# Patient Record
Sex: MALE | Race: WHITE | NOT HISPANIC OR LATINO | Employment: OTHER | URBAN - METROPOLITAN AREA
[De-identification: names, ages, dates, MRNs, and addresses within clinical notes are randomized per-mention and may not be internally consistent; named-entity substitution may affect disease eponyms.]

---

## 2017-01-05 ENCOUNTER — COMMUNICATION - HEALTHEAST (OUTPATIENT)
Dept: CARDIOLOGY | Facility: CLINIC | Age: 67
End: 2017-01-05

## 2017-01-05 DIAGNOSIS — E87.6 HYPOKALEMIA: ICD-10-CM

## 2017-02-07 ENCOUNTER — AMBULATORY - HEALTHEAST (OUTPATIENT)
Dept: CARDIOLOGY | Facility: CLINIC | Age: 67
End: 2017-02-07

## 2017-02-08 ENCOUNTER — COMMUNICATION - HEALTHEAST (OUTPATIENT)
Dept: CARDIOLOGY | Facility: CLINIC | Age: 67
End: 2017-02-08

## 2017-02-09 ENCOUNTER — OFFICE VISIT - HEALTHEAST (OUTPATIENT)
Dept: CARDIOLOGY | Facility: CLINIC | Age: 67
End: 2017-02-09

## 2017-02-09 DIAGNOSIS — Z86.79 HX OF BACTERIAL ENDOCARDITIS: ICD-10-CM

## 2017-02-09 DIAGNOSIS — E78.5 DYSLIPIDEMIA: ICD-10-CM

## 2017-02-09 DIAGNOSIS — I25.10 CORONARY ARTERY DISEASE INVOLVING NATIVE CORONARY ARTERY OF NATIVE HEART WITHOUT ANGINA PECTORIS: ICD-10-CM

## 2017-02-09 DIAGNOSIS — I48.19 PERSISTENT ATRIAL FIBRILLATION (H): ICD-10-CM

## 2017-02-09 DIAGNOSIS — R50.9 FEVER, UNSPECIFIED FEVER CAUSE: ICD-10-CM

## 2017-02-09 DIAGNOSIS — Z95.2 S/P MVR (MITRAL VALVE REPLACEMENT): ICD-10-CM

## 2017-02-09 LAB
ATRIAL RATE - MUSE: 92 BPM
DIASTOLIC BLOOD PRESSURE - MUSE: NORMAL MMHG
INTERPRETATION ECG - MUSE: NORMAL
P AXIS - MUSE: NORMAL DEGREES
PR INTERVAL - MUSE: NORMAL MS
QRS DURATION - MUSE: 88 MS
QT - MUSE: 372 MS
QTC - MUSE: 494 MS
R AXIS - MUSE: 68 DEGREES
SYSTOLIC BLOOD PRESSURE - MUSE: NORMAL MMHG
T AXIS - MUSE: 86 DEGREES
VENTRICULAR RATE- MUSE: 106 BPM

## 2017-02-09 ASSESSMENT — MIFFLIN-ST. JEOR: SCORE: 1587.36

## 2017-02-16 ENCOUNTER — COMMUNICATION - HEALTHEAST (OUTPATIENT)
Dept: CARDIOLOGY | Facility: CLINIC | Age: 67
End: 2017-02-16

## 2017-02-16 ENCOUNTER — INFUSION - HEALTHEAST (OUTPATIENT)
Dept: INFUSION THERAPY | Age: 67
End: 2017-02-16

## 2017-02-16 DIAGNOSIS — I33.0 SUBACUTE BACTERIAL ENDOCARDITIS: ICD-10-CM

## 2017-02-16 DIAGNOSIS — Z95.2 S/P MVR (MITRAL VALVE REPLACEMENT): ICD-10-CM

## 2017-02-17 ENCOUNTER — RECORDS - HEALTHEAST (OUTPATIENT)
Dept: ADMINISTRATIVE | Facility: OTHER | Age: 67
End: 2017-02-17

## 2017-02-17 ENCOUNTER — INFUSION - HEALTHEAST (OUTPATIENT)
Dept: INFUSION THERAPY | Age: 67
End: 2017-02-17

## 2017-02-17 DIAGNOSIS — I33.0 SUBACUTE BACTERIAL ENDOCARDITIS: ICD-10-CM

## 2017-02-18 ENCOUNTER — INFUSION - HEALTHEAST (OUTPATIENT)
Dept: INFUSION THERAPY | Age: 67
End: 2017-02-18

## 2017-02-18 DIAGNOSIS — I33.0 SUBACUTE BACTERIAL ENDOCARDITIS: ICD-10-CM

## 2017-02-19 ENCOUNTER — INFUSION - HEALTHEAST (OUTPATIENT)
Dept: INFUSION THERAPY | Age: 67
End: 2017-02-19

## 2017-02-19 DIAGNOSIS — I33.0 SUBACUTE BACTERIAL ENDOCARDITIS: ICD-10-CM

## 2017-02-20 ENCOUNTER — INFUSION - HEALTHEAST (OUTPATIENT)
Dept: INFUSION THERAPY | Age: 67
End: 2017-02-20

## 2017-02-20 DIAGNOSIS — I33.0 SUBACUTE BACTERIAL ENDOCARDITIS: ICD-10-CM

## 2017-02-21 ENCOUNTER — INFUSION - HEALTHEAST (OUTPATIENT)
Dept: INFUSION THERAPY | Age: 67
End: 2017-02-21

## 2017-02-21 DIAGNOSIS — I33.0 SUBACUTE BACTERIAL ENDOCARDITIS: ICD-10-CM

## 2017-02-22 ENCOUNTER — INFUSION - HEALTHEAST (OUTPATIENT)
Dept: INFUSION THERAPY | Age: 67
End: 2017-02-22

## 2017-02-22 DIAGNOSIS — I33.0 SUBACUTE BACTERIAL ENDOCARDITIS: ICD-10-CM

## 2017-02-23 ENCOUNTER — INFUSION - HEALTHEAST (OUTPATIENT)
Dept: INFUSION THERAPY | Age: 67
End: 2017-02-23

## 2017-02-23 DIAGNOSIS — I33.0 SUBACUTE BACTERIAL ENDOCARDITIS: ICD-10-CM

## 2017-02-24 ENCOUNTER — AMBULATORY - HEALTHEAST (OUTPATIENT)
Dept: INFUSION THERAPY | Age: 67
End: 2017-02-24

## 2017-02-24 ENCOUNTER — INFUSION - HEALTHEAST (OUTPATIENT)
Dept: INFUSION THERAPY | Age: 67
End: 2017-02-24

## 2017-02-24 DIAGNOSIS — I33.0 SUBACUTE BACTERIAL ENDOCARDITIS: ICD-10-CM

## 2017-02-25 ENCOUNTER — INFUSION - HEALTHEAST (OUTPATIENT)
Dept: INFUSION THERAPY | Age: 67
End: 2017-02-25

## 2017-02-25 DIAGNOSIS — I33.0 SUBACUTE BACTERIAL ENDOCARDITIS: ICD-10-CM

## 2017-02-26 ENCOUNTER — INFUSION - HEALTHEAST (OUTPATIENT)
Dept: INFUSION THERAPY | Age: 67
End: 2017-02-26

## 2017-02-26 DIAGNOSIS — I33.0 SUBACUTE BACTERIAL ENDOCARDITIS: ICD-10-CM

## 2017-02-27 ENCOUNTER — INFUSION - HEALTHEAST (OUTPATIENT)
Dept: INFUSION THERAPY | Age: 67
End: 2017-02-27

## 2017-02-27 DIAGNOSIS — I33.0 SUBACUTE BACTERIAL ENDOCARDITIS: ICD-10-CM

## 2017-02-28 ENCOUNTER — INFUSION - HEALTHEAST (OUTPATIENT)
Dept: INFUSION THERAPY | Age: 67
End: 2017-02-28

## 2017-02-28 DIAGNOSIS — I33.0 SUBACUTE BACTERIAL ENDOCARDITIS: ICD-10-CM

## 2017-03-01 ENCOUNTER — OFFICE VISIT - HEALTHEAST (OUTPATIENT)
Dept: INTERNAL MEDICINE | Facility: CLINIC | Age: 67
End: 2017-03-01

## 2017-03-01 ENCOUNTER — INFUSION - HEALTHEAST (OUTPATIENT)
Dept: INFUSION THERAPY | Age: 67
End: 2017-03-01

## 2017-03-01 DIAGNOSIS — I33.0 SUBACUTE BACTERIAL ENDOCARDITIS: ICD-10-CM

## 2017-03-01 DIAGNOSIS — Z95.2 S/P MVR (MITRAL VALVE REPLACEMENT): ICD-10-CM

## 2017-03-01 DIAGNOSIS — I25.10 CORONARY ARTERY DISEASE INVOLVING NATIVE CORONARY ARTERY OF NATIVE HEART WITHOUT ANGINA PECTORIS: ICD-10-CM

## 2017-03-01 DIAGNOSIS — E78.5 DYSLIPIDEMIA: ICD-10-CM

## 2017-03-01 ASSESSMENT — MIFFLIN-ST. JEOR: SCORE: 1559.12

## 2017-03-02 ENCOUNTER — INFUSION - HEALTHEAST (OUTPATIENT)
Dept: INFUSION THERAPY | Age: 67
End: 2017-03-02

## 2017-03-02 DIAGNOSIS — I33.0 SUBACUTE BACTERIAL ENDOCARDITIS: ICD-10-CM

## 2017-03-03 ENCOUNTER — HOSPITAL ENCOUNTER (OUTPATIENT)
Dept: RADIOLOGY | Facility: CLINIC | Age: 67
Discharge: HOME | End: 2017-03-03
Attending: INTERNAL MEDICINE

## 2017-03-03 ENCOUNTER — INFUSION - HEALTHEAST (OUTPATIENT)
Dept: INFUSION THERAPY | Age: 67
End: 2017-03-03

## 2017-03-03 DIAGNOSIS — R06.02 SHORTNESS OF BREATH: ICD-10-CM

## 2017-03-03 DIAGNOSIS — I33.0 SBE (SUBACUTE BACTERIAL ENDOCARDITIS): ICD-10-CM

## 2017-03-04 ENCOUNTER — INFUSION - HEALTHEAST (OUTPATIENT)
Dept: INFUSION THERAPY | Age: 67
End: 2017-03-04

## 2017-03-04 DIAGNOSIS — I33.0 SBE (SUBACUTE BACTERIAL ENDOCARDITIS): ICD-10-CM

## 2017-03-05 ENCOUNTER — INFUSION - HEALTHEAST (OUTPATIENT)
Dept: INFUSION THERAPY | Age: 67
End: 2017-03-05

## 2017-03-05 DIAGNOSIS — R69 DIAGNOSIS UNKNOWN: ICD-10-CM

## 2017-03-05 DIAGNOSIS — I33.0 SBE (SUBACUTE BACTERIAL ENDOCARDITIS): ICD-10-CM

## 2017-03-09 ENCOUNTER — INFUSION - HEALTHEAST (OUTPATIENT)
Dept: INFUSION THERAPY | Age: 67
End: 2017-03-09

## 2017-03-09 DIAGNOSIS — I33.0 SBE (SUBACUTE BACTERIAL ENDOCARDITIS): ICD-10-CM

## 2017-03-10 ENCOUNTER — INFUSION - HEALTHEAST (OUTPATIENT)
Dept: INFUSION THERAPY | Age: 67
End: 2017-03-10

## 2017-03-10 ENCOUNTER — COMMUNICATION - HEALTHEAST (OUTPATIENT)
Dept: CARDIOLOGY | Facility: CLINIC | Age: 67
End: 2017-03-10

## 2017-03-10 DIAGNOSIS — I33.0 SBE (SUBACUTE BACTERIAL ENDOCARDITIS): ICD-10-CM

## 2017-03-11 ENCOUNTER — INFUSION - HEALTHEAST (OUTPATIENT)
Dept: INFUSION THERAPY | Age: 67
End: 2017-03-11

## 2017-03-11 DIAGNOSIS — I33.0 SBE (SUBACUTE BACTERIAL ENDOCARDITIS): ICD-10-CM

## 2017-03-12 ENCOUNTER — INFUSION - HEALTHEAST (OUTPATIENT)
Dept: INFUSION THERAPY | Age: 67
End: 2017-03-12

## 2017-03-12 DIAGNOSIS — I33.0 SBE (SUBACUTE BACTERIAL ENDOCARDITIS): ICD-10-CM

## 2017-03-13 ENCOUNTER — ANESTHESIA - HEALTHEAST (OUTPATIENT)
Dept: SURGERY | Facility: CLINIC | Age: 67
End: 2017-03-13

## 2017-03-13 ENCOUNTER — SURGERY - HEALTHEAST (OUTPATIENT)
Dept: CARDIOLOGY | Facility: CLINIC | Age: 67
End: 2017-03-13

## 2017-03-13 ENCOUNTER — INFUSION - HEALTHEAST (OUTPATIENT)
Dept: INFUSION THERAPY | Age: 67
End: 2017-03-13

## 2017-03-13 DIAGNOSIS — I33.0 SBE (SUBACUTE BACTERIAL ENDOCARDITIS): ICD-10-CM

## 2017-03-13 DIAGNOSIS — I48.91 A-FIB (H): ICD-10-CM

## 2017-03-13 DIAGNOSIS — I33.0 VEGETATIVE ENDOCARDITIS OF MITRAL VALVE: ICD-10-CM

## 2017-03-14 ENCOUNTER — INFUSION - HEALTHEAST (OUTPATIENT)
Dept: INFUSION THERAPY | Age: 67
End: 2017-03-14

## 2017-03-14 DIAGNOSIS — I33.0 SBE (SUBACUTE BACTERIAL ENDOCARDITIS): ICD-10-CM

## 2017-03-15 ENCOUNTER — AMBULATORY - HEALTHEAST (OUTPATIENT)
Dept: SURGERY | Facility: CLINIC | Age: 67
End: 2017-03-15

## 2017-03-15 ENCOUNTER — INFUSION - HEALTHEAST (OUTPATIENT)
Dept: INFUSION THERAPY | Age: 67
End: 2017-03-15

## 2017-03-15 ENCOUNTER — HOSPITAL ENCOUNTER (OUTPATIENT)
Dept: SURGERY | Facility: CLINIC | Age: 67
Discharge: HOME | End: 2017-03-15

## 2017-03-15 DIAGNOSIS — I33.0 SBE (SUBACUTE BACTERIAL ENDOCARDITIS): ICD-10-CM

## 2017-03-15 DIAGNOSIS — I34.1 MITRAL VALVE PROLAPSE: ICD-10-CM

## 2017-03-15 DIAGNOSIS — G47.00 INSOMNIA: ICD-10-CM

## 2017-03-15 DIAGNOSIS — I33.0 VEGETATIVE ENDOCARDITIS OF MITRAL VALVE: ICD-10-CM

## 2017-03-15 DIAGNOSIS — I48.91 A-FIB (H): ICD-10-CM

## 2017-03-15 LAB
ATRIAL RATE - MUSE: 90 BPM
DIASTOLIC BLOOD PRESSURE - MUSE: NORMAL MMHG
INTERPRETATION ECG - MUSE: NORMAL
P AXIS - MUSE: NORMAL DEGREES
PR INTERVAL - MUSE: NORMAL MS
QRS DURATION - MUSE: 82 MS
QT - MUSE: 364 MS
QTC - MUSE: 455 MS
R AXIS - MUSE: 82 DEGREES
SYSTOLIC BLOOD PRESSURE - MUSE: NORMAL MMHG
T AXIS - MUSE: 43 DEGREES
VENTRICULAR RATE- MUSE: 94 BPM

## 2017-03-15 ASSESSMENT — MIFFLIN-ST. JEOR: SCORE: 1562.25

## 2017-03-16 ENCOUNTER — SURGERY - HEALTHEAST (OUTPATIENT)
Dept: SURGERY | Facility: CLINIC | Age: 67
End: 2017-03-16

## 2017-03-16 ENCOUNTER — HOSPITAL ENCOUNTER (OUTPATIENT)
Dept: CARDIOLOGY | Facility: CLINIC | Age: 67
Discharge: HOME | End: 2017-03-16

## 2017-03-16 ENCOUNTER — HOSPITAL ENCOUNTER (INPATIENT)
Dept: INTENSIVE CARE | Facility: CLINIC | Age: 67
Discharge: NURSING, REHAB OR LONG-TERM CARE | End: 2017-03-24
Attending: SURGERY | Admitting: SURGERY
Payer: MEDICARE

## 2017-03-16 DIAGNOSIS — I33.0 VEGETATIVE ENDOCARDITIS OF MITRAL VALVE: ICD-10-CM

## 2017-03-16 ASSESSMENT — MIFFLIN-ST. JEOR
SCORE: 1543.25
SCORE: 1543.25

## 2017-03-17 LAB
ATRIAL RATE - MUSE: 25 BPM
ATRIAL RATE - MUSE: 83 BPM
BLD PROD TYP BPU: NORMAL
BLOOD EXPIRATION DATE: NORMAL
BLOOD TYPE: 1700
BLOOD TYPE: 1700
BLOOD TYPE: 7300
CODING SYSTEM: NORMAL
COMPONENT (HISTORICAL CONVERSION): NORMAL
CROSSMATCH: NORMAL
DIASTOLIC BLOOD PRESSURE - MUSE: NORMAL MMHG
DIASTOLIC BLOOD PRESSURE - MUSE: NORMAL MMHG
INTERPRETATION ECG - MUSE: NORMAL
INTERPRETATION ECG - MUSE: NORMAL
ISSUE DATE AND TIME: NORMAL
LAB AP CHARGES (HE HISTORICAL CONVERSION): NORMAL
P AXIS - MUSE: 184 DEGREES
P AXIS - MUSE: NORMAL DEGREES
PATH REPORT.COMMENTS IMP SPEC: NORMAL
PATH REPORT.FINAL DX SPEC: NORMAL
PATH REPORT.GROSS SPEC: NORMAL
PATH REPORT.MICROSCOPIC SPEC OTHER STN: NORMAL
PATH REPORT.RELEVANT HX SPEC: NORMAL
PR INTERVAL - MUSE: NORMAL MS
PR INTERVAL - MUSE: NORMAL MS
QRS DURATION - MUSE: 82 MS
QRS DURATION - MUSE: 90 MS
QT - MUSE: 390 MS
QT - MUSE: 574 MS
QTC - MUSE: 456 MS
QTC - MUSE: 487 MS
R AXIS - MUSE: 67 DEGREES
R AXIS - MUSE: 80 DEGREES
RESULT FLAG (HE HISTORICAL CONVERSION): NORMAL
STATUS (HISTORICAL CONVERSION): NORMAL
SYSTOLIC BLOOD PRESSURE - MUSE: NORMAL MMHG
SYSTOLIC BLOOD PRESSURE - MUSE: NORMAL MMHG
T AXIS - MUSE: 19 DEGREES
T AXIS - MUSE: 58 DEGREES
UNIT ABO/RH (HISTORICAL CONVERSION): NORMAL
UNIT NUMBER: NORMAL
VENTRICULAR RATE- MUSE: 38 BPM
VENTRICULAR RATE- MUSE: 94 BPM

## 2017-03-17 ASSESSMENT — MIFFLIN-ST. JEOR
SCORE: 1606.25
SCORE: 1606.25

## 2017-03-19 LAB
BLD PROD TYP BPU: NORMAL
BLOOD EXPIRATION DATE: NORMAL
BLOOD TYPE: 7300
CODING SYSTEM: NORMAL
COMPONENT (HISTORICAL CONVERSION): NORMAL
CROSSMATCH: NORMAL
HBA1C MFR BLD: 5.4 % (ref 4.2–6.1)
STATUS (HISTORICAL CONVERSION): NORMAL
UNIT ABO/RH (HISTORICAL CONVERSION): NORMAL
UNIT NUMBER: NORMAL

## 2017-03-19 ASSESSMENT — MIFFLIN-ST. JEOR
SCORE: 1606.25
SCORE: 1606.25

## 2017-03-21 LAB
ATRIAL RATE - MUSE: 83 BPM
DIASTOLIC BLOOD PRESSURE - MUSE: NORMAL MMHG
INTERPRETATION ECG - MUSE: NORMAL
P AXIS - MUSE: NORMAL DEGREES
PR INTERVAL - MUSE: NORMAL MS
QRS DURATION - MUSE: 88 MS
QT - MUSE: 550 MS
QTC - MUSE: 672 MS
R AXIS - MUSE: 87 DEGREES
SYSTOLIC BLOOD PRESSURE - MUSE: NORMAL MMHG
T AXIS - MUSE: 84 DEGREES
VENTRICULAR RATE- MUSE: 90 BPM

## 2017-03-21 ASSESSMENT — MIFFLIN-ST. JEOR
SCORE: 1592.25
SCORE: 1592.25

## 2017-03-22 ASSESSMENT — MIFFLIN-ST. JEOR
SCORE: 1554.25
SCORE: 1554.25

## 2017-03-23 LAB
AORTIC VALVE MEAN VELOCITY: 79.9 CM/S
AORTIC VALVE MEAN VELOCITY: 79.9 CM/S
AV MEAN GRADIENT: 3 MMHG
AV MEAN GRADIENT: 3 MMHG
BLD PROD TYP BPU: NORMAL
BLOOD EXPIRATION DATE: NORMAL
BLOOD TYPE: 7300
CODING SYSTEM: NORMAL
COMPONENT (HISTORICAL CONVERSION): NORMAL
CROSSMATCH: NORMAL
DOP CALC AO PEAK VEL: 132 CM/S
DOP CALC AO PEAK VEL: 132 CM/S
DOP CALC AO VTI: 24.7 CM
DOP CALC AO VTI: 24.7 CM
DOP CALC LVOT AREA: 3.8 CM2
DOP CALC LVOT DIAMETER: 2.2 CM
DOP CALC LVOT PEAK VEL: 112 CM/S
DOP CALC LVOT STROKE VOLUME: 83.2 CM3
DOP CALC MV VTI: 123 CM
DOP CALC MV VTI: 123 CM
DOP CALCLVOT PEAK VEL VTI: 21.9 CM
ISSUE DATE AND TIME: NORMAL
LEFT VENTRICULAR OUTFLOW TRACT MEAN GRADIENT: 3 MMHG
LEFT VENTRICULAR OUTFLOW TRACT MEAN VELOCITY: 71.8 CM/S
LEFT VENTRICULAR OUTFLOW TRACT PEAK GRADIENT: 5 MMHG
MAIN PULMONARY ARTERY: 2.7 CM
MAIN PULMONARY ARTERY: 2.7 CM
MITRAL VALVE DECELERATION SLOPE: 2240 MM/S2
MITRAL VALVE DECELERATION SLOPE: 2240 MM/S2
MITRAL VALVE MEAN INFLOW VELOCITY: 156 CM/S
MITRAL VALVE MEAN INFLOW VELOCITY: 156 CM/S
MITRAL VALVE PEAK VELOCITY: 266 CM/S
MITRAL VALVE PEAK VELOCITY: 266 CM/S
MITRAL VALVE PRESSURE HALF-TIME: 260 MS
MV MEAN GRADIENT: 11 MMHG
MV MEAN GRADIENT: 11 MMHG
MV VALVE AREA PRESSURE 1/2 METHOD: 0.8 CM2
PV MEAN GRADIENT: 1 MMHG
PV MEAN GRADIENT: 1 MMHG
PV MEAN VELOCITY: 42.8 CM/S
PV MEAN VELOCITY: 42.8 CM/S
PV VELOCITY TIME INTERVAL: 18.2 CM
PV VELOCITY TIME INTERVAL: 18.2 CM
PV VMAX: 72.2 CM/S
PV VMAX: 72.2 CM/S
STATUS (HISTORICAL CONVERSION): NORMAL
UNIT ABO/RH (HISTORICAL CONVERSION): NORMAL
UNIT NUMBER: NORMAL

## 2017-03-23 ASSESSMENT — MIFFLIN-ST. JEOR
SCORE: 1557.3
SCORE: 1557.3

## 2017-03-24 ASSESSMENT — MIFFLIN-ST. JEOR
SCORE: 1554.58
SCORE: 1554.58

## 2017-03-27 ENCOUNTER — AMBULATORY - HEALTHEAST (OUTPATIENT)
Dept: GERIATRICS | Facility: CLINIC | Age: 67
End: 2017-03-27

## 2017-03-28 ENCOUNTER — OFFICE VISIT - HEALTHEAST (OUTPATIENT)
Dept: GERIATRICS | Facility: CLINIC | Age: 67
End: 2017-03-28

## 2017-03-28 DIAGNOSIS — I38 PROSTHETIC VALVE ENDOCARDITIS, SUBSEQUENT ENCOUNTER: ICD-10-CM

## 2017-03-28 DIAGNOSIS — J96.01 ACUTE RESPIRATORY FAILURE WITH HYPOXIA (H): ICD-10-CM

## 2017-03-28 DIAGNOSIS — Z95.4 S/P MITRAL VALVE REPLACEMENT WITH METALLIC VALVE: ICD-10-CM

## 2017-03-28 DIAGNOSIS — I50.21 ACUTE SYSTOLIC CONGESTIVE HEART FAILURE (H): ICD-10-CM

## 2017-03-28 DIAGNOSIS — Z86.79 STATUS POST MAZE OPERATION FOR ATRIAL FIBRILLATION: ICD-10-CM

## 2017-03-28 DIAGNOSIS — Z86.79 HX OF BACTERIAL ENDOCARDITIS: ICD-10-CM

## 2017-03-28 DIAGNOSIS — I34.1 MITRAL VALVE PROLAPSE: ICD-10-CM

## 2017-03-28 DIAGNOSIS — I48.0 PAROXYSMAL ATRIAL FIBRILLATION (H): ICD-10-CM

## 2017-03-28 DIAGNOSIS — D62 ACUTE BLOOD LOSS ANEMIA: ICD-10-CM

## 2017-03-28 DIAGNOSIS — Z95.2 S/P MVR (MITRAL VALVE REPLACEMENT): ICD-10-CM

## 2017-03-28 DIAGNOSIS — N17.9 ACUTE RENAL FAILURE, UNSPECIFIED ACUTE RENAL FAILURE TYPE (H): ICD-10-CM

## 2017-03-28 DIAGNOSIS — Z98.890 STATUS POST MAZE OPERATION FOR ATRIAL FIBRILLATION: ICD-10-CM

## 2017-03-28 DIAGNOSIS — I25.10 CORONARY ARTERY DISEASE INVOLVING NATIVE CORONARY ARTERY OF NATIVE HEART WITHOUT ANGINA PECTORIS: ICD-10-CM

## 2017-03-28 DIAGNOSIS — T82.6XXD PROSTHETIC VALVE ENDOCARDITIS, SUBSEQUENT ENCOUNTER: ICD-10-CM

## 2017-03-28 DIAGNOSIS — E78.5 DYSLIPIDEMIA: ICD-10-CM

## 2017-03-28 DIAGNOSIS — F34.1 DYSTHYMIA: ICD-10-CM

## 2017-03-29 ENCOUNTER — AMBULATORY - HEALTHEAST (OUTPATIENT)
Dept: GERIATRICS | Facility: CLINIC | Age: 67
End: 2017-03-29

## 2017-03-30 ENCOUNTER — OFFICE VISIT - HEALTHEAST (OUTPATIENT)
Dept: GERIATRICS | Facility: CLINIC | Age: 67
End: 2017-03-30

## 2017-03-30 ENCOUNTER — RECORDS - HEALTHEAST (OUTPATIENT)
Dept: ADMINISTRATIVE | Facility: OTHER | Age: 67
End: 2017-03-30

## 2017-03-30 DIAGNOSIS — G47.00 INSOMNIA: ICD-10-CM

## 2017-03-30 DIAGNOSIS — Z86.79 HX OF BACTERIAL ENDOCARDITIS: ICD-10-CM

## 2017-03-30 DIAGNOSIS — Z95.2 S/P MVR (MITRAL VALVE REPLACEMENT): ICD-10-CM

## 2017-03-30 DIAGNOSIS — D62 ACUTE BLOOD LOSS ANEMIA: ICD-10-CM

## 2017-03-30 DIAGNOSIS — T82.6XXD PROSTHETIC VALVE ENDOCARDITIS, SUBSEQUENT ENCOUNTER: ICD-10-CM

## 2017-03-30 DIAGNOSIS — I38 PROSTHETIC VALVE ENDOCARDITIS, SUBSEQUENT ENCOUNTER: ICD-10-CM

## 2017-03-30 DIAGNOSIS — Z98.890 STATUS POST MAZE OPERATION FOR ATRIAL FIBRILLATION: ICD-10-CM

## 2017-03-30 DIAGNOSIS — I25.10 CORONARY ARTERY DISEASE INVOLVING NATIVE CORONARY ARTERY OF NATIVE HEART WITHOUT ANGINA PECTORIS: ICD-10-CM

## 2017-03-30 DIAGNOSIS — E78.5 DYSLIPIDEMIA: ICD-10-CM

## 2017-03-30 DIAGNOSIS — I50.21 ACUTE SYSTOLIC CONGESTIVE HEART FAILURE (H): ICD-10-CM

## 2017-03-30 DIAGNOSIS — N17.9 ACUTE RENAL FAILURE, UNSPECIFIED ACUTE RENAL FAILURE TYPE (H): ICD-10-CM

## 2017-03-30 DIAGNOSIS — I48.0 PAROXYSMAL ATRIAL FIBRILLATION (H): ICD-10-CM

## 2017-03-30 DIAGNOSIS — Z86.79 STATUS POST MAZE OPERATION FOR ATRIAL FIBRILLATION: ICD-10-CM

## 2017-03-30 DIAGNOSIS — Z95.4 S/P MITRAL VALVE REPLACEMENT WITH METALLIC VALVE: ICD-10-CM

## 2017-03-31 ENCOUNTER — AMBULATORY - HEALTHEAST (OUTPATIENT)
Dept: INTERNAL MEDICINE | Facility: CLINIC | Age: 67
End: 2017-03-31

## 2017-03-31 ENCOUNTER — AMBULATORY - HEALTHEAST (OUTPATIENT)
Dept: GERIATRICS | Facility: CLINIC | Age: 67
End: 2017-03-31

## 2017-03-31 ENCOUNTER — COMMUNICATION - HEALTHEAST (OUTPATIENT)
Dept: INTERNAL MEDICINE | Facility: CLINIC | Age: 67
End: 2017-03-31

## 2017-03-31 ENCOUNTER — HOME CARE/HOSPICE - HEALTHEAST (OUTPATIENT)
Dept: HOME HEALTH SERVICES | Facility: HOME HEALTH | Age: 67
End: 2017-03-31

## 2017-03-31 ENCOUNTER — COMMUNICATION - HEALTHEAST (OUTPATIENT)
Dept: GERIATRICS | Facility: CLINIC | Age: 67
End: 2017-03-31

## 2017-03-31 ENCOUNTER — RECORDS - HEALTHEAST (OUTPATIENT)
Dept: ADMINISTRATIVE | Facility: OTHER | Age: 67
End: 2017-03-31

## 2017-03-31 ENCOUNTER — INFUSION - HEALTHEAST (OUTPATIENT)
Dept: INFUSION THERAPY | Age: 67
End: 2017-03-31

## 2017-03-31 DIAGNOSIS — I33.0 SBE (SUBACUTE BACTERIAL ENDOCARDITIS): ICD-10-CM

## 2017-03-31 DIAGNOSIS — Z95.2 H/O MITRAL VALVE REPLACEMENT: ICD-10-CM

## 2017-04-01 ENCOUNTER — INFUSION - HEALTHEAST (OUTPATIENT)
Dept: INFUSION THERAPY | Age: 67
End: 2017-04-01

## 2017-04-01 DIAGNOSIS — I33.0 SBE (SUBACUTE BACTERIAL ENDOCARDITIS): ICD-10-CM

## 2017-04-02 ENCOUNTER — INFUSION - HEALTHEAST (OUTPATIENT)
Dept: INFUSION THERAPY | Age: 67
End: 2017-04-02

## 2017-04-02 DIAGNOSIS — I33.0 SBE (SUBACUTE BACTERIAL ENDOCARDITIS): ICD-10-CM

## 2017-04-03 ENCOUNTER — COMMUNICATION - HEALTHEAST (OUTPATIENT)
Dept: INTERNAL MEDICINE | Facility: CLINIC | Age: 67
End: 2017-04-03

## 2017-04-03 ENCOUNTER — COMMUNICATION - HEALTHEAST (OUTPATIENT)
Dept: NURSING | Facility: CLINIC | Age: 67
End: 2017-04-03

## 2017-04-03 ENCOUNTER — INFUSION - HEALTHEAST (OUTPATIENT)
Dept: INFUSION THERAPY | Age: 67
End: 2017-04-03

## 2017-04-03 DIAGNOSIS — I48.0 PAROXYSMAL ATRIAL FIBRILLATION (H): ICD-10-CM

## 2017-04-03 DIAGNOSIS — Z79.01 LONG TERM (CURRENT) USE OF ANTICOAGULANTS: ICD-10-CM

## 2017-04-03 DIAGNOSIS — I33.0 SBE (SUBACUTE BACTERIAL ENDOCARDITIS): ICD-10-CM

## 2017-04-03 DIAGNOSIS — Z95.4 S/P MITRAL VALVE REPLACEMENT WITH METALLIC VALVE: ICD-10-CM

## 2017-04-03 DIAGNOSIS — J90 RECURRENT PLEURAL EFFUSION ON RIGHT: ICD-10-CM

## 2017-04-03 DIAGNOSIS — Z95.2 H/O MITRAL VALVE REPLACEMENT: ICD-10-CM

## 2017-04-03 DIAGNOSIS — Z95.2 S/P MVR (MITRAL VALVE REPLACEMENT): ICD-10-CM

## 2017-04-04 ENCOUNTER — INFUSION - HEALTHEAST (OUTPATIENT)
Dept: INFUSION THERAPY | Age: 67
End: 2017-04-04

## 2017-04-04 DIAGNOSIS — Z95.2 H/O MITRAL VALVE REPLACEMENT: ICD-10-CM

## 2017-04-04 DIAGNOSIS — I33.0 SBE (SUBACUTE BACTERIAL ENDOCARDITIS): ICD-10-CM

## 2017-04-05 ENCOUNTER — INFUSION - HEALTHEAST (OUTPATIENT)
Dept: INFUSION THERAPY | Age: 67
End: 2017-04-05

## 2017-04-05 ENCOUNTER — OFFICE VISIT - HEALTHEAST (OUTPATIENT)
Dept: INTERNAL MEDICINE | Facility: CLINIC | Age: 67
End: 2017-04-05

## 2017-04-05 DIAGNOSIS — Z95.2 S/P MVR (MITRAL VALVE REPLACEMENT): ICD-10-CM

## 2017-04-05 DIAGNOSIS — D62 ACUTE BLOOD LOSS ANEMIA: ICD-10-CM

## 2017-04-05 DIAGNOSIS — T82.6XXD PROSTHETIC VALVE ENDOCARDITIS, SUBSEQUENT ENCOUNTER: ICD-10-CM

## 2017-04-05 DIAGNOSIS — I33.0 SBE (SUBACUTE BACTERIAL ENDOCARDITIS): ICD-10-CM

## 2017-04-05 DIAGNOSIS — I25.10 CORONARY ARTERY DISEASE INVOLVING NATIVE CORONARY ARTERY OF NATIVE HEART WITHOUT ANGINA PECTORIS: ICD-10-CM

## 2017-04-05 DIAGNOSIS — I38 PROSTHETIC VALVE ENDOCARDITIS, SUBSEQUENT ENCOUNTER: ICD-10-CM

## 2017-04-05 ASSESSMENT — MIFFLIN-ST. JEOR: SCORE: 1559.12

## 2017-04-06 ENCOUNTER — COMMUNICATION - HEALTHEAST (OUTPATIENT)
Dept: NURSING | Facility: CLINIC | Age: 67
End: 2017-04-06

## 2017-04-06 ENCOUNTER — INFUSION - HEALTHEAST (OUTPATIENT)
Dept: INFUSION THERAPY | Age: 67
End: 2017-04-06

## 2017-04-06 DIAGNOSIS — I48.0 PAROXYSMAL ATRIAL FIBRILLATION (H): ICD-10-CM

## 2017-04-06 DIAGNOSIS — Z95.2 S/P MVR (MITRAL VALVE REPLACEMENT): ICD-10-CM

## 2017-04-06 DIAGNOSIS — Z79.01 LONG TERM (CURRENT) USE OF ANTICOAGULANTS: ICD-10-CM

## 2017-04-06 DIAGNOSIS — I33.0 SBE (SUBACUTE BACTERIAL ENDOCARDITIS): ICD-10-CM

## 2017-04-06 DIAGNOSIS — Z95.4 S/P MITRAL VALVE REPLACEMENT WITH METALLIC VALVE: ICD-10-CM

## 2017-04-07 ENCOUNTER — INFUSION - HEALTHEAST (OUTPATIENT)
Dept: INFUSION THERAPY | Age: 67
End: 2017-04-07

## 2017-04-07 DIAGNOSIS — I33.0 SBE (SUBACUTE BACTERIAL ENDOCARDITIS): ICD-10-CM

## 2017-04-07 DIAGNOSIS — Z95.4 S/P MITRAL VALVE REPLACEMENT WITH METALLIC VALVE: ICD-10-CM

## 2017-04-08 ENCOUNTER — INFUSION - HEALTHEAST (OUTPATIENT)
Dept: INFUSION THERAPY | Age: 67
End: 2017-04-08

## 2017-04-08 DIAGNOSIS — Z95.4 S/P MITRAL VALVE REPLACEMENT WITH METALLIC VALVE: ICD-10-CM

## 2017-04-08 DIAGNOSIS — I33.0 SBE (SUBACUTE BACTERIAL ENDOCARDITIS): ICD-10-CM

## 2017-04-09 ENCOUNTER — INFUSION - HEALTHEAST (OUTPATIENT)
Dept: INFUSION THERAPY | Age: 67
End: 2017-04-09

## 2017-04-09 DIAGNOSIS — Z95.4 S/P MITRAL VALVE REPLACEMENT WITH METALLIC VALVE: ICD-10-CM

## 2017-04-09 DIAGNOSIS — I33.0 SBE (SUBACUTE BACTERIAL ENDOCARDITIS): ICD-10-CM

## 2017-04-10 ENCOUNTER — COMMUNICATION - HEALTHEAST (OUTPATIENT)
Dept: NURSING | Facility: CLINIC | Age: 67
End: 2017-04-10

## 2017-04-10 ENCOUNTER — INFUSION - HEALTHEAST (OUTPATIENT)
Dept: INFUSION THERAPY | Age: 67
End: 2017-04-10

## 2017-04-10 DIAGNOSIS — Z95.4 S/P MITRAL VALVE REPLACEMENT WITH METALLIC VALVE: ICD-10-CM

## 2017-04-10 DIAGNOSIS — I33.0 SBE (SUBACUTE BACTERIAL ENDOCARDITIS): ICD-10-CM

## 2017-04-10 DIAGNOSIS — Z95.2 H/O MITRAL VALVE REPLACEMENT: ICD-10-CM

## 2017-04-10 DIAGNOSIS — Z95.2 S/P MVR (MITRAL VALVE REPLACEMENT): ICD-10-CM

## 2017-04-10 DIAGNOSIS — I48.0 PAROXYSMAL ATRIAL FIBRILLATION (H): ICD-10-CM

## 2017-04-11 ENCOUNTER — INFUSION - HEALTHEAST (OUTPATIENT)
Dept: INFUSION THERAPY | Age: 67
End: 2017-04-11

## 2017-04-11 DIAGNOSIS — I33.0 SBE (SUBACUTE BACTERIAL ENDOCARDITIS): ICD-10-CM

## 2017-04-11 DIAGNOSIS — Z95.4 S/P MITRAL VALVE REPLACEMENT WITH METALLIC VALVE: ICD-10-CM

## 2017-04-12 ENCOUNTER — INFUSION - HEALTHEAST (OUTPATIENT)
Dept: INFUSION THERAPY | Age: 67
End: 2017-04-12

## 2017-04-12 DIAGNOSIS — I33.0 SBE (SUBACUTE BACTERIAL ENDOCARDITIS): ICD-10-CM

## 2017-04-13 ENCOUNTER — INFUSION - HEALTHEAST (OUTPATIENT)
Dept: INFUSION THERAPY | Age: 67
End: 2017-04-13

## 2017-04-13 ENCOUNTER — COMMUNICATION - HEALTHEAST (OUTPATIENT)
Dept: NURSING | Facility: CLINIC | Age: 67
End: 2017-04-13

## 2017-04-13 DIAGNOSIS — Z95.2 S/P MVR (MITRAL VALVE REPLACEMENT): ICD-10-CM

## 2017-04-13 DIAGNOSIS — I48.0 PAROXYSMAL ATRIAL FIBRILLATION (H): ICD-10-CM

## 2017-04-13 DIAGNOSIS — I33.0 SBE (SUBACUTE BACTERIAL ENDOCARDITIS): ICD-10-CM

## 2017-04-13 DIAGNOSIS — Z95.4 S/P MITRAL VALVE REPLACEMENT WITH METALLIC VALVE: ICD-10-CM

## 2017-04-13 DIAGNOSIS — Z95.2 H/O MITRAL VALVE REPLACEMENT: ICD-10-CM

## 2017-04-14 ENCOUNTER — OFFICE VISIT - HEALTHEAST (OUTPATIENT)
Dept: CARDIOLOGY | Facility: CLINIC | Age: 67
End: 2017-04-14

## 2017-04-14 ENCOUNTER — INFUSION - HEALTHEAST (OUTPATIENT)
Dept: INFUSION THERAPY | Age: 67
End: 2017-04-14

## 2017-04-14 DIAGNOSIS — T82.6XXD PROSTHETIC VALVE ENDOCARDITIS, SUBSEQUENT ENCOUNTER: ICD-10-CM

## 2017-04-14 DIAGNOSIS — Z95.4 S/P MITRAL VALVE REPLACEMENT WITH METALLIC VALVE: ICD-10-CM

## 2017-04-14 DIAGNOSIS — I38 PROSTHETIC VALVE ENDOCARDITIS, SUBSEQUENT ENCOUNTER: ICD-10-CM

## 2017-04-14 DIAGNOSIS — I33.0 SBE (SUBACUTE BACTERIAL ENDOCARDITIS): ICD-10-CM

## 2017-04-14 ASSESSMENT — MIFFLIN-ST. JEOR: SCORE: 1527.36

## 2017-04-15 ENCOUNTER — INFUSION - HEALTHEAST (OUTPATIENT)
Dept: INFUSION THERAPY | Age: 67
End: 2017-04-15

## 2017-04-15 DIAGNOSIS — Z95.4 S/P MITRAL VALVE REPLACEMENT WITH METALLIC VALVE: ICD-10-CM

## 2017-04-15 DIAGNOSIS — I33.0 SBE (SUBACUTE BACTERIAL ENDOCARDITIS): ICD-10-CM

## 2017-04-16 ENCOUNTER — INFUSION - HEALTHEAST (OUTPATIENT)
Dept: INFUSION THERAPY | Age: 67
End: 2017-04-16

## 2017-04-16 DIAGNOSIS — I33.0 SBE (SUBACUTE BACTERIAL ENDOCARDITIS): ICD-10-CM

## 2017-04-16 DIAGNOSIS — Z95.4 S/P MITRAL VALVE REPLACEMENT WITH METALLIC VALVE: ICD-10-CM

## 2017-04-17 ENCOUNTER — AMBULATORY - HEALTHEAST (OUTPATIENT)
Dept: INFUSION THERAPY | Age: 67
End: 2017-04-17

## 2017-04-17 ENCOUNTER — INFUSION - HEALTHEAST (OUTPATIENT)
Dept: INFUSION THERAPY | Age: 67
End: 2017-04-17

## 2017-04-17 DIAGNOSIS — I33.0 SBE (SUBACUTE BACTERIAL ENDOCARDITIS): ICD-10-CM

## 2017-04-17 DIAGNOSIS — Z95.4 S/P MITRAL VALVE REPLACEMENT WITH METALLIC VALVE: ICD-10-CM

## 2017-04-18 ENCOUNTER — COMMUNICATION - HEALTHEAST (OUTPATIENT)
Dept: NURSING | Facility: CLINIC | Age: 67
End: 2017-04-18

## 2017-04-18 ENCOUNTER — INFUSION - HEALTHEAST (OUTPATIENT)
Dept: INFUSION THERAPY | Age: 67
End: 2017-04-18

## 2017-04-18 DIAGNOSIS — I48.0 PAROXYSMAL ATRIAL FIBRILLATION (H): ICD-10-CM

## 2017-04-18 DIAGNOSIS — I33.0 SBE (SUBACUTE BACTERIAL ENDOCARDITIS): ICD-10-CM

## 2017-04-18 DIAGNOSIS — Z95.4 S/P MITRAL VALVE REPLACEMENT WITH METALLIC VALVE: ICD-10-CM

## 2017-04-18 DIAGNOSIS — Z95.2 S/P MVR (MITRAL VALVE REPLACEMENT): ICD-10-CM

## 2017-04-18 DIAGNOSIS — Z79.01 LONG TERM (CURRENT) USE OF ANTICOAGULANTS: ICD-10-CM

## 2017-04-19 ENCOUNTER — INFUSION - HEALTHEAST (OUTPATIENT)
Dept: INFUSION THERAPY | Age: 67
End: 2017-04-19

## 2017-04-19 DIAGNOSIS — Z95.4 S/P MITRAL VALVE REPLACEMENT WITH METALLIC VALVE: ICD-10-CM

## 2017-04-19 DIAGNOSIS — I33.0 SBE (SUBACUTE BACTERIAL ENDOCARDITIS): ICD-10-CM

## 2017-04-20 ENCOUNTER — OFFICE VISIT - HEALTHEAST (OUTPATIENT)
Dept: INFECTIOUS DISEASES | Facility: CLINIC | Age: 67
End: 2017-04-20

## 2017-04-20 ENCOUNTER — INFUSION - HEALTHEAST (OUTPATIENT)
Dept: INFUSION THERAPY | Age: 67
End: 2017-04-20

## 2017-04-20 DIAGNOSIS — Z95.4 S/P MITRAL VALVE REPLACEMENT WITH METALLIC VALVE: ICD-10-CM

## 2017-04-20 DIAGNOSIS — I33.0 SBE (SUBACUTE BACTERIAL ENDOCARDITIS): ICD-10-CM

## 2017-04-20 ASSESSMENT — MIFFLIN-ST. JEOR: SCORE: 1521.47

## 2017-04-21 ENCOUNTER — INFUSION - HEALTHEAST (OUTPATIENT)
Dept: INFUSION THERAPY | Age: 67
End: 2017-04-21

## 2017-04-21 DIAGNOSIS — I33.0 SBE (SUBACUTE BACTERIAL ENDOCARDITIS): ICD-10-CM

## 2017-04-21 DIAGNOSIS — Z95.4 S/P MITRAL VALVE REPLACEMENT WITH METALLIC VALVE: ICD-10-CM

## 2017-04-22 ENCOUNTER — INFUSION - HEALTHEAST (OUTPATIENT)
Dept: INFUSION THERAPY | Age: 67
End: 2017-04-22

## 2017-04-22 DIAGNOSIS — I33.0 SBE (SUBACUTE BACTERIAL ENDOCARDITIS): ICD-10-CM

## 2017-04-22 DIAGNOSIS — Z95.4 S/P MITRAL VALVE REPLACEMENT WITH METALLIC VALVE: ICD-10-CM

## 2017-04-22 DIAGNOSIS — I33.0 VEGETATIVE ENDOCARDITIS OF MITRAL VALVE: ICD-10-CM

## 2017-04-24 ENCOUNTER — INFUSION - HEALTHEAST (OUTPATIENT)
Dept: INFUSION THERAPY | Age: 67
End: 2017-04-24

## 2017-04-24 DIAGNOSIS — I33.0 SBE (SUBACUTE BACTERIAL ENDOCARDITIS): ICD-10-CM

## 2017-04-24 DIAGNOSIS — Z95.4 S/P MITRAL VALVE REPLACEMENT WITH METALLIC VALVE: ICD-10-CM

## 2017-04-25 ENCOUNTER — INFUSION - HEALTHEAST (OUTPATIENT)
Dept: INFUSION THERAPY | Age: 67
End: 2017-04-25

## 2017-04-25 ENCOUNTER — COMMUNICATION - HEALTHEAST (OUTPATIENT)
Dept: NURSING | Facility: CLINIC | Age: 67
End: 2017-04-25

## 2017-04-25 DIAGNOSIS — I33.0 SBE (SUBACUTE BACTERIAL ENDOCARDITIS): ICD-10-CM

## 2017-04-25 DIAGNOSIS — Z95.2 S/P MVR (MITRAL VALVE REPLACEMENT): ICD-10-CM

## 2017-04-25 DIAGNOSIS — Z95.4 S/P MITRAL VALVE REPLACEMENT WITH METALLIC VALVE: ICD-10-CM

## 2017-04-25 DIAGNOSIS — I48.0 PAROXYSMAL ATRIAL FIBRILLATION (H): ICD-10-CM

## 2017-04-25 DIAGNOSIS — Z79.01 LONG TERM (CURRENT) USE OF ANTICOAGULANTS: ICD-10-CM

## 2017-04-26 ENCOUNTER — INFUSION - HEALTHEAST (OUTPATIENT)
Dept: INFUSION THERAPY | Age: 67
End: 2017-04-26

## 2017-04-26 DIAGNOSIS — I33.0 SBE (SUBACUTE BACTERIAL ENDOCARDITIS): ICD-10-CM

## 2017-04-26 DIAGNOSIS — I48.0 PAROXYSMAL ATRIAL FIBRILLATION (H): ICD-10-CM

## 2017-04-26 DIAGNOSIS — Z95.4 S/P MITRAL VALVE REPLACEMENT WITH METALLIC VALVE: ICD-10-CM

## 2017-04-27 ENCOUNTER — INFUSION - HEALTHEAST (OUTPATIENT)
Dept: INFUSION THERAPY | Age: 67
End: 2017-04-27

## 2017-04-27 DIAGNOSIS — Z95.4 S/P MITRAL VALVE REPLACEMENT WITH METALLIC VALVE: ICD-10-CM

## 2017-04-27 DIAGNOSIS — I33.0 SBE (SUBACUTE BACTERIAL ENDOCARDITIS): ICD-10-CM

## 2017-04-28 ENCOUNTER — INFUSION - HEALTHEAST (OUTPATIENT)
Dept: INFUSION THERAPY | Age: 67
End: 2017-04-28

## 2017-04-28 DIAGNOSIS — Z95.4 S/P MITRAL VALVE REPLACEMENT WITH METALLIC VALVE: ICD-10-CM

## 2017-04-28 DIAGNOSIS — I33.0 SBE (SUBACUTE BACTERIAL ENDOCARDITIS): ICD-10-CM

## 2017-04-28 LAB — BACTERIA SPEC CULT: NORMAL

## 2017-04-29 ENCOUNTER — INFUSION - HEALTHEAST (OUTPATIENT)
Dept: INFUSION THERAPY | Age: 67
End: 2017-04-29

## 2017-04-29 DIAGNOSIS — I33.0 SBE (SUBACUTE BACTERIAL ENDOCARDITIS): ICD-10-CM

## 2017-04-30 ENCOUNTER — INFUSION - HEALTHEAST (OUTPATIENT)
Dept: INFUSION THERAPY | Age: 67
End: 2017-04-30

## 2017-04-30 DIAGNOSIS — I33.0 SBE (SUBACUTE BACTERIAL ENDOCARDITIS): ICD-10-CM

## 2017-05-01 ENCOUNTER — COMMUNICATION - HEALTHEAST (OUTPATIENT)
Dept: NURSING | Facility: CLINIC | Age: 67
End: 2017-05-01

## 2017-05-01 ENCOUNTER — INFUSION - HEALTHEAST (OUTPATIENT)
Dept: INFUSION THERAPY | Age: 67
End: 2017-05-01

## 2017-05-01 DIAGNOSIS — Z95.2 S/P MVR (MITRAL VALVE REPLACEMENT): ICD-10-CM

## 2017-05-01 DIAGNOSIS — Z95.4 S/P MITRAL VALVE REPLACEMENT WITH METALLIC VALVE: ICD-10-CM

## 2017-05-01 DIAGNOSIS — I48.0 PAROXYSMAL ATRIAL FIBRILLATION (H): ICD-10-CM

## 2017-05-01 DIAGNOSIS — Z79.01 LONG TERM (CURRENT) USE OF ANTICOAGULANTS: ICD-10-CM

## 2017-05-01 DIAGNOSIS — I33.0 SBE (SUBACUTE BACTERIAL ENDOCARDITIS): ICD-10-CM

## 2017-05-02 ENCOUNTER — AMBULATORY - HEALTHEAST (OUTPATIENT)
Dept: CARDIOLOGY | Facility: CLINIC | Age: 67
End: 2017-05-02

## 2017-05-02 ENCOUNTER — AMBULATORY - HEALTHEAST (OUTPATIENT)
Dept: CARDIAC REHAB | Facility: CLINIC | Age: 67
End: 2017-05-02

## 2017-05-02 DIAGNOSIS — Z95.2 S/P MVR (MITRAL VALVE REPLACEMENT): ICD-10-CM

## 2017-05-02 DIAGNOSIS — R00.1 BRADYCARDIA: ICD-10-CM

## 2017-05-02 DIAGNOSIS — I33.0 VEGETATIVE ENDOCARDITIS OF MITRAL VALVE: ICD-10-CM

## 2017-05-02 DIAGNOSIS — I48.0 PAROXYSMAL ATRIAL FIBRILLATION (H): ICD-10-CM

## 2017-05-04 ENCOUNTER — COMMUNICATION - HEALTHEAST (OUTPATIENT)
Dept: INTERNAL MEDICINE | Facility: CLINIC | Age: 67
End: 2017-05-04

## 2017-05-05 ENCOUNTER — AMBULATORY - HEALTHEAST (OUTPATIENT)
Dept: CARDIAC REHAB | Facility: CLINIC | Age: 67
End: 2017-05-05

## 2017-05-05 ENCOUNTER — COMMUNICATION - HEALTHEAST (OUTPATIENT)
Dept: NURSING | Facility: CLINIC | Age: 67
End: 2017-05-05

## 2017-05-05 ENCOUNTER — AMBULATORY - HEALTHEAST (OUTPATIENT)
Dept: LAB | Facility: CLINIC | Age: 67
End: 2017-05-05

## 2017-05-05 DIAGNOSIS — Z95.4 S/P MITRAL VALVE REPLACEMENT WITH METALLIC VALVE: ICD-10-CM

## 2017-05-05 DIAGNOSIS — I48.0 PAROXYSMAL ATRIAL FIBRILLATION (H): ICD-10-CM

## 2017-05-05 DIAGNOSIS — Z95.2 S/P MVR (MITRAL VALVE REPLACEMENT): ICD-10-CM

## 2017-05-05 DIAGNOSIS — Z79.01 LONG TERM (CURRENT) USE OF ANTICOAGULANTS: ICD-10-CM

## 2017-05-08 ENCOUNTER — AMBULATORY - HEALTHEAST (OUTPATIENT)
Dept: CARDIAC REHAB | Facility: CLINIC | Age: 67
End: 2017-05-08

## 2017-05-08 ENCOUNTER — HOSPITAL ENCOUNTER (OUTPATIENT)
Dept: CARDIOLOGY | Facility: CLINIC | Age: 67
Discharge: HOME | End: 2017-05-08
Attending: INTERNAL MEDICINE

## 2017-05-08 DIAGNOSIS — R00.1 BRADYCARDIA: ICD-10-CM

## 2017-05-08 DIAGNOSIS — I33.0 VEGETATIVE ENDOCARDITIS OF MITRAL VALVE: ICD-10-CM

## 2017-05-08 DIAGNOSIS — I48.0 PAROXYSMAL ATRIAL FIBRILLATION (H): ICD-10-CM

## 2017-05-08 DIAGNOSIS — Z95.2 S/P MVR (MITRAL VALVE REPLACEMENT): ICD-10-CM

## 2017-05-10 ENCOUNTER — AMBULATORY - HEALTHEAST (OUTPATIENT)
Dept: CARDIAC REHAB | Facility: CLINIC | Age: 67
End: 2017-05-10

## 2017-05-10 DIAGNOSIS — I33.0 VEGETATIVE ENDOCARDITIS OF MITRAL VALVE: ICD-10-CM

## 2017-05-10 DIAGNOSIS — Z95.2 S/P MVR (MITRAL VALVE REPLACEMENT): ICD-10-CM

## 2017-05-12 ENCOUNTER — AMBULATORY - HEALTHEAST (OUTPATIENT)
Dept: CARDIAC REHAB | Facility: CLINIC | Age: 67
End: 2017-05-12

## 2017-05-12 ENCOUNTER — AMBULATORY - HEALTHEAST (OUTPATIENT)
Dept: LAB | Facility: CLINIC | Age: 67
End: 2017-05-12

## 2017-05-12 ENCOUNTER — COMMUNICATION - HEALTHEAST (OUTPATIENT)
Dept: NURSING | Facility: CLINIC | Age: 67
End: 2017-05-12

## 2017-05-12 DIAGNOSIS — Z95.2 S/P MVR (MITRAL VALVE REPLACEMENT): ICD-10-CM

## 2017-05-12 DIAGNOSIS — I48.0 PAROXYSMAL ATRIAL FIBRILLATION (H): ICD-10-CM

## 2017-05-12 DIAGNOSIS — Z95.4 S/P MITRAL VALVE REPLACEMENT WITH METALLIC VALVE: ICD-10-CM

## 2017-05-12 DIAGNOSIS — Z79.01 LONG TERM (CURRENT) USE OF ANTICOAGULANTS: ICD-10-CM

## 2017-05-16 ENCOUNTER — AMBULATORY - HEALTHEAST (OUTPATIENT)
Dept: CARDIAC REHAB | Facility: CLINIC | Age: 67
End: 2017-05-16

## 2017-05-16 ENCOUNTER — OFFICE VISIT - HEALTHEAST (OUTPATIENT)
Dept: CARDIOLOGY | Facility: CLINIC | Age: 67
End: 2017-05-16

## 2017-05-16 DIAGNOSIS — I48.19 PERSISTENT ATRIAL FIBRILLATION (H): ICD-10-CM

## 2017-05-16 DIAGNOSIS — I25.10 CORONARY ARTERY DISEASE INVOLVING NATIVE CORONARY ARTERY OF NATIVE HEART WITHOUT ANGINA PECTORIS: ICD-10-CM

## 2017-05-16 DIAGNOSIS — Z95.4 S/P MITRAL VALVE REPLACEMENT WITH METALLIC VALVE: ICD-10-CM

## 2017-05-16 DIAGNOSIS — Z95.2 H/O MITRAL VALVE REPLACEMENT WITH MECHANICAL VALVE: ICD-10-CM

## 2017-05-16 DIAGNOSIS — Z86.79 HX OF BACTERIAL ENDOCARDITIS: ICD-10-CM

## 2017-05-16 DIAGNOSIS — I50.42 CHRONIC COMBINED SYSTOLIC AND DIASTOLIC CHF, NYHA CLASS 2 (H): ICD-10-CM

## 2017-05-16 DIAGNOSIS — Z95.2 S/P MVR (MITRAL VALVE REPLACEMENT): ICD-10-CM

## 2017-05-16 ASSESSMENT — MIFFLIN-ST. JEOR: SCORE: 1518.29

## 2017-05-19 ENCOUNTER — COMMUNICATION - HEALTHEAST (OUTPATIENT)
Dept: NURSING | Facility: CLINIC | Age: 67
End: 2017-05-19

## 2017-05-19 ENCOUNTER — AMBULATORY - HEALTHEAST (OUTPATIENT)
Dept: LAB | Facility: CLINIC | Age: 67
End: 2017-05-19

## 2017-05-19 DIAGNOSIS — I48.0 PAROXYSMAL ATRIAL FIBRILLATION (H): ICD-10-CM

## 2017-05-19 DIAGNOSIS — Z95.2 S/P MVR (MITRAL VALVE REPLACEMENT): ICD-10-CM

## 2017-05-19 DIAGNOSIS — Z79.01 LONG TERM (CURRENT) USE OF ANTICOAGULANTS: ICD-10-CM

## 2017-05-19 DIAGNOSIS — Z95.4 S/P MITRAL VALVE REPLACEMENT WITH METALLIC VALVE: ICD-10-CM

## 2017-05-23 ENCOUNTER — AMBULATORY - HEALTHEAST (OUTPATIENT)
Dept: CARDIAC REHAB | Facility: CLINIC | Age: 67
End: 2017-05-23

## 2017-05-23 DIAGNOSIS — Z86.79 HX OF BACTERIAL ENDOCARDITIS: ICD-10-CM

## 2017-05-23 DIAGNOSIS — Z95.2 S/P MVR (MITRAL VALVE REPLACEMENT): ICD-10-CM

## 2017-05-25 ENCOUNTER — OFFICE VISIT - HEALTHEAST (OUTPATIENT)
Dept: INFECTIOUS DISEASES | Facility: CLINIC | Age: 67
End: 2017-05-25

## 2017-05-25 ENCOUNTER — AMBULATORY - HEALTHEAST (OUTPATIENT)
Dept: CARDIAC REHAB | Facility: CLINIC | Age: 67
End: 2017-05-25

## 2017-05-25 DIAGNOSIS — Z95.2 S/P MVR (MITRAL VALVE REPLACEMENT): ICD-10-CM

## 2017-05-25 DIAGNOSIS — Z86.79 HX OF BACTERIAL ENDOCARDITIS: ICD-10-CM

## 2017-05-25 DIAGNOSIS — I38 PROSTHETIC VALVE ENDOCARDITIS, SUBSEQUENT ENCOUNTER: ICD-10-CM

## 2017-05-25 DIAGNOSIS — T82.6XXD PROSTHETIC VALVE ENDOCARDITIS, SUBSEQUENT ENCOUNTER: ICD-10-CM

## 2017-05-25 ASSESSMENT — MIFFLIN-ST. JEOR: SCORE: 1548.68

## 2017-05-26 ENCOUNTER — COMMUNICATION - HEALTHEAST (OUTPATIENT)
Dept: NURSING | Facility: CLINIC | Age: 67
End: 2017-05-26

## 2017-05-26 ENCOUNTER — AMBULATORY - HEALTHEAST (OUTPATIENT)
Dept: LAB | Facility: CLINIC | Age: 67
End: 2017-05-26

## 2017-05-26 DIAGNOSIS — Z79.01 LONG TERM (CURRENT) USE OF ANTICOAGULANTS: ICD-10-CM

## 2017-05-26 DIAGNOSIS — I48.0 PAROXYSMAL ATRIAL FIBRILLATION (H): ICD-10-CM

## 2017-05-26 DIAGNOSIS — Z95.4 S/P MITRAL VALVE REPLACEMENT WITH METALLIC VALVE: ICD-10-CM

## 2017-05-26 DIAGNOSIS — Z95.2 S/P MVR (MITRAL VALVE REPLACEMENT): ICD-10-CM

## 2017-05-30 ENCOUNTER — COMMUNICATION - HEALTHEAST (OUTPATIENT)
Dept: INTERNAL MEDICINE | Facility: CLINIC | Age: 67
End: 2017-05-30

## 2017-05-30 ENCOUNTER — AMBULATORY - HEALTHEAST (OUTPATIENT)
Dept: CARDIAC REHAB | Facility: CLINIC | Age: 67
End: 2017-05-30

## 2017-05-30 DIAGNOSIS — I33.0 VEGETATIVE ENDOCARDITIS OF MITRAL VALVE: ICD-10-CM

## 2017-05-30 DIAGNOSIS — Z95.4 S/P MITRAL VALVE REPLACEMENT WITH METALLIC VALVE: ICD-10-CM

## 2017-05-30 DIAGNOSIS — Z95.2 S/P MVR (MITRAL VALVE REPLACEMENT): ICD-10-CM

## 2017-05-30 DIAGNOSIS — I48.0 PAROXYSMAL ATRIAL FIBRILLATION (H): ICD-10-CM

## 2017-06-05 ENCOUNTER — AMBULATORY - HEALTHEAST (OUTPATIENT)
Dept: LAB | Facility: CLINIC | Age: 67
End: 2017-06-05

## 2017-06-05 ENCOUNTER — COMMUNICATION - HEALTHEAST (OUTPATIENT)
Dept: NURSING | Facility: CLINIC | Age: 67
End: 2017-06-05

## 2017-06-05 DIAGNOSIS — I48.0 PAROXYSMAL ATRIAL FIBRILLATION (H): ICD-10-CM

## 2017-06-05 DIAGNOSIS — Z79.01 LONG TERM (CURRENT) USE OF ANTICOAGULANTS: ICD-10-CM

## 2017-06-05 DIAGNOSIS — Z95.4 S/P MITRAL VALVE REPLACEMENT WITH METALLIC VALVE: ICD-10-CM

## 2017-06-05 DIAGNOSIS — Z95.2 S/P MVR (MITRAL VALVE REPLACEMENT): ICD-10-CM

## 2017-06-06 ENCOUNTER — AMBULATORY - HEALTHEAST (OUTPATIENT)
Dept: CARDIAC REHAB | Facility: CLINIC | Age: 67
End: 2017-06-06

## 2017-06-06 DIAGNOSIS — I33.0 VEGETATIVE ENDOCARDITIS OF MITRAL VALVE: ICD-10-CM

## 2017-06-06 DIAGNOSIS — Z95.2 S/P MVR (MITRAL VALVE REPLACEMENT): ICD-10-CM

## 2017-06-07 ENCOUNTER — COMMUNICATION - HEALTHEAST (OUTPATIENT)
Dept: INFECTIOUS DISEASES | Facility: CLINIC | Age: 67
End: 2017-06-07

## 2017-06-08 ENCOUNTER — AMBULATORY - HEALTHEAST (OUTPATIENT)
Dept: CARDIAC REHAB | Facility: CLINIC | Age: 67
End: 2017-06-08

## 2017-06-08 DIAGNOSIS — Z95.2 S/P MVR (MITRAL VALVE REPLACEMENT): ICD-10-CM

## 2017-06-13 ENCOUNTER — AMBULATORY - HEALTHEAST (OUTPATIENT)
Dept: LAB | Facility: CLINIC | Age: 67
End: 2017-06-13

## 2017-06-13 ENCOUNTER — AMBULATORY - HEALTHEAST (OUTPATIENT)
Dept: CARDIAC REHAB | Facility: CLINIC | Age: 67
End: 2017-06-13

## 2017-06-13 ENCOUNTER — COMMUNICATION - HEALTHEAST (OUTPATIENT)
Dept: NURSING | Facility: CLINIC | Age: 67
End: 2017-06-13

## 2017-06-13 DIAGNOSIS — Z95.2 S/P MVR (MITRAL VALVE REPLACEMENT): ICD-10-CM

## 2017-06-13 DIAGNOSIS — I48.0 PAROXYSMAL ATRIAL FIBRILLATION (H): ICD-10-CM

## 2017-06-13 DIAGNOSIS — Z79.01 LONG TERM (CURRENT) USE OF ANTICOAGULANTS: ICD-10-CM

## 2017-06-13 DIAGNOSIS — Z95.4 S/P MITRAL VALVE REPLACEMENT WITH METALLIC VALVE: ICD-10-CM

## 2017-06-16 ENCOUNTER — COMMUNICATION - HEALTHEAST (OUTPATIENT)
Dept: ADMINISTRATIVE | Facility: CLINIC | Age: 67
End: 2017-06-16

## 2017-06-20 ENCOUNTER — COMMUNICATION - HEALTHEAST (OUTPATIENT)
Dept: SCHEDULING | Facility: CLINIC | Age: 67
End: 2017-06-20

## 2017-06-22 ENCOUNTER — AMBULATORY - HEALTHEAST (OUTPATIENT)
Dept: CARDIAC REHAB | Facility: CLINIC | Age: 67
End: 2017-06-22

## 2017-06-22 DIAGNOSIS — Z95.2 S/P MVR (MITRAL VALVE REPLACEMENT): ICD-10-CM

## 2017-06-26 ENCOUNTER — COMMUNICATION - HEALTHEAST (OUTPATIENT)
Dept: CARDIOLOGY | Facility: CLINIC | Age: 67
End: 2017-06-26

## 2017-06-26 DIAGNOSIS — Z95.2 H/O MITRAL VALVE REPLACEMENT WITH MECHANICAL VALVE: ICD-10-CM

## 2017-06-27 ENCOUNTER — AMBULATORY - HEALTHEAST (OUTPATIENT)
Dept: CARDIAC REHAB | Facility: CLINIC | Age: 67
End: 2017-06-27

## 2017-06-27 DIAGNOSIS — Z95.2 S/P MVR (MITRAL VALVE REPLACEMENT): ICD-10-CM

## 2017-06-28 ENCOUNTER — AMBULATORY - HEALTHEAST (OUTPATIENT)
Dept: LAB | Facility: CLINIC | Age: 67
End: 2017-06-28

## 2017-06-28 ENCOUNTER — COMMUNICATION - HEALTHEAST (OUTPATIENT)
Dept: NURSING | Facility: CLINIC | Age: 67
End: 2017-06-28

## 2017-06-28 DIAGNOSIS — I48.0 PAROXYSMAL ATRIAL FIBRILLATION (H): ICD-10-CM

## 2017-06-28 DIAGNOSIS — Z95.4 S/P MITRAL VALVE REPLACEMENT WITH METALLIC VALVE: ICD-10-CM

## 2017-06-28 DIAGNOSIS — Z79.01 LONG TERM (CURRENT) USE OF ANTICOAGULANTS: ICD-10-CM

## 2017-06-28 DIAGNOSIS — Z95.2 S/P MVR (MITRAL VALVE REPLACEMENT): ICD-10-CM

## 2017-06-29 ENCOUNTER — AMBULATORY - HEALTHEAST (OUTPATIENT)
Dept: CARDIAC REHAB | Facility: CLINIC | Age: 67
End: 2017-06-29

## 2017-06-29 DIAGNOSIS — Z95.2 S/P MVR (MITRAL VALVE REPLACEMENT): ICD-10-CM

## 2017-06-30 ENCOUNTER — COMMUNICATION - HEALTHEAST (OUTPATIENT)
Dept: INTERNAL MEDICINE | Facility: CLINIC | Age: 67
End: 2017-06-30

## 2017-07-05 ENCOUNTER — COMMUNICATION - HEALTHEAST (OUTPATIENT)
Dept: INTERNAL MEDICINE | Facility: CLINIC | Age: 67
End: 2017-07-05

## 2017-07-05 ENCOUNTER — COMMUNICATION - HEALTHEAST (OUTPATIENT)
Dept: NURSING | Facility: CLINIC | Age: 67
End: 2017-07-05

## 2017-07-19 ENCOUNTER — COMMUNICATION - HEALTHEAST (OUTPATIENT)
Dept: ADMINISTRATIVE | Facility: CLINIC | Age: 67
End: 2017-07-19

## 2017-07-20 ENCOUNTER — AMBULATORY - HEALTHEAST (OUTPATIENT)
Dept: CARDIAC REHAB | Facility: CLINIC | Age: 67
End: 2017-07-20

## 2017-07-20 DIAGNOSIS — Z95.2 S/P MVR (MITRAL VALVE REPLACEMENT): ICD-10-CM

## 2017-07-21 ENCOUNTER — COMMUNICATION - HEALTHEAST (OUTPATIENT)
Dept: NURSING | Facility: CLINIC | Age: 67
End: 2017-07-21

## 2017-07-21 ENCOUNTER — COMMUNICATION - HEALTHEAST (OUTPATIENT)
Dept: CARDIOLOGY | Facility: CLINIC | Age: 67
End: 2017-07-21

## 2017-07-21 DIAGNOSIS — Z95.4 S/P MITRAL VALVE REPLACEMENT WITH METALLIC VALVE: ICD-10-CM

## 2017-07-21 DIAGNOSIS — Z95.2 H/O MITRAL VALVE REPLACEMENT WITH MECHANICAL VALVE: ICD-10-CM

## 2017-07-21 DIAGNOSIS — I48.0 PAROXYSMAL ATRIAL FIBRILLATION (H): ICD-10-CM

## 2017-07-21 DIAGNOSIS — Z79.01 LONG TERM (CURRENT) USE OF ANTICOAGULANTS: ICD-10-CM

## 2017-07-26 ENCOUNTER — COMMUNICATION - HEALTHEAST (OUTPATIENT)
Dept: NURSING | Facility: CLINIC | Age: 67
End: 2017-07-26

## 2017-07-26 ENCOUNTER — AMBULATORY - HEALTHEAST (OUTPATIENT)
Dept: LAB | Facility: CLINIC | Age: 67
End: 2017-07-26

## 2017-07-26 DIAGNOSIS — Z95.4 S/P MITRAL VALVE REPLACEMENT WITH METALLIC VALVE: ICD-10-CM

## 2017-07-26 DIAGNOSIS — I48.0 PAROXYSMAL ATRIAL FIBRILLATION (H): ICD-10-CM

## 2017-07-26 DIAGNOSIS — Z79.01 LONG TERM (CURRENT) USE OF ANTICOAGULANTS: ICD-10-CM

## 2017-07-26 DIAGNOSIS — Z95.2 S/P MVR (MITRAL VALVE REPLACEMENT): ICD-10-CM

## 2017-07-27 ENCOUNTER — AMBULATORY - HEALTHEAST (OUTPATIENT)
Dept: CARDIAC REHAB | Facility: CLINIC | Age: 67
End: 2017-07-27

## 2017-07-27 DIAGNOSIS — I33.0 VEGETATIVE ENDOCARDITIS OF MITRAL VALVE: ICD-10-CM

## 2017-07-27 DIAGNOSIS — Z95.2 S/P MVR (MITRAL VALVE REPLACEMENT): ICD-10-CM

## 2017-08-01 ENCOUNTER — AMBULATORY - HEALTHEAST (OUTPATIENT)
Dept: CARDIAC REHAB | Facility: CLINIC | Age: 67
End: 2017-08-01

## 2017-08-01 DIAGNOSIS — Z95.2 S/P MVR (MITRAL VALVE REPLACEMENT): ICD-10-CM

## 2017-08-01 DIAGNOSIS — I33.0 VEGETATIVE ENDOCARDITIS OF MITRAL VALVE: ICD-10-CM

## 2017-08-03 ENCOUNTER — AMBULATORY - HEALTHEAST (OUTPATIENT)
Dept: CARDIAC REHAB | Facility: CLINIC | Age: 67
End: 2017-08-03

## 2017-08-03 DIAGNOSIS — I33.0 VEGETATIVE ENDOCARDITIS OF MITRAL VALVE: ICD-10-CM

## 2017-08-03 DIAGNOSIS — Z95.2 S/P MVR (MITRAL VALVE REPLACEMENT): ICD-10-CM

## 2017-08-08 ENCOUNTER — AMBULATORY - HEALTHEAST (OUTPATIENT)
Dept: CARDIAC REHAB | Facility: CLINIC | Age: 67
End: 2017-08-08

## 2017-08-08 DIAGNOSIS — Z95.2 S/P MVR (MITRAL VALVE REPLACEMENT): ICD-10-CM

## 2017-08-08 DIAGNOSIS — I33.0 VEGETATIVE ENDOCARDITIS OF MITRAL VALVE: ICD-10-CM

## 2017-08-10 ENCOUNTER — AMBULATORY - HEALTHEAST (OUTPATIENT)
Dept: CARDIAC REHAB | Facility: CLINIC | Age: 67
End: 2017-08-10

## 2017-08-10 DIAGNOSIS — Z95.2 S/P MVR (MITRAL VALVE REPLACEMENT): ICD-10-CM

## 2017-08-10 DIAGNOSIS — I33.0 VEGETATIVE ENDOCARDITIS OF MITRAL VALVE: ICD-10-CM

## 2017-08-15 ENCOUNTER — AMBULATORY - HEALTHEAST (OUTPATIENT)
Dept: CARDIAC REHAB | Facility: CLINIC | Age: 67
End: 2017-08-15

## 2017-08-15 DIAGNOSIS — Z95.2 S/P MVR (MITRAL VALVE REPLACEMENT): ICD-10-CM

## 2017-08-17 ENCOUNTER — AMBULATORY - HEALTHEAST (OUTPATIENT)
Dept: CARDIAC REHAB | Facility: CLINIC | Age: 67
End: 2017-08-17

## 2017-08-17 DIAGNOSIS — Z95.2 S/P MVR (MITRAL VALVE REPLACEMENT): ICD-10-CM

## 2017-08-17 DIAGNOSIS — I33.0 VEGETATIVE ENDOCARDITIS OF MITRAL VALVE: ICD-10-CM

## 2017-08-23 ENCOUNTER — AMBULATORY - HEALTHEAST (OUTPATIENT)
Dept: LAB | Facility: CLINIC | Age: 67
End: 2017-08-23

## 2017-08-23 ENCOUNTER — COMMUNICATION - HEALTHEAST (OUTPATIENT)
Dept: INFECTIOUS DISEASES | Facility: CLINIC | Age: 67
End: 2017-08-23

## 2017-08-23 ENCOUNTER — COMMUNICATION - HEALTHEAST (OUTPATIENT)
Dept: NURSING | Facility: CLINIC | Age: 67
End: 2017-08-23

## 2017-08-23 DIAGNOSIS — I48.0 PAROXYSMAL ATRIAL FIBRILLATION (H): ICD-10-CM

## 2017-08-23 DIAGNOSIS — Z79.01 LONG TERM (CURRENT) USE OF ANTICOAGULANTS: ICD-10-CM

## 2017-08-23 DIAGNOSIS — Z95.4 S/P MITRAL VALVE REPLACEMENT WITH METALLIC VALVE: ICD-10-CM

## 2017-08-23 DIAGNOSIS — Z95.2 S/P MVR (MITRAL VALVE REPLACEMENT): ICD-10-CM

## 2017-08-29 ENCOUNTER — AMBULATORY - HEALTHEAST (OUTPATIENT)
Dept: CARDIAC REHAB | Facility: CLINIC | Age: 67
End: 2017-08-29

## 2017-08-29 DIAGNOSIS — Z95.2 S/P MVR (MITRAL VALVE REPLACEMENT): ICD-10-CM

## 2017-09-07 ENCOUNTER — AMBULATORY - HEALTHEAST (OUTPATIENT)
Dept: CARDIAC REHAB | Facility: CLINIC | Age: 67
End: 2017-09-07

## 2017-09-07 DIAGNOSIS — Z95.2 S/P MVR (MITRAL VALVE REPLACEMENT): ICD-10-CM

## 2017-09-12 ENCOUNTER — AMBULATORY - HEALTHEAST (OUTPATIENT)
Dept: CARDIAC REHAB | Facility: CLINIC | Age: 67
End: 2017-09-12

## 2017-09-12 DIAGNOSIS — Z95.2 S/P MVR (MITRAL VALVE REPLACEMENT): ICD-10-CM

## 2017-09-14 ENCOUNTER — AMBULATORY - HEALTHEAST (OUTPATIENT)
Dept: CARDIAC REHAB | Facility: CLINIC | Age: 67
End: 2017-09-14

## 2017-09-14 DIAGNOSIS — Z95.2 S/P MVR (MITRAL VALVE REPLACEMENT): ICD-10-CM

## 2017-09-23 ENCOUNTER — COMMUNICATION - HEALTHEAST (OUTPATIENT)
Dept: CARDIOLOGY | Facility: CLINIC | Age: 67
End: 2017-09-23

## 2017-09-23 DIAGNOSIS — E87.6 HYPOKALEMIA: ICD-10-CM

## 2017-09-26 ENCOUNTER — AMBULATORY - HEALTHEAST (OUTPATIENT)
Dept: CARDIAC REHAB | Facility: CLINIC | Age: 67
End: 2017-09-26

## 2017-09-26 DIAGNOSIS — Z95.2 S/P MVR (MITRAL VALVE REPLACEMENT): ICD-10-CM

## 2017-09-27 ENCOUNTER — COMMUNICATION - HEALTHEAST (OUTPATIENT)
Dept: NURSING | Facility: CLINIC | Age: 67
End: 2017-09-27

## 2017-09-27 ENCOUNTER — AMBULATORY - HEALTHEAST (OUTPATIENT)
Dept: LAB | Facility: CLINIC | Age: 67
End: 2017-09-27

## 2017-09-27 DIAGNOSIS — Z95.2 S/P MVR (MITRAL VALVE REPLACEMENT): ICD-10-CM

## 2017-09-27 DIAGNOSIS — Z79.01 LONG TERM (CURRENT) USE OF ANTICOAGULANTS: ICD-10-CM

## 2017-09-27 DIAGNOSIS — Z95.4 S/P MITRAL VALVE REPLACEMENT WITH METALLIC VALVE: ICD-10-CM

## 2017-09-27 DIAGNOSIS — I48.0 PAROXYSMAL ATRIAL FIBRILLATION (H): ICD-10-CM

## 2017-09-28 ENCOUNTER — AMBULATORY - HEALTHEAST (OUTPATIENT)
Dept: CARDIAC REHAB | Facility: CLINIC | Age: 67
End: 2017-09-28

## 2017-09-28 DIAGNOSIS — Z95.2 S/P MVR (MITRAL VALVE REPLACEMENT): ICD-10-CM

## 2017-10-02 ENCOUNTER — COMMUNICATION - HEALTHEAST (OUTPATIENT)
Dept: INTERNAL MEDICINE | Facility: CLINIC | Age: 67
End: 2017-10-02

## 2017-10-02 DIAGNOSIS — Z86.79 HX OF BACTERIAL ENDOCARDITIS: ICD-10-CM

## 2017-10-02 DIAGNOSIS — I48.0 PAROXYSMAL ATRIAL FIBRILLATION (H): ICD-10-CM

## 2017-10-02 DIAGNOSIS — Z95.4 S/P MITRAL VALVE REPLACEMENT WITH METALLIC VALVE: ICD-10-CM

## 2017-10-02 DIAGNOSIS — I48.19 PERSISTENT ATRIAL FIBRILLATION (H): ICD-10-CM

## 2017-10-03 ENCOUNTER — AMBULATORY - HEALTHEAST (OUTPATIENT)
Dept: CARDIAC REHAB | Facility: CLINIC | Age: 67
End: 2017-10-03

## 2017-10-03 DIAGNOSIS — Z95.2 S/P MVR (MITRAL VALVE REPLACEMENT): ICD-10-CM

## 2017-10-05 ENCOUNTER — OFFICE VISIT - HEALTHEAST (OUTPATIENT)
Dept: INFECTIOUS DISEASES | Facility: CLINIC | Age: 67
End: 2017-10-05

## 2017-10-05 DIAGNOSIS — Z86.79 HX OF BACTERIAL ENDOCARDITIS: ICD-10-CM

## 2017-10-05 ASSESSMENT — MIFFLIN-ST. JEOR: SCORE: 1567.28

## 2017-10-10 ENCOUNTER — AMBULATORY - HEALTHEAST (OUTPATIENT)
Dept: CARDIAC REHAB | Facility: CLINIC | Age: 67
End: 2017-10-10

## 2017-10-10 DIAGNOSIS — I33.0 VEGETATIVE ENDOCARDITIS OF MITRAL VALVE: ICD-10-CM

## 2017-10-10 DIAGNOSIS — Z95.2 S/P MVR (MITRAL VALVE REPLACEMENT): ICD-10-CM

## 2017-10-11 ENCOUNTER — RECORDS - HEALTHEAST (OUTPATIENT)
Dept: ADMINISTRATIVE | Facility: OTHER | Age: 67
End: 2017-10-11

## 2017-10-12 ENCOUNTER — AMBULATORY - HEALTHEAST (OUTPATIENT)
Dept: CARDIAC REHAB | Facility: CLINIC | Age: 67
End: 2017-10-12

## 2017-10-12 DIAGNOSIS — Z95.2 S/P MVR (MITRAL VALVE REPLACEMENT): ICD-10-CM

## 2017-10-12 DIAGNOSIS — I33.0 VEGETATIVE ENDOCARDITIS OF MITRAL VALVE: ICD-10-CM

## 2017-10-17 ENCOUNTER — AMBULATORY - HEALTHEAST (OUTPATIENT)
Dept: CARDIAC REHAB | Facility: CLINIC | Age: 67
End: 2017-10-17

## 2017-10-17 DIAGNOSIS — I33.0 VEGETATIVE ENDOCARDITIS OF MITRAL VALVE: ICD-10-CM

## 2017-10-17 DIAGNOSIS — Z95.2 S/P MVR (MITRAL VALVE REPLACEMENT): ICD-10-CM

## 2017-10-17 DIAGNOSIS — J90 RECURRENT PLEURAL EFFUSION ON RIGHT: ICD-10-CM

## 2017-10-18 ENCOUNTER — OFFICE VISIT - HEALTHEAST (OUTPATIENT)
Dept: CARDIOLOGY | Facility: CLINIC | Age: 67
End: 2017-10-18

## 2017-10-18 DIAGNOSIS — I10 ESSENTIAL HYPERTENSION: ICD-10-CM

## 2017-10-18 DIAGNOSIS — Z95.4 S/P MITRAL VALVE REPLACEMENT WITH METALLIC VALVE: ICD-10-CM

## 2017-10-18 DIAGNOSIS — I50.42 CHRONIC COMBINED SYSTOLIC AND DIASTOLIC CONGESTIVE HEART FAILURE (H): ICD-10-CM

## 2017-10-18 DIAGNOSIS — I48.0 PAROXYSMAL ATRIAL FIBRILLATION (H): ICD-10-CM

## 2017-10-18 DIAGNOSIS — Z98.890 STATUS POST MAZE OPERATION FOR ATRIAL FIBRILLATION: ICD-10-CM

## 2017-10-18 DIAGNOSIS — Z86.79 STATUS POST MAZE OPERATION FOR ATRIAL FIBRILLATION: ICD-10-CM

## 2017-10-18 DIAGNOSIS — Z86.79 HX OF BACTERIAL ENDOCARDITIS: ICD-10-CM

## 2017-10-18 ASSESSMENT — MIFFLIN-ST. JEOR: SCORE: 1581.01

## 2017-10-24 ENCOUNTER — AMBULATORY - HEALTHEAST (OUTPATIENT)
Dept: LAB | Facility: CLINIC | Age: 67
End: 2017-10-24

## 2017-10-24 ENCOUNTER — COMMUNICATION - HEALTHEAST (OUTPATIENT)
Dept: NURSING | Facility: CLINIC | Age: 67
End: 2017-10-24

## 2017-10-24 DIAGNOSIS — Z79.01 LONG TERM CURRENT USE OF ANTICOAGULANT THERAPY: ICD-10-CM

## 2017-10-24 DIAGNOSIS — I48.0 PAROXYSMAL ATRIAL FIBRILLATION (H): ICD-10-CM

## 2017-10-24 DIAGNOSIS — Z95.2 S/P MVR (MITRAL VALVE REPLACEMENT): ICD-10-CM

## 2017-10-24 DIAGNOSIS — Z95.4 S/P MITRAL VALVE REPLACEMENT WITH METALLIC VALVE: ICD-10-CM

## 2017-11-21 ENCOUNTER — COMMUNICATION - HEALTHEAST (OUTPATIENT)
Dept: NURSING | Facility: CLINIC | Age: 67
End: 2017-11-21

## 2017-11-28 ENCOUNTER — AMBULATORY - HEALTHEAST (OUTPATIENT)
Dept: LAB | Facility: CLINIC | Age: 67
End: 2017-11-28

## 2017-11-28 ENCOUNTER — COMMUNICATION - HEALTHEAST (OUTPATIENT)
Dept: NURSING | Facility: CLINIC | Age: 67
End: 2017-11-28

## 2017-11-28 DIAGNOSIS — Z95.4 S/P MITRAL VALVE REPLACEMENT WITH METALLIC VALVE: ICD-10-CM

## 2017-11-28 DIAGNOSIS — Z79.01 LONG TERM CURRENT USE OF ANTICOAGULANT THERAPY: ICD-10-CM

## 2017-11-28 DIAGNOSIS — I48.0 PAROXYSMAL ATRIAL FIBRILLATION (H): ICD-10-CM

## 2017-11-28 DIAGNOSIS — Z95.2 S/P MVR (MITRAL VALVE REPLACEMENT): ICD-10-CM

## 2017-12-07 ENCOUNTER — COMMUNICATION - HEALTHEAST (OUTPATIENT)
Dept: INTERNAL MEDICINE | Facility: CLINIC | Age: 67
End: 2017-12-07

## 2018-01-03 ENCOUNTER — COMMUNICATION - HEALTHEAST (OUTPATIENT)
Dept: INTERNAL MEDICINE | Facility: CLINIC | Age: 68
End: 2018-01-03

## 2018-01-04 ENCOUNTER — OFFICE VISIT - HEALTHEAST (OUTPATIENT)
Dept: INTERNAL MEDICINE | Facility: CLINIC | Age: 68
End: 2018-01-04

## 2018-01-04 ENCOUNTER — COMMUNICATION - HEALTHEAST (OUTPATIENT)
Dept: INTERNAL MEDICINE | Facility: CLINIC | Age: 68
End: 2018-01-04

## 2018-01-04 DIAGNOSIS — Z12.5 PROSTATE CANCER SCREENING: ICD-10-CM

## 2018-01-04 DIAGNOSIS — E78.5 DYSLIPIDEMIA: ICD-10-CM

## 2018-01-04 DIAGNOSIS — I48.0 PAROXYSMAL ATRIAL FIBRILLATION (H): ICD-10-CM

## 2018-01-04 DIAGNOSIS — Z00.00 HEALTHCARE MAINTENANCE: ICD-10-CM

## 2018-01-04 DIAGNOSIS — Z95.2 S/P MVR (MITRAL VALVE REPLACEMENT): ICD-10-CM

## 2018-01-04 LAB
ALBUMIN SERPL-MCNC: 4.6 G/DL (ref 3.5–5)
ALP SERPL-CCNC: 82 U/L (ref 45–120)
ALT SERPL W P-5'-P-CCNC: 124 U/L (ref 0–45)
ANION GAP SERPL CALCULATED.3IONS-SCNC: 11 MMOL/L (ref 5–18)
AST SERPL W P-5'-P-CCNC: 164 U/L (ref 0–40)
BILIRUB SERPL-MCNC: 0.8 MG/DL (ref 0–1)
BUN SERPL-MCNC: 56 MG/DL (ref 8–22)
CALCIUM SERPL-MCNC: 10.4 MG/DL (ref 8.5–10.5)
CHLORIDE BLD-SCNC: 110 MMOL/L (ref 98–107)
CHOLEST SERPL-MCNC: 191 MG/DL
CO2 SERPL-SCNC: 18 MMOL/L (ref 22–31)
CREAT SERPL-MCNC: 1.63 MG/DL (ref 0.7–1.3)
FASTING STATUS PATIENT QL REPORTED: YES
GFR SERPL CREATININE-BSD FRML MDRD: 42 ML/MIN/1.73M2
GLUCOSE BLD-MCNC: 99 MG/DL (ref 70–125)
HDLC SERPL-MCNC: 65 MG/DL
LDLC SERPL CALC-MCNC: 113 MG/DL
POTASSIUM BLD-SCNC: 6.7 MMOL/L (ref 3.5–5)
PROT SERPL-MCNC: 8.8 G/DL (ref 6–8)
PSA SERPL-MCNC: 0.6 NG/ML (ref 0–4.5)
SODIUM SERPL-SCNC: 139 MMOL/L (ref 136–145)
TRIGL SERPL-MCNC: 65 MG/DL

## 2018-01-04 ASSESSMENT — MIFFLIN-ST. JEOR: SCORE: 1589.17

## 2018-01-05 ENCOUNTER — COMMUNICATION - HEALTHEAST (OUTPATIENT)
Dept: NURSING | Facility: CLINIC | Age: 68
End: 2018-01-05

## 2018-01-05 ENCOUNTER — AMBULATORY - HEALTHEAST (OUTPATIENT)
Dept: LAB | Facility: CLINIC | Age: 68
End: 2018-01-05

## 2018-01-05 ENCOUNTER — AMBULATORY - HEALTHEAST (OUTPATIENT)
Dept: INTERNAL MEDICINE | Facility: CLINIC | Age: 68
End: 2018-01-05

## 2018-01-05 DIAGNOSIS — E87.5 HYPERKALEMIA: ICD-10-CM

## 2018-01-05 DIAGNOSIS — Z95.2 S/P MVR (MITRAL VALVE REPLACEMENT): ICD-10-CM

## 2018-01-05 DIAGNOSIS — I48.0 PAROXYSMAL ATRIAL FIBRILLATION (H): ICD-10-CM

## 2018-01-05 DIAGNOSIS — Z79.01 LONG TERM CURRENT USE OF ANTICOAGULANT THERAPY: ICD-10-CM

## 2018-01-05 DIAGNOSIS — Z95.4 S/P MITRAL VALVE REPLACEMENT WITH METALLIC VALVE: ICD-10-CM

## 2018-01-05 LAB
INR PPP: 5 (ref 0.9–1.1)
POTASSIUM BLD-SCNC: 6.5 MMOL/L (ref 3.5–5)

## 2018-01-09 ENCOUNTER — COMMUNICATION - HEALTHEAST (OUTPATIENT)
Dept: NURSING | Facility: CLINIC | Age: 68
End: 2018-01-09

## 2018-01-09 ENCOUNTER — AMBULATORY - HEALTHEAST (OUTPATIENT)
Dept: LAB | Facility: CLINIC | Age: 68
End: 2018-01-09

## 2018-01-09 DIAGNOSIS — I48.0 PAROXYSMAL ATRIAL FIBRILLATION (H): ICD-10-CM

## 2018-01-09 DIAGNOSIS — E87.5 HYPERKALEMIA: ICD-10-CM

## 2018-01-09 DIAGNOSIS — Z95.4 S/P MITRAL VALVE REPLACEMENT WITH METALLIC VALVE: ICD-10-CM

## 2018-01-09 DIAGNOSIS — Z79.01 LONG TERM CURRENT USE OF ANTICOAGULANT THERAPY: ICD-10-CM

## 2018-01-09 DIAGNOSIS — Z95.2 S/P MVR (MITRAL VALVE REPLACEMENT): ICD-10-CM

## 2018-01-09 LAB
INR PPP: 1.7 (ref 0.9–1.1)
POTASSIUM BLD-SCNC: 5.3 MMOL/L (ref 3.5–5)

## 2018-01-10 ENCOUNTER — AMBULATORY - HEALTHEAST (OUTPATIENT)
Dept: INTERNAL MEDICINE | Facility: CLINIC | Age: 68
End: 2018-01-10

## 2018-01-10 DIAGNOSIS — E87.5 HYPERKALEMIA: ICD-10-CM

## 2018-01-17 ENCOUNTER — COMMUNICATION - HEALTHEAST (OUTPATIENT)
Dept: INTERNAL MEDICINE | Facility: CLINIC | Age: 68
End: 2018-01-17

## 2018-01-17 ENCOUNTER — AMBULATORY - HEALTHEAST (OUTPATIENT)
Dept: LAB | Facility: CLINIC | Age: 68
End: 2018-01-17

## 2018-01-17 ENCOUNTER — COMMUNICATION - HEALTHEAST (OUTPATIENT)
Dept: NURSING | Facility: CLINIC | Age: 68
End: 2018-01-17

## 2018-01-17 DIAGNOSIS — Z95.4 S/P MITRAL VALVE REPLACEMENT WITH METALLIC VALVE: ICD-10-CM

## 2018-01-17 DIAGNOSIS — I48.0 PAROXYSMAL ATRIAL FIBRILLATION (H): ICD-10-CM

## 2018-01-17 DIAGNOSIS — E87.5 HYPERKALEMIA: ICD-10-CM

## 2018-01-17 DIAGNOSIS — Z95.2 S/P MVR (MITRAL VALVE REPLACEMENT): ICD-10-CM

## 2018-01-17 DIAGNOSIS — Z79.01 LONG TERM CURRENT USE OF ANTICOAGULANT THERAPY: ICD-10-CM

## 2018-01-17 LAB
INR PPP: 2.1 (ref 0.9–1.1)
POTASSIUM BLD-SCNC: 4.9 MMOL/L (ref 3.5–5)

## 2018-01-19 ENCOUNTER — COMMUNICATION - HEALTHEAST (OUTPATIENT)
Dept: INTERNAL MEDICINE | Facility: CLINIC | Age: 68
End: 2018-01-19

## 2018-01-19 DIAGNOSIS — I48.0 PAROXYSMAL ATRIAL FIBRILLATION (H): ICD-10-CM

## 2018-01-19 DIAGNOSIS — Z86.79 HX OF BACTERIAL ENDOCARDITIS: ICD-10-CM

## 2018-01-19 DIAGNOSIS — I48.19 PERSISTENT ATRIAL FIBRILLATION (H): ICD-10-CM

## 2018-01-19 DIAGNOSIS — Z95.4 S/P MITRAL VALVE REPLACEMENT WITH METALLIC VALVE: ICD-10-CM

## 2018-01-25 ENCOUNTER — COMMUNICATION - HEALTHEAST (OUTPATIENT)
Dept: ADMINISTRATIVE | Facility: CLINIC | Age: 68
End: 2018-01-25

## 2018-01-26 ENCOUNTER — AMBULATORY - HEALTHEAST (OUTPATIENT)
Dept: LAB | Facility: CLINIC | Age: 68
End: 2018-01-26

## 2018-01-26 ENCOUNTER — COMMUNICATION - HEALTHEAST (OUTPATIENT)
Dept: NURSING | Facility: CLINIC | Age: 68
End: 2018-01-26

## 2018-01-26 DIAGNOSIS — Z95.4 S/P MITRAL VALVE REPLACEMENT WITH METALLIC VALVE: ICD-10-CM

## 2018-01-26 DIAGNOSIS — I48.0 PAROXYSMAL ATRIAL FIBRILLATION (H): ICD-10-CM

## 2018-01-26 DIAGNOSIS — Z79.01 LONG TERM CURRENT USE OF ANTICOAGULANT THERAPY: ICD-10-CM

## 2018-01-26 DIAGNOSIS — Z95.2 S/P MVR (MITRAL VALVE REPLACEMENT): ICD-10-CM

## 2018-01-26 LAB — INR PPP: 3.1 (ref 0.9–1.1)

## 2018-01-29 ENCOUNTER — COMMUNICATION - HEALTHEAST (OUTPATIENT)
Dept: CARDIOLOGY | Facility: CLINIC | Age: 68
End: 2018-01-29

## 2018-02-01 ENCOUNTER — COMMUNICATION - HEALTHEAST (OUTPATIENT)
Dept: NURSING | Facility: CLINIC | Age: 68
End: 2018-02-01

## 2018-02-01 DIAGNOSIS — I48.0 PAROXYSMAL ATRIAL FIBRILLATION (H): ICD-10-CM

## 2018-02-01 DIAGNOSIS — Z95.4 S/P MITRAL VALVE REPLACEMENT WITH METALLIC VALVE: ICD-10-CM

## 2018-02-08 ENCOUNTER — COMMUNICATION - HEALTHEAST (OUTPATIENT)
Dept: NURSING | Facility: CLINIC | Age: 68
End: 2018-02-08

## 2018-02-08 ENCOUNTER — AMBULATORY - HEALTHEAST (OUTPATIENT)
Dept: LAB | Facility: CLINIC | Age: 68
End: 2018-02-08

## 2018-02-08 DIAGNOSIS — Z95.2 S/P MVR (MITRAL VALVE REPLACEMENT): ICD-10-CM

## 2018-02-08 DIAGNOSIS — I48.0 PAROXYSMAL ATRIAL FIBRILLATION (H): ICD-10-CM

## 2018-02-08 DIAGNOSIS — Z95.4 S/P MITRAL VALVE REPLACEMENT WITH METALLIC VALVE: ICD-10-CM

## 2018-02-08 LAB — INR PPP: 1.5 (ref 0.9–1.1)

## 2018-02-16 ENCOUNTER — COMMUNICATION - HEALTHEAST (OUTPATIENT)
Dept: NURSING | Facility: CLINIC | Age: 68
End: 2018-02-16

## 2018-02-16 ENCOUNTER — AMBULATORY - HEALTHEAST (OUTPATIENT)
Dept: LAB | Facility: CLINIC | Age: 68
End: 2018-02-16

## 2018-02-16 DIAGNOSIS — Z95.4 S/P MITRAL VALVE REPLACEMENT WITH METALLIC VALVE: ICD-10-CM

## 2018-02-16 DIAGNOSIS — I48.0 PAROXYSMAL ATRIAL FIBRILLATION (H): ICD-10-CM

## 2018-02-16 DIAGNOSIS — Z95.2 S/P MVR (MITRAL VALVE REPLACEMENT): ICD-10-CM

## 2018-02-16 LAB — INR PPP: 2.4 (ref 0.9–1.1)

## 2018-02-26 ENCOUNTER — COMMUNICATION - HEALTHEAST (OUTPATIENT)
Dept: ENDOCRINOLOGY | Facility: CLINIC | Age: 68
End: 2018-02-26

## 2018-02-26 DIAGNOSIS — E87.6 HYPOKALEMIA: ICD-10-CM

## 2018-02-27 ENCOUNTER — AMBULATORY - HEALTHEAST (OUTPATIENT)
Dept: LAB | Facility: CLINIC | Age: 68
End: 2018-02-27

## 2018-02-27 ENCOUNTER — COMMUNICATION - HEALTHEAST (OUTPATIENT)
Dept: INTERNAL MEDICINE | Facility: CLINIC | Age: 68
End: 2018-02-27

## 2018-02-27 DIAGNOSIS — I48.0 PAROXYSMAL ATRIAL FIBRILLATION (H): ICD-10-CM

## 2018-02-27 DIAGNOSIS — Z95.2 S/P MVR (MITRAL VALVE REPLACEMENT): ICD-10-CM

## 2018-02-27 DIAGNOSIS — Z95.4 S/P MITRAL VALVE REPLACEMENT WITH METALLIC VALVE: ICD-10-CM

## 2018-02-27 LAB — INR PPP: 4.7 (ref 0.9–1.1)

## 2018-03-01 ENCOUNTER — AMBULATORY - HEALTHEAST (OUTPATIENT)
Dept: LAB | Facility: CLINIC | Age: 68
End: 2018-03-01

## 2018-03-01 ENCOUNTER — COMMUNICATION - HEALTHEAST (OUTPATIENT)
Dept: NURSING | Facility: CLINIC | Age: 68
End: 2018-03-01

## 2018-03-01 DIAGNOSIS — Z95.2 S/P MVR (MITRAL VALVE REPLACEMENT): ICD-10-CM

## 2018-03-01 DIAGNOSIS — I48.0 PAROXYSMAL ATRIAL FIBRILLATION (H): ICD-10-CM

## 2018-03-01 DIAGNOSIS — Z95.4 S/P MITRAL VALVE REPLACEMENT WITH METALLIC VALVE: ICD-10-CM

## 2018-03-01 LAB — INR PPP: 3 (ref 0.9–1.1)

## 2018-03-02 ENCOUNTER — COMMUNICATION - HEALTHEAST (OUTPATIENT)
Dept: INTERNAL MEDICINE | Facility: CLINIC | Age: 68
End: 2018-03-02

## 2018-03-02 DIAGNOSIS — E78.5 HLD (HYPERLIPIDEMIA): ICD-10-CM

## 2018-03-08 ENCOUNTER — AMBULATORY - HEALTHEAST (OUTPATIENT)
Dept: LAB | Facility: CLINIC | Age: 68
End: 2018-03-08

## 2018-03-08 ENCOUNTER — COMMUNICATION - HEALTHEAST (OUTPATIENT)
Dept: NURSING | Facility: CLINIC | Age: 68
End: 2018-03-08

## 2018-03-08 DIAGNOSIS — Z95.4 S/P MITRAL VALVE REPLACEMENT WITH METALLIC VALVE: ICD-10-CM

## 2018-03-08 DIAGNOSIS — I48.0 PAROXYSMAL ATRIAL FIBRILLATION (H): ICD-10-CM

## 2018-03-08 DIAGNOSIS — Z95.2 S/P MVR (MITRAL VALVE REPLACEMENT): ICD-10-CM

## 2018-03-08 LAB — INR PPP: 1.6 (ref 0.9–1.1)

## 2018-03-16 ENCOUNTER — COMMUNICATION - HEALTHEAST (OUTPATIENT)
Dept: NURSING | Facility: CLINIC | Age: 68
End: 2018-03-16

## 2018-03-16 ENCOUNTER — AMBULATORY - HEALTHEAST (OUTPATIENT)
Dept: LAB | Facility: CLINIC | Age: 68
End: 2018-03-16

## 2018-03-16 DIAGNOSIS — Z95.4 S/P MITRAL VALVE REPLACEMENT WITH METALLIC VALVE: ICD-10-CM

## 2018-03-16 DIAGNOSIS — Z95.2 S/P MVR (MITRAL VALVE REPLACEMENT): ICD-10-CM

## 2018-03-16 DIAGNOSIS — I48.0 PAROXYSMAL ATRIAL FIBRILLATION (H): ICD-10-CM

## 2018-03-16 LAB — INR PPP: 2.2 (ref 0.9–1.1)

## 2018-03-23 ENCOUNTER — COMMUNICATION - HEALTHEAST (OUTPATIENT)
Dept: NURSING | Facility: CLINIC | Age: 68
End: 2018-03-23

## 2018-03-23 ENCOUNTER — AMBULATORY - HEALTHEAST (OUTPATIENT)
Dept: LAB | Facility: CLINIC | Age: 68
End: 2018-03-23

## 2018-03-23 DIAGNOSIS — Z95.4 S/P MITRAL VALVE REPLACEMENT WITH METALLIC VALVE: ICD-10-CM

## 2018-03-23 DIAGNOSIS — Z95.2 S/P MVR (MITRAL VALVE REPLACEMENT): ICD-10-CM

## 2018-03-23 DIAGNOSIS — I48.0 PAROXYSMAL ATRIAL FIBRILLATION (H): ICD-10-CM

## 2018-03-23 LAB — INR PPP: 2.6 (ref 0.9–1.1)

## 2018-04-10 ENCOUNTER — COMMUNICATION - HEALTHEAST (OUTPATIENT)
Dept: ANTICOAGULATION | Facility: CLINIC | Age: 68
End: 2018-04-10

## 2018-04-10 ENCOUNTER — AMBULATORY - HEALTHEAST (OUTPATIENT)
Dept: LAB | Facility: CLINIC | Age: 68
End: 2018-04-10

## 2018-04-10 DIAGNOSIS — Z95.4 S/P MITRAL VALVE REPLACEMENT WITH METALLIC VALVE: ICD-10-CM

## 2018-04-10 DIAGNOSIS — I48.0 PAROXYSMAL ATRIAL FIBRILLATION (H): ICD-10-CM

## 2018-04-10 DIAGNOSIS — Z95.2 S/P MVR (MITRAL VALVE REPLACEMENT): ICD-10-CM

## 2018-04-10 LAB — INR PPP: 3.1 (ref 0.9–1.1)

## 2018-04-19 ENCOUNTER — OFFICE VISIT - HEALTHEAST (OUTPATIENT)
Dept: INFECTIOUS DISEASES | Facility: CLINIC | Age: 68
End: 2018-04-19

## 2018-04-19 DIAGNOSIS — T82.6XXD PROSTHETIC VALVE ENDOCARDITIS, SUBSEQUENT ENCOUNTER: ICD-10-CM

## 2018-04-19 DIAGNOSIS — I38 PROSTHETIC VALVE ENDOCARDITIS, SUBSEQUENT ENCOUNTER: ICD-10-CM

## 2018-04-19 ASSESSMENT — MIFFLIN-ST. JEOR: SCORE: 1632.6

## 2018-04-20 ENCOUNTER — OFFICE VISIT - HEALTHEAST (OUTPATIENT)
Dept: CARDIOLOGY | Facility: CLINIC | Age: 68
End: 2018-04-20

## 2018-04-20 ENCOUNTER — COMMUNICATION - HEALTHEAST (OUTPATIENT)
Dept: ANTICOAGULATION | Facility: CLINIC | Age: 68
End: 2018-04-20

## 2018-04-20 DIAGNOSIS — Z95.4 S/P MITRAL VALVE REPLACEMENT WITH METALLIC VALVE: ICD-10-CM

## 2018-04-20 DIAGNOSIS — Z95.2 S/P MVR (MITRAL VALVE REPLACEMENT): ICD-10-CM

## 2018-04-20 DIAGNOSIS — I48.0 PAROXYSMAL ATRIAL FIBRILLATION (H): ICD-10-CM

## 2018-04-20 DIAGNOSIS — I50.42 CHRONIC COMBINED SYSTOLIC AND DIASTOLIC CONGESTIVE HEART FAILURE (H): ICD-10-CM

## 2018-04-20 DIAGNOSIS — Z79.01 CHRONIC ANTICOAGULATION: ICD-10-CM

## 2018-04-20 DIAGNOSIS — I25.10 CORONARY ARTERY DISEASE INVOLVING NATIVE CORONARY ARTERY OF NATIVE HEART WITHOUT ANGINA PECTORIS: ICD-10-CM

## 2018-04-20 DIAGNOSIS — I48.19 PERSISTENT ATRIAL FIBRILLATION (H): ICD-10-CM

## 2018-04-20 LAB — POC INR - HE - HISTORICAL: 2.5 (ref 0.9–1.1)

## 2018-04-20 ASSESSMENT — MIFFLIN-ST. JEOR: SCORE: 1622.62

## 2018-05-01 ENCOUNTER — COMMUNICATION - HEALTHEAST (OUTPATIENT)
Dept: CARDIOLOGY | Facility: CLINIC | Age: 68
End: 2018-05-01

## 2018-05-01 DIAGNOSIS — I50.42 CHRONIC COMBINED SYSTOLIC AND DIASTOLIC CHF, NYHA CLASS 2 (H): ICD-10-CM

## 2018-05-07 ENCOUNTER — COMMUNICATION - HEALTHEAST (OUTPATIENT)
Dept: INTERNAL MEDICINE | Facility: CLINIC | Age: 68
End: 2018-05-07

## 2018-05-07 DIAGNOSIS — I48.19 PERSISTENT ATRIAL FIBRILLATION (H): ICD-10-CM

## 2018-05-07 DIAGNOSIS — Z95.4 S/P MITRAL VALVE REPLACEMENT WITH METALLIC VALVE: ICD-10-CM

## 2018-05-07 DIAGNOSIS — I48.0 PAROXYSMAL ATRIAL FIBRILLATION (H): ICD-10-CM

## 2018-05-18 ENCOUNTER — AMBULATORY - HEALTHEAST (OUTPATIENT)
Dept: LAB | Facility: CLINIC | Age: 68
End: 2018-05-18

## 2018-05-18 ENCOUNTER — COMMUNICATION - HEALTHEAST (OUTPATIENT)
Dept: ANTICOAGULATION | Facility: CLINIC | Age: 68
End: 2018-05-18

## 2018-05-18 DIAGNOSIS — I48.0 PAROXYSMAL ATRIAL FIBRILLATION (H): ICD-10-CM

## 2018-05-18 DIAGNOSIS — Z95.4 S/P MITRAL VALVE REPLACEMENT WITH METALLIC VALVE: ICD-10-CM

## 2018-05-18 DIAGNOSIS — Z95.2 S/P MVR (MITRAL VALVE REPLACEMENT): ICD-10-CM

## 2018-05-18 LAB — INR PPP: 3.6 (ref 0.9–1.1)

## 2018-05-25 ENCOUNTER — AMBULATORY - HEALTHEAST (OUTPATIENT)
Dept: CARDIAC REHAB | Facility: CLINIC | Age: 68
End: 2018-05-25

## 2018-05-25 DIAGNOSIS — I25.10 CORONARY ATHEROSCLEROSIS OF NATIVE CORONARY ARTERY: ICD-10-CM

## 2018-05-29 ENCOUNTER — OFFICE VISIT - HEALTHEAST (OUTPATIENT)
Dept: CARDIOLOGY | Facility: CLINIC | Age: 68
End: 2018-05-29

## 2018-05-29 ENCOUNTER — AMBULATORY - HEALTHEAST (OUTPATIENT)
Dept: CARDIAC REHAB | Facility: CLINIC | Age: 68
End: 2018-05-29

## 2018-05-29 DIAGNOSIS — I25.10 CORONARY ATHEROSCLEROSIS OF NATIVE CORONARY ARTERY: ICD-10-CM

## 2018-05-29 DIAGNOSIS — I33.0 VEGETATIVE ENDOCARDITIS OF MITRAL VALVE: ICD-10-CM

## 2018-05-29 DIAGNOSIS — I25.10 CORONARY ARTERY DISEASE INVOLVING NATIVE CORONARY ARTERY OF NATIVE HEART WITHOUT ANGINA PECTORIS: ICD-10-CM

## 2018-05-29 DIAGNOSIS — I48.19 PERSISTENT ATRIAL FIBRILLATION (H): ICD-10-CM

## 2018-05-29 DIAGNOSIS — Z95.2 S/P MVR (MITRAL VALVE REPLACEMENT): ICD-10-CM

## 2018-05-29 DIAGNOSIS — Z95.2 H/O MITRAL VALVE REPLACEMENT WITH MECHANICAL VALVE: ICD-10-CM

## 2018-05-29 DIAGNOSIS — R21 SKIN RASH: ICD-10-CM

## 2018-05-29 DIAGNOSIS — R60.0 BILATERAL LEG EDEMA: ICD-10-CM

## 2018-05-29 DIAGNOSIS — I50.22 CHRONIC SYSTOLIC CHF (CONGESTIVE HEART FAILURE), NYHA CLASS 2 (H): ICD-10-CM

## 2018-05-29 LAB
ANION GAP SERPL CALCULATED.3IONS-SCNC: 17 MMOL/L (ref 5–18)
BUN SERPL-MCNC: 40 MG/DL (ref 8–22)
CALCIUM SERPL-MCNC: 10 MG/DL (ref 8.5–10.5)
CHLORIDE BLD-SCNC: 107 MMOL/L (ref 98–107)
CO2 SERPL-SCNC: 17 MMOL/L (ref 22–31)
CREAT SERPL-MCNC: 1.29 MG/DL (ref 0.7–1.3)
ERYTHROCYTE [DISTWIDTH] IN BLOOD BY AUTOMATED COUNT: 14.4 % (ref 11–14.5)
GFR SERPL CREATININE-BSD FRML MDRD: 55 ML/MIN/1.73M2
GLUCOSE BLD-MCNC: 82 MG/DL (ref 70–125)
HCT VFR BLD AUTO: 28.3 % (ref 40–54)
HGB BLD-MCNC: 8.7 G/DL (ref 14–18)
MCH RBC QN AUTO: 28.8 PG (ref 27–34)
MCHC RBC AUTO-ENTMCNC: 30.7 G/DL (ref 32–36)
MCV RBC AUTO: 94 FL (ref 80–100)
PLATELET # BLD AUTO: 201 THOU/UL (ref 140–440)
PMV BLD AUTO: 10.9 FL (ref 8.5–12.5)
POTASSIUM BLD-SCNC: 4.2 MMOL/L (ref 3.5–5)
RBC # BLD AUTO: 3.02 MILL/UL (ref 4.4–6.2)
SODIUM SERPL-SCNC: 141 MMOL/L (ref 136–145)
WBC: 5.9 THOU/UL (ref 4–11)

## 2018-05-29 ASSESSMENT — MIFFLIN-ST. JEOR: SCORE: 1622.62

## 2018-05-30 ENCOUNTER — COMMUNICATION - HEALTHEAST (OUTPATIENT)
Dept: INTERNAL MEDICINE | Facility: CLINIC | Age: 68
End: 2018-05-30

## 2018-05-30 LAB — BNP SERPL-MCNC: 370 PG/ML (ref 0–65)

## 2018-05-31 ENCOUNTER — COMMUNICATION - HEALTHEAST (OUTPATIENT)
Dept: ADMINISTRATIVE | Facility: CLINIC | Age: 68
End: 2018-05-31

## 2018-05-31 DIAGNOSIS — T82.6XXD PROSTHETIC VALVE ENDOCARDITIS, SUBSEQUENT ENCOUNTER: ICD-10-CM

## 2018-05-31 DIAGNOSIS — I38 PROSTHETIC VALVE ENDOCARDITIS, SUBSEQUENT ENCOUNTER: ICD-10-CM

## 2018-06-01 ENCOUNTER — COMMUNICATION - HEALTHEAST (OUTPATIENT)
Dept: ANTICOAGULATION | Facility: CLINIC | Age: 68
End: 2018-06-01

## 2018-06-01 ENCOUNTER — HOSPITAL ENCOUNTER (OUTPATIENT)
Dept: CARDIOLOGY | Facility: CLINIC | Age: 68
Discharge: HOME | End: 2018-06-01
Attending: INTERNAL MEDICINE

## 2018-06-01 ENCOUNTER — AMBULATORY - HEALTHEAST (OUTPATIENT)
Dept: LAB | Facility: CLINIC | Age: 68
End: 2018-06-01

## 2018-06-01 ENCOUNTER — HOSPITAL ENCOUNTER (OUTPATIENT)
Dept: ULTRASOUND IMAGING | Facility: CLINIC | Age: 68
Discharge: HOME | End: 2018-06-01
Attending: INTERNAL MEDICINE

## 2018-06-01 ENCOUNTER — COMMUNICATION - HEALTHEAST (OUTPATIENT)
Dept: INTERNAL MEDICINE | Facility: CLINIC | Age: 68
End: 2018-06-01

## 2018-06-01 DIAGNOSIS — R60.0 BILATERAL LEG EDEMA: ICD-10-CM

## 2018-06-01 DIAGNOSIS — Z95.2 H/O MITRAL VALVE REPLACEMENT WITH MECHANICAL VALVE: ICD-10-CM

## 2018-06-01 DIAGNOSIS — I48.0 PAROXYSMAL ATRIAL FIBRILLATION (H): ICD-10-CM

## 2018-06-01 DIAGNOSIS — Z95.4 S/P MITRAL VALVE REPLACEMENT WITH METALLIC VALVE: ICD-10-CM

## 2018-06-01 DIAGNOSIS — Z95.2 S/P MVR (MITRAL VALVE REPLACEMENT): ICD-10-CM

## 2018-06-01 LAB
AORTIC ROOT: 3.6 CM
AORTIC VALVE MEAN VELOCITY: 93.1 CM/S
AV DIMENSIONLESS INDEX VTI: 0.8
AV MEAN GRADIENT: 4 MMHG
AV PEAK GRADIENT: 9.1 MMHG
AV VALVE AREA: 3.3 CM2
AV VELOCITY RATIO: 0.7
BSA FOR ECHO PROCEDURE: 2.07 M2
CV BLOOD PRESSURE: NORMAL MMHG
CV ECHO HEIGHT: 70 IN
CV ECHO WEIGHT: 191 LBS
DOP CALC AO PEAK VEL: 151 CM/S
DOP CALC AO VTI: 30.4 CM
DOP CALC LVOT AREA: 4.15 CM2
DOP CALC LVOT DIAMETER: 2.3 CM
DOP CALC LVOT PEAK VEL: 108 CM/S
DOP CALC LVOT STROKE VOLUME: 101.7 CM3
DOP CALC MV VTI: 49.6 CM
DOP CALC MV VTI: 55.2 CM
DOP CALC MV VTI: 56.7 CM
DOP CALC MV VTI: 59.4 CM
DOP CALCLVOT PEAK VEL VTI: 24.5 CM
EJECTION FRACTION: 64 % (ref 55–75)
FRACTIONAL SHORTENING: 50 % (ref 28–44)
INR PPP: 3.8 (ref 0.9–1.1)
INTERVENTRICULAR SEPTUM IN END DIASTOLE: 1.1 CM (ref 0.6–1)
IVS/PW RATIO: 1
LA AREA 1: 29.2 CM2
LA AREA 2: 28.7 CM2
LEFT ATRIUM LENGTH: 6.19 CM
LEFT ATRIUM SIZE: 5.4 CM
LEFT ATRIUM VOLUME INDEX: 55.6 ML/M2
LEFT ATRIUM VOLUME: 115.1 ML
LEFT VENTRICLE CARDIAC INDEX: 2.9 L/MIN/M2
LEFT VENTRICLE CARDIAC OUTPUT: 6.1 L/MIN
LEFT VENTRICLE DIASTOLIC VOLUME INDEX: 55.1 CM3/M2 (ref 34–74)
LEFT VENTRICLE DIASTOLIC VOLUME: 114 CM3 (ref 62–150)
LEFT VENTRICLE HEART RATE: 60 BPM
LEFT VENTRICLE MASS INDEX: 100.1 G/M2
LEFT VENTRICLE SYSTOLIC VOLUME INDEX: 19.8 CM3/M2 (ref 11–31)
LEFT VENTRICLE SYSTOLIC VOLUME: 41 CM3 (ref 21–61)
LEFT VENTRICULAR INTERNAL DIMENSION IN DIASTOLE: 5 CM (ref 4.2–5.8)
LEFT VENTRICULAR INTERNAL DIMENSION IN SYSTOLE: 2.5 CM (ref 2.5–4)
LEFT VENTRICULAR MASS: 207.1 G
LEFT VENTRICULAR OUTFLOW TRACT MEAN GRADIENT: 2 MMHG
LEFT VENTRICULAR OUTFLOW TRACT MEAN VELOCITY: 68.9 CM/S
LEFT VENTRICULAR OUTFLOW TRACT PEAK GRADIENT: 5 MMHG
LEFT VENTRICULAR POSTERIOR WALL IN END DIASTOLE: 1.1 CM (ref 0.6–1)
LV STROKE VOLUME INDEX: 49.1 ML/M2
MITRAL VALVE MEAN INFLOW VELOCITY: 77.9 CM/S
MITRAL VALVE MEAN INFLOW VELOCITY: 87.4 CM/S
MITRAL VALVE MEAN INFLOW VELOCITY: 89.4 CM/S
MITRAL VALVE MEAN INFLOW VELOCITY: 94.9 CM/S
MITRAL VALVE PEAK VELOCITY: 216 CM/S
MITRAL VALVE PEAK VELOCITY: 227 CM/S
MITRAL VALVE PEAK VELOCITY: 232 CM/S
MITRAL VALVE PEAK VELOCITY: 233 CM/S
MV E'TISSUE VEL-LAT: 8.48 CM/S
MV E'TISSUE VEL-MED: 3.12 CM/S
MV MEAN GRADIENT: 5 MMHG
NUC REST DIASTOLIC VOLUME INDEX: 3056 LBS
NUC REST SYSTOLIC VOLUME INDEX: 70 IN
TRICUSPID REGURGITATION PEAK PRESSURE GRADIENT: 44.1 MMHG
TRICUSPID VALVE ANULAR PLANE SYSTOLIC EXCURSION: 1.8 CM
TRICUSPID VALVE PEAK REGURGITANT VELOCITY: 332 CM/S

## 2018-06-01 ASSESSMENT — MIFFLIN-ST. JEOR: SCORE: 1622.62

## 2018-06-05 ENCOUNTER — COMMUNICATION - HEALTHEAST (OUTPATIENT)
Dept: INFECTIOUS DISEASES | Facility: CLINIC | Age: 68
End: 2018-06-05

## 2018-06-11 ENCOUNTER — COMMUNICATION - HEALTHEAST (OUTPATIENT)
Dept: CARDIOLOGY | Facility: CLINIC | Age: 68
End: 2018-06-11

## 2018-06-11 DIAGNOSIS — I50.42 CHRONIC COMBINED SYSTOLIC AND DIASTOLIC CHF, NYHA CLASS 2 (H): ICD-10-CM

## 2018-06-13 ENCOUNTER — COMMUNICATION - HEALTHEAST (OUTPATIENT)
Dept: CARDIOLOGY | Facility: CLINIC | Age: 68
End: 2018-06-13

## 2018-06-13 DIAGNOSIS — I50.42 CHRONIC COMBINED SYSTOLIC AND DIASTOLIC CHF, NYHA CLASS 2 (H): ICD-10-CM

## 2018-06-15 ENCOUNTER — AMBULATORY - HEALTHEAST (OUTPATIENT)
Dept: LAB | Facility: CLINIC | Age: 68
End: 2018-06-15

## 2018-06-15 ENCOUNTER — COMMUNICATION - HEALTHEAST (OUTPATIENT)
Dept: ANTICOAGULATION | Facility: CLINIC | Age: 68
End: 2018-06-15

## 2018-06-15 DIAGNOSIS — Z95.4 S/P MITRAL VALVE REPLACEMENT WITH METALLIC VALVE: ICD-10-CM

## 2018-06-15 DIAGNOSIS — I48.0 PAROXYSMAL ATRIAL FIBRILLATION (H): ICD-10-CM

## 2018-06-15 DIAGNOSIS — Z95.2 S/P MVR (MITRAL VALVE REPLACEMENT): ICD-10-CM

## 2018-06-15 LAB — INR PPP: 2.1 (ref 0.9–1.1)

## 2018-06-25 ENCOUNTER — COMMUNICATION - HEALTHEAST (OUTPATIENT)
Dept: INTERNAL MEDICINE | Facility: CLINIC | Age: 68
End: 2018-06-25

## 2018-06-25 ENCOUNTER — COMMUNICATION - HEALTHEAST (OUTPATIENT)
Dept: CARDIOLOGY | Facility: CLINIC | Age: 68
End: 2018-06-25

## 2018-06-25 ENCOUNTER — COMMUNICATION - HEALTHEAST (OUTPATIENT)
Dept: SCHEDULING | Facility: CLINIC | Age: 68
End: 2018-06-25

## 2018-06-26 ENCOUNTER — COMMUNICATION - HEALTHEAST (OUTPATIENT)
Dept: INFECTIOUS DISEASES | Facility: CLINIC | Age: 68
End: 2018-06-26

## 2018-06-28 ENCOUNTER — COMMUNICATION - HEALTHEAST (OUTPATIENT)
Dept: ANTICOAGULATION | Facility: CLINIC | Age: 68
End: 2018-06-28

## 2018-06-28 ENCOUNTER — AMBULATORY - HEALTHEAST (OUTPATIENT)
Dept: LAB | Facility: CLINIC | Age: 68
End: 2018-06-28

## 2018-06-28 DIAGNOSIS — Z95.4 S/P MITRAL VALVE REPLACEMENT WITH METALLIC VALVE: ICD-10-CM

## 2018-06-28 DIAGNOSIS — I48.0 PAROXYSMAL ATRIAL FIBRILLATION (H): ICD-10-CM

## 2018-06-28 DIAGNOSIS — Z95.2 S/P MVR (MITRAL VALVE REPLACEMENT): ICD-10-CM

## 2018-06-28 LAB — INR PPP: 2.1 (ref 0.9–1.1)

## 2018-06-29 ENCOUNTER — COMMUNICATION - HEALTHEAST (OUTPATIENT)
Dept: ADMINISTRATIVE | Facility: CLINIC | Age: 68
End: 2018-06-29

## 2018-06-29 ENCOUNTER — OFFICE VISIT - HEALTHEAST (OUTPATIENT)
Dept: INTERNAL MEDICINE | Facility: CLINIC | Age: 68
End: 2018-06-29

## 2018-06-29 DIAGNOSIS — I83.93 VARICOSE VEINS OF BOTH LOWER EXTREMITIES: ICD-10-CM

## 2018-06-29 DIAGNOSIS — Z95.4 S/P MITRAL VALVE REPLACEMENT WITH METALLIC VALVE: ICD-10-CM

## 2018-06-29 ASSESSMENT — MIFFLIN-ST. JEOR: SCORE: 1613.55

## 2018-07-10 ENCOUNTER — COMMUNICATION - HEALTHEAST (OUTPATIENT)
Dept: ANTICOAGULATION | Facility: CLINIC | Age: 68
End: 2018-07-10

## 2018-07-10 ENCOUNTER — AMBULATORY - HEALTHEAST (OUTPATIENT)
Dept: LAB | Facility: CLINIC | Age: 68
End: 2018-07-10

## 2018-07-10 DIAGNOSIS — Z95.4 S/P MITRAL VALVE REPLACEMENT WITH METALLIC VALVE: ICD-10-CM

## 2018-07-10 DIAGNOSIS — I48.0 PAROXYSMAL ATRIAL FIBRILLATION (H): ICD-10-CM

## 2018-07-10 DIAGNOSIS — Z95.2 S/P MVR (MITRAL VALVE REPLACEMENT): ICD-10-CM

## 2018-07-10 LAB — INR PPP: 3.5 (ref 0.9–1.1)

## 2018-07-17 ENCOUNTER — AMBULATORY - HEALTHEAST (OUTPATIENT)
Dept: LAB | Facility: CLINIC | Age: 68
End: 2018-07-17

## 2018-07-17 ENCOUNTER — COMMUNICATION - HEALTHEAST (OUTPATIENT)
Dept: ANTICOAGULATION | Facility: CLINIC | Age: 68
End: 2018-07-17

## 2018-07-17 ENCOUNTER — COMMUNICATION - HEALTHEAST (OUTPATIENT)
Dept: CARDIOLOGY | Facility: CLINIC | Age: 68
End: 2018-07-17

## 2018-07-17 DIAGNOSIS — Z95.2 S/P MVR (MITRAL VALVE REPLACEMENT): ICD-10-CM

## 2018-07-17 DIAGNOSIS — Z95.4 S/P MITRAL VALVE REPLACEMENT WITH METALLIC VALVE: ICD-10-CM

## 2018-07-17 DIAGNOSIS — I48.0 PAROXYSMAL ATRIAL FIBRILLATION (H): ICD-10-CM

## 2018-07-17 DIAGNOSIS — I50.42 CHRONIC COMBINED SYSTOLIC AND DIASTOLIC CHF, NYHA CLASS 2 (H): ICD-10-CM

## 2018-07-17 LAB — INR PPP: 3.4 (ref 0.9–1.1)

## 2018-07-31 ENCOUNTER — AMBULATORY - HEALTHEAST (OUTPATIENT)
Dept: LAB | Facility: CLINIC | Age: 68
End: 2018-07-31

## 2018-07-31 ENCOUNTER — COMMUNICATION - HEALTHEAST (OUTPATIENT)
Dept: ANTICOAGULATION | Facility: CLINIC | Age: 68
End: 2018-07-31

## 2018-07-31 DIAGNOSIS — Z95.4 S/P MITRAL VALVE REPLACEMENT WITH METALLIC VALVE: ICD-10-CM

## 2018-07-31 DIAGNOSIS — I48.0 PAROXYSMAL ATRIAL FIBRILLATION (H): ICD-10-CM

## 2018-07-31 DIAGNOSIS — Z95.2 S/P MVR (MITRAL VALVE REPLACEMENT): ICD-10-CM

## 2018-07-31 LAB — INR PPP: 1.6 (ref 0.9–1.1)

## 2018-08-01 ENCOUNTER — COMMUNICATION - HEALTHEAST (OUTPATIENT)
Dept: INTERNAL MEDICINE | Facility: CLINIC | Age: 68
End: 2018-08-01

## 2018-08-07 ENCOUNTER — COMMUNICATION - HEALTHEAST (OUTPATIENT)
Dept: ANTICOAGULATION | Facility: CLINIC | Age: 68
End: 2018-08-07

## 2018-08-07 ENCOUNTER — AMBULATORY - HEALTHEAST (OUTPATIENT)
Dept: LAB | Facility: CLINIC | Age: 68
End: 2018-08-07

## 2018-08-07 DIAGNOSIS — I48.0 PAROXYSMAL ATRIAL FIBRILLATION (H): ICD-10-CM

## 2018-08-07 DIAGNOSIS — Z95.4 S/P MITRAL VALVE REPLACEMENT WITH METALLIC VALVE: ICD-10-CM

## 2018-08-07 DIAGNOSIS — Z95.2 S/P MVR (MITRAL VALVE REPLACEMENT): ICD-10-CM

## 2018-08-07 LAB — INR PPP: 2.6 (ref 0.9–1.1)

## 2018-08-23 ENCOUNTER — COMMUNICATION - HEALTHEAST (OUTPATIENT)
Dept: ANTICOAGULATION | Facility: CLINIC | Age: 68
End: 2018-08-23

## 2018-08-23 ENCOUNTER — AMBULATORY - HEALTHEAST (OUTPATIENT)
Dept: LAB | Facility: CLINIC | Age: 68
End: 2018-08-23

## 2018-08-23 DIAGNOSIS — I48.0 PAROXYSMAL ATRIAL FIBRILLATION (H): ICD-10-CM

## 2018-08-23 DIAGNOSIS — Z95.4 S/P MITRAL VALVE REPLACEMENT WITH METALLIC VALVE: ICD-10-CM

## 2018-08-23 DIAGNOSIS — Z95.2 S/P MVR (MITRAL VALVE REPLACEMENT): ICD-10-CM

## 2018-08-23 LAB — INR PPP: 4.4 (ref 0.9–1.1)

## 2018-08-29 ENCOUNTER — RECORDS - HEALTHEAST (OUTPATIENT)
Dept: ADMINISTRATIVE | Facility: OTHER | Age: 68
End: 2018-08-29

## 2018-09-04 ENCOUNTER — COMMUNICATION - HEALTHEAST (OUTPATIENT)
Dept: ANTICOAGULATION | Facility: CLINIC | Age: 68
End: 2018-09-04

## 2018-09-04 ENCOUNTER — AMBULATORY - HEALTHEAST (OUTPATIENT)
Dept: LAB | Facility: CLINIC | Age: 68
End: 2018-09-04

## 2018-09-04 DIAGNOSIS — Z95.4 S/P MITRAL VALVE REPLACEMENT WITH METALLIC VALVE: ICD-10-CM

## 2018-09-04 DIAGNOSIS — Z95.2 S/P MVR (MITRAL VALVE REPLACEMENT): ICD-10-CM

## 2018-09-04 DIAGNOSIS — I48.0 PAROXYSMAL ATRIAL FIBRILLATION (H): ICD-10-CM

## 2018-09-04 LAB — INR PPP: 4.4 (ref 0.9–1.1)

## 2018-09-13 ENCOUNTER — AMBULATORY - HEALTHEAST (OUTPATIENT)
Dept: CARDIOLOGY | Facility: CLINIC | Age: 68
End: 2018-09-13

## 2018-09-13 ENCOUNTER — OFFICE VISIT - HEALTHEAST (OUTPATIENT)
Dept: CARDIOLOGY | Facility: CLINIC | Age: 68
End: 2018-09-13

## 2018-09-13 ENCOUNTER — COMMUNICATION - HEALTHEAST (OUTPATIENT)
Dept: ANTICOAGULATION | Facility: CLINIC | Age: 68
End: 2018-09-13

## 2018-09-13 DIAGNOSIS — Z95.2 S/P MVR (MITRAL VALVE REPLACEMENT): ICD-10-CM

## 2018-09-13 DIAGNOSIS — I25.10 CORONARY ARTERY DISEASE INVOLVING NATIVE CORONARY ARTERY OF NATIVE HEART WITHOUT ANGINA PECTORIS: ICD-10-CM

## 2018-09-13 DIAGNOSIS — Z95.4 S/P MITRAL VALVE REPLACEMENT WITH METALLIC VALVE: ICD-10-CM

## 2018-09-13 DIAGNOSIS — I48.0 PAROXYSMAL ATRIAL FIBRILLATION (H): ICD-10-CM

## 2018-09-13 DIAGNOSIS — I48.19 PERSISTENT ATRIAL FIBRILLATION (H): ICD-10-CM

## 2018-09-13 DIAGNOSIS — I50.42 CHRONIC COMBINED SYSTOLIC AND DIASTOLIC CONGESTIVE HEART FAILURE (H): ICD-10-CM

## 2018-09-13 DIAGNOSIS — Z79.01 LONG TERM CURRENT USE OF ANTICOAGULANT THERAPY: ICD-10-CM

## 2018-09-13 LAB — POC INR - HE - HISTORICAL: 3.1 (ref 0.9–1.1)

## 2018-09-13 ASSESSMENT — MIFFLIN-ST. JEOR: SCORE: 1563.65

## 2018-09-27 ENCOUNTER — AMBULATORY - HEALTHEAST (OUTPATIENT)
Dept: LAB | Facility: CLINIC | Age: 68
End: 2018-09-27

## 2018-09-27 ENCOUNTER — COMMUNICATION - HEALTHEAST (OUTPATIENT)
Dept: ANTICOAGULATION | Facility: CLINIC | Age: 68
End: 2018-09-27

## 2018-09-27 DIAGNOSIS — Z95.4 S/P MITRAL VALVE REPLACEMENT WITH METALLIC VALVE: ICD-10-CM

## 2018-09-27 DIAGNOSIS — I48.0 PAROXYSMAL ATRIAL FIBRILLATION (H): ICD-10-CM

## 2018-09-27 DIAGNOSIS — Z95.2 S/P MVR (MITRAL VALVE REPLACEMENT): ICD-10-CM

## 2018-09-27 LAB — INR PPP: 2.3 (ref 0.9–1.1)

## 2018-10-08 ENCOUNTER — COMMUNICATION - HEALTHEAST (OUTPATIENT)
Dept: INTERNAL MEDICINE | Facility: CLINIC | Age: 68
End: 2018-10-08

## 2018-10-08 ENCOUNTER — AMBULATORY - HEALTHEAST (OUTPATIENT)
Dept: INTERNAL MEDICINE | Facility: CLINIC | Age: 68
End: 2018-10-08

## 2018-10-08 DIAGNOSIS — D64.9 ANEMIA: ICD-10-CM

## 2018-10-11 ENCOUNTER — AMBULATORY - HEALTHEAST (OUTPATIENT)
Dept: LAB | Facility: CLINIC | Age: 68
End: 2018-10-11

## 2018-10-11 ENCOUNTER — COMMUNICATION - HEALTHEAST (OUTPATIENT)
Dept: ANTICOAGULATION | Facility: CLINIC | Age: 68
End: 2018-10-11

## 2018-10-11 DIAGNOSIS — Z95.2 S/P MVR (MITRAL VALVE REPLACEMENT): ICD-10-CM

## 2018-10-11 DIAGNOSIS — D64.9 ANEMIA: ICD-10-CM

## 2018-10-11 DIAGNOSIS — Z95.4 S/P MITRAL VALVE REPLACEMENT WITH METALLIC VALVE: ICD-10-CM

## 2018-10-11 DIAGNOSIS — I48.0 PAROXYSMAL ATRIAL FIBRILLATION (H): ICD-10-CM

## 2018-10-11 LAB
FERRITIN SERPL-MCNC: 142 NG/ML (ref 27–300)
HGB BLD-MCNC: 11.1 G/DL (ref 14–18)
INR PPP: 2.9 (ref 0.9–1.1)

## 2018-10-12 ENCOUNTER — COMMUNICATION - HEALTHEAST (OUTPATIENT)
Dept: INTERNAL MEDICINE | Facility: CLINIC | Age: 68
End: 2018-10-12

## 2018-10-15 ENCOUNTER — OFFICE VISIT - HEALTHEAST (OUTPATIENT)
Dept: INTERNAL MEDICINE | Facility: CLINIC | Age: 68
End: 2018-10-15

## 2018-10-15 DIAGNOSIS — D62 ACUTE BLOOD LOSS ANEMIA: ICD-10-CM

## 2018-10-15 DIAGNOSIS — I48.0 PAROXYSMAL ATRIAL FIBRILLATION (H): ICD-10-CM

## 2018-10-15 DIAGNOSIS — I83.93 VARICOSE VEINS OF BOTH LOWER EXTREMITIES: ICD-10-CM

## 2018-10-15 DIAGNOSIS — I50.9 CHRONIC CONGESTIVE HEART FAILURE, UNSPECIFIED HEART FAILURE TYPE (H): ICD-10-CM

## 2018-10-15 DIAGNOSIS — Z95.4 S/P MITRAL VALVE REPLACEMENT WITH METALLIC VALVE: ICD-10-CM

## 2018-10-15 ASSESSMENT — MIFFLIN-ST. JEOR: SCORE: 1594.27

## 2018-10-17 ENCOUNTER — COMMUNICATION - HEALTHEAST (OUTPATIENT)
Dept: ANTICOAGULATION | Facility: CLINIC | Age: 68
End: 2018-10-17

## 2018-10-17 DIAGNOSIS — Z95.4 S/P MITRAL VALVE REPLACEMENT WITH METALLIC VALVE: ICD-10-CM

## 2018-10-17 DIAGNOSIS — I48.0 PAROXYSMAL ATRIAL FIBRILLATION (H): ICD-10-CM

## 2018-10-17 DIAGNOSIS — Z95.2 S/P MVR (MITRAL VALVE REPLACEMENT): ICD-10-CM

## 2018-10-17 LAB — INR PPP: 4 (ref 0.9–1.1)

## 2018-10-22 ENCOUNTER — COMMUNICATION - HEALTHEAST (OUTPATIENT)
Dept: ANTICOAGULATION | Facility: CLINIC | Age: 68
End: 2018-10-22

## 2018-10-22 DIAGNOSIS — I48.0 PAROXYSMAL ATRIAL FIBRILLATION (H): ICD-10-CM

## 2018-10-22 DIAGNOSIS — Z95.2 S/P MVR (MITRAL VALVE REPLACEMENT): ICD-10-CM

## 2018-10-22 DIAGNOSIS — Z95.4 S/P MITRAL VALVE REPLACEMENT WITH METALLIC VALVE: ICD-10-CM

## 2018-10-22 LAB — INR PPP: 2.9 (ref 0.9–1.1)

## 2018-10-29 ENCOUNTER — COMMUNICATION - HEALTHEAST (OUTPATIENT)
Dept: ANTICOAGULATION | Facility: CLINIC | Age: 68
End: 2018-10-29

## 2018-10-29 DIAGNOSIS — Z95.4 S/P MITRAL VALVE REPLACEMENT WITH METALLIC VALVE: ICD-10-CM

## 2018-10-29 DIAGNOSIS — Z95.2 S/P MVR (MITRAL VALVE REPLACEMENT): ICD-10-CM

## 2018-10-29 DIAGNOSIS — I48.0 PAROXYSMAL ATRIAL FIBRILLATION (H): ICD-10-CM

## 2018-10-29 LAB — INR PPP: 2.1 (ref 0.9–1.1)

## 2018-11-05 ENCOUNTER — COMMUNICATION - HEALTHEAST (OUTPATIENT)
Dept: ANTICOAGULATION | Facility: CLINIC | Age: 68
End: 2018-11-05

## 2018-11-05 DIAGNOSIS — Z95.4 S/P MITRAL VALVE REPLACEMENT WITH METALLIC VALVE: ICD-10-CM

## 2018-11-05 DIAGNOSIS — I48.0 PAROXYSMAL ATRIAL FIBRILLATION (H): ICD-10-CM

## 2018-11-05 DIAGNOSIS — Z95.2 S/P MVR (MITRAL VALVE REPLACEMENT): ICD-10-CM

## 2018-11-05 LAB — INR PPP: 3.6 (ref 0.9–1.1)

## 2018-11-12 ENCOUNTER — COMMUNICATION - HEALTHEAST (OUTPATIENT)
Dept: INTERNAL MEDICINE | Facility: CLINIC | Age: 68
End: 2018-11-12

## 2018-11-12 DIAGNOSIS — Z95.4 S/P MITRAL VALVE REPLACEMENT WITH METALLIC VALVE: ICD-10-CM

## 2018-11-12 DIAGNOSIS — Z95.2 S/P MVR (MITRAL VALVE REPLACEMENT): ICD-10-CM

## 2018-11-12 DIAGNOSIS — I48.0 PAROXYSMAL ATRIAL FIBRILLATION (H): ICD-10-CM

## 2018-11-12 LAB — INR PPP: 4.4 (ref 0.9–1.1)

## 2018-11-19 ENCOUNTER — COMMUNICATION - HEALTHEAST (OUTPATIENT)
Dept: ANTICOAGULATION | Facility: CLINIC | Age: 68
End: 2018-11-19

## 2018-11-19 DIAGNOSIS — Z95.2 S/P MVR (MITRAL VALVE REPLACEMENT): ICD-10-CM

## 2018-11-19 DIAGNOSIS — I48.0 PAROXYSMAL ATRIAL FIBRILLATION (H): ICD-10-CM

## 2018-11-19 DIAGNOSIS — Z95.4 S/P MITRAL VALVE REPLACEMENT WITH METALLIC VALVE: ICD-10-CM

## 2018-11-19 LAB — INR PPP: 3.1 (ref 0.9–1.1)

## 2018-11-20 ENCOUNTER — OFFICE VISIT - HEALTHEAST (OUTPATIENT)
Dept: VASCULAR SURGERY | Facility: CLINIC | Age: 68
End: 2018-11-20

## 2018-11-20 ENCOUNTER — RECORDS - HEALTHEAST (OUTPATIENT)
Dept: ADMINISTRATIVE | Facility: OTHER | Age: 68
End: 2018-11-20

## 2018-11-20 DIAGNOSIS — I83.93 SPIDER VEINS OF BOTH LOWER EXTREMITIES: ICD-10-CM

## 2018-11-20 DIAGNOSIS — I87.2 VENOUS INSUFFICIENCY OF BOTH LOWER EXTREMITIES: ICD-10-CM

## 2018-11-20 DIAGNOSIS — I83.899 SYMPTOMATIC VARICOSE VEINS: ICD-10-CM

## 2018-11-20 DIAGNOSIS — M79.89 SWELLING OF BOTH LOWER EXTREMITIES: ICD-10-CM

## 2018-11-26 ENCOUNTER — COMMUNICATION - HEALTHEAST (OUTPATIENT)
Dept: ANTICOAGULATION | Facility: CLINIC | Age: 68
End: 2018-11-26

## 2018-11-26 DIAGNOSIS — Z95.2 S/P MVR (MITRAL VALVE REPLACEMENT): ICD-10-CM

## 2018-11-26 DIAGNOSIS — Z95.4 S/P MITRAL VALVE REPLACEMENT WITH METALLIC VALVE: ICD-10-CM

## 2018-11-26 DIAGNOSIS — I48.0 PAROXYSMAL ATRIAL FIBRILLATION (H): ICD-10-CM

## 2018-11-26 LAB — INR PPP: 2.3 (ref 0.9–1.1)

## 2018-11-27 ENCOUNTER — OFFICE VISIT - HEALTHEAST (OUTPATIENT)
Dept: VASCULAR SURGERY | Facility: CLINIC | Age: 68
End: 2018-11-27

## 2018-11-27 ENCOUNTER — RECORDS - HEALTHEAST (OUTPATIENT)
Dept: VASCULAR ULTRASOUND | Facility: CLINIC | Age: 68
End: 2018-11-27

## 2018-11-27 DIAGNOSIS — I83.899 VARICOSE VEINS OF UNSPECIFIED LOWER EXTREMITY WITH OTHER COMPLICATIONS: ICD-10-CM

## 2018-11-27 DIAGNOSIS — I83.93 SPIDER VEINS OF BOTH LOWER EXTREMITIES: ICD-10-CM

## 2018-11-27 DIAGNOSIS — M79.89 SWELLING OF BOTH LOWER EXTREMITIES: ICD-10-CM

## 2018-11-27 DIAGNOSIS — I87.2 VENOUS INSUFFICIENCY (CHRONIC) (PERIPHERAL): ICD-10-CM

## 2018-12-03 ENCOUNTER — COMMUNICATION - HEALTHEAST (OUTPATIENT)
Dept: INTERNAL MEDICINE | Facility: CLINIC | Age: 68
End: 2018-12-03

## 2018-12-03 DIAGNOSIS — E78.5 HLD (HYPERLIPIDEMIA): ICD-10-CM

## 2018-12-10 ENCOUNTER — COMMUNICATION - HEALTHEAST (OUTPATIENT)
Dept: ANTICOAGULATION | Facility: CLINIC | Age: 68
End: 2018-12-10

## 2018-12-10 ENCOUNTER — COMMUNICATION - HEALTHEAST (OUTPATIENT)
Dept: INTERNAL MEDICINE | Facility: CLINIC | Age: 68
End: 2018-12-10

## 2018-12-10 DIAGNOSIS — Z95.2 S/P MVR (MITRAL VALVE REPLACEMENT): ICD-10-CM

## 2018-12-10 DIAGNOSIS — I48.0 PAROXYSMAL ATRIAL FIBRILLATION (H): ICD-10-CM

## 2018-12-10 DIAGNOSIS — Z95.4 S/P MITRAL VALVE REPLACEMENT WITH METALLIC VALVE: ICD-10-CM

## 2018-12-10 LAB — INR PPP: 2.3 (ref 0.9–1.1)

## 2018-12-24 ENCOUNTER — COMMUNICATION - HEALTHEAST (OUTPATIENT)
Dept: INTERNAL MEDICINE | Facility: CLINIC | Age: 68
End: 2018-12-24

## 2019-01-03 ENCOUNTER — COMMUNICATION - HEALTHEAST (OUTPATIENT)
Dept: ANTICOAGULATION | Facility: CLINIC | Age: 69
End: 2019-01-03

## 2019-01-03 DIAGNOSIS — I48.0 PAROXYSMAL ATRIAL FIBRILLATION (H): ICD-10-CM

## 2019-01-03 DIAGNOSIS — Z95.2 S/P MVR (MITRAL VALVE REPLACEMENT): ICD-10-CM

## 2019-01-03 DIAGNOSIS — Z95.4 S/P MITRAL VALVE REPLACEMENT WITH METALLIC VALVE: ICD-10-CM

## 2019-01-03 LAB — INR PPP: 3 (ref 0.9–1.1)

## 2019-01-11 ENCOUNTER — COMMUNICATION - HEALTHEAST (OUTPATIENT)
Dept: ANTICOAGULATION | Facility: CLINIC | Age: 69
End: 2019-01-11

## 2019-01-11 DIAGNOSIS — Z95.4 S/P MITRAL VALVE REPLACEMENT WITH METALLIC VALVE: ICD-10-CM

## 2019-01-11 DIAGNOSIS — I48.0 PAROXYSMAL ATRIAL FIBRILLATION (H): ICD-10-CM

## 2019-01-16 ENCOUNTER — OFFICE VISIT - HEALTHEAST (OUTPATIENT)
Dept: INTERNAL MEDICINE | Facility: CLINIC | Age: 69
End: 2019-01-16

## 2019-01-16 ENCOUNTER — COMMUNICATION - HEALTHEAST (OUTPATIENT)
Dept: ANTICOAGULATION | Facility: CLINIC | Age: 69
End: 2019-01-16

## 2019-01-16 DIAGNOSIS — Z12.11 SCREEN FOR COLON CANCER: ICD-10-CM

## 2019-01-16 DIAGNOSIS — Z12.5 SCREENING FOR PROSTATE CANCER: ICD-10-CM

## 2019-01-16 DIAGNOSIS — Z00.00 HEALTHCARE MAINTENANCE: ICD-10-CM

## 2019-01-16 DIAGNOSIS — Z95.4 S/P MITRAL VALVE REPLACEMENT WITH METALLIC VALVE: ICD-10-CM

## 2019-01-16 DIAGNOSIS — I48.0 PAROXYSMAL ATRIAL FIBRILLATION (H): ICD-10-CM

## 2019-01-16 DIAGNOSIS — I25.10 CORONARY ARTERY DISEASE INVOLVING NATIVE CORONARY ARTERY OF NATIVE HEART WITHOUT ANGINA PECTORIS: ICD-10-CM

## 2019-01-16 DIAGNOSIS — Z95.2 S/P MVR (MITRAL VALVE REPLACEMENT): ICD-10-CM

## 2019-01-16 DIAGNOSIS — E78.5 DYSLIPIDEMIA: ICD-10-CM

## 2019-01-16 LAB
ALBUMIN SERPL-MCNC: 4.3 G/DL (ref 3.5–5)
ALP SERPL-CCNC: 77 U/L (ref 45–120)
ALT SERPL W P-5'-P-CCNC: 51 U/L (ref 0–45)
ANION GAP SERPL CALCULATED.3IONS-SCNC: 10 MMOL/L (ref 5–18)
AST SERPL W P-5'-P-CCNC: 94 U/L (ref 0–40)
BILIRUB SERPL-MCNC: 0.9 MG/DL (ref 0–1)
BUN SERPL-MCNC: 39 MG/DL (ref 8–28)
CALCIUM SERPL-MCNC: 10.4 MG/DL (ref 8.5–10.5)
CHLORIDE BLD-SCNC: 106 MMOL/L (ref 98–107)
CHOLEST SERPL-MCNC: 136 MG/DL
CO2 SERPL-SCNC: 23 MMOL/L (ref 22–31)
CREAT SERPL-MCNC: 1.27 MG/DL (ref 0.7–1.3)
FASTING STATUS PATIENT QL REPORTED: YES
GFR SERPL CREATININE-BSD FRML MDRD: 56 ML/MIN/1.73M2
GLUCOSE BLD-MCNC: 112 MG/DL (ref 70–125)
HDLC SERPL-MCNC: 37 MG/DL
INR PPP: 2.6 (ref 0.9–1.1)
LDLC SERPL CALC-MCNC: 81 MG/DL
POTASSIUM BLD-SCNC: 4.7 MMOL/L (ref 3.5–5)
PROT SERPL-MCNC: 8.8 G/DL (ref 6–8)
PSA SERPL-MCNC: 0.2 NG/ML (ref 0–6.5)
SODIUM SERPL-SCNC: 139 MMOL/L (ref 136–145)
TRIGL SERPL-MCNC: 92 MG/DL

## 2019-01-16 ASSESSMENT — MIFFLIN-ST. JEOR: SCORE: 1628.29

## 2019-01-17 ENCOUNTER — COMMUNICATION - HEALTHEAST (OUTPATIENT)
Dept: INTERNAL MEDICINE | Facility: CLINIC | Age: 69
End: 2019-01-17

## 2019-01-24 ENCOUNTER — AMBULATORY - HEALTHEAST (OUTPATIENT)
Dept: CARDIAC REHAB | Facility: CLINIC | Age: 69
End: 2019-01-24

## 2019-01-31 ENCOUNTER — COMMUNICATION - HEALTHEAST (OUTPATIENT)
Dept: ANTICOAGULATION | Facility: CLINIC | Age: 69
End: 2019-01-31

## 2019-01-31 DIAGNOSIS — Z95.4 S/P MITRAL VALVE REPLACEMENT WITH METALLIC VALVE: ICD-10-CM

## 2019-01-31 DIAGNOSIS — Z95.2 S/P MVR (MITRAL VALVE REPLACEMENT): ICD-10-CM

## 2019-01-31 DIAGNOSIS — I48.0 PAROXYSMAL ATRIAL FIBRILLATION (H): ICD-10-CM

## 2019-01-31 LAB — INR PPP: 4.7 (ref 0.9–1.1)

## 2019-02-08 ENCOUNTER — COMMUNICATION - HEALTHEAST (OUTPATIENT)
Dept: INTERNAL MEDICINE | Facility: CLINIC | Age: 69
End: 2019-02-08

## 2019-02-11 ENCOUNTER — COMMUNICATION - HEALTHEAST (OUTPATIENT)
Dept: CARDIOLOGY | Facility: CLINIC | Age: 69
End: 2019-02-11

## 2019-02-11 DIAGNOSIS — I50.42 CHRONIC COMBINED SYSTOLIC AND DIASTOLIC CHF, NYHA CLASS 2 (H): ICD-10-CM

## 2019-02-18 ENCOUNTER — COMMUNICATION - HEALTHEAST (OUTPATIENT)
Dept: SCHEDULING | Facility: CLINIC | Age: 69
End: 2019-02-18

## 2019-02-19 ENCOUNTER — OFFICE VISIT - HEALTHEAST (OUTPATIENT)
Dept: INTERNAL MEDICINE | Facility: CLINIC | Age: 69
End: 2019-02-19

## 2019-02-19 ENCOUNTER — COMMUNICATION - HEALTHEAST (OUTPATIENT)
Dept: ANTICOAGULATION | Facility: CLINIC | Age: 69
End: 2019-02-19

## 2019-02-19 ENCOUNTER — HOSPITAL ENCOUNTER (OUTPATIENT)
Dept: LAB | Age: 69
Setting detail: SPECIMEN
Discharge: HOME | End: 2019-02-19

## 2019-02-19 DIAGNOSIS — I48.0 PAROXYSMAL ATRIAL FIBRILLATION (H): ICD-10-CM

## 2019-02-19 DIAGNOSIS — Z95.2 S/P MVR (MITRAL VALVE REPLACEMENT): ICD-10-CM

## 2019-02-19 DIAGNOSIS — I50.43 ACUTE ON CHRONIC COMBINED SYSTOLIC AND DIASTOLIC CONGESTIVE HEART FAILURE (H): ICD-10-CM

## 2019-02-19 DIAGNOSIS — Z95.4 S/P MITRAL VALVE REPLACEMENT WITH METALLIC VALVE: ICD-10-CM

## 2019-02-19 DIAGNOSIS — I89.0 LYMPHEDEMA OF BOTH LOWER EXTREMITIES: ICD-10-CM

## 2019-02-19 DIAGNOSIS — D64.9 ANEMIA, UNSPECIFIED TYPE: ICD-10-CM

## 2019-02-19 LAB
ANION GAP SERPL CALCULATED.3IONS-SCNC: 10 MMOL/L (ref 5–18)
BNP SERPL-MCNC: 447 PG/ML (ref 0–67)
BUN SERPL-MCNC: 33 MG/DL (ref 8–28)
CALCIUM SERPL-MCNC: 9.9 MG/DL (ref 8.5–10.5)
CHLORIDE BLD-SCNC: 102 MMOL/L (ref 98–107)
CO2 SERPL-SCNC: 26 MMOL/L (ref 22–31)
CREAT SERPL-MCNC: 1.37 MG/DL (ref 0.7–1.3)
GFR SERPL CREATININE-BSD FRML MDRD: 51 ML/MIN/1.73M2
GLUCOSE BLD-MCNC: 113 MG/DL (ref 70–125)
HGB BLD-MCNC: 9.3 G/DL (ref 14–18)
INR PPP: 4.1 (ref 0.9–1.1)
POTASSIUM BLD-SCNC: 4.4 MMOL/L (ref 3.5–5)
SODIUM SERPL-SCNC: 138 MMOL/L (ref 136–145)

## 2019-02-19 ASSESSMENT — MIFFLIN-ST. JEOR: SCORE: 1664.58

## 2019-02-20 ENCOUNTER — COMMUNICATION - HEALTHEAST (OUTPATIENT)
Dept: SCHEDULING | Facility: CLINIC | Age: 69
End: 2019-02-20

## 2019-02-20 ENCOUNTER — AMBULATORY - HEALTHEAST (OUTPATIENT)
Dept: INTERNAL MEDICINE | Facility: CLINIC | Age: 69
End: 2019-02-20

## 2019-02-20 ENCOUNTER — COMMUNICATION - HEALTHEAST (OUTPATIENT)
Dept: INTERNAL MEDICINE | Facility: CLINIC | Age: 69
End: 2019-02-20

## 2019-02-20 LAB
IRON SATN MFR SERPL: 5 % (ref 20–50)
IRON SERPL-MCNC: 22 UG/DL (ref 42–175)
TIBC SERPL-MCNC: 460 UG/DL (ref 313–563)
TRANSFERRIN SERPL-MCNC: 368 MG/DL (ref 212–360)

## 2019-02-25 ENCOUNTER — HOSPITAL ENCOUNTER (OUTPATIENT)
Dept: CARDIOLOGY | Facility: CLINIC | Age: 69
Discharge: HOME | End: 2019-02-25
Attending: INTERNAL MEDICINE

## 2019-02-25 ENCOUNTER — COMMUNICATION - HEALTHEAST (OUTPATIENT)
Dept: ANTICOAGULATION | Facility: CLINIC | Age: 69
End: 2019-02-25

## 2019-02-25 DIAGNOSIS — I48.0 PAROXYSMAL ATRIAL FIBRILLATION (H): ICD-10-CM

## 2019-02-25 DIAGNOSIS — Z95.4 S/P MITRAL VALVE REPLACEMENT WITH METALLIC VALVE: ICD-10-CM

## 2019-02-25 DIAGNOSIS — Z95.2 S/P MVR (MITRAL VALVE REPLACEMENT): ICD-10-CM

## 2019-02-25 DIAGNOSIS — I50.43 ACUTE ON CHRONIC COMBINED SYSTOLIC AND DIASTOLIC CONGESTIVE HEART FAILURE (H): ICD-10-CM

## 2019-02-25 LAB — INR PPP: 2.8 (ref 0.9–1.1)

## 2019-02-25 ASSESSMENT — MIFFLIN-ST. JEOR: SCORE: 1664.58

## 2019-02-26 LAB
AORTIC ROOT: 4 CM
AORTIC VALVE MEAN VELOCITY: 117 CM/S
ASCENDING AORTA: 4.1 CM
AV DIMENSIONLESS INDEX VTI: 0.6
AV MEAN GRADIENT: 6 MMHG
AV PEAK GRADIENT: 11.4 MMHG
AV VALVE AREA: 2.6 CM2
AV VELOCITY RATIO: 0.7
BSA FOR ECHO PROCEDURE: 2.12 M2
CV BLOOD PRESSURE: ABNORMAL MMHG
CV ECHO HEIGHT: 69.5 IN
CV ECHO WEIGHT: 202 LBS
DOP CALC AO PEAK VEL: 169 CM/S
DOP CALC AO VTI: 42 CM
DOP CALC LVOT AREA: 4.15 CM2
DOP CALC LVOT DIAMETER: 2.3 CM
DOP CALC LVOT PEAK VEL: 112 CM/S
DOP CALC LVOT STROKE VOLUME: 107.1 CM3
DOP CALC MV VTI: 57.4 CM
DOP CALCLVOT PEAK VEL VTI: 25.8 CM
EJECTION FRACTION: 58 % (ref 55–75)
FRACTIONAL SHORTENING: 36.7 % (ref 28–44)
INTERVENTRICULAR SEPTUM IN END DIASTOLE: 0.9 CM (ref 0.6–1)
IVS/PW RATIO: 0.8
LA AREA 1: 32.3 CM2
LA AREA 2: 36.8 CM2
LEFT ATRIUM LENGTH: 7.28 CM
LEFT ATRIUM SIZE: 4.4 CM
LEFT ATRIUM VOLUME INDEX: 65.5 ML/M2
LEFT ATRIUM VOLUME: 138.8 ML
LEFT VENTRICLE CARDIAC INDEX: 2.8 L/MIN/M2
LEFT VENTRICLE CARDIAC OUTPUT: 5.9 L/MIN
LEFT VENTRICLE DIASTOLIC VOLUME INDEX: 75 CM3/M2 (ref 34–74)
LEFT VENTRICLE DIASTOLIC VOLUME: 159 CM3 (ref 62–150)
LEFT VENTRICLE HEART RATE: 55 BPM
LEFT VENTRICLE MASS INDEX: 83 G/M2
LEFT VENTRICLE SYSTOLIC VOLUME INDEX: 31.6 CM3/M2 (ref 11–31)
LEFT VENTRICLE SYSTOLIC VOLUME: 67 CM3 (ref 21–61)
LEFT VENTRICULAR INTERNAL DIMENSION IN DIASTOLE: 4.9 CM (ref 4.2–5.8)
LEFT VENTRICULAR INTERNAL DIMENSION IN SYSTOLE: 3.1 CM (ref 2.5–4)
LEFT VENTRICULAR MASS: 176 G
LEFT VENTRICULAR OUTFLOW TRACT MEAN GRADIENT: 2 MMHG
LEFT VENTRICULAR OUTFLOW TRACT MEAN VELOCITY: 70.7 CM/S
LEFT VENTRICULAR OUTFLOW TRACT PEAK GRADIENT: 5 MMHG
LEFT VENTRICULAR POSTERIOR WALL IN END DIASTOLE: 1.1 CM (ref 0.6–1)
LV STROKE VOLUME INDEX: 50.5 ML/M2
MITRAL VALVE DECELERATION SLOPE: ABNORMAL MM/S2
MITRAL VALVE MEAN INFLOW VELOCITY: 94.8 CM/S
MITRAL VALVE PEAK VELOCITY: 241 CM/S
MITRAL VALVE PRESSURE HALF-TIME: 50 MS
MV AREA VTI: 1.87 CM2
MV AVERAGE E/E' RATIO: 37.6 CM/S
MV DECELERATION TIME: 208 MS
MV E'TISSUE VEL-LAT: 6.43 CM/S
MV E'TISSUE VEL-MED: 5.65 CM/S
MV LATERAL E/E' RATIO: 35.3
MV MEAN GRADIENT: 5 MMHG
MV MEDIAL E/E' RATIO: 40.2
MV PEAK E VELOCITY: 227 CM/S
MV PEAK GRADIENT: 23.2 MMHG
MV VALVE AREA BY CONTINUITY EQUATION: 1.9 CM2
MV VALVE AREA PRESSURE 1/2 METHOD: 4.4 CM2
NUC REST DIASTOLIC VOLUME INDEX: 3232 LBS
NUC REST SYSTOLIC VOLUME INDEX: 69.5 IN
TRICUSPID REGURGITATION PEAK PRESSURE GRADIENT: 35 MMHG
TRICUSPID VALVE ANULAR PLANE SYSTOLIC EXCURSION: 1.5 CM
TRICUSPID VALVE PEAK REGURGITANT VELOCITY: 296 CM/S

## 2019-02-27 ENCOUNTER — OFFICE VISIT - HEALTHEAST (OUTPATIENT)
Dept: INTERNAL MEDICINE | Facility: CLINIC | Age: 69
End: 2019-02-27

## 2019-02-27 DIAGNOSIS — I50.42 CHRONIC COMBINED SYSTOLIC AND DIASTOLIC CONGESTIVE HEART FAILURE (H): ICD-10-CM

## 2019-02-27 DIAGNOSIS — I89.0 LYMPHEDEMA OF BOTH LOWER EXTREMITIES: ICD-10-CM

## 2019-02-27 DIAGNOSIS — Z79.899 MEDICATION MANAGEMENT: ICD-10-CM

## 2019-02-27 DIAGNOSIS — I50.9 CHRONIC CONGESTIVE HEART FAILURE, UNSPECIFIED HEART FAILURE TYPE (H): ICD-10-CM

## 2019-02-27 LAB
ANION GAP SERPL CALCULATED.3IONS-SCNC: 14 MMOL/L (ref 5–18)
BNP SERPL-MCNC: 360 PG/ML (ref 0–67)
BUN SERPL-MCNC: 48 MG/DL (ref 8–28)
CALCIUM SERPL-MCNC: 9.9 MG/DL (ref 8.5–10.5)
CHLORIDE BLD-SCNC: 99 MMOL/L (ref 98–107)
CO2 SERPL-SCNC: 24 MMOL/L (ref 22–31)
CREAT SERPL-MCNC: 1.48 MG/DL (ref 0.7–1.3)
GFR SERPL CREATININE-BSD FRML MDRD: 47 ML/MIN/1.73M2
GLUCOSE BLD-MCNC: 105 MG/DL (ref 70–125)
POTASSIUM BLD-SCNC: 3.8 MMOL/L (ref 3.5–5)
SODIUM SERPL-SCNC: 137 MMOL/L (ref 136–145)

## 2019-02-27 ASSESSMENT — MIFFLIN-ST. JEOR: SCORE: 1655.5

## 2019-02-28 ENCOUNTER — COMMUNICATION - HEALTHEAST (OUTPATIENT)
Dept: INTERNAL MEDICINE | Facility: CLINIC | Age: 69
End: 2019-02-28

## 2019-03-06 ENCOUNTER — COMMUNICATION - HEALTHEAST (OUTPATIENT)
Dept: INTERNAL MEDICINE | Facility: CLINIC | Age: 69
End: 2019-03-06

## 2019-03-06 ENCOUNTER — OFFICE VISIT - HEALTHEAST (OUTPATIENT)
Dept: INTERNAL MEDICINE | Facility: CLINIC | Age: 69
End: 2019-03-06

## 2019-03-06 DIAGNOSIS — I89.0 LYMPHEDEMA OF BOTH LOWER EXTREMITIES: ICD-10-CM

## 2019-03-06 DIAGNOSIS — Z79.899 MEDICATION MANAGEMENT: ICD-10-CM

## 2019-03-06 DIAGNOSIS — I50.9 CHRONIC CONGESTIVE HEART FAILURE, UNSPECIFIED HEART FAILURE TYPE (H): ICD-10-CM

## 2019-03-06 LAB
ANION GAP SERPL CALCULATED.3IONS-SCNC: 16 MMOL/L (ref 5–18)
BUN SERPL-MCNC: 50 MG/DL (ref 8–28)
CALCIUM SERPL-MCNC: 10.3 MG/DL (ref 8.5–10.5)
CHLORIDE BLD-SCNC: 102 MMOL/L (ref 98–107)
CO2 SERPL-SCNC: 24 MMOL/L (ref 22–31)
CREAT SERPL-MCNC: 1.66 MG/DL (ref 0.7–1.3)
GFR SERPL CREATININE-BSD FRML MDRD: 41 ML/MIN/1.73M2
GLUCOSE BLD-MCNC: 103 MG/DL (ref 70–125)
POTASSIUM BLD-SCNC: 4 MMOL/L (ref 3.5–5)
SODIUM SERPL-SCNC: 142 MMOL/L (ref 136–145)

## 2019-03-06 ASSESSMENT — MIFFLIN-ST. JEOR: SCORE: 1623.75

## 2019-03-08 ENCOUNTER — COMMUNICATION - HEALTHEAST (OUTPATIENT)
Dept: ANTICOAGULATION | Facility: CLINIC | Age: 69
End: 2019-03-08

## 2019-03-08 DIAGNOSIS — I48.0 PAROXYSMAL ATRIAL FIBRILLATION (H): ICD-10-CM

## 2019-03-08 DIAGNOSIS — Z95.4 S/P MITRAL VALVE REPLACEMENT WITH METALLIC VALVE: ICD-10-CM

## 2019-03-08 DIAGNOSIS — Z95.2 S/P MVR (MITRAL VALVE REPLACEMENT): ICD-10-CM

## 2019-03-08 LAB — INR PPP: 2.1 (ref 0.9–1.1)

## 2019-03-14 ENCOUNTER — COMMUNICATION - HEALTHEAST (OUTPATIENT)
Dept: INTERNAL MEDICINE | Facility: CLINIC | Age: 69
End: 2019-03-14

## 2019-03-19 ENCOUNTER — COMMUNICATION - HEALTHEAST (OUTPATIENT)
Dept: ANTICOAGULATION | Facility: CLINIC | Age: 69
End: 2019-03-19

## 2019-03-19 DIAGNOSIS — I48.0 PAROXYSMAL ATRIAL FIBRILLATION (H): ICD-10-CM

## 2019-03-19 DIAGNOSIS — Z95.2 S/P MVR (MITRAL VALVE REPLACEMENT): ICD-10-CM

## 2019-03-19 DIAGNOSIS — Z95.4 S/P MITRAL VALVE REPLACEMENT WITH METALLIC VALVE: ICD-10-CM

## 2019-03-19 LAB — INR PPP: 2.1 (ref 0.9–1.1)

## 2019-03-28 ENCOUNTER — COMMUNICATION - HEALTHEAST (OUTPATIENT)
Dept: CARDIOLOGY | Facility: CLINIC | Age: 69
End: 2019-03-28

## 2019-03-28 DIAGNOSIS — E87.6 HYPOKALEMIA: ICD-10-CM

## 2019-04-02 ENCOUNTER — COMMUNICATION - HEALTHEAST (OUTPATIENT)
Dept: ANTICOAGULATION | Facility: CLINIC | Age: 69
End: 2019-04-02

## 2019-04-02 DIAGNOSIS — Z95.2 S/P MVR (MITRAL VALVE REPLACEMENT): ICD-10-CM

## 2019-04-02 DIAGNOSIS — Z95.4 S/P MITRAL VALVE REPLACEMENT WITH METALLIC VALVE: ICD-10-CM

## 2019-04-02 DIAGNOSIS — I48.0 PAROXYSMAL ATRIAL FIBRILLATION (H): ICD-10-CM

## 2019-04-02 LAB — INR PPP: 4.8 (ref 0.9–1.1)

## 2019-04-04 ENCOUNTER — OFFICE VISIT - HEALTHEAST (OUTPATIENT)
Dept: INTERNAL MEDICINE | Facility: CLINIC | Age: 69
End: 2019-04-04

## 2019-04-04 DIAGNOSIS — I89.0 LYMPHEDEMA OF BOTH LOWER EXTREMITIES: ICD-10-CM

## 2019-04-04 DIAGNOSIS — I50.9 CHRONIC CONGESTIVE HEART FAILURE, UNSPECIFIED HEART FAILURE TYPE (H): ICD-10-CM

## 2019-04-04 DIAGNOSIS — Z12.11 COLON CANCER SCREENING: ICD-10-CM

## 2019-04-04 ASSESSMENT — MIFFLIN-ST. JEOR: SCORE: 1623.75

## 2019-04-09 ENCOUNTER — COMMUNICATION - HEALTHEAST (OUTPATIENT)
Dept: INFECTIOUS DISEASES | Facility: CLINIC | Age: 69
End: 2019-04-09

## 2019-04-09 DIAGNOSIS — T82.6XXD PROSTHETIC VALVE ENDOCARDITIS, SUBSEQUENT ENCOUNTER: ICD-10-CM

## 2019-04-09 DIAGNOSIS — I38 PROSTHETIC VALVE ENDOCARDITIS, SUBSEQUENT ENCOUNTER: ICD-10-CM

## 2019-04-12 ENCOUNTER — COMMUNICATION - HEALTHEAST (OUTPATIENT)
Dept: ANTICOAGULATION | Facility: CLINIC | Age: 69
End: 2019-04-12

## 2019-04-12 DIAGNOSIS — Z95.2 S/P MVR (MITRAL VALVE REPLACEMENT): ICD-10-CM

## 2019-04-12 DIAGNOSIS — Z95.4 S/P MITRAL VALVE REPLACEMENT WITH METALLIC VALVE: ICD-10-CM

## 2019-04-12 DIAGNOSIS — I48.0 PAROXYSMAL ATRIAL FIBRILLATION (H): ICD-10-CM

## 2019-04-12 LAB — INR PPP: 2.6 (ref 0.9–1.1)

## 2019-04-17 ENCOUNTER — RECORDS - HEALTHEAST (OUTPATIENT)
Dept: ADMINISTRATIVE | Facility: OTHER | Age: 69
End: 2019-04-17

## 2019-04-17 LAB — COLOGUARD-ABSTRACT: POSITIVE

## 2019-04-18 ENCOUNTER — OFFICE VISIT - HEALTHEAST (OUTPATIENT)
Dept: INFECTIOUS DISEASES | Facility: CLINIC | Age: 69
End: 2019-04-18

## 2019-04-18 DIAGNOSIS — Z86.79 HX OF BACTERIAL ENDOCARDITIS: ICD-10-CM

## 2019-04-24 ENCOUNTER — COMMUNICATION - HEALTHEAST (OUTPATIENT)
Dept: ANTICOAGULATION | Facility: CLINIC | Age: 69
End: 2019-04-24

## 2019-04-24 DIAGNOSIS — Z95.4 S/P MITRAL VALVE REPLACEMENT WITH METALLIC VALVE: ICD-10-CM

## 2019-04-24 DIAGNOSIS — Z95.2 S/P MVR (MITRAL VALVE REPLACEMENT): ICD-10-CM

## 2019-04-24 DIAGNOSIS — I48.0 PAROXYSMAL ATRIAL FIBRILLATION (H): ICD-10-CM

## 2019-04-24 LAB — INR PPP: 2.4 (ref 0.9–1.1)

## 2019-04-30 ENCOUNTER — RECORDS - HEALTHEAST (OUTPATIENT)
Dept: ADMINISTRATIVE | Facility: OTHER | Age: 69
End: 2019-04-30

## 2019-05-02 ENCOUNTER — OFFICE VISIT - HEALTHEAST (OUTPATIENT)
Dept: CARDIOLOGY | Facility: CLINIC | Age: 69
End: 2019-05-02

## 2019-05-02 ENCOUNTER — COMMUNICATION - HEALTHEAST (OUTPATIENT)
Dept: CARDIOLOGY | Facility: CLINIC | Age: 69
End: 2019-05-02

## 2019-05-02 DIAGNOSIS — E78.5 DYSLIPIDEMIA: ICD-10-CM

## 2019-05-02 DIAGNOSIS — Z95.4 S/P MITRAL VALVE REPLACEMENT WITH METALLIC VALVE: ICD-10-CM

## 2019-05-02 DIAGNOSIS — I48.19 PERSISTENT ATRIAL FIBRILLATION (H): ICD-10-CM

## 2019-05-02 DIAGNOSIS — N18.30 CKD (CHRONIC KIDNEY DISEASE), STAGE III (H): ICD-10-CM

## 2019-05-02 DIAGNOSIS — I25.10 CORONARY ARTERY DISEASE INVOLVING NATIVE CORONARY ARTERY OF NATIVE HEART WITHOUT ANGINA PECTORIS: ICD-10-CM

## 2019-05-02 DIAGNOSIS — I50.42 CHRONIC COMBINED SYSTOLIC AND DIASTOLIC CHF, NYHA CLASS 2 (H): ICD-10-CM

## 2019-05-02 LAB
ANION GAP SERPL CALCULATED.3IONS-SCNC: 8 MMOL/L (ref 5–18)
BNP SERPL-MCNC: 511 PG/ML (ref 0–67)
BUN SERPL-MCNC: 25 MG/DL (ref 8–28)
CALCIUM SERPL-MCNC: 10.1 MG/DL (ref 8.5–10.5)
CHLORIDE BLD-SCNC: 109 MMOL/L (ref 98–107)
CO2 SERPL-SCNC: 24 MMOL/L (ref 22–31)
CREAT SERPL-MCNC: 1.12 MG/DL (ref 0.7–1.3)
GFR SERPL CREATININE-BSD FRML MDRD: >60 ML/MIN/1.73M2
GLUCOSE BLD-MCNC: 91 MG/DL (ref 70–125)
POTASSIUM BLD-SCNC: 4.4 MMOL/L (ref 3.5–5)
SODIUM SERPL-SCNC: 141 MMOL/L (ref 136–145)

## 2019-05-02 ASSESSMENT — MIFFLIN-ST. JEOR: SCORE: 1619.22

## 2019-05-06 ENCOUNTER — COMMUNICATION - HEALTHEAST (OUTPATIENT)
Dept: INTERNAL MEDICINE | Facility: CLINIC | Age: 69
End: 2019-05-06

## 2019-05-08 ENCOUNTER — COMMUNICATION - HEALTHEAST (OUTPATIENT)
Dept: ANTICOAGULATION | Facility: CLINIC | Age: 69
End: 2019-05-08

## 2019-05-08 DIAGNOSIS — Z95.4 S/P MITRAL VALVE REPLACEMENT WITH METALLIC VALVE: ICD-10-CM

## 2019-05-08 DIAGNOSIS — Z95.2 S/P MVR (MITRAL VALVE REPLACEMENT): ICD-10-CM

## 2019-05-08 DIAGNOSIS — I48.0 PAROXYSMAL ATRIAL FIBRILLATION (H): ICD-10-CM

## 2019-05-08 LAB — INR PPP: 2.5 (ref 0.9–1.1)

## 2019-05-09 ENCOUNTER — RECORDS - HEALTHEAST (OUTPATIENT)
Dept: HEALTH INFORMATION MANAGEMENT | Facility: CLINIC | Age: 69
End: 2019-05-09

## 2019-05-10 ENCOUNTER — RECORDS - HEALTHEAST (OUTPATIENT)
Dept: ADMINISTRATIVE | Facility: OTHER | Age: 69
End: 2019-05-10

## 2019-05-20 ENCOUNTER — AMBULATORY - HEALTHEAST (OUTPATIENT)
Dept: LAB | Facility: CLINIC | Age: 69
End: 2019-05-20

## 2019-05-20 DIAGNOSIS — I50.42 CHRONIC COMBINED SYSTOLIC AND DIASTOLIC CHF, NYHA CLASS 2 (H): ICD-10-CM

## 2019-05-20 LAB
ANION GAP SERPL CALCULATED.3IONS-SCNC: 13 MMOL/L (ref 5–18)
BUN SERPL-MCNC: 29 MG/DL (ref 8–28)
CALCIUM SERPL-MCNC: 10.2 MG/DL (ref 8.5–10.5)
CHLORIDE BLD-SCNC: 104 MMOL/L (ref 98–107)
CO2 SERPL-SCNC: 22 MMOL/L (ref 22–31)
CREAT SERPL-MCNC: 1.15 MG/DL (ref 0.7–1.3)
GFR SERPL CREATININE-BSD FRML MDRD: >60 ML/MIN/1.73M2
GLUCOSE BLD-MCNC: 91 MG/DL (ref 70–125)
POTASSIUM BLD-SCNC: 4.3 MMOL/L (ref 3.5–5)
SODIUM SERPL-SCNC: 139 MMOL/L (ref 136–145)

## 2019-05-23 ENCOUNTER — COMMUNICATION - HEALTHEAST (OUTPATIENT)
Dept: ANTICOAGULATION | Facility: CLINIC | Age: 69
End: 2019-05-23

## 2019-05-23 DIAGNOSIS — I48.0 PAROXYSMAL ATRIAL FIBRILLATION (H): ICD-10-CM

## 2019-05-23 DIAGNOSIS — Z95.4 S/P MITRAL VALVE REPLACEMENT WITH METALLIC VALVE: ICD-10-CM

## 2019-05-23 DIAGNOSIS — Z95.2 S/P MVR (MITRAL VALVE REPLACEMENT): ICD-10-CM

## 2019-05-23 LAB — INR PPP: 2.4 (ref 0.9–1.1)

## 2019-06-03 ENCOUNTER — COMMUNICATION - HEALTHEAST (OUTPATIENT)
Dept: ADMINISTRATIVE | Facility: CLINIC | Age: 69
End: 2019-06-03

## 2019-06-07 ENCOUNTER — COMMUNICATION - HEALTHEAST (OUTPATIENT)
Dept: ANTICOAGULATION | Facility: CLINIC | Age: 69
End: 2019-06-07

## 2019-06-07 DIAGNOSIS — Z95.4 S/P MITRAL VALVE REPLACEMENT WITH METALLIC VALVE: ICD-10-CM

## 2019-06-07 DIAGNOSIS — Z95.2 S/P MVR (MITRAL VALVE REPLACEMENT): ICD-10-CM

## 2019-06-07 DIAGNOSIS — I48.0 PAROXYSMAL ATRIAL FIBRILLATION (H): ICD-10-CM

## 2019-06-07 LAB — INR PPP: 2.6 (ref 0.9–1.1)

## 2019-06-10 ENCOUNTER — OFFICE VISIT - HEALTHEAST (OUTPATIENT)
Dept: INTERNAL MEDICINE | Facility: CLINIC | Age: 69
End: 2019-06-10

## 2019-06-10 DIAGNOSIS — I50.9 CHRONIC CONGESTIVE HEART FAILURE, UNSPECIFIED HEART FAILURE TYPE (H): ICD-10-CM

## 2019-06-10 DIAGNOSIS — I48.0 PAROXYSMAL ATRIAL FIBRILLATION (H): ICD-10-CM

## 2019-06-10 ASSESSMENT — MIFFLIN-ST. JEOR: SCORE: 1646.43

## 2019-06-24 ENCOUNTER — COMMUNICATION - HEALTHEAST (OUTPATIENT)
Dept: ANTICOAGULATION | Facility: CLINIC | Age: 69
End: 2019-06-24

## 2019-06-24 DIAGNOSIS — I48.0 PAROXYSMAL ATRIAL FIBRILLATION (H): ICD-10-CM

## 2019-06-24 DIAGNOSIS — Z95.4 S/P MITRAL VALVE REPLACEMENT WITH METALLIC VALVE: ICD-10-CM

## 2019-06-24 DIAGNOSIS — Z95.2 S/P MVR (MITRAL VALVE REPLACEMENT): ICD-10-CM

## 2019-06-24 LAB — INR PPP: 2.2 (ref 0.9–1.1)

## 2019-06-26 ENCOUNTER — COMMUNICATION - HEALTHEAST (OUTPATIENT)
Dept: INTERNAL MEDICINE | Facility: CLINIC | Age: 69
End: 2019-06-26

## 2019-06-27 ENCOUNTER — COMMUNICATION - HEALTHEAST (OUTPATIENT)
Dept: INTERNAL MEDICINE | Facility: CLINIC | Age: 69
End: 2019-06-27

## 2019-06-27 DIAGNOSIS — Z95.4 S/P MITRAL VALVE REPLACEMENT WITH METALLIC VALVE: ICD-10-CM

## 2019-06-27 DIAGNOSIS — I48.0 PAROXYSMAL ATRIAL FIBRILLATION (H): ICD-10-CM

## 2019-06-27 DIAGNOSIS — Z95.2 S/P MVR (MITRAL VALVE REPLACEMENT): ICD-10-CM

## 2019-06-28 ENCOUNTER — COMMUNICATION - HEALTHEAST (OUTPATIENT)
Dept: INTERNAL MEDICINE | Facility: CLINIC | Age: 69
End: 2019-06-28

## 2019-06-28 ENCOUNTER — COMMUNICATION - HEALTHEAST (OUTPATIENT)
Dept: CARDIOLOGY | Facility: CLINIC | Age: 69
End: 2019-06-28

## 2019-06-28 DIAGNOSIS — E87.6 HYPOKALEMIA: ICD-10-CM

## 2019-07-01 ENCOUNTER — AMBULATORY - HEALTHEAST (OUTPATIENT)
Dept: LAB | Facility: CLINIC | Age: 69
End: 2019-07-01

## 2019-07-01 ENCOUNTER — AMBULATORY - HEALTHEAST (OUTPATIENT)
Dept: INTERNAL MEDICINE | Facility: CLINIC | Age: 69
End: 2019-07-01

## 2019-07-01 DIAGNOSIS — I50.22 CHRONIC SYSTOLIC HEART FAILURE (H): ICD-10-CM

## 2019-07-01 DIAGNOSIS — Z12.11 COLON CANCER SCREENING: ICD-10-CM

## 2019-07-02 ENCOUNTER — COMMUNICATION - HEALTHEAST (OUTPATIENT)
Dept: INTERNAL MEDICINE | Facility: CLINIC | Age: 69
End: 2019-07-02

## 2019-07-02 DIAGNOSIS — Z95.4 S/P MITRAL VALVE REPLACEMENT WITH METALLIC VALVE: ICD-10-CM

## 2019-07-03 ENCOUNTER — COMMUNICATION - HEALTHEAST (OUTPATIENT)
Dept: INTERNAL MEDICINE | Facility: CLINIC | Age: 69
End: 2019-07-03

## 2019-07-03 DIAGNOSIS — Z95.4 S/P MITRAL VALVE REPLACEMENT WITH METALLIC VALVE: ICD-10-CM

## 2019-07-03 DIAGNOSIS — Z95.2 S/P MVR (MITRAL VALVE REPLACEMENT): ICD-10-CM

## 2019-07-03 DIAGNOSIS — I48.0 PAROXYSMAL ATRIAL FIBRILLATION (H): ICD-10-CM

## 2019-07-05 ENCOUNTER — COMMUNICATION - HEALTHEAST (OUTPATIENT)
Dept: ANTICOAGULATION | Facility: CLINIC | Age: 69
End: 2019-07-05

## 2019-07-05 DIAGNOSIS — Z95.4 S/P MITRAL VALVE REPLACEMENT WITH METALLIC VALVE: ICD-10-CM

## 2019-07-05 DIAGNOSIS — I48.0 PAROXYSMAL ATRIAL FIBRILLATION (H): ICD-10-CM

## 2019-07-05 DIAGNOSIS — Z95.2 S/P MVR (MITRAL VALVE REPLACEMENT): ICD-10-CM

## 2019-07-05 LAB — INR PPP: 3.1 (ref 0.9–1.1)

## 2019-07-15 ENCOUNTER — COMMUNICATION - HEALTHEAST (OUTPATIENT)
Dept: INTERNAL MEDICINE | Facility: CLINIC | Age: 69
End: 2019-07-15

## 2019-07-16 ENCOUNTER — COMMUNICATION - HEALTHEAST (OUTPATIENT)
Dept: INTERNAL MEDICINE | Facility: CLINIC | Age: 69
End: 2019-07-16

## 2019-07-16 DIAGNOSIS — R19.5 POSITIVE COLORECTAL CANCER SCREENING USING COLOGUARD TEST: ICD-10-CM

## 2019-07-17 ENCOUNTER — AMBULATORY - HEALTHEAST (OUTPATIENT)
Dept: LAB | Facility: CLINIC | Age: 69
End: 2019-07-17

## 2019-07-17 DIAGNOSIS — I50.22 CHRONIC SYSTOLIC HEART FAILURE (H): ICD-10-CM

## 2019-07-17 LAB
ANION GAP SERPL CALCULATED.3IONS-SCNC: 8 MMOL/L (ref 5–18)
BUN SERPL-MCNC: 37 MG/DL (ref 8–28)
CALCIUM SERPL-MCNC: 10 MG/DL (ref 8.5–10.5)
CHLORIDE BLD-SCNC: 102 MMOL/L (ref 98–107)
CO2 SERPL-SCNC: 29 MMOL/L (ref 22–31)
CREAT SERPL-MCNC: 1.35 MG/DL (ref 0.7–1.3)
GFR SERPL CREATININE-BSD FRML MDRD: 52 ML/MIN/1.73M2
GLUCOSE BLD-MCNC: 104 MG/DL (ref 70–125)
POTASSIUM BLD-SCNC: 4.1 MMOL/L (ref 3.5–5)
SODIUM SERPL-SCNC: 139 MMOL/L (ref 136–145)

## 2019-07-18 ENCOUNTER — COMMUNICATION - HEALTHEAST (OUTPATIENT)
Dept: INTERNAL MEDICINE | Facility: CLINIC | Age: 69
End: 2019-07-18

## 2019-07-19 ENCOUNTER — COMMUNICATION - HEALTHEAST (OUTPATIENT)
Dept: INTERNAL MEDICINE | Facility: CLINIC | Age: 69
End: 2019-07-19

## 2019-07-26 ENCOUNTER — COMMUNICATION - HEALTHEAST (OUTPATIENT)
Dept: ANTICOAGULATION | Facility: CLINIC | Age: 69
End: 2019-07-26

## 2019-07-30 ENCOUNTER — COMMUNICATION - HEALTHEAST (OUTPATIENT)
Dept: INTERNAL MEDICINE | Facility: CLINIC | Age: 69
End: 2019-07-30

## 2019-07-31 ENCOUNTER — AMBULATORY - HEALTHEAST (OUTPATIENT)
Dept: LAB | Facility: CLINIC | Age: 69
End: 2019-07-31

## 2019-07-31 ENCOUNTER — COMMUNICATION - HEALTHEAST (OUTPATIENT)
Dept: INTERNAL MEDICINE | Facility: CLINIC | Age: 69
End: 2019-07-31

## 2019-07-31 ENCOUNTER — COMMUNICATION - HEALTHEAST (OUTPATIENT)
Dept: LAB | Facility: CLINIC | Age: 69
End: 2019-07-31

## 2019-07-31 DIAGNOSIS — Z95.4 S/P MITRAL VALVE REPLACEMENT WITH METALLIC VALVE: ICD-10-CM

## 2019-07-31 DIAGNOSIS — Z95.2 S/P MVR (MITRAL VALVE REPLACEMENT): ICD-10-CM

## 2019-07-31 DIAGNOSIS — I48.0 PAROXYSMAL ATRIAL FIBRILLATION (H): ICD-10-CM

## 2019-07-31 LAB — INR PPP: 6.54 (ref 0.9–1.1)

## 2019-08-01 LAB — INR PPP: 5.5 (ref 0.9–1.1)

## 2019-08-02 ENCOUNTER — COMMUNICATION - HEALTHEAST (OUTPATIENT)
Dept: INTERNAL MEDICINE | Facility: CLINIC | Age: 69
End: 2019-08-02

## 2019-08-02 ENCOUNTER — AMBULATORY - HEALTHEAST (OUTPATIENT)
Dept: LAB | Facility: CLINIC | Age: 69
End: 2019-08-02

## 2019-08-02 DIAGNOSIS — I48.0 PAROXYSMAL ATRIAL FIBRILLATION (H): ICD-10-CM

## 2019-08-02 DIAGNOSIS — Z95.4 S/P MITRAL VALVE REPLACEMENT WITH METALLIC VALVE: ICD-10-CM

## 2019-08-02 DIAGNOSIS — Z95.2 S/P MVR (MITRAL VALVE REPLACEMENT): ICD-10-CM

## 2019-08-02 LAB — INR PPP: 3.4 (ref 0.9–1.1)

## 2019-08-13 ENCOUNTER — COMMUNICATION - HEALTHEAST (OUTPATIENT)
Dept: ANTICOAGULATION | Facility: CLINIC | Age: 69
End: 2019-08-13

## 2019-08-13 DIAGNOSIS — Z95.4 S/P MITRAL VALVE REPLACEMENT WITH METALLIC VALVE: ICD-10-CM

## 2019-08-13 DIAGNOSIS — I48.0 PAROXYSMAL ATRIAL FIBRILLATION (H): ICD-10-CM

## 2019-08-13 DIAGNOSIS — Z95.2 S/P MVR (MITRAL VALVE REPLACEMENT): ICD-10-CM

## 2019-08-13 LAB — INR PPP: 3.8 (ref 0.9–1.1)

## 2019-08-26 ENCOUNTER — AMBULATORY - HEALTHEAST (OUTPATIENT)
Dept: LAB | Facility: CLINIC | Age: 69
End: 2019-08-26

## 2019-08-26 DIAGNOSIS — I50.22 CHRONIC SYSTOLIC HEART FAILURE (H): ICD-10-CM

## 2019-08-28 ENCOUNTER — COMMUNICATION - HEALTHEAST (OUTPATIENT)
Dept: ANTICOAGULATION | Facility: CLINIC | Age: 69
End: 2019-08-28

## 2019-08-28 ENCOUNTER — COMMUNICATION - HEALTHEAST (OUTPATIENT)
Dept: ADMINISTRATIVE | Facility: CLINIC | Age: 69
End: 2019-08-28

## 2019-08-28 DIAGNOSIS — I48.0 PAROXYSMAL ATRIAL FIBRILLATION (H): ICD-10-CM

## 2019-08-28 DIAGNOSIS — Z95.4 S/P MITRAL VALVE REPLACEMENT WITH METALLIC VALVE: ICD-10-CM

## 2019-08-28 DIAGNOSIS — Z95.2 S/P MVR (MITRAL VALVE REPLACEMENT): ICD-10-CM

## 2019-08-28 LAB — INR PPP: 4 (ref 0.9–1.1)

## 2019-09-11 ENCOUNTER — AMBULATORY - HEALTHEAST (OUTPATIENT)
Dept: LAB | Facility: CLINIC | Age: 69
End: 2019-09-11

## 2019-09-11 ENCOUNTER — COMMUNICATION - HEALTHEAST (OUTPATIENT)
Dept: ANTICOAGULATION | Facility: CLINIC | Age: 69
End: 2019-09-11

## 2019-09-11 DIAGNOSIS — I48.0 PAROXYSMAL ATRIAL FIBRILLATION (H): ICD-10-CM

## 2019-09-11 DIAGNOSIS — Z95.2 S/P MVR (MITRAL VALVE REPLACEMENT): ICD-10-CM

## 2019-09-11 DIAGNOSIS — Z95.4 S/P MITRAL VALVE REPLACEMENT WITH METALLIC VALVE: ICD-10-CM

## 2019-09-11 DIAGNOSIS — I50.22 CHRONIC SYSTOLIC HEART FAILURE (H): ICD-10-CM

## 2019-09-11 LAB
ANION GAP SERPL CALCULATED.3IONS-SCNC: 12 MMOL/L (ref 5–18)
BUN SERPL-MCNC: 52 MG/DL (ref 8–28)
CALCIUM SERPL-MCNC: 10.4 MG/DL (ref 8.5–10.5)
CHLORIDE BLD-SCNC: 101 MMOL/L (ref 98–107)
CO2 SERPL-SCNC: 27 MMOL/L (ref 22–31)
CREAT SERPL-MCNC: 1.65 MG/DL (ref 0.7–1.3)
GFR SERPL CREATININE-BSD FRML MDRD: 41 ML/MIN/1.73M2
GLUCOSE BLD-MCNC: 111 MG/DL (ref 70–125)
INR PPP: 4.4 (ref 0.9–1.1)
POTASSIUM BLD-SCNC: 3.9 MMOL/L (ref 3.5–5)
SODIUM SERPL-SCNC: 140 MMOL/L (ref 136–145)

## 2019-09-12 ENCOUNTER — COMMUNICATION - HEALTHEAST (OUTPATIENT)
Dept: INTERNAL MEDICINE | Facility: CLINIC | Age: 69
End: 2019-09-12

## 2019-09-24 ENCOUNTER — COMMUNICATION - HEALTHEAST (OUTPATIENT)
Dept: CARDIOLOGY | Facility: CLINIC | Age: 69
End: 2019-09-24

## 2019-09-24 DIAGNOSIS — E87.6 HYPOKALEMIA: ICD-10-CM

## 2019-09-25 ENCOUNTER — COMMUNICATION - HEALTHEAST (OUTPATIENT)
Dept: INTERNAL MEDICINE | Facility: CLINIC | Age: 69
End: 2019-09-25

## 2019-09-25 DIAGNOSIS — E87.6 HYPOKALEMIA: ICD-10-CM

## 2019-09-26 ENCOUNTER — COMMUNICATION - HEALTHEAST (OUTPATIENT)
Dept: ANTICOAGULATION | Facility: CLINIC | Age: 69
End: 2019-09-26

## 2019-09-26 DIAGNOSIS — Z95.4 S/P MITRAL VALVE REPLACEMENT WITH METALLIC VALVE: ICD-10-CM

## 2019-09-26 DIAGNOSIS — I48.0 PAROXYSMAL ATRIAL FIBRILLATION (H): ICD-10-CM

## 2019-09-26 DIAGNOSIS — Z95.2 S/P MVR (MITRAL VALVE REPLACEMENT): ICD-10-CM

## 2019-09-26 LAB — INR PPP: 2.5 (ref 0.9–1.1)

## 2019-09-27 ENCOUNTER — COMMUNICATION - HEALTHEAST (OUTPATIENT)
Dept: INTERNAL MEDICINE | Facility: CLINIC | Age: 69
End: 2019-09-27

## 2019-10-02 ENCOUNTER — COMMUNICATION - HEALTHEAST (OUTPATIENT)
Dept: INTERNAL MEDICINE | Facility: CLINIC | Age: 69
End: 2019-10-02

## 2019-10-02 ENCOUNTER — RECORDS - HEALTHEAST (OUTPATIENT)
Dept: ADMINISTRATIVE | Facility: OTHER | Age: 69
End: 2019-10-02

## 2019-10-02 DIAGNOSIS — I48.0 PAROXYSMAL ATRIAL FIBRILLATION (H): ICD-10-CM

## 2019-10-02 DIAGNOSIS — Z95.2 S/P MVR (MITRAL VALVE REPLACEMENT): ICD-10-CM

## 2019-10-02 DIAGNOSIS — Z95.4 S/P MITRAL VALVE REPLACEMENT WITH METALLIC VALVE: ICD-10-CM

## 2019-10-07 ENCOUNTER — COMMUNICATION - HEALTHEAST (OUTPATIENT)
Dept: INTERNAL MEDICINE | Facility: CLINIC | Age: 69
End: 2019-10-07

## 2019-10-07 DIAGNOSIS — Z95.2 S/P MVR (MITRAL VALVE REPLACEMENT): ICD-10-CM

## 2019-10-07 DIAGNOSIS — Z95.4 S/P MITRAL VALVE REPLACEMENT WITH METALLIC VALVE: ICD-10-CM

## 2019-10-07 DIAGNOSIS — I48.0 PAROXYSMAL ATRIAL FIBRILLATION (H): ICD-10-CM

## 2019-10-07 LAB — INR PPP: 2.4 (ref 0.9–1.1)

## 2019-10-10 ENCOUNTER — COMMUNICATION - HEALTHEAST (OUTPATIENT)
Dept: ANTICOAGULATION | Facility: CLINIC | Age: 69
End: 2019-10-10

## 2019-10-10 DIAGNOSIS — Z95.2 S/P MVR (MITRAL VALVE REPLACEMENT): ICD-10-CM

## 2019-10-10 DIAGNOSIS — I48.0 PAROXYSMAL ATRIAL FIBRILLATION (H): ICD-10-CM

## 2019-10-10 DIAGNOSIS — Z95.4 S/P MITRAL VALVE REPLACEMENT WITH METALLIC VALVE: ICD-10-CM

## 2019-10-10 LAB — INR PPP: 2.6 (ref 0.9–1.1)

## 2019-10-23 ENCOUNTER — COMMUNICATION - HEALTHEAST (OUTPATIENT)
Dept: ANTICOAGULATION | Facility: CLINIC | Age: 69
End: 2019-10-23

## 2019-10-23 DIAGNOSIS — Z95.4 S/P MITRAL VALVE REPLACEMENT WITH METALLIC VALVE: ICD-10-CM

## 2019-10-23 DIAGNOSIS — Z95.2 S/P MVR (MITRAL VALVE REPLACEMENT): ICD-10-CM

## 2019-10-23 DIAGNOSIS — I48.0 PAROXYSMAL ATRIAL FIBRILLATION (H): ICD-10-CM

## 2019-10-23 LAB — INR PPP: 1.9 (ref 0.9–1.1)

## 2019-10-31 ENCOUNTER — COMMUNICATION - HEALTHEAST (OUTPATIENT)
Dept: ANTICOAGULATION | Facility: CLINIC | Age: 69
End: 2019-10-31

## 2019-10-31 ENCOUNTER — RECORDS - HEALTHEAST (OUTPATIENT)
Dept: ADMINISTRATIVE | Facility: OTHER | Age: 69
End: 2019-10-31

## 2019-10-31 DIAGNOSIS — Z95.2 S/P MVR (MITRAL VALVE REPLACEMENT): ICD-10-CM

## 2019-10-31 DIAGNOSIS — Z95.4 S/P MITRAL VALVE REPLACEMENT WITH METALLIC VALVE: ICD-10-CM

## 2019-10-31 DIAGNOSIS — I48.0 PAROXYSMAL ATRIAL FIBRILLATION (H): ICD-10-CM

## 2019-10-31 LAB — INR PPP: 1.9 (ref 0.9–1.1)

## 2019-11-06 ENCOUNTER — AMBULATORY - HEALTHEAST (OUTPATIENT)
Dept: NURSING | Facility: CLINIC | Age: 69
End: 2019-11-06

## 2019-11-06 ENCOUNTER — AMBULATORY - HEALTHEAST (OUTPATIENT)
Dept: LAB | Facility: CLINIC | Age: 69
End: 2019-11-06

## 2019-11-06 DIAGNOSIS — I50.22 CHRONIC SYSTOLIC HEART FAILURE (H): ICD-10-CM

## 2019-11-06 DIAGNOSIS — Z23 FLU VACCINE NEED: ICD-10-CM

## 2019-11-06 LAB
ANION GAP SERPL CALCULATED.3IONS-SCNC: 12 MMOL/L (ref 5–18)
BUN SERPL-MCNC: 51 MG/DL (ref 8–28)
CALCIUM SERPL-MCNC: 9.9 MG/DL (ref 8.5–10.5)
CHLORIDE BLD-SCNC: 101 MMOL/L (ref 98–107)
CO2 SERPL-SCNC: 26 MMOL/L (ref 22–31)
CREAT SERPL-MCNC: 1.7 MG/DL (ref 0.7–1.3)
GFR SERPL CREATININE-BSD FRML MDRD: 40 ML/MIN/1.73M2
GLUCOSE BLD-MCNC: 115 MG/DL (ref 70–125)
POTASSIUM BLD-SCNC: 3.6 MMOL/L (ref 3.5–5)
SODIUM SERPL-SCNC: 139 MMOL/L (ref 136–145)

## 2019-11-08 ENCOUNTER — COMMUNICATION - HEALTHEAST (OUTPATIENT)
Dept: INTERNAL MEDICINE | Facility: CLINIC | Age: 69
End: 2019-11-08

## 2019-11-13 ENCOUNTER — COMMUNICATION - HEALTHEAST (OUTPATIENT)
Dept: ANTICOAGULATION | Facility: CLINIC | Age: 69
End: 2019-11-13

## 2019-11-13 DIAGNOSIS — I48.0 PAROXYSMAL ATRIAL FIBRILLATION (H): ICD-10-CM

## 2019-11-13 DIAGNOSIS — Z95.2 S/P MVR (MITRAL VALVE REPLACEMENT): ICD-10-CM

## 2019-11-13 DIAGNOSIS — Z95.4 S/P MITRAL VALVE REPLACEMENT WITH METALLIC VALVE: ICD-10-CM

## 2019-11-13 LAB — INR PPP: 2.7 (ref 0.9–1.1)

## 2019-11-29 ENCOUNTER — COMMUNICATION - HEALTHEAST (OUTPATIENT)
Dept: INTERNAL MEDICINE | Facility: CLINIC | Age: 69
End: 2019-11-29

## 2019-11-29 DIAGNOSIS — E78.5 HLD (HYPERLIPIDEMIA): ICD-10-CM

## 2019-12-02 ENCOUNTER — COMMUNICATION - HEALTHEAST (OUTPATIENT)
Dept: ANTICOAGULATION | Facility: CLINIC | Age: 69
End: 2019-12-02

## 2019-12-02 DIAGNOSIS — Z95.2 S/P MVR (MITRAL VALVE REPLACEMENT): ICD-10-CM

## 2019-12-02 DIAGNOSIS — I48.0 PAROXYSMAL ATRIAL FIBRILLATION (H): ICD-10-CM

## 2019-12-02 DIAGNOSIS — Z95.4 S/P MITRAL VALVE REPLACEMENT WITH METALLIC VALVE: ICD-10-CM

## 2019-12-02 LAB — INR PPP: 3.4 (ref 0.9–1.1)

## 2019-12-06 ENCOUNTER — RECORDS - HEALTHEAST (OUTPATIENT)
Dept: ADMINISTRATIVE | Facility: OTHER | Age: 69
End: 2019-12-06

## 2019-12-11 ENCOUNTER — OFFICE VISIT - HEALTHEAST (OUTPATIENT)
Dept: INTERNAL MEDICINE | Facility: CLINIC | Age: 69
End: 2019-12-11

## 2019-12-11 DIAGNOSIS — M25.512 LEFT SHOULDER PAIN, UNSPECIFIED CHRONICITY: ICD-10-CM

## 2019-12-11 DIAGNOSIS — I48.0 PAROXYSMAL ATRIAL FIBRILLATION (H): ICD-10-CM

## 2019-12-11 DIAGNOSIS — I50.9 CHRONIC CONGESTIVE HEART FAILURE, UNSPECIFIED HEART FAILURE TYPE (H): ICD-10-CM

## 2019-12-11 DIAGNOSIS — I25.10 CORONARY ARTERY DISEASE INVOLVING NATIVE CORONARY ARTERY OF NATIVE HEART WITHOUT ANGINA PECTORIS: ICD-10-CM

## 2019-12-11 ASSESSMENT — PATIENT HEALTH QUESTIONNAIRE - PHQ9: SUM OF ALL RESPONSES TO PHQ QUESTIONS 1-9: 5

## 2019-12-13 ENCOUNTER — HOSPITAL ENCOUNTER (OUTPATIENT)
Dept: MRI IMAGING | Facility: CLINIC | Age: 69
Discharge: HOME | End: 2019-12-13
Attending: INTERNAL MEDICINE

## 2019-12-13 DIAGNOSIS — M25.512 LEFT SHOULDER PAIN, UNSPECIFIED CHRONICITY: ICD-10-CM

## 2019-12-16 ENCOUNTER — COMMUNICATION - HEALTHEAST (OUTPATIENT)
Dept: INTERNAL MEDICINE | Facility: CLINIC | Age: 69
End: 2019-12-16

## 2019-12-16 ENCOUNTER — AMBULATORY - HEALTHEAST (OUTPATIENT)
Dept: INTERNAL MEDICINE | Facility: CLINIC | Age: 69
End: 2019-12-16

## 2019-12-16 DIAGNOSIS — M25.511 RIGHT SHOULDER PAIN, UNSPECIFIED CHRONICITY: ICD-10-CM

## 2019-12-17 ENCOUNTER — COMMUNICATION - HEALTHEAST (OUTPATIENT)
Dept: INTERNAL MEDICINE | Facility: CLINIC | Age: 69
End: 2019-12-17

## 2019-12-17 ENCOUNTER — COMMUNICATION - HEALTHEAST (OUTPATIENT)
Dept: ANTICOAGULATION | Facility: CLINIC | Age: 69
End: 2019-12-17

## 2019-12-17 DIAGNOSIS — I48.0 PAROXYSMAL ATRIAL FIBRILLATION (H): ICD-10-CM

## 2019-12-17 DIAGNOSIS — Z95.4 S/P MITRAL VALVE REPLACEMENT WITH METALLIC VALVE: ICD-10-CM

## 2019-12-17 DIAGNOSIS — Z95.2 S/P MVR (MITRAL VALVE REPLACEMENT): ICD-10-CM

## 2019-12-17 LAB — INR PPP: 4 (ref 0.9–1.1)

## 2019-12-18 ENCOUNTER — COMMUNICATION - HEALTHEAST (OUTPATIENT)
Dept: INTERNAL MEDICINE | Facility: CLINIC | Age: 69
End: 2019-12-18

## 2019-12-18 DIAGNOSIS — M25.519 ACUTE SHOULDER PAIN, UNSPECIFIED LATERALITY: ICD-10-CM

## 2019-12-20 ENCOUNTER — RECORDS - HEALTHEAST (OUTPATIENT)
Dept: ADMINISTRATIVE | Facility: OTHER | Age: 69
End: 2019-12-20

## 2019-12-23 ENCOUNTER — COMMUNICATION - HEALTHEAST (OUTPATIENT)
Dept: INTERNAL MEDICINE | Facility: CLINIC | Age: 69
End: 2019-12-23

## 2019-12-23 DIAGNOSIS — M25.519 ACUTE SHOULDER PAIN, UNSPECIFIED LATERALITY: ICD-10-CM

## 2019-12-30 ENCOUNTER — COMMUNICATION - HEALTHEAST (OUTPATIENT)
Dept: INTERNAL MEDICINE | Facility: CLINIC | Age: 69
End: 2019-12-30

## 2019-12-30 DIAGNOSIS — M25.519 ACUTE SHOULDER PAIN, UNSPECIFIED LATERALITY: ICD-10-CM

## 2020-01-02 ENCOUNTER — COMMUNICATION - HEALTHEAST (OUTPATIENT)
Dept: ANTICOAGULATION | Facility: CLINIC | Age: 70
End: 2020-01-02

## 2020-01-03 ENCOUNTER — COMMUNICATION - HEALTHEAST (OUTPATIENT)
Dept: ANTICOAGULATION | Facility: CLINIC | Age: 70
End: 2020-01-03

## 2020-01-03 DIAGNOSIS — Z95.2 S/P MVR (MITRAL VALVE REPLACEMENT): ICD-10-CM

## 2020-01-03 DIAGNOSIS — Z95.4 S/P MITRAL VALVE REPLACEMENT WITH METALLIC VALVE: ICD-10-CM

## 2020-01-03 DIAGNOSIS — I48.0 PAROXYSMAL ATRIAL FIBRILLATION (H): ICD-10-CM

## 2020-01-03 LAB — INR PPP: 3.7 (ref 0.9–1.1)

## 2020-01-05 ENCOUNTER — COMMUNICATION - HEALTHEAST (OUTPATIENT)
Dept: INTERNAL MEDICINE | Facility: CLINIC | Age: 70
End: 2020-01-05

## 2020-01-09 ENCOUNTER — RECORDS - HEALTHEAST (OUTPATIENT)
Dept: ADMINISTRATIVE | Facility: OTHER | Age: 70
End: 2020-01-09

## 2020-01-13 ENCOUNTER — COMMUNICATION - HEALTHEAST (OUTPATIENT)
Dept: INTERNAL MEDICINE | Facility: CLINIC | Age: 70
End: 2020-01-13

## 2020-01-13 ENCOUNTER — COMMUNICATION - HEALTHEAST (OUTPATIENT)
Dept: ANTICOAGULATION | Facility: CLINIC | Age: 70
End: 2020-01-13

## 2020-01-13 DIAGNOSIS — Z95.4 S/P MITRAL VALVE REPLACEMENT WITH METALLIC VALVE: ICD-10-CM

## 2020-01-13 DIAGNOSIS — I48.0 PAROXYSMAL ATRIAL FIBRILLATION (H): ICD-10-CM

## 2020-01-13 DIAGNOSIS — M25.519 ACUTE SHOULDER PAIN, UNSPECIFIED LATERALITY: ICD-10-CM

## 2020-01-14 ENCOUNTER — COMMUNICATION - HEALTHEAST (OUTPATIENT)
Dept: INTERNAL MEDICINE | Facility: CLINIC | Age: 70
End: 2020-01-14

## 2020-01-14 ENCOUNTER — COMMUNICATION - HEALTHEAST (OUTPATIENT)
Dept: INFECTIOUS DISEASES | Facility: CLINIC | Age: 70
End: 2020-01-14

## 2020-01-14 DIAGNOSIS — I48.0 PAROXYSMAL ATRIAL FIBRILLATION (H): ICD-10-CM

## 2020-01-14 DIAGNOSIS — T82.6XXD PROSTHETIC VALVE ENDOCARDITIS, SUBSEQUENT ENCOUNTER: ICD-10-CM

## 2020-01-14 DIAGNOSIS — Z95.2 S/P MVR (MITRAL VALVE REPLACEMENT): ICD-10-CM

## 2020-01-14 DIAGNOSIS — M25.519 ACUTE SHOULDER PAIN, UNSPECIFIED LATERALITY: ICD-10-CM

## 2020-01-14 DIAGNOSIS — I38 PROSTHETIC VALVE ENDOCARDITIS, SUBSEQUENT ENCOUNTER: ICD-10-CM

## 2020-01-14 DIAGNOSIS — Z95.4 S/P MITRAL VALVE REPLACEMENT WITH METALLIC VALVE: ICD-10-CM

## 2020-01-17 ENCOUNTER — COMMUNICATION - HEALTHEAST (OUTPATIENT)
Dept: INTERNAL MEDICINE | Facility: CLINIC | Age: 70
End: 2020-01-17

## 2020-01-17 ENCOUNTER — OFFICE VISIT - HEALTHEAST (OUTPATIENT)
Dept: INTERNAL MEDICINE | Facility: CLINIC | Age: 70
End: 2020-01-17

## 2020-01-17 ENCOUNTER — COMMUNICATION - HEALTHEAST (OUTPATIENT)
Dept: ANTICOAGULATION | Facility: CLINIC | Age: 70
End: 2020-01-17

## 2020-01-17 DIAGNOSIS — Z00.00 HEALTHCARE MAINTENANCE: ICD-10-CM

## 2020-01-17 DIAGNOSIS — E78.5 DYSLIPIDEMIA: ICD-10-CM

## 2020-01-17 DIAGNOSIS — Z95.4 S/P MITRAL VALVE REPLACEMENT WITH METALLIC VALVE: ICD-10-CM

## 2020-01-17 DIAGNOSIS — I48.0 PAROXYSMAL ATRIAL FIBRILLATION (H): ICD-10-CM

## 2020-01-17 DIAGNOSIS — M25.512 LEFT SHOULDER PAIN, UNSPECIFIED CHRONICITY: ICD-10-CM

## 2020-01-17 DIAGNOSIS — Z95.2 S/P MVR (MITRAL VALVE REPLACEMENT): ICD-10-CM

## 2020-01-17 DIAGNOSIS — Z12.5 SCREENING FOR PROSTATE CANCER: ICD-10-CM

## 2020-01-17 LAB
ALBUMIN SERPL-MCNC: 4.3 G/DL (ref 3.5–5)
ALP SERPL-CCNC: 78 U/L (ref 45–120)
ALT SERPL W P-5'-P-CCNC: 53 U/L (ref 0–45)
ANION GAP SERPL CALCULATED.3IONS-SCNC: 15 MMOL/L (ref 5–18)
AST SERPL W P-5'-P-CCNC: 79 U/L (ref 0–40)
BILIRUB SERPL-MCNC: 0.8 MG/DL (ref 0–1)
BUN SERPL-MCNC: 56 MG/DL (ref 8–28)
CALCIUM SERPL-MCNC: 10 MG/DL (ref 8.5–10.5)
CHLORIDE BLD-SCNC: 99 MMOL/L (ref 98–107)
CHOLEST SERPL-MCNC: 160 MG/DL
CO2 SERPL-SCNC: 25 MMOL/L (ref 22–31)
CREAT SERPL-MCNC: 1.58 MG/DL (ref 0.7–1.3)
FASTING STATUS PATIENT QL REPORTED: YES
GFR SERPL CREATININE-BSD FRML MDRD: 43 ML/MIN/1.73M2
GLUCOSE BLD-MCNC: 107 MG/DL (ref 70–125)
HDLC SERPL-MCNC: 50 MG/DL
INR PPP: 1.4 (ref 0.9–1.1)
LDLC SERPL CALC-MCNC: 90 MG/DL
POTASSIUM BLD-SCNC: 3.7 MMOL/L (ref 3.5–5)
PROT SERPL-MCNC: 8.1 G/DL (ref 6–8)
PSA SERPL-MCNC: 0.2 NG/ML (ref 0–6.5)
SODIUM SERPL-SCNC: 139 MMOL/L (ref 136–145)
TRIGL SERPL-MCNC: 99 MG/DL

## 2020-01-17 ASSESSMENT — MIFFLIN-ST. JEOR: SCORE: 1551.18

## 2020-01-20 ENCOUNTER — COMMUNICATION - HEALTHEAST (OUTPATIENT)
Dept: INTERNAL MEDICINE | Facility: CLINIC | Age: 70
End: 2020-01-20

## 2020-01-20 DIAGNOSIS — M25.522 PAIN IN JOINT, UPPER ARM, LEFT: ICD-10-CM

## 2020-01-20 DIAGNOSIS — M25.519 ACUTE SHOULDER PAIN, UNSPECIFIED LATERALITY: ICD-10-CM

## 2020-01-22 ENCOUNTER — COMMUNICATION - HEALTHEAST (OUTPATIENT)
Dept: INTERNAL MEDICINE | Facility: CLINIC | Age: 70
End: 2020-01-22

## 2020-01-22 DIAGNOSIS — M25.522 PAIN IN JOINT, UPPER ARM, LEFT: ICD-10-CM

## 2020-01-27 ENCOUNTER — COMMUNICATION - HEALTHEAST (OUTPATIENT)
Dept: ANTICOAGULATION | Facility: CLINIC | Age: 70
End: 2020-01-27

## 2020-01-27 DIAGNOSIS — Z95.2 S/P MVR (MITRAL VALVE REPLACEMENT): ICD-10-CM

## 2020-01-27 DIAGNOSIS — I48.0 PAROXYSMAL ATRIAL FIBRILLATION (H): ICD-10-CM

## 2020-01-27 DIAGNOSIS — Z95.4 S/P MITRAL VALVE REPLACEMENT WITH METALLIC VALVE: ICD-10-CM

## 2020-01-27 LAB — INR PPP: 2.1 (ref 0.9–1.1)

## 2020-01-28 ENCOUNTER — RECORDS - HEALTHEAST (OUTPATIENT)
Dept: ADMINISTRATIVE | Facility: OTHER | Age: 70
End: 2020-01-28

## 2020-01-29 ENCOUNTER — COMMUNICATION - HEALTHEAST (OUTPATIENT)
Dept: INTERNAL MEDICINE | Facility: CLINIC | Age: 70
End: 2020-01-29

## 2020-01-29 DIAGNOSIS — M25.519 ACUTE SHOULDER PAIN, UNSPECIFIED LATERALITY: ICD-10-CM

## 2020-01-30 ENCOUNTER — RECORDS - HEALTHEAST (OUTPATIENT)
Dept: ADMINISTRATIVE | Facility: OTHER | Age: 70
End: 2020-01-30

## 2020-01-31 ENCOUNTER — RECORDS - HEALTHEAST (OUTPATIENT)
Dept: ADMINISTRATIVE | Facility: OTHER | Age: 70
End: 2020-01-31

## 2020-02-06 ENCOUNTER — RECORDS - HEALTHEAST (OUTPATIENT)
Dept: ADMINISTRATIVE | Facility: OTHER | Age: 70
End: 2020-02-06

## 2020-02-07 ENCOUNTER — RECORDS - HEALTHEAST (OUTPATIENT)
Dept: ADMINISTRATIVE | Facility: OTHER | Age: 70
End: 2020-02-07

## 2020-02-10 ENCOUNTER — RECORDS - HEALTHEAST (OUTPATIENT)
Dept: ADMINISTRATIVE | Facility: OTHER | Age: 70
End: 2020-02-10

## 2020-02-10 ENCOUNTER — TRANSFERRED RECORDS (OUTPATIENT)
Dept: HEALTH INFORMATION MANAGEMENT | Facility: CLINIC | Age: 70
End: 2020-02-10

## 2020-02-12 ENCOUNTER — COMMUNICATION - HEALTHEAST (OUTPATIENT)
Dept: ANTICOAGULATION | Facility: CLINIC | Age: 70
End: 2020-02-12

## 2020-02-12 DIAGNOSIS — I48.0 PAROXYSMAL ATRIAL FIBRILLATION (H): ICD-10-CM

## 2020-02-12 DIAGNOSIS — Z95.4 S/P MITRAL VALVE REPLACEMENT WITH METALLIC VALVE: ICD-10-CM

## 2020-02-12 DIAGNOSIS — Z95.2 S/P MVR (MITRAL VALVE REPLACEMENT): ICD-10-CM

## 2020-02-12 LAB — INR PPP: 1.6 (ref 0.9–1.1)

## 2020-02-13 ENCOUNTER — OFFICE VISIT - HEALTHEAST (OUTPATIENT)
Dept: INTERNAL MEDICINE | Facility: CLINIC | Age: 70
End: 2020-02-13

## 2020-02-13 DIAGNOSIS — M54.12 CERVICAL RADICULOPATHY: ICD-10-CM

## 2020-02-13 DIAGNOSIS — I25.10 CORONARY ARTERY DISEASE INVOLVING NATIVE CORONARY ARTERY OF NATIVE HEART WITHOUT ANGINA PECTORIS: ICD-10-CM

## 2020-02-18 ENCOUNTER — COMMUNICATION - HEALTHEAST (OUTPATIENT)
Dept: INTERNAL MEDICINE | Facility: CLINIC | Age: 70
End: 2020-02-18

## 2020-02-18 ENCOUNTER — RECORDS - HEALTHEAST (OUTPATIENT)
Dept: ADMINISTRATIVE | Facility: OTHER | Age: 70
End: 2020-02-18

## 2020-02-18 DIAGNOSIS — M54.12 CERVICAL RADICULOPATHY: ICD-10-CM

## 2020-02-19 ENCOUNTER — COMMUNICATION - HEALTHEAST (OUTPATIENT)
Dept: INTERNAL MEDICINE | Facility: CLINIC | Age: 70
End: 2020-02-19

## 2020-02-19 DIAGNOSIS — M54.12 CERVICAL RADICULOPATHY: ICD-10-CM

## 2020-02-20 ENCOUNTER — COMMUNICATION - HEALTHEAST (OUTPATIENT)
Dept: ANTICOAGULATION | Facility: CLINIC | Age: 70
End: 2020-02-20

## 2020-02-20 DIAGNOSIS — Z95.4 S/P MITRAL VALVE REPLACEMENT WITH METALLIC VALVE: ICD-10-CM

## 2020-02-20 DIAGNOSIS — I48.0 PAROXYSMAL ATRIAL FIBRILLATION (H): ICD-10-CM

## 2020-02-20 DIAGNOSIS — Z95.2 S/P MVR (MITRAL VALVE REPLACEMENT): ICD-10-CM

## 2020-02-20 LAB — INR PPP: 2.1 (ref 0.9–1.1)

## 2020-02-21 ENCOUNTER — RECORDS - HEALTHEAST (OUTPATIENT)
Dept: ADMINISTRATIVE | Facility: OTHER | Age: 70
End: 2020-02-21

## 2020-02-21 ENCOUNTER — COMMUNICATION - HEALTHEAST (OUTPATIENT)
Dept: INTERNAL MEDICINE | Facility: CLINIC | Age: 70
End: 2020-02-21

## 2020-02-21 DIAGNOSIS — Z95.4 S/P MITRAL VALVE REPLACEMENT WITH METALLIC VALVE: ICD-10-CM

## 2020-02-21 DIAGNOSIS — Z95.2 S/P MVR (MITRAL VALVE REPLACEMENT): ICD-10-CM

## 2020-02-21 DIAGNOSIS — I48.0 PAROXYSMAL ATRIAL FIBRILLATION (H): ICD-10-CM

## 2020-02-25 ENCOUNTER — COMMUNICATION - HEALTHEAST (OUTPATIENT)
Dept: INTERNAL MEDICINE | Facility: CLINIC | Age: 70
End: 2020-02-25

## 2020-02-25 DIAGNOSIS — M54.12 CERVICAL RADICULOPATHY: ICD-10-CM

## 2020-02-26 ENCOUNTER — AMBULATORY - HEALTHEAST (OUTPATIENT)
Dept: LAB | Facility: CLINIC | Age: 70
End: 2020-02-26

## 2020-02-26 ENCOUNTER — COMMUNICATION - HEALTHEAST (OUTPATIENT)
Dept: ANTICOAGULATION | Facility: CLINIC | Age: 70
End: 2020-02-26

## 2020-02-26 DIAGNOSIS — Z95.2 S/P MVR (MITRAL VALVE REPLACEMENT): ICD-10-CM

## 2020-02-26 DIAGNOSIS — Z95.4 S/P MITRAL VALVE REPLACEMENT WITH METALLIC VALVE: ICD-10-CM

## 2020-02-26 DIAGNOSIS — I48.0 PAROXYSMAL ATRIAL FIBRILLATION (H): ICD-10-CM

## 2020-02-26 LAB
CREAT SERPL-MCNC: 1.86 MG/DL (ref 0.7–1.3)
GFR SERPL CREATININE-BSD FRML MDRD: 36 ML/MIN/1.73M2
INR PPP: 2.9 (ref 0.9–1.1)

## 2020-02-28 ENCOUNTER — AMBULATORY - HEALTHEAST (OUTPATIENT)
Dept: LAB | Facility: CLINIC | Age: 70
End: 2020-02-28

## 2020-02-28 DIAGNOSIS — I50.22 CHRONIC SYSTOLIC HEART FAILURE (H): ICD-10-CM

## 2020-03-02 ENCOUNTER — COMMUNICATION - HEALTHEAST (OUTPATIENT)
Dept: INTERNAL MEDICINE | Facility: CLINIC | Age: 70
End: 2020-03-02

## 2020-03-02 DIAGNOSIS — M54.12 CERVICAL RADICULOPATHY: ICD-10-CM

## 2020-03-03 ENCOUNTER — COMMUNICATION - HEALTHEAST (OUTPATIENT)
Dept: INTERNAL MEDICINE | Facility: CLINIC | Age: 70
End: 2020-03-03

## 2020-03-03 ENCOUNTER — AMBULATORY - HEALTHEAST (OUTPATIENT)
Dept: LAB | Facility: CLINIC | Age: 70
End: 2020-03-03

## 2020-03-03 DIAGNOSIS — Z95.4 S/P MITRAL VALVE REPLACEMENT WITH METALLIC VALVE: ICD-10-CM

## 2020-03-03 DIAGNOSIS — I48.0 PAROXYSMAL ATRIAL FIBRILLATION (H): ICD-10-CM

## 2020-03-03 DIAGNOSIS — M54.12 CERVICAL RADICULOPATHY: ICD-10-CM

## 2020-03-03 DIAGNOSIS — I50.22 CHRONIC SYSTOLIC HEART FAILURE (H): ICD-10-CM

## 2020-03-03 DIAGNOSIS — Z95.2 S/P MVR (MITRAL VALVE REPLACEMENT): ICD-10-CM

## 2020-03-03 LAB
ANION GAP SERPL CALCULATED.3IONS-SCNC: 17 MMOL/L (ref 5–18)
BUN SERPL-MCNC: 64 MG/DL (ref 8–28)
CALCIUM SERPL-MCNC: 10.4 MG/DL (ref 8.5–10.5)
CHLORIDE BLD-SCNC: 96 MMOL/L (ref 98–107)
CO2 SERPL-SCNC: 27 MMOL/L (ref 22–31)
CREAT SERPL-MCNC: 1.95 MG/DL (ref 0.7–1.3)
GFR SERPL CREATININE-BSD FRML MDRD: 34 ML/MIN/1.73M2
GLUCOSE BLD-MCNC: 107 MG/DL (ref 70–125)
INR PPP: 1.6 (ref 0.9–1.1)
POTASSIUM BLD-SCNC: 3.5 MMOL/L (ref 3.5–5)
SODIUM SERPL-SCNC: 140 MMOL/L (ref 136–145)

## 2020-03-04 ENCOUNTER — COMMUNICATION - HEALTHEAST (OUTPATIENT)
Dept: INFECTIOUS DISEASES | Facility: CLINIC | Age: 70
End: 2020-03-04

## 2020-03-04 ENCOUNTER — COMMUNICATION - HEALTHEAST (OUTPATIENT)
Dept: INTERNAL MEDICINE | Facility: CLINIC | Age: 70
End: 2020-03-04

## 2020-03-04 DIAGNOSIS — T82.6XXD PROSTHETIC VALVE ENDOCARDITIS, SUBSEQUENT ENCOUNTER: ICD-10-CM

## 2020-03-04 DIAGNOSIS — I38 PROSTHETIC VALVE ENDOCARDITIS, SUBSEQUENT ENCOUNTER: ICD-10-CM

## 2020-03-05 ENCOUNTER — AMBULATORY - HEALTHEAST (OUTPATIENT)
Dept: LAB | Facility: CLINIC | Age: 70
End: 2020-03-05

## 2020-03-05 DIAGNOSIS — I50.22 CHRONIC SYSTOLIC HEART FAILURE (H): ICD-10-CM

## 2020-03-06 ENCOUNTER — AMBULATORY - HEALTHEAST (OUTPATIENT)
Dept: LAB | Facility: CLINIC | Age: 70
End: 2020-03-06

## 2020-03-06 ENCOUNTER — COMMUNICATION - HEALTHEAST (OUTPATIENT)
Dept: INTERNAL MEDICINE | Facility: CLINIC | Age: 70
End: 2020-03-06

## 2020-03-06 ENCOUNTER — COMMUNICATION - HEALTHEAST (OUTPATIENT)
Dept: ANTICOAGULATION | Facility: CLINIC | Age: 70
End: 2020-03-06

## 2020-03-06 DIAGNOSIS — Z95.4 S/P MITRAL VALVE REPLACEMENT WITH METALLIC VALVE: ICD-10-CM

## 2020-03-06 DIAGNOSIS — I50.22 CHRONIC SYSTOLIC HEART FAILURE (H): ICD-10-CM

## 2020-03-06 DIAGNOSIS — Z95.2 S/P MVR (MITRAL VALVE REPLACEMENT): ICD-10-CM

## 2020-03-06 DIAGNOSIS — I48.0 PAROXYSMAL ATRIAL FIBRILLATION (H): ICD-10-CM

## 2020-03-06 LAB — LMWH PPP CHRO-ACNC: 1.09 IU/ML (ref 0.5–1)

## 2020-03-08 ENCOUNTER — COMMUNICATION - HEALTHEAST (OUTPATIENT)
Dept: INTERNAL MEDICINE | Facility: CLINIC | Age: 70
End: 2020-03-08

## 2020-03-08 DIAGNOSIS — M54.12 CERVICAL RADICULOPATHY: ICD-10-CM

## 2020-03-09 ENCOUNTER — COMMUNICATION - HEALTHEAST (OUTPATIENT)
Dept: ANTICOAGULATION | Facility: CLINIC | Age: 70
End: 2020-03-09

## 2020-03-09 DIAGNOSIS — I48.0 PAROXYSMAL ATRIAL FIBRILLATION (H): ICD-10-CM

## 2020-03-09 DIAGNOSIS — Z95.4 S/P MITRAL VALVE REPLACEMENT WITH METALLIC VALVE: ICD-10-CM

## 2020-03-09 DIAGNOSIS — Z95.2 S/P MVR (MITRAL VALVE REPLACEMENT): ICD-10-CM

## 2020-03-09 DIAGNOSIS — Z79.01 LONG TERM (CURRENT) USE OF ANTICOAGULANTS: ICD-10-CM

## 2020-03-09 LAB — INR PPP: 1.6 (ref 0.9–1.1)

## 2020-03-10 ENCOUNTER — AMBULATORY - HEALTHEAST (OUTPATIENT)
Dept: LAB | Facility: CLINIC | Age: 70
End: 2020-03-10

## 2020-03-10 DIAGNOSIS — I50.22 CHRONIC SYSTOLIC HEART FAILURE (H): ICD-10-CM

## 2020-03-10 LAB
ANION GAP SERPL CALCULATED.3IONS-SCNC: 22 MMOL/L (ref 5–18)
BUN SERPL-MCNC: 73 MG/DL (ref 8–28)
CALCIUM SERPL-MCNC: 10.5 MG/DL (ref 8.5–10.5)
CHLORIDE BLD-SCNC: 95 MMOL/L (ref 98–107)
CO2 SERPL-SCNC: 22 MMOL/L (ref 22–31)
CREAT SERPL-MCNC: 1.82 MG/DL (ref 0.7–1.3)
GFR SERPL CREATININE-BSD FRML MDRD: 37 ML/MIN/1.73M2
GLUCOSE BLD-MCNC: 86 MG/DL (ref 70–125)
POTASSIUM BLD-SCNC: 3.6 MMOL/L (ref 3.5–5)
SODIUM SERPL-SCNC: 139 MMOL/L (ref 136–145)

## 2020-03-11 ENCOUNTER — COMMUNICATION - HEALTHEAST (OUTPATIENT)
Dept: ANTICOAGULATION | Facility: CLINIC | Age: 70
End: 2020-03-11

## 2020-03-11 ENCOUNTER — COMMUNICATION - HEALTHEAST (OUTPATIENT)
Dept: INTERNAL MEDICINE | Facility: CLINIC | Age: 70
End: 2020-03-11

## 2020-03-11 ENCOUNTER — RECORDS - HEALTHEAST (OUTPATIENT)
Dept: INTERNAL MEDICINE | Facility: CLINIC | Age: 70
End: 2020-03-11

## 2020-03-11 DIAGNOSIS — M54.12 CERVICAL RADICULOPATHY: ICD-10-CM

## 2020-03-11 DIAGNOSIS — Z95.4 S/P MITRAL VALVE REPLACEMENT WITH METALLIC VALVE: ICD-10-CM

## 2020-03-11 DIAGNOSIS — Z95.2 S/P MVR (MITRAL VALVE REPLACEMENT): ICD-10-CM

## 2020-03-11 DIAGNOSIS — I48.0 PAROXYSMAL ATRIAL FIBRILLATION (H): ICD-10-CM

## 2020-03-11 LAB — INR PPP: 2.1 (ref 0.9–1.1)

## 2020-03-13 ENCOUNTER — COMMUNICATION - HEALTHEAST (OUTPATIENT)
Dept: INTERNAL MEDICINE | Facility: CLINIC | Age: 70
End: 2020-03-13

## 2020-03-13 ENCOUNTER — RECORDS - HEALTHEAST (OUTPATIENT)
Dept: ADMINISTRATIVE | Facility: OTHER | Age: 70
End: 2020-03-13

## 2020-03-13 DIAGNOSIS — G89.4 PAIN SYNDROME, CHRONIC: ICD-10-CM

## 2020-03-18 ENCOUNTER — COMMUNICATION - HEALTHEAST (OUTPATIENT)
Dept: ANTICOAGULATION | Facility: CLINIC | Age: 70
End: 2020-03-18

## 2020-03-18 DIAGNOSIS — Z95.2 S/P MVR (MITRAL VALVE REPLACEMENT): ICD-10-CM

## 2020-03-18 DIAGNOSIS — Z95.4 S/P MITRAL VALVE REPLACEMENT WITH METALLIC VALVE: ICD-10-CM

## 2020-03-18 DIAGNOSIS — I48.0 PAROXYSMAL ATRIAL FIBRILLATION (H): ICD-10-CM

## 2020-03-18 LAB — INR PPP: 4 (ref 0.9–1.1)

## 2020-03-19 ENCOUNTER — COMMUNICATION - HEALTHEAST (OUTPATIENT)
Dept: INTERNAL MEDICINE | Facility: CLINIC | Age: 70
End: 2020-03-19

## 2020-03-19 ENCOUNTER — AMBULATORY - HEALTHEAST (OUTPATIENT)
Dept: LAB | Facility: CLINIC | Age: 70
End: 2020-03-19

## 2020-03-19 DIAGNOSIS — I25.10 CORONARY ATHEROSCLEROSIS OF NATIVE CORONARY ARTERY: ICD-10-CM

## 2020-03-19 DIAGNOSIS — G89.4 PAIN SYNDROME, CHRONIC: ICD-10-CM

## 2020-03-19 DIAGNOSIS — I50.22 CHRONIC SYSTOLIC HEART FAILURE (H): ICD-10-CM

## 2020-03-20 ENCOUNTER — AMBULATORY - HEALTHEAST (OUTPATIENT)
Dept: LAB | Facility: CLINIC | Age: 70
End: 2020-03-20

## 2020-03-20 ENCOUNTER — COMMUNICATION - HEALTHEAST (OUTPATIENT)
Dept: INTERNAL MEDICINE | Facility: CLINIC | Age: 70
End: 2020-03-20

## 2020-03-20 DIAGNOSIS — Z95.4 S/P MITRAL VALVE REPLACEMENT WITH METALLIC VALVE: ICD-10-CM

## 2020-03-20 DIAGNOSIS — I48.0 PAROXYSMAL ATRIAL FIBRILLATION (H): ICD-10-CM

## 2020-03-20 DIAGNOSIS — I50.22 CHRONIC SYSTOLIC HEART FAILURE (H): ICD-10-CM

## 2020-03-20 DIAGNOSIS — I25.10 CORONARY ATHEROSCLEROSIS OF NATIVE CORONARY ARTERY: ICD-10-CM

## 2020-03-20 LAB
ANION GAP SERPL CALCULATED.3IONS-SCNC: 15 MMOL/L (ref 5–18)
BASOPHILS # BLD AUTO: 0 THOU/UL (ref 0–0.2)
BASOPHILS NFR BLD AUTO: 1 % (ref 0–2)
BUN SERPL-MCNC: 75 MG/DL (ref 8–28)
CALCIUM SERPL-MCNC: 10 MG/DL (ref 8.5–10.5)
CHLORIDE BLD-SCNC: 95 MMOL/L (ref 98–107)
CO2 SERPL-SCNC: 28 MMOL/L (ref 22–31)
CREAT SERPL-MCNC: 2.39 MG/DL (ref 0.7–1.3)
EOSINOPHIL # BLD AUTO: 0.2 THOU/UL (ref 0–0.4)
EOSINOPHIL NFR BLD AUTO: 4 % (ref 0–6)
ERYTHROCYTE [DISTWIDTH] IN BLOOD BY AUTOMATED COUNT: 14.4 % (ref 11–14.5)
GFR SERPL CREATININE-BSD FRML MDRD: 27 ML/MIN/1.73M2
GLUCOSE BLD-MCNC: 114 MG/DL (ref 70–125)
HCT VFR BLD AUTO: 29.6 % (ref 40–54)
HGB BLD-MCNC: 9.6 G/DL (ref 14–18)
LYMPHOCYTES # BLD AUTO: 1.1 THOU/UL (ref 0.8–4.4)
LYMPHOCYTES NFR BLD AUTO: 23 % (ref 20–40)
MCH RBC QN AUTO: 30.3 PG (ref 27–34)
MCHC RBC AUTO-ENTMCNC: 32.3 G/DL (ref 32–36)
MCV RBC AUTO: 94 FL (ref 80–100)
MONOCYTES # BLD AUTO: 0.5 THOU/UL (ref 0–0.9)
MONOCYTES NFR BLD AUTO: 12 % (ref 2–10)
NEUTROPHILS # BLD AUTO: 2.8 THOU/UL (ref 2–7.7)
NEUTROPHILS NFR BLD AUTO: 61 % (ref 50–70)
PLATELET # BLD AUTO: 199 THOU/UL (ref 140–440)
PMV BLD AUTO: 7.8 FL (ref 7–10)
POTASSIUM BLD-SCNC: 3.7 MMOL/L (ref 3.5–5)
RBC # BLD AUTO: 3.15 MILL/UL (ref 4.4–6.2)
SODIUM SERPL-SCNC: 138 MMOL/L (ref 136–145)
WBC: 4.6 THOU/UL (ref 4–11)

## 2020-03-23 ENCOUNTER — AMBULATORY - HEALTHEAST (OUTPATIENT)
Dept: LAB | Facility: CLINIC | Age: 70
End: 2020-03-23

## 2020-03-23 DIAGNOSIS — I50.22 CHRONIC SYSTOLIC HEART FAILURE (H): ICD-10-CM

## 2020-03-24 ENCOUNTER — RECORDS - HEALTHEAST (OUTPATIENT)
Dept: ADMINISTRATIVE | Facility: OTHER | Age: 70
End: 2020-03-24

## 2020-03-25 ENCOUNTER — COMMUNICATION - HEALTHEAST (OUTPATIENT)
Dept: ANTICOAGULATION | Facility: CLINIC | Age: 70
End: 2020-03-25

## 2020-03-25 ENCOUNTER — COMMUNICATION - HEALTHEAST (OUTPATIENT)
Dept: INTERNAL MEDICINE | Facility: CLINIC | Age: 70
End: 2020-03-25

## 2020-03-25 DIAGNOSIS — G89.4 PAIN SYNDROME, CHRONIC: ICD-10-CM

## 2020-03-25 DIAGNOSIS — Z95.2 S/P MVR (MITRAL VALVE REPLACEMENT): ICD-10-CM

## 2020-03-25 DIAGNOSIS — Z95.4 S/P MITRAL VALVE REPLACEMENT WITH METALLIC VALVE: ICD-10-CM

## 2020-03-25 DIAGNOSIS — I48.0 PAROXYSMAL ATRIAL FIBRILLATION (H): ICD-10-CM

## 2020-03-25 LAB — INR PPP: 5 (ref 0.9–1.1)

## 2020-03-26 ENCOUNTER — COMMUNICATION - HEALTHEAST (OUTPATIENT)
Dept: INTERNAL MEDICINE | Facility: CLINIC | Age: 70
End: 2020-03-26

## 2020-03-26 DIAGNOSIS — G89.4 PAIN SYNDROME, CHRONIC: ICD-10-CM

## 2020-03-30 ENCOUNTER — COMMUNICATION - HEALTHEAST (OUTPATIENT)
Dept: INTERNAL MEDICINE | Facility: CLINIC | Age: 70
End: 2020-03-30

## 2020-03-30 ENCOUNTER — AMBULATORY - HEALTHEAST (OUTPATIENT)
Dept: LAB | Facility: CLINIC | Age: 70
End: 2020-03-30

## 2020-03-30 DIAGNOSIS — I50.22 CHRONIC SYSTOLIC HEART FAILURE (H): ICD-10-CM

## 2020-03-30 DIAGNOSIS — G89.4 PAIN SYNDROME, CHRONIC: ICD-10-CM

## 2020-03-30 LAB
ANION GAP SERPL CALCULATED.3IONS-SCNC: 18 MMOL/L (ref 5–18)
BUN SERPL-MCNC: 74 MG/DL (ref 8–28)
CALCIUM SERPL-MCNC: 10.9 MG/DL (ref 8.5–10.5)
CHLORIDE BLD-SCNC: 98 MMOL/L (ref 98–107)
CO2 SERPL-SCNC: 28 MMOL/L (ref 22–31)
CREAT SERPL-MCNC: 2.25 MG/DL (ref 0.7–1.3)
GFR SERPL CREATININE-BSD FRML MDRD: 29 ML/MIN/1.73M2
GLUCOSE BLD-MCNC: 107 MG/DL (ref 70–125)
POTASSIUM BLD-SCNC: 4 MMOL/L (ref 3.5–5)
SODIUM SERPL-SCNC: 144 MMOL/L (ref 136–145)

## 2020-04-02 ENCOUNTER — COMMUNICATION - HEALTHEAST (OUTPATIENT)
Dept: INTERNAL MEDICINE | Facility: CLINIC | Age: 70
End: 2020-04-02

## 2020-04-02 DIAGNOSIS — G89.4 PAIN SYNDROME, CHRONIC: ICD-10-CM

## 2020-04-03 ENCOUNTER — COMMUNICATION - HEALTHEAST (OUTPATIENT)
Dept: INTERNAL MEDICINE | Facility: CLINIC | Age: 70
End: 2020-04-03

## 2020-04-03 DIAGNOSIS — G89.4 PAIN SYNDROME, CHRONIC: ICD-10-CM

## 2020-04-06 ENCOUNTER — COMMUNICATION - HEALTHEAST (OUTPATIENT)
Dept: ANTICOAGULATION | Facility: CLINIC | Age: 70
End: 2020-04-06

## 2020-04-06 DIAGNOSIS — Z95.4 S/P MITRAL VALVE REPLACEMENT WITH METALLIC VALVE: ICD-10-CM

## 2020-04-06 DIAGNOSIS — I48.0 PAROXYSMAL ATRIAL FIBRILLATION (H): ICD-10-CM

## 2020-04-06 DIAGNOSIS — Z95.2 S/P MVR (MITRAL VALVE REPLACEMENT): ICD-10-CM

## 2020-04-06 LAB — INR PPP: 5.6 (ref 0.9–1.1)

## 2020-04-07 ENCOUNTER — RECORDS - HEALTHEAST (OUTPATIENT)
Dept: ADMINISTRATIVE | Facility: OTHER | Age: 70
End: 2020-04-07

## 2020-04-09 ENCOUNTER — RECORDS - HEALTHEAST (OUTPATIENT)
Dept: ADMINISTRATIVE | Facility: OTHER | Age: 70
End: 2020-04-09

## 2020-04-10 ENCOUNTER — RECORDS - HEALTHEAST (OUTPATIENT)
Dept: ADMINISTRATIVE | Facility: OTHER | Age: 70
End: 2020-04-10

## 2020-04-13 ENCOUNTER — COMMUNICATION - HEALTHEAST (OUTPATIENT)
Dept: INTERNAL MEDICINE | Facility: CLINIC | Age: 70
End: 2020-04-13

## 2020-04-13 ENCOUNTER — COMMUNICATION - HEALTHEAST (OUTPATIENT)
Dept: ANTICOAGULATION | Facility: CLINIC | Age: 70
End: 2020-04-13

## 2020-04-13 DIAGNOSIS — G89.4 PAIN SYNDROME, CHRONIC: ICD-10-CM

## 2020-04-13 DIAGNOSIS — I48.0 PAROXYSMAL ATRIAL FIBRILLATION (H): ICD-10-CM

## 2020-04-13 DIAGNOSIS — Z95.4 S/P MITRAL VALVE REPLACEMENT WITH METALLIC VALVE: ICD-10-CM

## 2020-04-13 DIAGNOSIS — Z95.2 S/P MVR (MITRAL VALVE REPLACEMENT): ICD-10-CM

## 2020-04-13 LAB — INR PPP: 2.8 (ref 0.9–1.1)

## 2020-04-15 ENCOUNTER — RECORDS - HEALTHEAST (OUTPATIENT)
Dept: ADMINISTRATIVE | Facility: OTHER | Age: 70
End: 2020-04-15

## 2020-04-20 ENCOUNTER — COMMUNICATION - HEALTHEAST (OUTPATIENT)
Dept: ANTICOAGULATION | Facility: CLINIC | Age: 70
End: 2020-04-20

## 2020-04-20 ENCOUNTER — COMMUNICATION - HEALTHEAST (OUTPATIENT)
Dept: INTERNAL MEDICINE | Facility: CLINIC | Age: 70
End: 2020-04-20

## 2020-04-20 DIAGNOSIS — I48.0 PAROXYSMAL ATRIAL FIBRILLATION (H): ICD-10-CM

## 2020-04-20 DIAGNOSIS — G89.4 PAIN SYNDROME, CHRONIC: ICD-10-CM

## 2020-04-20 DIAGNOSIS — Z95.4 S/P MITRAL VALVE REPLACEMENT WITH METALLIC VALVE: ICD-10-CM

## 2020-04-20 DIAGNOSIS — Z95.2 S/P MVR (MITRAL VALVE REPLACEMENT): ICD-10-CM

## 2020-04-20 LAB — INR PPP: 4.1 (ref 0.9–1.1)

## 2020-04-22 ENCOUNTER — COMMUNICATION - HEALTHEAST (OUTPATIENT)
Dept: SCHEDULING | Facility: CLINIC | Age: 70
End: 2020-04-22

## 2020-04-22 ENCOUNTER — COMMUNICATION - HEALTHEAST (OUTPATIENT)
Dept: INTERNAL MEDICINE | Facility: CLINIC | Age: 70
End: 2020-04-22

## 2020-04-22 DIAGNOSIS — G89.4 PAIN SYNDROME, CHRONIC: ICD-10-CM

## 2020-04-24 ENCOUNTER — COMMUNICATION - HEALTHEAST (OUTPATIENT)
Dept: SCHEDULING | Facility: CLINIC | Age: 70
End: 2020-04-24

## 2020-04-24 ENCOUNTER — AMBULATORY - HEALTHEAST (OUTPATIENT)
Dept: INTERNAL MEDICINE | Facility: CLINIC | Age: 70
End: 2020-04-24

## 2020-04-24 ENCOUNTER — RECORDS - HEALTHEAST (OUTPATIENT)
Dept: ADMINISTRATIVE | Facility: OTHER | Age: 70
End: 2020-04-24

## 2020-04-24 DIAGNOSIS — M25.519 CHRONIC SHOULDER PAIN: ICD-10-CM

## 2020-04-24 DIAGNOSIS — G89.29 CHRONIC SHOULDER PAIN: ICD-10-CM

## 2020-04-28 ENCOUNTER — COMMUNICATION - HEALTHEAST (OUTPATIENT)
Dept: INTERNAL MEDICINE | Facility: CLINIC | Age: 70
End: 2020-04-28

## 2020-04-28 ENCOUNTER — COMMUNICATION - HEALTHEAST (OUTPATIENT)
Dept: ANTICOAGULATION | Facility: CLINIC | Age: 70
End: 2020-04-28

## 2020-04-28 DIAGNOSIS — G89.4 PAIN SYNDROME, CHRONIC: ICD-10-CM

## 2020-04-29 ENCOUNTER — COMMUNICATION - HEALTHEAST (OUTPATIENT)
Dept: INTERNAL MEDICINE | Facility: CLINIC | Age: 70
End: 2020-04-29

## 2020-04-30 ENCOUNTER — OFFICE VISIT - HEALTHEAST (OUTPATIENT)
Dept: INTERNAL MEDICINE | Facility: CLINIC | Age: 70
End: 2020-04-30

## 2020-04-30 ENCOUNTER — COMMUNICATION - HEALTHEAST (OUTPATIENT)
Dept: ANTICOAGULATION | Facility: CLINIC | Age: 70
End: 2020-04-30

## 2020-04-30 DIAGNOSIS — Z95.2 S/P MVR (MITRAL VALVE REPLACEMENT): ICD-10-CM

## 2020-04-30 DIAGNOSIS — D64.9 NORMOCYTIC ANEMIA: ICD-10-CM

## 2020-04-30 DIAGNOSIS — N18.4 CKD (CHRONIC KIDNEY DISEASE) STAGE 4, GFR 15-29 ML/MIN (H): ICD-10-CM

## 2020-04-30 DIAGNOSIS — M60.9 MYOSITIS OF LEFT SHOULDER, UNSPECIFIED MYOSITIS TYPE: ICD-10-CM

## 2020-04-30 DIAGNOSIS — I48.0 PAROXYSMAL ATRIAL FIBRILLATION (H): ICD-10-CM

## 2020-04-30 DIAGNOSIS — E83.52 HYPERCALCEMIA: ICD-10-CM

## 2020-04-30 DIAGNOSIS — Z95.4 S/P MITRAL VALVE REPLACEMENT WITH METALLIC VALVE: ICD-10-CM

## 2020-04-30 LAB — INR PPP: 5.9 (ref 0.9–1.1)

## 2020-05-01 ENCOUNTER — COMMUNICATION - HEALTHEAST (OUTPATIENT)
Dept: ANTICOAGULATION | Facility: CLINIC | Age: 70
End: 2020-05-01

## 2020-05-01 DIAGNOSIS — Z95.2 S/P MVR (MITRAL VALVE REPLACEMENT): ICD-10-CM

## 2020-05-01 DIAGNOSIS — Z95.4 S/P MITRAL VALVE REPLACEMENT WITH METALLIC VALVE: ICD-10-CM

## 2020-05-01 DIAGNOSIS — I48.0 PAROXYSMAL ATRIAL FIBRILLATION (H): ICD-10-CM

## 2020-05-01 LAB — INR PPP: 3.4 (ref 0.9–1.1)

## 2020-05-04 ENCOUNTER — COMMUNICATION - HEALTHEAST (OUTPATIENT)
Dept: INTERNAL MEDICINE | Facility: CLINIC | Age: 70
End: 2020-05-04

## 2020-05-04 ENCOUNTER — AMBULATORY - HEALTHEAST (OUTPATIENT)
Dept: LAB | Facility: CLINIC | Age: 70
End: 2020-05-04

## 2020-05-04 DIAGNOSIS — N18.4 CKD (CHRONIC KIDNEY DISEASE) STAGE 4, GFR 15-29 ML/MIN (H): ICD-10-CM

## 2020-05-04 DIAGNOSIS — M60.9 MYOSITIS OF LEFT SHOULDER, UNSPECIFIED MYOSITIS TYPE: ICD-10-CM

## 2020-05-04 DIAGNOSIS — G89.4 PAIN SYNDROME, CHRONIC: ICD-10-CM

## 2020-05-04 DIAGNOSIS — D64.9 NORMOCYTIC ANEMIA: ICD-10-CM

## 2020-05-04 DIAGNOSIS — E83.52 HYPERCALCEMIA: ICD-10-CM

## 2020-05-04 DIAGNOSIS — D64.9 ANEMIA, UNSPECIFIED: ICD-10-CM

## 2020-05-04 DIAGNOSIS — M60.812 OTHER MYOSITIS, LEFT SHOULDER: ICD-10-CM

## 2020-05-04 LAB
ALBUMIN SERPL-MCNC: 4 G/DL (ref 3.5–5)
ALBUMIN UR-MCNC: NEGATIVE MG/DL
ALP SERPL-CCNC: 83 U/L (ref 45–120)
ALT SERPL W P-5'-P-CCNC: 21 U/L (ref 0–45)
ANION GAP SERPL CALCULATED.3IONS-SCNC: 18 MMOL/L (ref 5–18)
APPEARANCE UR: CLEAR
AST SERPL W P-5'-P-CCNC: 45 U/L (ref 0–40)
BILIRUB SERPL-MCNC: 0.6 MG/DL (ref 0–1)
BILIRUB UR QL STRIP: NEGATIVE
BUN SERPL-MCNC: 54 MG/DL (ref 8–28)
C REACTIVE PROTEIN LHE: 1.7 MG/DL (ref 0–0.8)
CALCIUM SERPL-MCNC: 9.8 MG/DL (ref 8.5–10.5)
CHLORIDE BLD-SCNC: 96 MMOL/L (ref 98–107)
CK SERPL-CCNC: 95 U/L (ref 30–190)
CO2 SERPL-SCNC: 24 MMOL/L (ref 22–31)
COLOR UR AUTO: YELLOW
CREAT SERPL-MCNC: 1.85 MG/DL (ref 0.7–1.3)
ERYTHROCYTE [SEDIMENTATION RATE] IN BLOOD BY WESTERGREN METHOD: 112 MM/HR (ref 0–15)
FERRITIN SERPL-MCNC: 61 NG/ML (ref 27–300)
GFR SERPL CREATININE-BSD FRML MDRD: 36 ML/MIN/1.73M2
GLUCOSE BLD-MCNC: 93 MG/DL (ref 70–125)
GLUCOSE UR STRIP-MCNC: NEGATIVE MG/DL
HGB UR QL STRIP: NEGATIVE
KETONES UR STRIP-MCNC: NEGATIVE MG/DL
LDH SERPL L TO P-CCNC: 348 U/L (ref 125–220)
LEUKOCYTE ESTERASE UR QL STRIP: NEGATIVE
NITRATE UR QL: NEGATIVE
PH UR STRIP: 7 [PH] (ref 5–8)
POTASSIUM BLD-SCNC: 3.5 MMOL/L (ref 3.5–5)
PROT SERPL-MCNC: 7.7 G/DL (ref 6–8)
PTH-INTACT SERPL-MCNC: 109 PG/ML (ref 10–86)
RHEUMATOID FACT SERPL-ACNC: <15 IU/ML (ref 0–30)
SODIUM SERPL-SCNC: 138 MMOL/L (ref 136–145)
SP GR UR STRIP: 1.02 (ref 1–1.03)
T3 SERPL-MCNC: 117 NG/DL (ref 45–175)
T4 FREE SERPL-MCNC: 1 NG/DL (ref 0.7–1.8)
TSH SERPL DL<=0.005 MIU/L-ACNC: 1.29 UIU/ML (ref 0.3–5)
UROBILINOGEN UR STRIP-ACNC: NORMAL

## 2020-05-05 LAB
25(OH)D3 SERPL-MCNC: 39.9 NG/ML (ref 30–80)
25(OH)D3 SERPL-MCNC: 39.9 NG/ML (ref 30–80)
ALBUMIN PERCENT: 60.6 % (ref 51–67)
ALBUMIN SERPL ELPH-MCNC: 4.7 G/DL (ref 3.2–4.7)
ALPHA 1 PERCENT: 4.7 % (ref 2–4)
ALPHA 2 PERCENT: 8.6 % (ref 5–13)
ALPHA1 GLOB SERPL ELPH-MCNC: 0.4 G/DL (ref 0.1–0.3)
ALPHA2 GLOB SERPL ELPH-MCNC: 0.7 G/DL (ref 0.4–0.9)
B-GLOBULIN SERPL ELPH-MCNC: 1 G/DL (ref 0.7–1.2)
BETA PERCENT: 12.7 % (ref 10–17)
CCP AB SER IA-ACNC: <0.5 U/ML
GAMMA GLOB SERPL ELPH-MCNC: 1 G/DL (ref 0.6–1.4)
GAMMA GLOBULIN PERCENT: 13.4 % (ref 9–20)
PATH ICD:: ABNORMAL
PATH ICD:: NORMAL
PROT PATTERN SERPL ELPH-IMP: ABNORMAL
PROT PATTERN SERPL ELPH-IMP: NORMAL
PROT SERPL-MCNC: 7.8 G/DL (ref 6–8)
REVIEWING PATHOLOGIST: ABNORMAL
REVIEWING PATHOLOGIST: NORMAL
TOTAL PROTEIN RANDOM URINE MG/DL: <7 MG/DL

## 2020-05-06 ENCOUNTER — COMMUNICATION - HEALTHEAST (OUTPATIENT)
Dept: ANTICOAGULATION | Facility: CLINIC | Age: 70
End: 2020-05-06

## 2020-05-06 DIAGNOSIS — Z95.4 S/P MITRAL VALVE REPLACEMENT WITH METALLIC VALVE: ICD-10-CM

## 2020-05-06 DIAGNOSIS — I48.0 PAROXYSMAL ATRIAL FIBRILLATION (H): ICD-10-CM

## 2020-05-06 DIAGNOSIS — Z95.2 S/P MVR (MITRAL VALVE REPLACEMENT): ICD-10-CM

## 2020-05-06 LAB
ACE SERPL-CCNC: 76 U/L (ref 9–67)
INR PPP: 2.4 (ref 0.9–1.1)

## 2020-05-08 ENCOUNTER — COMMUNICATION - HEALTHEAST (OUTPATIENT)
Dept: SCHEDULING | Facility: CLINIC | Age: 70
End: 2020-05-08

## 2020-05-09 LAB
AEROBIC BLOOD CULTURE BOTTLE: NO GROWTH
AEROBIC BLOOD CULTURE BOTTLE: NO GROWTH
ANAEROBIC BLOOD CULTURE BOTTLE: NO GROWTH
ANAEROBIC BLOOD CULTURE BOTTLE: NO GROWTH

## 2020-05-11 ENCOUNTER — COMMUNICATION - HEALTHEAST (OUTPATIENT)
Dept: INTERNAL MEDICINE | Facility: CLINIC | Age: 70
End: 2020-05-11

## 2020-05-11 DIAGNOSIS — G89.4 PAIN SYNDROME, CHRONIC: ICD-10-CM

## 2020-05-11 LAB — ANA SER QL: 1.6 U

## 2020-05-12 ENCOUNTER — OFFICE VISIT - HEALTHEAST (OUTPATIENT)
Dept: INTERNAL MEDICINE | Facility: CLINIC | Age: 70
End: 2020-05-12

## 2020-05-12 DIAGNOSIS — G72.49 INFLAMMATORY MYOPATHY: ICD-10-CM

## 2020-05-12 ASSESSMENT — PATIENT HEALTH QUESTIONNAIRE - PHQ9: SUM OF ALL RESPONSES TO PHQ QUESTIONS 1-9: 0

## 2020-05-13 ENCOUNTER — COMMUNICATION - HEALTHEAST (OUTPATIENT)
Dept: INTERNAL MEDICINE | Facility: CLINIC | Age: 70
End: 2020-05-13

## 2020-05-13 ENCOUNTER — COMMUNICATION - HEALTHEAST (OUTPATIENT)
Dept: ANTICOAGULATION | Facility: CLINIC | Age: 70
End: 2020-05-13

## 2020-05-13 DIAGNOSIS — I48.0 PAROXYSMAL ATRIAL FIBRILLATION (H): ICD-10-CM

## 2020-05-13 DIAGNOSIS — Z95.2 S/P MVR (MITRAL VALVE REPLACEMENT): ICD-10-CM

## 2020-05-13 DIAGNOSIS — Z95.4 S/P MITRAL VALVE REPLACEMENT WITH METALLIC VALVE: ICD-10-CM

## 2020-05-13 LAB — INR PPP: 2 (ref 0.9–1.1)

## 2020-05-18 ENCOUNTER — COMMUNICATION - HEALTHEAST (OUTPATIENT)
Dept: INTERNAL MEDICINE | Facility: CLINIC | Age: 70
End: 2020-05-18

## 2020-05-18 DIAGNOSIS — G89.4 PAIN SYNDROME, CHRONIC: ICD-10-CM

## 2020-05-20 ENCOUNTER — COMMUNICATION - HEALTHEAST (OUTPATIENT)
Dept: INTERNAL MEDICINE | Facility: CLINIC | Age: 70
End: 2020-05-20

## 2020-05-20 ENCOUNTER — OFFICE VISIT - HEALTHEAST (OUTPATIENT)
Dept: INTERNAL MEDICINE | Facility: CLINIC | Age: 70
End: 2020-05-20

## 2020-05-20 DIAGNOSIS — T82.6XXD PROSTHETIC VALVE ENDOCARDITIS, SUBSEQUENT ENCOUNTER: ICD-10-CM

## 2020-05-20 DIAGNOSIS — G72.49 INFLAMMATORY MYOPATHY: ICD-10-CM

## 2020-05-20 DIAGNOSIS — I38 PROSTHETIC VALVE ENDOCARDITIS, SUBSEQUENT ENCOUNTER: ICD-10-CM

## 2020-05-20 DIAGNOSIS — G89.4 PAIN SYNDROME, CHRONIC: ICD-10-CM

## 2020-05-21 ENCOUNTER — OFFICE VISIT - HEALTHEAST (OUTPATIENT)
Dept: RHEUMATOLOGY | Facility: CLINIC | Age: 70
End: 2020-05-21

## 2020-05-21 ENCOUNTER — COMMUNICATION - HEALTHEAST (OUTPATIENT)
Dept: ANTICOAGULATION | Facility: CLINIC | Age: 70
End: 2020-05-21

## 2020-05-21 DIAGNOSIS — I48.0 PAROXYSMAL ATRIAL FIBRILLATION (H): ICD-10-CM

## 2020-05-21 DIAGNOSIS — Z95.4 S/P MITRAL VALVE REPLACEMENT WITH METALLIC VALVE: ICD-10-CM

## 2020-05-21 DIAGNOSIS — M25.512 CHRONIC LEFT SHOULDER PAIN: ICD-10-CM

## 2020-05-21 DIAGNOSIS — D64.9 ANEMIA, UNSPECIFIED TYPE: ICD-10-CM

## 2020-05-21 DIAGNOSIS — N28.9 RENAL INSUFFICIENCY: ICD-10-CM

## 2020-05-21 DIAGNOSIS — M54.2 NECK PAIN ON LEFT SIDE: ICD-10-CM

## 2020-05-21 DIAGNOSIS — R74.8 ABNORMAL SERUM ANGIOTENSIN-CONVERTING ENZYME LEVEL: ICD-10-CM

## 2020-05-21 DIAGNOSIS — G89.29 CHRONIC PAIN OF LEFT UPPER EXTREMITY: ICD-10-CM

## 2020-05-21 DIAGNOSIS — G89.29 CHRONIC LEFT SHOULDER PAIN: ICD-10-CM

## 2020-05-21 DIAGNOSIS — M79.602 CHRONIC PAIN OF LEFT UPPER EXTREMITY: ICD-10-CM

## 2020-05-21 DIAGNOSIS — R70.0 ELEVATED SED RATE: ICD-10-CM

## 2020-05-21 DIAGNOSIS — Z95.2 S/P MVR (MITRAL VALVE REPLACEMENT): ICD-10-CM

## 2020-05-21 LAB — INR PPP: 2.5 (ref 0.9–1.1)

## 2020-05-22 ENCOUNTER — COMMUNICATION - HEALTHEAST (OUTPATIENT)
Dept: INTERNAL MEDICINE | Facility: CLINIC | Age: 70
End: 2020-05-22

## 2020-05-25 ENCOUNTER — COMMUNICATION - HEALTHEAST (OUTPATIENT)
Dept: INTERNAL MEDICINE | Facility: CLINIC | Age: 70
End: 2020-05-25

## 2020-05-25 DIAGNOSIS — G89.4 PAIN SYNDROME, CHRONIC: ICD-10-CM

## 2020-05-26 ENCOUNTER — OFFICE VISIT - HEALTHEAST (OUTPATIENT)
Dept: INTERNAL MEDICINE | Facility: CLINIC | Age: 70
End: 2020-05-26

## 2020-05-26 DIAGNOSIS — I48.0 PAROXYSMAL ATRIAL FIBRILLATION (H): ICD-10-CM

## 2020-05-26 DIAGNOSIS — G89.4 PAIN SYNDROME, CHRONIC: ICD-10-CM

## 2020-05-26 DIAGNOSIS — Z86.79 HX OF BACTERIAL ENDOCARDITIS: ICD-10-CM

## 2020-05-26 DIAGNOSIS — M60.9 MYOSITIS OF LEFT SHOULDER, UNSPECIFIED MYOSITIS TYPE: ICD-10-CM

## 2020-05-26 DIAGNOSIS — I50.42 CHRONIC COMBINED SYSTOLIC AND DIASTOLIC CONGESTIVE HEART FAILURE (H): ICD-10-CM

## 2020-05-27 ENCOUNTER — AMBULATORY - HEALTHEAST (OUTPATIENT)
Dept: LAB | Facility: CLINIC | Age: 70
End: 2020-05-27

## 2020-05-27 DIAGNOSIS — R70.0 ELEVATED SED RATE: ICD-10-CM

## 2020-05-27 DIAGNOSIS — M54.2 NECK PAIN ON LEFT SIDE: ICD-10-CM

## 2020-05-27 DIAGNOSIS — G89.29 CHRONIC LEFT SHOULDER PAIN: ICD-10-CM

## 2020-05-27 DIAGNOSIS — G89.29 CHRONIC PAIN OF LEFT UPPER EXTREMITY: ICD-10-CM

## 2020-05-27 DIAGNOSIS — M79.602 CHRONIC PAIN OF LEFT UPPER EXTREMITY: ICD-10-CM

## 2020-05-27 DIAGNOSIS — M25.512 CHRONIC LEFT SHOULDER PAIN: ICD-10-CM

## 2020-05-27 LAB
CK SERPL-CCNC: 96 U/L (ref 30–190)
URATE SERPL-MCNC: 15.9 MG/DL (ref 3–8)

## 2020-05-28 ENCOUNTER — COMMUNICATION - HEALTHEAST (OUTPATIENT)
Dept: ANTICOAGULATION | Facility: CLINIC | Age: 70
End: 2020-05-28

## 2020-05-28 DIAGNOSIS — Z95.2 S/P MVR (MITRAL VALVE REPLACEMENT): ICD-10-CM

## 2020-05-28 DIAGNOSIS — Z95.4 S/P MITRAL VALVE REPLACEMENT WITH METALLIC VALVE: ICD-10-CM

## 2020-05-28 DIAGNOSIS — I48.0 PAROXYSMAL ATRIAL FIBRILLATION (H): ICD-10-CM

## 2020-05-28 LAB
B BURGDOR IGG+IGM SER QL: 0.17 INDEX VALUE
INR PPP: 3.3 (ref 0.9–1.1)
MYOGLOBIN SERPL-MCNC: 155 MCG/L

## 2020-05-29 LAB
ALDOLASE SERPL-CCNC: 2.5 U/L (ref 1.5–8.1)
ANCA IGG TITR SER IF: NORMAL {TITER}

## 2020-05-31 ENCOUNTER — COMMUNICATION - HEALTHEAST (OUTPATIENT)
Dept: INTERNAL MEDICINE | Facility: CLINIC | Age: 70
End: 2020-05-31

## 2020-05-31 DIAGNOSIS — G89.4 PAIN SYNDROME, CHRONIC: ICD-10-CM

## 2020-06-02 ENCOUNTER — COMMUNICATION - HEALTHEAST (OUTPATIENT)
Dept: SCHEDULING | Facility: CLINIC | Age: 70
End: 2020-06-02

## 2020-06-02 ENCOUNTER — OFFICE VISIT - HEALTHEAST (OUTPATIENT)
Dept: INTERNAL MEDICINE | Facility: CLINIC | Age: 70
End: 2020-06-02

## 2020-06-02 ENCOUNTER — COMMUNICATION - HEALTHEAST (OUTPATIENT)
Dept: ADMINISTRATIVE | Facility: CLINIC | Age: 70
End: 2020-06-02

## 2020-06-02 DIAGNOSIS — G89.4 PAIN SYNDROME, CHRONIC: ICD-10-CM

## 2020-06-02 DIAGNOSIS — E79.0 ELEVATED URIC ACID IN BLOOD: ICD-10-CM

## 2020-06-02 DIAGNOSIS — G72.49 INFLAMMATORY MYOPATHY: ICD-10-CM

## 2020-06-03 ENCOUNTER — HOSPITAL ENCOUNTER (OUTPATIENT)
Dept: CT IMAGING | Facility: CLINIC | Age: 70
Discharge: HOME | End: 2020-06-03
Attending: INTERNAL MEDICINE

## 2020-06-03 ENCOUNTER — COMMUNICATION - HEALTHEAST (OUTPATIENT)
Dept: ANTICOAGULATION | Facility: CLINIC | Age: 70
End: 2020-06-03

## 2020-06-03 DIAGNOSIS — E79.0 ELEVATED URIC ACID IN BLOOD: ICD-10-CM

## 2020-06-03 DIAGNOSIS — Z95.4 S/P MITRAL VALVE REPLACEMENT WITH METALLIC VALVE: ICD-10-CM

## 2020-06-03 DIAGNOSIS — I48.0 PAROXYSMAL ATRIAL FIBRILLATION (H): ICD-10-CM

## 2020-06-03 DIAGNOSIS — G72.49 INFLAMMATORY MYOPATHY: ICD-10-CM

## 2020-06-03 DIAGNOSIS — Z95.2 S/P MVR (MITRAL VALVE REPLACEMENT): ICD-10-CM

## 2020-06-03 LAB
DNA (DS) ANTIBODY - HISTORICAL: 6 IU
INR PPP: 3.6 (ref 0.9–1.1)
JO-1 AUTOANTIBODIES - HISTORICAL: 0 EU

## 2020-06-04 ENCOUNTER — COMMUNICATION - HEALTHEAST (OUTPATIENT)
Dept: ONCOLOGY | Facility: HOSPITAL | Age: 70
End: 2020-06-04

## 2020-06-04 ENCOUNTER — OFFICE VISIT - HEALTHEAST (OUTPATIENT)
Dept: INFECTIOUS DISEASES | Facility: CLINIC | Age: 70
End: 2020-06-04

## 2020-06-04 ENCOUNTER — AMBULATORY - HEALTHEAST (OUTPATIENT)
Dept: INTERNAL MEDICINE | Facility: CLINIC | Age: 70
End: 2020-06-04

## 2020-06-04 DIAGNOSIS — M89.9 BONE LESION: ICD-10-CM

## 2020-06-04 DIAGNOSIS — M60.9 MYOSITIS, UNSPECIFIED MYOSITIS TYPE, UNSPECIFIED SITE: ICD-10-CM

## 2020-06-04 DIAGNOSIS — Z86.79 HX OF BACTERIAL ENDOCARDITIS: ICD-10-CM

## 2020-06-05 ENCOUNTER — OFFICE VISIT - HEALTHEAST (OUTPATIENT)
Dept: INTERNAL MEDICINE | Facility: CLINIC | Age: 70
End: 2020-06-05

## 2020-06-05 ENCOUNTER — COMMUNICATION - HEALTHEAST (OUTPATIENT)
Dept: ANTICOAGULATION | Facility: CLINIC | Age: 70
End: 2020-06-05

## 2020-06-05 ENCOUNTER — COMMUNICATION - HEALTHEAST (OUTPATIENT)
Dept: ONCOLOGY | Facility: HOSPITAL | Age: 70
End: 2020-06-05

## 2020-06-05 DIAGNOSIS — Z95.2 S/P MVR (MITRAL VALVE REPLACEMENT): ICD-10-CM

## 2020-06-05 DIAGNOSIS — Z86.79 HX OF BACTERIAL ENDOCARDITIS: ICD-10-CM

## 2020-06-05 DIAGNOSIS — N18.30 CHRONIC KIDNEY DISEASE, STAGE III (MODERATE) (H): ICD-10-CM

## 2020-06-05 DIAGNOSIS — K59.03 DRUG INDUCED CONSTIPATION: ICD-10-CM

## 2020-06-05 DIAGNOSIS — I48.0 PAROXYSMAL ATRIAL FIBRILLATION (H): ICD-10-CM

## 2020-06-05 DIAGNOSIS — M89.9 LYTIC LESION OF BONE ON X-RAY: ICD-10-CM

## 2020-06-05 DIAGNOSIS — M60.9 MYOSITIS, UNSPECIFIED MYOSITIS TYPE, UNSPECIFIED SITE: ICD-10-CM

## 2020-06-05 DIAGNOSIS — G89.4 PAIN SYNDROME, CHRONIC: ICD-10-CM

## 2020-06-05 DIAGNOSIS — E83.52 HYPERCALCEMIA: ICD-10-CM

## 2020-06-05 DIAGNOSIS — D64.9 NORMOCYTIC ANEMIA: ICD-10-CM

## 2020-06-05 DIAGNOSIS — E79.0 ELEVATED URIC ACID IN BLOOD: ICD-10-CM

## 2020-06-05 DIAGNOSIS — R76.12 POSITIVE QUANTIFERON-TB GOLD TEST: ICD-10-CM

## 2020-06-05 DIAGNOSIS — Z95.4 S/P MITRAL VALVE REPLACEMENT WITH METALLIC VALVE: ICD-10-CM

## 2020-06-05 LAB — INR PPP: 2.9 (ref 0.9–1.1)

## 2020-06-09 ENCOUNTER — COMMUNICATION - HEALTHEAST (OUTPATIENT)
Dept: INTERNAL MEDICINE | Facility: CLINIC | Age: 70
End: 2020-06-09

## 2020-06-09 DIAGNOSIS — G89.4 PAIN SYNDROME, CHRONIC: ICD-10-CM

## 2020-06-10 ENCOUNTER — AMBULATORY - HEALTHEAST (OUTPATIENT)
Dept: LAB | Facility: CLINIC | Age: 70
End: 2020-06-10

## 2020-06-10 ENCOUNTER — COMMUNICATION - HEALTHEAST (OUTPATIENT)
Dept: ANTICOAGULATION | Facility: CLINIC | Age: 70
End: 2020-06-10

## 2020-06-10 ENCOUNTER — COMMUNICATION - HEALTHEAST (OUTPATIENT)
Dept: INTERNAL MEDICINE | Facility: CLINIC | Age: 70
End: 2020-06-10

## 2020-06-10 ENCOUNTER — COMMUNICATION - HEALTHEAST (OUTPATIENT)
Dept: INFECTIOUS DISEASES | Facility: CLINIC | Age: 70
End: 2020-06-10

## 2020-06-10 DIAGNOSIS — T82.6XXD PROSTHETIC VALVE ENDOCARDITIS, SUBSEQUENT ENCOUNTER: ICD-10-CM

## 2020-06-10 DIAGNOSIS — Z95.4 S/P MITRAL VALVE REPLACEMENT WITH METALLIC VALVE: ICD-10-CM

## 2020-06-10 DIAGNOSIS — I38 PROSTHETIC VALVE ENDOCARDITIS, SUBSEQUENT ENCOUNTER: ICD-10-CM

## 2020-06-10 DIAGNOSIS — I48.0 PAROXYSMAL ATRIAL FIBRILLATION (H): ICD-10-CM

## 2020-06-10 DIAGNOSIS — M89.9 BONE LESION: ICD-10-CM

## 2020-06-10 DIAGNOSIS — M60.9 MYOSITIS, UNSPECIFIED MYOSITIS TYPE, UNSPECIFIED SITE: ICD-10-CM

## 2020-06-10 DIAGNOSIS — E79.0 ELEVATED URIC ACID IN BLOOD: ICD-10-CM

## 2020-06-10 DIAGNOSIS — G89.4 PAIN SYNDROME, CHRONIC: ICD-10-CM

## 2020-06-10 DIAGNOSIS — Z95.2 S/P MVR (MITRAL VALVE REPLACEMENT): ICD-10-CM

## 2020-06-10 LAB
CREAT SERPL-MCNC: 1.85 MG/DL (ref 0.7–1.3)
GFR SERPL CREATININE-BSD FRML MDRD: 36 ML/MIN/1.73M2
INR PPP: 1.6 (ref 0.9–1.1)
URATE SERPL-MCNC: 11.6 MG/DL (ref 3–8)

## 2020-06-11 LAB
BASOPHILS # BLD AUTO: 0.1 THOU/UL (ref 0–0.2)
BASOPHILS NFR BLD AUTO: 1 % (ref 0–2)
EOSINOPHIL # BLD AUTO: 0.1 THOU/UL (ref 0–0.4)
EOSINOPHIL NFR BLD AUTO: 2 % (ref 0–6)
ERYTHROCYTE [DISTWIDTH] IN BLOOD BY AUTOMATED COUNT: 15.9 % (ref 11–14.5)
HCT VFR BLD AUTO: 31.6 % (ref 40–54)
HGB BLD-MCNC: 9.7 G/DL (ref 14–18)
LAB AP CHARGES (HE HISTORICAL CONVERSION): NORMAL
LYMPHOCYTES # BLD AUTO: 0.4 THOU/UL (ref 0.8–4.4)
LYMPHOCYTES NFR BLD AUTO: 6 % (ref 20–40)
MCH RBC QN AUTO: 29.7 PG (ref 27–34)
MCHC RBC AUTO-ENTMCNC: 30.7 G/DL (ref 32–36)
MCV RBC AUTO: 97 FL (ref 80–100)
MONOCYTES # BLD AUTO: 0.4 THOU/UL (ref 0–0.9)
MONOCYTES NFR BLD AUTO: 6 % (ref 2–10)
NEUTROPHILS # BLD AUTO: 5.8 THOU/UL (ref 2–7.7)
NEUTROPHILS NFR BLD AUTO: 85 % (ref 50–70)
PATH REPORT.COMMENTS IMP SPEC: NORMAL
PATH REPORT.COMMENTS IMP SPEC: NORMAL
PATH REPORT.FINAL DX SPEC: NORMAL
PATH REPORT.MICROSCOPIC SPEC OTHER STN: ABNORMAL
PATH REPORT.MICROSCOPIC SPEC OTHER STN: NORMAL
PATH REPORT.RELEVANT HX SPEC: NORMAL
PLATELET # BLD AUTO: 258 THOU/UL (ref 140–440)
PMV BLD AUTO: 10.7 FL (ref 8.5–12.5)
RBC # BLD AUTO: 3.27 MILL/UL (ref 4.4–6.2)
WBC: 6.9 THOU/UL (ref 4–11)

## 2020-06-12 ENCOUNTER — COMMUNICATION - HEALTHEAST (OUTPATIENT)
Dept: INTERNAL MEDICINE | Facility: CLINIC | Age: 70
End: 2020-06-12

## 2020-06-12 DIAGNOSIS — G89.4 PAIN SYNDROME, CHRONIC: ICD-10-CM

## 2020-06-12 LAB
KAPPA LC FREE SER-MCNC: 4.54 MG/DL (ref 0.33–1.94)
KAPPA LC FREE/LAMBDA FREE SER NEPH: 2.05 {RATIO} (ref 0.26–1.65)
LAMBDA LC FREE SERPL-MCNC: 2.21 MG/DL (ref 0.57–2.63)

## 2020-06-13 ENCOUNTER — COMMUNICATION - HEALTHEAST (OUTPATIENT)
Dept: INTERNAL MEDICINE | Facility: CLINIC | Age: 70
End: 2020-06-13

## 2020-06-13 DIAGNOSIS — K59.03 DRUG INDUCED CONSTIPATION: ICD-10-CM

## 2020-06-15 ENCOUNTER — AMBULATORY - HEALTHEAST (OUTPATIENT)
Dept: ONCOLOGY | Facility: CLINIC | Age: 70
End: 2020-06-15

## 2020-06-15 ENCOUNTER — OFFICE VISIT - HEALTHEAST (OUTPATIENT)
Dept: ONCOLOGY | Facility: CLINIC | Age: 70
End: 2020-06-15

## 2020-06-15 ENCOUNTER — AMBULATORY - HEALTHEAST (OUTPATIENT)
Dept: INFUSION THERAPY | Facility: CLINIC | Age: 70
End: 2020-06-15

## 2020-06-15 DIAGNOSIS — R93.7 ABNORMAL FINDINGS ON DIAGNOSTIC IMAGING OF OTHER PARTS OF MUSCULOSKELETAL SYSTEM: ICD-10-CM

## 2020-06-15 DIAGNOSIS — M89.9 BONE LESION: ICD-10-CM

## 2020-06-15 DIAGNOSIS — M89.9 LYTIC LESION OF BONE ON X-RAY: ICD-10-CM

## 2020-06-15 DIAGNOSIS — D72.818 OTHER DECREASED WHITE BLOOD CELL COUNT: ICD-10-CM

## 2020-06-15 DIAGNOSIS — M89.9 LYTIC BONE LESIONS ON XRAY: ICD-10-CM

## 2020-06-15 LAB
ALBUMIN SERPL-MCNC: 4 G/DL (ref 3.5–5)
ALP SERPL-CCNC: 65 U/L (ref 45–120)
ALT SERPL W P-5'-P-CCNC: 27 U/L (ref 0–45)
ANION GAP SERPL CALCULATED.3IONS-SCNC: 13 MMOL/L (ref 5–18)
AST SERPL W P-5'-P-CCNC: 40 U/L (ref 0–40)
BASOPHILS # BLD AUTO: 0.1 THOU/UL (ref 0–0.2)
BASOPHILS NFR BLD AUTO: 2 % (ref 0–2)
BILIRUB SERPL-MCNC: 0.6 MG/DL (ref 0–1)
BUN SERPL-MCNC: 63 MG/DL (ref 8–28)
CALCIUM SERPL-MCNC: 9.9 MG/DL (ref 8.5–10.5)
CHLORIDE BLD-SCNC: 96 MMOL/L (ref 98–107)
CO2 SERPL-SCNC: 27 MMOL/L (ref 22–31)
CREAT SERPL-MCNC: 1.87 MG/DL (ref 0.7–1.3)
EOSINOPHIL # BLD AUTO: 0.4 THOU/UL (ref 0–0.4)
EOSINOPHIL NFR BLD AUTO: 5 % (ref 0–6)
ERYTHROCYTE [DISTWIDTH] IN BLOOD BY AUTOMATED COUNT: 15.6 % (ref 11–14.5)
GFR SERPL CREATININE-BSD FRML MDRD: 36 ML/MIN/1.73M2
GLUCOSE BLD-MCNC: 118 MG/DL (ref 70–125)
HCT VFR BLD AUTO: 31.2 % (ref 40–54)
HGB BLD-MCNC: 10 G/DL (ref 14–18)
LYMPHOCYTES # BLD AUTO: 1.2 THOU/UL (ref 0.8–4.4)
LYMPHOCYTES NFR BLD AUTO: 16 % (ref 20–40)
MCH RBC QN AUTO: 29.3 PG (ref 27–34)
MCHC RBC AUTO-ENTMCNC: 32.1 G/DL (ref 32–36)
MCV RBC AUTO: 92 FL (ref 80–100)
MONOCYTES # BLD AUTO: 0.9 THOU/UL (ref 0–0.9)
MONOCYTES NFR BLD AUTO: 12 % (ref 2–10)
NEUTROPHILS # BLD AUTO: 4.7 THOU/UL (ref 2–7.7)
NEUTROPHILS NFR BLD AUTO: 64 % (ref 50–70)
PLATELET # BLD AUTO: 197 THOU/UL (ref 140–440)
PMV BLD AUTO: 10.4 FL (ref 8.5–12.5)
POTASSIUM BLD-SCNC: 3.1 MMOL/L (ref 3.5–5)
PROT SERPL-MCNC: 8 G/DL (ref 6–8)
PSA SERPL-MCNC: 0.2 NG/ML (ref 0–6.5)
RBC # BLD AUTO: 3.41 MILL/UL (ref 4.4–6.2)
SODIUM SERPL-SCNC: 136 MMOL/L (ref 136–145)
WBC: 7.4 THOU/UL (ref 4–11)

## 2020-06-15 ASSESSMENT — MIFFLIN-ST. JEOR: SCORE: 1398.1

## 2020-06-16 ENCOUNTER — COMMUNICATION - HEALTHEAST (OUTPATIENT)
Dept: ANTICOAGULATION | Facility: CLINIC | Age: 70
End: 2020-06-16

## 2020-06-16 DIAGNOSIS — I48.0 PAROXYSMAL ATRIAL FIBRILLATION (H): ICD-10-CM

## 2020-06-16 DIAGNOSIS — Z95.2 S/P MVR (MITRAL VALVE REPLACEMENT): ICD-10-CM

## 2020-06-16 DIAGNOSIS — Z95.4 S/P MITRAL VALVE REPLACEMENT WITH METALLIC VALVE: ICD-10-CM

## 2020-06-16 LAB — INR PPP: 1.6 (ref 0.9–1.1)

## 2020-06-17 ENCOUNTER — COMMUNICATION - HEALTHEAST (OUTPATIENT)
Dept: INTERNAL MEDICINE | Facility: CLINIC | Age: 70
End: 2020-06-17

## 2020-06-18 ENCOUNTER — COMMUNICATION - HEALTHEAST (OUTPATIENT)
Dept: RHEUMATOLOGY | Facility: CLINIC | Age: 70
End: 2020-06-18

## 2020-06-18 ENCOUNTER — OFFICE VISIT - HEALTHEAST (OUTPATIENT)
Dept: RHEUMATOLOGY | Facility: CLINIC | Age: 70
End: 2020-06-18

## 2020-06-18 ENCOUNTER — COMMUNICATION - HEALTHEAST (OUTPATIENT)
Dept: FAMILY MEDICINE | Facility: CLINIC | Age: 70
End: 2020-06-18

## 2020-06-18 DIAGNOSIS — N28.9 RENAL INSUFFICIENCY: ICD-10-CM

## 2020-06-18 DIAGNOSIS — G89.29 CHRONIC PAIN OF LEFT UPPER EXTREMITY: ICD-10-CM

## 2020-06-18 DIAGNOSIS — M25.512 CHRONIC LEFT SHOULDER PAIN: ICD-10-CM

## 2020-06-18 DIAGNOSIS — G89.4 PAIN SYNDROME, CHRONIC: ICD-10-CM

## 2020-06-18 DIAGNOSIS — G89.29 CHRONIC LEFT SHOULDER PAIN: ICD-10-CM

## 2020-06-18 DIAGNOSIS — R93.5 ABNORMAL CT SCAN, PELVIS: ICD-10-CM

## 2020-06-18 DIAGNOSIS — M79.602 CHRONIC PAIN OF LEFT UPPER EXTREMITY: ICD-10-CM

## 2020-06-18 DIAGNOSIS — E79.0 ELEVATED URIC ACID IN BLOOD: ICD-10-CM

## 2020-06-18 DIAGNOSIS — R70.0 ELEVATED SED RATE: ICD-10-CM

## 2020-06-18 LAB
AMPHIPHYSIN AB TITR SER: NEGATIVE TITER
CV2 IGG TITR SER: NEGATIVE TITER
GLIAL NUC TYPE 1 AB TITR SER: NEGATIVE TITER
HU1 AB TITR SER: NEGATIVE TITER
HU2 AB TITR SER IF: NEGATIVE TITER
HU3 AB TITR SER: NEGATIVE TITER
IMMUNOLOGIST REVIEW: NORMAL
LABORATORY COMMENT REPORT: NORMAL
NACHR AB SER-SCNC: 0 NMOL/L
PCA-1 AB TITR SER: NEGATIVE TITER
PCA-2 AB TITR SER: NEGATIVE TITER
PCA-TR AB TITR SER IF: NEGATIVE TITER
STRIA MUS AB TITR SER: NEGATIVE TITER
VGCC-N BIND AB SER-SCNC: 0 NMOL/L
VGCC-P/Q BIND AB SER-SCNC: 0 NMOL/L
VGKC AB SER-SCNC: 0 NMOL/L

## 2020-06-19 ENCOUNTER — OFFICE VISIT - HEALTHEAST (OUTPATIENT)
Dept: INTERNAL MEDICINE | Facility: CLINIC | Age: 70
End: 2020-06-19

## 2020-06-19 DIAGNOSIS — R76.12 POSITIVE QUANTIFERON-TB GOLD TEST: ICD-10-CM

## 2020-06-19 DIAGNOSIS — N18.30 CKD (CHRONIC KIDNEY DISEASE) STAGE 3, GFR 30-59 ML/MIN (H): ICD-10-CM

## 2020-06-19 DIAGNOSIS — E79.0 ELEVATED URIC ACID IN BLOOD: ICD-10-CM

## 2020-06-19 DIAGNOSIS — M89.9 LYTIC LESION OF BONE ON X-RAY: ICD-10-CM

## 2020-06-19 DIAGNOSIS — I50.42 CHRONIC COMBINED SYSTOLIC AND DIASTOLIC CONGESTIVE HEART FAILURE (H): ICD-10-CM

## 2020-06-19 DIAGNOSIS — D64.9 NORMOCYTIC ANEMIA: ICD-10-CM

## 2020-06-19 DIAGNOSIS — G89.4 PAIN SYNDROME, CHRONIC: ICD-10-CM

## 2020-06-19 LAB
PATH ICD:: NORMAL
PROT PATTERN SERPL IFE-IMP: NORMAL
REVIEWING PATHOLOGIST: NORMAL

## 2020-06-21 ENCOUNTER — COMMUNICATION - HEALTHEAST (OUTPATIENT)
Dept: SCHEDULING | Facility: CLINIC | Age: 70
End: 2020-06-21

## 2020-06-21 DIAGNOSIS — E79.0 ELEVATED URIC ACID IN BLOOD: ICD-10-CM

## 2020-06-21 DIAGNOSIS — G89.4 PAIN SYNDROME, CHRONIC: ICD-10-CM

## 2020-06-23 ENCOUNTER — AMBULATORY - HEALTHEAST (OUTPATIENT)
Dept: INFUSION THERAPY | Facility: CLINIC | Age: 70
End: 2020-06-23

## 2020-06-23 ENCOUNTER — RECORDS - HEALTHEAST (OUTPATIENT)
Dept: ADMINISTRATIVE | Facility: OTHER | Age: 70
End: 2020-06-23

## 2020-06-23 ENCOUNTER — COMMUNICATION - HEALTHEAST (OUTPATIENT)
Dept: ANTICOAGULATION | Facility: CLINIC | Age: 70
End: 2020-06-23

## 2020-06-23 ENCOUNTER — AMBULATORY - HEALTHEAST (OUTPATIENT)
Dept: LAB | Facility: CLINIC | Age: 70
End: 2020-06-23

## 2020-06-23 DIAGNOSIS — Z95.2 S/P MVR (MITRAL VALVE REPLACEMENT): ICD-10-CM

## 2020-06-23 DIAGNOSIS — Z95.4 S/P MITRAL VALVE REPLACEMENT WITH METALLIC VALVE: ICD-10-CM

## 2020-06-23 DIAGNOSIS — M89.9 LYTIC LESION OF BONE ON X-RAY: ICD-10-CM

## 2020-06-23 DIAGNOSIS — I48.0 PAROXYSMAL ATRIAL FIBRILLATION (H): ICD-10-CM

## 2020-06-23 DIAGNOSIS — D72.818 OTHER DECREASED WHITE BLOOD CELL COUNT: ICD-10-CM

## 2020-06-23 LAB
BASOPHILS # BLD AUTO: 0.1 THOU/UL (ref 0–0.2)
BASOPHILS NFR BLD AUTO: 2 % (ref 0–2)
EOSINOPHIL # BLD AUTO: 0.4 THOU/UL (ref 0–0.4)
EOSINOPHIL NFR BLD AUTO: 8 % (ref 0–6)
ERYTHROCYTE [DISTWIDTH] IN BLOOD BY AUTOMATED COUNT: 15.8 % (ref 11–14.5)
HCT VFR BLD AUTO: 29.2 % (ref 40–54)
HGB BLD-MCNC: 9.3 G/DL (ref 14–18)
INR PPP: 1.6 (ref 0.9–1.1)
LYMPHOCYTES # BLD AUTO: 0.9 THOU/UL (ref 0.8–4.4)
LYMPHOCYTES NFR BLD AUTO: 19 % (ref 20–40)
MCH RBC QN AUTO: 29.2 PG (ref 27–34)
MCHC RBC AUTO-ENTMCNC: 31.8 G/DL (ref 32–36)
MCV RBC AUTO: 92 FL (ref 80–100)
MONOCYTES # BLD AUTO: 0.8 THOU/UL (ref 0–0.9)
MONOCYTES NFR BLD AUTO: 15 % (ref 2–10)
NEUTROPHILS # BLD AUTO: 2.7 THOU/UL (ref 2–7.7)
NEUTROPHILS NFR BLD AUTO: 56 % (ref 50–70)
PLATELET # BLD AUTO: 161 THOU/UL (ref 140–440)
PMV BLD AUTO: 10.3 FL (ref 8.5–12.5)
RBC # BLD AUTO: 3.19 MILL/UL (ref 4.4–6.2)
WBC: 4.9 THOU/UL (ref 4–11)

## 2020-06-24 LAB
LAB AP CHARGES (HE HISTORICAL CONVERSION): ABNORMAL
PATH REPORT.COMMENTS IMP SPEC: ABNORMAL
PATH REPORT.FINAL DX SPEC: ABNORMAL
PATH REPORT.MICROSCOPIC SPEC OTHER STN: ABNORMAL
RESULT FLAG (HE HISTORICAL CONVERSION): ABNORMAL

## 2020-06-26 ENCOUNTER — OFFICE VISIT - HEALTHEAST (OUTPATIENT)
Dept: INTERNAL MEDICINE | Facility: CLINIC | Age: 70
End: 2020-06-26

## 2020-06-26 DIAGNOSIS — I50.42 CHRONIC COMBINED SYSTOLIC AND DIASTOLIC CONGESTIVE HEART FAILURE (H): ICD-10-CM

## 2020-06-26 DIAGNOSIS — M89.9 LYTIC LESION OF BONE ON X-RAY: ICD-10-CM

## 2020-06-26 DIAGNOSIS — E79.0 ELEVATED URIC ACID IN BLOOD: ICD-10-CM

## 2020-06-26 DIAGNOSIS — G89.4 PAIN SYNDROME, CHRONIC: ICD-10-CM

## 2020-06-26 DIAGNOSIS — R76.12 POSITIVE QUANTIFERON-TB GOLD TEST: ICD-10-CM

## 2020-06-29 ENCOUNTER — COMMUNICATION - HEALTHEAST (OUTPATIENT)
Dept: ONCOLOGY | Facility: CLINIC | Age: 70
End: 2020-06-29

## 2020-06-29 ENCOUNTER — AMBULATORY - HEALTHEAST (OUTPATIENT)
Dept: LAB | Facility: CLINIC | Age: 70
End: 2020-06-29

## 2020-06-29 DIAGNOSIS — I50.22 CHRONIC SYSTOLIC HEART FAILURE (H): ICD-10-CM

## 2020-06-30 LAB — SPECIMEN STATUS: NORMAL

## 2020-07-01 ENCOUNTER — COMMUNICATION - HEALTHEAST (OUTPATIENT)
Dept: ANTICOAGULATION | Facility: CLINIC | Age: 70
End: 2020-07-01

## 2020-07-01 DIAGNOSIS — I48.0 PAROXYSMAL ATRIAL FIBRILLATION (H): ICD-10-CM

## 2020-07-01 DIAGNOSIS — Z95.2 S/P MVR (MITRAL VALVE REPLACEMENT): ICD-10-CM

## 2020-07-01 DIAGNOSIS — Z95.4 S/P MITRAL VALVE REPLACEMENT WITH METALLIC VALVE: ICD-10-CM

## 2020-07-01 LAB — INR PPP: 3.3 (ref 0.9–1.1)

## 2020-07-06 ENCOUNTER — OFFICE VISIT - HEALTHEAST (OUTPATIENT)
Dept: ONCOLOGY | Facility: CLINIC | Age: 70
End: 2020-07-06

## 2020-07-06 ENCOUNTER — AMBULATORY - HEALTHEAST (OUTPATIENT)
Dept: INFUSION THERAPY | Facility: CLINIC | Age: 70
End: 2020-07-06

## 2020-07-06 DIAGNOSIS — R93.5 ABNORMAL CT SCAN, PELVIS: ICD-10-CM

## 2020-07-06 DIAGNOSIS — M89.9 BONE LESION: ICD-10-CM

## 2020-07-06 DIAGNOSIS — N28.9 RENAL INSUFFICIENCY: ICD-10-CM

## 2020-07-06 DIAGNOSIS — I50.22 CHRONIC SYSTOLIC HEART FAILURE (H): ICD-10-CM

## 2020-07-06 DIAGNOSIS — G89.29 CHRONIC PAIN OF LEFT UPPER EXTREMITY: ICD-10-CM

## 2020-07-06 DIAGNOSIS — R70.0 ELEVATED SED RATE: ICD-10-CM

## 2020-07-06 DIAGNOSIS — E79.0 ELEVATED URIC ACID IN BLOOD: ICD-10-CM

## 2020-07-06 DIAGNOSIS — M79.602 CHRONIC PAIN OF LEFT UPPER EXTREMITY: ICD-10-CM

## 2020-07-06 LAB
ALBUMIN SERPL-MCNC: 3.8 G/DL (ref 3.5–5)
ALT SERPL W P-5'-P-CCNC: 29 U/L (ref 0–45)
ANION GAP SERPL CALCULATED.3IONS-SCNC: 13 MMOL/L (ref 5–18)
AST SERPL W P-5'-P-CCNC: 84 U/L (ref 0–40)
BUN SERPL-MCNC: 53 MG/DL (ref 8–28)
C REACTIVE PROTEIN LHE: 0.3 MG/DL (ref 0–0.8)
CALCIUM SERPL-MCNC: 9.9 MG/DL (ref 8.5–10.5)
CHLORIDE BLD-SCNC: 97 MMOL/L (ref 98–107)
CK SERPL-CCNC: 143 U/L (ref 30–190)
CO2 SERPL-SCNC: 24 MMOL/L (ref 22–31)
CREAT SERPL-MCNC: 2.35 MG/DL (ref 0.7–1.3)
ERYTHROCYTE [SEDIMENTATION RATE] IN BLOOD BY WESTERGREN METHOD: 73 MM/HR (ref 0–15)
GFR SERPL CREATININE-BSD FRML MDRD: 27 ML/MIN/1.73M2
GLUCOSE BLD-MCNC: 104 MG/DL (ref 70–125)
POTASSIUM BLD-SCNC: 4.1 MMOL/L (ref 3.5–5)
SODIUM SERPL-SCNC: 134 MMOL/L (ref 136–145)
URATE SERPL-MCNC: 9.4 MG/DL (ref 3–8)

## 2020-07-07 ENCOUNTER — COMMUNICATION - HEALTHEAST (OUTPATIENT)
Dept: ANTICOAGULATION | Facility: CLINIC | Age: 70
End: 2020-07-07

## 2020-07-07 DIAGNOSIS — I48.0 PAROXYSMAL ATRIAL FIBRILLATION (H): ICD-10-CM

## 2020-07-07 DIAGNOSIS — Z95.4 S/P MITRAL VALVE REPLACEMENT WITH METALLIC VALVE: ICD-10-CM

## 2020-07-07 DIAGNOSIS — Z95.2 S/P MVR (MITRAL VALVE REPLACEMENT): ICD-10-CM

## 2020-07-07 LAB — INR PPP: 2.3 (ref 0.9–1.1)

## 2020-07-08 LAB
ALDOLASE SERPL-CCNC: 1.8 U/L (ref 1.5–8.1)
MYOGLOBIN SERPL-MCNC: 344 MCG/L

## 2020-07-09 ENCOUNTER — COMMUNICATION - HEALTHEAST (OUTPATIENT)
Dept: INTERNAL MEDICINE | Facility: CLINIC | Age: 70
End: 2020-07-09

## 2020-07-09 DIAGNOSIS — G89.4 PAIN SYNDROME, CHRONIC: ICD-10-CM

## 2020-07-10 ENCOUNTER — COMMUNICATION - HEALTHEAST (OUTPATIENT)
Dept: RHEUMATOLOGY | Facility: CLINIC | Age: 70
End: 2020-07-10

## 2020-07-10 ENCOUNTER — AMBULATORY - HEALTHEAST (OUTPATIENT)
Dept: ONCOLOGY | Facility: HOSPITAL | Age: 70
End: 2020-07-10

## 2020-07-10 DIAGNOSIS — M89.9 BONE LESION: ICD-10-CM

## 2020-07-10 DIAGNOSIS — N28.9 RENAL INSUFFICIENCY: ICD-10-CM

## 2020-07-10 DIAGNOSIS — R89.7 ELEVATED MYOGLOBIN LEVEL: ICD-10-CM

## 2020-07-10 DIAGNOSIS — D64.9 ANEMIA, UNSPECIFIED TYPE: ICD-10-CM

## 2020-07-10 DIAGNOSIS — E79.0 ELEVATED URIC ACID IN BLOOD: ICD-10-CM

## 2020-07-13 ENCOUNTER — COMMUNICATION - HEALTHEAST (OUTPATIENT)
Dept: RHEUMATOLOGY | Facility: CLINIC | Age: 70
End: 2020-07-13

## 2020-07-13 ENCOUNTER — COMMUNICATION - HEALTHEAST (OUTPATIENT)
Dept: ADMINISTRATIVE | Facility: CLINIC | Age: 70
End: 2020-07-13

## 2020-07-14 ENCOUNTER — COMMUNICATION - HEALTHEAST (OUTPATIENT)
Dept: ANTICOAGULATION | Facility: CLINIC | Age: 70
End: 2020-07-14

## 2020-07-14 DIAGNOSIS — Z95.4 S/P MITRAL VALVE REPLACEMENT WITH METALLIC VALVE: ICD-10-CM

## 2020-07-14 DIAGNOSIS — Z95.2 S/P MVR (MITRAL VALVE REPLACEMENT): ICD-10-CM

## 2020-07-14 DIAGNOSIS — I48.0 PAROXYSMAL ATRIAL FIBRILLATION (H): ICD-10-CM

## 2020-07-14 LAB — INR PPP: 4 (ref 0.9–1.1)

## 2020-07-18 ENCOUNTER — HOSPITAL ENCOUNTER (INPATIENT)
Facility: CLINIC | Age: 70
LOS: 5 days | Discharge: HOME | DRG: 683 | End: 2020-07-23
Attending: INTERNAL MEDICINE | Admitting: HOSPITALIST
Payer: MEDICARE

## 2020-07-18 DIAGNOSIS — K59.09 OTHER CONSTIPATION: ICD-10-CM

## 2020-07-18 DIAGNOSIS — R21 RASH: Primary | ICD-10-CM

## 2020-07-18 LAB
ANION GAP SERPL CALCULATED.3IONS-SCNC: 8 MMOL/L (ref 3–14)
BUN SERPL-MCNC: 58 MG/DL (ref 7–30)
CALCIUM SERPL-MCNC: 8.6 MG/DL (ref 8.5–10.1)
CHLORIDE SERPL-SCNC: 107 MMOL/L (ref 94–109)
CO2 SERPL-SCNC: 22 MMOL/L (ref 20–32)
CREAT SERPL-MCNC: 2.46 MG/DL (ref 0.66–1.25)
GFR SERPL CREATININE-BSD FRML MDRD: 25 ML/MIN/{1.73_M2}
GLUCOSE SERPL-MCNC: 129 MG/DL (ref 70–99)
POTASSIUM SERPL-SCNC: 4 MMOL/L (ref 3.4–5.3)
SODIUM SERPL-SCNC: 137 MMOL/L (ref 133–144)

## 2020-07-18 PROCEDURE — 12000001 ZZH R&B MED SURG/OB UMMC

## 2020-07-18 PROCEDURE — 25800030 ZZH RX IP 258 OP 636: Performed by: NURSE PRACTITIONER

## 2020-07-18 PROCEDURE — 87040 BLOOD CULTURE FOR BACTERIA: CPT | Performed by: NURSE PRACTITIONER

## 2020-07-18 PROCEDURE — 36415 COLL VENOUS BLD VENIPUNCTURE: CPT | Performed by: NURSE PRACTITIONER

## 2020-07-18 PROCEDURE — 25000132 ZZH RX MED GY IP 250 OP 250 PS 637: Mod: GY | Performed by: NURSE PRACTITIONER

## 2020-07-18 PROCEDURE — 99223 1ST HOSP IP/OBS HIGH 75: CPT | Mod: AI | Performed by: HOSPITALIST

## 2020-07-18 PROCEDURE — 80048 BASIC METABOLIC PNL TOTAL CA: CPT | Performed by: NURSE PRACTITIONER

## 2020-07-18 RX ORDER — BISACODYL 5 MG
10 TABLET, DELAYED RELEASE (ENTERIC COATED) ORAL DAILY PRN
Status: DISCONTINUED | OUTPATIENT
Start: 2020-07-18 | End: 2020-07-23 | Stop reason: HOSPADM

## 2020-07-18 RX ORDER — ONDANSETRON 2 MG/ML
4 INJECTION INTRAMUSCULAR; INTRAVENOUS EVERY 6 HOURS PRN
Status: DISCONTINUED | OUTPATIENT
Start: 2020-07-18 | End: 2020-07-23 | Stop reason: HOSPADM

## 2020-07-18 RX ORDER — PROCHLORPERAZINE MALEATE 5 MG
5 TABLET ORAL EVERY 6 HOURS PRN
Status: DISCONTINUED | OUTPATIENT
Start: 2020-07-18 | End: 2020-07-23 | Stop reason: HOSPADM

## 2020-07-18 RX ORDER — BISACODYL 5 MG
5 TABLET, DELAYED RELEASE (ENTERIC COATED) ORAL DAILY PRN
Status: DISCONTINUED | OUTPATIENT
Start: 2020-07-18 | End: 2020-07-23 | Stop reason: HOSPADM

## 2020-07-18 RX ORDER — POTASSIUM CHLORIDE 1500 MG/1
60 TABLET, EXTENDED RELEASE ORAL DAILY
Status: ON HOLD | COMMUNITY
Start: 2019-09-25 | End: 2020-07-19

## 2020-07-18 RX ORDER — TORSEMIDE 100 MG/1
50 TABLET ORAL 2 TIMES DAILY
COMMUNITY
Start: 2020-03-05 | End: 2021-07-15

## 2020-07-18 RX ORDER — NALOXONE HYDROCHLORIDE 0.4 MG/ML
.1-.4 INJECTION, SOLUTION INTRAMUSCULAR; INTRAVENOUS; SUBCUTANEOUS
Status: DISCONTINUED | OUTPATIENT
Start: 2020-07-18 | End: 2020-07-23 | Stop reason: HOSPADM

## 2020-07-18 RX ORDER — POLYETHYLENE GLYCOL 3350 17 G/17G
17 POWDER, FOR SOLUTION ORAL DAILY PRN
Status: DISCONTINUED | OUTPATIENT
Start: 2020-07-18 | End: 2020-07-23 | Stop reason: HOSPADM

## 2020-07-18 RX ORDER — PROCHLORPERAZINE 25 MG
12.5 SUPPOSITORY, RECTAL RECTAL EVERY 12 HOURS PRN
Status: DISCONTINUED | OUTPATIENT
Start: 2020-07-18 | End: 2020-07-23 | Stop reason: HOSPADM

## 2020-07-18 RX ORDER — ONDANSETRON 4 MG/1
4 TABLET, ORALLY DISINTEGRATING ORAL EVERY 6 HOURS PRN
Status: DISCONTINUED | OUTPATIENT
Start: 2020-07-18 | End: 2020-07-23 | Stop reason: HOSPADM

## 2020-07-18 RX ORDER — PENICILLIN V POTASSIUM 500 MG/1
500 TABLET, FILM COATED ORAL DAILY
COMMUNITY
Start: 2019-05-06 | End: 2022-09-08

## 2020-07-18 RX ORDER — FERROUS SULFATE 325(65) MG
325 TABLET ORAL
COMMUNITY
Start: 2019-02-20 | End: 2021-07-15

## 2020-07-18 RX ORDER — MAGNESIUM CARB/ALUMINUM HYDROX 105-160MG
148 TABLET,CHEWABLE ORAL
Status: DISCONTINUED | OUTPATIENT
Start: 2020-07-18 | End: 2020-07-23 | Stop reason: HOSPADM

## 2020-07-18 RX ORDER — LIDOCAINE 40 MG/G
CREAM TOPICAL
Status: DISCONTINUED | OUTPATIENT
Start: 2020-07-18 | End: 2020-07-23 | Stop reason: HOSPADM

## 2020-07-18 RX ORDER — GABAPENTIN 300 MG/1
600 CAPSULE ORAL
COMMUNITY
Start: 2020-02-10 | End: 2021-07-15

## 2020-07-18 RX ORDER — TORSEMIDE 100 MG
50 TABLET ORAL DAILY
Status: DISCONTINUED | OUTPATIENT
Start: 2020-07-19 | End: 2020-07-19

## 2020-07-18 RX ORDER — GABAPENTIN 300 MG/1
600 CAPSULE ORAL 2 TIMES DAILY
Status: DISCONTINUED | OUTPATIENT
Start: 2020-07-18 | End: 2020-07-23 | Stop reason: HOSPADM

## 2020-07-18 RX ORDER — ACETAMINOPHEN 325 MG/1
650 TABLET ORAL EVERY 4 HOURS PRN
Status: DISCONTINUED | OUTPATIENT
Start: 2020-07-18 | End: 2020-07-23 | Stop reason: HOSPADM

## 2020-07-18 RX ORDER — WARFARIN SODIUM 3 MG/1
4.5-6 TABLET ORAL
Status: ON HOLD | COMMUNITY
Start: 2018-12-10 | End: 2020-07-23

## 2020-07-18 RX ORDER — OXYCODONE HYDROCHLORIDE 5 MG/1
10-15 TABLET ORAL EVERY 4 HOURS PRN
Status: DISCONTINUED | OUTPATIENT
Start: 2020-07-18 | End: 2020-07-23 | Stop reason: HOSPADM

## 2020-07-18 RX ORDER — BISACODYL 5 MG
15 TABLET, DELAYED RELEASE (ENTERIC COATED) ORAL DAILY PRN
Status: DISCONTINUED | OUTPATIENT
Start: 2020-07-18 | End: 2020-07-23 | Stop reason: HOSPADM

## 2020-07-18 RX ORDER — PENICILLIN V POTASSIUM 500 MG/1
500 TABLET, FILM COATED ORAL DAILY
Status: DISCONTINUED | OUTPATIENT
Start: 2020-07-19 | End: 2020-07-23 | Stop reason: HOSPADM

## 2020-07-18 RX ADMIN — SODIUM CHLORIDE 500 ML: 9 INJECTION, SOLUTION INTRAVENOUS at 22:49

## 2020-07-18 RX ADMIN — GABAPENTIN 600 MG: 300 CAPSULE ORAL at 22:42

## 2020-07-18 RX ADMIN — OXYCODONE HYDROCHLORIDE 10 MG: 5 TABLET ORAL at 22:42

## 2020-07-18 SDOH — HEALTH STABILITY: MENTAL HEALTH: HOW OFTEN DO YOU HAVE A DRINK CONTAINING ALCOHOL?: 2-4 TIMES A MONTH

## 2020-07-18 ASSESSMENT — ACTIVITIES OF DAILY LIVING (ADL)
SWALLOWING: 0-->SWALLOWS FOODS/LIQUIDS WITHOUT DIFFICULTY
COGNITION: 0 - NO COGNITION ISSUES REPORTED
FALL_HISTORY_WITHIN_LAST_SIX_MONTHS: NO
RETIRED_EATING: 0-->INDEPENDENT
BATHING: 0-->INDEPENDENT
RETIRED_COMMUNICATION: 0-->UNDERSTANDS/COMMUNICATES WITHOUT DIFFICULTY
AMBULATION: 0-->INDEPENDENT
TOILETING: 0-->INDEPENDENT
TRANSFERRING: 0-->INDEPENDENT
DRESS: 0-->INDEPENDENT

## 2020-07-18 ASSESSMENT — PAIN DESCRIPTION - DESCRIPTORS: DESCRIPTORS: BURNING

## 2020-07-18 ASSESSMENT — MIFFLIN-ST. JEOR: SCORE: 1509.69

## 2020-07-19 ENCOUNTER — APPOINTMENT (OUTPATIENT)
Dept: GENERAL RADIOLOGY | Facility: CLINIC | Age: 70
DRG: 683 | End: 2020-07-19
Attending: PHYSICIAN ASSISTANT
Payer: MEDICARE

## 2020-07-19 LAB
ALBUMIN SERPL-MCNC: 2.5 G/DL (ref 3.4–5)
ALBUMIN UR-MCNC: NEGATIVE MG/DL
ALP SERPL-CCNC: 53 U/L (ref 40–150)
ALT SERPL W P-5'-P-CCNC: 25 U/L (ref 0–70)
ANION GAP SERPL CALCULATED.3IONS-SCNC: 6 MMOL/L (ref 3–14)
APPEARANCE UR: CLEAR
AST SERPL W P-5'-P-CCNC: 34 U/L (ref 0–45)
BASOPHILS # BLD AUTO: 0 10E9/L (ref 0–0.2)
BASOPHILS # BLD AUTO: 0 10E9/L (ref 0–0.2)
BASOPHILS NFR BLD AUTO: 0.7 %
BASOPHILS NFR BLD AUTO: 0.7 %
BILIRUB SERPL-MCNC: 0.5 MG/DL (ref 0.2–1.3)
BILIRUB UR QL STRIP: NEGATIVE
BUN SERPL-MCNC: 58 MG/DL (ref 7–30)
CALCIUM SERPL-MCNC: 8.4 MG/DL (ref 8.5–10.1)
CHLORIDE SERPL-SCNC: 108 MMOL/L (ref 94–109)
CO2 SERPL-SCNC: 23 MMOL/L (ref 20–32)
COLOR UR AUTO: ABNORMAL
CREAT SERPL-MCNC: 2.4 MG/DL (ref 0.66–1.25)
CRP SERPL-MCNC: 59 MG/L (ref 0–8)
DIFFERENTIAL METHOD BLD: ABNORMAL
DIFFERENTIAL METHOD BLD: ABNORMAL
EOSINOPHIL # BLD AUTO: 0.3 10E9/L (ref 0–0.7)
EOSINOPHIL # BLD AUTO: 0.3 10E9/L (ref 0–0.7)
EOSINOPHIL NFR BLD AUTO: 4.3 %
EOSINOPHIL NFR BLD AUTO: 4.5 %
ERYTHROCYTE [DISTWIDTH] IN BLOOD BY AUTOMATED COUNT: 17.6 % (ref 10–15)
ERYTHROCYTE [DISTWIDTH] IN BLOOD BY AUTOMATED COUNT: 17.9 % (ref 10–15)
ERYTHROCYTE [SEDIMENTATION RATE] IN BLOOD BY WESTERGREN METHOD: 55 MM/H (ref 0–20)
GFR SERPL CREATININE-BSD FRML MDRD: 26 ML/MIN/{1.73_M2}
GLUCOSE SERPL-MCNC: 114 MG/DL (ref 70–99)
GLUCOSE UR STRIP-MCNC: NEGATIVE MG/DL
HCT VFR BLD AUTO: 26.7 % (ref 40–53)
HCT VFR BLD AUTO: 27.6 % (ref 40–53)
HGB BLD-MCNC: 8.3 G/DL (ref 13.3–17.7)
HGB BLD-MCNC: 8.6 G/DL (ref 13.3–17.7)
HGB UR QL STRIP: NEGATIVE
IMM GRANULOCYTES # BLD: 0 10E9/L (ref 0–0.4)
IMM GRANULOCYTES # BLD: 0 10E9/L (ref 0–0.4)
IMM GRANULOCYTES NFR BLD: 0.2 %
IMM GRANULOCYTES NFR BLD: 0.4 %
INR PPP: 3.12 (ref 0.86–1.14)
KETONES UR STRIP-MCNC: NEGATIVE MG/DL
LEUKOCYTE ESTERASE UR QL STRIP: NEGATIVE
LYMPHOCYTES # BLD AUTO: 0.7 10E9/L (ref 0.8–5.3)
LYMPHOCYTES # BLD AUTO: 0.7 10E9/L (ref 0.8–5.3)
LYMPHOCYTES NFR BLD AUTO: 11.6 %
LYMPHOCYTES NFR BLD AUTO: 12.1 %
MCH RBC QN AUTO: 28.4 PG (ref 26.5–33)
MCH RBC QN AUTO: 28.6 PG (ref 26.5–33)
MCHC RBC AUTO-ENTMCNC: 31.1 G/DL (ref 31.5–36.5)
MCHC RBC AUTO-ENTMCNC: 31.2 G/DL (ref 31.5–36.5)
MCV RBC AUTO: 91 FL (ref 78–100)
MCV RBC AUTO: 92 FL (ref 78–100)
MONOCYTES # BLD AUTO: 0.8 10E9/L (ref 0–1.3)
MONOCYTES # BLD AUTO: 0.8 10E9/L (ref 0–1.3)
MONOCYTES NFR BLD AUTO: 13.6 %
MONOCYTES NFR BLD AUTO: 13.7 %
MUCOUS THREADS #/AREA URNS LPF: PRESENT /LPF
NEUTROPHILS # BLD AUTO: 3.9 10E9/L (ref 1.6–8.3)
NEUTROPHILS # BLD AUTO: 4 10E9/L (ref 1.6–8.3)
NEUTROPHILS NFR BLD AUTO: 69 %
NEUTROPHILS NFR BLD AUTO: 69.2 %
NITRATE UR QL: NEGATIVE
NRBC # BLD AUTO: 0 10*3/UL
NRBC BLD AUTO-RTO: 0 /100
NT-PROBNP SERPL-MCNC: 3687 PG/ML (ref 0–900)
PH UR STRIP: 5 PH (ref 5–7)
PLATELET # BLD AUTO: 146 10E9/L (ref 150–450)
PLATELET # BLD AUTO: 146 10E9/L (ref 150–450)
POTASSIUM SERPL-SCNC: 4 MMOL/L (ref 3.4–5.3)
PROCALCITONIN SERPL-MCNC: 1.34 NG/ML
PROT SERPL-MCNC: 5.7 G/DL (ref 6.8–8.8)
RBC # BLD AUTO: 2.9 10E12/L (ref 4.4–5.9)
RBC # BLD AUTO: 3.03 10E12/L (ref 4.4–5.9)
RBC #/AREA URNS AUTO: 0 /HPF (ref 0–2)
RETICS # AUTO: 58.3 10E9/L (ref 25–95)
RETICS/RBC NFR AUTO: 1.9 % (ref 0.5–2)
SODIUM SERPL-SCNC: 137 MMOL/L (ref 133–144)
SOURCE: ABNORMAL
SP GR UR STRIP: 1 (ref 1–1.03)
TSH SERPL DL<=0.005 MIU/L-ACNC: 2.43 MU/L (ref 0.4–4)
URATE SERPL-MCNC: 8 MG/DL (ref 3.5–7.2)
UROBILINOGEN UR STRIP-MCNC: NORMAL MG/DL (ref 0–2)
WBC # BLD AUTO: 5.3 10E9/L (ref 4–11)
WBC # BLD AUTO: 5.9 10E9/L (ref 4–11)
WBC #/AREA URNS AUTO: 0 /HPF (ref 0–5)

## 2020-07-19 PROCEDURE — 99207 ZZC APP CREDIT; MD BILLING SHARED VISIT: CPT | Performed by: PHYSICIAN ASSISTANT

## 2020-07-19 PROCEDURE — 86140 C-REACTIVE PROTEIN: CPT | Performed by: NURSE PRACTITIONER

## 2020-07-19 PROCEDURE — 25000132 ZZH RX MED GY IP 250 OP 250 PS 637: Performed by: INTERNAL MEDICINE

## 2020-07-19 PROCEDURE — 84550 ASSAY OF BLOOD/URIC ACID: CPT | Performed by: NURSE PRACTITIONER

## 2020-07-19 PROCEDURE — 85045 AUTOMATED RETICULOCYTE COUNT: CPT | Performed by: PHYSICIAN ASSISTANT

## 2020-07-19 PROCEDURE — 25000128 H RX IP 250 OP 636: Performed by: INTERNAL MEDICINE

## 2020-07-19 PROCEDURE — 85027 COMPLETE CBC AUTOMATED: CPT | Performed by: NURSE PRACTITIONER

## 2020-07-19 PROCEDURE — 12000001 ZZH R&B MED SURG/OB UMMC

## 2020-07-19 PROCEDURE — 25000132 ZZH RX MED GY IP 250 OP 250 PS 637: Performed by: NURSE PRACTITIONER

## 2020-07-19 PROCEDURE — 88305 TISSUE EXAM BY PATHOLOGIST: CPT | Mod: TC | Performed by: DERMATOLOGY

## 2020-07-19 PROCEDURE — 81001 URINALYSIS AUTO W/SCOPE: CPT | Performed by: NURSE PRACTITIONER

## 2020-07-19 PROCEDURE — 85004 AUTOMATED DIFF WBC COUNT: CPT | Performed by: NURSE PRACTITIONER

## 2020-07-19 PROCEDURE — 84443 ASSAY THYROID STIM HORMONE: CPT | Performed by: NURSE PRACTITIONER

## 2020-07-19 PROCEDURE — 99233 SBSQ HOSP IP/OBS HIGH 50: CPT | Performed by: INTERNAL MEDICINE

## 2020-07-19 PROCEDURE — 85652 RBC SED RATE AUTOMATED: CPT | Performed by: NURSE PRACTITIONER

## 2020-07-19 PROCEDURE — 83880 ASSAY OF NATRIURETIC PEPTIDE: CPT | Performed by: NURSE PRACTITIONER

## 2020-07-19 PROCEDURE — 25000125 ZZHC RX 250: Performed by: NURSE PRACTITIONER

## 2020-07-19 PROCEDURE — 25000132 ZZH RX MED GY IP 250 OP 250 PS 637: Performed by: PHYSICIAN ASSISTANT

## 2020-07-19 PROCEDURE — 84145 PROCALCITONIN (PCT): CPT | Performed by: NURSE PRACTITIONER

## 2020-07-19 PROCEDURE — 40000611 ZZHCL STATISTIC MORPHOLOGY W/INTERP HEMEPATH TC 85060: Performed by: PHYSICIAN ASSISTANT

## 2020-07-19 PROCEDURE — 36415 COLL VENOUS BLD VENIPUNCTURE: CPT | Performed by: PHYSICIAN ASSISTANT

## 2020-07-19 PROCEDURE — 36415 COLL VENOUS BLD VENIPUNCTURE: CPT | Performed by: NURSE PRACTITIONER

## 2020-07-19 PROCEDURE — 0HBHXZX EXCISION OF RIGHT UPPER LEG SKIN, EXTERNAL APPROACH, DIAGNOSTIC: ICD-10-PCS | Performed by: DERMATOLOGY

## 2020-07-19 PROCEDURE — 80053 COMPREHEN METABOLIC PANEL: CPT | Performed by: NURSE PRACTITIONER

## 2020-07-19 PROCEDURE — 71045 X-RAY EXAM CHEST 1 VIEW: CPT

## 2020-07-19 PROCEDURE — 85025 COMPLETE CBC W/AUTO DIFF WBC: CPT | Performed by: PHYSICIAN ASSISTANT

## 2020-07-19 PROCEDURE — 85610 PROTHROMBIN TIME: CPT | Performed by: NURSE PRACTITIONER

## 2020-07-19 RX ORDER — WARFARIN SODIUM 6 MG/1
6 TABLET ORAL
Status: COMPLETED | OUTPATIENT
Start: 2020-07-19 | End: 2020-07-19

## 2020-07-19 RX ORDER — TRIAMCINOLONE ACETONIDE 1 MG/G
OINTMENT TOPICAL 2 TIMES DAILY
Status: DISCONTINUED | OUTPATIENT
Start: 2020-07-19 | End: 2020-07-23 | Stop reason: HOSPADM

## 2020-07-19 RX ORDER — HYDROXYZINE HYDROCHLORIDE 25 MG/1
25 TABLET, FILM COATED ORAL 3 TIMES DAILY PRN
Status: DISCONTINUED | OUTPATIENT
Start: 2020-07-19 | End: 2020-07-23 | Stop reason: HOSPADM

## 2020-07-19 RX ORDER — FOLIC ACID 1 MG/1
1 TABLET ORAL DAILY
COMMUNITY
End: 2023-04-24

## 2020-07-19 RX ORDER — SENNOSIDES 8.6 MG
1-4 TABLET ORAL 2 TIMES DAILY
COMMUNITY
Start: 2020-06-05

## 2020-07-19 RX ORDER — OXYCODONE HYDROCHLORIDE 10 MG/1
5-10 TABLET ORAL EVERY 4 HOURS PRN
COMMUNITY
Start: 2020-07-13 | End: 2021-07-23

## 2020-07-19 RX ORDER — POTASSIUM CHLORIDE 1.5 G/1.58G
40 POWDER, FOR SOLUTION ORAL 2 TIMES DAILY
COMMUNITY
Start: 2022-09-30 | End: 2024-01-01

## 2020-07-19 RX ORDER — LIDOCAINE HYDROCHLORIDE AND EPINEPHRINE 10; 10 MG/ML; UG/ML
1 INJECTION, SOLUTION INFILTRATION; PERINEURAL ONCE
Status: DISCONTINUED | OUTPATIENT
Start: 2020-07-19 | End: 2020-07-23 | Stop reason: HOSPADM

## 2020-07-19 RX ORDER — PIPERACILLIN SODIUM, TAZOBACTAM SODIUM 3; .375 G/15ML; G/15ML
3.38 INJECTION, POWDER, LYOPHILIZED, FOR SOLUTION INTRAVENOUS EVERY 6 HOURS
Status: DISCONTINUED | OUTPATIENT
Start: 2020-07-19 | End: 2020-07-21

## 2020-07-19 RX ORDER — TRIAMCINOLONE ACETONIDE 1 MG/G
CREAM TOPICAL 2 TIMES DAILY
Status: DISCONTINUED | OUTPATIENT
Start: 2020-07-19 | End: 2020-07-19

## 2020-07-19 RX ADMIN — PIPERACILLIN AND TAZOBACTAM 3.38 G: 3; .375 INJECTION, POWDER, FOR SOLUTION INTRAVENOUS at 23:57

## 2020-07-19 RX ADMIN — WARFARIN SODIUM 6 MG: 6 TABLET ORAL at 17:55

## 2020-07-19 RX ADMIN — LIDOCAINE HYDROCHLORIDE 0.5 ML: 10 INJECTION, SOLUTION EPIDURAL; INFILTRATION; INTRACAUDAL; PERINEURAL at 15:02

## 2020-07-19 RX ADMIN — GABAPENTIN 600 MG: 300 CAPSULE ORAL at 20:46

## 2020-07-19 RX ADMIN — OXYCODONE HYDROCHLORIDE 10 MG: 5 TABLET ORAL at 22:19

## 2020-07-19 RX ADMIN — BISACODYL 5 MG: 5 TABLET, COATED ORAL at 22:20

## 2020-07-19 RX ADMIN — GABAPENTIN 600 MG: 300 CAPSULE ORAL at 08:21

## 2020-07-19 RX ADMIN — HYDROXYZINE HYDROCHLORIDE 25 MG: 25 TABLET ORAL at 15:43

## 2020-07-19 RX ADMIN — OXYCODONE HYDROCHLORIDE 10 MG: 5 TABLET ORAL at 04:37

## 2020-07-19 RX ADMIN — OXYCODONE HYDROCHLORIDE 10 MG: 5 TABLET ORAL at 15:43

## 2020-07-19 RX ADMIN — HYDROXYZINE HYDROCHLORIDE 25 MG: 25 TABLET ORAL at 10:21

## 2020-07-19 RX ADMIN — PENICILLIN V POTASIUM 500 MG: 500 TABLET OROPHARYNGEAL at 08:21

## 2020-07-19 ASSESSMENT — PAIN DESCRIPTION - DESCRIPTORS
DESCRIPTORS: BURNING

## 2020-07-19 ASSESSMENT — ACTIVITIES OF DAILY LIVING (ADL)
ADLS_ACUITY_SCORE: 13
ADLS_ACUITY_SCORE: 13
ADLS_ACUITY_SCORE: 12
ADLS_ACUITY_SCORE: 13
ADLS_ACUITY_SCORE: 12
ADLS_ACUITY_SCORE: 13

## 2020-07-19 ASSESSMENT — MIFFLIN-ST. JEOR: SCORE: 1511.5

## 2020-07-19 NOTE — H&P
[facility], [place]    History and Physical - Hospitalist Service, Gold Night       Date of Admission:  7/18/2020    Assessment & Plan   Bandar Wells is a 69 year old man admitted on 7/18/2020. He has a history of mechanical mitral valve, CKD, systolic and diastolic heart failure, paroxysmal atrial fibrillation s/p Maze procedure and hyperlipidemia and is admitted with a rash, worsening renal disease and hyperkalemia.    1) Rash - Ongoing for close to two weeks now.  The rash is painful, splotchy and originated on his thighs before moving to cover all of his body involving the palms of his hands as well as a few oral lesions.  He was started on allopurinol and colchicine in June, both of which were stopped a week ago.  Differential is broad, but SJS a concern, although I do not note any blisters or skin sloughing.  - Consult dermatology  - Continue oxycodone PRN pain  - Started on cetirizine, clobetasol and hydroxyzine, will hold given lack of improvement    2) History of mechanical mitral valve - Secondary to endocarditis  - Continue prophylactic penicillin  - Continue coumadin, pharmacy to dose    3) Hyperkalemia - Patient takes potassium supplements with his diuretics and has developed worsening renal failure over the past month.  Suspect hyperkalemia in the outside ED was due to not adjusting his potassium supplements as his kidneys fail.  - Hold potassium supplements.  BMP now and in the am.    4) CKD - Cr appears to have been steadily worsening over the past month.  Started on treatment for elevated uric acid levels but this was stopped in the setting of his new rash.  - Check UA, uric acid level, consider renal consult    5) Combined systolic and diastolic heart failure  - Continue torsemide  - Hold potassium supplementation       Diet: Regular  DVT Prophylaxis: Warfarin  Chappell Catheter: not present  Code Status: Full         Disposition Plan   Expected discharge: 4 - 7 days,  recommended to prior living arrangement once diagnosis established.  Entered: STIVEN Green CNP 07/18/2020, 9:09 PM     The patient's care was discussed with the Attending Physician, Dr. Dye.    STIVEN Green CNP  [facility], [place]  Pager: [phone]  Please see sticky note for cross cover information  ______________________________________________________________________    Chief Complaint   Rash    History is obtained from the patient    History of Present Illness   Bandar Wells is a 69 year old man admitted on 7/18/2020. He has a history of mechanical mitral valve, CKD, systolic and diastolic heart failure, paroxysmal atrial fibrillation s/p Maze procedure and hyperlipidemia and is admitted with a rash, worsening renal disease and hyperkalemia.    The patient reports he has had a rash that is been bothering him for close to 2 weeks now.  This began in his inner thighs and was blotchy, red, itchy and painful.  This then spread to his trunk and arms.  He has not noted any oral lesions himself but has had pain when drinking orange juice which is unusual for him.  He has been managing his pain with oxycodone.    He was seen by dermatology in clinic and started on clobetasol, hydroxyzine and sertraline, all without effect.    Last month he was started on colchicine and allopurinol due to elevated uric acid levels.  These medications were stopped 1 week ago after the rash developed.    Review of Systems    The 10 point Review of Systems is negative other than noted in the HPI or here.     Past Medical History    I have reviewed this patient's medical history and updated it with pertinent information if needed.   Past Medical History:   Diagnosis Date     Atrial fibrillation (H)     s/p Maze procedure     Chronic combined systolic and diastolic heart failure (H)      CKD (chronic kidney disease)      H/O mitral valve replacement with mechanical valve       Hyperlipidemia        Past Surgical History   I have reviewed this patient's surgical history and updated it with pertinent information if needed.  Past Surgical History:   Procedure Laterality Date     Mechanical mitral valve         Social History   I have reviewed this patient's social history and updated it with pertinent information if needed.    Drinks 1-2 / week    Family History   I have reviewed this patient's family history and updated it with pertinent information if needed.  Family History   Problem Relation Age of Onset     Heart Failure Mother        Prior to Admission Medications   Prior to Admission Medications   Prescriptions Last Dose Informant Patient Reported? Taking?   ferrous sulfate (FEROSUL) 325 (65 Fe) MG tablet   Yes Yes   Sig: Take 325 mg by mouth   gabapentin (NEURONTIN) 300 MG capsule   Yes Yes   Sig: Take 600 mg by mouth   penicillin V (VEETID) 500 MG tablet   Yes Yes   Sig: Take 500 mg by mouth daily Hx endocarditis   potassium chloride ER (KLOR-CON M) 20 MEQ CR tablet   Yes Yes   Sig: Take 60 mEq by mouth daily   torsemide (DEMADEX) 100 MG tablet   Yes Yes   Sig: Take 50 mg by mouth 2 times daily   warfarin ANTICOAGULANT (COUMADIN) 3 MG tablet   Yes Yes   Sig: Take 4.5-6 mg by mouth      Facility-Administered Medications: None     Allergies   No Known Allergies    Physical Exam   Vital Signs: Temp: 100.4  F (38  C) Temp src: Oral BP: 118/47 Pulse: 80   Resp: 20 SpO2: 100 % O2 Device: None (Room air)    Weight: 165 lbs 0 oz    Physical Exam   Constitutional:   Well nourished, well developed, resting comfortably   Head: Normocephalic and atraumatic.   Eyes: Conjunctivae are normal. Pupils are equal, round, and reactive to light.   Neck:   No adenopathy, no bony tenderness  Cardiovascular: Regular rate and rhythm without murmurs or gallops  Pulmonary/Chest: Clear to auscultation bilaterally, with no wheezes or retractions. No respiratory distress.  GI: Soft with good bowel sounds.   Non-tender, non-distended, with no guarding, no rebound, no peritoneal signs.   Back:  No bony or CVA tenderness   Musculoskeletal:  No edema or clubbing   Skin: Diffuse blotchy rash extending over all extremities, trunk, face, palms of hands and with two small oral lesions.  Neurological: Alert and oriented to person, place, and time. Nonfocal exam  Psychiatric:  Normal mood and affect.      Data   Data reviewed today: I reviewed all medications, new labs and imaging results over the last 24 hours. I personally reviewed his outside labs and records.

## 2020-07-19 NOTE — PROVIDER NOTIFICATION
Attempted to text page and phone page med gold team numerous times with no return call about patient's low BP's. Will continue to try.

## 2020-07-19 NOTE — PLAN OF CARE
AVSS, pain from rash better today. Tolerating regular diet and activity, up to bathroom. Voided x 2 small amounts, adequate fluid intake. Seen by team, son present. When further general work up complete likely return to home in 1-2 days.

## 2020-07-19 NOTE — PROGRESS NOTES
[facility], [place] Progress Note - Hospitalist Service, Gold 4       Date of Admission:  7/18/2020  Assessment & Plan   Bandar Wells is a 69 year old man with PMHx of complicated cardiac disease with systolic and diastolic CHF, mechanical MVR on coumadin, hx of endocarditis, pAfib s/p maze procedure, HLD, and CKD stage III, who recently started allopurinol for hyperuricemia who developed rash, acute kidney injury and hyperkalemia presented to OSH transferred for dermatology evaluation admitted on 7/18/2020.      # Rash - Ongoing diffuse skin rash for last 7-10 day prior to admission, over neck, back, chest, arms, and legs. The rash is painful, splotchy and originated on his thighs before moving to cover all of his body involving the palms of his hands as well as a few oral lesions. Recently started allopurinol and colchicine 6/2020, which was stopped a week ago. Seen by dermatology in outpatient and prescribed cetrizine, clobetasol and hydroxyzine. Differential is broad including drug rash, SJS, TENS. Unlikely DRESS as patient without any eosinophilia on differential. Febrile to 100.4F  - Consulted dermatology, for biopsy. Awaiting recommendations  - Continue oxycodone PRN pain  - Continue atarax PRN itching.   - triamcinolone ointment 0.1% BID, and cover with saran wrap per Derm   - F/u blood cultures  - Obtain blood smear   - Trend CBC with diff and CMP daily     # SIRS inflammatory response - Spiking fever to 100.4F on admission. Hypotension responding to IVF. CRP 59 and SED rate 55 elevated. Procal elevated at 1.3. No leukocytosis. CXR with atelectasis. UA clean. LFTs WNL. Hx of bacteremia on ppx penicillin. Blood cultures obtained on admission. Possibly due to drug reaction, but low threshold to start abx.   -Follow up Blood cultures   - Monitor  - IF patient become hypotensive, repeat cultures and obtain lactic acid and start broad spectrum abx     # Acute on  chronic kidney injury  # CKD stage III- Baseline Cr 1.87, and appears to have been steadily worsening over the past month. Creatinine elevated to 3.32 at OSH. Given IVF and sodium bicarb. Creatinine on admission down to 2.4. UA bland. Likely due to dehydration with poor PO intake and continued diuretic use.   - Trend CBC  - Hold diuretics today  - Avoid nephrotoxic agent, renally dose meds  - IF creatinine worsens will consult nephrology     # Hyperkalemia, resolved  - Patient takes potassium supplements with his diuretics and has developed worsening renal failure over the past month. Given IVF and insulin/D50. Repeat levels resolved.     # Thrombocytopenia   Slightly low at 147. Rash does not appear to be purpura/petechia. No si/sx of bleeding  - Trend    Chronic and resolved issues:  # History of mechanical mitral valve - Secondary to endocarditis  - Continue prophylactic penicillin  - Continue coumadin, pharmacy to dose    # Combined systolic and diastolic heart failure  Dry weight near 160lbs. TTE 2/2020 with LVEF 55-60% with R ventricular systolic dysfunction. Followed by Dr. Valencia and Arlene Bustillos DNP at [facility]. PTA medications include torsemide 50 mg BID and metolazone 2.5 mg as needed for weight gain and KCl 20 mEq daily. BNP at OSH WNL on 7/18. NT-proBNP elevated 3687. No prior level for comparison.   - Hold torsemide today with soft BP and likely dehydration, consider resuming tomorrow  - Hold KCl supplementation   - Daily weights, I&O, low salt diet     # Chronic pain syndrome  Takes oxycodone 5-10 mg Q4H PRN. Following and getting worked up by rheumatology and hematology for possible inflammatory myopathy, hyperuricemia, and abnormal bone lesions possibly contributing to pain.  -Continue oxycodone PRN     # Anemia of chronic disease  Hbg baseline 9.0-10.0s. Hemoglobin slightly lower than baseline.   -Trend CBC    # Abnormal bone lesion  Underwent bone marrow biopsy on June 6th for abnormal bone  lesions, which reveled hypercellularity but otherwise unremarkable with no evidence of myeloma or malignancy. Plans to follow up with outpatient hematology in 6 months.         Diet: Combination Diet Regular Diet Adult    DVT Prophylaxis: Warfarin  Chappell Catheter: not present  Code Status: Full Code           Disposition Plan   Expected discharge: 2 - 3 days, recommended to prior living arrangement once adequate pain management/ tolerating PO medications.  Entered: Lisa Vick PA-C 07/19/2020, 7:32 AM       The patient's care was discussed with the Attending Physician, Dr. Ajay Grover, Bedside Nurse, Patient and Patient's Family.    Lisa Vick PA-C  Hospitalist Service, [address], [place]  Pager: [phone]  Please see sticky note for cross cover information  ______________________________________________________________________    Interval History   Overnight events:   Patient spiked fever to 100.4F and hypotensive which improved with 500 ml bolus.     Subjective:   Patient states he developed rash approximately 10 days ago, which started from knee to thighs bilaterally and slow progressed over entire legs, abdomen, chest, back, neck and UE. States its painful and itchy and hot to touch. Denies any blistering. Denies any oral lesions, but states that he's noticed burning with drinking acidic things like orange juice. Denies any dry/itchy eyes or dysuria. Patient had seen outpatient Dermatologist Aletha Pennington MD with [facility] dermatology who prescribed clobetasol, cetrizine, and atarax. Developed fevers to 103F on Friday and worsening redness. Family saw patient on Friday, where he was very weak and not walking around much or eating/drinking, so they brought him in for evaluation. Denies any recent travel.     Currently patient and son feels the redness has lightened slightly since admission. States rash is still itchy and painful to 8/10 which improved with  oxycodone.  Still feeling fatigued. Patient denies any dyspnea, but patient's son noticed he was more DUMONT prior to coming in. Denies any CP, orthopnea, LE edema, N/V/D, abdominal pain, hematuria, or urinary frequency.     Data reviewed today: I reviewed all medications, new labs and imaging results over the last 24 hours.     Physical Exam   Vital Signs: Temp: 98.8  F (37.1  C) Temp src: Oral BP: 113/42 Pulse: 61 Heart Rate: 60 Resp: 19 SpO2: 92 % O2 Device: None (Room air)    Weight: 165 lbs 0 oz   GENERAL: Alert and oriented x 3. NAD. Pleasant and conversational.   HEENT: Anicteric sclera. EOMI. Mucous membranes moist. No visible oral lesions.   NECK: Trachea midline. No JVD appreciated.   CARDIOVASCULAR: RRR. S1, S2. No murmurs, rubs, or gallops.   RESPIRATORY: Effort normal on RA. Clear to auscultation bilaterally, no rales, rhonchi or wheezes, no use of accessory muscles, no retractions, respirations unlabored   GI: Abdomen soft, non-tender abdomen without rebound or guarding, no hepatosplenomegaly, no palpable masses, normoactive bowel sounds present  MUSCULOSKELETAL: Trace pre-tibial edema. No calf asymmetry, erythema, or tenderness.   NEUROLOGICAL: No focal deficits. CN II-XII grossly intact. Moving all extremities symmetrically.   SKIN: Intact. Warm and dry. No Jaundice. Multiple erythematous papule and macules that coalescing into large patches over trunk, neck, face and extremities. Non-blanchable. No vesicles or bullae or skin sloughing. Warm to touch. Please see photos from today on media tab.     Data   Recent Labs   Lab 07/19/20  0606 07/18/20  2234   WBC 5.3  --    HGB 8.3*  --    MCV 92  --    *  --    INR 3.12*  --     137   POTASSIUM 4.0 4.0   CHLORIDE 108 107   CO2 23 22   BUN 58* 58*   CR 2.40* 2.46*   ANIONGAP 6 8   DEVYN 8.4* 8.6   * 129*   ALBUMIN 2.5*  --    PROTTOTAL 5.7*  --    BILITOTAL 0.5  --    ALKPHOS 53  --    ALT 25  --    AST 34  --      No results found for this  or any previous visit (from the past 24 hour(s)).  Medications     - MEDICATION INSTRUCTIONS -       Warfarin Therapy Reminder         gabapentin  600 mg Oral BID     penicillin V  500 mg Oral Daily     sodium chloride (PF)  3 mL Intracatheter Q8H     triamcinolone   Topical BID     warfarin ANTICOAGULANT  6 mg Oral ONCE at 18:00

## 2020-07-19 NOTE — CONSULTS
[facility] Inpatient Consult Dermatology Note    Impression/Plan:  1. Erythyroderma, BSA ~80%  Given the recent addition of allopurinol and colchicine ~4 weeks prior to onset of rash, a drug-eruption is favored. This would likely be in the category of simple morbilliform eruption vs DIHS/DRESS (favored over simple drug reaction due to systemic symptoms, ie, fevers, and ISMAEL). The patient is without peripheral eosinophilia or transaminitis at this time which is not necessary but does support DRESS/DIHS is these are present. Psoriasiform drug eruption is also a possibility.  It is interesting, however that the rash in some areas resembles a morbilliform eruption, but in others the plaques are more well-demarcated which brings into consideration papulosquamous disorders such as psoriasis and pityriasis rubra pilaris (PRP). The islands of sparing the patient has on his skin are classic for PRP, and should still remain high in the differential. The patient has what appears to be seborrheic dermatitis on the face, and rarely may also cause erythroderma.  It is unclear why the patient has ISMAEL at this time (AIN can also cause morbilliform eruption and ISMAEL). Fevers, although very common in severe drug reactions like DRESS/DIHS, can also occur in other erythrodermic states too. Would recommend to avoid treatment with systemic steroids at this time and treat with topical steroids with Saran wrap occlusion. Would also continue to work up patient for sepsis and rule out infection as well.  In sum, given diagnostic uncertainty, we should remain broad and categorize the patient at this time within the spectrum of erythrodermic disorders (which include drug, Sezary, and the above disorders mentioned). I performed a punch biopsy today, which should hopefully distinguish between morbilliform drug eruption and psoriasiform disorder.    - Punch biopsy: After discussion of benefits and risks including but not limited  to bleeding, infection, scar, incomplete removal, recurrence, and non-diagnostic biopsy, written consent and photographs were obtained. Time-out was performed. The area was cleaned with isopropyl alcohol. 2 mL of 1% lidocaine was injected to obtain adequate anesthesia of the lesion on the right thigh. A 4 mm punch biopsy was performed.  4-0 prolene sutures were utilized to approximate the epidermal edges.  White petroleum jelly/VaselineTM and a bandage was applied to the wound.  Explicit verbal wound care instructions were provided.  Recommend suture removal in 14 days.   - Recommend triamcinolone 0.1% ointment BID to entire body with Saran wrap occlusion on trunk and extremities (should have 454 g jar ordered)  - Avoid allopurinol and colchicine (allopurinol is more likely to cause severe drug rash)  - Daily CBC w/ diff and CMP (monitor for worsening ISMAEL, transaminitis, or peripheral eosinophilia)  - Peripheral smear looking for Sezary cells (if biopsy may suggest this will next add flow cytometry, but will wait on pathology first)  - Consider nephrology consultation, particularly if creatinine doesn't improve  - Consider TTE given elevated BNP, though patient does not appear to be hypervolemic on exam at this time (DRESS/DIHS may cause a eosinophilic myocarditis but usually occurs several months later; erythrodermic patients may develop high-output heart failure)  - Repletion of electrolytes important in erythrodermic patients  - Follow-up blood cultures and continued workup for sepsis      Thank you for the dermatology consultation. Please do not hesitate to contact the dermatology resident/faculty on call for any additional questions or concerns. We will continue to follow.     Patient seen and staffed with attending physician, Dr. Andreas Delgado MD  Internal Medicine-Dermatology, PGY5    I have seen and examined this patient and agree with the assessment and plan as documented in the  resident's note, and was present for the key elements of all procedures.    Michael Johns MD  Dermatology Attending      Dermatology Problem List:  1.Erythroderma- favor drug etiology vs psoriasiform etiology    Date of Admission: Jul 17, 2020   Encounter Date: 07/19/2020    Reason for Consultation:   Rash    History of Present Illness:  Mr. Bandar Wells is a 69 year old male with history of heart failure, endocarditis (on suppressive antibiotics) MVR replacement on warfarin, pAF, CKDIII who was admitted 7/17/20 at [facility] for worsening rash and transferred to [facility] 7/18/20 for Dermatology consultation.  The patient developed diffuse skin rash 7-10 days ago (~7/8/20).  The patient was started on allopurinol and colchicine for hyperuricemia around 6/3/20. The patient was also on a prednisone taper that was started 6/2/20 and stopped on 6/13/20.   The patient stopped allopurinol and colchicine on 7/11/20.   The patient was seen by Dermatology as an outpatient and he was prescribed cetirizine, clobetasol cream and hyxroxyzine. Despite treatment with this agents, his rash continued to progress and he presented to [facility] were he was also found to have ISMAEL on CKD. He otherwise denied F/C, or URI symptoms.   The patient has also been worked up for multiple myeloma by Oncology as outpatient, due to skeletal lytic lesions seen on CT scan. He underwent BM biopsy which did not show evidence of malignancy.  Furthermore patient denies history of psoriasis or similar eruption occurring in the past.         Past Medical History:   Patient Active Problem List   Diagnosis     Rash     Past Medical History:   Diagnosis Date     Atrial fibrillation (H)     s/p Maze procedure     Chronic combined systolic and diastolic heart failure (H)      CKD (chronic kidney disease)      H/O mitral valve replacement with mechanical valve      Hyperlipidemia      Past Surgical History:   Procedure Laterality Date      "Mechanical mitral valve           Social History:  Patient reports that he has never smoked. He does not have any smokeless tobacco history on file. He reports current alcohol use.    Family History:  Family History   Problem Relation Age of Onset     Heart Failure Mother        Medications:  Current Facility-Administered Medications   Medication     acetaminophen (TYLENOL) tablet 650 mg     bisacodyl (DULCOLAX) EC tablet 5 mg    Or     bisacodyl (DULCOLAX) EC tablet 10 mg    Or     bisacodyl (DULCOLAX) EC tablet 15 mg     gabapentin (NEURONTIN) capsule 600 mg     hydrOXYzine (ATARAX) tablet 25 mg     lidocaine (LMX4) cream     lidocaine 1 % 0.1-1 mL     magnesium citrate solution 148 mL     melatonin tablet 1 mg     naloxone (NARCAN) injection 0.1-0.4 mg     ondansetron (ZOFRAN-ODT) ODT tab 4 mg    Or     ondansetron (ZOFRAN) injection 4 mg     oxyCODONE (ROXICODONE) tablet 10-15 mg     Patient is already receiving anticoagulation with heparin, enoxaparin (LOVENOX), warfarin (COUMADIN)  or other anticoagulant medication     penicillin V (VEETID) tablet 500 mg     polyethylene glycol (MIRALAX) Packet 17 g     prochlorperazine (COMPAZINE) injection 5 mg    Or     prochlorperazine (COMPAZINE) tablet 5 mg    Or     prochlorperazine (COMPAZINE) Suppository 12.5 mg     sodium chloride (PF) 0.9% PF flush 3 mL     sodium chloride (PF) 0.9% PF flush 3 mL     triamcinolone (KENALOG) 0.1 % cream     warfarin ANTICOAGULANT (COUMADIN) tablet 6 mg     Warfarin Therapy Reminder (Check START DATE - warfarin may be starting in the FUTURE)          No Known Allergies      Review of Systems:  -As per HPI; otherwise 10 point ROS negative      Physical exam:  Vitals: BP 94/44 (BP Location: Right arm)   Pulse 55   Temp 98.2  F (36.8  C) (Oral)   Resp 18   Ht 1.778 m (5' 10\")   Wt 75 kg (165 lb 6.4 oz)   SpO2 92%   BMI 23.73 kg/m    GEN: This is a well developed, well-nourished male in no acute distress, in a pleasant mood.  " "  SKIN: Full skin, which includes the head/face, both arms, chest, back, abdomen,both legs, genitalia and/or groin buttocks, digits and/or nails, was examined.  -Barrett skin type: I  -multiple pink, non-scaly papules and macules coaslescing to larger patches and plaques invovling the trunk, face and extremities with grease-like scale on the nasolabial folds, eyebrows and scalp  -There are \"islands\" of sparing on the thighs and lower back  -There are faint pink patches on the palms but no hyperkeratosis or evidence of palmoplantar keratoderma  -No other lesions of concern on areas examined.                                 Laboratory:  Results for orders placed or performed during the hospital encounter of 07/18/20 (from the past 24 hour(s))   Blood culture    Specimen: Blood    Right Hand   Result Value Ref Range    Specimen Description Blood Right Hand     Culture Micro No growth after 7 hours    Blood culture    Specimen: Blood    Left Hand   Result Value Ref Range    Specimen Description Blood Left Hand     Culture Micro No growth after 7 hours    Basic metabolic panel   Result Value Ref Range    Sodium 137 133 - 144 mmol/L    Potassium 4.0 3.4 - 5.3 mmol/L    Chloride 107 94 - 109 mmol/L    Carbon Dioxide 22 20 - 32 mmol/L    Anion Gap 8 3 - 14 mmol/L    Glucose 129 (H) 70 - 99 mg/dL    Urea Nitrogen 58 (H) 7 - 30 mg/dL    Creatinine 2.46 (H) 0.66 - 1.25 mg/dL    GFR Estimate 25 (L) >60 mL/min/[1.73_m2]    GFR Estimate If Black 29 (L) >60 mL/min/[1.73_m2]    Calcium 8.6 8.5 - 10.1 mg/dL   UA with Microscopic reflex to Culture    Specimen: Urine clean catch; Midstream Urine   Result Value Ref Range    Color Urine Light Yellow     Appearance Urine Clear     Glucose Urine Negative NEG^Negative mg/dL    Bilirubin Urine Negative NEG^Negative    Ketones Urine Negative NEG^Negative mg/dL    Specific Gravity Urine 1.003 1.003 - 1.035    Blood Urine Negative NEG^Negative    pH Urine 5.0 5.0 - 7.0 pH    Protein " Albumin Urine Negative NEG^Negative mg/dL    Urobilinogen mg/dL Normal 0.0 - 2.0 mg/dL    Nitrite Urine Negative NEG^Negative    Leukocyte Esterase Urine Negative NEG^Negative    Source Midstream Urine     WBC Urine 0 0 - 5 /HPF    RBC Urine 0 0 - 2 /HPF    Mucous Urine Present (A) NEG^Negative /LPF   Comprehensive metabolic panel   Result Value Ref Range    Sodium 137 133 - 144 mmol/L    Potassium 4.0 3.4 - 5.3 mmol/L    Chloride 108 94 - 109 mmol/L    Carbon Dioxide 23 20 - 32 mmol/L    Anion Gap 6 3 - 14 mmol/L    Glucose 114 (H) 70 - 99 mg/dL    Urea Nitrogen 58 (H) 7 - 30 mg/dL    Creatinine 2.40 (H) 0.66 - 1.25 mg/dL    GFR Estimate 26 (L) >60 mL/min/[1.73_m2]    GFR Estimate If Black 30 (L) >60 mL/min/[1.73_m2]    Calcium 8.4 (L) 8.5 - 10.1 mg/dL    Bilirubin Total 0.5 0.2 - 1.3 mg/dL    Albumin 2.5 (L) 3.4 - 5.0 g/dL    Protein Total 5.7 (L) 6.8 - 8.8 g/dL    Alkaline Phosphatase 53 40 - 150 U/L    ALT 25 0 - 70 U/L    AST 34 0 - 45 U/L   CBC with platelets   Result Value Ref Range    WBC 5.3 4.0 - 11.0 10e9/L    RBC Count 2.90 (L) 4.4 - 5.9 10e12/L    Hemoglobin 8.3 (L) 13.3 - 17.7 g/dL    Hematocrit 26.7 (L) 40.0 - 53.0 %    MCV 92 78 - 100 fl    MCH 28.6 26.5 - 33.0 pg    MCHC 31.1 (L) 31.5 - 36.5 g/dL    RDW 17.6 (H) 10.0 - 15.0 %    Platelet Count 146 (L) 150 - 450 10e9/L   Uric acid   Result Value Ref Range    Uric Acid 8.0 (H) 3.5 - 7.2 mg/dL   TSH with free T4 reflex   Result Value Ref Range    TSH 2.43 0.40 - 4.00 mU/L   INR   Result Value Ref Range    INR 3.12 (H) 0.86 - 1.14   CRP inflammation   Result Value Ref Range    CRP Inflammation 59.0 (H) 0.0 - 8.0 mg/L   Nt probnp inpatient   Result Value Ref Range    N-Terminal Pro BNP Inpatient 3,687 (H) 0 - 900 pg/mL   Procalcitonin   Result Value Ref Range    Procalcitonin 1.34 ng/ml   WBC Differential   Result Value Ref Range    Diff Method Automated Method     % Neutrophils 69.2 %    % Lymphocytes 11.6 %    % Monocytes 13.6 %    % Eosinophils 4.5 %     % Basophils 0.7 %    % Immature Granulocytes 0.4 %    Absolute Neutrophil 3.9 1.6 - 8.3 10e9/L    Absolute Lymphocytes 0.7 (L) 0.8 - 5.3 10e9/L    Absolute Monocytes 0.8 0.0 - 1.3 10e9/L    Absolute Eosinophils 0.3 0.0 - 0.7 10e9/L    Absolute Basophils 0.0 0.0 - 0.2 10e9/L    Abs Immature Granulocytes 0.0 0 - 0.4 10e9/L   Erythrocyte sedimentation rate auto   Result Value Ref Range    Sed Rate 55 (H) 0 - 20 mm/h       Dr. Johns staffed the patient.    Staff Involved:  Resident/Staff

## 2020-07-19 NOTE — PLAN OF CARE
[id number]:    AVSS with soft BP 99/40. Pain from rash, gave PRN oxycodone. Tolerating regular diet and activity, up to bathroom/urinal at bedside. Voiding with small amounts, adequate fluid intake. Skin biopsy done late afternoon on right knee. Medication cream ordered, to be put on at 8pm with dressing change. When further general work up complete likely return to home in 1-2 days.

## 2020-07-19 NOTE — PLAN OF CARE
"11p-7a: Tmax 100.2. Had BC's done on previous shift. Completed 500 ml NS fluid bolus. Attempted to reach [facility] provider several times via text and phone pages about patient's BP's running 80's-90's over 30's. Provider called back and was updated on improved pressure of 113/42. No new orders or interventions as patient is asymptomatic. Continue to watch and notify if any concerns today. Given oxy 10 mg x 1 for \"burning\" pain of full-body rash. And also applying sween cream as skin is itchy and dry. States these are helpful and make him feel some relief. Needing frequent reminders to not scratch his skin to prevent open sores. Urine sample collected for UA. Bed alarm on as patient had some mild confusion earlier in the night shift. Was instructed several times to use his call light for assistance but appeared to have problems working the controls. Had also removed his night gown stating he was \"hot.\" Appears appropriate and oriented this morning.   " Hemostasis: Drysol and Monsel's

## 2020-07-19 NOTE — PLAN OF CARE
7p-11p: Admitted from [facility]. Has full body rash which is itchy and painful. Temp 100.4. Has BC's to be drawn per [facility] doctor. Needs UA collected. LBM was a few days ago. Has generalized weakness. Appetite good today but has been poor the last week. Alert and oriented. Admission questions completed. Skin assessment done.

## 2020-07-19 NOTE — PHARMACY-ANTICOAGULATION SERVICE
Clinical Pharmacy - Warfarin Dosing Consult     Pharmacy has been consulted to manage this patient s warfarin therapy.  Indication: Mechanical Mitral Valve Replacement  Therapy Goal: INR 2.5-3.5  Warfarin Prior to Admission: Yes  Warfarin PTA Regimen: 4.5 mg on Tues/Fri and 6 mg ROW    Patient took Warfarin prior to admission.     Pharmacy will monitor Bandar Wells daily and order warfarin doses to achieve specified goal.      Please contact pharmacy as soon as possible if the warfarin needs to be held for a procedure or if the warfarin goals change.      Batool Hood, PharmD, BCPS

## 2020-07-20 ENCOUNTER — AMBULATORY - HEALTHEAST (OUTPATIENT)
Dept: ANTICOAGULATION | Facility: CLINIC | Age: 70
End: 2020-07-20

## 2020-07-20 ENCOUNTER — APPOINTMENT (OUTPATIENT)
Dept: CARDIOLOGY | Facility: CLINIC | Age: 70
DRG: 683 | End: 2020-07-20
Attending: PHYSICIAN ASSISTANT
Payer: MEDICARE

## 2020-07-20 ENCOUNTER — APPOINTMENT (OUTPATIENT)
Dept: PHYSICAL THERAPY | Facility: CLINIC | Age: 70
DRG: 683 | End: 2020-07-20
Attending: PHYSICIAN ASSISTANT
Payer: MEDICARE

## 2020-07-20 DIAGNOSIS — Z95.4 S/P MITRAL VALVE REPLACEMENT WITH METALLIC VALVE: ICD-10-CM

## 2020-07-20 DIAGNOSIS — Z95.2 S/P MVR (MITRAL VALVE REPLACEMENT): ICD-10-CM

## 2020-07-20 DIAGNOSIS — I48.0 PAROXYSMAL ATRIAL FIBRILLATION (H): ICD-10-CM

## 2020-07-20 LAB
ALBUMIN SERPL-MCNC: 2.3 G/DL (ref 3.4–5)
ALP SERPL-CCNC: 56 U/L (ref 40–150)
ALT SERPL W P-5'-P-CCNC: 26 U/L (ref 0–70)
ANION GAP SERPL CALCULATED.3IONS-SCNC: 7 MMOL/L (ref 3–14)
AST SERPL W P-5'-P-CCNC: 50 U/L (ref 0–45)
BASOPHILS # BLD AUTO: 0.1 10E9/L (ref 0–0.2)
BASOPHILS NFR BLD AUTO: 1 %
BILIRUB SERPL-MCNC: 0.6 MG/DL (ref 0.2–1.3)
BUN SERPL-MCNC: 48 MG/DL (ref 7–30)
CALCIUM SERPL-MCNC: 8.3 MG/DL (ref 8.5–10.1)
CHLORIDE SERPL-SCNC: 111 MMOL/L (ref 94–109)
CO2 SERPL-SCNC: 21 MMOL/L (ref 20–32)
COPATH REPORT: NORMAL
CREAT SERPL-MCNC: 1.97 MG/DL (ref 0.66–1.25)
DIFFERENTIAL METHOD BLD: ABNORMAL
EOSINOPHIL # BLD AUTO: 0.3 10E9/L (ref 0–0.7)
EOSINOPHIL NFR BLD AUTO: 5.4 %
ERYTHROCYTE [DISTWIDTH] IN BLOOD BY AUTOMATED COUNT: 17.9 % (ref 10–15)
GFR SERPL CREATININE-BSD FRML MDRD: 33 ML/MIN/{1.73_M2}
GLUCOSE SERPL-MCNC: 111 MG/DL (ref 70–99)
HCT VFR BLD AUTO: 26.7 % (ref 40–53)
HGB BLD-MCNC: 8.3 G/DL (ref 13.3–17.7)
IMM GRANULOCYTES # BLD: 0 10E9/L (ref 0–0.4)
IMM GRANULOCYTES NFR BLD: 0.4 %
INR PPP: 3.03 (ref 0.86–1.14)
LACTATE BLD-SCNC: 1.5 MMOL/L (ref 0.7–2)
LYMPHOCYTES # BLD AUTO: 0.7 10E9/L (ref 0.8–5.3)
LYMPHOCYTES NFR BLD AUTO: 14.4 %
MCH RBC QN AUTO: 28.3 PG (ref 26.5–33)
MCHC RBC AUTO-ENTMCNC: 31.1 G/DL (ref 31.5–36.5)
MCV RBC AUTO: 91 FL (ref 78–100)
MONOCYTES # BLD AUTO: 0.7 10E9/L (ref 0–1.3)
MONOCYTES NFR BLD AUTO: 13.4 %
NEUTROPHILS # BLD AUTO: 3.4 10E9/L (ref 1.6–8.3)
NEUTROPHILS NFR BLD AUTO: 65.4 %
NRBC # BLD AUTO: 0 10*3/UL
NRBC BLD AUTO-RTO: 0 /100
PLATELET # BLD AUTO: 152 10E9/L (ref 150–450)
POTASSIUM SERPL-SCNC: 3.8 MMOL/L (ref 3.4–5.3)
PROT SERPL-MCNC: 5.3 G/DL (ref 6.8–8.8)
RBC # BLD AUTO: 2.93 10E12/L (ref 4.4–5.9)
SODIUM SERPL-SCNC: 138 MMOL/L (ref 133–144)
WBC # BLD AUTO: 5.2 10E9/L (ref 4–11)

## 2020-07-20 PROCEDURE — 83605 ASSAY OF LACTIC ACID: CPT | Performed by: INTERNAL MEDICINE

## 2020-07-20 PROCEDURE — 36415 COLL VENOUS BLD VENIPUNCTURE: CPT | Performed by: INTERNAL MEDICINE

## 2020-07-20 PROCEDURE — 25000132 ZZH RX MED GY IP 250 OP 250 PS 637: Mod: GY | Performed by: NURSE PRACTITIONER

## 2020-07-20 PROCEDURE — 40000556 ZZH STATISTIC PERIPHERAL IV START W US GUIDANCE

## 2020-07-20 PROCEDURE — 93306 TTE W/DOPPLER COMPLETE: CPT

## 2020-07-20 PROCEDURE — 25800030 ZZH RX IP 258 OP 636: Performed by: INTERNAL MEDICINE

## 2020-07-20 PROCEDURE — 97116 GAIT TRAINING THERAPY: CPT | Mod: GP

## 2020-07-20 PROCEDURE — 97162 PT EVAL MOD COMPLEX 30 MIN: CPT | Mod: GP

## 2020-07-20 PROCEDURE — 99233 SBSQ HOSP IP/OBS HIGH 50: CPT | Performed by: INTERNAL MEDICINE

## 2020-07-20 PROCEDURE — 99207 ZZC CDG-CHARGE REQUIRED MANUAL ENTRY: CPT | Performed by: INTERNAL MEDICINE

## 2020-07-20 PROCEDURE — 87040 BLOOD CULTURE FOR BACTERIA: CPT | Performed by: INTERNAL MEDICINE

## 2020-07-20 PROCEDURE — 25000132 ZZH RX MED GY IP 250 OP 250 PS 637: Mod: GY | Performed by: INTERNAL MEDICINE

## 2020-07-20 PROCEDURE — 85610 PROTHROMBIN TIME: CPT | Performed by: INTERNAL MEDICINE

## 2020-07-20 PROCEDURE — 12000001 ZZH R&B MED SURG/OB UMMC

## 2020-07-20 PROCEDURE — 25000128 H RX IP 250 OP 636: Performed by: INTERNAL MEDICINE

## 2020-07-20 PROCEDURE — 25000132 ZZH RX MED GY IP 250 OP 250 PS 637: Mod: GY | Performed by: STUDENT IN AN ORGANIZED HEALTH CARE EDUCATION/TRAINING PROGRAM

## 2020-07-20 PROCEDURE — 85025 COMPLETE CBC W/AUTO DIFF WBC: CPT | Performed by: INTERNAL MEDICINE

## 2020-07-20 PROCEDURE — 80053 COMPREHEN METABOLIC PANEL: CPT | Performed by: INTERNAL MEDICINE

## 2020-07-20 PROCEDURE — 99207 ZZC APP CREDIT; MD BILLING SHARED VISIT: CPT | Performed by: NURSE PRACTITIONER

## 2020-07-20 PROCEDURE — 93306 TTE W/DOPPLER COMPLETE: CPT | Mod: 26 | Performed by: INTERNAL MEDICINE

## 2020-07-20 RX ORDER — POLYETHYLENE GLYCOL 3350 17 G/17G
17 POWDER, FOR SOLUTION ORAL DAILY
Status: DISCONTINUED | OUTPATIENT
Start: 2020-07-20 | End: 2020-07-23 | Stop reason: HOSPADM

## 2020-07-20 RX ORDER — WARFARIN SODIUM 6 MG/1
6 TABLET ORAL
Status: COMPLETED | OUTPATIENT
Start: 2020-07-20 | End: 2020-07-20

## 2020-07-20 RX ADMIN — POLYETHYLENE GLYCOL 3350 17 G: 17 POWDER, FOR SOLUTION ORAL at 10:45

## 2020-07-20 RX ADMIN — GABAPENTIN 600 MG: 300 CAPSULE ORAL at 20:04

## 2020-07-20 RX ADMIN — VANCOMYCIN HYDROCHLORIDE 1750 MG: 1 INJECTION, POWDER, LYOPHILIZED, FOR SOLUTION INTRAVENOUS at 02:10

## 2020-07-20 RX ADMIN — PIPERACILLIN AND TAZOBACTAM 3.38 G: 3; .375 INJECTION, POWDER, FOR SOLUTION INTRAVENOUS at 13:49

## 2020-07-20 RX ADMIN — OXYCODONE HYDROCHLORIDE 10 MG: 5 TABLET ORAL at 14:02

## 2020-07-20 RX ADMIN — PIPERACILLIN AND TAZOBACTAM 3.38 G: 3; .375 INJECTION, POWDER, FOR SOLUTION INTRAVENOUS at 20:04

## 2020-07-20 RX ADMIN — BISACODYL 10 MG: 5 TABLET, COATED ORAL at 12:27

## 2020-07-20 RX ADMIN — ACETAMINOPHEN 650 MG: 325 TABLET, FILM COATED ORAL at 15:44

## 2020-07-20 RX ADMIN — SODIUM CHLORIDE, POTASSIUM CHLORIDE, SODIUM LACTATE AND CALCIUM CHLORIDE 1000 ML: 600; 310; 30; 20 INJECTION, SOLUTION INTRAVENOUS at 06:17

## 2020-07-20 RX ADMIN — OXYCODONE HYDROCHLORIDE 10 MG: 5 TABLET ORAL at 23:24

## 2020-07-20 RX ADMIN — WARFARIN SODIUM 6 MG: 6 TABLET ORAL at 18:50

## 2020-07-20 RX ADMIN — PIPERACILLIN AND TAZOBACTAM 3.38 G: 3; .375 INJECTION, POWDER, FOR SOLUTION INTRAVENOUS at 08:18

## 2020-07-20 RX ADMIN — GABAPENTIN 600 MG: 300 CAPSULE ORAL at 08:13

## 2020-07-20 RX ADMIN — TRIAMCINOLONE ACETONIDE: 1 OINTMENT TOPICAL at 15:49

## 2020-07-20 RX ADMIN — TRIAMCINOLONE ACETONIDE: 1 OINTMENT TOPICAL at 20:04

## 2020-07-20 ASSESSMENT — ACTIVITIES OF DAILY LIVING (ADL)
ADLS_ACUITY_SCORE: 12

## 2020-07-20 ASSESSMENT — PAIN DESCRIPTION - DESCRIPTORS: DESCRIPTORS: ACHING

## 2020-07-20 ASSESSMENT — MIFFLIN-ST. JEOR: SCORE: 1543.7

## 2020-07-20 NOTE — PROGRESS NOTES
[facility] Progress Note - Hospitalist Service, Gold 4       Date of Admission:  7/18/2020  Assessment & Plan     Bandar Wells is a 69 year old male with past medical history significant for complicated cardiac disease with systolic and diastolic CHF, mechanical MVR on warfarin, history endocarditis, paroxysmal atrial fib s/p MAZE procedure, HLD, and stage III CKD who recently started allopurinol and colchicine for hyperuricemia and developed rash, ISMAEL, and hyperkalemia and is transferred to [facility] on 7/18 for further evaluation by Dermatology.      # Rash -  Improving, feels less itchy and painful today.  Developed painful, splotchy, erythematous rash diffusely over neck, back, chest, arms, and legs and eventually palms and a few oral lesions about 7-10 days prior to admission, coinciding with starting allopurinol and colchicine.  Started on cetirizine, clobetasol, and hydroxyzine outpt without improvement.  Last seen by Derm on 7/19, underwent punch bx, results pending.  Overall, favor erythrodermic disorders including DIHS/DRESS vs PRP vs other.  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       - Appreciate Dermatology recommendations   - Okay to trial Triamcinolone cream on small test area of skin; if tolerated, please continue BID all over body and cover with Saran wrap   - Continue Oxycodone PRN for pain   - Continue Atarax PRN for itching   - Peripheral smear to evaluate for Sezary cells, pending   - Trend CBC w/ differential, CMP daily     # SIRS - Febrile to 101.3 F last evening and hypotensive to 92/39, though pt did not experience fevers, chills, or other symptoms.  Started on IV Vanc and Zosyn.  Blood cx pending.  Continues to be without leukocytosis.  UA negative.   - Continue IV Vanc and Zosyn for now   - Added CRP for AM   - Will follow blood cx   - APAP  PRN for fevers   - Monitor closely     # ISMAEL in setting of CKD stage III - Improving.  Cr 1.97 today.  BL closer to 1.87.  Appears to have been gradually worsening over the last month.  Cr peaked at 3.32 at OSH, improved with fluids.  UA neg.  Possibly 2/2 dehydration and poor PO intake, as well as diuretic use PTA.    - Follow BMP daily to trend renal function   - Continue to hold diuretics for now, monitor volume status carefully  - Renally dose medications, avoid nephrotoxic agents     # Thrombocytopenia - Improving.  Plts normal at 152 today.      # Constipation - Reports no BM in 5 days.  Added scheduled miralax.  Continue PRN senna.      # Abnormal bone lesion - Underwent bone marrow biopsy on June 6th for abnormal bone lesions, which reveled hypercellularity but otherwise unremarkable with no evidence of myeloma or malignancy. Plans to follow up with outpatient hematology in 6 months.     Stable/chronic issues:  # S/p mechanical MVR - 2/2 endocarditis.  Continue PTA warfarin, dosing by Pharmacy.  Hold PTA ppx PCN while on broad spectrum abx.    # Combined systolic and diastolic heart failure - Echocardiogram today w/ EF 60-65%, improved from prev in 2/2020 with 55-60%.  On torsemide 50mg BID and metolozone 2.5mg daily PRN, currently on hold in setting of ISMAEL, dehydration.  Dry weight ~ 160lbs.  Please obtain daily standing weights and I/Os.  Will resume PTA torsemide as Cr improves.    # Chronic pain syndrome - On Oxycodone 5-10mg Q4H PRN PTA.  Follows with Rheumatology and Hematology for possible inflammatory myopathy, hyperuricemia, and abnormal bone lesions that may be contributory.  Will continue PTA oxycodone and follow up with outpatient providers.    # Anemia 2/2 chronic illness - Hgb BL 9-10s.  Slightly below BL at 8.3 in setting of acute illness.  Following CBC daily as above.             Diet: Combination Diet Regular Diet Adult    DVT Prophylaxis: on warfarin d/t MV  Chappell Catheter: not  present  Code Status: Full Code           Disposition Plan   Expected discharge: 3-5 days, recommended to TBD once fevers resolved, antibiotic plan in place, rash improved, and PT/OT evaluation complete.  Entered: STIVEN Vega CNP 07/20/2020, 7:53 AM       The patient's care was discussed with the Attending Physician, Dr. Akshat Grover.    STIVEN Vega CNP  Hospitalist Service, [address]  Pager: [phone]  Please see sticky note for cross cover information  ______________________________________________________________________    Interval History   Bandar is seen in his room, his son is at the bedside.  He reports that his rash feels much less itchy and painful today.  Able to move around the room but needs assistance.  He does not recall feeling feverish last night.  Eating and drinking well.  He is agreeable to trying a small test patch for the prescribed triamcinolone.      Data reviewed today: I reviewed all medications, new labs and imaging results over the last 24 hours.     Physical Exam   Vital Signs: Temp: 99.1  F (37.3  C) Temp src: Oral BP: 102/42 Pulse: 58 Heart Rate: 84 Resp: 18 SpO2: 93 % O2 Device: None (Room air)    Weight: 165 lbs 6.4 oz    GENERAL: Alert and oriented x 3. Well nourished, well developed.  No acute distress.    HEENT: Normocephalic, atraumatic. Anicteric sclera. Mucous membranes moist.   CV: RRR. S1, S2. No murmurs appreciated.   RESPIRATORY: Effort normal on room air. Lungs CTAB with no wheezing, rales, or rhonchi.   GI: Abdomen soft and non distended, bowel sounds present x all 4 quadrants. No tenderness, rebound, or guarding.   NEUROLOGICAL: No focal deficits. Follows commands.  Strength equal in upper and lower extremities.   MUSCULOSKELETAL: No joint swelling or tenderness. Moves all extremities.   EXTREMITIES: No gross deformities. No peripheral edema.   SKIN: Grossly warm, dry, and intact. No jaundice. Diffuse  erythematous rash, mostly macular, over more 80% body, mostly legs, back, arms, and abdomen.        Data   Recent Labs   Lab 20  0435 20  1451 20  0606 20  2234   WBC 5.2 5.9 5.3  --    HGB 8.3* 8.6* 8.3*  --    MCV 91 91 92  --     146* 146*  --    INR 3.03*  --  3.12*  --      --  137 137   POTASSIUM 3.8  --  4.0 4.0   CHLORIDE 111*  --  108 107   CO2 21  --  23 22   BUN 48*  --  58* 58*   CR 1.97*  --  2.40* 2.46*   ANIONGAP 7  --  6 8   DEVYN 8.3*  --  8.4* 8.6   *  --  114* 129*   ALBUMIN 2.3*  --  2.5*  --    PROTTOTAL 5.3*  --  5.7*  --    BILITOTAL 0.6  --  0.5  --    ALKPHOS 56  --  53  --    ALT 26  --  25  --    AST 50*  --  34  --      Recent Results (from the past 24 hour(s))   Echo Complete    Narrative    [id number]  [id number]  [id number]  [id number]^SIRENA^ESTEBAN           [facility],[place]  Echocardiography Laboratory  [address] [place] [id number]     Name: JAMEEL ARZATE  MRN: [id number]  : 1950  Study Date: 2020 11:23 AM  Age: 69 yrs  Gender: Male  Patient Location: [facility]  Reason For Study: CHF  Ordering Physician: ESTEBAN PACK  Referring Physician: SELF, REFERRED  Performed By: Jhoana Vazquez RDCS     BSA: 1.9 m2  Height: 70 in  Weight: 165 lb  HR: 64  BP: 99/40 mmHg  _____________________________________________________________________________  __        Procedure  Complete Portable Echo Adult.  _____________________________________________________________________________  __        Interpretation Summary  Global and regional left ventricular function is normal with an EF of 60-65%.  Right ventricular function, chamber size, wall motion, and thickness are  normal.  A mechanical mitral valve is present.  MeanMitral gradient 7 mmHg at heart rate 63BPM/AFib.No valvular or para  valvular MR.  Right ventricular systolic pressure is 42mmHg above the right atrial pressure.  IVC diameter >2.1 cm collapsing  <50% with sniff suggests a high RA pressure  estimated at 15 mmHg or greater.  No pericardial effusion is present.  Previous study not available for comparison.  _____________________________________________________________________________  __        Left Ventricle  Global and regional left ventricular function is normal with an EF of 60-65%.  Left ventricular wall thickness is normal. Left ventricular size is normal.  Diastolic function not assessed due to presence of prosthetic mitral valve.  Abnormal non-specific septal motion is present.     Right Ventricle  Right ventricular function, chamber size, wall motion, and thickness are  normal.     Atria  The atria cannot be assessed.     Mitral Valve  A mechanical mitral valve is present. MeanMitral gradient 7 mmHg at heart rate  63BPM/AFib.No valvular or para valvular MR.        Aortic Valve  Trileaflet aortic sclerosis without stenosis.     Tricuspid Valve  The tricuspid valve is normal. Trace to mild tricuspid insufficiency is  present. Right ventricular systolic pressure is 42mmHg above the right atrial  pressure.     Pulmonic Valve  The pulmonic valve is normal.     Vessels  The thoracic aorta is normal. The pulmonary artery and bifurcation cannot be  assessed. IVC diameter >2.1 cm collapsing <50% with sniff suggests a high RA  pressure estimated at 15 mmHg or greater.     Pericardium  No pericardial effusion is present.        Compared to Previous Study  Previous study not available for comparison.  _____________________________________________________________________________  __  MMode/2D Measurements & Calculations  IVSd: 0.82 cm     LVIDd: 5.0 cm  LVIDs: 3.1 cm  LVPWd: 0.95 cm  FS: 37.3 %  LV mass(C)d: 153.7 grams  LV mass(C)dI: 79.9 grams/m2  asc Aorta Diam: 3.4 cm  LVOT diam: 2.3 cm  LVOT area: 4.2 cm2  RVOT diam: 4.7 cm  RWT: 0.38        Doppler Measurements & Calculations  MV max P.4 mmHg  MV mean P.3 mmHg  MV V2 VTI: 56.6 cm  MVA(VTI): 1.6  cm2  MV P1/2t max donna: 212.0 cm/sec  MV P1/2t: 96.3 msec  MVA(P1/2t): 2.3 cm2  MV dec slope: 645.0 cm/sec2  LV V1 max P.6 mmHg  LV V1 max: 118.4 cm/sec  LV V1 VTI: 22.2 cm  SV(LVOT): 92.4 ml  SI(LVOT): 48.1 ml/m2  TR max donna: 323.5 cm/sec  TR max P.9 mmHg        _____________________________________________________________________________  __        Report approved by: Clayton Trent 2020 12:41 PM        Medications     - MEDICATION INSTRUCTIONS -       Warfarin Therapy Reminder         gabapentin  600 mg Oral BID     lidocaine 1% with EPINEPHrine 1:100,000  1 mL Intradermal Once     [Held by provider] penicillin V  500 mg Oral Daily     piperacillin-tazobactam  3.375 g Intravenous Q6H     polyethylene glycol  17 g Oral Daily     sodium chloride (PF)  3 mL Intracatheter Q8H     triamcinolone   Topical BID     vancomycin place saldana - receiving intermittent dosing  1 each Does not apply See Admin Instructions     warfarin ANTICOAGULANT  6 mg Oral ONCE at 18:00

## 2020-07-20 NOTE — PLAN OF CARE
Discharge Planner PT   Patient plan for discharge: Prefers home; understands TCU  Current status: Requires light physical assist for bed mobility, transfers and amb 150' x 2. 2x loss of balance and pt generally unsteady with staggered and path deviation gait pattern. Lives alone.  Barriers to return to prior living situation: Level of support, fall risk, deconditioning  Recommendations for discharge: TCU at this time  Rationale for recommendations: Current level of function       Entered by: Rj Fajardo 07/20/2020 4:15 PM

## 2020-07-20 NOTE — PROGRESS NOTES
"   07/20/20 1600   Quick Adds   Type of Visit Initial PT Evaluation   Living Environment   Lives With alone   Living Arrangements house   Home Accessibility stairs to enter home;stairs within home   Number of Stairs, Main Entrance 6   Stair Railings, Main Entrance railing on right side (ascending)   Number of Stairs, Within Home, Primary   (flight to 2nd floor and to basement)   Stair Railings, Within Home, Primary railing on right side (ascending)   Transportation Anticipated car, drives self;family or friend will provide   Living Environment Comment Pt lives alone but has some assist at home (son or girlfriend)   Self-Care   Usual Activity Tolerance good   Current Activity Tolerance moderate   Regular Exercise No   Equipment Currently Used at Home none   Activity/Exercise/Self-Care Comment Used to do \"everything\" but now unable due to pandemic. Remains at home and stays busy there.   Functional Level Prior   Ambulation 0-->independent   Transferring 0-->independent   Fall history within last six months no   Which of the above functional risks had a recent onset or change? ambulation;transferring   Prior Functional Level Comment Pt indep prior to admission.   General Information   Onset of Illness/Injury or Date of Surgery - Date 07/18/20   Patient/Family Goals Statement Wants to get stronger prior to d/c home. Hasn't been up and out of bed much over past 3 days.   Pertinent History of Current Problem (include personal factors and/or comorbidities that impact the POC) Bandar Wells is a 69 year old man with PMHx of complicated cardiac disease with systolic and diastolic CHF, mechanical MVR on coumadin, hx of endocarditis, pAfib s/p maze procedure, HLD, and CKD stage III, who recently started allopurinol for hyperuricemia who developed rash, acute kidney injury and hyperkalemia presented to OSH transferred for dermatology evaluation admitted on 7/18/2020.     Precautions/Limitations fall precautions   General " Observations Pt supine in bed; agreeable to session.   Cognitive Status Examination   Orientation orientation to person, place and time   Level of Consciousness alert   Follows Commands and Answers Questions 100% of the time   Personal Safety and Judgment intact;at risk behaviors demonstrated   Memory intact   Pain Assessment   Patient Currently in Pain No   Integumentary/Edema   Integumentary/Edema no deficits were identifed   Integumentary/Edema Comments Rash noted throughout entire body. No edema noted.   Posture    Posture Forward head position;Protracted shoulders;Kyphosis   Posture Comments Neck downward facing with R rotation once against gravity. Unable to maintain head forward position.   Range of Motion (ROM)   ROM Comment B LE AROM WFL   Strength   Strength Comments B UE/LE strength grossly 4-5/5   Bed Mobility   Bed Mobility Comments Supine>sit: min A for trunk support. Difficult to get up to sitting.   Transfer Skills   Transfer Comments Sit>stand; CGA and no AD.   Gait   Gait Comments Amb 15' without AD and CGA. 2x LOB. Path deviation, forward flexion, slow reba noted. Fatigues quickly.   Balance   Balance Comments CGA for standing static/dynamic balance. Min A at times for LOB   Sensory Examination   Sensory Perception no deficits were identified   General Therapy Interventions   Planned Therapy Interventions balance training;bed mobility training;cognition;gait training;neuromuscular re-education;strengthening;stretching;transfer training;home program guidelines;progressive activity/exercise   Clinical Impression   Criteria for Skilled Therapeutic Intervention yes, treatment indicated   PT Diagnosis Impaired functional mobility   Influenced by the following impairments Deconditioning, poor posture, cardiopulmonary status   Functional limitations due to impairments Bed mobility, transfers, gait, balance, endurance   Clinical Presentation Evolving/Changing   Clinical Presentation Rationale clinial  "judgement   Clinical Decision Making (Complexity) Moderate complexity   Therapy Frequency 5x/week   Predicted Duration of Therapy Intervention (days/wks) 2 weeks   Anticipated Discharge Disposition Transitional Care Facility   Risk & Benefits of therapy have been explained Yes   Patient, Family & other staff in agreement with plan of care Yes   [facility] AM-PAC  \"6 Clicks\" V.2 Basic Mobility Inpatient Short Form   1. Turning from your back to your side while in a flat bed without using bedrails? 3 - A Little   2. Moving from lying on your back to sitting on the side of a flat bed without using bedrails? 3 - A Little   3. Moving to and from a bed to a chair (including a wheelchair)? 3 - A Little   4. Standing up from a chair using your arms (e.g., wheelchair, or bedside chair)? 3 - A Little   5. To walk in hospital room? 3 - A Little   6. Climbing 3-5 steps with a railing? 2 - A Lot   Basic Mobility Raw Score (Score out of 24.Lower scores equate to lower levels of function) 17   Total Evaluation Time   Total Evaluation Time (Minutes) 12     "

## 2020-07-20 NOTE — PLAN OF CARE
11p-7a: On call doctor updated on patient's temp of 101.3. Blood cultures ordered and drawn. Started on abx. Given vanco x 1 and zosyn x 1. Sepsis triggered. Lactic acid was 1.5. Doctor updated on second increase in temp to 102.1 and hypotensive BP's. Ordered LR 1,000 ml fluid bolus to be given over 2 hours. Bolus and abx started late due to patient needing a new peripheral IV placed. No BM for several days. Given prn dulcolax tab last night.

## 2020-07-20 NOTE — PHARMACY-VANCOMYCIN DOSING SERVICE
Pharmacy Vancomycin Initial Note  Date of Service 2020  Patient's  1950  69 year old, male    Indication: Sepsis    Current estimated CrCl = Estimated Creatinine Clearance: 29.9 mL/min (A) (based on SCr of 2.4 mg/dL (H)).    Creatinine for last 3 days  2020: 10:34 PM Creatinine 2.46 mg/dL  2020:  6:06 AM Creatinine 2.40 mg/dL    Recent Vancomycin Level(s) for last 3 days  No results found for requested labs within last 72 hours.      Vancomycin IV Administrations (past 72 hours)      No vancomycin orders with administrations in past 72 hours.                Nephrotoxins and other renal medications (From now, onward)    Start     Dose/Rate Route Frequency Ordered Stop    20 2330  piperacillin-tazobactam (ZOSYN) 3.375 g vial to attach to  mL bag      3.375 g  over 1 Hours Intravenous EVERY 6 HOURS 20 2310      20 0800  penicillin V (VEETID) tablet 500 mg      500 mg Oral DAILY 20 2157            Contrast Orders - past 72 hours (72h ago, onward)    None                Plan:  1.  Start vancomycin  1750 mg IV once, then follow levels.   2.  Goal Trough Level: 15-20 mg/L   3.  Pharmacy will check trough levels as appropriate in 1-3 Days.    4. Serum creatinine levels will be ordered daily for the first week of therapy and at least twice weekly for subsequent weeks.    5. [place] method utilized to dose vancomycin therapy: Method 2    Fabio Herzog [facility]

## 2020-07-20 NOTE — PLAN OF CARE
7p-11p: Tmax 100.8. Tried contacting team x 2 to update if they want anything ordered. But no return calls. Temp recheck down to 99.8. Appetite good. BP's remain 90's over 40's but within ordered parameters. Ambulated in the hallway with gait belt and assist of one. Is weak and mildly deconditioned. Encouraged increased activity. Rash pain and itching much improved. Declined Rx cream and is currently using the sween cream.

## 2020-07-20 NOTE — PROGRESS NOTES
Addendum     Around 4:30am paged for ongoing fever and SBP 83.  Boluses with 1L LR and will monitor closely.      Crosscover Note    Paged for temp 101.3 around 11pm.  HDS.  Will reculture and start vanc/zosyn for concern of developing sepsis, unclear source.  Hold oral penicllin V while on broad spectrum abx.    Tayla Hidalgo MD

## 2020-07-21 ENCOUNTER — APPOINTMENT (OUTPATIENT)
Dept: PHYSICAL THERAPY | Facility: CLINIC | Age: 70
DRG: 683 | End: 2020-07-21
Attending: INTERNAL MEDICINE
Payer: MEDICARE

## 2020-07-21 LAB
ALBUMIN SERPL-MCNC: 2.2 G/DL (ref 3.4–5)
ALP SERPL-CCNC: 67 U/L (ref 40–150)
ALT SERPL W P-5'-P-CCNC: 60 U/L (ref 0–70)
ANION GAP SERPL CALCULATED.3IONS-SCNC: 7 MMOL/L (ref 3–14)
AST SERPL W P-5'-P-CCNC: 122 U/L (ref 0–45)
BASOPHILS # BLD AUTO: 0.1 10E9/L (ref 0–0.2)
BASOPHILS NFR BLD AUTO: 1 %
BILIRUB SERPL-MCNC: 1.2 MG/DL (ref 0.2–1.3)
BUN SERPL-MCNC: 38 MG/DL (ref 7–30)
CALCIUM SERPL-MCNC: 8.4 MG/DL (ref 8.5–10.1)
CHLORIDE SERPL-SCNC: 110 MMOL/L (ref 94–109)
CO2 SERPL-SCNC: 21 MMOL/L (ref 20–32)
COPATH REPORT: NORMAL
CREAT SERPL-MCNC: 1.71 MG/DL (ref 0.66–1.25)
CRP SERPL-MCNC: 35 MG/L (ref 0–8)
DIFFERENTIAL METHOD BLD: ABNORMAL
EOSINOPHIL # BLD AUTO: 0.6 10E9/L (ref 0–0.7)
EOSINOPHIL NFR BLD AUTO: 11.3 %
ERYTHROCYTE [DISTWIDTH] IN BLOOD BY AUTOMATED COUNT: 18.1 % (ref 10–15)
GFR SERPL CREATININE-BSD FRML MDRD: 39 ML/MIN/{1.73_M2}
GLUCOSE SERPL-MCNC: 116 MG/DL (ref 70–99)
HCT VFR BLD AUTO: 26.6 % (ref 40–53)
HGB BLD-MCNC: 8.3 G/DL (ref 13.3–17.7)
IMM GRANULOCYTES # BLD: 0 10E9/L (ref 0–0.4)
IMM GRANULOCYTES NFR BLD: 0.8 %
INR PPP: 3.02 (ref 0.86–1.14)
LYMPHOCYTES # BLD AUTO: 0.8 10E9/L (ref 0.8–5.3)
LYMPHOCYTES NFR BLD AUTO: 15.3 %
MCH RBC QN AUTO: 28.6 PG (ref 26.5–33)
MCHC RBC AUTO-ENTMCNC: 31.2 G/DL (ref 31.5–36.5)
MCV RBC AUTO: 92 FL (ref 78–100)
MONOCYTES # BLD AUTO: 0.6 10E9/L (ref 0–1.3)
MONOCYTES NFR BLD AUTO: 11.1 %
NEUTROPHILS # BLD AUTO: 3 10E9/L (ref 1.6–8.3)
NEUTROPHILS NFR BLD AUTO: 60.5 %
NRBC # BLD AUTO: 0 10*3/UL
NRBC BLD AUTO-RTO: 0 /100
PLATELET # BLD AUTO: 142 10E9/L (ref 150–450)
POTASSIUM SERPL-SCNC: 3.9 MMOL/L (ref 3.4–5.3)
PROCALCITONIN SERPL-MCNC: 0.39 NG/ML
PROT SERPL-MCNC: 5.5 G/DL (ref 6.8–8.8)
RBC # BLD AUTO: 2.9 10E12/L (ref 4.4–5.9)
SODIUM SERPL-SCNC: 139 MMOL/L (ref 133–144)
VANCOMYCIN SERPL-MCNC: 11.8 MG/L
WBC # BLD AUTO: 5 10E9/L (ref 4–11)

## 2020-07-21 PROCEDURE — 88185 FLOWCYTOMETRY/TC ADD-ON: CPT | Performed by: NURSE PRACTITIONER

## 2020-07-21 PROCEDURE — 25000132 ZZH RX MED GY IP 250 OP 250 PS 637: Mod: GY | Performed by: INTERNAL MEDICINE

## 2020-07-21 PROCEDURE — 97530 THERAPEUTIC ACTIVITIES: CPT | Mod: GP

## 2020-07-21 PROCEDURE — 99233 SBSQ HOSP IP/OBS HIGH 50: CPT | Performed by: INTERNAL MEDICINE

## 2020-07-21 PROCEDURE — 40001005 ZZHCL STATISTIC FLOW >15 ABY TC 88189: Performed by: NURSE PRACTITIONER

## 2020-07-21 PROCEDURE — 25000128 H RX IP 250 OP 636: Performed by: INTERNAL MEDICINE

## 2020-07-21 PROCEDURE — 80202 ASSAY OF VANCOMYCIN: CPT | Performed by: INTERNAL MEDICINE

## 2020-07-21 PROCEDURE — 85025 COMPLETE CBC W/AUTO DIFF WBC: CPT | Performed by: INTERNAL MEDICINE

## 2020-07-21 PROCEDURE — 84145 PROCALCITONIN (PCT): CPT | Performed by: INTERNAL MEDICINE

## 2020-07-21 PROCEDURE — 99207 ZZC APP CREDIT; MD BILLING SHARED VISIT: CPT | Performed by: NURSE PRACTITIONER

## 2020-07-21 PROCEDURE — 88184 FLOWCYTOMETRY/ TC 1 MARKER: CPT | Performed by: NURSE PRACTITIONER

## 2020-07-21 PROCEDURE — 85610 PROTHROMBIN TIME: CPT | Performed by: INTERNAL MEDICINE

## 2020-07-21 PROCEDURE — 80053 COMPREHEN METABOLIC PANEL: CPT | Performed by: INTERNAL MEDICINE

## 2020-07-21 PROCEDURE — 25000132 ZZH RX MED GY IP 250 OP 250 PS 637: Mod: GY | Performed by: NURSE PRACTITIONER

## 2020-07-21 PROCEDURE — 97116 GAIT TRAINING THERAPY: CPT | Mod: GP

## 2020-07-21 PROCEDURE — 12000001 ZZH R&B MED SURG/OB UMMC

## 2020-07-21 PROCEDURE — 36415 COLL VENOUS BLD VENIPUNCTURE: CPT | Performed by: INTERNAL MEDICINE

## 2020-07-21 PROCEDURE — 86140 C-REACTIVE PROTEIN: CPT | Performed by: INTERNAL MEDICINE

## 2020-07-21 RX ADMIN — GABAPENTIN 600 MG: 300 CAPSULE ORAL at 20:50

## 2020-07-21 RX ADMIN — PIPERACILLIN AND TAZOBACTAM 3.38 G: 3; .375 INJECTION, POWDER, FOR SOLUTION INTRAVENOUS at 03:52

## 2020-07-21 RX ADMIN — PIPERACILLIN AND TAZOBACTAM 3.38 G: 3; .375 INJECTION, POWDER, FOR SOLUTION INTRAVENOUS at 08:41

## 2020-07-21 RX ADMIN — GABAPENTIN 600 MG: 300 CAPSULE ORAL at 07:53

## 2020-07-21 RX ADMIN — OXYCODONE HYDROCHLORIDE 15 MG: 5 TABLET ORAL at 15:23

## 2020-07-21 RX ADMIN — TRIAMCINOLONE ACETONIDE: 1 OINTMENT TOPICAL at 20:50

## 2020-07-21 RX ADMIN — Medication 4.5 MG: at 17:56

## 2020-07-21 RX ADMIN — TRIAMCINOLONE ACETONIDE: 1 OINTMENT TOPICAL at 07:56

## 2020-07-21 RX ADMIN — OXYCODONE HYDROCHLORIDE 10 MG: 5 TABLET ORAL at 20:50

## 2020-07-21 RX ADMIN — POLYETHYLENE GLYCOL 3350 17 G: 17 POWDER, FOR SOLUTION ORAL at 07:53

## 2020-07-21 ASSESSMENT — ACTIVITIES OF DAILY LIVING (ADL)
ADLS_ACUITY_SCORE: 12

## 2020-07-21 ASSESSMENT — MIFFLIN-ST. JEOR: SCORE: 1552.78

## 2020-07-21 NOTE — PLAN OF CARE
Discharge Planner PT   Patient plan for discharge: Prefers home; understands TCU  Current status: SBA for all mobility this date and amb 150' x 2 with front and four wheeled walkers. No loss of balance this date with improved activity tolerance. VSS with activity.  Barriers to return to prior living situation: Level of support, fall risk, deconditioning  Recommendations for discharge: TCU; may progress to home with assist/home PT    Rationale for recommendations: Current level of function       Entered by: Rj Fajardo 07/21/2020 3:08 PM

## 2020-07-21 NOTE — PLAN OF CARE
"/46 (BP Location: Right arm)   Pulse 65   Temp 96.9  F (36.1  C) (Oral)   Resp 16   Ht 1.778 m (5' 10\")   Wt 79.2 kg (174 lb 8 oz)   SpO2 97%   BMI 25.04 kg/m      0730 - 1530    Neuro: A&Ox4.   Cardiac: SR. VSS.   Respiratory: Sating 97% on RA.  GI/: Adequate urine output. BM X1  Diet/appetite: Tolerating regular diet. Eating well.  Activity: Stand by assist up to chair and in halls.  Pain: At acceptable level on current regimen.   Skin: No new deficits noted.  LDA's:PIV saline locked.  New this shift : Renal function is improving and no Vancomycin for today.  Plan: Continue with POC. Notify primary team with changes.  "

## 2020-07-21 NOTE — PROGRESS NOTES
[facility], [facility] Progress Note - Hospitalist Service, Gold 4       Date of Admission:  7/18/2020  Assessment & Plan     Bandar Wells is a 69 year old male with past medical history significant for complicated cardiac disease with systolic and diastolic CHF, mechanical MVR on warfarin, history endocarditis, paroxysmal atrial fib s/p MAZE procedure, HLD, and stage III CKD who recently started allopurinol and colchicine for hyperuricemia and developed rash, ISMAEL, and hyperkalemia and is transferred to [facility] on 7/18 for further evaluation by Dermatology.      # Rash -  Feels about the same, but overall less itchy.  Developed painful, splotchy, erythematous rash diffusely over neck, back, chest, arms, and legs and eventually palms and a few oral lesions about 7-10 days prior to admission, coinciding with starting allopurinol and colchicine.  Started on cetirizine, clobetasol, and hydroxyzine outpt without improvement.  Last seen by Derm on 7/19, underwent punch bx, results pending.  Overall, favor erythrodermic disorders including DIHS/DRESS vs PRP vs other.  Peripheral smear ordered to evaluate for Sezary cells, final result with very rare atypical (?reactive) lymphocytes.    - Appreciate Dermatology following; punch bx inconclusive but possibly c/w drug reaction, will add flow cytometry (blood and tissue) to further evaluate given non-specific bx and PS findings   - Continue Triamcinolone cream BID with saran wrap covering   - Continue Oxycodone PRN for pain   - Continue Atarax PRN for itching   - Trend CBC w/ differential, CMP daily   - Will defer oral steroids for now per d/w Derm, but will consider if worsening LFTs, fevers    # Fevers - Febrile to 102 F evening 7/19 and started on IV Vanc and Zosyn.  Blood cx pending.  Tmax to 101.7 on IV abx on 720, but felt to be 2/2 rash.  Afebrile ~ 24 hr.  CRP and procal downtrending.  Continues to be without leukocytosis.  UA negative.    - Stop IV vanc and zosyn   - Trend CRP and procal in AM   - Will continue to follow blood cx   - APAP PRN for fevers   - Monitor closely - resume IV abx and consider ID consult if recurrent fevers     # Isolated AST elevation - In setting of possible drug rash vs DRESS as above.   (50).  Other LFTs wnl but uptrending.    - D/w Dermatology, will order RUQ US to evaluate for possible other liver pathology       # ISMAEL in setting of CKD stage III - Improving.  Cr 1.71 today.  BL closer to 1.87.  Appears to have been gradually worsening over the last month.  Cr peaked at 3.32 at OSH, improved with fluids.  UA neg.  Possibly 2/2 dehydration and poor PO intake, as well as diuretic use PTA.    - Follow BMP daily to trend renal function   - Continue to hold diuretics for now, monitor volume status carefully  - Renally dose medications, avoid nephrotoxic agents     # Abnormal bone lesion - Underwent bone marrow biopsy on June 6th for abnormal bone lesions, which reveled hypercellularity but otherwise unremarkable with no evidence of myeloma or malignancy. Plans to follow up with outpatient hematology in 6 months.     # Deconditioning - In setting of acute illness.  PT recommending TCU at this time, however pt previously lived independently and wanting to return home.   - Ongoing work w/ PT during hospital stay with goal to return home       Stable/chronic issues:  # S/p mechanical MVR - 2/2 endocarditis.  Continue PTA warfarin, dosing by Pharmacy.  Hold PTA ppx PCN while on broad spectrum abx.    # Combined systolic and diastolic heart failure - Echocardiogram today w/ EF 60-65%, improved from prev in 2/2020 with 55-60%.  On torsemide 50mg BID and metolozone 2.5mg daily PRN, currently on hold in setting of ISMAEL, dehydration.  Dry weight ~ 160lbs.  Please obtain daily standing weights and I/Os.  Will resume PTA torsemide as Cr improves.    # Chronic pain syndrome - On Oxycodone 5-10mg Q4H PRN PTA.  Follows with  Rheumatology and Hematology for possible inflammatory myopathy, hyperuricemia, and abnormal bone lesions that may be contributory.  Will continue PTA oxycodone and follow up with outpatient providers.    # Anemia 2/2 chronic illness - Hgb BL 9-10s.  Slightly below BL at 8.3 in setting of acute illness.  Following CBC daily as above.      Resolved hospital problems:   # Thrombocytopenia - Improving.  Plts normal at 152 today.    # Constipation - Resolved.  Had 3 BMs last 24 hr. Continue scheduled Miralax, hold for loose stools.             Diet: Combination Diet Regular Diet Adult    DVT Prophylaxis: on warfarin d/t MV, goal 2.5-3.5   Chappell Catheter: not present  Code Status: Full Code           Disposition Plan   Expected discharge: 2 - 3 days, recommended to TBD once fevers resolved and final plan for rash per Dermatology.  Entered: STIVEN Vega CNP 07/21/2020, 7:55 AM       The patient's care was discussed with the Attending Physician, Dr. Nacho Knapp.    STIVEN Vega CNP  Hospitalist Service, [address]  Pager: [phone]  Please see sticky note for cross cover information  ______________________________________________________________________    Interval History   Bandar is seen in his room eating lunch, his son is at the bedside.  He reports that his rash feels about the same as yesterday.  Very productive morning with showering and PT session.  No complaints of pain.  Eating and drinking well.  No shortness of breath, chest pain, nausea, or vomiting.  Tolerated test patches of triamcinolone well and eager to use over rest of body.      Data reviewed today: I reviewed all medications, new labs and imaging results over the last 24 hours.     Physical Exam   Vital Signs: Temp: 98.2  F (36.8  C) Temp src: Oral BP: 101/46 Pulse: 50 Heart Rate: 51 Resp: 20 SpO2: 93 % O2 Device: None (Room air)    Weight: 172 lbs 8 oz    GENERAL: Alert and oriented x 3.  Well nourished, well developed.  No acute distress.    HEENT: Normocephalic, atraumatic. Anicteric sclera. Mucous membranes moist.   CV: RRR. S1, S2. No murmurs appreciated.   RESPIRATORY: Effort normal on room air. Lungs CTAB with no wheezing, rales, or rhonchi.   GI: Abdomen soft and non distended, bowel sounds present x all 4 quadrants. No tenderness, rebound, or guarding.   NEUROLOGICAL: No focal deficits. Follows commands.  Strength equal in upper and lower extremities.   MUSCULOSKELETAL: No joint swelling or tenderness. Moves all extremities.   EXTREMITIES: No gross deformities. No peripheral edema.   SKIN: Grossly warm, dry, and intact. No jaundice. Diffuse erythematous rash, mostly macular, over more 80% body, mostly legs, back, arms, and abdomen.        Data   Recent Labs   Lab 20  0527 20  0435 20  1451 20  0606   WBC 5.0 5.2 5.9 5.3   HGB 8.3* 8.3* 8.6* 8.3*   MCV 92 91 91 92   * 152 146* 146*   INR 3.02* 3.03*  --  3.12*    138  --  137   POTASSIUM 3.9 3.8  --  4.0   CHLORIDE 110* 111*  --  108   CO2 21 21  --  23   BUN 38* 48*  --  58*   CR 1.71* 1.97*  --  2.40*   ANIONGAP 7 7  --  6   DEVYN 8.4* 8.3*  --  8.4*   * 111*  --  114*   ALBUMIN 2.2* 2.3*  --  2.5*   PROTTOTAL 5.5* 5.3*  --  5.7*   BILITOTAL 1.2 0.6  --  0.5   ALKPHOS 67 56  --  53   ALT 60 26  --  25   * 50*  --  34     Recent Results (from the past 24 hour(s))   Echo Complete    Narrative    [id number]  [id number]  [id number]  [id number]^SIRENA^ESTEBAN           [facility],[place]  Echocardiography Laboratory  [address] [place] [id number]     Name: JAMEEL ARZATE  MRN: [id number]  : 1950  Study Date: 2020 11:23 AM  Age: 69 yrs  Gender: Male  Patient Location: [facility] [place]  Reason For Study: CHF  Ordering Physician: ESTEBAN PACK  Referring Physician: SELF, REFERRED  Performed By: Jhoana Vazquez RDCS     BSA: 1.9 m2  Height: 70 in  Weight: 165  lb  HR: 64  BP: 99/40 mmHg  _____________________________________________________________________________  __        Procedure  Complete Portable Echo Adult.  _____________________________________________________________________________  __        Interpretation Summary  Global and regional left ventricular function is normal with an EF of 60-65%.  Right ventricular function, chamber size, wall motion, and thickness are  normal.  A mechanical mitral valve is present.  MeanMitral gradient 7 mmHg at heart rate 63BPM/AFib.No valvular or para  valvular MR.  Right ventricular systolic pressure is 42mmHg above the right atrial pressure.  IVC diameter >2.1 cm collapsing <50% with sniff suggests a high RA pressure  estimated at 15 mmHg or greater.  No pericardial effusion is present.  Previous study not available for comparison.  _____________________________________________________________________________  __        Left Ventricle  Global and regional left ventricular function is normal with an EF of 60-65%.  Left ventricular wall thickness is normal. Left ventricular size is normal.  Diastolic function not assessed due to presence of prosthetic mitral valve.  Abnormal non-specific septal motion is present.     Right Ventricle  Right ventricular function, chamber size, wall motion, and thickness are  normal.     Atria  The atria cannot be assessed.     Mitral Valve  A mechanical mitral valve is present. MeanMitral gradient 7 mmHg at heart rate  63BPM/AFib.No valvular or para valvular MR.        Aortic Valve  Trileaflet aortic sclerosis without stenosis.     Tricuspid Valve  The tricuspid valve is normal. Trace to mild tricuspid insufficiency is  present. Right ventricular systolic pressure is 42mmHg above the right atrial  pressure.     Pulmonic Valve  The pulmonic valve is normal.     Vessels  The thoracic aorta is normal. The pulmonary artery and bifurcation cannot be  assessed. IVC diameter >2.1 cm collapsing <50% with  sniff suggests a high RA  pressure estimated at 15 mmHg or greater.     Pericardium  No pericardial effusion is present.        Compared to Previous Study  Previous study not available for comparison.  _____________________________________________________________________________  __  MMode/2D Measurements & Calculations  IVSd: 0.82 cm     LVIDd: 5.0 cm  LVIDs: 3.1 cm  LVPWd: 0.95 cm  FS: 37.3 %  LV mass(C)d: 153.7 grams  LV mass(C)dI: 79.9 grams/m2  asc Aorta Diam: 3.4 cm  LVOT diam: 2.3 cm  LVOT area: 4.2 cm2  RVOT diam: 4.7 cm  RWT: 0.38        Doppler Measurements & Calculations  MV max P.4 mmHg  MV mean P.3 mmHg  MV V2 VTI: 56.6 cm  MVA(VTI): 1.6 cm2  MV P1/2t max donna: 212.0 cm/sec  MV P1/2t: 96.3 msec  MVA(P1/2t): 2.3 cm2  MV dec slope: 645.0 cm/sec2  LV V1 max P.6 mmHg  LV V1 max: 118.4 cm/sec  LV V1 VTI: 22.2 cm  SV(LVOT): 92.4 ml  SI(LVOT): 48.1 ml/m2  TR max donna: 323.5 cm/sec  TR max P.9 mmHg        _____________________________________________________________________________  __        Report approved by: Clayton Trent 2020 12:41 PM        Medications     - MEDICATION INSTRUCTIONS -       Warfarin Therapy Reminder         gabapentin  600 mg Oral BID     lidocaine 1% with EPINEPHrine 1:100,000  1 mL Intradermal Once     [Held by provider] penicillin V  500 mg Oral Daily     piperacillin-tazobactam  3.375 g Intravenous Q6H     polyethylene glycol  17 g Oral Daily     sodium chloride (PF)  3 mL Intracatheter Q8H     triamcinolone   Topical BID     vancomycin place saldana - receiving intermittent dosing  1 each Does not apply See Admin Instructions

## 2020-07-21 NOTE — PHARMACY-VANCOMYCIN DOSING SERVICE
Pharmacy Vancomycin Note  Date of Service 2020  Patient's  1950   69 year old, male    Indication: Sepsis  Goal Trough Level: 15-20 mg/L  Day of Therapy: 2  Current Vancomycin regimen:  1750 mg IV once, planned for intermittent dosing    Current estimated CrCl = Estimated Creatinine Clearance: 44.4 mL/min (A) (based on SCr of 1.71 mg/dL (H)).    Creatinine for last 3 days  2020: 10:34 PM Creatinine 2.46 mg/dL  2020:  6:06 AM Creatinine 2.40 mg/dL  2020:  4:35 AM Creatinine 1.97 mg/dL  2020:  5:27 AM Creatinine 1.71 mg/dL    Recent Vancomycin Levels (past 3 days)  2020:  5:27 AM Vancomycin Level 11.8 mg/L    Vancomycin IV Administrations (past 72 hours)                   vancomycin (VANCOCIN) 1,750 mg in sodium chloride 0.9 % 500 mL intermittent infusion (mg) 1,750 mg New Bag 20 0210                Nephrotoxins and other renal medications (From now, onward)    Start     Dose/Rate Route Frequency Ordered Stop    20 1300  vancomycin (VANCOCIN) 1,750 mg in sodium chloride 0.9 % 500 mL intermittent infusion      1,750 mg  over 2 Hours Intravenous ONCE 20 1222      20 0800  [Held by provider]  penicillin V (VEETID) tablet 500 mg     (Held by provider since Mon 2020 at 0427 by Tayla Hidalgo MD. Reason: Other.)    500 mg Oral DAILY 20 2157               Contrast Orders - past 72 hours (72h ago, onward)    None          Interpretation of levels and current regimen:  Trough level is  Subtherapeutic    Has serum creatinine changed > 50% in last 72 hours: No    Urine output:  good urine output    Renal Function: Improving    Plan:  1.  Vancomycin discontinued .    Cam Jones, PGY-1 Pharmacy Resident  [facility], [facility]        .

## 2020-07-21 NOTE — PLAN OF CARE
[id number]:  Tmax 101.7, pt felt chilled, tylenol given, provider notified. Oxycodone given for pain x1, good relief. Cream applied on legs, saran wrap unavailable at the time, available for evening dose. Echo done today. Dulcolax given x1. Miralax initiated. BM x1. BA on for safety. Up w/ SBA. Skin biopsy results pending. Continue to monitor & w/ POC.     -Notified provider of 1900 VS, low BPs.

## 2020-07-21 NOTE — PROGRESS NOTES
[facility] Inpatient Dermatology Progress Note    Assessment and Plan:  1. Erythroderma  Biopsy findings are a bit equivocal, as there are findings of an eczematous dermatitis but also subtle findings of a drug rash also a possibility. Given history we still are primarily considering drug rash, and have some concern for a limited/abortive form of DRESS/DIHS, but we recommend careful monitoring off of systemic steroids at this time. Patient has had intermittent hypotension, fevers, elevated procalcitonin that is improving on antibiotics. He doesn't have a leukocytosis currently. He also has improving ISMAEL, but a slightly worsening transaminitis. There are features that support diagnosis of DRESS/DIHS and certainly a trial of steroids may be warranted, but would continue to look for cause of infection. If patient spikes fever and transamintis worsens tomorrow, we may consider trial of PO steroids.   -Continue TAC 0.1% ointment BID under occlusion with Saran wrap  -Continue monitoring CBC w/ diff and CMP daily, as well as fever curve  -Do recommend flow cytometry on blood and tissue given atypical cells seen on smear (has had SPEP and immunofixation and BM biopsy recently so no need to add on at this time)  -Consider RUQ U/S to rule out alternative cause for transaminitis      We will continue to follow. Please do not hesitate to contact the dermatology resident/faculty on call for any additional questions or concerns.     Patient seen and discussed with attending physician, Dr. Andreas Delgado MD  Internal Medicine-Dermatology, PGY-5    Attending Note  While we are still concerned about the possibility of a systemic hypersensitivity reaction, the patient is well appearing, and thus we would suggest close monitoring rather than initiating systemic steroids at this time.  We will continue to follow closely.    I have seen and examined this patient and agree with the assessment and  plan as documented in the resident's note.    Michael Johns MD  Dermatology Attending      Dermatology Problem List:  1. 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  ISMAEL appears to be better.  Patient afebrile now after IV antibiotics given yesterday. Now off broad spectrum IV antibiotics.  He overall states he feels better. He thinks his rash is slightly better--not as pruritic. Denies facial edema.    Medications:  Current Facility-Administered Medications   Medication     acetaminophen (TYLENOL) tablet 650 mg     bisacodyl (DULCOLAX) EC tablet 5 mg    Or     bisacodyl (DULCOLAX) EC tablet 10 mg    Or     bisacodyl (DULCOLAX) EC tablet 15 mg     gabapentin (NEURONTIN) capsule 600 mg     hydrOXYzine (ATARAX) tablet 25 mg     lidocaine (LMX4) cream     lidocaine 1 % 0.1-1 mL     lidocaine 1% with EPINEPHrine 1:100,000 injection 1 mL     magnesium citrate solution 148 mL     melatonin tablet 1 mg     naloxone (NARCAN) injection 0.1-0.4 mg     ondansetron (ZOFRAN-ODT) ODT tab 4 mg    Or     ondansetron (ZOFRAN) injection 4 mg     oxyCODONE (ROXICODONE) tablet 10-15 mg     Patient is already receiving anticoagulation with heparin, enoxaparin (LOVENOX), warfarin (COUMADIN)  or other anticoagulant medication     [Held by provider] penicillin V (VEETID) tablet 500 mg     polyethylene glycol (MIRALAX) Packet 17 g     polyethylene glycol (MIRALAX) Packet 17 g     prochlorperazine (COMPAZINE) injection 5 mg    Or     prochlorperazine (COMPAZINE) tablet 5 mg    Or     prochlorperazine (COMPAZINE) Suppository 12.5 mg     sodium chloride (PF) 0.9% PF flush 3 mL     sodium chloride (PF) 0.9% PF flush 3 mL     triamcinolone (KENALOG) 0.1 % ointment     Warfarin Therapy Reminder (Check START DATE - warfarin may be starting in the FUTURE)  "       Physical exam:  /46 (BP Location: Right arm)   Pulse 65   Temp 96.9  F (36.1  C) (Oral)   Resp 16   Ht 1.778 m (5' 10\")   Wt 79.2 kg (174 lb 8 oz)   SpO2 97%   BMI 25.04 kg/m    GEN:This is a well developed, well-nourished male in no acute distress, in a pleasant mood.    SKIN: Total skin excluding the undergarment areas was performed. The exam included the head/face, neck, both arms, chest, back, abdomen, both legs, digits and/or nails.   -Barrett skin type: I  -multiple pink plaques and areas of pink erythema (BSA ~80%) with islands of sparing on the anterior thighs and lower back; erythema and yellow greasy scale on the nasolabial and melolabial folds and eyebrows    Laboratory:  Results for orders placed or performed during the hospital encounter of 07/18/20 (from the past 24 hour(s))   INR   Result Value Ref Range    INR 3.02 (H) 0.86 - 1.14   Comprehensive metabolic panel   Result Value Ref Range    Sodium 139 133 - 144 mmol/L    Potassium 3.9 3.4 - 5.3 mmol/L    Chloride 110 (H) 94 - 109 mmol/L    Carbon Dioxide 21 20 - 32 mmol/L    Anion Gap 7 3 - 14 mmol/L    Glucose 116 (H) 70 - 99 mg/dL    Urea Nitrogen 38 (H) 7 - 30 mg/dL    Creatinine 1.71 (H) 0.66 - 1.25 mg/dL    GFR Estimate 39 (L) >60 mL/min/[1.73_m2]    GFR Estimate If Black 45 (L) >60 mL/min/[1.73_m2]    Calcium 8.4 (L) 8.5 - 10.1 mg/dL    Bilirubin Total 1.2 0.2 - 1.3 mg/dL    Albumin 2.2 (L) 3.4 - 5.0 g/dL    Protein Total 5.5 (L) 6.8 - 8.8 g/dL    Alkaline Phosphatase 67 40 - 150 U/L    ALT 60 0 - 70 U/L     (H) 0 - 45 U/L   CBC with platelets differential   Result Value Ref Range    WBC 5.0 4.0 - 11.0 10e9/L    RBC Count 2.90 (L) 4.4 - 5.9 10e12/L    Hemoglobin 8.3 (L) 13.3 - 17.7 g/dL    Hematocrit 26.6 (L) 40.0 - 53.0 %    MCV 92 78 - 100 fl    MCH 28.6 26.5 - 33.0 pg    MCHC 31.2 (L) 31.5 - 36.5 g/dL    RDW 18.1 (H) 10.0 - 15.0 %    Platelet Count 142 (L) 150 - 450 10e9/L    Diff Method Automated Method     % " Neutrophils 60.5 %    % Lymphocytes 15.3 %    % Monocytes 11.1 %    % Eosinophils 11.3 %    % Basophils 1.0 %    % Immature Granulocytes 0.8 %    Nucleated RBCs 0 0 /100    Absolute Neutrophil 3.0 1.6 - 8.3 10e9/L    Absolute Lymphocytes 0.8 0.8 - 5.3 10e9/L    Absolute Monocytes 0.6 0.0 - 1.3 10e9/L    Absolute Eosinophils 0.6 0.0 - 0.7 10e9/L    Absolute Basophils 0.1 0.0 - 0.2 10e9/L    Abs Immature Granulocytes 0.0 0 - 0.4 10e9/L    Absolute Nucleated RBC 0.0    Vancomycin level   Result Value Ref Range    Vancomycin Level 11.8 mg/L   CRP inflammation   Result Value Ref Range    CRP Inflammation 35.0 (H) 0.0 - 8.0 mg/L   Procalcitonin   Result Value Ref Range    Procalcitonin 0.39 ng/ml       Staff Involved:  Resident/Staff

## 2020-07-21 NOTE — PLAN OF CARE
AVSS.  Pt denies n/v, endorses moderate pain.  Oxy given for relief.  Applied kennalog to legs and saran wrapped for ~6 hours.  No acute events.  Continue with POC.

## 2020-07-22 ENCOUNTER — APPOINTMENT (OUTPATIENT)
Dept: ULTRASOUND IMAGING | Facility: CLINIC | Age: 70
DRG: 683 | End: 2020-07-22
Attending: NURSE PRACTITIONER
Payer: MEDICARE

## 2020-07-22 LAB
ALBUMIN SERPL-MCNC: 2.2 G/DL (ref 3.4–5)
ALP SERPL-CCNC: 70 U/L (ref 40–150)
ALT SERPL W P-5'-P-CCNC: 61 U/L (ref 0–70)
ANION GAP SERPL CALCULATED.3IONS-SCNC: 7 MMOL/L (ref 3–14)
ANISOCYTOSIS BLD QL SMEAR: ABNORMAL
AST SERPL W P-5'-P-CCNC: 89 U/L (ref 0–45)
BASOPHILS # BLD AUTO: 0 10E9/L (ref 0–0.2)
BASOPHILS NFR BLD AUTO: 0 %
BILIRUB SERPL-MCNC: 0.8 MG/DL (ref 0.2–1.3)
BUN SERPL-MCNC: 30 MG/DL (ref 7–30)
BURR CELLS BLD QL SMEAR: SLIGHT
CALCIUM SERPL-MCNC: 8.4 MG/DL (ref 8.5–10.1)
CHLORIDE SERPL-SCNC: 112 MMOL/L (ref 94–109)
CO2 SERPL-SCNC: 22 MMOL/L (ref 20–32)
CREAT SERPL-MCNC: 1.53 MG/DL (ref 0.66–1.25)
CRP SERPL-MCNC: 30 MG/L (ref 0–8)
DIFFERENTIAL METHOD BLD: ABNORMAL
EOSINOPHIL # BLD AUTO: 0.8 10E9/L (ref 0–0.7)
EOSINOPHIL NFR BLD AUTO: 13 %
ERYTHROCYTE [DISTWIDTH] IN BLOOD BY AUTOMATED COUNT: 18.5 % (ref 10–15)
GFR SERPL CREATININE-BSD FRML MDRD: 45 ML/MIN/{1.73_M2}
GLUCOSE SERPL-MCNC: 111 MG/DL (ref 70–99)
HCT VFR BLD AUTO: 24.7 % (ref 40–53)
HGB BLD-MCNC: 7.7 G/DL (ref 13.3–17.7)
INR PPP: 3.09 (ref 0.86–1.14)
LYMPHOCYTES # BLD AUTO: 0.9 10E9/L (ref 0.8–5.3)
LYMPHOCYTES NFR BLD AUTO: 15 %
MACROCYTES BLD QL SMEAR: PRESENT
MCH RBC QN AUTO: 28.8 PG (ref 26.5–33)
MCHC RBC AUTO-ENTMCNC: 31.2 G/DL (ref 31.5–36.5)
MCV RBC AUTO: 93 FL (ref 78–100)
MONOCYTES # BLD AUTO: 0.6 10E9/L (ref 0–1.3)
MONOCYTES NFR BLD AUTO: 10 %
NEUTROPHILS # BLD AUTO: 3.7 10E9/L (ref 1.6–8.3)
NEUTROPHILS NFR BLD AUTO: 62 %
OVALOCYTES BLD QL SMEAR: ABNORMAL
PLATELET # BLD AUTO: 146 10E9/L (ref 150–450)
POIKILOCYTOSIS BLD QL SMEAR: ABNORMAL
POTASSIUM SERPL-SCNC: 3.9 MMOL/L (ref 3.4–5.3)
PROCALCITONIN SERPL-MCNC: 0.28 NG/ML
PROT SERPL-MCNC: 5.5 G/DL (ref 6.8–8.8)
RBC # BLD AUTO: 2.67 10E12/L (ref 4.4–5.9)
SODIUM SERPL-SCNC: 140 MMOL/L (ref 133–144)
WBC # BLD AUTO: 6 10E9/L (ref 4–11)

## 2020-07-22 PROCEDURE — 85025 COMPLETE CBC W/AUTO DIFF WBC: CPT | Performed by: INTERNAL MEDICINE

## 2020-07-22 PROCEDURE — 36415 COLL VENOUS BLD VENIPUNCTURE: CPT | Performed by: INTERNAL MEDICINE

## 2020-07-22 PROCEDURE — 99207 ZZC APP CREDIT; MD BILLING SHARED VISIT: CPT | Performed by: NURSE PRACTITIONER

## 2020-07-22 PROCEDURE — 25000132 ZZH RX MED GY IP 250 OP 250 PS 637: Mod: GY | Performed by: NURSE PRACTITIONER

## 2020-07-22 PROCEDURE — 80053 COMPREHEN METABOLIC PANEL: CPT | Performed by: INTERNAL MEDICINE

## 2020-07-22 PROCEDURE — 76705 ECHO EXAM OF ABDOMEN: CPT

## 2020-07-22 PROCEDURE — 99232 SBSQ HOSP IP/OBS MODERATE 35: CPT | Performed by: INTERNAL MEDICINE

## 2020-07-22 PROCEDURE — 84145 PROCALCITONIN (PCT): CPT | Performed by: INTERNAL MEDICINE

## 2020-07-22 PROCEDURE — 85610 PROTHROMBIN TIME: CPT | Performed by: INTERNAL MEDICINE

## 2020-07-22 PROCEDURE — 99207 ZZC CDG-MDM COMPONENT: MEETS LOW - DOWN CODED: CPT | Performed by: INTERNAL MEDICINE

## 2020-07-22 PROCEDURE — 25000132 ZZH RX MED GY IP 250 OP 250 PS 637: Mod: GY | Performed by: INTERNAL MEDICINE

## 2020-07-22 PROCEDURE — 12000001 ZZH R&B MED SURG/OB UMMC

## 2020-07-22 PROCEDURE — 86140 C-REACTIVE PROTEIN: CPT | Performed by: INTERNAL MEDICINE

## 2020-07-22 RX ORDER — WARFARIN SODIUM 6 MG/1
6 TABLET ORAL
Status: COMPLETED | OUTPATIENT
Start: 2020-07-22 | End: 2020-07-22

## 2020-07-22 RX ORDER — TORSEMIDE 100 MG
50 TABLET ORAL DAILY
Status: DISCONTINUED | OUTPATIENT
Start: 2020-07-23 | End: 2020-07-23 | Stop reason: HOSPADM

## 2020-07-22 RX ADMIN — OXYCODONE HYDROCHLORIDE 15 MG: 5 TABLET ORAL at 21:09

## 2020-07-22 RX ADMIN — WARFARIN SODIUM 6 MG: 6 TABLET ORAL at 18:10

## 2020-07-22 RX ADMIN — GABAPENTIN 600 MG: 300 CAPSULE ORAL at 08:00

## 2020-07-22 RX ADMIN — TRIAMCINOLONE ACETONIDE: 1 OINTMENT TOPICAL at 21:02

## 2020-07-22 RX ADMIN — TRIAMCINOLONE ACETONIDE: 1 OINTMENT TOPICAL at 08:48

## 2020-07-22 RX ADMIN — OXYCODONE HYDROCHLORIDE 15 MG: 5 TABLET ORAL at 15:33

## 2020-07-22 RX ADMIN — OXYCODONE HYDROCHLORIDE 10 MG: 5 TABLET ORAL at 02:19

## 2020-07-22 RX ADMIN — OXYCODONE HYDROCHLORIDE 15 MG: 5 TABLET ORAL at 08:00

## 2020-07-22 RX ADMIN — PENICILLIN V POTASIUM 500 MG: 500 TABLET OROPHARYNGEAL at 08:01

## 2020-07-22 RX ADMIN — GABAPENTIN 600 MG: 300 CAPSULE ORAL at 21:02

## 2020-07-22 ASSESSMENT — PAIN DESCRIPTION - DESCRIPTORS
DESCRIPTORS: BURNING;ITCHING
DESCRIPTORS: BURNING
DESCRIPTORS: BURNING;ITCHING
DESCRIPTORS: BURNING

## 2020-07-22 ASSESSMENT — ACTIVITIES OF DAILY LIVING (ADL)
ADLS_ACUITY_SCORE: 12

## 2020-07-22 ASSESSMENT — MIFFLIN-ST. JEOR: SCORE: 1557.31

## 2020-07-22 NOTE — PROGRESS NOTES
[facility], [facility] Progress Note - Hospitalist Service, Gold 4       Date of Admission:  7/18/2020  Assessment & Plan     Bandar Wells is a 69 year old male with past medical history significant for complicated cardiac disease with systolic and diastolic CHF, mechanical MVR on warfarin, history endocarditis, paroxysmal atrial fib s/p MAZE procedure, HLD, and stage III CKD who recently started allopurinol and colchicine for hyperuricemia and developed rash, ISMAEL, and hyperkalemia and is transferred to [facility] on 7/18 for further evaluation by Dermatology.      # Rash -  Feels about the same, but overall less itchy.  Developed painful, splotchy, erythematous rash diffusely over neck, back, chest, arms, and legs and eventually palms and a few oral lesions about 7-10 days prior to admission, coinciding with starting allopurinol and colchicine.  Started on cetirizine, clobetasol, and hydroxyzine outpt without improvement.  Last seen by Derm on 7/19, underwent punch bx, results pending.  Overall, favor erythrodermic disorders including DIHS/DRESS vs PRP vs other.  Punch bx with mild spongiosis and perivascular lymphohistiocytic inflammation, possible c/w eczematous dermatitis, but could still include drug eruption.  Peripheral smear ordered to evaluate for Sezary cells, final result with very rare atypical (?reactive) lymphocytes.  Flow cytometry added onto tissue and blood specimen, currently pending.  CRP continues to downtrend after stopping abx.    - Appreciate Dermatology recs  - Continue Triamcinolone cream BID with saran wrap covering   - Continue Oxycodone PRN for pain   - Continue Atarax PRN for itching   - Trend CBC w/ differential, CMP daily   - Will defer oral steroids for now per d/w Derm, but will consider if worsening LFTs, fevers    # Fevers - Resolved.  Febrile to 102 F evening 7/19 and started on IV Vanc and Zosyn, Tmax to 101.7 on IV abx on 7/20, but felt to be 2/2  rash.  Afebrile ~ 24 hr off abx.  CRP and procal downtrending.  Continues to be without leukocytosis.  UA negative.  Blood cx NGTD.   - Trend CRP and procal in AM   - Will continue to follow blood cx   - APAP PRN for fevers   - Monitor closely - resume IV abx and consider ID consult if recurrent fevers     # Isolated AST elevation - In setting of possible drug rash vs DRESS as above.  AST remains elevated but now downtrending.  Other LFTs wnl.  US abdomen today with slightly coarsened echotexture of liver parenchyma with focal areas of slightly increased echogenicity, question possible hepatitis, but otherwise unremarkable.    - Will discuss results w/ Derm     # ISMAEL in setting of CKD stage III - Improving.  Cr 1.53 (1.71) today.  BL ~ 1.87.  Appears to have been gradually worsening over the last month.  Cr peaked at 3.32 at OSH, improved with fluids.  UA neg.  Possibly 2/2 dehydration and poor PO intake, as well as diuretic use PTA.    - Follow BMP daily to trend renal function   - Continue to hold diuretics for now, monitor volume status carefully  - Renally dose medications, avoid nephrotoxic agents     # Abnormal bone lesion - Underwent bone marrow biopsy on June 6th for abnormal bone lesions, which reveled hypercellularity but otherwise unremarkable with no evidence of myeloma or malignancy. Plans to follow up with outpatient hematology in 6 months.     # Deconditioning - In setting of acute illness.  PT recommending TCU at this time, however pt previously lived independently and wanting to return home.   - Ongoing work w/ PT during hospital stay with goal to return home       Stable/chronic issues:  # S/p mechanical MVR - 2/2 endocarditis.  Continue PTA warfarin, dosing by Pharmacy.  Hold PTA ppx PCN while on broad spectrum abx.    # Combined systolic and diastolic heart failure - Echocardiogram today w/ EF 60-65%, improved from prev in 2/2020 with 55-60%.  On torsemide 50mg BID and metolozone 2.5mg daily PRN,  currently on hold in setting of ISMAEL, dehydration.  Dry weight ~ 160lbs.  Please obtain daily standing weights and I/Os.  Will resume PTA torsemide at 50% dose.  # Chronic pain syndrome - On Oxycodone 5-10mg Q4H PRN PTA.  Follows with Rheumatology and Hematology for possible inflammatory myopathy, hyperuricemia, and abnormal bone lesions that may be contributory.  Will continue PTA oxycodone and follow up with outpatient providers.    # Anemia 2/2 chronic illness - Hgb BL 9-10s.  Slightly below BL at 8.3 in setting of acute illness.  Following CBC daily as above.      Resolved hospital problems:   # Thrombocytopenia - Improving.  Plts normal at 152 today.    # Constipation - Resolved.  Had 3 BMs last 24 hr. Continue scheduled Miralax, hold for loose stools.             Diet: NPO per Anesthesia Guidelines for Procedure/Surgery Except for: Meds    DVT Prophylaxis: on warfarin d/t MV, goal 2.5-3.5   Chappell Catheter: not present  Code Status: Full Code           Disposition Plan   Expected discharge: 2 - 3 days, recommended to TBD once fevers resolved and final plan for rash per Dermatology.  Entered: STIVEN Vega CNP 07/22/2020, 8:10 AM       The patient's care was discussed with the Attending Physician, Dr. Nacho Knapp.    STIVEN Vega CNP  Hospitalist Service, [address]  Pager: [phone]  Please see sticky note for cross cover information  ______________________________________________________________________    Interval History   Bandar is seen in his room eating lunch, his son is at the bedside.  Feels that he has been more active today.  Rash is less bothersome.  Denies pain.  Having BMs.      Data reviewed today: I reviewed all medications, new labs and imaging results over the last 24 hours.     Physical Exam   Vital Signs: Temp: 97.8  F (36.6  C) Temp src: Oral BP: 111/46 Pulse: 63 Heart Rate: 52 Resp: 16 SpO2: 91 % O2 Device: None (Room air)     Weight: 174 lbs 8 oz    GENERAL: Alert and oriented x 3. Well nourished, well developed.  No acute distress.    HEENT: Normocephalic, atraumatic. Anicteric sclera. Mucous membranes moist.   CV: RRR. S1, S2. No murmurs appreciated.   RESPIRATORY: Effort normal on room air. Lungs CTAB with no wheezing, rales, or rhonchi.   GI: Abdomen soft and non distended, bowel sounds present x all 4 quadrants. No tenderness, rebound, or guarding.   NEUROLOGICAL: No focal deficits. Follows commands.  Strength equal in upper and lower extremities.   MUSCULOSKELETAL: No joint swelling or tenderness. Moves all extremities.   EXTREMITIES: No gross deformities. No peripheral edema.   SKIN: Grossly warm, dry, and intact. No jaundice. Diffuse erythematous rash, mostly macular, over more 80% body, mostly legs, back, arms, and abdomen with some scaling on scalp, improving.        Data   Recent Labs   Lab 07/22/20  0514 07/21/20  0527 07/20/20  0435   WBC 6.0 5.0 5.2   HGB 7.7* 8.3* 8.3*   MCV 93 92 91   * 142* 152   INR 3.09* 3.02* 3.03*    139 138   POTASSIUM 3.9 3.9 3.8   CHLORIDE 112* 110* 111*   CO2 22 21 21   BUN 30 38* 48*   CR 1.53* 1.71* 1.97*   ANIONGAP 7 7 7   DEVYN 8.4* 8.4* 8.3*   * 116* 111*   ALBUMIN 2.2* 2.2* 2.3*   PROTTOTAL 5.5* 5.5* 5.3*   BILITOTAL 0.8 1.2 0.6   ALKPHOS 70 67 56   ALT 61 60 26   AST 89* 122* 50*     No results found for this or any previous visit (from the past 24 hour(s)).  Medications     - MEDICATION INSTRUCTIONS -       Warfarin Therapy Reminder         gabapentin  600 mg Oral BID     lidocaine 1% with EPINEPHrine 1:100,000  1 mL Intradermal Once     penicillin V  500 mg Oral Daily     polyethylene glycol  17 g Oral Daily     sodium chloride (PF)  3 mL Intracatheter Q8H     triamcinolone   Topical BID

## 2020-07-22 NOTE — PLAN OF CARE
"BP's within ordered parameters @ 113/49 and 101/42. Tmax 100.1. Up independently in his room. States he is feeling \"stronger.\" Also ambulated x 1 in the halls with SBA. Skin itchy but is relieved with scheduled kenalog ointment. Given oxy 10 mg x 1 for \"burning\" discomfort from his generalized skin rash. Appetite good. Ate 100% of dinner. Plan to be NPO after midnight for an abdominal US tomorrow. Ordered due to increasing LFT's & to rule out any liver abnomalies that might be present.   "

## 2020-07-22 NOTE — PLAN OF CARE
"Care from [id number].    Status: Admitted for rash evaluation from dermatology.  Vitals: /46 (BP Location: Right arm)   Pulse 63   Temp 97.8  F (36.6  C) (Oral)   Resp 16   Ht 1.778 m (5' 10\")   Wt 79.6 kg (175 lb 8 oz)   SpO2 91%   BMI 25.18 kg/m  . RA.  Neuros: A&O x4.  IV: PIV SL.  Resp/trach: LS clear & equal.  Diet: Diet advanced to regular after abdominal ultrasound. Patient ate 100% of late breakfast. Fluids provided.  Bowel status: BS+, last BM 7/21. Patient refused miralax this morning.  : Voiding spontaneously.  Skin: +2 edema to BLE's. Generalized red rash throughout body. Rash is itchy, painful, and burning. Some peeling was noted with rash cares. Kenalog cream was applied and new saran wrap was placed per orders.  Pain: Pain controlled with PRN oxycodone (15 mg) once, available when it is requested next.  Activity: Up ad liban in the room. Ambulated to the bathroom. Patient reportedly is trying to stay out of bed most of the day today.  Social: Son visited and helped with rash cares.  Plan: Abdominal ultrasound was completed this morning. Will continue to monitor and follow POC.  "

## 2020-07-22 NOTE — PROGRESS NOTES
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 Rash appears to be improving as well but on face.  Reasonable to follow closely off of steroids vs starting prednisone 0.5 mg/kg/day (as long as team not worried about infection) if labs worsen tomorrow.  -Continue TAC 0.1% ointment BID under occlusion with Saran wrap  -Continue monitoring CBC w/ diff and CMP daily, as well as fever curve  -Follow-up flow cytometry  -If transaminitis or ISMAEL worsen, or patient becomes febrile, from our perspective can do prednisone at a lower that usual dose at 0.5 mg/kg/day given we think overall this represents a mild/abortive case of DRESS/DIHS    We will continue to follow. Please do not hesitate to contact the dermatology resident/faculty on call for any additional questions or concerns.     Patient discussed with attending physician, Dr. Andreas Delgado MD  Internal Medicine-Dermatology, PGY-5    Attending note  Patient remains well-appearing; either close monitoring with a low threshold to start oral steroids if objective findings worsen, or a short course of PO prednisone with close followup in clinic both seem reasonable.  At this time we favor careful clinical monitoring, but will revisit our recommendation regarding steroids pending patient's course over the next 1-2 days, including lab value trends.    I have seen and examined this patient and agree with the assessment and plan as documented in the resident's note.    Michael Johns MD  Dermatology Attending      Dermatology Problem List:  1. 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 Rash seems to be improving but significant facial involvement. Continues with triamcinolone under occlusion.    Medications:  Current Facility-Administered Medications   Medication     acetaminophen (TYLENOL) tablet 650 mg     bisacodyl (DULCOLAX) EC tablet 5 mg    Or     bisacodyl (DULCOLAX) EC tablet 10 mg    Or     bisacodyl (DULCOLAX) EC tablet 15 mg     gabapentin (NEURONTIN) capsule 600 mg     hydrOXYzine (ATARAX) tablet 25 mg     lidocaine (LMX4) cream     lidocaine 1 % 0.1-1 mL     lidocaine 1% with EPINEPHrine 1:100,000 injection 1 mL     magnesium citrate solution 148 mL     melatonin tablet 1 mg     naloxone (NARCAN) injection 0.1-0.4 mg     ondansetron (ZOFRAN-ODT) ODT tab 4 mg    Or     ondansetron (ZOFRAN) injection 4 mg     oxyCODONE (ROXICODONE) tablet 10-15 mg     Patient is already receiving anticoagulation with heparin, enoxaparin (LOVENOX), warfarin (COUMADIN)  or other anticoagulant medication     penicillin V (VEETID) tablet 500 mg     polyethylene glycol (MIRALAX) Packet 17 g     polyethylene glycol (MIRALAX) Packet 17 g     prochlorperazine (COMPAZINE) injection 5 mg    Or     prochlorperazine (COMPAZINE) tablet 5 mg    Or     prochlorperazine (COMPAZINE) Suppository 12.5 mg     sodium chloride (PF) 0.9% PF flush 3 mL     sodium chloride (PF) 0.9% PF flush 3 mL     [START ON 7/23/2020] torsemide (DEMADEX) half-tab 50 mg     triamcinolone (KENALOG) 0.1 % ointment     warfarin ANTICOAGULANT (COUMADIN) tablet 6 mg     Warfarin Therapy Reminder (Check START DATE - warfarin may be starting in the FUTURE)        Physical exam:  /56 (BP Location: Right arm)   Pulse 63   Temp 96.7  F (35.9  C) (Oral)   Resp 18   Ht 1.778 m (5' 10\")   Wt 79.6 kg (175 lb 8 oz)   SpO2 98%   BMI 25.18 kg/m    GEN:This is a well developed, well-nourished male in no acute " distress, in a pleasant mood.    SKIN: Total skin excluding the undergarment areas was performed. The exam included the head/face, neck, both arms, chest, back, abdomen, both legs, digits and/or nails.   -Barrett skin type: I  -multiple pink plaques and areas of pink erythema (BSA ~80%) with islands of sparing on the anterior thighs and lower back; erythema and yellow greasy scale on the nasolabial and melolabial folds and eyebrows  There are areas of superficial desquamation  -Overall improved    Laboratory:  Results for orders placed or performed during the hospital encounter of 07/18/20 (from the past 24 hour(s))   INR   Result Value Ref Range    INR 3.09 (H) 0.86 - 1.14   Comprehensive metabolic panel   Result Value Ref Range    Sodium 140 133 - 144 mmol/L    Potassium 3.9 3.4 - 5.3 mmol/L    Chloride 112 (H) 94 - 109 mmol/L    Carbon Dioxide 22 20 - 32 mmol/L    Anion Gap 7 3 - 14 mmol/L    Glucose 111 (H) 70 - 99 mg/dL    Urea Nitrogen 30 7 - 30 mg/dL    Creatinine 1.53 (H) 0.66 - 1.25 mg/dL    GFR Estimate 45 (L) >60 mL/min/[1.73_m2]    GFR Estimate If Black 52 (L) >60 mL/min/[1.73_m2]    Calcium 8.4 (L) 8.5 - 10.1 mg/dL    Bilirubin Total 0.8 0.2 - 1.3 mg/dL    Albumin 2.2 (L) 3.4 - 5.0 g/dL    Protein Total 5.5 (L) 6.8 - 8.8 g/dL    Alkaline Phosphatase 70 40 - 150 U/L    ALT 61 0 - 70 U/L    AST 89 (H) 0 - 45 U/L   CBC with platelets differential   Result Value Ref Range    WBC 6.0 4.0 - 11.0 10e9/L    RBC Count 2.67 (L) 4.4 - 5.9 10e12/L    Hemoglobin 7.7 (L) 13.3 - 17.7 g/dL    Hematocrit 24.7 (L) 40.0 - 53.0 %    MCV 93 78 - 100 fl    MCH 28.8 26.5 - 33.0 pg    MCHC 31.2 (L) 31.5 - 36.5 g/dL    RDW 18.5 (H) 10.0 - 15.0 %    Platelet Count 146 (L) 150 - 450 10e9/L    Diff Method Manual Differential     % Neutrophils 62.0 %    % Lymphocytes 15.0 %    % Monocytes 10.0 %    % Eosinophils 13.0 %    % Basophils 0.0 %    Absolute Neutrophil 3.7 1.6 - 8.3 10e9/L    Absolute Lymphocytes 0.9 0.8 - 5.3 10e9/L     Absolute Monocytes 0.6 0.0 - 1.3 10e9/L    Absolute Eosinophils 0.8 (H) 0.0 - 0.7 10e9/L    Absolute Basophils 0.0 0.0 - 0.2 10e9/L    Anisocytosis Moderate     Poikilocytosis Moderate     Ovalocytes Moderate     Oc Cells Slight     Macrocytes Present    CRP inflammation   Result Value Ref Range    CRP Inflammation 30.0 (H) 0.0 - 8.0 mg/L   Procalcitonin   Result Value Ref Range    Procalcitonin 0.28 ng/ml   US Abdomen Limited    Narrative    Ultrasound abdominal right upper quadrant, 7/22/2020 9:23 AM.    Comparison: None.    History: Rising LFTs in setting of possible DRESS, evaluate for liver  abnormalities.    Findings:    There is no ascites.     Liver: The liver parenchyma has a coarsened echotexture with focal  areas of slightly increased echogenicity, most pronounced in the right  lobe. There are no focal masses. There are no portal-systemic  collaterals identified. There is no intrahepatic biliary dilatation.    Gallbladder: There was a negative sonographic Vaughn's sign. The  gallbladder lumen is anechoic and not distended. The wall is not  thickened. The extrahepatic bile duct measures 6 mm. There is no  extrahepatic biliary dilatation. There is no pericholecystic fluid.    Pancreas: The visualized pancreas is unremarkable.     Right Kidney: There is no hydronephrosis or hydroureter. There are no  shadowing renal calculi, cystic lesion or mass. The right kidney  measures 10.8 cm long. The capsule and parenchyma have normal  echotexture.       Impression    Impression:   Slightly coarsened echotexture of the liver parenchyma with focal  areas of slightly increased echogenicity, most pronounced in the right  lobe suggests underlying parenchymal involvement with possible  hepatitis. Otherwise, unremarkable right upper quadrant ultrasound.    I have personally reviewed the examination and initial interpretation  and I agree with the findings.    MAX EPSTEIN MD       Staff  Involved:  Resident/Staff

## 2020-07-22 NOTE — PLAN OF CARE
[id number]    VSS. Afebrile. LS clear, on room air. Denies nausea and SOB. Generalized burning/itching sensation r/t his skin rash, PRN Oxy given x1. Skin is still saturated in ointment from prior shift, and legs remain wrapped. NPO since 0000 for abdominal ultrasound in the AM, patient verbalized understanding. However, he is very worried about missing breakfast. PIV SL. Will continue with POC.

## 2020-07-23 ENCOUNTER — RECORDS - HEALTHEAST (OUTPATIENT)
Dept: ADMINISTRATIVE | Facility: OTHER | Age: 70
End: 2020-07-23

## 2020-07-23 ENCOUNTER — APPOINTMENT (OUTPATIENT)
Dept: PHYSICAL THERAPY | Facility: CLINIC | Age: 70
DRG: 683 | End: 2020-07-23
Attending: INTERNAL MEDICINE
Payer: MEDICARE

## 2020-07-23 ENCOUNTER — AMBULATORY - HEALTHEAST (OUTPATIENT)
Dept: CARE COORDINATION | Facility: CLINIC | Age: 70
End: 2020-07-23

## 2020-07-23 ENCOUNTER — PATIENT OUTREACH (OUTPATIENT)
Dept: CARE COORDINATION | Facility: CLINIC | Age: 70
End: 2020-07-23

## 2020-07-23 VITALS
HEART RATE: 56 BPM | SYSTOLIC BLOOD PRESSURE: 120 MMHG | BODY MASS INDEX: 25.13 KG/M2 | HEIGHT: 70 IN | WEIGHT: 175.5 LBS | OXYGEN SATURATION: 92 % | DIASTOLIC BLOOD PRESSURE: 50 MMHG | TEMPERATURE: 97.2 F | RESPIRATION RATE: 18 BRPM

## 2020-07-23 DIAGNOSIS — I48.0 PAROXYSMAL ATRIAL FIBRILLATION (H): ICD-10-CM

## 2020-07-23 DIAGNOSIS — I50.42 CHRONIC COMBINED SYSTOLIC AND DIASTOLIC CONGESTIVE HEART FAILURE (H): ICD-10-CM

## 2020-07-23 DIAGNOSIS — N18.30 CKD (CHRONIC KIDNEY DISEASE) STAGE 3, GFR 30-59 ML/MIN (H): ICD-10-CM

## 2020-07-23 PROBLEM — K59.09 OTHER CONSTIPATION: Status: ACTIVE | Noted: 2020-07-23

## 2020-07-23 LAB
ALBUMIN SERPL-MCNC: 2.4 G/DL (ref 3.4–5)
ALP SERPL-CCNC: 70 U/L (ref 40–150)
ALT SERPL W P-5'-P-CCNC: 50 U/L (ref 0–70)
ANION GAP SERPL CALCULATED.3IONS-SCNC: 5 MMOL/L (ref 3–14)
AST SERPL W P-5'-P-CCNC: 52 U/L (ref 0–45)
BASOPHILS # BLD AUTO: 0.1 10E9/L (ref 0–0.2)
BASOPHILS NFR BLD AUTO: 1.5 %
BILIRUB DIRECT SERPL-MCNC: 0.1 MG/DL (ref 0–0.2)
BILIRUB SERPL-MCNC: 0.4 MG/DL (ref 0.2–1.3)
BUN SERPL-MCNC: 31 MG/DL (ref 7–30)
CALCIUM SERPL-MCNC: 8.5 MG/DL (ref 8.5–10.1)
CHLORIDE SERPL-SCNC: 110 MMOL/L (ref 94–109)
CO2 SERPL-SCNC: 22 MMOL/L (ref 20–32)
COPATH REPORT: NORMAL
CREAT SERPL-MCNC: 1.27 MG/DL (ref 0.66–1.25)
CRP SERPL-MCNC: 23 MG/L (ref 0–8)
DIFFERENTIAL METHOD BLD: ABNORMAL
EOSINOPHIL # BLD AUTO: 0.8 10E9/L (ref 0–0.7)
EOSINOPHIL NFR BLD AUTO: 12.2 %
ERYTHROCYTE [DISTWIDTH] IN BLOOD BY AUTOMATED COUNT: 18.6 % (ref 10–15)
GFR SERPL CREATININE-BSD FRML MDRD: 56 ML/MIN/{1.73_M2}
GLUCOSE SERPL-MCNC: 102 MG/DL (ref 70–99)
HCT VFR BLD AUTO: 24.3 % (ref 40–53)
HGB BLD-MCNC: 7.5 G/DL (ref 13.3–17.7)
IMM GRANULOCYTES # BLD: 0 10E9/L (ref 0–0.4)
IMM GRANULOCYTES NFR BLD: 0.4 %
INR PPP: 3.37 (ref 0.86–1.14)
LYMPHOCYTES # BLD AUTO: 1.3 10E9/L (ref 0.8–5.3)
LYMPHOCYTES NFR BLD AUTO: 18.5 %
MCH RBC QN AUTO: 28.3 PG (ref 26.5–33)
MCHC RBC AUTO-ENTMCNC: 30.9 G/DL (ref 31.5–36.5)
MCV RBC AUTO: 92 FL (ref 78–100)
MONOCYTES # BLD AUTO: 0.8 10E9/L (ref 0–1.3)
MONOCYTES NFR BLD AUTO: 11.7 %
NEUTROPHILS # BLD AUTO: 3.8 10E9/L (ref 1.6–8.3)
NEUTROPHILS NFR BLD AUTO: 55.7 %
PLATELET # BLD AUTO: 160 10E9/L (ref 150–450)
POTASSIUM SERPL-SCNC: 3.7 MMOL/L (ref 3.4–5.3)
PROCALCITONIN SERPL-MCNC: 0.18 NG/ML
PROT SERPL-MCNC: 5.9 G/DL (ref 6.8–8.8)
RBC # BLD AUTO: 2.65 10E12/L (ref 4.4–5.9)
SODIUM SERPL-SCNC: 137 MMOL/L (ref 133–144)
WBC # BLD AUTO: 7.1 10E9/L (ref 4–11)

## 2020-07-23 PROCEDURE — 36415 COLL VENOUS BLD VENIPUNCTURE: CPT | Performed by: INTERNAL MEDICINE

## 2020-07-23 PROCEDURE — 80076 HEPATIC FUNCTION PANEL: CPT | Performed by: NURSE PRACTITIONER

## 2020-07-23 PROCEDURE — 80076 HEPATIC FUNCTION PANEL: CPT | Performed by: INTERNAL MEDICINE

## 2020-07-23 PROCEDURE — 99239 HOSP IP/OBS DSCHRG MGMT >30: CPT | Performed by: INTERNAL MEDICINE

## 2020-07-23 PROCEDURE — 85027 COMPLETE CBC AUTOMATED: CPT | Performed by: INTERNAL MEDICINE

## 2020-07-23 PROCEDURE — 86140 C-REACTIVE PROTEIN: CPT | Performed by: INTERNAL MEDICINE

## 2020-07-23 PROCEDURE — 99207 ZZC APP CREDIT; MD BILLING SHARED VISIT: CPT | Performed by: NURSE PRACTITIONER

## 2020-07-23 PROCEDURE — 97116 GAIT TRAINING THERAPY: CPT | Mod: GP

## 2020-07-23 PROCEDURE — 25000132 ZZH RX MED GY IP 250 OP 250 PS 637: Mod: GY | Performed by: NURSE PRACTITIONER

## 2020-07-23 PROCEDURE — G0472 HEP C SCREEN HIGH RISK/OTHER: HCPCS | Performed by: INTERNAL MEDICINE

## 2020-07-23 PROCEDURE — G0499 HEPB SCREEN HIGH RISK INDIV: HCPCS | Performed by: INTERNAL MEDICINE

## 2020-07-23 PROCEDURE — 85004 AUTOMATED DIFF WBC COUNT: CPT | Performed by: INTERNAL MEDICINE

## 2020-07-23 PROCEDURE — 80048 BASIC METABOLIC PNL TOTAL CA: CPT | Performed by: INTERNAL MEDICINE

## 2020-07-23 PROCEDURE — 85610 PROTHROMBIN TIME: CPT | Performed by: INTERNAL MEDICINE

## 2020-07-23 PROCEDURE — 86706 HEP B SURFACE ANTIBODY: CPT | Performed by: INTERNAL MEDICINE

## 2020-07-23 PROCEDURE — 84145 PROCALCITONIN (PCT): CPT | Performed by: INTERNAL MEDICINE

## 2020-07-23 PROCEDURE — 86708 HEPATITIS A ANTIBODY: CPT | Performed by: INTERNAL MEDICINE

## 2020-07-23 RX ORDER — TRIAMCINOLONE ACETONIDE 1 MG/G
OINTMENT TOPICAL 2 TIMES DAILY
Qty: 453.6 G | Refills: 3 | Status: SHIPPED | OUTPATIENT
Start: 2020-07-23 | End: 2021-09-24

## 2020-07-23 RX ORDER — ACETAMINOPHEN 325 MG/1
650 TABLET ORAL EVERY 4 HOURS PRN
COMMUNITY
Start: 2020-07-23 | End: 2021-07-15

## 2020-07-23 RX ORDER — POLYETHYLENE GLYCOL 3350 17 G/17G
17 POWDER, FOR SOLUTION ORAL DAILY PRN
Qty: 30 PACKET | Refills: 0 | Status: SHIPPED | OUTPATIENT
Start: 2020-07-23

## 2020-07-23 RX ORDER — WARFARIN SODIUM 3 MG/1
4.5-6 TABLET ORAL DAILY
Refills: 0 | COMMUNITY
Start: 2020-07-23 | End: 2021-08-26

## 2020-07-23 RX ADMIN — Medication 50 MG: at 07:49

## 2020-07-23 RX ADMIN — OXYCODONE HYDROCHLORIDE 15 MG: 5 TABLET ORAL at 02:04

## 2020-07-23 RX ADMIN — PENICILLIN V POTASIUM 500 MG: 500 TABLET OROPHARYNGEAL at 07:49

## 2020-07-23 RX ADMIN — TRIAMCINOLONE ACETONIDE: 1 OINTMENT TOPICAL at 07:51

## 2020-07-23 RX ADMIN — GABAPENTIN 600 MG: 300 CAPSULE ORAL at 07:50

## 2020-07-23 RX ADMIN — OXYCODONE HYDROCHLORIDE 15 MG: 5 TABLET ORAL at 06:55

## 2020-07-23 RX ADMIN — OXYCODONE HYDROCHLORIDE 15 MG: 5 TABLET ORAL at 10:53

## 2020-07-23 ASSESSMENT — ACTIVITIES OF DAILY LIVING (ADL)
ADLS_ACUITY_SCORE: 12

## 2020-07-23 ASSESSMENT — PAIN DESCRIPTION - DESCRIPTORS: DESCRIPTORS: BURNING

## 2020-07-23 NOTE — PLAN OF CARE
Discharge Planner PT   Patient plan for discharge: Home with family assist  Current status: Much improved activity tolerance, balance and independence. Amb 800' with balance challenges and SBA. Discussed cervical spine HEP and follow up with MDs.  Barriers to return to prior living situation: None  Recommendations for discharge: Home with PRN family assist and outpatient PT (c spine ROM and strengthening)  Rationale for recommendations: Current level of function       Entered by: Rj Fajardo 07/23/2020 10:16 AM

## 2020-07-23 NOTE — PLAN OF CARE
"Care from [id number] (Discharged).    Status: Admitted for further evaluation with dermatology.  Vitals: /50 (BP Location: Right arm)   Pulse 56   Temp 97.2  F (36.2  C) (Oral)   Resp 18   Ht 1.778 m (5' 10\")   Wt 79.6 kg (175 lb 8 oz)   SpO2 92%   BMI 25.18 kg/m  . RA.  Neuros: A&O x4.  IV: PIV removed.  Resp/trach: LS clear & equal.  Diet: Regular diet, good appetite. Ate 100% of breakfast and lunch.  Bowel status: BS+, last BM 7/21. Patient refused miralax.  : Voiding spontaneously.  Skin: +2 edema to LLE, +3 edema to RLE. Generalized red rash throughout body. Rash is itchy, painful, and burning. Some peeling was noted with rash cares, mostly on feet. Kenalog cream was applied and new saran wrap was placed per orders.  Pain: PRN Oxycodone (15 mg). Scheduled gabapentin. Scheduled Kenalog cream BID.  Activity: SBA. Ambulated in the room and in the halls with PT.  Social: Son at the bedside.  Plan: Patient discharged to home with all belongings, son for transport. Discharge paperwork reviewed and signed by patient. All questions answered. Patient left 7D around 1415.  "

## 2020-07-23 NOTE — PLAN OF CARE
VSS. Afebrile. Up independently in room and ambulating in the crowell. No nausea. Appetite good. Rash much less red since admission. Pruritis managed with kenalog ointment. Burning pain managed with oxy 15 mg po x 2. Son here to visit.     Addendum @ 0307: Temp 100.3 at this time. Will monitor.

## 2020-07-23 NOTE — PHARMACY-ANTICOAGULATION SERVICE
Clinical Pharmacy- Warfarin Discharge Note  This patient is currently on warfarin for the treatment of Mechanical mitral valve replacement.      INR Goal= 2.5-3.5  Expected length of therapy lifetime.    Warfarin PTA Regimen: 4.5 mg on Tues/Fri and 6 mg ROW      Anticoagulation Dose History     Recent Dosing and Labs Latest Ref Rng & Units 7/19/2020 7/20/2020 7/21/2020 7/22/2020 7/23/2020    ZZ IMS TEMPLATE - - - 4.5 mg - -    Warfarin 6 mg - 6 mg 6 mg - 6 mg -    INR 0.86 - 1.14 3.12(H) 3.03(H) 3.02(H) 3.09(H) 3.37(H)          Vitamin K doses administered during the last 7 days: none  FFP administered during the last 7 days: none  Recommend discharging the patient on a warfarin regimen of 4.5mg on Tuesday and Fridays, and 6mg the rest of the week, with a prescription for warfarin 3mg tablets.      The patient should have an INR checked in 3-5 days.    Natalie Smith, PharmD

## 2020-07-23 NOTE — DISCHARGE SUMMARY
[facility], [place]  Hospitalist Discharge Summary      Date of Admission:  7/18/2020  Date of Discharge:  7/23/2020  Discharging Provider: STIVEN Vega CNP, Nacho Knapp MD   Discharge Team: Hospitalist Service, Gold 4    Discharge Diagnoses     # Rash  # Fevers   # Isolated AST elevation   # ISMAEL in setting of stage III CKD   # Abnormal bone lesion   # Deconditioning   # S/p mechanical mitral valve replacement   # Combined systolic and diastolic heart failure   # Chronic pain syndrome   # Anemia 2/2 chronic illness   # Thrombocytopenia   # Constipation     Follow-ups Needed After Discharge   Follow-up Appointments     Adult [facility] Follow-up and recommended labs and tests      Follow up with primary care provider, Jin Hicks, within 7 days for   hospital follow- up.  The following labs/tests are recommended: CMP, CBC   w/ differential, Mag, Phos, and INR.      Appointments on [place] and/or [facility] (with [facility] or [facility]   provider or service). Call [phone] if you haven't heard regarding   these appointments within 7 days of discharge.         Follow Up and recommended labs and tests      CMP, CBC w/ differential, Mag, Phos; please have INR checked on Monday 7/27             Unresulted Labs Ordered in the Past 30 Days of this Admission     Date and Time Order Name Status Description    7/23/2020 0524 Hepatitis C antibody In process     7/23/2020 0524 Hepatitis B surface antigen In process     7/23/2020 0524 Hepatitis A Antibody IgG In process     7/23/2020 0524 Hepatitis B Surface Antibody In process     7/21/2020 1644 Leukemia Lymphoma Evaluation Non CSF In process     7/21/2020 1644 Leukemia Lymphoma Evaluation Non CSF In process     7/19/2020 2310 Blood culture Preliminary     7/19/2020 2310 Blood culture Preliminary     7/18/2020 2143 Blood culture Preliminary     7/18/2020 2143 Blood culture Preliminary       Will follow peripherally and also contact  patient's PCP to follow up on hepatitis serology results.      Discharge Disposition   Discharged to home  Condition at discharge: Stable    Hospital Course      Bandar Wells is a 69 year old male with past medical history significant for complicated cardiac disease with systolic and diastolic CHF, mechanical MVR on warfarin, history endocarditis, paroxysmal atrial fib s/p MAZE procedure, HLD, and stage III CKD who recently started allopurinol and colchicine for hyperuricemia and developed rash, ISMAEL, and hyperkalemia and is transferred to [facility] on 7/18 for further evaluation by Dermatology.      # Rash - Developed painful, splotchy, erythematous rash diffusely over neck, back, chest, arms, and legs and eventually palms and a few oral lesions about 7-10 days prior to admission, coinciding with starting allopurinol and colchicine.  Started on cetirizine, clobetasol, and hydroxyzine outpt without improvement.  Last seen by Derm on 7/19 and had punch bx.  Results c/w mild spongiosis and perivascular lymphohistiocytic inflammation, possible c/w eczematous dermatitis, but could still include drug eruption.  Peripheral smear ordered to evaluate for Sezary cells, final result with very rare atypical (?reactive) lymphocytes.  Flow cytometry added onto tissue and blood specimen, currently pending at time of discharge.  Per Dermatology recommendations, given downtrending CRP and clinical improvement of rash, will discharge home.  Continue topical triamcinolone cream to be applied to affected areas twice daily.  Dermatology to arrange outpatient follow up.      # Fevers - Febrile to 102 F evening 7/19 and started on IV Vanc and Zosyn, Tmax to 101.7 on IV abx on 7/20, but ultimately felt to be 2/2 rash.  Stopped IV abx on 7/21.  CRP and procal downtrending at time of discharge.  Continues to be without leukocytosis.  UA negative.  Blood cx NGTD.  No indication for treatment with antibiotics at this time.      # Isolated AST  elevation - In setting of possible drug rash vs DRESS as above.  AST remains slight elevated but now downtrending.  Other LFTs wnl.  US abdomen with slightly coarsened echotexture of liver parenchyma with focal areas of slightly increased echogenicity, question possible hepatitis, but otherwise unremarkable.  Discussed with Hepatology, US findings not reflective of DRESS, as would expect to see marked necrosis but did recommend adding hepatitis serologies.  Will follow results and communicate with patient's PCP to follow up on final results as well.       # ISMAEL in setting of stage III CKD - Resolved, and improved significantly from prev BL ~ 1.87 PTA.  Cr 1.27 day of discharge.   UA neg.  Possibly 2/2 dehydration and poor PO intake, as well as diuretic use PTA.  Resumed PTA diuretics at discharge, as below.  Recommend repeat CMP to assess renal function for continued improvement next week.      # Abnormal bone lesion - Underwent bone marrow biopsy on June 6th for abnormal bone lesions, which reveled hypercellularity but otherwise unremarkable with no evidence of myeloma or malignancy. Plans to follow up with outpatient hematology in 6 months.     # Deconditioning - In setting of acute illness. Okay to return home with family.  Recommend PT outpatient.     # S/p mechanical mitral valve replacement - 2/2 endocarditis.  Continue PTA warfarin regimen and have INR checked on Monday 7/27.  Continue Ppx PCN.     # Combined systolic and diastolic heart failure - Echocardiogram this admission w/ EF 60-65%, improved from prev in 2/2020 with 55-60%.  On torsemide 50mg BID and metolozone 2.5mg daily PRN, held during admission due to ISMAEL, resumed at discharge.  Resumed KCl 20mEq daily.  Will need repeat CMP to check electrolytes next week.      # Chronic pain syndrome - On Oxycodone 5-10mg Q4H PRN PTA.  Follows with Rheumatology and Hematology for possible inflammatory myopathy, hyperuricemia, and abnormal bone lesions that may  be contributory.  Will continue PTA oxycodone and follow up with outpatient providers.      # Anemia 2/2 chronic illness - Hgb BL 9-10s.  Slightly below BL at 8.3 in setting of acute illness.    # Thrombocytopenia - Stable.  No signs of symptoms of bleeding.  Recheck CBC in one week.     # Constipation - Resolved.  Continue Miralax and senna as needed.         Consultations This Hospital Stay   DERMATOLOGY IP CONSULT  PHARMACY TO DOSE WARFARIN  PHYSICAL THERAPY ADULT IP CONSULT  PHARMACY TO DOSE VANCO  VASCULAR ACCESS CARE ADULT IP CONSULT  VASCULAR ACCESS CARE ADULT IP CONSULT    Code Status   Full Code    Time Spent on this Encounter   I, STIVEN Vega CNP, personally saw the patient today and spent greater than 30 minutes discharging this patient.       STIVEN Vega CNP  [facility] [place]  ______________________________________________________________________    Physical Exam   Vital Signs: Temp: 97.2  F (36.2  C) Temp src: Oral BP: 120/50 Pulse: 56 Heart Rate: 90 Resp: 18 SpO2: 92 % O2 Device: None (Room air)    Weight: 175 lbs 8 oz    GENERAL: Alert and oriented x 3. Well nourished, well developed.  No acute distress.    HEENT: Normocephalic, atraumatic. Anicteric sclera. Mucous membranes moist.   CV: RRR. S1, S2. No murmurs appreciated.   RESPIRATORY: Effort normal on room air. Lungs CTAB with no wheezing, rales, or rhonchi.   GI: Abdomen soft and non distended, bowel sounds present x all 4 quadrants. No tenderness, rebound, or guarding.   NEUROLOGICAL: No focal deficits. Follows commands.  Strength equal in upper and lower extremities.   MUSCULOSKELETAL: No joint swelling or tenderness. Moves all extremities.   EXTREMITIES: No gross deformities. No peripheral edema.   SKIN: Grossly warm, dry, and intact. No jaundice. Diffuse erythematous rash, mostly macular, over more 80% body, mostly legs, back, arms, and abdomen with some scaling on scalp, improving.            Primary Care Physician   Jin Hicks    Discharge Orders      PHYSICAL THERAPY REFERRAL      Reason for your hospital stay    Dear Mr. Wells,    You were admitted to [facility] from 7/18 to 7/23 with fevers and diffuse erythematous (red) rash that required inpatient Dermatology consultation.  You were treated with IV antibiotics and topical steroids and your rash began to improve.  Your inflammatory markers and liver enzymes were initially elevated, but have begun to decrease.    You are being discharged on triamcinolone cream, please continue to apply this over your body twice daily and cover with saran wrap if possible.  Our Dermatology team would like to see you in clinic, and they will help arrange this - a  should be in contact with you.  Please continue the triamcinolone cream until they re-evaulate you.  Flow cytometry studies on the skin biopsy samples are not yet final at discharge, but these may be reviewed at your follow up appointment.      Given that you had elevated liver enzymes this admission, we obtained an ultrasound of your liver, which showed some likely fibrotic changes potentially consistent with typical aging process or fatty liver, but are unable to fully exclude hepatitis.  As I discussed with you and your son Jabier, I will add these to your lab specimens taken during hospital stay and your PCP can review them once they are final.  You may need to sign a release of information if your PCP is outside our system.     You will be resumed on your home medications, including your diuretics and potassium supplements.  I recommend that you follow up for lab draws (CMP) to check your electrolytes, kidney function, and liver function on Monday (7/27) or Tuesday (7/28) of next week.  Please continue to have your INR checked and warfarin adjusted per usual routine.         It was a pleasure to care for you during your hospital stay.  Please let us know if there is anything else we can do for you  so that we can be sure you are leaving completely satisfied with your care experience.    If you have any questions, please call the hospital at [phone] and ask to talk to a nurse on unit 7D.    Take care,    Leann Guerra CNP  [facility] - Internal Medicine   Hospitalist Service     Adult [facility] Follow-up and recommended labs and tests    Follow up with primary care provider, Jin Hicks, within 7 days for hospital follow- up.  The following labs/tests are recommended: CMP, CBC w/ differential, Mag, Phos, and INR.      Appointments on [place] and/or [facility] (with [facility] or [facility] provider or service). Call [phone] if you haven't heard regarding these appointments within 7 days of discharge.     Follow Up and recommended labs and tests    CMP, CBC w/ differential, Mag, Phos; please have INR checked on Monday 7/27     Activity    Your activity upon discharge: activity as tolerated     When to contact your care team    Call your PCP or return to ED for temperatures > 100.7 degrees, worsening or changing pain, uncontrolled vomiting or inability to tolerate oral intake, new or worsening diarrhea, new or worsening shortness of breath, decreased urine output, yellowing of the eyes or skin, confusion, weakness, blood in urine or stools.     Diet    Follow this diet upon discharge: Regular Diet Adult       Significant Results and Procedures   Most Recent 3 CBC's:  Recent Labs   Lab Test 07/23/20 0524 07/22/20 0514 07/21/20 0527   WBC 7.1 6.0 5.0   HGB 7.5* 7.7* 8.3*   MCV 92 93 92    146* 142*     Most Recent 3 BMP's:  Recent Labs   Lab Test 07/23/20  0524 07/22/20  0514 07/21/20 0527    140 139   POTASSIUM 3.7 3.9 3.9   CHLORIDE 110* 112* 110*   CO2 22 22 21   BUN 31* 30 38*   CR 1.27* 1.53* 1.71*   ANIONGAP 5 7 7   DEVYN 8.5 8.4* 8.4*   * 111* 116*     Most Recent 2 LFT's:  Recent Labs   Lab Test 07/23/20 0524 07/22/20 0514   AST 52* 89*   ALT 50 61   ALKPHOS  70 70   BILITOTAL 0.4 0.8     Most Recent 3 INR's:  Recent Labs   Lab Test 07/23/20  0524 07/22/20  0514 07/21/20  0527   INR 3.37* 3.09* 3.02*   ,   Results for orders placed or performed during the hospital encounter of 07/18/20   XR Chest Port 1 View    Narrative    EXAM: XR CHEST PORT 1 VW  7/19/2020 11:28 AM      HISTORY: Work up for fevers    COMPARISON: None    TECHNIQUE: AP chest radiograph    FINDINGS:   Postsurgical changes of the chest, including sternotomy wires,  artificial mitral valve. Cardiomegaly. Prominent pulmonary  vasculature. Bibasilar streaky opacities, right greater than left.  Trace right pleural effusion, no left pleural effusion. No  pneumothorax. No acute abdominal pathology.      Impression    IMPRESSION:  1.  Cardiomegaly with interstitial pulmonary edema.   2.  Small right pleural effusion with overlying opacities, likely  atelectasis, less likely infection.    I have personally reviewed the examination and initial interpretation  and I agree with the findings.    GUNJAN JIMÉNEZ MD   US Abdomen Limited    Narrative    Ultrasound abdominal right upper quadrant, 7/22/2020 9:23 AM.    Comparison: None.    History: Rising LFTs in setting of possible DRESS, evaluate for liver  abnormalities.    Findings:    There is no ascites.     Liver: The liver parenchyma has a coarsened echotexture with focal  areas of slightly increased echogenicity, most pronounced in the right  lobe. There are no focal masses. There are no portal-systemic  collaterals identified. There is no intrahepatic biliary dilatation.    Gallbladder: There was a negative sonographic Vaughn's sign. The  gallbladder lumen is anechoic and not distended. The wall is not  thickened. The extrahepatic bile duct measures 6 mm. There is no  extrahepatic biliary dilatation. There is no pericholecystic fluid.    Pancreas: The visualized pancreas is unremarkable.     Right Kidney: There is no hydronephrosis or hydroureter. There are  no  shadowing renal calculi, cystic lesion or mass. The right kidney  measures 10.8 cm long. The capsule and parenchyma have normal  echotexture.       Impression    Impression:   Slightly coarsened echotexture of the liver parenchyma with focal  areas of slightly increased echogenicity, most pronounced in the right  lobe suggests underlying parenchymal involvement with possible  hepatitis. Otherwise, unremarkable right upper quadrant ultrasound.    I have personally reviewed the examination and initial interpretation  and I agree with the findings.    MAX EPSTEIN MD   Echo Complete    Narrative    [id number]  [id number]  [id number]  [id number]^SIRENA^ESTEBAN           [facility],[place]  Echocardiography Laboratory  [address] [place] [address]     Name: JAMEEL ARZATE  MRN: [id number]  : 1950  Study Date: 2020 11:23 AM  Age: 69 yrs  Gender: Male  Patient Location: [facility]  Reason For Study: CHF  Ordering Physician: ESTEBAN PACK  Referring Physician: SELF, REFERRED  Performed By: Jhoana Vazquez RDCS     BSA: 1.9 m2  Height: 70 in  Weight: 165 lb  HR: 64  BP: 99/40 mmHg  _____________________________________________________________________________  __        Procedure  Complete Portable Echo Adult.  _____________________________________________________________________________  __        Interpretation Summary  Global and regional left ventricular function is normal with an EF of 60-65%.  Right ventricular function, chamber size, wall motion, and thickness are  normal.  A mechanical mitral valve is present.  MeanMitral gradient 7 mmHg at heart rate 63BPM/AFib.No valvular or para  valvular MR.  Right ventricular systolic pressure is 42mmHg above the right atrial pressure.  IVC diameter >2.1 cm collapsing <50% with sniff suggests a high RA pressure  estimated at 15 mmHg or greater.  No pericardial effusion is present.  Previous study not available for  comparison.  _____________________________________________________________________________  __        Left Ventricle  Global and regional left ventricular function is normal with an EF of 60-65%.  Left ventricular wall thickness is normal. Left ventricular size is normal.  Diastolic function not assessed due to presence of prosthetic mitral valve.  Abnormal non-specific septal motion is present.     Right Ventricle  Right ventricular function, chamber size, wall motion, and thickness are  normal.     Atria  The atria cannot be assessed.     Mitral Valve  A mechanical mitral valve is present. MeanMitral gradient 7 mmHg at heart rate  63BPM/AFib.No valvular or para valvular MR.        Aortic Valve  Trileaflet aortic sclerosis without stenosis.     Tricuspid Valve  The tricuspid valve is normal. Trace to mild tricuspid insufficiency is  present. Right ventricular systolic pressure is 42mmHg above the right atrial  pressure.     Pulmonic Valve  The pulmonic valve is normal.     Vessels  The thoracic aorta is normal. The pulmonary artery and bifurcation cannot be  assessed. IVC diameter >2.1 cm collapsing <50% with sniff suggests a high RA  pressure estimated at 15 mmHg or greater.     Pericardium  No pericardial effusion is present.        Compared to Previous Study  Previous study not available for comparison.  _____________________________________________________________________________  __  MMode/2D Measurements & Calculations  IVSd: 0.82 cm     LVIDd: 5.0 cm  LVIDs: 3.1 cm  LVPWd: 0.95 cm  FS: 37.3 %  LV mass(C)d: 153.7 grams  LV mass(C)dI: 79.9 grams/m2  asc Aorta Diam: 3.4 cm  LVOT diam: 2.3 cm  LVOT area: 4.2 cm2  RVOT diam: 4.7 cm  RWT: 0.38        Doppler Measurements & Calculations  MV max P.4 mmHg  MV mean P.3 mmHg  MV V2 VTI: 56.6 cm  MVA(VTI): 1.6 cm2  MV P1/2t max donna: 212.0 cm/sec  MV P1/2t: 96.3 msec  MVA(P1/2t): 2.3 cm2  MV dec slope: 645.0 cm/sec2  LV V1 max P.6 mmHg  LV V1 max: 118.4  cm/sec  LV V1 VTI: 22.2 cm  SV(LVOT): 92.4 ml  SI(LVOT): 48.1 ml/m2  TR max donna: 323.5 cm/sec  TR max P.9 mmHg        _____________________________________________________________________________  __        Report approved by: Clayton Trent 2020 12:41 PM            Discharge Medications   Current Discharge Medication List      START taking these medications    Details   acetaminophen (TYLENOL) 325 MG tablet Take 2 tablets (650 mg) by mouth every 4 hours as needed for mild pain    Associated Diagnoses: Rash      polyethylene glycol (MIRALAX) 17 g packet Take 17 g by mouth daily as needed for constipation  Qty: 30 packet, Refills: 0    Associated Diagnoses: Other constipation      triamcinolone (KENALOG) 0.1 % external ointment Apply topically 2 times daily  Qty: 453.6 g, Refills: 3    Associated Diagnoses: Rash         CONTINUE these medications which have CHANGED    Details   warfarin ANTICOAGULANT (COUMADIN) 3 MG tablet Take 1.5-2 tablets (4.5-6 mg) by mouth daily Per PTA regimen (4.5mg on Tue/Fri, 6mg on other days of the week)  Refills: 0         CONTINUE these medications which have NOT CHANGED    Details   ferrous sulfate (FEROSUL) 325 (65 Fe) MG tablet Take 325 mg by mouth      gabapentin (NEURONTIN) 300 MG capsule Take 600 mg by mouth      oxyCODONE IR (ROXICODONE) 10 MG tablet Take 5-10 mg by mouth every 4 hours as needed      penicillin V (VEETID) 500 MG tablet Take 500 mg by mouth daily Hx endocarditis      sennosides (SENOKOT) 8.6 MG tablet Take 1-4 tablets by mouth 2 times daily      torsemide (DEMADEX) 100 MG tablet Take 50 mg by mouth 2 times daily      folic acid (FOLVITE) 1 MG tablet Take 1 mg by mouth daily      potassium chloride (KLOR-CON) 20 MEQ packet Take 20 mEq by mouth daily           Allergies   No Known Allergies

## 2020-07-23 NOTE — PLAN OF CARE
[id number]     HR gisela, other VSS. Afebrile. LS clear, on room air. Denies nausea and SOB. Generalized burning/itching sensation r/t his skin rash, PRN Oxy given when available. Skin saturated in ointment from prior shift, and legs remain wrapped. PIV SL. Sleeping between cares, will continue with POC.

## 2020-07-24 ENCOUNTER — COMMUNICATION - HEALTHEAST (OUTPATIENT)
Dept: INTERNAL MEDICINE | Facility: CLINIC | Age: 70
End: 2020-07-24

## 2020-07-24 ENCOUNTER — COMMUNICATION - HEALTHEAST (OUTPATIENT)
Dept: NURSING | Facility: CLINIC | Age: 70
End: 2020-07-24

## 2020-07-24 ENCOUNTER — OFFICE VISIT - HEALTHEAST (OUTPATIENT)
Dept: INTERNAL MEDICINE | Facility: CLINIC | Age: 70
End: 2020-07-24

## 2020-07-24 DIAGNOSIS — L27.0 DRUG RASH: ICD-10-CM

## 2020-07-24 DIAGNOSIS — G89.4 PAIN SYNDROME, CHRONIC: ICD-10-CM

## 2020-07-24 LAB
BACTERIA SPEC CULT: NO GROWTH
BACTERIA SPEC CULT: NO GROWTH
HAV IGG SER QL IA: REACTIVE
HBV SURFACE AB SERPL IA-ACNC: 0.33 M[IU]/ML
HBV SURFACE AG SERPL QL IA: NONREACTIVE
HCV AB SERPL QL IA: NONREACTIVE
SPECIMEN SOURCE: NORMAL
SPECIMEN SOURCE: NORMAL

## 2020-07-24 NOTE — PROGRESS NOTES
[facility]: Post-Discharge Note  SITUATION                                                      Admission:    Admission Date: 07/18/20   Reason for Admission: Rash  Discharge:   Discharge Date: 07/23/20  Discharge Diagnosis: Rash  Discharge Service: Hospitalist    BACKGROUND                                                      Bandar Wells is a 69 year old male with past medical history significant for complicated cardiac disease with systolic and diastolic CHF, mechanical MVR on warfarin, history endocarditis, paroxysmal atrial fib s/p MAZE procedure, HLD, and stage III CKD who recently started allopurinol and colchicine for hyperuricemia and developed rash, ISMAEL, and hyperkalemia and is transferred to [facility] on 7/18 for further evaluation by Dermatology.      ASSESSMENT      Discharge Assessment  Patient reports symptoms are: Unchanged(Ongoing swelling in bilateral legs. Not worse than yesteday but not improved.)  Does the patient have all of their medications?: Yes  Does patient know what their new medications are for?: Yes  Does patient have a follow-up appointment scheduled?: Yes  Does patient have any other questions or concerns?: No    Post-op  Did the patient have surgery or a procedure: No  Fever: No  Chills: No  Eating & Drinking: eating and drinking without complaints/concerns  PO Intake: regular diet  Bowel Function: normal  Urinary Status: voiding without complaint/concerns    PLAN                                                      Outpatient Plan:      Follow up with primary care provider, Jin Hicks, within 7 days for hospital follow- up.  The following labs/tests are recommended: CMP, CBC w/ differential, Mag, Phos, and INR.      Future Appointments   Date Time Provider Department Center   7/27/2020  6:00 AM [facility] PT OVERFLOW [facility]           Ирина Valdez, ADOLPH

## 2020-07-25 ENCOUNTER — COMMUNICATION - HEALTHEAST (OUTPATIENT)
Dept: INTERNAL MEDICINE | Facility: CLINIC | Age: 70
End: 2020-07-25

## 2020-07-25 DIAGNOSIS — G89.4 PAIN SYNDROME, CHRONIC: ICD-10-CM

## 2020-07-26 LAB
BACTERIA SPEC CULT: NO GROWTH
BACTERIA SPEC CULT: NO GROWTH
SPECIMEN SOURCE: NORMAL
SPECIMEN SOURCE: NORMAL

## 2020-07-27 ENCOUNTER — COMMUNICATION - HEALTHEAST (OUTPATIENT)
Dept: ANTICOAGULATION | Facility: CLINIC | Age: 70
End: 2020-07-27

## 2020-07-27 DIAGNOSIS — I48.0 PAROXYSMAL ATRIAL FIBRILLATION (H): ICD-10-CM

## 2020-07-27 DIAGNOSIS — Z95.2 S/P MVR (MITRAL VALVE REPLACEMENT): ICD-10-CM

## 2020-07-27 DIAGNOSIS — Z95.4 S/P MITRAL VALVE REPLACEMENT WITH METALLIC VALVE: ICD-10-CM

## 2020-07-27 LAB — INR PPP: 4.6 (ref 0.9–1.1)

## 2020-07-27 NOTE — PLAN OF CARE
Physical Therapy Discharge Summary    Reason for therapy discharge:    Discharged to home with outpatient therapy.    Progress towards therapy goal(s). See goals on Care Plan in [facility] electronic health record for goal details.  Goals met    Therapy recommendation(s):    Continued therapy is recommended.  Rationale/Recommendations:  OP PT was recommended to progress c spine ROM and strength.

## 2020-07-28 ENCOUNTER — AMBULATORY - HEALTHEAST (OUTPATIENT)
Dept: LAB | Facility: CLINIC | Age: 70
End: 2020-07-28

## 2020-07-28 DIAGNOSIS — R89.7 ELEVATED MYOGLOBIN LEVEL: ICD-10-CM

## 2020-07-28 DIAGNOSIS — N28.9 RENAL INSUFFICIENCY: ICD-10-CM

## 2020-07-28 DIAGNOSIS — E79.0 ELEVATED URIC ACID IN BLOOD: ICD-10-CM

## 2020-07-28 LAB
ALT SERPL W P-5'-P-CCNC: 44 U/L (ref 0–45)
AST SERPL W P-5'-P-CCNC: 90 U/L (ref 0–40)
BASOPHILS # BLD AUTO: 0.1 THOU/UL (ref 0–0.2)
BASOPHILS NFR BLD AUTO: 1 % (ref 0–2)
CK SERPL-CCNC: 41 U/L (ref 30–190)
CREAT SERPL-MCNC: 1.32 MG/DL (ref 0.7–1.3)
EOSINOPHIL # BLD AUTO: 0.2 THOU/UL (ref 0–0.4)
EOSINOPHIL NFR BLD AUTO: 3 % (ref 0–6)
ERYTHROCYTE [DISTWIDTH] IN BLOOD BY AUTOMATED COUNT: 15.9 % (ref 11–14.5)
GFR SERPL CREATININE-BSD FRML MDRD: 53 ML/MIN/1.73M2
HCT VFR BLD AUTO: 26.1 % (ref 40–54)
HGB BLD-MCNC: 8.8 G/DL (ref 14–18)
LYMPHOCYTES # BLD AUTO: 1 THOU/UL (ref 0.8–4.4)
LYMPHOCYTES NFR BLD AUTO: 13 % (ref 20–40)
MCH RBC QN AUTO: 29.2 PG (ref 27–34)
MCHC RBC AUTO-ENTMCNC: 33.7 G/DL (ref 32–36)
MCV RBC AUTO: 87 FL (ref 80–100)
MONOCYTES # BLD AUTO: 1 THOU/UL (ref 0–0.9)
MONOCYTES NFR BLD AUTO: 13 % (ref 2–10)
NEUTROPHILS # BLD AUTO: 5.2 THOU/UL (ref 2–7.7)
NEUTROPHILS NFR BLD AUTO: 70 % (ref 50–70)
PLATELET # BLD AUTO: 282 THOU/UL (ref 140–440)
PMV BLD AUTO: 8.1 FL (ref 7–10)
RBC # BLD AUTO: 3.01 MILL/UL (ref 4.4–6.2)
URATE SERPL-MCNC: 10.8 MG/DL (ref 3–8)
WBC: 7.5 THOU/UL (ref 4–11)

## 2020-07-29 LAB — ALDOLASE SERPL-CCNC: 4.9 U/L (ref 1.5–8.1)

## 2020-07-30 ENCOUNTER — COMMUNICATION - HEALTHEAST (OUTPATIENT)
Dept: INTERNAL MEDICINE | Facility: CLINIC | Age: 70
End: 2020-07-30

## 2020-07-30 ENCOUNTER — OFFICE VISIT (OUTPATIENT)
Dept: DERMATOLOGY | Facility: CLINIC | Age: 70
End: 2020-07-30
Payer: MEDICARE

## 2020-07-30 ENCOUNTER — RECORDS - HEALTHEAST (OUTPATIENT)
Dept: ADMINISTRATIVE | Facility: OTHER | Age: 70
End: 2020-07-30

## 2020-07-30 DIAGNOSIS — T50.905A DRESS SYNDROME: Primary | ICD-10-CM

## 2020-07-30 DIAGNOSIS — D72.12 DRESS SYNDROME: Primary | ICD-10-CM

## 2020-07-30 DIAGNOSIS — L98.491 SKIN ULCER, LIMITED TO BREAKDOWN OF SKIN (H): ICD-10-CM

## 2020-07-30 DIAGNOSIS — Z51.81 MEDICATION MONITORING ENCOUNTER: ICD-10-CM

## 2020-07-30 LAB — MYOGLOBIN SERPL-MCNC: 94 MCG/L

## 2020-07-30 RX ORDER — TRIAMCINOLONE ACETONIDE 1 MG/G
OINTMENT TOPICAL 2 TIMES DAILY
Qty: 453 G | Refills: 5 | Status: SHIPPED | OUTPATIENT
Start: 2020-07-30 | End: 2021-09-24

## 2020-07-30 RX ORDER — PETROLATUM,WHITE 1"X36"
BANDAGE TOPICAL
Qty: 12 EACH | Refills: 5 | Status: SHIPPED | OUTPATIENT
Start: 2020-07-30 | End: 2022-04-11

## 2020-07-30 RX ORDER — PREDNISONE 10 MG/1
TABLET ORAL
Qty: 60 TABLET | Refills: 2 | Status: SHIPPED | OUTPATIENT
Start: 2020-07-30 | End: 2020-07-30

## 2020-07-30 ASSESSMENT — PAIN SCALES - GENERAL: PAINLEVEL: SEVERE PAIN (6)

## 2020-07-30 NOTE — NURSING NOTE
"Chief Complaint   Patient presents with     Derm Problem     Bandra is here today for HFU. Bandar notes \"oozing\" from legs - \"it stings and it hurts\".  Bandar states he is slowly improving and woudl like to discuss possible causes for his condition.      Nahomy Morales LPN    "

## 2020-07-30 NOTE — PROGRESS NOTES
[facility] Dermatology Note    Dermatology Problem List:  1. 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 Leg ulcers  - elevation, compression, Vaseline gauze    Encounter Date: Jul 30, 2020    CC: DRESS syndrome    History of Present Illness:  Mr. Bandar Wells is a 69 year old male presenting for DRESS follow up  - complex history: heart failure, endocarditis, MVR on warfarin, a-fib, CKD  - diffuse skin eruption and fevers started 7/8/20  - seen in hospital for inpatient consult on 7/19/20  - held off on prednisone as after assessment, thought it may resolved on its own and planned close clinic follow up  - skin has continued to be very itchy  - tried to use triamcinolone ointment on the chest and back, improving but hard to reach his back  - denies fever at home, overall has malaise and does not feel well  - labs revealed resolution of eosinophilia, but persistently elevated AST at 90, creatinine stable at 1.32  - wondering why this happened in the first place  - legs have been swollen and developed some open wounds on the legs that are extremely painful    Past Medical, Social, Family History:   Patient Active Problem List   Diagnosis     Rash     Other constipation     Past Medical History:   Diagnosis Date     Atrial fibrillation (H)     s/p Maze procedure     Chronic combined systolic and diastolic heart failure (H)      CKD (chronic kidney disease)      H/O mitral valve replacement with mechanical valve      Hyperlipidemia      Past Surgical History:   Procedure Laterality Date     Mechanical mitral valve         Family History   Problem Relation Age of Onset     Heart Failure Mother        Social History:  Patient  reports that  he has never smoked. He does not have any smokeless tobacco history on file. He reports current alcohol use.    Current Outpatient Medications   Medication Sig Dispense Refill     acetaminophen (TYLENOL) 325 MG tablet Take 2 tablets (650 mg) by mouth every 4 hours as needed for mild pain       ferrous sulfate (FEROSUL) 325 (65 Fe) MG tablet Take 325 mg by mouth       folic acid (FOLVITE) 1 MG tablet Take 1 mg by mouth daily       gabapentin (NEURONTIN) 300 MG capsule Take 600 mg by mouth       oxyCODONE IR (ROXICODONE) 10 MG tablet Take 5-10 mg by mouth every 4 hours as needed       penicillin V (VEETID) 500 MG tablet Take 500 mg by mouth daily Hx endocarditis       polyethylene glycol (MIRALAX) 17 g packet Take 17 g by mouth daily as needed for constipation 30 packet 0     potassium chloride (KLOR-CON) 20 MEQ packet Take 20 mEq by mouth daily       sennosides (SENOKOT) 8.6 MG tablet Take 1-4 tablets by mouth 2 times daily       torsemide (DEMADEX) 100 MG tablet Take 50 mg by mouth 2 times daily       triamcinolone (KENALOG) 0.1 % external ointment Apply topically 2 times daily 453.6 g 3     warfarin ANTICOAGULANT (COUMADIN) 3 MG tablet Take 1.5-2 tablets (4.5-6 mg) by mouth daily Per PTA regimen (4.5mg on Tue/Fri, 6mg on other days of the week)  0        Allergies:  No Known Allergies    Review of Systems:  Constitutional: Otherwise feeling well today, in usual state of health.  HEENT: Patient denies nonhealing oral sores.  See HPI; otherwise 10 point ROS negative    Physical exam:  Vitals: There were no vitals taken for this visit.  General: in no acute distress, well-developed, well-nourished  Eyes: conjunctivae clear  Pulmonary: breathing comfortably in no distress  CV: well-perfused, no cyanosis  Extremities: severe kyphosis, bilateral lower extremity edema  Lymphatic: no lymphadenopathy    Skin: Waist-up skin, which includes the head/face, neck, both arms, chest, back, abdomen, digits and/or nails, bilateral  legs  - skin type: light-skinned  - upper chest, back: ill-defined scattered bright red to violaceous eczematous scaly coalescing papules and plaques  - face, scalp: fine desquamating pink plaques  - bilateral shins: crusted eroded punched out plaques                      Impression/Plan:  1. Favor a limited/abortive version of drug reaction with eosinophilia with systemic symptoms (DRESS)/drug-induced hypersensitivity syndrome (DIHS)  - likely resolving as skin is much improved  - will hold off on prednisone for now and continue close clinical monitoring; patient would prefer to avoid prednisone if possible as he is concerned that it may exacerbate leg swelling  - repeat CBC with diff and CMP in 2 weeks  - triamcinolone 0.1% ointment BID to all affected areas    2. Leg edema with ulcers   - leg elevation, compression  - Vaseline  - consider Unna boot or ACE wraps in the     [facility]  [address] [facility] [address]  [place] [address] on close of this encounter.    Follow-up in 2 weeks     Faculty: Dr. Johns    Staff Involved:  Resident/Staff    Delia Abebe MD  Dermatology Resident  [facility]    I have seen and examined this patient and agree with the assessment and plan as documented in the resident's note.    Michael Johns MD  Dermatology Attending

## 2020-07-30 NOTE — LETTER
7/30/2020       RE: Bandar Wells  [address] [address]     Dear Colleague,    Thank you for referring your patient, Bandar Wells, to the [facility] DERMATOLOGY at [facility]. Please see a copy of my visit note below.    [facility] Dermatology Note    Dermatology Problem List:  1. 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 Leg ulcers  - elevation, compression, Vaseline gauze    Encounter Date: Jul 30, 2020    CC: DRESS syndrome    History of Present Illness:  Mr. Bandar Wells is a 69 year old male presenting for DRESS follow up  - complex history: heart failure, endocarditis, MVR on warfarin, a-fib, CKD  - diffuse skin eruption and fevers started 7/8/20  - seen in hospital for inpatient consult on 7/19/20  - held off on prednisone as after assessment, thought it may resolved on its own and planned close clinic follow up  - skin has continued to be very itchy  - tried to use triamcinolone ointment on the chest and back, improving but hard to reach his back  - denies fever at home, overall has malaise and does not feel well  - labs revealed resolution of eosinophilia, but persistently elevated AST at 90, creatinine stable at 1.32  - wondering why this happened in the first place  - legs have been swollen and developed some open wounds on the legs that are extremely painful    Past Medical, Social, Family History:   Patient Active Problem List   Diagnosis     Rash     Other constipation     Past Medical History:   Diagnosis Date     Atrial fibrillation (H)     s/p Maze procedure     Chronic combined systolic and diastolic heart failure (H)      CKD (chronic kidney disease)       H/O mitral valve replacement with mechanical valve      Hyperlipidemia      Past Surgical History:   Procedure Laterality Date     Mechanical mitral valve         Family History   Problem Relation Age of Onset     Heart Failure Mother        Social History:  Patient  reports that he has never smoked. He does not have any smokeless tobacco history on file. He reports current alcohol use.    Current Outpatient Medications   Medication Sig Dispense Refill     acetaminophen (TYLENOL) 325 MG tablet Take 2 tablets (650 mg) by mouth every 4 hours as needed for mild pain       ferrous sulfate (FEROSUL) 325 (65 Fe) MG tablet Take 325 mg by mouth       folic acid (FOLVITE) 1 MG tablet Take 1 mg by mouth daily       gabapentin (NEURONTIN) 300 MG capsule Take 600 mg by mouth       oxyCODONE IR (ROXICODONE) 10 MG tablet Take 5-10 mg by mouth every 4 hours as needed       penicillin V (VEETID) 500 MG tablet Take 500 mg by mouth daily Hx endocarditis       polyethylene glycol (MIRALAX) 17 g packet Take 17 g by mouth daily as needed for constipation 30 packet 0     potassium chloride (KLOR-CON) 20 MEQ packet Take 20 mEq by mouth daily       sennosides (SENOKOT) 8.6 MG tablet Take 1-4 tablets by mouth 2 times daily       torsemide (DEMADEX) 100 MG tablet Take 50 mg by mouth 2 times daily       triamcinolone (KENALOG) 0.1 % external ointment Apply topically 2 times daily 453.6 g 3     warfarin ANTICOAGULANT (COUMADIN) 3 MG tablet Take 1.5-2 tablets (4.5-6 mg) by mouth daily Per PTA regimen (4.5mg on Tue/Fri, 6mg on other days of the week)  0        Allergies:  No Known Allergies    Review of Systems:  Constitutional: Otherwise feeling well today, in usual state of health.  HEENT: Patient denies nonhealing oral sores.  See HPI; otherwise 10 point ROS negative    Physical exam:  Vitals: There were no vitals taken for this visit.  General: in no acute distress, well-developed, well-nourished  Eyes: conjunctivae clear  Pulmonary:  breathing comfortably in no distress  CV: well-perfused, no cyanosis  Extremities: severe kyphosis, bilateral lower extremity edema  Lymphatic: no lymphadenopathy    Skin: Waist-up skin, which includes the head/face, neck, both arms, chest, back, abdomen, digits and/or nails, bilateral legs  - skin type: light-skinned  - upper chest, back: ill-defined scattered bright red to violaceous eczematous scaly coalescing papules and plaques  - face, scalp: fine desquamating pink plaques  - bilateral shins: crusted eroded punched out plaques                      Impression/Plan:  1. Favor a limited/abortive version of drug reaction with eosinophilia with systemic symptoms (DRESS)/drug-induced hypersensitivity syndrome (DIHS)  - likely resolving as skin is much improved  - will hold off on prednisone for now and continue close clinical monitoring; patient would prefer to avoid prednisone if possible as he is concerned that it may exacerbate leg swelling  - repeat CBC with diff and CMP in 2 weeks  - triamcinolone 0.1% ointment BID to all affected areas    2. Leg edema with ulcers   - leg elevation, compression  - Vaseline  - consider Unna boot or ACE wraps in the     [facility]  [address] [facility] [address]  [place] [address] on close of this encounter.    Follow-up in 2 weeks     Faculty: Dr. Johns    Staff Involved:  Resident/Staff    Delia Abebe MD  Dermatology Resident  [facility]    I have seen and examined this patient and agree with the assessment and plan as documented in the resident's note.    Michael Johns MD  Dermatology Attending      Again, thank you for allowing me to participate in the care of your patient.      Sincerely,    Michael Johns MD

## 2020-08-03 ENCOUNTER — TELEPHONE (OUTPATIENT)
Dept: DERMATOLOGY | Facility: CLINIC | Age: 70
End: 2020-08-03

## 2020-08-03 ENCOUNTER — THERAPY VISIT (OUTPATIENT)
Dept: PHYSICAL THERAPY | Facility: CLINIC | Age: 70
End: 2020-08-03
Attending: NURSE PRACTITIONER
Payer: MEDICARE

## 2020-08-03 DIAGNOSIS — R21 RASH: ICD-10-CM

## 2020-08-03 PROCEDURE — 97110 THERAPEUTIC EXERCISES: CPT | Mod: GP | Performed by: PHYSICAL THERAPIST

## 2020-08-03 PROCEDURE — 97162 PT EVAL MOD COMPLEX 30 MIN: CPT | Mod: GP | Performed by: PHYSICAL THERAPIST

## 2020-08-03 PROCEDURE — 97140 MANUAL THERAPY 1/> REGIONS: CPT | Mod: GP | Performed by: PHYSICAL THERAPIST

## 2020-08-03 NOTE — LETTER
DEPARTMENT OF HEALTH AND HUMAN SERVICES  CENTERS FOR MEDICARE & MEDICAID SERVICES    PLAN/UPDATED PLAN OF PROGRESS FOR OUTPATIENT REHABILITATION    PATIENTS NAME:  Bandar Wells   : 1950    PROVIDER NUMBER:    [id number]    [id number]     PROVIDER NAME: [place] [facility]    MEDICAL RECORD NUMBER: [id number]     START OF CARE DATE:  SOC Date: 20   TYPE:  PT    PRIMARY/TREATMENT DIAGNOSIS: (Pertinent Medical Diagnosis)  Rash    VISITS FROM START OF CARE:  Rxs Used: 1     [place] [facility] Initial Evaluation  Subjective:  The history is provided by the patient. No  was used.   Patient Health History  Bandar Wells being seen for neck weakness.     Date of Onset: 20, date of order, began 6 months ago.   Problem occurred: not sure   Pain is reported as 4/10 on pain scale.  General health as reported by patient is fair.  Health conditions: heart problems, chronic kidney disease, rash.   Red flags:  Significant weakness.  Medical allergies: alipentonal.   Surgeries: 2 mitral valve replacements.    Current medications: coumadin, penicillian, potassium, diuretic.    Current occupation is Retired.   Primary job tasks include:  Prolonged sitting and repetitive tasks.                  Therapist Generated HPI Evaluation  Problem details: The pt notes that about 6 months ago he has been been having some neck weakness. Prior to today he notes he had good posture. He denies any mechanism of injury. Pt notes having quite a bit of general fatigue these past few months. The pt has a significant other that has been helping him with driving. The pt has been occasionally using a soft neck brace at home.    Activity: walks around the block 1-2x a week. He has a treadmill that he rarely uses    Goal: improve neck strength, use his treadmill at home..         Type of problem:  Cervical spine.    Condition occurred with:  Insidious  onset.    Radiates to: neck.   PATIENTS NAME:  Bandar Wells   : 1950    Associated symptoms:  Fatigue. Symptoms are exacerbated by looking up or down and rotating head  and relieved by rest.    Objective:  Standing Alignment:    Cervical/Thoracic:  Thoracic kyphosis increased, forward head and cervical spine lateral flex R    Gait:    Assistive Devices:  None    Flexibility/Screens:   Spine:  Decreased left spine flexibility:  Sternocleidomastoid and Scalenes  Decreased right spine flexibility:  Sternocleidomastoid and Scalenes     Cervical/Thoracic Evaluation  AROM:  AROM Cervical:  Flexion:          Pt resting in ~50 degeres of cervical fleixon (not formally measured).  Extension:       Minimal  Rotation:         Left: ~10 degress not formally measured     Right: ~10 degress not formally measured  Side Bend:      Left:     Right:   Strength: Pt demonstrated fatiuge when holding head up during subjective of initial evaluaiton (20 minutes)    Headaches: none    Cervical Palpation:    Tenderness present at Left:    Scalenes; Upper Trap; Levator; Erector Spinae and Suboccipitals  Tenderness present at Right:    Scalenes; Upper Trap; Levator; Erector Spinae and Suboccipitals    Spinal Segmental Conclusions:    Level:  Hypo at C2, C3, C4, C5, C6 and C7    Assessment/Plan:    Patient is a 69 year old male with cervical complaints.    Patient has the following significant findings with corresponding treatment plan.                Diagnosis 1: Weakness of cervical muscles  Decreased ROM/flexibility - manual therapy, therapeutic exercise, therapeutic activity and home program  Decreased strength - therapeutic exercise, therapeutic activities and home program  Impaired muscle performance - neuro re-education and home program  Impaired posture - neuro re-education, therapeutic activities and home program    Therapy Evaluation Codes:   1) History comprised of:   Personal factors that impact the plan of care:      Age  and Time since onset of symptoms.    Comorbidity factors that impact the plan of care are:      Heart problems and Weakness.     Medications impacting care: Cardiac.  2) Examination of Body Systems comprised of:   Body structures and functions that impact the plan of care:      Cervical spine.       PATIENTS NAME:  Bandar Wells   : 1950      Activity limitations that impact the plan of care are:      Bathing, Bending, Cooking, Driving, Dressing, Lifting, Reading/Computer work, Sitting and Frequency.  3) Clinical presentation characteristics are:   Evolving/Changing.  4) Decision-Making    Moderate complexity using standardized patient assessment instrument and/or measureable assessment of functional outcome.  Cumulative Therapy Evaluation is: Moderate complexity.    Previous and current functional limitations:  (See Goal Flow Sheet for this information)    Short term and Long term goals: (See Goal Flow Sheet for this information)     Communication ability:  Patient appears to be able to clearly communicate and understand verbal and written communication and follow directions correctly.  Treatment Explanation - The following has been discussed with the patient:   RX ordered/plan of care  Anticipated outcomes  Possible risks and side effects  This patient would benefit from PT intervention to resume normal activities.   Rehab potential is good.    Frequency:  2 X week, once daily  Duration:  for 6 weeks  Discharge Plan:  Achieve all LTG.  Independent in home treatment program.  Reach maximal therapeutic benefit.    Please refer to the daily flowsheet for treatment today, total treatment time and time spent performing 1:1 timed codes.         Caregiver Signature/Credentials _____________________________ Date ________       Treating Provider: Paula Steward DPT   I have reviewed and certified the need for these services and plan of treatment while under my care.        PHYSICIAN'S SIGNATURE:    "_________________________________________  Date___________   Rj Fajardo MD    Certification period:  Beginning of Cert date period: 08/03/20 to  End of Cert period date: 10/03/20     Functional Level Progress Report: Please see attached \"Goal Flow sheet for Functional level.\"    ____X____ Continue Services or       ________ DC Services                Service dates: From  SOC Date: 08/03/20 date to present                         "

## 2020-08-03 NOTE — TELEPHONE ENCOUNTER
[facility] Call Center    Phone Message    May a detailed message be left on voicemail: yes    Reason for Call: Medication Question or concern regarding medication   Prescription Clarification  Name of Medication: Prednisone  Prescribing Provider: Dr. Johns   Pharmacy: [facility]   What on the order needs clarification? Pt received prednisone script dated 7/30 from Dr. Johns from his visit last week despite notes stating Pt should be holding off on it for now. Pt wants to know why it was prescribed. Also, no documentation in Pt chart showing it was sent, but Pt says he has a bottle for it with Dr. Johns's name on it. Pt did get the other 2 prescriptions from 7/30 that are documented. Please call Pt back.           Action Taken: Message routed to:  [facility] ([facility] D[facility]MATOLOG[facility] AD[facility]    Travel Screening: Not Applicable

## 2020-08-03 NOTE — PROGRESS NOTES
[place] [facility] Initial Evaluation  Subjective:  The history is provided by the patient. No  was used.   Patient Health History  Bandar Wells being seen for neck weakness.     Date of Onset: 7/18/20, date of order, began 6 months ago.   Problem occurred: not sure   Pain is reported as 4/10 on pain scale.  General health as reported by patient is fair.  Health conditions: heart problems, chronic kidney disease, rash.   Red flags:  Significant weakness.  Medical allergies: alipentonal.   Surgeries: 2 mitral valve replacements.    Current medications: coumadin, penicillian, potassium, diuretic.    Current occupation is Retired.   Primary job tasks include:  Prolonged sitting and repetitive tasks.                  Therapist Generated HPI Evaluation  Problem details: The pt notes that about 6 months ago he has been been having some neck weakness. Prior to today he notes he had good posture. He denies any mechanism of injury. Pt notes having quite a bit of general fatigue these past few months. The pt has a significant other that has been helping him with driving. The pt has been occasionally using a soft neck brace at home.    Activity: walks around the block 1-2x a week. He has a treadmill that he rarely uses    Goal: improve neck strength, use his treadmill at home..         Type of problem:  Cervical spine.      Condition occurred with:  Insidious onset.        Radiates to: neck.     Associated symptoms:  Fatigue. Symptoms are exacerbated by looking up or down and rotating head  and relieved by rest.                              Objective:  Standing Alignment:    Cervical/Thoracic:  Thoracic kyphosis increased, forward head and cervical spine lateral flex R                Gait:    Assistive Devices:  None      Flexibility/Screens:         Spine:  Decreased left spine flexibility:  Sternocleidomastoid and Scalenes    Decreased right spine flexibility:  Sternocleidomastoid and  Luis F                  Cervical/Thoracic Evaluation    AROM:  AROM Cervical:    Flexion:          Pt resting in ~50 degeres of cervical fleixon (not formally measured).  Extension:       Minimal  Rotation:         Left: ~10 degress not formally measured     Right: ~10 degress not formally measured  Side Bend:      Left:     Right:     Strength: Pt demonstrated fatiuge when holding head up during subjective of initial evaluaiton (20 minutes)  Headaches: none          Cervical Palpation:    Tenderness present at Left:    Scalenes; Upper Trap; Levator; Erector Spinae and Suboccipitals  Tenderness present at Right:    Scalenes; Upper Trap; Levator; Erector Spinae and Suboccipitals      Spinal Segmental Conclusions:    Level:  Hypo at C2, C3, C4, C5, C6 and C7                                                General     ROS    Assessment/Plan:    Patient is a 69 year old male with cervical complaints.    Patient has the following significant findings with corresponding treatment plan.                Diagnosis 1: Weakness of cervical muscles  Decreased ROM/flexibility - manual therapy, therapeutic exercise, therapeutic activity and home program  Decreased strength - therapeutic exercise, therapeutic activities and home program  Impaired muscle performance - neuro re-education and home program  Impaired posture - neuro re-education, therapeutic activities and home program    Therapy Evaluation Codes:   1) History comprised of:   Personal factors that impact the plan of care:      Age and Time since onset of symptoms.    Comorbidity factors that impact the plan of care are:      Heart problems and Weakness.     Medications impacting care: Cardiac.  2) Examination of Body Systems comprised of:   Body structures and functions that impact the plan of care:      Cervical spine.   Activity limitations that impact the plan of care are:      Bathing, Bending, Cooking, Driving, Dressing, Lifting, Reading/Computer work, Sitting and  Frequency.  3) Clinical presentation characteristics are:   Evolving/Changing.  4) Decision-Making    Moderate complexity using standardized patient assessment instrument and/or measureable assessment of functional outcome.  Cumulative Therapy Evaluation is: Moderate complexity.    Previous and current functional limitations:  (See Goal Flow Sheet for this information)    Short term and Long term goals: (See Goal Flow Sheet for this information)     Communication ability:  Patient appears to be able to clearly communicate and understand verbal and written communication and follow directions correctly.  Treatment Explanation - The following has been discussed with the patient:   RX ordered/plan of care  Anticipated outcomes  Possible risks and side effects  This patient would benefit from PT intervention to resume normal activities.   Rehab potential is good.    Frequency:  2 X week, once daily  Duration:  for 6 weeks  Discharge Plan:  Achieve all LTG.  Independent in home treatment program.  Reach maximal therapeutic benefit.    Please refer to the daily flowsheet for treatment today, total treatment time and time spent performing 1:1 timed codes.

## 2020-08-03 NOTE — TELEPHONE ENCOUNTER
Yes, he is not supposed to take prednisone. I had initially sent it, but then canceled it, because Dr. Johns decided to hold off

## 2020-08-03 NOTE — TELEPHONE ENCOUNTER
After reviewing the chart, there is no documentation of Prednisone being prescribed. Per patient, he was given this medication and the two other medication from the pharmacy. Patient has not taken the medication and does not plan on taking it since it was never discussed during his visit. Patient plans on calling the pharmacy.     Britney Jurado [facility]

## 2020-08-04 ENCOUNTER — COMMUNICATION - HEALTHEAST (OUTPATIENT)
Dept: INTERNAL MEDICINE | Facility: CLINIC | Age: 70
End: 2020-08-04

## 2020-08-04 ENCOUNTER — RECORDS - HEALTHEAST (OUTPATIENT)
Dept: ADMINISTRATIVE | Facility: OTHER | Age: 70
End: 2020-08-04

## 2020-08-04 ENCOUNTER — COMMUNICATION - HEALTHEAST (OUTPATIENT)
Dept: ANTICOAGULATION | Facility: CLINIC | Age: 70
End: 2020-08-04

## 2020-08-04 DIAGNOSIS — Z95.2 S/P MVR (MITRAL VALVE REPLACEMENT): ICD-10-CM

## 2020-08-04 DIAGNOSIS — I48.0 PAROXYSMAL ATRIAL FIBRILLATION (H): ICD-10-CM

## 2020-08-04 DIAGNOSIS — Z79.01 LONG TERM (CURRENT) USE OF ANTICOAGULANTS: ICD-10-CM

## 2020-08-04 DIAGNOSIS — Z95.4 S/P MITRAL VALVE REPLACEMENT WITH METALLIC VALVE: ICD-10-CM

## 2020-08-04 LAB — INR PPP: 2.1 (ref 0.9–1.1)

## 2020-08-04 NOTE — PROGRESS NOTES
[facility] Initial Evaluation  Subjective:  HPI                    Objective:  System    Physical Exam    General     ROS    Assessment/Plan:

## 2020-08-05 ENCOUNTER — NURSE TRIAGE (OUTPATIENT)
Dept: NURSING | Facility: CLINIC | Age: 70
End: 2020-08-05

## 2020-08-05 NOTE — TELEPHONE ENCOUNTER
"Bandar calling today for an appointment, he doesn't think he should still have 7-8 open areas on each leg.  Had appointment with Dr Johns 7/30/20.  Reports he has been elevating legs, and applying vaseline to open areas.  Reports redness/swelling and amount of \"sweat\" he has been having have all decreased around sores.  Denies fever, chilling.  Was not able to tolerate wearing compression stockings.  A friend has been putting kenalog to his back twice a day.  Denies any open area on back or chest/abdomen.    Advised to continue with elevation, vaseline for lower legs and triamcinolone as directed.  Is not able to shower as water stings back/abdomen/chest and legs.  Advised to use only clear water, no soaps or lotions, agrees.  Requests appointment, scheduled for 8/6/20 with Dr Burger.    Additional Information    Negative: Sudden onset of rash (within last 2 hours) and difficulty with breathing or swallowing    Negative: Difficult to awaken or acting confused (e.g., disoriented, slurred speech)    Negative: Fever and purple or blood-colored spots or dots    Negative: Too weak or sick to stand    Negative: Life-threatening reaction (anaphylaxis) in the past to similar substance (e.g., food, insect bite/sting, chemical, etc.) and < 2 hours since exposure    Negative: Sounds like a life-threatening emergency to the triager    Negative: Insect bites suspected    Negative: Sunburn suspected    Negative: Hives suspected    Negative: Drug rash suspected and started taking new medicine within last 2 weeks (EXCEPTION: antihistamine, eye drops, ear drops, decongestant or other OTC cough/cold medicines)    Negative: Bright red, sunburn-like rash and current tampon use    Negative: Bright red, sunburn-like rash and current tampon use or nasal packing    Negative: Bright red, sunburn-like rash and wound infection or recent surgery    Negative: Bright red skin that peels off in sheets    Negative: Stiff neck (can't touch chin to " "chest)    Negative: Patient sounds very sick or weak to the triager    Negative: Fever    Negative: Face becomes swollen    Negative: Headache    Negative: Purple or blood-colored spots or dots (no fever and sounds well to triager)    Negative: Joint pain or swelling    Negative: Sores in mouth    Negative: Rash looks like large or small blisters (i.e., fluid filled bubbles or sacs on the skin)    Negative: Pregnant    Negative: Rash began within 4 hours of a new prescription medication    Negative: Severe itching    Negative: Sore throat    Negative: Ring-like appearance of rash (or ask: does it look like a 'target' or 'bulls-eye')    Negative: Patient wants to be seen    Mild widespread rash    Answer Assessment - Initial Assessment Questions  1. APPEARANCE of RASH: \"Describe the rash.\" (e.g., spots, blisters, raised areas, skin peeling, scaly)      Rash appearance on back and abdomen, open sores on lower legs  2. SIZE: \"How big are the spots?\" (e.g., tip of pen, eraser, coin; inches, centimeters)     Open areas on legs quarter size and larger, abdomen and back tip of pen  3. LOCATION: \"Where is the rash located?\"      Rash abdomen and back, wounds on both legs  4. COLOR: \"What color is the rash?\" (Note: It is difficult to assess rash color in people with darker-colored skin. When this situation occurs, simply ask the caller to describe what they see.)      red  5. ONSET: \"When did the rash begin?\"      During hospitalization  6. FEVER: \"Do you have a fever?\" If so, ask: \"What is your temperature, how was it measured, and when did it start?\"      Denies  7. ITCHING: \"Does the rash itch?\" If so, ask: \"How bad is the itch?\" (Scale 1-10; or mild, moderate, severe)      mild  8. CAUSE: \"What do you think is causing the rash?\"      From medications he had been taking  9. MEDICATION FACTORS: \"Have you started any new medications within the last 2 weeks?\" (e.g., antibiotics)       Has been taken off of medication having " "caused symptoms  10. OTHER SYMPTOMS: \"Do you have any other symptoms?\" (e.g., dizziness, headache, sore throat, joint pain)        Some intermittent pain  11. PREGNANCY: \"Is there any chance you are pregnant?\" \"When was your last menstrual period?\"        N/A    Protocols used: RASH OR REDNESS - WIDESPREAD-A-OH      "

## 2020-08-06 ENCOUNTER — OFFICE VISIT (OUTPATIENT)
Dept: DERMATOLOGY | Facility: CLINIC | Age: 70
End: 2020-08-06
Payer: MEDICARE

## 2020-08-06 ENCOUNTER — RECORDS - HEALTHEAST (OUTPATIENT)
Dept: ADMINISTRATIVE | Facility: OTHER | Age: 70
End: 2020-08-06

## 2020-08-06 DIAGNOSIS — L08.9 SKIN INFECTION: Primary | ICD-10-CM

## 2020-08-06 DIAGNOSIS — L97.909 ULCER OF LOWER EXTREMITY, UNSPECIFIED LATERALITY, UNSPECIFIED ULCER STAGE (H): ICD-10-CM

## 2020-08-06 PROCEDURE — 87077 CULTURE AEROBIC IDENTIFY: CPT | Performed by: DERMATOLOGY

## 2020-08-06 PROCEDURE — 87070 CULTURE OTHR SPECIMN AEROBIC: CPT | Performed by: DERMATOLOGY

## 2020-08-06 PROCEDURE — 87186 SC STD MICRODIL/AGAR DIL: CPT | Performed by: DERMATOLOGY

## 2020-08-06 RX ORDER — MUPIROCIN 20 MG/G
OINTMENT TOPICAL
Qty: 30 G | Refills: 3 | Status: SHIPPED | OUTPATIENT
Start: 2020-08-06 | End: 2022-04-11

## 2020-08-06 ASSESSMENT — PAIN SCALES - GENERAL: PAINLEVEL: SEVERE PAIN (6)

## 2020-08-06 NOTE — LETTER
"8/6/2020     RE: Bandar Wells  [address] [facility] [address]     Dear Colleague,    Thank you for referring your patient, Bandar Wells, to the [facility] DERMATOLOGY at [facility]. Please see a copy of my visit note below.    [facility] Dermatology Note      Dermatology Problem List:  1. 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 Leg ulcers  - elevation, compression, Vaseline gauze, mupirocin ointment, ACE wraps  - future: consider vascular consult    Encounter Date: Aug 6, 2020    CC:   Chief Complaint   Patient presents with     Derm Problem     Bandar is here today for lesions on his legs. He states \" the lesions are healing and looking a lot better today\"     History of Present Illness:  Mr. Bandar Wells is a 69 year old male who presents as a follow-up for DRESS felt to be 2/2 allopurinol in addition to leg ulcers. The patient was last seen 7/30/20 by Shreya Johns and Andrae when we recommended repeat CBC/CMP in 2 weeks (~8/13/20, not completed) and triamcinolone ointment 0.1% ointment BID to all affected areas of the skin and for the ulcers recommended leg elevation, compression, Vaseline and consideration of Unna boot or ACE wrap in the future. He did erroneously receive a prescription for prednisone which he did not start. He presents a week earlier than anticipated today after calling the clinic stating he did not believe he should still have ulcerations of the lower legs. He has been applying vaseline to open areas since the last time he was seen (one week ago) and had not been using compression stockings. He has been able to put on kenalog with help from a friend to the skin on " his back. Today he states, he thinks his legs are improving but is worried they may be infected. They are tender around the sites of ulceration. There is some swelling. He would like his suture from his biopsy on 7/19/ removed. The patient is well today in their baseline state of health, with no additional skin concerns.     Past Medical History:   Patient Active Problem List   Diagnosis     Rash     Other constipation     Past Medical History:   Diagnosis Date     Atrial fibrillation (H)     s/p Maze procedure     Chronic combined systolic and diastolic heart failure (H)      CKD (chronic kidney disease)      H/O mitral valve replacement with mechanical valve      Hyperlipidemia      Past Surgical History:   Procedure Laterality Date     Mechanical mitral valve         Social History:  Patient reports that he has never smoked. He has never used smokeless tobacco. He reports current alcohol use.    Family History:  Family History   Problem Relation Age of Onset     Heart Failure Mother        Medications:  Current Outpatient Medications   Medication Sig Dispense Refill     acetaminophen (TYLENOL) 325 MG tablet Take 2 tablets (650 mg) by mouth every 4 hours as needed for mild pain       ferrous sulfate (FEROSUL) 325 (65 Fe) MG tablet Take 325 mg by mouth       folic acid (FOLVITE) 1 MG tablet Take 1 mg by mouth daily       gabapentin (NEURONTIN) 300 MG capsule Take 600 mg by mouth       mupirocin (BACTROBAN) 2 % external ointment Use 2 times a day to affected area. 30 g 3     oxyCODONE IR (ROXICODONE) 10 MG tablet Take 5-10 mg by mouth every 4 hours as needed       penicillin V (VEETID) 500 MG tablet Take 500 mg by mouth daily Hx endocarditis       polyethylene glycol (MIRALAX) 17 g packet Take 17 g by mouth daily as needed for constipation 30 packet 0     potassium chloride (KLOR-CON) 20 MEQ packet Take 20 mEq by mouth daily       torsemide (DEMADEX) 100 MG tablet Take 50 mg by mouth 2 times daily        triamcinolone (KENALOG) 0.1 % external ointment Apply topically 2 times daily 453 g 5     triamcinolone (KENALOG) 0.1 % external ointment Apply topically 2 times daily 453.6 g 3     warfarin ANTICOAGULANT (COUMADIN) 3 MG tablet Take 1.5-2 tablets (4.5-6 mg) by mouth daily Per PTA regimen (4.5mg on Tue/Fri, 6mg on other days of the week)  0     Wound Dressings (VASELINE PETROLATUM GAUZE) PADS Apply to open wounds on the legs 12 each 5     sennosides (SENOKOT) 8.6 MG tablet Take 1-4 tablets by mouth 2 times daily          No Known Allergies      Review of Systems:  -As per HPI  -Constitutional: Otherwise feeling well today, in usual state of health.  -Skin: As above in HPI. No additional skin concerns.    Physical exam:  Vitals: There were no vitals taken for this visit.  GEN: This is a well developed, well-nourished male in no acute distress, in a pleasant mood.    SKIN: Focused examination of the bilateral lower legs and right thigh was performed.  - punched out appearing erosions with impetiginized crust on the bilateral lower legs, appear stable to slightly improved from last week's photos  - 1+ pitting edema bilateral lower legs  - retained suture, right anterior thigh, site is clean/dry/intact        -No other lesions of concern on areas examined.     Impression/Plan:  1. Leg ulcers  - discussed with the patient that these ulcers are likely to take weeks to months to heal. Discussed that poor circulation, decreased tissue oxygenation and delivery of nutrients can contribute to slowed wound healing of the lower legs and he should not expect rapid improvement/improvement over night  - due to impetiginized crust, will send skin culture and start topical mupirocin. Can consider vinegar soaks/compress  In the future if necessary  - re emphasized the benefits of compression and strongly encouraged him to use compression to the bilateral lower legs at least to the level of the knee, encouraged elevation of the legs   -  continue Vaseline to keep wound from drying out     2. History of DRESS, follows with Dr. Johns  - reminded patient that he should get his labs rechecked neck week and keep his scheduled appointment for follow up  - removed suture from 7/19/20    CC Referred Self, MD  No address on file on close of this encounter.  Follow-up in 1 week (labs) 2 weeks (follow up), earlier for new or changing lesions.      staffed the patient.    Staff Involved:  Resident(Marco A)/Staff    Staff Physician Comments:   I saw and evaluated the patient with the resident and I agree with the assessment and plan.  I was present for the examination.    Kalina Burger MD    Department of Dermatology  [facility]: Phone: [phone], Fax: [phone]  8/7/2020

## 2020-08-06 NOTE — PROGRESS NOTES
"[facility] Dermatology Note      Dermatology Problem List:  1. 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 Leg ulcers  - elevation, compression, Vaseline gauze, mupirocin ointment, ACE wraps  - future: consider vascular consult    Encounter Date: Aug 6, 2020    CC:   Chief Complaint   Patient presents with     Derm Problem     Bandar is here today for lesions on his legs. He states \" the lesions are healing and looking a lot better today\"     History of Present Illness:  Mr. Bandar Wells is a 69 year old male who presents as a follow-up for DRESS felt to be 2/2 allopurinol in addition to leg ulcers. The patient was last seen 7/30/20 by Shreya Johns and Andrae when we recommended repeat CBC/CMP in 2 weeks (~8/13/20, not completed) and triamcinolone ointment 0.1% ointment BID to all affected areas of the skin and for the ulcers recommended leg elevation, compression, Vaseline and consideration of Unna boot or ACE wrap in the future. He did erroneously receive a prescription for prednisone which he did not start. He presents a week earlier than anticipated today after calling the clinic stating he did not believe he should still have ulcerations of the lower legs. He has been applying vaseline to open areas since the last time he was seen (one week ago) and had not been using compression stockings. He has been able to put on kenalog with help from a friend to the skin on his back. Today he states, he thinks his legs are improving but is worried they may be infected. They are tender around the sites of ulceration. There is some swelling. He would like his suture from his biopsy on 7/19/ removed. The patient is well today in their baseline state " of [facility], with no additional skin concerns.     Past Medical History:   Patient Active Problem List   Diagnosis     Rash     Other constipation     Past Medical History:   Diagnosis Date     Atrial fibrillation (H)     s/p Maze procedure     Chronic combined systolic and diastolic heart failure (H)      CKD (chronic kidney disease)      H/O mitral valve replacement with mechanical valve      Hyperlipidemia      Past Surgical History:   Procedure Laterality Date     Mechanical mitral valve         Social History:  Patient reports that he has never smoked. He has never used smokeless tobacco. He reports current alcohol use.    Family History:  Family History   Problem Relation Age of Onset     Heart Failure Mother        Medications:  Current Outpatient Medications   Medication Sig Dispense Refill     acetaminophen (TYLENOL) 325 MG tablet Take 2 tablets (650 mg) by mouth every 4 hours as needed for mild pain       ferrous sulfate (FEROSUL) 325 (65 Fe) MG tablet Take 325 mg by mouth       folic acid (FOLVITE) 1 MG tablet Take 1 mg by mouth daily       gabapentin (NEURONTIN) 300 MG capsule Take 600 mg by mouth       mupirocin (BACTROBAN) 2 % external ointment Use 2 times a day to affected area. 30 g 3     oxyCODONE IR (ROXICODONE) 10 MG tablet Take 5-10 mg by mouth every 4 hours as needed       penicillin V (VEETID) 500 MG tablet Take 500 mg by mouth daily Hx endocarditis       polyethylene glycol (MIRALAX) 17 g packet Take 17 g by mouth daily as needed for constipation 30 packet 0     potassium chloride (KLOR-CON) 20 MEQ packet Take 20 mEq by mouth daily       torsemide (DEMADEX) 100 MG tablet Take 50 mg by mouth 2 times daily       triamcinolone (KENALOG) 0.1 % external ointment Apply topically 2 times daily 453 g 5     triamcinolone (KENALOG) 0.1 % external ointment Apply topically 2 times daily 453.6 g 3     warfarin ANTICOAGULANT (COUMADIN) 3 MG tablet Take 1.5-2 tablets (4.5-6 mg) by mouth daily Per PTA regimen  (4.5mg on Tue/Fri, 6mg on other days of the week)  0     Wound Dressings (VASELINE PETROLATUM GAUZE) PADS Apply to open wounds on the legs 12 each 5     sennosides (SENOKOT) 8.6 MG tablet Take 1-4 tablets by mouth 2 times daily          No Known Allergies      Review of Systems:  -As per HPI  -Constitutional: Otherwise feeling well today, in usual state of health.  -Skin: As above in HPI. No additional skin concerns.    Physical exam:  Vitals: There were no vitals taken for this visit.  GEN: This is a well developed, well-nourished male in no acute distress, in a pleasant mood.    SKIN: Focused examination of the bilateral lower legs and right thigh was performed.  - punched out appearing erosions with impetiginized crust on the bilateral lower legs, appear stable to slightly improved from last week's photos  - 1+ pitting edema bilateral lower legs  - retained suture, right anterior thigh, site is clean/dry/intact        -No other lesions of concern on areas examined.     Impression/Plan:  1. Leg ulcers  - discussed with the patient that these ulcers are likely to take weeks to months to heal. Discussed that poor circulation, decreased tissue oxygenation and delivery of nutrients can contribute to slowed wound healing of the lower legs and he should not expect rapid improvement/improvement over night  - due to impetiginized crust, will send skin culture and start topical mupirocin. Can consider vinegar soaks/compress  In the future if necessary  - re emphasized the benefits of compression and strongly encouraged him to use compression to the bilateral lower legs at least to the level of the knee, encouraged elevation of the legs   - continue Vaseline to keep wound from drying out     2. History of DRESS, follows with Dr. Johns  - reminded patient that he should get his labs rechecked neck week and keep his scheduled appointment for follow up  - removed suture from 7/19/20    CC Referred MD Oumar  No address on file  on close of this encounter.  Follow-up in 1 week (labs) 2 weeks (follow up), earlier for new or changing lesions.      staffed the patient.    Staff Involved:  Resident(Marco A)/Staff    Staff Physician Comments:   I saw and evaluated the patient with the resident and I agree with the assessment and plan.  I was present for the examination.    Kalina Burger MD    Department of Dermatology  [facility]: Phone: [phone], Fax: [phone]  8/7/2020

## 2020-08-06 NOTE — PATIENT INSTRUCTIONS
Please apply mupirocin ointment to the wound twice a day (sent to your pharmacy).     Apply vaseline to the wound to keep it from drying out.    Please elevate as often as you can.     Please try compression or an ACE wrap if you can, this will help your wound to heal faster.    Please get your labs drawn as previously recommended next week.     Please keep your follow up on Aug 20.     Great to meet you!

## 2020-08-08 LAB
BACTERIA SPEC CULT: ABNORMAL
BACTERIA SPEC CULT: ABNORMAL
Lab: ABNORMAL
SPECIMEN SOURCE: ABNORMAL

## 2020-08-10 DIAGNOSIS — D72.12 DRESS SYNDROME: Primary | ICD-10-CM

## 2020-08-10 DIAGNOSIS — T50.905A DRESS SYNDROME: Primary | ICD-10-CM

## 2020-08-11 ENCOUNTER — TELEPHONE (OUTPATIENT)
Dept: DERMATOLOGY | Facility: CLINIC | Age: 70
End: 2020-08-11

## 2020-08-11 ENCOUNTER — RECORDS - HEALTHEAST (OUTPATIENT)
Dept: ADMINISTRATIVE | Facility: OTHER | Age: 70
End: 2020-08-11

## 2020-08-11 ENCOUNTER — COMMUNICATION - HEALTHEAST (OUTPATIENT)
Dept: INTERNAL MEDICINE | Facility: CLINIC | Age: 70
End: 2020-08-11

## 2020-08-11 ENCOUNTER — COMMUNICATION - HEALTHEAST (OUTPATIENT)
Dept: ANTICOAGULATION | Facility: CLINIC | Age: 70
End: 2020-08-11

## 2020-08-11 DIAGNOSIS — L08.9 SKIN INFECTION: Primary | ICD-10-CM

## 2020-08-11 DIAGNOSIS — L08.9 SKIN INFECTION: ICD-10-CM

## 2020-08-11 DIAGNOSIS — L29.9 PRURITUS: Primary | ICD-10-CM

## 2020-08-11 DIAGNOSIS — Z95.2 S/P MVR (MITRAL VALVE REPLACEMENT): ICD-10-CM

## 2020-08-11 DIAGNOSIS — I48.0 PAROXYSMAL ATRIAL FIBRILLATION (H): ICD-10-CM

## 2020-08-11 DIAGNOSIS — Z95.4 S/P MITRAL VALVE REPLACEMENT WITH METALLIC VALVE: ICD-10-CM

## 2020-08-11 LAB — INR PPP: 2 (ref 0.9–1.1)

## 2020-08-11 RX ORDER — GENTAMICIN SULFATE 1 MG/G
OINTMENT TOPICAL 2 TIMES DAILY
Qty: 30 G | Refills: 3 | Status: SHIPPED | OUTPATIENT
Start: 2020-08-11 | End: 2022-04-11

## 2020-08-11 RX ORDER — HYDROXYZINE HYDROCHLORIDE 25 MG/1
25 TABLET, FILM COATED ORAL 3 TIMES DAILY PRN
Qty: 90 TABLET | Refills: 4 | Status: SHIPPED | OUTPATIENT
Start: 2020-08-11 | End: 2021-07-15

## 2020-08-11 NOTE — PROGRESS NOTES
Culture results growing enterobacter and coag neg staph. Will add twice daily gentamicin to patient's current regimen, otherwise no other changes. Staffed with Dr. Burger and my chart message sent to patient with recommendation.     Respectfully,    Gabrielle Strickland  PGY-4 Dermatology Resident  [facility] Department of Dermatology

## 2020-08-11 NOTE — TELEPHONE ENCOUNTER
Relayed the message below to Bandar. I also relayed his culture results. He will  both prescriptions.

## 2020-08-11 NOTE — TELEPHONE ENCOUNTER
Health Call Center    Phone Message    May a detailed message be left on voicemail: yes     Reason for Call: Symptoms or Concerns     If patient has red-flag symptoms, warm transfer to triage line    Current symptom or concern: Pt has been having problems since his hospital discharge with intense itching at the site.  Medication from Dr burger and Dr Johns has not had the desired effect and the Pt is becoming desperate for relief.  Please call to discuss    Symptoms have been present for:  3 week(s)    Has patient previously been seen for this? Yes    By Michael Johns MD; Heather Burger MD; Gabrielle Strickland MD;      Are there any new or worsening symptoms? No      Action Taken: Message routed to:  [facility] dermatology    Travel Screening: Not Applicable

## 2020-08-11 NOTE — TELEPHONE ENCOUNTER
Delia Abebe MD  You 5 minutes ago (4:00 PM)       I sent him hydroxyzine 25 mg to take as needed. He can start with one at night, but just let him know it can make him sleepy. Also, he should really do the wet wraps with triamcinolone which is the best for skin inflammation that causes itching:     Wet Wraps   During particularly intense flares with severe itch or pain, wet wrap therapy can work wonders to rehydrate and calm the skin and help topical medications work better.     Wet wraps are best done after bathing, moisturizing and applying medication. Use clean, preferably white, cotton clothing or gauze from a roll for the wet layer, and pajamas or a sweat suit on top as a dry layer. If the itchy rash is severe on the feet and/or hands, you can use cotton gloves or socks for the wet layer with vinyl gloves or food-grade plastic wrap as the dry layer.     To do wet wrap therapy, first apply medicated steroid ointment to all affected areas after bathing. Then moisten the clothing or gauze in warm water until they are slightly damp. Next, wrap the moist dressing around the affected areas. Then gently wrap the dry layer over the wet one. Lastly, carefully put on night-time clothing so as not to disturb the dressing. Leave wet wraps on for several hours or overnight, taking care not to let them dry out.

## 2020-08-11 NOTE — PROGRESS NOTES
- patient calling complaining of severe itching  - sent hydroxyzine 25 mg Q6H PRN    Delia Abebe MD  Dermatology Resident  [facility]

## 2020-08-12 ENCOUNTER — COMMUNICATION - HEALTHEAST (OUTPATIENT)
Dept: RHEUMATOLOGY | Facility: CLINIC | Age: 70
End: 2020-08-12

## 2020-08-12 ENCOUNTER — THERAPY VISIT (OUTPATIENT)
Dept: PHYSICAL THERAPY | Facility: CLINIC | Age: 70
End: 2020-08-12
Payer: MEDICARE

## 2020-08-12 ENCOUNTER — COMMUNICATION - HEALTHEAST (OUTPATIENT)
Dept: SCHEDULING | Facility: CLINIC | Age: 70
End: 2020-08-12

## 2020-08-12 ENCOUNTER — AMBULATORY - HEALTHEAST (OUTPATIENT)
Dept: RHEUMATOLOGY | Facility: CLINIC | Age: 70
End: 2020-08-12

## 2020-08-12 DIAGNOSIS — R89.7 ELEVATED MYOGLOBIN LEVEL: ICD-10-CM

## 2020-08-12 DIAGNOSIS — N28.9 RENAL INSUFFICIENCY: ICD-10-CM

## 2020-08-12 DIAGNOSIS — M53.82 NECK MUSCLE WEAKNESS: ICD-10-CM

## 2020-08-12 DIAGNOSIS — R21 RASH: Primary | ICD-10-CM

## 2020-08-12 DIAGNOSIS — E79.0 ELEVATED URIC ACID IN BLOOD: ICD-10-CM

## 2020-08-12 PROCEDURE — 97110 THERAPEUTIC EXERCISES: CPT | Mod: GP | Performed by: PHYSICAL THERAPIST

## 2020-08-12 PROCEDURE — 97112 NEUROMUSCULAR REEDUCATION: CPT | Mod: GP | Performed by: PHYSICAL THERAPIST

## 2020-08-13 ENCOUNTER — TELEPHONE (OUTPATIENT)
Dept: DERMATOLOGY | Facility: CLINIC | Age: 70
End: 2020-08-13

## 2020-08-13 DIAGNOSIS — T50.905A DRESS SYNDROME: Primary | ICD-10-CM

## 2020-08-13 DIAGNOSIS — D72.12 DRESS SYNDROME: Primary | ICD-10-CM

## 2020-08-13 RX ORDER — PREDNISONE 20 MG/1
TABLET ORAL
Qty: 30 TABLET | Refills: 1 | Status: SHIPPED | OUTPATIENT
Start: 2020-08-13 | End: 2021-07-15

## 2020-08-13 NOTE — TELEPHONE ENCOUNTER
Rx for prednisone 20 mg sent. Patient to take 20 mg x 5 days, extra supply sent in anticipation of patient necessitating potential longer treatment duration. Rx authorized by Dr. Andreas Abebe MD  Dermatology Resident  [facility]

## 2020-08-13 NOTE — TELEPHONE ENCOUNTER
Bandar called and notes that he is still very itchy all over. He is doing the soak and smear and the Hydroxyzine. Bandar notes that he is still very itchy.    I spoke with Dr. Johns and he would like to send a prednisone taper for Bandar (20 mg for 5 days)    Routing to [facility] to fulfil the prescription.

## 2020-08-14 ENCOUNTER — THERAPY VISIT (OUTPATIENT)
Dept: PHYSICAL THERAPY | Facility: CLINIC | Age: 70
End: 2020-08-14
Payer: MEDICARE

## 2020-08-14 DIAGNOSIS — R21 RASH: ICD-10-CM

## 2020-08-14 DIAGNOSIS — M53.82 NECK MUSCLE WEAKNESS: ICD-10-CM

## 2020-08-14 PROCEDURE — 97112 NEUROMUSCULAR REEDUCATION: CPT | Mod: GP | Performed by: PHYSICAL THERAPIST

## 2020-08-14 PROCEDURE — 97140 MANUAL THERAPY 1/> REGIONS: CPT | Mod: GP | Performed by: PHYSICAL THERAPIST

## 2020-08-14 PROCEDURE — 97110 THERAPEUTIC EXERCISES: CPT | Mod: GP | Performed by: PHYSICAL THERAPIST

## 2020-08-14 RX ORDER — GENTAMICIN SULFATE 1 MG/G
OINTMENT TOPICAL
Qty: 30 G | Refills: 3 | OUTPATIENT
Start: 2020-08-14

## 2020-08-15 ENCOUNTER — COMMUNICATION - HEALTHEAST (OUTPATIENT)
Dept: INTERNAL MEDICINE | Facility: CLINIC | Age: 70
End: 2020-08-15

## 2020-08-15 DIAGNOSIS — G89.4 PAIN SYNDROME, CHRONIC: ICD-10-CM

## 2020-08-16 PROBLEM — T50.905A DRESS SYNDROME: Status: ACTIVE | Noted: 2020-08-16

## 2020-08-16 PROBLEM — D72.12 DRESS SYNDROME: Status: ACTIVE | Noted: 2020-08-16

## 2020-08-17 ENCOUNTER — TELEPHONE (OUTPATIENT)
Dept: DERMATOLOGY | Facility: CLINIC | Age: 70
End: 2020-08-17

## 2020-08-17 DIAGNOSIS — N18.9 CHRONIC KIDNEY DISEASE: Primary | ICD-10-CM

## 2020-08-17 DIAGNOSIS — D72.12 DRESS SYNDROME: ICD-10-CM

## 2020-08-17 DIAGNOSIS — T50.905A DRESS SYNDROME: ICD-10-CM

## 2020-08-17 LAB
ALBUMIN SERPL-MCNC: 3.6 G/DL (ref 3.4–5)
ALP SERPL-CCNC: 105 U/L (ref 40–150)
ALT SERPL W P-5'-P-CCNC: 29 U/L (ref 0–70)
ANION GAP SERPL CALCULATED.3IONS-SCNC: 8 MMOL/L (ref 3–14)
AST SERPL W P-5'-P-CCNC: 45 U/L (ref 0–45)
BASOPHILS # BLD AUTO: 0 10E9/L (ref 0–0.2)
BASOPHILS NFR BLD AUTO: 0.3 %
BILIRUB SERPL-MCNC: 0.5 MG/DL (ref 0.2–1.3)
BUN SERPL-MCNC: 36 MG/DL (ref 7–30)
CALCIUM SERPL-MCNC: 9.5 MG/DL (ref 8.5–10.1)
CHLORIDE SERPL-SCNC: 101 MMOL/L (ref 94–109)
CO2 SERPL-SCNC: 28 MMOL/L (ref 20–32)
CREAT SERPL-MCNC: 1.3 MG/DL (ref 0.66–1.25)
DIFFERENTIAL METHOD BLD: ABNORMAL
EOSINOPHIL # BLD AUTO: 0 10E9/L (ref 0–0.7)
EOSINOPHIL NFR BLD AUTO: 0 %
ERYTHROCYTE [DISTWIDTH] IN BLOOD BY AUTOMATED COUNT: 17.6 % (ref 10–15)
GFR SERPL CREATININE-BSD FRML MDRD: 55 ML/MIN/{1.73_M2}
GLUCOSE SERPL-MCNC: 116 MG/DL (ref 70–99)
HCT VFR BLD AUTO: 28.7 % (ref 40–53)
HGB BLD-MCNC: 9 G/DL (ref 13.3–17.7)
IMM GRANULOCYTES # BLD: 0 10E9/L (ref 0–0.4)
IMM GRANULOCYTES NFR BLD: 0.5 %
LYMPHOCYTES # BLD AUTO: 0.6 10E9/L (ref 0.8–5.3)
LYMPHOCYTES NFR BLD AUTO: 9.8 %
MCH RBC QN AUTO: 28 PG (ref 26.5–33)
MCHC RBC AUTO-ENTMCNC: 31.4 G/DL (ref 31.5–36.5)
MCV RBC AUTO: 89 FL (ref 78–100)
MONOCYTES # BLD AUTO: 0.4 10E9/L (ref 0–1.3)
MONOCYTES NFR BLD AUTO: 5.6 %
NEUTROPHILS # BLD AUTO: 5.2 10E9/L (ref 1.6–8.3)
NEUTROPHILS NFR BLD AUTO: 83.8 %
NRBC # BLD AUTO: 0 10*3/UL
NRBC BLD AUTO-RTO: 0 /100
PLATELET # BLD AUTO: 336 10E9/L (ref 150–450)
POTASSIUM SERPL-SCNC: 3.6 MMOL/L (ref 3.4–5.3)
PROT SERPL-MCNC: 8.9 G/DL (ref 6.8–8.8)
RBC # BLD AUTO: 3.22 10E12/L (ref 4.4–5.9)
SODIUM SERPL-SCNC: 137 MMOL/L (ref 133–144)
WBC # BLD AUTO: 6.2 10E9/L (ref 4–11)

## 2020-08-17 NOTE — TELEPHONE ENCOUNTER
Spoke with patient regarding changing visit to a virtual visit. Patient agreed and visit switched.

## 2020-08-18 ENCOUNTER — COMMUNICATION - HEALTHEAST (OUTPATIENT)
Dept: INTERNAL MEDICINE | Facility: CLINIC | Age: 70
End: 2020-08-18

## 2020-08-18 ENCOUNTER — RECORDS - HEALTHEAST (OUTPATIENT)
Dept: ADMINISTRATIVE | Facility: OTHER | Age: 70
End: 2020-08-18

## 2020-08-18 ENCOUNTER — COMMUNICATION - HEALTHEAST (OUTPATIENT)
Dept: ANTICOAGULATION | Facility: CLINIC | Age: 70
End: 2020-08-18

## 2020-08-18 ENCOUNTER — MEDICAL CORRESPONDENCE (OUTPATIENT)
Dept: HEALTH INFORMATION MANAGEMENT | Facility: CLINIC | Age: 70
End: 2020-08-18

## 2020-08-18 DIAGNOSIS — Z95.2 S/P MVR (MITRAL VALVE REPLACEMENT): ICD-10-CM

## 2020-08-18 DIAGNOSIS — Z95.4 S/P MITRAL VALVE REPLACEMENT WITH METALLIC VALVE: ICD-10-CM

## 2020-08-18 DIAGNOSIS — I48.0 PAROXYSMAL ATRIAL FIBRILLATION (H): ICD-10-CM

## 2020-08-18 LAB
INR PPP: 4.6 (ref 0.9–1.1)
URATE SERPL-MCNC: 12.3 MG/DL (ref 3.5–7.2)

## 2020-08-19 ENCOUNTER — OFFICE VISIT - HEALTHEAST (OUTPATIENT)
Dept: INTERNAL MEDICINE | Facility: CLINIC | Age: 70
End: 2020-08-19

## 2020-08-19 DIAGNOSIS — N18.30 CKD (CHRONIC KIDNEY DISEASE) STAGE 3, GFR 30-59 ML/MIN (H): ICD-10-CM

## 2020-08-19 DIAGNOSIS — N17.9 ACUTE RENAL FAILURE, UNSPECIFIED ACUTE RENAL FAILURE TYPE (H): ICD-10-CM

## 2020-08-19 DIAGNOSIS — M60.9 MYOSITIS OF LEFT SHOULDER, UNSPECIFIED MYOSITIS TYPE: ICD-10-CM

## 2020-08-19 DIAGNOSIS — M89.9 BONE LESION: ICD-10-CM

## 2020-08-19 DIAGNOSIS — I50.42 CHRONIC COMBINED SYSTOLIC AND DIASTOLIC CONGESTIVE HEART FAILURE (H): ICD-10-CM

## 2020-08-19 DIAGNOSIS — M60.9 MYOSITIS, UNSPECIFIED MYOSITIS TYPE, UNSPECIFIED SITE: ICD-10-CM

## 2020-08-19 DIAGNOSIS — L27.0 DRUG ERUPTION: ICD-10-CM

## 2020-08-19 DIAGNOSIS — E79.0 ELEVATED URIC ACID IN BLOOD: ICD-10-CM

## 2020-08-20 ENCOUNTER — TELEPHONE (OUTPATIENT)
Dept: RHEUMATOLOGY | Facility: CLINIC | Age: 70
End: 2020-08-20

## 2020-08-20 ENCOUNTER — RECORDS - HEALTHEAST (OUTPATIENT)
Dept: ADMINISTRATIVE | Facility: OTHER | Age: 70
End: 2020-08-20

## 2020-08-20 ENCOUNTER — TRANSCRIBE ORDERS (OUTPATIENT)
Dept: OTHER | Age: 70
End: 2020-08-20

## 2020-08-20 ENCOUNTER — VIRTUAL VISIT (OUTPATIENT)
Dept: DERMATOLOGY | Facility: CLINIC | Age: 70
End: 2020-08-20
Payer: MEDICARE

## 2020-08-20 DIAGNOSIS — D72.12 DRESS SYNDROME: ICD-10-CM

## 2020-08-20 DIAGNOSIS — L97.321 NON-PRESSURE CHRONIC ULCER OF LEFT ANKLE LIMITED TO BREAKDOWN OF SKIN (H): ICD-10-CM

## 2020-08-20 DIAGNOSIS — T50.905A DRESS SYNDROME: ICD-10-CM

## 2020-08-20 DIAGNOSIS — L97.909 ULCER OF LOWER EXTREMITY, UNSPECIFIED LATERALITY, UNSPECIFIED ULCER STAGE (H): Primary | ICD-10-CM

## 2020-08-20 DIAGNOSIS — M60.9 MYOSITIS OF LEFT SHOULDER, UNSPECIFIED MYOSITIS TYPE: ICD-10-CM

## 2020-08-20 DIAGNOSIS — M60.9 MYOSITIS, UNSPECIFIED MYOSITIS TYPE, UNSPECIFIED SITE: ICD-10-CM

## 2020-08-20 DIAGNOSIS — E79.0 ELEVATED URIC ACID IN BLOOD: Primary | ICD-10-CM

## 2020-08-20 NOTE — NURSING NOTE
"Chief Complaint   Patient presents with     Derm Problem     Bandar is here today for nonhealing lesions on his legs. He states \" the lesions are starting to heal\"     Natividad Gray, RMEV  "

## 2020-08-20 NOTE — LETTER
August 26, 2020    Jin Hicks MD  [facility] [address]  [facility] [address]      Bandar Wells  [address]  [facility] [address]      Dr. Hicks,    We did call and offer Mr. Wells a Rheumatology appointment and he stated that his symptoms have resolved and he currently does not see a need to see Rheumatology.  If symptoms return or you would like him to be seen, please discuss with him and re-refer.    Thank you,  [facility] Rheumatology

## 2020-08-20 NOTE — PROGRESS NOTES
" InterviewBest Dermatology Record:  Store and Forward and Telephone [phone]    Dermatology Problem List:  1. 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 eczematous eruption  - bx 7/19/20: mild spongiosis and perivascular lymphohistiocytic inflammation  - +peripheral eosinophilia, transaminitis and ISMAEL, fevers (resolved)  - TAC 0.1% oint BID with Saran wraps  - prednisone 20 mg x 5 days total  - hydroxyzine 25 mg PRN  2. Leg ulcers  - culture with Enterobacter  - elevation, compression, Vaseline gauze, mupirocin oint, gentamicin oint, ACE wraps  - ABIs  - vascular medicine referral    Encounter Date: Aug 20, 2020    CC:   Chief Complaint   Patient presents with     Derm Problem     Bandar is here today for nonhealing lesions on his legs. He states \" the lesions are starting to heal\"       History of Present Illness:  Bandar Wells is a 70 year old male who presents for DRESS  - almost done with prednisone 20 mg daily  - itching was severe, but now skin is much improved  - leg ulcers healing with gentamicin and mupirocin, but still painful  - denies fevers, chills, malaise    ROS: Patient is generally feeling well today.  See HPI, otherwise 10 point ROS negative    Physical Examination:  General: Well-appearing, appropriately-developed individual.  Skin: Focused examination including bilateral legs was performed.   - well-defined punched out eroded plaques with impetiginized crust on the bilateral lower legs and heel, improving                          Past Medical History:   Patient Active Problem List   Diagnosis     Rash     Other constipation     Neck muscle weakness     DRESS syndrome     Past Medical History:   Diagnosis Date     Atrial fibrillation (H)     s/p Maze procedure     Chronic combined systolic and diastolic heart failure (H)      CKD (chronic kidney disease)      H/O mitral valve replacement with mechanical valve      Hyperlipidemia      Past Surgical " History:   Procedure Laterality Date     Mechanical mitral valve         Social History:  Patient reports that he has never smoked. He has never used smokeless tobacco. He reports current alcohol use.    Family History:  Family History   Problem Relation Age of Onset     Heart Failure Mother        Medications:  Current Outpatient Medications   Medication     acetaminophen (TYLENOL) 325 MG tablet     ferrous sulfate (FEROSUL) 325 (65 Fe) MG tablet     folic acid (FOLVITE) 1 MG tablet     gabapentin (NEURONTIN) 300 MG capsule     gentamicin (GARAMYCIN) 0.1 % external ointment     hydrOXYzine (ATARAX) 25 MG tablet     mupirocin (BACTROBAN) 2 % external ointment     oxyCODONE IR (ROXICODONE) 10 MG tablet     penicillin V (VEETID) 500 MG tablet     polyethylene glycol (MIRALAX) 17 g packet     potassium chloride (KLOR-CON) 20 MEQ packet     predniSONE (DELTASONE) 20 MG tablet     sennosides (SENOKOT) 8.6 MG tablet     torsemide (DEMADEX) 100 MG tablet     triamcinolone (KENALOG) 0.1 % external ointment     triamcinolone (KENALOG) 0.1 % external ointment     warfarin ANTICOAGULANT (COUMADIN) 3 MG tablet     Wound Dressings (VASELINE PETROLATUM GAUZE) PADS     No current facility-administered medications for this visit.           Allergies   Allergen Reactions     Allopurinol Rash       Impression and Recommendations (Patient Counseled on the Following):  1. Leg ulcers with impetigization.  Etiology not entirely clear; likely component of venous stasis, but need to rule out arterial component  - gentamicin and mupirocin ointment  - obtain ABIs  - referral to vascular medicine  - compression, elevation as tolerated  - Vaseline and dressing daily     2. DRESS, improving.  Patient likely had more limited form of DRESS rather than full blown syndrome.  - prednisone 20 mg for 5 more days total  - f/u CBC and CMP in 2 weeks  - triamcinolone 0.1% ointment PRN  - hydroxyzine 25 mg PRN  - strict contraindication to rechallenge  with allopurinol    Follow-up: 2-3 weeks after lab tests  Faculty: Dr. Johns     Staff and resident    Delia Abebe MD  Dermatology Resident  [facility]    I participated in the entirety of the interview, reviewed all available images, and agree with the assessment and plan as documented in the resident's note.    Michael Johns MD  Dermatology Attending      ________________________________    Teledermatology information:  - Location of patient: [place]  - Patient presented as: return  - Location of teledermatologist:  ([facility] DERMATOLOGY )  - Reason teledermatology is appropriate:  patient desires expedited evaluation,  - Image quality and interpretability: acceptable  - Physician has received verbal consent for a Video/Photos Visit from the patient? Yes  - In-person dermatology visit recommendation: no  - Date of images: 8/20/20  - Service start time:1:50 PM  - Service end time: 2:30 PM  - Date of report: 8/20/2020

## 2020-08-20 NOTE — PATIENT INSTRUCTIONS
[phone] - call for vascular medicine appointment  Obtain lab tests in 2 weeks  Schedule with radiology to obtain ultrasound test to check your leg circulation (YESSY)

## 2020-08-20 NOTE — LETTER
"8/20/2020       RE: Bandar Wells  [address] [address]     Dear Colleague,    Thank you for referring your patient, Bandar Wells, to the [facility] DERMATOLOGY at [facility]. Please see a copy of my visit note below.    [facility] Dermatology Record:  Store and Forward and Telephone [phone]    Dermatology Problem List:  1. 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 eczematous eruption  - bx 7/19/20: mild spongiosis and perivascular lymphohistiocytic inflammation  - +peripheral eosinophilia, transaminitis and ISMAEL, fevers (resolved)  - TAC 0.1% oint BID with Saran wraps  - prednisone 20 mg x 5 days total  - hydroxyzine 25 mg PRN  2. Leg ulcers  - culture with Enterobacter  - elevation, compression, Vaseline gauze, mupirocin oint, gentamicin oint, ACE wraps  - ABIs  - vascular medicine referral    Encounter Date: Aug 20, 2020    CC:   Chief Complaint   Patient presents with     Derm Problem     Bandar is here today for nonhealing lesions on his legs. He states \" the lesions are starting to heal\"       History of Present Illness:  Bandar Wells is a 70 year old male who presents for DRESS  - almost done with prednisone 20 mg daily  - itching was severe, but now skin is much improved  - leg ulcers healing with gentamicin and mupirocin, but still painful  - denies fevers, chills, malaise    ROS: Patient is generally feeling well today.  See HPI, otherwise 10 point ROS negative    Physical Examination:  General: Well-appearing, appropriately-developed individual.  Skin: Focused examination including bilateral legs was performed.   - well-defined punched out eroded plaques with impetiginized crust on the bilateral lower legs and heel, improving                          Past Medical History:   Patient Active Problem List   Diagnosis     Rash     Other constipation     Neck muscle weakness     DRESS syndrome     Past " Medical History:   Diagnosis Date     Atrial fibrillation (H)     s/p Maze procedure     Chronic combined systolic and diastolic heart failure (H)      CKD (chronic kidney disease)      H/O mitral valve replacement with mechanical valve      Hyperlipidemia      Past Surgical History:   Procedure Laterality Date     Mechanical mitral valve         Social History:  Patient reports that he has never smoked. He has never used smokeless tobacco. He reports current alcohol use.    Family History:  Family History   Problem Relation Age of Onset     Heart Failure Mother        Medications:  Current Outpatient Medications   Medication     acetaminophen (TYLENOL) 325 MG tablet     ferrous sulfate (FEROSUL) 325 (65 Fe) MG tablet     folic acid (FOLVITE) 1 MG tablet     gabapentin (NEURONTIN) 300 MG capsule     gentamicin (GARAMYCIN) 0.1 % external ointment     hydrOXYzine (ATARAX) 25 MG tablet     mupirocin (BACTROBAN) 2 % external ointment     oxyCODONE IR (ROXICODONE) 10 MG tablet     penicillin V (VEETID) 500 MG tablet     polyethylene glycol (MIRALAX) 17 g packet     potassium chloride (KLOR-CON) 20 MEQ packet     predniSONE (DELTASONE) 20 MG tablet     sennosides (SENOKOT) 8.6 MG tablet     torsemide (DEMADEX) 100 MG tablet     triamcinolone (KENALOG) 0.1 % external ointment     triamcinolone (KENALOG) 0.1 % external ointment     warfarin ANTICOAGULANT (COUMADIN) 3 MG tablet     Wound Dressings (VASELINE PETROLATUM GAUZE) PADS     No current facility-administered medications for this visit.           Allergies   Allergen Reactions     Allopurinol Rash       Impression and Recommendations (Patient Counseled on the Following):  1. Leg ulcers with impetigization.  Etiology not entirely clear; likely component of venous stasis, but need to rule out arterial component  - gentamicin and mupirocin ointment  - obtain ABIs  - referral to vascular medicine  - compression, elevation as tolerated  - Vaseline and dressing daily     2.  DRESS, improving.  Patient likely had more limited form of DRESS rather than full blown syndrome.  - prednisone 20 mg for 5 more days total  - f/u CBC and CMP in 2 weeks  - triamcinolone 0.1% ointment PRN  - hydroxyzine 25 mg PRN  - strict contraindication to rechallenge with allopurinol    Follow-up: 2-3 weeks after lab tests  Faculty: Dr. Johns     Staff and resident    Delia Abebe MD  Dermatology Resident  [facility]    I participated in the entirety of the interview, reviewed all available images, and agree with the assessment and plan as documented in the resident's note.    Michael Johns MD  Dermatology Attending      ________________________________    Teledermatology information:  - Location of patient: [place]  - Patient presented as: return  - Location of teledermatologist:  ([facility] DERMATOLOGY )  - Reason teledermatology is appropriate:  patient desires expedited evaluation,  - Image quality and interpretability: acceptable  - Physician has received verbal consent for a Video/Photos Visit from the patient? Yes  - In-person dermatology visit recommendation: no  - Date of images: 8/20/20  - Service start time:1:50 PM  - Service end time: 2:30 PM  - Date of report: 8/20/2020

## 2020-08-21 ENCOUNTER — COMMUNICATION - HEALTHEAST (OUTPATIENT)
Dept: INTERNAL MEDICINE | Facility: CLINIC | Age: 70
End: 2020-08-21

## 2020-08-21 ENCOUNTER — THERAPY VISIT (OUTPATIENT)
Dept: PHYSICAL THERAPY | Facility: CLINIC | Age: 70
End: 2020-08-21
Payer: MEDICARE

## 2020-08-21 DIAGNOSIS — M53.82 NECK MUSCLE WEAKNESS: ICD-10-CM

## 2020-08-21 DIAGNOSIS — R21 RASH: ICD-10-CM

## 2020-08-21 PROCEDURE — 97110 THERAPEUTIC EXERCISES: CPT | Mod: GP | Performed by: PHYSICAL THERAPIST

## 2020-08-21 PROCEDURE — 97112 NEUROMUSCULAR REEDUCATION: CPT | Mod: GP | Performed by: PHYSICAL THERAPIST

## 2020-08-24 ENCOUNTER — THERAPY VISIT (OUTPATIENT)
Dept: PHYSICAL THERAPY | Facility: CLINIC | Age: 70
End: 2020-08-24
Payer: MEDICARE

## 2020-08-24 DIAGNOSIS — M53.82 NECK MUSCLE WEAKNESS: ICD-10-CM

## 2020-08-24 DIAGNOSIS — R21 RASH: ICD-10-CM

## 2020-08-24 PROCEDURE — 97140 MANUAL THERAPY 1/> REGIONS: CPT | Mod: GP | Performed by: PHYSICAL THERAPIST

## 2020-08-24 PROCEDURE — 97112 NEUROMUSCULAR REEDUCATION: CPT | Mod: GP | Performed by: PHYSICAL THERAPIST

## 2020-08-24 PROCEDURE — 97110 THERAPEUTIC EXERCISES: CPT | Mod: GP | Performed by: PHYSICAL THERAPIST

## 2020-08-26 ENCOUNTER — RECORDS - HEALTHEAST (OUTPATIENT)
Dept: ADMINISTRATIVE | Facility: OTHER | Age: 70
End: 2020-08-26

## 2020-08-26 ENCOUNTER — COMMUNICATION - HEALTHEAST (OUTPATIENT)
Dept: ANTICOAGULATION | Facility: CLINIC | Age: 70
End: 2020-08-26

## 2020-08-26 DIAGNOSIS — I48.0 PAROXYSMAL ATRIAL FIBRILLATION (H): ICD-10-CM

## 2020-08-26 DIAGNOSIS — Z95.2 S/P MVR (MITRAL VALVE REPLACEMENT): ICD-10-CM

## 2020-08-26 DIAGNOSIS — Z95.4 S/P MITRAL VALVE REPLACEMENT WITH METALLIC VALVE: ICD-10-CM

## 2020-08-26 LAB — INR PPP: 3.1 (ref 0.9–1.1)

## 2020-08-26 NOTE — TELEPHONE ENCOUNTER
Pt returned call, he states that shoulder pain is gone. He is now having neck pain and weakness and is having physical therapy for this.  He currently does not see a need to see a rheumatologist.    Aga Vasques RN  Rheumatology Clinic

## 2020-08-26 NOTE — TELEPHONE ENCOUNTER
Left message requesting return call to schedule in person appt with Dr. Crowley.    Aga Vasques RN  Rheumatology Clinic

## 2020-08-27 ENCOUNTER — COMMUNICATION - HEALTHEAST (OUTPATIENT)
Dept: INTERNAL MEDICINE | Facility: CLINIC | Age: 70
End: 2020-08-27

## 2020-08-27 ENCOUNTER — RECORDS - HEALTHEAST (OUTPATIENT)
Dept: ADMINISTRATIVE | Facility: OTHER | Age: 70
End: 2020-08-27

## 2020-08-28 ENCOUNTER — TELEPHONE (OUTPATIENT)
Dept: VASCULAR SURGERY | Facility: CLINIC | Age: 70
End: 2020-08-28

## 2020-08-28 NOTE — TELEPHONE ENCOUNTER
[facility] Call Center    Phone Message    May a detailed message be left on voicemail: yes     Reason for Call: Other: The pt has been referred By Dr Johns for Ulcer of lower extremity, unspecified laterality, unspecified ulcer stage. Please contact the pt to make an appt. Thanks.     Action Taken: Message routed to:  [facility] ([facility]    Travel Screening: Not Applicable

## 2020-08-28 NOTE — TELEPHONE ENCOUNTER
YESSY order already placed by Dr. Johns. Sent to pool to schedule for an in person clinic visit with Dr. Pina.

## 2020-09-02 ENCOUNTER — OFFICE VISIT - HEALTHEAST (OUTPATIENT)
Dept: INTERNAL MEDICINE | Facility: CLINIC | Age: 70
End: 2020-09-02

## 2020-09-02 DIAGNOSIS — M60.9 MYOSITIS, UNSPECIFIED MYOSITIS TYPE, UNSPECIFIED SITE: ICD-10-CM

## 2020-09-02 DIAGNOSIS — D72.12 DRESS SYNDROME: ICD-10-CM

## 2020-09-02 DIAGNOSIS — N18.30 CKD (CHRONIC KIDNEY DISEASE) STAGE 3, GFR 30-59 ML/MIN (H): ICD-10-CM

## 2020-09-02 DIAGNOSIS — T50.905A DRESS SYNDROME: ICD-10-CM

## 2020-09-02 DIAGNOSIS — E79.0 ELEVATED URIC ACID IN BLOOD: ICD-10-CM

## 2020-09-02 DIAGNOSIS — I48.0 PAROXYSMAL ATRIAL FIBRILLATION (H): ICD-10-CM

## 2020-09-02 DIAGNOSIS — M89.9 BONE LESION: ICD-10-CM

## 2020-09-02 DIAGNOSIS — I50.42 CHRONIC COMBINED SYSTOLIC AND DIASTOLIC CONGESTIVE HEART FAILURE (H): ICD-10-CM

## 2020-09-03 ENCOUNTER — AMBULATORY - HEALTHEAST (OUTPATIENT)
Dept: ONCOLOGY | Facility: HOSPITAL | Age: 70
End: 2020-09-03

## 2020-09-03 DIAGNOSIS — R77.9 ABNORMALITY OF PLASMA PROTEIN, UNSPECIFIED: ICD-10-CM

## 2020-09-03 DIAGNOSIS — M89.9 BONE LESION: ICD-10-CM

## 2020-09-04 ENCOUNTER — COMMUNICATION - HEALTHEAST (OUTPATIENT)
Dept: ONCOLOGY | Facility: HOSPITAL | Age: 70
End: 2020-09-04

## 2020-09-04 ENCOUNTER — COMMUNICATION - HEALTHEAST (OUTPATIENT)
Dept: INTERNAL MEDICINE | Facility: CLINIC | Age: 70
End: 2020-09-04

## 2020-09-04 DIAGNOSIS — G89.4 PAIN SYNDROME, CHRONIC: ICD-10-CM

## 2020-09-05 ENCOUNTER — COMMUNICATION - HEALTHEAST (OUTPATIENT)
Dept: INTERNAL MEDICINE | Facility: CLINIC | Age: 70
End: 2020-09-05

## 2020-09-05 DIAGNOSIS — K59.03 DRUG INDUCED CONSTIPATION: ICD-10-CM

## 2020-09-08 LAB
BKR LAB AP CORE BIOPSY/ASPIRATE CLOT: ABNORMAL
LAB AP ASPIRATE SMEAR (HE HISTORICAL CONVERSION): ABNORMAL
LAB AP BONE MARROW DIFF (HE HISTORICAL CONVERSION): ABNORMAL
LAB AP CHARGES (HE HISTORICAL CONVERSION): ABNORMAL
PATH REPORT.ADDENDUM SPEC: ABNORMAL
PATH REPORT.COMMENTS IMP SPEC: ABNORMAL
PATH REPORT.FINAL DX SPEC: ABNORMAL
PATH REPORT.MICROSCOPIC SPEC OTHER STN: ABNORMAL
PATH REPORT.MICROSCOPIC SPEC OTHER STN: ABNORMAL
PATH REPORT.RELEVANT HX SPEC: ABNORMAL
RESULT FLAG (HE HISTORICAL CONVERSION): ABNORMAL

## 2020-09-10 ENCOUNTER — COMMUNICATION - HEALTHEAST (OUTPATIENT)
Dept: ANTICOAGULATION | Facility: CLINIC | Age: 70
End: 2020-09-10

## 2020-09-10 ENCOUNTER — RECORDS - HEALTHEAST (OUTPATIENT)
Dept: ADMINISTRATIVE | Facility: OTHER | Age: 70
End: 2020-09-10

## 2020-09-10 DIAGNOSIS — I48.0 PAROXYSMAL ATRIAL FIBRILLATION (H): ICD-10-CM

## 2020-09-10 DIAGNOSIS — Z95.4 S/P MITRAL VALVE REPLACEMENT WITH METALLIC VALVE: ICD-10-CM

## 2020-09-10 DIAGNOSIS — Z95.2 S/P MVR (MITRAL VALVE REPLACEMENT): ICD-10-CM

## 2020-09-10 LAB — INR PPP: 3.3 (ref 0.9–1.1)

## 2020-09-11 ENCOUNTER — ANCILLARY PROCEDURE (OUTPATIENT)
Dept: GENERAL RADIOLOGY | Facility: CLINIC | Age: 70
End: 2020-09-11
Attending: FAMILY MEDICINE
Payer: MEDICARE

## 2020-09-11 ENCOUNTER — OFFICE VISIT (OUTPATIENT)
Dept: ORTHOPEDICS | Facility: CLINIC | Age: 70
End: 2020-09-11
Payer: MEDICARE

## 2020-09-11 ENCOUNTER — RECORDS - HEALTHEAST (OUTPATIENT)
Dept: ADMINISTRATIVE | Facility: OTHER | Age: 70
End: 2020-09-11

## 2020-09-11 VITALS — BODY MASS INDEX: 24.34 KG/M2 | HEIGHT: 70 IN | WEIGHT: 170 LBS

## 2020-09-11 DIAGNOSIS — M79.641 RIGHT HAND PAIN: Primary | ICD-10-CM

## 2020-09-11 ASSESSMENT — MIFFLIN-ST. JEOR: SCORE: 1527.36

## 2020-09-11 NOTE — PROGRESS NOTES
SPORTS & ORTHOPEDIC WALK-IN VISIT 9/11/2020    Primary Care Physician: Dr. Hicks    He was opening the fridge and he fell backwards, once he got up he fell again. The majority of his pain is throughout his hand. He has tingling in his 5th finger.     Reason for visit:     What part of your body is injured / painful?  right hand    What caused the injury /pain? Fall    How long ago did your injury occur or pain begin? 9/3/2020    What are your most bothersome symptoms? Pain, Swelling and Tingling    How would you characterize your symptom?  throbing    What makes your symptoms better? Ice    What makes your symptoms worse? Movement    Have you been previously seen for this problem? No    Medical History:    Any recent changes to your medical history? No    Any new medication prescribed since last visit? No    Have you had surgery on this body part before? No    Social History:    Occupation: Retired    Handedness: Right    Exercise: A little each day    Review of Systems:    Do you have fever, chills, weight loss? No    Do you have any vision problems? No    Do you have any chest pain or edema? No    Do you have any shortness of breath or wheezing?  No    Do you have stomach problems? No    Do you have any numbness or focal weakness? No    Do you have diabetes? No    Do you have problems with bleeding or clotting? No    Do you have an rashes or other skin lesions? No

## 2020-09-11 NOTE — LETTER
9/11/2020         RE: Bandar Wells  [address] [address]        Dear Colleague,    Thank you for referring your patient, Bandar Wells, to the [facility] SPORTS AND ORTHOPAEDIC WALK IN CLINIC. Please see a copy of my visit note below.          SPORTS & ORTHOPEDIC WALK-IN VISIT 9/11/2020    Primary Care Physician: Dr. Hicks    He was opening the fridge and he fell backwards, once he got up he fell again. The majority of his pain is throughout his hand. He has tingling in his 5th finger.     Reason for visit:     What part of your body is injured / painful?  right hand    What caused the injury /pain? Fall    How long ago did your injury occur or pain begin? 9/3/2020    What are your most bothersome symptoms? Pain, Swelling and Tingling    How would you characterize your symptom?  throbing    What makes your symptoms better? Ice    What makes your symptoms worse? Movement    Have you been previously seen for this problem? No    Medical History:    Any recent changes to your medical history? No    Any new medication prescribed since last visit? No    Have you had surgery on this body part before? No    Social History:    Occupation: Retired    Handedness: Right    Exercise: A little each day    Review of Systems:    Do you have fever, chills, weight loss? No    Do you have any vision problems? No    Do you have any chest pain or edema? No    Do you have any shortness of breath or wheezing?  No    Do you have stomach problems? No    Do you have any numbness or focal weakness? No    Do you have diabetes? No    Do you have problems with bleeding or clotting? No    Do you have an rashes or other skin lesions? No           CHIEF COMPLAINT:  Pain of the Right Wrist       HISTORY OF PRESENT ILLNESS  Mr. Wells is a pleasant 70 year old year old male who presents to clinic today with pain and swelling of right wrist.  Bandar explains that he suffered a fall in his house last week.  States that he  believes he was feeling dizzy at the time.  He was on the ground approximately 6 hours before he was able to get up from this fall.  He did not seek medical care for this and attributed it to weaning off of a steroid medication.  He fell while in his kitchen, striking his right hand and causing an abrasion at dorsum of hand. Rather immediate discomfort and swelling of hand and wrist but has been able to use it. Due to swelling he felt it should be evaluated today.    Able to move all fingers. Localizes pain to radial aspect of wrist and fifth metacarpal region.  Has been performing dressing changes and keeping abrasion clean/dry.    Additional history: No dizziness or syncopal episodes in last week.  Has not seen primary care or other physician to evaluate for this event.     MEDICAL HISTORY  Patient Active Problem List   Diagnosis     Rash     Other constipation     Neck muscle weakness     DRESS syndrome       Current Outpatient Medications   Medication Sig Dispense Refill     acetaminophen (TYLENOL) 325 MG tablet Take 2 tablets (650 mg) by mouth every 4 hours as needed for mild pain       ferrous sulfate (FEROSUL) 325 (65 Fe) MG tablet Take 325 mg by mouth       folic acid (FOLVITE) 1 MG tablet Take 1 mg by mouth daily       gabapentin (NEURONTIN) 300 MG capsule Take 600 mg by mouth       gentamicin (GARAMYCIN) 0.1 % external ointment Apply topically 2 times daily 30 g 3     hydrOXYzine (ATARAX) 25 MG tablet Take 1 tablet (25 mg) by mouth 3 times daily as needed for itching 90 tablet 4     mupirocin (BACTROBAN) 2 % external ointment Use 2 times a day to affected area. 30 g 3     oxyCODONE IR (ROXICODONE) 10 MG tablet Take 5-10 mg by mouth every 4 hours as needed       penicillin V (VEETID) 500 MG tablet Take 500 mg by mouth daily Hx endocarditis       polyethylene glycol (MIRALAX) 17 g packet Take 17 g by mouth daily as needed for constipation 30 packet 0     potassium chloride (KLOR-CON) 20 MEQ packet Take 20  "mEq by mouth daily       sennosides (SENOKOT) 8.6 MG tablet Take 1-4 tablets by mouth 2 times daily       torsemide (DEMADEX) 100 MG tablet Take 50 mg by mouth 2 times daily       triamcinolone (KENALOG) 0.1 % external ointment Apply topically 2 times daily 453 g 5     triamcinolone (KENALOG) 0.1 % external ointment Apply topically 2 times daily 453.6 g 3     warfarin ANTICOAGULANT (COUMADIN) 3 MG tablet Take 1.5-2 tablets (4.5-6 mg) by mouth daily Per PTA regimen (4.5mg on Tue/Fri, 6mg on other days of the week)  0     Wound Dressings (VASELINE PETROLATUM GAUZE) PADS Apply to open wounds on the legs 12 each 5     predniSONE (DELTASONE) 20 MG tablet Please take 20 mg daily for 5 days (Patient not taking: Reported on 9/11/2020) 30 tablet 1       Allergies   Allergen Reactions     Allopurinol Rash       Family History   Problem Relation Age of Onset     Heart Failure Mother        Additional medical/Social/Surgical histories reviewed in [facility] and updated as appropriate.     REVIEW OF SYSTEMS (9/12/2020)  A 10-point review of systems was obtained and is negative except for as noted in the HPI.      PHYSICAL EXAM  Ht 1.778 m (5' 10\")   Wt 77.1 kg (170 lb)   BMI 24.39 kg/m      General  - normal appearance, in no obvious distress  HEENT  - conjunctivae not injected, moist mucous membranes  CV  - normal radial pulse  Pulm  - normal respiratory pattern, non-labored  Musculoskeletal - right hand and wrist  - inspection: no atrophy, normal joint alignment, Moderate swelling at metarcarpal region of hand into fingers.    - palpation: Tender distal radiocarpal joint. Tenderness at 5th metacarpal head.  No significant tenderness in region around abrasion.  - ROM:  ROM of wrist without pain, symmetric.  Able to move all fingers into full flexion and extension.  Thumb with full ROM.  - strength: 5/5  strength, 5/5 wrist abduction, 5/5 flexion, extension, pronation, supination, adduction  Neuro  - no numbness, no motor " deficit, grossly normal coordination, normal muscle tone  Skin  - 3.5 cm Abrasion of dorsum of hand overlying second metacarpal. No erythema, no necrotic tissue or bleeding. Bandage clean/dry.   Psych  - interactive, appropriate, normal mood and affect    IMAGING :XR Right hand. Final results and radiologist's interpretation, available in the [facility] health record. Images were reviewed with the patient/family members in the office today. My personal interpretation of the performed imaging is no acute osseous abnormalites of hand or fingers.     ASSESSMENT & PLAN  Mr. Wells is a 70 year old year old male who presents to clinic today with acute pain and swelling of right hand.  Healthy superficial abrasion without concern for cellulitis at this time, I favor swelling secondary to contusion of right hand.      Diagnosis:   Pain of right hand and wrist  Swelling of right hand  Possible syncopal episode    -Long discussion with Bandar that this must be evaluated urgently at his primary doctor's office; reviewed possibility of BP issues, arrhythmias, other pathology that I am not able to evaluate for today.  He agrees to this today  -ACE wrap hand for edema control  -Dressing changes reviewed daily for hand  -Red flags for immediate follow-up; erythema, worsening swelling  -Son purchasing him a monitor to alert for falls  -Follow up 1-2 weeks PRN    It was a pleasure seeing Bandar today.    Raheem Irwin, , CAQSM  Primary Care Sports Medicine

## 2020-09-12 NOTE — PROGRESS NOTES
CHIEF COMPLAINT:  Pain of the Right Wrist       HISTORY OF PRESENT ILLNESS  Mr. Wells is a pleasant 70 year old year old male who presents to clinic today with pain and swelling of right wrist.  Bandar explains that he suffered a fall in his house last week.  States that he believes he was feeling dizzy at the time.  He was on the ground approximately 6 hours before he was able to get up from this fall.  He did not seek medical care for this and attributed it to weaning off of a steroid medication.  He fell while in his kitchen, striking his right hand and causing an abrasion at dorsum of hand. Rather immediate discomfort and swelling of hand and wrist but has been able to use it. Due to swelling he felt it should be evaluated today.    Able to move all fingers. Localizes pain to radial aspect of wrist and fifth metacarpal region.  Has been performing dressing changes and keeping abrasion clean/dry.    Additional history: No dizziness or syncopal episodes in last week.  Has not seen primary care or other physician to evaluate for this event.     MEDICAL HISTORY  Patient Active Problem List   Diagnosis     Rash     Other constipation     Neck muscle weakness     DRESS syndrome       Current Outpatient Medications   Medication Sig Dispense Refill     acetaminophen (TYLENOL) 325 MG tablet Take 2 tablets (650 mg) by mouth every 4 hours as needed for mild pain       ferrous sulfate (FEROSUL) 325 (65 Fe) MG tablet Take 325 mg by mouth       folic acid (FOLVITE) 1 MG tablet Take 1 mg by mouth daily       gabapentin (NEURONTIN) 300 MG capsule Take 600 mg by mouth       gentamicin (GARAMYCIN) 0.1 % external ointment Apply topically 2 times daily 30 g 3     hydrOXYzine (ATARAX) 25 MG tablet Take 1 tablet (25 mg) by mouth 3 times daily as needed for itching 90 tablet 4     mupirocin (BACTROBAN) 2 % external ointment Use 2 times a day to affected area. 30 g 3     oxyCODONE IR (ROXICODONE) 10 MG tablet Take 5-10 mg by mouth every  "4 hours as needed       penicillin V (VEETID) 500 MG tablet Take 500 mg by mouth daily Hx endocarditis       polyethylene glycol (MIRALAX) 17 g packet Take 17 g by mouth daily as needed for constipation 30 packet 0     potassium chloride (KLOR-CON) 20 MEQ packet Take 20 mEq by mouth daily       sennosides (SENOKOT) 8.6 MG tablet Take 1-4 tablets by mouth 2 times daily       torsemide (DEMADEX) 100 MG tablet Take 50 mg by mouth 2 times daily       triamcinolone (KENALOG) 0.1 % external ointment Apply topically 2 times daily 453 g 5     triamcinolone (KENALOG) 0.1 % external ointment Apply topically 2 times daily 453.6 g 3     warfarin ANTICOAGULANT (COUMADIN) 3 MG tablet Take 1.5-2 tablets (4.5-6 mg) by mouth daily Per PTA regimen (4.5mg on Tue/Fri, 6mg on other days of the week)  0     Wound Dressings (VASELINE PETROLATUM GAUZE) PADS Apply to open wounds on the legs 12 each 5     predniSONE (DELTASONE) 20 MG tablet Please take 20 mg daily for 5 days (Patient not taking: Reported on 9/11/2020) 30 tablet 1       Allergies   Allergen Reactions     Allopurinol Rash       Family History   Problem Relation Age of Onset     Heart Failure Mother        Additional medical/Social/Surgical histories reviewed in [facility] and updated as appropriate.     REVIEW OF SYSTEMS (9/12/2020)  A 10-point review of systems was obtained and is negative except for as noted in the HPI.      PHYSICAL EXAM  Ht 1.778 m (5' 10\")   Wt 77.1 kg (170 lb)   BMI 24.39 kg/m      General  - normal appearance, in no obvious distress  HEENT  - conjunctivae not injected, moist mucous membranes  CV  - normal radial pulse  Pulm  - normal respiratory pattern, non-labored  Musculoskeletal - right hand and wrist  - inspection: no atrophy, normal joint alignment, Moderate swelling at metarcarpal region of hand into fingers.    - palpation: Tender distal radiocarpal joint. Tenderness at 5th metacarpal head.  No significant tenderness in region around abrasion.  - " ROM:  ROM of wrist without pain, symmetric.  Able to move all fingers into full flexion and extension.  Thumb with full ROM.  - strength: 5/5  strength, 5/5 wrist abduction, 5/5 flexion, extension, pronation, supination, adduction  Neuro  - no numbness, no motor deficit, grossly normal coordination, normal muscle tone  Skin  - 3.5 cm Abrasion of dorsum of hand overlying second metacarpal. No erythema, no necrotic tissue or bleeding. Bandage clean/dry.   Psych  - interactive, appropriate, normal mood and affect    IMAGING :XR Right hand. Final results and radiologist's interpretation, available in the [facility] health record. Images were reviewed with the patient/family members in the office today. My personal interpretation of the performed imaging is no acute osseous abnormalites of hand or fingers.     ASSESSMENT & PLAN  Mr. Wells is a 70 year old year old male who presents to clinic today with acute pain and swelling of right hand.  Healthy superficial abrasion without concern for cellulitis at this time, I favor swelling secondary to contusion of right hand.      Diagnosis:   Pain of right hand and wrist  Swelling of right hand  Possible syncopal episode    -Long discussion with Bandar that this must be evaluated urgently at his primary doctor's office; reviewed possibility of BP issues, arrhythmias, other pathology that I am not able to evaluate for today.  He agrees to this today  -ACE wrap hand for edema control  -Dressing changes reviewed daily for hand  -Red flags for immediate follow-up; erythema, worsening swelling  -Son purchasing him a monitor to alert for falls  -Follow up 1-2 weeks PRN    It was a pleasure seeing Bandar today.    Raheem Irwin, DO, CAQSM  Primary Care Sports Medicine

## 2020-09-15 ENCOUNTER — RECORDS - HEALTHEAST (OUTPATIENT)
Dept: ADMINISTRATIVE | Facility: OTHER | Age: 70
End: 2020-09-15

## 2020-09-16 ENCOUNTER — RECORDS - HEALTHEAST (OUTPATIENT)
Dept: ADMINISTRATIVE | Facility: OTHER | Age: 70
End: 2020-09-16

## 2020-09-16 PROBLEM — L97.321: Status: ACTIVE | Noted: 2020-09-16

## 2020-09-16 PROBLEM — L97.909 ULCER OF LOWER EXTREMITY, UNSPECIFIED LATERALITY, UNSPECIFIED ULCER STAGE (H): Status: ACTIVE | Noted: 2020-09-16

## 2020-09-19 ENCOUNTER — RECORDS - HEALTHEAST (OUTPATIENT)
Dept: ADMINISTRATIVE | Facility: OTHER | Age: 70
End: 2020-09-19

## 2020-09-21 ENCOUNTER — RECORDS - HEALTHEAST (OUTPATIENT)
Dept: ADMINISTRATIVE | Facility: OTHER | Age: 70
End: 2020-09-21

## 2020-09-21 ENCOUNTER — COMMUNICATION - HEALTHEAST (OUTPATIENT)
Dept: INTERNAL MEDICINE | Facility: CLINIC | Age: 70
End: 2020-09-21

## 2020-09-21 ENCOUNTER — COMMUNICATION - HEALTHEAST (OUTPATIENT)
Dept: SCHEDULING | Facility: CLINIC | Age: 70
End: 2020-09-21

## 2020-09-22 ENCOUNTER — RECORDS - HEALTHEAST (OUTPATIENT)
Dept: ADMINISTRATIVE | Facility: OTHER | Age: 70
End: 2020-09-22

## 2020-09-22 ENCOUNTER — COMMUNICATION - HEALTHEAST (OUTPATIENT)
Dept: SCHEDULING | Facility: CLINIC | Age: 70
End: 2020-09-22

## 2020-09-23 ENCOUNTER — RECORDS - HEALTHEAST (OUTPATIENT)
Dept: ADMINISTRATIVE | Facility: OTHER | Age: 70
End: 2020-09-23

## 2020-09-24 ENCOUNTER — RECORDS - HEALTHEAST (OUTPATIENT)
Dept: ADMINISTRATIVE | Facility: OTHER | Age: 70
End: 2020-09-24

## 2020-09-25 ENCOUNTER — COMMUNICATION - HEALTHEAST (OUTPATIENT)
Dept: INTERNAL MEDICINE | Facility: CLINIC | Age: 70
End: 2020-09-25

## 2020-09-25 ENCOUNTER — RECORDS - HEALTHEAST (OUTPATIENT)
Dept: ADMINISTRATIVE | Facility: OTHER | Age: 70
End: 2020-09-25

## 2020-09-28 ENCOUNTER — COMMUNICATION - HEALTHEAST (OUTPATIENT)
Dept: ONCOLOGY | Facility: CLINIC | Age: 70
End: 2020-09-28

## 2020-09-29 ENCOUNTER — RECORDS - HEALTHEAST (OUTPATIENT)
Dept: ADMINISTRATIVE | Facility: OTHER | Age: 70
End: 2020-09-29

## 2020-09-29 ENCOUNTER — COMMUNICATION - HEALTHEAST (OUTPATIENT)
Dept: INTERNAL MEDICINE | Facility: CLINIC | Age: 70
End: 2020-09-29

## 2020-09-29 DIAGNOSIS — Z95.4 S/P MITRAL VALVE REPLACEMENT WITH METALLIC VALVE: ICD-10-CM

## 2020-09-29 DIAGNOSIS — I48.0 PAROXYSMAL ATRIAL FIBRILLATION (H): ICD-10-CM

## 2020-09-29 DIAGNOSIS — Z95.2 S/P MVR (MITRAL VALVE REPLACEMENT): ICD-10-CM

## 2020-09-29 LAB — INR PPP: 2.6 (ref 0.9–1.1)

## 2020-10-01 ENCOUNTER — COMMUNICATION - HEALTHEAST (OUTPATIENT)
Dept: INTERNAL MEDICINE | Facility: CLINIC | Age: 70
End: 2020-10-01

## 2020-10-02 ENCOUNTER — OFFICE VISIT - HEALTHEAST (OUTPATIENT)
Dept: INTERNAL MEDICINE | Facility: CLINIC | Age: 70
End: 2020-10-02

## 2020-10-02 ENCOUNTER — COMMUNICATION - HEALTHEAST (OUTPATIENT)
Dept: ANTICOAGULATION | Facility: CLINIC | Age: 70
End: 2020-10-02

## 2020-10-02 DIAGNOSIS — I48.0 PAROXYSMAL ATRIAL FIBRILLATION (H): ICD-10-CM

## 2020-10-02 DIAGNOSIS — Z95.2 S/P MVR (MITRAL VALVE REPLACEMENT): ICD-10-CM

## 2020-10-02 DIAGNOSIS — D72.12 DRESS SYNDROME: ICD-10-CM

## 2020-10-02 DIAGNOSIS — T50.905A DRESS SYNDROME: ICD-10-CM

## 2020-10-02 DIAGNOSIS — I25.10 CORONARY ARTERY DISEASE INVOLVING NATIVE CORONARY ARTERY OF NATIVE HEART WITHOUT ANGINA PECTORIS: ICD-10-CM

## 2020-10-02 DIAGNOSIS — I34.1 MITRAL VALVE PROLAPSE: ICD-10-CM

## 2020-10-02 DIAGNOSIS — Z95.4 S/P MITRAL VALVE REPLACEMENT WITH METALLIC VALVE: ICD-10-CM

## 2020-10-02 DIAGNOSIS — I50.42 CHRONIC COMBINED SYSTOLIC AND DIASTOLIC CONGESTIVE HEART FAILURE (H): ICD-10-CM

## 2020-10-02 DIAGNOSIS — M89.9 LYTIC BONE LESIONS ON XRAY: ICD-10-CM

## 2020-10-02 DIAGNOSIS — E87.6 HYPOKALEMIA: ICD-10-CM

## 2020-10-02 DIAGNOSIS — N18.30 STAGE 3 CHRONIC KIDNEY DISEASE, UNSPECIFIED WHETHER STAGE 3A OR 3B CKD (H): ICD-10-CM

## 2020-10-02 DIAGNOSIS — Z86.79 HX OF BACTERIAL ENDOCARDITIS: ICD-10-CM

## 2020-10-02 DIAGNOSIS — E79.0 ELEVATED URIC ACID IN BLOOD: ICD-10-CM

## 2020-10-02 LAB
C REACTIVE PROTEIN LHE: 8.5 MG/DL (ref 0–0.8)
ERYTHROCYTE [DISTWIDTH] IN BLOOD BY AUTOMATED COUNT: 14.1 % (ref 11–14.5)
HCT VFR BLD AUTO: 31.5 % (ref 40–54)
HGB BLD-MCNC: 10.3 G/DL (ref 14–18)
INR PPP: 3.6 (ref 0.9–1.1)
MCH RBC QN AUTO: 27 PG (ref 27–34)
MCHC RBC AUTO-ENTMCNC: 32.8 G/DL (ref 32–36)
MCV RBC AUTO: 82 FL (ref 80–100)
PLATELET # BLD AUTO: 435 THOU/UL (ref 140–440)
PMV BLD AUTO: 7.6 FL (ref 7–10)
RBC # BLD AUTO: 3.83 MILL/UL (ref 4.4–6.2)
URATE SERPL-MCNC: 7.9 MG/DL (ref 3–8)
WBC: 9.2 THOU/UL (ref 4–11)

## 2020-10-02 ASSESSMENT — MIFFLIN-ST. JEOR: SCORE: 1491.08

## 2020-10-05 ENCOUNTER — COMMUNICATION - HEALTHEAST (OUTPATIENT)
Dept: ANTICOAGULATION | Facility: CLINIC | Age: 70
End: 2020-10-05

## 2020-10-05 DIAGNOSIS — Z95.2 S/P MVR (MITRAL VALVE REPLACEMENT): ICD-10-CM

## 2020-10-05 DIAGNOSIS — Z95.4 S/P MITRAL VALVE REPLACEMENT WITH METALLIC VALVE: ICD-10-CM

## 2020-10-05 DIAGNOSIS — I48.0 PAROXYSMAL ATRIAL FIBRILLATION (H): ICD-10-CM

## 2020-10-05 LAB — INR PPP: 2.6 (ref 0.9–1.1)

## 2020-10-07 ENCOUNTER — RECORDS - HEALTHEAST (OUTPATIENT)
Dept: ADMINISTRATIVE | Facility: OTHER | Age: 70
End: 2020-10-07

## 2020-10-08 ENCOUNTER — COMMUNICATION - HEALTHEAST (OUTPATIENT)
Dept: INTERNAL MEDICINE | Facility: CLINIC | Age: 70
End: 2020-10-08

## 2020-10-08 DIAGNOSIS — G89.4 PAIN SYNDROME, CHRONIC: ICD-10-CM

## 2020-10-09 ENCOUNTER — COMMUNICATION - HEALTHEAST (OUTPATIENT)
Dept: INTERNAL MEDICINE | Facility: CLINIC | Age: 70
End: 2020-10-09

## 2020-10-12 ENCOUNTER — COMMUNICATION - HEALTHEAST (OUTPATIENT)
Dept: ONCOLOGY | Facility: CLINIC | Age: 70
End: 2020-10-12

## 2020-10-12 ENCOUNTER — HOSPITAL ENCOUNTER (OUTPATIENT)
Dept: PET IMAGING | Facility: HOSPITAL | Age: 70
Setting detail: RADIATION/ONCOLOGY SERIES
Discharge: STILL ADMITTED OR ADMINISTRATIVE | End: 2020-10-12
Attending: INTERNAL MEDICINE

## 2020-10-12 ENCOUNTER — HOSPITAL ENCOUNTER (OUTPATIENT)
Dept: PET IMAGING | Facility: HOSPITAL | Age: 70
Discharge: HOME | End: 2020-10-12
Attending: INTERNAL MEDICINE

## 2020-10-12 DIAGNOSIS — R77.9 ABNORMALITY OF PLASMA PROTEIN, UNSPECIFIED: ICD-10-CM

## 2020-10-12 DIAGNOSIS — M89.9 BONE LESION: ICD-10-CM

## 2020-10-12 LAB — GLUCOSE BLDC GLUCOMTR-MCNC: 115 MG/DL (ref 70–139)

## 2020-10-12 ASSESSMENT — MIFFLIN-ST. JEOR: SCORE: 1477.47

## 2020-10-13 ENCOUNTER — RECORDS - HEALTHEAST (OUTPATIENT)
Dept: ADMINISTRATIVE | Facility: OTHER | Age: 70
End: 2020-10-13

## 2020-10-13 ENCOUNTER — COMMUNICATION - HEALTHEAST (OUTPATIENT)
Dept: ANTICOAGULATION | Facility: CLINIC | Age: 70
End: 2020-10-13

## 2020-10-13 ENCOUNTER — OFFICE VISIT - HEALTHEAST (OUTPATIENT)
Dept: ONCOLOGY | Facility: HOSPITAL | Age: 70
End: 2020-10-13

## 2020-10-13 DIAGNOSIS — I48.0 PAROXYSMAL ATRIAL FIBRILLATION (H): ICD-10-CM

## 2020-10-13 DIAGNOSIS — Z95.2 S/P MVR (MITRAL VALVE REPLACEMENT): ICD-10-CM

## 2020-10-13 DIAGNOSIS — Z95.4 S/P MITRAL VALVE REPLACEMENT WITH METALLIC VALVE: ICD-10-CM

## 2020-10-13 DIAGNOSIS — M89.9 BONE LESION: ICD-10-CM

## 2020-10-13 LAB — INR PPP: 2.2 (ref 0.9–1.1)

## 2020-10-21 ENCOUNTER — RECORDS - HEALTHEAST (OUTPATIENT)
Dept: ADMINISTRATIVE | Facility: OTHER | Age: 70
End: 2020-10-21

## 2020-10-21 ENCOUNTER — OFFICE VISIT - HEALTHEAST (OUTPATIENT)
Dept: INTERNAL MEDICINE | Facility: CLINIC | Age: 70
End: 2020-10-21

## 2020-10-21 DIAGNOSIS — I48.0 PAROXYSMAL ATRIAL FIBRILLATION (H): ICD-10-CM

## 2020-10-21 DIAGNOSIS — M79.89 LEG SWELLING: ICD-10-CM

## 2020-10-21 DIAGNOSIS — G89.4 PAIN SYNDROME, CHRONIC: ICD-10-CM

## 2020-10-21 DIAGNOSIS — N18.30 STAGE 3 CHRONIC KIDNEY DISEASE, UNSPECIFIED WHETHER STAGE 3A OR 3B CKD (H): ICD-10-CM

## 2020-10-21 DIAGNOSIS — D72.12 DRESS SYNDROME: ICD-10-CM

## 2020-10-21 DIAGNOSIS — E79.0 ELEVATED URIC ACID IN BLOOD: ICD-10-CM

## 2020-10-21 DIAGNOSIS — M89.9 BONE LESION: ICD-10-CM

## 2020-10-21 DIAGNOSIS — T50.905A DRESS SYNDROME: ICD-10-CM

## 2020-10-21 DIAGNOSIS — I50.42 CHRONIC COMBINED SYSTOLIC AND DIASTOLIC CONGESTIVE HEART FAILURE (H): ICD-10-CM

## 2020-10-21 DIAGNOSIS — S81.809D MULTIPLE OPENS WOUND OF LOWER EXTREMITY, UNSPECIFIED LATERALITY, SUBSEQUENT ENCOUNTER: ICD-10-CM

## 2020-10-21 ASSESSMENT — MIFFLIN-ST. JEOR: SCORE: 1477.47

## 2020-10-21 ASSESSMENT — PATIENT HEALTH QUESTIONNAIRE - PHQ9: SUM OF ALL RESPONSES TO PHQ QUESTIONS 1-9: 0

## 2020-10-22 ENCOUNTER — COMMUNICATION - HEALTHEAST (OUTPATIENT)
Dept: INTERNAL MEDICINE | Facility: CLINIC | Age: 70
End: 2020-10-22

## 2020-10-22 DIAGNOSIS — G89.4 PAIN SYNDROME, CHRONIC: ICD-10-CM

## 2020-10-23 ENCOUNTER — RECORDS - HEALTHEAST (OUTPATIENT)
Dept: ADMINISTRATIVE | Facility: OTHER | Age: 70
End: 2020-10-23

## 2020-10-28 ENCOUNTER — COMMUNICATION - HEALTHEAST (OUTPATIENT)
Dept: ANTICOAGULATION | Facility: CLINIC | Age: 70
End: 2020-10-28

## 2020-10-29 ENCOUNTER — RECORDS - HEALTHEAST (OUTPATIENT)
Dept: ADMINISTRATIVE | Facility: OTHER | Age: 70
End: 2020-10-29

## 2020-10-30 ENCOUNTER — RECORDS - HEALTHEAST (OUTPATIENT)
Dept: ADMINISTRATIVE | Facility: OTHER | Age: 70
End: 2020-10-30

## 2020-11-04 ENCOUNTER — COMMUNICATION - HEALTHEAST (OUTPATIENT)
Dept: RHEUMATOLOGY | Facility: CLINIC | Age: 70
End: 2020-11-04

## 2020-11-05 ENCOUNTER — RECORDS - HEALTHEAST (OUTPATIENT)
Dept: ADMINISTRATIVE | Facility: OTHER | Age: 70
End: 2020-11-05

## 2020-11-06 ENCOUNTER — COMMUNICATION - HEALTHEAST (OUTPATIENT)
Dept: INTERNAL MEDICINE | Facility: CLINIC | Age: 70
End: 2020-11-06

## 2020-11-09 ENCOUNTER — COMMUNICATION - HEALTHEAST (OUTPATIENT)
Dept: INTERNAL MEDICINE | Facility: CLINIC | Age: 70
End: 2020-11-09

## 2020-11-09 ENCOUNTER — AMBULATORY - HEALTHEAST (OUTPATIENT)
Dept: LAB | Facility: CLINIC | Age: 70
End: 2020-11-09

## 2020-11-09 DIAGNOSIS — G89.4 PAIN SYNDROME, CHRONIC: ICD-10-CM

## 2020-11-09 DIAGNOSIS — I50.22 CHRONIC SYSTOLIC HEART FAILURE (H): ICD-10-CM

## 2020-11-10 ENCOUNTER — COMMUNICATION - HEALTHEAST (OUTPATIENT)
Dept: INTERNAL MEDICINE | Facility: CLINIC | Age: 70
End: 2020-11-10

## 2020-11-10 DIAGNOSIS — Z95.2 S/P MVR (MITRAL VALVE REPLACEMENT): ICD-10-CM

## 2020-11-10 DIAGNOSIS — I48.0 PAROXYSMAL ATRIAL FIBRILLATION (H): ICD-10-CM

## 2020-11-10 DIAGNOSIS — Z95.4 S/P MITRAL VALVE REPLACEMENT WITH METALLIC VALVE: ICD-10-CM

## 2020-11-10 LAB — INR PPP: 3.3 (ref 0.9–1.1)

## 2020-11-12 ENCOUNTER — AMBULATORY - HEALTHEAST (OUTPATIENT)
Dept: LAB | Facility: CLINIC | Age: 70
End: 2020-11-12

## 2020-11-12 ENCOUNTER — RECORDS - HEALTHEAST (OUTPATIENT)
Dept: ADMINISTRATIVE | Facility: OTHER | Age: 70
End: 2020-11-12

## 2020-11-12 DIAGNOSIS — I50.22 CHRONIC SYSTOLIC HEART FAILURE (H): ICD-10-CM

## 2020-11-12 LAB
ANION GAP SERPL CALCULATED.3IONS-SCNC: 13 MMOL/L (ref 5–18)
BUN SERPL-MCNC: 37 MG/DL (ref 8–28)
CALCIUM SERPL-MCNC: 9.5 MG/DL (ref 8.5–10.5)
CHLORIDE BLD-SCNC: 99 MMOL/L (ref 98–107)
CO2 SERPL-SCNC: 28 MMOL/L (ref 22–31)
CREAT SERPL-MCNC: 1.62 MG/DL (ref 0.7–1.3)
GFR SERPL CREATININE-BSD FRML MDRD: 42 ML/MIN/1.73M2
GLUCOSE BLD-MCNC: 115 MG/DL (ref 70–125)
POTASSIUM BLD-SCNC: 3.7 MMOL/L (ref 3.5–5)
SODIUM SERPL-SCNC: 140 MMOL/L (ref 136–145)

## 2020-11-14 ENCOUNTER — HEALTH MAINTENANCE LETTER (OUTPATIENT)
Age: 70
End: 2020-11-14

## 2020-11-20 ENCOUNTER — RECORDS - HEALTHEAST (OUTPATIENT)
Dept: ADMINISTRATIVE | Facility: OTHER | Age: 70
End: 2020-11-20

## 2020-11-27 ENCOUNTER — RECORDS - HEALTHEAST (OUTPATIENT)
Dept: ADMINISTRATIVE | Facility: OTHER | Age: 70
End: 2020-11-27

## 2020-11-27 ENCOUNTER — COMMUNICATION - HEALTHEAST (OUTPATIENT)
Dept: INTERNAL MEDICINE | Facility: CLINIC | Age: 70
End: 2020-11-27

## 2020-11-27 DIAGNOSIS — D72.12 DRESS SYNDROME: ICD-10-CM

## 2020-11-27 DIAGNOSIS — T50.905A DRESS SYNDROME: ICD-10-CM

## 2020-11-28 ENCOUNTER — RECORDS - HEALTHEAST (OUTPATIENT)
Dept: ADMINISTRATIVE | Facility: OTHER | Age: 70
End: 2020-11-28

## 2020-11-30 ENCOUNTER — COMMUNICATION - HEALTHEAST (OUTPATIENT)
Dept: INTERNAL MEDICINE | Facility: CLINIC | Age: 70
End: 2020-11-30

## 2020-11-30 DIAGNOSIS — G89.4 PAIN SYNDROME, CHRONIC: ICD-10-CM

## 2020-12-01 ENCOUNTER — RECORDS - HEALTHEAST (OUTPATIENT)
Dept: ADMINISTRATIVE | Facility: OTHER | Age: 70
End: 2020-12-01

## 2020-12-01 ENCOUNTER — COMMUNICATION - HEALTHEAST (OUTPATIENT)
Dept: INTERNAL MEDICINE | Facility: CLINIC | Age: 70
End: 2020-12-01

## 2020-12-01 DIAGNOSIS — G89.4 PAIN SYNDROME, CHRONIC: ICD-10-CM

## 2020-12-02 ENCOUNTER — RECORDS - HEALTHEAST (OUTPATIENT)
Dept: ADMINISTRATIVE | Facility: OTHER | Age: 70
End: 2020-12-02

## 2020-12-02 ENCOUNTER — COMMUNICATION - HEALTHEAST (OUTPATIENT)
Dept: ANTICOAGULATION | Facility: CLINIC | Age: 70
End: 2020-12-02

## 2020-12-03 ENCOUNTER — RECORDS - HEALTHEAST (OUTPATIENT)
Dept: ADMINISTRATIVE | Facility: OTHER | Age: 70
End: 2020-12-03

## 2020-12-09 ENCOUNTER — RECORDS - HEALTHEAST (OUTPATIENT)
Dept: ADMINISTRATIVE | Facility: OTHER | Age: 70
End: 2020-12-09

## 2020-12-14 ENCOUNTER — COMMUNICATION - HEALTHEAST (OUTPATIENT)
Dept: SCHEDULING | Facility: CLINIC | Age: 70
End: 2020-12-14

## 2020-12-14 DIAGNOSIS — Z95.4 S/P MITRAL VALVE REPLACEMENT WITH METALLIC VALVE: ICD-10-CM

## 2020-12-14 DIAGNOSIS — I48.0 PAROXYSMAL ATRIAL FIBRILLATION (H): ICD-10-CM

## 2020-12-14 DIAGNOSIS — Z95.2 S/P MVR (MITRAL VALVE REPLACEMENT): ICD-10-CM

## 2020-12-14 LAB — INR PPP: 2.4 (ref 0.9–1.1)

## 2020-12-17 ENCOUNTER — COMMUNICATION - HEALTHEAST (OUTPATIENT)
Dept: ANTICOAGULATION | Facility: CLINIC | Age: 70
End: 2020-12-17

## 2020-12-17 DIAGNOSIS — Z79.01 LONG TERM (CURRENT) USE OF ANTICOAGULANTS: ICD-10-CM

## 2020-12-17 DIAGNOSIS — Z95.4 S/P MITRAL VALVE REPLACEMENT WITH METALLIC VALVE: ICD-10-CM

## 2020-12-17 DIAGNOSIS — I48.0 PAROXYSMAL ATRIAL FIBRILLATION (H): ICD-10-CM

## 2020-12-19 ENCOUNTER — COMMUNICATION - HEALTHEAST (OUTPATIENT)
Dept: INTERNAL MEDICINE | Facility: CLINIC | Age: 70
End: 2020-12-19

## 2020-12-19 DIAGNOSIS — G89.4 PAIN SYNDROME, CHRONIC: ICD-10-CM

## 2020-12-26 ENCOUNTER — COMMUNICATION - HEALTHEAST (OUTPATIENT)
Dept: INFECTIOUS DISEASES | Facility: CLINIC | Age: 70
End: 2020-12-26

## 2020-12-26 DIAGNOSIS — I38 PROSTHETIC VALVE ENDOCARDITIS, SUBSEQUENT ENCOUNTER: ICD-10-CM

## 2020-12-26 DIAGNOSIS — T82.6XXD PROSTHETIC VALVE ENDOCARDITIS, SUBSEQUENT ENCOUNTER: ICD-10-CM

## 2020-12-31 ENCOUNTER — RECORDS - HEALTHEAST (OUTPATIENT)
Dept: ADMINISTRATIVE | Facility: OTHER | Age: 70
End: 2020-12-31

## 2020-12-31 ENCOUNTER — COMMUNICATION - HEALTHEAST (OUTPATIENT)
Dept: ANTICOAGULATION | Facility: CLINIC | Age: 70
End: 2020-12-31

## 2020-12-31 DIAGNOSIS — I48.0 PAROXYSMAL ATRIAL FIBRILLATION (H): ICD-10-CM

## 2020-12-31 DIAGNOSIS — Z95.4 S/P MITRAL VALVE REPLACEMENT WITH METALLIC VALVE: ICD-10-CM

## 2020-12-31 DIAGNOSIS — Z95.2 S/P MVR (MITRAL VALVE REPLACEMENT): ICD-10-CM

## 2020-12-31 LAB — INR PPP: 2.9 (ref 0.9–1.1)

## 2021-01-14 ENCOUNTER — COMMUNICATION - HEALTHEAST (OUTPATIENT)
Dept: INTERNAL MEDICINE | Facility: CLINIC | Age: 71
End: 2021-01-14

## 2021-01-14 ENCOUNTER — OFFICE VISIT - HEALTHEAST (OUTPATIENT)
Dept: INTERNAL MEDICINE | Facility: CLINIC | Age: 71
End: 2021-01-14

## 2021-01-14 DIAGNOSIS — I50.42 CHRONIC COMBINED SYSTOLIC AND DIASTOLIC CONGESTIVE HEART FAILURE (H): ICD-10-CM

## 2021-01-14 DIAGNOSIS — Z95.4 S/P MITRAL VALVE REPLACEMENT WITH METALLIC VALVE: ICD-10-CM

## 2021-01-14 DIAGNOSIS — G24.3 CERVICAL DYSTONIA: ICD-10-CM

## 2021-01-14 DIAGNOSIS — G89.4 PAIN SYNDROME, CHRONIC: ICD-10-CM

## 2021-01-14 DIAGNOSIS — N18.4 CKD (CHRONIC KIDNEY DISEASE) STAGE 4, GFR 15-29 ML/MIN (H): ICD-10-CM

## 2021-01-14 DIAGNOSIS — I48.0 PAROXYSMAL ATRIAL FIBRILLATION (H): ICD-10-CM

## 2021-01-14 DIAGNOSIS — I38 PROSTHETIC VALVE ENDOCARDITIS, SUBSEQUENT ENCOUNTER: ICD-10-CM

## 2021-01-14 DIAGNOSIS — T82.6XXD PROSTHETIC VALVE ENDOCARDITIS, SUBSEQUENT ENCOUNTER: ICD-10-CM

## 2021-01-14 DIAGNOSIS — T50.905A DRESS SYNDROME: ICD-10-CM

## 2021-01-14 DIAGNOSIS — D72.12 DRESS SYNDROME: ICD-10-CM

## 2021-01-14 ASSESSMENT — MIFFLIN-ST. JEOR: SCORE: 1531.9

## 2021-01-18 ENCOUNTER — RECORDS - HEALTHEAST (OUTPATIENT)
Dept: ADMINISTRATIVE | Facility: OTHER | Age: 71
End: 2021-01-18

## 2021-01-18 ENCOUNTER — COMMUNICATION - HEALTHEAST (OUTPATIENT)
Dept: INTERNAL MEDICINE | Facility: CLINIC | Age: 71
End: 2021-01-18

## 2021-01-19 ENCOUNTER — OFFICE VISIT - HEALTHEAST (OUTPATIENT)
Dept: INTERNAL MEDICINE | Facility: CLINIC | Age: 71
End: 2021-01-19

## 2021-01-19 DIAGNOSIS — M1A.30X1 CHRONIC GOUT DUE TO RENAL IMPAIRMENT WITH TOPHUS, UNSPECIFIED SITE: ICD-10-CM

## 2021-01-19 DIAGNOSIS — M25.532 LEFT WRIST PAIN: ICD-10-CM

## 2021-01-21 ENCOUNTER — COMMUNICATION - HEALTHEAST (OUTPATIENT)
Dept: ANTICOAGULATION | Facility: CLINIC | Age: 71
End: 2021-01-21

## 2021-01-21 ENCOUNTER — RECORDS - HEALTHEAST (OUTPATIENT)
Dept: ADMINISTRATIVE | Facility: OTHER | Age: 71
End: 2021-01-21

## 2021-01-22 ENCOUNTER — COMMUNICATION - HEALTHEAST (OUTPATIENT)
Dept: ANTICOAGULATION | Facility: CLINIC | Age: 71
End: 2021-01-22

## 2021-01-22 DIAGNOSIS — Z95.4 S/P MITRAL VALVE REPLACEMENT WITH METALLIC VALVE: ICD-10-CM

## 2021-01-22 DIAGNOSIS — I48.0 PAROXYSMAL ATRIAL FIBRILLATION (H): ICD-10-CM

## 2021-01-22 LAB — INR PPP: 2.2 (ref 0.9–1.1)

## 2021-02-01 ENCOUNTER — COMMUNICATION - HEALTHEAST (OUTPATIENT)
Dept: ANTICOAGULATION | Facility: CLINIC | Age: 71
End: 2021-02-01

## 2021-02-01 DIAGNOSIS — I48.0 PAROXYSMAL ATRIAL FIBRILLATION (H): ICD-10-CM

## 2021-02-01 DIAGNOSIS — Z95.4 S/P MITRAL VALVE REPLACEMENT WITH METALLIC VALVE: ICD-10-CM

## 2021-02-01 LAB — INR PPP: 2.3 (ref 0.9–1.1)

## 2021-02-04 ENCOUNTER — COMMUNICATION - HEALTHEAST (OUTPATIENT)
Dept: INTERNAL MEDICINE | Facility: CLINIC | Age: 71
End: 2021-02-04

## 2021-02-04 DIAGNOSIS — I48.0 PAROXYSMAL ATRIAL FIBRILLATION (H): ICD-10-CM

## 2021-02-04 DIAGNOSIS — Z95.4 S/P MITRAL VALVE REPLACEMENT WITH METALLIC VALVE: ICD-10-CM

## 2021-02-04 LAB — INR PPP: 1.9 (ref 0.9–1.1)

## 2021-02-05 ENCOUNTER — COMMUNICATION - HEALTHEAST (OUTPATIENT)
Dept: INTERNAL MEDICINE | Facility: CLINIC | Age: 71
End: 2021-02-05

## 2021-02-05 DIAGNOSIS — G89.4 PAIN SYNDROME, CHRONIC: ICD-10-CM

## 2021-02-08 ENCOUNTER — COMMUNICATION - HEALTHEAST (OUTPATIENT)
Dept: ANTICOAGULATION | Facility: CLINIC | Age: 71
End: 2021-02-08

## 2021-02-08 ENCOUNTER — AMBULATORY - HEALTHEAST (OUTPATIENT)
Dept: LAB | Facility: CLINIC | Age: 71
End: 2021-02-08

## 2021-02-08 DIAGNOSIS — D64.9 ANEMIA, UNSPECIFIED: ICD-10-CM

## 2021-02-08 DIAGNOSIS — I48.0 PAROXYSMAL ATRIAL FIBRILLATION (H): ICD-10-CM

## 2021-02-08 DIAGNOSIS — E79.0 ELEVATED URIC ACID IN BLOOD: ICD-10-CM

## 2021-02-08 DIAGNOSIS — R89.7 ELEVATED MYOGLOBIN LEVEL: ICD-10-CM

## 2021-02-08 DIAGNOSIS — E79.0 HYPERURICEMIA: ICD-10-CM

## 2021-02-08 DIAGNOSIS — M18.30 POST-TRAUMATIC OSTEOARTHRITIS OF FIRST CARPOMETACARPAL JOINT OF ONE HAND: ICD-10-CM

## 2021-02-08 DIAGNOSIS — N28.9 RENAL INSUFFICIENCY: ICD-10-CM

## 2021-02-08 DIAGNOSIS — Z95.4 S/P MITRAL VALVE REPLACEMENT WITH METALLIC VALVE: ICD-10-CM

## 2021-02-08 DIAGNOSIS — Z79.01 LONG TERM (CURRENT) USE OF ANTICOAGULANTS: ICD-10-CM

## 2021-02-08 LAB
ALBUMIN SERPL-MCNC: 4.5 G/DL (ref 3.5–5)
ANION GAP SERPL CALCULATED.3IONS-SCNC: 16 MMOL/L (ref 5–18)
BASOPHILS # BLD AUTO: 0 THOU/UL (ref 0–0.2)
BASOPHILS NFR BLD AUTO: 1 % (ref 0–2)
BUN SERPL-MCNC: 104 MG/DL (ref 8–28)
CALCIUM SERPL-MCNC: 10.1 MG/DL (ref 8.5–10.5)
CHLORIDE BLD-SCNC: 99 MMOL/L (ref 98–107)
CO2 SERPL-SCNC: 25 MMOL/L (ref 22–31)
CREAT SERPL-MCNC: 2.23 MG/DL (ref 0.7–1.3)
EOSINOPHIL # BLD AUTO: 0 THOU/UL (ref 0–0.4)
EOSINOPHIL NFR BLD AUTO: 1 % (ref 0–6)
ERYTHROCYTE [DISTWIDTH] IN BLOOD BY AUTOMATED COUNT: 15 % (ref 11–14.5)
GFR SERPL CREATININE-BSD FRML MDRD: 29 ML/MIN/1.73M2
GLUCOSE BLD-MCNC: 103 MG/DL (ref 70–125)
HCT VFR BLD AUTO: 35.9 % (ref 40–54)
HGB BLD-MCNC: 11.3 G/DL (ref 14–18)
IMM GRANULOCYTES # BLD: 0 THOU/UL
IMM GRANULOCYTES NFR BLD: 0 %
INR PPP: 2.4 (ref 0.9–1.1)
LYMPHOCYTES # BLD AUTO: 1.2 THOU/UL (ref 0.8–4.4)
LYMPHOCYTES NFR BLD AUTO: 17 % (ref 20–40)
MAGNESIUM SERPL-MCNC: 2.6 MG/DL (ref 1.8–2.6)
MCH RBC QN AUTO: 30.1 PG (ref 27–34)
MCHC RBC AUTO-ENTMCNC: 31.5 G/DL (ref 32–36)
MCV RBC AUTO: 96 FL (ref 80–100)
MONOCYTES # BLD AUTO: 0.6 THOU/UL (ref 0–0.9)
MONOCYTES NFR BLD AUTO: 9 % (ref 2–10)
NEUTROPHILS # BLD AUTO: 5.2 THOU/UL (ref 2–7.7)
NEUTROPHILS NFR BLD AUTO: 73 % (ref 50–70)
PHOSPHATE SERPL-MCNC: 4.2 MG/DL (ref 2.5–4.5)
PLATELET # BLD AUTO: 219 THOU/UL (ref 140–440)
PMV BLD AUTO: 10.4 FL (ref 7–10)
POTASSIUM BLD-SCNC: 3.7 MMOL/L (ref 3.5–5)
RBC # BLD AUTO: 3.76 MILL/UL (ref 4.4–6.2)
SODIUM SERPL-SCNC: 140 MMOL/L (ref 136–145)
URATE SERPL-MCNC: 9.8 MG/DL (ref 3–8)
WBC: 7.1 THOU/UL (ref 4–11)

## 2021-02-09 ENCOUNTER — AMBULATORY - HEALTHEAST (OUTPATIENT)
Dept: ANTICOAGULATION | Facility: CLINIC | Age: 71
End: 2021-02-09

## 2021-02-15 ENCOUNTER — COMMUNICATION - HEALTHEAST (OUTPATIENT)
Dept: INTERNAL MEDICINE | Facility: CLINIC | Age: 71
End: 2021-02-15

## 2021-02-15 DIAGNOSIS — Z79.01 LONG TERM (CURRENT) USE OF ANTICOAGULANTS: ICD-10-CM

## 2021-02-15 DIAGNOSIS — Z95.4 S/P MITRAL VALVE REPLACEMENT WITH METALLIC VALVE: ICD-10-CM

## 2021-02-15 DIAGNOSIS — I48.0 PAROXYSMAL ATRIAL FIBRILLATION (H): ICD-10-CM

## 2021-02-18 ENCOUNTER — COMMUNICATION - HEALTHEAST (OUTPATIENT)
Dept: ANTICOAGULATION | Facility: CLINIC | Age: 71
End: 2021-02-18

## 2021-02-19 ENCOUNTER — TELEPHONE (OUTPATIENT)
Dept: VASCULAR SURGERY | Facility: CLINIC | Age: 71
End: 2021-02-19

## 2021-02-19 NOTE — TELEPHONE ENCOUNTER
Unable to contact the pt the is needing to schedule an  YESSY Doppler No Exercise 1-2 Levels Bilateral Ultrasound and a visit with Dr. Pina.        Called the pt two times and he answered and hung up 2 times on home and on cell phone 2/17 RC       Called the pt on cell phone per pts message he  answered and hung up 2/18 RC     2/19 Letter sent RC

## 2021-02-22 ENCOUNTER — COMMUNICATION - HEALTHEAST (OUTPATIENT)
Dept: ANTICOAGULATION | Facility: CLINIC | Age: 71
End: 2021-02-22

## 2021-02-22 DIAGNOSIS — Z95.4 S/P MITRAL VALVE REPLACEMENT WITH METALLIC VALVE: ICD-10-CM

## 2021-02-22 DIAGNOSIS — I48.0 PAROXYSMAL ATRIAL FIBRILLATION (H): ICD-10-CM

## 2021-02-22 LAB — INR PPP: 4.5 (ref 0.9–1.1)

## 2021-02-23 ENCOUNTER — COMMUNICATION - HEALTHEAST (OUTPATIENT)
Dept: INTERNAL MEDICINE | Facility: CLINIC | Age: 71
End: 2021-02-23

## 2021-03-02 ENCOUNTER — RECORDS - HEALTHEAST (OUTPATIENT)
Dept: ADMINISTRATIVE | Facility: OTHER | Age: 71
End: 2021-03-02

## 2021-03-03 ENCOUNTER — COMMUNICATION - HEALTHEAST (OUTPATIENT)
Dept: INTERNAL MEDICINE | Facility: CLINIC | Age: 71
End: 2021-03-03

## 2021-03-03 DIAGNOSIS — G89.4 PAIN SYNDROME, CHRONIC: ICD-10-CM

## 2021-03-03 NOTE — TELEPHONE ENCOUNTER
Pt called regarding below. Pt said he is currently going to the [facility] for vascular treatment and will not need to come here for that. If pt has any questions or wants to schedule with us , pt will call back. Thanks

## 2021-03-04 ENCOUNTER — THERAPY VISIT (OUTPATIENT)
Dept: PHYSICAL THERAPY | Facility: CLINIC | Age: 71
End: 2021-03-04
Payer: COMMERCIAL

## 2021-03-04 DIAGNOSIS — R21 RASH: ICD-10-CM

## 2021-03-04 DIAGNOSIS — M53.82 NECK MUSCLE WEAKNESS: Primary | ICD-10-CM

## 2021-03-04 PROCEDURE — 97162 PT EVAL MOD COMPLEX 30 MIN: CPT | Mod: GP | Performed by: PHYSICAL THERAPIST

## 2021-03-04 PROCEDURE — 97112 NEUROMUSCULAR REEDUCATION: CPT | Mod: GP | Performed by: PHYSICAL THERAPIST

## 2021-03-04 NOTE — PROGRESS NOTES
Physical Therapy Initial Evaluation  March 4, 2021     Precautions/Restrictions/MD instructions: PT eval and treat.     Subjective:   Date of Onset: 8/3/20  C/C: neck weakness, shoulder and posterior neck pain.  Quality of pain is dull. Pains are described as constant in nature. Pain is worse: evening. Pain is rated 0-7/10.   History of symptoms: Pains began suddenly as the result of unknown origins. This occurred along with a rash on his back due to an allergic reaction to antibiotics. He stayed in the hospital and was diagnosed with Drug rash with eosinophilia and systemic symptoms  (DRESS) syndrome. Since onset, symptoms are waxing and waning with pain, weakness is unchanged. Previous episodes: Hx of left arm pain in May 2020, neck pain on the right following that.  Worsened by: sitting, walking, lifting his arm  Alleviated by:  PT, trigger point injections   General health as reported by patient: good  Pertinent medical/surgical history: mitral valve replacement, blood thinner, history of leg ulcers for 6 months following DRESS syndrome, Working with [place] to address lymphedema and neck pain. He denies unrelenting night pain, significant current illness or recent hospital admission. He denies any regional implanted devices. He denies history of surgery in the area.  Imaging: x-ray, He is unsure when he had his last MRI. Current occupational status: retired. Patient's goals are: decrease pain, improve posture, improve neck position. Return to MD:  PRN.     Objective:  CERVICAL:    Posture: rounded shoulders, forward head, downward gaze. Rests in 35 degrees of cervical flexion.    Neurological:    Motor Deficit: NEXT  Sensory Deficit, Reflexes, Dural Signs: NEXT  AROM: (Major, Moderate, Minimal or Nil loss)  Movement Loss Jose Carlos Mod Min Nil Pain   Protrusion    x    Flexion    x    Retraction x       Extension xxx     lacking 5 degrees from neutral neck position with maximum effort   Left Rotation 20       Right  Rotation  35          Assessment/Plan:    The patient is a 70 year old male with chief complaint of neck weakness.    The patient has the following significant findings with corresponding treatment plan.  Diagnosis 1:  Rash, neck weakness    Pain -  hot/cold therapy, manual therapy, splint/taping/bracing/orthotics, education and home program  Decreased ROM/flexibility - manual therapy and therapeutic exercise  Decreased strength - therapeutic exercise and therapeutic activities  Decreased proprioception - neuro re-education and therapeutic activities  Impaired muscle performance - neuro re-education  Decreased function - therapeutic activities  Impaired posture - neuro re-education          Therapy Evaluation Codes:   1) History comprised of:   Personal factors that impact the plan of care:      Age, Past/current experiences and Time since onset of symptoms.    Comorbidity factors that impact the plan of care are:      Heart problems, Pain at night/rest, Weakness and LE ulcers.     Medications impacting care: see epic.  2) Examination of Body Systems comprised of:   Body structures and functions that impact the plan of care:      Cervical spine, Shoulder and Thoracic Spine.   Activity limitations that impact the plan of care are:      Bathing, Bending, Cooking, Dressing, Lifting, Reading/Computer work, Sitting, Stairs, Standing and Walking.   Clinical presentation characteristics are:    Evolving/Changing.  3) Presentation comprised of:   Presentation scored as Moderate complexity with Evolving presentation with changing characteristics..  4) Decision-Making    Moderate complexity using standardized patient assessment instrument and/or measureable assessment of functional outcome.  Cumulative Therapy Evaluation is: Moderate complexity.    Previous and current functional limitations:  (See Goal Flow Sheet for this information)    Short term and Long term goals: (See Goal Flow Sheet for this information)      Communication ability:  Patient appears to be able to clearly communicate and understand verbal and written communication and follow directions correctly.  Treatment Explanation - The following has been discussed with the patient: RX ordered/plan of care, anticipated outcomes, and possible risks and side effects.  This patient would benefit from PT intervention to resume normal activities.   Rehab potential is good.    Frequency:  1 X week, once daily  Duration:  for 6 weeks    I recommend Bandar follow up with a spine specialist due to neck weakness and substantial postural deformity without a mechanism of injury and only mild pain.    Discharge Plan: Achieve all LTGs, be independent in home treatment program, and reach maximal therapeutic benefit.    Please refer to the daily flowsheet for treatment today, total treatment time and time spent performing 1:1 timed codes.

## 2021-03-08 ENCOUNTER — RECORDS - HEALTHEAST (OUTPATIENT)
Dept: ADMINISTRATIVE | Facility: OTHER | Age: 71
End: 2021-03-08

## 2021-03-09 ENCOUNTER — COMMUNICATION - HEALTHEAST (OUTPATIENT)
Dept: ANTICOAGULATION | Facility: CLINIC | Age: 71
End: 2021-03-09

## 2021-03-09 ENCOUNTER — THERAPY VISIT (OUTPATIENT)
Dept: PHYSICAL THERAPY | Facility: CLINIC | Age: 71
End: 2021-03-09
Payer: COMMERCIAL

## 2021-03-09 DIAGNOSIS — M53.82 NECK MUSCLE WEAKNESS: ICD-10-CM

## 2021-03-09 DIAGNOSIS — R21 RASH: ICD-10-CM

## 2021-03-09 PROCEDURE — 97110 THERAPEUTIC EXERCISES: CPT | Mod: GP | Performed by: PHYSICAL THERAPIST

## 2021-03-09 PROCEDURE — 97140 MANUAL THERAPY 1/> REGIONS: CPT | Mod: GP | Performed by: PHYSICAL THERAPIST

## 2021-03-09 PROCEDURE — 97112 NEUROMUSCULAR REEDUCATION: CPT | Mod: GP | Performed by: PHYSICAL THERAPIST

## 2021-03-10 ENCOUNTER — COMMUNICATION - HEALTHEAST (OUTPATIENT)
Dept: ANTICOAGULATION | Facility: CLINIC | Age: 71
End: 2021-03-10

## 2021-03-10 DIAGNOSIS — Z95.4 S/P MITRAL VALVE REPLACEMENT WITH METALLIC VALVE: ICD-10-CM

## 2021-03-10 DIAGNOSIS — I48.0 PAROXYSMAL ATRIAL FIBRILLATION (H): ICD-10-CM

## 2021-03-10 LAB — INR PPP: 4.5 (ref 0.9–1.1)

## 2021-03-15 ENCOUNTER — THERAPY VISIT (OUTPATIENT)
Dept: PHYSICAL THERAPY | Facility: CLINIC | Age: 71
End: 2021-03-15
Payer: COMMERCIAL

## 2021-03-15 DIAGNOSIS — R21 RASH: ICD-10-CM

## 2021-03-15 DIAGNOSIS — M53.82 NECK MUSCLE WEAKNESS: Primary | ICD-10-CM

## 2021-03-15 PROCEDURE — 97140 MANUAL THERAPY 1/> REGIONS: CPT | Mod: GP | Performed by: PHYSICAL THERAPIST

## 2021-03-15 PROCEDURE — 97110 THERAPEUTIC EXERCISES: CPT | Mod: GP | Performed by: PHYSICAL THERAPIST

## 2021-03-15 PROCEDURE — 97112 NEUROMUSCULAR REEDUCATION: CPT | Mod: GP | Performed by: PHYSICAL THERAPIST

## 2021-03-18 ENCOUNTER — RECORDS - HEALTHEAST (OUTPATIENT)
Dept: ADMINISTRATIVE | Facility: OTHER | Age: 71
End: 2021-03-18

## 2021-03-19 ENCOUNTER — COMMUNICATION - HEALTHEAST (OUTPATIENT)
Dept: ANTICOAGULATION | Facility: CLINIC | Age: 71
End: 2021-03-19

## 2021-03-19 DIAGNOSIS — Z95.4 S/P MITRAL VALVE REPLACEMENT WITH METALLIC VALVE: ICD-10-CM

## 2021-03-19 DIAGNOSIS — I48.0 PAROXYSMAL ATRIAL FIBRILLATION (H): ICD-10-CM

## 2021-03-19 LAB — INR PPP: 2 (ref 0.9–1.1)

## 2021-03-23 ENCOUNTER — THERAPY VISIT (OUTPATIENT)
Dept: PHYSICAL THERAPY | Facility: CLINIC | Age: 71
End: 2021-03-23
Payer: COMMERCIAL

## 2021-03-23 DIAGNOSIS — R21 RASH: Primary | ICD-10-CM

## 2021-03-23 DIAGNOSIS — M53.82 WEAKNESS OF NECK: ICD-10-CM

## 2021-03-23 PROCEDURE — 97110 THERAPEUTIC EXERCISES: CPT | Mod: GP | Performed by: PHYSICAL THERAPIST

## 2021-03-23 PROCEDURE — 97112 NEUROMUSCULAR REEDUCATION: CPT | Mod: GP | Performed by: PHYSICAL THERAPIST

## 2021-03-23 PROCEDURE — 97140 MANUAL THERAPY 1/> REGIONS: CPT | Mod: GP | Performed by: PHYSICAL THERAPIST

## 2021-03-25 ENCOUNTER — COMMUNICATION - HEALTHEAST (OUTPATIENT)
Dept: ANTICOAGULATION | Facility: CLINIC | Age: 71
End: 2021-03-25

## 2021-03-25 ENCOUNTER — RECORDS - HEALTHEAST (OUTPATIENT)
Dept: ADMINISTRATIVE | Facility: OTHER | Age: 71
End: 2021-03-25

## 2021-03-25 DIAGNOSIS — I48.0 PAROXYSMAL ATRIAL FIBRILLATION (H): ICD-10-CM

## 2021-03-25 DIAGNOSIS — Z95.4 S/P MITRAL VALVE REPLACEMENT WITH METALLIC VALVE: ICD-10-CM

## 2021-03-25 LAB — INR PPP: 2.8 (ref 0.9–1.1)

## 2021-03-29 ENCOUNTER — COMMUNICATION - HEALTHEAST (OUTPATIENT)
Dept: INTERNAL MEDICINE | Facility: CLINIC | Age: 71
End: 2021-03-29

## 2021-03-29 DIAGNOSIS — G89.4 PAIN SYNDROME, CHRONIC: ICD-10-CM

## 2021-03-30 ENCOUNTER — THERAPY VISIT (OUTPATIENT)
Dept: PHYSICAL THERAPY | Facility: CLINIC | Age: 71
End: 2021-03-30
Payer: COMMERCIAL

## 2021-03-30 DIAGNOSIS — M53.82 WEAKNESS OF NECK: ICD-10-CM

## 2021-03-30 DIAGNOSIS — R21 RASH: ICD-10-CM

## 2021-03-30 PROCEDURE — 97112 NEUROMUSCULAR REEDUCATION: CPT | Mod: GP | Performed by: PHYSICAL THERAPIST

## 2021-03-30 PROCEDURE — 97110 THERAPEUTIC EXERCISES: CPT | Mod: GP | Performed by: PHYSICAL THERAPIST

## 2021-03-30 PROCEDURE — 97140 MANUAL THERAPY 1/> REGIONS: CPT | Mod: GP | Performed by: PHYSICAL THERAPIST

## 2021-04-06 ENCOUNTER — THERAPY VISIT (OUTPATIENT)
Dept: PHYSICAL THERAPY | Facility: CLINIC | Age: 71
End: 2021-04-06
Payer: COMMERCIAL

## 2021-04-06 DIAGNOSIS — R21 RASH: ICD-10-CM

## 2021-04-06 DIAGNOSIS — M53.82 WEAKNESS OF NECK: ICD-10-CM

## 2021-04-06 PROCEDURE — 97110 THERAPEUTIC EXERCISES: CPT | Mod: GP | Performed by: PHYSICAL THERAPIST

## 2021-04-06 PROCEDURE — 97140 MANUAL THERAPY 1/> REGIONS: CPT | Mod: GP | Performed by: PHYSICAL THERAPIST

## 2021-04-06 PROCEDURE — 97112 NEUROMUSCULAR REEDUCATION: CPT | Mod: GP | Performed by: PHYSICAL THERAPIST

## 2021-04-09 ENCOUNTER — COMMUNICATION - HEALTHEAST (OUTPATIENT)
Dept: INTERNAL MEDICINE | Facility: CLINIC | Age: 71
End: 2021-04-09

## 2021-04-09 DIAGNOSIS — M25.519 SHOULDER PAIN: ICD-10-CM

## 2021-04-09 DIAGNOSIS — M54.2 NECK PAIN: ICD-10-CM

## 2021-04-13 ENCOUNTER — THERAPY VISIT (OUTPATIENT)
Dept: PHYSICAL THERAPY | Facility: CLINIC | Age: 71
End: 2021-04-13
Payer: COMMERCIAL

## 2021-04-13 DIAGNOSIS — R21 RASH: ICD-10-CM

## 2021-04-13 DIAGNOSIS — M53.82 WEAKNESS OF NECK: ICD-10-CM

## 2021-04-13 PROCEDURE — 97140 MANUAL THERAPY 1/> REGIONS: CPT | Mod: GP | Performed by: PHYSICAL THERAPIST

## 2021-04-13 PROCEDURE — 97110 THERAPEUTIC EXERCISES: CPT | Mod: GP | Performed by: PHYSICAL THERAPIST

## 2021-04-13 PROCEDURE — 97112 NEUROMUSCULAR REEDUCATION: CPT | Mod: GP | Performed by: PHYSICAL THERAPIST

## 2021-04-15 ENCOUNTER — COMMUNICATION - HEALTHEAST (OUTPATIENT)
Dept: ANTICOAGULATION | Facility: CLINIC | Age: 71
End: 2021-04-15

## 2021-04-15 ENCOUNTER — RECORDS - HEALTHEAST (OUTPATIENT)
Dept: ADMINISTRATIVE | Facility: OTHER | Age: 71
End: 2021-04-15

## 2021-04-15 ENCOUNTER — COMMUNICATION - HEALTHEAST (OUTPATIENT)
Dept: INTERNAL MEDICINE | Facility: CLINIC | Age: 71
End: 2021-04-15

## 2021-04-15 ENCOUNTER — OFFICE VISIT - HEALTHEAST (OUTPATIENT)
Dept: INTERNAL MEDICINE | Facility: CLINIC | Age: 71
End: 2021-04-15

## 2021-04-15 DIAGNOSIS — Z98.890 STATUS POST MAZE OPERATION FOR ATRIAL FIBRILLATION: ICD-10-CM

## 2021-04-15 DIAGNOSIS — I48.0 PAROXYSMAL ATRIAL FIBRILLATION (H): ICD-10-CM

## 2021-04-15 DIAGNOSIS — Z86.79 STATUS POST MAZE OPERATION FOR ATRIAL FIBRILLATION: ICD-10-CM

## 2021-04-15 DIAGNOSIS — T82.6XXD PROSTHETIC VALVE ENDOCARDITIS, SUBSEQUENT ENCOUNTER: ICD-10-CM

## 2021-04-15 DIAGNOSIS — Z01.818 PREOPERATIVE EXAMINATION: ICD-10-CM

## 2021-04-15 DIAGNOSIS — G89.4 PAIN SYNDROME, CHRONIC: ICD-10-CM

## 2021-04-15 DIAGNOSIS — I25.10 CORONARY ARTERY DISEASE INVOLVING NATIVE CORONARY ARTERY OF NATIVE HEART WITHOUT ANGINA PECTORIS: ICD-10-CM

## 2021-04-15 DIAGNOSIS — I50.42 CHRONIC COMBINED SYSTOLIC AND DIASTOLIC CONGESTIVE HEART FAILURE (H): ICD-10-CM

## 2021-04-15 DIAGNOSIS — M1A.00X0 IDIOPATHIC CHRONIC GOUT WITHOUT TOPHUS, UNSPECIFIED SITE: ICD-10-CM

## 2021-04-15 DIAGNOSIS — H26.9 CATARACT OF BOTH EYES, UNSPECIFIED CATARACT TYPE: ICD-10-CM

## 2021-04-15 DIAGNOSIS — D72.12 DRESS SYNDROME: ICD-10-CM

## 2021-04-15 DIAGNOSIS — Z95.4 S/P MITRAL VALVE REPLACEMENT WITH METALLIC VALVE: ICD-10-CM

## 2021-04-15 DIAGNOSIS — I38 PROSTHETIC VALVE ENDOCARDITIS, SUBSEQUENT ENCOUNTER: ICD-10-CM

## 2021-04-15 DIAGNOSIS — N18.4 CKD (CHRONIC KIDNEY DISEASE) STAGE 4, GFR 15-29 ML/MIN (H): ICD-10-CM

## 2021-04-15 DIAGNOSIS — Z79.01 LONG TERM (CURRENT) USE OF ANTICOAGULANTS: ICD-10-CM

## 2021-04-15 DIAGNOSIS — T50.905A DRESS SYNDROME: ICD-10-CM

## 2021-04-15 LAB
ALBUMIN SERPL-MCNC: 4.2 G/DL (ref 3.5–5)
ALBUMIN UR-MCNC: NEGATIVE G/DL
ALP SERPL-CCNC: 90 U/L (ref 45–120)
ALT SERPL W P-5'-P-CCNC: 19 U/L (ref 0–45)
ANION GAP SERPL CALCULATED.3IONS-SCNC: 16 MMOL/L (ref 5–18)
APPEARANCE UR: CLEAR
AST SERPL W P-5'-P-CCNC: 45 U/L (ref 0–40)
BILIRUB SERPL-MCNC: 0.9 MG/DL (ref 0–1)
BILIRUB UR QL STRIP: NEGATIVE
BUN SERPL-MCNC: 46 MG/DL (ref 8–28)
CALCIUM SERPL-MCNC: 10.3 MG/DL (ref 8.5–10.5)
CHLORIDE BLD-SCNC: 100 MMOL/L (ref 98–107)
CHOLEST SERPL-MCNC: 237 MG/DL
CO2 SERPL-SCNC: 25 MMOL/L (ref 22–31)
COLOR UR AUTO: YELLOW
CREAT SERPL-MCNC: 2.13 MG/DL (ref 0.7–1.3)
ERYTHROCYTE [DISTWIDTH] IN BLOOD BY AUTOMATED COUNT: 15.7 % (ref 11–14.5)
FASTING STATUS PATIENT QL REPORTED: YES
GFR SERPL CREATININE-BSD FRML MDRD: 31 ML/MIN/1.73M2
GLUCOSE BLD-MCNC: 99 MG/DL (ref 70–125)
GLUCOSE UR STRIP-MCNC: NEGATIVE MG/DL
HCT VFR BLD AUTO: 34.4 % (ref 40–54)
HDLC SERPL-MCNC: 39 MG/DL
HGB BLD-MCNC: 10.8 G/DL (ref 14–18)
HGB UR QL STRIP: NEGATIVE
INR PPP: 2.8 (ref 0.9–1.1)
KETONES UR STRIP-MCNC: NEGATIVE MG/DL
LDLC SERPL CALC-MCNC: 166 MG/DL
LEUKOCYTE ESTERASE UR QL STRIP: NEGATIVE
MCH RBC QN AUTO: 30.4 PG (ref 27–34)
MCHC RBC AUTO-ENTMCNC: 31.4 G/DL (ref 32–36)
MCV RBC AUTO: 97 FL (ref 80–100)
NITRATE UR QL: NEGATIVE
PH UR STRIP: 6.5 [PH] (ref 5–8)
PHOSPHATE SERPL-MCNC: 3.8 MG/DL (ref 2.5–4.5)
PLATELET # BLD AUTO: 192 THOU/UL (ref 140–440)
PMV BLD AUTO: 9.3 FL (ref 7–10)
POTASSIUM BLD-SCNC: 4.2 MMOL/L (ref 3.5–5)
PROT SERPL-MCNC: 8.6 G/DL (ref 6–8)
PTH-INTACT SERPL-MCNC: 85 PG/ML (ref 10–86)
RBC # BLD AUTO: 3.55 MILL/UL (ref 4.4–6.2)
SODIUM SERPL-SCNC: 141 MMOL/L (ref 136–145)
SP GR UR STRIP: 1.01 (ref 1–1.03)
TRIGL SERPL-MCNC: 162 MG/DL
TSH SERPL DL<=0.005 MIU/L-ACNC: 3.05 UIU/ML (ref 0.3–5)
URATE SERPL-MCNC: 8.3 MG/DL (ref 3–8)
UROBILINOGEN UR STRIP-ACNC: NORMAL
WBC: 5 THOU/UL (ref 4–11)

## 2021-04-15 ASSESSMENT — MIFFLIN-ST. JEOR: SCORE: 1522.83

## 2021-04-16 ENCOUNTER — COMMUNICATION - HEALTHEAST (OUTPATIENT)
Dept: INTERNAL MEDICINE | Facility: CLINIC | Age: 71
End: 2021-04-16

## 2021-04-16 DIAGNOSIS — G89.4 PAIN SYNDROME, CHRONIC: ICD-10-CM

## 2021-04-16 LAB
25(OH)D3 SERPL-MCNC: 28.9 NG/ML (ref 30–80)
25(OH)D3 SERPL-MCNC: 28.9 NG/ML (ref 30–80)
ATRIAL RATE - MUSE: 40 BPM
DIASTOLIC BLOOD PRESSURE - MUSE: NORMAL
INTERPRETATION ECG - MUSE: NORMAL
P AXIS - MUSE: NORMAL
PR INTERVAL - MUSE: NORMAL
QRS DURATION - MUSE: 108 MS
QT - MUSE: 464 MS
QTC - MUSE: 478 MS
R AXIS - MUSE: 80 DEGREES
SYSTOLIC BLOOD PRESSURE - MUSE: NORMAL
T AXIS - MUSE: 19 DEGREES
VENTRICULAR RATE- MUSE: 64 BPM

## 2021-04-20 ENCOUNTER — THERAPY VISIT (OUTPATIENT)
Dept: PHYSICAL THERAPY | Facility: CLINIC | Age: 71
End: 2021-04-20
Payer: COMMERCIAL

## 2021-04-20 DIAGNOSIS — R21 RASH: ICD-10-CM

## 2021-04-20 DIAGNOSIS — M53.82 WEAKNESS OF NECK: ICD-10-CM

## 2021-04-20 PROCEDURE — 97140 MANUAL THERAPY 1/> REGIONS: CPT | Mod: GP | Performed by: PHYSICAL THERAPIST

## 2021-04-20 PROCEDURE — 97112 NEUROMUSCULAR REEDUCATION: CPT | Mod: GP | Performed by: PHYSICAL THERAPIST

## 2021-04-20 PROCEDURE — 97110 THERAPEUTIC EXERCISES: CPT | Mod: GP | Performed by: PHYSICAL THERAPIST

## 2021-04-27 ENCOUNTER — THERAPY VISIT (OUTPATIENT)
Dept: PHYSICAL THERAPY | Facility: CLINIC | Age: 71
End: 2021-04-27
Payer: COMMERCIAL

## 2021-04-27 DIAGNOSIS — R21 RASH: ICD-10-CM

## 2021-04-27 DIAGNOSIS — M53.82 WEAKNESS OF NECK: ICD-10-CM

## 2021-04-27 PROCEDURE — 97112 NEUROMUSCULAR REEDUCATION: CPT | Mod: GP | Performed by: PHYSICAL THERAPIST

## 2021-04-27 PROCEDURE — 97110 THERAPEUTIC EXERCISES: CPT | Mod: GP | Performed by: PHYSICAL THERAPIST

## 2021-04-27 PROCEDURE — 97140 MANUAL THERAPY 1/> REGIONS: CPT | Mod: GP | Performed by: PHYSICAL THERAPIST

## 2021-05-06 ENCOUNTER — COMMUNICATION - HEALTHEAST (OUTPATIENT)
Dept: ANTICOAGULATION | Facility: CLINIC | Age: 71
End: 2021-05-06

## 2021-05-09 ENCOUNTER — RECORDS - HEALTHEAST (OUTPATIENT)
Dept: ADMINISTRATIVE | Facility: OTHER | Age: 71
End: 2021-05-09

## 2021-05-10 ENCOUNTER — COMMUNICATION - HEALTHEAST (OUTPATIENT)
Dept: ANTICOAGULATION | Facility: CLINIC | Age: 71
End: 2021-05-10

## 2021-05-10 DIAGNOSIS — Z95.4 S/P MITRAL VALVE REPLACEMENT WITH METALLIC VALVE: ICD-10-CM

## 2021-05-10 DIAGNOSIS — I48.0 PAROXYSMAL ATRIAL FIBRILLATION (H): ICD-10-CM

## 2021-05-10 LAB — INR PPP: 1.9 (ref 0.9–1.1)

## 2021-05-11 ENCOUNTER — THERAPY VISIT (OUTPATIENT)
Dept: PHYSICAL THERAPY | Facility: CLINIC | Age: 71
End: 2021-05-11
Payer: COMMERCIAL

## 2021-05-11 DIAGNOSIS — M53.82 WEAKNESS OF NECK: ICD-10-CM

## 2021-05-11 DIAGNOSIS — R21 RASH: ICD-10-CM

## 2021-05-11 PROCEDURE — 97112 NEUROMUSCULAR REEDUCATION: CPT | Mod: GP | Performed by: PHYSICAL THERAPIST

## 2021-05-11 PROCEDURE — 97140 MANUAL THERAPY 1/> REGIONS: CPT | Mod: GP | Performed by: PHYSICAL THERAPIST

## 2021-05-11 PROCEDURE — 97110 THERAPEUTIC EXERCISES: CPT | Mod: GP | Performed by: PHYSICAL THERAPIST

## 2021-05-12 ENCOUNTER — COMMUNICATION - HEALTHEAST (OUTPATIENT)
Dept: INTERNAL MEDICINE | Facility: CLINIC | Age: 71
End: 2021-05-12

## 2021-05-12 DIAGNOSIS — G89.4 PAIN SYNDROME, CHRONIC: ICD-10-CM

## 2021-05-13 ENCOUNTER — COMMUNICATION - HEALTHEAST (OUTPATIENT)
Dept: INTERNAL MEDICINE | Facility: CLINIC | Age: 71
End: 2021-05-13

## 2021-05-13 DIAGNOSIS — G89.4 PAIN SYNDROME, CHRONIC: ICD-10-CM

## 2021-05-18 ENCOUNTER — COMMUNICATION - HEALTHEAST (OUTPATIENT)
Dept: ANTICOAGULATION | Facility: CLINIC | Age: 71
End: 2021-05-18

## 2021-05-18 ENCOUNTER — RECORDS - HEALTHEAST (OUTPATIENT)
Dept: ADMINISTRATIVE | Facility: OTHER | Age: 71
End: 2021-05-18

## 2021-05-18 DIAGNOSIS — I48.0 PAROXYSMAL ATRIAL FIBRILLATION (H): ICD-10-CM

## 2021-05-18 DIAGNOSIS — Z95.4 S/P MITRAL VALVE REPLACEMENT WITH METALLIC VALVE: ICD-10-CM

## 2021-05-18 LAB — INR PPP: 2.4 (ref 0.9–1.1)

## 2021-05-20 ENCOUNTER — RECORDS - HEALTHEAST (OUTPATIENT)
Dept: ADMINISTRATIVE | Facility: OTHER | Age: 71
End: 2021-05-20

## 2021-05-20 ENCOUNTER — COMMUNICATION - HEALTHEAST (OUTPATIENT)
Dept: ANTICOAGULATION | Facility: CLINIC | Age: 71
End: 2021-05-20

## 2021-05-20 DIAGNOSIS — I48.0 PAROXYSMAL ATRIAL FIBRILLATION (H): ICD-10-CM

## 2021-05-20 DIAGNOSIS — Z95.4 S/P MITRAL VALVE REPLACEMENT WITH METALLIC VALVE: ICD-10-CM

## 2021-05-20 LAB — INR PPP: 2.6 (ref 0.9–1.1)

## 2021-05-24 ENCOUNTER — COMMUNICATION - HEALTHEAST (OUTPATIENT)
Dept: INTERNAL MEDICINE | Facility: CLINIC | Age: 71
End: 2021-05-24

## 2021-05-24 ENCOUNTER — COMMUNICATION - HEALTHEAST (OUTPATIENT)
Dept: INFECTIOUS DISEASES | Facility: CLINIC | Age: 71
End: 2021-05-24

## 2021-05-24 DIAGNOSIS — G89.4 PAIN SYNDROME, CHRONIC: ICD-10-CM

## 2021-05-24 DIAGNOSIS — I38 PROSTHETIC VALVE ENDOCARDITIS, SUBSEQUENT ENCOUNTER: ICD-10-CM

## 2021-05-24 DIAGNOSIS — T82.6XXD PROSTHETIC VALVE ENDOCARDITIS, SUBSEQUENT ENCOUNTER: ICD-10-CM

## 2021-05-25 ENCOUNTER — THERAPY VISIT (OUTPATIENT)
Dept: PHYSICAL THERAPY | Facility: CLINIC | Age: 71
End: 2021-05-25
Payer: COMMERCIAL

## 2021-05-25 DIAGNOSIS — M53.82 WEAKNESS OF NECK: ICD-10-CM

## 2021-05-25 DIAGNOSIS — R21 RASH: ICD-10-CM

## 2021-05-25 PROCEDURE — 97110 THERAPEUTIC EXERCISES: CPT | Performed by: PHYSICAL THERAPIST

## 2021-05-25 PROCEDURE — 97112 NEUROMUSCULAR REEDUCATION: CPT | Performed by: PHYSICAL THERAPIST

## 2021-05-26 ENCOUNTER — RECORDS - HEALTHEAST (OUTPATIENT)
Dept: ADMINISTRATIVE | Facility: OTHER | Age: 71
End: 2021-05-26

## 2021-05-26 ASSESSMENT — PATIENT HEALTH QUESTIONNAIRE - PHQ9
SUM OF ALL RESPONSES TO PHQ QUESTIONS 1-9: 0
SUM OF ALL RESPONSES TO PHQ QUESTIONS 1-9: 0
SUM OF ALL RESPONSES TO PHQ QUESTIONS 1-9: 5

## 2021-05-27 VITALS
DIASTOLIC BLOOD PRESSURE: 67 MMHG | TEMPERATURE: 98.6 F | OXYGEN SATURATION: 96 % | HEART RATE: 59 BPM | SYSTOLIC BLOOD PRESSURE: 139 MMHG

## 2021-05-27 NOTE — TELEPHONE ENCOUNTER
ANTICOAGULATION  MANAGEMENT-home monitor result    Assessment     Today's INR result of 2.6 is Therapeutic (goal INR of 2.5-3.5)        Warfarin taken as previously instructed    No new diet changes affecting INR    No new medication/supplements affecting INR    Continues to tolerate warfarin with no reported s/s of bleeding or thromboembolism     Previous INR was Supratherapeutic at this same dose for unclear reasons    Plan:     Spoke with Bandar regarding INR result and instructed:     Warfarin Dosing Instructions:  Continue current warfarin dose    3 mg every Mon, Thu, Sat; 6 mg all other days         Instructed patient to follow up no later than: 2 weeks with home monitor    Education provided: importance of consistent vitamin K intake and target INR goal and significance of current INR result    Bandar verbalizes understanding and agrees to warfarin dosing plan.    Instructed to call the [facility] for any changes, questions or concerns. (#[phone])   ?   Christel La RN    Subjective/Objective:      Bandar Wells, a 68 y.o. male is on warfarin.     Bandar reports:     Home warfarin dose: verbally confirmed home dose with Bandar and updated on anticoagulation calendar     Missed doses: No     Medication changes:  No     S/S of bleeding or thromboembolism:  No     New Injury or illness:  No     Changes in diet or alcohol consumption:  No     Upcoming surgery, procedure or cardioversion:  No    Anticoagulation Episode Summary     Current INR goal:   2.5-3.5   TTR:   53.0 % (1.7 y)   Next INR check:   4/26/2019   INR from last check:   2.60 (4/12/2019)   Weekly max warfarin dose:      Target end date:   Indefinite   INR check location:      Preferred lab:      Send INR reminders to:   ANTICOAGULATION POOL C (DTN,VAD,CGR,GAV)    Indications    Paroxysmal atrial fibrillation (H) [I48.0]  S/P MVR (mitral valve replacement) [Z95.2]  S/P mitral valve replacement with metallic valve [Z95.4]           Comments:   INR  TARGET GOAL 2.5 - 3.5         Anticoagulation Care Providers     Provider Role Specialty Phone number    Jin Bhat MD Referring Internal Medicine [phone]

## 2021-05-27 NOTE — PROGRESS NOTES
CLINIC FOLLOW UP NOTE:    Original Reason for consultation, site patient seen:    Interval history:  Noted shortness of breath and diuretic issues in Dr. Bhat note.  Pt otherwise doing well from ID perspective.  On abx as prior.    We mostly just talked.  I did tell him about a new heart failure specialist at [place] I know from training, Christopher Carlson MD.          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  EF is 58%, valve crisp on exam     PLAN:  Daily PCN probably for life  Takes clinda for dental prophylaxis, he isn't allergic to amox classically any more given takes daily PEN VK but I'll just leave dental prophy same as prior.    Come back in year for visit, if like this one it will mostly be a social visit      Thank you for allowing me to continue care of this patient.    Sincerely:      BISHNU Ordonez MD  [facility]  [facility]   [phone]

## 2021-05-27 NOTE — TELEPHONE ENCOUNTER
Who is calling:  Patient  Reason for Call:  The patient is returning a call to anticoagulation regarding his INR today. Please advise at the number provided.  Okay to leave a detailed message: Yes

## 2021-05-27 NOTE — PROGRESS NOTES
[facility] Follow Up Note    Bandar Wells   68 y.o. male    Date of Visit: 4/4/2019    Chief Complaint   Patient presents with     Follow-up     Subjective  This is a 68-year-old man who has chronic lymphedema but also has issues with chronic congestive heart failure.  Throughout the winter months we have had some difficulty with fluid retention and shortness of breath.  I would refer to my previous notes this winter for details of what is been going on.  He did respond to an adjustment in his diuretics and is now doing much better.  His weight came back to more reasonable level and has been stable since his last visit.  He still has some dyspnea on exertion but it is pretty much at baseline and he is doing well.  The edema is stable.  He has been concerned because of the urinary frequency especially at nighttime.  We had discontinued his metolazone and now he has tapered off of his furosemide over the past few days.  He does weigh himself daily and is very aware of the need to continue to do this.  Otherwise he feels good.    ROS A comprehensive review of systems was performed and was otherwise negative    Medications, allergies, and problem list were reviewed and updated    Exam  General Appearance:   On examination his blood pressure is 138/66.  Weight is 193 pounds and height is 69.5 inches.  BMI is 28.09.    Lungs are clear.    Heart rhythm is in the 60s and stable.    Edema is stable with no increase since his last visit.    The patient is alert and oriented x3.      Assessment/Plan  1. Chronic congestive heart failure, unspecified heart failure type (H)     2. Lymphedema of both lower extremities     3. Colon cancer screening  Cologuard     Chronic congestive heart failure.  Stable at this time.  We did discuss his use of diuretics and I am fine with his monitoring his weight on a daily basis and using his furosemide as needed with any weight increase of 3 pounds or more.  He is  pretty much aware of the situation and I think can handle the schedule.  He does have a follow-up appointment with his cardiologist in about 2 weeks so we will obtain his opinion as well regarding this approach.  No other changes in medication.    He is due for colon cancer screening and would prefer not to do a colonoscopy with his other medical issues and I agree.  We will set him up for noninvasive screening.    I would like to see him back in 2 months for follow-up unless there is an issue.  Total time of this office visit was 25 minutes with greater than 50% of the time spent in care coordination of patient counseling.  The following high BMI interventions were performed this visit: weight monitoring    Jin Bhat MD      Current Outpatient Medications on File Prior to Visit   Medication Sig     acetaminophen (TYLENOL) 500 MG tablet Take 500-1,000 mg by mouth every 6 (six) hours as needed for pain. Pain 1-5  Give 500 mg  Or pain 6-10 give 1000 mg every 6 hours ad needed not to exceed 4 grams in 24 hours     atorvastatin (LIPITOR) 40 MG tablet TAKE 1 TABLET (40 MG TOTAL) BY MOUTH AT BEDTIME.     b complex vitamins tablet Take 1 tablet by mouth daily.      cholecalciferol, vitamin D3, 1,000 unit tablet Take 1,000 Units by mouth daily.     clindamycin (CLEOCIN) 300 MG capsule Take 2 capsules (600 mg total) by mouth see administration instructions. Take prior to Dentist appointments     cyanocobalamin 1000 MCG tablet Take 1,000 mcg by mouth daily.     ferrous sulfate 325 (65 FE) MG tablet Take 1 tablet (325 mg total) by mouth 2 (two) times a day.     FLAXSEED OIL ORAL Take 1,000 mg by mouth daily.      fluticasone (FLONASE) 50 mcg/actuation nasal spray 1 spray into each nostril daily.     FOLIC ACID ORAL Take 1 tablet by mouth daily.     furosemide (LASIX) 20 MG tablet Take 2 tablets (40 mg total) by mouth daily.     L. ACIDOPHILUS/BIFIDO LONGUM (PROBIOTIC PEARLS ORAL) Take 1 capsule by mouth daily.      multivitamin therapeutic (THERAGRAN) tablet Take 1 tablet by mouth daily.     penicillin VK (PEN VK) 500 MG tablet TAKE 1 TABLET BY MOUTH DAILY FOR 30 DAYS     potassium chloride (K-DUR,KLOR-CON) 20 MEQ tablet TAKE 1 TABLET BY MOUTH DAILY. (Patient taking differently: TAKE 2 TABLET BY MOUTH DAILY.)     triamcinolone (KENALOG) 0.1 % cream APPLY TOPICALLY TWICE A DAY AS NEEDED     warfarin (COUMADIN/JANTOVEN) 3 MG tablet Take ONE tab (3mg) to TWO tabs (6mg) by mouth daily, as directed.  Adjust dose based on INR results..     metOLazone (ZAROXOLYN) 5 MG tablet Take 1 tablet (5 mg total) by mouth daily. 30 minutes prior to furosemide     No current facility-administered medications on file prior to visit.      No Known Allergies  Social History     Tobacco Use     Smoking status: Never Smoker     Smokeless tobacco: Never Used   Substance Use Topics     Alcohol use: Yes     Alcohol/week: 3.6 oz     Types: 5 Glasses of wine, 1 Cans of beer per week     Drug use: No

## 2021-05-27 NOTE — TELEPHONE ENCOUNTER
Incoming fax from [employer] [facility] home monitoring     INR date: 4/12    See attached document

## 2021-05-27 NOTE — TELEPHONE ENCOUNTER
Who is calling:  Patient Bandar  Reason for Call:  Returning call, attempted transfer not available. Please call patient back. Thanks.  Date of last appointment with primary care: [id number]  Okay to leave a detailed message: Yes

## 2021-05-27 NOTE — TELEPHONE ENCOUNTER
ANTICOAGULATION  MANAGEMENT    Assessment     Today's INR result of 4.8 is Supratherapeutic (goal INR of 2.5-3.5)        Warfarin taken as previously instructed    No new diet changes affecting INR    No new medication/supplements affecting INR    Continues to tolerate warfarin with no reported s/s of bleeding or thromboembolism     Previous INR was Subtherapeutic    Plan:     Spoke with Bandar regarding INR result and instructed:     Warfarin Dosing Instructions:  hold warfarin today then change warfarin dose to 3 mg daily on Mon/Thurs/Sat; and 6 mg daily rest of week  (8 % change)    Instructed patient to follow up no later than: 7-10 days    Education provided: importance of therapeutic range    Bandar verbalizes understanding and agrees to warfarin dosing plan.    Instructed to call the [facility] for any changes, questions or concerns. (#[phone])   ?   Ilene Porter RN    Subjective/Objective:      Bandar Wells, a 68 y.o. male is on warfarin.     Bandar reports:     Home warfarin dose: confirmed with Bandar correct dose taken     Missed doses: No     Medication changes:  No     S/S of bleeding or thromboembolism:  No     New Injury or illness:  No     Changes in diet or alcohol consumption:  No     Upcoming surgery, procedure or cardioversion:  No    Anticoagulation Episode Summary     Current INR goal:   2.5-3.5   TTR:   53.2 % (1.7 y)   Next INR check:   4/12/2019   INR from last check:   4.80! (4/2/2019)   Weekly max warfarin dose:      Target end date:   Indefinite   INR check location:      Preferred lab:      Send INR reminders to:   ANTICOAGULATION POOL C (DTN,VAD,CGR,GAV)    Indications    Paroxysmal atrial fibrillation (H) [I48.0]  S/P MVR (mitral valve replacement) [Z95.2]  S/P mitral valve replacement with metallic valve [Z95.4]           Comments:   INR TARGET GOAL 2.5 - 3.5         Anticoagulation Care Providers     Provider Role Specialty Phone number    Jin Bhat MD Referring  Internal Medicine [phone]

## 2021-05-27 NOTE — TELEPHONE ENCOUNTER
Incoming fax from [employer] [facility] home monitoring     INR date: 4/2    See attached document

## 2021-05-28 NOTE — TELEPHONE ENCOUNTER
ANTICOAGULATION  MANAGEMENT    Assessment     Today's INR result of 2.50 is Therapeutic (goal INR of 2.5-3.5)        Warfarin taken as previously instructed    No new diet changes affecting INR    No interaction expected between Lasix dose increased and topical Triamcinolone cream and warfarin     Continues to tolerate warfarin with YES reported s/s of bleeding with epistaxis.    Previous INR was Subtherapeutic at 2.49 on 4/24/19.    Plan:     Spoke with Bandar regarding INR result and instructed:    1.  Advised to f/u with Dr. Bhat re:  Results on Colorguard.    Warfarin Dosing Instructions:  (has 4mg tabs).   - Continue current warfarin dose 4 mg daily on Mon/Thurs.Sat; and 6 mg daily rest of week.    Instructed patient to follow up no later than:  2 wks.   - checks INR's every 2 wks with home INR monitor thru [employer].    Education provided: importance of consistent vitamin K intake, target INR goal and significance of current INR result, no interaction anticipated between warfarin and Lasix dose increased, , importance of notifying clinic for changes in medications, monitoring for bleeding signs and symptoms and when to seek medical attention/emergency care    Bandar verbalizes understanding and agrees to warfarin dosing plan.    Instructed to call the [facility] for any changes, questions or concerns. (#[phone])   ?   Meg Jacinto RN    Subjective/Objective:      Bandar Wells, a 68 y.o. male is on warfarin.     Bandar reports:     Home warfarin dose: verbally confirmed home dose with Bandar and updated on anticoagulation calendar     Missed doses: No     Medication changes:  Yes:  On 5/2/19 Lasix increased from 20mg daily to 40mg daily and also reported restarting topical - Triamcinolone cream for his leg.     S/S of bleeding or thromboembolism:  Yes:  Reported nose blood and also result of Colorguard reported on 5/6/19 was positive for blood.   - f/u appt with ENT - Dr. Interiano next week.     New Injury or  illness:  No.  Hx of chronic systolic and diastolic CHF and bilateral Leg edema.     Changes in diet or alcohol consumption:  No     Upcoming surgery, procedure or cardioversion:  No    Anticoagulation Episode Summary     Current INR goal:   2.5-3.5   TTR:   51.8 % (1.8 y)   Next INR check:   5/22/2019   INR from last check:   2.50 (5/8/2019)   Weekly max warfarin dose:      Target end date:   Indefinite   INR check location:      Preferred lab:      Send INR reminders to:   ANTICOAGULATION POOL C (DTN,VAD,CGR,GAV)    Indications    Paroxysmal atrial fibrillation (H) [I48.0]  S/P MVR (mitral valve replacement) [Z95.2]  S/P mitral valve replacement with metallic valve [Z95.4]           Comments:   INR TARGET GOAL 2.5 - 3.5         Anticoagulation Care Providers     Provider Role Specialty Phone number    Jin Bhat MD Referring Internal Medicine [phone]

## 2021-05-28 NOTE — TELEPHONE ENCOUNTER
Test Results  Who is calling?:  [employer] [facility]  / Outside Lab Source   Who ordered the test:  PCP  Type of test: Lab Cologaurd   Date of test:  Results yielded, 05/03/19   Where was the test performed: Sent in to [employer] [facility] .   What are your questions/concerns?:  (+) Positive Results, sent to Provider Portal .  Please advise   Okay to leave a detailed message?:  Yes

## 2021-05-28 NOTE — TELEPHONE ENCOUNTER
ANTICOAGULATION  MANAGEMENT    Assessment     Today's INR result of 2.40 is Subtherapeutic (goal INR of 2.5-3.5)        Warfarin taken as previously instructed    Increased greens/vitamin K intake may be affecting INR    No new medication/supplements affecting INR    Continues to tolerate warfarin with no reported s/s of bleeding or thromboembolism     Previous INR was Therapeutic at 2.60 on 4/12/19.    Plan:     Spoke with Bandar regarding INR result and instructed:     Warfarin Dosing Instructions:  (has 3mg tabs)   - advised one time booster with 7.5mg warfarin dose tonight,    - then continue current warfarin dose 3 mg daily on Mon/Thurs/Sat; and 6 mg daily rest of week.    Instructed patient to follow up no later than:  2 wks.   - checks INR's every 2 wks with home INR monitor thru [employer].    Education provided: importance of consistent vitamin K intake, impact of vitamin K foods on INR and target INR goal and significance of current INR result    Bandar verbalizes understanding and agrees to warfarin dosing plan.    Instructed to call the [facility] for any changes, questions or concerns. (#[phone])   ?   Meg Jacinto RN    Subjective/Objective:      Bandar Wells, a 68 y.o. male is on warfarin.     Bandar reports:     Home warfarin dose: verbally confirmed home dose with Bandar and updated on anticoagulation calendar     Missed doses: No     Medication changes:  Yes:  Metolazone stopped and takes furosemide PRN as directed with any increase in WT - 3 lbs or more.     S/S of bleeding or thromboembolism:  No     New Injury or illness:  Yes:  Hx of chronic CHF.  (during winter had problems with fluid retention and shortness of breath.  Will be following up soon with his Cardiologist.     Changes in diet or alcohol consumption:  Yes: Bandar reported eating more aspargus encinas (at least 10).     Upcoming surgery, procedure or cardioversion:  No    Anticoagulation Episode Summary     Current INR goal:   2.5-3.5    TTR:   52.9 % (1.7 y)   Next INR check:   5/8/2019   INR from last check:   2.40! (4/24/2019)   Weekly max warfarin dose:      Target end date:   Indefinite   INR check location:      Preferred lab:      Send INR reminders to:   ANTICOAGULATION POOL C (DTN,VAD,CGR,GAV)    Indications    Paroxysmal atrial fibrillation (H) [I48.0]  S/P MVR (mitral valve replacement) [Z95.2]  S/P mitral valve replacement with metallic valve [Z95.4]           Comments:   INR TARGET GOAL 2.5 - 3.5         Anticoagulation Care Providers     Provider Role Specialty Phone number    Jin Bhat MD Referring Internal Medicine [phone]

## 2021-05-28 NOTE — TELEPHONE ENCOUNTER
Incoming fax from [employer] [facility] home monitoring     INR date: 5/8    See attached document

## 2021-05-28 NOTE — TELEPHONE ENCOUNTER
Received a faxed INR result for Bandar Wells  From [facility]  INR result dated 4/24/2019 is 2.40

## 2021-05-29 NOTE — TELEPHONE ENCOUNTER
Received a faxed INR result for Bandar Wells  From [facility]  INR result dated 6/22/2019 is 2.20

## 2021-05-29 NOTE — TELEPHONE ENCOUNTER
I called the pt, Dr Ordonez had not called him. The pt is concerned about what to do in regards with his current Cardiologist. I informed the pt that he does not need to tell his Cardiologist Dr Carney that he has an appt for a second opinion. I informed the pt that he should have the second opinion and assess who he would like to continue seeing. The pt understands. He would like me to inform Dr Ordonez of this. I will forward.

## 2021-05-29 NOTE — TELEPHONE ENCOUNTER
Anticoagulation Management    Unable to reach Bandar today.    Today's INR result of 2.20 is Subtherapeutic (goal INR of 2.5-3.5)     Follow up required to confirm dose and assess for changes (no template)     Left message to take a booster dose of warfarin,  9 mg tonight    - will be increasing warfarin dose to 3mg on Wednesdays and 6mg ROW.   - will continue to recheck in 2 wks with home INR monitor.        ACN to follow up - on 6/25/19.    Meg Jacinto RN

## 2021-05-29 NOTE — PROGRESS NOTES
[facility] Follow Up Note    Bandar Wells   68 y.o. male    Date of Visit: 6/10/2019    Chief Complaint   Patient presents with     Follow-up     Subjective  This is a 68-year-old man with known paroxysmal atrial fibrillation chronic congestive heart failure.  He has had ongoing issues with significant edema.  He also has chronic lymphedema but in addition he has been having more leg swelling probably secondary to his heart failure.  I did review his last visit with cardiology and they increased his diuretic but he does not think there is been any significant improvement.  He sought a second cardiology opinion but they did not make any additional recommendations.  He does get somewhat short of breath although he has had no chest pain.  Cardiac rhythm is been stable.  No other particular complaints at this time.    We did note in reviewing his chart that he had a Cologuard test done about 6 weeks ago and it came back positive.  Usually a letter goes out but he had never received one so I am not sure what happened.  I did discuss this with him.    ROS A comprehensive review of systems was performed and was otherwise negative    Medications, allergies, and problem list were reviewed and updated    Exam  General Appearance:   On examination his blood pressure is 128/60.  Weight is 198 pounds and height is 69.5 inches.  BMI is 28.82.    Lungs are clear today.    Heart rhythm is stable with rate in the 60s and no ectopy.    He does have 3+ edema in both lower extremities below the knees.    The patient is alert and oriented x3.      Assessment/Plan  1. Chronic congestive heart failure, unspecified heart failure type (H)     2. Paroxysmal atrial fibrillation (H)       Ongoing congestive heart failure.  He will continue to follow the recommendations of cardiology.  We discussed his options in terms of cardiologist.    Paroxysmal atrial fibrillation.  Stable at this time.  Continue blood  thinners.    Positive Cologuard test.  I discussed with him the recommendation for a colonoscopy.  He has been reluctant to do this because of his multiple cardiac issues and use of anticoagulants.  He is going to give this some thought and we will talk about it when he returns from a trip [place] which she will be doing until the end of this month.  The following high BMI interventions were performed this visit: weight monitoring    Jin Bhat MD      Current Outpatient Medications on File Prior to Visit   Medication Sig     acetaminophen (TYLENOL) 500 MG tablet Take 500-1,000 mg by mouth every 6 (six) hours as needed for pain. Pain 1-5  Give 500 mg  Or pain 6-10 give 1000 mg every 6 hours ad needed not to exceed 4 grams in 24 hours     atorvastatin (LIPITOR) 40 MG tablet TAKE 1 TABLET (40 MG TOTAL) BY MOUTH AT BEDTIME.     b complex vitamins tablet Take 1 tablet by mouth daily.      cholecalciferol, vitamin D3, 1,000 unit tablet Take 1,000 Units by mouth daily.     clindamycin (CLEOCIN) 300 MG capsule Take 2 capsules (600 mg total) by mouth see administration instructions. Take prior to Dentist appointments     cyanocobalamin 1000 MCG tablet Take 1,000 mcg by mouth daily.     ferrous sulfate 325 (65 FE) MG tablet Take 1 tablet (325 mg total) by mouth 2 (two) times a day. (Patient taking differently: Take 1 tablet by mouth daily with breakfast.       )     FLAXSEED OIL ORAL Take 1,000 mg by mouth daily.      fluticasone (FLONASE) 50 mcg/actuation nasal spray 1 spray into each nostril daily.     FOLIC ACID ORAL Take 1 tablet by mouth daily.     furosemide (LASIX) 40 MG tablet Take 1 tablet (40 mg total) by mouth daily.     L. ACIDOPHILUS/BIFIDO LONGUM (PROBIOTIC PEARLS ORAL) Take 1 capsule by mouth daily.     multivitamin therapeutic (THERAGRAN) tablet Take 1 tablet by mouth daily.     penicillin VK (PEN VK) 500 MG tablet TAKE 1 TABLET BY MOUTH DAILY FOR 30 DAYS (Patient taking differently: TAKE 1 TABLET BY MOUTH  DAILY      )     potassium chloride (K-DUR,KLOR-CON) 20 MEQ tablet TAKE 1 TABLET BY MOUTH DAILY.     triamcinolone (KENALOG) 0.1 % cream APPLY TOPICALLY TWICE A DAY AS NEEDED     warfarin (COUMADIN/JANTOVEN) 3 MG tablet Take ONE tab (3mg) to TWO tabs (6mg) by mouth daily, as directed.  Adjust dose based on INR results..     No current facility-administered medications on file prior to visit.      No Known Allergies  Social History     Tobacco Use     Smoking status: Never Smoker     Smokeless tobacco: Never Used   Substance Use Topics     Alcohol use: Yes     Alcohol/week: 3.6 oz     Types: 5 Glasses of wine, 1 Cans of beer per week     Frequency: 2-3 times a week     Drug use: No

## 2021-05-29 NOTE — TELEPHONE ENCOUNTER
Mery    Patient calling back. You referred patient to another provider.    will be meeting with the dr araujo tomorrow 06.04.2019 and would like to talk with you before then. Please call him back @ [phone].

## 2021-05-29 NOTE — TELEPHONE ENCOUNTER
ANTICOAGULATION  MANAGEMENT    Assessment     Today's INR result of 2.40 is Subtherapeutic (goal INR of 2.5-3.5)        Missed dose(s) may be affecting INR    No new diet changes affecting INR    Potential interaction between topical Triamcinolone cream and warfarin which may affect subsequent INRs    Continues to tolerate warfarin with NO reported s/s of bleeding.   - however, COLORGUARD TEST WAS POSITIVE FOR BLOOD ON 4/17/19.    Previous INR was Therapeutic at 2.50 on 5/8/19.    Plan:     Spoke with Bandar regarding INR result and instructed:     Warfarin Dosing Instructions:  (has 3mg tabs)   - Change warfarin dose to 3 mg daily on Wed/Sat; and 6 mg daily rest of week  (9.1 % change)    Instructed patient to follow up no later than: 1-2 wks.   - checks INR's every 2 wks with home INR monitor thru [employer].       Education provided: target INR goal and significance of current INR result, importance of taking warfarin as instructed, potential interaction between warfarin and Triamcinolone topical cream  and importance of notifying clinic for changes in medications    Bandar verbalizes understanding and agrees to warfarin dosing plan.    Instructed to call the [facility] for any changes, questions or concerns. (#[phone])   ?   Meg Jacinto RN    Subjective/Objective:      Bandar Wells, a 68 y.o. male is on warfarin.     Bandar reports:     Home warfarin dose: verbally confirmed home dose with Bandar and updated on anticoagulation calendar     Missed doses: Yes:  Reported missing 3mg warfarin dose 10 days ago.     Medication changes:  Yes: had been using topical Triamcinolone cream on legs.   - per [employer], Concurrent use of TRIAMCINOLONE and WARFARIN may potenially result in increased risk of bleeding or diminished effects of warfarin.    - Furosemide dose was also increased to 40mg daily to get rid of 10-12 lbs of fluid r/t CHF and bilateral leg edema.     S/S of bleeding or thromboembolism:  No     New Injury  or illness:  No     Changes in diet or alcohol consumption:  No     Upcoming surgery, procedure or cardioversion:  No    Anticoagulation Episode Summary     Current INR goal:   2.5-3.5   TTR:   50.6 % (1.8 y)   Next INR check:   6/6/2019   INR from last check:   2.40! (5/23/2019)   Weekly max warfarin dose:      Target end date:   Indefinite   INR check location:      Preferred lab:      Send INR reminders to:   [facility]    Indications    Paroxysmal atrial fibrillation (H) [I48.0]  S/P MVR (mitral valve replacement) [Z95.2]  S/P mitral valve replacement with metallic valve [Z95.4]           Comments:   INR TARGET GOAL 2.5 - 3.5         Anticoagulation Care Providers     Provider Role Specialty Phone number    Jin Bhat MD Referring Internal Medicine [phone]

## 2021-05-29 NOTE — TELEPHONE ENCOUNTER
Incoming fax from [employer] [facility] home monitoring     INR date: 5/23    See attached document

## 2021-05-29 NOTE — TELEPHONE ENCOUNTER
ANTICOAGULATION  MANAGEMENT    Assessment     Today's INR result of 2.60 is Therapeutic (goal INR of 2.5-3.5)        Warfarin taken as previously instructed    No new diet changes affecting INR    No new medication/supplements affecting INR    Continues to tolerate warfarin with no reported s/s of bleeding or thromboembolism     Previous INR was Subtherapeutic at 2.40 on 5/23/19.    Plan:     Spoke with Bandar regarding INR result and instructed:     Warfarin Dosing Instructions:  (has 3mg tabs)   - Continue current warfarin dose 3 mg daily on Wed/Sat; and 6 mg daily rest of week.    Instructed patient to follow up no later than:  2 wks.   - checks INR's every 2 wks with home INR monitor thru [employer].    Education provided: importance of consistent vitamin K intake, target INR goal and significance of current INR result and importance of notifying clinic for changes in medications    Bandar verbalizes understanding and agrees to warfarin dosing plan.    Instructed to call the [facility] for any changes, questions or concerns. (#[phone])   ?   Meg Jacinto RN    Subjective/Objective:      Bandar Wells, a 68 y.o. male is on warfarin.     Bandar reports:     Home warfarin dose: verbally confirmed home dose with Bandar and updated on anticoagulation calendar     Missed doses: No     Medication changes:  No.  Reported finishing topical Triamcinolone cream for now.     S/S of bleeding or thromboembolism:  No     New Injury or illness:  No     Changes in diet or alcohol consumption:  No     Upcoming surgery, procedure or cardioversion:  No    Anticoagulation Episode Summary     Current INR goal:   2.5-3.5   TTR:   50.6 % (1.9 y)   Next INR check:   6/21/2019   INR from last check:   2.60 (6/7/2019)   Weekly max warfarin dose:      Target end date:   Indefinite   INR check location:      Preferred lab:      Send INR reminders to:   [facility]    Indications    Paroxysmal atrial fibrillation (H)  [I48.0]  S/P MVR (mitral valve replacement) [Z95.2]  S/P mitral valve replacement with metallic valve [Z95.4]           Comments:   INR TARGET GOAL 2.5 - 3.5         Anticoagulation Care Providers     Provider Role Specialty Phone number    Jin Bhat MD Referring Internal Medicine [phone]

## 2021-05-29 NOTE — TELEPHONE ENCOUNTER
Voice mail Friday 5/31 at 4:40.    Bandar is requesting a call back from Dr. Ordonez before Tuesday.

## 2021-05-30 VITALS — BODY MASS INDEX: 25.5 KG/M2 | WEIGHT: 177.7 LBS

## 2021-05-30 VITALS
HEIGHT: 70 IN | HEIGHT: 70 IN | BODY MASS INDEX: 25.2 KG/M2 | WEIGHT: 176 LBS | WEIGHT: 176 LBS | BODY MASS INDEX: 25.2 KG/M2

## 2021-05-30 VITALS — BODY MASS INDEX: 25.34 KG/M2 | HEIGHT: 70 IN | WEIGHT: 177 LBS

## 2021-05-30 VITALS — BODY MASS INDEX: 24.39 KG/M2 | WEIGHT: 170 LBS

## 2021-05-30 VITALS — WEIGHT: 177 LBS | BODY MASS INDEX: 25.34 KG/M2 | HEIGHT: 70 IN

## 2021-05-30 VITALS — WEIGHT: 169 LBS | BODY MASS INDEX: 24.25 KG/M2

## 2021-05-30 VITALS — HEIGHT: 70 IN | WEIGHT: 168 LBS | BODY MASS INDEX: 24.05 KG/M2

## 2021-05-30 VITALS — HEIGHT: 70 IN | WEIGHT: 170 LBS | BODY MASS INDEX: 24.34 KG/M2

## 2021-05-30 VITALS — WEIGHT: 170 LBS | BODY MASS INDEX: 24.39 KG/M2

## 2021-05-30 VITALS — WEIGHT: 174.7 LBS | HEIGHT: 70 IN | BODY MASS INDEX: 25.01 KG/M2

## 2021-05-30 VITALS — WEIGHT: 168.7 LBS | BODY MASS INDEX: 24.15 KG/M2 | HEIGHT: 70 IN

## 2021-05-30 VITALS — BODY MASS INDEX: 25.28 KG/M2 | WEIGHT: 180.6 LBS | HEIGHT: 71 IN

## 2021-05-30 VITALS — WEIGHT: 166.6 LBS | BODY MASS INDEX: 23.9 KG/M2

## 2021-05-30 VITALS — HEIGHT: 70 IN | BODY MASS INDEX: 25.44 KG/M2 | WEIGHT: 177.69 LBS

## 2021-05-30 VITALS — WEIGHT: 168 LBS | BODY MASS INDEX: 24.11 KG/M2

## 2021-05-30 VITALS — WEIGHT: 186 LBS | BODY MASS INDEX: 26.69 KG/M2

## 2021-05-30 NOTE — TELEPHONE ENCOUNTER
FYI - Status Update  Who is Calling: Patient  Update: Patient has been out of town, but now home and has received her message regarding dosing. Patient not available to talk at this time, but will call back for Odette later today or tomorrow to discuss the dosing.   Okay to leave a detailed message?:  No return call needed

## 2021-05-30 NOTE — TELEPHONE ENCOUNTER
MTM please advise on this patient, per Dr. Bhat.      1.  Please advise if patient will need bridging while off warfarin.    2.  Patient will be scheduled for Colonoscopy.    3.  [facility] needs a 4 day hold and bridge orders called to - [phone].

## 2021-05-30 NOTE — TELEPHONE ENCOUNTER
FYI - Status Update  Who is Calling: Patient  Update: Patient wanted the ACN to know that they have run out of testing supplies for their home monitor and wont have more for about a week.  Okay to leave a detailed message?:  No return call needed

## 2021-05-30 NOTE — TELEPHONE ENCOUNTER
Already called ERIC WALSH with OK to hold warfarin for 4 days with bridging orders n place by PharmD.    Already informed Bandar.  He is awaiting to be called with scheduled appt for colonoscopy from [facility].   -Bandar will call back ACN.

## 2021-05-30 NOTE — TELEPHONE ENCOUNTER
Odette,  Would you be able to advise on the bridging recommendations?  Please advise.  Thank you.  Elyssa HERNANDEZ, ADOLPH/BAHMAN....................10:02 AM

## 2021-05-30 NOTE — TELEPHONE ENCOUNTER
Bandar called     - reported he was on vacation.  He could not access his home phone during his trip.     - so he took the one time booster of 9mg warfarin dose last night, instead of 6/24.     - gave his new warfarin dose.     - will check INR in 2 wks with home INR monitor.     - reported no new changes with meds / has not missed any warfarin doses.  He stated feeling good with no new complaints.

## 2021-05-30 NOTE — TELEPHONE ENCOUNTER
Provider has reviewed and agreed with plan below.  Lovenox order sent to pharmacy on file.    Batool Connelly, PharmD, BCACP  Anticoagulation Clinical Pharmacist

## 2021-05-30 NOTE — TELEPHONE ENCOUNTER
Pt is scheduled for a colonoscopy  Updated: 07/17/2019 16:20  We scheduled the Colonoscopy order for Bandar Wells. The appointment is with Geeta Branham MD at [facility] on 10/02/2019 at 09:30 AM.

## 2021-05-30 NOTE — TELEPHONE ENCOUNTER
Orders being requested: hold and bridge orders for patient warfarin.   Reason service is needed/diagnosis: colonoscopy, hold warfarin for 4 days   When are orders needed by: as soon as possible   Where to send Orders: Phone:  [phone]  Okay to leave detailed message?  Yes

## 2021-05-30 NOTE — TELEPHONE ENCOUNTER
Patient Returning Call  Reason for call:  Patient returning AC[facility] nurse call  Information relayed to patient:  Tried to schedule an appointment at clinic but patient refused and just wants to speak with ACN nurse.   Patient has additional questions:  Yes  If YES, what are your questions/concerns:  Patient would like to discuss with AC[facility] nurse only about INRs.  Okay to leave a detailed message?: Yes

## 2021-05-30 NOTE — TELEPHONE ENCOUNTER
Anticoagulation Management    Unable to reach Bandar today.  (LM detailed message on home / mobile #).    Follow up required to confirm dose and assess for changes (no template)     left detailed message with new increased warfarin dose starting today, 6/25/19  - 3mg every Wednesdays and 6mg ROW.    - check INR every 2 wks with home INR monitor thru [employer].   - advised to call me back.      (would like to know if he took the booster dose on 6/24/19).         ACN to follow up    Meg Jacinto RN

## 2021-05-30 NOTE — TELEPHONE ENCOUNTER
FYI:    Pt has declined to schedule colonoscopy since he is due in April of 2020. He did have a positive Cologuard.    Please advise if ok for patient to wait or should he get scheduled.    Updated: 07/12/2019 10:17  We cancelled the Colonoscopy order for Bandar Wells. Reason: Patient Refused. (Spoke w/ pt, informed him that he is not actually due back until 04/09/2020. Asked pt why his PCP wanted him to come back before, pt states he had a positive cologuard.  Asked pt if he would like to continue sched or wait until recall date. Pt elected to wait until next year to sched. Adviced pt to also let his PCP know.)

## 2021-05-30 NOTE — TELEPHONE ENCOUNTER
ANTICOAGULATION  MANAGEMENT    Assessment     Today's INR result of 3.10 is Therapeutic (goal INR of 2.5-3.5)        Warfarin taken as previously instructed    No new diet changes affecting INR    No new medication/supplements affecting INR    Continues to tolerate warfarin with no reported s/s of bleeding or thromboembolism     Previous INR was Subtherapeutic at 2.20 on 6/22/19.    Will be scheduled for colonoscopy.  Will need 4 day warfarin hold and bridging.    Plan:     Spoke with Bandar regarding INR result and instructed:     Warfarin Dosing Instructions:   (has 3mg tabs)   - Continue current warfarin dose 3 mg daily on Wednesday; and 6 mg daily rest of week.    Instructed patient to follow up no later than:  2 wks.   - checks INR's every 2 wks with home monitor thru [employer].    Education provided: importance of consistent vitamin K intake, target INR goal and significance of current INR result and importance of notifying clinic of upcoming surgeries and procedures 2 weeks in advance    Bandar verbalizes understanding and agrees to warfarin dosing plan.    Instructed to call the [facility] for any changes, questions or concerns. (#[phone])   ?   Meg Jacinto RN    Subjective/Objective:      Bandar Wells, a 68 y.o. male is on warfarin.     Bandar reports:     Home warfarin dose: verbally confirmed home dose with Bandar and updated on anticoagulation calendar     Missed doses: No     Medication changes:  No     S/S of bleeding or thromboembolism:  No     New Injury or illness:  No     Changes in diet or alcohol consumption:  No     Upcoming surgery, procedure or cardioversion:  Yes.  [facility] will be calling Bandar with appt schedule for Colonoscopy.   - there is an OK to hold warfarin for 4 days and to bridge while off warfarin.   - Bandar will call me, as soon as he is scheduled.  Warfarin hold / lovenox injections insructions for patient has been outlined by pharmD.  (See Telephone encounter on  7/2/19.)    Anticoagulation Episode Summary     Current INR goal:   2.5-3.5   TTR:   50.4 % (1.9 y)   Next INR check:   7/19/2019   INR from last check:   3.10 (7/5/2019)   Weekly max warfarin dose:      Target end date:   Indefinite   INR check location:      Preferred lab:      Send INR reminders to:   [facility]    Indications    Paroxysmal atrial fibrillation (H) [I48.0]  S/P MVR (mitral valve replacement) [Z95.2]  S/P mitral valve replacement with metallic valve [Z95.4]           Comments:   INR TARGET GOAL 2.5 - 3.5         Anticoagulation Care Providers     Provider Role Specialty Phone number    Jin Bhat MD Referring Internal Medicine [phone]

## 2021-05-30 NOTE — TELEPHONE ENCOUNTER
Who is calling:  Patient   Reason for Call:  Patient is returning [facility] call- has been playing phone tag  Date of last appointment with primary care: n/a  Okay to leave a detailed message: Yes

## 2021-05-30 NOTE — TELEPHONE ENCOUNTER
ANTICOAGULATION  MANAGEMENT PROGRAM    Bandar Wells is overdue for INR check.  Reminder call made.    Spoke with Bandar and left message for Bandar. If returning call, please schedule INR check as soon as possible.    - will test as soon as he gets his test strips.    Meg Jacinto RN

## 2021-05-30 NOTE — TELEPHONE ENCOUNTER
Please review and confirm if you agree with the plan below:    Celestino SwainD, BCACP  Anticoagulation Clinical Pharmacist      BELL-PROCEDURAL ANTICOAGULATION  MANAGEMENT    Assessment     Warfarin interruption plan for [facility] procedure to be scheduled.    Mitral Valve Replacement plus Afib      Risk stratification for thromboembolism: high. (2012 Chest guidelines)         2017 AHA/ACC Management of Valvular Heart disease guidelines suggests bridging is reasonable on individual basis for mechanical AVR patients with risk factors for thrombosis (Afib, previous thromboembolism, hypercoagulable condition, LV systolic dysfunction, older generation valves, or > 1 valve)    Plan       Pre-Procedure:    Hold warfarin until after procedure startin days prior to procedure     Lovenox 90 mg subq Q 12 hrs (1 mg/kg Q 12 hr for CrCl 60ml/min)     Start Lovenox: 36 hours after last warfarin dose    Last dose of Lovenox prior to procedure: 24 hours prior to procedure       Post-Procedure:    Resume home warfarin dose if okay with provider doing procedure on night of procedure, 6mg PM: of procedure    Resume Lovenox ~ 24 hrs post procedure when okay with provider doing procedure. Continue until INR >= 2.5    Recheck INR 3-5 days after resuming warfarin   ?   Celestino DensonD    Subjective/Objective:      terence Hopkins 68 y.o. male    Reason for Anticoagulation: Mitral Valve Replacement    Goal INR Range: 2.5-3.5    Patient bridged in past: Not noted in chart since metallic valve placed 2017  Pertinent History:     Wt Readings from Last 3 Encounters:   06/10/19 198 lb (89.8 kg)   19 192 lb (87.1 kg)   19 193 lb 14.4 oz (88 kg)        Ideal body weight: 71.8 kg (158 lb 6.4 oz)  Adjusted ideal body weight: 79 kg (174 lb 3.8 oz)     BMI: There is no height or weight on file to calculate BMI.    Lab Results   Component Value Date    INR 2.20 (!) 2019    INR 2.60 (!) 2019    INR 2.40  (!) 05/23/2019     Lab Results   Component Value Date    HGB 9.3 (L) 02/19/2019    HCT 28.3 (L) 05/29/2018     05/29/2018     Lab Results   Component Value Date    CREATININE 1.15 05/20/2019    CREATININE 1.12 05/02/2019    CREATININE 1.66 (H) 03/06/2019     Estimated CrCl: 60ml/min from May 2018 BMP

## 2021-05-30 NOTE — TELEPHONE ENCOUNTER
I thought that to the anticoagulation nurses were now doing the bridging orders for this type of thing.

## 2021-05-31 VITALS — WEIGHT: 179.2 LBS | HEIGHT: 71 IN | BODY MASS INDEX: 25.09 KG/M2

## 2021-05-31 VITALS — WEIGHT: 176 LBS | BODY MASS INDEX: 25.25 KG/M2

## 2021-05-31 VITALS — HEIGHT: 71 IN | BODY MASS INDEX: 25.34 KG/M2 | WEIGHT: 181 LBS

## 2021-05-31 VITALS — BODY MASS INDEX: 25.4 KG/M2 | WEIGHT: 177 LBS

## 2021-05-31 VITALS — BODY MASS INDEX: 25.54 KG/M2 | WEIGHT: 178 LBS

## 2021-05-31 VITALS — HEIGHT: 70 IN | WEIGHT: 178.8 LBS | BODY MASS INDEX: 25.6 KG/M2

## 2021-05-31 NOTE — TELEPHONE ENCOUNTER
FYI - Status Update  Who is Calling: [employer] ([employer] for [employer])  Update: INR 5.5, taken yesterday, patient called it in today, patient has own meter,home test  Okay to leave a detailed message?: Please call patient back to followup   No return call needed for [employer]

## 2021-05-31 NOTE — TELEPHONE ENCOUNTER
ANTICOAGULATION  MANAGEMENT    Assessment     Today's INR result of 6.54 is Supratherapeutic (goal INR of 2.5-3.5)        Warfarin taken as previously instructed    Change in alcohol intake may be affecting INR and decreased greens    No new medication/supplements affecting INR    Continues to tolerate warfarin with no reported s/s of bleeding or thromboembolism     Previous INR was Therapeutic    Plan:     Spoke with Bandar regarding INR result and instructed:     Warfarin Dosing Instructions: hold warfarin today and tomorrow    Instructed patient to follow up no later than: 8/2    Education provided: importance of consistent vitamin K intake, impact of vitamin K foods on INR, potential interaction between warfarin and alcohol, target INR goal and significance of current INR result and importance of following up for INR monitoring at instructed interval  Bandar plans on going back to his normal amount of greens and alcohol.   Bandar verbalizes understanding and agrees to warfarin dosing plan.    Instructed to call the [facility] for any changes, questions or concerns. (#[phone])   ?   Ilene Porter RN    Subjective/Objective:      Bandar Wells, a 68 y.o. male is on warfarin.     Bandar reports:     Home warfarin dose: as updated on anticoagulation calendar per template     Missed doses: No     Medication changes:  No     S/S of bleeding or thromboembolism:  No     New Injury or illness:  No     Changes in diet or alcohol consumption:  Yes: more alcohol and less greens     Upcoming surgery, procedure or cardioversion:  No    Anticoagulation Episode Summary     Current INR goal:   2.5-3.5   TTR:   49.0 % (2 y)   Next INR check:   8/2/2019   INR from last check:   6.54! (7/31/2019)   Weekly max warfarin dose:      Target end date:   Indefinite   INR check location:      Preferred lab:      Send INR reminders to:   [facility]    Indications    Paroxysmal atrial fibrillation (H) [I48.0]  S/P MVR  (mitral valve replacement) [Z95.2]  S/P mitral valve replacement with metallic valve [Z95.4]           Comments:   INR TARGET GOAL 2.5 - 3.5         Anticoagulation Care Providers     Provider Role Specialty Phone number    Jin Bhat MD Referring Internal Medicine [phone]

## 2021-05-31 NOTE — TELEPHONE ENCOUNTER
Who is calling:  Patient   Reason for Call:  Requesting to talk to Ilene about his last INR. No other information given  Date of last appointment with primary care: n/a  Okay to leave a detailed message: Yes

## 2021-05-31 NOTE — TELEPHONE ENCOUNTER
Bandar amari his INR yesterday and it was 5.5. He states he continues to get the error code today when trying to draw another INR. Recommended patient have INR drawn in clinic. Scheduled at Mid for 12:45

## 2021-05-31 NOTE — TELEPHONE ENCOUNTER
Received a faxed INR result for Bandar Wells  From [facility]  INR result dated 8/28/2019 is 4.00

## 2021-05-31 NOTE — TELEPHONE ENCOUNTER
FYI - Status Update  Who is Calling: [facility] lab  Update: Patients INR was 6.54  Okay to leave a detailed message?:  Yes

## 2021-05-31 NOTE — TELEPHONE ENCOUNTER
INR Venous draw 6.54. Does PCP agree with plan for patient to hold warfarin today and tomorrow and recheck INR Friday?

## 2021-05-31 NOTE — TELEPHONE ENCOUNTER
ANTICOAGULATION  MANAGEMENT    Assessment     Today's INR result of 3.80 is Supratherapeutic (goal INR of 2.5-3.5)        Warfarin recently held as instructed which may be affecting INR    No new diet changes affecting INR    No new medication/supplements affecting INR    Continues to tolerate warfarin with no reported s/s of bleeding or thromboembolism     Previous INR was Therapeutic at 3.40 on 8/2/19.    Plan:     Spoke with Bandar regarding INR result and instructed:     Warfarin Dosing Instructions:    - Change warfarin dose to 3 mg daily on Tuesday / Friday; and 6 mg daily rest of week.   - (7.7 % change)    Instructed patient to follow up no later than:  2 wks.   - checks INR's every 2 wks with home INR monitor thru [employer].    Education provided: importance of consistent vitamin K intake, target INR goal and significance of current INR result, potential interaction between warfarin and CBD oil and importance of notifying clinic for changes in medications    Bandar verbalizes understanding and agrees to warfarin dosing plan.    Instructed to call the [facility] for any changes, questions or concerns. (#[phone])   ?   Meg Jacinto RN    Subjective/Objective:      Bandar Wells, a 68 y.o. male is on warfarin.     Bandar reports:     Home warfarin dose: verbally confirmed home dose with Bandar and updated on anticoagulation calendar     Missed doses: Yes:  Reported holding 2 days of warfarin doses on 7/31 - 8/1.     Medication changes:  Yes: Now taking CBD oil.   - CBD can increase the blood thinning effect of warfarin and may increase the risk of bleeding. This interaction has been scientifically documented.       S/S of bleeding or thromboembolism:  No     New Injury or illness:  Yes:   Reported valve surgery on his veins of the lower extremities' (sclerotherapy).  Wearing compression stockings for 2 wks.     Changes in diet or alcohol consumption:  No     Upcoming surgery, procedure or cardioversion:   No    Anticoagulation Episode Summary     Current INR goal:   2.5-3.5   TTR:   48.5 % (2 y)   Next INR check:   8/27/2019   INR from last check:   3.80! (8/13/2019)   Weekly max warfarin dose:      Target end date:   Indefinite   INR check location:      Preferred lab:      Send INR remind[facility] to:   [facility]    Indications    Paroxysmal atrial fibrillation (H) [I48.0]  S/P MVR (mitral valve replacement) [Z95.2]  S/P mitral valve replacement with metallic valve [Z95.4]           Comments:   INR TARGET GOAL 2.5 - 3.5         Anticoagulation Care Providers     Provider Role Specialty Phone number    Jin Bhat MD Referring Internal Medicine [phone]

## 2021-05-31 NOTE — TELEPHONE ENCOUNTER
"ANTICOAGULATION  MANAGEMENT    Bandar Wells is on warfarin and had a point of care INR result > 5.5 or an \"error message\" on point of care meter today.    Morning INR; STAT venous INR processing requested from lab    Spoke with Bandar via phone while at the lab and reviewed triage questions for potential signs and symptoms of bleeding.      Patient Response     Have you had any bleeding in the last week?   No   Have you passed any red, black, or tarry stools in last week?   No   Have you vomited/spit up any red or coffee ground material in last week?   No   Have you had any new, severe abdominal pain or bloating develop in last week?   No   Have you fallen or had any injuries in last week?   No   Do you currently have a severe, sudden onset headache?   No   Do you currently have any severe sudden changes in your vision?   No   Do you currently have any new onset numbness, weakness/paralysis?   No     Assessment/Plan:     Bandar's responses were negative for signs and symptoms of bleeding; may discharge from clinic    Bandar instructed to:       Hold warfarin today until venous INR result returns and receives follow up call from [facility]    Seek medical attention for new signs and symptoms of bleeding or a fall with injury.       "

## 2021-05-31 NOTE — TELEPHONE ENCOUNTER
ANTICOAGULATION  MANAGEMENT    Assessment     Today's INR result of 4.00 is Supratherapeutic (goal INR of 2.5-3.5)        Warfarin taken as previously instructed    No new diet changes affecting INR    No interaction expected between Lasix and warfarin    Continues to tolerate warfarin with no reported s/s of bleeding or thromboembolism     Previous INR was Supratherapeutic at 3.80 on 8/13/19.    Plan:     Spoke with Bandar regarding INR result and instructed:     Warfarin Dosing Instructions:   (evenings. has 3mg tabs)   - today, advised one time lower dose with 3mg warfarin,    - then continue current warfarin dose 3 mg daily on Tues / Fri; and 6 mg daily rest of week.    Instructed patient to follow up no later than:  2 wks.   - checks INR's every  wks with home monitor.    Education provided: importance of consistent vitamin K intake, target INR goal and significance of current INR result, no interaction anticipated between warfarin and 3 days of increased Lasix and importance of notifying clinic for changes in medications    Bandar verbalizes understanding and agrees to warfarin dosing plan.    Instructed to call the [facility] for any changes, questions or concerns. (#[phone])   ?   Meg Jacinto RN    Subjective/Objective:      Bandar Wells, a 69 y.o. male is on warfarin.     Bandar reports:     Home warfarin dose: verbally confirmed home dose with Bandar and updated on anticoagulation calendar     Missed doses: No     Medication changes:  Yes:  On 8/26/19, to take Lasix 40mg two times a day for 3 days, then resume 40mg daily.     S/S of bleeding or thromboembolism:  No     New Injury or illness:  Yes:  On 8/26/19 had f/u at [facility] @ [facility] - d/t legs are more swollen and some increase in WT.     Changes in diet or alcohol consumption:  No     Upcoming surgery, procedure or cardioversion:  No    Anticoagulation Episode Summary     Current INR goal:   2.5-3.5   TTR:   47.6 % (2.1 y)   Next INR  check:   9/11/2019   INR from last check:   4.00! (8/28/2019)   Weekly max warfarin dose:      Target end date:   Indefinite   INR check location:      Preferred lab:      Send INR reminders to:   [facility]    Indications    Paroxysmal atrial fibrillation (H) [I48.0]  S/P MVR (mitral valve replacement) [Z95.2]  S/P mitral valve replacement with metallic valve [Z95.4]           Comments:   INR TARGET GOAL 2.5 - 3.5         Anticoagulation Care Providers     Provider Role Specialty Phone number    Jin Bhat MD Referring Internal Medicine [phone]

## 2021-05-31 NOTE — TELEPHONE ENCOUNTER
Verified critical INR result of 6.54 with [facility]. Closing this encounter as it is a duplicate. See other encounter from today (7/31)

## 2021-05-31 NOTE — TELEPHONE ENCOUNTER
INITIAL INR > 5.5 NOTIFICATION- Anticoagulation Management Program    Bandar Wells had an initial point of care INR of >8.0.     Venous confirmation of INR required per protocol. STAT venous sample drawn and being sent out for confirmation.    Anticoagulation template scanned into EHR. Patient phone call with Anticoagulation Management Program (ACM) being arranged for patient triage prior to discharge. Called Dr Office and gave them the result     Jess Ferguson MLT

## 2021-05-31 NOTE — TELEPHONE ENCOUNTER
ANTICOAGULATION  MANAGEMENT    Assessment     Today's INR result of 3.4 is Supratherapeutic (goal INR of 2.5-3.5)        Warfarin recently held as instructed which may be affecting INR    Back to his normal amount of greens and alcohol    No new medication/supplements affecting INR    Continues to tolerate warfarin with no reported s/s of bleeding or thromboembolism     Previous INR was Supratherapeutic    Plan:     Spoke with Bandar regarding INR result and instructed:     Warfarin Dosing Instructions:  Continue current warfarin dose 3 mg daily on Wed; and 6 mg daily rest of week  (0 % change)    Instructed patient to follow up no later than: 5-7 days    Education provided: importance of consistent vitamin K intake, impact of vitamin K foods on INR, potential interaction between warfarin and alcohol, importance of therapeutic range and target INR goal and significance of current INR result    Bandar verbalizes understanding and agrees to warfarin dosing plan.    Instructed to call the [facility] for any changes, questions or concerns. (#[phone])   ?   Ilene Porter RN    Subjective/Objective:      Bandar Wells, a 68 y.o. male is on warfarin.     Bandar reports:     Home warfarin dose: as updated on anticoagulation calendar per template     Missed doses: Yes: held two doses as instructed     Medication changes:  No     S/S of bleeding or thromboembolism:  No     New Injury or illness:  No     Changes in diet or alcohol consumption:  Back to normal amount of greens/alcohol     Upcoming surgery, procedure or cardioversion:  No    Anticoagulation Episode Summary     Current INR goal:   2.5-3.5   TTR:   48.8 % (2 y)   Next INR check:   8/9/2019   INR from last check:   3.40 (8/2/2019)   Weekly max warfarin dose:      Target end date:   Indefinite   INR check location:      Preferred lab:      Send INR reminders to:   [facility]    Indications    Paroxysmal atrial fibrillation (H) [I48.0]  S/P MVR  (mitral valve replacement) [Z95.2]  S/P mitral valve replacement with metallic valve [Z95.4]           Comments:   INR TARGET GOAL 2.5 - 3.5         Anticoagulation Care Providers     Provider Role Specialty Phone number    Jin Bhat MD Referring Internal Medicine [phone]

## 2021-05-31 NOTE — TELEPHONE ENCOUNTER
Received a faxed INR result for Bandar Wells  From [facility]  INR result dated 8/13/2019 is 3.80

## 2021-06-01 ENCOUNTER — THERAPY VISIT (OUTPATIENT)
Dept: PHYSICAL THERAPY | Facility: CLINIC | Age: 71
End: 2021-06-01
Payer: COMMERCIAL

## 2021-06-01 VITALS — WEIGHT: 178 LBS | HEIGHT: 70 IN | BODY MASS INDEX: 25.48 KG/M2

## 2021-06-01 VITALS — BODY MASS INDEX: 26.45 KG/M2 | WEIGHT: 184.75 LBS | HEIGHT: 70 IN

## 2021-06-01 VITALS — HEIGHT: 70 IN | BODY MASS INDEX: 27.06 KG/M2 | WEIGHT: 189 LBS

## 2021-06-01 VITALS — HEIGHT: 70 IN | BODY MASS INDEX: 27.35 KG/M2 | WEIGHT: 191 LBS

## 2021-06-01 VITALS — HEIGHT: 70 IN | WEIGHT: 193.2 LBS | BODY MASS INDEX: 27.66 KG/M2

## 2021-06-01 VITALS — WEIGHT: 191 LBS | BODY MASS INDEX: 27.35 KG/M2 | HEIGHT: 70 IN

## 2021-06-01 VITALS — WEIGHT: 191 LBS | HEIGHT: 70 IN | BODY MASS INDEX: 27.35 KG/M2

## 2021-06-01 DIAGNOSIS — M53.82 WEAKNESS OF NECK: ICD-10-CM

## 2021-06-01 DIAGNOSIS — R21 RASH: ICD-10-CM

## 2021-06-01 PROCEDURE — 97110 THERAPEUTIC EXERCISES: CPT | Mod: GP | Performed by: PHYSICAL THERAPIST

## 2021-06-01 PROCEDURE — 97112 NEUROMUSCULAR REEDUCATION: CPT | Mod: GP | Performed by: PHYSICAL THERAPIST

## 2021-06-01 NOTE — TELEPHONE ENCOUNTER
Reviewed Lovenox bridging and holding warfarin instructions per note from 9/26. Bandar was concerned about how to waste his Lovenox (has 100mg/ml syringe and needs to take 0.9ml). ACN explained over phone and also recommended going to his pharmacist to have them show him on the syringe how much will be wasted. Bandar verbalized understanding and has no further questions.

## 2021-06-01 NOTE — TELEPHONE ENCOUNTER
FYI - Status Update  Who is Calling: Patient  Update: Had his colonoscopy earlier today, and per instructions, he is calling back to report they did find and remove polyps. Please call him back to discuss his anticoagulation medication dosing.  Okay to leave a detailed message?:  Yes

## 2021-06-01 NOTE — TELEPHONE ENCOUNTER
BELL-PROCEDURAL ANTICOAGULATION  MANAGEMENT    Assessment     Warfarin interruption plan for Colonoscopy on 10/2.    Atrial Fibrillation and Mitral Valve Replacement      Risk stratification for thromboembolism: high. (2012 Chest guidelines)    Bridging suggested per chest guideline. See 7/2/19 encounter for initial bridge plan approval    With INR 2.5 today; okay to hold 4 days per GI protocol    Elevated creatinine. Recently rechecked at [facility]. Stable at 1.53. Creatinine clearance remains appropriate for 1 mg/kg Q 12 hrs enoxaparin    Plan       Pre-Procedure:      Hold warfarin until after procedure starting: Saturday 9/28     Lovenox 90 mg subq Q 12 hrs (1 mg/kg Q 12 hr for CrCl >30)     Start Lovenox: Sunday 9/29 PM    Last dose of Lovenox prior to procedure: Tuesday 10/1 AM       Post-Procedure:      Resume warfarin dose if okay with provider doing procedure on night of procedure, Wed 10/2 PM: 9 mg x 1 then resume home regimen 3 mg Sun, Tue, Fri and 6 mg rest of week       Resume Lovenox ~ 24 hrs post procedure when okay with provider doing procedure. Call prior to restarting if polyps removed Continue until INR >= 2.5 x 2.        Recheck 10/7    Spoke to Bandar and relayed above plan. Enoxaparin (Lovenox) education provided. Reviewed: role of enoxaparin in bridge therapy, prescribed enoxaparin dose and frequency, recommended injection site and site rotation, proper technique for administration of subcutaneous injection, monitoring for signs and symptoms of bruising and bleeding and monitoring for signs and symptoms of clotting.     Written instructions also sent via MyChart Letter    Bandar verbalizes understanding of plan.    ?   Shilpi Tamayo, PharmD    Subjective/Objective:      Bandar Wells, a 69 y.o. male    Reason for Anticoagulation: Mitral Valve Replacement; hx of aifb    Goal INR Range: 2.5-3.5    Patient bridged in past: Yes    Pertinent History: CHF    9/24/19: 87.5 kg  (at [facility] visit;  [facility]) via Care Everywhere    Wt Readings from Last 3 Encounters:   06/10/19 198 lb (89.8 kg)   05/02/19 192 lb (87.1 kg)   04/18/19 193 lb 14.4 oz (88 kg)        Ideal body weight: 71.8 kg (158 lb 6.4 oz)  Adjusted ideal body weight: 79 kg (174 lb 3.8 oz)     Lab Results   Component Value Date    INR 2.50 (!) 09/26/2019    INR 4.40 (H) 09/11/2019    INR 4.00 (!) 08/28/2019     Lab Results   Component Value Date    HGB 9.3 (L) 02/19/2019    HCT 28.3 (L) 05/29/2018     05/29/2018 9/24/19:  Creatinine 1.53 at [facility] (via Care Everywhere)    Lab Results   Component Value Date    CREATININE 1.65 (H) 09/11/2019    CREATININE 1.35 (H) 07/17/2019    CREATININE 1.15 05/20/2019     Estimated CrCl:   49 ml/min (using adjusted weight 79 kg; creatinine 1.53)

## 2021-06-01 NOTE — TELEPHONE ENCOUNTER
Received a faxed INR result for Bandar Wells  From [facility]  INR result dated 9/26/2019 is 2.50

## 2021-06-01 NOTE — TELEPHONE ENCOUNTER
Received a faxed INR result for Bandar Wells  From [facility]  INR result dated 9/11/2019 is 4.4

## 2021-06-01 NOTE — TELEPHONE ENCOUNTER
Refill Approved    Rx renewed per Medication Renewal Policy. Medication was last renewed on 3/28/19.    Elin Camarillo, [facility] Triage/Med Refill 9/25/2019     Requested Prescriptions   Pending Prescriptions Disp Refills     potassium chloride (K-DUR,KLOR-CON) 20 MEQ tablet 90 tablet 1     Sig: Take 1 tablet (20 mEq total) by mouth daily.       Potassium Supplements Refill Protocol Passed - 9/25/2019  9:13 AM        Passed - PCP or prescribing provider visit in past 12 months       Last office visit with prescriber/PCP: 6/10/2019 Jin Bhat MD OR same dept: 6/10/2019 Jin Bhat MD OR same specialty: 6/10/2019 Jin Bhat MD  Last physical: 1/16/2019 Last MTM visit: Visit date not found   Next visit within 3 mo: Visit date not found  Next physical within 3 mo: Visit date not found  Prescriber OR PCP: Jin Bhat MD  Last diagnosis associated with med order: 1. Hypokalemia  - potassium chloride (K-DUR,KLOR-CON) 20 MEQ tablet; Take 1 tablet (20 mEq total) by mouth daily.  Dispense: 90 tablet; Refill: 1    If protocol passes may refill for 12 months if within 3 months of last provider visit (or a total of 15 months).             Passed - Potassium level in last 12 months     Lab Results   Component Value Date    Potassium 3.9 09/11/2019

## 2021-06-01 NOTE — TELEPHONE ENCOUNTER
Who is calling:  Patient   Reason for Call:  The patient is calling the [facility] to request more information about dosing. He has questions.    Okay to leave a detailed message: Yes

## 2021-06-01 NOTE — TELEPHONE ENCOUNTER
Shilpi Tamayo,PharmD     - Bandar Wells just informed me he is already scheduled for colonoscopy on 10/2/19 with [facility] - Dr. Rowan.     - he is familiar with injecting Lovenox.  (s/p mitral valve replacement, (metalic)     - please call him as to when he should stop warfarin and start lovenox injections.

## 2021-06-01 NOTE — TELEPHONE ENCOUNTER
ANTICOAGULATION  MANAGEMENT    Assessment     Today's INR result of 4.40 is Supratherapeutic (goal INR of 2.5-3.5)    - [facility].  (he also tested with his home monitor machine thru [employer]).       Missed dose(s) may be affecting INR    No new diet changes affecting INR    No new medication/supplements affecting INR    Continues to tolerate warfarin with no reported s/s of bleeding or thromboembolism     Previous INR was Supratherapeutic at 4.00 on 8/28/19.    Plan:     Spoke with Bandar regarding INR result and instructed:     Warfarin Dosing Instructions:    (evenings. has 3mg tabs).   - today, advised one time lower dose with 3mg warfarin,    - then change warfarin dose to 3 mg daily on Sun/Tues/Fri; and 6 mg daily rest of week.   - (8.3 % change)    Instructed patient to follow up no later than:  1-2 wks.    - he also has home INR monitor thru [employer] and checks q2wks.    Education provided: importance of consistent vitamin K intake, target INR goal and significance of current INR result, no interaction anticipated between warfarin and Furosemide / Metolazone and importance of notifying clinic for changes in medications    Bandar verbalizes understanding and agrees to warfarin dosing plan.    Instructed to call the [facility] for any changes, questions or concerns. (#[phone])   ?   Meg Jacinto RN    Subjective/Objective:      Bandar Wells, a 69 y.o. male is on warfarin.     Bandar reports:     Home warfarin dose: verbally confirmed home dose with Bandar and updated on anticoagulation calendar     Missed doses: Yes: reported missing one dose on 9/6/19.     Medication changes:  Yes:   on 8/26/19, new RX for Metolazone 5mg daily.  He also reported, taking an increased Lasix dose of 40mg two times a day just for 3 days, then back to his usual 40mg daily     S/S of bleeding or thromboembolism:  No     New Injury or illness:  Yes:  Followed at [facility] with JOSEY  fluctuations.     Changes in diet or alcohol consumption:  No     Upcoming surgery, procedure or cardioversion:  No    Anticoagulation Episode Summary     Current INR goal:   2.5-3.5   TTR:   35.3 %   Next INR check:   9/25/2019   INR from last check:   4.40! (9/11/2019)   Weekly max warfarin dose:      Target end date:   Indefinite   INR check location:      Preferred lab:      Send INR reminders to:   [facility]    Indications    Paroxysmal atrial fibrillation (H) [I48.0]  S/P MVR (mitral valve replacement) [Z95.2]  S/P mitral valve replacement with metallic valve [Z95.4]           Comments:   INR TARGET GOAL 2.5 - 3.5         Anticoagulation Care Providers     Provider Role Specialty Phone number    Jin Bhat MD Referring Internal Medicine [phone]

## 2021-06-01 NOTE — TELEPHONE ENCOUNTER
Anticoagulation    Spoke to Bandar.  He reports he was told he had 3 small polyps removed today and pre-procedure INR was 1.6.  He also noted a little bit of delayed bleeding at injection site with last lovenox shot. Resolved when band-aid placed.    Instructed Bandar to resume warfarin as planned tonight 9 mg x 1 (delayed onset medication) and to hold off on Lovenox tomorrow until I call back with further instruction after review with his care team. Polypectomy high bleeding risk Lovenox restart 48-72 hours post procedure suggested.    Shilpi Tamayo, PharmD

## 2021-06-02 VITALS — HEIGHT: 70 IN | WEIGHT: 194 LBS | BODY MASS INDEX: 27.77 KG/M2

## 2021-06-02 VITALS — BODY MASS INDEX: 28.92 KG/M2 | WEIGHT: 202 LBS | HEIGHT: 70 IN

## 2021-06-02 VITALS — HEIGHT: 70 IN | WEIGHT: 200 LBS | BODY MASS INDEX: 28.63 KG/M2

## 2021-06-02 VITALS — HEIGHT: 70 IN | BODY MASS INDEX: 28.35 KG/M2 | WEIGHT: 198 LBS

## 2021-06-02 VITALS — WEIGHT: 202 LBS | BODY MASS INDEX: 28.92 KG/M2 | HEIGHT: 70 IN

## 2021-06-02 VITALS — HEIGHT: 70 IN | BODY MASS INDEX: 27.49 KG/M2 | WEIGHT: 192 LBS

## 2021-06-02 VITALS — WEIGHT: 193 LBS | HEIGHT: 70 IN | BODY MASS INDEX: 27.63 KG/M2

## 2021-06-02 VITALS — BODY MASS INDEX: 27.63 KG/M2 | HEIGHT: 70 IN | WEIGHT: 193 LBS

## 2021-06-02 VITALS — WEIGHT: 193.9 LBS | BODY MASS INDEX: 28.22 KG/M2

## 2021-06-02 NOTE — TELEPHONE ENCOUNTER
Anticoagulation    Returned call to Bandar    Relayed Lovenox instructions as below to restart at 72 hours post procedure on Sat 10/5 and continue Q 12 hrs until INR therapeutic.  Also reviewed warfarin plan: 10/3: 6 mg, 10/4: 3 mg, 10/5: 6 mg, 10/6: 3 mg,  Monday INR with home monitor    Enoxaparin (Lovenox) education provided. Reviewed: role of enoxaparin in bridge therapy, monitoring for signs and symptoms of bruising and bleeding and monitoring for signs and symptoms of clotting. *    Bandar verbalizes understanding on plan

## 2021-06-02 NOTE — TELEPHONE ENCOUNTER
ANTICOAGULATION  MANAGEMENT    Assessment     Today's INR result of 2.60 is Therapeutic (goal INR of 2.5-3.5)        Warfarin taken as previously instructed    poor appetite may be affecting diet and INR    No interaction expected between stopped Lovenox injections and warfarin    Continues to tolerate warfarin with no reported s/s of bleeding or thromboembolism     Previous INR was Subtherapeutic at 2.40 on 10/7/19.    S/p Colonoscopy on 10/2/19 with polypectomy 2x (biopsies were negative - cecum and transverse colon).      Plan:     Spoke with Bandar regarding INR result and instructed:    1.  Advised to f/u with Dr. Bhat if his decreased appetite and not feeling good persists.    Warfarin Dosing Instructions:   (evenings. has 3mg tabs)   - Continue current warfarin dose 3 mg daily on Sun/Tues/Fri; and 6 mg daily rest of week.    Instructed patient to follow up no later than:  1-2 wksl   - checks INR's with home monitor thru [employer].    Education provided: impact of vitamin K foods on INR, target INR goal and significance of current INR result, when to seek medical attention/emergency care and importance of notifying clinic for diarrhea, nausea/vomiting, reduced intake and/or illness    Bandar verbalizes understanding and agrees to warfarin dosing plan.    Instructed to call the [facility] for any changes, questions or concerns. (#[phone])   ?   Meg Jacinto RN    Subjective/Objective:      Bandar Wells, a 69 y.o. male is on warfarin.     Bandar reports:     Home warfarin dose: verbally confirmed home dose with Bandar and updated on anticoagulation calendar     Missed doses: No.  However, he did hold warfarin dose for 4 days prior to colonoscopy.     Medication changes:  No     S/S of bleeding or thromboembolism:  No    New Injury or illness:  Yes.  Reported not feeling good.  Had no appetite.   - S/p Colonoscopy on 10/2/19 with polypectomy 2x (biopsies were negative - cecum and transverse colon).     Changes  in diet or alcohol consumption:  No     Upcoming surgery, procedure or cardioversion:  No    Anticoagulation Episode Summary     Current INR goal:   2.5-3.5   TTR:   32.2 %   Next INR check:   10/17/2019   INR from last check:   2.60 (10/10/2019)   Weekly max warfarin dose:      Target end date:   Indefinite   INR check location:      Preferred lab:      Send INR reminders to:   [facility]    Indications    Paroxysmal atrial fibrillation (H) [I48.0]  S/P MVR (mitral valve replacement) [Z95.2]  S/P mitral valve replacement with metallic valve [Z95.4]           Comments:   INR TARGET GOAL 2.5 - 3.5         Anticoagulation Care Providers     Provider Role Specialty Phone number    Jin Bhat MD Referring Internal Medicine [phone]

## 2021-06-02 NOTE — TELEPHONE ENCOUNTER
Anticoagulation    Bandar called yesterday after procedure to notify anticoagulation clinic he had polyps removed as requested in hold plan in order to review Lovenox start time.  Warfarin started as planned 10/3.    Spoke to [facility] and confirmed Bandar had 3 polyps removed from 2 different locations. Polyps were  5 mm, 3-4 mm and 4-5 mm. At least some were removed by hot snare.    Polypectomy moved colonoscopy to high bleeding risk procedure. Recommended to delay restart of therapeutic Lovenox until 48-72 hours post procedure.  ____    Suggest resuming Lovenox at 48 hours post procedure given no polyps >= 1 cm and Bandar at high thrombotic risk with Afib and MVR.    Shilpi Tamayo, PharmD            - - - - - -

## 2021-06-02 NOTE — TELEPHONE ENCOUNTER
ANTICOAGULATION  MANAGEMENT    Assessment     Today's INR result of 2.40 is Subtherapeutic (goal INR of 2.5-3.5)        Warfarin recently held as instructed which may be affecting INR    No new diet changes affecting INR    No new medication/supplements affecting INR    - per Shilpi Tamayo, PharmD - OK to stop Lovenox injection today.    Continues to tolerate warfarin with YES reported s/s of bleeding.   - epistaxis event 2x on 10/6-7.    Previous INR was Therapeutic at 2.50 on  9/26/19    S/p Colonoscopy on 10/2/19 with polypectomy 2x (cecum and transverse colon) - negative.      Plan:     Spoke with Bandar regarding INR result and instructed:     Warfarin Dosing Instructions:  (evenings. has 3mg tabs)   - tonight, advised one time booster with 7.5mg warfarin dose,   - then continue current warfarin dose 3 mg daily on Sn/Tues/Fri; and 6 mg daily rest of week.    Instructed patient to follow up no later than:  Advised to check INR this Thurs. 10/10/19 with INR monitor thru [employer].    Education provided: importance of consistent vitamin K intake, target INR goal and significance of current INR result, importance of notifying clinic for changes in medications and monitoring for bleeding signs and symptoms    Bandar verbalizes understanding and agrees to warfarin dosing plan.    Instructed to call the [facility] for any changes, questions or concerns. (#[phone])   ?   Meg Jacinto RN    Subjective/Objective:      Bandar Wells, a 69 y.o. male is on warfarin.     Bandar reports:     Home warfarin dose: verbally confirmed home dose with Bandar and updated on anticoagulation calendar     Missed doses: No.  Previously held warfarin for 4 days, on 9/28-10/1.     Medication changes:  Yes.  OK to stop Lovenox injections.     S/S of bleeding or thromboembolism:  Yes:  Reported epistaxis on 10/6-7.     New Injury or illness:  No.  S/p Colonoscopy on 10/2/19 with polypectomy 2x (cecum and transverse colon) -  negative     Changes in diet or alcohol consumption:  No     Upcoming surgery, procedure or cardioversion:  No    Anticoagulation Episode Summary     Current INR goal:   2.5-3.5   TTR:   32.6 %   Next INR check:   10/10/2019   INR from last check:   2.40! (10/7/2019)   Weekly max warfarin dose:      Target end date:   Indefinite   INR check location:      Preferred lab:      Send INR reminders to:   [facility]    Indications    Paroxysmal atrial fibrillation (H) [I48.0]  S/P MVR (mitral valve replacement) [Z95.2]  S/P mitral valve replacement with metallic valve [Z95.4]           Comments:   INR TARGET GOAL 2.5 - 3.5         Anticoagulation Care Providers     Provider Role Specialty Phone number    Jin Bhat MD Referring Internal Medicine [phone]

## 2021-06-02 NOTE — TELEPHONE ENCOUNTER
Received a faxed INR result for Bandar Wells  From [facility]  INR result dated 10/23/2019 is 1.90        Last INR on 10/10/19 was 2.60

## 2021-06-02 NOTE — PROGRESS NOTES
Discharge Note    Diagnosis: REDO MVR. Bacterial endocarditits    Functional Outcomes:  Patient has participated in Phase III Cardiac Rehab from 5/29/2018 to 1/24/2019.  Patient has progressed/maintained a 3-5 met level for a duration of 55 minutes.  Target heart Rate 20-30 > than resting HR.  RPE 11-14.  Resting HR Range 50-70  BP Range 114//70  Exercise HR Range 60-80 BP Range 106//74     Asymptomatic/ Symptomatic: asymptomatic    Comments/Concerns: none      Patient verbalized an understanding of:  Cardiac signs/symptoms and emergency plan, Home Exercise program and Basic exercise principles    Patient has chosen to discontinue Phase III Cardiac Rehabilitation.

## 2021-06-02 NOTE — TELEPHONE ENCOUNTER
ANTICOAGULATION  MANAGEMENT    Assessment     Today's INR result of 1.90 is Subtherapeutic (goal INR of 2.5-3.5)        Missed dose(s) may be affecting INR    No new diet changes affecting INR    No interaction expected between Furosemide and Metolazone and warfarin, but could affect INR with frequent fluctuations with his WT.    Continues to tolerate warfarin with no reported s/s of bleeding or thromboembolism     Previous INR was Therapeutic at 2.60 on 10/10/19.    Has f/u appt with Cardiologist on 10/28/19.    Plan:     Spoke with Bandar regarding INR result and instructed:     Warfarin Dosing Instructions:  (evenings.  Has 3mg tabs)   - tonight, advised one time booster with 9mg warfarin dose,    - then continue current warfarin dose 3 mg daily on Sun/Tues/Fri; and 6 mg daily rest of week.    Instructed patient to follow up no later than:  advised to check in one wk on 10/30/19.   - checks INR's every 2 wks with home monitor thru [employer].    Education provided: importance of consistent vitamin K intake, target INR goal and significance of current INR result, no interaction anticipated between warfarin and Furosemide / Metolazone and importance of notifying clinic for changes in medications    Bandar verbalizes understanding and agrees to warfarin dosing plan.    Instructed to call the [facility] for any changes, questions or concerns. (#[phone])   ?   Meg Jacinto RN    Subjective/Objective:      Bandar Wells, a 69 y.o. male is on warfarin.     Bandar reports:     Home warfarin dose: verbally confirmed home dose with Bandar and updated on anticoagulation calendar     Missed doses: Yes: reported missing warfarin dose 5 days ago.     Medication changes:  Yes: on 9/24/19, per Cardiologist currently was instructed to increased his Lasix 80mg daily and added Metolazone 10mg daily.   - was advised to increase Lasix to 40mg two times a day for 3 days, if WT goes over 190 lbs.  Then back down to 40mg daily.     S/S  of bleeding or thromboembolism:  No     New Injury or illness:  Yes:  Frequent fluctuations with his WT due to Heart Failure.  He does watch his sodium intake.     Changes in diet or alcohol consumption:  No     Upcoming surgery, procedure or cardioversion:  No    Anticoagulation Episode Summary     Current INR goal:   2.5-3.5   TTR:   30.4 %   Next INR check:   10/30/2019   INR from last check:   1.90! (10/23/2019)   Weekly max warfarin dose:      Target end date:   Indefinite   INR check location:      Preferred lab:      Send INR reminders to:   [facility]    Indications    Paroxysmal atrial fibrillation (H) [I48.0]  S/P MVR (mitral valve replacement) [Z95.2]  S/P mitral valve replacement with metallic valve [Z95.4]           Comments:   INR TARGET GOAL 2.5 - 3.5         Anticoagulation Care Providers     Provider Role Specialty Phone number    Jin Bhat MD Referring Internal Medicine [phone]

## 2021-06-02 NOTE — TELEPHONE ENCOUNTER
INR result is   2.4    Will the patient be seen, or did they already see, MD or CNP today? No    Most Recent Warfarin dose day/week    Last day of lovenox.    Sunday Monday Tuesday Wednesday Thursday Friday Saturday      9 6 3 6     Sunday Monday Tuesday Wednesday Thursday Friday Saturday   3             Has the patient missed any doses of Coumadin, Warfarin, Jantoven in the past 7 days? No    Has the patients medications changed since the last visit? No    Has the patient experienced any bleeding recently? Yes, slight nose bleed, 10/6 and 10/7    Has the patient experienced any injuries or illness recently? No    Has the patient experienced any 'new' shortness of breath, severe headaches, or changes in vision recently? No    Has the patient had any changes in their diet, or alcohol consumption? Yes, went on a diet to prepare for the colonoscopy.    Is the patient here today to prepare for any type of upcoming surgery, procedure, or for a cardioversion procedure? No    What phone number can we reach the patient at today? [phone]

## 2021-06-02 NOTE — TELEPHONE ENCOUNTER
Incoming fax from [facility] home monitoring     INR date: 10/10    See attached document   Patient tolerated exercise with no complaints.

## 2021-06-02 NOTE — TELEPHONE ENCOUNTER
Who is calling:  Patient  Reason for Call:  Returning phone call from Shilpi, it is noted in message thread for patient to call [phone] with questions, however,  writer unable to relay dosing instructions and below phone number for Shilpi is not working.   Date of last appointment with primary care: na  Okay to leave a detailed message: Yes

## 2021-06-02 NOTE — TELEPHONE ENCOUNTER
Incoming fax from [facility] home monitoring     INR date: 10/31    See attached document

## 2021-06-02 NOTE — TELEPHONE ENCOUNTER
ANTICOAGULATION  MANAGEMENT    Assessment     Today's INR result of 1.90 is Subtherapeutic (goal INR of 2.5-3.5)        Warfarin taken as previously instructed    WT Loss of 7 lbs may be affecting INR    No new medication/supplements affecting INR    - on 10/23/19 increased Lasix and new RX Metolazone.    Continues to tolerate warfarin with no reported s/s of bleeding or thromboembolism     Previous INR was Subtherapeutic at 1.90 on 10/23/19.    Plan:     Spoke with Bandar regarding INR result and instructed:    1.  Rationale for one time booster dose tonight.  INR was low and tomorrow, he is due to take lower warfarin dose with 3mg.    Warfarin Dosing Instructions:   (evenings.  has 3mg tabs)   - today, advised one time booster with 9mg warfarin dose,   - then change warfarin dose to 3 mg daily on Tues/Fri; and 6 mg daily rest of week  (9.1 % change)    Instructed patient to follow up no later than:  1-2 wks.   - checks INR's every 2 wks with home monitor thru [employer].    Education provided: importance of consistent vitamin K intake, target INR goal and significance of current INR result and importance of notifying clinic for changes in medications    Bandar verbalizes understanding and agrees to warfarin dosing plan.    Instructed to call the [facility] for any changes, questions or concerns. (#[phone])   ?   Meg Jacinto RN    Subjective/Objective:      Bandar Wells, a 69 y.o. male is on warfarin.     Bandar reports:     Home warfarin dose: verbally confirmed home dose with Bandar and updated on anticoagulation calendar     Missed doses: No     Medication changes:  Yes: recently increased his Lasix 80mg daily and added Metolazone 10mg daily.     S/S of bleeding or thromboembolism:  No     New Injury or illness:  Yes: has lost 7 lbs.  (was 198 lbs.  Now at 191 lbs).     - Frequent fluctuations with his WT due to Heart Failure.     Changes in diet or alcohol consumption:  No     Upcoming surgery, procedure or  cardioversion:  No    Anticoagulation Episode Summary     Current INR goal:   2.5-3.5   TTR:   29.7 %   Next INR check:   11/13/2019   INR from last check:   1.90! (10/31/2019)   Weekly max warfarin dose:      Target end date:   Indefinite   INR check location:      Preferred lab:      Send INR reminders to:   [facility]    Indications    Paroxysmal atrial fibrillation (H) [I48.0]  S/P MVR (mitral valve replacement) [Z95.2]  S/P mitral valve replacement with metallic valve [Z95.4]           Comments:   INR TARGET GOAL 2.5 - 3.5         Anticoagulation Care Providers     Provider Role Specialty Phone number    Jin Bhat MD Referring Internal Medicine [phone]

## 2021-06-03 ENCOUNTER — OFFICE VISIT - HEALTHEAST (OUTPATIENT)
Dept: INFECTIOUS DISEASES | Facility: CLINIC | Age: 71
End: 2021-06-03

## 2021-06-03 ENCOUNTER — COMMUNICATION - HEALTHEAST (OUTPATIENT)
Dept: INFECTIOUS DISEASES | Facility: CLINIC | Age: 71
End: 2021-06-03

## 2021-06-03 VITALS
SYSTOLIC BLOOD PRESSURE: 136 MMHG | OXYGEN SATURATION: 94 % | WEIGHT: 191 LBS | BODY MASS INDEX: 27.8 KG/M2 | HEART RATE: 66 BPM | DIASTOLIC BLOOD PRESSURE: 70 MMHG

## 2021-06-03 DIAGNOSIS — Z86.79 HX OF BACTERIAL ENDOCARDITIS: ICD-10-CM

## 2021-06-03 NOTE — TELEPHONE ENCOUNTER
ANTICOAGULATION  MANAGEMENT    Assessment     Today's INR result of 2.70 is Therapeutic (goal INR of 2.5-3.5)        Warfarin taken as previously instructed    No new diet changes affecting INR    No new medication/supplements affecting INR    Continues to tolerate warfarin with no reported s/s of bleeding or thromboembolism     Previous INR was Subtherapeutic at 1.90 on 10/31/19.    Plan:     Spoke with Bandar regarding INR result and instructed:     Warfarin Dosing Instructions:  (evenings.  has 3mg tabs)   - Continue current warfarin dose 3 mg daily on Tues/Fri; and 6 mg daily rest of week.    Instructed patient to follow up no later than:  2 wks.   - checks INR's with home monitor thru [employer].    Education provided: importance of consistent vitamin K intake, target INR goal and significance of current INR result and importance of notifying clinic for changes in medications    Bandar verbalizes understanding and agrees to warfarin dosing plan.    Instructed to call the [facility] for any changes, questions or concerns. (#[phone])   ?   Meg Jacinto RN    Subjective/Objective:      Bandar Wells, a 69 y.o. male is on warfarin.     Bandar reports:     Home warfarin dose: verbally confirmed home dose with Bandar and updated on anticoagulation calendar     Missed doses: No     Medication changes:  No vaccinated with high dose Flu shot on 11/6/19.     S/S of bleeding or thromboembolism:  No     New Injury or illness:  No     Changes in diet or alcohol consumption:  No     Upcoming surgery, procedure or cardioversion:  No    Anticoagulation Episode Summary     Current INR goal:   2.5-3.5   TTR:   29.4 %   Next INR check:   11/27/2019   INR from last check:   2.70 (11/13/2019)   Weekly max warfarin dose:      Target end date:   Indefinite   INR check location:      Preferred lab:      Send INR reminders to:   [facility]    Indications    Paroxysmal atrial fibrillation (H) [I48.0]  S/P MVR (mitral  valve replacement) [Z95.2]  S/P mitral valve replacement with metallic valve [Z95.4]           Comments:   INR TARGET GOAL 2.5 - 3.5         Anticoagulation Care Providers     Provider Role Specialty Phone number    Jin Bhat MD Referring Internal Medicine [phone]

## 2021-06-03 NOTE — TELEPHONE ENCOUNTER
Incoming fax from [employer] [facility] home monitoring     INR date: 12/2    See attached document

## 2021-06-03 NOTE — TELEPHONE ENCOUNTER
ANTICOAGULATION  MANAGEMENT-Patient Home Monitoring Result    Assessment     Therapeutic INR result of 3.40 (goal INR of 2.5-3.5) received via fax from [employer] home INR monitoring company.        Previous INR was Therapeutic at 2.70 on 11/13/19.    Bandar Wells last contacted by phone:  11/13/19.    Plan     Per home monitoring agreement with patient, patient was NOT contacted regarding therapeutic result today.  Patient is to continue current dose and continue to check INR with home monitor per protocol.  ?   Meg Jacinto RN    Subjective/Objective:      Bandar Wells, a 69 y.o. male  is established on warfarin using a home INR monitor.    Anticoagulation Episode Summary     Current INR goal:   2.5-3.5   TTR:   32.5 % (1 y)   Next INR check:   12/16/2019   INR from last check:   3.40 (12/2/2019)   Weekly max warfarin dose:      Target end date:   Indefinite   INR check location:      Preferred lab:      Send INR reminders to:   [facility]    Indications    Paroxysmal atrial fibrillation (H) [I48.0]  S/P MVR (mitral valve replacement) [Z95.2]  S/P mitral valve replacement with metallic valve [Z95.4]           Comments:   INR TARGET GOAL 2.5 - 3.5         Anticoagulation Care Providers     Provider Role Specialty Phone number    Jin Bhat MD Referring Internal Medicine [phone]

## 2021-06-03 NOTE — TELEPHONE ENCOUNTER
Received a faxed INR result for Bandar Wells  From [facility]  INR result dated 11/13/2019 is 2.70        Last INR from 10/31/19 was 1.90

## 2021-06-03 NOTE — TELEPHONE ENCOUNTER
Refill Approved    Rx renewed per Medication Renewal Policy. Medication was last renewed on 12/5/18.    Batool Ramirez, [facility] Triage/Med Refill 11/29/2019     Requested Prescriptions   Pending Prescriptions Disp Refills     atorvastatin (LIPITOR) 40 MG tablet [Pharmacy Med Name: ATORVASTATIN 40 MG TAB** 40 TAB] 90 tablet 3     Sig: TAKE 1 TABLET (40 MG TOTAL) BY MOUTH AT BEDTIME.       Statins Refill Protocol (Hmg CoA Reductase Inhibitors) Passed - 11/29/2019 12:14 PM        Passed - PCP or prescribing provider visit in past 12 months      Last office visit with prescriber/PCP: 6/10/2019 Jin Bhat MD OR same dept: 6/10/2019 Jin Bhat MD OR same specialty: 6/10/2019 Jin Bhat MD  Last physical: 1/16/2019 Last MTM visit: Visit date not found   Next visit within 3 mo: Visit date not found  Next physical within 3 mo: Visit date not found  Prescriber OR PCP: Jin Bhat MD  Last diagnosis associated with med order: 1. HLD (hyperlipidemia)  - atorvastatin (LIPITOR) 40 MG tablet [Pharmacy Med Name: ATORVASTATIN 40 MG TAB** 40 TAB]; TAKE 1 TABLET (40 MG TOTAL) BY MOUTH AT BEDTIME.  Dispense: 90 tablet; Refill: 3    If protocol passes may refill for 12 months if within 3 months of last provider visit (or a total of 15 months).

## 2021-06-04 VITALS
WEIGHT: 166 LBS | TEMPERATURE: 98.3 F | HEIGHT: 63 IN | HEART RATE: 69 BPM | DIASTOLIC BLOOD PRESSURE: 65 MMHG | OXYGEN SATURATION: 94 % | BODY MASS INDEX: 29.41 KG/M2 | SYSTOLIC BLOOD PRESSURE: 140 MMHG

## 2021-06-04 VITALS
SYSTOLIC BLOOD PRESSURE: 128 MMHG | HEART RATE: 63 BPM | BODY MASS INDEX: 27.8 KG/M2 | OXYGEN SATURATION: 97 % | WEIGHT: 191 LBS | DIASTOLIC BLOOD PRESSURE: 60 MMHG

## 2021-06-04 VITALS
OXYGEN SATURATION: 96 % | DIASTOLIC BLOOD PRESSURE: 57 MMHG | TEMPERATURE: 98.1 F | SYSTOLIC BLOOD PRESSURE: 105 MMHG | WEIGHT: 160.3 LBS | HEART RATE: 64 BPM | BODY MASS INDEX: 28.4 KG/M2

## 2021-06-04 VITALS
BODY MASS INDEX: 23.19 KG/M2 | WEIGHT: 162 LBS | OXYGEN SATURATION: 96 % | DIASTOLIC BLOOD PRESSURE: 56 MMHG | HEIGHT: 70 IN | SYSTOLIC BLOOD PRESSURE: 120 MMHG | HEART RATE: 73 BPM

## 2021-06-04 VITALS
BODY MASS INDEX: 22.76 KG/M2 | HEIGHT: 70 IN | OXYGEN SATURATION: 95 % | WEIGHT: 159 LBS | SYSTOLIC BLOOD PRESSURE: 138 MMHG | DIASTOLIC BLOOD PRESSURE: 64 MMHG | HEART RATE: 68 BPM

## 2021-06-04 VITALS
BODY MASS INDEX: 25.34 KG/M2 | SYSTOLIC BLOOD PRESSURE: 138 MMHG | OXYGEN SATURATION: 96 % | WEIGHT: 177 LBS | HEART RATE: 67 BPM | DIASTOLIC BLOOD PRESSURE: 70 MMHG | HEIGHT: 70 IN

## 2021-06-04 VITALS
OXYGEN SATURATION: 97 % | WEIGHT: 161 LBS | BODY MASS INDEX: 23.1 KG/M2 | HEART RATE: 66 BPM | SYSTOLIC BLOOD PRESSURE: 121 MMHG | TEMPERATURE: 98.8 F | DIASTOLIC BLOOD PRESSURE: 57 MMHG

## 2021-06-04 VITALS — WEIGHT: 159 LBS | HEIGHT: 70 IN | BODY MASS INDEX: 22.76 KG/M2

## 2021-06-04 NOTE — TELEPHONE ENCOUNTER
Controlled Substance Refill Request  Medication:   Requested Prescriptions     Pending Prescriptions Disp Refills     traMADol (ULTRAM) 50 mg tablet 20 tablet 0     Sig: Take 1 tablet (50 mg total) by mouth every 6 (six) hours as needed for pain.     Date Last Fill: 12.23.19  Pharmacy: [facility]   Submit electronically to pharmacy  Controlled Substance Agreement on File:   Encounter-Level CSA Scan Date:    There are no encounter-level csa scan date.       Last office visit: Last office visit pertaining to requested medication was 12.11.19.

## 2021-06-04 NOTE — TELEPHONE ENCOUNTER
Received a faxed INR result for Bandar Wells  From [facility]  INR result dated 12/17/2019 is 4.00      Last INR from 12/2/19 was 3.40

## 2021-06-04 NOTE — TELEPHONE ENCOUNTER
Left message to call back for: Bandar  Information to relay to patient:   See note    Germaine Melissa CMA ([facility])

## 2021-06-04 NOTE — PROGRESS NOTES
[facility] Follow Up Note    Bandar Wells   69 y.o. male    Date of Visit: 12/11/2019    Chief Complaint   Patient presents with     Arm Pain     left, thinks its coming from the shoulder     Subjective  This is a 69-year-old man who comes in primarily because of persistent left shoulder pain.  This started about 3 months ago.  He denies any unusual activity or trauma.  The pain was fairly severe low he has fairly decent range of motion in that left shoulder.  He tried massage therapy which did not work.  He then visited a chiropractor and has had about 15 treatments over the past 2 months.  There is been no significant improvement.  He describes episodes 4-5 times a day where he will get sudden pain in that shoulder that is not related to anything in particular.  The symptoms may last up to 15 minutes at a time.  It will awaken him at night at times.  He is wondering if any additional evaluation should be done such as an MRI scan.    He also has multiple cardiac issues including coronary artery disease, chronic congestive heart failure and paroxysmal atrial fibrillation.  He is now seeing a new cardiologist for this and there have been some medication adjustments.  He does feel like he is making progress in this area.    ROS A comprehensive review of systems was performed and was otherwise negative    Medications, allergies, and problem list were reviewed and updated    Exam  General Appearance:   On examination his blood pressure is 136/70.  Weight is 191 pounds.  BMI is 27.80.    Heart rhythm is stable with a rate in the 60s.    Lungs are clear.    Edema is stable.    No specific tenderness over the left shoulder.  Range of motion is reasonably good.    The patient is alert and oriented x3.      Assessment/Plan  1. Left shoulder pain, unspecified chronicity  MR Shoulder Without Contrast Left   2. Paroxysmal atrial fibrillation (H)     3. Coronary artery disease involving native coronary  artery of native heart without angina pectoris     4. Chronic congestive heart failure, unspecified heart failure type (H)       Left shoulder pain.  Given the 3-month history and persistence as well as the lack of response to treatment I agree we should go ahead with an MRI scan as our next step.  We will set that up and follow-up with him regarding the results.    Congestive heart failure, paroxysmal atrial fibrillation and coronary artery disease.  He will continue to follow-up with his new cardiologist.  The following high BMI interventions were performed this visit: weight monitoring    Jin Bhat MD      Current Outpatient Medications on File Prior to Visit   Medication Sig     acetaminophen (TYLENOL) 500 MG tablet Take 500-1,000 mg by mouth every 6 (six) hours as needed for pain. Pain 1-5  Give 500 mg  Or pain 6-10 give 1000 mg every 6 hours ad needed not to exceed 4 grams in 24 hours     atorvastatin (LIPITOR) 40 MG tablet TAKE 1 TABLET (40 MG TOTAL) BY MOUTH AT BEDTIME.     b complex vitamins tablet Take 1 tablet by mouth daily.      cholecalciferol, vitamin D3, 1,000 unit tablet Take 1,000 Units by mouth daily.     cyanocobalamin 1000 MCG tablet Take 1,000 mcg by mouth daily.     ferrous sulfate 325 (65 FE) MG tablet Take 1 tablet (325 mg total) by mouth 2 (two) times a day. (Patient taking differently: Take 1 tablet by mouth daily with breakfast.       )     FLAXSEED OIL ORAL Take 1,000 mg by mouth daily.      fluticasone (FLONASE) 50 mcg/actuation nasal spray 1 spray into each nostril daily.     FOLIC ACID ORAL Take 1 tablet by mouth daily.     furosemide (LASIX) 40 MG tablet Take 1 tablet (40 mg total) by mouth daily.     L. ACIDOPHILUS/BIFIDO LONGUM (PROBIOTIC PEARLS ORAL) Take 1 capsule by mouth daily.     multivitamin therapeutic (THERAGRAN) tablet Take 1 tablet by mouth daily.     penicillin VK (PEN VK) 500 MG tablet TAKE 1 TABLET BY MOUTH DAILY FOR 30 DAYS (Patient taking differently: TAKE 1  TABLET BY MOUTH DAILY      )     potassium chloride (K-DUR,KLOR-CON) 20 MEQ tablet Take 1 tablet (20 mEq total) by mouth daily.     warfarin (COUMADIN/JANTOVEN) 3 MG tablet Take ONE tablet (3mg) to TWO tablets (6mg) by mouth daily, as directed.  Adjust dose based on INR results.     clindamycin (CLEOCIN) 300 MG capsule Take 2 capsules (600 mg total) by mouth see administration instructions. Take prior to Dentist appointments     triamcinolone (KENALOG) 0.1 % cream APPLY TOPICALLY TWICE A DAY AS NEEDED     [DISCONTINUED] enoxaparin (LOVENOX) 100 mg/mL Syrg Inject 90mg every 12 hours as directed by INR clinic     No current facility-administered medications on file prior to visit.      No Known Allergies  Social History     Tobacco Use     Smoking status: Never Smoker     Smokeless tobacco: Never Used   Substance Use Topics     Alcohol use: Yes     Alcohol/week: 6.0 standard drinks     Types: 5 Glasses of wine, 1 Cans of beer per week     Frequency: 2-3 times a week     Drug use: No

## 2021-06-04 NOTE — TELEPHONE ENCOUNTER
Dr. Bhat,  Please review message and advise if a prescription should be sent for the patient.  Elyssa HERNANDEZ, ADOLPH/CMT....................3:39 PM

## 2021-06-04 NOTE — TELEPHONE ENCOUNTER
----- Message from Jin Bhat MD sent at 12/16/2019  3:20 PM CST -----  Please tell him there is inflammation and a small partial tear in the shoulder.  I would recommend that he see the orthopedic doctor to assess this and make a recommendation on treatment.

## 2021-06-04 NOTE — TELEPHONE ENCOUNTER
Patient Returning Call  Reason for call:  Results  Information relayed to patient:  The writer read the following to patient per Dr Perlita Bhat MD sent at 12/16/2019  3:20 PM CST -----  Please tell him there is inflammation and a small partial tear in the shoulder.  I would recommend that he see the orthopedic doctor to assess this and make a recommendation on   Patient has additional questions:  Yes  If YES, what are your questions/concerns:  Please refer him to orthopedic provider of Dr Bhat's choice.  Okay to leave a detailed message?: Yes

## 2021-06-04 NOTE — TELEPHONE ENCOUNTER
ANTICOAGULATION  MANAGEMENT    Assessment     Today's INR result of 4.00 is Supratherapeutic (goal INR of 2.5-3.5)        Warfarin taken as previously instructed    No new diet changes affecting INR    Potential interaction between scheduled ES-Tylenol 500mg and warfarin which may affect subsequent INRs    Continues to tolerate warfarin with no reported s/s of bleeding or thromboembolism     Previous INR was Therapeutic at 3.40 on 12/2/19.    Plan:     Spoke with Bandar regarding INR result and instructed:     Warfarin Dosing Instructions:  (evenings. has 3mg tabs)   - today, advised one time lower dose with 1.5mg warfarin,   - then change warfarin dose to 3 mg daily on Tues/Thurs/Sat; and 6 mg daily rest of week.    Instructed patient to follow up no later than:  2 wks.   - checks INR's every 2 wks with home monitor thru [employer].    Education provided: impact of vitamin K foods on INR, target INR goal and significance of current INR result, potential interaction between warfarin and taking more ES-Tylenol and importance of notifying clinic for changes in medications    Bandar verbalizes understanding and agrees to warfarin dosing plan.    Instructed to call the [facility] for any changes, questions or concerns. (#[phone])   ?   Meg Jacinto RN    Subjective/Objective:      Bandar Wells, a 69 y.o. male is on warfarin.     Bandar reports:     Home warfarin dose: verbally confirmed home dose with Bandar and updated on anticoagulation calendar     Missed doses: No     Medication changes:  Yes:  Reported a lot more of generic ES-Tylenol 500mg  2 tablets every 6-8 hours.  (not effective though in controlling his shoulder pain).     S/S of bleeding or thromboembolism:  No     New Injury or illness:  Yes:  Reported persistent left shoulder pain r/t to inflammation and small partial tear.  Awaiting call to get set up for orthopedic appt.     Changes in diet or alcohol consumption:  No     Upcoming surgery, procedure or  cardioversion:  Yes:  Possible left shoulder surgery.    Anticoagulation Episode Summary     Current INR goal:   2.5-3.5   TTR:   33.2 % (1 y)   Next INR check:   12/31/2019   INR from last check:   4.00! (12/17/2019)   Weekly max warfarin dose:      Target end date:   Indefinite   INR check location:      Preferred lab:      Send INR reminders to:   [facility]    Indications    Paroxysmal atrial fibrillation (H) [I48.0]  S/P MVR (mitral valve replacement) [Z95.2]  S/P mitral valve replacement with metallic valve [Z95.4]           Comments:   INR TARGET GOAL 2.5 - 3.5         Anticoagulation Care Providers     Provider Role Specialty Phone number    Jin Bhat MD Referring Internal Medicine [phone]

## 2021-06-04 NOTE — TELEPHONE ENCOUNTER
ANTICOAGULATION  MANAGEMENT PROGRAM    Bandar Wells is overdue for INR check.     Spoke with Bandar. Instructed to test INR with home meter and call results in to home monitoring company as soon as possible.       Ilene Porter RN

## 2021-06-04 NOTE — TELEPHONE ENCOUNTER
ANTICOAGULATION  MANAGEMENT    Assessment     Today's INR result of 3.7 is Supratherapeutic (goal INR of 2.5-3.5)        Warfarin taken as previously instructed    Decreased greens/vitamin K intake may be affecting INR    Interaction between tramadol and warfarin may be affecting INR . Started 2 weeks ago    Nosebleed in the last week. Was able to stop it fairly quickly . Has had this issue in the past    Previous INR was Supratherapeutic    Plan:     Spoke with Bandar regarding INR result and instructed:     Warfarin Dosing Instructions:  Continue current warfarin dose 3 mg daily on Tue/Thurs/Sat; and 6 mg daily rest of week  (0 % change)    Instructed patient to follow up no later than: two weeks    Education provided: impact of vitamin K foods on INR and vitamin K content of foods  Bandar states he will increase his greens back to normal  Bandar verbalizes understanding and agrees to warfarin dosing plan.    Instructed to call the [facility] for any changes, questions or concerns. (#[phone])   ?   Ilene Porter RN    Subjective/Objective:      Bandar Wells, a 69 y.o. male is on warfarin.     Bandar reports:     Home warfarin dose: confirmed correct dose taken with Bandar     Missed doses: No     Medication changes:  Yes: Tramadol started 2 weeks ago     S/S of bleeding or thromboembolism:  Yes: one nose bleed in the last week     New Injury or illness:  Yes: muscle tear in shoulder     Changes in diet or alcohol consumption:  Yes: less greens than normal     Upcoming surgery, procedure or cardioversion:  No    Anticoagulation Episode Summary     Current INR goal:   2.5-3.5   TTR:   28.5 % (1 y)   Next INR check:   1/17/2020   INR from last check:   3.70! (1/3/2020)   Weekly max warfarin dose:      Target end date:   Indefinite   INR check location:      Preferred lab:      Send INR reminders to:   [facility]    Indications    Paroxysmal atrial fibrillation (H) [I48.0]  S/P MVR (mitral valve  replacement) [Z95.2]  S/P mitral valve replacement with metallic valve [Z95.4]           Comments:   INR TARGET GOAL 2.5 - 3.5         Anticoagulation Care Providers     Provider Role Specialty Phone number    Jin Bhat MD Referring Internal Medicine [phone]

## 2021-06-04 NOTE — TELEPHONE ENCOUNTER
Prescription Monitoring Program activity reviewed with no discrepancies noted.      Last fill per : 12/23  Quantity/days supply: 20/5    Controlled Substance Agreement on file: No  Date:     Last office visit with provider:  12/11/2019 Jin Bhat MD    Please advise.  Germaine Melissa CMA ([facility])

## 2021-06-04 NOTE — TELEPHONE ENCOUNTER
Controlled Substance Refill Request  Medication:   Requested Prescriptions     Pending Prescriptions Disp Refills     traMADol (ULTRAM) 50 mg tablet 20 tablet 0     Sig: Take 1 tablet (50 mg total) by mouth every 6 (six) hours as needed for pain.     Date Last Fill: 12.23.19  Pharmacy: [facility] [facility]   Submit electronically to pharmacy  Controlled Substance Agreement on File:   Encounter-Level CSA Scan Date:    There are no encounter-level csa scan date.       Last office visit: Last office visit pertaining to requested medication was 12.11.19.

## 2021-06-05 VITALS
HEIGHT: 70 IN | DIASTOLIC BLOOD PRESSURE: 60 MMHG | HEART RATE: 79 BPM | BODY MASS INDEX: 24.2 KG/M2 | WEIGHT: 169 LBS | SYSTOLIC BLOOD PRESSURE: 122 MMHG | OXYGEN SATURATION: 97 %

## 2021-06-05 VITALS
HEIGHT: 70 IN | OXYGEN SATURATION: 96 % | BODY MASS INDEX: 24.48 KG/M2 | DIASTOLIC BLOOD PRESSURE: 58 MMHG | SYSTOLIC BLOOD PRESSURE: 130 MMHG | WEIGHT: 171 LBS | HEART RATE: 75 BPM

## 2021-06-05 NOTE — TELEPHONE ENCOUNTER
Controlled Substance Refill Request  Medication Name:   Requested Prescriptions     Pending Prescriptions Disp Refills     warfarin ANTICOAGULANT (COUMADIN/JANTOVEN) 3 MG tablet [Pharmacy Med Name: WARFARIN SODIUM 3 MG TAB ^^ 3 TAB] 160 tablet 1     Sig: TAKE ONE TO TWO TABLETS (3-6MG) BY MOUTH DAILY, AS DIRECTED. ADJUST DOSE BASED ON INR RESULTS.     traMADol (ULTRAM) 50 mg tablet [Pharmacy Med Name: TRAMADOL HCL 50MG TABLET** 50 TAB] 20 tablet 0     Sig: TAKE 1 TABLET (50 MG TOTAL) BY MOUTH EVERY 6 (SIX) HOURS AS NEEDED FOR PAIN.     Date Last Fill: 1/2/2020  Requested Pharmacy: [facility]  Submit electronically to pharmacy  Controlled Substance Agreement on file:   Encounter-Level CSA Scan Date:    There are no encounter-level csa scan date.        Last office visit:  12/11/19    Constanza Latham RN  Triage Nurse Advisor          RN cannot approve Refill Request    RN can NOT refill this medication Protocol failed and NO refill given. Last office visit: 12/11/2019 Jin Bhat MD Last Physical: 1/16/2019 Last MTM visit: Visit date not found Last visit same specialty: 12/11/2019 Jin Bhat MD.  Next visit within 3 mo: Visit date not found  Next physical within 3 mo: Visit date not found      Constanza Latham, [facility] Triage/Med Refill 1/14/2020    Requested Prescriptions   Pending Prescriptions Disp Refills     warfarin ANTICOAGULANT (COUMADIN/JANTOVEN) 3 MG tablet [Pharmacy Med Name: WARFARIN SODIUM 3 MG TAB ^^ 3 TAB] 160 tablet 1     Sig: TAKE ONE TO TWO TABLETS (3-6MG) BY MOUTH DAILY, AS DIRECTED. ADJUST DOSE BASED ON INR RESULTS.       Warfarin Refill Protocol  Failed - 1/14/2020  4:32 PM        Failed -  Route to appropriate pool/provider     Last Anticoagulation Summary:   Anticoagulation Episode Summary     Current INR goal:   2.5-3.5   TTR:   26.3 % (11.8 mo)   Next INR check:   1/17/2020   INR from last check:   3.70! (1/3/2020)   Weekly max warfarin dose:      Target end date:    Indefinite   INR check location:      Preferred lab:      Send INR reminders to:   [facility]    Indications    Paroxysmal atrial fibrillation (H) [I48.0]  S/P MVR (mitral valve replacement) [Z95.2]  S/P mitral valve replacement with metallic valve [Z95.4]           Comments:   INR TARGET GOAL 2.5 - 3.5         Anticoagulation Care Providers     Provider Role Specialty Phone number    Jin Bhat MD Referring Internal Medicine [phone]                Passed - Provider visit in last year     Last office visit with prescriber/PCP: 12/11/2019 Jin Bhat MD OR same dept: 12/11/2019 Jin Bhat MD OR same specialty: 12/11/2019 Jin Baht MD  Last physical: 1/16/2019 Last MTM visit: Visit date not found    Next appt within 3 mo: Visit date not found Next physical within 3 mo: Visit date not found  Prescriber OR PCP: Jin Bhat MD  Last diagnosis associated with med order: 1. S/P mitral valve replacement with metallic valve  - warfarin ANTICOAGULANT (COUMADIN/JANTOVEN) 3 MG tablet [Pharmacy Med Name: WARFARIN SODIUM 3 MG TAB ^^ 3 TAB]; TAKE ONE TO TWO TABLETS (3-6MG) BY MOUTH DAILY, AS DIRECTED. ADJUST DOSE BASED ON INR RESULTS.  Dispense: 160 tablet; Refill: 1    2. Paroxysmal atrial fibrillation (H)  - warfarin ANTICOAGULANT (COUMADIN/JANTOVEN) 3 MG tablet [Pharmacy Med Name: WARFARIN SODIUM 3 MG TAB ^^ 3 TAB]; TAKE ONE TO TWO TABLETS (3-6MG) BY MOUTH DAILY, AS DIRECTED. ADJUST DOSE BASED ON INR RESULTS.  Dispense: 160 tablet; Refill: 1    3. S/P MVR (mitral valve replacement)  - warfarin ANTICOAGULANT (COUMADIN/JANTOVEN) 3 MG tablet [Pharmacy Med Name: WARFARIN SODIUM 3 MG TAB ^^ 3 TAB]; TAKE ONE TO TWO TABLETS (3-6MG) BY MOUTH DAILY, AS DIRECTED. ADJUST DOSE BASED ON INR RESULTS.  Dispense: 160 tablet; Refill: 1    4. Acute shoulder pain, unspecified laterality  - traMADol (ULTRAM) 50 mg tablet [Pharmacy Med Name: TRAMADOL HCL 50MG TABLET** 50 TAB]; Take 1 tablet (50  mg total) by mouth every 6 (six) hours as needed for pain.  Dispense: 20 tablet; Refill: 0    If protocol passes may refill for 6 months if within 3 months of last provider visit (or a total of 9 months).          traMADol (ULTRAM) 50 mg tablet [Pharmacy Med Name: TRAMADOL HCL 50MG TABLET** 50 TAB] 20 tablet 0     Sig: TAKE 1 TABLET (50 MG TOTAL) BY MOUTH EVERY 6 (SIX) HOURS AS NEEDED FOR PAIN.       Controlled Substances Refill Protocol Failed - 1/14/2020  4:32 PM        Failed - Route all Controlled Substance Requests to Provider        Failed - Patient has controlled substance agreement in past 12 months     Encounter-Level CSA Scan Date:    There are no encounter-level csa scan date.               Passed - Visit with PCP or prescribing provider visit in past 12 months      Last office visit with prescriber/PCP: 12/11/2019 Jin Bhat MD OR same dept: 12/11/2019 Jin Bhat MD OR same specialty: 12/11/2019 Jin Bhat MD Last physical: 1/16/2019 Last MTM visit: Visit date not found    Next visit within 3 mo: Visit date not found  Next physical within 3 mo: Visit date not found  Prescriber OR PCP: Jin Bhat MD  Last diagnosis associated with med order: 1. S/P mitral valve replacement with metallic valve  - warfarin ANTICOAGULANT (COUMADIN/JANTOVEN) 3 MG tablet [Pharmacy Med Name: WARFARIN SODIUM 3 MG TAB ^^ 3 TAB]; TAKE ONE TO TWO TABLETS (3-6MG) BY MOUTH DAILY, AS DIRECTED. ADJUST DOSE BASED ON INR RESULTS.  Dispense: 160 tablet; Refill: 1    2. Paroxysmal atrial fibrillation (H)  - warfarin ANTICOAGULANT (COUMADIN/JANTOVEN) 3 MG tablet [Pharmacy Med Name: WARFARIN SODIUM 3 MG TAB ^^ 3 TAB]; TAKE ONE TO TWO TABLETS (3-6MG) BY MOUTH DAILY, AS DIRECTED. ADJUST DOSE BASED ON INR RESULTS.  Dispense: 160 tablet; Refill: 1    3. S/P MVR (mitral valve replacement)  - warfarin ANTICOAGULANT (COUMADIN/JANTOVEN) 3 MG tablet [Pharmacy Med Name: WARFARIN SODIUM 3 MG TAB ^^ 3 TAB]; TAKE ONE TO TWO  TABLETS (3-6MG) BY MOUTH DAILY, AS DIRECTED. ADJUST DOSE BASED ON INR RESULTS.  Dispense: 160 tablet; Refill: 1    4. Acute shoulder pain, unspecified laterality  - traMADol (ULTRAM) 50 mg tablet [Pharmacy Med Name: TRAMADOL HCL 50MG TABLET** 50 TAB]; Take 1 tablet (50 mg total) by mouth every 6 (six) hours as needed for pain.  Dispense: 20 tablet; Refill: 0

## 2021-06-05 NOTE — TELEPHONE ENCOUNTER
Dr. Bhat,  Please verify questions from the pharmacy below.  Elyssa HERNANDEZ CMA/BAHMAN....................5:00 PM

## 2021-06-05 NOTE — TELEPHONE ENCOUNTER
Date: 4/2/2020 Status: [facility]   Time: 2:00 PM Length: 30   Visit Type: OFFICE VISIT [[id number]] Copay: $0.00   Provider: Ronn Ordonez MD Department: INF INFECTIOUS DISEASE   Referring Provider: KATHRYN COFFMAN CSN: [id number]   Notes: annual f/u

## 2021-06-05 NOTE — TELEPHONE ENCOUNTER
ANTICOAGULATION  MANAGEMENT    Assessment     Today's INR result of 2.10 is Subtherapeutic (goal INR of 2.5-3.5)        Warfarin taken as previously instructed    No new diet changes affecting INR    No new medication/supplements affecting INR    Continues to tolerate warfarin with no reported s/s of bleeding or thromboembolism     Previous INR was Subtherapeutic at 1.40 on 1/17/2020.    Plan:     Spoke with Bandar regarding INR result and instructed:     Warfarin Dosing Instructions:    - Change warfarin dose to 3 mg daily on Wed / Sat; and 6 mg daily rest of week.   - (9.1 % change)    Instructed patient to follow up no later than: 1-2 wks   - Checks INR's with home monitor    Education provided: importance of consistent vitamin K intake, target INR goal and significance of current INR result and importance of notifying clinic of upcoming surgeries and procedures 2 weeks in advance    Bandar verbalizes understanding and agrees to warfarin dosing plan.    Instructed to call the [facility] for any changes, questions or concerns. (#[phone])   ?   Meg Jacinto RN    Subjective/Objective:      Bandar Wells, a 69 y.o. male is on warfarin.     Bandar reports:     Home warfarin dose: verbally confirmed home dose with Bandar and updated on anticoagulation calendar     Missed doses: No     Medication changes:  No     S/S of bleeding or thromboembolism:  No     New Injury or illness:  No     Changes in diet or alcohol consumption:  No     Upcoming surgery, procedure or cardioversion:  No    Anticoagulation Episode Summary     Current INR goal:   2.5-3.5   TTR:   24.9 % (1 y)   Next INR check:   2/10/2020   INR from last check:   2.10! (1/27/2020)   Weekly max warfarin dose:      Target end date:   Indefinite   INR check location:      Preferred lab:      Send INR reminders to:   [facility]    Indications    Paroxysmal atrial fibrillation (H) [I48.0]  S/P MVR (mitral valve replacement) [Z95.2]  S/P mitral  valve replacement with metallic valve [Z95.4]           Comments:   INR TARGET GOAL 2.5 - 3.5         Anticoagulation Care Providers     Provider Role Specialty Phone number    Jin Bhat MD Referring Internal Medicine [phone]

## 2021-06-05 NOTE — PROGRESS NOTES
Assessment and Plan:   Annual wellness visit.  All material and information related to the annual wellness visit was reviewed.  Significant new issues are identified.    Hyperlipidemia.  We will check lipids today.    Paroxysmal atrial fibrillation.  Stable at this time.  Continue follow-up with cardiology.    Ongoing left shoulder pain.  He will continue to follow-up with the orthopedic doctors.  He will try some lidocaine patches for the shoulder to see if that helps somewhat.        The patient's current medical problems were reviewed.    I have had an Advance Directives discussion with the patient.  The following health maintenance schedule was reviewed with the patient and provided in printed form in the after visit summary:   Health Maintenance   Topic Date Due     DEPRESSION ACTION PLAN  1950     HEART FAILURE ACTION PLAN  1950     ZOSTER VACCINES (2 of 3) 12/13/2016     CBC  05/29/2019     ALT  01/16/2020     LIPID  01/16/2020     MEDICARE ANNUAL WELLNESS VISIT  01/16/2020     BMP  05/06/2020     FALL RISK ASSESSMENT  12/11/2020     TD 18+ HE  09/07/2022     ADVANCE CARE PLANNING  01/16/2024     COLONOSCOPY  10/02/2029     TSH  Completed     HEPATITIS C SCREENING  Completed     PNEUMOCOCCAL IMMUNIZATION 65+ HIGH/HIGHEST RISK  Completed     INFLUENZA VACCINE RULE BASED  Completed        Subjective:   Chief Complaint: Bandar Wells is an 69 y.o. male here for an Annual Wellness visit.   HPI: This is a 69-year-old man who comes in for his annual wellness visit.  He does have a history of hyperlipidemia as well as paroxysmal atrial fibrillation coronary artery disease and prior mitral valve disease with replacement.  All of these issues have been fairly stable and he seems to be doing well.  His biggest issue at this point is some ongoing left shoulder pain that is been going on now for about 4 months conservative approaches did not work and he was referred to orthopedic doctors.  He has fairly  recently had an injection with not much luck at this point.  He is to follow-up with him in another week and a half or so if he does not start to make some improvement.  He gets minimal relief with tramadol.  He is wondering about trying some lidocaine patches.    Review of Systems:    Please see above.  The rest of the review of systems are negative for all systems.    Patient Care Team:  Jin Bhat MD as PCP - General (Internal Medicine)  Bull Dodson MD (Dermatology)  Andrew Interiano MD as Physician (Otolaryngology)  Burt Velasco MD as Physician (General Surgery)  Jin Bhat MD as Assigned PCP     Patient Active Problem List   Diagnosis     Hx of bacterial endocarditis     Dyslipidemia     Mitral valve prolapse     S/P MVR (mitral valve replacement)     Recurrent pleural effusion on right     Pericardial effusion without cardiac tamponade     Status post Maze operation for atrial fibrillation     Paroxysmal atrial fibrillation (H)     Coronary artery disease     Acute diastolic heart failure (H)     CHF (congestive heart failure) (H)     Hypovolemia     Hypotension     Acute respiratory failure with hypoxia (H)     Acute blood loss anemia     Acute renal failure (H)     Dysthymia     Prosthetic valve endocarditis, subsequent encounter     S/P mitral valve replacement with metallic valve     Persistent atrial fibrillation     Chronic combined systolic and diastolic congestive heart failure (H)     Acute on chronic combined systolic and diastolic congestive heart failure (H)     Lymphedema of both lower extremities     Past Medical History:   Diagnosis Date     Abnormal liver function test      Atrial fibrillation (H)     post naun     Atrial fibrillation with RVR (H) 8/29/2015    S/p MAZE Jul 2015     Bacterial endocarditis      CHF (congestive heart failure) (H)      Dyslipidemia      Hyperlipidemia      Mitral valve prolapse      Near syncope      Pericardial effusion without cardiac  tamponade 8/29/2015     S/P mitral valve replacement with metallic valve 03/16/2017     Status post Maze operation for atrial fibrillation 8/29/2015      Past Surgical History:   Procedure Laterality Date     CARDIAC SURGERY       CHG X-RAY PELVIS 3+ VW N/A 7/1/2015    Procedure: MITRAL VALVE REPLACEMENT, MAZE PROCEDURE, CARDIOLOGY TRANSESOPHAGEAL ECHOCARDIOGRAM;  Surgeon: Rm Munoz MD;  Location: [facility] OR;  Service:      LANG-MAZE MICROWAVE ABLATION       EYE SURGERY      Retial hole treatment     HERNIA REPAIR Right     inguinal     MITRAL VALVE REPLACEMENT N/A 3/16/2017    Procedure: REDO STERNOTOMY, REDO MITRAL VALVE REPLACEMENT, PLACEMENT TEMPORARY VENTRICULAR PACING WIRES, ANESTHESIA TRANSESOPHAGEAL ECHOCARDIOGRAM;  Surgeon: Raphael Rosenberg MD;  Location: [facility] OR;  Service:      MITRAL VALVE REPLACEMENT  2015    Bioprosthetic     PERICARDIAL WINDOW N/A 8/31/2015    Procedure: RIGHT PERICARDIAL WINDOW, TALC PLEURODESIS,VIDEO ASSISTED THOROSCOPY,DRAINAGE OF BILATERAL PLEURAL EFFUSIONS;  Surgeon: Rm Munoz MD;  Location: [facility] OR;  Service:      PICC  9/3/2015          PICC  2/14/2017           Family History   Problem Relation Age of Onset     Atrial fibrillation Mother      Heart failure Mother      Emphysema Father      Heart failure Father      Kidney failure Sister         S/P Renal transplant     Alcoholism Brother      No Medical Problems Son      No Medical Problems Brother       Social History     Socioeconomic History     Marital status: Single     Spouse name: Not on file     Number of children: Not on file     Years of education: Not on file     Highest education level: Not on file   Occupational History     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   Social Needs     Financial resource strain: Not on file     Food insecurity:     Worry: Not on file     Inability: Not on file     Transportation needs:     Medical:  Not on file     Non-medical: Not on file   Tobacco Use     Smoking status: Never Smoker     Smokeless tobacco: Never Used   Substance and Sexual Activity     Alcohol use: Yes     Alcohol/week: 6.0 standard drinks     Types: 5 Glasses of wine, 1 Cans of beer per week     Frequency: 2-3 times a week     Drug use: No     Sexual activity: Never   Lifestyle     Physical activity:     Days per week: Not on file     Minutes per session: Not on file     Stress: Not on file   Relationships     Social connections:     Talks on phone: Not on file     Gets together: Not on file     Attends Bahai service: Not on file     Active member of club or organization: Not on file     Attends meetings of clubs or organizations: Not on file     Relationship status: Not on file     Intimate partner violence:     Fear of current or ex partner: Not on file     Emotionally abused: Not on file     Physically abused: Not on file     Forced sexual activity: Not on file   Other Topics Concern     Not on file   Social History Narrative    Has a steady girlfriend and multiple friends .  His son is in [place] but can be present if warned.  Home 1 1/2 stories.    3/2017      Current Outpatient Medications   Medication Sig Dispense Refill     acetaminophen (TYLENOL) 500 MG tablet Take 500-1,000 mg by mouth every 6 (six) hours as needed for pain. Pain 1-5  Give 500 mg  Or pain 6-10 give 1000 mg every 6 hours ad needed not to exceed 4 grams in 24 hours       atorvastatin (LIPITOR) 40 MG tablet TAKE 1 TABLET (40 MG TOTAL) BY MOUTH AT BEDTIME. 90 tablet 2     b complex vitamins tablet Take 1 tablet by mouth daily.        cholecalciferol, vitamin D3, 1,000 unit tablet Take 1,000 Units by mouth daily.       clindamycin (CLEOCIN) 300 MG capsule Take 2 capsules (600 mg total) by mouth see administration instructions. Take prior to Dentist appointments 2 capsule 3     cyanocobalamin 1000 MCG tablet Take 1,000 mcg by mouth daily.       ferrous sulfate 325 (65  "FE) MG tablet Take 1 tablet (325 mg total) by mouth 2 (two) times a day. (Patient taking differently: Take 1 tablet by mouth daily with breakfast.       ) 60 tablet 4     FLAXSEED OIL ORAL Take 1,000 mg by mouth daily.        fluticasone (FLONASE) 50 mcg/actuation nasal spray 1 spray into each nostril daily.       FOLIC ACID ORAL Take 1 tablet by mouth daily.       furosemide (LASIX) 40 MG tablet Take 1 tablet (40 mg total) by mouth daily. 30 tablet 11     L. ACIDOPHILUS/BIFIDO LONGUM (PROBIOTIC PEARLS ORAL) Take 1 capsule by mouth daily.       multivitamin therapeutic (THERAGRAN) tablet Take 1 tablet by mouth daily.       penicillin VK (PEN VK) 500 MG tablet TAKE 1 TABLET BY MOUTH DAILY 90 tablet 0     potassium chloride (K-DUR,KLOR-CON) 20 MEQ tablet Take 1 tablet (20 mEq total) by mouth daily. 90 tablet 2     traMADol (ULTRAM) 50 mg tablet TAKE 1 TABLET (50 MG TOTAL) BY MOUTH EVERY 6 (SIX) HOURS AS NEEDED FOR PAIN. 20 tablet 0     triamcinolone (KENALOG) 0.1 % cream APPLY TOPICALLY TWICE A DAY AS NEEDED  2     warfarin ANTICOAGULANT (COUMADIN/JANTOVEN) 3 MG tablet TAKE ONE TO TWO TABLETS (3-6MG) BY MOUTH DAILY, AS DIRECTED. ADJUST DOSE BASED ON INR RESULTS. 160 tablet 1     No current facility-administered medications for this visit.       Objective:   Vital Signs:   Visit Vitals  /70 (Patient Site: Left Arm, Patient Position: Sitting, Cuff Size: Adult Large)   Pulse 67   Ht 5' 9.5\" (1.765 m)   Wt 177 lb (80.3 kg)   SpO2 96%   BMI 25.76 kg/m         VisionScreening:  No exam data present     PHYSICAL EXAM  On examination his vital signs are as noted.    Eyes: Pupils are equal    Ears nose and throat: External ears canals and tympanic membranes are normal.  Nose and throat are normal.    Neck: Supple with no masses and no neck vein distention.  No thyroid enlargement.    Lungs: Clear.    Cardiovascular: Heart rhythm is stable with rate of 68 and no ectopy today.  No gallops or murmurs.  Carotid pulses are " full.  No peripheral edema.    GI: Abdomen is soft and nontender with no distention.  No masses organomegaly.    Musculoskeletal: Head and neck are normal to inspection with good range of motion.  Movement in the right arm and lower extremities is normal.  He does have tenderness over the left shoulder.    Neurologic: Cranial nerves are intact.  Gait is normal.    Psychiatric: The patient is alert and oriented x3.  Mood and affect are appropriate.    Assessment Results 1/17/2020   Activities of Daily Living No help needed   Instrumental Activities of Daily Living No help needed   Get Up and Go Score Less than 12 seconds   Mini Cog Total Score 5   Some recent data might be hidden     A Mini-Cog score of 0-2 suggests the possibility of dementia, score of 3-5 suggests no dementia    Identified Health Risks:     He is at risk for lack of exercise and has been provided with information to increase physical activity for the benefit of his well-being.  The patient's PHQ-9 score is consistent with mild depression. He was provided with information regarding depression and was advised to schedule a follow up appointment in 26 weeks to further address this issue.  Patient's advanced directive was discussed and I am comfortable with the patient's wishes.

## 2021-06-05 NOTE — TELEPHONE ENCOUNTER
Pharmacy calling - says patient is out of Tramadol.    Constanza Latham, CLINT  Triage Nurse Advisor

## 2021-06-05 NOTE — TELEPHONE ENCOUNTER
Controlled Substance Refill Request  Medication Name:   Requested Prescriptions     Pending Prescriptions Disp Refills     traMADol (ULTRAM) 50 mg tablet 50 tablet 0     Sig: Take 1 tablet (50 mg total) by mouth every 6 (six) hours as needed for pain.     Date Last Fill: 1/20/20  [facility]ed [facility]  Submit electronically to pharmacy  Controlled Substance Agreement on file:   Encounter-Level CSA Scan Date:    There are no encounter-level csa scan date.        Last office visit:  1/17/20

## 2021-06-05 NOTE — TELEPHONE ENCOUNTER
Who is calling:  Martha  Reason for Call:  INR result of 1.4  Date of last appointment with primary care: 1/17/2020  Okay to leave a detailed message: Yes

## 2021-06-05 NOTE — TELEPHONE ENCOUNTER
Already documented INR.     - Bandar had INR done at [facility] during f/u visit with Dr. Bhat.

## 2021-06-05 NOTE — TELEPHONE ENCOUNTER
ANTICOAGULATION  MANAGEMENT    Assessment     Today's INR result of 1.40 is Subtherapeutic (goal INR of 2.5-3.5)        Warfarin taken as previously instructed    - reported not missing any warfarin doses.    No new diet changes affecting INR    No interaction expected between OTC Lidocaine patch and warfarin    Potential interaction between Depo-Medrol injection and warfarin which may affect subsequent INRs    Continues to tolerate warfarin with no reported s/s of bleeding or thromboembolism     Previous INR was Supratherapeutic at 3.70 on 1/3/2020    Plan:     Spoke with Bandar regarding INR result and instructed:     Warfarin Dosing Instructions:  (evenings. Has 3mg tabs)   - today, advised one time booster with 9mg warfarin dose,    -then continue current warfarin dose 3 mg daily on Tues/Thurs/Sat; and 6 mg daily rest of week.    Instructed patient to follow up no later than:  Advised to check INR on 1/23/2020 with home INR monitor.    Education provided: importance of consistent vitamin K intake, target INR goal and significance of current INR result, potential interaction between warfarin and Methylprednisone injection and importance of notifying clinic for changes in medications    Bandar verbalizes understanding and agrees to warfarin dosing plan.    Instructed to call the [facility] for any changes, questions or concerns. (#[phone])   ?   Meg Jacinto RN    Subjective/Objective:      Bandar Wells, a 69 y.o. male is on warfarin.     Bandar reports:     Home warfarin dose: as updated on anticoagulation calendar per template     Missed doses: No     Medication changes:  Yes:  On 1/9/20 post Depo-medrol injection of LEFT Shoulder.   - also has been taking Tramadol 50mg every 5 hrs, PRN.   - per [employer], Concurrent use of METHYLPREDNISOLONE and WARFARIN may result in increased risk of bleeding or diminished effects of warfarin.        S/S of bleeding or thromboembolism:  No     New Injury or illness:   Yes:  Left shoulder pain really bad.     Changes in diet or alcohol consumption:  No     Upcoming surgery, procedure or cardioversion:  No    Anticoagulation Episode Summary     Current INR goal:   2.5-3.5   TTR:   26.4 % (1 y)   Next INR check:   1/23/2020   INR from last check:   1.40! (1/17/2020)   Weekly max warfarin dose:      Target end date:   Indefinite   INR check location:      Preferred lab:      Send INR reminders to:   [facility]    Indications    Paroxysmal atrial fibrillation (H) [I48.0]  S/P MVR (mitral valve replacement) [Z95.2]  S/P mitral valve replacement with metallic valve [Z95.4]           Comments:   INR TARGET GOAL 2.5 - 3.5         Anticoagulation Care Providers     Provider Role Specialty Phone number    Jin Bhat MD Referring Internal Medicine [phone]

## 2021-06-05 NOTE — TELEPHONE ENCOUNTER
Dr. Bhat,    Patient is requesting sig change to state 50 mg every 3 hours. Pharmacy also needs to know if this is a chronic or acute pain medication.    Please advise.      Prescription Monitoring Program activity reviewed with no discrepancies noted.      Last fill per : 1/15/2020  Quantity/days supply: #20 for a 5 day supply    Controlled Substance Agreement on file: No  Date:     Last office visit with provider:  12/11/2019 Jin Bhat MD    Please advise.    Marisa HAYES LPN .......... 2:53 PM  01/20/20

## 2021-06-05 NOTE — TELEPHONE ENCOUNTER
Prescription Monitoring Program activity reviewed with no discrepancies noted.      Last fill per : 1/2/20  Quantity/days supply: 20 tablets     Controlled Substance Agreement on file: No  Date: NA    Last office visit with provider:  12/11/2019 Jin Bhat MD    Please advise.

## 2021-06-05 NOTE — TELEPHONE ENCOUNTER
Controlled Substance Refill Request  Medication Name:   Requested Prescriptions     Pending Prescriptions Disp Refills     traMADol (ULTRAM) 50 mg tablet 20 tablet 0     Sig: Take 1 tablet (50 mg total) by mouth every 6 (six) hours as needed for pain.     Date Last Fill: d/cd  traMADol (ULTRAM) 50 mg tablet (Discontinued) 20 tablet 0 1/2/2020 1/15/2020 No   Sig - Route: Take 1 tablet (50 mg total) by mouth every 6 (six) hours as needed for pain. - Oral   Sent to pharmacy as: traMADol 50 mg tablet (ULTRAM)   E-Prescribing Status: Receipt confirmed by pharmacy (1/2/2020 10:12 AM CST)   Requested Pharmacy: [facility]   Submit electronically to pharmacy  Controlled Substance Agreement on file:   Encounter-Level CSA Scan Date:    There are no encounter-level csa scan date.        Last office visit:  12/11/19

## 2021-06-05 NOTE — TELEPHONE ENCOUNTER
Medication Request  Medication name:   lidocaine (LIDODERM) 5 % 10 patch 0 1/22/2020  No   Sig: REMOVE & DISCARD PATCH WITHIN 12 HOURS OR AS DIRECTED BY MD **NEED SPECIFIC DIRECTIONS**     Requested Pharmacy: [facility]  Reason for request: please indicate the area or areas of the body to be treated.  How many patches is the patient using daily?  When did you use medication last?:  unclear  Patient offered appointment:  N/A - electronic request  Okay to leave a detailed message: yes

## 2021-06-05 NOTE — TELEPHONE ENCOUNTER
Prescription Monitoring Program activity reviewed with no discrepancies noted.      Last fill per : 1/20/20   Quantity/days supply: #50, 2 week supply    Controlled Substance Agreement on file: No  Date:     Last office visit with provider:  12/11/2019 Jin Bhat MD    Please advise.    Natividad Interiano LPN

## 2021-06-05 NOTE — TELEPHONE ENCOUNTER
"Anticoagulation Annual Referral Renewal Review    Bandar Wells's chart reviewed for annual renewal of referral to anticoagulation monitoring.        Criteria for anticoagulation nurse and/or pharmacist renewal met   Warfarin indication: Mechanical MVR Yes, per indication   Current with INR monitoring/compliant Yes Yes   Date of last office visit 12/11/2019 Yes, had office visit within last year   Time in Therapeutic Range (TTR) 54.23 % No, TTR < 60 %       Bandar Wells did NOT meet all criteria for anticoagulation management program initiated renewal and requires provider review. Using dot phrase, \".acmrenewalprovider\", please advise if Janeths anticoagulation management referral should be renewed or if patient should be seen in office to review anticoagulation therapy      Meg Jacinto RN  11:02 AM      "
Provider Review: Anticoagulation Annual Referral Renewal    [facility] Renewal Decision:  Renew [facility] warfarin management      INR Range:   Continue management at current INR goal   Anticipated Duration of Therapy (from today):  Long-term anticoagulation      Jin Bhat MD  4:10 PM  
Verbal/written post procedure instructions were given to patient/caregiver
Verbal/written post procedure instructions were given to patient/caregiver

## 2021-06-05 NOTE — TELEPHONE ENCOUNTER
Are you willing to prescribe this, or have patient come in to see you first? Thank you.    Jackelyn Vasquez, CMA

## 2021-06-05 NOTE — TELEPHONE ENCOUNTER
Controlled Substance Refill Request  Medication Name:   Requested Prescriptions     Pending Prescriptions Disp Refills     traMADol (ULTRAM) 50 mg tablet 20 tablet 0     Sig: Take 1 tablet (50 mg total) by mouth every 6 (six) hours as needed for pain.     Date Last Fill: 1/15/20  [facility]ed [facility]  Submit electronically to pharmacy  Controlled Substance Agreement on file:   Encounter-Level CSA Scan Date:    There are no encounter-level csa scan date.        Last office visit:  1/17/20

## 2021-06-06 NOTE — TELEPHONE ENCOUNTER
Anticoagulation    Spoke to Bandar and notified him creatinine still pending. Will call tomorrow with instructions and written plan via [employer].    Discussed injections in clinic is generally not available. Bandar has given them in the past, but would prefer not too.  Suggested a family member or neighbor for support, but he does not feel there is anyone

## 2021-06-06 NOTE — TELEPHONE ENCOUNTER
Who is calling:  Patient  Reason for Call:  The patient is planning a steroid injection in his neck. He is hoping to get this on 2/27 but if this date is too soon for 5 day holding and bridging then he could get it on 3/4. Please advise at the number provided today.     Okay to leave a detailed message: Yes

## 2021-06-06 NOTE — TELEPHONE ENCOUNTER
Controlled Substance Refill Request  Medication Name:   Requested Prescriptions     Pending Prescriptions Disp Refills     HYDROcodone-acetaminophen 5-325 mg per tablet 28 tablet 0     Sig: Take 1 tablet by mouth every 6 (six) hours as needed for pain.     Date Last Fill: 2/19/20  [facility]ed [facility]  Submit electronically to pharmacy  Controlled Substance Agreement on file:   Encounter-Level CSA Scan Date:    There are no encounter-level csa scan date.        Last office visit:  2/13/20

## 2021-06-06 NOTE — TELEPHONE ENCOUNTER
BELL-PROCEDURAL ANTICOAGULATION  MANAGEMENT    Assessment     Warfarin interruption plan for Epidural Steroid Injection on TBD.    Atrial Fibrillation and Mitral Valve Replacement      Risk stratification for thromboembolism: high. (2012 Chest guidelines)    MVR: 2017 AHA/ACC Management of Valvular Heart disease guidelines suggests bridging is reasonable on individual basis with risk of bleeding weighed against risks of clotting for mechanical MVR patients    Plan       Pre-Procedure:    Labs:    Creatinine check prior to Lovenox    INR 1-2 weeks prior to procedure      Okay to hold warfarin 5 days prior to procedure      Bridge with Lovenox; plan will be written based on pre-procedure INR/creatinine.   ?   Shilpi Tamayo, PharmD    Subjective/Objective:      Bandar Wells, a 69 y.o. male    Reason for Anticoagulation: Atrial Fibrillation and Mitral Valve Replacement    Goal INR Range: 2.5-3.5    Patient bridged in past: Yes, most recently 9/2019    Wt Readings from Last 3 Encounters:   02/13/20 191 lb (86.6 kg)   01/17/20 177 lb (80.3 kg)   12/11/19 191 lb (86.6 kg)      Ideal body weight: 71.8 kg (158 lb 6.4 oz)  Adjusted ideal body weight: 77.8 kg (171 lb 7 oz)     Lab Results   Component Value Date    INR 2.10 (!) 02/20/2020    INR 1.60 (!) 02/12/2020    INR 2.10 (!) 01/27/2020     Lab Results   Component Value Date    HGB 9.3 (L) 02/19/2019    HCT 28.3 (L) 05/29/2018     05/29/2018     Lab Results   Component Value Date    CREATININE 1.58 (H) 01/17/2020    CREATININE 1.70 (H) 11/06/2019    CREATININE 1.65 (H) 09/11/2019     Estimated CrCl:  46.846 ml/min using adjusted weight 77.8 kg, creatinine 1.58

## 2021-06-06 NOTE — TELEPHONE ENCOUNTER
Called and informed Bandar.     - he is still awaiting to be scheduled for CS steroid injection.     - advised to let us know, as soon as he finds out the date.       - we willl give instructions on 5 day warfarin hold and Lovenox bridging.

## 2021-06-06 NOTE — TELEPHONE ENCOUNTER
Prescription Monitoring Program activity reviewed with no discrepancies noted.      Last fill per : 3/4  Quantity/days supply: 28/7    Controlled Substance Agreement on file: No  Date:       Yes the refill is sitting in the My chart refill bucket    Last office visit with provider:  2/13/2020 Jin Bhat MD    Please advise.  Germaine Melissa CMA ([facility])

## 2021-06-06 NOTE — TELEPHONE ENCOUNTER
Called and spoke with Bandar Wells.     - has phobia about giving himself injections.     - is requesting if he can go to [facility] for nurse to give his injections     - scheduled for epidural steroidal injection on 3/4/2020.     - he still waiting for your instructions on Lovenox injection, if there is something that can be sent to him to follow instructions.

## 2021-06-06 NOTE — TELEPHONE ENCOUNTER
Received a faxed INR result for Bandar Wells  From [facility]  INR result dated 2/12/2020 is 1.60

## 2021-06-06 NOTE — TELEPHONE ENCOUNTER
"ANTICOAGULATION  MANAGEMENT    Assessment     Today's INR result of 2.10 is Subtherapeutic (goal INR of 2.5-3.5)        Warfarin taken as previously instructed    Increased greens/vitamin K intake may be affecting INR    - reported \" started eating a lot of spinach. \"    No new medication/supplements affecting INR    Continues to tolerate warfarin with no reported s/s of bleeding or thromboembolism     Previous INR was Subtherapeutic at 1.60 on 2/12/2020.    Will be scheduled for steroid injection of cervical spine with [facility].      Plan:     Spoke with Bandar regarding INR result and instructed:    1.  Last 4 INR's sub-therapeutic.  Will continue to adjust warfarin dose, based on INR.   2.  Advised to check INR's every wk for now, till maintenance dose is achieved.    Warfarin Dosing Instructions:  (evenings. Has 3mg tabs)   - Change warfarin dose to 9 mg daily on Mon/Thurs; and 6 mg daily rest of week.   - (14.3 % change)    Instructed patient to follow up no later than:  Recommended to check INR in one wk.   - checks INR's with home monitor thru [employer] every 2 wks.    Education provided: importance of consistent vitamin K intake, impact of vitamin K foods on INR, target INR goal and significance of current INR result and importance of notifying clinic of upcoming surgeries and procedures 2 weeks in advance    Bandar verbalizes understanding and agrees to warfarin dosing plan.    Instructed to call the [facility] for any changes, questions or concerns. (#[phone])   ?   Meg Jacinto RN    Subjective/Objective:      Bandar Wells, a 69 y.o. male is on warfarin.     Bandar reports:     Home warfarin dose: verbally confirmed home dose with Bandar  and updated on anticoagulation calendar     Missed doses: No     Medication changes:  Yes: Hydrocodone-Acetaminophen 5/325mg 1 tab q6hrs, PRN for back pain.     S/S of bleeding or thromboembolism:  No     New Injury or illness:  Yes:  Continues to complain of " shoulder / arm pains r/t cervical deg. Disc disease.     Changes in diet or alcohol consumption:  Yes.  Reported, just started eating spinach.     Upcoming surgery, procedure or cardioversion:  Yes: awaiting to hear when he will be scheduled for cervical spine steroid injection.  Will possibly need to hold warfarin with possible bridging with lovenox.  He will await for orders / instructions.    Anticoagulation Episode Summary     Current INR goal:   2.5-3.5   TTR:   24.9 % (1 y)   Next INR check:   2/27/2020   INR from last check:   2.10! (2/20/2020)   Weekly max warfarin dose:      Target end date:   Indefinite   INR check location:      Preferred lab:      Send INR reminders to:   [facility]    Indications    Paroxysmal atrial fibrillation (H) [I48.0]  S/P MVR (mitral valve replacement) [Z95.2]  S/P mitral valve replacement with metallic valve [Z95.4]           Comments:   INR TARGET GOAL 2.5 - 3.5         Anticoagulation Care Providers     Provider Role Specialty Phone number    Jin Bhat MD Referring Internal Medicine [phone]

## 2021-06-06 NOTE — TELEPHONE ENCOUNTER
"FYI - Status Update  Who is Calling: Patient  Update: Is scheduled for a procedure tomorrow morning, and paperwork tells him to follow up with Provider regarding anticoagulation. Requests to speak with ACN to discuss \"after procedure\"  Okay to leave a detailed message?:  Yes    "
Anticoagulation    10:07 am called placed to Dr. Valencia [facility] this morning to discuss options with Bandar's Lovenox difficulties with injection site bleeding to review with his appointment today. Discussed potential options to continue lovenox with Anti-xa and adjustment if high (suspicion he's accumulating with CKD) or transition to heparin.    12:41 PM: received return call from CLINT Mercado, with Dr. Valencia. Dr. Valencia recommends continuing Lovenox and seeing if dose reduction needed with anti-xa. Notes he may discontinue lovenox early at INR > 2 if continues to have difficulty tolerating due to injection site bleeding. Jess also notes they are worried about Bandar's fluid status and weight gain with heart failure. They are going to attempt to manage by adjusting dose of diuretics, but if he doesn't respond may need hospitalization. They have BMP planned for 3/10.    5:00:  Spoke to Bandar. He notes he took Lovenox last night and not warfarin (different than instructed). Reviewed plan for 9 mg of warfarin tonight. Take Lovenox tonight and tomorrow morning and check Lovenox Anti-Xa 4 hours post dose tomorrow. Lab appointment scheduled. Will do stat lab and call Bandar tomorrow with further instruction.    Shilpi Tamayo, PharmD    
Anticoagulation    Spoke to Bandar. Reviewed only INR done today.  INR 1.6 still a little high for 1 day prior to procedure; will continue to drop overnight. Instructed Bandar to discuss result with provider doing procedure tomorrow.    Reviewed post procedure plan to restart warfarin 3/4 PM and Lovenox 3/5 if okay with provider doing procedure. Bandar has Letter printed with dose instructions that we made on 2/26 visit.    Will follow up on creatinine results that are still pending today    Bandar reports generally tolerating lovenox shots okay. No significant bleeding/bruising concerns, however a little bleeding from last injection site right now and he will re-bandage.    Shilpi Tamayo, PharmD        
Called and spoke with Bandar.     - would like to talk with Shilpi.      He wanted to know if he can stop his Lovenox?  He's been bleeding at injection site.  
INR result is 1.3 at [facility] 3/4  INR   Date Value Ref Range Status   03/03/2020 1.60 (H) 0.90 - 1.10 Final       Will the patient be seen, or did they already see, MD or CNP today? Yes [facility]    Most Recent Warfarin dose day/week been holding for procedure  Sunday Monday Tuesday Wednesday Thursday Friday Saturday Sunday Monday Tuesday Wednesday Thursday Friday Saturday                Has the patient missed any doses of Coumadin, Warfarin, Jantoven in the past 7 days? No    Has the patients medications changed since the last visit? No    Has the patient experienced any bleeding recently? Yes Slight bleeding when injecting Lovenox    Has the patient experienced any injuries or illness recently? No    Has the patient experienced any 'new' shortness of breath, severe headaches, or changes in vision recently? No    Has the patient had any changes in their diet, or alcohol consumption? No    Is the patient here today to prepare for any type of upcoming surgery, procedure, or for a cardioversion procedure? No    What phone number can we reach the patient at today? [phone] Bandar GAGNON.  
Returned call to Bandar.  He was able to have injection today. Cleared to resume anticoagulation. Hesitant to resume Lovenox with seeping from injection site with latest injections; first 4 went well.  Notes his metabolic panel back.  Creatinine 1.86 --> 1.95.      Explained to Bandar he's high risk with MVR and afib. Encouraged him to take first dose of Lovenox tomorrow and we will discuss lovenox with anti=xa vs. Heparin with his cardiologist. He is seeing cardiology tomorrow too.    Cardiology: Dr. Valencia, [facility]  Aniko: [phone]    Shilpi Tamayo, PharmD    
none

## 2021-06-06 NOTE — TELEPHONE ENCOUNTER
ANTICOAGULATION  MANAGEMENT    Assessment     Today's INR result of 1.60 is Subtherapeutic (goal INR of 2.5-3.5)        Warfarin taken as previously instructed    No new diet changes affecting INR    No new medication/supplements affecting INR    Continues to tolerate warfarin with no reported s/s of bleeding or thromboembolism     Previous INR was Subtherapeutic at 2.10 on 1/27/2020.    Plan:     Spoke with Bandar regarding INR result and instructed:     Warfarin Dosing Instructions:  (evenings. Has 3mg tabs)   - today, advised one time booster with 9mg warfarin dose,   - then change warfarin dose to 6 mg daily.   - (16.7 % change)    Instructed patient to follow up no later than:  5-7 days.    Education provided: importance of consistent vitamin K intake, target INR goal and significance of current INR result, no interaction anticipated between warfarin and Gabapentin and importance of notifying clinic for changes in medications    Banadr verbalizes understanding and agrees to warfarin dosing plan.    Instructed to call the [facility] for any changes, questions or concerns. (#[phone])   ?   Meg Jacinto RN    Subjective/Objective:      Bandar Wells, a 69 y.o. male is on warfarin.     Bandar reports:     Home warfarin dose: verbally confirmed home dose with Bandar and updated on anticoagulation calendar     Missed doses: No.  However, he not sure if he missed a dose 4 days ago, Saturday.     Medication changes:  No     S/S of bleeding or thromboembolism:  No     New Injury or illness:  No     Changes in diet or alcohol consumption:  No     Upcoming surgery, procedure or cardioversion:  No    Anticoagulation Episode Summary     Current INR goal:   2.5-3.5   TTR:   24.9 % (1 y)   Next INR check:   2/19/2020   INR from last check:   1.60! (2/12/2020)   Weekly max warfarin dose:      Target end date:   Indefinite   INR check location:      Preferred lab:      Send INR reminders to:   [facility]     Indications    Paroxysmal atrial fibrillation (H) [I48.0]  S/P MVR (mitral valve replacement) [Z95.2]  S/P mitral valve replacement with metallic valve [Z95.4]           Comments:   INR TARGET GOAL 2.5 - 3.5         Anticoagulation Care Providers     Provider Role Specialty Phone number    Jin Bhat MD Referring Internal Medicine [phone]

## 2021-06-06 NOTE — TELEPHONE ENCOUNTER
Anticoagulation    Spoke to Bandar. He realizes he has a conflict with 2/27 and would like to plan for back injection on 3/4.      Set up for INR and creatinine check in clinic 2/26.  Will review hold plan based on lab results (see 2/17 encounter)    Shilpi Tamayo, PharmD

## 2021-06-06 NOTE — TELEPHONE ENCOUNTER
Form juana back to Dr Bhat to sign with information, then faxed back to Sophia    Germaine Melissa CMA ([facility])

## 2021-06-06 NOTE — TELEPHONE ENCOUNTER
Anticoagulation    Bandar on warfarin for MVR and Afib. Goal INR 2.5-3.5. Held warfarin 2/28-2/4 for spine injection on 3/4. Resumed Lovenox 90 mg Q 12 hrs on 3/4 PM post procedure. Complaint for prolonged bleeding from injections site with last shot pre-procedure; hx of prolonged bleeding too with past bridging. Discussed plan with his cardiologist Dr. Valencia yesterday. Continue Lovenox, check Anti-Xa and adjust dose if needed due to CKD.    Bandar reports: took 9 mg of warfarin last night. Last dose of Lovenox  this morning at 8:45 AM. Notes no recent difficulties with bleeding. Reports he has 7 syringes of lovenox 100 mg left    Lab Results   Component Value Date    CREATININE 1.95 (H) 03/03/2020    CREATININE 1.86 (H) 02/26/2020    CREATININE 1.58 (H) 01/17/2020      Estimated Creatinine Clearance: 38.2 mL/min (A) (by C-G formula based on SCr of 1.95 mg/dL (H)).    Lab Results   Component Value Date    ANTIXALMWH 1.09 (H) 03/06/2020     ASSESSMENT    Anti-Xa peak drawn ~ 4 hours post dose.    Anti-Xa of 1.09 is SUPRATHERAPEUTIC (goal Anti-Xa of 0.6-1.0.)    PLAN    Reduce Lovenox dose to 80 mg Q 12 hrs  (10%)    Warfarin dosing:  3/6:  9 mg then continue 9 mg on Mon,Thur and 6 mg daily rest of week.     INR check 3/9    Spoke to Bandar and reviewed results and plan. He verbalized understanding and agrees to plan. Reviewed pushing out Lovenox to new dose.  Sending [employer] message with written instructions for Bandar.    Shilpi Tamayo, PharmD

## 2021-06-06 NOTE — TELEPHONE ENCOUNTER
Prescription Monitoring Program activity reviewed with no discrepancies noted.      Last fill per : 2/26/2020  Quantity/days supply: 28    Controlled Substance Agreement on file: No  Date:     Last office visit with provider:  2/13/2020 Jin Bhat MD    Please advise.

## 2021-06-06 NOTE — TELEPHONE ENCOUNTER
Per message from Dr. Bhat, refill has been set up for him to review.  Elyssa HERNANDEZ, ADOLPH/CMT....................12:41 PM

## 2021-06-06 NOTE — TELEPHONE ENCOUNTER
Received a faxed INR result for Bandar Wells  From [facility]  INR result dated 2/20/2020 is 2.10      Last INR from 2/12/2020 was 1.60

## 2021-06-06 NOTE — TELEPHONE ENCOUNTER
BELL-PROCEDURAL ANTICOAGULATION  MANAGEMENT    Assessment     Warfarin interruption plan for Epidural Injection on 3/4/19.    Atrial Fibrillation and Mitral Valve Replacement      5 day hold and bridge planned (see 2/20 encounter)      Creatinine increased from January; CrCl ~ 40 ml/min; remains above threshold for renal dose adjustment for Lovenox.  Lovenox Anti-Xa planned for 3/3 due to elevated creatinine and hx of bleeding/seeping from injection site from Lovenox    Plan       Pre-Procedure:    Hold warfarin until after procedure starting: Friday 2/28     Lovenox 90 mg subq Q 12 hrs (1 mg/kg Q 12 hr for CrCl >30)     Start Lovenox: Michael 3/1 AM    Last dose of Lovenox prior to procedure: Tuesday 3/3 AM    Lovenox Anti-Xa 3/3 4 hours after AM dose       Post-Procedure:    Resume warfarin dose if okay with provider doing procedure on night of procedure, 3/4 PM: 9 mg x 1 then resume 9 mg on Mon, Thur and 6 mg daily rest of week    Resume Lovenox ~ 24 hrs post procedure when okay with provider doing procedure. Continue until INR >= 2.5 per protocol    Recheck INR 4-6 days after resuming warfarin     Spoke to Bandar and reviewed instructions above. Bandar repeated back. Education provided: prescribed enoxaparin dose and frequency, recommended injection site and site rotation, monitoring for signs and symptoms of bruising and bleeding and monitoring for signs and symptoms of clotting.     Education session with RN to review Lovenox technique was offered and declined. Encouraged Bandar to review administration video at [employer]    Bandar verbalizes understanding and agrees to plan.    Requests [employer] with copy of instructions; will send 2/28  ?   Shilpi Tamayo, PharmD    Subjective/Objective:      Bandar Wells, a 69 y.o. male    Goal INR Range: 2.5-3.5    Patient bridged in past: Yes    Wt Readings from Last 3 Encounters:   02/13/20 191 lb (86.6 kg)   01/17/20 177 lb (80.3 kg)   12/11/19 191 lb (86.6 kg)      Ideal body  weight: 71.8 kg (158 lb 6.4 oz)  Adjusted ideal body weight: 77.8 kg (171 lb 7 oz)     Lab Results   Component Value Date    INR 2.90 (H) 02/26/2020    INR 2.10 (!) 02/20/2020    INR 1.60 (!) 02/12/2020     Lab Results   Component Value Date    HGB 9.3 (L) 02/19/2019    HCT 28.3 (L) 05/29/2018     05/29/2018     Lab Results   Component Value Date    CREATININE 1.86 (H) 02/26/2020    CREATININE 1.58 (H) 01/17/2020    CREATININE 1.70 (H) 11/06/2019      Estimated Creatinine Clearance: 40.1 mL/min (A) (by C-G formula based on SCr of 1.86 mg/dL (H)).

## 2021-06-06 NOTE — TELEPHONE ENCOUNTER
Patient states he sent my chart message for requesting refill on 03/08/20 patient is out of medication he is in pain requesting to process as soon as possible.  Controlled Substance Refill Request  Medication Name:   Requested Prescriptions     Pending Prescriptions Disp Refills     HYDROcodone-acetaminophen 5-325 mg per tablet 28 tablet 0     Sig: Take 1 tablet by mouth every 6 (six) hours as needed for pain.   Date Last Fill: 03/04/20  Is patient out of medication?:  Yes  Patient notified refills processed within 3 business days:  Yes  Requested Pharmacy: [facility]   Submit electronically to pharmacy  Controlled Substance Agreement on file:   Encounter-Level CSA Scan Date:    There are no encounter-level csa scan date.        Last office visit:  Unknown

## 2021-06-06 NOTE — TELEPHONE ENCOUNTER
Prescription Monitoring Program activity reviewed with no discrepancies noted.      Last fill per : 02/26/2020  Quantity/days supply: 28    Controlled Substance Agreement on file: No  Date:     Last office visit with provider:  2/13/2020 Jin Bhat MD    Please advise.

## 2021-06-06 NOTE — TELEPHONE ENCOUNTER
Controlled Substance Refill Request  Medication Name:   Requested Prescriptions     Pending Prescriptions Disp Refills     HYDROcodone-acetaminophen 5-325 mg per tablet 28 tablet 0     Sig: Take 1 tablet by mouth every 6 (six) hours as needed for pain.     Date Last Fill: 2/26/20  Requested Pharmacy: [facility]  Submit electronically to pharmacy  Controlled Substance Agreement on file:   Encounter-Level CSA Scan Date:    There are no encounter-level csa scan date.        Last office visit:  2/13/20

## 2021-06-06 NOTE — TELEPHONE ENCOUNTER
ANTICOAGULATION  MANAGEMENT    Assessment     Today's INR result of 2.90 is Therapeutic (goal INR of 2.5-3.5)        Warfarin taken as previously instructed    No new diet changes affecting INR    Potential interaction between Hydrocodone-Acetaminophen and warfarin which may affect subsequent INRs    Continues to tolerate warfarin with no reported s/s of bleeding or thromboembolism     Previous INR was Subtherapeutic at 2.10 on 2/20/2020.    Scheduled Epidural injection on 3/4/2020.  (warfarin will be held for 5 days and bridged with Lovenox injections).    Plan:     Spoke with Bandar regarding INR result and instructed:     Warfarin Dosing Instructions:  (evenings. Has 3mg tabs)   - Continue current warfarin dose 9 mg daily on Mon/Thurs; and 6 mg daily rest of week.    Instructed patient to follow up no later than: 2 wks.   - checks INR's every 2 wks with home monitor thru [employer].    Education provided: importance of consistent vitamin K intake, target INR goal and significance of current INR result, importance of notifying clinic for changes in medications and importance of notifying clinic of upcoming surgeries and procedures 2 weeks in advance    Bandar verbalizes understanding and agrees to warfarin dosing plan.    Instructed to call the [facility] for any changes, questions or concerns. (#[phone])   ?   Meg Jacinto RN    Subjective/Objective:      Bandar Wells, a 69 y.o. male is on warfarin.     Bandar reports:     Home warfarin dose: as updated on anticoagulation calendar per template     Missed doses: No     Medication changes:  Yes:  Currently taking Vicodin (Hydrocodone-Acet) since 2/19/20.     S/S of bleeding or thromboembolism:  No     New Injury or illness:  No     Changes in diet or alcohol consumption:  No     Upcoming surgery, procedure or cardioversion:  Yes:  Reported scheduled for epidural steroidal injection of cervical spine with [facility] on 3/4/2020.    Anticoagulation Episode  Summary     Current INR goal:   2.5-3.5   TTR:   24.6 % (1 y)   Next INR check:   3/11/2020   INR from last check:   2.90 (2/26/2020)   Weekly max warfarin dose:      Target end date:   Indefinite   INR check location:      Preferred lab:      Send INR reminders to:   [facility]    Indications    Paroxysmal atrial fibrillation (H) [I48.0]  S/P MVR (mitral valve replacement) [Z95.2]  S/P mitral valve replacement with metallic valve [Z95.4]           Comments:   INR TARGET GOAL 2.5 - 3.5         Anticoagulation Care Providers     Provider Role Specialty Phone number    Jin Bhat MD Referring Internal Medicine [phone]

## 2021-06-06 NOTE — PROGRESS NOTES
[facility] Follow Up Note    Bandar Wells   69 y.o. male    Date of Visit: 2/13/2020    Chief Complaint   Patient presents with     Follow-up     pain-ortho, derm, meds per pharmacy     Subjective  This is a 69-year-old man with multiple medical problems many of which are cardiology related.  These have actually been pretty stable.  He saw his cardiologist yesterday and reports that everything seems to be going well.  As long as he is taking his diuretic he has no shortness of breath and no chest pains.    He recently noted a scalp lesion and saw his dermatologist.  This was a biopsied and he tells me it is a precancerous lesion but it is doing well.    His primary reason for coming in today was to follow-up on what we have been thinking with shoulder pain.  He has been seen the orthopedic doctors.  He has made absolutely no progress.  In reviewing the most recent orthopedic notes they are now thinking this might be a cervical radiculopathy.  Cervical x-ray apparently showed some disc compression.  He did have an MRI scan and is meeting with the surgeons again next Tuesday to go over the results and try to come up with the plan.  He continues to experience severe pain at least 3-4 times per day.  He describes it as a very severe sharp lightening-like pain down his right shoulder and arm.  The change now is that the pain is going all the way down to his hand.  Tramadol is not doing anything for his pain.    ROS A comprehensive review of systems was performed and was otherwise negative    Medications, allergies, and problem list were reviewed and updated    Exam  General Appearance:   On examination his blood pressure is 128/60.  Weight is 191 pounds and BMI is 27.80.    Heart rhythm is stable today with rate in the 60s and no ectopy.    No palpable tenderness in the right shoulder or arm at this moment.  Range of motion in that right arm is good right now.    The patient is alert and oriented  x3.      Assessment/Plan  1. Cervical radiculopathy  HYDROcodone-acetaminophen 5-325 mg per tablet   2. Coronary artery disease involving native coronary artery of native heart without angina pectoris       Right shoulder and arm pain.  Based upon the most recent evaluation this seems likely to be a cervical radiculopathy.  He has absolutely no pain relief at this point so I am going to try him on some Vicodin as needed for the next 7 days to see if we can get him some relief while we await the results of the MRI and recommendations from the surgeons.  I will follow-up on this next week.    Coronary artery disease.  Stable.  Continue follow-up with cardiology as recommended.    I will see him back once some type of plan for the neck issue is been set up.      Jin Bhat MD      Current Outpatient Medications on File Prior to Visit   Medication Sig     acetaminophen (TYLENOL) 500 MG tablet Take 500-1,000 mg by mouth every 6 (six) hours as needed for pain. Pain 1-5  Give 500 mg  Or pain 6-10 give 1000 mg every 6 hours ad needed not to exceed 4 grams in 24 hours     atorvastatin (LIPITOR) 40 MG tablet TAKE 1 TABLET (40 MG TOTAL) BY MOUTH AT BEDTIME.     b complex vitamins tablet Take 1 tablet by mouth daily.      cholecalciferol, vitamin D3, 1,000 unit tablet Take 1,000 Units by mouth daily.     clindamycin (CLEOCIN) 300 MG capsule Take 2 capsules (600 mg total) by mouth see administration instructions. Take prior to Dentist appointments     cyanocobalamin 1000 MCG tablet Take 1,000 mcg by mouth daily.     ferrous sulfate 325 (65 FE) MG tablet Take 1 tablet (325 mg total) by mouth 2 (two) times a day. (Patient taking differently: Take 1 tablet by mouth daily with breakfast.       )     FLAXSEED OIL ORAL Take 1,000 mg by mouth daily.      fluticasone (FLONASE) 50 mcg/actuation nasal spray 1 spray into each nostril daily.     FOLIC ACID ORAL Take 1 tablet by mouth daily.     furosemide (LASIX) 40 MG tablet Take 1  tablet (40 mg total) by mouth daily.     gabapentin (NEURONTIN) 300 MG capsule 1-3 at night     L. ACIDOPHILUS/BIFIDO LONGUM (PROBIOTIC PEARLS ORAL) Take 1 capsule by mouth daily.     lidocaine (LIDODERM) 5 % Apply one patch to shoulder once daily     multivitamin therapeutic (THERAGRAN) tablet Take 1 tablet by mouth daily.     penicillin VK (PEN VK) 500 MG tablet TAKE 1 TABLET BY MOUTH DAILY     potassium chloride (K-DUR,KLOR-CON) 20 MEQ tablet Take 1 tablet (20 mEq total) by mouth daily.     traMADol (ULTRAM) 50 mg tablet Take 1 tablet (50 mg total) by mouth every 6 (six) hours as needed for pain.     triamcinolone (KENALOG) 0.1 % cream APPLY TOPICALLY TWICE A DAY AS NEEDED     warfarin ANTICOAGULANT (COUMADIN/JANTOVEN) 3 MG tablet TAKE ONE TO TWO TABLETS (3-6MG) BY MOUTH DAILY, AS DIRECTED. ADJUST DOSE BASED ON INR RESULTS.     No current facility-administered medications on file prior to visit.      No Known Allergies  Social History     Tobacco Use     Smoking status: Never Smoker     Smokeless tobacco: Never Used   Substance Use Topics     Alcohol use: Yes     Alcohol/week: 6.0 standard drinks     Types: 5 Glasses of wine, 1 Cans of beer per week     Frequency: 2-3 times a week     Drug use: No

## 2021-06-06 NOTE — TELEPHONE ENCOUNTER
FYI - Status Update  Who is Calling: Patient  Update: Patient would like a call back to discuss their Lovenox dosing, please call the patient back, thank you.  Okay to leave a detailed message?:  Yes

## 2021-06-06 NOTE — TELEPHONE ENCOUNTER
Controlled Substance Refill Request  Medication Name:   Requested Prescriptions     Pending Prescriptions Disp Refills     HYDROcodone-acetaminophen 5-325 mg per tablet 28 tablet 0     Sig: Take 1 tablet by mouth every 6 (six) hours as needed for pain.     Date Last Fill: 2/26/20  Is patient out of medication?:  Yes  Patient notified refills processed within 3 business days:  Yes  Requested Pharmacy:  [facility]  Submit electronically to pharmacy  Controlled Substance Agreement on file:   Encounter-Level CSA Scan Date:    There are no encounter-level csa scan date.        Last office visit:  2/13/20    Patient is having a steroid injection tomorrow morning at 8:30 am but will need to have the pain medication on had to take as needed.  Bandar hopes to get this prescription today.

## 2021-06-06 NOTE — TELEPHONE ENCOUNTER
Lovenox renewal.     - Bandar has one syringe left for tonight.     - need to reorder for the Lovenox 80mg every 12 hrs, till INR is greater than 2.0.     - have pharmacy call patient when ready for  today.

## 2021-06-07 ENCOUNTER — COMMUNICATION - HEALTHEAST (OUTPATIENT)
Dept: ANTICOAGULATION | Facility: CLINIC | Age: 71
End: 2021-06-07

## 2021-06-07 ENCOUNTER — TRANSFERRED RECORDS (OUTPATIENT)
Dept: HEALTH INFORMATION MANAGEMENT | Facility: CLINIC | Age: 71
End: 2021-06-07

## 2021-06-07 DIAGNOSIS — I48.0 PAROXYSMAL ATRIAL FIBRILLATION (H): ICD-10-CM

## 2021-06-07 DIAGNOSIS — Z95.4 S/P MITRAL VALVE REPLACEMENT WITH METALLIC VALVE: ICD-10-CM

## 2021-06-07 LAB — INR PPP: 2.3 (ref 0.9–1.1)

## 2021-06-07 NOTE — TELEPHONE ENCOUNTER
There is a problem taking any NSAID because he is on the warfarin.  Fortunately other than some supplemental Tylenol I do not know that there is any other medication he could take at this time.

## 2021-06-07 NOTE — TELEPHONE ENCOUNTER
ANTICOAGULATION  MANAGEMENT    Assessment     Today's INR result of 2.40 is Subtherapeutic (goal INR of 2.5-3.5)        Warfarin recently held as instructed which may be affecting INR    No new diet changes affecting INR    No new medication/supplements affecting INR    Continues to tolerate warfarin with no reported s/s of bleeding or thromboembolism     Previous INR was Therapeutic at 3.40 on 5/1/20.    Plan:     Spoke with Bandar regarding INR result and instructed:     Warfarin Dosing Instructions:   (evenings. Has 3mg tabs)   - tonight, advised one time booster with 4.5mg warfarin dose, then continue current warfarin dose 6 mg daily on Mon/Thurs/Sat; and 3 mg daily rest of week.    Instructed patient to follow up no later than:  1-2 wks.  Advised to check INR on 5/13.   - checks INR's with home monitor thru [employer].    Education provided: importance of consistent vitamin K intake, target INR goal and significance of current INR result, importance of notifying clinic for changes in medications, monitoring for bleeding signs and symptoms and when to seek medical attention/emergency care    Bandar verbalizes understanding and agrees to warfarin dosing plan.    Instructed to call the [facility] for any changes, questions or concerns. (#[phone])   ?   Meg Jacinto RN    Subjective/Objective:      Bandar Wells, a 69 y.o. male is on warfarin.     Bandar reports:     Home warfarin dose: verbally confirmed home dose with Bandar and updated on anticoagulation calendar     Missed doses: Yes:  HELD warfarin dose on 4/30.     Medication changes:  No     S/S of bleeding or thromboembolism:  No     New Injury or illness:  No     Changes in diet or alcohol consumption:  No     Upcoming surgery, procedure or cardioversion:  No    Anticoagulation Episode Summary     Current INR goal:   2.5-3.5   TTR:   23.7 % (1 y)   Next INR check:   5/13/2020   INR from last check:   2.40! (5/6/2020)   Weekly max warfarin dose:      Target  end date:   Indefinite   INR check location:      Preferred lab:      Send INR reminders to:   [facility]    Indications    Paroxysmal atrial fibrillation (H) [I48.0]  S/P MVR (mitral valve replacement) [Z95.2]  S/P mitral valve replacement with metallic valve [Z95.4]           Comments:   INR TARGET GOAL 2.5 - 3.5         Anticoagulation Care Providers     Provider Role Specialty Phone number    Jin Bhat MD Referring Internal Medicine [phone]

## 2021-06-07 NOTE — TELEPHONE ENCOUNTER
Spoke with the patient and relayed message below from Dr. Bhat.  He verbalized understanding and had no further questions at this time.  Elyssa HERNANDEZ, ADOLPH/BAHMAN....................2:31 PM

## 2021-06-07 NOTE — TELEPHONE ENCOUNTER
Received a faxed INR result for Bandar Wells  From [facility]  INR result dated 3/25/2020 is 5.00      Last INR from 3/18/20 was 4.00

## 2021-06-07 NOTE — TELEPHONE ENCOUNTER
Referral Request  Type of referral: Rhuematology  Who s requesting: Patient  Why the request: Patient has been seen by [facility], who has told the patient that he should see a Rheumatologist for his pain,   Have you been seen for this: Concern evaluated with [facility]  Does patient have a preference on a group/provider? [facility]  Okay to leave a detailed message?  Yes

## 2021-06-07 NOTE — TELEPHONE ENCOUNTER
Dr. Bhat or covering provider:    INR today 5.9 is supratherapeutic ( Goal Range is 2.5-3.5 )    Bandar reported that he has not eating well lately and that he still has a lot of pain which could have affected his INR today.       Bandar's responses positive for potential signs and symptoms of bleeding; Reported Nose bleeding last episode was 2-3 days ago.    Patient is currently at home and is not able to come in the clinic to have venous drawn to confirm INR    Bandar reported that he has not eating well lately and that he still has a lot of pain which could have affected his INR today. He stated that he has an appointment at [facility] next Thurs.    Is it okay to hold warfarin dose today then recheck INR tomorrow with home monitor?    Please advise.    Thank you.    Galilea Abebe

## 2021-06-07 NOTE — TELEPHONE ENCOUNTER
ANTICOAGULATION  MANAGEMENT    Assessment     Today's INR result of 3.4 is Therapeutic (goal INR of 2.5-3.5)        Warfarin taken as previously instructed    No new diet changes affecting INR had low appetite, now normal and eating well    No new medication/supplements affecting INR    Continues to tolerate warfarin with no reported s/s of bleeding or thromboembolism     Previous INR was Supratherapeutic    Plan:     Spoke with Bandar regarding INR result and instructed:     Warfarin Dosing Instructions:  Change warfarin dose to 6 mg daily on mon/thu/sat; and 3 mg daily rest of week  (9 % change)    Instructed patient to follow up no later than: 5/5 with home monitor      Bandar verbalizes understanding and agrees to warfarin dosing plan.    Instructed to call the [facility] for any changes, questions or concerns. (#[phone])   ?   Shmuel Jeffers RN    Subjective/Objective:      Bandar Wells, a 69 y.o. male is on warfarin.     Bandar reports:     Home warfarin dose: as updated on anticoagulation calendar per template     Missed doses: No     Medication changes:  No     S/S of bleeding or thromboembolism:  No     New Injury or illness:  No     Changes in diet or alcohol consumption:  No     Upcoming surgery, procedure or cardioversion:  No    Anticoagulation Episode Summary     Current INR goal:   2.5-3.5   TTR:   22.5 % (1 y)   Next INR check:   5/5/2020   INR from last check:   3.40 (5/1/2020)   Weekly max warfarin dose:      Target end date:   Indefinite   INR check location:      Preferred lab:      Send INR reminders to:   [facility] [place]    Indications    Paroxysmal atrial fibrillation (H) [I48.0]  S/P MVR (mitral valve replacement) [Z95.2]  S/P mitral valve replacement with metallic valve [Z95.4]           Comments:   INR TARGET GOAL 2.5 - 3.5         Anticoagulation Care Providers     Provider Role Specialty Phone number    Jin Bhat MD Referring Internal Medicine [phone]

## 2021-06-07 NOTE — TELEPHONE ENCOUNTER
Patient calling today with nose bleed that is lasting longer than 10 hours on and off.  He states it is a small amount at a time but almost Continuously.  He does not have transportation and does not want to come in.  He will call [facility] to make appt as they have treated him previously.  Gave home care instructions for him to follow.    COVID 19 Nurse Triage Plan/Patient Instructions    Please be aware that novel coronavirus (COVID-19) may be circulating in the community. If you develop symptoms such as fever, cough, or SOB or if you have concerns about the presence of another infection including coronavirus (COVID-19), please contact your health care provider or visit [email or web address].     Disposition/Instructions    Patient to stay at home and follow home care protocol based instructions.    Thank you for limiting contact with others, wearing a simple mask to cover your cough, practice good hand hygiene habits and accessing our virtual services where possible to limit the spread of this virus.    For more information about COVID19 and options for caring for yourself at home, please visit the CDC website at https://www.cdc.gov/coronavirus/2019-ncov/about/steps-when-sick.html  For more options for care at [facility], please visit our website at [email or web address]    For more information, please use the [place] Department of Health COVID-19 Website: https://www.health.[facility].[place].us/diseases/coronavirus/index.html  [place] Department of Health ([facility]) COVID-19 Hotlines (Interpreters available):      Health questions: Phone Number: [phone] or [phone] and Hours: 7 a.m. to 7 p.m.    Schools and  questions: Phone Number: [phone] or [phone] and Hours 7 a.m. to 7 p.m.      Barby Joaquin RN

## 2021-06-07 NOTE — TELEPHONE ENCOUNTER
Controlled Substance Refill Request  Medication Name:   Requested Prescriptions     Pending Prescriptions Disp Refills     oxyCODONE (ROXICODONE) 5 MG immediate release tablet 28 tablet 0     Sig: Take 1 tablet (5 mg total) by mouth every 6 (six) hours as needed for pain.     Date Last Fill: 3/20/20  [facility]ed Pharmacy: [facility]  Submit electronically to pharmacy  Controlled Substance Agreement on file:   Encounter-Level CSA Scan Date:    There are no encounter-level csa scan date.        Last office visit:  2/13/20

## 2021-06-07 NOTE — TELEPHONE ENCOUNTER
Controlled Substance Refill Request  Medication Name:   Requested Prescriptions     Pending Prescriptions Disp Refills     oxyCODONE (ROXICODONE) 5 MG immediate release tablet 42 tablet 0     Sig: Take 1 tablet (5 mg total) by mouth every 4 (four) hours as needed for pain.     Date Last Fill: 4/28/20  Requested Pharmacy: [facility]  Submit electronically to pharmacy  Controlled Substance Agreement on file:   Encounter-Level CSA Scan Date:    There are no encounter-level csa scan date.        Last office visit:  4/30/20

## 2021-06-07 NOTE — TELEPHONE ENCOUNTER
Spoke with Bandar,     - he reported, needs a renewal on his Oxycodone (Roxicodone).  Due to renew on 4/29.     - please renew.

## 2021-06-07 NOTE — TELEPHONE ENCOUNTER
Prescription Monitoring Program activity reviewed with no discrepancies noted.      Last fill per : 04/22/2020  Quantity/days supply: 42    Controlled Substance Agreement on file: No  Date:     Last office visit with provider:  2/13/2020 Jin Bhat MD    Please advise.

## 2021-06-07 NOTE — TELEPHONE ENCOUNTER
Spoke with patient- upon further review a rheumatology order was placed last Friday- apparently there was a message left for patient on 4/27- however he states he never received it    Can scheduling reach out to him again- his contact # is [phone]

## 2021-06-07 NOTE — TELEPHONE ENCOUNTER
Who is calling:  Patient  Reason for Call:  Patient stated that he is out of his medication and would like a prescription sent today. Patient stated that on 3/26 there was a My-Chart message sent to him stating that the medication was filled 3/26. Patient stated that nothing was sent. Patient is questioning if the dose should be increased? Patient stated that he will leave that for Jin Bhat MD to decide but he needs his medication today.    Date of last appointment with primary care: n/a  Okay to leave a detailed message: Yes  [phone]

## 2021-06-07 NOTE — TELEPHONE ENCOUNTER
Controlled Substance Refill Request  Medication Name:   Requested Prescriptions     Pending Prescriptions Disp Refills     oxyCODONE (ROXICODONE) 5 MG immediate release tablet 42 tablet 0     Sig: Take 1 tablet (5 mg total) by mouth every 4 (four) hours as needed for pain.     Date Last Fill: 4/3/20  [facility]ed Pharmacy: [facility]  Submit electronically to pharmacy  Controlled Substance Agreement on file:   Encounter-Level CSA Scan Date:    There are no encounter-level csa scan date.        Last office visit:  2/13/20

## 2021-06-07 NOTE — PROGRESS NOTES
"Bandar Wells is a 69 y.o. male who is being evaluated via a billable video visit.      The patient has been notified of following:     \"This video visit will be conducted via a call between you and your physician/provider. We have found that certain health care needs can be provided without the need for an in-person physical exam.  This service lets us provide the care you need with a video conversation.  If a prescription is necessary we can send it directly to your pharmacy.  If lab work is needed we can place an order for that and you can then stop by our lab to have the test done at a later time.    Video visits are billed at different rates depending on your insurance coverage. Please reach out to your insurance provider with any questions.    If during the course of the call the physician/provider feels a video visit is not appropriate, you will not be charged for this service.\"    Patient has given verbal consent to a Video visit? Yes    Patient would like to receive their AVS by AVS Preference: Mail a copy.    Patient would like the video invitation sent by: Text to cell phone: [phone]    Will anyone else be joining your video visit? No    Video Start Time: 11:10 AM    Additional provider notes: GENERAL: healthy, alert and no distress  EYES: Eyes grossly normal to inspection, conjunctivae and sclerae normal  RESP: no audible wheeze, cough, or visible cyanosis.  No visible retractions or increased work of breathing.  Able to speak fully in complete sentences.  NEURO: Cranial nerves grossly intact, mentation intact and speech normal  PSYCH: mentation appears normal, affect normal/bright, judgement and insight intact, normal speech and appearance well-groomed      Video-Visit Details    Type of service:  Video Visit    Video End Time (time video stopped): 11:30 AM  Originating Location (pt. Location): Home    Distant Location (provider location):  [facility]     Platform used for Video Visit: " Doximity      Jin Hicks MD       Video Visit - Follow Up   Bandar Wells   69 y.o. male    Date of Visit: 4/30/2020    Chief Complaint   Patient presents with     Medication Questions        Assessment and Plan   1. Myositis of left shoulder, unspecified myositis type  Patient appears to have inflammatory process in the muscles of the neck and shoulder.  Etiology is uncertain.  History of endocarditis and infection a consideration although no ongoing fever or chills.  He is on suppressive antibiotics.  He has had elevation in his transaminases, hypercalcemia, normocytic anemia and recent worsening of his creatinine.  He is going to see rheumatology at the end of May.  I like to start by checking some labs as below.  Will check blood cultures.  Not getting much pain relief from opioids.  Not get much benefit from steroid injection.  - C-Reactive Protein  - Erythrocyte Sedimentation Rate  - CK Total  - Urinalysis-UC if Indicated  - Antinuclear Antibody (LILIAN) Cascade  - Rheumatoid Factor Quant  - CCP Antibodies  - Angiotensin Converting Enzyme (ACE)  - Thyroid Stimulating Hormone (TSH)  - T4, Free  - T3, Total  - Lactate Dehydrogenase (LDH)    2. Normocytic anemia  Longstanding but etiology not entirely certain.  At some point infection and chronic inflammation contributed but unclear why this was persistent.  Check labs as above and below  - Ferritin    3. Hypercalcemia  Etiology uncertain  - Parathyroid Hormone Intact  - Vitamin D, Total (25-Hydroxy)  - Electrophoresis, Protein, Serum, Cascade  - Electrophoresis, Protein, Random Urine Ccade    4. CKD (chronic kidney disease) stage 4, GFR 15-29 ml/min (H)  Labs as above and below    Addendum: Elevated INR, new medications, poor appetite, hold warfarin today recheck tomorrow follow-up with anticoagulation nursing    Return in about 2 weeks (around 5/14/2020) for follow up with PCP, video visit.     History of Present Illness   This 69 y.o. old man who I am  meeting via video conference for the first time.  He is a somewhat complex history including history of endocarditis, mitral valve replacement, now 6+ month history of left shoulder pain and arm pain.  He has had MRI of the shoulder, MRI of the brachial plexus and EMG.  He is seeing orthopedic surgery.  Apparently MRI of the brachial plexus showed some inflammation in the paraspinal muscles and Dr. Moreno was concerned about a myositis or underlying rheumatologic condition.  Patient is not having other areas of muscle pain or fever, night sweats.  His appetite is been poor and he has lost some weight.  No nausea vomiting diarrhea.  No headache.  No inflammatory skin condition.  INR recently elevated.    Review of Systems: A comprehensive review of systems was negative except as noted.     Medications, Allergies and Problem List   Reviewed, reconciled and updated  Post Discharge Medication Reconciliation Status:      Additional Information   Current Outpatient Medications   Medication Sig Dispense Refill     acetaminophen (TYLENOL) 500 MG tablet Take 500-1,000 mg by mouth every 6 (six) hours as needed for pain. Pain 1-5  Give 500 mg  Or pain 6-10 give 1000 mg every 6 hours ad needed not to exceed 4 grams in 24 hours       atorvastatin (LIPITOR) 40 MG tablet TAKE 1 TABLET (40 MG TOTAL) BY MOUTH AT BEDTIME. 90 tablet 2     b complex vitamins tablet Take 1 tablet by mouth daily.        cholecalciferol, vitamin D3, 1,000 unit tablet Take 1,000 Units by mouth daily.       clindamycin (CLEOCIN) 300 MG capsule Take 2 capsules (600 mg total) by mouth see administration instructions. Take prior to Dentist appointments 2 capsule 3     cyanocobalamin 1000 MCG tablet Take 1,000 mcg by mouth daily.       ferrous sulfate 325 (65 FE) MG tablet Take 1 tablet (325 mg total) by mouth 2 (two) times a day. (Patient taking differently: Take 1 tablet by mouth daily with breakfast.       ) 60 tablet 4     FLAXSEED OIL ORAL Take 1,000 mg by  mouth daily.        fluticasone (FLONASE) 50 mcg/actuation nasal spray 1 spray into each nostril daily.       FOLIC ACID ORAL Take 1 tablet by mouth daily.       furosemide (LASIX) 40 MG tablet Take 1 tablet (40 mg total) by mouth daily. 30 tablet 11     gabapentin (NEURONTIN) 300 MG capsule 1-3 at night       L. ACIDOPHILUS/BIFIDO LONGUM (PROBIOTIC PEARLS ORAL) Take 1 capsule by mouth daily.       multivitamin therapeutic (THERAGRAN) tablet Take 1 tablet by mouth daily.       oxyCODONE (ROXICODONE) 5 MG immediate release tablet Take 1 tablet (5 mg total) by mouth every 4 (four) hours as needed for pain. 42 tablet 0     penicillin VK (PEN VK) 500 MG tablet TAKE 1 TABLET BY MOUTH DAILY 30 tablet 0     potassium chloride (K-DUR,KLOR-CON) 20 MEQ tablet Take 1 tablet (20 mEq total) by mouth daily. 90 tablet 2     traMADol (ULTRAM) 50 mg tablet Take 1 tablet (50 mg total) by mouth every 6 (six) hours as needed for pain. 50 tablet 0     warfarin ANTICOAGULANT (COUMADIN/JANTOVEN) 3 MG tablet TAKE ONE TO TWO TABLETS (3-6MG) BY MOUTH DAILY, AS DIRECTED. ADJUST DOSE BASED ON INR RESULTS. 160 tablet 1     No current facility-administered medications for this visit.      No Known Allergies  Social History     Tobacco Use     Smoking status: Never Smoker     Smokeless tobacco: Never Used   Substance Use Topics     Alcohol use: Yes     Alcohol/week: 6.0 standard drinks     Types: 5 Glasses of wine, 1 Cans of beer per week     Frequency: 2-3 times a week     Drug use: No       Review and/or order of clinical lab tests: Reviewed  Review and/or order of radiology tests: Reviewed  Review and/or order of medicine tests: Reviewed  Discussion of test results with performing physician:  Decision to obtain old records and/or obtain history from someone other than the patient:  Review and summarization of old records and/or obtaining history from someone other than the patient and.or discussion of case with another health care provider:  Sense of review of the chart including notes from Dr. Jin Bhat, orthopedic surgery including Dr. Moreno  Independent visualization of image, tracing or specimen itself:    Time:      Jin Hicks MD

## 2021-06-07 NOTE — TELEPHONE ENCOUNTER
"68 y/o male calls about medication renewal    Patient will be out of his Oxycontin 5mg PRN today, would like it renewed.    He states he only has a \"weekly supply\" and would like to get that changed to 30 days, if possible.  His pain is getting worse, he has an appointment with orthopedic surgeon on Friday, Dr. Moreno at [facility].    Venus Wells RN Triage Nurse/[facility]  04/22/2020   1:50AM        "

## 2021-06-07 NOTE — TELEPHONE ENCOUNTER
"ANTICOAGULATION  MANAGEMENT    Bandar Wells is on warfarin and had a point of care INR result > 5.5 or an \"error message\" on point of care meter today.    Patient stated that he is not able to come in to the clinic today to have a venous drawn to confirm INR result    Spoke with Bandar via phone  and reviewed triage questions for potential signs and symptoms of bleeding.      Patient Response     Have you had any bleeding in the last week?   Yes: nose bleeding 2 - 3 days ago   Have you passed any red, black, or tarry stools in last week?   No   Have you vomited/spit up any red or coffee ground material in last week?   No   Have you had any new, severe abdominal pain or bloating develop in last week?   No   Have you fallen or had any injuries in last week?   No   Do you currently have a severe, sudden onset headache?   No   Do you currently have any severe sudden changes in your vision?   No   Do you currently have any new onset numbness, weakness/paralysis?   No     Assessment/Plan:     Bandar's responses positive for potential signs and symptoms of bleeding; Patient is currently at home and is not able to come in the clinic to have venous drawn to confirm INR    Bandar reported that he has not eating well lately and that he still has a lot of pain which could have affected his INR today.    Bandar instructed to:       Hold warfarin today and ACN will call back with PCP's response    Seek medical attention for new signs and symptoms of bleeding or a fall with injury.       "

## 2021-06-07 NOTE — TELEPHONE ENCOUNTER
Spoke with the patient and relayed message below from Dr. Bhat.  Patient verbalized understanding and had no further questions at this time.    Referral has been placed per message below.  Elyssa HERNANDEZ CMA/BAHMAN....................2:32 PM

## 2021-06-07 NOTE — TELEPHONE ENCOUNTER
** PCP please review request below and advise on qty requested. I have not updated Rx to reflect 30 day qty. **     Prescription Monitoring Program activity reviewed with no discrepancies noted.      Last fill per : 4/15/20  Quantity/days supply: 42 tablets for 7 days    Controlled Substance Agreement on file: No  Date: NA    Last office visit with provider:2/13/20      Please advise.

## 2021-06-07 NOTE — TELEPHONE ENCOUNTER
Who is calling:  Patient  Reason for Call:  The jarettent has been seen by [facility], who wish the patient to see Rheumatology. This writer has requested the referral for the patient  The patient is also asking if there is not an anti-inflammatory that he is able to take along with the oxycodone for the pain, as the patient says that the oxycodone works for all but an hour and then the pain is back. Is asking for suggestions for PCP please, based on his current medications, [facility] recommended the Ibuprofen but needs clearance from PCP.as the naresh take Warfarin..  Date of last appointment with primary care: 2/13/20  Okay to leave a detailed message: Yes

## 2021-06-07 NOTE — TELEPHONE ENCOUNTER
We could change the dosing to 5 mg every 4 hours as needed and at the next refill then would be for a #42.

## 2021-06-07 NOTE — TELEPHONE ENCOUNTER
Controlled Substance Refill Request  Medication Name:   Requested Prescriptions     Pending Prescriptions Disp Refills     oxyCODONE (ROXICODONE) 5 MG immediate release tablet 28 tablet 0     Sig: Take 1 tablet (5 mg total) by mouth every 6 (six) hours as needed for pain.     Date Last Fill: 3/26/20  Requested Pharmacy: [facility]  Submit electronically to pharmacy  Controlled Substance Agreement on file:   Encounter-Level CSA Scan Date:    There are no encounter-level csa scan date.        Last office visit:  2/13/20

## 2021-06-07 NOTE — TELEPHONE ENCOUNTER
ANTICOAGULATION  MANAGEMENT PROGRAM    Bandar Wells is overdue for INR check.     Spoke with Bandar who declined to schedule INR at this time. If calling back please schedule as soon as possible.    - Bandar will call and make INR appt.   - he is currently having a lot of muscle pains and rheumatology appt not till 6/4/20      Meg Jacinto RN

## 2021-06-07 NOTE — TELEPHONE ENCOUNTER
Controlled Substance Refill Request  Medication Name:   Requested Prescriptions     Pending Prescriptions Disp Refills     oxyCODONE (ROXICODONE) 5 MG immediate release tablet 42 tablet 0     Sig: Take 1 tablet (5 mg total) by mouth every 4 (four) hours as needed for pain.     Date Last Fill: 04/03/20  Is patient out of medication?: No, 3 days left  Patient notified refills processed within 3 business days:  Yes  Requested Pharmacy: [facility]  Submit electronically to pharmacy  Controlled Substance Agreement on file:   Encounter-Level CSA Scan Date:    There are no encounter-level csa scan date.        Last office visit:  01/17/2020    Who is calling:  Patient  Reason for Call:  Pt is calling because he is having a hard time remembering to order his pain medication before he runs out.  Pt also inquiring why he only gets 7 days worth.  Pt states it is hard to get around and he doesn't drive.  Please advise.  Date of last appointment with primary care:  N/A  Has the patient been recently seen:  No  Okay to leave a detailed message: No

## 2021-06-07 NOTE — TELEPHONE ENCOUNTER
Symptom  Describe your symptoms: left shoulder shooting down to arm to finger tips.   Any pain: yes  New/Ongoing: New  How long have you been having symptoms: 10  week(s)  Have you been seen for this:  No  Appointment offered?: Patient declines  Triage offered?: No, caller not with patient  Home remedies tried: caller has been seeing [facility] in the last 4 weeks and was told that it was a muscle and that he needed to see a Rheumatologist   Requested Pharmacy: [facility]  Okay to leave a detailed message? Yes  [phone]

## 2021-06-07 NOTE — TELEPHONE ENCOUNTER
Saw Dr. Moreno @ [place] most recently on 4/24    Assessment indicated that his neck pain and L shoulder pain were secondary to inflammatory process and not spinal etiology    The recommendation was to have further evaluation management with a rheumatologist either through [place] or [place]    If you are in agreement with this plan okay to place order?

## 2021-06-07 NOTE — TELEPHONE ENCOUNTER
FYI - Status Update  Who is Calling: [facility]  Update: 4/30/20      5.9       No patient-related details available  Okay to leave a detailed message?:  Return call to patient

## 2021-06-07 NOTE — TELEPHONE ENCOUNTER
Spoke with the patient and relayed message below from Dr. Bhat. He verbalized understanding and had no further questions at this time.    Prescription has been set up for Dr. Bhat to review per message below.  Elyssa HERNANDEZ CMA/BAHMAN....................3:38 PM

## 2021-06-07 NOTE — TELEPHONE ENCOUNTER
ANTICOAGULATION  MANAGEMENT    Assessment     Today's INR result of 2.8 is Supratherapeutic (goal INR of 2.5-3.5)        Warfarin recently held as instructed which may be affecting INR    No new diet changes affecting INR    No new medication/supplements affecting INR    Continues to tolerate warfarin with no reported s/s of bleeding or thromboembolism     Previous INR was Supratherapeutic    Plan:     Spoke with Bandar regarding INR result and instructed:     Warfarin Dosing Instructions:  Continue current warfarin dose 3 mg daily on Mon/Thurs; and 6 mg daily rest of week  (0 % change)    Instructed patient to follow up no later than: one week    Education provided: importance of therapeutic range and target INR goal and significance of current INR result    Bandar verbalizes understanding and agrees to warfarin dosing plan.    Instructed to call the [facility] for any changes, questions or concerns. (#[phone])   ?   Ilene Porter RN    Subjective/Objective:      Bandar Wells, a 69 y.o. male is on warfarin.     Bandar reports:     Home warfarin dose: confirmed correct dose taken with Bandar     Missed doses: No     Medication changes:  No     S/S of bleeding or thromboembolism:  No     New Injury or illness:  No     Changes in diet or alcohol consumption:  No     Upcoming surgery, procedure or cardioversion:  No    Anticoagulation Episode Summary     Current INR goal:   2.5-3.5   TTR:   22.5 % (1 y)   Next INR check:   4/20/2020   INR from last check:   2.80 (4/13/2020)   Weekly max warfarin dose:      Target end date:   Indefinite   INR check location:      Preferred lab:      Send INR reminders to:   [facility]    Indications    Paroxysmal atrial fibrillation (H) [I48.0]  S/P MVR (mitral valve replacement) [Z95.2]  S/P mitral valve replacement with metallic valve [Z95.4]           Comments:   INR TARGET GOAL 2.5 - 3.5         Anticoagulation Care Providers     Provider Role Specialty Phone  number    Jin Bhat MD [facility] Internal Medicine [phone]

## 2021-06-07 NOTE — TELEPHONE ENCOUNTER
Medication Question or Clarification  Who is calling: Patient  What medication are you calling about (include dose and sig)?:   oxyCODONE (ROXICODONE) 5 MG immediate release tablet  28 tablet  0  4/1/2020      Sig - Route: Take 1 tablet (5 mg total) by mouth every 6 (six) hours as needed for pain. - Oral     Sent to pharmacy as: oxyCODONE 5 mg tablet (ROXICODONE)     Earliest Fill Date: 4/1/2020     E-Prescribing Status: Receipt confirmed by pharmacy (4/1/2020  8:54 AM CDT)         Who prescribed the medication?:   Jin Bhat MD  What is your question/concern?:   Patient is having increased pain.  Patient is requesting an increase in dosage.  Please reach out to patient and advise.  Requested Pharmacy: [facility], [address], [place]  Okay to leave a detailed message?: Yes

## 2021-06-07 NOTE — TELEPHONE ENCOUNTER
ANTICOAGULATION  MANAGEMENT    Assessment     Today's INR result of 4.1 is Supratherapeutic (goal INR of 2.5-3.5)        Patient states it's possible he missed a dose but is unsure    No new diet changes affecting INR    No new medication/supplements affecting INR    Continues to tolerate warfarin with no reported s/s of bleeding or thromboembolism     Previous INR was Therapeutic    Plan:     Spoke with Bandar regarding INR result and instructed:     Warfarin Dosing Instructions:  one time lower dose of 1.5mg  then change warfarin dose to 3 mg daily on Mon/wed/fri; and 6 mg daily rest of week  (8 % change)    Instructed patient to follow up no later than: one week    Education provided: importance of therapeutic range    Bandar verbalizes understanding and agrees to warfarin dosing plan.    Instructed to call the [facility] for any changes, questions or concerns. (#[phone])   ?   Ilene Porter RN    Subjective/Objective:      Bandar Wells, a 69 y.o. male is on warfarin.     Bandar reports:     Home warfarin dose: confirmed correct dose taken      Missed doses: Possibly     Medication changes:  No     S/S of bleeding or thromboembolism:  No     New Injury or illness:  No     Changes in diet or alcohol consumption:  No     Upcoming surgery, procedure or cardioversion:  No    Anticoagulation Episode Summary     Current INR goal:   2.5-3.5   TTR:   22.5 % (1 y)   Next INR check:   4/27/2020   INR from last check:   4.10! (4/20/2020)   Weekly max warfarin dose:      Target end date:   Indefinite   INR check location:      Preferred lab:      Send INR reminders to:   [facility]    Indications    Paroxysmal atrial fibrillation (H) [I48.0]  S/P MVR (mitral valve replacement) [Z95.2]  S/P mitral valve replacement with metallic valve [Z95.4]           Comments:   INR TARGET GOAL 2.5 - 3.5         Anticoagulation Care Providers     Provider Role Specialty Phone number    Jin Bhat MD Referring  Internal Medicine [phone]

## 2021-06-08 ENCOUNTER — THERAPY VISIT (OUTPATIENT)
Dept: PHYSICAL THERAPY | Facility: CLINIC | Age: 71
End: 2021-06-08
Payer: COMMERCIAL

## 2021-06-08 DIAGNOSIS — M53.82 WEAKNESS OF NECK: ICD-10-CM

## 2021-06-08 DIAGNOSIS — R21 RASH: ICD-10-CM

## 2021-06-08 PROCEDURE — 97110 THERAPEUTIC EXERCISES: CPT | Mod: GP | Performed by: PHYSICAL THERAPIST

## 2021-06-08 PROCEDURE — 97112 NEUROMUSCULAR REEDUCATION: CPT | Mod: GP | Performed by: PHYSICAL THERAPIST

## 2021-06-08 NOTE — TELEPHONE ENCOUNTER
Check if this patient is due to get oxycodone. He just refilled this 4 days ago. Perhaps this can wait for Dr. Bhat to refill tomorrow.

## 2021-06-08 NOTE — TELEPHONE ENCOUNTER
Question following Office Visit  When did you see your provider: 5/12/20  What is your question: Patient stated he would like a call back from Dr. Hicks. Patient stated he just about done with the prednisone and it did not help him with his symptoms.  Okay to leave a detailed message: No  [phone]

## 2021-06-08 NOTE — PROGRESS NOTES
Bandar arrived on [address] at 1025. He stated he had no pain or problems with the PICC line at home. PICC site looks clean, dry and intact. This nurse administered 10 ml NS without difficulty. Unable to get a blood return,but no change at the site with administering the normal saline. This nurse infused the Rocephin per Infusion therapy's direction without difficulty. The PICC line then flushed with 20ml NS and capped with a green swab cap. Bandar left unit 5000  Ambulatory at 1125 to return to this unit on 2/19.

## 2021-06-08 NOTE — PROGRESS NOTES
"Bandar Wells is a 69 y.o. male who is being evaluated via a billable video visit.      The patient has been notified of following:     \"This video visit will be conducted via a call between you and your physician/provider. We have found that certain health care needs can be provided without the need for an in-person physical exam.  This service lets us provide the care you need with a video conversation.  If a prescription is necessary we can send it directly to your pharmacy.  If lab work is needed we can place an order for that and you can then stop by our lab to have the test done at a later time.    Video visits are billed at different rates depending on your insurance coverage. Please reach out to your insurance provider with any questions.    If during the course of the call the physician/provider feels a video visit is not appropriate, you will not be charged for this service.\"    Patient has given verbal consent to a Video visit? Yes    Patient would like to receive their AVS by AVS Preference: Mail a copy.    Patient would like the video invitation sent by: Text to cell phone: [phone]    Will anyone else be joining your video visit? No    Video Start Time: 11:22a    Additional provider notes: GENERAL: Healthy, alert and no distress  EYES: Eyes grossly normal to inspection. No discharge or erythema, or obvious scleral/conjunctival abnormalities.  RESP: No audible wheeze, cough, or visible cyanosis.  No visible retractions or increased work of breathing.    NEURO: Cranial nerves grossly intact. Mentation and speech appropriate for age.  PSYCH: Mentation appears normal, affect normal/bright, judgement and insight intact, normal speech and appearance well-groomed      Video-Visit Details    Type of service:  Video Visit    Video End Time (time video stopped): 11:52a  Originating Location (pt. Location): Home    Distant Location (provider location):  [facility] INTERNAL MEDICINE     Platform used for Video Visit: " [employer]      Jin Hicks MD       Video Visit - Follow Up   Bandar Wells   69 y.o. male    Date of Visit: 5/12/2020    Chief Complaint   Patient presents with     Results Or General        Assessment and Plan   1. Inflammatory myopathy  Long discussion with Bandar today about the inflammatory process in his neck and back.  I suspect this is the cause of his pain in the left arm and neck.  With the elevated sed rate and no other explanation, consideration for polymyalgia rheumatica although I am not entirely certain that this would lead to the pattern seen on the MRI.  That said he is pretty miserable right now and I discussed the possible diagnostic benefit of low/moderate dose prednisone as well as the potential downside of masking the diagnosis down the road.  He is very much in favor of a couple days of prednisone to see if he gets vast improvement in his symptoms.  I discussed with Dr. Ronn Ordonez the possibility of a pyomyositis and although possible he thinks this is unlikely and did not recommend any additional diagnostic testing to this and at this point.  Other considerations for myositis, does have mild elevation in calcium and mild elevation is but a sarcoid process I believe would be exceptionally uncommon without other features.  He is going to see rheumatology.  He is also lengthened to [facility] and it would be helpful if their care everywhere was functional so they could review labs and records as well.    I did call over to his rheumatology clinic to discuss but his rheumatologist was unavailable today.  I think it is okay to wait until May 21 for any further discussion.    - predniSONE (DELTASONE) 5 MG tablet; Take 15 mg by mouth daily.  Dispense: 30 tablet; Refill: 1      Return in about 3 days (around 5/15/2020) for video visit if symptoms fail to improve.     History of Present Illness   This 69 y.o. old man comes in and is seen via video conference for evaluation of left arm and neck  pain.  This is been going on for quite some time please see previous notes for complete details.  He has had multiple MRIs including the shoulder neck and brachial plexus.  I was able to obtain the MRI after calling [facility] today specifically of the brachial plexus.  This shows inflammation, possible myositis of the paraspinal musculature bilaterally at the C3-C7 levels.  He is mostly complaining of achiness and pain in the left arm around the shoulder down to the elbow.  Occasional neck achiness.  This is worse in the morning and improves after couple of hours.  It gets worse again in the evening.  He said no vision changes.  No other muscle achiness or joint pain.  No rash.  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  He has had no fever chills or night sweats.  There is no focal fluid collection on the MRI.  We did some labs and his sed rate was 112.  LDH mildly elevated, CK normal, calcium mildly elevated with mild elevation in PTH, ACE level mildly elevated, other labs okay.  I thought I checked an aldolase but this was not resulted.  He denies any rash on his hands or fingers.    Review of Systems: A comprehensive review of systems was negative except as noted.     Medications, Allergies and Problem List   Reviewed, reconciled and updated  Post Discharge Medication Reconciliation Status:      Additional Information   Current Outpatient Medications   Medication Sig Dispense Refill     acetaminophen (TYLENOL) 500 MG tablet Take 500-1,000 mg by mouth every 6 (six) hours as needed for pain. Pain 1-5  Give 500 mg  Or pain 6-10 give 1000 mg every 6 hours ad needed not to exceed 4 grams in 24 hours       atorvastatin (LIPITOR) 40 MG tablet TAKE 1 TABLET (40 MG TOTAL) BY MOUTH AT BEDTIME. 90 tablet 2     b complex vitamins tablet Take 1 tablet by mouth daily.        cholecalciferol, vitamin D3, 1,000 unit tablet Take 1,000 Units by mouth daily.       clindamycin  (CLEOCIN) 300 MG capsule Take 2 capsules (600 mg total) by mouth see administration instructions. Take prior to Dentist appointments 2 capsule 3     cyanocobalamin 1000 MCG tablet Take 1,000 mcg by mouth daily.       ferrous sulfate 325 (65 FE) MG tablet Take 1 tablet (325 mg total) by mouth 2 (two) times a day. (Patient taking differently: Take 1 tablet by mouth daily with breakfast.       ) 60 tablet 4     FLAXSEED OIL ORAL Take 1,000 mg by mouth daily.        fluticasone (FLONASE) 50 mcg/actuation nasal spray 1 spray into each nostril daily.       FOLIC ACID ORAL Take 1 tablet by mouth daily.       furosemide (LASIX) 40 MG tablet Take 1 tablet (40 mg total) by mouth daily. 30 tablet 11     gabapentin (NEURONTIN) 300 MG capsule 1-3 at night       L. ACIDOPHILUS/BIFIDO LONGUM (PROBIOTIC PEARLS ORAL) Take 1 capsule by mouth daily.       multivitamin therapeutic (THERAGRAN) tablet Take 1 tablet by mouth daily.       penicillin VK (PEN VK) 500 MG tablet TAKE 1 TABLET BY MOUTH DAILY 30 tablet 0     potassium chloride (K-DUR,KLOR-CON) 20 MEQ tablet Take 1 tablet (20 mEq total) by mouth daily. 90 tablet 2     traMADol (ULTRAM) 50 mg tablet Take 1 tablet (50 mg total) by mouth every 6 (six) hours as needed for pain. 50 tablet 0     warfarin ANTICOAGULANT (COUMADIN/JANTOVEN) 3 MG tablet TAKE ONE TO TWO TABLETS (3-6MG) BY MOUTH DAILY, AS DIRECTED. ADJUST DOSE BASED ON INR RESULTS. 160 tablet 1     oxyCODONE (ROXICODONE) 5 MG immediate release tablet Take 1 tablet (5 mg total) by mouth every 4 (four) hours as needed for pain. 42 tablet 0     predniSONE (DELTASONE) 5 MG tablet Take 15 mg by mouth daily. 30 tablet 1     No current facility-administered medications for this visit.      No Known Allergies  Social History     Tobacco Use     Smoking status: Never Smoker     Smokeless tobacco: Never Used   Substance Use Topics     Alcohol use: Yes     Alcohol/week: 6.0 standard drinks     Types: 5 Glasses of wine, 1 Cans of beer  per week     Frequency: 2-3 times a week     Drug use: No       Review and/or order of clinical lab tests: Reviewed  Review and/or order of radiology tests: Reviewed  Review and/or order of medicine tests:  Discussion of test results with performing physician:  Decision to obtain old records and/or obtain history from someone other than the patient:  Review and summarization of old records and/or obtaining history from someone other than the patient and.or discussion of case with another health care provider: I spoke with Dr. Ronn Ordonez, requested records from [facility] and reviewed documentation from [facility] as well as forwarded results to [facility]  Independent visualization of image, tracing or specimen itself:    Time:      Jin Hicks MD

## 2021-06-08 NOTE — TELEPHONE ENCOUNTER
ANTICOAGULATION  MANAGEMENT    Assessment     Today's INR result of 3.30 is Therapeutic (goal INR of 2.5-3.5)        Warfarin taken as previously instructed    No new diet changes affecting INR    No interaction expected between Oxycodone and warfarin    Continues to tolerate warfarin with no reported s/s of bleeding or thromboembolism     Previous INR was Therapeutic at 2.50 on 5/21/20.    Plan:     Spoke with Bandar regarding INR result and instructed:     Warfarin Dosing Instructions:    - Continue current warfarin dose 6 mg daily on Wednesdays; and 4.5 mg daily rest of week.    Instructed patient to follow up no later than:  One wk.   - checks INR/s with home monitor thru [employer].    Education provided: target INR goal and significance of current INR result, no interaction anticipated between warfarin and Oxycodone and importance of notifying clinic for changes in medications    Bandar verbalizes understanding and agrees to warfarin dosing plan.    Instructed to call the [facility] for any changes, questions or concerns. (#[phone])   ?   Meg Jacinto RN    Subjective/Objective:      Bandar Wells, a 69 y.o. male is on warfarin.     Bandar reports:     Home warfarin dose: verbally confirmed home dose with Bandra and updated on anticoagulation calendar     Missed doses: No     Medication changes:  Yes:  Since 5/26/20 - Oxycodone     S/S of bleeding or thromboembolism:  No     New Injury or illness:  Yes: Myositis     Changes in diet or alcohol consumption:  No     Upcoming surgery, procedure or cardioversion:  No    Anticoagulation Episode Summary     Current INR goal:   2.5-3.5   TTR:   25.6 % (1 y)   Next INR check:   6/4/2020   INR from last check:   3.30 (5/28/2020)   Weekly max warfarin dose:      Target end date:   Indefinite   INR check location:      Preferred lab:      Send INR reminders to:   [facility]    Indications    Paroxysmal atrial fibrillation (H) [I48.0]  S/P MVR (mitral valve  replacement) [Z95.2]  S/P mitral valve replacement with metallic valve [Z95.4]           Comments:   INR TARGET GOAL 2.5 - 3.5         Anticoagulation Care Providers     Provider Role Specialty Phone number    Jin Bhat MD Referring Internal Medicine [phone]

## 2021-06-08 NOTE — TELEPHONE ENCOUNTER
Central PA team  [phone]  Pool: HE PA MED ([id number])          PA has been initiated.       PA form completed and faxed insurance via Cover My Meds     Brown:  JAMEEL ARZATE Brown: [id number]     Medication:  oxyCODONE HCl 10MG tablets    Insurance:  HEALTH "[employer]        Response will be received via fax and may take up to 5-10 business days depending on plan

## 2021-06-08 NOTE — PROGRESS NOTES
"Bandar Wells is a 69 y.o. male who is being evaluated via a billable video visit.      The patient has been notified of following:     \"This video visit will be conducted via a call between you and your physician/provider. We have found that certain health care needs can be provided without the need for an in-person physical exam.  This service lets us provide the care you need with a video conversation.  If a prescription is necessary we can send it directly to your pharmacy.  If lab work is needed we can place an order for that and you can then stop by our lab to have the test done at a later time.    Video visits are billed at different rates depending on your insurance coverage. Please reach out to your insurance provider with any questions.    If during the course of the call the physician/provider feels a video visit is not appropriate, you will not be charged for this service.\"    Patient has given verbal consent to a Video visit? Yes    Patient would like to receive their AVS by AVS Preference: Mail a copy.    Patient would like the video invitation sent by: Text to cell phone: [phone]    Will anyone else be joining your video visit? No    Video Start Time: 8:45 AM    Additional provider notes: GENERAL: Healthy, alert and no distress  EYES: Eyes grossly normal to inspection. No discharge or erythema, or obvious scleral/conjunctival abnormalities.  RESP: No audible wheeze, cough, or visible cyanosis.  No visible retractions or increased work of breathing.    NEURO: Cranial nerves grossly intact. Mentation and speech appropriate for age.  PSYCH: Mentation appears normal, affect normal/bright, judgement and insight intact, normal speech and appearance well-groomed      Video-Visit Details    Type of service:  Video Visit    Video End Time (time video stopped): 9:02 AM  Originating Location (pt. Location): Home    Distant Location (provider location):  [facility] INTERNAL MEDICINE "     Platform used for Video Visit: [employer]      Jin Hicks MD       Video Visit - Follow Up   Bandar Wells   69 y.o. male    Date of Visit: 6/5/2020    Chief Complaint   Patient presents with     Gout        Assessment and Plan   1. Lytic lesion of bone on x-ray  2. Hypercalcemia  3. Normocytic anemia  4. Chronic kidney disease, stage III (moderate) (H)  5. Elevated uric acid in blood  Concerning for myeloma although no M protein on SPEP.  I have placed order for peripheral smear as well as light chain analysis of the serum.  He has appoint with Dr. Dennis Solorio on 6/15/2020.  Regarding his pain and some radicular symptoms into the left arm and shoulder, MRI did not show any evidence of cord compression, plasmacytosis etc.  I did discuss with him that if he has progression of symptoms we should re-images cervical spine    6. Myositis, unspecified myositis type, unspecified site  Unclear etiology, I have placed an order for paraneoplastic antibody panel    7. Pain syndrome, chronic  Current dose of oxycodone not sufficient to provide pain relief we will increase from 5 mg with each dose to 10 mg.  He understands that he can use half a tablet as well.  Start senna for prevention of constipation  - oxyCODONE (ROXICODONE) 10 mg immediate release tablet; Take 0.5-1 tablets (5-10 mg total) by mouth every 4 (four) hours as needed for pain.  Dispense: 42 tablet; Refill: 0    8. Drug induced constipation  - senna (SENOKOT) 8.6 mg tablet; Take 1-4 tablets by mouth 2 (two) times a day.  Dispense: 90 tablet; Refill: 1    9. Positive QuantiFERON-TB Gold test  Asked with Dr. Wolf and very low positive.  The patient's mother did have tuberculosis.  At this point his presentation seems unlikely to be related to tuberculosis    10. Hx of bacterial endocarditis  On suppressive antibiotics    Return in about 2 weeks (around 6/19/2020) for video visit.     History of Present Illness   This 69 y.o. old man is seen in  follow-up.  Since his last visit he has had a CT of the chest abdomen pelvis which showed some lytic bone lesions.  Previously he had had SPEP and UPEP which were fairly unremarkable.  He has had hypercalcemia, anemia and progression of his chronic kidney disease.  This is concerning for multiple myeloma and he has an appoint with Dr. Dennis Solorio on 6/15/2020.  He has marked elevation in his uric acid level.  Prior to the CT scan there was concern that his joint pain in the left shoulder and spine could be a manifestation of gouty arthropathy.  He was put on prednisone, colchicine and allopurinol.  He has had much improvement in morning stiffness and achiness but is still having pain in the shoulder and neck as the day progresses and using about 30 mg of oxycodone which is insufficient to control the pain.  He has no weakness, numbness.  He has previously had MRI of the cervical spine which showed no tumor cord compression.  MRI of the brachial plexus did not show any masses.    Review of Systems: A comprehensive review of systems was negative except as noted.     Medications, Allergies and Problem List   Reviewed, reconciled and updated  Post Discharge Medication Reconciliation Status:      Additional Information   Current Outpatient Medications   Medication Sig Dispense Refill     acetaminophen (TYLENOL) 500 MG tablet Take 500-1,000 mg by mouth every 6 (six) hours as needed for pain. Pain 1-5  Give 500 mg  Or pain 6-10 give 1000 mg every 6 hours ad needed not to exceed 4 grams in 24 hours       allopurinoL (ZYLOPRIM) 100 MG tablet Take 2 tablets (200 mg total) by mouth daily. 60 tablet 0     b complex vitamins tablet Take 1 tablet by mouth daily.        cholecalciferol, vitamin D3, 1,000 unit tablet Take 1,000 Units by mouth daily.       clindamycin (CLEOCIN) 300 MG capsule Take 2 capsules (600 mg total) by mouth see administration instructions. Take prior to Dentist appointments 2 capsule 3     colchicine 0.6 mg  tablet Take 1 tablet (0.6 mg total) by mouth daily for 14 days. 14 tablet 0     cyanocobalamin 1000 MCG tablet Take 1,000 mcg by mouth daily.       ferrous sulfate 325 (65 FE) MG tablet Take 1 tablet (325 mg total) by mouth 2 (two) times a day. (Patient taking differently: Take 1 tablet by mouth daily with breakfast.       ) 60 tablet 4     FLAXSEED OIL ORAL Take 1,000 mg by mouth daily.        fluticasone (FLONASE) 50 mcg/actuation nasal spray 1 spray into each nostril daily.       FOLIC ACID ORAL Take 1 tablet by mouth daily.       furosemide (LASIX) 40 MG tablet Take 1 tablet (40 mg total) by mouth daily. 30 tablet 11     gabapentin (NEURONTIN) 300 MG capsule 1-3 at night       L. ACIDOPHILUS/BIFIDO LONGUM (PROBIOTIC PEARLS ORAL) Take 1 capsule by mouth daily.       multivitamin therapeutic (THERAGRAN) tablet Take 1 tablet by mouth daily.       oxyCODONE (ROXICODONE) 10 mg immediate release tablet Take 0.5-1 tablets (5-10 mg total) by mouth every 4 (four) hours as needed for pain. 42 tablet 0     penicillin VK (PEN VK) 500 MG tablet TAKE 1 TABLET BY MOUTH DAILY 30 tablet 0     potassium chloride (K-DUR,KLOR-CON) 20 MEQ tablet Take 1 tablet (20 mEq total) by mouth daily. 90 tablet 2     predniSONE (DELTASONE) 10 mg tablet Take 40mg by mouth daily for 3 days, then 30mg x 3 days, then 20mg x 3 days ,then 10mg x 3 days then stop. 30 tablet 0     warfarin ANTICOAGULANT (COUMADIN/JANTOVEN) 3 MG tablet TAKE ONE TO TWO TABLETS (3-6MG) BY MOUTH DAILY, AS DIRECTED. ADJUST DOSE BASED ON INR RESULTS. 160 tablet 1     senna (SENOKOT) 8.6 mg tablet Take 1-4 tablets by mouth 2 (two) times a day. 90 tablet 1     No current facility-administered medications for this visit.      No Known Allergies  Social History     Tobacco Use     Smoking status: Never Smoker     Smokeless tobacco: Never Used   Substance Use Topics     Alcohol use: Yes     Alcohol/week: 6.0 standard drinks     Types: 5 Glasses of wine, 1 Cans of beer per week      Frequency: 2-3 times a week     Drug use: No       Review and/or order of clinical lab tests: Reviewed and ordered  Review and/or order of radiology tests: Reviewed  Review and/or order of medicine tests:  Discussion of test results with performing physician:  Decision to obtain old records and/or obtain history from someone other than the patient:  Review and summarization of old records and/or obtaining history from someone other than the patient and.or discussion of case with another health care provider: I spoken with Dr. Ronn Ordonez regarding his medical treatment, we did discuss his positive QuantiFERON gold and this was mildly positive and Dr. Wolf did not feel it was of clinical significance.  I also have spoken with Dr. Raphael Nicole about the above management  Independent visualization of image, tracing or specimen itself:    Time:      Jin Hicks MD

## 2021-06-08 NOTE — TELEPHONE ENCOUNTER
Received a faxed INR result for Bandar Wells  From [facility]  INR result dated 5/28/2020 is 3.30      Last INR on 5/21/20 was 3.30

## 2021-06-08 NOTE — TELEPHONE ENCOUNTER
ANTICOAGULATION  MANAGEMENT    Assessment     Today's INR result of 3.6 is Supratherapeutic (goal INR of 2.5-3.5)        Warfarin taken as previously instructed    No new diet changes affecting INR     6/2 Virtual visit with Dr Hicks for Inflammatory myopathy pain syndrome, chronic elevated uric acid    6/2 -Started taking prednisone 40 mg x 3 days; then 30 mg x 3; 20 mg x 3 then 10 mg x3, allopurinol, colchicine .    No interaction expected between cochicine and warfarin    Potential interaction between prednisone,allopurinol and warfarin which may affect subsequent INRs    Continues to tolerate warfarin with no reported s/s of bleeding or thromboembolism     Previous INR was Therapeutic    Plan:     Spoke with Bandar regarding INR result and instructed:     Warfarin Dosing Instructions:  take one time lower dose of 3 mg today then scheduled 4.5 mg tomorrow    (0 % change)    Instructed patient to follow up no later than: 6/5 with home monitor. Instructed to check INR before 2 PM.    Education provided: importance of therapeutic range, target INR goal and significance of current INR result, potential interaction between warfarin and Prednisone,Allopurinol and no interaction anticipated between warfarin and Cochicine    Bandar verbalizes understanding and agrees to warfarin dosing plan.    Instructed to call the [facility] for any changes, questions or concerns. (#[phone])   ?   Galilea Hernandez RN    Subjective/Objective:      Bandar Wells, a 69 y.o. male is on warfarin.     Bandar reports:     Home warfarin dose: verbally confirmed home dose with Bandar and updated on anticoagulation calendar     Missed doses: No     Medication changes:  Yes: started doses yesterday:  1. Prednisone 40 mg x 3 days 6/2-6/4  Then 30 mg x days 6/5 -6/7  Then 20 mg x 3 days 6/8-6/10  Then 10 mg x3 days 6/11-6/13 then stop.  2.Colchicine .6 mg daily  3. Allopurinol 200 mg daily     S/S of bleeding or thromboembolism:  No     New Injury or  illness:  Yes: see above     Changes in diet or alcohol consumption:  No     Upcoming surgery, procedure or cardioversion:  No    Anticoagulation Episode Summary     Current INR goal:   2.5-3.5   TTR:   25.5 % (1 y)   Next INR check:   6/10/2020   INR from last check:   3.60! (6/3/2020)   Weekly max warfarin dose:      Target end date:   Indefinite   INR check location:      Preferred lab:      Send INR reminders to:   [facility]    Indications    Paroxysmal atrial fibrillation (H) [I48.0]  S/P MVR (mitral valve replacement) [Z95.2]  S/P mitral valve replacement with metallic valve [Z95.4]           Comments:   INR TARGET GOAL 2.5 - 3.5         Anticoagulation Care Providers     Provider Role Specialty Phone number    Jin Bhat MD Referring Internal Medicine [phone]

## 2021-06-08 NOTE — TELEPHONE ENCOUNTER
Who is calling:  Bandar  Reason for Call:  #1)  Patient is now back from [facility].  #2) Please send his May 4th lab work to [facility].  Dr. Rubin, Spine/Orthopedics.  Include his [place] Patient ID #[id number].  Fax #[phone]  Date of last appointment with primary care: 54/30/20.  Okay to leave a detailed message: Yes

## 2021-06-08 NOTE — TELEPHONE ENCOUNTER
Bandar was referred for an oncology consult by Dr. Hicks.  I called in attempt to schedule and reached his VM.  I LM for him to call me back.

## 2021-06-08 NOTE — TELEPHONE ENCOUNTER
Controlled Substance Refill Request  Medication Name:   Requested Prescriptions     Pending Prescriptions Disp Refills     oxyCODONE (ROXICODONE) 5 MG immediate release tablet 42 tablet 0     Sig: Take 1 tablet (5 mg total) by mouth every 4 (four) hours as needed for pain.     Date Last Fill: 5/5/20  [facility]ed [facility]: [facility]  Submit electronically to pharmacy  Controlled Substance Agreement on file:   Encounter-Level CSA Scan Date:    There are no encounter-level csa scan date.        Last office visit:  4/30/20

## 2021-06-08 NOTE — TELEPHONE ENCOUNTER
See progress note in chart from [facility]-    There are still tests in process- will fax next week when everything is completed     Fax # for Dr. Rubin is [phone]

## 2021-06-08 NOTE — PROGRESS NOTES
Arrived for IV antibiotic.  No new problems or c/o's.  PICC flushed with ease but unable to ascertain blood return after multiple attempts to flush.  TPA instilled.  No blood return after 30 min, recheck at 60 min revealed slight blood return.  Had pt stand move arms, walk short distance and was able to withdraw 4cc of blood without problem.  Line flushed with 20cc NS and antibiotic hung.   Antibiotic infused without problem and PICC flushed with ease upon completion.  Left via w/c accompanied by transport at 1245.  Will return to [address] at 1000 on Saturday and Sunday and return here on Monday.  Verbalizes understanding of plan of care.  Note that catheter length exposed on PICC is 11 cm today and on insertion it is noted there were 2 cm exposed.  Spoke with PICC nurse and pt does not require central line for rocephin but unclear where exactly the tip of the catheter is now since there is change in amount exposed.  Dressing itself is dry and intact today.  Call placed to Dr. Ordonez about whether he wants to do anything with catheter at this time.  Did receive call back from Dr. Ordonez.  As long as we continue to get blood return from catheter and it infuses without problem, we don't need to have any further intervention at this time.  We will notify Dr. Ordonez if further problems.

## 2021-06-08 NOTE — TELEPHONE ENCOUNTER
Who is calling:  Patient  Reason for Call:  Pt called to express 3 concerns:    1.Seen rheum yesterday get back to himtest were ordered next few weeks    2.Appt with [place]     3.Pain from condition is worsening    Pt asking that provider contact him. Please advise.     Date of last appointment with primary care: N/A  Okay to leave a detailed message: Yes

## 2021-06-08 NOTE — TELEPHONE ENCOUNTER
Received a faxed INR result for Bandar Wells  From [facility]    INR result dated 6/3/2020 is 3.6

## 2021-06-08 NOTE — TELEPHONE ENCOUNTER
Controlled Substance Refill Request  Medication Name:   Requested Prescriptions     Pending Prescriptions Disp Refills     oxyCODONE (ROXICODONE) 10 mg immediate release tablet [Pharmacy Med Name: OXYCODONE HCL 10 MG TABLET 10 TAB] 42 tablet 0     Sig: TAKE 0.5-1 TABLETS (5-10 MG TOTAL) BY MOUTH EVERY 4 (FOUR) HOURS AS NEEDED FOR PAIN.     Date Last Fill: 6/5/20  [facility]ed [facility]  Submit electronically to pharmacy  Controlled Substance Agreement on file:   Encounter-Level CSA Scan Date:    There are no encounter-level csa scan date.        Last office visit:  6/5/20

## 2021-06-08 NOTE — PROGRESS NOTES
Arrived to continue daily Rocephin after being discharged from hospital yesterday.Color kvng. States very tired after going home yesterday, felt he may have run a fever last pm, no chills, temp on arrival now of 99.0 PICC patent, bio patch bloody so dressing changed. Rocephin infused over approx 30 minutes and was well tolerated. Pt denies any rash, loose stools etc. States he will try to eat some yogurt daily. Marginal appetite but when he does eat food tastes good. BETI noted.States he is drinking fluids well, eating the best he can. Is able to do most activities if he paces self. Pt. Left via w/c. Will check for temp at home prn. RTC 02/17/17  When changing PICC dressing after biopatch and stat lock removed it appeared that possibly more line had migrated from picc site and this was secured.

## 2021-06-08 NOTE — PROGRESS NOTES
"Bandar Wells is a 69 y.o. male who is being evaluated via a billable video visit.      The patient has been notified of following:     \"This video visit will be conducted via a call between you and your physician/provider. We have found that certain health care needs can be provided without the need for an in-person physical exam.  This service lets us provide the care you need with a video conversation.  If a prescription is necessary we can send it directly to your pharmacy.  If lab work is needed we can place an order for that and you can then stop by our lab to have the test done at a later time.    Video visits are billed at different rates depending on your insurance coverage. Please reach out to your insurance provider with any questions.    If during the course of the call the physician/provider feels a video visit is not appropriate, you will not be charged for this service.\"    Patient has given verbal consent to a Video visit? Yes    Patient would like to receive their AVS by AVS Preference: Nick.    Patient would like the video invitation sent by: Text to cell phone: [phone]    Will anyone else be joining your video visit? No              Rachael Wick CMA  "

## 2021-06-08 NOTE — PATIENT INSTRUCTIONS - HE
Summary of Your Rheumatology Visit    Next Appointment: 3-4 weeks    Medications:       Referrals:     Neurology    Tests:      EMG    Please have labs and x-rays that were ordered performed.       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

## 2021-06-08 NOTE — TELEPHONE ENCOUNTER
Prior Authorization Request  Who s requesting:  Pharmacy  Pharmacy Name and Location: [facility] [place], [place] - [facility] [address]   Medication Name: oxyCODONE (ROXICODONE) 10 mg immediate release tablet     Insurance Plan:   Insurance Member ID Number:  [id number]  CoverMyMeds Key: [id number]  Informed patient that prior authorizations can take up to 10 business days for response:   NA  Okay to leave a detailed message: Yes

## 2021-06-08 NOTE — PROGRESS NOTES
"Bandar Wells is a 69 y.o. male who is being evaluated via a billable video visit.      The patient has been notified of following:     \"This video visit will be conducted via a call between you and your physician/provider. We have found that certain health care needs can be provided without the need for an in-person physical exam.  This service lets us provide the care you need with a video conversation.  If a prescription is necessary we can send it directly to your pharmacy.  If lab work is needed we can place an order for that and you can then stop by our lab to have the test done at a later time.    Video visits are billed at different rates depending on your insurance coverage. Please reach out to your insurance provider with any questions.    If during the course of the call the physician/provider feels a video visit is not appropriate, you will not be charged for this service.\"    Patient has given verbal consent to a Video visit? Yes    Patient would like to receive their AVS by AVS Preference: Mail a copy.    Patient would like the video invitation sent by: Text to cell phone: [phone]    Will anyone else be joining your video visit? No    Video Start Time: 2:25 PM    Additional provider notes: GENERAL: Healthy, alert and no distress  EYES: Eyes grossly normal to inspection. No discharge or erythema, or obvious scleral/conjunctival abnormalities.  RESP: No audible wheeze, cough, or visible cyanosis.  No visible retractions or increased work of breathing.    NEURO: Cranial nerves grossly intact. Mentation and speech appropriate for age.  PSYCH: Mentation appears normal, affect normal/bright, judgement and insight intact, normal speech and appearance well-groomed      Video-Visit Details    Type of service:  Video Visit    Video End Time (time video stopped): 2:36 PM  Originating Location (pt. Location): Home    Distant Location (provider location):  [facility] INTERNAL MEDICINE     Platform used for Video " Visit: Artem Hicks MD       Video Visit - Follow Up   Bandar Wells   69 y.o. male    Date of Visit: 5/20/2020    Chief Complaint   Patient presents with     Polymyalgia rheumatica follow up        Assessment and Plan   1. Inflammatory myopathy  Etiology uncertain.  He has elevated inflammatory markers, imaging suggesting muscle inflammation.  No clear evidence of abscess or infection.  No significant response to low moderate dose prednisone making polymyalgia unlikely.  He is going to see rheumatology tomorrow and also may follow-up at [facility].    2. Prosthetic valve endocarditis, subsequent encounter  As above      Return in about 2 weeks (around 6/3/2020) for video visit, follow up with PCP.     History of Present Illness   This 69 y.o. old man is being seen in follow-up.  He has been having pain in his left arm and neck.  Extensive evaluation including imaging showed some inflammation in the musculature along the paraspinous muscles on the left.  He has seen orthopedic surgery, orthopedic spine and spine surgeon at [facility].  I checked some labs and found his inflammatory marker including sed rate was quite high.  We did a trial of prednisone which the first day seemed to help but has not helped the rest of the week.  He is back taking oxycodone on a regular basis.  I had double checked with his infectious disease doctor Dr. Ronn Ordonez about the possibility of pyomyositis given history of endocarditis and is on suppressive antibiotics.  Although possible felt to be unlikely.  No clear abscess for drainage.  No specific diagnostic testing apart from what with already done including blood cultures was recommended.  He is going to follow-up at [facility] and has an appoint with rheumatology tomorrow    Review of Systems: A comprehensive review of systems was negative except as noted.     Medications, Allergies and Problem List   Reviewed, reconciled and updated  Post Discharge  Medication Reconciliation Status:      Additional Information   Current Outpatient Medications   Medication Sig Dispense Refill     acetaminophen (TYLENOL) 500 MG tablet Take 500-1,000 mg by mouth every 6 (six) hours as needed for pain. Pain 1-5  Give 500 mg  Or pain 6-10 give 1000 mg every 6 hours ad needed not to exceed 4 grams in 24 hours       atorvastatin (LIPITOR) 40 MG tablet TAKE 1 TABLET (40 MG TOTAL) BY MOUTH AT BEDTIME. 90 tablet 2     b complex vitamins tablet Take 1 tablet by mouth daily.        cholecalciferol, vitamin D3, 1,000 unit tablet Take 1,000 Units by mouth daily.       clindamycin (CLEOCIN) 300 MG capsule Take 2 capsules (600 mg total) by mouth see administration instructions. Take prior to Dentist appointments 2 capsule 3     cyanocobalamin 1000 MCG tablet Take 1,000 mcg by mouth daily.       ferrous sulfate 325 (65 FE) MG tablet Take 1 tablet (325 mg total) by mouth 2 (two) times a day. (Patient taking differently: Take 1 tablet by mouth daily with breakfast.       ) 60 tablet 4     FLAXSEED OIL ORAL Take 1,000 mg by mouth daily.        fluticasone (FLONASE) 50 mcg/actuation nasal spray 1 spray into each nostril daily.       FOLIC ACID ORAL Take 1 tablet by mouth daily.       furosemide (LASIX) 40 MG tablet Take 1 tablet (40 mg total) by mouth daily. 30 tablet 11     gabapentin (NEURONTIN) 300 MG capsule 1-3 at night       L. ACIDOPHILUS/BIFIDO LONGUM (PROBIOTIC PEARLS ORAL) Take 1 capsule by mouth daily.       multivitamin therapeutic (THERAGRAN) tablet Take 1 tablet by mouth daily.       oxyCODONE (ROXICODONE) 5 MG immediate release tablet Take 1 tablet (5 mg total) by mouth every 4 (four) hours as needed for pain. 42 tablet 0     penicillin VK (PEN VK) 500 MG tablet TAKE 1 TABLET BY MOUTH DAILY 30 tablet 0     potassium chloride (K-DUR,KLOR-CON) 20 MEQ tablet Take 1 tablet (20 mEq total) by mouth daily. 90 tablet 2     predniSONE (DELTASONE) 5 MG tablet Take 15 mg by mouth daily. 30  tablet 1     traMADol (ULTRAM) 50 mg tablet Take 1 tablet (50 mg total) by mouth every 6 (six) hours as needed for pain. 50 tablet 0     warfarin ANTICOAGULANT (COUMADIN/JANTOVEN) 3 MG tablet TAKE ONE TO TWO TABLETS (3-6MG) BY MOUTH DAILY, AS DIRECTED. ADJUST DOSE BASED ON INR RESULTS. 160 tablet 1     No current facility-administered medications for this visit.      No Known Allergies  Social History     Tobacco Use     Smoking status: Never Smoker     Smokeless tobacco: Never Used   Substance Use Topics     Alcohol use: Yes     Alcohol/week: 6.0 standard drinks     Types: 5 Glasses of wine, 1 Cans of beer per week     Frequency: 2-3 times a week     Drug use: No       Review and/or order of clinical lab tests:  Review and/or order of radiology tests:  Review and/or order of medicine tests:  Discussion of test results with performing physician:  Decision to obtain old records and/or obtain history from someone other than the patient:  Review and summarization of old records and/or obtaining history from someone other than the patient and.or discussion of case with another health care provider:  Independent visualization of image, tracing or specimen itself:    Time:      Jin Hicks MD

## 2021-06-08 NOTE — PROGRESS NOTES
I reviewed Bandar CT with him.  He has some bone lesions which are concerning for myeloma.  I had previously checked an SPEP and UPEP which showed no evidence of myeloma.  He does have chronic kidney disease, anemia, elevated inflammatory markers, mild hypercalcemia, elevated LDH, elevated uric acid.  I am going to check a peripheral smear, light chains serum, and a paraneoplastic antibody panel.  I have placed a referral for oncology.

## 2021-06-08 NOTE — TELEPHONE ENCOUNTER
Controlled Substance Refill Request  Medication Name:   Requested Prescriptions     Pending Prescriptions Disp Refills     oxyCODONE (ROXICODONE) 5 MG immediate release tablet 42 tablet 0     Sig: Take 1 tablet (5 mg total) by mouth every 4 (four) hours as needed for pain.     Date Last Fill: 5/12/20  [facility]  Submit electronically to pharmacy  Controlled Substance Agreement on file:   Encounter-Level CSA Scan Date:    There are no encounter-level csa scan date.        Last office visit:  5/12/20

## 2021-06-08 NOTE — PROGRESS NOTES
Bandar arrived ambulatory on [address] at 1025.He denies any pain or any problems with his PICC line. He did state he has felt a little more short of breath today. Pulse 52. Denies any dizziness. Patient stated that he was recently started on Metoprolol. Bandar to let his MD know if he experiences any dizziness or other symptoms  and instructed to let the infusion enter know about his lower pulse rate. PICC line dressing dry, clean and intact. PICC line flushed with 10 ml NS. Obtained a GOOD blood return today. Rocephin infused without difficulty. PICC line flushed with 20 ml NS post antibiotic infusion and capped with a green swab cap. Bandar left [address]  Ambulatory at 1125. To return to the Infusion center 2/20.

## 2021-06-08 NOTE — PATIENT INSTRUCTIONS - HE
Summary of Your Rheumatology Visit    Next Appointment:   6-8 weeks    Medications:     Please follow directives on pill bottle on how to take medication(s) provided.      Referrals:       Tests:      Please have labs performed in about 3 weeks.       Injections:         Other:

## 2021-06-08 NOTE — TELEPHONE ENCOUNTER
ANTICOAGULATION  MANAGEMENT    Assessment     Today's INR result of 2.50 is Therapeutic (goal INR of 2.5-3.5)        Warfarin taken as previously instructed    No new diet changes affecting INR    No new medication/supplements affecting INR    Continues to tolerate warfarin with no reported s/s of bleeding or thromboembolism     Previous INR was Subtherapeutic at 2.00 on 5/13/20.    Still complaining of myalgias.  Consultation today with Rheumatologist.    Plan:     Spoke with Bandar regarding INR result and instructed:     Warfarin Dosing Instructions: (evenings. Has 3mg tabs)   - Continue current warfarin dose 6 mg daily on Wednesdays; and 4.5 mg daily rest of week.    Instructed patient to follow up no later than:  One wk.   - checks INR's with home monitor thru [employer]    Education provided: importance of consistent vitamin K intake, target INR goal and significance of current INR result and importance of notifying clinic for changes in medications    Bandar verbalizes understanding and agrees to warfarin dosing plan.    Instructed to call the [facility] for any changes, questions or concerns. (#[phone])   ?   Meg Jacinto RN    Subjective/Objective:      Bandar Wells, a 69 y.o. male is on warfarin.     Bandar reports:     Home warfarin dose: verbally confirmed home dose with Bandar and updated on anticoagulation calendar     Missed doses: No     Medication changes:  No     S/S of bleeding or thromboembolism:  No     New Injury or illness:  No     Changes in diet or alcohol consumption:  No     Upcoming surgery, procedure or cardioversion:  No    Anticoagulation Episode Summary     Current INR goal:   2.5-3.5   TTR:   23.7 % (1 y)   Next INR check:   5/28/2020   INR from last check:   2.50 (5/21/2020)   Weekly max warfarin dose:      Target end date:   Indefinite   INR check location:      Preferred lab:      Send INR reminders to:   [facility]    Indications    Paroxysmal atrial fibrillation  (H) [I48.0]  S/P MVR (mitral valve replacement) [Z95.2]  S/P mitral valve replacement with metallic valve [Z95.4]           Comments:   INR TARGET GOAL 2.5 - 3.5         Anticoagulation Care Providers     Provider Role Specialty Phone number    Jin Bhat MD Referring Internal Medicine [phone]

## 2021-06-08 NOTE — TELEPHONE ENCOUNTER
Prior Authorization Request  Who s requesting:  Pharmacy  Pharmacy Name and Location: [facility]  Medication Name: oxyCODONE (ROXICODONE) 10 mg immediate release tablet   Insurance Plan: [employer]  Insurance Member ID Number:  [id number]  CoverMyMeds Key: N/A  Informed patient that prior authorizations can take up to 10 business days for response:   NA  Okay to leave a detailed message: No

## 2021-06-08 NOTE — TELEPHONE ENCOUNTER
Received a faxed INR result for Bandar Wells  From [facility]  INR result dated 5/13/2020 is 2.00      Last INR from 5/6/20 was 2.40

## 2021-06-08 NOTE — TELEPHONE ENCOUNTER
PRIOR AUTHORIZATION NOT NEEDED    Per [facility] drug,  a PA is not needed a they did fill the Rx on 06/13/2020 for the full amount of 42 tablets per 7 days. They do not sure any pending requests for a Prior Auth or a Refill Request. If the provider wants to send a new Rx if appropriate, he may do so.

## 2021-06-08 NOTE — TELEPHONE ENCOUNTER
Dr. Hicks,  Spoke with the patient and helped him to schedule a video visit with you on Tuesday morning at 9 am.  Elyssa HERNANDEZ, ADOLPH/CMT....................11:55 AM

## 2021-06-08 NOTE — PROGRESS NOTES
Bandar Wells who presents today with a chief complaint of  Consult      Joint Pains: Yes  Location: neck, left shoulder and left arm  Onset: September 2019   Intensity:  4/10  AM Stiffness: 30 Minutes  Alleviating/Aggravating Factors: Medications helpful? Yes   Tolerating Meds: Yes  Other:      ROS:  Patient denies having: persistent dry eyes, dry mouth, recurrent oral ulcers, patchy alopecia, active rashes, photosensitivity, history of psoriasis, active chest pain, active shortness of breath, active cough, active dysuria, history of kidney stones, active abdominal pain, active diarrhea, history of hematochezia, active dysphagia, history of peptic ulcer disease, history of HIV, tuberculosis, hepatitis B or C, Lyme disease, seizure history, raynaud's, active documented fevers, recent infections, difficulty sleeping or chronic unrefreshing sleep, involuntary weight loss, loss of appetite, excessive fatigue, depression, anxiety,  recurrent sinus infections, history of inflammatory eye diseases (such as uveitis, scleritis, iritis, etc).     Information gathered by medical assistant incorporated into this note, was reviewed and discussed with the patient.    Problem List:  Patient Active Problem List   Diagnosis     Hx of bacterial endocarditis     Dyslipidemia     Mitral valve prolapse     S/P MVR (mitral valve replacement)     Recurrent pleural effusion on right     Pericardial effusion without cardiac tamponade     Status post Maze operation for atrial fibrillation     Paroxysmal atrial fibrillation (H)     Coronary artery disease     Acute diastolic heart failure (H)     CHF (congestive heart failure) (H)     Hypovolemia     Hypotension     Acute respiratory failure with hypoxia (H)     Acute blood loss anemia     Acute renal failure (H)     Dysthymia     Prosthetic valve endocarditis, subsequent encounter     S/P mitral valve replacement with metallic valve     Persistent atrial fibrillation     Chronic combined  systolic and diastolic congestive heart failure (H)     Acute on chronic combined systolic and diastolic congestive heart failure (H)     Lymphedema of both lower extremities        PMH:   Past Medical History:   Diagnosis Date     Abnormal liver function test      Atrial fibrillation (H)     post naun     Atrial fibrillation with RVR (H) 8/29/2015    S/p MAZE Jul 2015     Bacterial endocarditis      CHF (congestive heart failure) (H)      Dyslipidemia      Hyperlipidemia      Mitral valve prolapse      Near syncope      Pericardial effusion without cardiac tamponade 8/29/2015     S/P mitral valve replacement with metallic valve 03/16/2017     Status post Maze operation for atrial fibrillation 8/29/2015       Surgical History:  Past Surgical History:   Procedure Laterality Date     CARDIAC SURGERY       CHG X-RAY PELVIS 3+ VW N/A 7/1/2015    Procedure: MITRAL VALVE REPLACEMENT, MAZE PROCEDURE, CARDIOLOGY TRANSESOPHAGEAL ECHOCARDIOGRAM;  Surgeon: Rm Munoz MD;  Location: [facility];  Service:      LANG-MAZE MICROWAVE ABLATION       EYE SURGERY      Retial hole treatment     HERNIA REPAIR Right     inguinal     MITRAL VALVE REPLACEMENT N/A 3/16/2017    Procedure: REDO STERNOTOMY, REDO MITRAL VALVE REPLACEMENT, PLACEMENT TEMPORARY VENTRICULAR PACING WIRES, ANESTHESIA TRANSESOPHAGEAL ECHOCARDIOGRAM;  Surgeon: Raphael Rosenberg MD;  Location: [facility] OR;  Service:      MITRAL VALVE REPLACEMENT  2015    Bioprosthetic     PERICARDIAL WINDOW N/A 8/31/2015    Procedure: RIGHT PERICARDIAL WINDOW, TALC PLEURODESIS,VIDEO ASSISTED THOROSCOPY,DRAINAGE OF BILATERAL PLEURAL EFFUSIONS;  Surgeon: Rm Munoz MD;  Location: [facility] OR;  Service:      PICC  9/3/2015          PICC  2/14/2017            Family History:  Family History   Problem Relation Age of Onset     Atrial fibrillation Mother      Heart failure Mother      Emphysema Father      Heart failure Father      Kidney  failure Sister         S/P Renal transplant     Alcoholism Brother      No Medical Problems Son      No Medical Problems Brother        Social History:   reports that he has never smoked. He has never used smokeless tobacco. He reports current alcohol use of about 6.0 standard drinks of alcohol per week. He reports that he does not use drugs.    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 Pain 1-5  Give 500 mg  Or pain 6-10 give 1000 mg every 6 hours ad needed not to exceed 4 grams in 24 hours       atorvastatin (LIPITOR) 40 MG tablet TAKE 1 TABLET (40 MG TOTAL) BY MOUTH AT BEDTIME. 90 tablet 2     b complex vitamins tablet Take 1 tablet by mouth daily.        cholecalciferol, vitamin D3, 1,000 unit tablet Take 1,000 Units by mouth daily.       clindamycin (CLEOCIN) 300 MG capsule Take 2 capsules (600 mg total) by mouth see administration instructions. Take prior to Dentist appointments 2 capsule 3     cyanocobalamin 1000 MCG tablet Take 1,000 mcg by mouth daily.       ferrous sulfate 325 (65 FE) MG tablet Take 1 tablet (325 mg total) by mouth 2 (two) times a day. (Patient taking differently: Take 1 tablet by mouth daily with breakfast.       ) 60 tablet 4     FLAXSEED OIL ORAL Take 1,000 mg by mouth daily.        fluticasone (FLONASE) 50 mcg/actuation nasal spray 1 spray into each nostril daily.       FOLIC ACID ORAL Take 1 tablet by mouth daily.       furosemide (LASIX) 40 MG tablet Take 1 tablet (40 mg total) by mouth daily. 30 tablet 11     gabapentin (NEURONTIN) 300 MG capsule 1-3 at night       L. ACIDOPHILUS/BIFIDO LONGUM (PROBIOTIC PEARLS ORAL) Take 1 capsule by mouth daily.       multivitamin therapeutic (THERAGRAN) tablet Take 1 tablet by mouth daily.        "oxyCODONE (ROXICODONE) 5 MG immediate release tablet Take 1 tablet (5 mg total) by mouth every 4 (four) hours as needed for pain. 42 tablet 0     penicillin VK (PEN VK) 500 MG tablet TAKE 1 TABLET BY MOUTH DAILY 30 tablet 0     potassium chloride (K-DUR,KLOR-CON) 20 MEQ tablet Take 1 tablet (20 mEq total) by mouth daily. 90 tablet 2     traMADol (ULTRAM) 50 mg tablet Take 1 tablet (50 mg total) by mouth every 6 (six) hours as needed for pain. 50 tablet 0     warfarin ANTICOAGULANT (COUMADIN/JANTOVEN) 3 MG tablet TAKE ONE TO TWO TABLETS (3-6MG) BY MOUTH DAILY, AS DIRECTED. ADJUST DOSE BASED ON INR RESULTS. 160 tablet 1     predniSONE (DELTASONE) 5 MG tablet Take 15 mg by mouth daily. 30 tablet 1     No current facility-administered medications for this visit.            Physical Exam:  There were no vitals taken for this visit.  General: A & O x 3 in NAD  MS: Points the pain involving left side of neck, left shoulder and proximal left upper extremity.      Bandar Wells is a 69 y.o. male who is being evaluated via a billable video visit.      The patient has been notified of following:     \"This video visit will be conducted via a call between you and your physician/provider. We have found that certain health care needs can be provided without the need for an in-person physical exam.  This service lets us provide the care you need with a video conversation.  If a prescription is necessary we can send it directly to your pharmacy.  If lab work is needed we can place an order for that and you can then stop by our lab to have the test done at a later time.    Video visits are billed at different rates depending on your insurance coverage. Please reach out to your insurance provider with any questions.    If during the course of the call the physician/provider feels a video visit is not appropriate, you will not be charged for this service.\"    Patient has given verbal consent to a Video visit? Yes    Patient would like " to receive their AVS by AVS Preference: Nick.    Patient would like the video invitation sent by: Send to e-mail at: [email or web address]    Will anyone else be joining your video visit? Yes: Pat. How would they like to receive their invitation? Send to e-mail at: [email or web address]      Video Start Time: 9:20 AM    Additional provider notes:       Video-Visit Details    Type of service:  Video Visit     Video End Time (time video stopped): 10:04 AM  Originating Location (pt. Location): Home    Distant Location (provider location):  [facility]     Platform used for Video Visit: Yessica Gottlieb DO        Summary/Assessment:    Pleasant 69-year-old male presents with history of developing pain involving left side of neck, left shoulder and proximal left upper extremity.  States these pains began in September 2019.  Denies any prior trauma.    Initially saw orthopedics told to have some mild rotator cuff tear, went to physical therapy and also received cortisone injection however has not noticed improvement.    In addition to MRI left shoulder, patient also had MRIs of cervical spine and left brachial plexus.    MRI of cervical spine showed some mild to moderate DJD, mild to moderate foraminal stenosis and mild spinal stenosis.  States received injection involving the cervical spine from spine clinic and has not noticed benefit.    Left brachial plexus MRI showed some nonspecific signs of myositis involving dorsal paraspinal musculature at C3-C7.    Noted to have elevated ESR, elevated sed rate.    Also noted to have renal insufficiency and anemia.    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      Some autoimmune markers were performed which were unrevealing (see below).    Currently receiving some partial benefit from oxycodone.  Has tried Tylenol, tramadol, Vicodin, Lidoderm, Neurontin which were not very  beneficial.    Denies having any swelling involving left shoulder joint.    Denies left arm weakness, mainly pain.    Traveled to [place] in the summer 2019.      Denies having chest x-ray or EMG study.    Has not seen neurology.    Is now established with [facility] orthopedics, has seen [place] as well in the past.    States PCP try trial of prednisone Fifteen (15)  mg for 5 days, with insufficient benefit.    Please see below for management plan.        Pertinent rheumatology/past medical history (please refer to above for more detailed history):      Chronic left neck, left shoulder, left proximal arm pain since September 2019.    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 fib        Rheumatology medications provided/suggested:          Pertinent medication from other providers or from otc (please refer to above for more detailed med list):    Coumadin  Oxycodone  Lipitor  Iron supplements  Neurontin      Pertinent medications already tried:     Tylenol  Tramadol  Vicodin  Lidoderm patches      Pertinent lab history:    Negative/unremarkable: LILIAN, rheumatoid factor, CCP antibody, SPEP/UPEP, blood cultures    Elevated/positive: ESR, CRP, ACE level, creatinine, AST    Low hemoglobin/hematocrit    Pertinent imaging/test history:      2/2020: MRI Left shoulder impression:   1.  Mild left supraspinatus and subscapularis tendinopathy.  2.  Small partial-thickness bursal sided tear distal supraspinatus footprint posteriorly.  3.  No full-thickness medially retracted rotator cuff tear or rotator cuff muscle atrophy.  4.  Mild acromioclavicular joint osteoarthritis. Mild long head biceps tendinopathy.  5.  Mild-moderate glenohumeral chondrosis with degenerative superior labrum.     MRI of the cervical spine from 2/2020 showed:  Some signs of mild  to moderate cervical spondylosis C3-C7.    Mild to moderate left femoral stenosis C3-7  Mild spinal stenosis 5 the C6   (if necessary, please see full impression/report for more details).    MRI of left brachial plexus showed:  Diffuse edema involving dorsal paraspinal musculature bilaterally/symmetrically C3-C7, nonspecific reflects nonspecific myositis.  No abnormal collection or masses involving brachial plexus noted.  (if necessary, please see full impression/report for more details).    Other:    , has 1 child.  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  Denies tobacco use.       Plan:      We will obtain some additional autoimmune and muscle markers and correlate clinically.    Will obtain chest x-ray given elevated ACE level.    Will obtain EMG study of left upper extremity.    Will place referral to neurology.    Noted to have positive TB test in February 2017, unsure if managed, recommend he discuss with his infectious disease doctor who is been monitoring him annually.    Patient plans on continuing care with [facility] orthopedics.    Continue following up with PCP regarding renal insufficiency and anemia.    On oxycodone, 5 some pain relief.    Patient made aware that after having labs, chest x-ray and EMG if still symptomatic he can contact us and we can try trial of higher dosing of steroids (such as Medrol Dosepak), will avoid providing sooner as may taint results.    Follow-up in 3 to 4 weeks.      Procedure note:     .  This note was transcribed using Dragon voice recognition software as a result unintentional grammatical errors or word substitutions may have occurred. Please contact our Rheumatology department if you need any clarification or if you have any related inquiries.    Thank you for referring this patient to our clinic.      Peter Gottlieb DO ....................  5/21/2020   8:43 AM

## 2021-06-08 NOTE — TELEPHONE ENCOUNTER
Pt notified of Dr Dow's comments. Pt did also talk to Dr Hicks regarding this as well. Scheduled pts labs for 6/12/20 at [facility]. Lab orders entered per Dr Dow.  Pt has CT scan scheduled for today.

## 2021-06-08 NOTE — TELEPHONE ENCOUNTER
MARK Ernst    Patient's lab results have been printed and faxed the the [facility] as requested.  Fax# [phone].  Elyssa HERNANDEZ CMA/BAHMAN....................10:09 AM

## 2021-06-08 NOTE — PROGRESS NOTES
"Bandar Wells is a 69 y.o. male who is being evaluated via a billable telephone visit.      The patient has been notified of following:     \"This telephone visit will be conducted via a call between you and your physician/provider. We have found that certain health care needs can be provided without the need for a physical exam.  This service lets us provide the care you need with a short phone conversation.  If a prescription is necessary we can send it directly to your pharmacy.  If lab work is needed we can place an order for that and you can then stop by our lab to have the test done at a later time.    Telephone visits are billed at different rates depending on your insurance coverage. During this emergency period, for some insurers they may be billed the same as an in-person visit.  Please reach out to your insurance provider with any questions.    If during the course of the call the physician/provider feels a telephone visit is not appropriate, you will not be charged for this service.\"    Patient has given verbal consent to a Telephone visit? Yes    What phone number would you like to be contacted at? home    Patient would like to receive their AVS by AVS Preference: Mail a copy.    Additional provider notes: insert own note template here   Called the pt using amwell.  He has lost 30 pounds.  I talked about a month ago to Kurt about this case.  He has new renal failure.  His uric acid is way up.  Testing SPEP for MM is actually negative but CT yesterday with lytic lesions.  I can see lysis in his spine on the films.  I don't see axial bone lysis much to my reading.  He has pain after about ten am till he goes to bed and it is UPPER spine and shoulder not lower back.  Some fever at night and sweats.  Is worried about situation understandably, can't really be active much at all.      These sxs do NOT remind him of having endocarditis.     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 Hicks's choice (?[facility])  Continue PCN    Phone call duration: 15 minutes    Ronn Ordonez MD

## 2021-06-08 NOTE — TELEPHONE ENCOUNTER
ANTICOAGULATION  MANAGEMENT    Assessment     Today's INR result of 2.00 is Subtherapeutic (goal INR of 2.5-3.5)        Missed dose(s) may be affecting INR    - was @ [facility] in [place], [place] and forgot to to bring his meds, and missed 2 days on 5/6-7.    No new diet changes affecting INR    Potential interaction between Prednisone and warfarin which may affect subsequent INRsContinues to tolerate warfarin with no reported s/s of bleeding or thromboembolism     Previous INR was Subtherapeutic at 2.40 on 5/6/20.    Has noted some improvement of his polymyalgias.    Plan:     Spoke with Bandar regarding INR result and instructed:     Warfarin Dosing Instructions:  (evenings. Has 3mg tabs)   - Change warfarin dose to 6 mg daily on Wednesdays; and 4.5 mg daily rest of week.   - (10 % change)    Instructed patient to follow up no later than: 1-2 wks.  Advised to check in one wk, 5/20.   - checks INR's with home monitor thru [employer]      Education provided: target INR goal and significance of current INR result, potential interaction between warfarin and Prednisone and importance of notifying clinic for changes in medications    Bandar verbalizes understanding and agrees to warfarin dosing plan.    Instructed to call the [facility] for any changes, questions or concerns. (#[phone])   ?   Meg Jacinto RN    Subjective/Objective:      Bandar Wells, a 69 y.o. male is on warfarin.     Bandar reports:     Home warfarin dose: verbally confirmed home dose with Bandar and updated on anticoagulation calendar     Missed doses: Yes:  Reported missing warfarin doses on 5/6-7.     Medication changes:  Yes: on 5/12/20, started on Prednisone 15mg daily.     S/S of bleeding or thromboembolism:  No     New Injury or illness:  Yes: reported possible polymyalgia rheumatica.     Changes in diet or alcohol consumption:  No     Upcoming surgery, procedure or cardioversion:  No    Anticoagulation Episode Summary     Current INR goal:    2.5-3.5   TTR:   23.7 % (1 y)   Next INR check:   5/20/2020   INR from last check:   2.00! (5/13/2020)   Weekly max warfarin dose:      Target end date:   Indefinite   INR check location:      Preferred lab:      Send INR reminders to:   [facility], [place], operated by [facility]    Indications    Paroxysmal atrial fibrillation (H) [I48.0]  S/P MVR (mitral valve replacement) [Z95.2]  S/P mitral valve replacement with metallic valve [Z95.4]           Comments:   INR TARGET GOAL 2.5 - 3.5         Anticoagulation Care Providers     Provider Role Specialty Phone number    Jin Bhat MD Referring Internal Medicine [phone]

## 2021-06-08 NOTE — TELEPHONE ENCOUNTER
ANTICOAGULATION  MANAGEMENT    Assessment     Today's INR result of 1.60 is Subtherapeutic (goal INR of 2.5-3.5)        Warfarin taken as previously instructed    No new diet changes affecting INR    Potential interaction between tapering Prednisone dose. and warfarin which may affect subsequent INRs    Continues to tolerate warfarin with no reported s/s of bleeding or thromboembolism     Previous INR was Therapeutic at 2.90 on 6/5/20.    Plan:     Spoke with Bandar regarding INR result and instructed:     Warfarin Dosing Instructions:    (evenings. has 3mg tabs).   - tonight, take one time booster with 10.5mg warfarin dose,   - then continue current warfarin dose 6 mg daily on Wednesdays; and 4.5 mg daily rest of week.    Instructed patient to follow up no later than:  5-7 days.    - will test INR on 6/15/20 with home monitor thru [employer].    Education provided: target INR goal and significance of current INR result, potential interaction between warfarin and tapering Prednisone / colchicine and importance of notifying clinic for changes in medications    Bandar verbalizes understanding and agrees to warfarin dosing plan.    Instructed to call the [facility] for any changes, questions or concerns. (#[phone])   ?   Meg Jacinto RN    Subjective/Objective:      Bandar Wells, a 69 y.o. male is on warfarin.     Bandar reports:     Home warfarin dose: as updated on anticoagulation calendar per template     Missed doses: No     Medication changes:  Yes:  Reported he completed Colchicine and tapering Prednisone.   - Since 6/2/20 - Prednisone 40mg for 3 days, 30mg for 3 days, then 20mg for 3 days, then 10mg for 3 days, then STOP.       S/S of bleeding or thromboembolism:  No     New Injury or illness:  Yes. Reported myalgia a little better.      Changes in diet or alcohol consumption:  No     Upcoming surgery, procedure or cardioversion:  No    Anticoagulation Episode Summary     Current INR goal:   2.5-3.5   TTR:    24.5 % (1 y)   Next INR check:   6/15/2020   INR from last check:   1.60! (6/10/2020)   Weekly max warfarin dose:      Target end date:   Indefinite   INR check location:      Preferred lab:      Send INR reminders to:   [facility]    Indications    Paroxysmal atrial fibrillation (H) [I48.0]  S/P MVR (mitral valve replacement) [Z95.2]  S/P mitral valve replacement with metallic valve [Z95.4]           Comments:   INR TARGET GOAL 2.5 - 3.5         Anticoagulation Care Providers     Provider Role Specialty Phone number    Jin Bhat MD Referring Internal Medicine [phone]

## 2021-06-08 NOTE — TELEPHONE ENCOUNTER
Who is calling:  The patient   Reason for Call:  The patient states he was given prednisone yesterday per Dr. Hicks and was informed to call back today with an update. The patient states the prednisone is helping with relieving his polymyalgia pain.The patient states Dr. Hicks can call him at his convenience.  Date of last appointment with primary care: 5/12/20  Okay to leave a detailed message: Yes

## 2021-06-08 NOTE — TELEPHONE ENCOUNTER
ANTICOAGULATION  MANAGEMENT    Assessment     Today's INR result of 2.90 is Therapeutic (goal INR of 2.5-3.5)        Warfarin taken as previously instructed    No new diet changes affecting INR    No interaction expected between Colchicine / Senna / Oxycodone and warfarin    Continues to tolerate warfarin with no reported s/s of bleeding or thromboembolism     Previous INR was Supratherapeutic at 3.60 on 6/3/20.    Plan:     Spoke with Bandar regarding INR result and instructed:     Warfarin Dosing Instructions:  (evenings. has 3mg tabs).   - Continue current warfarin dose 6 mg daily on Wednesdays; and 4.5 mg daily rest of week.    Instructed patient to follow up no later than:  One wk.   - checks INR's with home monitor thru [employer].    Education provided: importance of consistent vitamin K intake, target INR goal and significance of current INR result, potential interaction between warfarin and Colchicine / Oxycodone / Senna and importance of notifying clinic for changes in medications    Bandar verbalizes understanding and agrees to warfarin dosing plan.    Instructed to call the [facility] for any changes, questions or concerns. (#[phone])   ?   Meg Jacinto RN    Subjective/Objective:      Bandar Wells, a 69 y.o. male is on warfarin.     Bandar reports:     Home warfarin dose: verbally confirmed home dose with Bandar and updated on anticoagulation calendar     Missed doses: No     Medication changes:  Yes: Oxycodone dose increased / Senna for constipation on 6/5/20 / Colchicine on 6/2/20.     S/S of bleeding or thromboembolism:  No     New Injury or illness:  Yes:  Gout.     Changes in diet or alcohol consumption:  No     Upcoming surgery, procedure or cardioversion:  No    Anticoagulation Episode Summary     Current INR goal:   2.5-3.5   TTR:   25.4 % (1 y)   Next INR check:   6/12/2020   INR from last check:   2.90 (6/5/2020)   Weekly max warfarin dose:      Target end date:   Indefinite   INR check  location:      Preferred lab:      Send INR reminders to:   [facility]    Indications    Paroxysmal atrial fibrillation (H) [I48.0]  S/P MVR (mitral valve replacement) [Z95.2]  S/P mitral valve replacement with metallic valve [Z95.4]           Comments:   INR TARGET GOAL 2.5 - 3.5         Anticoagulation Care Providers     Provider Role Specialty Phone number    Jin Bhat MD Referring Internal Medicine [phone]

## 2021-06-08 NOTE — TELEPHONE ENCOUNTER
Spoke with the patient and relayed message below from Dr. Hicks.  Patient agreed to a video visit this afternoon with Dr. Hicks to discuss.  Lab results have been faxed to [facility] as requested.  Elyssa HERNANDEZ, ADOLPH/CMT....................11:02 AM

## 2021-06-08 NOTE — TELEPHONE ENCOUNTER
New Patient Oncology Nurse Navigator Note     Referring provider: Dr. Hicks     Referring Clinic/Organization: [facility] pcp     Referred to (specialty): Medical oncology    Requested provider (if applicable): NA     Date Referral Received: 6/4/20     Evaluation for : possible myeloma     Clinical History (per Nurse review of records provided):  Patient began having pain and work up in September of 2019.  He has been seen by ortho, [facility] and recently reumatology, Dr. Wilcox      Clinical Assessment / Barriers to Care (Per Nurse): NA       Records Location (Care Everywhere, Media, etc.): [place]: CareEverywhere, otherwise in [facility]      Records Needed: NA     Additional testing needed prior to consult: none    I called Bandar and scheduled him to see Dr. Solorio at  on 6/15/20.  I will mail a welcome packet to him.  I reviewed my role with him as his navigator and gave him our contact information.  I encouraged he call me if needed for resource assistance.

## 2021-06-08 NOTE — PROGRESS NOTES
"Bandar Wells is a 69 y.o. male who is being evaluated via a billable video visit.      The patient has been notified of following:     \"This video visit will be conducted via a call between you and your physician/provider. We have found that certain health care needs can be provided without the need for an in-person physical exam.  This service lets us provide the care you need with a video conversation.  If a prescription is necessary we can send it directly to your pharmacy.  If lab work is needed we can place an order for that and you can then stop by our lab to have the test done at a later time.    Video visits are billed at different rates depending on your insurance coverage. Please reach out to your insurance provider with any questions.    If during the course of the call the physician/provider feels a video visit is not appropriate, you will not be charged for this service.\"    Patient has given verbal consent to a Video visit? Yes    Patient would like to receive their AVS by AVS Preference: Mail a copy.    Patient would like the video invitation sent by: Text to cell phone: [phone]    Will anyone else be joining your video visit? No    Video Start Time: 10:03 AM    Additional provider notes: GENERAL: Healthy, alert and no distress  EYES: Eyes grossly normal to inspection. No discharge or erythema, or obvious scleral/conjunctival abnormalities.  RESP: No audible wheeze, cough, or visible cyanosis.  No visible retractions or increased work of breathing.    NEURO: Cranial nerves grossly intact. Mentation and speech appropriate for age.  PSYCH: Mentation appears normal, affect normal/bright, judgement and insight intact, normal speech and appearance well-groomed      Video-Visit Details    Type of service:  Video Visit    Video End Time (time video stopped): 10:23 AM  Originating Location (pt. Location): Home    Distant Location (provider location):  [facility] INTERNAL MEDICINE "     Platform used for Video Visit: 4th [email or web address]      Jin Hicks MD       Video Visit - Follow Up   Bandar Wells   69 y.o. male    Date of Visit: 6/2/2020    Chief Complaint   Patient presents with     Lab Result Follow Up     Medication Management     pain meds        Assessment and Plan   1. Pain syndrome, chronic  Given the elevated uric acid level, elevated inflammatory markers, I think gout should be considered.  It would be fairly atypical.  There is no evidence of a hemolytic anemia with normal bilirubin, normal MCV.  Considering other causes of elevated uric acid, there is no mention of hemolysis on the automated review.  There has been no suggestion of underlying tumor burden or malignancy on imaging studies.  He is not on niacin.  He does have chronic kidney disease.  Hyperparathyroidism would be in the differential but seems fairly mild with normal calcium now and PTH of 109.  I have placed a call to his rheumatologist to discuss.  I am interested in starting him on prednisone and colchicine and possibly allopurinol as treatment for gout.  I would like to discuss with his rheumatologist prior to initiating this therapy but if unable to get a hold of him today we will start this this afternoon.    Addendum: I am unable to reach his rheumatologist today.  I spoke with Bandar and he would like to proceed with treatment of possible gout.  We will start higher dose prednisone, colchicine, renally dosed, discussed extensively narrow therapeutic window of this medication and risk in patients with chronic kidney disease and he would like to try it.  We will also start allopurinol at a lower dose and gradually titrate this.  We discussed the importance of staying hydrated.  I would like to see him on Friday for follow-up visit.  - oxyCODONE (ROXICODONE) 5 MG immediate release tablet; Take 1 tablet (5 mg total) by mouth every 4 (four) hours as needed for pain.  Dispense: 42 tablet; Refill: 0    Return in about 1  week (around 6/9/2020) for video visit.     History of Present Illness   This 69 y.o. old-year-old man is seen in follow-up.  Since I saw him last he has seen rheumatology.  Numerous labs were ordered.  Chest x-ray ordered but not completed.  Labs notable for now normalized calcium, quite elevated uric acid and elevated myoglobin.  Continues to have pain in the shoulder and left arm.  Quite debilitating.  Using 6 oxycodone per day.  Has not made progress on evaluation at [facility].    Review of Systems: A comprehensive review of systems was negative except as noted.     Medications, Allergies and Problem List   Reviewed, reconciled and updated  Post Discharge Medication Reconciliation Status:      Additional Information   Current Outpatient Medications   Medication Sig Dispense Refill     acetaminophen (TYLENOL) 500 MG tablet Take 500-1,000 mg by mouth every 6 (six) hours as needed for pain. Pain 1-5  Give 500 mg  Or pain 6-10 give 1000 mg every 6 hours ad needed not to exceed 4 grams in 24 hours       atorvastatin (LIPITOR) 40 MG tablet TAKE 1 TABLET (40 MG TOTAL) BY MOUTH AT BEDTIME. 90 tablet 2     b complex vitamins tablet Take 1 tablet by mouth daily.        cholecalciferol, vitamin D3, 1,000 unit tablet Take 1,000 Units by mouth daily.       clindamycin (CLEOCIN) 300 MG capsule Take 2 capsules (600 mg total) by mouth see administration instructions. Take prior to Dentist appointments 2 capsule 3     cyanocobalamin 1000 MCG tablet Take 1,000 mcg by mouth daily.       ferrous sulfate 325 (65 FE) MG tablet Take 1 tablet (325 mg total) by mouth 2 (two) times a day. (Patient taking differently: Take 1 tablet by mouth daily with breakfast.       ) 60 tablet 4     FLAXSEED OIL ORAL Take 1,000 mg by mouth daily.        fluticasone (FLONASE) 50 mcg/actuation nasal spray 1 spray into each nostril daily.       FOLIC ACID ORAL Take 1 tablet by mouth daily.       furosemide (LASIX) 40 MG tablet Take 1 tablet (40 mg  total) by mouth daily. 30 tablet 11     gabapentin (NEURONTIN) 300 MG capsule 1-3 at night       L. ACIDOPHILUS/BIFIDO LONGUM (PROBIOTIC PEARLS ORAL) Take 1 capsule by mouth daily.       multivitamin therapeutic (THERAGRAN) tablet Take 1 tablet by mouth daily.       oxyCODONE (ROXICODONE) 5 MG immediate release tablet Take 1 tablet (5 mg total) by mouth every 4 (four) hours as needed for pain. 42 tablet 0     penicillin VK (PEN VK) 500 MG tablet TAKE 1 TABLET BY MOUTH DAILY 30 tablet 0     potassium chloride (K-DUR,KLOR-CON) 20 MEQ tablet Take 1 tablet (20 mEq total) by mouth daily. 90 tablet 2     warfarin ANTICOAGULANT (COUMADIN/JANTOVEN) 3 MG tablet TAKE ONE TO TWO TABLETS (3-6MG) BY MOUTH DAILY, AS DIRECTED. ADJUST DOSE BASED ON INR RESULTS. 160 tablet 1     No current facility-administered medications for this visit.      No Known Allergies  Social History     Tobacco Use     Smoking status: Never Smoker     Smokeless tobacco: Never Used   Substance Use Topics     Alcohol use: Yes     Alcohol/week: 6.0 standard drinks     Types: 5 Glasses of wine, 1 Cans of beer per week     Frequency: 2-3 times a week     Drug use: No       Review and/or order of clinical lab tests:  Review and/or order of radiology tests:  Review and/or order of medicine tests:  Discussion of test results with performing physician:  Decision to obtain old records and/or obtain history from someone other than the patient:  Review and summarization of old records and/or obtaining history from someone other than the patient and.or discussion of case with another health care provider:  Independent visualization of image, tracing or specimen itself:    Time:      Jin Hicks MD

## 2021-06-08 NOTE — TELEPHONE ENCOUNTER
Spoke to Dr. Hicks.      Patient's symptoms and a latest labs particularly significantly elevated uric acid level.      Given current GFR suggested can increase allopurinol to 200 mg daily (from 100 mg daily).    Agree with adding colchicine 0.6 mg daily.    Suggested rechecking creatinine and uric acid in about 1 to 2 weeks.    Also agreed with plan to obtain CT of chest, abdomen and pelvis to screen for other etiologies. Dr Hicks will discuss further with patient.    Tried calling patient to go over lab results and above plan, reached voicemail, left message on home and cell phone.    Please go over lab results with patient, recommend he maintain upcoming appointment with us this month to discuss results in more detail, follow-through additional labs/test results and monitor response to treatment.    Also recommend he maintain upcoming appointment  with Dr. Hicks.

## 2021-06-08 NOTE — TELEPHONE ENCOUNTER
Per message below, please start PA process.  Thank you.  Elyssa HERNANDEZ CMA/BAHMAN....................11:43 AM

## 2021-06-08 NOTE — TELEPHONE ENCOUNTER
ANTICOAGULATION  MANAGEMENT    Assessment     Today's INR result of 1.60 is Subtherapeutic (goal INR of 2.5-3.5)        Warfarin taken as previously instructed    No new diet changes affecting INR    No new medication/supplements affecting INR    Continues to tolerate warfarin with no reported s/s of bleeding or thromboembolism     Previous INR was Subtherapeutic at 1.60 on 6/10/20.    Plan:     Spoke with Bandar regarding INR result and instructed:     Warfarin Dosing Instructions:  (evenings. has 3mg tabs).   - Change warfarin dose to 4.5 mg daily on Mon/Wed/Fri; and 6 mg daily rest of week.(13.6 % change)    Instructed patient to follow up no later than:  1-2 wks.  Advised to check INR's wkly for now on 6/22/20.   - checks INR's with home monitor thru [employer].    Education provided: target INR goal and significance of current INR result and importance of notifying clinic for changes in medications    Bandar verbalizes understanding and agrees to warfarin dosing plan.    Instructed to call the [facility] for any changes, questions or concerns. (#[phone])   ?   Meg Jacinto RN    Subjective/Objective:      Bandar Wells, a 69 y.o. male is on warfarin.     Bandar reports:     Home warfarin dose: verbally confirmed home dose with Bandar and updated on anticoagulation calendar     Missed doses: No     Medication changes:  No     S/S of bleeding or thromboembolism:  No     New Injury or illness:  Yes:  Lytic lesions of bone.     Changes in diet or alcohol consumption:  No     Upcoming surgery, procedure or cardioversion:  Yes:  Will be scheduled for bone biopsy to r/o myeloma.    Anticoagulation Episode Summary     Current INR goal:   2.5-3.5   TTR:   24.6 % (1 y)   Next INR check:   6/23/2020   INR from last check:   1.60! (6/16/2020)   Weekly max warfarin dose:      Target end date:   Indefinite   INR check location:      Preferred lab:      Send INR reminders to:   [facility] [place]    Indications     Paroxysmal atrial fibrillation (H) [I48.0]  S/P MVR (mitral valve replacement) [Z95.2]  S/P mitral valve replacement with metallic valve [Z95.4]           Comments:   INR TARGET GOAL 2.5 - 3.5         Anticoagulation Care Providers     Provider Role Specialty Phone number    Jin Bhat MD Referring Internal Medicine [phone]

## 2021-06-08 NOTE — TELEPHONE ENCOUNTER
Received a faxed INR result for Bandar Wells  From [facility]  INR result dated 5/21/2020 is 2.50      Last INR from 5/13/20 was 2.00

## 2021-06-08 NOTE — PROGRESS NOTES
"Bandar Wells is a 69 y.o. male who is being evaluated via a billable video visit.      The patient has been notified of following:     \"This video visit will be conducted via a call between you and your physician/provider. We have found that certain health care needs can be provided without the need for an in-person physical exam.  This service lets us provide the care you need with a video conversation.  If a prescription is necessary we can send it directly to your pharmacy.  If lab work is needed we can place an order for that and you can then stop by our lab to have the test done at a later time.    Video visits are billed at different rates depending on your insurance coverage. Please reach out to your insurance provider with any questions.    If during the course of the call the physician/provider feels a video visit is not appropriate, you will not be charged for this service.\"    Patient has given verbal consent to a Video visit? Yes    Patient would like to receive their AVS by AVS Preference: Mail a copy.    Patient would like the video invitation sent by: Text to cell phone: [phone]    Will anyone else be joining your video visit? No    Video Start Time: 9:00AM    Additional provider notes: GENERAL: Healthy, alert and no distress  EYES: Eyes grossly normal to inspection. No discharge or erythema, or obvious scleral/conjunctival abnormalities.  RESP: No audible wheeze, cough, or visible cyanosis.  No visible retractions or increased work of breathing.    NEURO: Cranial nerves grossly intact. Mentation and speech appropriate for age.  PSYCH: Mentation appears normal, affect normal/bright, judgement and insight intact, normal speech and appearance well-groomed      Video-Visit Details    Type of service:  Video Visit    Video End Time (time video stopped): 9:13 AM  Originating Location (pt. Location): Home    Distant Location (provider location):  [facility] INTERNAL MEDICINE     Platform used for Video Visit: " Artem Hicks MD       Video Visit - Follow Up   Bandar Wells   69 y.o. male    Date of Visit: 5/26/2020    Chief Complaint   Patient presents with     Test Results     rheumatologist        Assessment and Plan   1. Myositis of left shoulder, unspecified myositis type  This patient has evidence of muscle inflammation of the left shoulder.  Elevated inflammatory markers, mildly elevated ACE level.  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  He will continue with current plan, check labs chest x-ray and EMG.  He will also schedule visit with [facility] if it is an option.  Did not respond well to prednisone    2. Pain syndrome, chronic  - oxyCODONE (ROXICODONE) 5 MG immediate release tablet; Take 1 tablet (5 mg total) by mouth every 4 (four) hours as needed for pain.  Dispense: 42 tablet; Refill: 0    3. Hx of bacterial endocarditis  As above    4. Chronic combined systolic and diastolic congestive heart failure (H)  Stable following up with cardiology    5. Paroxysmal atrial fibrillation (H)  Rate control strategy with warfarin for stroke prevention    Return in about 1 week (around 6/2/2020) for video visit.     History of Present Illness   This 69 y.o. old man is seen in follow-up.  Since I last saw him he seen rheumatology.  Additional evaluation has been ordered including lab tests chest x-ray and EMG.  Patient had quite a bit of pain over the weekend but this morning actually feels better.  Seems to respond okay to oxycodone.  Still waiting for [facility] to schedule consultative visit.  Has follow-up appointment with cardiology later today.  Feels well from a cardiac standpoint.    Review of Systems: A comprehensive review of systems was negative except as noted.     Medications, Allergies and Problem List   Reviewed, reconciled and updated  Post Discharge Medication Reconciliation Status:      Additional Information   Current  Outpatient Medications   Medication Sig Dispense Refill     acetaminophen (TYLENOL) 500 MG tablet Take 500-1,000 mg by mouth every 6 (six) hours as needed for pain. Pain 1-5  Give 500 mg  Or pain 6-10 give 1000 mg every 6 hours ad needed not to exceed 4 grams in 24 hours       atorvastatin (LIPITOR) 40 MG tablet TAKE 1 TABLET (40 MG TOTAL) BY MOUTH AT BEDTIME. 90 tablet 2     b complex vitamins tablet Take 1 tablet by mouth daily.        cholecalciferol, vitamin D3, 1,000 unit tablet Take 1,000 Units by mouth daily.       clindamycin (CLEOCIN) 300 MG capsule Take 2 capsules (600 mg total) by mouth see administration instructions. Take prior to Dentist appointments 2 capsule 3     cyanocobalamin 1000 MCG tablet Take 1,000 mcg by mouth daily.       ferrous sulfate 325 (65 FE) MG tablet Take 1 tablet (325 mg total) by mouth 2 (two) times a day. (Patient taking differently: Take 1 tablet by mouth daily with breakfast.       ) 60 tablet 4     FLAXSEED OIL ORAL Take 1,000 mg by mouth daily.        fluticasone (FLONASE) 50 mcg/actuation nasal spray 1 spray into each nostril daily.       FOLIC ACID ORAL Take 1 tablet by mouth daily.       furosemide (LASIX) 40 MG tablet Take 1 tablet (40 mg total) by mouth daily. 30 tablet 11     gabapentin (NEURONTIN) 300 MG capsule 1-3 at night       L. ACIDOPHILUS/BIFIDO LONGUM (PROBIOTIC PEARLS ORAL) Take 1 capsule by mouth daily.       multivitamin therapeutic (THERAGRAN) tablet Take 1 tablet by mouth daily.       oxyCODONE (ROXICODONE) 5 MG immediate release tablet Take 1 tablet (5 mg total) by mouth every 4 (four) hours as needed for pain. 42 tablet 0     penicillin VK (PEN VK) 500 MG tablet TAKE 1 TABLET BY MOUTH DAILY 30 tablet 0     potassium chloride (K-DUR,KLOR-CON) 20 MEQ tablet Take 1 tablet (20 mEq total) by mouth daily. 90 tablet 2     predniSONE (DELTASONE) 5 MG tablet Take 15 mg by mouth daily. 30 tablet 1     traMADol (ULTRAM) 50 mg tablet Take 1 tablet (50 mg total) by  mouth every 6 (six) hours as needed for pain. 50 tablet 0     warfarin ANTICOAGULANT (COUMADIN/JANTOVEN) 3 MG tablet TAKE ONE TO TWO TABLETS (3-6MG) BY MOUTH DAILY, AS DIRECTED. ADJUST DOSE BASED ON INR RESULTS. 160 tablet 1     No current facility-administered medications for this visit.      No Known Allergies  Social History     Tobacco Use     Smoking status: Never Smoker     Smokeless tobacco: Never Used   Substance Use Topics     Alcohol use: Yes     Alcohol/week: 6.0 standard drinks     Types: 5 Glasses of wine, 1 Cans of beer per week     Frequency: 2-3 times a week     Drug use: No       Review and/or order of clinical lab tests:  Review and/or order of radiology tests:  Review and/or order of medicine tests:  Discussion of test results with performing physician:  Decision to obtain old records and/or obtain history from someone other than the patient:  Review and summarization of old records and/or obtaining history from someone other than the patient and.or discussion of case with another health care provider:  Independent visualization of image, tracing or specimen itself:    Time:      Jin Hicks MD

## 2021-06-08 NOTE — PROGRESS NOTES
Bandar ZURITA Priscilla who presents today with a chief complaint of  Follow-up      Joint Pains: No   Location: none   Onset:no joint pain   Intensity:  0/10  AM Stiffness:0 Minutes  Alleviating/Aggravating Factors:0  Tolerating Meds:  Other:      ROS:  Patient denies having any chest pain, shortness of breath, cough, abdominal pain, nausea, vomiting, rashes, fevers, oral ulcers and recent infections.  Patient admits to having a good appetite      Problem List:  Patient Active Problem List   Diagnosis     Hx of bacterial endocarditis     Dyslipidemia     Mitral valve prolapse     S/P MVR (mitral valve replacement)     Status post Maze operation for atrial fibrillation     Paroxysmal atrial fibrillation (H)     Coronary artery disease     Dysthymia     Prosthetic valve endocarditis, subsequent encounter     S/P mitral valve replacement with metallic valve     Chronic combined systolic and diastolic congestive heart failure (H)     Lymphedema of both lower extremities        PMH:   Past Medical History:   Diagnosis Date     Abnormal liver function test      Atrial fibrillation (H)     post naun     Atrial fibrillation with RVR (H) 8/29/2015    S/p MAZE Jul 2015     Bacterial endocarditis      CHF (congestive heart failure) (H)      Dyslipidemia      Hyperlipidemia      Mitral valve prolapse      Near syncope      Pericardial effusion without cardiac tamponade 8/29/2015     S/P mitral valve replacement with metallic valve 03/16/2017     Status post Maze operation for atrial fibrillation 8/29/2015       Surgical History:  Past Surgical History:   Procedure Laterality Date     CARDIAC SURGERY       CHG X-RAY PELVIS 3+ VW N/A 7/1/2015    Procedure: MITRAL VALVE REPLACEMENT, MAZE PROCEDURE, CARDIOLOGY TRANSESOPHAGEAL ECHOCARDIOGRAM;  Surgeon: Rm Munoz MD;  Location: [facility];  Service:      LANG-MAZE MICROWAVE ABLATION       EYE SURGERY      Retial hole treatment     HERNIA REPAIR Right     inguinal      MITRAL VALVE REPLACEMENT N/A 3/16/2017    Procedure: REDO STERNOTOMY, REDO MITRAL VALVE REPLACEMENT, PLACEMENT TEMPORARY VENTRICULAR PACING WIRES, ANESTHESIA TRANSESOPHAGEAL ECHOCARDIOGRAM;  Surgeon: Raphael Rosenberg MD;  Location: [facility];  Service:      MITRAL VALVE REPLACEMENT  2015    Bioprosthetic     PERICARDIAL WINDOW N/A 8/31/2015    Procedure: RIGHT PERICARDIAL WINDOW, TALC PLEURODESIS,VIDEO ASSISTED THOROSCOPY,DRAINAGE OF BILATERAL PLEURAL EFFUSIONS;  Surgeon: Rm Munoz MD;  Location: [facility];  Service:      PICC  9/3/2015          PICC  2/14/2017            Family History:  Family History   Problem Relation Age of Onset     Atrial fibrillation Mother      Heart failure Mother      Emphysema Father      Heart failure Father      Kidney failure Sister         S/P Renal transplant     Alcoholism Brother      No Medical Problems Son      No Medical Problems Brother        Social History:   reports that he has never smoked. He has never used smokeless tobacco. He reports current alcohol use of about 6.0 standard drinks of alcohol per week. He reports that he does not use drugs.    Allergies:  No Known Allergies     Current Medications:  Current Outpatient Medications   Medication Sig Dispense Refill     acetaminophen (TYLENOL) 500 MG tablet Take 500-1,000 mg by mouth every 6 (six) hours as needed for pain. Pain 1-5  Give 500 mg  Or pain 6-10 give 1000 mg every 6 hours ad needed not to exceed 4 grams in 24 hours       allopurinoL (ZYLOPRIM) 100 MG tablet Take 2 tablets (200 mg total) by mouth daily. 60 tablet 0     b complex vitamins tablet Take 1 tablet by mouth daily.        cholecalciferol, vitamin D3, 1,000 unit tablet Take 1,000 Units by mouth daily.       clindamycin (CLEOCIN) 300 MG capsule Take 2 capsules (600 mg total) by mouth see administration instructions. Take prior to Dentist appointments 2 capsule 3     cyanocobalamin 1000 MCG tablet Take 1,000 mcg by  "mouth daily.       ferrous sulfate 325 (65 FE) MG tablet Take 1 tablet (325 mg total) by mouth 2 (two) times a day. (Patient taking differently: Take 1 tablet by mouth daily with breakfast.       ) 60 tablet 4     FLAXSEED OIL ORAL Take 1,000 mg by mouth daily.        fluticasone (FLONASE) 50 mcg/actuation nasal spray 1 spray into each nostril daily.       FOLIC ACID ORAL Take 1 tablet by mouth daily.       L. ACIDOPHILUS/BIFIDO LONGUM (PROBIOTIC PEARLS ORAL) Take 1 capsule by mouth daily.       metOLazone (ZAROXOLYN) 2.5 MG tablet Take 2.5 mg by mouth. One tab on Sun and wed       multivitamin therapeutic (THERAGRAN) tablet Take 1 tablet by mouth daily.       oxyCODONE (ROXICODONE) 10 mg immediate release tablet TAKE 0.5-1 TABLETS (5-10 MG TOTAL) BY MOUTH EVERY 4 (FOUR) HOURS AS NEEDED FOR PAIN. 42 tablet 0     penicillin VK (PEN VK) 500 MG tablet TAKE 1 TABLET BY MOUTH DAILY 90 tablet 1     potassium chloride (K-DUR,KLOR-CON) 20 MEQ tablet Take 1 tablet (20 mEq total) by mouth daily. 90 tablet 2     predniSONE (DELTASONE) 10 mg tablet Take 40mg by mouth daily for 3 days, then 30mg x 3 days, then 20mg x 3 days ,then 10mg x 3 days then stop. 30 tablet 0     senna (SENOKOT) 8.6 mg tablet Take 1-4 tablets by mouth 2 (two) times a day. 90 tablet 1     torsemide (DEMADEX) 50 MG Take 100 mg by mouth 2 (two) times a day at 9am and 6pm.       warfarin ANTICOAGULANT (COUMADIN/JANTOVEN) 3 MG tablet TAKE ONE TO TWO TABLETS (3-6MG) BY MOUTH DAILY, AS DIRECTED. ADJUST DOSE BASED ON INR RESULTS. 160 tablet 1     No current facility-administered medications for this visit.            Physical Exam:  There were no vitals taken for this visit.  General: A & O x 3 in RICARDO Wells is a 69 y.o. male who is being evaluated via a billable video visit.      The patient has been notified of following:     \"This video visit will be conducted via a call between you and your physician/provider. We have found that certain health care " "needs can be provided without the need for an in-person physical exam.  This service lets us provide the care you need with a video conversation.  If a prescription is necessary we can send it directly to your pharmacy.  If lab work is needed we can place an order for that and you can then stop by our lab to have the test done at a later time.    Video visits are billed at different rates depending on your insurance coverage. Please reach out to your insurance provider with any questions.    If during the course of the call the physician/provider feels a video visit is not appropriate, you will not be charged for this service.\"    Patient has given verbal consent to a Video visit? Yes     Patient would like the video invitation sent by:   [email or web address]     Will anyone else be joining your video visit? No      Video Start Time: 9:27 AM    Additional provider notes:       Video-Visit Details    Type of service:  Video Visit    Video End Time (time video stopped): 9:48 AM  Originating Location (pt. Location): Home    Distant Location (provider location):  [facility] RHEUMATOLOGY     Platform used for Video Visit: Yessica Gottlieb DO      Summary/Assessment:    History that includes chronic left side of neck/left shoulder/left proximal arm pain since September 2019, hyperuricemia and abnormal skeletal findings on CT.    Presents for follow-up visit.    States pains are significantly better \"the best I have felt in 10 months\", after completing prednisone taper starting at 40 mg decreasing to off over 12 days, allopurinol 200 mg daily and colchicine 0.6 mg daily.  States is about to run out of these medications.    Uric acid has improved from 15 range to 11.6.    Has renal insufficiency, kidney function has been stable in the process.     Had an appointment with Dr. Solorio from hematology, has a bone marrow biopsy scheduled.  Multiple myeloma in the differential.    Has a " follow-up appointment with PCP.    From prior note: Presented on initial visit with history of developing pain involving left side of neck, left shoulder and proximal left upper extremity.  States these pains began in September 2019.  Denies any prior trauma.    Initially saw orthopedics told to have some mild rotator cuff tear, went to physical therapy and also received cortisone injection however has not noticed improvement.    In addition to MRI left shoulder, patient also had MRIs of cervical spine and left brachial plexus.    MRI of cervical spine showed some mild to moderate DJD, mild to moderate foraminal stenosis and mild spinal stenosis.  States received injection involving the cervical spine from spine clinic and has not noticed benefit.    Left brachial plexus MRI showed some nonspecific signs of myositis involving dorsal paraspinal musculature at C3-C7.    Noted to have elevated ESR, elevated sed rate.    Also noted to have renal insufficiency and anemia.    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      Some autoimmune markers were performed which were unrevealing (see below).    Currently receiving some partial benefit from oxycodone.  Has tried Tylenol, tramadol, Vicodin, Lidoderm, Neurontin which were not very beneficial.    Denies having any swelling involving left shoulder joint.    Denies left arm weakness, mainly pain.    Traveled to [place] in the summer 2019.      Denies having chest x-ray or EMG study.    Has not seen neurology.    Is now established with [facility], has seen [place] as well in the past.    States PCP try trial of prednisone Fifteen (15)  mg for 5 days, with insufficient benefit.      Pertinent rheumatology/past medical history (please refer to above for more detailed history):      Hyperuricemia    Abnormal skeletal findings on CT    Chronic left neck, left shoulder, left proximal arm pain since September  2019.    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 fib      Rheumatology medications provided/suggested:    Allopurinol  Colchicine      Pertinent medication from other providers or from otc (please refer to above for more detailed med list):    Coumadin  Oxycodone  Lipitor  Iron supplements  Neurontin      Pertinent medications already tried:     Tylenol  Tramadol  Vicodin  Lidoderm patches      Pertinent lab history:    Negative/unremarkable: LILIAN, rheumatoid factor, CCP antibody, SPEP/UPEP, blood cultures    Elevated/positive: Uric acid ESR, CRP, ACE level, creatinine, AST    Low hemoglobin/hematocrit    Pertinent imaging/test history:      2/2020: MRI Left shoulder impression:   1.  Mild left supraspinatus and subscapularis tendinopathy.  2.  Small partial-thickness bursal sided tear distal supraspinatus footprint posteriorly.  3.  No full-thickness medially retracted rotator cuff tear or rotator cuff muscle atrophy.  4.  Mild acromioclavicular joint osteoarthritis. Mild long head biceps tendinopathy.  5.  Mild-moderate glenohumeral chondrosis with degenerative superior labrum.     MRI of the cervical spine from 2/2020 showed:  Some signs of mild to moderate cervical spondylosis C3-C7.    Mild to moderate left femoral stenosis C3-7  Mild spinal stenosis 5 the C6   (if necessary, please see full impression/report for more details).    MRI of left brachial plexus showed:  Diffuse edema involving dorsal paraspinal musculature bilaterally/symmetrically C3-C7, nonspecific reflects nonspecific myositis.  No abnormal collection or masses involving brachial plexus noted.  (if necessary, please see full impression/report for more details).    June 2020, CT of chest abdomen and pelvis  1.  Multiple tiny lucent skeletal lesions suspicious for  multiple myeloma.  2.  No evidence of mass nor adenopathy within the chest, abdomen and pelvis.  3.  Cirrhotic appearing liver. No ascites nor splenomegaly.  4.  Previous right pleurodesis, sternotomy for coronary artery bypass and mitral valve replacement.    Other:    , has 1 child.  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  Denies tobacco use.     On prior visit we also placed referral for neurology/EMG study of left upper extremity, can hold off for now given that he is feeling better.      Plan:      Continue allopurinol 200 mg daily.    For now continue colchicine 0.6 mg daily.    Continue following through with directives provided by hematology/oncology.    On prior visit, noted to have positive TB test in February 2017, unsure if managed, recommend he discuss with his infectious disease doctor who is been monitoring him annually.    Continue following up with PCP.  PCP also managing patients renal insufficiency and anemia.    Has oxycodone, as needed.    Recommend checking labs in 3 weeks.    Follow-up in 6-8 weeks.      This note was transcribed using Dragon voice recognition software as a result unintentional grammatical errors or word substitutions may have occurred. Please contact our Rheumatology department if you need any clarification or if you have any related inquiries.    Major side effect profile of medications provided were discussed with the patient.      Peter Gottlieb DO  ....................  6/18/2020   8:51 AM

## 2021-06-08 NOTE — TELEPHONE ENCOUNTER
Who is calling:  Patient  Reason for Call:  The patient was seen at [facility] today and is calling back to discuss further cares with Dr Hicks, as requested by Dr Hicks.    The patient also needs please his most recent labs sent to [facility] Attn: Dr Rubin, Orthopedics.  Patient's [facility] ID # : [id number]    Date of last appointment with primary care: 4/30/20 with Dr Hicks  Okay to leave a detailed message: No

## 2021-06-08 NOTE — CONSULTS
"[facility] Hematology and Oncology Consult Note    Patient: Bandar Wells  MRN: [id number]  Date of Service: 06/15/2020      Reason for Visit:    1.  Lytic lesions on x-ray    Assessment/Plan:    1.  Lytic lesion seen on CT scan I reviewed his recent CT chest, abdomen, and pelvis with the patient.:  None of the lesions are too big.  No evidence of primary tumor elsewhere.  His myeloma labs drawn to date are not convincing.  We added a serum immunofixation.  PSA is normal.  His only pain is in the left shoulder and left upper arm.  Reviewed with him the only way to diagnose rule out myeloma is with a bone marrow biopsy.  The logistics of the procedure were explained to him.  He agrees to proceed.  We will get this scheduled.  We will see him back in clinic 3 to 4 days after to review the results.  If he has myeloma we will discuss treatment options.  If he does not, we may consider a PET/CT to see if there is any activity in these lesions.  He had an opportunity to have his questions answered.     ECOG Performance   ECOG Performance Status: 0(not driving d/t opioids)    Distress Assessment  Distress Assessment Score: 7(\"frustrated with pain issue\")    Problem List:    1. Bone lesion       Staging History:    Cancer Staging  No matching staging information was found for the patient.    History:    Bandar is a 69-year-old gentleman who is referred for further evaluation of some small lucencies seen in his bones on imaging.  He states that he has had some discomfort in the lateral left upper arm and a little bit in the neck since the fall 2019.  He has had an extensive work-up todate.  it has generally been unrevealing.  He had an MRI in April 2020 which showed some diffuse hyperintensity within the dorsal paraspinal musculature suggesting myositis.  He also had an MRI of the cervical spine in February 2020 showing normal marrow signal intensity.  No lytic lesions were seen.  Today's feeling okay.  He actually states that " he has no pain today.  Denies shortness of breath.  No headaches.  No acute complaints today.    Past History:    Past Medical History:   Diagnosis Date     Abnormal liver function test      Atrial fibrillation (H)     post naun     Atrial fibrillation with RVR (H) 8/29/2015    S/p MAZE Jul 2015     Bacterial endocarditis      CHF (congestive heart failure) (H)      Dyslipidemia      Hyperlipidemia      Mitral valve prolapse      Near syncope      Pericardial effusion without cardiac tamponade 8/29/2015     S/P mitral valve replacement with metallic valve 03/16/2017     Status post Maze operation for atrial fibrillation 8/29/2015    Family History   Problem Relation Age of Onset     Atrial fibrillation Mother      Heart failure Mother      Emphysema Father      Heart failure Father      Kidney failure Sister         S/P Renal transplant     Alcoholism Brother      No Medical Problems Son      No Medical Problems Brother       [unfilled] Social History     Socioeconomic History     Marital status: Single     Spouse name: Not on file     Number of children: Not on file     Years of education: Not on file     Highest education level: Not on file   Occupational History     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   Social Needs     Financial resource strain: Not on file     Food insecurity     Worry: Not on file     Inability: Not on file     Transportation needs     Medical: Not on file     Non-medical: Not on file   Tobacco Use     Smoking status: Never Smoker     Smokeless tobacco: Never Used   Substance and Sexual Activity     Alcohol use: Yes     Alcohol/week: 6.0 standard drinks     Types: 5 Glasses of wine, 1 Cans of beer per week     Frequency: 2-3 times a week     Drug use: No     Sexual activity: Never   Lifestyle     Physical activity     Days per week: Not on file     Minutes per session: Not on file     Stress: Not on file   Relationships     Social connections     Talks on phone: Not on  file     Gets together: Not on file     Attends Yazidi service: Not on file     Active member of club or organization: Not on file     Attends meetings of clubs or organizations: Not on file     Relationship status: Not on file     Intimate partner violence     Fear of current or ex partner: Not on file     Emotionally abused: Not on file     Physically abused: Not on file     Forced sexual activity: Not on file   Other Topics Concern     Not on file   Social History Narrative    Has a steady girlfriend and multiple friends .  His son is in [place] but can be present if warned.  Home 1 1/2 stories.    3/2017        Allergies:    No Known Allergies    Review of Systems:    General  General (WDL): All general elements are within defined limits  ENT  ENT (WDL): All ENT elements are within defined limits  Respiratory  Respiratory (WDL): All respiratory elements are within defined limits  Cardiovascular  Cardiovascular (WDL): All cardiovascular elements are within defined limits  Endocrine  Endocrine (WDL): All endocrine elements are within defined limits  Gastrointestinal  Gastrointestinal (WDL): All gastrointestinal elements are within defined limits  Musculoskeletal  Musculoskeletal (WDL): Exceptions to WDL  Muscle pain or stiffness: Yes - Recent (Less than 3 months)  Neurological  Neurological (WDL): All neurological elements are within defined limits  Dominant Hand: Right  Psychological/Emotional  Psychological/Emotional (WDL): Exceptions to WDL  Anxiety: Yes - Recent (Less than 3 months)  Hematological/Lymphatic  Hematological/Lymphatic (WDL): All hematological/lymphatic elements are within defined limits  Dermatological  Dermatologic (WDL): All dermatological elements are within defined limits  Genitourinary/Reproductive  Genitourinary/Reproductive (WDL): All genitourinary/reproductive elements are within defined limits  Reproductive (Females only)     Pain       Physical Exam:    Recent Vitals 6/15/2020  "  Height 5' 3\"   Weight 166 lbs   BSA (m2) 1.83 m2   /65   Pulse 69   Temp 98.3   Temp src 1   SpO2 94   Some recent data might be hidden     General: patient appears stated age of 69 y.o.. Nontoxic and in no distress.   HEENT: Head: atraumatic, normocephalic. Sclerae anicteric.  Chest:  Normal respiratory effort  Cardiac:  No edema.   Abdomen: abdomen is non-distended  Extremities: normal tone and muscle bulk.  Skin: no lesions or rash. Warm and dry.   CNS: alert and oriented. Grossly non-focal.   Psychiatric: normal mood and affect.     Lab Results:    Recent Results (from the past 168 hour(s))   INR   Result Value Ref Range    INR 1.60 (H) 0.90 - 1.10   Creatinine   Result Value Ref Range    Creatinine 1.85 (H) 0.70 - 1.30 mg/dL    GFR MDRD Af Amer 44 (L) >60 mL/min/1.73m2    GFR MDRD Non Af Amer 36 (L) >60 mL/min/1.73m2   Uric Acid   Result Value Ref Range    Uric Acid 11.6 (H) 3.0 - 8.0 mg/dL   Morphology,Smear Review (MORP)   Result Value Ref Range    Pathology, Smear Review See Separate Pathology Report (!) (none)    WBC 6.9 4.0 - 11.0 thou/uL    RBC 3.27 (L) 4.40 - 6.20 mill/uL    Hemoglobin 9.7 (L) 14.0 - 18.0 g/dL    Hematocrit 31.6 (L) 40.0 - 54.0 %    MCV 97 80 - 100 fL    MCH 29.7 27.0 - 34.0 pg    MCHC 30.7 (L) 32.0 - 36.0 g/dL    RDW 15.9 (H) 11.0 - 14.5 %    Platelets 258 140 - 440 thou/uL    MPV 10.7 8.5 - 12.5 fL    Neutrophils % 85 (H) 50 - 70 %    Lymphocytes % 6 (L) 20 - 40 %    Monocytes % 6 2 - 10 %    Eosinophils % 2 0 - 6 %    Basophils % 1 0 - 2 %    Neutrophils Absolute 5.8 2.0 - 7.7 thou/uL    Lymphocytes Absolute 0.4 (L) 0.8 - 4.4 thou/uL    Monocytes Absolute 0.4 0.0 - 0.9 thou/uL    Eosinophils Absolute 0.1 0.0 - 0.4 thou/uL    Basophils Absolute 0.1 0.0 - 0.2 thou/uL   Immunoglobulin Free Light Chains, Serum   Result Value Ref Range    Kappa Free Lt Chain 4.54 (H) 0.33 - 1.94 mg/dL    Lambda Free Lt Chain 2.21 0.57 - 2.63 mg/dL    Kappa Lambda Ratio 2.05 (H) 0.26 - 1.65 "   Peripheral Blood Smear, Path Review   Result Value Ref Range    Case Report       Peripheral Blood Morphology Report                Case: [id number]                                   Authorizing Provider:  Jin Hicks MD   Collected:           06/10/2020 1002              Ordering Location:     [facility]   Received:            06/11/2020 0700                                     Lab                                                                          Pathologist:           Brenden King MD                                                        Specimen:    Peripheral Blood                                                                           Final Diagnosis       PERIPHERAL BLOOD:    -  NORMOCHROMIC NORMOCYTIC ANEMIA    Comment       The complex clinical history has been reviewed.   No significant rouleaux formation.     Normocytic anemia can be caused by multiple etiologies including intrinsic bone marrow disease, renal disease, hepatic disease, endocrine disease, anemia of chronic disease, post-hemorrhagic anemia, and bone marrow infiltration by tumors. Recommend clinical correlation and follow-up.    Clinical Information M89.9     Peripheral Smear       Red blood cells are decreased in number but overall normochromic and normocytic. Anisopoikilocytosis and polychromasia are slightly increased and rouleaux formation does not appear prominent.     The white blood cell count and differential appear as reported on the CBC. Leukocytes are normal in number and appearance, consisting predominantly of segmented and band neutrophils with fewer numbers of lymphocytes and monocytes. No blasts or dysplastic changes are identified.    Platelets are normal in number and appearance.    Charges CPT:   39351  ICD10: D64.9    Comprehensive Metabolic Panel   Result Value Ref Range    Sodium 136 136 - 145 mmol/L    Potassium 3.1 (L) 3.5 - 5.0 mmol/L    Chloride 96 (L) 98 - 107 mmol/L    CO2 27  22 - 31 mmol/L    Anion Gap, Calculation 13 5 - 18 mmol/L    Glucose 118 70 - 125 mg/dL    BUN 63 (H) 8 - 28 mg/dL    Creatinine 1.87 (H) 0.70 - 1.30 mg/dL    GFR MDRD Af Amer 43 (L) >60 mL/min/1.73m2    GFR MDRD Non Af Amer 36 (L) >60 mL/min/1.73m2    Bilirubin, Total 0.6 0.0 - 1.0 mg/dL    Calcium 9.9 8.5 - 10.5 mg/dL    Protein, Total 8.0 6.0 - 8.0 g/dL    Albumin 4.0 3.5 - 5.0 g/dL    Alkaline Phosphatase 65 45 - 120 U/L    AST 40 0 - 40 U/L    ALT 27 0 - 45 U/L   PSA, Diagnostic (Prostatic-Specific Antigen)   Result Value Ref Range    PSA 0.2 0.0 - 6.5 ng/mL   HM1 (CBC with Diff)   Result Value Ref Range    WBC 7.4 4.0 - 11.0 thou/uL    RBC 3.41 (L) 4.40 - 6.20 mill/uL    Hemoglobin 10.0 (L) 14.0 - 18.0 g/dL    Hematocrit 31.2 (L) 40.0 - 54.0 %    MCV 92 80 - 100 fL    MCH 29.3 27.0 - 34.0 pg    MCHC 32.1 32.0 - 36.0 g/dL    RDW 15.6 (H) 11.0 - 14.5 %    Platelets 197 140 - 440 thou/uL    MPV 10.4 8.5 - 12.5 fL    Neutrophils % 64 50 - 70 %    Lymphocytes % 16 (L) 20 - 40 %    Monocytes % 12 (H) 2 - 10 %    Eosinophils % 5 0 - 6 %    Basophils % 2 0 - 2 %    Neutrophils Absolute 4.7 2.0 - 7.7 thou/uL    Lymphocytes Absolute 1.2 0.8 - 4.4 thou/uL    Monocytes Absolute 0.9 0.0 - 0.9 thou/uL    Eosinophils Absolute 0.4 0.0 - 0.4 thou/uL    Basophils Absolute 0.1 0.0 - 0.2 thou/uL     Imaging Results:    Personally reviewed CT chest, abdomen, pelvis.  Small lytic lesions seen in a few vertebral bodies.    Ct Chest Abdomen Pelvis Without Oral Without Iv Contrast    Result Date: 6/3/2020  EXAM: CT CHEST ABDOMEN PELVIS WO ORAL WO IV CONTRAST LOCATION: [facility] DATE/TIME: 6/3/2020 3:24 PM INDICATION: Unexplained myositis and elevated uric acid. COMPARISON: CT chest 2/9/2017 and CT abdomen pelvis 1/16/2014. TECHNIQUE: CT scan of the chest, abdomen, and pelvis was performed without IV contrast. Multiplanar reformats were obtained. Dose reduction techniques were used. CONTRAST: None. FINDINGS: LUNGS AND  PLEURA: Postpleurodesis changes within the right posterior costophrenic phrenic sulcus with pleural thickening and evolving calcific indication. Scattered subpleural linear parenchymal bands of fibrosis. No focal consolidation. Central airways are patent. MEDIASTINUM/AXILLAE: Previous sternotomy for coronary artery bypass and mitral valve replacement. Mild cardiac enlargement no mass/adenopathy nor pericardial effusion. HEPATOBILIARY: Nodular hepatic surface contour compatible with cirrhosis. Tiny calcified gallstones with no bile duct dilatation. PANCREAS: No significant mass, duct dilatation, or inflammatory change. SPLEEN: No splenomegaly. Spleen measures 11.5 cm in craniocaudal dimension. ADRENAL GLANDS: Normal. KIDNEYS/BLADDER: No significant mass, stone, or hydronephrosis. BOWEL: No obstruction or inflammatory change. Normal appendix. LYMPH NODES: No lymphadenopathy. VASCULATURE: No aortoiliac aneurysm. PELVIC ORGANS: Normal-sized prostate. No focal fluid collection. MUSCULOSKELETAL: Multiple tiny lucent lesions within the axial and proximal appendicular skeleton, most pronounced in the pelvis and proximal femurs. Findings suspicious for myeloma.     1.  Multiple tiny lucent skeletal lesions suspicious for multiple myeloma. 2.  No evidence of mass nor adenopathy within the chest, abdomen and pelvis. 3.  Cirrhotic appearing liver. No ascites nor splenomegaly. 4.  Previous right pleurodesis, sternotomy for coronary artery bypass and mitral valve replacement.    Pathology:    Peripheral Blood Smear, Path Review       Final Diagnosis    PERIPHERAL BLOOD:     -  NORMOCHROMIC NORMOCYTIC ANEMIA    Electronically signed by Brenden King MD on 6/11/2020 at 1340    Comment     The complex clinical history has been reviewed.   No significant rouleaux formation.     Normocytic anemia can be caused by multiple etiologies including intrinsic bone marrow disease, renal disease, hepatic disease, endocrine disease, anemia  of chronic disease, post-hemorrhagic anemia, and bone marrow infiltration by tumors. Recommend clinical correlation and follow-up.              Signed by: José Miguel Solorio MD

## 2021-06-08 NOTE — TELEPHONE ENCOUNTER
Patient calling - says he missed a call from Dr. Gottlieb.  Message from Dr. Gottlieb relayed to patient:    Patient's symptoms and a latest labs particularly significantly elevated uric acid level.    Given current GFR suggested can increase allopurinol to 200 mg daily (from 100 mg daily).   Agree with adding colchicine 0.6 mg daily.   Suggested rechecking creatinine and uric acid in about 1 to 2 weeks.   Also agreed with plan to obtain CT of chest, abdomen and pelvis to screen for other etiologies. Dr Hicks will discuss further with patient.   Tried calling patient to go over lab results and above plan, reached voicemail, left message on home and cell phone.   Please go over lab results with patient, recommend he maintain upcoming appointment with us this month to discuss results in more detail, follow-through additional labs/test results and monitor response to treatment.   Also recommend he maintain upcoming appointment  with Dr. Hicks.     Constanza Latham, RN  Triage Nurse Advisor    Reason for Disposition    Caller requesting lab results    Protocols used: PCP CALL - NO TRIAGE-A-

## 2021-06-09 NOTE — TELEPHONE ENCOUNTER
Patient called and left a message requesting call back to [phone].      I called patient back. Patient had BMBX on 6/23/20.  He said he felt good on Tues and Wed. He said he had trouble walking on Thurs due to L foot pain. He said pain was improved by Fri morning and was able to walk normally.  He said since Fri he has been having bilat lower back pain.  Describes as burning sensation. No rash, redness, puss. Denies fevers. He said pain has been improving every day. I told him since his pain is improving to continue to monitor and if it doesn't resolve, worsens, or he develops fever, rash, redness, drainage, he needs to call us back. I also told him he could apply ice to the areas of soreness. He verbalized understanding.    Reviewed the above with ABHISHEK Carvalho CNP. He agreed and also said patient could try tylenol as needed to.help with pain. Called patient with this message. He verbalized understanding and will monitor symptoms.    He said his PCP ordered lab draw for 7/6. He is wondering if I can be drawn here.  I told him we have an order from Dr Hicks, so yes we can and I asked that he come at 2p so we can draw prior to his appt with Dr. Solorio. He thanked for this/BISHNU Zamora RN

## 2021-06-09 NOTE — PROGRESS NOTES
Pharmacy Consult: Warfarin Management    Pharmacy consulted to dose warfarin for Bandar Wells, a 66 y.o. male    Ordering provider: Janusz Hall PA-C  Reason for warfarin therapy: Atrial Fibrillation, prosethic valve  Goal INR Range: 2.5-3.5    Subjective  Medication lists (home and hospital) were reviewed.    New medications that may increase bleeding risk/INR: Rocephin  Started home medications that may increase bleeding risk/INR: None  Home Warfarin Dosing: Patient was previously on Apixaban 5 mg bid, then enoxapirin prior to surg  Other Anticoagulants:heparin gtt- low dose protocol   Patient being bridged: Yes    Patient Active Problem List   Diagnosis     Hx of bacterial endocarditis     Dyslipidemia     Mitral valve prolapse     S/P MVR (mitral valve replacement)     Recurrent pleural effusion on right     Pericardial effusion without cardiac tamponade     Status post Maze operation for atrial fibrillation     Paroxysmal atrial fibrillation     Coronary artery disease     Acute diastolic heart failure     CHF (congestive heart failure)     Endocarditis of prosthetic valve, sequela     Prosthetic valve dysfunction     Hypovolemia     Hypotension     Prosthetic valve endocarditis     Acute respiratory failure with hypoxia     Acute blood loss anemia    Past Medical History:   Diagnosis Date     Abnormal liver function test      Atrial fibrillation     post naun     Atrial fibrillation with RVR 8/29/2015    S/p MAZE Jul 2015     Bacterial endocarditis      CHF (congestive heart failure)      Dyslipidemia      Hyperlipidemia      Mitral valve prolapse      Near syncope      Pericardial effusion without cardiac tamponade 8/29/2015     Status post Maze operation for atrial fibrillation 8/29/2015        Social History   Substance Use Topics     Smoking status: Never Smoker     Smokeless tobacco: None     Alcohol use 3.6 oz/week     5 Glasses of wine, 1 Cans of beer per week       Objective   Labs:  Last 3 days:    Recent  Labs      03/15/17   0905  03/16/17   1449  03/16/17   1548  03/17/17   0411  03/17/17   1127   CREATININE  1.29  0.96  0.96  1.27   --    HGB  9.5*  10.0*  10.6*  7.7*  7.9*   HCT  29.8*  30.0*  32.0*  23.7*  24.7*   PLT  197  168  153  138*  136*     Last 7 days:   Recent labs: (last 7 days)      03/15/17   0905  03/16/17   1449  03/16/17   1548  03/17/17   0411  03/17/17   1127   INR  1.23*  1.41*  1.35*  1.30*  1.24*       Warfarin Dosing History:    Date INR Warfarin Dose Comment   03/17/17 1.24 5 mg                        Assessment  The patient is initiating warfarin for the indication of Atrial Fibrillation with a goal INR of 2-3. I    Plan  1. Administer warfarin 5 mg PO today.  2. Check INR daily or as appropriate.  3. Continue to follow the patient's INR, PLT, and HGB as available.  4. Monitor for potential drug/disease interactions.    Thank you for the consult,  Mildred Durand, [facility] 3/17/2017 2:18 PM

## 2021-06-09 NOTE — PROGRESS NOTES
"Inpatient Progress Note - Internal Medicine  Principal Problem:    Prosthetic valve endocarditis  Active Problems:    Hx of bacterial endocarditis    S/P MVR (mitral valve replacement)    Acute respiratory failure with hypoxia    Acute blood loss anemia    Acute renal failure      Subjective:  He is a little discouraged and down.  Wife has reported periods of delirium or minor confusion although has really oriented and is doing well.  Serum creatinine continues to improve urine output is stable    Notes by nephrology ID and cardiovascular surgery services reviewed and appreciated    Vital signs stable would like to wean from dopamine.  Pacer wires still in contact these will be removed tomorrow.    INR is therapeutic.    No other specific complaints        ROS: A comprehensive review of systems was performed and was otherwise negative except as mentioned above.     Physical Exam:   Visit Vitals     /66     Pulse 88     Temp 98.4  F (36.9  C) (Oral)     Resp 18     Ht 5' 10\" (1.778 m)     Wt 184 lb 4.9 oz (83.6 kg)     SpO2 90%     BMI 26.44 kg/m2     Vitals stable and reviewed chest exam unremarkable chest looks good heart systolic murmur unchanged skin and lymphatics negative extremities 2+ edema    Weight is down 3 pounds remains about 10-12 pounds over baseline weight        Allergies: reviewed  Medications: reviewed, see discussion for details  Diagnostics: Pertinent labs and imaging reviewed and discussed in subjective as pertinent.    Assessment and Plan:  Generally but slowly improving.  Mild renal insufficiency hemodynamically stable.  Continue on antibiotic therapy.  Increase activity as tolerated after weaning from dopamine and removal of pacer wires continue with diuretics    Time: total time spent with the patient was 35 minutes of which >50% was spent in counseling and coordination of care.    José Miguel Black MD   3/21/2017 5:34 PM  Internal Medicine  [facility]    Scheduled Meds:    aspirin  81 mg Oral DAILY     atorvastatin  40 mg Oral QHS     bisacodyl  10 mg Oral Once     bumetanide  1 mg Intravenous Q12H     cefTRIAXone (ROCEPHIN) 2 g in 50 mL NS  2 g Intravenous Q24H     docusate sodium  100 mg Oral BID     ferrous sulfate  325 mg Oral BID with meals     insulin aspart (NovoLOG) injection   Subcutaneous TID with meals     insulin aspart (NovoLOG) injection   Subcutaneous QHS     ipratropium-albuterol  3 mL Nebulization Q6H - RT     lidocaine  2 patch Transdermal Q24H     magnesium hydroxide  30 mL Oral DAILY     melatonin  3 mg Oral QHS     omeprazole  20 mg Oral Daily before brkfst     [START ON 3/22/2017] penicillin VK  250 mg Oral DAILY     polyethylene glycol  17 g Oral DAILY     warfarin - daily dose required   Other Med Consult or Protocol     warfarin - NO DOSE TODAY   Other No Dose Today     Continuous Infusions:    dexmedetomidine 200 mcg/50 mL (PRECEDEX) (4mcg/mL) 0.3 mcg/kg/hr (03/16/17 1743)     DOPamine 3 mcg/kg/min (03/20/17 0700)     DOPamine 3 mcg/kg/min (03/21/17 1447)     epinephrine Stopped (03/17/17 0908)     heparin infusion 14 Units/kg/hr (03/21/17 1223)     insulin regular infusion 0.5 unit/mL 1 Units/hr (03/17/17 0929)     niCARdipine Stopped (03/17/17 0800)     norepinephrine IV infusion in D5W Stopped (03/17/17 0451)     nacl 0.9% Stopped (03/17/17 1921)     vasopressin 100 units/100 ml (PITRESSIN)in D5W (1 unit/ml) 2.4 Units/hr (03/20/17 0800)     PRN Meds:.acetaminophen, acetaminophen, albumin human, aluminum-magnesium hydroxide-simethicone, bisacodyl, bisacodyl, dexmedetomidine 200 mcg/50 mL (PRECEDEX) (4mcg/mL), dextrose 50 % (D50W), DOPamine, epinephrine, glucagon (human recombinant), heparin, heparin, magnesium hydroxide, naloxone **OR** naloxone, niCARdipine, norepinephrine IV infusion in D5W, ondansetron, oxyCODONE, sodium chloride, sodium phosphates 133 mL, traZODone, vasopressin 100 units/100 ml (PITRESSIN)in D5W (1  unit/ml)

## 2021-06-09 NOTE — ANESTHESIA PROCEDURE NOTES
Central line    Start time: 3/16/2017 8:40 AM  End time: 3/16/2017 8:57 AM  Patient location: OR Pre-induction  Indications: central pressure monitoring and vascular access  Performing Anesthesiologist: BARRETT HARO  Pre-procedure Checklist  Completed: patient identified, site marked, risks, benefits, and alternatives discussed, timeout performed, consent obtained, hand hygiene performed, all elements of maximal sterile barriers used including cap, mask, gown, sterile gloves, and large sheet and skin prep agent completely dried prior to procedure    Procedure Details:  Lidocaine 1% local anesthesia used for skin prep  Preparation: 2% chlorhexidine  Location details: right internal jugular  Site selection rationale: access  Catheter type: Introducer with [place]  Introducer type: MAC  Lumens:double lumenWedged at: 58   Withdrawn and locked at: 52.  Note:  swan floated s/p cardiopulmonary bypass.  Pre-bypass, swan tip placed in SVC.Number of attempts: 2  Ultrasound evaluation of access site: yes  Vessel patent by US exam  Concurrent real time visualization of needle entry  manometry confirmation of venous access    Post-procedure:   line sutured  Assessment: blood return through all ports and free fluid flow  Complications: none

## 2021-06-09 NOTE — PROGRESS NOTES
03/22/17 1745   Reason for Assessment (RCAT)   RCAT Assesment Re-eval   Assessment Reason  Cardiac surgery   $ RCAT Eval Time 15 min.  Yes   Vitals (RCAT)   Resp 24   SpO2 95 %   FiO2 (%) 21 %   Patient Temp 98 F   Chart Assessment (RCAT)   Pulmonary Status  0   Surgical Status  3   Chest Xray  3   Patient Assessment (RCAT)    Respiratory Pattern  1   Mental Status  0   Breath Sounds  2   Cough Effectiveness  1   Level of Activity  1   O2 Required SpO2 >= 92%  0   Chart + Pt. Assessment Total Points  11   RCAT Acuity Score 11 (Acuity 3)   Clinical Indications (RCAT)   Aerosol Hygiene RCAT protocol;Physician order   Broncho-Pulmonary Therapy Productive cough   Volume Expansion Therapy Atelectasis   RCAT Order Placed  Continue current therapy;Yes    Pt RCAT score 11 acuity 3, will change neb to  Duo Qid & leave Bronchial hygiene Qid.  Pt has coarse BS occ rhonchi with prod cough.  Will re-eval in 48 hrs.   Kristi Sinclair

## 2021-06-09 NOTE — TELEPHONE ENCOUNTER
Question following Office Visit  When did you see your provider: Today  What is your question: The patient is calling to share the appointments he has set for providers.  The next appointment will be with Dr Johns Dermatologist [facility] on 7/30/2020 2:30  The other appointment is for Dr España Nephrologist who works for [facility].  The appointment is August 5 2020 at 1:10 pm.  No follow up needed.    Okay to leave a detailed message: Yes

## 2021-06-09 NOTE — PROGRESS NOTES
Patient extubated to 10lpm oxymask without complication.  Patient with strong cough.  Patient verbalizes name.  Sao2 on 10lpm oxymask 94%  DEBBY Adams PATIENT CHARTED. PLEASE EXCLUDE NOTE

## 2021-06-09 NOTE — PROGRESS NOTES
...  Patient Name: Bandar Wells   MRN: [id number]   Date of Admission: 3/16/2017    Procedure: REDO STERNOTOMY, REDO MITRAL VALVE REPLACEMENT, PLACEMENT TEMPORARY VENTRICULAR PACING WIRES, ANESTHESIA TRANSESOPHAGEAL ECHOCARDIOGRAM    Post Op day #:    Subjective (Patient focus/Primary Problem for shift): Use of IS respiratory status pain level , activity level.          Pain Goal0 Pain Rating2/10           Pain Medication/ Regime effective to reduce patient painN/A    Objective (Physical assessment):           Rhythm: Junctional Rhythm            Bowel Activity: no if Yes indicate when: 3/22          Bowel Medications: no            Incision: healing well          Incentive Spirometry Q 1-2 hour when awake:  yes Volume: 600          Epicardial Pacing Wires:  no            Patient Activity:           Up to chair for meals: yes          Ambulation with RN x2 (Not including CR): yes             Chest Tubes   Pleural: no Draining: no               Suction: no              Mediastinal: no Draining: no               Suction: no   Dressing Change Daily:no                      Urinary Catheter: no           Preventative WOC consult (need MD order): no       Assessment (Nursing primary shift focus):  IS level ,Activity tolerance  atient Care Plan/focus): Encourage use of IS walkwith patient , offer pain medication       Alma Grande   3/23/2017   2:34 PM           No

## 2021-06-09 NOTE — PROGRESS NOTES
Code Status:  FULL CODE  Visit Type: Discharge Summary     Facility:  [facility] [[id number]]         Facility Type: SNF (Skilled Nursing Facility, TCU)    History of Present Illness: Bandar Wells is a 66 y.o. male who I am seeing today for discharge.  Patient plans to discharge in a.m.however very eager to discharge today so he can begin setting up his IV care at the clinic and outpatient cardiac rehab.  Patient recently admitted status post mechanical mitral valve replacement on 3/16/2017 with a bacterial endocarditis.  He originally underwent mitral valve bioprosthetic replacement in 2015.  He continues on both oral and IV antibiotics until May 1.  He tells me he will go to his clinic for administration.  Continues with some tenderness in the chest around incision site.  History of atrial fib on chronic anticoagulation.  INR today somewhat subtherapeutic at 1.56.  He did undergo maze procedure in  August 2015.  CHF.  He continues on Lasix.  He does have 1-2+ lower extremity edema.  During his TCU stay he did have low heart rate in the mid 40s.  His metoprolol was decreased.  This seems to have improved somewhat.  Insomnia.  He was treated with trazodone during his TCU stay.  He did have the same after his first procedure.  He continues in therapy.  Acute blood loss anemia.  He does continue on iron.  Recent hemoglobin 9.7.      Active Ambulatory Problems     Diagnosis Date Noted     Hx of bacterial endocarditis 04/29/2015     Dyslipidemia 04/29/2015     Mitral valve prolapse 04/29/2015     S/P MVR (mitral valve replacement) 07/01/2015     Recurrent pleural effusion on right 08/03/2015     Pericardial effusion without cardiac tamponade 08/29/2015     Status post Maze operation for atrial fibrillation 08/29/2015     Paroxysmal atrial fibrillation 10/29/2015     Coronary artery disease 12/07/2015     Acute diastolic heart failure 03/03/2017     CHF (congestive heart failure) 03/03/2017      Hypovolemia      Hypotension      Acute respiratory failure with hypoxia 03/16/2017     Acute blood loss anemia 03/16/2017     Acute renal failure      Dysthymia      Prosthetic valve endocarditis, subsequent encounter      S/P mitral valve replacement with metallic valve 03/16/2017     Resolved Ambulatory Problems     Diagnosis Date Noted     Abnormal liver function tests 04/29/2015     Post-op bleeding 07/01/2015     Acute postoperative respiratory insufficiency 07/01/2015     Hives 07/01/2015     Atrial fibrillation with RVR 08/29/2015     Pericardial effusion 08/31/2015     Persistent atrial fibrillation 12/07/2015     Febrile illness 02/09/2017     H/O infective endocarditis during pregnancy 02/09/2017     Subacute bacterial endocarditis      Past Medical History:   Diagnosis Date     Abnormal liver function test      Atrial fibrillation      Atrial fibrillation with RVR 8/29/2015     Bacterial endocarditis      CHF (congestive heart failure)      Dyslipidemia      Hyperlipidemia      Mitral valve prolapse      Near syncope      Pericardial effusion without cardiac tamponade 8/29/2015     S/P mitral valve replacement with metallic valve 03/16/2017     Status post Maze operation for atrial fibrillation 8/29/2015       Current Outpatient Prescriptions   Medication Sig Note     acetaminophen (TYLENOL) 500 MG tablet Take 500-1,000 mg by mouth every 6 (six) hours as needed for pain. Pain 1-5  Give 500 mg  Or pain 6-10 give 1000 mg every 6 hours ad needed not to exceed 4 grams in 24 hours      atorvastatin (LIPITOR) 40 MG tablet Take 40 mg by mouth bedtime.       b complex vitamins tablet Take 1 tablet by mouth daily.       cefTRIAXone (ROCEPHIN) 2 gram injection Infuse 2,000 mg into a venous catheter daily.      cholecalciferol, vitamin D3, 1,000 unit tablet Take 1,000 Units by mouth daily.      clindamycin (CLEOCIN) 300 MG capsule Take 600 mg by mouth see administration instructions. Take prior to Dentist  appointments      cyanocobalamin 1000 MCG tablet Take 1,000 mcg by mouth daily.      ferrous sulfate 325 (65 FE) MG tablet Take 1 tablet by mouth 2 (two) times a day.      FLAXSEED OIL ORAL Take 1,000 mg by mouth daily.       FOLIC ACID ORAL Take 1 tablet by mouth daily. 3/16/2017: Unknown strength, patient ran out and needs to buy more     furosemide (LASIX) 20 MG tablet Take 20 mg by mouth every other day. Daily regimen of 20 mg alternating with 40 mg every other day.       furosemide (LASIX) 20 MG tablet Take 40 mg by mouth every other day. Daily regimen of 20 mg alternating with 40 mg every other day.      L. ACIDOPHILUS/BIFIDO LONGUM (PROBIOTIC PEARLS ORAL) Take 1 capsule by mouth daily.      metoprolol tartrate (LOPRESSOR) 25 MG tablet Take 1 tablet (25 mg total) by mouth 2 (two) times a day. (Patient taking differently: Take 12.5 mg by mouth 2 (two) times a day. ) 3/10/2017: Message left on pts phone that he needs to be taking the 25 mg BID now, see above note as well as progress note     multivitamin therapeutic (THERAGRAN) tablet Take 1 tablet by mouth daily.      oxyCODONE (ROXICODONE) 5 MG immediate release tablet Take 1-2 tablets (5-10 mg total) by mouth every 4 (four) hours as needed. (Patient taking differently: Take 5-10 mg by mouth every 4 (four) hours as needed. Take 5 mg for pain 1-5  Or take 10 mg for pain 6-10 ever 4 hours as needed for pain)      oxymetazoline (AFRIN) 0.05 % nasal spray Apply 2-3 sprays into each nostril 2 (two) times a day as needed. Separate doses by at least 10-12 hours.      traZODone (DESYREL) 50 MG tablet Take 50 mg by mouth at bedtime as needed for sleep.      WARFARIN SODIUM (WARFARIN ORAL) Take 6 mg by mouth once daily. 3/30 INR 1.56. Take 6 mg on 3/30 and 3/31. Take 1/2 tab (3mg) on 4/1 and 4/2. Follow up INR on 4/3.        Allergies   Allergen Reactions     Amoxicillin Other (See Comments)     Hepatic fibrosis,  Tolerated ceftriaxone (rocephine)          Review of  Systems   No fevers or chills. No headache, lightheadedness or dizziness. No SOB, chest pains or palpitations. Appetite is good. No nausea, vomiting, constipation or diarrhea. No dysuria, frequency, burning or pain with urination. Otherwise review of systems are negative.       Physical Exam   PHYSICAL EXAMINATION:  Vital signs:   Vitals:    03/30/17 1219   BP: 128/64   Pulse: 62   Resp: 18   Temp: 97  F (36.1  C)   SpO2: 95%     General: Awake, Alert, oriented x3, appropriately, follows simple commands, conversant  HEENT:PERRLA, Pink conjunctiva, anicteric sclerae, moist oral mucosa  NECK: Supple, without any lymphadenopathy, or masses  CVS:  S1  S2, without murmur or gallop. Incision to chest dry and intact. Slight tender around. No redness. Drain sites with band aide.   LUNG: Clear to auscultation, No wheezes, rales or rhonci. No SOB at rest.   BACK: No kyphosis of the thoracic spine  ABDOMEN: Soft, nontender to palpation, with positive bowel sounds  EXTREMITIES: Good range of motion on both upper and lower extremities, 2+ pedal edema, no calf tenderness. PICC line in place.   SKIN: Warm and dry, no rashes or erythema noted  NEUROLOGIC: Intact, pulses palpable  PSYCHIATRIC: Cognition intact, flat affect.           Labs:    Recent Results (from the past 240 hour(s))   Anti-Xa Heparin Level   Result Value Ref Range    Anti-Xa Heparin Assay <0.10 (L) 0.30 - 0.70 IU/mL   POCT Glucose   Result Value Ref Range    Glucose,  mg/dL   POCT Glucose   Result Value Ref Range    Glucose,  mg/dL   POCT Glucose   Result Value Ref Range    Glucose,  mg/dL   Anti-Xa Heparin Level   Result Value Ref Range    Anti-Xa Heparin Assay 0.71 (H) 0.30 - 0.70 IU/mL   INR   Result Value Ref Range    INR 2.25 (H) 0.90 - 1.10   Magnesium   Result Value Ref Range    Magnesium 2.1 1.8 - 2.6 mg/dL   Renal Function Profile   Result Value Ref Range    Albumin 3.2 (L) 3.5 - 5.0 g/dL    Calcium 9.6 8.5 - 10.5 mg/dL    Phosphorus  4.1 2.5 - 4.5 mg/dL    Glucose 99 70 - 125 mg/dL    BUN 39 (H) 8 - 22 mg/dL    Creatinine 1.41 (H) 0.70 - 1.30 mg/dL    Sodium 134 (L) 136 - 145 mmol/L    Potassium 4.3 3.5 - 5.0 mmol/L    Chloride 99 98 - 107 mmol/L    CO2 24 22 - 31 mmol/L    Anion Gap, Calculation 11 5 - 18 mmol/L    GFR MDRD Af Amer >60 >60 mL/min/1.73m2    GFR MDRD Non Af Amer 50 (L) >60 mL/min/1.73m2   HM2(CBC w/o Differential)   Result Value Ref Range    WBC 11.0 4.0 - 11.0 thou/uL    RBC 2.72 (L) 4.40 - 6.20 mill/uL    Hemoglobin 8.2 (L) 14.0 - 18.0 g/dL    Hematocrit 26.3 (L) 40.0 - 54.0 %    MCV 97 80 - 100 fL    MCH 30.1 27.0 - 34.0 pg    MCHC 31.2 (L) 32.0 - 36.0 g/dL    RDW 15.2 (H) 11.0 - 14.5 %    Platelets 218 140 - 440 thou/uL    MPV 10.6 8.5 - 12.5 fL   Hepatic Profile   Result Value Ref Range    Bilirubin, Total 0.4 0.0 - 1.0 mg/dL    Bilirubin, Direct 0.2 <=0.5 mg/dL    Protein, Total 7.5 6.0 - 8.0 g/dL    Albumin 3.2 (L) 3.5 - 5.0 g/dL    Alkaline Phosphatase 56 45 - 120 U/L    AST 27 0 - 40 U/L    ALT 11 0 - 45 U/L   Anti-Xa Heparin Level   Result Value Ref Range    Anti-Xa Heparin Assay 0.50 0.30 - 0.70 IU/mL   ECG 12 lead MUSE   Result Value Ref Range    SYSTOLIC BLOOD PRESSURE  mmHg    DIASTOLIC BLOOD PRESSURE  mmHg    VENTRICULAR RATE 90 BPM    ATRIAL RATE 83 BPM    P-R INTERVAL  ms    QRS DURATION 88 ms    Q-T INTERVAL 550 ms    QTC CALCULATION (BEZET) 672 ms    P Axis  degrees    R AXIS 87 degrees    T AXIS 84 degrees    MUSE DIAGNOSIS       Atrial fibrillation  T wave abnormality, consider anterior ischemia or digitalis effect  Prolonged QT  Abnormal ECG  When compared with ECG of 17-MAR-2017 08:16,  ST no longer depressed in Anterior leads  Nonspecific T wave abnormality no longer evident in Inferior leads  QT has lengthened  Confirmed by PAO VEGA MD LOC:WW ([id number]) on 3/21/2017 10:43:07 AM     POCT Glucose   Result Value Ref Range    Glucose, POC 98 mg/dL   Anti-Xa Heparin Level   Result Value Ref Range    Anti-Xa  Heparin Assay <0.10 (L) 0.30 - 0.70 IU/mL   Anti-Xa Heparin Level   Result Value Ref Range    Anti-Xa Heparin Assay <0.10 (L) 0.30 - 0.70 IU/mL   POCT Glucose   Result Value Ref Range    Glucose,  mg/dL   Procalcitonin   Result Value Ref Range    Procalcitonin 1.14 (H) 0.00 - 0.49 ng/mL   Sputum culture   Result Value Ref Range    Culture Usual Evelia     Gram Stain Result 2+ Polymorphonuclear leukocytes     Gram Stain Result 2+ Yeast    POCT Glucose   Result Value Ref Range    Glucose,  mg/dL   Anti-Xa Heparin Level   Result Value Ref Range    Anti-Xa Heparin Assay 0.17 (L) 0.30 - 0.70 IU/mL   POCT Glucose   Result Value Ref Range    Glucose,  mg/dL   Anti-Xa Heparin Level   Result Value Ref Range    Anti-Xa Heparin Assay 0.68 0.30 - 0.70 IU/mL   Potassium   Result Value Ref Range    Potassium 3.6 3.5 - 5.0 mmol/L   POCT Glucose   Result Value Ref Range    Glucose,  mg/dL   INR   Result Value Ref Range    INR 2.91 (H) 0.90 - 1.10   Renal Function Profile   Result Value Ref Range    Albumin 2.9 (L) 3.5 - 5.0 g/dL    Calcium 9.4 8.5 - 10.5 mg/dL    Phosphorus 3.1 2.5 - 4.5 mg/dL    Glucose 99 70 - 125 mg/dL    BUN 32 (H) 8 - 22 mg/dL    Creatinine 1.00 0.70 - 1.30 mg/dL    Sodium 134 (L) 136 - 145 mmol/L    Potassium 3.5 3.5 - 5.0 mmol/L    Chloride 96 (L) 98 - 107 mmol/L    CO2 28 22 - 31 mmol/L    Anion Gap, Calculation 10 5 - 18 mmol/L    GFR MDRD Af Amer >60 >60 mL/min/1.73m2    GFR MDRD Non Af Amer >60 >60 mL/min/1.73m2   Magnesium   Result Value Ref Range    Magnesium 1.9 1.8 - 2.6 mg/dL   Hepatic Profile   Result Value Ref Range    Bilirubin, Total 0.4 0.0 - 1.0 mg/dL    Bilirubin, Direct 0.2 <=0.5 mg/dL    Protein, Total 6.8 6.0 - 8.0 g/dL    Albumin 2.9 (L) 3.5 - 5.0 g/dL    Alkaline Phosphatase 55 45 - 120 U/L    AST 30 0 - 40 U/L    ALT 14 0 - 45 U/L   Anti-Xa Heparin Level   Result Value Ref Range    Anti-Xa Heparin Assay 0.50 0.30 - 0.70 IU/mL   HM1 (CBC with Diff)   Result  Value Ref Range    WBC 9.5 4.0 - 11.0 thou/uL    RBC 2.45 (L) 4.40 - 6.20 mill/uL    Hemoglobin 7.5 (L) 14.0 - 18.0 g/dL    Hematocrit 22.9 (L) 40.0 - 54.0 %    MCV 94 80 - 100 fL    MCH 30.6 27.0 - 34.0 pg    MCHC 32.8 32.0 - 36.0 g/dL    RDW 15.1 (H) 11.0 - 14.5 %    Platelets 227 140 - 440 thou/uL    MPV 10.4 8.5 - 12.5 fL    Neutrophils % 75 (H) 50 - 70 %    Lymphocytes % 10 (L) 20 - 40 %    Monocytes % 11 (H) 2 - 10 %    Eosinophils % 5 0 - 6 %    Basophils % 0 0 - 2 %    Neutrophils Absolute 7.1 2.0 - 7.7 thou/uL    Lymphocytes Absolute 0.9 0.8 - 4.4 thou/uL    Monocytes Absolute 1.0 (H) 0.0 - 0.9 thou/uL    Eosinophils Absolute 0.4 0.0 - 0.4 thou/uL    Basophils Absolute 0.0 0.0 - 0.2 thou/uL   POCT Glucose   Result Value Ref Range    Glucose, POC 99 mg/dL   Hemoglobin   Result Value Ref Range    Hemoglobin 7.9 (L) 14.0 - 18.0 g/dL   Type and Screen   Result Value Ref Range    ABORh B POS     Antibody Screen Negative Negative   Potassium   Result Value Ref Range    Potassium 3.9 3.5 - 5.0 mmol/L   Crossmatch   Result Value Ref Range    Crossmatch Compatible     Blood Expiration Date [id number]     Unit Type B Pos     Unit Number [id number]     Status Transfused     Component Red Blood Cells     PRODUCT CODE R5780V35     Issue Date and Time [id number]     Blood Type 7300     CODING SYSTEM [id number]    INR   Result Value Ref Range    INR 3.01 (H) 0.90 - 1.10   Renal Function Profile   Result Value Ref Range    Albumin 2.9 (L) 3.5 - 5.0 g/dL    Calcium 9.2 8.5 - 10.5 mg/dL    Phosphorus 2.9 2.5 - 4.5 mg/dL    Glucose 102 70 - 125 mg/dL    BUN 25 (H) 8 - 22 mg/dL    Creatinine 0.90 0.70 - 1.30 mg/dL    Sodium 134 (L) 136 - 145 mmol/L    Potassium 3.9 3.5 - 5.0 mmol/L    Chloride 96 (L) 98 - 107 mmol/L    CO2 32 (H) 22 - 31 mmol/L    Anion Gap, Calculation 6 5 - 18 mmol/L    GFR MDRD Af Amer >60 >60 mL/min/1.73m2    GFR MDRD Non Af Amer >60 >60 mL/min/1.73m2   Magnesium   Result Value Ref Range     Magnesium 1.9 1.8 - 2.6 mg/dL   HM2(CBC w/o Differential)   Result Value Ref Range    WBC 7.9 4.0 - 11.0 thou/uL    RBC 2.67 (L) 4.40 - 6.20 mill/uL    Hemoglobin 7.9 (L) 14.0 - 18.0 g/dL    Hematocrit 25.0 (L) 40.0 - 54.0 %    MCV 94 80 - 100 fL    MCH 29.6 27.0 - 34.0 pg    MCHC 31.6 (L) 32.0 - 36.0 g/dL    RDW 15.1 (H) 11.0 - 14.5 %    Platelets 226 140 - 440 thou/uL    MPV 10.4 8.5 - 12.5 fL   POCT Glucose   Result Value Ref Range    Glucose,  mg/dL   POCT Glucose   Result Value Ref Range    Glucose,  mg/dL   INR   Result Value Ref Range    INR 3.16 (H) 0.90 - 1.10   INR   Result Value Ref Range    INR 2.41 (H) 0.90 - 1.10   Basic Metabolic Panel   Result Value Ref Range    Sodium 138 136 - 145 mmol/L    Potassium 3.4 (L) 3.5 - 5.0 mmol/L    Chloride 95 (L) 98 - 107 mmol/L    CO2 35 (H) 22 - 31 mmol/L    Anion Gap, Calculation 8 5 - 18 mmol/L    Glucose 103 70 - 125 mg/dL    Calcium 9.8 8.5 - 10.5 mg/dL    BUN 19 8 - 22 mg/dL    Creatinine 1.05 0.70 - 1.30 mg/dL    GFR MDRD Af Amer >60 >60 mL/min/1.73m2    GFR MDRD Non Af Amer >60 >60 mL/min/1.73m2   C-Reactive Protein (CRP)   Result Value Ref Range    CRP 7.9 (H) 0.0 - 0.8 mg/dL   INR   Result Value Ref Range    INR 2.46 (H) 0.90 - 1.10   HM1 (CBC with Diff)   Result Value Ref Range    WBC 10.1 4.0 - 11.0 thou/uL    RBC 3.31 (L) 4.40 - 6.20 mill/uL    Hemoglobin 9.7 (L) 14.0 - 18.0 g/dL    Hematocrit 31.1 (L) 40.0 - 54.0 %    MCV 94 80 - 100 fL    MCH 29.3 27.0 - 34.0 pg    MCHC 31.2 (L) 32.0 - 36.0 g/dL    RDW 14.9 (H) 11.0 - 14.5 %    Platelets 341 140 - 440 thou/uL    MPV 11.4 8.5 - 12.5 fL    Neutrophils % 69 50 - 70 %    Lymphocytes % 18 (L) 20 - 40 %    Monocytes % 10 2 - 10 %    Eosinophils % 2 0 - 6 %    Basophils % 1 0 - 2 %    Neutrophils Absolute 6.9 2.0 - 7.7 thou/uL    Lymphocytes Absolute 1.8 0.8 - 4.4 thou/uL    Monocytes Absolute 1.0 (H) 0.0 - 0.9 thou/uL    Eosinophils Absolute 0.2 0.0 - 0.4 thou/uL    Basophils Absolute 0.1 0.0 -  0.2 thou/uL   INR   Result Value Ref Range    INR 2.01 (H) 0.90 - 1.10   INR   Result Value Ref Range    INR 1.56 (H) 0.90 - 1.10         Assessment/Plan:  1. S/P MVR (mitral valve replacement)     2. Prosthetic valve endocarditis, subsequent encounter     3. S/P mitral valve replacement with metallic valve     4. Hx of bacterial endocarditis     5. Paroxysmal atrial fibrillation     6. Status post Maze operation for atrial fibrillation     7. Acute systolic congestive heart failure     8. Acute renal failure, unspecified acute renal failure type     9. Acute blood loss anemia     10. Coronary artery disease involving native coronary artery of native heart without angina pectoris     11. Dyslipidemia     12. Insomnia       Patient with recent mitral valve replacement with resulting bacterial endocarditis.  He does continue on oral antibiotics and IV Rocephin once daily until 5 1.  He is followed by infectious disease.  C-reactive protein continues to be elevated at 7.9.  He will follow-up at his clinic for IV infusion daily.  He will set up cardiac rehab.  He has done this in the past.  Acute blood loss anemia.  He does continue on iron.  Recent hemoglobin was 9.7.  Insomnia.  Treated with trazodone.  Systolic congestive heart failure.  He does continue with some lower extremity edema.  No shortness of breath.  No chest pain other than incisional site pain.  He is on Lasix with an alternating dose of 2040 every other day.  Atrial fib.  He continues on chronic anticoagulation.  Recent subtherapeutic range at 1.56.  I will send him home with 6 mg tablets taking 6 mg today 3/30, 3/31. 3mg 4/1, 4/2 with follow up INR on 4/3. Labs weekly to be sent to ID.       40 minutes spent of which greater than 50% was face to face communication with the patient about above plan of care.    Electronically signed by: Anne Hester, CNP

## 2021-06-09 NOTE — PROGRESS NOTES
Pharmacy Consult: Warfarin Management    Pharmacy consulted to dose warfarin for Bandar Wells, a 66 y.o. male    Ordering provider: Janusz Hall PA-C  Reason for warfarin therapy: Atrial Fibrillation, prosethic valve  Goal INR Range: 2.5-3.5    Subjective  Medication lists (home and hospital) were reviewed.    New medications that may increase bleeding risk/INR: Rocephin, Penicillin VK    Started home medications that may increase bleeding risk/INR: Aspirin    Home Warfarin Dosing: Patient was previously on Apixaban 5 mg BID, then enoxaparin prior to surg    Other Anticoagulants: None  Patient being bridged: No, heparin discontinued today as INR therapeutic    Patient Active Problem List   Diagnosis     Hx of bacterial endocarditis     Dyslipidemia     Mitral valve prolapse     S/P MVR (mitral valve replacement)     Recurrent pleural effusion on right     Pericardial effusion without cardiac tamponade     Status post Maze operation for atrial fibrillation     Paroxysmal atrial fibrillation     Coronary artery disease     Acute diastolic heart failure     CHF (congestive heart failure)     Endocarditis of prosthetic valve, sequela     Prosthetic valve dysfunction     Hypovolemia     Hypotension     Prosthetic valve endocarditis     Acute respiratory failure with hypoxia     Acute blood loss anemia     Acute renal failure    Past Medical History:   Diagnosis Date     Abnormal liver function test      Atrial fibrillation     post naun     Atrial fibrillation with RVR 8/29/2015    S/p MAZE Jul 2015     Bacterial endocarditis      CHF (congestive heart failure)      Dyslipidemia      Hyperlipidemia      Mitral valve prolapse      Near syncope      Pericardial effusion without cardiac tamponade 8/29/2015     Status post Maze operation for atrial fibrillation 8/29/2015        Social History   Substance Use Topics     Smoking status: Never Smoker     Smokeless tobacco: None     Alcohol use 3.6 oz/week     5 Glasses of wine, 1  Cans of beer per week       Objective   Labs:  Last 3 days:    Recent Labs      03/20/17   0507  03/21/17   0513  03/22/17   0538  03/22/17   0936   CREATININE  1.71*  1.41*  1.00   --    HGB  8.0*  8.2*  7.5*  7.9*   HCT   --   26.3*  22.9*   --    PLT  194  218  227   --    BILITOT   --   0.4  0.4   --    AST   --   27  30   --    ALT   --   11  14   --    ALKPHOS   --   56  55   --      Last 7 days:   Recent labs: (last 7 days)      03/17/17   0411  03/17/17   1127  03/18/17   0539  03/19/17   0422  03/20/17   0507  03/21/17   0513  03/22/17   0538   INR  1.30*  1.24*  1.23*  1.19*  1.46*  2.25*  2.91*     Warfarin Dosing History:    Date INR Warfarin Dose Comment   3/17/17 1.24 5 mg    3/18/17 1.23 5 mg    3/19/17 1.19 7.5 mg    3/20/17 1.46 7.5 mg    3/21/17 2.25 Held    3/22/17 2.91 2 mg            Assessment  The patient is initiating warfarin for the indication of Atrial Fibrillation with a goal INR of 2.5-3.5. INR is therapeutic today. INR increased significantly again. Will dose conservatively given recent trend in INR and recent dosing. Given that we held the dose yesterday, we will give low dose to avoid any significant drop in the upcoming days.    Plan  1. Administer warfarin 2 mg today.  2. Check INR daily or as appropriate.  3. Continue to follow the patient's INR, PLT, and HGB as available.  4. Monitor for potential drug/disease interactions.    Thank you for the consult,  Wesley J Franke, PharmD 3/22/2017 10:51 AM

## 2021-06-09 NOTE — PROGRESS NOTES
Arrived for daily rocephin.  States he was very tired over the weekend but feels better today and more rested.  No other changes or c/o's.  States he remains short of breath with exertion, but no worsening of this.  PICC flushed with ease and given rocephin without problem.  Left via w/c acccompanied by transport at 1000.  Will return in am for next dose of rocephin.

## 2021-06-09 NOTE — PROGRESS NOTES
"Inpatient Progress Note - Internal Medicine  Principal Problem:    Prosthetic valve endocarditis  Active Problems:    Hx of bacterial endocarditis    S/P MVR (mitral valve replacement)    Acute respiratory failure with hypoxia    Acute blood loss anemia      Subjective:  Covering for Dr. Jin Bhat.  Patient is well-known to me.  He is admitted for removal of infected mitral valve prosthesis and tricuspid valvuloplasty.  Notes by Dr. Rosenberg and Dr. Ronn Ordonez are reviewed and appreciated.  Note by Barby Curtis is reviewed and appreciated as well.    He is now one day postop and is doing well.  He is awake and alert.  Pain control was adequate.  He did have labile blood pressures during the night this has responded to IV fluids    Urine output has been sluggish however has been responded to IV fluids.  Hemodynamic data reviewed as noted stable    Laboratory studies reviewed creatinine has increased slightly however remains in acceptable range.    He offers no complaints pain control adequate denies fever    Plan as outlined by Dr. Wolf is reviewed.  Plan IV antibiotics for 6 weeks and lifelong penicillin.    Has some discomfort from chest tubes otherwise feeling reasonably well    ROS: A comprehensive review of systems was performed and was otherwise negative except as mentioned above.     Physical Exam:   Visit Vitals     BP (!) 89/53     Pulse 80     Temp 98  F (36.7  C) (Core)     Resp 23     Ht 5' 10\" (1.778 m)     Wt 187 lb 6.3 oz (85 kg)     SpO2 94%     BMI 26.89 kg/m2     Alert and awake and able to communicate well.  Remembers me has no evidence of confusion or cognitive deficit    Skin warm and dry    Heart systolic murmur of mitral regurgitation and tricuspid regurgitation otherwise negative abdomen soft chest tubes in place no edema neurologically intact    Allergies: reviewed  Medications: reviewed, see discussion for details  Diagnostics: Pertinent labs and imaging reviewed and discussed in " subjective as pertinent.    Assessment and Plan:  Goalpost mitral valve prosthetic removal with replacement and valvuloplasty for persistent prosthetic valvular endocarditis.  No major concerns will continue to follow closely continue with maximal supportive care    Will be followed by Dr. Raheem Bloom over the weekend    Time: total time spent with the patient was 30 minutes of which >50% was spent in counseling and coordination of care.    José Miguel Black MD   3/17/2017 4:36 PM  Internal Medicine  [facility]    Scheduled Meds:    acetaminophen  650 mg Oral Q6H    Or     acetaminophen  650 mg Rectal Q6H     aspirin  81 mg Oral DAILY     atorvastatin  40 mg Oral QHS     bisacodyl  10 mg Oral Once     [START ON 3/19/2017] bisacodyl  10 mg Rectal Once     cefTRIAXone (ROCEPHIN) 2 g in 50 mL NS  2 g Intravenous Q24H     docusate sodium  100 mg Oral BID     ferrous sulfate  325 mg Oral BID with meals     furosemide  40 mg Intravenous BID - diuretic     insulin aspart (NovoLOG) injection   Subcutaneous TID with meals     insulin aspart (NovoLOG) injection   Subcutaneous QHS     [START ON 3/18/2017] magnesium hydroxide  30 mL Oral DAILY     melatonin  3 mg Oral QHS     mupirocin  1 application Nasal BID     omeprazole  20 mg Oral Daily before brkfst     polyethylene glycol  17 g Oral DAILY     warfarin - daily dose required   Other Med Consult or Protocol     warfarin  5 mg Oral Daily 1700     Continuous Infusions:    dexmedetomidine 200 mcg/50 mL (PRECEDEX) (4mcg/mL) 0.3 mcg/kg/hr (03/16/17 1743)     dexmedetomidine 200 mcg/50 mL (PRECEDEX) (4mcg/mL) 0.5 mcg/kg/hr (03/16/17 1600)     DOPamine       epinephrine Stopped (03/17/17 0908)     heparin infusion 12 Units/kg/hr (03/17/17 1157)     insulin regular infusion 0.5 unit/mL 1 Units/hr (03/17/17 0929)     niCARdipine Stopped (03/17/17 0800)     norepinephrine IV infusion in D5W Stopped (03/17/17 0451)     nacl 0.9% 10 mL/hr (03/17/17 1300)      nacl 0.9% 25 mL/hr (03/16/17 1600)     vasopressin 100 units/100 ml (PITRESSIN)in D5W (1 unit/ml)       PRN Meds:.acetaminophen, acetaminophen, albumin human, aluminum-magnesium hydroxide-simethicone, [START ON 3/18/2017] bisacodyl, [START ON 3/19/2017] bisacodyl, dexmedetomidine 200 mcg/50 mL (PRECEDEX) (4mcg/mL), dextrose 50 % (D50W), DOPamine, epinephrine, glucagon (human recombinant), heparin, heparin, HYDROmorphone, [START ON 3/18/2017] magnesium hydroxide, naloxone **OR** naloxone, niCARdipine, norepinephrine IV infusion in D5W, ondansetron, oxyCODONE, sodium chloride, sodium phosphates 133 mL, traZODone, vasopressin 100 units/100 ml (PITRESSIN)in D5W (1 unit/ml)

## 2021-06-09 NOTE — PROGRESS NOTES
"Cardiac Rehab Discharge Summary    Problem List  Patient Active Problem List    Diagnosis Date Noted     Acute renal failure, unspecified acute renal failure type      Prosthetic valve endocarditis, subsequent encounter      Dysthymia      Acute renal failure      Prosthetic valve endocarditis 03/16/2017     Acute respiratory failure with hypoxia 03/16/2017     Acute blood loss anemia 03/16/2017     Prosthetic valve dysfunction      Hypovolemia      Hypotension      Endocarditis of prosthetic valve, sequela      Acute diastolic heart failure 03/03/2017     CHF (congestive heart failure) 03/03/2017     Coronary artery disease 12/07/2015     Paroxysmal atrial fibrillation 10/29/2015     Pericardial effusion without cardiac tamponade 08/29/2015     Status post Maze operation for atrial fibrillation 08/29/2015     Recurrent pleural effusion on right 08/03/2015     S/P MVR (mitral valve replacement) 07/01/2015     Hx of bacterial endocarditis 04/29/2015     Dyslipidemia 04/29/2015     Mitral valve prolapse 04/29/2015       Pertinent Medical History  Pertinent Medical History: Valve Surgery, Hyperlipidemia, CHF (A-fib,bacterial endocarditis,pericardial window)    Risk Factors  Risk Factors: High cholesterol    Living Situation  Living Accomodations: Alone, House    Vitals  Height: 5' 10\" (177.8 cm)     Weight: 176 lb (79.8 kg)      Labs    Hemoglobin A1c   Date/Time Value Ref Range Status   03/17/2017 11:27 AM 5.4 4.2 - 6.1 % Final     Troponin I   Date/Time Value Ref Range Status   03/03/2017 05:16 PM 0.06 0.00 - 0.29 ng/mL Final     BNP   Date/Time Value Ref Range Status   03/06/2017 05:39  (H) 0 - 64 pg/mL Final     Lab Results   Component Value Date    CHOL 197 10/18/2016    CHOL 142 07/28/2016    CHOL 261 (H) 04/06/2015     Lab Results   Component Value Date    HDL 62 10/18/2016    HDL 40 07/28/2016    HDL 32 (L) 04/06/2015     Lab Results   Component Value Date    LDLCALC 109 10/18/2016    LDLCALC 87 " 07/28/2016    LDLCALC 208 (H) 04/06/2015     Lab Results   Component Value Date    TRIG 130 10/18/2016    TRIG 74 07/28/2016    TRIG 106 04/06/2015         Treatment Progression: Maximal Activity and Exercise Level  Transfers  Bed Mobility: Min A, Assist of 1  Bed to Chair: Min A, Assist of 1  Chair to Stand: Min A, Assist of 1  Transfer Comments: assisted to bathroom x 2    Walking/Marching  Distance: 300 feet  Time: standing 5/transfer to bed  Met Level: 3  Peak HR: 71  Peak BP: 108/62  Ambulation Status: CGA, Assist of 1  Device: Walker  Comment: tired                             Tolerance Level/Symptoms/Complications       Symptomatic: sl sob, tired, weak    EKG/Arrhythmias  ECG: Junctional    Discharge Disposition  Discharge   Recommended Discharge Disposition: TCU  Outpatient Location: [facility]  Comment: Reviewed discharge instructions and home activity guidelines with patient and SO

## 2021-06-09 NOTE — TELEPHONE ENCOUNTER
Received a faxed INR result for Bandar Wells  From [facility]  INR result dated 6/23/2020 is 1.60      Last INR from 6/16/20 was 1.60

## 2021-06-09 NOTE — PROGRESS NOTES
"Progress Note  CV Surg   POD # 5 Redo sternotomy MVR -[facility]  A1C 5.2    Subjective: \"I cough too much\"    Objective  In bed, appear fatigued/sleepy,   no obvious confusion but more sedated today.  SO feels patient is not \"himself\".     GTTs: Dopamine       Vital signs in last 24 hours  Visit Vitals     /57     Pulse 82     Temp 98.4  F (36.9  C) (Oral)     Resp 18     Ht 5' 10\" (1.778 m)     Wt 184 lb 4.9 oz (83.6 kg)     SpO2 90%     BMI 26.44 kg/m2     O2 Sat's on 2L/nc  cc    Weight:   184 lb 4.9 oz (83.6 kg)  POD #1  85 kg.  Pre op 78.7 kg    CXR: 3/19 Sternotomy. Interval removal of SG catheter. Interval removal of mediastinal drain. Mild cardiomegaly. Small amount of fluid at both lung bases. Right-sided PICC terminates in the right subclavian. No pneumothorax. Mild infiltrate or   atelectasis both lung bases.     Intake/Output this shift:  Urine output 255 cc/noc and 545 cc/24 hours      Ambulation:  Physical Exam  Neuro: A & O, TAM = [place]  Lungs:decreased with congested,  productive cough  Cor: irreg  PW remove wean INR <2.0  INC: intact, no redness, no drainage  ABD: slightly firm, BS hypoactive  EXT: minimal edema       Pertinent Labs   Lab Results: personally reviewed.   Lab Results   Component Value Date     (L) 03/21/2017    K 4.3 03/21/2017    CL 99 03/21/2017    CO2 24 03/21/2017    BUN 39 (H) 03/21/2017    CREATININE 1.41 (H) 03/21/2017    CALCIUM 9.6 03/21/2017     Lab Results   Component Value Date    WBC 11.0 03/21/2017    WBC 7.3 09/07/2015    HGB 8.2 (L) 03/21/2017    HCT 26.3 (L) 03/21/2017    MCV 97 03/21/2017     03/21/2017     Mag 2.1  INR 1.19    Assessment  1. Hx of bacterial endocarditis   S/P redo MVR-[facility] - coumadin- heparin gtt until therapeutic INR   Pain control issues with frequent cough  2. Acute resp failure- requires supplemental O2 - 40% aeros mask  3. Acute blood loss anemia-  stable   4. Acute renal failure/volume overload- weight is down 1.4 kg " today    Creat improving 1.41 (171)    Renal for fluid management   5. Rhythm: accelerated junctional- atrial fib-currently    Atrial fib with rate -     PW remain now due to INR >2.0 -   6. Constipation - bowel routine - LBM 3/20  7. Hypotension during the night Dopamine - added resolved B/P 100-130 systolic     Unable to wean dopamine and maintain good pressures at present  Will hold on weaning until B/P not as soft  No BB. Currently HR is Atrial fib rate of 60.    PLAN  Dopamine added during the night for B/P  Wean dopamine   Limit narcotic pain meds- dc dilaudid  Use alternative pain management, ice, heat etc.- discussed with RN  Try lidocaine patch for sternal discomfort with cough  Continue humidity with O2  Pulmonary toilet   Hold coumadin today, hope to dc PW in am  Desmond dillon when okay with Renal

## 2021-06-09 NOTE — TELEPHONE ENCOUNTER
Pt notified of message below, pt states he does not have left arm pain currently so he will hld off on EMG and neurology consult.     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 Ordonez-ID once a year, last seen 6/4/20. Will send message to ID to see if pt needs to see Dr. Ordonez again to advise on this result or if a repeat lab or CXR is needed? (chest CT done 6/3/20)

## 2021-06-09 NOTE — TELEPHONE ENCOUNTER
Bandar with a very weakly + QFT test in 2017.  Never treated.  This is not what is currently wrong with the pt and should not block or delay dx of bone marrow problem. I think the risk for TB reactivation is extremely low even if gets chemotherapy.  I would continue to deal with present issue first.

## 2021-06-09 NOTE — BRIEF OP NOTE
Brief Operative Note    Name:  Bandar Wells  Location: [facility] Main OR  Procedure Date:  3/16/2017  PCP:  Jin Bhat MD      REDO STERNOTOMY, REDO MITRAL VALVE REPLACEMENT, PLACEMENT TEMPORARY VENTRICULAR PACING WIRES, ANESTHESIA TRANSESOPHAGEAL ECHOCARDIOGRAM (N/A)    Pre-Procedure Diagnosis:    Endocarditis [I38]     Post-Procedure Diagnosis:    * No post-op diagnosis entered *    Surgeon(s):  MD Rm Bonner MD    Findings:    As above    Estimated Blood Loss:   1500 mL from 3/16/2017  8:08 AM to 3/16/2017  2:44 PM    Specimens:      ID Type Source Tests Collected by Time Destination   A : prosthetic mitral valve and prosthetic vegetation Tissue Heart Valve, Mitral (Bicuspid) CULTURE, ANAEROBIC, CULTURE, FUNGUS, FLUID/TISSUE, STAT GRAM STAIN, AFB STAIN, CULTURE/GRAM STAIN: TISSUE, CULTURE, AFB, BODY FLUID/TISSUE, SURGICAL PATHOLOGY EXAM Raphael Rosenberg MD 3/16/2017 1150    B : mitral valve and cord--gross only Tissue Heart Valve, Mitral (Bicuspid) SURGICAL PATHOLOGY EXAM Raphael Rosenberg MD 3/16/2017 1419           Drains:   Chest Tube 1 28 Fr. (Active)       Chest Tube 2 24 Fr. (Active)       Urethral Catheter Non-latex;Temperature probe 16 Fr. (Active)       Implants:    Implant Name Type Inv. Item Serial No.  Lot No. LRB No. Used Action   FELT YAS PLEDGET 4.5 X 6       [id number] - [id number] CARDIO IMPLANTS FELT YAS PLEDGET 4.5 X 6       [id number]  [place] [id number] N/A 2 Implanted   LEAD PACING MYOCARDIAL TEMPORARY   6495F - [id number] Lead LEAD PACING MYOCARDIAL TEMPORARY   6495F  [employer] [id number] N/A 1 Implanted   WIRE STERNAL SUTURE SINGLE #6    [id number] - [id number] 98-OTHER IMPLANTS WIRE STERNAL SUTURE SINGLE #6    046-032  A&E MED [id number] N/A 2 Implanted   WIRE STERNAL SUTURE DOUBLE #6    046-267 - [id number] 98-OTHER IMPLANTS WIRE STERNAL SUTURE DOUBLE #6    046-267  A&E MED [id number] N/A 1 Implanted   CLIP LIGATION [facility] MED MULTI     2204 -  [id number] - [id number] STAPLING CLIP LIGATION HORIZON MED MULTI     2204 - [id number]  TELEFLEX [id number] N/A 1 Implanted   CLIP LIGATION HORIZON SM SINGLE     1200 - [id number] - [id number] STAPLING CLIP LIGATION HORIZON SM SINGLE     1200 - 022314  WECK [id number] N/A 1 Implanted   RELOAD COR KNOT SGL               247854 - [id number] STAPLING RELOAD COR KNOT SGL               824171  LSI [id number] N/A 15 Implanted   RELOAD COR KNOT SGL               574060 - [id number] STAPLING RELOAD COR KNOT SGL               952893  LSI [id number] N/A 3 Implanted   VALVE MITRAL 31MM       ST [facility] 31MJ-501 - [id number] HEART VALVES VALVE MITRAL 31MM       ST HANH 31MJ-501 [id number] ST [facility]H  N/A 1 Implanted   GUARD BELL 6X8 HEARTS ONLY    [id number] - [id number] CARDIO IMPLANTS GUARD BELL 6X8 HEARTS ONLY    [id number]   BIO VASC [id number] N/A 1 Implanted       Complications:    None    Raphael Rosenberg     Date: 3/16/2017  Time: 2:44 PM

## 2021-06-09 NOTE — PROGRESS NOTES
ANTICOAGULATION  MANAGEMENT: Discharge Review    Bandar Wells chart reviewed for anticoagulation continuity of care    Hospital admission on  7/17/20 for rash.    Discharge disposition: Transferred hospitals to [facility]    INR Results:       Recent labs: (last 7 days)     07/14/20 07/17/20  2141 07/18/20  0835   INR 4.00* 2.44* 2.86*       Warfarin inpatient management: home regimen continued    Warfarin discharge instructions: not applicable    Plan     Will follow up after discharge from [facility]    Anticoagulation calendar updated    Ilene Porter RN

## 2021-06-09 NOTE — PROGRESS NOTES
Clinic Care Coordination Contact  Community Health Worker Initial Outreach            Patient accepts CC: No, Patient has a cancer care navigator and no needs for CCC at this time.. Patient will be sent Care Coordination introduction letter for future reference.

## 2021-06-09 NOTE — TELEPHONE ENCOUNTER
Who is calling:  Patient  Reason for Call:  Patient tried getting an appointment before Dr. Hicks's vacation, however he has set one for a tele visit on 7/24/20. If that is not suitable or if Dr. Hicks would like to see the patient sooner please contact patient to schedule.   Date of last appointment with primary care: NA  Okay to leave a detailed message: Yes

## 2021-06-09 NOTE — PROGRESS NOTES
"Arrived for daily rocephin.  No new specific c/o's.  Does seem that his dry cough is more apparent today, but as noted on toxicity assessment, he states \"the window I have drains and that sometimes makes the cough worse.\"  Did have \"mild\" noc sweat last nite.  \"They are not as bad as they had been\".  Given rocephin without problem.  Tolerated infusion well while here.  Left ambulatory by self at 0955.  Will return in am for next dose of rocephin.   "

## 2021-06-09 NOTE — PROGRESS NOTES
Patient arrived via pedis, for bone marrow bx and exam. Consent signed and witnessed. Pre-meds given. Post procedure information covered, patient had no questions. Pathology notified.   Corin Vaughn RN

## 2021-06-09 NOTE — PROGRESS NOTES
Bone Marrow Biopsy and Aspiration Procedure Note     Informed consent was obtained and potential risks including bleeding, infection and pain were reviewed with the patient.     Pause for the cause was performed.    Posterior iliac crest(s) prepped with Betadine.     Lidocaine 1% local anesthesia infiltrated into the subcutaneous tissue.    Left PIC bone marrow biopsy and bone marrow aspirate was obtained.     The procedure was tolerated well and there were no complications.    Specimens sent for: routine.    Physician: Brenden King

## 2021-06-09 NOTE — PROGRESS NOTES
"CV Surgery POD # 4 re do sternotomy, re do MVR    Subjective : up in the chair, said he had a bad night d/t SOB    Objective:  Visit Vitals     /55     Pulse 65     Temp 98  F (36.7  C)     Resp 18     Ht 5' 10\" (1.778 m)     Wt 187 lb 6.3 oz (85 kg)     SpO2 97%     BMI 26.89 kg/m2     Weight: 85.0, pre 78.7  Drips: dopamine, heparin  Oxygen: 88% RA, 96% 5L oxymask    Physical Exam:  Neuro: A&O,, no focal deficits  Heart: RRR crisp valve click  Lungs: clear, few crackles in right base, productive cough whitish secretions  Abdomen: soft and nontender, +BS and BM  Incisions: CDI  Extremities:warm, 1+ BLE edema    Ambulation: ICU status    UO:   UO 1600cc last shift, 150cc/24 hours    Labs:  Plt 194, Hgb 8.0, Mg 2.1, INR 1.46  Na 132, K 4.6, Bun 38, Cr 1.71, GFR 40 (baseline Cr 1.1-1.4)  -119    Imaging:  CXR 3/19: Sternotomy. Interval removal of SG catheter. Interval removal of mediastinal drain. Mild cardiomegaly. Small amount of fluid at both lung bases. Right-sided PICC terminates in the right subclavian. No pneumothorax. Mild infiltrate or atelectasis both lung bases.    Assessment:  1. Bacterial endocarditis of prosthetic MV-s/p re do MVR (mechanical)-on coumadin and heparin drip INR target is 2.5-3.5  2. Hypotension-still on dopamine  3. Accelerated junctional rhythm-  4. Acute on chronic kidney disease, stage 3-renal has been consulted  5. Hypervolemia-diuresis per nephrology  6. Acute post op blood loss anemia-stable  7. Junctional rhythm earlier this am, then AFib 100,, now SR 60's-    Plan:  Will need pacing wires removed before INR therapeutic  Diuresis per renal  Wean dopamine as able  Continue nebs  Antibiotics per ID    Winifred Garza CNP  Pager [phone]      "

## 2021-06-09 NOTE — PROGRESS NOTES
[facility] Progress Note    SUBJECTIVE:  Feels pretty good today. New cough with tan sputum. No fever. Npo rash or shortness of breath or lip swelling initiated on PCN this morning.     REVIEW OF SYSTEMS:  Negative unless as listed above.  Social history, Family history, Medications: reviewed.    OBJECTIVE:  Vitals:    03/21/17 1000   BP: 103/56   Pulse: 92   Resp: 18   Temp:    SpO2:        PHYSICAL EXAM:  Alert, awake  Vitals tabulated above, reviewed  Neck supple without lymphadenopathy  Sclera normal color, not injected  CARDIOVASCULAR regular rate and rhythm, no mumur  Lungs CLEAR TO AUSCULTATION   Abdomen soft, NT/ND, absent HEPATOSPLENOMEGALY  Skin normal  Joints normal  Neurologic exam non focal  Lines ok  Incision clean      Antibiotics:  CTX    Pertinent labs:    Results from last 7 days  Lab Units 03/21/17  0513 03/20/17  0507 03/19/17  0422 03/18/17  0949   LN-WHITE BLOOD CELL COUNT thou/uL 11.0  --  11.0 12.3*   LN-HEMOGLOBIN g/dL 8.2* 8.0* 7.8* 7.9*   LN-HEMATOCRIT % 26.3*  --  25.4* 25.6*   LN-PLATELET COUNT thou/uL 218 194 153 140         Results from last 7 days  Lab Units 03/21/17  0513   LN-SODIUM mmol/L 134*   LN-POTASSIUM mmol/L 4.3   LN-CHLORIDE mmol/L 99   LN-CO2 mmol/L 24   LN-BLOOD UREA NITROGEN mg/dL 39*   LN-CREATININE mg/dL 1.41*   LN-CALCIUM mg/dL 9.6       MICROBIOLOGY DATA:  Valve tissue cx so far neg      IMAGING/RADIOLOGY:  XR CHEST AP PORTABLE  3/21/2017 9:41 AM     INDICATION: reassess effusion/infiltrates  COMPARISON: 3/19/2017     FINDINGS: Right-sided PICC line with the tip of the catheter superimposed over the right subclavian vein. Sternotomy with aortic valve prosthesis. Cardiomegaly with normal pulmonary vascularity. Bilateral pleural effusions. Right lung infiltrate which   has increased in comparison to previous study. Minor left lower lobe atelectasis and/or consolidation.        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   New cough and infiltrate on CXR. No fever or leukocytosis. At high risk for HCAP/VAP  PCN allergy. Per family, may have been elevation of LFT. No rash or angioedema or anaphylaxis. PCN challenge started 3/21 with normal baseline LFT.      RECOMMENDATION:  -Sputum cultures today.   -Monitor temp and WBC. Procalcitonin today.   -Continue CTX for at least 6 weeks from surgery   -Follow creatinine  -Continue PCN challenge, watching for side effects.   -Low threshold to change CTX to IV Vanco + Zosyn pending sputum cultures.   -Line in    Discussed with the patient and family (girlfriend) and nursing staff.     ID will follow      ABHISHEK Currie MD  Office [phone] option 2 to desk staff

## 2021-06-09 NOTE — PROGRESS NOTES
03/20/17 0836   Reason for Assessment (RCAT)   RCAT Assesment Initial   Assessment Reason  Cardiac surgery   $ RCAT Eval Time 15 min.  Yes   Vitals (RCAT)   Resp 24   Chart Assessment (RCAT)   Pulmonary Status  0   Surgical Status  3   Chest Xray  3   Patient Assessment (RCAT)    Respiratory Pattern  1   Mental Status  0   Breath Sounds  2   Cough Effectiveness  1   Level of Activity  1   O2 Required SpO2 >= 92%  2   Chart + Pt. Assessment Total Points  13   RCAT Acuity Score 13 (Acuity 3)   Clinical Indications (RCAT)   Aerosol Hygiene RCAT protocol   RCAT Order Placed  Yes     Pt RCAT completed, continue current regimen, with an increase of pulmonary toileting with EZpap. Pt BS are coarse and diminished with a congested productive cough, remains on 5L oxymask. Will continue to monitor. RT following.    TOBY DiazT

## 2021-06-09 NOTE — ANESTHESIA POSTPROCEDURE EVALUATION
Patient: Bandar Wells  REDO STERNOTOMY, REDO MITRAL VALVE REPLACEMENT, PLACEMENT TEMPORARY VENTRICULAR PACING WIRES, ANESTHESIA TRANSESOPHAGEAL ECHOCARDIOGRAM  Anesthesia type: general    Patient location: ICU  Last vitals:   Vitals:    03/17/17 0715   BP: 99/54   Pulse: 70   Resp: 26   Temp:    SpO2: 96%     Post vital signs: stable  Level of consciousness: awake and responds to simple questions  Post-anesthesia pain: pain controlled  Post-anesthesia nausea and vomiting: no  Pulmonary: unassisted, return to baseline  Cardiovascular: stable and blood pressure at baseline  Hydration: adequate  Anesthetic events: no    QCDR Measures:  ASA# 11 - Kathia-op Cardiac Arrest: ASA11B - Patient did NOT experience unanticipated cardiac arrest  ASA# 12 - Kathia-op Mortality Rate: ASA12B - Patient did NOT die  ASA# 13 - PACU Re-Intubation Rate: ASA13B - Patient did NOT require a new airway mgmt  ASA# 10 - Composite Anes Safety: ASA10A - No serious adverse event  ASA# 38 - New Corneal Injury: ASA38A - No new exposure keratitis or corneal abrasion in PACU    Additional Notes:

## 2021-06-09 NOTE — TELEPHONE ENCOUNTER
ANTICOAGULATION  MANAGEMENT    Assessment     Today's INR result of 4.00 is Supratherapeutic (goal INR of 2.5-3.5)        Warfarin taken as previously instructed    Increased greens/vitamin K intake may be affecting INR    No interaction expected between Colchicine and potential interaction with Prednisone and  warfarin    Continues to tolerate warfarin with no reported s/s of bleeding or thromboembolism     Previous INR was Subtherapeutic at 2.30 on 7/7/20.    Rash developed and itchy skin.    Plan:     Spoke with Bandar regarding INR result and instructed:     Warfarin Dosing Instructions:  (evenings. Has 3mg tabs)   - Change warfarin dose to 4.5 mg daily on Tues/Fri; and 6 mg daily rest of week.   - (3.7 % change)    Instructed patient to follow up no later than:  1-2 wks.   - advised to check INR's wkly for now with home monitor thru [employer].    Education provided: importance of consistent vitamin K intake, impact of vitamin K foods on INR, target INR goal and significance of current INR result, potential interaction between warfarin and stopped Allopurinol, no interaction anticipated between warfarin and stopped Colchicine and importance of notifying clinic for changes in medications    Bandar verbalizes understanding and agrees to warfarin dosing plan.    Instructed to call the [facility] for any changes, questions or concerns. (#[phone])   ?   Meg Jacinto RN    Subjective/Objective:      Bandar Wells, a 69 y.o. male is on warfarin.     Bandar reports:     Home warfarin dose: verbally confirmed home dose with Bandar and updated on anticoagulation calendar     Missed doses: No     Medication changes:  Yes: was advised to hold Allopurinol for now and stop Colchicine r/t rash. Was also prescribed Prednisone 10mg daily for one wk, then 5mg daily till he is seen.  (this is due to holding Allopurinol and could have a flare up of gout.)     S/S of bleeding or thromboembolism:  No     New Injury or illness:  Yes:   Reported developed rash and itchy skin.     Changes in diet or alcohol consumption:  Yes: reported eating more salads than usual last night.     Upcoming surgery, procedure or cardioversion:  No    Anticoagulation Episode Summary     Current INR goal:   2.5-3.5   TTR:   24.9 % (1 y)   Next INR check:   7/21/2020   INR from last check:   4.00! (7/14/2020)   Weekly max warfarin dose:      Target end date:   Indefinite   INR check location:      Preferred lab:      Send INR reminders to:   [facility], [place]    Indications    Paroxysmal atrial fibrillation (H) [I48.0]  S/P MVR (mitral valve replacement) [Z95.2]  S/P mitral valve replacement with metallic valve [Z95.4]           Comments:   INR TARGET GOAL 2.5 - 3.5         Anticoagulation Care Providers     Provider Role Specialty Phone number    Jin Bhat MD Referring Internal Medicine [phone]

## 2021-06-09 NOTE — PROGRESS NOTES
Pt arrived per w/c with son Jabier from his preop cardiology visit and testing. Tired after his long morning. Some anxiety about surgery but ready to be done with it. Antibiotic infused without problem per patent PICC line. Line flushed and saline locked at completion. Left per w/c with transport and his son. Will return as directed by MD's after surgery if antibiotics needed.

## 2021-06-09 NOTE — CONSULTS
"Clinical Nutrition Therapy Assessment Note    Reason for Assessment:  consult/ CV Surgery.    Nutrition History:  Information obtained from chart.  Low Na diet prior to admission.   Patient has the following food allergies or intolerances: none reported    Current Nutrition Prescription and intake:   Diet: Clear liquid, no CARB to start. Pt has larger emesis.    Anthropometrics:  Height: 5'10\"    Admit wt:173 1/2#, 187 1/2# today   BMI: 25.5  BMI indication: 25-29.9 overweight  Weight History: 171 1/4# 3/7/2017, had lost 5.5% weight Feb to early March 2017, 179# 2013    Skin/Wound:  Neymar score not available, no open wounds beyond surgical.    Physical finding:edema post surgery    Medications:  Medications reviewed.    Labs:  Labs reviewed     Estimated Nutrition Needs:  Using 79 kg, with a weight source of ideal weight BW.    Estimated Energy Needs:  1975 to 2370 calories at 25 to 30 flaco/kg  Estimated Protein Needs: 95 to 110 grams at 1.2 to 1.4 grams/kg    Malnutrition Assessment:difficult to tell with fluid issues, met moderate malnutrition 3/4 admit, weight is up 4# from that weight at admission.     Nutrition Risk Level: high    PES:inadequate po intake r/t clear liquids starting, poor intake documented earlier this month AEB diet hx,      Intervention/,goal/plan:  Intervention: no education planned, pt is very familiar with as has had education with inpatient RD and RD at cardiac rehab outpatient. Earlier this month had trials of supplements due to poor po intake, will monitor intake as diet advances for additional supplement needs.  Goal: pt to meet est needs,  Plan: monitor intake as diet advances, (Dislikes boost compact supplement), adjust as needed    See Care Plan for further Problems, Goals, and Interventions.  "

## 2021-06-09 NOTE — PROGRESS NOTES
"Pt is alert and oriented. He has transfers with an unsteady gait and requires to 2 assist with transfers. He c/o pain 7-8/10  And was given rpn Dilaudid 1mg oxycodone 5-10 mg with some relief. Pt is currently on Oxyimizier 4L and has a congested cough. While awake pt have been using the IS at 750 ml.  He knows to use cardiac pillow during transfers to protect incision site. He has been afebrile this shift. Dopamine fusing at 3 mcg and Heparin 12 units at this time.     Visit Vitals     /70 (Patient Position: Lying)     Pulse (!) 107     Temp 98.5  F (36.9  C) (Oral)     Resp 20     Ht 5' 10\" (1.778 m)     Wt 184 lb 4.9 oz (83.6 kg)     SpO2 90%     BMI 26.44 kg/m2     "

## 2021-06-09 NOTE — PROGRESS NOTES
"Inpatient Progress Note - Internal Medicine  Principal Problem:    Prosthetic valve endocarditis  Active Problems:    Hx of bacterial endocarditis    S/P MVR (mitral valve replacement)    Acute respiratory failure with hypoxia    Acute blood loss anemia    Acute renal failure      Subjective:  Postop day #4 post question tissue mitral valve replacement and tricuspid valvuloplasty.    Moving slowly.  Has sig significant postop renal failure likely related to ATN.  Notes by the nephrology service appreciated.    Endocarditis currently on ceftriaxone therapy will be discharged on IV therapy for 4 weeks followed by lifetime penicillin.    Agree a cautious trial with penicillin is reasonable.    INR remains subtherapeutic is on heparin.  Hemoglobin 8.0 will continue to follow creatinine improved at 1.7.    Vital signs stable mild transient hypotension O2 sats stable.  Weight is up about 15 pounds from baseline     ROS: A comprehensive review of systems was performed and was otherwise negative except as mentioned above.     Physical Exam:   Visit Vitals     /55     Pulse 68     Temp 97.5  F (36.4  C) (Axillary)     Resp 24     Ht 5' 10\" (1.778 m)     Wt 187 lb 6.3 oz (85 kg)     SpO2 96%     BMI 26.89 kg/m2     Mental status appropriate appears tired alert and oriented EENT grossly negative lungs good breath sounds abdomen soft extremities 3+ edema    Allergies: reviewed  Medications: reviewed, see discussion for details  Diagnostics: Pertinent labs and imaging reviewed and discussed in subjective as pertinent.    Assessment and Plan:  Total body sodium and volume overload responding to diuretics.  Creatinine improved    Continue with supportive care generally doing well    Time: total time spent with the patient was 35 minutes of which >50% was spent in counseling and coordination of care.    José Miguel Black MD   3/20/2017 4:58 PM  Internal Medicine  [facility]    Scheduled Meds:    aspirin "  81 mg Oral DAILY     atorvastatin  40 mg Oral QHS     bisacodyl  10 mg Oral Once     bumetanide  2 mg Intravenous Q12H     cefTRIAXone (ROCEPHIN) 2 g in 50 mL NS  2 g Intravenous Q24H     docusate sodium  100 mg Oral BID     ferrous sulfate  325 mg Oral BID with meals     insulin aspart (NovoLOG) injection   Subcutaneous TID with meals     insulin aspart (NovoLOG) injection   Subcutaneous QHS     ipratropium-albuterol  3 mL Nebulization Q6H - RT     magnesium hydroxide  30 mL Oral DAILY     melatonin  3 mg Oral QHS     metOLazone  2.5 mg Oral DAILY     omeprazole  20 mg Oral Daily before brkfst     [START ON 3/22/2017] penicillin VK  250 mg Oral DAILY     penicillin VK  500 mg Oral DAILY     polyethylene glycol  17 g Oral DAILY     warfarin - daily dose required   Other Med Consult or Protocol     [COMPLETED] warfarin  7.5 mg Oral Daily 1700     Continuous Infusions:    dexmedetomidine 200 mcg/50 mL (PRECEDEX) (4mcg/mL) 0.3 mcg/kg/hr (03/16/17 1743)     DOPamine 3 mcg/kg/min (03/20/17 0700)     DOPamine 3 mcg/kg/min (03/20/17 1518)     epinephrine Stopped (03/17/17 0908)     heparin infusion 10 Units/kg/hr (03/20/17 1110)     insulin regular infusion 0.5 unit/mL 1 Units/hr (03/17/17 0929)     niCARdipine Stopped (03/17/17 0800)     norepinephrine IV infusion in D5W Stopped (03/17/17 0451)     nacl 0.9% Stopped (03/17/17 1921)     vasopressin 100 units/100 ml (PITRESSIN)in D5W (1 unit/ml) 2.4 Units/hr (03/20/17 0800)     PRN Meds:.acetaminophen, acetaminophen, albumin human, aluminum-magnesium hydroxide-simethicone, bisacodyl, bisacodyl, dexmedetomidine 200 mcg/50 mL (PRECEDEX) (4mcg/mL), dextrose 50 % (D50W), DOPamine, epinephrine, glucagon (human recombinant), heparin, heparin, HYDROmorphone, magnesium hydroxide, naloxone **OR** naloxone, niCARdipine, norepinephrine IV infusion in D5W, ondansetron, oxyCODONE, sodium chloride, sodium phosphates 133 mL, traZODone, vasopressin 100 units/100 ml (PITRESSIN)in D5W (1  unit/ml)

## 2021-06-09 NOTE — PROGRESS NOTES
Bandar arrived ambulatory on [address] at 1055. He denied any problems with his PICC line. PICC line dressing dry and intact. Bandar did complain of some chronic shoulder discomfort bilaterally 3/10. He stated he takes Tylenol at home for this with relief noted. Good blood return noted from PICC line and it was flushed with 10 ml NS prior to starting the Ceftriaxone. Ceftriaxone infused without difficulty. PICC then flushed with 20 ml of NS and capped with a green swab cap. Bandar left [address] in wheelchair at 1155 to return to [address] on 4/2

## 2021-06-09 NOTE — ANESTHESIA PROCEDURE NOTES
BETI    Patient location during procedure: OR  Start time: 3/16/2017 9:45 AM  Staffing:  Performing  Anesthesiologist: BARRETT HARO  BETI:  Type/Reason: Monitoring BETI  Technique: blind insertion  Difficulty: easy  Anesthesia Monitoring: see additional note

## 2021-06-09 NOTE — PROGRESS NOTES
"Visit Vitals     /61 (Patient Position: Sitting)     Pulse 64     Temp 97.9  F (36.6  C) (Oral)     Resp 18     Ht 5' 10\" (1.778 m)     Wt 175 lb 14.8 oz (79.8 kg)     SpO2 96%     BMI 25.24 kg/m2   Pt seen for Flutter valve bronchial hygiene x2 & Duo neb x1  HR 64-65 RR 22-24 SO2 94% on RA.  BS coarse with occ rhonchi. Strong PC pt swallowed.  IS vol 750-1000 ml.  MD DC'd neb with pt transfer orders, but bronchial hygiene remains Qid.    Kristi Sinclair    "

## 2021-06-09 NOTE — PROGRESS NOTES
[facility] Follow Up Note    Bandar Wells   66 y.o. male    Date of Visit: 4/5/2017    Chief Complaint   Patient presents with     Follow-up     surgery     Subjective  This is a 66-year-old man whom I have only seen one time before.  He has a history of mitral valve disease and recently had another valve replacement.  He has had endocarditis and continues on daily IV antibiotic infusion.  He did spend about 1 week and TCU following his most recent discharge.  He is doing well.  He has minimal shortness of breath and no chest pain.  He is fatigued but feels he is doing much better than after his previous heart surgery 2 years ago.  He is now back at home.  Other than the fatigue he really has no significant complaints.  Blood tests were done yesterday at the infusion center and I did review with him.  His potassium is stable and he is currently not on potassium.  His hemoglobin was down just a little bit.  He has a follow-up with cardiology at the end of next week and in 2 weeks with infectious disease.  He looks much better than he did when I saw him for his first visit several weeks ago.  He offers no new complaints.    ROS A comprehensive review of systems was performed and was otherwise negative    Medications, allergies, and problem list were reviewed and updated    Exam  General Appearance:   On examination his blood pressure is 122/60.  Weight is 177 pounds and height is 70 inches.  BMI is 25.40.    No neck vein distention.    Lungs are clear.    Heart rhythm seems slightly irregular but his rate is in the 50s.  It had been in the 40s last week and his metoprolol was discontinued.    He does have 1+ edema in both ankles.    The patient is alert and oriented ×3.      Assessment/Plan  1. S/P MVR (mitral valve replacement)     2. Coronary artery disease involving native coronary artery of native heart without angina pectoris     3. Prosthetic valve endocarditis, subsequent encounter      4. Acute blood loss anemia       The patient appears to be doing well since hospitalization and surgery.  He is very slowly regaining his strength which I would anticipate to be the case.  Breathing is stable and he has had no chest pain.  He will continue with his antibiotic infusions.  He is now on Coumadin and INRs are being monitored.  He will follow-up with cardiology and infectious disease as noted.  I would like to see him back in 2 weeks for follow-up.    Total time of this office visit was 25 minutes with greater than 50% of the time spent in care coordination and patient counseling.      Jin Bhat MD      Current Outpatient Prescriptions on File Prior to Visit   Medication Sig     acetaminophen (TYLENOL) 500 MG tablet Take 500-1,000 mg by mouth every 6 (six) hours as needed for pain. Pain 1-5  Give 500 mg  Or pain 6-10 give 1000 mg every 6 hours ad needed not to exceed 4 grams in 24 hours     atorvastatin (LIPITOR) 40 MG tablet Take 40 mg by mouth bedtime.      b complex vitamins tablet Take 1 tablet by mouth daily.      cefTRIAXone (ROCEPHIN) 2 gram injection Infuse 2,000 mg into a venous catheter daily.     cholecalciferol, vitamin D3, 1,000 unit tablet Take 1,000 Units by mouth daily.     clindamycin (CLEOCIN) 300 MG capsule Take 600 mg by mouth see administration instructions. Take prior to Dentist appointments     cyanocobalamin 1000 MCG tablet Take 1,000 mcg by mouth daily.     ferrous sulfate 325 (65 FE) MG tablet Take 1 tablet by mouth 2 (two) times a day.     FLAXSEED OIL ORAL Take 1,000 mg by mouth daily.      FOLIC ACID ORAL Take 1 tablet by mouth daily.     furosemide (LASIX) 20 MG tablet Take 20 mg by mouth every other day. Daily regimen of 20 mg alternating with 40 mg every other day.      furosemide (LASIX) 20 MG tablet Take 40 mg by mouth every other day. Daily regimen of 20 mg alternating with 40 mg every other day.     L. ACIDOPHILUS/BIFIDO LONGUM (PROBIOTIC PEARLS ORAL) Take 1  capsule by mouth daily.     oxymetazoline (AFRIN) 0.05 % nasal spray Apply 2-3 sprays into each nostril 2 (two) times a day as needed. Separate doses by at least 10-12 hours.     traZODone (DESYREL) 50 MG tablet Take 50 mg by mouth at bedtime as needed for sleep.     warfarin (COUMADIN) 6 MG tablet Take ONE or ONE-HALF tablet daily, as instructed.  Dose will be adjusted based on INR results.     metoprolol tartrate (LOPRESSOR) 25 MG tablet Take 1 tablet (25 mg total) by mouth 2 (two) times a day. (Patient taking differently: Take 12.5 mg by mouth 2 (two) times a day. )     multivitamin therapeutic (THERAGRAN) tablet Take 1 tablet by mouth daily.     Current Facility-Administered Medications on File Prior to Visit   Medication     [COMPLETED] cefTRIAXone 2 g in NaCl 0.9 % 50 mL (MINI-BAG Plus) (ROCEPHIN)     cefTRIAXone 2 g in NaCl 0.9 % 50 mL (MINI-BAG Plus) (ROCEPHIN)     sodium chloride 0.9 % flush 10 mL (NS)     [COMPLETED] sodium chloride 0.9% 250 mL infusion     sodium chloride 0.9% 250 mL infusion     [DISCONTINUED] sodium chloride 0.9 % flush 10 mL (NS)     Allergies   Allergen Reactions     Amoxicillin Other (See Comments)     Hepatic fibrosis,  Tolerated ceftriaxone (rocephine)      Social History   Substance Use Topics     Smoking status: Never Smoker     Smokeless tobacco: None     Alcohol use 3.6 oz/week     5 Glasses of wine, 1 Cans of beer per week

## 2021-06-09 NOTE — PROGRESS NOTES
Progress Note    Assessment/Plan  3 days postop.  Patient seems to be clinically improving.  Unfortunately, his creatinine continues to rise.  Will ask nephrology to see.  Principal Problem:    Prosthetic valve endocarditis  Active Problems:    Hx of bacterial endocarditis    S/P MVR (mitral valve replacement)    Acute respiratory failure with hypoxia    Acute blood loss anemia      Subjective  Some trouble swallowing his stool softener  No complaints of dyspnea.  No chest pain.  No chills.  No nausea.    Objective    Vital signs in last 24 hours  Temp:  [97  F (36.1  C)-98.3  F (36.8  C)] 98.1  F (36.7  C)  Heart Rate:  [51-58] 52  Resp:  [13-34] 17  BP: ()/(48-59) 114/53  Weight:   187 lb 6.3 oz (85 kg)    Intake/Output last 3 shifts  I/O last 3 completed shifts:  In: 1640.6 [P.O.:610; I.V.:1030.6]  Out: 545 [Urine:545]  Intake/Output this shift:         Physical Exam  Sitting up in a chair conversing.  He is in no distress.  Blood pressure 114/50.  Pulse 56 and regular.  Lungs have decreased breath sounds in the left lung base.  Legs look okay.  Heart shows a crisp sounding mitral valve.    Pertinent Labs     Results from last 7 days  Lab Units 03/19/17  0422 03/18/17  0949 03/18/17  0539 03/17/17  1127 03/17/17  0411 03/16/17  1548 03/16/17  1449 03/15/17  0905   LN-WHITE BLOOD CELL COUNT thou/uL 11.0 12.3*  --  11.1* 12.8* 16.7* 14.0* 11.0   LN-HEMOGLOBIN g/dL 7.8* 7.9*  --  7.9* 7.7* 10.6* 10.0* 9.5*   LN-HEMATOCRIT % 25.4* 25.6*  --  24.7* 23.7* 32.0* 30.0* 29.8*   LN-PLATELET COUNT thou/uL 153 140 130* 136* 138* 153 168 197       Results from last 7 days  Lab Units 03/19/17  0422 03/18/17  0539 03/17/17  1127 03/17/17  0411 03/16/17  1548 03/16/17  1449 03/15/17  0905   LN-INR  1.19* 1.23* 1.24* 1.30* 1.35* 1.41* 1.23*       Results from last 7 days  Lab Units 03/19/17  0422 03/18/17  1100 03/18/17  0540 03/17/17  0411 03/16/17  1548 03/16/17  1449 03/15/17  0905   LN-SODIUM mmol/L 133*  --  137 140  143 143 135*   LN-POTASSIUM mmol/L 4.7 4.3 4.3 4.6 4.1 4.0 4.6   LN-CHLORIDE mmol/L 103  --  107 111* 113* 113* 105   LN-CO2 mmol/L 20*  --  17* 24 22 23 22   LN-BLOOD UREA NITROGEN mg/dL 34*  --  27* 22 20 20 21   LN-CREATININE mg/dL 1.83*  --  1.67* 1.27 0.96 0.96 1.29   LN-CALCIUM mg/dL 9.3  --  9.5 9.0 10.2 10.7* 9.5                     Pertinent Radiology   na    Advanced Care Planning  Continues to require hospital care.  Nephrology consult pending.

## 2021-06-09 NOTE — TELEPHONE ENCOUNTER
Who is calling:  Patient   Reason for Call:  Patient was returning missed ACN call for today.    Patient states he did not receive any message on his voice-mail.    Date of last appointment with primary care:   Okay to leave a detailed message: No

## 2021-06-09 NOTE — DISCHARGE SUMMARY
Physician Discharge Summary     Principal Diagnosis:  Prosthetic valve endocarditis    Discharge Diagnoses:  Principal Problem:    Prosthetic valve endocarditis  Active Problems:    Hx of bacterial endocarditis    S/P MVR (mitral valve replacement)    Coronary artery disease    Acute diastolic heart failure    CHF (congestive heart failure)    Endocarditis of prosthetic valve, sequela    Prosthetic valve dysfunction    Acute respiratory failure with hypoxia    Acute blood loss anemia    Acute renal failure    Dysthymia    Acute renal failure, unspecified acute renal failure type    Prosthetic valve endocarditis, subsequent encounter      Brief Hospital Course:   66year-old gentleman with known endocarditis of mitral prosthetic valve.  Had failed medical therapy and was admitted for mitral valve replacement and tricuspid valvuloplasty.    See notes by Dr. Rosenberg regarding operative findings and procedure.  Underwent successful tricuspid valvuloplasty and mechanical [facility] mitral prosthetic replacement    Postoperatively the patient progressed slowly although had no major complications.  Hemodynamically he was relatively stable he had no problems with shock or of hypoperfusion.    Developed transient acute renal insufficiency related to ATN needing return to normal see notes by nephrologist    Followed by infectious disease as well will continue with a total of 6 weeks of treatment with ceftriaxone followed by lifelong penicillin.  Will be followed by Dr. Ronn Ordonez who is very familiar with him    History of possible penicillin allergy however was treated with test doses of oral penicillin and had no evidence of allergic reaction.    Mild symptoms of mood disorder or dysthymia I did start low-dose Paxil.    INR on discharge was 3.16 BUN 25 creatinine 0.9 electrolytes negative hemoglobin 7.9 white count normal platelets normal.    See notes regarding medications on discharge.    Plan discharge to transitional  care unit with ultimate discharge to home within 7-10 days will need follow-up with infectious disease with Dr. Jin Bhat and with the cardiovascular surgery service  Discharge Provider: José Miguel Black MD     Admission Date: 3/16/2017  Discharge Date: March 24, 2017  Code Status: Full Code  Disposition: Transitional care unit    Discharge Medications:      Medication List      START taking these medications          oxyCODONE 5 MG immediate release tablet   Quantity:  30 tablet   Dose:  5-10 mg   Commonly known as:  ROXICODONE   5-10 mg, Oral, Q4H PRN       polyethylene glycol 17 gram packet   Quantity:  14 each   Dose:  17 g   Commonly known as:  MIRALAX   17 g, Oral, DAILY       * warfarin 2 MG tablet   Quantity:  30 tablet   Dose:  2 mg   Commonly known as:  COUMADIN   2 mg, Oral, Daily 1700       * warfarin 2 MG tablet   Quantity:  30 tablet   Dose:  2 mg   Commonly known as:  COUMADIN   2 mg, Oral, Daily 1700       * Notice:  This list has 2 medication(s) that are the same as other medications prescribed for you. Read the directions carefully, and ask your doctor or other care provider to review them with you.      CONTINUE taking these medications          acetaminophen 500 MG tablet   Dose:  500-1000 mg   Commonly known as:  TYLENOL   500-1,000 mg, Oral, Q6H PRN       atorvastatin 40 MG tablet   Dose:  40 mg   Commonly known as:  LIPITOR   40 mg, Oral, QHS       b complex vitamins tablet   Dose:  1 tablet   1 tablet, Oral, DAILY       cefTRIAXone 2 g in NaCl 0.9 % 0.9 % 50 mL IVPB   Quantity:  1 each   Dose:  2 g   2 g, Intravenous, Q24H       cholecalciferol (vitamin D3) 1,000 unit tablet   Dose:  1000 Units   1,000 Units, Oral, DAILY       clindamycin 300 MG capsule   Dose:  600 mg   Commonly known as:  CLEOCIN   600 mg, Oral, See Admin Instructions, Take prior to Dentist appointments       cyanocobalamin 1000 MCG tablet   Dose:  1000 mcg   1,000 mcg, DAILY       enoxaparin 80 mg/0.8 mL syringe   Dose:   80 mg   Commonly known as:  LOVENOX   80 mg, Subcutaneous, Q12H       ferrous sulfate 325 (65 FE) MG tablet   Dose:  1 tablet   1 tablet, BID       FLAXSEED OIL ORAL   Dose:  1000 mg   1 tablet, DAILY       FOLIC ACID ORAL   Dose:  1 tablet   1 tablet, Oral, DAILY       * furosemide 20 MG tablet   Dose:  20 mg   Commonly known as:  LASIX   20 mg, Oral, Every Other Day, Daily regimen of 20 mg alternating with 40 mg every other day.       * furosemide 20 MG tablet   Dose:  40 mg   Commonly known as:  LASIX   40 mg, Oral, Every Other Day, Daily regimen of 20 mg alternating with 40 mg every other day.        metoprolol tartrate 25 MG tablet   Quantity:  60 tablet   Dose:  25 mg   Commonly known as:  LOPRESSOR   25 mg, Oral, BID       multivitamin therapeutic tablet   Dose:  1 tablet   Commonly known as:  THERAGRAN   1 tablet, Oral, DAILY       oxymetazoline 0.05 % nasal spray   Dose:  2-3 spray   Commonly known as:  AFRIN   2-3 sprays, Each Nare, BID PRN, Separate doses by at least 10-12 hours.       PROBIOTIC PEARLS ORAL   Dose:  1 capsule   1 capsule, Oral, DAILY       * Notice:  This list has 2 medication(s) that are the same as other medications prescribed for you. Read the directions carefully, and ask your doctor or other care provider to review them with you.      STOP taking these medications          traZODone 50 MG tablet   Commonly known as:  DESYREL             Discharge Instructions:  Follow up appointment with Primary Care Physician: Jin Bhat MD  Follow up appointment with Specialist: As noted needs to follow-up with Dr. Jin Bhat his primary physician, Dr. Ronn Ordonez his infectious disease consultant and with the cardiovascular surgery service  Diet: regular diet  Activity: activity as tolerated  The following tests have been done with results pending: Should have INR metabolic profile and CBC done early next week     Vital Signs in last 24 hours:    Temp:  [97.7  F (36.5  C)-98.2  F (36.8   C)] 97.7  F (36.5  C)  Heart Rate:  [60-66] 62  Resp:  [16-19] 16  BP: (104-129)/(52-71) 104/56    Physical Exam:    Pertinent exam findings on day of discharge include valve sounds are distinct short systolic murmur no diastolic murmur abdomen soft lungs clear mental status appropriate wounds are well-healed    Total Time:   Time: total time spent with the patient was 50 minutes of which >50% was spent in counseling and coordination of care    José Miguel Black MD  Internal Medicine  [facility]

## 2021-06-09 NOTE — PROGRESS NOTES
"Progress Note  CV Surg   POD # 3 Redo sternotomy MVR -[facility]  A1C 5.2    Subjective: \"I have them shut the door so I don't scare anyone\"    Objective  Up to chair, frequent congested cough    GTTs: Dopamine   Heparin    Vital signs in last 24 hours  Visit Vitals     /52     Pulse 60     Temp 98.2  F (36.8  C) (Oral)     Resp 23     Ht 5' 10\" (1.778 m)     Wt 187 lb 6.3 oz (85 kg)     SpO2 94%     BMI 26.89 kg/m2     O2 Sat's on 2L/nc  cc    Weight:   187 lb 6.3 oz (85 kg)  POD #1  85 kg.  Pre op 78.7 kg    CXR: 3/19 Sternotomy. Interval removal of SG catheter. Interval removal of mediastinal drain. Mild cardiomegaly. Small amount of fluid at both lung bases. Right-sided PICC terminates in the right subclavian. No pneumothorax. Mild infiltrate or   atelectasis both lung bases.     Intake/Output this shift:  Urine output 255 cc/noc and 545 cc/24 hours      Ambulation:  Physical Exam  Neuro: A & O, TAM = [place]  Lungs:decreased with congested, non productive cough  Cor: accelerated junctional   PW remain due to accelerated junctional rhythm  INC: intact, no redness, no drainage  ABD: slightly firm, BS hypoactive  EXT: minimal edema       Pertinent Labs   Lab Results: personally reviewed.   Lab Results   Component Value Date     (L) 03/19/2017    K 4.7 03/19/2017     03/19/2017    CO2 20 (L) 03/19/2017    BUN 34 (H) 03/19/2017    CREATININE 1.83 (H) 03/19/2017    CALCIUM 9.3 03/19/2017     Lab Results   Component Value Date    WBC 11.0 03/19/2017    WBC 7.3 09/07/2015    HGB 7.8 (L) 03/19/2017    HCT 25.4 (L) 03/19/2017    MCV 98 03/19/2017     03/19/2017     Mag 2.1  INR 1.19    Assessment  1. Hx of bacterial endocarditis   S/P redo MVR-[facility] - coumadin- heparin gtt until therapeutic INR  2. Acute resp failure- requires supplemental O2 - 2l/nc  3. Acute blood loss anemia- low but stable, watching for now-recheck in am   4. Acute renal failure/volume overload- weight is up ~ 6 kg.  - " increasing creat with poor response to lasix and bumex,    Consulted Renal for fluid management   5. Rhythm: accelerated junctional- PW remain - no BB  6. Constipation - bowel routine    PLAN  Keep in ICU  Keep dillon for accurate I/O  Consult Nephrology  Dopamine low dose 3 mcg-  Keep in ICU  Constipation- supp today.    LABS: hgb, BMP

## 2021-06-09 NOTE — PROGRESS NOTES
"CV Surgery POD # 8 re do sternotomy, re do MVR      Subjective :  Seen with Dr Rosenberg, was in the chair, looks great, is a little anxious about going to TCU today but Dr Rosenberg reassured  Him that he is ready to leave      Objective:  /58 (Patient Position: Lying)  Pulse 60  Temp 98.2  F (36.8  C) (Oral)   Resp 16  Ht 5' 10\" (1.778 m)  Wt 176 lb (79.8 kg)  SpO2 94%  BMI 25.25 kg/m2     Weight: 78.8, pre 78.7  Oxygen: 92% 2L NC      Physical Exam:  Neuro: A&O,, no focal deficits  Heart: RRR crisp valve click  Lungs: clear,f  Abdomen: soft and nontender, +BS and BM  Incisions: CDI  Extremities:warm, no edema     Ambulation: Ambulated  300  feet x2 yesterday      Labs:  Wbc 7.9, Hgb 7.9, Plt 226, INR 3.01  Na 134, K 3.9, Bun 25, Cr 0.90, GFR >60       Imaging:  CXR 3/21: .Right-sided PICC line with the tip of the catheter superimposed over the right subclavian vein. Sternotomy with aortic valve prosthesis. Cardiomegaly with normal pulmonary vascularity. Bilateral pleural effusions. Right lung infiltrate which has increased in comparison to previous study. Minor left lower lobe atelectasis and/or consolidation.      Assessment:  1. Bacterial endocarditis of prosthetic MV-s/p re do MVR (mechanical)-on coumadin INR target is 2.5-3.5  2. Hypotension-resolved  3. Accelerated junctional rhythm-  4. Acute on chronic kidney disease, stage 3-resolved  5. Hypervolemia-diuresis per nephrology  6. Acute post op blood loss anemia-stable      Plan:  Ok from surgical standpoint for DC to TCU today    He has an appointment to f/u with me on 4/11 @ 11:20am    Winifred Garza CNP  Pager [phone]          "

## 2021-06-09 NOTE — PROGRESS NOTES
Pharmacy Consult: Warfarin Management    Pharmacy consulted to dose warfarin for Bandar Wells, a 66 y.o. male    Ordering provider: Janusz Hall PA-C  Reason for warfarin therapy: Atrial Fibrillation, prosethic valve  Goal INR Range: 2.5-3.5    Subjective  Medication lists (home and hospital) were reviewed.    New medications that may increase bleeding risk/INR: Rocephin  Started home medications that may increase bleeding risk/INR: None  Home Warfarin Dosing: Patient was previously on Apixaban 5 mg bid, then enoxapirin prior to surg  Other Anticoagulants:heparin gtt- low dose protocol   Patient being bridged: Yes    Patient Active Problem List   Diagnosis     Hx of bacterial endocarditis     Dyslipidemia     Mitral valve prolapse     S/P MVR (mitral valve replacement)     Recurrent pleural effusion on right     Pericardial effusion without cardiac tamponade     Status post Maze operation for atrial fibrillation     Paroxysmal atrial fibrillation     Coronary artery disease     Acute diastolic heart failure     CHF (congestive heart failure)     Endocarditis of prosthetic valve, sequela     Prosthetic valve dysfunction     Hypovolemia     Hypotension     Prosthetic valve endocarditis     Acute respiratory failure with hypoxia     Acute blood loss anemia     Acute renal failure    Past Medical History:   Diagnosis Date     Abnormal liver function test      Atrial fibrillation     post naun     Atrial fibrillation with RVR 8/29/2015    S/p MAZE Jul 2015     Bacterial endocarditis      CHF (congestive heart failure)      Dyslipidemia      Hyperlipidemia      Mitral valve prolapse      Near syncope      Pericardial effusion without cardiac tamponade 8/29/2015     Status post Maze operation for atrial fibrillation 8/29/2015        Social History   Substance Use Topics     Smoking status: Never Smoker     Smokeless tobacco: None     Alcohol use 3.6 oz/week     5 Glasses of wine, 1 Cans of beer per week       Objective    Labs:  Last 3 days:    Recent Labs      03/16/17   1449  03/16/17   1548  03/17/17   0411  03/17/17   1127  03/18/17   0539  03/18/17   0540  03/18/17   0949  03/19/17   0422   CREATININE  0.96  0.96  1.27   --    --   1.67*   --   1.83*   HGB  10.0*  10.6*  7.7*  7.9*   --    --   7.9*  7.8*   HCT  30.0*  32.0*  23.7*  24.7*   --    --   25.6*  25.4*   PLT  168  153  138*  136*  130*   --   140  153     Last 7 days:   Recent labs: (last 7 days)      03/15/17   0905  03/16/17   1449  03/16/17   1548  03/17/17   0411  03/17/17   1127  03/18/17   0539  03/19/17   0422   INR  1.23*  1.41*  1.35*  1.30*  1.24*  1.23*  1.19*       Warfarin Dosing History:    Date INR Warfarin Dose Comment   03/17/17 1.24 5 mg    03/18/17 1.23 5 mg    03/19/17 1.19 7.5 mg            Assessment  The patient is initiating warfarin for the indication of Atrial Fibrillation with a goal INR of 2.5-3.5. INR is lower than anticipated today. Will increase warfarin dose.    Plan  1. Administer warfarin 7.5 mg PO today.  2. Check INR daily or as appropriate.  3. Continue to follow the patient's INR, PLT, and HGB as available.  4. Monitor for potential drug/disease interactions.    Thank you for the consult,  Mildred Durand, [facility] 3/19/2017 1:58 PM

## 2021-06-09 NOTE — TELEPHONE ENCOUNTER
Controlled Substance Refill Request  Medication Name:   Requested Prescriptions     Pending Prescriptions Disp Refills     oxyCODONE (ROXICODONE) 10 mg immediate release tablet 42 tablet 0     Sig: Take 0.5-1 tablets (5-10 mg total) by mouth every 4 (four) hours as needed for pain.     Date Last Fill: 6/12/20  Requested Pharmacy: [facility]  Submit electronically to pharmacy  Controlled Substance Agreement on file:   Encounter-Level CSA Scan Date:    There are no encounter-level csa scan date.        Last office visit:  6/26/20  Fifi Mccarthy RN, MA  [facility]    Triage Nurse Advisor

## 2021-06-09 NOTE — PROGRESS NOTES
Pt came in ambulatory with no complaints.  PICC line flushed and blood return present.  IV antibiotic infused with out difficulty.  Drove himself home.

## 2021-06-09 NOTE — PROGRESS NOTES
Saw patient and son, Jabier, in [facility]. Patient is very familiar to us and our program.  Patient feels he knows program well and stated he did not need us to review our program with him in [place].  Gave him brief review of when we will start program post-op and talked about outpatient course.

## 2021-06-09 NOTE — TELEPHONE ENCOUNTER
Received a faxed INR result for Bandar Wells  From [facility]  INR result dated 7/14/2020 is 4.00      Last INR from 7/7/20 was 2.30

## 2021-06-09 NOTE — PROGRESS NOTES
"Arrived for daily Rocephin. Fatigue and shortness of breath about the same with activity but declines any w/c rides. Denies any changes from yesterday. VSS. PICC patent, faint blood return when flushed with NS with arm elevated and rocephin infused and pt. tolerated this well while here. Just before leaving pt has very minor bleeding from right nare, states he has noticed this the past couple of days and it is only a few drops and then stops, pt is on fish oil and he was told \" thinner blood\" can be a side effect of this and that dry air can be a contributing factor. Pt. Left amb. RTC 03/02/17  "

## 2021-06-09 NOTE — PROGRESS NOTES
Nutrition Follow-up    Date: 3/21/2017    Discussed diet with patient and wife. She would like to bring him some food from home and wanted to make sure the items were within modifications. They adhere to a low salt diet at home. They will receive more education after being transferred out of the ICU.    Diet:   ADA diet, no caffeine ordered.  (patient is not diabetic, will change diet to Cardiac/2m Na)    Appetite/Intake:   Has just had some clear liquids thus far.    Weight: increased 4.3 lb per I&Os    Labs:   Glu 99    GI:  Bowel sounds: hypoactive  Last stool: 3/21    Plan:    Change diet to Cardiac/2 gm Na    Goals:    Tolerate oral diet  Address education needs after transfer out of the ICU       Dayami Shen RD

## 2021-06-09 NOTE — PROGRESS NOTES
"Progress Note    Assessment/Plan  S/P redo MVR POD # 2  Awake and alert, AVSS, accelerated junctional R 56  Complaint of incisional chest pain with coughing, otherwise pain is well controlled, mild associated shortness of breath  Lung sounds diminished bilaterally and very congested  Will start broncopysiotherapy to mobilize secretion  Acute blood loss anemia, Hgb 7.8, and transfuse 1 unit PRBC  ISMAEL, Cr 1.67 up from 1.27 yesterday  H/o bacterial endocarditis  Incisions CDI, lung sounds diminished bilaterally  Chest tube drainage 95/195, serous sanguineous  Chest tubes removed and PW left in due to junctional rhythm, patient tolerated well  Abdomen soft and non tender, bowel sounds present  UO marginal and no BM yet  Lasix 40 mg IV BID X 24 hrs for gentle diuresis  Wt 85 kg, pre-op wt 78.7 kg  Continue aggressive pulmonary toilet  Keep in the unit today and work on pulmonary congestion and also monitor rhythm      Subjective  Denies chest pain, except with coughing, no overt shortness of breath  Objective  Awake and alert, no apparent distress  Vital signs in last 24 hours  Temp:  [98  F (36.7  C)-98.4  F (36.9  C)] 98.4  F (36.9  C)  Heart Rate:  [55-90] 55  Resp:  [13-30] 27  BP: ()/(49-56) 95/51  Arterial Line BP: ()/(42-50) 93/47  Weight:   187 lb 6.3 oz (85 kg)    Intake/Output last 3 shifts  I/O last 3 completed shifts:  In: 1045 [P.O.:540; I.V.:505]  Out: 1746 [Urine:1551; Chest Tube:195]  Intake/Output this shift:       Review of Systems   A 12 point comprehensive review of systems was negative except as noted.    Physical Exam  Visit Vitals     BP 95/51     Pulse (!) 55     Temp 98.4  F (36.9  C) (Oral)     Resp 27     Ht 5' 10\" (1.778 m)     Wt 187 lb 6.3 oz (85 kg)     SpO2 92%     BMI 26.89 kg/m2     Accelerated junctional HR 56, incisions CDI, lung sounds congested  Abdomen soft and non tender  No LE edema    Pertinent Labs   Lab Results: personally reviewed.   Lab Results   Component Value " Date     03/18/2017    K 4.3 03/18/2017     03/18/2017    CO2 17 (L) 03/18/2017    BUN 27 (H) 03/18/2017    CREATININE 1.67 (H) 03/18/2017    CALCIUM 9.5 03/18/2017     Lab Results   Component Value Date    WBC 11.1 (H) 03/17/2017    WBC 7.3 09/07/2015    HGB 7.9 (L) 03/17/2017    HCT 24.7 (L) 03/17/2017    MCV 95 03/17/2017     (L) 03/18/2017       Pertinent Radiology   Radiology Results: Not yet available for review  EKG Results: personally reviewed.  and accelerated junctional     Dave Hall

## 2021-06-09 NOTE — PROGRESS NOTES
[facility]    DATE: 3/28/2017  NAME: Bandar Wells  : 1950           MR# [id number]     CODE STATUS:  FULL CODE  VISIT TYPE: Admission  FACILITY: [facility] [[id number]]    ROOM: [id number]  PRIMARY CARE PROVIDER: Jin Bhat MD Phone: [phone] Fax:[phone]    History of Present Illness:   Bandar Wells is a 66 y.o. male with Status post 3/16/17 mechanical mitral valve replacement following bacterial endocarditis and impairment of a bioprosthetic  Mitral valve placed in . He is recovering uneventfully from surgery and actually feeling much stronger and more alert since placement of the new valve. He does understand the issue of anticoagulation with the mechanical valve versus bioprosthetic. He does note that he's had a history of insomnia lasting three months after his prior surgery but resolving spontaneously eventually. He would like to try the trazodone that was prescribed for him in the hospital as a sleep aid but did not carry over onto his discharge records. 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 I have decreased his metoprolol by 50% and will watch the pulse. I have given them the trazodone to try for his sleep disturbance, unfortunately I have limited expectations for success given his previous report of 3 to 4 months insomnia after his initial valve surgery.    Past Medical History:  Past Medical History:   Diagnosis Date     Abnormal liver function test      Atrial fibrillation     post naun     Atrial fibrillation with RVR 2015    S/p MAZE 2015     Bacterial endocarditis      CHF (congestive heart failure)      Dyslipidemia      Hyperlipidemia      Mitral valve prolapse      Near syncope      Pericardial effusion without cardiac tamponade 2015     S/P mitral valve replacement with metallic valve 2017     Status post Maze operation for atrial fibrillation 2015       Past  Surgical History:  Past Surgical History:   Procedure Laterality Date     CARDIAC SURGERY       CHG X-RAY PELVIS 3+ VW N/A 7/1/2015    Procedure: MITRAL VALVE REPLACEMENT, MAZE PROCEDURE, CARDIOLOGY TRANSESOPHAGEAL ECHOCARDIOGRAM;  Surgeon: Rm Munoz MD;  Location: [facility];  Service:      LANG-MAZE MICROWAVE ABLATION       EYE SURGERY      Retial hole treatment     HERNIA REPAIR Right     inguinal     MITRAL VALVE REPLACEMENT N/A 3/16/2017    Procedure: REDO STERNOTOMY, REDO MITRAL VALVE REPLACEMENT, PLACEMENT TEMPORARY VENTRICULAR PACING WIRES, ANESTHESIA TRANSESOPHAGEAL ECHOCARDIOGRAM;  Surgeon: Raphael Rosenberg MD;  Location: [facility];  Service:      MITRAL VALVE REPLACEMENT  2015    Bioprosthetic     PERICARDIAL WINDOW N/A 8/31/2015    Procedure: RIGHT PERICARDIAL WINDOW, TALC PLEURODESIS,VIDEO ASSISTED THOROSCOPY,DRAINAGE OF BILATERAL PLEURAL EFFUSIONS;  Surgeon: Rm Munoz MD;  Location: [facility];  Service:      PICC  9/3/2015          PICC  2/14/2017            Allergies:  Allergies   Allergen Reactions     Amoxicillin Other (See Comments)     Hepatic fibrosis,  Tolerated ceftriaxone (rocephine)        Social History:  Social History     Social History     Marital status: Single     Spouse name: N/A     Number of children: N/A     Years of education: N/A     Occupational History     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  His son is in [place] but can be present if warned.  Home 1 1/2 stories.    3/2017       Family History:  Family History   Problem Relation Age of Onset     Atrial fibrillation Mother       Heart failure Mother      Emphysema Father      Heart failure Father      Kidney failure Sister      S/P Renal transplant     Alcoholism Brother      No Medical Problems Son      No Medical Problems Brother        Current Medications:  Current Outpatient Prescriptions   Medication Sig Note     acetaminophen (TYLENOL) 500 MG tablet Take 500-1,000 mg by mouth every 6 (six) hours as needed for pain. Pain 1-5  Give 500 mg  Or pain 6-10 give 1000 mg every 6 hours ad needed not to exceed 4 grams in 24 hours      atorvastatin (LIPITOR) 40 MG tablet Take 40 mg by mouth bedtime.       b complex vitamins tablet Take 1 tablet by mouth daily.       cefTRIAXone (ROCEPHIN) 2 gram injection Infuse 2,000 mg into a venous catheter daily.      cholecalciferol, vitamin D3, 1,000 unit tablet Take 1,000 Units by mouth daily.      clindamycin (CLEOCIN) 300 MG capsule Take 600 mg by mouth see administration instructions. Take prior to Dentist appointments      cyanocobalamin 1000 MCG tablet Take 1,000 mcg by mouth daily.      ferrous sulfate 325 (65 FE) MG tablet Take 1 tablet by mouth 2 (two) times a day.      FLAXSEED OIL ORAL Take 1,000 mg by mouth daily.       FOLIC ACID ORAL Take 1 tablet by mouth daily. 3/16/2017: Unknown strength, patient ran out and needs to buy more     furosemide (LASIX) 20 MG tablet Take 20 mg by mouth every other day. Daily regimen of 20 mg alternating with 40 mg every other day.       furosemide (LASIX) 20 MG tablet Take 40 mg by mouth every other day. Daily regimen of 20 mg alternating with 40 mg every other day.      L. ACIDOPHILUS/BIFIDO LONGUM (PROBIOTIC PEARLS ORAL) Take 1 capsule by mouth daily.      metoprolol tartrate (LOPRESSOR) 25 MG tablet Take 1 tablet (25 mg total) by mouth 2 (two) times a day. (Patient taking differently: Take 12.5 mg by mouth 2 (two) times a day. ) 3/10/2017: Message left on pts phone that he needs to be taking the 25 mg BID now, see above note as well as progress note      multivitamin therapeutic (THERAGRAN) tablet Take 1 tablet by mouth daily.      oxyCODONE (ROXICODONE) 5 MG immediate release tablet Take 1-2 tablets (5-10 mg total) by mouth every 4 (four) hours as needed. (Patient taking differently: Take 5-10 mg by mouth every 4 (four) hours as needed. Take 5 mg for pain 1-5  Or take 10 mg for pain 6-10 ever 4 hours as needed for pain)      oxymetazoline (AFRIN) 0.05 % nasal spray Apply 2-3 sprays into each nostril 2 (two) times a day as needed. Separate doses by at least 10-12 hours.      traZODone (DESYREL) 50 MG tablet Take 50 mg by mouth at bedtime as needed for sleep.      warfarin (COUMADIN) 2 MG tablet Take 1 tablet (2 mg total) by mouth daily. (Patient taking differently: Take by mouth daily. 3/27/17 INR 2.46. Take 3mg daily. Next INR 3/30)        Review of Systems:  History obtained from chart review and the patient  General ROS: positive for  - fatigue  negative for - chills or fever  Psychological ROS: negative for - disorientation, hallucinations or hostility He does recall being very snowed in the hospital with the pain patch and oxycodone however  Ophthalmic ROS: negative for - double vision or loss of vision  ENT ROS: negative for - nasal congestion or sore throat  Respiratory ROS: no cough, shortness of breath, or wheezing  Cardiovascular ROS: He has chest wall pain related to his sternotomy in his drain sites  Gastrointestinal ROS: Minimal discomfort at the drain tube sites on his abdomen  Genito-Urinary ROS: no dysuria, trouble voiding, or hematuria  Musculoskeletal ROS: He feels stiff especially on a motion of his arms or chest  Neurological ROS: no TIA or stroke symptoms  Dermatological ROS: e reports his skin incisions look good to him       Physical Examination:  /53  Pulse (!) 53  Temp 97.5  F (36.4  C)  Resp 16  Wt 186 lb (84.4 kg)  SpO2 91%  BMI 26.69 kg/m2  General appearance: alert, appears stated age, cachectic, cooperative, distracted,  fatigued and moderate distress  Head: Normocephalic, without obvious abnormality, atraumatic  Eyes: conjunctivae/corneas clear. PERRL, EOM's intact. Fundi benign.  Nose: Nares normal. Septum midline. Mucosa normal. No drainage or sinus tenderness.  Throat: lips, mucosa, and tongue normal; teeth and gums normal  Neck: no adenopathy, no carotid bruit, no JVD, supple, symmetrical, trachea midline and thyroid not enlarged, symmetric, no tenderness/mass/nodules  Back: symmetric, no curvature. ROM normal. No CVA tenderness.  Lungs: clear to auscultation bilaterally  Chest wall: excellent looking midline incision with minimal tenderness on palpation no evidence of inflammation  Heart: click avail was  easily appreciated with a stethoscope  Abdomen: soft, non-tender; bowel sounds normal; no masses,  no organomegaly  Extremities: extremities normal, atraumatic, no cyanosis or edema  Pulses: 2+ and symmetric  Skin: clean looking drain tube sites on upper abdomen  Neurologic: Mental status: Alert, oriented, thought content appropriate  Motor:generally symmetrical strength limited somewhat by chest wall tenderness       Impression:  Bandar Wells is a 66 y.o. male with The history of infected bioprosthetic valve now replaced with a mechanical mitral valve    1. Mitral valve prolapse     2. S/P MVR (mitral valve replacement)     3. Hx of bacterial endocarditis     4. Prosthetic valve endocarditis, subsequent encounter     5. S/P mitral valve replacement with metallic valve     6. Paroxysmal atrial fibrillation     7. Status post Maze operation for atrial fibrillation     8. Acute renal failure, unspecified acute renal failure type     9. Acute systolic congestive heart failure     10. Acute respiratory failure with hypoxia     11. Acute blood loss anemia     12. Coronary artery disease involving native coronary artery of native heart without angina pectoris     13. Dyslipidemia     14. Dysthymia           Plan: Will follow  recommendations from infectious disease with regards to his IV medication which does  include ceftriaxone Intravenously. Will pay attention to his anticoagulation in hopes of being able to stop his Lovenox relatively quickly    Electronically signed by: Robert Joseph Sr., MD

## 2021-06-09 NOTE — TELEPHONE ENCOUNTER
ANTICOAGULATION  MANAGEMENT    Assessment     Today's INR result of 3.30 is Therapeutic (goal INR of 2.5-3.5)        Warfarin taken as previously instructed    No new diet changes affecting INR    No interaction expected between decreased dose -  Metolazone and warfarin    Continues to tolerate warfarin with no reported s/s of bleeding or thromboembolism     Previous INR was Subtherapeutic at 1.60 on 6/23/20.    Reported, awaiting for bone marrow biopsy result.    Plan:     Spoke with Badnar regarding INR result and instructed:     Warfarin Dosing Instructions:  (evenings. Has 3mg tabs)   - Continue current warfarin dose 4.5 mg daily on Mon/Fri; and 6 mg daily rest of week.    Instructed patient to follow up no later than:  One wk.   - checks INRs with home monitor thru [employer].    Education provided: target INR goal and significance of current INR result    Bandar verbalizes understanding and agrees to warfarin dosing plan.    Instructed to call the [facility] for any changes, questions or concerns. (#[phone])   ?   Meg Jacinto RN    Subjective/Objective:      Bandar Wells, a 69 y.o. male is on warfarin.     Bandar reports:     Home warfarin dose: verbally confirmed home dose with Bandar and updated on anticoagulation calendar     Missed doses: No     Medication changes:  No     S/S of bleeding or thromboembolism:  No     New Injury or illness:  No     Changes in diet or alcohol consumption:  No     Upcoming surgery, procedure or cardioversion:  No    Anticoagulation Episode Summary     Current INR goal:   2.5-3.5   TTR:   24.0 % (1 y)   Next INR check:   7/7/2020   INR from last check:   3.30 (6/30/2020)   Weekly max warfarin dose:      Target end date:   Indefinite   INR check location:      Preferred lab:      [facility]    Indications    Paroxysmal atrial fibrillation (H) [I48.0]  S/P MVR (mitral valve replacement) [Z95.2]  S/P mitral valve replacement with metallic  valve [Z95.4]           Comments:   INR TARGET GOAL 2.5 - 3.5         Anticoagulation Care Providers     Provider Role Specialty Phone number    Jin Bhat MD Referring Internal Medicine [phone]

## 2021-06-09 NOTE — PROGRESS NOTES
"Arrived for daily rocephin.  States he is feeling \"a little better today\" with slightly less shortness of breath and cough.  \"Maybe not quite as tired\".  Labs drawn via PICC without problem.  Given rocephin.  Tolerated infusion well while here.  Labs reviewed with pt (except for ESR which was not back by the time he left here).  Left via w/c accompanied by transport at 1015.  Will return tomorrow for next rocephin.   Note that pt states he was called by coumadin nurse and is taking 6 mg now.  Next INR will be done on 4/6.    "

## 2021-06-09 NOTE — PROGRESS NOTES
"Bandar Wlels is a 69 y.o. male who is being evaluated via a billable video visit.      The patient has been notified of following:     \"This video visit will be conducted via a call between you and your physician/provider. We have found that certain health care needs can be provided without the need for an in-person physical exam.  This service lets us provide the care you need with a video conversation.  If a prescription is necessary we can send it directly to your pharmacy.  If lab work is needed we can place an order for that and you can then stop by our lab to have the test done at a later time.    Video visits are billed at different rates depending on your insurance coverage. Please reach out to your insurance provider with any questions.    If during the course of the call the physician/provider feels a video visit is not appropriate, you will not be charged for this service.\"    Patient has given verbal consent to a Video visit? Yes  How would you like to obtain your AVS? AVS Preference: Mail a copy.  If dropped by the video visit, the video invitation should be sent to: Text to cell phone: [phone]  Will anyone else be joining your video visit? No    Video Start Time: 7:56 AM    Additional provider notes: GENERAL: Healthy, alert and no distress  EYES: Eyes grossly normal to inspection. No discharge or erythema, or obvious scleral/conjunctival abnormalities.  RESP: No audible wheeze, cough, or visible cyanosis.  No visible retractions or increased work of breathing.    NEURO: Cranial nerves grossly intact. Mentation and speech appropriate for age.  PSYCH: Mentation appears normal, affect normal/bright, judgement and insight intact, normal speech and appearance well-groomed      Video-Visit Details    Type of service:  Video Visit    Video End Time (time video stopped): 8:33 AM  Originating Location (pt. Location): Home    Distant Location (provider location):  [facility] INTERNAL MEDICINE "     Platform used for Video Visit: Artem Hicks MD     I am seeing Bandar virtually in post hospital follow-up.  In summary, Bandar is a fairly new patient to me who last fall early winter developed some pain in his left shoulder.  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  This was in 2017.  He also has a slight positive QuantiFERON gold test which infectious disease feels it is of little clinical significance.  His mother did have tuberculosis.  Subsequent evaluation of his shoulder pain showed some myositis in the shoulder.  He was seen down at [facility] by orthopedic specialist.  Around the same time I saw him for virtual visit.  My initial evaluation showed quite elevated inflammatory markers with a sed rate above 100 I believe.  His ferritin was also quite elevated.  He had a normocytic anemia and progression of chronic kidney disease.  Rheumatologic markers were normal or negative including rheumatoid factor, LILIAN, CCP antibodies.  He had mild transaminitis which I attributed to muscle injury.  Interestingly, his CK was normal throughout.  His thyroid function was normal.  SPEP and UPEP were normal.  Lyme testing was normal or negative.  He had an elevated LDH.  Myoglobin was mildly elevated.  Aldolase was normal.  He was seen by rheumatology and additional testing showed quite elevated uric acid level to over 16.  Lety antibodies were normal or negative.  Paraneoplastic antibody panel was normal or negative.  He underwent CT of the chest abdomen pelvis which showed lytic bone lesions.  He was seen by hematology and had a bone marrow biopsy which was fairly unremarkable.  No evidence of myeloma.  Peripheral smear had also previously been fairly unremarkable with the exception of a normocytic anemia.  He was started on colchicine and allopurinol and prednisone.  He had a marked improvement in his symptoms of left  shoulder pain and his uric acid was improving.  Concurrently his renal function was worsening and he had been on both torsemide and metolazone.  I was in contact with his cardiologist and we had stopped metolazone.  After his bone marrow biopsy he developed a rash which progressed involving most of his body.  He was admitted to the [facility] and his rash was felt to be related to allopurinol.  A skin biopsy was performed at the [facility] system which was fairly nondiagnostic, consistent with eczematous type reaction.  At that time he also had flow cytometry which was slightly abnormal with suggestion of I believe clonal B lymphocytes but not diagnostic.    I am seeing him today and overall he feels okay.  Rash is improving some leg swelling.  No shoulder pain.  He is going to see a neurologist and nephrologist has rheumatology follow-up and hematology follow-up.  He has lab scheduled for later this month.  He requests referral to [facility] and I have called down to [facility] and arranged for a general internal medicine consultation.  He will fill out paperwork for this.    I am not entirely sure what is the primary process with Bandar.  He has evidence of myositis in his left shoulder.  He has lytic bone lesions.  He has marked elevation inflammatory markers, elevated uric acid, worsening chronic kidney disease, known history of streptococcal endocarditis on suppressive antibiotics, known history of positive QuantiFERON gold, abnormal flow cytometry.  His presentation could be secondary to gout with heavy diuretics including metolazone and quite elevated uric acid.  In this case, he might benefit from medication like Uloric and can discuss with nephrology.  He could have an underlying lymphoma which is possibly picked up on his flow cytometry and will await assessment by hematology.  There could be other infectious process going on especially given his history of  recurrent endocarditis.  He is on suppressive penicillin and his blood cultures have been negative.  He does have prior history of slight positive QuantiFERON gold with a maternal history of tuberculosis and I am not entirely certain that we have fully excluded extrapulmonary tuberculosis.  Other inflammatory conditions could be considered as well, including extrapulmonary sarcoidosis.  He did have a mildly elevated ACE level.    I appreciate the numerous subspecialists attention and stability of [facility] evaluation.    Time: total time spent with the patient was 40 minutes of which >50% was spent in counseling and coordination of care as above    Jin Hicks

## 2021-06-09 NOTE — TELEPHONE ENCOUNTER
Pt states he saw Dr Thompson at [facility], thinks rash is likely related to allopurinol. Pt is has been off allopurinol and colchicine since 7/11/20. Dr Thompson prescribed cetrizine, clobetasol cream and hydroxyzine to help with rash symptoms. Pt will be seeing Dr Thompson again on Monday or sooner if rash gets worse.

## 2021-06-09 NOTE — TELEPHONE ENCOUNTER
Please mention to the patient that agree with holding allopurinol for now.  Discontinue colchicine.  When holding allopurinol patients uric acid level may rise and sometimes patients can have a gout flare therefore recommend taking prednisone 10 mg daily for 1 week then 5 mg daily remain on this dose until reassessed by us.    If unable to see PCP or covering PCP, recommend patient go to urgent care to have the rash assessed and to optimize management.  Urgent care may increase prednisone dose, if deemed necessary.    This past Friday, message sent with some recommendations and to go over lab results, please discuss with patient.      Recommend scheduling reassessment with us within 2 to 3 weeks.

## 2021-06-09 NOTE — PROGRESS NOTES
Pt arrived at [facility] to see Dr. Solorio. Pt is here for Bone Marrow Bx results.

## 2021-06-09 NOTE — PROGRESS NOTES
Writer met with pt to discuss DC plan.  Writer also spoke to pt's significant other, Pat, via phone regarding DC plan.  [facility] is able to accept pt on 3/24.  Pt is aware that the bed they have available has a private bedroom with a shared bathroom.  Pt would prefer Pat transport him but understands if he is still needing O2 he will need to be transported via HE stretcher.  RN will assess pt this evening to see if he is able to wean off O2.    Planning for DC to [facility] on 3/24 at 11am via [facility] stretcher.

## 2021-06-09 NOTE — CONSULTS
RENAL CONSULTATION:    Date of Consultation:  3/19/2017    Requesting Physician: Dr. Raheem Bloom    Reason for Consult:  ISMAEL    Assessment/ Recommendations:  1. ISMAEL: likely ATN in the setting of hypotension perioperatively. UA from 3/15 showed some hyaline casts leading up to surgery.    -No need for RRT at this time   -Increase diuretics   -Daily labs, wts, I/Os    2. Hypervolemia: Due to ISMAEL/CHF and input.    -Bumex 2mg IV BID   -Metolazone 2.5mg Daily.   -Goal Net negative of 2L/day    3. CKD Stage 3: baseline creatinine of 1.1-1.4mg/dl in the past few months. Check UPCR. Supportive care.    4. Hyponatremia: Due to hypervolemia. Increase diuretics as above.    5. HFrEF: left ventricular ejection fraction is 42% by 3/4/17 echo. Also diminished right heart function. Diuretics as above.        Javier Hudson MD  [facility]  Pager: [phone]   Office: [phone]        History of present illness:  Mr. Bandar Wells is a 66 year-old man who I am asked to see for ISMAEL. He was admitted on 3/16/17 for redo of his MVR, now POD#3.   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 He then developed severe mitral regurgitation and atrial fibrillation, and is status post mitral valve replacement in July 2015. He developed a pericardial effusion with cardiac tamponade in August 2016, in addition to bilateral pleural effusions. He underwent subxiphoid pericardial window at that time, and because he had bilateral pleural effusions, he underwent drainage of these and right VATS talc pleurodesis as well. 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 He was doing well until February 2017 when he re-presented with fevers, chills weight loss and bacteremia with enterococcus. He was started on Ceftriaxone and was continuing as an outpatient when he developed  worsening chills and fatigue. He is now readmitted and repeat echcardiography demonstrates persistent prosthetic valve dysfunction with thickened immobile leaflefts and native tricuspid valvular vegetation. His baseline creatinine is about 1.1-1.4mg/dl over the past few months leading up to surgery. He had had ISMAEL in the 2014. His creatinine is now up to 1.83mg/dl. He was hypotensive perioperatively, but hemodynamics have improved. He has been on intermittent, single doses of bumex and lasix. His weight is up from 78.7kg on admit to 85kg today. His I/Os show him to be up by 4.7L since admit. He is requiring 2L NC.     Past Medical History:   Diagnosis Date     Abnormal liver function test      Atrial fibrillation     post naun     Atrial fibrillation with RVR 8/29/2015    S/p MAZE Jul 2015     Bacterial endocarditis      CHF (congestive heart failure)      Dyslipidemia      Hyperlipidemia      Mitral valve prolapse      Near syncope      Pericardial effusion without cardiac tamponade 8/29/2015     Status post Maze operation for atrial fibrillation 8/29/2015       Medications: Scheduled Meds:    aspirin  81 mg Oral DAILY     atorvastatin  40 mg Oral QHS     bisacodyl  10 mg Oral Once     bisacodyl  10 mg Rectal Once     cefTRIAXone (ROCEPHIN) 2 g in 50 mL NS  2 g Intravenous Q24H     docusate sodium  100 mg Oral BID     ferrous sulfate  325 mg Oral BID with meals     insulin aspart (NovoLOG) injection   Subcutaneous TID with meals     insulin aspart (NovoLOG) injection   Subcutaneous QHS     ipratropium-albuterol  3 mL Nebulization Q6H - RT     magnesium hydroxide  30 mL Oral DAILY     melatonin  3 mg Oral QHS     omeprazole  20 mg Oral Daily before brkfst     polyethylene glycol  17 g Oral DAILY     warfarin - daily dose required   Other Med Consult or Protocol     Continuous Infusions:    dexmedetomidine 200 mcg/50 mL (PRECEDEX) (4mcg/mL) 0.3 mcg/kg/hr (03/16/17 3866)     dexmedetomidine 200 mcg/50 mL (PRECEDEX)  "(4mcg/mL) 0.5 mcg/kg/hr (03/16/17 1600)     DOPamine       epinephrine Stopped (03/17/17 0908)     heparin infusion 14 Units/kg/hr (03/19/17 0656)     insulin regular infusion 0.5 unit/mL 1 Units/hr (03/17/17 0929)     niCARdipine Stopped (03/17/17 0800)     norepinephrine IV infusion in D5W Stopped (03/17/17 0451)     nacl 0.9% Stopped (03/17/17 1921)     nacl 0.9% Stopped (03/17/17 1921)     vasopressin 100 units/100 ml (PITRESSIN)in D5W (1 unit/ml)       PRN Meds:.acetaminophen, acetaminophen, albumin human, aluminum-magnesium hydroxide-simethicone, bisacodyl, bisacodyl, dexmedetomidine 200 mcg/50 mL (PRECEDEX) (4mcg/mL), dextrose 50 % (D50W), DOPamine, epinephrine, glucagon (human recombinant), heparin, heparin, HYDROmorphone, magnesium hydroxide, naloxone **OR** naloxone, niCARdipine, norepinephrine IV infusion in D5W, ondansetron, oxyCODONE, sodium chloride, sodium phosphates 133 mL, traZODone, vasopressin 100 units/100 ml (PITRESSIN)in D5W (1 unit/ml)    Allergies   Allergen Reactions     Amoxicillin Other (See Comments)     Hepatic fibrosis,  Tolerated ceftriaxone (rocephine)        Social History     Social History     Marital status: Single     Spouse name: N/A     Number of children: N/A     Years of education: N/A     Occupational History     Not on file.     Social History Main Topics     Smoking status: Never Smoker     Smokeless tobacco: Not on file     Alcohol use 3.6 oz/week     5 Glasses of wine, 1 Cans of beer per week     Drug use: No     Sexual activity: No     Other Topics Concern     Not on file     Social History Narrative       Family History:  + for kidney disease(mother).      Review of Systems:Other than above, a comprehensive ROS was negative.        Visit Vitals     /53     Pulse (!) 52     Temp 98.1  F (36.7  C) (Oral)     Resp 17     Ht 5' 10\" (1.778 m)     Wt 187 lb 6.3 oz (85 kg)     SpO2 95%     BMI 26.89 kg/m2       Intake/Output Summary (Last 24 hours) at 03/19/17 " 1007  Last data filed at 03/19/17 0600   Gross per 24 hour   Intake          1640.56 ml   Output              545 ml   Net          1095.56 ml     Physical Exam:   GENERAL: calm and comfortable, alert  HEENT: NC/AT, OP clear, MMM, pupils equal, sclerae not icteric.  RESP: Decreased breath sounds at bases with no respiratory distress, normal effort.  CV: Regular rhythm, normal rate, no rub. + leg edema.    GI:  Soft, NT/ND, no masses or HSM  Musculoskeletal: Normal muscle bulk/ tone; No gross joint abnormalities  SKIN: No rash, warm/ dry  PSYCH: Alert and oriented x 3, normal affect  Lymph: No cervical adenopathy    LABS:    Results from last 7 days  Lab Units 03/19/17  0422 03/18/17  1100 03/18/17  0540 03/17/17  0411   LN-SODIUM mmol/L 133*  --  137 140   LN-POTASSIUM mmol/L 4.7 4.3 4.3 4.6   LN-CHLORIDE mmol/L 103  --  107 111*   LN-CO2 mmol/L 20*  --  17* 24   LN-BLOOD UREA NITROGEN mg/dL 34*  --  27* 22   LN-CREATININE mg/dL 1.83*  --  1.67* 1.27   LN-CALCIUM mg/dL 9.3  --  9.5 9.0       Results from last 7 days  Lab Units 03/19/17  0422 03/18/17  0949 03/18/17  0539 03/17/17  1127   LN-WHITE BLOOD CELL COUNT thou/uL 11.0 12.3*  --  11.1*   LN-HEMOGLOBIN g/dL 7.8* 7.9*  --  7.9*   LN-HEMATOCRIT % 25.4* 25.6*  --  24.7*   LN-PLATELET COUNT thou/uL 153 140 130* 136*

## 2021-06-09 NOTE — PROGRESS NOTES
"Inpatient Progress Note - Internal Medicine  Principal Problem:    Prosthetic valve endocarditis  Active Problems:    Hx of bacterial endocarditis    S/P MVR (mitral valve replacement)    Acute respiratory failure with hypoxia    Acute blood loss anemia    Acute renal failure      Subjective:  Generally doing well but feels down and discouraged.  Pain control is adequate he does not feel breathless.  Has done some limited walking.  Pacer wires are out.  Creatinine continues to improve.    Hemoglobin down to 7.5 could consider transfusion will reassess tomorrow.    Hemodynamically stable.  No evidence of heart failure.  Remains afebrile.  Rhythm stable.    ROS: A comprehensive review of systems was performed and was otherwise negative except as mentioned above.     Physical Exam:   Visit Vitals     /54     Pulse 69     Temp 98.1  F (36.7  C) (Oral)     Resp 18     Ht 5' 10\" (1.778 m)     Wt 175 lb 14.8 oz (79.8 kg)     SpO2 96%     BMI 25.24 kg/m2     Valve sounds distinct murmur unchanged good breath sounds bilaterally abdomen soft 1-2+ edema        Allergies: reviewed  Medications: reviewed, see discussion for details  Diagnostics: Pertinent labs and imaging reviewed and discussed in subjective as pertinent.    Assessment and Plan:  We will likely go to TCU hopefully later this week.  Needs to improve nutritional intake he would not like to begin a supplement.    Will begin low-dose antidepressant therapy per discussion with patient.    Antibiotic plan as outlined by infectious disease    Time: total time spent with the patient was 35 minutes of which >50% was spent in counseling and coordination of care.    José Miguel Black MD   3/22/2017 12:19 PM  Internal Medicine  [facility]    Scheduled Meds:    aspirin  81 mg Oral DAILY     atorvastatin  40 mg Oral QHS     bisacodyl  10 mg Oral Once     [START ON 3/23/2017] bumetanide  1 mg Intravenous DAILY     cefTRIAXone (ROCEPHIN) 2 g in 50 " mL NS  2 g Intravenous Q24H     docusate sodium  100 mg Oral BID     ferrous sulfate  325 mg Oral BID with meals     ipratropium-albuterol  3 mL Nebulization Q6H - RT     lidocaine  2 patch Transdermal Q24H     melatonin  3 mg Oral QHS     omeprazole  20 mg Oral Daily before brkfst     PARoxetine  10 mg Oral DAILY     penicillin VK  250 mg Oral DAILY     polyethylene glycol  17 g Oral DAILY     potassium chloride SA  30 mEq Oral Q4H     warfarin - daily dose required   Other Med Consult or Protocol     warfarin  2 mg Oral Daily 1700     Continuous Infusions:    dexmedetomidine 200 mcg/50 mL (PRECEDEX) (4mcg/mL) 0.3 mcg/kg/hr (03/16/17 1743)     DOPamine 2 mcg/kg/min (03/22/17 0730)     epinephrine Stopped (03/17/17 0908)     insulin regular infusion 0.5 unit/mL 1 Units/hr (03/17/17 0929)     niCARdipine Stopped (03/17/17 0800)     norepinephrine IV infusion in D5W Stopped (03/17/17 0451)     nacl 0.9% Stopped (03/17/17 1921)     vasopressin 100 units/100 ml (PITRESSIN)in D5W (1 unit/ml) 2.4 Units/hr (03/20/17 0800)     PRN Meds:.acetaminophen, acetaminophen, albumin human, aluminum-magnesium hydroxide-simethicone, bisacodyl, bisacodyl, dexmedetomidine 200 mcg/50 mL (PRECEDEX) (4mcg/mL), dextrose 50 % (D50W), epinephrine, glucagon (human recombinant), magnesium hydroxide, naloxone **OR** naloxone, niCARdipine, norepinephrine IV infusion in D5W, ondansetron, oxyCODONE, sodium chloride, sodium phosphates 133 mL, traZODone, vasopressin 100 units/100 ml (PITRESSIN)in D5W (1 unit/ml)

## 2021-06-09 NOTE — PROGRESS NOTES
Patient extubated to 6lpm oxymask without complication. Sao2 94% Patient able to verbalize. Patient cough strong.    Amos Amato, LRT

## 2021-06-09 NOTE — PROGRESS NOTES
Neb tx with flutter valve, pt up in chair, coarse BS throughout with congested prod cough.  800mls on IS, VVS

## 2021-06-09 NOTE — PROGRESS NOTES
"Progress Note    Assessment/Plan  S/P redo MVR POD # 1  Awake and alert, AVSS, ? A-fib, EKG rhythm undetermined, backup V-pace at 60  Complaint of incisional chest pain and associated shortness of breath  Lung sounds diminished bilaterally and congested  Acute blood loss anemia, Hgb 7.7,will repeat Hgb and transfuse if still low  H/o bacterial endocarditis  Now off Epi, CI 4.2, CO 8.7, CVP 20, PAP 48/27  Incisions CDI, lung sounds diminished bilaterally  Chest tube drainage 95/217, serous sanguineous  Will leave chest tube for now  Abdomen soft and non tender, bowel sounds present  UO marginal will continue Lasix for gentle diuresis  Wt 85 kg, pre-op wt 78.7 kg  If patient remains stable for 2 hours after Epi coming off, will d/c A-line and swan  Start aggressive pulmonary toilet  Maybe able to transfer to [facility] today once lines are out      Subjective  Complaint of incisional chest pain and associated shortness of breath  Objective  Awake and alert, laying in bed with HOB up, no apparent distress  Vital signs in last 24 hours  Temp:  [96  F (35.6  C)-100  F (37.8  C)] 97.3  F (36.3  C)  Heart Rate:  [] 70  Resp:  [12-32] 26  BP: ()/(41-56) 99/54  Arterial Line BP: ()/(9-64) 98/46  FiO2 (%):  [40 %-60 %] 40 %  Weight:   187 lb 6.3 oz (85 kg)  Pre-op wt 78.7 kg  Intake/Output last 3 shifts  I/O last 3 completed shifts:  In: 6995.8 [I.V.:3909.8; Blood:2616; IV Piggyback:470]  Out: 2659 [Urine:942; Blood:1500; Chest Tube:217]  Intake/Output this shift:       Review of Systems   A 12 point comprehensive review of systems was negative except as noted.    Physical Exam  Visit Vitals     BP 99/54     Pulse 70     Temp 97.3  F (36.3  C) (Core)     Resp 26     Ht 5' 10\" (1.778 m)     Wt 187 lb 6.3 oz (85 kg)     SpO2 96%     BMI 26.89 kg/m2     A-fib, V-paced, incisions CDI, lung sounds congested  Abdomen soft and non tender  No LE edema noted    Pertinent Labs   Lab Results: personally reviewed.   Lab " Results   Component Value Date     03/17/2017    K 4.6 03/17/2017     (H) 03/17/2017    CO2 24 03/17/2017    BUN 22 03/17/2017    CREATININE 1.27 03/17/2017    CALCIUM 9.0 03/17/2017     Lab Results   Component Value Date    WBC 12.8 (H) 03/17/2017    WBC 7.3 09/07/2015    HGB 7.7 (L) 03/17/2017    HCT 23.7 (L) 03/17/2017    MCV 93 03/17/2017     (L) 03/17/2017       Pertinent Radiology   Radiology Results: Personally reviewed image/s, Personally reviewed impression/s and Bilateral pulmonary congestion and atelectasis  EKG Results: personally reviewed.  and A-fib, back up [facility]

## 2021-06-09 NOTE — CONSULTS
PULMONARY / CRITICAL CARE CONSULTATION NOTE      Consultation - Pulmonary/Critical Care Medicine  Bandar Wells,  1950, MRN [id number]  Date / Time of Admission:  3/16/2017  5:35 AM    Admitting Dx: Endocarditis [I38]    PCP: Jin Bhat MD, [phone]  Consulting physician:  Raphael Rosenberg MD   Code status:  Full Code       Extended Emergency Contact Information  Primary Emergency Contact: Milly Echevarria   [place]  Mobile Phone: [phone]  Relation: Friend  Secondary Emergency Contact: Jabier Wells   [place]  Mobile Phone: [phone]  Relation: Child       ID:  Bandar Wells is a 66 y.o. male with h/o MVR, maze 2015, admitted today for elective redo sternotomy, and redo  MVR for prosthestic mitral valve dysfunction.      ASSESSMENT   1. Acute hypoxic respiratory failure.  To ICU from OR on full vent support  2. Prosthetic mitral valve dysfunction .  S/p redo sternotomy and redo MVR today  3. Coagulopathy.    4. Acute blood loss anemia .  EBL 1500 ml     Advance Directives:  Received     PLAN   Systems to Assess:     Pulmonary: Wean supplemental O2 as tolerated; goal O2 sat > 92%.  HOB > 30 degrees to limit aspiration risk.      Check ABG and adjust support as indicated; avoid acidotic state.     Check CXR    Once hemodynamically stable, plan to proceed to wake, wean, and extubate.     CT management per CVS    Cardiovascular: Cardiac monitoring.     SBP goal > 90 mmHg, MAP goal > 65 mmHg.      Goal CI 2-3     Vasoactive gtts per CV-surgery.     V Temporary pacing wires present; will use in setting of symptomatic arrhythmia.     Neurological: Neuro checks per ICU protocol.     Sedate with precededx until ready to wean and extubate.     Pain control: PRN    GI/:     NPO until extubated and fully awake.     Chappell catheter in place for accurate measurement of I/O.     GI prophylaxis:  Omeprazole    Renal: Monitor I/O's.  Electrolyte repletion PRN.  Avoid/limit  nephrotoxic agents.     IVFs: per CV-surgery    Heme/Coag: Monitor H/H.     Transfuse per CV-surgery.     Monitor chest tube output hourly; notify CV-surgery for excessive output or abrupt stop in output.     DVT prophylaxis:  SubQ heparin    Infectious disease: General precautions.     Remove lines and drains once no longer required.     Endocrine:     RHI gtt per ICU protocol.     Musculoskeletal:     Bedrest; initiate mobility protocol.     Lines: A-line, Day# 1, Central line, Day# 1 or [place] Mildred, Day# 1      Code Status:  full    Thank you for this interesting consultation.  Please call if any questions or concerns.    The patient and/or the family were educated about the above plan of care and indicated understanding.      This patient is considered critically ill and requires ICU level of care due to immediate post CV-surgical procedure. High risk for acute hemodynamic collapse and death.     Total Critical Care time, not including separate billable procedure time:  45 minutes.    Barby Ever [facility] Pulmonary/Critical Care      Chief Complaint   Prosthetic mitral valve dysfunction      HPI    Bandar Wells is a 66 y.o. 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 He was treated with IV antibiotics (ampicillin and gentamicin) and recovered. He then developed severe mitral regurgitation and atrial fibrillation, and is status post elective mitral valve replacement with a 33 mm SJM Epic mitral valve and Nicholas maze IV procedure in July 2015. He did well and was discharged on POD 6.     He developed a pericardial effusion with cardiac tamponade in August 2016, in addition to bilateral pleural effusions. He underwent subxiphoid pericardial window at that time, and because he had bilateral pleural effusions, he underwent drainage of these and right VATS talc pleurodesis as well. 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    He completed a course of IV antibiotics after about 6 weeks (10/8/2015), and was doing well until February 2017 when he re-presented with fevers, chills weight loss and bacteremia with enterococcus. He was started on Ceftriaxone and was continuing as an outpatient when he developed worsening chills and fatigue. He was readmitted and repeat echcardiography demonstrates persistent prosthetic valve dysfunction (mitral stenosis with a mean gradient of 18 mmHg) with thickened immobile leaflefts and native tricuspid valvular vegetation.     History is provided by: review of medical records and bedside handoff by NURIS and Dr Rosenberg.  Pt had redo sternotomy, redo MV replacement today. The case was reported ans uneventful.  He came to ICu on full vent support, on precedex, insulin and levophed drips.     Review of Systems   Review of systems not obtained due to inability to communicate with the patient.      Active Problem List   Patient Active Problem List    Diagnosis Date Noted     Prosthetic valve endocarditis 03/16/2017     Acute respiratory failure with hypoxia 03/16/2017     Acute blood loss anemia 03/16/2017     Prosthetic valve dysfunction      Hypovolemia      Hypotension      Endocarditis of prosthetic valve, sequela      Acute diastolic heart failure 03/03/2017     CHF (congestive heart failure) 03/03/2017     Coronary artery disease 12/07/2015     Paroxysmal atrial fibrillation 10/29/2015     Pericardial effusion without cardiac tamponade 08/29/2015     Status post Maze operation for atrial fibrillation 08/29/2015     Recurrent pleural effusion on right 08/03/2015     S/P MVR (mitral valve replacement) 07/01/2015     Hx of bacterial endocarditis 04/29/2015     Dyslipidemia 04/29/2015     Mitral valve prolapse 04/29/2015         Medical/Surgical History   Past Medical History:   Diagnosis Date     Abnormal liver function test       Atrial fibrillation     post nanu     Atrial fibrillation with RVR 8/29/2015    S/p MAZE Jul 2015     Bacterial endocarditis      CHF (congestive heart failure)      Dyslipidemia      Hyperlipidemia      Mitral valve prolapse      Near syncope      Pericardial effusion without cardiac tamponade 8/29/2015     Status post Maze operation for atrial fibrillation 8/29/2015     Past Surgical History:   Procedure Laterality Date     CARDIAC SURGERY       CHG X-RAY PELVIS 3+ VW N/A 7/1/2015    Procedure: MITRAL VALVE REPLACEMENT, MAZE PROCEDURE, CARDIOLOGY TRANSESOPHAGEAL ECHOCARDIOGRAM;  Surgeon: Rm Munoz MD;  Location: [facility];  Service:      EYE SURGERY       HERNIA REPAIR Right     inguinal     PERICARDIAL WINDOW N/A 8/31/2015    Procedure: RIGHT PERICARDIAL WINDOW, TALC PLEURODESIS,VIDEO ASSISTED THOROSCOPY,DRAINAGE OF BILATERAL PLEURAL EFFUSIONS;  Surgeon: Rm Munoz MD;  Location: [facility];  Service:      PICC  9/3/2015          PICC  2/14/2017              Allergies   Allergies   Allergen Reactions     Amoxicillin Other (See Comments)     Hepatic fibrosis,  Tolerated ceftriaxone (rocephine)          Medications:  OUTpatient medications   Prior to Admission medications    Medication Sig Start Date End Date Taking? Authorizing Provider   acetaminophen (TYLENOL) 500 MG tablet Take 500-1,000 mg by mouth every 6 (six) hours as needed for pain.    Yes PROVIDER, HISTORICAL   atorvastatin (LIPITOR) 40 MG tablet Take 40 mg by mouth bedtime.    Yes PROVIDER, HISTORICAL   b complex vitamins tablet Take 1 tablet by mouth daily.    Yes PROVIDER, HISTORICAL   cefTRIAXone 2 g in NaCl 0.9 % 0.9 % 50 mL IVPB Infuse 2 g into a venous catheter daily. 2/14/17 3/25/17 Yes Lashonda Currie MD   cholecalciferol, vitamin D3, 1,000 unit tablet Take 1,000 Units by mouth daily.   Yes PROVIDER, HISTORICAL   clindamycin (CLEOCIN) 300 MG capsule Take 600 mg by mouth see administration  instructions. Take prior to Dentist appointments   Yes PROVIDER, HISTORICAL   cyanocobalamin 1000 MCG tablet Take 1,000 mcg by mouth daily.   Yes PROVIDER, HISTORICAL   enoxaparin (LOVENOX) 80 mg/0.8 mL syringe Inject 80 mg under the skin every 12 (twelve) hours.   Yes PROVIDER, HISTORICAL   ferrous sulfate 325 (65 FE) MG tablet Take 1 tablet by mouth 2 (two) times a day.   Yes PROVIDER, HISTORICAL   FLAXSEED OIL ORAL Take 1,000 mg by mouth daily.    Yes PROVIDER, HISTORICAL   FOLIC ACID ORAL Take 1 tablet by mouth daily.   Yes PROVIDER, HISTORICAL   furosemide (LASIX) 20 MG tablet Take 20 mg by mouth every other day. Daily regimen of 20 mg alternating with 40 mg every other day.    Yes PROVIDER, HISTORICAL   furosemide (LASIX) 20 MG tablet Take 40 mg by mouth every other day. Daily regimen of 20 mg alternating with 40 mg every other day.   Yes PROVIDER, HISTORICAL   L. ACIDOPHILUS/BIFIDO LONGUM (PROBIOTIC PEARLS ORAL) Take 1 capsule by mouth daily.   Yes PROVIDER, HISTORICAL   metoprolol tartrate (LOPRESSOR) 25 MG tablet Take 1 tablet (25 mg total) by mouth 2 (two) times a day. 3/8/17  Yes José Miguel Black MD   multivitamin therapeutic (THERAGRAN) tablet Take 1 tablet by mouth daily.   Yes PROVIDER, HISTORICAL   oxymetazoline (AFRIN) 0.05 % nasal spray Apply 2-3 sprays into each nostril 2 (two) times a day as needed. Separate doses by at least 10-12 hours.   Yes PROVIDER, HISTORICAL   traZODone (DESYREL) 50 MG tablet Take 1 tablet (50 mg total) by mouth bedtime as needed for sleep (Insomnia). 3/15/17 3/16/17 Yes Dave Hall PA-C   apixaban (ELIQUIS) 5 mg Tab tablet Take 5 mg by mouth 2 (two) times a day.  3/16/17 Yes PROVIDER, HISTORICAL           Medications:  INpatient medications     acetaminophen  650 mg Oral Q6H    Or     acetaminophen  650 mg Rectal Q6H     [START ON 3/17/2017] aspirin  81 mg Oral DAILY     atorvastatin  40 mg Oral QHS     [START ON 3/17/2017] bisacodyl  10 mg Oral Once     [START ON  "3/19/2017] bisacodyl  10 mg Rectal Once     cefTRIAXone (ROCEPHIN) 2 g in 50 mL NS  2 g Intravenous Q24H     [START ON 3/17/2017] docusate sodium  100 mg Oral BID     [START ON 3/17/2017] heparin (PF)  5,000 Units Subcutaneous Q8H FIXED TIMES     [START ON 3/18/2017] magnesium hydroxide  30 mL Oral DAILY     melatonin  3 mg Oral QHS     mupirocin  1 application Nasal BID     [START ON 3/17/2017] omeprazole  20 mg Oral Daily before brkfst     [START ON 3/17/2017] polyethylene glycol  17 g Oral DAILY     [START ON 3/17/2017] vancomycin  1,000 mg Intravenous Q12H           Family History  Social History     Reviewed  Family History   Problem Relation Age of Onset     Atrial fibrillation Mother      Heart failure Mother        Reviewed  Social History     Social History     Marital status: Single     Spouse name: N/A     Number of children: N/A     Years of education: N/A     Occupational History     Not on file.     Social History Main Topics     Smoking status: Never Smoker     Smokeless tobacco: Not on file     Alcohol use 3.6 oz/week     5 Glasses of wine, 1 Cans of beer per week     Drug use: No     Sexual activity: No     Other Topics Concern     Not on file     Social History Narrative      Psychosocial Needs  Social History     Social History Narrative     Additional psychosocial needs reviewed per nursing assessment.      Physical Exam   VITALS:    Visit Vitals     /61 (Patient Position: Sitting)     Pulse (!) 53     Temp 97.8  F (36.6  C) (Oral)     Resp (!) 34     Ht 5' 10\" (1.778 m)     Wt 173 lb 8 oz (78.7 kg)     SpO2 98%     BMI 24.89 kg/m2     Temp:  [97.8  F (36.6  C)] 97.8  F (36.6  C)  Heart Rate:  [53-94] 53  Resp:  [34] 34  BP: (104-107)/(61-67) 104/61  SpO2:  [97 %-98 %] 98 %  FiO2 (%):  [60 %] 60 %    PHYSICAL EXAM:   GEN: sedated., on vent support  HEENT:  NC PERRL OETT  NECK: Supple.  Trachea midline  PULM:  Diminished, CTs w/o air leak  CVS:   RRR  ABDOMEN:   soft  EXTREMITIES/SKIN:    No " e/c/c  NEURO:  .  Unknown, sedated    I&O:    Intake/Output Summary (Last 24 hours) at 03/16/17 1538  Last data filed at 03/16/17 1458   Gross per 24 hour   Intake             5666 ml   Output             1875 ml   Net             3791 ml        Pertinent Labs   Lab Results: personally reviewed.     Serum Glucose range:  Recent Labs      03/16/17   1106  03/16/17   1144  03/16/17   1212  03/16/17   1304  03/16/17   1336  03/16/17   1416   POCGLU  141  128  159  173  173  146       No results for input(s): PHART, HRS9KAE, PO2ART, OXYHB, BEARTCALC, CARBOXYHGB, METHGB, POCPEEP, TEMP, 19481, POCRATE, POCFLOW, PSV in the last 72 hours.    Invalid input(s): [id number], 02SAT, VENTTIVOL  CBC:    Results from last 7 days  Lab Units 03/16/17  1449 03/15/17  0905   LN-WHITE BLOOD CELL COUNT thou/uL 14.0* 11.0   LN-HEMOGLOBIN g/dL 10.0* 9.5*   LN-HEMATOCRIT % 30.0* 29.8*   LN-PLATELET COUNT thou/uL 168 197     Chemistry:    Results from last 7 days  Lab Units 03/16/17  1449 03/16/17  0624 03/15/17  0905   LN-SODIUM mmol/L 143  --  135*   LN-POTASSIUM mmol/L 4.0  --  4.6   LN-CHLORIDE mmol/L 113*  --  105   LN-CO2 mmol/L 23  --  22   LN-BLOOD UREA NITROGEN mg/dL 20  --  21   LN-CREATININE mg/dL 0.96  --  1.29   LN-CALCIUM mg/dL 10.7*  --  9.5   LN-MAGNESIUM mg/dL  --  2.1 1.6*     Coags:  Lab Results   Component Value Date    INR 1.41 (H) 03/16/2017    INR 1.23 (H) 03/15/2017    INR 1.59 (H) 07/28/2016     Cardiac Markers:           Cardiac/Radiology Studies   Cardiac:    EKG:   Postop one not done yet    Radiology:    Chest X-Ray: postop not done yet

## 2021-06-09 NOTE — PROGRESS NOTES
Bandar arrived on [address] via ambulatory at 1055. He c/o only 2/10 bilateral shoulder discomfort. He did state he felt  Weak and cold  today. Note pulse in mid 40's. Denies any problems with his PICC line. PICC dressing dry and intact. Good blood return noted from PICC line. PICC intially flushed with 10ml NS. Ceftriaxone infused without difficulty. PICC line flushed with 20ml and end covered with a green swab cap. Left [address] via wheelchair at 1155 to return to OP infusion 4/3

## 2021-06-09 NOTE — PROGRESS NOTES
RENAL PROGRESS NOTE - [facility]    CC: f/u ismael    Subjective: feels  better today, up in chair, denies sob, pain, nausea, able to stand up.  Assessment and Plan: 65 yo wm with h/o enterococcus endocaditis complicated by severe MR, afib, s/p MV replacement in July 2015 with prosthetic valve mitral endocarditis, now sp redo MVR with post op ismael  1. ISMAEL - baseline ckd 3, now ismael in setting of MVR with likely post-op ATN.  Good urine output today and continues with improvement in creatinine. Cont to renally dose meds, optimize hemodynamics, will dec bumex to 1 mg bid  Will need to monitor renal function closely with ongoing use of ceftriaxone as at risk for AIN, now undergoing penicillin challenge. Ok for diloln removal   2. MV endocarditits -now s/p redo MVR. Post op care per surgery.  On ceftriaxone.  3. Anasarca - good response to bumex and metolazone, will change to bumex 1 mg bid given very good response  4. Hyponatremia - due to ismael, heart failure/hypervolemia, improving with diuresis.  D/w family at bedside  Urvashi Osborne MD  [facility]  [phone]    Objective    PHYSICAL EXAM  Vitals:    03/21/17 1000   BP: 103/56   Pulse: 92   Resp: 18   Temp:    SpO2:            GENERAL: nad, in chair  HEENT:normocephalic, atraumatic  CARDIOVASCULAR: rr, no rub, 1+ edema  PULMONARY:cta ant. No cyanosis  GASTROINTESTINAL: soft, nt/nd  MSK: Normal muscle mass,warm  NEURO: Alert, no gross focal findings  PSYCHIATRIC: flat affect  SKIN: Pale, no jaundice, no rash. Sternotomy incision cdi    LABORATORIES    Results from last 7 days  Lab Units 03/21/17  0513 03/20/17  0507 03/19/17  0422 03/18/17  0949   LN-WHITE BLOOD CELL COUNT thou/uL 11.0  --  11.0 12.3*   LN-HEMOGLOBIN g/dL 8.2* 8.0* 7.8* 7.9*   LN-HEMATOCRIT % 26.3*  --  25.4* 25.6*   LN-PLATELET COUNT thou/uL 218 194 153 140       Results from last 7 days  Lab Units 03/21/17  0513 03/20/17  0507 03/19/17  0422   LN-SODIUM mmol/L 134* 132*  133*   LN-POTASSIUM mmol/L 4.3 4.6 4.7   LN-CHLORIDE mmol/L 99 101 103   LN-CO2 mmol/L 24 22 20*   LN-BLOOD UREA NITROGEN mg/dL 39* 38* 34*   LN-CREATININE mg/dL 1.41* 1.71* 1.83*   LN-CALCIUM mg/dL 9.6 9.3 9.3   LN-PROTEIN TOTAL g/dL 7.5  --   --    LN-BILIRUBIN TOTAL mg/dL 0.4  --   --    LN-ALKALINE PHOSPHATASE U/L 56  --   --    LN-ALT (SGPT) U/L 11  --   --    LN-AST (SGOT) U/L 27  --   --          MEDICATIONS    aspirin  81 mg Oral DAILY     atorvastatin  40 mg Oral QHS     bisacodyl  10 mg Oral Once     bumetanide  2 mg Intravenous Q12H     cefTRIAXone (ROCEPHIN) 2 g in 50 mL NS  2 g Intravenous Q24H     docusate sodium  100 mg Oral BID     ferrous sulfate  325 mg Oral BID with meals     insulin aspart (NovoLOG) injection   Subcutaneous TID with meals     insulin aspart (NovoLOG) injection   Subcutaneous QHS     ipratropium-albuterol  3 mL Nebulization Q6H - RT     lidocaine  2 patch Transdermal Q24H     magnesium hydroxide  30 mL Oral DAILY     melatonin  3 mg Oral QHS     metOLazone  2.5 mg Oral DAILY     metoprolol tartrate  6.25 mg Oral BID     omeprazole  20 mg Oral Daily before brkfst     [START ON 3/22/2017] penicillin VK  250 mg Oral DAILY     polyethylene glycol  17 g Oral DAILY     warfarin - daily dose required   Other Med Consult or Protocol     warfarin - NO DOSE TODAY   Other No Dose Today         Urvashi Osborne MD  [facility]  [phone]

## 2021-06-09 NOTE — PROGRESS NOTES
RESPIRATORY CARE NOTE     Patient Name: Bandar Wells  Today's Date: 3/21/2017       Pt continues to receive duo neb and flutter treatment. BS are coarse. Pt is on 40% of oxygen via cool mist treatment, SpO2 is 100%. Pt has a strong productive cough mod amt thick green sample sent.  RT encouraged deep breathing and coughing. Pt's respiratory status is stable.  RT will continue to monitor pt's respiratory status closely.     Amos Amato, LRT

## 2021-06-09 NOTE — PROGRESS NOTES
[facility] Progress Note    SUBJECTIVE:  Feels pretty good today. No new issues. PCN allergy may have been elevation of LFT.     REVIEW OF SYSTEMS:  Negative unless as listed above.  Social history, Family history, Medications: reviewed.    OBJECTIVE:  Vitals:    03/20/17 1406   BP:    Pulse:    Resp:    Temp:    SpO2: 95%       PHYSICAL EXAM:  Alert, awake  Vitals tabulated above, reviewed  Neck supple without lymphadenopathy  Sclera normal color, not injected  CARDIOVASCULAR regular rate and rhythm, no mumur  Lungs CLEAR TO AUSCULTATION   Abdomen soft, NT/ND, absent HEPATOSPLENOMEGALY  Skin normal  Joints normal  Neurologic exam non focal  Lines ok  Incision clean    CTX  Antibiotics:    Pertinent labs:    Results from last 7 days  Lab Units 03/20/17  0507 03/19/17  0422 03/18/17  0949  03/17/17  1127   LN-WHITE BLOOD CELL COUNT thou/uL  --  11.0 12.3*  --  11.1*   LN-HEMOGLOBIN g/dL 8.0* 7.8* 7.9*  --  7.9*   LN-HEMATOCRIT %  --  25.4* 25.6*  --  24.7*   LN-PLATELET COUNT thou/uL 194 153 140  < > 136*   < > = values in this interval not displayed.      Results from last 7 days  Lab Units 03/20/17  0507   LN-SODIUM mmol/L 132*   LN-POTASSIUM mmol/L 4.6   LN-CHLORIDE mmol/L 101   LN-CO2 mmol/L 22   LN-BLOOD UREA NITROGEN mg/dL 38*   LN-CREATININE mg/dL 1.71*   LN-CALCIUM mg/dL 9.3                   MICROBIOLOGY DATA:  Valve tissue cx so far neg      IMAGING/RADIOLOGY:          ASSESSMENT:  MVR  Treated for cx negative endocarditis autumn 16  Then gordonii + PVE feb 17  Failed medical rx due to acute heart failure  Mild RF hopefully just post op ATN, creatinine 1.7 today, from 1.8 yesterday.   PCN allergy. Per family, may have been elevation of LFT. No rash or angioedema or anaphylaxis. Will start challenging tomorrow. Also baseline LFT today.     RECOMMENDATION:  Continue CTX for at least 6 weeks from surgery   Follow creatinine  Will challenge with 3 days of daily penVK to be sure he can take it  in future for prophylaxis, give here in a monitored setting.  Ordered to be started 3/21 8 AM.   Line in    Discussed with the patient and family (girlfriend) and nursing staff.     ID will follow      ABHISHEK Currie MD  Office [phone] option 2 to desk staff

## 2021-06-09 NOTE — TELEPHONE ENCOUNTER
lvm for patient to return call and schedule next follow up and lab    Summary of Your Rheumatology Visit     Next Appointment:   6-8 weeks     Medications:     Please follow directives on pill bottle on how to take medication(s) provided.        Referrals:        Tests:      Please have labs performed in about 3 weeks.        Injections:

## 2021-06-09 NOTE — PROGRESS NOTES
Pharmacy Consult: Warfarin Management    Pharmacy consulted to dose warfarin for Bandar Wells, a 66 y.o. male    Ordering provider: Janusz Hall PA-C  Reason for warfarin therapy: Atrial Fibrillation, prosethic valve  Goal INR Range: 2.5-3.5    Subjective  Medication lists (home and hospital) were reviewed.    New medications that may increase bleeding risk/INR: Rocephin  Started home medications that may increase bleeding risk/INR: None  Home Warfarin Dosing: Patient was previously on Apixaban 5 mg bid, then enoxapirin prior to surg  Other Anticoagulants:heparin gtt- low dose protocol   Patient being bridged: Yes    Patient Active Problem List   Diagnosis     Hx of bacterial endocarditis     Dyslipidemia     Mitral valve prolapse     S/P MVR (mitral valve replacement)     Recurrent pleural effusion on right     Pericardial effusion without cardiac tamponade     Status post Maze operation for atrial fibrillation     Paroxysmal atrial fibrillation     Coronary artery disease     Acute diastolic heart failure     CHF (congestive heart failure)     Endocarditis of prosthetic valve, sequela     Prosthetic valve dysfunction     Hypovolemia     Hypotension     Prosthetic valve endocarditis     Acute respiratory failure with hypoxia     Acute blood loss anemia    Past Medical History:   Diagnosis Date     Abnormal liver function test      Atrial fibrillation     post naun     Atrial fibrillation with RVR 8/29/2015    S/p MAZE Jul 2015     Bacterial endocarditis      CHF (congestive heart failure)      Dyslipidemia      Hyperlipidemia      Mitral valve prolapse      Near syncope      Pericardial effusion without cardiac tamponade 8/29/2015     Status post Maze operation for atrial fibrillation 8/29/2015        Social History   Substance Use Topics     Smoking status: Never Smoker     Smokeless tobacco: None     Alcohol use 3.6 oz/week     5 Glasses of wine, 1 Cans of beer per week       Objective   Labs:  Last 3 days:    Recent  Labs      03/16/17   1449  03/16/17   1548  03/17/17   0411  03/17/17   1127  03/18/17   0539  03/18/17   0540  03/18/17   0949   CREATININE  0.96  0.96  1.27   --    --   1.67*   --    HGB  10.0*  10.6*  7.7*  7.9*   --    --   7.9*   HCT  30.0*  32.0*  23.7*  24.7*   --    --   25.6*   PLT  168  153  138*  136*  130*   --   140     Last 7 days:   Recent labs: (last 7 days)      03/15/17   0905  03/16/17   1449  03/16/17   1548  03/17/17   0411  03/17/17   1127  03/18/17   0539   INR  1.23*  1.41*  1.35*  1.30*  1.24*  1.23*       Warfarin Dosing History:    Date INR Warfarin Dose Comment   03/17/17 1.24 5 mg    03/18/17 1.23 5 mg                  Assessment  The patient is initiating warfarin for the indication of Atrial Fibrillation with a goal INR of 2-3. I    Plan  1. Administer warfarin 5 mg PO today.  2. Check INR daily or as appropriate.  3. Continue to follow the patient's INR, PLT, and HGB as available.  4. Monitor for potential drug/disease interactions.    Thank you for the consult,  Vanesa Zepeda [facility] 3/18/2017 2:16 PM

## 2021-06-09 NOTE — PROGRESS NOTES
Arrived for daily antibiotic.  States he feels as tho he's stable.  Continues to have dyspnea on exertion but has been limiting his walking or activity to a point that he is not dyspneic.  Continues to have shallow cough at times but again, feels that this is unchanged. Given rocephin without problem. Left via w/c accompanied by transport at 1015.  To return to [address] on Saturday and Sunday at 1100.  Will return here on Monday.    Does have questions about his metoprolol dosing as noted on medication list.  I did call cardiology and, as noted on that phone call from Irene, as the discharge noted from Dr. Black was reviewed, he does state that pt should be taking the 25 mg bid.  Pt had left here by the time this issue had been resolved so I called him and left a message on his home phone.

## 2021-06-09 NOTE — PROGRESS NOTES
"Arrived for daily rocephin.  States he was very tired over the weekend and \"really couldn't do anything\".  Continues to have noc sweats, \"maybe about every third nite\".  Continues, also, to have issues with insomnia but did sleep better last nite and feels better this am.  Given rocephin without problem.  Left ambulatory by self at 1000.  Will return tomorrow for labs, PICC dressing change and rocephin.  Suggested to pt he call cardiology to check when they want to see him again.  "

## 2021-06-09 NOTE — PROGRESS NOTES
"  RESPIRATORY CARE NOTE     Patient Name: Bandar Wells  Today's Date: 3/20/2017         Visit Vitals     /58 (Patient Position: Sitting)     Pulse 71     Temp 98.1  F (36.7  C) (Oral)     Resp 20     Ht 5' 10\" (1.778 m)     Wt 187 lb 6.3 oz (85 kg)     SpO2 97%     BMI 26.89 kg/m2      Pt is on 4L oxymask sating high 90s, Duo-neb was given with flutter valve which help to initiated strong productive cough, Pt seems falling sleep on and off during tx and encouraged to complete the tx.  BBS clear on the upper and crackles at the bases.  RT will continue follow.     Ashley Funes, TOBYT    "

## 2021-06-09 NOTE — PROGRESS NOTES
Pt was found on Aerosol Mask 10L 40% sating 100%, switched to 2L nasal cannula, SpO2 94%. Bs coarse. Duo neb given at scheduled time. Will continue to monitor.     DEBBY Marcos

## 2021-06-09 NOTE — TELEPHONE ENCOUNTER
Please mention to the patient that given that his left arm pain is doing better, he can hold off for now on pursuing referral for EMG study and neurology consult.    Please inquire if he had a chance yet to discuss indeterminate TB test in the past with his infectious disease doctor?  If not, recommend discussing.  If yes, what was the outcome?

## 2021-06-09 NOTE — PROGRESS NOTES
"Arrived this am for daily rocephin.  States he walked up to the department today and does note he is short of breath with this exertion as well as when he \"does the stairs\" at home. States \"my cough is gone, tho\".    No other new c/o's.  PICC flushed with ease with good blood return noted.  Given rocephin without problem.  Tolerated infusion well while here.  Left ambulatory by self at 1020.  Will return in am for next dose of rocephin.    "

## 2021-06-09 NOTE — PROGRESS NOTES
[place] [facility] Progress Note    SUBJECTIVE: Cough is much better. Not on oxygen.     REVIEW OF SYSTEMS:  Negative unless as listed above.  Social history, Family history, Medications: reviewed.    OBJECTIVE:  Vitals:    03/22/17 0800   BP: 109/56   Pulse: 67   Resp: 20   Temp: 98.1  F (36.7  C)   SpO2:        PHYSICAL EXAM:  Alert, awake  Vitals tabulated above, reviewed  Neck supple without lymphadenopathy  Sclera normal color, not injected  CARDIOVASCULAR regular rate and rhythm, no mumur  Lungs CLEAR TO AUSCULTATION   Abdomen soft, NT/ND, absent HEPATOSPLENOMEGALY  Skin normal  Joints normal  Neurologic exam non focal  Lines ok  Incision clean      Antibiotics:  CTX    Pertinent labs:    Results from last 7 days  Lab Units 03/22/17  0538 03/21/17  0513 03/20/17  0507 03/19/17  0422   LN-WHITE BLOOD CELL COUNT thou/uL 9.5 11.0  --  11.0   LN-HEMOGLOBIN g/dL 7.5* 8.2* 8.0* 7.8*   LN-HEMATOCRIT % 22.9* 26.3*  --  25.4*   LN-PLATELET COUNT thou/uL 227 218 194 153         Results from last 7 days  Lab Units 03/22/17  0538   LN-SODIUM mmol/L 134*   LN-POTASSIUM mmol/L 3.5   LN-CHLORIDE mmol/L 96*   LN-CO2 mmol/L 28   LN-BLOOD UREA NITROGEN mg/dL 32*   LN-CREATININE mg/dL 1.00   LN-CALCIUM mg/dL 9.4       MICROBIOLOGY DATA:  Valve tissue cx so far neg      IMAGING/RADIOLOGY:  XR CHEST AP PORTABLE  3/21/2017 9:41 AM     INDICATION: reassess effusion/infiltrates  COMPARISON: 3/19/2017     FINDINGS: Right-sided PICC line with the tip of the catheter superimposed over the right subclavian vein. Sternotomy with aortic valve prosthesis. Cardiomegaly with normal pulmonary vascularity. Bilateral pleural effusions. Right lung infiltrate which   has increased in comparison to previous study. Minor left lower lobe atelectasis and/or consolidation.        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   New cough and infiltrate on CXR. No fever or leukocytosis. At high risk for HCAP/VAP  PCN allergy. Per family, may have been elevation of LFT. No rash or angioedema or anaphylaxis. PCN challenge started 3/21 with normal baseline LFT.  Tolerating well.     RECOMMENDATION:  -Monitor temp and WBC. Procalcitonin today.   -Continue CTX for at least 6 weeks from surgery   -Follow creatinine  -Continue PCN challenge, watching for side effects.   -Low threshold to change CTX to IV Vanco + Zosyn pending sputum cultures.   -Line in    Discussed with the patient and family and nursing staff.     ID will follow      ABHISHEK Currie MD  Office [phone] option 2 to desk staff

## 2021-06-09 NOTE — PROGRESS NOTES
[facility] Clinic Follow Up Note    Bandar Wells   66 y.o. male    Date of Visit: 3/1/2017    Chief Complaint   Patient presents with     Establish Care     needs new primary     Subjective  This is the first visit to our office for this 66-year-old patient.  Formerly been a patient of the family practice clinic.  He had generally been in excellent health until 2014.  He had had no prior evidence of medical issues or cardiac disease and was very active to the point of running marathons.  Since then he has had a multitude of cardiac issues which are listed in his [facility] charts and which I have had the opportunity to review.  He had developed endocarditis followed by severe mitral valve disease with ultimate valve replacement.  He has had problems with the coronaries and with rhythm disturbances as well.  His most recent hospitalization was for another episode of endocarditis and he is currently on an outpatient treatment program with daily IV antibiotics which will continue for a total of 6 weeks.  He tells me that for the most part his breathing is okay and that he is not having any chest pain.  He does have ongoing fatigue as I would expect.  He is clearly somewhat discouraged over the issues that have developed over the past 2-1/2-3 years.  He has no other specific complaints to offer at this time.  He is seen at the [facility] cardiologist on a regular basis.  He is also being followed up by the infectious disease doctors.  Medications are as listed.    Past medical history, social history and family history are well outlined in his multiple charts.    ROS A comprehensive review of systems was performed and was otherwise negative    Medications, allergies, and problem list were reviewed and updated    Exam  General Appearance:   On examination his blood pressure is 124/60.  Weight is 177 pounds and height 70 inches.  BMI is 25.40.    Heart rhythm is stable with a rate around 100.    The  patient is alert and oriented ×3.      Assessment/Plan  1. Subacute bacterial endocarditis     2. S/P MVR (mitral valve replacement)     3. Coronary artery disease involving native coronary artery of native heart without angina pectoris     4. Dyslipidemia       This is an unfortunate gentleman who has developed significant cardiac issues.  The most recent problem is an exacerbation of an endocarditis for which she is currently on prolonged IV antibiotic treatment.  At this time the majority of his care is being delivered by the cardiologist and infectious disease doctors.  He came in today primarily to establish care with a primary clinic that is within the [facility] system.  We did minimal examination today as he was recently hospitalized and is seeing the cardiologist.  We spent the majority of our time discussing his issues and getting acquainted.  He had no further questions at the end of the meeting.  He will continue his current therapy and I asked him to return to see me after he has been reevaluated by infectious disease.  He will call in between should there be any other medical issues.      Jin Bhat MD      Current Outpatient Prescriptions on File Prior to Visit   Medication Sig     apixaban (ELIQUIS) 5 mg Tab tablet Take 1 tablet (5 mg total) by mouth 2 (two) times a day.     atorvastatin (LIPITOR) 40 MG tablet Take 40 mg by mouth bedtime.      b complex vitamins tablet Take 1 tablet by mouth daily.      cholecalciferol, vitamin D3, 1,000 unit tablet Take 1,000 Units by mouth daily.     cyanocobalamin 1000 MCG tablet Take 1,000 mcg by mouth daily.     diltiazem (CARDIZEM CD) 180 MG 24 hr capsule Take 1 capsule (180 mg total) by mouth daily.     docoshexanoic acid-eicosapent 500 mg (FISH OIL) 500-100 mg cap capsule Take 500 mg by mouth daily.     ferrous sulfate 325 (65 FE) MG tablet Take 1 tablet by mouth 2 (two) times a day.     FLAXSEED OIL ORAL Take 1,000 mg by mouth daily.      folic acid  (FOLVITE) 1 MG tablet Take 1 mg by mouth daily.     furosemide (LASIX) 20 MG tablet Take by mouth see administration instructions. Daily regimen of 20 mg alternating with 40 mg every other day.     furosemide (LASIX) 20 MG tablet TAKE 1 TABLET BY MOUTH EVERY OTHER DAY, ALTERNATING WITH 2 TABLETS OTHER DAYS     L. ACIDOPHILUS/BIFIDO LONGUM (PROBIOTIC PEARLS ORAL) Take 1 capsule by mouth daily.     metoprolol tartrate (LOPRESSOR) 25 MG tablet Take 0.5 tablets (12.5 mg total) by mouth 2 (two) times a day.     multivitamin therapeutic (THERAGRAN) tablet Take 1 tablet by mouth daily.     oxymetazoline (AFRIN) 0.05 % nasal spray Apply 2-3 sprays into each nostril 2 (two) times a day as needed. Separate doses by at least 10-12 hours.     potassium chloride SA (K-DUR,KLOR-CON) 20 MEQ tablet Take 20 mEq by mouth daily.     UBIDECARENONE (COENZYME Q10 ORAL) Take 1 capsule by mouth daily.     cefTRIAXone 2 g in NaCl 0.9 % 0.9 % 50 mL IVPB Infuse 2 g into a venous catheter daily.     clindamycin (CLEOCIN) 300 MG capsule Take 600 mg by mouth see administration instructions. Take prior to Dentist appointments     Current Facility-Administered Medications on File Prior to Visit   Medication     [COMPLETED] cefTRIAXone 2 g in NaCl 0.9 % 50 mL (MINI-BAG Plus) (ROCEPHIN)     [COMPLETED] sodium chloride 0.9 % flush 10 mL (NS)     [COMPLETED] sodium chloride 0.9% 250 mL infusion     [DISCONTINUED] sodium chloride 0.9 % flush 10 mL (NS)     [DISCONTINUED] sodium chloride 0.9 % flush 10 mL (NS)     Allergies   Allergen Reactions     Amoxicillin Other (See Comments)     Hepatic fibrosis,  Tolerated ceftriaxone (rocephine)      Social History   Substance Use Topics     Smoking status: Never Smoker     Smokeless tobacco: None     Alcohol use 3.6 oz/week     5 Glasses of wine, 1 Cans of beer per week

## 2021-06-09 NOTE — TELEPHONE ENCOUNTER
Pt notified of Dr Gottlieb's recommendations, he will continue to hold allopurinol and d/c colchicine due to labs. Lab results discussed with pt and sent [employer] message with Dr Gottlieb's comments as well per pt request. Scheduled lab appt 7/28 at [facility] clinic. Pt states he will go to a urgent care clinic to have the rash further evaluated, encouraged pt to do this this afternoon or tomorrow morning. Pt agreed to do so.

## 2021-06-09 NOTE — PROGRESS NOTES
RENAL PROGRESS NOTE - [facility]    CC: f/u ismael    Subjective: feels  better today, up in chair, denies sob, pain, nausea, says he has been working with therapy  Assessment and Plan: 65 yo wm with h/o enterococcus endocaditis complicated by severe MR, afib, s/p MV replacement in July 2015 with prosthetic valve mitral endocarditis, now sp redo MVR with post op ismael  1. ISMAEL - baseline ckd 3, now ismael in setting of MVR with likely post-op ATN.  Good urine output today and continues with improvement in creatinine. Cont to renally dose meds, optimize hemodynamics, will dec bumex to 1 mg q day given very brisk diuresis.   Will need to monitor renal function closely with ongoing use of ceftriaxone as at risk for AIN, now undergoing penicillin challenge.   2. MV endocarditits -now s/p redo MVR. Post op care per surgery.  On ceftriaxone, pcn  3. Anasarca - good response to bumex and approaching admit weight will change to bumex 1 mg qd given very good response - will likely be able to cut back further soon  4. Hyponatremia - due to ismael, heart failure/hypervolemia, improving with diuresis.  D/w family at bedside  Not much to add, will sign off, call with questions.  Urvashi Osborne MD  [facility]  [phone]    Objective    PHYSICAL EXAM  Vitals:    03/22/17 0930   BP: 109/55   Pulse: 68   Resp:    Temp:    SpO2:            GENERAL: nad, in chair  HEENT:normocephalic, atraumatic  CARDIOVASCULAR: rr, no rub, 1+ edema  PULMONARY:cta ant. No cyanosis  GASTROINTESTINAL: soft, nt/nd  MSK: Normal muscle mass,warm  NEURO: Alert, no gross focal findings  PSYCHIATRIC: flat affect  SKIN: Pale, no jaundice, no rash. Sternotomy incision cdi    LABORATORIES    Results from last 7 days  Lab Units 03/22/17  0936 03/22/17  0538 03/21/17  0513 03/20/17  0507 03/19/17  0422   LN-WHITE BLOOD CELL COUNT thou/uL  --  9.5 11.0  --  11.0   LN-HEMOGLOBIN g/dL 7.9* 7.5* 8.2* 8.0* 7.8*   LN-HEMATOCRIT %  --  22.9* 26.3*   --  25.4*   LN-PLATELET COUNT thou/uL  --  227 218 194 153       Results from last 7 days  Lab Units 03/22/17  0538 03/22/17  0043 03/21/17  0513 03/20/17  0507   LN-SODIUM mmol/L 134*  --  134* 132*   LN-POTASSIUM mmol/L 3.5 3.6 4.3 4.6   LN-CHLORIDE mmol/L 96*  --  99 101   LN-CO2 mmol/L 28  --  24 22   LN-BLOOD UREA NITROGEN mg/dL 32*  --  39* 38*   LN-CREATININE mg/dL 1.00  --  1.41* 1.71*   LN-CALCIUM mg/dL 9.4  --  9.6 9.3   LN-PROTEIN TOTAL g/dL 6.8  --  7.5  --    LN-BILIRUBIN TOTAL mg/dL 0.4  --  0.4  --    LN-ALKALINE PHOSPHATASE U/L 55  --  56  --    LN-ALT (SGPT) U/L 14  --  11  --    LN-AST (SGOT) U/L 30  --  27  --          MEDICATIONS    aspirin  81 mg Oral DAILY     atorvastatin  40 mg Oral QHS     bisacodyl  10 mg Oral Once     bumetanide  1 mg Intravenous Q12H     cefTRIAXone (ROCEPHIN) 2 g in 50 mL NS  2 g Intravenous Q24H     docusate sodium  100 mg Oral BID     ferrous sulfate  325 mg Oral BID with meals     insulin aspart (NovoLOG) injection   Subcutaneous QHS     ipratropium-albuterol  3 mL Nebulization Q6H - RT     lidocaine  2 patch Transdermal Q24H     melatonin  3 mg Oral QHS     omeprazole  20 mg Oral Daily before brkfst     penicillin VK  250 mg Oral DAILY     polyethylene glycol  17 g Oral DAILY     potassium chloride SA  30 mEq Oral Q4H     warfarin - daily dose required   Other Med Consult or Protocol         Urvashi Osborne MD  [facility]  [phone]

## 2021-06-09 NOTE — PROGRESS NOTES
Arrived for daily rocephin.  Discouraged by continued sx, especially shortness of breath with exertion,  which he feels is slightly worse and fatigue which he feels is significant.  Dry, shallow cough is also more noticeable, especially with conversation.  Dr. Ordonez called with pts continuing concern and chest xray ordered.  Pt will stop in radiology after leaving here today.  Given rocephin without problem.  Pt left ambulatory by self at 1000 to go to radiology.  Will return on Saturday and Sunday to unit 5000 and here again on Monday.  Later this afternoon, noted xray report and Dr. Ordonez called to have him review this.  He wants pt to go to ED today to be evaluated.  Pt called and told to go to ED.

## 2021-06-09 NOTE — TELEPHONE ENCOUNTER
Prescription Monitoring Program activity reviewed with no discrepancies noted.      Last fill per : 6/12/20  Quantity/days supply: 42 tablets for 7days    Controlled Substance Agreement on file: No  Date: NA    Last office visit with provider:  2/13/20    Please advise.

## 2021-06-09 NOTE — ANESTHESIA PREPROCEDURE EVALUATION
Anesthesia Evaluation      Patient summary reviewed   No history of anesthetic complications     Airway   Mallampati: II  Neck ROM: full   Pulmonary - normal exam   (+) shortness of breath,                          Cardiovascular - normal exam  Exercise tolerance: < 4 METS  (+) valvular problems/murmurs (Severe MS) MS, CAD, CHF, cardiomyopathy, hypercholesterolemia,     ECG reviewed        Neuro/Psych - negative ROS     Endo/Other - negative ROS      GI/Hepatic/Renal    (+)   impaired hepatic function          Dental - normal exam                        Anesthesia Plan  Planned anesthetic: general endotracheal  20 mg methadone on induction.  50 mg ketamine on induction.  ASA 4   Induction: intravenous   Anesthetic plan and risks discussed with: patient and child/children  Anesthesia plan special considerations: CVP line, arterial catheterization, pulmonary artery catheterization, dexmedetomidine  Post-op plan: extended intubation/vent support

## 2021-06-09 NOTE — TELEPHONE ENCOUNTER
Why won't his insurance cover?  It should be inexpensive to pay for a few days with cash.  Like only a few dollars.

## 2021-06-09 NOTE — PROGRESS NOTES
"Patient info: Name: Bandar Wells           AGE: 66 y.o.           : 1950                      MRN: [id number]                      Admit Date: 3/16/2017               Patient transferred to unit 4100  Mode of transport: wc  Family/Caregiver Member updated: no - no  Patient belongings accompanied with patient: yes  Bedside report given to receiving nurse- RN Name: Lyndsey RN [id number]    D: Diagnosis/Procedures  Principal Problem:    Prosthetic valve endocarditis  Active Problems:    Hx of bacterial endocarditis    S/P MVR (mitral valve replacement)    Acute respiratory failure with hypoxia    Acute blood loss anemia    Acute renal failure    Dysthymia    REDO STERNOTOMY, REDO MITRAL VALVE REPLACEMENT, PLACEMENT TEMPORARY VENTRICULAR PACING WIRES, ANESTHESIA TRANSESOPHAGEAL ECHOCARDIOGRAM 3/16/2017    A: Assessment/Abnormals  Last VS:   Visit Vitals     /61 (Patient Position: Sitting)     Pulse 64     Temp 97.9  F (36.6  C) (Oral)     Resp 18     Ht 5' 10\" (1.778 m)     Wt 175 lb 14.8 oz (79.8 kg)     SpO2 96%     BMI 25.24 kg/m2     IV Access: Patient doeshave an IV    Pain:   Last PRN pain medication given , Pain reassessment due done    Patient appeared stable at nurse handoff: yes  Patient without complaints/incidents: yes except lack of sleep and poor appetite    S: Safety  Falls: Yellow \"At Risk\" band applied:yes  Equipment with patient at time of transfer: chart, tele pack  All unnecessary IV pumps and medications have been removed, the patient transferred with the following IV medications running via IV pump: none    H: Home/Disposition  Anticipated Discharge Date: couple days per Dr. Black  Anticipated Discharge Disposition: TCU     Further care and data now assumed per receiving nurse/unit.    Vashti Peraza  3/22/2017 10:01 PM     "

## 2021-06-09 NOTE — TELEPHONE ENCOUNTER
Received a faxed INR result for Bandar Wells  From [facility]  INR result dated 6/30/2020 is 3.30      Last INR from 6/23/20 was 1.60

## 2021-06-09 NOTE — PROGRESS NOTES
[place] [facility] Progress Note    SUBJECTIVE:  Has congestion.  No fever.  Incision clean.  Teeth in good repair generally.  He has a girlfriend, is .        REVIEW OF SYSTEMS:  Negative unless as listed above.  Social history, Family history, Medications: reviewed.    OBJECTIVE:  Vitals:    03/18/17 1400   BP: 99/55   Pulse: (!) 53   Resp: 25   Temp:    SpO2:                  PHYSICAL EXAM:  Alert, awake  Vitals tabulated above, reviewed  Neck supple without lymphadenopathy  Sclera normal color, not injected  CARDIOVASCULAR regular rate and rhythm, no mumur  Lungs CLEAR TO AUSCULTATION   Abdomen soft, NT/ND, absent HEPATOSPLENOMEGALY  Skin normal  Joints normal  Neurologic exam non focal  Lines ok  Incision clean    CTX  Antibiotics:    Pertinent labs:    Results from last 7 days  Lab Units 03/18/17  0949 03/18/17  0539 03/17/17  1127 03/17/17  0411   LN-WHITE BLOOD CELL COUNT thou/uL 12.3*  --  11.1* 12.8*   LN-HEMOGLOBIN g/dL 7.9*  --  7.9* 7.7*   LN-HEMATOCRIT % 25.6*  --  24.7* 23.7*   LN-PLATELET COUNT thou/uL 140 130* 136* 138*         Results from last 7 days  Lab Units 03/18/17  1100 03/18/17  0540   LN-SODIUM mmol/L  --  137   LN-POTASSIUM mmol/L 4.3 4.3   LN-CHLORIDE mmol/L  --  107   LN-CO2 mmol/L  --  17*   LN-BLOOD UREA NITROGEN mg/dL  --  27*   LN-CREATININE mg/dL  --  1.67*   LN-CALCIUM mg/dL  --  9.5                   MICROBIOLOGY DATA:  Valve tissue cx so far neg      IMAGING/RADIOLOGY:          ASSESSMENT:  MVR  Treated for 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

## 2021-06-09 NOTE — PROGRESS NOTES
Dr. Zarco was contacted as the patient was having an increased need for oxygen.  Per Dr. Zarco, he wanted a RCAT, which was done.  The patient was placed on 7L oxymask and was moved to the chair.  He is now sating 92-93%.  HR and BP stable.     Will continue to monitor.

## 2021-06-09 NOTE — PROGRESS NOTES
RENAL PROGRESS NOTE - [facility]    CC: f/u ismael    Subjective: feels a little better today, slept poorly, was still on clear diet earlier, appetite is poor. Denies sob, has some incisional pain.    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 ISMAEL - baseline ckd 3, now ismael in setting of MVR with likely post-op ATN.  Good urine output today and some improvement in creatinine. Cont to renally dose meds, optimize hemodynamics, will cont current dose bumex, metolazone as does appear volume up.  Will need to monitor renal function closely with ongoing use of ceftriaxone as at risk for AIN, plan for penicillin challenge noted.  2. MV endocarditits -now s/p redo MVR. Post op care per surgery.  3. Anasarca - good response to bumex and metolazone, cont current dosing.  4. Hyponatremia - due to ismael, heart failure/hypervolemia, cont diuresis.    Urvashi Osborne MD  [facility]  [phone]    Objective    PHYSICAL EXAM  Vitals:    03/20/17 1600   BP: 113/55   Pulse: 68   Resp:    Temp:    SpO2:            GENERAL: nad  HEENT:normocephalic, atraumatic  CARDIOVASCULAR: rr, no rub, 1+ edema  PULMONARY:cta ant. No cyanosis  GASTROINTESTINAL: soft, nt/nd  MSK: Normal muscle mass,warm  NEURO: Alert, no gross focal findings  PSYCHIATRIC: Adequate mood and interaction  SKIN: Pale, no jaundice, no rash. Sternotomy incision cdi    LABORATORIES    Results from last 7 days  Lab Units 03/20/17  0507 03/19/17  0422 03/18/17  0949  03/17/17  1127   LN-WHITE BLOOD CELL COUNT thou/uL  --  11.0 12.3*  --  11.1*   LN-HEMOGLOBIN g/dL 8.0* 7.8* 7.9*  --  7.9*   LN-HEMATOCRIT %  --  25.4* 25.6*  --  24.7*   LN-PLATELET COUNT thou/uL 194 153 140  < > 136*   < > = values in this interval not displayed.    Results from last 7 days  Lab Units 03/20/17  4606  03/19/17  0422 03/18/17  1100 03/18/17  0540   LN-SODIUM mmol/L 132* 133*  --  137   LN-POTASSIUM mmol/L 4.6 4.7 4.3 4.3   LN-CHLORIDE mmol/L 101 103  --  107   LN-CO2 mmol/L 22 20*  --  17*   LN-BLOOD UREA NITROGEN mg/dL 38* 34*  --  27*   LN-CREATININE mg/dL 1.71* 1.83*  --  1.67*   LN-CALCIUM mg/dL 9.3 9.3  --  9.5         MEDICATIONS    aspirin  81 mg Oral DAILY     atorvastatin  40 mg Oral QHS     bisacodyl  10 mg Oral Once     bumetanide  2 mg Intravenous Q12H     cefTRIAXone (ROCEPHIN) 2 g in 50 mL NS  2 g Intravenous Q24H     docusate sodium  100 mg Oral BID     ferrous sulfate  325 mg Oral BID with meals     insulin aspart (NovoLOG) injection   Subcutaneous TID with meals     insulin aspart (NovoLOG) injection   Subcutaneous QHS     ipratropium-albuterol  3 mL Nebulization Q6H - RT     magnesium hydroxide  30 mL Oral DAILY     melatonin  3 mg Oral QHS     metOLazone  2.5 mg Oral DAILY     omeprazole  20 mg Oral Daily before brkfst     [START ON 3/22/2017] penicillin VK  250 mg Oral DAILY     penicillin VK  500 mg Oral DAILY     polyethylene glycol  17 g Oral DAILY     warfarin - daily dose required   Other Med Consult or Protocol     [COMPLETED] warfarin  7.5 mg Oral Daily 1700         Urvashi Osborne MD  [facility]  [phone]

## 2021-06-09 NOTE — PROGRESS NOTES
"  RESPIRATORY CARE NOTE     Patient Name: Bandar Wells  Today's Date: 3/20/2017          Visit Vitals     /55     Pulse 65     Temp 98  F (36.7  C)     Resp 18     Ht 5' 10\" (1.778 m)     Wt 187 lb 6.3 oz (85 kg)     SpO2 97%     BMI 26.89 kg/m2   SpO2 on 5L oxymask    Pt on 5L oxymask.  Breath sounds diminished coarse.  Duoneb given X1 on schedule.  Pt has a congested non-productive cough.   Pt performs a little over 500ml on IS.  RT will continue to follow.  Encourage cough and deep breathing.         Roe Vazquez, LRT    "

## 2021-06-09 NOTE — PROGRESS NOTES
Progress Note    Assessment/Plan  Patient is 20 days postop.  Clinically he seems to be doing well.  Minor discomfort especially when he coughs.  Minor nausea.  I note his creatinine has risen.  Principal Problem:    Prosthetic valve endocarditis  Active Problems:    Hx of bacterial endocarditis    S/P MVR (mitral valve replacement)    Acute respiratory failure with hypoxia    Acute blood loss anemia      Subjective  Please have chest discomfort with coughing.  No shortness of breath.  No leg pain.    Objective    Vital signs in last 24 hours  Temp:  [98  F (36.7  C)-98.4  F (36.9  C)] 98.4  F (36.9  C)  Heart Rate:  [55-97] 55  Resp:  [13-30] 27  BP: ()/(49-59) 95/51  Arterial Line BP: ()/(42-54) 93/47  Weight:   187 lb 6.3 oz (85 kg)    Intake/Output last 3 shifts  I/O last 3 completed shifts:  In: 1045 [P.O.:540; I.V.:505]  Out: 1746 [Urine:1551; Chest Tube:195]  Intake/Output this shift:         Physical Exam  He is wide awake and alert.  Vital signs are stable.  Lungs are clear after coughing.  He has upper airway rhonchi.  Heart shows atrial fibrillation rhythm  Abdomen is nontender.  Legs look okay.    Pertinent Labs     Results from last 7 days  Lab Units 03/18/17  0539 03/17/17  1127 03/17/17  0411 03/16/17  1548 03/16/17  1449 03/15/17  0905   LN-WHITE BLOOD CELL COUNT thou/uL  --  11.1* 12.8* 16.7* 14.0* 11.0   LN-HEMOGLOBIN g/dL  --  7.9* 7.7* 10.6* 10.0* 9.5*   LN-HEMATOCRIT %  --  24.7* 23.7* 32.0* 30.0* 29.8*   LN-PLATELET COUNT thou/uL 130* 136* 138* 153 168 197       Results from last 7 days  Lab Units 03/18/17  0539 03/17/17  1127 03/17/17  0411 03/16/17  1548 03/16/17  1449 03/15/17  0905   LN-INR  1.23* 1.24* 1.30* 1.35* 1.41* 1.23*       Results from last 7 days  Lab Units 03/18/17  0540 03/17/17  0411 03/16/17  1548 03/16/17  1449 03/15/17  0905   LN-SODIUM mmol/L 137 140 143 143 135*   LN-POTASSIUM mmol/L 4.3 4.6 4.1 4.0 4.6   LN-CHLORIDE mmol/L 107 111* 113* 113* 105   LN-CO2  mmol/L 17* 24 22 23 22   LN-BLOOD UREA NITROGEN mg/dL 27* 22 20 20 21   LN-CREATININE mg/dL 1.67* 1.27 0.96 0.96 1.29   LN-CALCIUM mg/dL 9.5 9.0 10.2 10.7* 9.5                     Pertinent Radiology   na    Advanced Care Planning  Per surgery.  Need to adjust medicines per renal function.  Looks quite good at this time.

## 2021-06-09 NOTE — PROGRESS NOTES
"Arrived for daily rocephin and weekly labs.  Again, today, states he's feeling a little better.  Labs drawn via picc with some difficulty in that I flushed it multiple times and had him reposition his arms before being able to draw.  Flushed with ease entire time.  Line flushed well after draw and given rocephin without problem.  Labs noted and faxed to Dr. Ordonez with note that pt is still having noc sweats approx \"every third nite\".  Copy of labs also given to pt.  Dressing also changed as noted on LDA flowsheet.  PICC site looks good with a very slight increase in amount of catheter exposed as noted on LDA.  Left ambulatory by self at completion of infusion.  Will return in am.    "

## 2021-06-09 NOTE — ANESTHESIA PROCEDURE NOTES
Arterial Line  Reason for Procedure: hemodynamic monitoring and multiple ABGs  Start time: 3/16/2017 8:25 AM  End time: 3/16/2017 8:33 AM  Staffing:  Performing  Anesthesiologist: BARRETT HARO  Performing CRNA: KATHRYN GIRON  Sterile Precautions:  sterile barriers used during insertion: cap, mask, sterile gloves, large sheet, and hand hygiene used.  Arterial Line:   Immediately prior to procedure a time out was called to verify the correct patient, procedure, equipment, support staff and site/side marked as required  Laterality: right  Location: radial  Prepped with: ChloroPrep    Needle gauge: 20 G  Number of Attempts: 1  Secured with: tape, transparent dressing and pressure dressing  Flushed with: saline  1% lidocaine local anesthesia used for skin prep.   See MAR for additional medications given.

## 2021-06-09 NOTE — PROGRESS NOTES
"Arrived for IV antibiotic this am.  States he is feeling better each day and is pleased about this.  Feels his pain is \"almost gone\".  Looking forward to visit from son this weekend and \"getting out a little\".  States he is taking 6 mg of coumadin daily now after talking with anticoag nurse yesterday.  Given rocephin without problem.  Left via w/c accompanied by transport.  Will go to [address] on Saturday and Sunday and return here on Monday and will have INR done here on Monday as well.    "

## 2021-06-09 NOTE — PROGRESS NOTES
Critical Care Progress Note     Admit Date: 3/16/2017  ICU Day: 1     Code Status: full code      HPI: 66 y.o. 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 He was treated with IV antibiotics (ampicillin and gentamicin) and recovered. He then developed severe mitral regurgitation and atrial fibrillation, and is status post elective mitral valve replacement with a 33 mm SJM Epic mitral valve and Nicholas maze IV procedure in July 2015. He did well and was discharged on POD 6.      He developed a pericardial effusion with cardiac tamponade in August 2016, in addition to bilateral pleural effusions. He underwent subxiphoid pericardial window at that time, and because he had bilateral pleural effusions, he underwent drainage of these and right VATS talc pleurodesis as well. A mass was noted on the prosthetic mitral valve, and he was treated empirically for culture negative enterococcal prosthetic valvular endocarditis with gentamicin and Unasyn.     He completed a course of IV antibiotics after about 6 weeks (10/8/2015), and was doing well until February 2017 when he re-presented with fevers, chills weight loss and bacteremia with enterococcus. He was started on Ceftriaxone and was continuing as an outpatient when he developed worsening chills and fatigue. He was readmitted and repeat echcardiography demonstrates persistent prosthetic valve dysfunction (mitral stenosis with a mean gradient of 18 mmHg) with thickened immobile leaflefts and native tricuspid valvular vegetation.      History is provided by: review of medical records and bedside handoff by NURIS and Dr Rosenberg. Pt had redo sternotomy, redo MV replacement today. The case was reported ans uneventful. He came to ICu on full vent support, on precedex, insulin and levophed drips.    Major events over the last 24 hours: extubated last pm  off pressors since mid am, having loose, strong cough this am also    Subjective: alert in bed in  "nad  ROS: denies sob, has some incisional pain with coughing    Drips: epi, heparin    Ventilator: Mechanical Ventilation Day: NA        Settings: NA    Visit Vitals     /59     Pulse 80     Temp 98  F (36.7  C) (Core)     Resp 24     Ht 5' 10\" (1.778 m)     Wt 187 lb 6.3 oz (85 kg)     SpO2 96%     BMI 26.89 kg/m2     PAP: (33-76)/(17-41) 60/20  CVP:  [11 mmHg-38 mmHg] 19 mmHg  I/O last 3 completed shifts:  In: 6995.8 [I.V.:3909.8; Blood:2616; IV Piggyback:470]  Out: 2659 [Urine:942; Blood:1500; Chest Tube:217]  Weight change:   Vent Mode: CPAP/PSV  FiO2 (%):  [40 %-60 %] 40 %  S RR:  [14] 14  S VT:  [575 mL] 575 mL  PEEP/CPAP (cm H2O):  [5 cm H2O] 5 cm H2O  Minute Ventilation (L/min):  [8.1 L/min-9 L/min] 9 L/min  PIP:  [13 cm H2O-36 cm H2O] 13 cm H2O  MAP (cm H2O):  [7-9] 7      EXAM:   Mental status: in bed smiling in nad  HEENT: Nc PERRL EOmsI  Resp: cta Cts w/o air leak  Cardiovascular: RRR  Abdominal: soft  Extremeties: no e/c/c  Neurology: non focal      Labs Personally reviewed: yes    Results from last 7 days  Lab Units 03/17/17  1127 03/17/17  0411 03/16/17  1548   LN-WHITE BLOOD CELL COUNT thou/uL 11.1* 12.8* 16.7*   LN-HEMOGLOBIN g/dL 7.9* 7.7* 10.6*   LN-HEMATOCRIT % 24.7* 23.7* 32.0*   LN-PLATELET COUNT thou/uL 136* 138* 153   LN-NEUTROPHILS RELATIVE PERCENT %  --  83* 88*   LN-MONOCYTES RELATIVE PERCENT %  --  8 6       Results from last 7 days  Lab Units 03/17/17  0411 03/16/17  1548 03/16/17  1449   LN-SODIUM mmol/L 140 143 143   LN-POTASSIUM mmol/L 4.6 4.1 4.0   LN-CHLORIDE mmol/L 111* 113* 113*   LN-CO2 mmol/L 24 22 23   LN-BLOOD UREA NITROGEN mg/dL 22 20 20   LN-CREATININE mg/dL 1.27 0.96 0.96   LN-CALCIUM mg/dL 9.0 10.2 10.7*       Results from last 7 days  Lab Units 03/17/17  1127 03/17/17  0411 03/16/17  1548 03/16/17  1449   LN-INR  1.24* 1.30* 1.35* 1.41*   LN-PARTIAL THROMBOPLASTIN TIME seconds 51*  --   --  50*       Microbiology: reviwed  Imaging (all imaging is personally " reviewed): yes    cxr 3/17/17-Sternal wires. Aortic valve prosthesis. Right internal jugular [place] catheter tip projects over the main pulmonary artery. Mediastinal drain. Right PICC tip is difficult to visualize because of the overlying ribs. It appears to overlie the   right subclavian vein. The endotracheal tube has been removed.     Stable enlarged cardiac silhouette. Stable bilateral pleural thickening which was present preoperatively and could represent fluid or scar. Stable prominent central pulmonary vessels. Mild left central alveolar infiltrate, increased from preoperative   could represent atelectasis, asymmetric edema, pneumonia, or hemorrhage.    Impression:  1.  Acute hypoxic respiratory failure.    To ICU from OR on full vent support   Now extubated  2.  Prosthetic mitral valve dysfunction .    POD # 1 redo sternotomy and redo MVR   3.  Coagulopathy.   4. Acute blood loss anemia .    EBL 1500 ml     PLAN:   1. Titrate fio2 to keep sats>90%  2. Antibiotics per ID  3. Weaning off pressors per CVS     ICU DAILY CHECKLIST                           Can patient transfer out of MICU? Yes when off pressr s and Ok with [facility]    FAST HUG:    Feeding:  Feeding: Yes.  Patient is receiving ORAL    [place]:Yes  Analgesia/Sedation:Not Indicated NA  Thromboembolic prophylaxis: yes; Mode:  Heparin and SCDs  HOB>30:  Yes  Stress Ulcer Protocol Active: yes; Mode: PPI  Glycemic Control: Any glucose > 180 yes; Mode of Insulin Therapy: Insulin gtt    INTUBATED:  Can patient have daily waking:  not applicable  Can patient have spontaneous breathing trial:  not applicable    Restraints? no    PHYSICAL THERAPY AND MOBILITY:  Can patient have PT and mobility trial: no  Activity: Bed Rest, while still on pressors    transfer/discharge plans: stays in ICu while still on pressors    The patient is critically ill with impairment in organ system and high risk of life threatening deterioration.     Total CCT spent 33 minutes thus  far today.       Barby Curtis [facility] [phone]  [facility]

## 2021-06-09 NOTE — TELEPHONE ENCOUNTER
ANTICOAGULATION  MANAGEMENT    Assessment     Today's INR result of 2.30 is Subtherapeutic (goal INR of 2.5-3.5)        Warfarin taken as previously instructed    appetite may be affecting diet and INR    No new medication/supplements affecting INR    Continues to tolerate warfarin with no reported s/s of bleeding or thromboembolism     Previous INR was Therapeutic at 3.30 on 6/30/20.    Plan:     Spoke with Bandar regarding INR result and instructed:     Warfarin Dosing Instructions:  (evenings. Has 3mg tabs)   - Change warfarin dose to 4.5 mg daily on Fridays; and 6 mg daily rest of week.   - (3.8 % change)    Instructed patient to follow up no later than:  One wk.   - checks INRs with home monitor thru [employer].    Education provided: target INR goal and significance of current INR result and importance of notifying clinic for changes in medications    Bandar verbalizes understanding and agrees to warfarin dosing plan.    Instructed to call the [facility] for any changes, questions or concerns. (#[phone])   ?   Meg Jacinto RN    Subjective/Objective:      Bandar Wells, a 69 y.o. male is on warfarin.     Bandar reports:     Home warfarin dose: verbally confirmed home dose with Bandar and updated on anticoagulation calendar     Missed doses: No     Medication changes:  No     S/S of bleeding or thromboembolism:  No     New Injury or illness:  No     Changes in diet or alcohol consumption:  No     Upcoming surgery, procedure or cardioversion:  No    Anticoagulation Episode Summary     Current INR goal:   2.5-3.5   TTR:   23.8 % (1 y)   Next INR check:   7/21/2020   INR from last check:   2.30! (7/7/2020)   Weekly max warfarin dose:      Target end date:   Indefinite   INR check location:      Preferred lab:      Send INR reminders to:   [facility] [place], operated [facility]    Indications    Paroxysmal atrial fibrillation (H) [I48.0]  S/P MVR (mitral valve replacement) [Z95.2]  S/P mitral valve replacement with metallic  valve [Z95.4]           Comments:   INR TARGET GOAL 2.5 - 3.5         Anticoagulation Care Providers     Provider Role Specialty Phone number    Jin Bhat MD Referring Internal Medicine [phone]

## 2021-06-09 NOTE — PROGRESS NOTES
Pt arrived per w/c for his daily antibiotic infusion. Saw Dr Bhat this am and says it went well. Feeling much better since stopping metoprolol. Pulse 57 at office and 50-56 when checked here. Feels less fatigued and DUMONT. Very happy about this. Antibiotic infused without problem. Line flushed and saline locked. [place] per w/c with transport. Will return tomorrow am.

## 2021-06-09 NOTE — TELEPHONE ENCOUNTER
"Pt notified of lab results below from Dr. Gottlieb:    \"Creatinine level a bit more elevated, level has been fluctuating, consistent with having chronic renal insufficiency/diminished kidney function noted.       Myoglobin, a muscle enzyme noted to be more elevated compared to prior level however, other muscle enzymes CK level and aldolase levels were normal.       Recommend holding Colchicine/Colcrys and seeing a nephrologist, please place referral.       Uric acid level still elevated however improving.  Latest level 9.4, was 11.6 and 15.9 prior.       Sedimentation rate still elevated however some improvement noted compared to prior level.  CRP level now within normal limits.  Sedimentation rate and CRP levels are nonspecific inflammatory markers.       AST liver enzyme elevated, levels fluctuating however stable when trending over the past year.  Other liver enzyme ALT level was within normal limits.       Recommend holding Colchicine/Colcrys and seeing a nephrologist.  Please place referral to nephrology and associate with diagnosis of chronic renal insufficiency, elevated myoglobin, hyperuricemia.       Recheck myoglobin, CK, aldolase, uric acid, creatinine, CBC, AST/ALT, urinalysis, urine microscopic in 1 to 2 weeks. \"    Lab order entered in chart and lab appt scheduled. Nephrology referral entered in chart.     "

## 2021-06-09 NOTE — PROGRESS NOTES
Pharmacy Note - Brief Admission Medication History Note  Pharmacist completed medication history with the patient while in the [facility].  Prior to admission (PTA) med list completed and updated in the electronic medical record (EMR).    The patient was asked about OTC/herbal products specifically and the PTA med list reflects the patient's response.    Allergies were reviewed and assessed with the patient, responses were updated in the EMR.    Thank you for the opportunity to participate in the care of this patient.    Lashawn Hardwick, PharmD     3/16/2017     6:20 AM    PTA Med List   Medication Sig Note Last Dose     acetaminophen (TYLENOL) 500 MG tablet Take 500-1,000 mg by mouth every 6 (six) hours as needed for pain.   3/15/2017 at pm     atorvastatin (LIPITOR) 40 MG tablet Take 40 mg by mouth bedtime.   3/15/2017 at pm     b complex vitamins tablet Take 1 tablet by mouth daily.   3/15/2017 at am     cefTRIAXone 2 g in NaCl 0.9 % 0.9 % 50 mL IVPB Infuse 2 g into a venous catheter daily.  3/15/2017 at Unknown time     cholecalciferol, vitamin D3, 1,000 unit tablet Take 1,000 Units by mouth daily.  3/15/2017 at am     clindamycin (CLEOCIN) 300 MG capsule Take 600 mg by mouth see administration instructions. Take prior to Dentist appointments  Unknown at Unknown time     cyanocobalamin 1000 MCG tablet Take 1,000 mcg by mouth daily.  3/15/2017 at am     enoxaparin (LOVENOX) 80 mg/0.8 mL syringe Inject 80 mg under the skin every 12 (twelve) hours. 3/16/2017: While holding apixaban 5 mg PO BID 3/15/2017 at am     ferrous sulfate 325 (65 FE) MG tablet Take 1 tablet by mouth 2 (two) times a day.  3/16/2017 at 0500     FLAXSEED OIL ORAL Take 1,000 mg by mouth daily.   3/15/2017 at am     FOLIC ACID ORAL Take 1 tablet by mouth daily. 3/16/2017: Unknown strength, patient ran out and needs to buy more Past Month at Unknown time     furosemide (LASIX) 20 MG tablet Take 20 mg by mouth every other day. Daily regimen of 20 mg  alternating with 40 mg every other day.   3/16/2017 at 0500     furosemide (LASIX) 20 MG tablet Take 40 mg by mouth every other day. Daily regimen of 20 mg alternating with 40 mg every other day.  3/15/2017 at am     L. ACIDOPHILUS/BIFIDO LONGUM (PROBIOTIC PEARLS ORAL) Take 1 capsule by mouth daily.  3/15/2017 at am     metoprolol tartrate (LOPRESSOR) 25 MG tablet Take 1 tablet (25 mg total) by mouth 2 (two) times a day.  3/16/2017 at 0500     multivitamin therapeutic (THERAGRAN) tablet Take 1 tablet by mouth daily.  3/15/2017 at am     oxymetazoline (AFRIN) 0.05 % nasal spray Apply 2-3 sprays into each nostril 2 (two) times a day as needed. Separate doses by at least 10-12 hours.  Unknown at Unknown time     traZODone (DESYREL) 50 MG tablet Take 1 tablet (50 mg total) by mouth bedtime as needed for sleep (Insomnia).  3/15/2017 at pm     [DISCONTINUED] apixaban (ELIQUIS) 5 mg Tab tablet Take 5 mg by mouth 2 (two) times a day.  Unknown at Unknown time     [DISCONTINUED] enoxaparin (LOVENOX) 80 mg/0.8 mL syringe Inject 0.8 mL (80 mg total) under the skin every 12 (twelve) hours.  Taking

## 2021-06-09 NOTE — DISCHARGE SUMMARY
Patient's wife has the tcu packet and is instructed to bring for patient. Patient left stable with his wife to the tcu.

## 2021-06-09 NOTE — PROGRESS NOTES
"Progress Note    Assessment/Plan  S/P redo MVR POD # 6  Endocarditis of the previously implanted mitral valve  Awake and alert, OOB to chair  AVSS on Dopamine 2 mcg/min  Mild respiratory insufficiency requiring supplemental oxygen 2 liters nasal cannula  Acute blood loss anemia with no apparent hemorrhage, Hgb 7.5 down from 8.2 yesterday  Stat Hgb, and if still low may transfuse 1 unit PRBC  Leukocytosis resolved, WBC 9.9   On Warfarin therapy, INR 2.91, will d/c Heparin drip  ISMAEL, resolved, Cr 1.0 down from 1.4 yesterday  Chest tube had been removed. Left ventricular PW was cut today due to INR 2.9  Abdomen soft and non tender  UO adequate with diuresis, BM adequate  Wt 79.8 kg, pre-op wt 78.7 kg  No LE edema  Continue mobilization and pulmonary toilet  Wean Dopamine very slowly for SBP > 100 or MAP > 65  D/c dillon cath today  Ambulated with cardiac rehab and tolerated very well  Encourage to get a shave today  Keep in the unit for now till off Dopamine        Subjective  Denies chest pain and no overt shortness of breath  Objective  Awake and alert, OOB to chair  Vital signs in last 24 hours  Temp:  [97.7  F (36.5  C)-98.4  F (36.9  C)] 98.1  F (36.7  C)  Heart Rate:  [67-98] 67  Resp:  [18-22] 20  BP: ()/(50-66) 109/56  FiO2 (%):  [40 %] 40 %  Weight:   175 lb 14.8 oz (79.8 kg)    Intake/Output last 3 shifts  I/O last 3 completed shifts:  In: 777.3 [I.V.:777.3]  Out: 5050 [Urine:5050]  Intake/Output this shift:       Review of Systems   A 12 point comprehensive review of systems was negative except as noted.    Physical Exam  Visit Vitals     /56     Pulse 67     Temp 98.1  F (36.7  C) (Oral)     Resp 20     Ht 5' 10\" (1.778 m)     Wt 175 lb 14.8 oz (79.8 kg)     SpO2 94%     BMI 25.24 kg/m2     HRR, incisions CDI, lung sounds diminished  Abdomen soft and non tender  No LE edema    Pertinent Labs   Lab Results: personally reviewed.   Lab Results   Component Value Date     (L) 03/22/2017    K 3.5 " 03/22/2017    CL 96 (L) 03/22/2017    CO2 28 03/22/2017    BUN 32 (H) 03/22/2017    CREATININE 1.00 03/22/2017    CALCIUM 9.4 03/22/2017     Lab Results   Component Value Date    WBC 9.5 03/22/2017    WBC 7.3 09/07/2015    HGB 7.5 (L) 03/22/2017    HCT 22.9 (L) 03/22/2017    MCV 94 03/22/2017     03/22/2017       Pertinent Radiology   Radiology Results: Personally reviewed image/s, Personally reviewed impression/s and Bilateral pulmonary congestion  EKG Results: personally reviewed.  and NSR per monitor    Dave Hall

## 2021-06-09 NOTE — PROGRESS NOTES
Speech Language/Pathology  Bedside Swallow Evaluation    Problem:  Patient Active Problem List   Diagnosis     Hx of bacterial endocarditis     Dyslipidemia     Mitral valve prolapse     S/P MVR (mitral valve replacement)     Recurrent pleural effusion on right     Pericardial effusion without cardiac tamponade     Status post Maze operation for atrial fibrillation     Paroxysmal atrial fibrillation     Coronary artery disease     Acute diastolic heart failure     CHF (congestive heart failure)     Endocarditis of prosthetic valve, sequela     Prosthetic valve dysfunction     Hypovolemia     Hypotension     Prosthetic valve endocarditis     Acute respiratory failure with hypoxia     Acute blood loss anemia     Acute renal failure     Dysthymia       Onset date: 3/22/17  Reason for evaluation: rule out aspiration that could be contributing to (L)LL atelectasis. No issues with s/s aspiration reported by nurse.  Pertinent History: see above  Current Diet: regular textures  Baseline Diet: regular textures    Patient presents as alert and cooperative during this session.   An  was not applicable    Patient was given thin and regular solid.    Dentition/Oral hygiene: intact    Oral motor function was not impaired.     Bolus prep and oral control was not impaired. Mastication was not impaired  and the patient used rotary chewing.    Anterior-Posterior transit was not impaired.    Oral stasis did not occur with all textures trialed.    Pharyngeal Phase:    Thin:Patient trialed 2 ounces by straw and presented with no s/s of aspiration. Initiation of swallow appeared timely. Hyolaryngeal movement appeared intact .    Regular solid: Patient trialed grilled cheese sandwich and fresh fruit and presented with no s/s of aspiration. Initiation of swallow appeared timely. Hyolaryngeal movement appeared intact         Assessment:    Patient presents with no signs and symptoms of aspiration with all textures  trialed    Patient demonstrated no oral and no pharyngeal dysphagia.    Rehab potential is good based on prior level of function and evaluation results.    Recommendations:    Plan: Regular and thin liquids    Strategies: na    Speech therapy is not recommended at this time    Referrals: N/A    20 dysphagia minutes       Khushboo Antonio MS, CCC-SLP

## 2021-06-09 NOTE — TELEPHONE ENCOUNTER
ANTICOAGULATION  MANAGEMENT    Assessment     Today's INR result of 1.60 is Subtherapeutic (goal INR of 2.5-3.5)        Warfarin taken as previously instructed    No new diet changes affecting INR    No new medication/supplements affecting INR    Continues to tolerate warfarin with no reported s/s of bleeding or thromboembolism     Previous INR was Subtherapeutic at 1.60 on 6/16/20.    Plan:     Spoke with Bandar regarding INR result and instructed:     Warfarin Dosing Instructions:    - tonight, advised to take one time booster with 9mg warfarin dose,   - then change warfarin dose to 4.5 mg daily on Mon/Fri; and 6 mg daily rest of week.   - (4 % change)    Instructed patient to follow up no later than: one wk.   - will check INR with home monitor thru [employer].    Education provided: target INR goal and significance of current INR result    Bandar verbalizes understanding and agrees to warfarin dosing plan.    Instructed to call the [facility] for any changes, questions or concerns. (#[phone])   ?   Meg Jacinto RN    Subjective/Objective:      Bandar Wells, a 69 y.o. male is on warfarin.     Bandar reports:     Home warfarin dose: verbally confirmed home dose with Bandar and updated on anticoagulation calendar     Missed doses: No     Medication changes:  No.  Recent completion with Prednisone.     S/S of bleeding or thromboembolism:  No     New Injury or illness:  Yes.  Post Bone marrow biopsy and aspiration, 6/23/20.     Changes in diet or alcohol consumption:  No     Upcoming surgery, procedure or cardioversion:  No    Anticoagulation Episode Summary     Current INR goal:   2.5-3.5   TTR:   24.6 % (1 y)   Next INR check:   6/30/2020   INR from last check:   1.60! (6/23/2020)   Weekly max warfarin dose:      Target end date:   Indefinite   INR check location:      Preferred lab:      Send INR reminders to:   [facility]    Indications    Paroxysmal atrial fibrillation (H) [I48.0]  S/P MVR  (mitral valve replacement) [Z95.2]  S/P mitral valve replacement with metallic valve [Z95.4]           Comments:   INR TARGET GOAL 2.5 - 3.5         Anticoagulation Care Providers     Provider Role Specialty Phone number    Jin Bhat MD Referring Internal Medicine [phone]

## 2021-06-09 NOTE — PROGRESS NOTES
Bone marrow procedure complete at 1145. Checked on patient.   At 12:18, VSS. Dressing C/D/I. No shadowing.   Patient reports feeling well.   Gave written information on post-procedure.   Patient transferred via WC to front hospital entrance. Friend Dennis Luis Miguel to drive patient home.   Corin Vaughn RN

## 2021-06-09 NOTE — PROGRESS NOTES
"Spiritual Care Narrative Note    Spiritual Assessment:   made a post surgery visit this morning to offer support and assess needs. Patient up in his chair when  arrives; no family present. Patient seems tired as he speaks of his long medical journey. Patient spoke about feeling like he has, \"Been in the hospital everyday for a long time,\" and is hopeful he will recover from this surgery and find relief and good health. When asked about his support system he states, \"I am wonderfully amazed at the wonderful support I am receiving from my family.\" Patient seems to be doing well overall;  notes no concerns.     Care Provided/ Plan of Care:    Patient denies needs; support offered.  will continue to follow as a member of patient's care team.    ISABEL Escamilla, BCC            "

## 2021-06-09 NOTE — PROGRESS NOTES
"Inpatient Progress Note - Internal Medicine  Principal Problem:    Prosthetic valve endocarditis  Active Problems:    Hx of bacterial endocarditis    S/P MVR (mitral valve replacement)    Acute respiratory failure with hypoxia    Acute blood loss anemia    Acute renal failure    Dysthymia      Subjective:  Overall is feeling better.  I did start Paxil yesterday as a low dose mood elevator had discussion with his wife about this.    Generally feels better appetite somewhat improved.  Energy level fair    Progressing well with rehab is ambulating without difficulty.    Laboratory studies stable vital signs stable afebrile    Long-term plan includes treatment with ceftriaxone for 6 weeks post surgery.  Will begin his post hospital treatments in a transitional care unit we are reviewing options.    No other new complaints        ROS: A comprehensive review of systems was performed and was otherwise negative except as mentioned above.     Physical Exam: /71 (Patient Position: Lying)  Pulse 61  Temp 98.2  F (36.8  C) (Oral)   Resp 18  Ht 5' 10\" (1.778 m)  Wt 176 lb 9.6 oz (80.1 kg)  SpO2 96%  BMI 25.34 kg/m2  Canted no mental status changes head and neck negative wounds look good lungs clear abdomen benign    Allergies: reviewed  Medications: reviewed, see discussion for details  Diagnostics: Pertinent labs and imaging reviewed and discussed in subjective as pertinent.    Assessment and Plan:  Stable post mitral valve replacement for endocarditis with tricuspid valvuloplasty.  Mild mood disorder.  Otherwise doing well.    We will plan transfer tomorrow to TCU if appropriate bed available    Time: total time spent with the patient was 25 minutes of which >50% was spent in counseling and coordination of care.    José Miguel Black MD   3/23/2017 4:17 PM  Internal Medicine  [facility]    Scheduled Meds:    aspirin  81 mg Oral DAILY     atorvastatin  40 mg Oral QHS     bumetanide  1 mg " Intravenous DAILY     cefTRIAXone (ROCEPHIN) 2 g in 50 mL NS  2 g Intravenous Q24H     lidocaine  2 patch Transdermal Q24H     melatonin  3 mg Oral QHS     omeprazole  20 mg Oral Daily before brkfst     PARoxetine  10 mg Oral DAILY     penicillin VK  250 mg Oral DAILY     polyethylene glycol  17 g Oral DAILY     warfarin - daily dose required   Other Med Consult or Protocol     warfarin  2 mg Oral Once     Continuous Infusions:   PRN Meds:.acetaminophen, bisacodyl, bisacodyl, dextrose 50 % (D50W), glucagon (human recombinant), morphine  injection, naloxone **OR** naloxone, ondansetron, oxyCODONE, sodium phosphates 133 mL, traZODone

## 2021-06-09 NOTE — PROGRESS NOTES
Arrived for daily antibiotic and weekly lab and dressing change.  No specific new c/o's.  Continues to note dyspnea with mild to moderate exertion.  Lab drawn before infusion begun.  Slight difficulty in obtaining blood return but after good flush of line and pt moving his arms, was able to draw lab without problem.  Given antibiotic without problem.  PICC line dressing changed and site looks good. Scant amount of scabbed drainage at PICC site.  Catheter length exposed unchanged.  Copy of labs given to pt.  Pt left ambulatory by self at 1000.  Will return in am for next dose of IV antibiotic.

## 2021-06-09 NOTE — PROGRESS NOTES
Pharmacy Consult: Warfarin Management    Pharmacy consulted to dose warfarin for Bandar Wells, a 66 y.o. male    Ordering provider: Janusz Hall PA-C  Reason for warfarin therapy: Atrial Fibrillation, prosethic valve  Goal INR Range: 2.5-3.5    Subjective  Medication lists (home and hospital) were reviewed.    New medications that may increase bleeding risk/INR: Rocephin, Penicillin VK, Heparin    Started home medications that may increase bleeding risk/INR: Aspirin    Home Warfarin Dosing: Patient was previously on Apixaban 5 mg BID, then enoxaparin prior to surg    Other Anticoagulants: Heparin - Low Dose protocol   Patient being bridged: Yes    Patient Active Problem List   Diagnosis     Hx of bacterial endocarditis     Dyslipidemia     Mitral valve prolapse     S/P MVR (mitral valve replacement)     Recurrent pleural effusion on right     Pericardial effusion without cardiac tamponade     Status post Maze operation for atrial fibrillation     Paroxysmal atrial fibrillation     Coronary artery disease     Acute diastolic heart failure     CHF (congestive heart failure)     Endocarditis of prosthetic valve, sequela     Prosthetic valve dysfunction     Hypovolemia     Hypotension     Prosthetic valve endocarditis     Acute respiratory failure with hypoxia     Acute blood loss anemia     Acute renal failure    Past Medical History:   Diagnosis Date     Abnormal liver function test      Atrial fibrillation     post naun     Atrial fibrillation with RVR 8/29/2015    S/p MAZE Jul 2015     Bacterial endocarditis      CHF (congestive heart failure)      Dyslipidemia      Hyperlipidemia      Mitral valve prolapse      Near syncope      Pericardial effusion without cardiac tamponade 8/29/2015     Status post Maze operation for atrial fibrillation 8/29/2015        Social History   Substance Use Topics     Smoking status: Never Smoker     Smokeless tobacco: None     Alcohol use 3.6 oz/week     5 Glasses of wine, 1 Cans of beer  per week       Objective   Labs:  Last 3 days:    Recent Labs      03/19/17   0422  03/20/17   0507  03/21/17   0513   CREATININE  1.83*  1.71*  1.41*   HGB  7.8*  8.0*  8.2*   HCT  25.4*   --   26.3*   PLT  153  194  218   BILITOT   --    --   0.4   AST   --    --   27   ALT   --    --   11   ALKPHOS   --    --   56     Last 7 days:   Recent labs: (last 7 days)      03/16/17   1548  03/17/17   0411  03/17/17   1127  03/18/17   0539  03/19/17   0422  03/20/17   0507  03/21/17   0513   INR  1.35*  1.30*  1.24*  1.23*  1.19*  1.46*  2.25*     Warfarin Dosing History:    Date INR Warfarin Dose Comment   3/17/17 1.24 5 mg    3/18/17 1.23 5 mg    3/19/17 1.19 7.5 mg    3/20/17 1.46 7.5 mg    3/21/17 2.25 Held                  Assessment  The patient is initiating warfarin for the indication of Atrial Fibrillation with a goal INR of 2.5-3.5. INR is subtherapeutic today. INR increased significantly again. Given the drastic elevation and desire from CV surgery to pull pacer wires soon will need to hold off on dosing for the immediate time.    Plan  1. HOLD warfarin today.  2. Check INR daily or as appropriate.  3. Continue to follow the patient's INR, PLT, and HGB as available.  4. Monitor for potential drug/disease interactions.    Thank you for the consult,  Wesley J Franke, PharmD 3/21/2017 10:36 AM

## 2021-06-09 NOTE — PROGRESS NOTES
"CV Surgery POD # 7 re do sternotomy, re do MVR     Subjective : up walking with rehab, feeling better,  Hoping to discharge soom     Objective:  /65 (Patient Position: Lying)  Pulse (!) 59  Temp 98.4  F (36.9  C) (Oral)   Resp 20  Ht 5' 10\" (1.778 m)  Wt 176 lb 9.6 oz (80.1 kg)  SpO2 92%  BMI 25.34 kg/m2   Weight: 80.1, pre 78.7  Oxygen: 92% 2L NC     Physical Exam:  Neuro: A&O,, no focal deficits  Heart: RRR crisp valve click  Lungs: clear, f  Abdomen: soft and nontender, +BS and BM  Incisions: CDI  Extremities:warm, no edema     Ambulation:  Ambulated 2200 and 250 feet yesterday     UO:   UO 1575cc/24 hours     Labs:  Wbc 7.9, Hgb 7.9, Plt 226, INR 3.01  Na 134, K 3.9, Bun 25, Cr 0.90, GFR >60      Imaging:  CXR 3/21: .Right-sided PICC line with the tip of the catheter superimposed over the right subclavian vein. Sternotomy with aortic valve prosthesis. Cardiomegaly with normal pulmonary vascularity. Bilateral pleural effusions. Right lung infiltrate which has increased in comparison to previous study. Minor left lower lobe atelectasis and/or consolidation.     Assessment:  1. Bacterial endocarditis of prosthetic MV-s/p re do MVR (mechanical)-on coumadin  INR target is 2.5-3.5  2. Hypotension-resolved  3. Accelerated junctional rhythm-  4. Acute on chronic kidney disease, stage 3-resolved  5. Hypervolemia-diuresis per nephrology  6. Acute post op blood loss anemia-stable     Plan:  Rehab today  Antibiotics per ID- will continue antibiotics for at least 6 weeks from surgery date, F/U with Dr Gandara as outpt  tentative dc to TCU tomorrow     Winifred Garza CNP  Pager [phone]          "

## 2021-06-09 NOTE — TELEPHONE ENCOUNTER
Pt is calling    Appointment this last Friday. He went to  the Allopurinol that he is supposed to be on and his insurance won't let him pick it up until 06/24/2020. They will not pay for it. He will have missed 7 days of the medication. He hasn't taken it since Thursday, and it won't be covered. He is having testing done, and this may affect the results.  He is concerned about this and is wondering if he can get an authorization to pick it up tomorrow.     Dr Valencia, Cardiology is recommending metolazone as well, and he is ok to pick that up, and will start tomorrow.      Reason for Disposition    [1] Follow-up call from patient regarding patient's clinical status AND [2] information urgent    Additional Information    Negative: [1] Caller is not with the adult (patient) AND [2] reporting urgent symptoms    Negative: Lab result questions    Negative: Medication questions    Negative: Caller can't be reached by phone    Negative: Caller has already spoken to PCP or another triager    Negative: RN needs further essential information from caller in order to complete triage    Negative: Lab calling with strep throat test results and triager can call in prescription    Negative: Lab calling with urinalysis test results and triager can call in prescription    Negative: Medication questions    Protocols used: PCP CALL - NO TRIAGE-A-, INFORMATION ONLY CALL-A-    I advised him that I would send a message to his physician, and we will call him back with the advice tomorrow.  Call back with any new signs, symptom, concerns, or questions.  He verbalized understanding.    Anne Aquino RN   [facility] Nurse Advisor

## 2021-06-09 NOTE — PROGRESS NOTES
"Arrived this am for daily rocephin.  States he's feeling better today.  Still fatigues easily and is short of breath with exertion but feels it's \"a little better\".  States cough \"is almost gone\".  Given rocephin without problem.  PICC line care done as noted on LDA flowsheet.  Site looks good.  Note INR drawn and result given to pt as well as called to [facility] in [facility].  Left via w/c accompanied by transport at 1020.  Will return in am for next rocephin.  "

## 2021-06-09 NOTE — PROGRESS NOTES
Patient's 02 sats dropped to 85% on room air during the night, put 1 liter per nasal cannula back on and 02 sats went back up to 93%.

## 2021-06-09 NOTE — TELEPHONE ENCOUNTER
Patient called.     He has a rash that appear on Friday and it could be from taking the medication Dr. Gottlieb prescribed. He is wondering what to do.    Please call the patient for additional information @ [phone]

## 2021-06-09 NOTE — PROGRESS NOTES
Arrived for daily rocephin.  Labs entered in computer are only for cardiology.  Spoke with Winifred Garza CNP and she states they will draw labs tomorrow when pt comes for pre op education.  PICC dressing changed and site looks good.  No further migration of line.  Tolerated rocephin well while here.  Left via w/c accompanied by transport at 1015.  Will return tomorrow after his pre op teaching is done (around noon).

## 2021-06-09 NOTE — PROGRESS NOTES
[place] [facility] Progress Note    SUBJECTIVE:  Has congestion. Mild cough.  Girlfriend here.  No fever.      REVIEW OF SYSTEMS:  Negative unless as listed above.  Social history, Family history, Medications: reviewed.    OBJECTIVE:  Vitals:    03/19/17 1300   BP: 107/53   Pulse: (!) 52   Resp:    Temp:    SpO2:                  PHYSICAL EXAM:  Alert, awake  Vitals tabulated above, reviewed  Neck supple without lymphadenopathy  Sclera normal color, not injected  CARDIOVASCULAR regular rate and rhythm, no mumur  Lungs CLEAR TO AUSCULTATION   Abdomen soft, NT/ND, absent HEPATOSPLENOMEGALY  Skin normal  Joints normal  Neurologic exam non focal  Lines ok  Incision clean    CTX  Antibiotics:    Pertinent labs:    Results from last 7 days  Lab Units 03/19/17  0422 03/18/17  0949 03/18/17  0539 03/17/17  1127   LN-WHITE BLOOD CELL COUNT thou/uL 11.0 12.3*  --  11.1*   LN-HEMOGLOBIN g/dL 7.8* 7.9*  --  7.9*   LN-HEMATOCRIT % 25.4* 25.6*  --  24.7*   LN-PLATELET COUNT thou/uL 153 140 130* 136*         Results from last 7 days  Lab Units 03/19/17  0422   LN-SODIUM mmol/L 133*   LN-POTASSIUM mmol/L 4.7   LN-CHLORIDE mmol/L 103   LN-CO2 mmol/L 20*   LN-BLOOD UREA NITROGEN mg/dL 34*   LN-CREATININE mg/dL 1.83*   LN-CALCIUM mg/dL 9.3                   MICROBIOLOGY DATA:  Valve tissue cx so far neg      IMAGING/RADIOLOGY:          ASSESSMENT:  MVR  Treated for cx negative endocarditis autumn 16  Then chino + PVE feb 17  Failed medical rx due to acute heart failure  Mild RF hopefully just post op ATN    RECOMMENDATION:  Follow creatinine  Give three days of daily penVK to be sure he can take it in future for prophylaxis, give here in a monitored setting.    On CTX will treat again for many weeks more  Line in  'watch kidneys closely    BISHNU Ordonez MD  Office [phone] option 2 to desk staff

## 2021-06-09 NOTE — TELEPHONE ENCOUNTER
Pt states he has a rash on his right leg on the inside from his knee up to his groin and around to his buttocks and on the front of his left leg. Pt states he started noticing the rash late last week, pt stopped taking taking allopurinol and colchicine on Saturday (last dose was on Friday) as he is concerned this is a reaction to one of these meds. Pt states the rash is somewhat raised, red and sometimes itchy. Pt started allopurinol and colchicine 3-4 weeks ago.    Pt denies starting any other new meds, has not used any new soaps or lotions.     Pt willing to do virtual visit but no openings today, PCP is out of town this week.     Try on home number first, if he doesn't answer call cell number

## 2021-06-09 NOTE — PROGRESS NOTES
Nephrology was contacted as the patient was having increased oxygen needs, is up 15lb and has only had 300 since his Bumex was given at this evening.  This was brought to Dr. Ta who said that he just wanted to wait and see how he would do.  He gave no new orders.      Will continue to monitor respiratory status and urine output.

## 2021-06-09 NOTE — PROGRESS NOTES
"Arrived for IV antibiotic after having been discharged from hospital yesterday.  States he slept well last nite and \"felt good to be home\".  No new problems.  As noted on toxicity assessment, pt still has cough, but is much better than before admission.  Note that PICC site looks good and pt states dressing was changed yesterday but stat lock is not within tegaderm so dressing was changed again.  Note that 13 cm are now out from insertion site.  I did have to flush catheter and have pt move arms about before I was able to ascertain blood return.  PICC did function well and infusion completed with flush of line without problem.  Left via w/c accompanied by transport at 1020.  Will return in am for next antibiotic.  "

## 2021-06-09 NOTE — PROGRESS NOTES
"Bandar Wells is a 69 y.o. male who is being evaluated via a billable video visit.      The patient has been notified of following:     \"This video visit will be conducted via a call between you and your physician/provider. We have found that certain health care needs can be provided without the need for an in-person physical exam.  This service lets us provide the care you need with a video conversation.  If a prescription is necessary we can send it directly to your pharmacy.  If lab work is needed we can place an order for that and you can then stop by our lab to have the test done at a later time.    Video visits are billed at different rates depending on your insurance coverage. Please reach out to your insurance provider with any questions.    If during the course of the call the physician/provider feels a video visit is not appropriate, you will not be charged for this service.\"    Patient has given verbal consent to a Video visit? Yes    Will anyone else be joining your video visit? No     Please contact patient at     Video Start Time: 8:56 AM    Additional provider notes: GENERAL: Healthy, alert and no distress  EYES: Eyes grossly normal to inspection. No discharge or erythema, or obvious scleral/conjunctival abnormalities.  RESP: No audible wheeze, cough, or visible cyanosis.  No visible retractions or increased work of breathing.    NEURO: Cranial nerves grossly intact. Mentation and speech appropriate for age.  PSYCH: Mentation appears normal, affect normal/bright, judgement and insight intact, normal speech and appearance well-groomed      Video-Visit Details    Type of service:  Video Visit    Video End Time (time video stopped): 9:14 AM  Originating Location (pt. Location): Home    Distant Location (provider location):  [facility] INTERNAL MEDICINE     Platform used for Video Visit: Artem Hicks MD       Video Visit - Follow Up   Bandar Wells   69 y.o. " male    Date of Visit: 6/26/2020    Chief Complaint   Patient presents with     Gout        Assessment and Plan   1. Pain syndrome, chronic  Etiology uncertain, bone marrow biopsy pending, continue allopurinol and colchicine.    2. Lytic lesion of bone on x-ray  Bone marrow pending.  I did ask for mycobacterial stains and culture and will try and get these added on  - Culture, Mycobacterium, Blood/Bone Marrow  - KOH Prep  - Culture, Yeast    3. Elevated uric acid in blood  As above recheck labs the week of July 6    4. Chronic combined systolic and diastolic congestive heart failure (H)  Does not seem to have any issues with fluid or sodium retention with decreasing metolazone.  Could be contributing to uric acid elevation    5. Positive QuantiFERON-TB Gold test  As above    Return in about 4 weeks (around 7/24/2020) for recheck.     History of Present Illness   This 69 y.o. old and is seen in follow-up.  Overall doing okay left arm pain is remained under control.  Some ongoing neck and back pain on the left side.  Had a bone marrow biopsy and some pain afterwards.  Waiting for results of bone marrow biopsy.  On allopurinol and colchicine which seems to have helped.  Now off prednisone.  I have asked him to stop metolazone.  He contacted his cardiology clinic and I also spoke with his cardiology team and they would like him to take metolazone 1 tablet weekly.  He has not noticed any worsening swelling with reduced metolazone.  His potassium was low last check.    Review of Systems: A comprehensive review of systems was negative except as noted.     Medications, Allergies and Problem List   Reviewed, reconciled and updated  Post Discharge Medication Reconciliation Status:      Additional Information   Current Outpatient Medications   Medication Sig Dispense Refill     acetaminophen (TYLENOL) 500 MG tablet Take 500-1,000 mg by mouth every 6 (six) hours as needed for pain. Pain 1-5  Give 500 mg  Or pain 6-10 give 1000  mg every 6 hours ad needed not to exceed 4 grams in 24 hours       allopurinoL (ZYLOPRIM) 100 MG tablet Take 2 tablets (200 mg total) by mouth daily. 180 tablet 3     b complex vitamins tablet Take 1 tablet by mouth daily.        cholecalciferol, vitamin D3, 1,000 unit tablet Take 1,000 Units by mouth daily.       clindamycin (CLEOCIN) 300 MG capsule Take 2 capsules (600 mg total) by mouth see administration instructions. Take prior to Dentist appointments 2 capsule 3     colchicine (COLCRYS) 0.6 mg tablet Take 1 tablet (0.6 mg total) by mouth daily. 30 tablet 1     cyanocobalamin 1000 MCG tablet Take 1,000 mcg by mouth daily.       ferrous sulfate 325 (65 FE) MG tablet Take 1 tablet (325 mg total) by mouth 2 (two) times a day. (Patient taking differently: Take 1 tablet by mouth daily with breakfast.       ) 60 tablet 4     FLAXSEED OIL ORAL Take 1,000 mg by mouth daily.        fluticasone (FLONASE) 50 mcg/actuation nasal spray 1 spray into each nostril daily.       FOLIC ACID ORAL Take 1 tablet by mouth daily.       L. ACIDOPHILUS/BIFIDO LONGUM (PROBIOTIC PEARLS ORAL) Take 1 capsule by mouth daily.       metOLazone (ZAROXOLYN) 2.5 MG tablet Take 2.5 mg by mouth. One tab on Sun and wed       multivitamin therapeutic (THERAGRAN) tablet Take 1 tablet by mouth daily.       oxyCODONE (ROXICODONE) 10 mg immediate release tablet TAKE 0.5-1 TABLETS (5-10 MG TOTAL) BY MOUTH EVERY 4 (FOUR) HOURS AS NEEDED FOR PAIN. 42 tablet 0     penicillin VK (PEN VK) 500 MG tablet TAKE 1 TABLET BY MOUTH DAILY 90 tablet 1     potassium chloride (K-DUR,KLOR-CON) 20 MEQ tablet Take 1 tablet (20 mEq total) by mouth daily. 90 tablet 2     predniSONE (DELTASONE) 10 mg tablet Take 40mg by mouth daily for 3 days, then 30mg x 3 days, then 20mg x 3 days ,then 10mg x 3 days then stop. 30 tablet 0     senna (SENOKOT) 8.6 mg tablet Take 1-4 tablets by mouth 2 (two) times a day. 90 tablet 1     torsemide (DEMADEX) 50 MG Take 50 mg by mouth 2 (two)  times a day at 9am and 6pm.       warfarin ANTICOAGULANT (COUMADIN/JANTOVEN) 3 MG tablet TAKE ONE TO TWO TABLETS (3-6MG) BY MOUTH DAILY, AS DIRECTED. ADJUST DOSE BASED ON INR RESULTS. 160 tablet 1     No current facility-administered medications for this visit.      No Known Allergies  Social History     Tobacco Use     Smoking status: Never Smoker     Smokeless tobacco: Never Used   Substance Use Topics     Alcohol use: Yes     Alcohol/week: 6.0 standard drinks     Types: 5 Glasses of wine, 1 Cans of beer per week     Frequency: 2-3 times a week     Drug use: No       Review and/or order of clinical lab tests:  Review and/or order of radiology tests:  Review and/or order of medicine tests:  Discussion of test results with performing physician:  Decision to obtain old records and/or obtain history from someone other than the patient:  Review and summarization of old records and/or obtaining history from someone other than the patient and.or discussion of case with another health care provider:  Independent visualization of image, tracing or specimen itself:    Time:      Jin Hicks MD

## 2021-06-09 NOTE — PROGRESS NOTES
Pharmacy Consult: Warfarin Management    Pharmacist consulted to dose warfarin for Bandar Wells, a 66 y.o. male    Ordering provider: Janusz Hall PA-C  Reason for warfarin therapy: Atrial Fibrillation, prosethic valve  Goal INR Range: 2.5-3.5    Subjective  Medication lists (home and hospital) were reviewed.    New medications that may increase bleeding risk/INR: penicillin VK, paroxetine    Restarted home medications that may increase bleeding risk/INR: ceftriaxone, aspirin    Home Warfarin Dosing: Patient was previously on Apixaban 5 mg BID, then bridged with enoxaparin prior to surgery    Other Anticoagulants: None  Patient being bridged: No, heparin discontinued 3/22/17 as INR therapeutic    Patient Active Problem List   Diagnosis     Hx of bacterial endocarditis     Dyslipidemia     Mitral valve prolapse     S/P MVR (mitral valve replacement)     Recurrent pleural effusion on right     Pericardial effusion without cardiac tamponade     Status post Maze operation for atrial fibrillation     Paroxysmal atrial fibrillation     Coronary artery disease     Acute diastolic heart failure     CHF (congestive heart failure)     Endocarditis of prosthetic valve, sequela     Prosthetic valve dysfunction     Hypovolemia     Hypotension     Prosthetic valve endocarditis     Acute respiratory failure with hypoxia     Acute blood loss anemia     Acute renal failure     Dysthymia     Acute renal failure, unspecified acute renal failure type     Prosthetic valve endocarditis, subsequent encounter    Past Medical History:   Diagnosis Date     Abnormal liver function test      Atrial fibrillation     post naun     Atrial fibrillation with RVR 8/29/2015    S/p MAZE Jul 2015     Bacterial endocarditis      CHF (congestive heart failure)      Dyslipidemia      Hyperlipidemia      Mitral valve prolapse      Near syncope      Pericardial effusion without cardiac tamponade 8/29/2015     Status post Maze operation for atrial fibrillation  8/29/2015        Social History   Substance Use Topics     Smoking status: Never Smoker     Smokeless tobacco: None     Alcohol use 3.6 oz/week     5 Glasses of wine, 1 Cans of beer per week       Objective   Labs:  Last 3 days:    Recent Labs      03/22/17   0538  03/22/17   0936  03/23/17   0600   CREATININE  1.00   --   0.90   HGB  7.5*  7.9*  7.9*   HCT  22.9*   --   25.0*   PLT  227   --   226   BILITOT  0.4   --    --    AST  30   --    --    ALT  14   --    --    ALKPHOS  55   --    --      Last 7 days:   Recent labs: (last 7 days)      03/18/17   0539  03/19/17   0422  03/20/17   0507  03/21/17   0513  03/22/17   0538  03/23/17   0600  03/24/17   0646   INR  1.23*  1.19*  1.46*  2.25*  2.91*  3.01*  3.16*     Warfarin Dosing History:    Date INR Warfarin Dose Comment   3/17/17 1.24 5 mg    3/18/17 1.23 5 mg    3/19/17 1.19 7.5 mg    3/20/17 1.46 7.5 mg    3/21/17 2.25 Held    3/22/17 2.91 2 mg    3/23/17 3.01 2 mg    3/24/17 3.16 2 mg      Assessment  The patient is initiating warfarin for the indication of Atrial Fibrillation with a goal INR of 2.5-3.5. INR is within therapeutic range today.   INR increased again, but only a small increase. Will continue to dose conservatively given the fast trend up in his INR with recent dosing.     Plan  1. Administer warfarin 2 mg PO today.  2. Check INR daily or as appropriate.  3. Continue to follow the patient's INR, PLT, and HGB as available.  4. Monitor for potential drug/disease interactions.    Thank you for the consult,  Precious Bonilla, PharmD 3/24/2017 8:57 AM

## 2021-06-09 NOTE — TELEPHONE ENCOUNTER
Anticoagulation Management    Unable to reach Bandar today.    Today's INR result of 1.60 is Subtherapeutic (goal INR of 2.5-3.5).     Follow up required to confirm doses taken and assess for changes (no template).   - s/p Bone marrow biopsy and aspiration 6/23/20.    Left message to take a booster dose of warfarin,  9 mg tonight.       ACN to follow up - Wed. 6/24.    Meg Jacinto RN

## 2021-06-09 NOTE — PROGRESS NOTES
Vitals:    03/19/17 0245   BP:    Pulse: (!) 55   Resp: (!) 34   Temp:    SpO2: 92%     Patient given Duoneb times 1 during the night. Flutter valve also used. SpO2 96% on 3 L/M via NC. Lung sounds very congested. Also noted that his weight is up 10 lbs since admission and the I & O is up about 4500.   RT following with aggressive pulmonary toilet. Roe Blank, LRT

## 2021-06-09 NOTE — ANESTHESIA CARE TRANSFER NOTE
Last vitals:   Vitals:    03/16/17 1537   BP: 121/56   Pulse: (!) 44   Resp: 18   Temp: 36  C (96.8  F)   SpO2: 100%     Patient's level of consciousness is unresponsive  Spontaneous respirations: no: on vent  Maintains airway independently: no: on vent  Dentition unchanged: yes  Oropharynx: oropharynx clear of all foreign objects and endotracheal tube in place    QCDR Measures:  ASA# 20 - Surgical Safety Checklist: ASA20A - Safety Checks Done  PQRS# 430 - Adult PONV Prevention: 4558F-8P - Pt did NOT receive => 2 anti-emetic agents  ASA# 8 - Peds PONV Prevention: 4558F-8P - Pt did NOT receive => 2 antiemetic agents  PQRS# 424 - Kathia-op Temp Management: 4559F - At least one body temp DOCUMENTED => 35.5C or 95.9F within required timeframe  PQRS# 426 - PACU Transfer Protocol: - Transfer of care checklist used  ASA# 14 - Acute Post-op Pain: ASA14B - Patient did NOT experience pain >= 7 out of 10to icu with all monitors on. 100 percent O2 via ett/ambu. Placed on vent per icu parameters    I completed my SBAR handoff to the receiving nurse per policy and procedure.

## 2021-06-09 NOTE — PROGRESS NOTES
Pulmonary/Critical Care  3/20/2017   9:37 AM     Chart reviewed. Discussed on morning interdisciplinary rounds.   CV-surgery directing care.   Our service will sign off. Please call if we can be of further assistance.     Tayla Rosa, [facility] Pulmonary[facility]Critical Care

## 2021-06-09 NOTE — PROGRESS NOTES
"Bandar Wells is a 69 y.o. male who is being evaluated via a billable video visit.      The patient has been notified of following:     \"This video visit will be conducted via a call between you and your physician/provider. We have found that certain health care needs can be provided without the need for an in-person physical exam.  This service lets us provide the care you need with a video conversation.  If a prescription is necessary we can send it directly to your pharmacy.  If lab work is needed we can place an order for that and you can then stop by our lab to have the test done at a later time.    Video visits are billed at different rates depending on your insurance coverage. Please reach out to your insurance provider with any questions.    If during the course of the call the physician/provider feels a video visit is not appropriate, you will not be charged for this service.\"    Patient has given verbal consent to a Video visit? Yes    Will anyone else be joining your video visit? No     Please contact patient at     Video Start Time: 8:15 AM    Additional provider notes: GENERAL: Healthy, alert and no distress  EYES: Eyes grossly normal to inspection. No discharge or erythema, or obvious scleral/conjunctival abnormalities.  RESP: No audible wheeze, cough, or visible cyanosis.  No visible retractions or increased work of breathing.    NEURO: Cranial nerves grossly intact. Mentation and speech appropriate for age.  PSYCH: Mentation appears normal, affect normal/bright, judgement and insight intact, normal speech and appearance well-groomed      Video-Visit Details    Type of service:  Video Visit    Video End Time (time video stopped): 8:37 AM  Originating Location (pt. Location): Home    Distant Location (provider location):  [facility] INTERNAL MEDICINE     Platform used for Video Visit: Artem Hicks MD       Video Visit - Follow Up   Bandar Wells   69 y.o. " male    Date of Visit: 6/19/2020    Chief Complaint   Patient presents with     Gout     Follow up        Assessment and Plan   1. Pain syndrome, chronic  2. Elevated uric acid in blood  He has had quite significant improvement with treatment targeted at uric acid and possible gout.  His uric acid was quite high which raise concern for multiple myeloma which is amplified with finding of lytic bone lesions on his x-ray coupled with anemia, worsening/progressive chronic kidney disease and elevated inflammatory markers.  He is on metolazone which could be driving up his uric acid and worsening his kidney function have asked him to stop this and I spoke with his cardiology team about it as well.  He has been directed to continue colchicine and allopurinol by rheumatology and this seems like very good advice.  He will be getting a bone marrow biopsy.  I have asked for acid-fast bacilli staining on this and TB culture.    3. Chronic combined systolic and diastolic congestive heart failure (H)  As above    4. Positive QuantiFERON-TB Gold test  As above    5. Lytic lesion of bone on x-ray  6. CKD (chronic kidney disease) stage 3, GFR 30-59 ml/min (H)  7. Normocytic anemia  As above    Return in about 1 week (around 6/26/2020) for recheck, video visit.     History of Present Illness   This 69 y.o. old man is seen in follow-up.  Since I seen him last he is followed up with rheumatology as well as oncology.  He is feeling nearly 100% better.  Shoulder and neck pain essentially resolved.  Not really using any opioid pain medication.  He has run out of his medications but his rheumatologist has recommended he stay on colchicine and allopurinol.  We also reviewed his cardiac meds and he has been on both high-dose torsemide as well as intermittent metolazone.  Had previously spoken with Dr. Ronn Ordonez about his positive QuantiFERON gold test.  Per Dr. Wolf, this was minimally positive.  Additional testing thus far for multiple  myeloma has been fairly unremarkable and he has an upcoming bone marrow biopsy his weights have been stable.    Review of Systems: A comprehensive review of systems was negative except as noted.     Medications, Allergies and Problem List   Reviewed, reconciled and updated  Post Discharge Medication Reconciliation Status:      Additional Information   Current Outpatient Medications   Medication Sig Dispense Refill     acetaminophen (TYLENOL) 500 MG tablet Take 500-1,000 mg by mouth every 6 (six) hours as needed for pain. Pain 1-5  Give 500 mg  Or pain 6-10 give 1000 mg every 6 hours ad needed not to exceed 4 grams in 24 hours       allopurinoL (ZYLOPRIM) 100 MG tablet Take 2 tablets (200 mg total) by mouth daily. 180 tablet 0     b complex vitamins tablet Take 1 tablet by mouth daily.        cholecalciferol, vitamin D3, 1,000 unit tablet Take 1,000 Units by mouth daily.       clindamycin (CLEOCIN) 300 MG capsule Take 2 capsules (600 mg total) by mouth see administration instructions. Take prior to Dentist appointments 2 capsule 3     colchicine (COLCRYS) 0.6 mg tablet Take 1 tablet (0.6 mg total) by mouth daily. 30 tablet 1     cyanocobalamin 1000 MCG tablet Take 1,000 mcg by mouth daily.       ferrous sulfate 325 (65 FE) MG tablet Take 1 tablet (325 mg total) by mouth 2 (two) times a day. (Patient taking differently: Take 1 tablet by mouth daily with breakfast.       ) 60 tablet 4     FLAXSEED OIL ORAL Take 1,000 mg by mouth daily.        fluticasone (FLONASE) 50 mcg/actuation nasal spray 1 spray into each nostril daily.       FOLIC ACID ORAL Take 1 tablet by mouth daily.       L. ACIDOPHILUS/BIFIDO LONGUM (PROBIOTIC PEARLS ORAL) Take 1 capsule by mouth daily.       metOLazone (ZAROXOLYN) 2.5 MG tablet Take 2.5 mg by mouth. One tab on Sun and wed       multivitamin therapeutic (THERAGRAN) tablet Take 1 tablet by mouth daily.       oxyCODONE (ROXICODONE) 10 mg immediate release tablet TAKE 0.5-1 TABLETS (5-10 MG  TOTAL) BY MOUTH EVERY 4 (FOUR) HOURS AS NEEDED FOR PAIN. 42 tablet 0     penicillin VK (PEN VK) 500 MG tablet TAKE 1 TABLET BY MOUTH DAILY 90 tablet 1     potassium chloride (K-DUR,KLOR-CON) 20 MEQ tablet Take 1 tablet (20 mEq total) by mouth daily. 90 tablet 2     predniSONE (DELTASONE) 10 mg tablet Take 40mg by mouth daily for 3 days, then 30mg x 3 days, then 20mg x 3 days ,then 10mg x 3 days then stop. 30 tablet 0     senna (SENOKOT) 8.6 mg tablet Take 1-4 tablets by mouth 2 (two) times a day. 90 tablet 1     torsemide (DEMADEX) 50 MG Take 50 mg by mouth 2 (two) times a day at 9am and 6pm.       warfarin ANTICOAGULANT (COUMADIN/JANTOVEN) 3 MG tablet TAKE ONE TO TWO TABLETS (3-6MG) BY MOUTH DAILY, AS DIRECTED. ADJUST DOSE BASED ON INR RESULTS. 160 tablet 1     No current facility-administered medications for this visit.      No Known Allergies  Social History     Tobacco Use     Smoking status: Never Smoker     Smokeless tobacco: Never Used   Substance Use Topics     Alcohol use: Yes     Alcohol/week: 6.0 standard drinks     Types: 5 Glasses of wine, 1 Cans of beer per week     Frequency: 2-3 times a week     Drug use: No       Review and/or order of clinical lab tests: Reviewed  Review and/or order of radiology tests: Reviewed  Review and/or order of medicine tests:  Discussion of test results with performing physician:  Decision to obtain old records and/or obtain history from someone other than the patient:  Review and summarization of old records and/or obtaining history from someone other than the patient and.or discussion of case with another health care provider: I have spoken with his cardiology team at [facility] with stopping metolazone and monitoring weights, have spoken with Dr. José Miguel Solorio regarding TB studies on bone marrow.  I reviewed extensively records from subspecialists, summarized above  Independent visualization of image, tracing or specimen itself:    Time:      Jin King  MD Kurt

## 2021-06-09 NOTE — TELEPHONE ENCOUNTER
Pt states he did get the [employer] message with lab result comments now. Pt states he is still having a lot of itching, has not taken allopurinol or colchicine since Saturday. Pt had trouble getting in to see urgent care or PCP so he is going to derm today and will call us back this afternoon or tomorrow and let us know what they say rash is from.

## 2021-06-09 NOTE — PROGRESS NOTES
[facility] Hematology and Oncology Progress Note    Patient: Bandar Wells  MRN: [id number]  Date of Service: 07/06/2020      Assessment and Plan:    1.  Bone lesion seen on imaging: Bone marrow was negative.  No evidence of myeloma or other malignancy.  Myeloma serologic work-up was negative.  CT imaging shows no evidence of any primary tumor.  PSA was stable and quite low.  At this point I do not think we need to pursue any further work-up.  I think it is unlikely that these lesions are malignant.  We are going to see him back in 6 months with a noncontrast CT to assure stability.  If there is change at that time we will get a PET scan.  The plan was discussed with the patient and his significant other.  Questions were answered.    2.  Anemia: Chronic.  Likely anemia of chronic kidney disease.    ECOG Performance   ECOG Performance Status: 1    Distress Assessment  Distress Assessment Score: 6(Bx results)    Pain  Currently in Pain: No/denies    Diagnosis:    Bone lesion seen on CT scan from Rita 3, 2020.    Treatment:    Observation    Interim History:    Bandar returns today for a follow-up visit.  He is been feeling okay.  No acute complaints today.    Review of Systems:    Constitutional  Constitutional (WDL): Exceptions to WDL  Fatigue: Fatigue relieved by rest  Weight Loss: to <10% from baseline, intervention not indicated  Neurosensory  Neurosensory (WDL): Exceptions to WDL  Ataxia: Moderate symptoms, limiting instrumental ADL  Cardiovascular  Cardiovascular (WDL): All cardiovascular elements are within defined limits  Pulmonary  Respiratory (WDL): Exceptions to WDL  Cough: Mild symptoms, nonprescription intervention indicated  Gastrointestinal  Gastrointestinal (WDL): Exceptions to WDL  Anorexia: Oral intake altered without significant weight loss or malnutrition, oral nutritional supplements indicated  Constipation: Occasional or intermittent symptoms, occasional use of stool softeners, laxatives, dietary  modification, or enema  Genitourinary  Genitourinary (WDL): All genitourinary elements are within defined limits  Integumentary  Integumentary (WDL): All integumentary elements are within defined limits  Patient Coping  Patient Coping: Accepting  Accompanied by  Accompanied by: Alone    Past History:    Past Medical History:   Diagnosis Date     Abnormal liver function test      Atrial fibrillation (H)     post naun     Atrial fibrillation with RVR (H) 8/29/2015    S/p MAZE Jul 2015     Bacterial endocarditis      CHF (congestive heart failure) (H)      Dyslipidemia      Hyperlipidemia      Mitral valve prolapse      Near syncope      Pericardial effusion without cardiac tamponade 8/29/2015     S/P mitral valve replacement with metallic valve 03/16/2017     Status post Maze operation for atrial fibrillation 8/29/2015     Physical Exam:    Recent Vitals 7/6/2020   Height -   Weight 160 lbs 5 oz   BSA (m2) 1.8 m2   /57   Pulse 64   Temp 98.1   Temp src 1   SpO2 96   Some recent data might be hidden     General: patient appears stated age of 69 y.o.. Nontoxic and in no distress.   HEENT: Head: atraumatic, normocephalic. Sclerae anicteric.  Chest:  Normal respiratory effort  Cardiac:  No edema.   Abdomen: abdomen is non-distended  Extremities: normal tone and muscle bulk.  Skin: no lesions or rash. Warm and dry.   CNS: alert and oriented. Grossly non-focal.   Psychiatric: normal mood and affect.     Lab Results:    Recent Results (from the past 168 hour(s))   INR   Result Value Ref Range    INR 3.30 (!) 0.9 - 1.1   ALT (SGPT)   Result Value Ref Range    ALT 29 0 - 45 U/L   AST (SGOT)   Result Value Ref Range    AST 84 (H) 0 - 40 U/L   Albumin   Result Value Ref Range    Albumin 3.8 3.5 - 5.0 g/dL   Uric Acid   Result Value Ref Range    Uric Acid 9.4 (H) 3.0 - 8.0 mg/dL   Erythrocyte Sedimentation Rate   Result Value Ref Range    Sed Rate 73 (H) 0 - 15 mm/hr   C-Reactive Protein   Result Value Ref Range    CRP 0.3  0.0 - 0.8 mg/dL   CK Total   Result Value Ref Range    CK, Total 143 30 - 190 U/L   Basic Metabolic Panel   Result Value Ref Range    Sodium 134 (L) 136 - 145 mmol/L    Potassium 4.1 3.5 - 5.0 mmol/L    Chloride 97 (L) 98 - 107 mmol/L    CO2 24 22 - 31 mmol/L    Anion Gap, Calculation 13 5 - 18 mmol/L    Glucose 104 70 - 125 mg/dL    Calcium 9.9 8.5 - 10.5 mg/dL    BUN 53 (H) 8 - 28 mg/dL    Creatinine 2.35 (H) 0.70 - 1.30 mg/dL    GFR MDRD Af Amer 33 (L) >60 mL/min/1.73m2    GFR MDRD Non Af Amer 27 (L) >60 mL/min/1.73m2     Imaging:    No results found.      Signed by: José Miguel Solorio MD

## 2021-06-09 NOTE — PROGRESS NOTES
Pharmacy Consult: Warfarin Management    Pharmacist consulted to dose warfarin for Bandar Wells, a 66 y.o. male    Ordering provider: Janusz Hall PA-C  Reason for warfarin therapy: Atrial Fibrillation, prosethic valve  Goal INR Range: 2.5-3.5    Subjective  Medication lists (home and hospital) were reviewed.    New medications that may increase bleeding risk/INR: penicillin VK, paroxetine    Restarted home medications that may increase bleeding risk/INR: ceftriaxone, aspirin    Home Warfarin Dosing: Patient was previously on Apixaban 5 mg BID, then bridged with enoxaparin prior to surgery    Other Anticoagulants: None  Patient being bridged: No, heparin discontinued 3/22/17 as INR therapeutic    Patient Active Problem List   Diagnosis     Hx of bacterial endocarditis     Dyslipidemia     Mitral valve prolapse     S/P MVR (mitral valve replacement)     Recurrent pleural effusion on right     Pericardial effusion without cardiac tamponade     Status post Maze operation for atrial fibrillation     Paroxysmal atrial fibrillation     Coronary artery disease     Acute diastolic heart failure     CHF (congestive heart failure)     Endocarditis of prosthetic valve, sequela     Prosthetic valve dysfunction     Hypovolemia     Hypotension     Prosthetic valve endocarditis     Acute respiratory failure with hypoxia     Acute blood loss anemia     Acute renal failure     Dysthymia    Past Medical History:   Diagnosis Date     Abnormal liver function test      Atrial fibrillation     post naun     Atrial fibrillation with RVR 8/29/2015    S/p MAZE Jul 2015     Bacterial endocarditis      CHF (congestive heart failure)      Dyslipidemia      Hyperlipidemia      Mitral valve prolapse      Near syncope      Pericardial effusion without cardiac tamponade 8/29/2015     Status post Maze operation for atrial fibrillation 8/29/2015        Social History   Substance Use Topics     Smoking status: Never Smoker     Smokeless tobacco: None      Alcohol use 3.6 oz/week     5 Glasses of wine, 1 Cans of beer per week       Objective   Labs:  Last 3 days:    Recent Labs      03/21/17   0513  03/22/17   0538  03/22/17   0936  03/23/17   0600   CREATININE  1.41*  1.00   --   0.90   HGB  8.2*  7.5*  7.9*  7.9*   HCT  26.3*  22.9*   --   25.0*   PLT  218  227   --   226   BILITOT  0.4  0.4   --    --    AST  27  30   --    --    ALT  11  14   --    --    ALKPHOS  56  55   --    --      Last 7 days:   Recent labs: (last 7 days)      03/17/17   1127  03/18/17   0539  03/19/17   0422  03/20/17   0507  03/21/17   0513  03/22/17   0538  03/23/17   0600   INR  1.24*  1.23*  1.19*  1.46*  2.25*  2.91*  3.01*     Warfarin Dosing History:    Date INR Warfarin Dose Comment   3/17/17 1.24 5 mg    3/18/17 1.23 5 mg    3/19/17 1.19 7.5 mg    3/20/17 1.46 7.5 mg    3/21/17 2.25 Held    3/22/17 2.91 2 mg    3/23/17 3.01 2 mg      Assessment  The patient is initiating warfarin for the indication of Atrial Fibrillation with a goal INR of 2.5-3.5. INR is therapeutic today.   INR increased again, but only a small increase today. Will continue to dose conservatively given the fast trend up in his INR with recent dosing.     Plan  1. Administer warfarin 2 mg PO today.  2. Check INR daily or as appropriate.  3. Continue to follow the patient's INR, PLT, and HGB as available.  4. Monitor for potential drug/disease interactions.    Thank you for the consult,  Lashawn Hardwick, PharmD 3/23/2017 11:47 AM    Detail Level: Detailed Detail Level: Zone

## 2021-06-09 NOTE — PROGRESS NOTES
[facility] Progress Note    SUBJECTIVE: Cough is much better. Had some episode of nausea and vomiting last evening. Feels good this  Morning. No rash to PCN     REVIEW OF SYSTEMS:  Negative unless as listed above.  Social history, Family history, Medications: reviewed.    OBJECTIVE:  Vitals:    03/23/17 0701   BP: 121/65   Pulse: (!) 59   Resp: 20   Temp: 98.4  F (36.9  C)   SpO2: 92%       PHYSICAL EXAM:  Alert, awake  Vitals tabulated above, reviewed  Neck supple without lymphadenopathy  Sclera normal color, not injected  CARDIOVASCULAR regular rate and rhythm, no mumur  Lungs CLEAR TO AUSCULTATION   Abdomen soft, NT/ND, absent HEPATOSPLENOMEGALY  Skin normal  Joints normal  Neurologic exam non focal  Lines ok  Incision clean      Antibiotics:  CTX    Pertinent labs:    Results from last 7 days  Lab Units 03/23/17  0600 03/22/17  0936 03/22/17  0538 03/21/17  0513   LN-WHITE BLOOD CELL COUNT thou/uL 7.9  --  9.5 11.0   LN-HEMOGLOBIN g/dL 7.9* 7.9* 7.5* 8.2*   LN-HEMATOCRIT % 25.0*  --  22.9* 26.3*   LN-PLATELET COUNT thou/uL 226  --  227 218         Results from last 7 days  Lab Units 03/23/17  0600   LN-SODIUM mmol/L 134*   LN-POTASSIUM mmol/L 3.9   LN-CHLORIDE mmol/L 96*   LN-CO2 mmol/L 32*   LN-BLOOD UREA NITROGEN mg/dL 25*   LN-CREATININE mg/dL 0.90   LN-CALCIUM mg/dL 9.2       MICROBIOLOGY DATA:  Valve tissue cx so far neg      IMAGING/RADIOLOGY:  XR CHEST AP PORTABLE  3/21/2017 9:41 AM     INDICATION: reassess effusion/infiltrates  COMPARISON: 3/19/2017     FINDINGS: Right-sided PICC line with the tip of the catheter superimposed over the right subclavian vein. Sternotomy with aortic valve prosthesis. Cardiomegaly with normal pulmonary vascularity. Bilateral pleural effusions. Right lung infiltrate which   has increased in comparison to previous study. Minor left lower lobe atelectasis and/or consolidation.        ASSESSMENT:  MVR  Treated for cx negative endocarditis autumn 16  Then  gigiii + PVE feb 17  Failed medical rx due to acute heart failure  Mild RF hopefully just post op ATN, creatinine 1.8, 3/19, 1.7 on 3/20 and 1.4 on 3/21.   New cough and infiltrate on CXR. No fever or leukocytosis. At high risk for HCAP/VAP. Improving.   PCN allergy. Per family, may have been elevation of LFT. No rash or angioedema or anaphylaxis. PCN challenge started 3/21 with normal baseline LFT.  Tolerating well.     RECOMMENDATION:  -Continue CTX for at least 6 weeks from surgery   -Follow creatinine  -Continue PCN challenge, watching for side effects.   -Line in  -Can discharge anytime going forward from the ID prospective.   -Outpatient follow up with Dr. Ordonez.     Discussed with the patient and family and nursing staff.     ID will follow      ABHISHEK Currie MD  Office [phone] option 2 to desk staff

## 2021-06-09 NOTE — TELEPHONE ENCOUNTER
Please go over lab results with patient, advise to hold colchicine/Colcrys, avoid anti-inflammatory medication such as ibuprofen/Advil/Aleve and place referral to nephrology.

## 2021-06-09 NOTE — TELEPHONE ENCOUNTER
Thank you for the update.    Can you please inform me if patient was placed or is now taking low dose prednisone in the interim? Please see prior message below.    If stable and not on low-dose prednisone/desires to monitor symptoms for now, can do so however there is a possibility that symptoms may resurface.    If desires to start low-dose prednisone, can prescribe 5 mg daily.    An alternative to allopurinol is Uloric.  Please send patient handout.     Patient would  need clearance from kidney specialist prior to starting 40 mg qd of Uloric.    If already established with kidney specialist and desires to start prednisone 5 mg daily, patient should make kidney specialist aware initiating prednisone 5 mg daily.    Recommend patient schedule a follow-up appointment as suggested on prior message.

## 2021-06-09 NOTE — PROGRESS NOTES
Arrived for daily rocephin.  States he is more fatigued but has been sleeping quite well and able to nap during the day as well.  Heart rate noted to be 40 and remained 36-40 while here. He states he feels short of breath but states he doesn't feel it has gotten worse.  I did note that he seemed to have a little more frequent shallow cough today, but he doesn't feel it has gotten any worse.    Called Dr. Carney's office and nurse suggested we call CV surgery for this.  I did speak with Maria C ADAMSON and he said he would see pt but didn't know how long it would take for him to stop by since he was seeing other pts.  Pt waited for approx 45 min but then expressed concern that his ride was waiting.  I did place another call to PA and he stated pt should stop his metoprolol and see primary care this week and CV surgery in 2-3 weeks.  These appts made for pt after he left here and he was called to notify him of these appts.  INR noted and coumadin nurse (Venus)  at Dr. Bhat's office called to note it.  She will call pt later today with coumadin dosing.  Pt is aware of this.  Pt left here at 1100 via w/c accompanied by transport.  Will return in am for next rocephin.

## 2021-06-09 NOTE — CONSULTS
Infectious Disease Consultation:  Requesting MD:Chet  Reason for consult:follow up       HISTORY:  Bandar Wells is my pt.  He had cx negative PVE of mitral valve late July 2016 and felt to be cured about oct 15 2016.  He did well for about four months and was able to travel.  Admitted to our service feb 2017 with s gordonii/anguinosis PVE.  While on CTX IV for that he came to infusion SOB and malaise.  Admit echo showed worsening mitral disease DESPITE the abx, set for surgery.  Surgery done yesterday Dr. Rosenberg graciously emailed me the findings.  Valve was badly infected.  He is awake now with all mental faculty.  He introduced me to his son Jabier (I think was the name) and Jabier came down from [place] to see dad.  He is not having fever, he has an open clean heart incision and swan and lines.  The rest of the ROS is negative.  He is not at all sure about having an amoxicillin reaction.            Pertinent past history, past infectious disease history:  Medical/Surgical History        Past Medical History:   Diagnosis Date     Abnormal liver function test       Atrial fibrillation       post naun     Atrial fibrillation with RVR 8/29/2015     S/p MAZE Jul 2015     Bacterial endocarditis       CHF (congestive heart failure)       Dyslipidemia       Hyperlipidemia       Mitral valve prolapse       Near syncope       Pericardial effusion without cardiac tamponade 8/29/2015     Status post Maze operation for atrial fibrillation 8/29/2015            Past Surgical History:   Procedure Laterality Date     CARDIAC SURGERY         CHG X-RAY PELVIS 3+ VW N/A 7/1/2015     Procedure: MITRAL VALVE REPLACEMENT, MAZE PROCEDURE, CARDIOLOGY TRANSESOPHAGEAL ECHOCARDIOGRAM; Surgeon: Rm Munoz MD; Location: [facility]; Service:      EYE SURGERY         HERNIA REPAIR Right       inguinal     PERICARDIAL WINDOW N/A 8/31/2015     Procedure: RIGHT PERICARDIAL WINDOW, TALC PLEURODESIS,VIDEO ASSISTED  THOROSCOPY,DRAINAGE OF BILATERAL PLEURAL EFFUSIONS; Surgeon: Rm Munoz MD; Location: [facility]; Service:      PICC   9/3/2015            PICC   2/14/2017                 Allergies         Allergies   Allergen Reactions     Amoxicillin Other (See Comments)       Hepatic fibrosis,  Tolerated ceftriaxone (rocephine)           Medications: OUTpatient medications           Prior to Admission medications    Medication Sig Start Date End Date Taking? Authorizing Provider   acetaminophen (TYLENOL) 500 MG tablet Take 500-1,000 mg by mouth every 6 (six) hours as needed for pain.      Yes PROVIDER, HISTORICAL   atorvastatin (LIPITOR) 40 MG tablet Take 40 mg by mouth bedtime.      Yes PROVIDER, HISTORICAL   b complex vitamins tablet Take 1 tablet by mouth daily.      Yes PROVIDER, HISTORICAL   cefTRIAXone 2 g in NaCl 0.9 % 0.9 % 50 mL IVPB Infuse 2 g into a venous catheter daily. 2/14/17 3/25/17 Yes Lashonda Currie MD   cholecalciferol, vitamin D3, 1,000 unit tablet Take 1,000 Units by mouth daily.     Yes PROVIDER, HISTORICAL   clindamycin (CLEOCIN) 300 MG capsule Take 600 mg by mouth see administration instructions. Take prior to Dentist appointments     Yes PROVIDER, HISTORICAL   cyanocobalamin 1000 MCG tablet Take 1,000 mcg by mouth daily.     Yes PROVIDER, HISTORICAL   enoxaparin (LOVENOX) 80 mg/0.8 mL syringe Inject 80 mg under the skin every 12 (twelve) hours.     Yes PROVIDER, HISTORICAL   ferrous sulfate 325 (65 FE) MG tablet Take 1 tablet by mouth 2 (two) times a day.     Yes PROVIDER, HISTORICAL   FLAXSEED OIL ORAL Take 1,000 mg by mouth daily.      Yes PROVIDER, HISTORICAL   FOLIC ACID ORAL Take 1 tablet by mouth daily.     Yes PROVIDER, HISTORICAL   furosemide (LASIX) 20 MG tablet Take 20 mg by mouth every other day. Daily regimen of 20 mg alternating with 40 mg every other day.      Yes PROVIDER, HISTORICAL   furosemide (LASIX) 20 MG tablet Take 40 mg by mouth every other day. Daily  regimen of 20 mg alternating with 40 mg every other day.     Yes PROVIDER, HISTORICAL   L. ACIDOPHILUS/BIFIDO LONGUM (PROBIOTIC PEARLS ORAL) Take 1 capsule by mouth daily.     Yes PROVIDER, HISTORICAL   metoprolol tartrate (LOPRESSOR) 25 MG tablet Take 1 tablet (25 mg total) by mouth 2 (two) times a day. 3/8/17   Yes José Miguel Black MD   multivitamin therapeutic (THERAGRAN) tablet Take 1 tablet by mouth daily.     Yes PROVIDER, HISTORICAL   oxymetazoline (AFRIN) 0.05 % nasal spray Apply 2-3 sprays into each nostril 2 (two) times a day as needed. Separate doses by at least 10-12 hours.     Yes PROVIDER, HISTORICAL   traZODone (DESYREL) 50 MG tablet Take 1 tablet (50 mg total) by mouth bedtime as needed for sleep (Insomnia). 3/15/17 3/16/17 Yes Dave Hall PA-C   apixaban (ELIQUIS) 5 mg Tab tablet Take 5 mg by mouth 2 (two) times a day.   3/16/17 Yes PROVIDER, HISTORICAL          Medications: INpatient medications     acetaminophen 650 mg Oral Q6H     Or     acetaminophen 650 mg Rectal Q6H     [START ON 3/17/2017] aspirin 81 mg Oral DAILY     atorvastatin 40 mg Oral QHS     [START ON 3/17/2017] bisacodyl 10 mg Oral Once     [START ON 3/19/2017] bisacodyl 10 mg Rectal Once     cefTRIAXone (ROCEPHIN) 2 g in 50 mL NS 2 g Intravenous Q24H     [START ON 3/17/2017] docusate sodium 100 mg Oral BID     [START ON 3/17/2017] heparin (PF) 5,000 Units Subcutaneous Q8H FIXED TIMES     [START ON 3/18/2017] magnesium hydroxide 30 mL Oral DAILY     melatonin 3 mg Oral QHS     mupirocin 1 application Nasal BID     [START ON 3/17/2017] omeprazole 20 mg Oral Daily before brkfst     [START ON 3/17/2017] polyethylene glycol 17 g Oral DAILY     [START ON 3/17/2017] vancomycin 1,000 mg Intravenous Q12H          Family History Social History     Reviewed        Family History   Problem Relation Age of Onset     Atrial fibrillation Mother       Heart failure Mother         Reviewed   Social History    Social History            Social  History     Marital status: Single       Spouse name: N/A     Number of children: N/A     Years of education: N/A          Occupational History     Not on file.              Social History Main Topics     Smoking status: Never Smoker     Smokeless tobacco: Not on file     Alcohol use 3.6 oz/week       5 Glasses of wine, 1 Cans of beer per week     Drug use: No     Sexual activity: No           Other Topics Concern     Not on file      Social History Narrative         Psychosocial Needs  Social History      Social History Narrative      Additional psychosocial needs reviewed per nursing assessment.           Medications:  Reviewed prior to admission meds as applicable in chart review.  Current meds are reviewed in the EMR listed MAR.     ANTIBIOTICS:    Current:CTX   Prior:CTX   Allergy to:amox?    SH/FH and  travel history(if applicable to consult):  This is above and reviewed by me  REVIEW OF SYSTEMS:  All other systems negative    EXAMINATION:  Vitals:    03/17/17 0715   BP: 99/54   Pulse: 70   Resp: 26   Temp:    SpO2: 96%     Alert, awake  Vitals tabulated above, reviewed  HEENT:normal exam, dentition good symmetric smile  Neck supple without lymphadenopathy  Sclera clear  CARDIOVASCULAR regular rate and rhythm, no mumur  Lungs CLEAR TO AUSCULTATION   Abdomen soft, NT/ND, absent HEPATOSPLENOMEGALY  Skin normal  Joints normal  Neurologic exam non focal  Wound:  Clean cardiac scar, fresh, one day old only          CLINICAL DATABASE FOR---LAB/MICRO/CULTURES/IMAGING STUDIES:    Results from last 7 days  Lab Units 03/17/17  0411 03/16/17  1548 03/16/17  1449   LN-WHITE BLOOD CELL COUNT thou/uL 12.8* 16.7* 14.0*   LN-HEMOGLOBIN g/dL 7.7* 10.6* 10.0*   LN-HEMATOCRIT % 23.7* 32.0* 30.0*   LN-PLATELET COUNT thou/uL 138* 153 168         Results from last 7 days  Lab Units 03/17/17  0411   LN-SODIUM mmol/L 140   LN-POTASSIUM mmol/L 4.6   LN-CHLORIDE mmol/L 111*   LN-CO2 mmol/L 24   LN-BLOOD UREA NITROGEN mg/dL 22    LN-CREATININE mg/dL 1.27   LN-CALCIUM mg/dL 9.0     Gram stain 1+ wbc, tissue cx pending      IMPRESSION:  PVE which decompensated despite medical therapy so s/p scheduled MVR  REDO STERNOTOMY, REDO MITRAL VALVE REPLACEMENT, PLACEMENT TEMPORARY VENTRICULAR PACING WIRES, ANESTHESIA TRANSESOPHAGEAL ECHOCARDIOGRAM (N/A)  Doing well POD 1    PLAN:  I will do IV CTX for another 6 weeks as d/w Dr. hightower  This coming Sunday I'm going to give penicillin  mg oral and see if he does ok on it  Then 500mg PenVK possibly for life, I had an internal discussion with Dr. Hightower about this idea.    Thank you for calling me again and his good care on 5100.        BISHNU GARCIA MD  [facility]  Office [phone]

## 2021-06-09 NOTE — PROGRESS NOTES
Arrived for daily Rocephin infusion. Pt. Thinks he may have taken a full tablet of metoprolol this am instead of half a pill and he will not take anymore Metoprolol until tomorrow. Pulse rate low to upper 50's. No new concerns or medication changes since here yesterday. Brisk blood return from picc when he raises right arm, flushes easily in any position. Tolerated Rocephin well while here and left amb. RTC 02/23/17

## 2021-06-09 NOTE — PROGRESS NOTES
Pharmacy Consult: Warfarin Management    Pharmacy consulted to dose warfarin for Bandar Wells, a 66 y.o. male    Ordering provider: Janusz Hall PA-C  Reason for warfarin therapy: Atrial Fibrillation, prosethic valve  Goal INR Range: 2.5-3.5    Subjective  Medication lists (home and hospital) were reviewed.    New medications that may increase bleeding risk/INR: Rocephin, Penicillin VK, Heparin    Started home medications that may increase bleeding risk/INR: Aspirin    Home Warfarin Dosing: Patient was previously on Apixaban 5 mg BID, then enoxaparin prior to surg    Other Anticoagulants: Heparin - Low Dose protocol   Patient being bridged: Yes    Patient Active Problem List   Diagnosis     Hx of bacterial endocarditis     Dyslipidemia     Mitral valve prolapse     S/P MVR (mitral valve replacement)     Recurrent pleural effusion on right     Pericardial effusion without cardiac tamponade     Status post Maze operation for atrial fibrillation     Paroxysmal atrial fibrillation     Coronary artery disease     Acute diastolic heart failure     CHF (congestive heart failure)     Endocarditis of prosthetic valve, sequela     Prosthetic valve dysfunction     Hypovolemia     Hypotension     Prosthetic valve endocarditis     Acute respiratory failure with hypoxia     Acute blood loss anemia     Acute renal failure    Past Medical History:   Diagnosis Date     Abnormal liver function test      Atrial fibrillation     post naun     Atrial fibrillation with RVR 8/29/2015    S/p MAZE Jul 2015     Bacterial endocarditis      CHF (congestive heart failure)      Dyslipidemia      Hyperlipidemia      Mitral valve prolapse      Near syncope      Pericardial effusion without cardiac tamponade 8/29/2015     Status post Maze operation for atrial fibrillation 8/29/2015        Social History   Substance Use Topics     Smoking status: Never Smoker     Smokeless tobacco: None     Alcohol use 3.6 oz/week     5 Glasses of wine, 1 Cans of beer  per week       Objective   Labs:  Last 3 days:    Recent Labs      03/17/17   1127  03/18/17   0539  03/18/17   0540  03/18/17   0949  03/19/17   0422  03/20/17   0507   CREATININE   --    --   1.67*   --   1.83*  1.71*   HGB  7.9*   --    --   7.9*  7.8*  8.0*   HCT  24.7*   --    --   25.6*  25.4*   --    PLT  136*  130*   --   140  153  194     Last 7 days:   Recent labs: (last 7 days)      03/16/17   1449  03/16/17   1548  03/17/17   0411  03/17/17   1127  03/18/17   0539  03/19/17   0422  03/20/17   0507   INR  1.41*  1.35*  1.30*  1.24*  1.23*  1.19*  1.46*     Warfarin Dosing History:    Date INR Warfarin Dose Comment   03/17/17 1.24 5 mg    03/18/17 1.23 5 mg    03/19/17 1.19 7.5 mg    3/20/17 1.46 7.5 mg      Assessment  The patient is initiating warfarin for the indication of Atrial Fibrillation with a goal INR of 2.5-3.5. INR is subtherapeutic today. INR increased from yesterday after the 3rd dose, likely increase is related to first 5 mg dose. Since INR still < 1.5 and goal is 2.5-3.5 will continue to dose slightly more aggressively to get patient into therapeutic range and monitor to prevent supratherapeutic INR levels.    Plan  1. Administer warfarin 7.5 mg PO today.  2. Check INR daily or as appropriate.  3. Continue to follow the patient's INR, PLT, and HGB as available.  4. Monitor for potential drug/disease interactions.    Thank you for the consult,  Wesley J Franke, PharmD 3/20/2017 10:41 AM

## 2021-06-09 NOTE — PROGRESS NOTES
[facility] Patient Outreach:     Discharge Date::  3/30/17      Reason for TCU stay (discharge diagnosis)::  Mechanical mitral valve replacement due to prosthetic valve endocarditis, A-fib, anemia      Are you feeling better, the same or worse since your discharge?:  Patient is feeling better          As part of your discharge plan, did they discuss home care with you?: Yes (goes daily to outpatient at [facility])        Have your seen them yet, or are they scheduled to visit?: No        Did you receive any new medications, or was there a change to your medications?: Yes        Are you taking those medications, or do you have any established regiment?:  Cardiology stopped Metoprolol as of 4/3/17.      Do you have any follow up visits scheduled with your PCP or Specialist?:  Yes, with PCP      (RN) Is it scheduled soon enough (3-5 days)?: Yes

## 2021-06-09 NOTE — PROGRESS NOTES
"Arrived for daily Rocephin. Denies any changes since here yesterday \" only complaint is I get tired and short of breath with walking up stairs or walking longer distances but think it is a little better\" VSS. PICC remains patent. Rocephin infused and pt tolerated this well while here. Left amb, refuses a w/c. RTC 02/24/17  "

## 2021-06-09 NOTE — OP NOTE
DATE OF SERVICE: 03/16/2017    DATE OF SERVICE:  03/16/2017    PREOPERATIVE DIAGNOSES:  1.  Prosthetic valve dysfunction.  2.  History of mitral valve replacement with a bioprosthetic valve.  3.  Prosthetic mitral valvular stenosis and regurgitation.  4.  Prosthetic valvular endocarditis.    POSTOPERATIVE DIAGNOSIS:  1.  Prosthetic valve dysfunction.  2.  History of mitral valve replacement with a bioprosthetic valve.  3.  Prosthetic mitral valvular stenosis and regurgitation.  4.  Prosthetic valvular endocarditis.    PROCEDURE PERFORMED:  1.  Redo sternotomy.  2.  Mitral valve replacement (31 mm [employer] medical mechanical mitral valve).    SURGEON:  Raphael Rosenberg MD, PhD    FIRST ASSISTANT:  Rm Munoz MD     SECOND ASSISTANT:  Constanza Harrison, surgical first assistant/surgical technician.    ANESTHESIA:  General endotracheal anesthesia.    ANESTHESIOLOGIST:  Micah Faye MD    ESTIMATED BLOOD LOSS:  1 liter.    SPECIMEN:  Previously placed bioprosthetic mitral valve and thrombus/vegetation.    INDICATIONS FOR PROCEDURE:  Mr. Wells is a 66-year-old man with a history of native  mitral valvular endocarditis that was treated with antibiotics.  He subsequently  developed severe mitral regurgitation.  He underwent mitral valve replacement with a  [employer] medical Epic bioprosthetic valve.  He then developed prosthetic valvular  endocarditis and was treated with antibiotics.  Now, a year and a half later, he has developed a  recurrence of prosthetic valvular endocarditis with positive blood cultures.  He is  currently being treated with antibiotics.  He understands the risks and benefits of  the procedure and wishes to undergo the operation.    OPERATIVE FINDINGS:  There were dense adhesions throughout the mediastinum.  There  was no calcified plaque in the ascending aorta.  The previously placed [employer]  medical Epic bioprosthetic valve was covered with vegetation and thrombus with  almost no motion  of the leaflets.  There was no annular abscess.  After valve  replacement with a [employer] medical mechanical valve, leaflet motion was normal.   The valve was competent.  Postoperatively on transesophageal echocardiography, there  was no paravalvular leak.  Left ventricular function was minimally diminished  preoperatively and unchanged after bypass on low-dose inotropic support.  After  reperfusion, atrial fibrillation was present which was present preoperatively.    OPERATIVE DESCRIPTION IN DETAIL:  After obtaining informed consent, the patient was  brought to the operating room and placed in the supine position on the operating  room table.  Appropriate lines and devices for monitoring were placed by the  anesthesia team.  The patient underwent smooth induction of general anesthesia, and  the trachea was intubated orally.  A right internal jugular Cordis introducer was  placed, and a Ethel-Mildred catheter was placed through this.  The patient was prepped  and draped, and a timeout was performed to confirm the correct patient identity, as  well as the procedure to be performed.      A redo median sternotomy was performed.  The  previously placed sternal wires were removed.  Extensive adhesiolysis was undertaken  and this required approximately an hour of dissection before cannulation could be  performed.  The patient was given full dose heparin and after cannulation of the  distal ascending aorta, the inferior vena cava and the superior vena cava  cardiopulmonary bypass was commenced.  Antegrade and retrograde cardioplegia  cannulae were placed.  The heart was arrested with 1 liter of cold antegrade blood  cardioplegia.  Subsequent maintenance doses of 300 mL of cold retrograde blood  cardioplegia were given approximately every 20 minutes.  The patient was cooled to  28 degrees Celsius.  Topical cold saline slush was applied for additional  protection.      The vena cavae were snared, and a right atriotomy was  performed. Through a transseptal approach, the  previously placed Epic mitral valve was examined with the above findings noted.  The  valve was removed using sharp and blunt dissection.  The previously placed pledgets  were removed, and the annulus was debrided meticulously.  The mitral annulus and left  ventricle were irrigated with 4 liters of cold saline.  Pledgeted 2-0 Ethibond  everting mattress sutures were placed (from the atrial side to the ventricular side)  circumferentially.  These sutures were then brought through the sewing ring of a 31  mm [employer] medical mechanical valve, and the valve was lowered into place.  The  sutures were tied down and cut with a Cor-Knot device.  The motion of the leaflets  was confirmed, and the competence of the valve was confirmed with saline.  The  septostomy was closed in 2 layers with running 4-0 Prolene.  The right atriotomy was  closed in 2 layers with running 4-0 Prolene.     The caval snares were released.  After delivering a hot shot  through the retrograde cardioplegia cannula, the  crossclamp was released, and the heart was de-aired and resuscitated .  The patient weaned from cardiopulmonary  bypass, was given protamine and decannulated.  All bleeding points were controlled.  A bipolar ventricular  pacing lead was placed and brought out through a separate stab incision.  A  28-Stateless chest tube and a 24-Stateless Renny drain was placed in the mediastinum and  brought out through a separate stab incision.  The sternal edges were reapposed with  3 interrupted #6 stainless steel sternal wires in the manubrium, 3 double wires in  the body of the sternum and 1 additional #6 stainless steel sternal wire at the  lower aspect of the sternum.  The muscle, fascia, and skin were closed in layers  with absorbable suture.  Dermabond was applied.  All sponge, needle and instrument  counts were correct at the end of the case.  Total cardiopulmonary bypass time was  150 minutes.   Aortic crossclamp time was 112 minutes.  The patient received multiple  blood and blood products at the end of case.      MD yao CHENG 03/24/2017 21:12:16  T 03/24/2017 21:44:32  R 03/24/2017 21:44:32  [id number]        cc: KATHRYN REYNOLDS MD

## 2021-06-09 NOTE — PROGRESS NOTES
"Arrived for daily rocephin.  No new c/o's.  See toxicity assessment.  Tries to nap for an hour after lunch each day but is still very fatigued and feels this is partly due to his insomnia.  \"I know I had this before when I had an infection\".  Does also report noc sweat \"about every other noc\".  States he does need to change his noc clothing when it occurs.  Given rocephin without problem.  PICC functioning well with blood return as noted.  Tolerated infusion well while here.  Left ambulatory by self at 1000.  Will return on Saturday and Sunday to unit 5000 and here again on Monday.    "

## 2021-06-10 NOTE — PROGRESS NOTES
Arrived for daily Rocephin. States he is glad labs are good and he can feel improvement in his general well being post op. Comfortable with driving.PICC patent and rocephin administered and this was well tolerated while here. Left amb. RTC 04/20/17

## 2021-06-10 NOTE — TELEPHONE ENCOUNTER
RN cannot approve Refill Request    RN can NOT refill this medication Protocol failed and NO refill given. Last office visit: 2/13/2020 Jin Bhat MD Last Physical: 1/17/2020 Last MTM visit: Visit date not found Last visit same specialty: 2/13/2020 Jin Bhat MD.  Next visit within 3 mo: Visit date not found  Next physical within 3 mo: Visit date not found      Elin [place], [facility] Triage/Med Refill 8/4/2020    Requested Prescriptions   Pending Prescriptions Disp Refills     warfarin ANTICOAGULANT (COUMADIN/JANTOVEN) 3 MG tablet [Pharmacy Med Name: WARFARIN SODIUM 3 MG TABS 3 TAB] 160 tablet 1     Sig: TAKE ONE TO TWO TABLETS (3-6MG) BY MOUTH DAILY, AS DIRECTED. ADJUST DOSE BASED ON INR RESULTS.       Warfarin Refill Protocol  Failed - 8/4/2020  3:09 PM        Failed -  Route to appropriate pool/provider     Last Anticoagulation Summary:   Anticoagulation Episode Summary     Current INR goal:   2.5-3.5   TTR:   26.8 % (1 y)   Next INR check:   8/11/2020   INR from last check:   2.10! (8/4/2020)   Weekly max warfarin dose:      Target end date:   Indefinite   INR check location:      Preferred lab:      Send INR reminders to:   [facility]    Indications    Paroxysmal atrial fibrillation (H) [I48.0]  S/P MVR (mitral valve replacement) [Z95.2]  S/P mitral valve replacement with metallic valve [Z95.4]           Comments:   INR TARGET GOAL 2.5 - 3.5         Anticoagulation Care Providers     Provider Role Specialty Phone number    Jin Bhat MD Referring Internal Medicine [phone]                Passed - Provider visit in last year     Last office visit with prescriber/PCP: 2/13/2020 Jin Bhat MD OR same dept: 2/13/2020 Jin Bhat MD OR same specialty: 2/13/2020 Jin Bhat MD  Last physical: 1/17/2020 Last MTM visit: Visit date not found    Next appt within 3 mo: Visit date not found Next physical within 3 mo: Visit date not found  Prescriber OR  PCP: Jin Bhat MD  Last diagnosis associated with med order: There are no diagnoses linked to this encounter.  If protocol passes may refill for 6 months if within 3 months of last provider visit (or a total of 9 months).

## 2021-06-10 NOTE — PROGRESS NOTES
ITP ASSESSMENT   Assessment Day: Initial  Session Number: 1 of 18-36  Precautions: standard CV surgical precautions  Diagnosis: Valve  Risk Stratification: High  Referring Provider: Raphael Rosenberg, *  EXERCISE  Exercise Assessment: Initial     6 Minute Walk Test   Pre   Pre Exercise HR: 45                  Pre Exercise BP: 123/63    Peak  Peak HR: 98                 Peak BP: 146/79  Peak feet: 800  Peak O2 SAT: 94  Peak RPE: 13  Peak MPH: 1.52    Symptoms:  Peak Symptoms: just starting to get winded    5 mins. Post  5 Min Post HR: 48 ( junctional rhythm 30s post walk. Asx. EKG fax to )  5 Min Post BP: 149/68                         Exercise Plan  Goals Next 30 days  ADL'S: climb 1-2 flights steps without fatigue and sob  Leisure: try putting and chipping gently to work on golCatacel game without sx or sternal discomfort.     Education Goals: All goals in this section met  Education Goals Met: Patient can state cardiac s/s and appropriate emergency response.;Has system for taking medication.;Medication review.    Exercise Prescription  Exercise Mode: Treadmill;Bike;Nustep  Frequency: 3x week   Duration: 45-50 min  Intensity / THR: 20-30 beats above resting heart rate  RPE 11-14  Progression / Met level: 2-4 mets as tolerated      Current Exercise (mins/week): 1    Interventions  Home Exercise:  Mode: walk  Frequency: 5-7 days / week  Duration: 5 - 10 min 3-5 x daily with rest as needed    Education Material : Offer educational classes    Education Completed  Exercise Education Completed: Signs and Symptoms;Medication review;RPE;Emergency Plan;Home Exercise;Warm up/cool down;FITT Principles;BP/HR Reponse to exercise;Benefits of Exercise;End point of exercise            Exercise Follow-up/Discharge  Follow up/Discharge: To begin home TM after being assessed  in CR NUTRITION  Nutrition Assessment: Initial    Nutrition Risk Factors:  Nutrition Risk Factors: Dyslipidemia  Cholesterol: 197  LDL: 109  HDL:  "62  Triglycerides: 130       Education Completed  Nutrition Education Completed: Risk factor overview;Low sodium diet;Weight management    Goals  Nutrition Goals (Next 30 days): Patient will maintain current weight or gradual weight gain    Goals Met  Nutrition Goals Met: Patient follows a low sodium diet;Patient knows appropriate portion size    Height, Weight, and  BMI  Weight: 169 lb (76.7 kg)  Height: 5' 10\" (1.778 m)  BMI: 24.25    Nutrition Follow-up  Follow-up/Discharge: Pt hopes to maintain healthy weight thru exercise and healthy eating .       Other Risk Factors  Other Risk Factor Assessment: Initial    HTN Risk Factor: NA    Pre Exercise BP: 123/63  Post Exercise BP: 146/79            PSYCHOSOCIAL  Psychosocial Assessment: Initial     [facility] Q of L Summary Score: 22    ERIC-D Score: 23    Psychosocial Risk Factor: Stress    Psychosocial Plan  Interventions  If ERIC-D > 15 send letter to MD  Interventions: Individual education and counseling    Education Completed  Education Completed: Relaxation/Coping Techniques    Goals  Goals (Next 30 days): Practicing stress management skills    Goals Met  Goals Met: Identify stressors    Psychosocial Follow-up  Follow-up/Discharge: pt feels as his health improves his stress level with decrease           Patient involved in Goal setting?: Yes    Signature: _____________________________________________________________    Date: __________________    Time: __________________  "

## 2021-06-10 NOTE — PROGRESS NOTES
"Bandar Wells is a 69 y.o. male who is being evaluated via a billable video visit.      The patient has been notified of following:     \"This video visit will be conducted via a call between you and your physician/provider. We have found that certain health care needs can be provided without the need for an in-person physical exam.  This service lets us provide the care you need with a video conversation.  If a prescription is necessary we can send it directly to your pharmacy.  If lab work is needed we can place an order for that and you can then stop by our lab to have the test done at a later time.    Video visits are billed at different rates depending on your insurance coverage. Please reach out to your insurance provider with any questions.    If during the course of the call the physician/provider feels a video visit is not appropriate, you will not be charged for this service.\"    Patient has given verbal consent to a Video visit? Yes  How would you like to obtain your AVS? AVS Preference: Mail a copy.  If dropped by the video visit, the video invitation should be sent to: Text to cell phone: [phone]  Will anyone else be joining your video visit? No    Video Start Time: 7:28 AM    Additional provider notes: GENERAL: Healthy, alert and no distress  EYES: Eyes grossly normal to inspection. No discharge or erythema, or obvious scleral/conjunctival abnormalities.  RESP: No audible wheeze, cough, or visible cyanosis.  No visible retractions or increased work of breathing.    NEURO: Cranial nerves grossly intact. Mentation and speech appropriate for age.  PSYCH: Mentation appears normal, affect normal/bright, judgement and insight intact, normal speech and appearance well-groomed      Video-Visit Details    Type of service:  Video Visit    Video End Time (time video stopped): 7:55 AM  Originating Location (pt. Location): Home    Distant Location (provider location):  [facility] INTERNAL MEDICINE "     Platform used for Video Visit: [employer]      Jin Hicks MD       Video Visit - Follow Up   Bandar Wells   69 y.o. male    Date of Visit: 8/19/2020    Chief Complaint   Patient presents with     Neck Pain     Shoulder Pain     left        Assessment and Plan   1. Elevated uric acid in blood  Agree with starting Uloric, titration per nephrology.  Still unexplained arthralgias myositis and elevation inflammatory markers.  He would like to see rheumatology and I think it would be a good idea for him to establish at the [facility] where he is also receiving care.  He may see [facility] as well.  Hopefully the [facility] will be kind enough to see him.  - Ambulatory referral to Rheumatology    2. Myositis, unspecified myositis type, unspecified site  As above  - Ambulatory referral to Rheumatology    3. Myositis of left shoulder, unspecified myositis type  As above  - Ambulatory referral to Rheumatology    4. Drug eruption  Likely related to allopurinol, starting Uloric as above, follow with dermatology wounds are healing    5. Acute renal failure, unspecified acute renal failure type (H)  Kidney function improved    6. Bone lesion  Somewhat unexplained, undergoing continued evaluation of paraproteinemia with nephrology    7. Chronic combined systolic and diastolic congestive heart failure (H)  Appears euvolemic continue same follows with cardiology    8. CKD (chronic kidney disease) stage 3, GFR 30-59 ml/min (H)  Kidney function now stabilized improved    Return in about 4 weeks (around 9/16/2020) for recheck.     History of Present Illness   This 69 y.o. old man is seen telephonically/video visit for follow-up of numerous medical problems.  Overall doing much better.  Leg swelling essentially resolved.  Wounds are healing, follows with dermatology after drug reaction presumably to allopurinol.  Recently saw new nephrologist, Dr. Florencia Nguyen.  She and I spoke today.  She is  continue evaluation for paraproteinemia.  She rechecked uric acid level which is still elevated and is going to start him on Uloric.  He is still interested in seeing [facility] and has not yet established an appointment.  His shoulder pain is better.  Kidney function improved.    Review of Systems: A comprehensive review of systems was negative except as noted.     Medications, Allergies and Problem List   Reviewed, reconciled and updated  Post Discharge Medication Reconciliation Status:      Additional Information   Current Outpatient Medications   Medication Sig Dispense Refill     acetaminophen (TYLENOL) 500 MG tablet Take 500-1,000 mg by mouth every 6 (six) hours as needed for pain. Pain 1-5  Give 500 mg  Or pain 6-10 give 1000 mg every 6 hours ad needed not to exceed 4 grams in 24 hours       b complex vitamins tablet Take 1 tablet by mouth daily.        cholecalciferol, vitamin D3, 1,000 unit tablet Take 1,000 Units by mouth daily.       clindamycin (CLEOCIN) 300 MG capsule Take 2 capsules (600 mg total) by mouth see administration instructions. Take prior to Dentist appointments 2 capsule 3     cyanocobalamin 1000 MCG tablet Take 1,000 mcg by mouth daily.       FLAXSEED OIL ORAL Take 1,000 mg by mouth daily.        fluticasone (FLONASE) 50 mcg/actuation nasal spray 1 spray into each nostril daily as needed.        folic acid (FOLVITE) 1 MG tablet Take 1 mg by mouth daily.       L. ACIDOPHILUS/BIFIDO LONGUM (PROBIOTIC PEARLS ORAL) Take 1 capsule by mouth daily.       multivitamin therapeutic (THERAGRAN) tablet Take 1 tablet by mouth daily.       oxyCODONE (ROXICODONE) 10 mg immediate release tablet Take 0.5-1 tablets (5-10 mg total) by mouth every 4 (four) hours as needed for pain. 42 tablet 0     penicillin VK (PEN VK) 500 MG tablet TAKE 1 TABLET BY MOUTH DAILY 90 tablet 1     potassium chloride (K-DUR,KLOR-CON) 20 MEQ tablet Take 1 tablet (20 mEq total) by mouth daily. (Patient taking differently: Take 20  mEq by mouth 2 (two) times a day. ) 90 tablet 2     senna (SENOKOT) 8.6 mg tablet Take 1-4 tablets by mouth 2 (two) times a day. 90 tablet 1     torsemide (DEMADEX) 50 MG Take 50 mg by mouth 2 (two) times a day at 9am and 6pm.       warfarin ANTICOAGULANT (COUMADIN/JANTOVEN) 3 MG tablet Take 1 &1/2  Tablets (4.5mg) to 2 tablets (6mg) by mouth daily, as directed.  Adjust dose based on INR results. 170 tablet 1     No current facility-administered medications for this visit.      Allergies   Allergen Reactions     Allopurinol Rash     Social History     Tobacco Use     Smoking status: Never Smoker     Smokeless tobacco: Never Used   Substance Use Topics     Alcohol use: Yes     Alcohol/week: 2.0 standard drinks     Types: 2 Shots of liquor per week     Frequency: 2-3 times a week     Drug use: No       Review and/or order of clinical lab tests: Reviewed  Review and/or order of radiology tests: Reviewed  Review and/or order of medicine tests:  Discussion of test results with performing physician:  Decision to obtain old records and/or obtain history from someone other than the patient:  Review and summarization of old records and/or obtaining history from someone other than the patient and.or discussion of case with another health care provider: I spoke with Dr. Lopez, nephrology and reviewed extensively outside records  Independent visualization of image, tracing or specimen itself:    Time:      Jin Hicks MD

## 2021-06-10 NOTE — TELEPHONE ENCOUNTER
ANTICOAGULATION  MANAGEMENT    Assessment     Today's INR result of 4.6 is Supratherapeutic (goal INR of 2.5-3.5)        Warfarin taken as previously instructed    No new diet changes affecting INR    No new medication/supplements affecting INR abx are finished    Continues to tolerate warfarin with no reported s/s of bleeding or thromboembolism     Previous INR was Therapeutic    Plan:     Spoke with Bandar regarding INR result and instructed:     Warfarin Dosing Instructions:  hold today and take 3 mg tomorrow to lower inr then continue current warfarin dose 4.5 mg daily on tue/fri; and 6 mg daily rest of week        Instructed patient to follow up no later than: one week with home monitor      Bandar verbalizes understanding and agrees to warfarin dosing plan.    Instructed to call the [facility] for any changes, questions or concerns. (#[phone])   ?   Shmuel Jeffers RN    Subjective/Objective:      Bandar Wells, a 69 y.o. male is on warfarin.     Bandar reports:     Home warfarin dose: verbally confirmed home dose with Bandar and updated on anticoagulation calendar     Missed doses: No     Medication changes:  No     S/S of bleeding or thromboembolism:  No     New Injury or illness:  No     Changes in diet or alcohol consumption:  No     Upcoming surgery, procedure or cardioversion:  No    Anticoagulation Episode Summary     Current INR goal:   2.5-3.5   TTR:   26.6 % (1 y)   Next INR check:   8/3/2020   INR from last check:   4.60! (7/27/2020)   Weekly max warfarin dose:      Target end date:   Indefinite   INR check location:      Preferred lab:      Send INR reminders to:   [facility] [place]    Indications    Paroxysmal atrial fibrillation (H) [I48.0]  S/P MVR (mitral valve replacement) [Z95.2]  S/P mitral valve replacement with metallic valve [Z95.4]           Comments:   INR TARGET GOAL 2.5 - 3.5         Anticoagulation Care Providers     Provider Role Specialty Phone number    Jin Bhat MD  [facility] Internal Medicine [phone]

## 2021-06-10 NOTE — TELEPHONE ENCOUNTER
ANTICOAGULATION  MANAGEMENT    Assessment     Today's INR result of 2.10 is Subtherapeutic (goal INR of 2.5-3.5)        Warfarin recently held as instructed which may be affecting INR   - HELD warfarin on 7/27/20.    No new diet changes affecting INR    Potential interaction between tapering Prednisone dose and Triamcinolone ointment and warfarin which may affect subsequent INRs    - Prednisone 10mg.  (take 40mg x days, 20mg x5 days, and 10mg x5 days, and   - Triamcinolone ointment applied BID    Continues to tolerate warfarin with no reported s/s of bleeding or thromboembolism     Previous INR was Supratherapeutic at 4.60 on 7/27.    Referral to [facility] for consultation with Internal medicine.    Plan:     Spoke with Bandar regarding INR result and instructed:     Warfarin Dosing Instructions:     Change warfarin dose to 4.5 mg daily on Tues/Fri; and 6 mg daily rest of week.    Instructed patient to follow up no later than:  One wk   - checks INR wkly with home monitor thru [employer].    Education provided: target INR goal and significance of current INR result    Bandar verbalizes understanding and agrees to warfarin dosing plan.    Instructed to call the [facility] for any changes, questions or concerns. (#[phone])   ?   Meg Jacinto RN    Subjective/Objective:      Bandar Wells, a 69 y.o. male is on warfarin.     Bandar reports:     Home warfarin dose: verbally confirmed home dose with Bandar and updated on anticoagulation calendar     Missed doses: Yes:  Reported holding warfarin on 7/27.     Medication changes:  Yes.  Tapering dose with Prednisone and Triamcinolone ointment.     S/S of bleeding or thromboembolism:  No     New Injury or illness:  Yes:  Reported his legs hurt from from leg sores.  (during leg edema, fluid seeped out due to leg swelling).     Changes in diet or alcohol consumption:  No     Upcoming surgery, procedure or cardioversion:  No    Anticoagulation Episode Summary     Current INR  goal:   2.5-3.5   TTR:   26.8 % (1 y)   Next INR check:   8/11/2020   INR from last check:   2.10! (8/4/2020)   Weekly max warfarin dose:      Target end date:   Indefinite   INR check location:      Preferred lab:      Send INR reminders to:   [facility], [place]    Indications    Paroxysmal atrial fibrillation (H) [I48.0]  S/P MVR (mitral valve replacement) [Z95.2]  S/P mitral valve replacement with metallic valve [Z95.4]           Comments:   INR TARGET GOAL 2.5 - 3.5         Anticoagulation Care Providers     Provider Role Specialty Phone number    Jin Bhat MD Referring Internal Medicine [phone]

## 2021-06-10 NOTE — PROGRESS NOTES
" FYI :  Pt gradually noting more stamina and is \"feeling better than he has in a long time\"  Monitor showing AFlutter with exercise and Junctional rhythm with controlled rate of 57-60  with  rest post exercise.. Asx and BP stable 122/74. EKG strips scanned into system for review.   "

## 2021-06-10 NOTE — PROGRESS NOTES
CLINIC FOLLOW UP NOTE:    Original Reason for consultation, site patient seen:follow up post cardiac surgery    Interval history:  First visit since got off IV and onto daily PCN.  Stronger.  Weight 170#.  Brighter spirits, very mentally sharp and with it.  No complaints, GI Ok.            Current antibiotics and duration plan:daily PCN long term    Reviewed PAST MEDICAL HISTORY,  family and social history      Examination:  Alert, awake  Vitals tabulated above, reviewed  Neck supple  Sclera OK  CARDIOVASCULAR regular rate and rhythm, no mumur pulse around 70  Incision very clean, few healing scabs  Lungs CLEAR TO AUSCULTATION   Abdomen soft, NT/ND, absent HEPATOSPLENOMEGALY  Skin normal  Joints normal  Neurologic exam non focal  Wound:  N/A    Lab Data/New Imaging/Medication levels/Microbiologic data:  Reviewed, nothing of new concern    IMPRESSION:  Post MVR  Recurrent endocarditis, even happened while ON iv abx last time  Appetite improving slowly, same for energy    PLAN:  Daily PCN probably for life  Takes clinda for dental prophylaxis, he isn't allergic to amox classically any more given takes daily PEN VK but I'll just leave dental prophy same as prior.    Grateful Bandar is to Dr. Rosenberg and his nurses, thanks again    Thank you for allowing me to continue care of this patient.    Sincerely:      BISHNU Ordonez MD  [facility]  [facility]   [phone]

## 2021-06-10 NOTE — PROGRESS NOTES
CLINIC FOLLOW UP NOTE:    Original Reason for consultation, site patient seen:[facility]    Interval history:  Well known to me.  On IV CTX for many weeks.  He feels he is getting better this time faster than the prior time.  New 31mm St Hector mitral valve.  I looked over final micro and path.  Path saw clotty like material on the valve but no comment on wbcs or orgs and all micro is TOTALLY negative from the valve.  Saw Chet and all happy with incision and progress thus far.  ESR and CRP are better all blood salts and labs are good as are LFTs    Lost 20 pounds but out of TCU now with better appetite          Current antibiotics and duration plan:  CTX to May 1  Reviewed PAST MEDICAL HISTORY,  family and social history      Examination:  Alert, awake  Vitals tabulated above, reviewed  Neck supple  Sclera OK  CARDIOVASCULAR regular rate and rhythm, no mumur nice crisp click noted  Lungs CLEAR TO AUSCULTATION   Abdomen soft, NT/ND, absent HEPATOSPLENOMEGALY  Skin normal  Joints normal  Neurologic exam non focal  Wound:  N/A    Lab Data/New Imaging/Medication levels/Microbiologic data:  Component      Latest Ref Rng & Units 9/17/2015 9/24/2015 10/2/2015 10/8/2015   CRP      0.0 - 0.8 mg/dL 5.6 (H) 3.7 (H) 2.9 (H) 1.6 (H)     Component      Latest Ref Rng & Units 2/9/2017 2/21/2017 2/28/2017 3/27/2017   CRP      0.0 - 0.8 mg/dL 11.7 (H) 2.7 (H) 4.9 (H) 7.9 (H)     Component      Latest Ref Rng & Units 4/4/2017 4/11/2017 4/18/2017   CRP      0.0 - 0.8 mg/dL 5.7 (H) 2.4 (H) 1.8 (H)     Component      Latest Ref Rng & Units 9/10/2015 9/17/2015 9/24/2015 10/2/2015   Sed Rate      0 - 15 mm/hr 104 (H) 105 (H) 111 (H) 114 (H)     Component      Latest Ref Rng & Units 10/8/2015 2/9/2017 4/4/2017 4/11/2017   Sed Rate      0 - 15 mm/hr 104 (H) 103 (H) 85 (H) 71 (H)     Component      Latest Ref Rng & Units 4/18/2017   Sed Rate      0 - 15 mm/hr 77 (H)     IMPRESSION:  Strep gordonae infective endocarditis, 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  If does good on this might do for life?    Appreciate all the work Dr. Rosenberg did for Bandar and especially also tigist and kajal RNs at [facility] too.    Thank you for allowing me to continue care of this patient.    Sincerely:      BISHNU Ordonez MD  [facility]  [facility]   [phone]

## 2021-06-10 NOTE — TELEPHONE ENCOUNTER
ANTICOAGULATION  MANAGEMENT    Assessment     Today's INR result of 2.00 is Subtherapeutic (goal INR of 2.5-3.5)        Warfarin taken as previously instructed    No new diet changes affecting INR    No new medication/supplements affecting INR    Continues to tolerate warfarin with no reported s/s of bleeding or thromboembolism     Previous INR was Subtherapeutic at 2.10 on 8/4/20.    Plan:     Spoke on phone with Bandar regarding INR result and instructed:    1.  Bandar requested a call back and leave detailed message of new warfarin dose on VM.    Warfarin Dosing Instructions:  (evenings. has 3mg tabs)   - Change warfarin dose to 7.5 mg daily on Tuesdays; and 6 mg daily rest of week  (11.5 % change)    Instructed patient to follow up no later than: one wk.   - checks INR's wkly with home monitor thru [employer].    Education provided: importance of consistent vitamin K intake, target INR goal and significance of current INR result and importance of notifying clinic for changes in medications    Bandar verbalizes understanding and agrees to warfarin dosing plan.    Instructed to call the [facility] for any changes, questions or concerns. (#[phone])   ?   Meg Jacinto RN    Subjective/Objective:      Bandar Wells, a 69 y.o. male is on warfarin.     Bandar reports:     Home warfarin dose: verbally confirmed home dose with Bandar and updated on anticoagulation calendar     Missed doses: No     Medication changes:  No     S/S of bleeding or thromboembolism:  No     New Injury or illness:  No     Changes in diet or alcohol consumption:  No     Upcoming surgery, procedure or cardioversion:  No    Anticoagulation Episode Summary     Current INR goal:   2.5-3.5   TTR:   26.7 % (1 y)   Next INR check:   8/18/2020   INR from last check:   2.00! (8/11/2020)   Weekly max warfarin dose:      Target end date:   Indefinite   INR check location:      Preferred lab:      Send INR reminders to:   [facility]     Indications    Paroxysmal atrial fibrillation (H) [I48.0]  S/P MVR (mitral valve replacement) [Z95.2]  S/P mitral valve replacement with metallic valve [Z95.4]           Comments:   INR TARGET GOAL 2.5 - 3.5         Anticoagulation Care Providers     Provider Role Specialty Phone number    Jin Bhat MD Referring Internal Medicine [phone]

## 2021-06-10 NOTE — PROGRESS NOTES
"Arrived for daily rocephin.  States he's feeling \"pretty good\" today, \"probably the best I've felt since surgery\".  Continues to have shoulder and upper back aching as noted on toxicity assessment \"but it's not bad\".  Pulse slightly irregular today.  Is seeing cardiac surgeon today after leaving here.  Given rocephin without problem.  PICC looks good.  Left via w/c accompanied by friend at 0935  to go to cardiac surgery appt.  Will return to [address] over weekend and then back here on Monday am.  Understanding of plan.   "

## 2021-06-10 NOTE — TELEPHONE ENCOUNTER
Left message to call back for: Bandar  Information to relay to patient:  Please relay message below from Dr. Hicks and let us know if the appointment will work.  Thank you.  Elyssa HERNANDEZ, ADOLPH/CMT....................3:33 PM

## 2021-06-10 NOTE — TELEPHONE ENCOUNTER
Please call patient offer him an appt on dr. Hicks's schedule for 8/19/20 @ 720 am.  Please add to Dr. Hicks's schedule if this works, thanks.

## 2021-06-10 NOTE — TELEPHONE ENCOUNTER
Dr. Hicks,  Patient would like to know if you would be willing to be his PCP.  Since he would like continue on with you regarding his issues since he has been working with you.  He would like you to know that he is still alive and has an appointment with you on 9/2/2020.  Please advise.  Thank you.  Elyssa HERNANDEZ CMA/BAHMAN....................3:06 PM

## 2021-06-10 NOTE — TELEPHONE ENCOUNTER
Dr. Hicks,  Spoke with the patient who will contact [facility] about his referral status.  He would like you to know that his legs are draining with is causing sores.  The sores are not open, but are oozing, which stings when they start to drain.  He is planning on sending a photo of his legs through my chart for you to review.  Elyssa HERNANDEZ, ADOLPH/BAHMAN....................8:48 AM

## 2021-06-10 NOTE — PROGRESS NOTES
"Arrived for daily Rocephin. Did hold coumadin last pm and now is on 6 mg daily with another inr on Monday. Pulse rate varies while here today from 48 to 60 and irregular. States very infrequently he feels a little lightheaded when standing up but it passes quickly and he takes his time, denies feeling like he could fall or that it is more than just minor. Pt. Encouraged to try and drink some extra fluids. Appetite continues to improve and stamina. States at times he still feels a little cloudy mentally but this is also minor, \" I write a lot of things down not to forget\". Has been pensive about his past and what the future holds. Denies feeling any depression. Rocephin infused and pt tolerated this well while here. Is picking up his oral antibiotic today so he can start it on 05/02/17. Pt left amb after ivab completed, denied any lightheadedness . RTC 04/27/17  "

## 2021-06-10 NOTE — PROGRESS NOTES
"Arrived for daily rocephin.  States he still feels tired.  Last week was saying \"better every day\" but feels that has \"leveled off and I wish I could turn a corner\".  Heart rate is slower and is more irregular this am.  States he did feel a \"little lightheaded\" this am but none while here.  States he is still off metoprolol.  No real dyspnea since being in [place].  Feels the stairs in his son's house was the issue over the weekend.  Labs drawn without problem on arrival and results noted and copy given to pt as well.  Given rocephin without problem.  Left ambulatory by self at 1020.  Will return in am.    "

## 2021-06-10 NOTE — PROGRESS NOTES
Arrived for daily rocephin.  No new problems or c/o's.  Seeing Dr. Ordonez this afternoon.  Looking forward to weekend when he is going to [place] for granddaughter's confirmation.  States he continues to feel continuing improvement in condition and he is happy about this.  Given rocephin without problem.  Left ambulatory by self at 0955.  Will return in am for rocephin.

## 2021-06-10 NOTE — TELEPHONE ENCOUNTER
Who is calling:  patient  Reason for Call:  patient has an appointment sept 2 regarding on going health concerns , would like to know if he can speak with the provider sooner? Please advise.  Date of last appointment with primary care:   Okay to leave a detailed message: Yes

## 2021-06-10 NOTE — PROGRESS NOTES
"Arrived for final dose of rocephin.  Pleased that this is his final dose but somewhat guarded because \"I've been down this road before\".  Hopeful that \"this will have taken care of it all\".  Continues to have irregular heart beat, but rate is better than last week.  States occasionally experiences light headedness with standing but \"if I take some deep breaths it goes away\".  Continues to feel fatigued and is hopeful that this will improve.  He is starting cardiac rehab tomorrow.  Upon completion of rocephin and flush of line, PICC removed without difficulty.  Pt observed for 20 minutes post removal of line without problems noted.  Left ambulatory by self.   AVS reviewed with pt and he verbalizes understanding of info given.    "

## 2021-06-10 NOTE — PROGRESS NOTES
Pt arrived for his antibiotic infusion and INR. Feeling OK- a little better each day. Sees Dr Rosenberg tomorrow. Lab drawn per protocol and result called to INR nurse Odette. Antibiotic infused without problem. Line flushed and saline locked. Left per w/c with transport. Will return tomorrow am before appt with MD.

## 2021-06-10 NOTE — PROGRESS NOTES
Pt arrived for his daily antibiotic infusion. Says no new problems. Continues to slowly feel better. Antibiotic infused without problem. Line flushed and saline locked at completion. Left per w/c with transport. Will return tomorrow am.

## 2021-06-10 NOTE — TELEPHONE ENCOUNTER
Not sure what is going on with this one. There is a mychart message from 7/24/2020 that Dr. Hicks sent to patient about [place].

## 2021-06-10 NOTE — TELEPHONE ENCOUNTER
Who is calling:  Vashti from [facility].  Reason for Call:  The caller states she needs the patient's labs that were drawn on 8/17/20 faxed.The patient has an appointment today. Fax to #[phone]  Date of last appointment with primary care:   Okay to leave a detailed message: Yes

## 2021-06-10 NOTE — PROGRESS NOTES
Bandar Wells has participated in 5 sessions of Phase II Cardiac Rehab.    Progress Report:   Cardiac Rehab Treatment Progress Report 5/2/2017 5/5/2017 5/8/2017 5/10/2017 5/12/2017   Weight 169 lbs 170 lbs 166 lbs 10 oz 170 lbs 168 lbs   Pre Exercise  HR 45-50 97 73 65 62   Pre Exercise /63 102/60 124/44 102/66 120/56   Pre Blood Sugar (mg/dl) NA - - - -   Treadmill Peak HR - 80 73 72 75   Treadmill Peak Blood Pressure - 112/60 114/52 128/54 130/62   Nustep Peak Heart Rate - 70 77 73 -   Nustep Peak Blood Pressure - - - 110/56 -   Heart Rate 48-62 60 66 64 -   Post Exercise /79 104/60 122/58 124/64 -   Post Blood Sugar (mg/dl) NA - - - -   ECG AFib, SB, ST, junctional A-fib, Junctional  SR/ AFib SR / AFib / PVC / bigeminy -   Total Exercise Minutes 4.5 35 30 45 25         Current Status:  Symptomatic: mild SOB while on treadmill. His endurance has shown a gradual improvement  ECG: Varies each session as noted above.    If Physician recommends change in treatment plan, please place orders.        __________________________________________________      _____________  Signature                                                                                                  Date

## 2021-06-10 NOTE — PROGRESS NOTES
Cardiac surgery    Mr. 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 He will complete IV antibiotics at the end of this month and then he will start oral penicillin therapy under the direction of Dr. Ordonez. Mr. Wells is doing well and will start cardiac rehab when he completes IV antibiotics. He will have follow up with Dr. Carney. He may start driving.    I asked him to please call me at the first sign of any trouble--specifically if he has fevers, chills, sweats, malaise, worsening dyspnea, or lower extremity edema.    Raphael Rosenberg MD PhD

## 2021-06-10 NOTE — PROGRESS NOTES
"Arrived for daily rocephin.  States he's \"feeling better every day\".  Able to walk up to department on own today and drove himself here as well.  States he is \"actually thinking I can maybe plan a vacation for this summer\".  Given rocephin without problem.  PICC site looks good with dressing change.  No discomfort or redness of any kind.  Left ambulatory by self at 0955.  Will return in am for next dose.   "

## 2021-06-10 NOTE — PROGRESS NOTES
Bandar arrived ambulatory on [address] at 1045. He denied any pain or problems with his PICC line. PICC dressing dry and intact. Obtained a blood return from PICC line and flushed it with 10 ml of NS. Ceftriaxone infused without problem. Flushed PICC with 20 ml of NS after antibiotic infusion and capped line with a green swab cap. Bandar left [address] at 1140 to return 4/30

## 2021-06-10 NOTE — PROGRESS NOTES
Arrived ambulatory. States he was cleared to drive and did drive here today, he thinks he is doing much better than a few days ago. Good spirits. VSS. Rocephin infused and this was well tolerated while here. States he wants to walk back to lobby.

## 2021-06-10 NOTE — PROGRESS NOTES
"Arrived for daily rocephin.  States he really enjoyed his weekend in [place].  Did note increased fatigue after returning home last evening.  Notes also he felt more short of breath with stair climbing but states son has 18 steps to get to his upper level of his home.  Was able to navigate stairs, \"but slowly\".  Denies dyspnea on walk over to our department today.  PICC flushes with ease.  Did require additional flush and arm movement to obtain blood return but one was obtained.  Given rocephin without problem.  Left ambulatory by self on completion of flush of PICC as noted on MAR and LDA flowsheet.  Will return in am for lab and dressing change in addition to rocephin.    "

## 2021-06-10 NOTE — PROGRESS NOTES
Arrived for daily rocephin.  States he didn't sleep as well last nite and doesn't feel as good as he did yesterday.  No real specific change in sx.  Labs drawn without problem via PICC and given rocephin without problem.  Pt has concerns about attending his granddaughter's confirmation on 4/23.  Spoke with Dr. Ordonez who is ok with pt receiving his rocephin on 4/22 and then taking po ceftin for 4/23 and then returning here for his infusion on 4/24.  Script for ceftin for 2 doses for 4/23 called to [facility] and pt notified of plan.  He is very happy to be able to attend the confirmation.  Left here this am at 1010 and will return here in am for next dose of rocephin.

## 2021-06-10 NOTE — TELEPHONE ENCOUNTER
Left message for patient to call back. Need to know what medical supply company he wants to use. Once we have the information we can have PCP sign order.    Jackelyn Vasquez, CMA

## 2021-06-10 NOTE — TELEPHONE ENCOUNTER
Was able to reach pt, the rash is doing better every day finally. However, pt was admitted to [facility] for the rash for almost a week, notes are in Epic. Pt now sees Dr. Johns- Dermatology, pt has follow up with him tomorrow.     Pt is not taking prednisone, joint feel okay, pt mentions some ankle swelling but feels it is fluid retention. Recommended follow up with PCP on this.     Pt states he was not told the cause of the rash, possibly reaction to allopurinol or colchicine, which pt stopped around July 11th.

## 2021-06-10 NOTE — PROGRESS NOTES
Arrived for daily rocephin.  States he feels good this am.  Did not hear from cardiology yesterday.  Is wearing his fitbit and HR has been in the 60's.  On arrival here, HR was in 40's but shortly after sitting down returned to 80 and regular rhythm.  Plans to start cardiac rehab on Tuesday where he will be wearing monitor so he is ok with watching rate over weekend and will call MD if problems.  Given rocephin without problem.  Left ambulatory by self at 1015.  Will return to [address] Sat and Sunday and will return here on Monday for final dose of rocephin and PICC removal.

## 2021-06-10 NOTE — TELEPHONE ENCOUNTER
Controlled Substance Refill Request  Medication Name:   Requested Prescriptions     Pending Prescriptions Disp Refills     oxyCODONE (ROXICODONE) 10 mg immediate release tablet 42 tablet 0     Sig: Take 0.5-1 tablets (5-10 mg total) by mouth every 4 (four) hours as needed for pain.     Date Last Fill: 7/24/20  Requested Pharmacy: [facility]  Submit electronically to pharmacy  Controlled Substance Agreement on file:   Encounter-Level CSA Scan Date:    There are no encounter-level csa scan date.        Last office visit:  7/24/20

## 2021-06-10 NOTE — PROGRESS NOTES
"Arrived for daily rocephin.  No new c/o's and feels he's \"getting a little better every day\".  Didn't nap yesterday which he felt good about.  INR drawn before antibiotic given and called tSaci in [facility] clinic, tho she had already seen result and called pt.  Given rocephin without problem.  Left via w/c accompanied by transport at completion at 1035.   Will return in am for next rocephin.    "

## 2021-06-10 NOTE — PROGRESS NOTES
Bandar arrived on [address] ambulatory at 1045. He denied any problems with his PICC line or any pain.Flushed PICC with 10 ml NS, Did obtain a blood return. Infused the Ceftriaxone without difficulty. Flushed the PICC line with 20 ml NS after antibiotic infusion and capped with green swab cap. Bandar left [address] at 1140 to return to infusion therapy 5/1

## 2021-06-10 NOTE — TELEPHONE ENCOUNTER
ANTICOAGULATION  MANAGEMENT    Assessment     Today's INR result of 3.10 is Therapeutic (goal INR of 2.5-3.5)        Warfarin recently held as instructed which may be affecting INR    - however, he was taking more than instructed.   - HELD warfarin on 8/18, as instructed.    No new diet changes affecting INR    No interaction expected between Uloric and warfarin    Potential interaction between tapering dose with Prednisone and warfarin which may affect subsequent INRs    Continues to tolerate warfarin with no reported s/s of bleeding or thromboembolism     Previous INR was Supratherapeutic at 4.60 on 8/18/20.    Plan:     Spoke on phone with Banadr regarding INR result and instructed:    1.  INR therapeutic.  Will have Bandar continue taking same warfarin dose, he is currently taking at 40.5mg per week.       Warfarin Dosing Instructions:   (evenings. Has 3mg tabs)   - Continue current warfarin dose 4.5 mg daily on Tuesdays; and 6 mg daily rest of week.    Instructed patient to follow up no later than:  2 wks.   - advised to check INR in one wk with home monitor thru [employer].    Education provided: importance of consistent vitamin K intake, target INR goal and significance of current INR result and importance of notifying clinic for changes in medications    Bandar verbalizes understanding and agrees to warfarin dosing plan.    [facility], questions or concerns. (#[phone])   ?   Meg Jacinto RN    Subjective/Objective:      Bandar Wells, a 70 y.o. male is on warfarin.     Bandar reports:     Home warfarin dose: verbally confirmed home dose with Bandar and updated on anticoagulation calendar - Bandar verbalized back taking more warfarin than instructed.     Missed doses: No     Medication changes:  Yes: also started on Uloric for elevated Uric acid level.   - started on 8/18/20, tapering Prednisone 10mg:              - 40mg for 5 days, ends today.  Then down to 20mg for 5 days,  then 10mg for 5 days.              - Continues with topical ointment - Triamcinolone.  Recent addition with Mupirocin ointment     S/S of bleeding or thromboembolism:  No     New Injury or illness:  No     Changes in diet or alcohol consumption:  No     Upcoming surgery, procedure or cardioversion:  No    Anticoagulation Episode Summary     Current INR goal:   2.5-3.5   TTR:   28.1 % (1 y)   Next INR check:   9/2/2020   INR from last check:   3.10 (8/26/2020)   Weekly max warfarin dose:      Target end date:   Indefinite   INR check location:      Preferred lab:      Send INR reminders to:   [facility]    Indications    Paroxysmal atrial fibrillation (H) [I48.0]  S/P MVR (mitral valve replacement) [Z95.2]  S/P mitral valve replacement with metallic valve [Z95.4]           Comments:   INR TARGET GOAL 2.5 - 3.5         Anticoagulation Care Providers     Provider Role Specialty Phone number    Jin Bhat MD Referring Internal Medicine [phone]

## 2021-06-10 NOTE — PROGRESS NOTES
Pt arrived ambulatory. VSS. Denies pain.  No signs or symptoms of infection or distress.  Pt plans trip to [place] to attend grand daughters confirmation to be held tomorrow.  Alert/ oriented.  Picc line flushed new cap placed.

## 2021-06-10 NOTE — TELEPHONE ENCOUNTER
Prescription Monitoring Program activity reviewed with no discrepancies noted.      Last fill per : 7/24/20  Quantity/days supply: 42 tablets for 7 days    Controlled Substance Agreement on file: No  Date: NA    Last office visit with provider:  7/24/20    Please advise.

## 2021-06-10 NOTE — TELEPHONE ENCOUNTER
Patient Returning Call  Reason for call:  The patient states he has open sores on his legs and that he is returning Dr. Hicks's call.   Information relayed to patient:   There is no message to relay  Patient has additional questions:  Yes  If YES, what are your questions/concerns:  The patient states he saw a dermatologist at the [facility] for his leg sores and was given a treatment option.   Okay to leave a detailed message?: No call back needed The patient states only if  wants to call.

## 2021-06-10 NOTE — TELEPHONE ENCOUNTER
Received a faxed INR result for Bandar Wells  From [facility]  INR result dated 8/26/2020 is 3.1

## 2021-06-10 NOTE — PROGRESS NOTES
"Arrived for daily rocephin. No new problems.  \"Feeling pretty good this morning\".  Heart rate remains irregular and slow on arrival.  Denies any lightheadedness and was able to ambulate here from lobby without any dyspnea.  Did call Dr. Carney's office who referred me to Dr. Rosenberg's office.  Spoke with Cuca and she will check with MD and contact pt if any changes are going to be made.  This info relayed to Bandar as well.  Given rocephin without problem.  Left ambulatory by self at 0945.  Will return in am for next dose of rocephin.  "

## 2021-06-10 NOTE — TELEPHONE ENCOUNTER
Received a faxed INR result for Bandar Wells  From [facility]  INR result dated 8/18/2020 is 4.6

## 2021-06-10 NOTE — TELEPHONE ENCOUNTER
Patient Returning Call  Reason for call: calling back   Information relayed to patient:  Pt was informed  Would like to have the vv on 8.19.20 @720a and pt is ok with that and would like to move forward with that time, advised that central scheduling was not able to schedule it but will send msg to provide   Patient has additional questions:  Yes  If YES, what are your questions/concerns:  Wants a callback to confirm apt  Okay to leave a detailed message?: Yes

## 2021-06-10 NOTE — TELEPHONE ENCOUNTER
Who is calling:  MAYANK Lindsey with [facility]  Reason for Call:  Caller stated she saw the patient this morning for physical therapy for his neck weakness and pain. Caller stated she messaged Jin Hicks MD on [employer] and through a staff message.    Caller stated the presentation of the neck weakness is dramatic. Caller is asking if Jin Hicks MD has an explanation as to what may be causing the head drop? Caller is questioning if the patient should see sports medicine or neurology because the head drop is progressing? Caller stated the patient may have seen Jin Hicks MD about this neck issues but they do not discuss the head drop.    Caller stated she would like a call back from Jin Hicks MD to further discuss this.  Date of last appointment with primary care: 8/19/20  Okay to leave a detailed message: No [phone]

## 2021-06-10 NOTE — PROGRESS NOTES
Cardiac Rehab  Phase II Assessment    Assessment Date:5/2/2017    Diagnosis: Vegetative Endocarditis  Date of Onset: 3/16/2017  Procedure: Redo [facility] MVR  Date of Onset: 3/16/2017  ICD/Pacemaker: No Parameters: NA  Post-op Complications: None  ECG History: AFib EF%:42  Past Medical History:   Patient Active Problem List   Diagnosis     Hx of bacterial endocarditis     Dyslipidemia     Mitral valve prolapse     S/P MVR (mitral valve replacement)     Recurrent pleural effusion on right     Pericardial effusion without cardiac tamponade     Status post Maze operation for atrial fibrillation     Paroxysmal atrial fibrillation     Coronary artery disease     Acute diastolic heart failure     CHF (congestive heart failure)     Hypovolemia     Hypotension     Acute respiratory failure with hypoxia     Acute blood loss anemia     Acute renal failure     Dysthymia     Prosthetic valve endocarditis, subsequent encounter     S/P mitral valve replacement with metallic valve       Physical Assessment  Precautions/ Physical Limitations: none  Oxygen: No  O2 Sats:97 Lung Sounds: clear Edema:very mild [place] ankle edema.  Incisions: sternal healed  Sleeping Pattern: poor  Appetite: good   Nutrition Risk Screen: Weight loss since Feb, 15# due to poor appetite    Pain none  Location: na  Characteristics:na  Intensity: (0-10 scale) 0  Current Pain Management: occasional pain med  Intervention: Oxycontin   Response: sleeps better    Psychosocial/ Emotional Health  1. In the past 12 months, have you been in a relationship where you have been abused physically, emotionally, sexually or financially? No  notified: NA  2. Who do you turn to for emotional support?: Pat ,son and friends  3. Do you have cultural or spiritual needs? No  4. Have there been any major life changes in the past 12 months? No    Referral Information  Primary Physician: Jin Bhat MD  Cardiologist: Dr Carney  Surgeon: Dr Rosenberg    [place]  exercise/Equipment: Treadmill    Patient's long-term goal(s): regain energy and muscle tone.     1. Living Accommodations: Home Steps: Yes      Support people at home: Lives alone independently   2. Marital Status: single  3. Family is able to assist with cares      Gnosticism/Community involvement: volunteers with [employer]  4. Recreation/Hobbies: curling, running, golf, cross country skiing

## 2021-06-10 NOTE — TELEPHONE ENCOUNTER
Patient Returning Call  Reason for call:  Return call  Information relayed to patient:  Patient was informed of Arlene's message below.  Patient has additional questions:  No  If YES, what are your questions/concerns:  n/a  Okay to leave a detailed message?: No call back needed    Patient was transferred to scheduling to be added onto Dr. Hicks's schedule on 8/19/20 at 7:20 AM.

## 2021-06-10 NOTE — TELEPHONE ENCOUNTER
Dr. Hicks    [facility]    Please advise if any further action is needed.  Thank you.  Elyssa HERNANDEZ, ADOLPH/BAHMAN....................8:26 AM

## 2021-06-10 NOTE — TELEPHONE ENCOUNTER
Spoke with the patient and let him know that the appointment has been scheduled.  He verbalized understanding and had no further questions at this time.  Elyssa HERNANDEZ CMA/BAHMAN....................4:04 PM

## 2021-06-10 NOTE — PROGRESS NOTES
Arrived for daily rocephin.  Pleased with visit with Dr. Ordonez yesterday.  Happy to be stopping antibiotics (IV) on 5/1.  States has script for penicillin to start after IV antibiotics are completed.  Very happy to be going to [place] for the weekend as well.  Will have rocephin tomorrow on 5000 and will take his oral ceftin on Sunday and return here on Monday.  Tolerated rocephin well while here.  Left ambulatory by self at 0945.

## 2021-06-10 NOTE — TELEPHONE ENCOUNTER
ANTICOAGULATION  MANAGEMENT    Assessment     Today's INR result of 4.60 is Supratherapeutic (goal INR of 2.5-3.5)        Warfarin taken as previously instructed    No new diet changes affecting INR    Potential interaction between tapering Prednisone dose and topical ointment with Triamcinolone and Mupirocin, and warfarin which may affect subsequent INRs    Continues to tolerate warfarin with no reported s/s of bleeding or thromboembolism     Previous INR was Subtherapeutic at 2.00 on 8/11/20.    Continues to f/u with [facility] Dermatologist for leg lesions / rash.    Plan:     Spoke on phone with Bandar regarding INR result and instructed:     Warfarin Dosing Instructions:  (evenings. Has 3mg tabs)   - HOLD warfarin dose today,   - then change warfarin dose to 6 mg daily.   - (10.3 % change)    Instructed patient to follow up no later than:  Advised to check INR this Fri. 8/21/20.   - he only has one strip left and is awaiting for more to be delivered.   - checks INR's with home monitor thru [employer].    Education provided: importance of consistent vitamin K intake, target INR goal and significance of current INR result, potential interaction between warfarin and Prednisone and importance of notifying clinic for changes in medications    Bandar verbalizes understanding and agrees to warfarin dosing plan.    Instructed to call the [facility] for any changes, questions or concerns. (#[phone])   ?   Meg Jacinto RN    Subjective/Objective:      Bandar Wells, a 69 y.o. male is on warfarin.     Bandar reports:     Home warfarin dose: verbally confirmed home dose with Bandar and updated on anticoagulation calendar     Missed doses: No     Medication changes:  Yes: started on tapering Prednisone 10mg:   - 40mg for 5 days, ends today.  Then down to 20mg for 5 days, then 10mg for 5 days.   - Continues with topical ointment - Triamcinolone.  Recent addition with Mupirocin ointment.     S/S of bleeding or thromboembolism:   No     New Injury or illness:  No     Changes in diet or alcohol consumption:  No     Upcoming surgery, procedure or cardioversion:  No    Anticoagulation Episode Summary     Current INR goal:   2.5-3.5   TTR:   27.5 % (1 y)   Next INR check:   8/25/2020   INR from last check:   4.60! (8/18/2020)   Weekly max warfarin dose:      Target end date:   Indefinite   INR check location:      Preferred lab:      Send INR reminders to:   [facility]    Indications    Paroxysmal atrial fibrillation (H) [I48.0]  S/P MVR (mitral valve replacement) [Z95.2]  S/P mitral valve replacement with metallic valve [Z95.4]           Comments:   INR TARGET GOAL 2.5 - 3.5         Anticoagulation Care Providers     Provider Role Specialty Phone number    Jin Bhat MD Referring Internal Medicine [phone]

## 2021-06-10 NOTE — TELEPHONE ENCOUNTER
Patient Returning Call    Reason for call:    DME-  Cervical Collar-Neck muscle weakness      Information relayed to patient:  Patient informed of request and states he needs script sent to [facility] medical    Patient has additional questions:  Yes  If YES, what are your questions/concerns:    Please send script.    Okay to leave a detailed message?: Yes     [facility]  Ph:   Fx:     Please place order and notify patient to schedule.

## 2021-06-10 NOTE — PROGRESS NOTES
Pt arrived in Cardiac Rehab for his initial evaluation following his MVR redo. Monitor showed slow AFib 40s at rest. With walk in [place] went to 98 AFib with BP stable 146/79. Sats 94% and feeling slightly winded, but ok. While resting post walk noted to drop to 37 with apparent junctional rhythm. BP remains stable  144/76. EKG strips scanned and faxed to Dr Carney.

## 2021-06-11 NOTE — TELEPHONE ENCOUNTER
Received a faxed INR result for Bandar Wells  From [facility]  INR result dated 9/10/2020 is 3.3

## 2021-06-11 NOTE — TELEPHONE ENCOUNTER
ANTICOAGULATION  MANAGEMENT-Patient Home Monitoring Result    Assessment     Therapeutic INR result of 3.30 (goal INR of 2.5-3.5) received via fax from [employer] home INR monitoring company.        Previous INR was Therapeutic at 3.10 on 8/26/20.    Bandar Wells last contacted by phone: 8/26/20.    Plan     Per home monitoring agreement with patient, patient was NOT contacted regarding therapeutic result today.  Patient is to continue current dose and continue to check INR with home monitor per protocol.  ?   Meg Jacinto RN    Subjective/Objective:      Bandar Wells, a 70 y.o. male  is established on warfarin using a home INR monitor.    Anticoagulation Episode Summary     Current INR goal:   2.5-3.5   TTR:   32.2 % (1 y)   Next INR check:   9/24/2020   INR from last check:   3.30 (9/10/2020)   Weekly max warfarin dose:      Target end date:   Indefinite   INR check location:      Preferred lab:      Send INR reminders to:   [facility]    Indications    Paroxysmal atrial fibrillation (H) [I48.0]  S/P MVR (mitral valve replacement) [Z95.2]  S/P mitral valve replacement with metallic valve [Z95.4]           Comments:   INR TARGET GOAL 2.5 - 3.5         Anticoagulation Care Providers     Provider Role Specialty Phone number    Jin Bhat MD Referring Internal Medicine [phone]

## 2021-06-11 NOTE — TELEPHONE ENCOUNTER
ANTICOAGULATION  MANAGEMENT PROGRAM    Bandar Wells is overdue for INR check.     Spoke with Bandar. Instructed to test INR with home meter and call results in to home monitoring company as soon as possible.     - he just checked his INR and was good at 3.3   - he will [employer] with result      Meg Jacinto RN

## 2021-06-11 NOTE — TELEPHONE ENCOUNTER
FYI - Status Update  Who is Calling: Jess with [facility]  Update: Patient INR was 2.6  Okay to leave a detailed message?:  No return call needed

## 2021-06-11 NOTE — TELEPHONE ENCOUNTER
- Bandar reported recent hospitalization  @ [facility] since 9/21 with possible discharge tomorro - for wound cellulitis Left lower extremity - it was red, swelling and some drainage discharge.  He did have temperature of 100.     - prescribed Doxycycline for 7 days.   - and Metolazone 2.5mg for WT gain or swelling.     - INR taken today, was subtherapeutic at 2.1    (target goal is 2.5 - 3.5)   - he expressed, not doing lovenox injections when he gets discharge tomorrow.     - advised Bandar, to inform his Cardiologist during his hospital visit today and express his wished that he does not want to bridge with Lovenox, while INR is still subtherapeutic. Perhaps doctor will order a warfarin booster dose.

## 2021-06-11 NOTE — TELEPHONE ENCOUNTER
"Patient calls to request a message sent as FYI to his PCP Dr. Hicks.    Patient states he was  \"admitted to [facility] today by Dr. Franklin (cardiologist) for excess fluid both legs needing to be drained and might reveal problems with Right side of heart.\"  Also states is receiving wound care for legs.    Advised patient this RN will route the message to PCP now. Encouraged patient to contact PCP when discharged.    Anabela Cantu RN BSN PHN  Triage Nurse Advisor      "

## 2021-06-11 NOTE — TELEPHONE ENCOUNTER
Who is calling:    Reason for Call:  The patient was discharged from [facility] on 9/19/20. Caller states the patient has a temp of 102.2 and wounds on both lower extremities that have purulent drainage. The patient's leg wounds are red and hot to touch. The patient has tylenol ordered PRN which hasn't been given to him. Caller states the patient has had the wounds since end of July 2020.   Caller would like a call to discuss further care for the patient.   Date of last appointment with primary care:   Okay to leave a detailed message: Yes

## 2021-06-11 NOTE — TELEPHONE ENCOUNTER
Spoke with [facility] and she states patient did agree to go back in and will head there later today.    Jackelyn Vasquez, CMA

## 2021-06-11 NOTE — PROGRESS NOTES
"ITP ASSESSMENT   Assessment Day: 60 Day  Session Number: 15  Precautions: Standard Cardiac Sxs  Diagnosis: Valve  Risk Stratification: High  Referring Provider: Raphael Rosenberg, *  EXERCISE  Exercise Assessment: Reassessment     6 Minute Walk Test  Pre  Pre Exercise HR: 45                  Pre Exercise BP: 123/63    Peak  Peak HR: 98                 Peak BP: 146/79  Peak feet: 800  Peak O2 SAT: 94  Peak RPE: 13  Peak MPH: 1.52    Symptoms:  Peak Symptoms: just starting to get winded    5 mins. Post  5 Min Post HR: 48 ( junctional rhythm 30s post walk. Asx. EKG fax to )  5 Min Post BP: 149/68                         Exercise Plan  Goals Next 30 days  ADL'S: Pt will include stair climbing up to 2-3 flights several times weekly to increase activity tolerance.  Leisure: Pt will resume golfing with friends.  Work: Pt will increase time on TM at home to 30 min 4-5 days per week.    Education Goals: All goals in this section met  Education Goals Met: Patient can state cardiac s/s and appropriate emergency response.;Has system for taking medication.;Medication review.                        Goals Met  30 day ADL'S goals met: Pt has not met goal of climbing 2 flights of stairs without sxs and would like to continue goal.  30 day Leisure goals met: Pt has not started any golfing yet but plans on starting this month.  30 day Work goals met: Pt has met this goal and is ready to start increasing the intensity and duration of exercise.  30 Day Progression: Pt reported being encouraged by increased stamina. \"I was able to drive to [place] which was about a 6 hour drive one way without being tired!\"    Initial ADL's goals met: Pt. able to climbm 1 flight of stairs without SOB or other SX.    Initial Progression: Pt's endurance is gradually improving.  He has completed his antibiotic infusions. He is 6 weeks post surgery and is tolerating low level upper extremity exercise/activity without discomfort.  He is frustrated " over slow progress, but is starting to make some lonf term goals, such as light jogging, cross counnrty skiing and golfing with friends.    Exercise Prescription  Exercise Mode: Treadmill;Bike;Nustep;Arm Erg.;Elliptical  Frequency: 2-3 x weekly  Duration: 50-60 min  Intensity / THR: 20-30 beats above resting heart rate  RPE 11-14  Progression / Met level: 4-6 METS  Resistive Training?: Yes    Current Exercise (mins/week): 210    Interventions  Home Exercise:  Mode: TM,walking  Frequency: 5 -7 days weekly  Duration: 30-60 min as tolerated    Education Material : Educational videos;Provide written material;Individual education and counseling;Offer educational classes    Education Completed  Exercise Education Completed: Signs and Symptoms;Medication review;RPE;Emergency Plan;Home Exercise;Warm up/cool down;FITT Principles;BP/HR Reponse to exercise;Benefits of Exercise;End point of exercise            Exercise Follow-up/Discharge  Follow up/Discharge: Pt stated he is ready to start increasing duration and intensity on TM at home. NUTRITION  Nutrition Assessment: Reassessment    Nutrition Risk Factors:  Nutrition Risk Factors: Dyslipidemia  Cholesterol: 197  LDL: 109  HDL: 62  Triglycerides: 130    Nutrition Plan  Interventions  Nutrition Interventions: Diet consult;Diet class;Therapist/Patient discussion;Educational videos;Provide with written material    Education Completed  Nutrition Education Completed: Low Saturated fat diet;Risk factor overview;Low sodium diet;Weight management    Goals  Nutrition Goals (Next 30 days): Patient demonstrated understanding of cardiac nutrition, no goals identified for the next 30 days    Goals Met  Nutrition Goals Met: Patient can identify their risk factors for CAD;Patient follows a low sodium diet;Completed Nutritional Risk Screen;Patient states following a low saturated fat diet;Patient knows appropriate portion size    Height, Weight, and  BMI  Weight: 170 lb (77.1 kg)  Height:  "5' 10\" (1.778 m)  BMI: 24.39    Nutrition Follow-up  Follow-up/Discharge: Pt stated he is following the low fat / cholesterol / salt diet. \"I'm doing well maintaining my weight and I eat a lot of fish, fruit.\"       Other Risk Factors  Other Risk Factor Assessment: Reassessment    HTN Risk Factor: NA    Pre Exercise BP: 120/66  Post Exercise BP: 112/64    Hypertension Plan  Goals  NA      Tobacco Risk Factor: NA      Risk Factor Follow-up   Follow-up/Discharge: Pt. is doing well with risk factor modification .  He is following dietary modification and is exercising.   PSYCHOSOCIAL  Psychosocial Assessment: Reassessment      Psychosocial Risk Factor: Stress    Psychosocial Plan  Interventions  Interventions: Offer educational videos and classes;Provide written material;Individual education and counseling    Education Completed  Education Completed: Relaxation/Coping Techniques;S/S of depression;Effects of stress on body    Goals  Goals (Next 30 days): Practicing stress management skills    Goals Met  Goals Met: Identified Support system;Identify stressors;Practicing stress management skills    Psychosocial Follow-up  Follow-up/Discharge: Pt has reported returniing to several leisure and social activities such as concerts, traveling with SO and plans to return to golfing with friends as tolerated.           Patient involved in Goal setting?: Yes    Signature: _____________________________________________________________    Date: __________________    Time: __________________  "

## 2021-06-11 NOTE — TELEPHONE ENCOUNTER
ANTICOAGULATION  MANAGEMENT- Home Care/Care Facility Result    Assessment     Today's INR result of 2.60 is Therapeutic (goal INR of 2.5-3.5)    - [facility] tested INR today.      Was given a 2 days warfarin dose with 8mg on 9/24-25,     No new diet changes affecting INR    No new medication/supplements affecting INR    - completed a 5 day course with Keflex.    - discharge with iv lovenox until INR 2.5  - He already stopped Lovenox injections.    - He was bridge with heparin during his hospitalization and did not want to use Lovenox injections after his discharge.    - unsure of warfarin dose during hospitalization.    Continues to tolerate warfarin with no reported s/s of bleeding or thromboembolism     Previous INR was Subtherapeutic at 2.10 on 9/25/20.     Bandar was recently discharged from [facility] on 9/22-25/20 with Home Care thru [facility].    Presented in ED on 9/21/20 due to high fever and infected wounds with odorous drainage on his legs - cellulitis.    Plan:     Spoke with CLINT Gonzalez and Bandar discussed INR result and instructed:    1.  Will monitor INR's closely and adjust warfarin dose based on INR results during RN home visits for wound cares.    Warfarin Dosing Instructions:   - Continue current warfarin dose 4.5 mg daily on Tuesdays; and 6 mg daily rest of week.    Next INR to be drawn:  Scheduled this Fri. 10/2/20.    Education provided: target INR goal and significance of current INR result and importance of notifying clinic for changes in medications    CLINT Gonzalez and Bandar verbalizes understanding and agrees to warfarin dosing plan.   ?   Meg Jacinto RN    Subjective/Objective:      Bandar Wells, a 70 y.o. male is established on warfarin.     Home care/care facility RN's report of Bandar INR, recent warfarin dosing, diet changes, medication changes, and symptoms is documented below.    Additional findings: none    Anticoagulation Episode Summary     Current INR goal:   2.5-3.5    TTR:   35.3 % (1 y)   Next INR check:   10/2/2020   INR from last check:   2.60 (9/29/2020)   Weekly max warfarin dose:      Target end date:   Indefinite   INR check location:      Preferred lab:      Send INR reminders to:   [facility] [place]    Indications    Paroxysmal atrial fibrillation (H) [I48.0]  S/P MVR (mitral valve replacement) [Z95.2]  S/P mitral valve replacement with metallic valve [Z95.4]           Comments:   INR TARGET GOAL 2.5 - 3.5         Anticoagulation Care Providers     Provider Role Specialty Phone number    Jin Bhat MD Referring Internal Medicine [phone]

## 2021-06-11 NOTE — TELEPHONE ENCOUNTER
Refill Approved    Rx renewed per Medication Renewal Policy. Medication was last renewed on 6/5/20.    Elin Camarillo, [facility] Triage/Med Refill 9/7/2020     Requested Prescriptions   Pending Prescriptions Disp Refills     SENNA 8.6 mg tablet [Pharmacy Med Name: SENNA 8.6MG TAB 8.6MG TAB] 90 tablet 1     Sig: TAKE 1-4 TABLETS BY MOUTH 2 (TWO) TIMES A DAY.       GI Medications Refill Protocol Passed - 9/5/2020  2:10 PM        Passed - PCP or prescribing provider visit in last 12 or next 3 months.     Last office visit with prescriber/PCP: Visit date not found OR same dept: Visit date not found OR same specialty: 2/13/2020 Jin Bhat MD  Last physical: Visit date not found Last MTM visit: Visit date not found   Next visit within 3 mo: Visit date not found  Next physical within 3 mo: Visit date not found  Prescriber OR PCP: Jin Hicks MD  Last diagnosis associated with med order: 1. Drug induced constipation  - SENNA 8.6 mg tablet [Pharmacy Med Name: SENNA 8.6MG TAB 8.6MG TAB]; TAKE 1-4 TABLETS BY MOUTH 2 (TWO) TIMES A DAY.  Dispense: 90 tablet; Refill: 1    If protocol passes may refill for 12 months if within 3 months of last provider visit (or a total of 15 months).

## 2021-06-11 NOTE — PROGRESS NOTES
ITP ASSESSMENT   Assessment Day: 30 Day  Session Number: 10  Precautions: Standard Cardiac SX, gradual resumption of UE activity  Diagnosis: Valve  Risk Stratification: High  Referring Provider: Raphael Rosenberg, *  EXERCISE  Exercise Assessment: Reassessment                          Exercise Plan  Goals Next 30 days  ADL'S: Pt. to return to tolerating 2 flights of sytairs without SOB or other SX.  Leisure: Pt. to try putting and chipping golf balls.   Exercie Goals: Pt. to increase to 3.5-4.0 on Tdm. Pt. to explore possibility of doing high intensity exercise training.    Education Goals: All goals in this section met  Education Goals Met: Patient can state cardiac s/s and appropriate emergency response.;Has system for taking medication.;Medication review.                        Goals Met  Initial ADL's goals met: Pt. able to climbm 1 flight of stairs without SOB or other SX.  Initial Progression: Pt's endurance is gradually improving.  He has completed his antibiotic infusions. He is 6 weeks post surgery and is tolerating low level upper extremity exercise/activity without discomfort.  He is frustrated over slow progress, but is starting to make some lonf term goals, such as light jogging, cross counnrty skiing and golfing with friends.    Exercise Prescription  Exercise Mode: Treadmill;Bike;Nustep (Rower)  Frequency: 3x week  Duration: 45-60 min  Intensity / THR: 20-30 beats above resting heart rate  RPE 11-14  Progression / Met level: 3-5 mets  No Data Recorded    Current Exercise (mins/week): 190    Interventions  Home Exercise:  Mode: walk  Frequency: 5-7 days/ week  Duration: 30-60 min as tolerated    Education Material : Educational videos;Provide written material;Individual education and counseling;Offer educational classes    Education Completed  Exercise Education Completed: Signs and Symptoms;Medication review;RPE;Emergency Plan;Home Exercise;Warm up/cool down;FITT Principles;BP/HR Reponse to  "exercise;Benefits of Exercise;End point of exercise            Exercise Follow-up/Discharge  Follow up/Discharge: Pt. is excited to increase modalities at home and in rehab. He has developed an exercise plan for home.         NUTRITION  Nutrition Assessment: Reassessment    Nutrition Risk Factors:  Nutrition Risk Factors: Dyslipidemia  Cholesterol: 197  LDL: 109  HDL: 62  Triglycerides: 130    Nutrition Plan  Interventions  Nutrition Interventions: Therapist/Patient discussion;Educational videos;Provide with written material (pt. has declined 1;1 and dietary class, done on prev. admit)    Education Completed  Nutrition Education Completed: Risk factor overview;Low sodium diet;Weight management    Goals  Nutrition Goals (Next 30 days): Patient demonstrated understanding of cardiac nutrition, no goals identified for the next 30 days    Goals Met  Nutrition Goals Met: Patient follows a low sodium diet;Patient knows appropriate portion size;Patient can identify their risk factors for CAD;Completed Nutritional Risk Screen    Height, Weight, and  BMI  Weight: 170 lb (77.1 kg)  Height: 5' 10\" (1.778 m)  BMI: 24.39    Nutrition Follow-up  Follow-up/Discharge: Pt. feels he has a good understanding of healthy food choices. He will ask for a nutr. consult as needed.  His maintaining his current weight.     Other Risk Factors  Other Risk Factor Assessment: Reassessment    HTN Risk Factor: NA    Pre Exercise BP: 120/62  Post Exercise BP: 106/62      RiskFactor follow-up; Pt. Is doing well , plans to continue current plan of watching his diet,and exercising.              PSYCHOSOCIAL  Psychosocial Assessment: Reassessment       Psychosocial Risk Factor: Stress    Psychosocial Plan  Interventions  Interventions: Individual education and counseling;Provide written material;Offer educational videos and classes    Education Completed  Education Completed: Relaxation/Coping Techniques;Effects of stress on body;S/S of " depression    Goals  Goals (Next 30 days): Practicing stress management skills    Goals Met  Goals Met: Identify stressors;Identified Support system    Psychosocial Follow-up  Follow-up/Discharge: pt feels as his health improves his stress level with decrease         Patient involved in Goal setting?: Yes    Signature: _____________________________________________________________    Date: __________________    Time: __________________See Doc Flowsheet

## 2021-06-11 NOTE — TELEPHONE ENCOUNTER
Who is calling:  Bandar  Reason for Call:  Patient is in the hospital & is being discharged today. He stated he needs his INR to be regulated for discharge with Lovenox  Date of last appointment with primary care: 9/2/2020  Okay to leave a detailed message: Yes

## 2021-06-11 NOTE — TELEPHONE ENCOUNTER
Patient called and left message stating he's unable to come to his appointment with Dr. Solorio tomorrow.  He asked for call back to [phone].    Appt tomorrow for PET scan results. PET scan not done. Per Dr. Solorio 7/6/20 visit, patient due for CT and follow-up in 6 mo.  Discussed with Dr. Solorio. He said PCP, Dr. Hemphill, still concerned lesions could be malignant. Dr. Solorio reports labs and bone marrow work up negative. He said PET last thing that can be done.     Called patient. He reports he was hospitalized for leg cellultitis. He was discharged on Fri. And having home care in for dressing changes. He agreed to PET scan but requests follow-up in 1 week. Transferred call to DAVE [facility] to reschedule appointments/BISHNU Zamora RN

## 2021-06-11 NOTE — PROGRESS NOTES
"Bandar Wells is a 70 y.o. male who is being evaluated via a billable video visit.      The patient has been notified of following:     \"This video visit will be conducted via a call between you and your physician/provider. We have found that certain health care needs can be provided without the need for an in-person physical exam.  This service lets us provide the care you need with a video conversation.  If a prescription is necessary we can send it directly to your pharmacy.  If lab work is needed we can place an order for that and you can then stop by our lab to have the test done at a later time.    Video visits are billed at different rates depending on your insurance coverage. Please reach out to your insurance provider with any questions.    If during the course of the call the physician/provider feels a video visit is not appropriate, you will not be charged for this service.\"    Patient has given verbal consent to a Video visit? Yes  How would you like to obtain your AVS? AVS Preference: Mail a copy.  If dropped by the video visit, the video invitation should be sent to: Text to cell phone: [phone]  Will anyone else be joining your video visit? No    Video Start Time: 3:28 PM    Additional provider notes: GENERAL: Healthy, alert and no distress  EYES: Eyes grossly normal to inspection. No discharge or erythema, or obvious scleral/conjunctival abnormalities.  RESP: No audible wheeze, cough, or visible cyanosis.  No visible retractions or increased work of breathing.    NEURO: Cranial nerves grossly intact. Mentation and speech appropriate for age.  PSYCH: Mentation appears normal, affect normal/bright, judgement and insight intact, normal speech and appearance well-groomed      Video-Visit Details    Type of service:  Video Visit    Video End Time (time video stopped): 3:44 PM  Originating Location (pt. Location): Home    Distant Location (provider location):  [facility] INTERNAL MEDICINE "     Platform used for Video Visit: Artem Hicks MD       Video Visit - Follow Up   Bandar Wells   70 y.o. male    Date of Visit: 9/2/2020    Chief Complaint   Patient presents with     Neck Pain     Leg Pain        Assessment and Plan   1. DRESS syndrome  Continue in the dermatology clinic and wound/vascular clinic    2. Myositis, unspecified myositis type, unspecified site  We are still lacking a unifying diagnosis for his presentation.  Unfortunately despite my request that a [facility] rheumatologist, board-certified and rheumatology with prior training internal medicine evaluate the patient, he was instead screened by a nurse without above training who decided his presentation did not warrant rheumatologic evaluation.    3. CKD (chronic kidney disease) stage 3, GFR 30-59 ml/min (H)  This seems to improved with change in his diuretics.  Following with nephrology, repeat protein levels have been ordered and he has not yet had these drawn.  Have encouraged him to follow through with this    4. Elevated uric acid in blood  Continue Uloric, etiology not entirely certain may be diuretic induced, I think myeloma is still a consideration    5. Chronic combined systolic and diastolic congestive heart failure (H)  Appears euvolemic continue same    6. Bone lesion  Etiology uncertain, bone marrow biopsy fairly unremarkable.  Unfortunately no TB testing was ordered on the bone marrow specimen.    7. Paroxysmal atrial fibrillation (H)  Continue with current strategy    Return in about 4 weeks (around 9/30/2020) for recheck.     History of Present Illness   This 70 y.o. old man is seen via video visit for follow-up of complicated medical history.  He overall is feeling okay.  Feels tired and weak.  He has started Uloric.  Has not yet followed up labs as recommended by nephrology.  I asked him to see rheumatology at [facility] for second opinion regarding his myositis and inexplicably a nurse  in the rheumatology clinic decided that this appointment was not necessary.  His wounds are healing and he follows with dermatology.  Vascular referral placed.  Does not have follow-up with Dr. José Miguel Solorio for a few months.  Has unexplained lytic lesions on his bone, abnormal blood proteins and abnormal flow cytometry.  Somewhat unexplained anemia, chronic kidney disease elevated uric acid level.    Review of Systems: A comprehensive review of systems was negative except as noted.     Medications, Allergies and Problem List   Reviewed, reconciled and updated  Post Discharge Medication Reconciliation Status:      Additional Information   Current Outpatient Medications   Medication Sig Dispense Refill     acetaminophen (TYLENOL) 500 MG tablet Take 500-1,000 mg by mouth every 6 (six) hours as needed for pain. Pain 1-5  Give 500 mg  Or pain 6-10 give 1000 mg every 6 hours ad needed not to exceed 4 grams in 24 hours       b complex vitamins tablet Take 1 tablet by mouth daily.        cholecalciferol, vitamin D3, 1,000 unit tablet Take 1,000 Units by mouth daily.       clindamycin (CLEOCIN) 300 MG capsule Take 2 capsules (600 mg total) by mouth see administration instructions. Take prior to Dentist appointments 2 capsule 3     cyanocobalamin 1000 MCG tablet Take 1,000 mcg by mouth daily.       FLAXSEED OIL ORAL Take 1,000 mg by mouth daily.        fluticasone (FLONASE) 50 mcg/actuation nasal spray 1 spray into each nostril daily as needed.        folic acid (FOLVITE) 1 MG tablet Take 1 mg by mouth daily.       L. ACIDOPHILUS/BIFIDO LONGUM (PROBIOTIC PEARLS ORAL) Take 1 capsule by mouth daily.       multivitamin therapeutic (THERAGRAN) tablet Take 1 tablet by mouth daily.       oxyCODONE (ROXICODONE) 10 mg immediate release tablet Take 0.5-1 tablets (5-10 mg total) by mouth every 4 (four) hours as needed for pain. 42 tablet 0     penicillin VK (PEN VK) 500 MG tablet TAKE 1 TABLET BY MOUTH DAILY 90 tablet 1     potassium  chloride (K-DUR,KLOR-CON) 20 MEQ tablet Take 1 tablet (20 mEq total) by mouth daily. (Patient taking differently: Take 20 mEq by mouth 2 (two) times a day. ) 90 tablet 2     senna (SENOKOT) 8.6 mg tablet Take 1-4 tablets by mouth 2 (two) times a day. 90 tablet 1     torsemide (DEMADEX) 50 MG Take 50 mg by mouth 2 (two) times a day at 9am and 6pm.       warfarin ANTICOAGULANT (COUMADIN/JANTOVEN) 3 MG tablet Take 1 &1/2  Tablets (4.5mg) to 2 tablets (6mg) by mouth daily, as directed.  Adjust dose based on INR results. 170 tablet 1     No current facility-administered medications for this visit.      Allergies   Allergen Reactions     Allopurinol Rash     Social History     Tobacco Use     Smoking status: Never Smoker     Smokeless tobacco: Never Used   Substance Use Topics     Alcohol use: Yes     Alcohol/week: 2.0 standard drinks     Types: 2 Shots of liquor per week     Frequency: 2-3 times a week     Drug use: No       Review and/or order of clinical lab tests:  Review and/or order of radiology tests:  Review and/or order of medicine tests:  Discussion of test results with performing physician:  Decision to obtain old records and/or obtain history from someone other than the patient:  Review and summarization of old records and/or obtaining history from someone other than the patient and.or discussion of case with another health care provider:  Independent visualization of image, tracing or specimen itself:    Time:      Jin Hicks MD

## 2021-06-11 NOTE — TELEPHONE ENCOUNTER
Patient may need to have a emergency room or urgent care evaluation and treatment as he could have surrounding cellulitis to the infected wounds.  At the minimum he may require a telephone visit.  My schedule is full today sorry.

## 2021-06-11 NOTE — TELEPHONE ENCOUNTER
Who is calling:  Lisa  Reason for Call:  Homecare nurse calling for dosing for patient, per nurse patient had his INR drawn in the clinic with a result of 2.6 today at 12pm  Date of last appointment with primary care: 9/2/2020  Okay to leave a detailed message: Yes

## 2021-06-11 NOTE — TELEPHONE ENCOUNTER
Controlled Substance Refill Request  Medication Name:   Requested Prescriptions     Pending Prescriptions Disp Refills     oxyCODONE (ROXICODONE) 10 mg immediate release tablet [Pharmacy Med Name: OXYCODONE HCL 10 MG TABLET 10 TAB] 42 tablet 0     Sig: TAKE 0.5 TO 1 TABLET (5-10 MG TOTAL) BY MOUTH EVERY 4 (FOUR) HOURS AS NEEDED FOR PAIN.     Date Last Fill: 08/18/20  Requested Pharmacy: [facility]  Submit electronically to pharmacy  Controlled Substance Agreement on file:   Encounter-Level CSA Scan Date:    There are no encounter-level csa scan date.        Last office visit:  07/24/20

## 2021-06-11 NOTE — TELEPHONE ENCOUNTER
[facility] is calling and phoned about 2 hours ago and is admitting Bandar and needs to see if MD wants to see him in clinic or return to ED today.  Please phone ASAP.  Legs are red and hot and have green odorous drainage and fever of 102.2 today.  RN feels that Bandar needs to also schedule a follow up appointment with clinic.  [facility] [phone].    Reason for Disposition    [1] Red area or streak AND [2] fever    Additional Information    Negative: Looks like a broken bone or dislocated joint (e.g., crooked or deformed)    Negative: Sounds like a life-threatening emergency to the triager    Negative: Chest pain    Negative: Difficulty breathing    Negative: Entire foot is cool or blue in comparison to other side    Negative: Unable to walk    Protocols used: LEG PAIN-A-AH

## 2021-06-12 NOTE — PROGRESS NOTES
"ITP ASSESSMENT   Assessment Day: 120 Day  Session Number: 24  Precautions: standard cardiac   Diagnosis: Valve  Risk Stratification: High  Referring Provider: Raphael Rosenberg, *  EXERCISE  Exercise Assessment: Reassessment                          Exercise Plan  Goals Next 30 days  ADL'S: pt will move furniture out of spare bedroom and paint room without sob  Leisure: Pt will participate in 3 day golf tournement completing 18 holes each day without sob.      Education Goals: All goals in this section met  Education Goals Met: Patient can state cardiac s/s and appropriate emergency response.;Has system for taking medication.;Medication review.                        Goals Met  90 day ADL'S goals met: stair climbing goal met, pt will loose 2-3 lbs as pt is concerned with his recently 3 lb weight gain.  90 day Leisure goals met: golf goal met  90 Day Progress: Pt is making good progress but continues to have sob and fatigue.  Pt experienced a llittle set back in the month of July due to frequent nose bleeds, but is no longer struggling with them.    60 day ADL'S goals met: pt able to climb 2 flights slowly now, pacing self wilthout becoming too winded.  60 day Leisure goals met: has not been golfing yet due to nose bleed, pkg.   60 day Work goals met: Treadmill time and walking dog limited due to bloody nose   60 Day Progression: pt frustrated by frequent medical problems hampering his recovery.    30 day ADL'S goals met: Pt has not met goal of climbing 2 flights of stairs without sxs and would like to continue goal.  30 day Leisure goals met: Pt has not started any golfing yet but plans on starting this month.  30 day Work goals met: Pt has met this goal and is ready to start increasing the intensity and duration of exercise.  30 Day Progression: Pt reported being encouraged by increased stamina. \"I was able to drive to [place] which was about a 6 hour drive one way without being tired!\"    Initial ADL's goals " met: Pt. able to climbm 1 flight of stairs without SOB or other SX.  Initial Progression: Pt's endurance is gradually improving.  He has completed his antibiotic infusions. He is 6 weeks post surgery and is tolerating low level upper extremity exercise/activity without discomfort.  He is frustrated over slow progress, but is starting to make some lonf term goals, such as light jogging, cross counnrty skiing and golfing with friends.    Exercise Prescription  Exercise Mode: Treadmill;Bike;Nustep;Arm Erg.;Elliptical;Stairs  Frequency: 2-3 x week  Duration: 50-60 min  Intensity / THR: 20-30 beats above resting heart rate  RPE 11-14  Progression / Met level: 4-6 mets  Resistive Training?: Yes    Current Exercise (mins/week): 200    Interventions  Home Exercise:  Mode: treadmill, outdoor walking  Frequency: 4-5 x per week  Duration: 20-60 min    Education Material : Educational videos;Provide written material;Individual education and counseling;Offer educational classes    Education Completed  Exercise Education Completed: Signs and Symptoms;Medication review;RPE;Emergency Plan;Home Exercise;Warm up/cool down;FITT Principles;BP/HR Reponse to exercise;Benefits of Exercise;End point of exercise;Strength training            Exercise Follow-up/Discharge  Follow up/Discharge: Continue with home exercise.  Start  back at HI program on treadmill NUTRITION  Nutrition Assessment: Reassessment    Nutrition Risk Factors:  Nutrition Risk Factors: Dyslipidemia    Nutrition Plan  Interventions  Nutrition Interventions: Diet consult;Diet class;Therapist/Patient discussion;Educational videos;Provide with written material  Rate Your Plate Score: 56    Education Completed  Nutrition Education Completed: Low Saturated fat diet;Risk factor overview;Low sodium diet;Weight management;Discussed small frequent meals and nutritional supplements    Goals  Nutrition Goals (Next 30 days): Patient demonstrated understanding of cardiac nutrition,  "no goals identified for the next 30 days    Goals Met  Nutrition Goals Met: Patient can identify their risk factors for CAD;Patient follows a low sodium diet;Completed Nutritional Risk Screen;Patient states following a low saturated fat diet;Patient knows appropriate portion size;Provided Rate your Plate Survey;Reviewed Dietitian schedule    Height, Weight, and  BMI  Weight: 176 lb (79.8 kg)  Height: 5' 10\" (1.778 m)  BMI: 25.25    Nutrition Follow-up  Follow-up/Discharge: Pt stated he is following the low fat / cholesterol / salt diet. \"I'm doing well maintaining my weight and I eat a lot of fish, fruit.\"       Other Risk Factors  Other Risk Factor Assessment: Reassessment    HTN Risk Factor: NA    Pre Exercise BP: 106/70  Post Exercise BP: 132/60    Tobacco Risk Factor: NA   PSYCHOSOCIAL  Psychosocial Assessment: Reassessment    Psychosocial Risk Factor: Stress    Psychosocial Plan  Interventions  Interventions: Individual education and counseling;Offer educational videos and classes;Provide written material    Education Completed  Education Completed: Relaxation/Coping Techniques;S/S of depression;Effects of stress on body    Goals  Goals (Next 30 days): Practicing stress management skills    Goals Met  Goals Met: Identified Support system;Oriented to stress management classes;Identify stressors    Psychosocial Follow-up  Follow-up/Discharge: Pt continues to struggle with stress, attempting to manage it through exercise and relaxation techniques           Patient involved in Goal setting?: Yes   Progressing well but continues to struggle with lower energy and endurance and sob.  Exercising regularly at home and rehab.  Continue with further cardiac rehab to increase endurance for daily activities.  Please sign and return through epic.      Signature: _____________________________________________________________    Date: __________________    Time: __________________  "

## 2021-06-12 NOTE — PROGRESS NOTES
Office Visit - Follow Up   Bandar Wells   70 y.o. male    Date of Visit: 10/2/2020    Chief Complaint   Patient presents with     Referral     wound care     Rash     all over body     Labs Only        Assessment and Plan   1. Chronic combined systolic and diastolic congestive heart failure (H)  Continue with current plan per cardiology, still quite edematous but some of this may be venous stasis  - torsemide (DEMADEX) 20 MG tablet; Take 3 tablets (60 mg total) by mouth 2 (two) times a day at 9am and 6pm.; Refill: 0    2. Mitral valve prolapse  3. S/P MVR (mitral valve replacement)  4. Hx of bacterial endocarditis  Continue on penicillin prophylaxis follow-up with ID    5. Coronary artery disease involving native coronary artery of native heart without angina pectoris  Continue secondary prevention    6. Paroxysmal atrial fibrillation (H)  Rate control strategy with warfarin for stroke prevention    7. Hypokalemia  - potassium chloride (K-DUR,KLOR-CON) 20 MEQ tablet; Take 2 tablets (40 mEq total) by mouth daily AND 1 tablet (20 mEq total) at bedtime.  Dispense: 90 tablet; Refill: 2    8. Stage 3 chronic kidney disease, unspecified whether stage 3a or 3b CKD  Following with nephrology, recently had labs and will have close follow-up    9. DRESS syndrome  Continue with wound care per dermatology and home care  - gabapentin (NEURONTIN) 300 MG capsule; Take 2 capsules (600 mg total) by mouth 2 (two) times a day.  Dispense: 30 capsule; Refill: 0  - C-Reactive Protein  - HM2(CBC w/o Differential)    10. Elevated uric acid in blood  Recently started Uloric recheck uric acid follow-up with nephrology  - febuxostat (ULORIC) 40 mg tablet; Take 1 tablet (40 mg total) by mouth daily.  Dispense:  ; Refill: 0  - Uric Acid    11. Lytic bone lesions on xray  Has follow-up with Dr. José Miguel Solorio, does not appear to be a malignant process    Return in about 4 weeks (around 10/30/2020) for recheck.     History of Present Illness  "  This 70 y.o. old man comes in for follow-up.  Recent hospitalization at [facility] for congestive heart failure.  He is in a program at home monitoring vitals weights.  He has home care nurse.  Is getting wound care at home.  He follows in the dermatology clinic at the [facility] but does not have any scheduled follow-up now.  He has chronic kidney disease and follows with Dr. Nguyen.  Recently started on Uloric.  States that new medication was sent in but not sure what this is has not started it yet.  Is back on high-dose torsemide and intermittent metolazone which she has not taken recently.  Uric acid level quite elevated.  Pain generally controlled except for in his legs where he had a reaction to allopurinol and dress syndrome.  He has chronic wounds.    Review of Systems: A comprehensive review of systems was negative except as noted.     Medications, Allergies and Problem List   Reviewed, reconciled and updated  Post Discharge Medication Reconciliation Status:      Physical Exam   General Appearance:   No acute distress    /56 (Patient Site: Right Arm, Patient Position: Sitting, Cuff Size: Adult Regular)   Pulse 73   Ht 5' 10\" (1.778 m)   Wt 162 lb (73.5 kg)   SpO2 96%   BMI 23.24 kg/m      HEENT exam is unremarkable  Neck supple no thyromegaly or nodule palpable  Lymphatic no cervical lymphadenopathy  Cardiovascular regular rate and rhythm no murmur gallop or rub  Pulmonary lungs are clear to auscultation bilaterally  Gastrointestinal abdomen soft nontender nondistended no organomegaly  Neurologic exam is non focal  Psychiatric pleasant, no confusion or agitation   Bilateral lower extremity edema with wounds which are bandaged     Additional Information   Current Outpatient Medications   Medication Sig Dispense Refill     acetaminophen (TYLENOL) 500 MG tablet Take 500-1,000 mg by mouth every 6 (six) hours as needed for pain. Pain 1-5  Give 500 mg  Or pain 6-10 give 1000 mg every 6 " hours ad needed not to exceed 4 grams in 24 hours       b complex vitamins tablet Take 1 tablet by mouth daily.        cholecalciferol, vitamin D3, 1,000 unit tablet Take 1,000 Units by mouth daily.       cyanocobalamin 1000 MCG tablet Take 1,000 mcg by mouth daily.       FLAXSEED OIL ORAL Take 1,000 mg by mouth daily.        fluticasone (FLONASE) 50 mcg/actuation nasal spray 1 spray into each nostril daily as needed.        folic acid (FOLVITE) 1 MG tablet Take 1 mg by mouth daily.       L. ACIDOPHILUS/BIFIDO LONGUM (PROBIOTIC PEARLS ORAL) Take 1 capsule by mouth daily.       multivitamin therapeutic (THERAGRAN) tablet Take 1 tablet by mouth daily.       oxyCODONE (ROXICODONE) 10 mg immediate release tablet TAKE 0.5 TO 1 TABLET (5-10 MG TOTAL) BY MOUTH EVERY 4 (FOUR) HOURS AS NEEDED FOR PAIN. 42 tablet 0     penicillin VK (PEN VK) 500 MG tablet TAKE 1 TABLET BY MOUTH DAILY 90 tablet 1     potassium chloride (K-DUR,KLOR-CON) 20 MEQ tablet Take 2 tablets (40 mEq total) by mouth daily AND 1 tablet (20 mEq total) at bedtime. 90 tablet 2     SENNA 8.6 mg tablet TAKE 1-4 TABLETS BY MOUTH 2 (TWO) TIMES A DAY. 90 tablet 11     warfarin ANTICOAGULANT (COUMADIN/JANTOVEN) 3 MG tablet Take 1 &1/2  Tablets (4.5mg) to 2 tablets (6mg) by mouth daily, as directed.  Adjust dose based on INR results. 170 tablet 1     febuxostat (ULORIC) 40 mg tablet Take 1 tablet (40 mg total) by mouth daily.  0     gabapentin (NEURONTIN) 300 MG capsule Take 2 capsules (600 mg total) by mouth 2 (two) times a day. 30 capsule 0     torsemide (DEMADEX) 20 MG tablet Take 3 tablets (60 mg total) by mouth 2 (two) times a day at 9am and 6pm.  0     No current facility-administered medications for this visit.      Allergies   Allergen Reactions     Allopurinol Rash     Social History     Tobacco Use     Smoking status: Never Smoker     Smokeless tobacco: Never Used   Substance Use Topics     Alcohol use: Yes     Alcohol/week: 2.0 standard drinks     Types:  2 Shots of liquor per week     Frequency: 2-3 times a week     Drug use: No       Review and/or order of clinical lab tests: Extensive review  Review and/or order of radiology tests: Extensive review  Review and/or order of medicine tests: Sensitive review  Discussion of test results with performing physician:  Decision to obtain old records and/or obtain history from someone other than the patient:  Review and summarization of old records and/or obtaining history from someone other than the patient and.or discussion of case with another health care provider: Tensive review of hospitalization, current cardiac follow-up and geriatric homecare, summarized above  Independent visualization of image, tracing or specimen itself:    Time:      Jin Hicks MD

## 2021-06-12 NOTE — TELEPHONE ENCOUNTER
Who is calling:  patient  Reason for Call:  Would like a call back regarding his message below.   Date of last appointment with primary care: 10/02/20  Okay to leave a detailed message: Yes

## 2021-06-12 NOTE — TELEPHONE ENCOUNTER
ANTICOAGULATION  MANAGEMENT    Assessment     Today's INR result of 2.60 is Therapeutic (goal INR of 2.5-3.5)        Warfarin taken as previously instructed    No new diet changes affecting INR    No new medication/supplements affecting INR    Continues to tolerate warfarin with no reported s/s of bleeding or thromboembolism     Previous INR was Supratherapeutic at 3.60 on 10/2/20.    Plan:     Spoke on phone with Bandar regarding INR result and instructed:     Warfarin Dosing Instructions:   (evenings. Has 3mg tabs)   - Continue current warfarin dose 4.5 mg daily on Thursdays; and 6 mg daily rest of week.    Instructed patient to follow up no later than:  5-7 days.   - advised to check INR this Thurs. 10/8/20 with home monitor thru [employer].    Education provided: importance of consistent vitamin K intake, target INR goal and significance of current INR result and importance of notifying clinic for changes in medications    Bandar verbalizes understanding and agrees to warfarin dosing plan.    Instructed to call the [facility] for any changes, questions or concerns. (#[phone])   ?   Meg Jacinto RN    Subjective/Objective:      Bandar Wells, a 70 y.o. male is on warfarin.     Bandar reports:     Home warfarin dose: verbally confirmed home dose with Bandar and updated on anticoagulation calendar     Missed doses: No     Medication changes:  No     S/S of bleeding or thromboembolism:  No     New Injury or illness:  No     Changes in diet or alcohol consumption:  No     Upcoming surgery, procedure or cardioversion:  No    Anticoagulation Episode Summary     Current INR goal:  2.5-3.5   TTR:  36.7 % (1 y)   Next INR check:  10/8/2020   INR from last check:  2.60 (10/5/2020)   Weekly max warfarin dose:     Target end date:  Indefinite   INR check location:     Preferred lab:     Send INR reminders to:  [facility]    Indications    Paroxysmal atrial fibrillation (H) [I48.0]  S/P MVR (mitral valve  replacement) [Z95.2]  S/P mitral valve replacement with metallic valve [Z95.4]           Comments:  INR TARGET GOAL 2.5 - 3.5         Anticoagulation Care Providers     Provider Role Specialty Phone number    Jin Bhat MD Referring Internal Medicine [phone]

## 2021-06-12 NOTE — TELEPHONE ENCOUNTER
ANTICOAGULATION  MANAGEMENT PROGRAM    Bandar Wells is overdue for INR check.     Spoke with Bandar. Instructed to test INR with home meter and call results in to home monitoring company as soon as possible.       Meg Jacinto RN

## 2021-06-12 NOTE — TELEPHONE ENCOUNTER
Received a faxed INR result for Bandar Wells  From [facility]  INR result dated 10/2/2020 is 3.6

## 2021-06-12 NOTE — TELEPHONE ENCOUNTER
Patient called and left message for call back re: questions about his PET scan this afternoon. I called patient back. He wanted to confirm that his PET scan was at 1245 today at [facility]. I confirmed this. He wondered where to go. I told him to go to main entrance and the welcome desk will direct him to the proper location. He also wanted to confirm that he had an appointment tomorrow with Dr. Solorio at [facility]. I told him this was correct and that Dr. Solorio would be reviewing his PET scan results from today. I told him to arrive tomorrow by 135p.  He verbalized and thanked me for calling back/BISHNU Zamora RN

## 2021-06-12 NOTE — TELEPHONE ENCOUNTER
Controlled Substance Refill Request  Medication Name:   Requested Prescriptions     Pending Prescriptions Disp Refills     oxyCODONE (ROXICODONE) 10 mg immediate release tablet 42 tablet 0     Date Last Fill: 9/8/2020  Requested Pharmacy: [facility], [address], [place]  Submit electronically to pharmacy  Controlled Substance Agreement on file:   Encounter-Level CSA Scan Date:    There are no encounter-level csa scan date.        Last office visit:  10/2/2020

## 2021-06-12 NOTE — PROGRESS NOTES
Office Visit - Follow Up   Bandar Wells   70 y.o. male    Date of Visit: 10/21/2020    Chief Complaint   Patient presents with     Leg Swelling     left        Assessment and Plan   1. Multiple opens wound of lower extremity, unspecified laterality, subsequent encounter  Unclear if these are venous stasis wounds or ongoing injury from his dress syndrome.  Following with wound care and has a wound specialist that is coming to his home.  He will also be meeting with dermatology at [facility].    2. Leg swelling  Likely multifactorial.  Some evidence of elevated right-sided pressures on echocardiogram.  He has some known diastolic congestive heart failure and has had systolic failure in the past.  He is on fairly significant doses of diuretic which are managed by cardiology.  He is also on the study through the [facility].    3. DRESS syndrome  As above    4. Stage 3 chronic kidney disease, unspecified whether stage 3a or 3b CKD  Kidney function has been stable and follows with nephrology Dr. Nguyen.  Encourage him not to use ibuprofen    5. Chronic combined systolic and diastolic congestive heart failure (H)  As above    6. Paroxysmal atrial fibrillation (H)  Rate control strategy with warfarin for stroke prevention    7. Bone lesion  Recent PET scan looked okay, Dr. José Miguel Solorio is not believing that these are related to malignancy    8. Elevated uric acid in blood  Last uric acid level 8.5.  He is going to follow-up with nephrology, may need to increase Uloric    9. Pain syndrome, chronic  He does have oxycodone and I think this would be a better medication for him to use for his pain related to his lower extremity wounds then ibuprofen.  - oxyCODONE (ROXICODONE) 10 mg immediate release tablet; TAKE 0.5 TO 1 TABLET (5-10 MG TOTAL) BY MOUTH EVERY 4 (FOUR) HOURS AS NEEDED FOR PAIN.  Dispense: 42 tablet; Refill: 0      Return in about 4 weeks (around 11/18/2020) for recheck.     History of Present Illness   This 70  "y.o. old man with a complicated history which I have outlined previously.  Is currently struggling with congestive heart failure and lower extremity edema.  Diuretic management per cardiology.  Currently on torsemide and thrice weekly metolazone.  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  He is now on Uloric, low-dose and has had improvement but not normalization of his uric acid level.  He is following with nephrology for chronic kidney disease and his kidney function has been stable.  He has been using some ibuprofen which I discouraged.  He has had some abnormal proteins in the blood and had a bone scan which showed some lytic lesions.  Bone marrow biopsy showed no evidence of multiple myeloma and recent PET scan was unremarkable.  He is following with Dr. José Miguel Solorio.  He developed skin reaction to allopurinol and subsequently has had lower extremity wounds and is meeting with wound care next week and has a home health wound care nurse.  Underlying atrial fibrillation for which he takes warfarin.  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    Review of Systems: A comprehensive review of systems was negative except as noted.     Medications, Allergies and Problem List   Reviewed, reconciled and updated  Post Discharge Medication Reconciliation Status:      Physical Exam   General Appearance:   No acute distress    /64 (Patient Site: Left Arm, Patient Position: Sitting, Cuff Size: Adult Regular)   Pulse 68   Ht 5' 10\" (1.778 m)   Wt 159 lb (72.1 kg)   SpO2 95%   BMI 22.81 kg/m      Significant weakness of his extensor muscles of the neck.  Heart rate is controlled Marked edema of the lower extremities with chronic wounds some erythematous changes with thickening of the skin.  Abdomen soft.  No hot or swollen joints no joint deformity.  Lungs are generally clear to " auscultation bilaterally     Additional Information   Current Outpatient Medications   Medication Sig Dispense Refill     acetaminophen (TYLENOL) 500 MG tablet Take 500-1,000 mg by mouth every 6 (six) hours as needed for pain. Pain 1-5  Give 500 mg  Or pain 6-10 give 1000 mg every 6 hours ad needed not to exceed 4 grams in 24 hours       b complex vitamins tablet Take 1 tablet by mouth daily.        cholecalciferol, vitamin D3, 1,000 unit tablet Take 1,000 Units by mouth daily.       cyanocobalamin 1000 MCG tablet Take 1,000 mcg by mouth daily.       febuxostat (ULORIC) 40 mg tablet Take 1 tablet (40 mg total) by mouth daily.  0     FLAXSEED OIL ORAL Take 1,000 mg by mouth daily.        fluticasone (FLONASE) 50 mcg/actuation nasal spray 1 spray into each nostril daily as needed.        folic acid (FOLVITE) 1 MG tablet Take 1 mg by mouth daily.       gabapentin (NEURONTIN) 300 MG capsule Take 2 capsules (600 mg total) by mouth 2 (two) times a day. 30 capsule 0     lactobacillus comb no.10 20 billion cell cap Take 1 capsule by mouth.       metOLazone (ZAROXOLYN) 2.5 MG tablet Take 2.5 mg by mouth 3 (three) times a week.       multivitamin therapeutic (THERAGRAN) tablet Take 1 tablet by mouth daily.       oxyCODONE (ROXICODONE) 10 mg immediate release tablet TAKE 0.5 TO 1 TABLET (5-10 MG TOTAL) BY MOUTH EVERY 4 (FOUR) HOURS AS NEEDED FOR PAIN. 42 tablet 0     penicillin VK (PEN VK) 500 MG tablet TAKE 1 TABLET BY MOUTH DAILY 90 tablet 1     potassium chloride (K-DUR,KLOR-CON) 20 MEQ tablet Take 2 tablets (40 mEq total) by mouth daily AND 1 tablet (20 mEq total) at bedtime. 90 tablet 2     SENNA 8.6 mg tablet TAKE 1-4 TABLETS BY MOUTH 2 (TWO) TIMES A DAY. 90 tablet 11     torsemide (DEMADEX) 20 MG tablet Take 3 tablets (60 mg total) by mouth 2 (two) times a day at 9am and 6pm.  0     warfarin ANTICOAGULANT (COUMADIN/JANTOVEN) 3 MG tablet Take 1 &1/2  Tablets (4.5mg) to 2 tablets (6mg) by mouth daily, as directed.  Adjust  dose based on INR results. 170 tablet 1     No current facility-administered medications for this visit.      Allergies   Allergen Reactions     Allopurinol Rash     Social History     Tobacco Use     Smoking status: Never Smoker     Smokeless tobacco: Never Used   Substance Use Topics     Alcohol use: Yes     Alcohol/week: 2.0 standard drinks     Types: 2 Shots of liquor per week     Frequency: 2-3 times a week     Drug use: No       Review and/or order of clinical lab tests:  Review and/or order of radiology tests:  Review and/or order of medicine tests:  Discussion of test results with performing physician:  Decision to obtain old records and/or obtain history from someone other than the patient:  Review and summarization of old records and/or obtaining history from someone other than the patient and.or discussion of case with another health care provider:  Independent visualization of image, tracing or specimen itself:    Time:      Jin Hicks MD

## 2021-06-12 NOTE — PROGRESS NOTES
"ITP ASSESSMENT   Assessment Day: 90 Day  Session Number: 17  Precautions: standard cardiac  Diagnosis: Valve  Risk Stratification: High  Referring Provider: Raphael Rosenberg, *  EXERCISE                           Exercise Plan  Goals Next 30 days  ADL'S: To try 2-3 flights slowly without fatigue, sob  Leisure: To resume chipping, putting and   riding in golf cart with friends to find relaxation, enjoyment with friends.  Work: Still trying to increase stamina thru trying mod version of HIIT program, 3-4 mph     Education Goals: All goals in this section met  Education Goals Met: Patient can state cardiac s/s and appropriate emergency response.;Has system for taking medication.;Medication review.                        Goals Met  60 day ADL'S goals met: pt able to climb 2 flights slowly now, pacing self wilthout becoming too winded.  60 day Leisure goals met: has not been golfing yet due to nose bleed, pkg.   60 day Work goals met: Treadmill time and walking dog limited due to bloody nose   60 Day Progression: pt frustrated by frequent medical problems hampering his recovery.    30 day ADL'S goals met: Pt has not met goal of climbing 2 flights of stairs without sxs and would like to continue goal.  30 day Leisure goals met: Pt has not started any golfing yet but plans on starting this month.  30 day Work goals met: Pt has met this goal and is ready to start increasing the intensity and duration of exercise.  30 Day Progression: Pt reported being encouraged by increased stamina. \"I was able to drive to [place] which was about a 6 hour drive one way without being tired!\"    Initial ADL's goals met: Pt. able to climbm 1 flight of stairs without SOB or other SX.      Initial Progression: Pt's endurance is gradually improving.  He has completed his antibiotic infusions. He is 6 weeks post surgery and is tolerating low level upper extremity exercise/activity without discomfort.  He is frustrated over slow progress, " but is starting to make some long term goals, such as light jogging, cross country skiing and golfing with friends.    Exercise Prescription  Exercise Mode: Treadmill;Bike;Nustep;Arm Erg.;Elliptical;Stairs  Frequency: 2-3 x weekly  Duration: 50-60 min  Intensity / THR: 20-30 beats above resting heart rate  RPE 11-14  Progression / Met level: 4-6 mets  Resistive Training?: Yes    Current Exercise (mins/week): 50    Interventions  Home Exercise:  Mode: TM, Outdoor,Walking  Frequency: 4-7 days / week  Duration: 30-60 min    Education Material : Educational videos;Provide written material;Individual education and counseling;Offer educational classes    Education Completed  Exercise Education Completed: Signs and Symptoms;Medication review;RPE;Emergency Plan;Home Exercise;Warm up/cool down;FITT Principles;BP/HR Reponse to exercise;Benefits of Exercise;End point of exercise;Strength training (states he's got handout from past CR, but not doing Wts yet.)            Exercise Follow-up/Discharge  Follow up/Discharge: Still hoping to increase energy and endurance by increasing speed rather than incline. NUTRITION  Nutrition Assessment: Reassessment    Nutrition Risk Factors:  Nutrition Risk Factors: Dyslipidemia  Cholesterol: 197  LDL: 109  HDL: 62  Triglycerides: 130    Nutrition Plan  Interventions  Nutrition Interventions: Diet consult;Diet class;Therapist/Patient discussion;Educational videos;Provide with written material  Rate Your Plate Score: 56    Education Completed  Nutrition Education Completed: Low Saturated fat diet;Risk factor overview;Low sodium diet;Weight management;Discussed small frequent meals and nutritional supplements    Goals  Nutrition Goals (Next 30 days): Patient demonstrated understanding of cardiac nutrition, no goals identified for the next 30 days    Goals Met  Nutrition Goals Met: Patient can identify their risk factors for CAD;Patient follows a low sodium diet;Completed Nutritional Risk  "Screen;Patient states following a low saturated fat diet;Patient knows appropriate portion size    Height, Weight, and  BMI  Weight: 170 lb (77.1 kg)  Height: 5' 10\" (1.778 m)  BMI: 24.39    Nutrition Follow-up  Follow-up/Discharge: Pt stated he is following the low fat / cholesterol / salt diet. \"I'm doing well maintaining my weight and I eat a lot of fish, fruit.\"       Other Risk Factors  Other Risk Factor Assessment: Reassessment    HTN Risk Factor: NA    Pre Exercise BP: 128/62  Post Exercise BP: 124/78       Tobacco Risk Factor: NA            PSYCHOSOCIAL     Psychosocial Risk Factor: Stress    Psychosocial Plan  Interventions  Interventions: Individual education and counseling    Education Completed  Education Completed: Relaxation/Coping Techniques;S/S of depression;Effects of stress on body    Goals  Goals (Next 30 days): Practicing stress management skills    Goals Met  Goals Met: Identified Support system;Oriented to stress management classes;Identify stressors    Psychosocial Follow-up  Follow-up/Discharge: Pt finding it difficult to remain upbeat but working on stress management thru exercise and relaxation techniques.           Patient involved in Goal setting?: Yes    Signature: _____________________________________________________________    Date: __________________    Time: __________________See Doc Flowsheet  "

## 2021-06-12 NOTE — TELEPHONE ENCOUNTER
Orders being requested: New dressings   Reason service is needed/diagnosis: caller stated that the current routine for the wound care is not working and that the wrap is sticking into the wound and when they remove it, its hurting the patient. Caller is wondering if they can use Iodosorb  wound gel to help it.   When are orders needed by: as soon as possible   Where to send Orders: Phone:  [phone]  Okay to leave detailed message?  yes

## 2021-06-12 NOTE — TELEPHONE ENCOUNTER
Received a faxed INR result for Bandar Wells  From [facility]  INR result dated 10/13/2020 is 2.2

## 2021-06-12 NOTE — TELEPHONE ENCOUNTER
Prescription Monitoring Program activity reviewed with no discrepancies noted.      Last fill per : 10/21/20  Quantity/days supply: #42 for a 7 day supply    Controlled Substance Agreement on file: No  Date:     Last office visit with provider:  10/21/2020 Jin Hicks MD    Please advise.

## 2021-06-13 NOTE — TELEPHONE ENCOUNTER
"Anticoagulation Annual Referral Renewal Review    Bandar Wells's chart reviewed for annual renewal of referral to anticoagulation monitoring.        Criteria for anticoagulation nurse and/or pharmacist renewal met   Warfarin indication: Atrial Fibrillation and Mechanical MVR Yes, per indication   Current with INR monitoring/compliant Yes Yes   Date of last office visit 10/21/2020 Yes, had office visit within last year   Time in Therapeutic Range (TTR) 43.4 % No, TTR < 60 %       Bandar Wells did NOT meet all criteria for anticoagulation management program initiated renewal and requires provider review. Using dot phrase, \".acmrenewalprovider\", please advise if Janeths anticoagulation management referral should be renewed or if patient should be seen in office to review anticoagulation therapy      Meg Jacinto RN  2:03 PM      "

## 2021-06-13 NOTE — PROGRESS NOTES
[facility] Hematology and Oncology Progress Note    Patient: Bandar Wells  MRN: [id number]  Date of Service: 10/13/2020      Assessment and Plan:    1.  Bone lesion seen on imaging: Patient had a PET scan on October 12.  It was negative.  Specifically, no bone lesions suspicious for myeloma or metastases.  Previous evaluation including a bone marrow has also been unremarkable in terms of finding a malignancy.  I do not necessarily think he needs to follow-up with us.  I will leave this at the discretion of Dr. Hicks.    2.  Anemia: Chronic.  Likely anemia of chronic kidney disease.    ECOG Performance   ECOG Performance Status: 1    Distress Assessment  Distress Assessment Score: 1    Pain  Currently in Pain: Yes  Pain Score (Initial OR Reassessment): 7  Location: feet/ankles    Diagnosis:    Bone lesion seen on CT scan from Rita 3, 2020.    Treatment:    Observation    Interim History:    Bandar returns today for a follow-up visit.  He recently had a PET scan and is here to review the results.  Still has a rash which itches but no blisters.  No fevers.    Review of Systems:    Constitutional  Constitutional (WDL): All constitutional elements are within defined limits  Neurosensory  Neurosensory (WDL): Exceptions to WDL  Ataxia: Asymptomatic, clinical or diagnostic observations only, intervention not indicated(r/t neck)  Cardiovascular  Cardiovascular (WDL): All cardiovascular elements are within defined limits  Pulmonary  Respiratory (WDL): Within Defined Limits  Gastrointestinal  Gastrointestinal (WDL): All gastrointestinal elements are within defined limits  Genitourinary  Genitourinary (WDL): All genitourinary elements are within defined limits  Integumentary  Integumentary (WDL): Exceptions to WDL(open sores on ankles/feet from cellulitis, gets in home wounds care)  Patient Coping  Patient Coping: Accepting  Accompanied by  Accompanied by: Alone    Past History:    Past Medical History:   Diagnosis Date      "Abnormal liver function test      Atrial fibrillation (H)     post naun     Atrial fibrillation with RVR (H) 8/29/2015    S/p MAZE Jul 2015     Bacterial endocarditis      CHF (congestive heart failure) (H)      Dyslipidemia      Hyperlipidemia      Mitral valve prolapse      Near syncope      Pericardial effusion without cardiac tamponade 8/29/2015     S/P mitral valve replacement with metallic valve 03/16/2017     Status post Maze operation for atrial fibrillation 8/29/2015     Physical Exam:    Recent Vitals 10/21/2020   Height 5' 10\"   Weight 159 lbs   BSA (m2) 1.89 m2   /64   Pulse 68   Temp -   Temp src -   SpO2 95   Some recent data might be hidden     General: patient appears stated age of 70 y.o.. Nontoxic and in no distress.   HEENT: Head: atraumatic, normocephalic. Sclerae anicteric.  Chest:  Normal respiratory effort  Cardiac:  No edema.   Abdomen: abdomen is non-distended  Extremities: normal tone and muscle bulk.  CNS: alert and oriented. Grossly non-focal.   Psychiatric: normal mood and affect.     Lab Results:    No results found for this or any previous visit (from the past 168 hour(s)).     Imaging:    PET scan personally reviewed.  No suspicious lesions noted.    EXAM: NM PET CT WHOLE BODY  LOCATION: [facility]  DATE/TIME: 10/12/2020 3:15 PM     FINDINGS: FDG avid skin/subcutaneous thickening of the bilateral lower extremities likely representing inflammatory change related to venous stasis. Linear FDG uptake involving the skin/subcutaneous tissue along the medial aspect of the right great toe   (max SUV 5.2) and along the left heel (max SUV 2.8) with mildly FDG avid bilateral inguinal lymph nodes (Max SUV 2.2) suspicious for superimposed inflammatory/infectious processes with reactive lymphadenopathy. The remaining FDG uptake is physiologic   from the skull vertex to feet without convincing evidence of active neoplasm.     Moderate senescent intracranial changes. " Mitral valve prosthesis. Cholelithiasis. Multilevel degenerative changes of the spine.     IMPRESSION:      1. No metabolic evidence of active neoplasm.     2. FDG avid skin thickening of the bilateral lower extremities likely representing inflammatory change related to the venous stasis with more focal areas along the medial aspect of the right great toe and left heel suspicious for superimposed   inflammatory/infectious processes.      Signed by: José Miguel Solorio MD

## 2021-06-13 NOTE — TELEPHONE ENCOUNTER
Controlled Substance Refill Request  Medication Name:   Requested Prescriptions     Pending Prescriptions Disp Refills     oxyCODONE (ROXICODONE) 10 mg immediate release tablet 42 tablet 0     Sig: TAKE 0.5 TO 1 TABLET (5-10 MG TOTAL) BY MOUTH EVERY 4 (FOUR) HOURS AS NEEDED FOR PAIN.     Date Last Fill: 12/1/20  Requested Pharmacy: [facility]  Submit electronically to pharmacy  Controlled Substance Agreement on file:   Encounter-Level CSA Scan Date:    There are no encounter-level csa scan date.        Last office visit:  10/21/20

## 2021-06-13 NOTE — PROGRESS NOTES
ITP ASSESSMENT   Assessment Day:180 Day/Discharge  Session Number: 33  Precautions: standard cardiac  Diagnosis: Valve  Risk Stratification: High  Referring Provider: Raphael Rosenberg, *  EXERCISE  Exercise Assessment: Discharge     6 Minute Walk Test   Pre   Pre Exercise HR: 67                  Pre Exercise BP: 120/60    Peak  Peak HR: 86                 Peak BP: 177/76  Peak feet: 1660  Peak O2 SAT: 100  Peak RPE: 13  Peak MPH: 3.14    Symptoms:  Peak Symptoms: none    5 mins. Post  5 Min Post HR: 80  5 Min Post BP: 142/76                         Exercise Plan  Goals Next 30 days  ADL'S: Pt. will continue to move furniture out and paint spare bedroom  Leisure: Pt. will complete Phase 2   Work: Pt. will continue a regular exercise program  to get in to shape for cross-country skiing this winter.    Education Goals: All goals in this section met  Education Goals Met: Patient can state cardiac s/s and appropriate emergency response.;Has system for taking medication.;Medication review.                        Goals Met  150+ day ADL'S goals met: Pt. has been able to organize and move furniture out of spare bedroom.          150+ Day Progress: Pt. has completed pase 2 cardiac rehab.  He has been tolerating a 3-5 met level comfortably .  Discussed long term ex. plan in preparation for crops[place] country skiing.     150+ day ADL'S goals met: Pt. has been able to organize and move furniture out of spare bedroom.          150+ Day Progress: Pt. has completed pase 2 cardiac rehab.  He has been tolerating a 3-5 met level comfortably .  Discussed long term ex. plan in preparation for crops[place] country skiing.     120 day Leisure goals met: Pt. completed participating in 3 day golf tournament without symptoms  No Data Recorded  120 Day Progress: Pt. has started moving some of the furniture out of the bedroom. pt. plans to remove rest of furniture and begin painting.    90 day ADL'S goals met: stair climbing goal met, pt will  "loose 2-3 lbs as pt is concerned with his recently 3 lb weight gain.  90 day Leisure goals met: golf goal met  No Data Recorded  90 Day Progress: Pt is making good progress but continues to have sob and fatigue.  Pt experienced a llittle set back in the month of July due to frequent nose bleeds, but is no longer struggling with them.    60 day ADL'S goals met: pt able to climb 2 flights slowly now, pacing self wilthout becoming too winded.  60 day Leisure goals met: has not been golfing yet due to nose bleed, pkg.   60 day Work goals met: Treadmill time and walking dog limited due to bloody nose   60 Day Progression: pt frustrated by frequent medical problems hampering his recovery.    30 day ADL'S goals met: Pt has not met goal of climbing 2 flights of stairs without sxs and would like to continue goal.  30 day Leisure goals met: Pt has not started any golfing yet but plans on starting this month.  30 day Work goals met: Pt has met this goal and is ready to start increasing the intensity and duration of exercise.  30 Day Progression: Pt reported being encouraged by increased stamina. \"I was able to drive to [place] which was about a 6 hour drive one way without being tired!\"      Exercise Prescription  Exercise Mode: Treadmill;Bike;Nustep;Arm Erg.;Elliptical (rower)  Frequency: 2x week  Duration: 50-60 min  Intensity / THR: 20-30 beats above resting heart rate  RPE 11-14  Progression / Met level: 4-6 mets  Resistive Training?: Yes    Current Exercise (mins/week): 300    Interventions  Home Exercise:  Mode: Treadmill, walking  Frequency: 4-5x week  Duration: 30-60    Education Material : Educational videos;Provide written material;Individual education and counseling;Offer educational classes    Education Completed  Exercise Education Completed: Signs and Symptoms;Medication review;RPE;Emergency Plan;Home Exercise;Warm up/cool down;FITT Principles;BP/HR Reponse to exercise;Benefits of Exercise;End point of " "exercise;Strength training;Stretching            Exercise Follow-up/Discharge  Follow up/Discharge: Home exercise reviewed, pt. exhibits an understanding of information. His plan is to do exercise on his own. NUTRITION  Nutrition Assessment: Discharge    Nutrition Risk Factors:  Nutrition Risk Factors: Dyslipidemia  Cholesterol: 197  LDL: 109  HDL: 62  Triglycerides: 120    Nutrition Plan  Interventions  Nutrition Interventions: Diet consult;Diet class;Therapist/Patient discussion;Educational videos;Provide with written material  Rate Your Plate Score: 56        Education Completed  Nutrition Education Completed: Low Saturated fat diet;Risk factor overview;Low sodium diet;Weight management;Discussed small frequent meals and nutritional supplements    Goals  Nutrition Goals (Next 30 days): Patient demonstrated understanding of cardiac nutrition, no goals identified for the next 30 days    Goals Met  Nutrition Goals Met: Patient can identify their risk factors for CAD;Patient follows a low sodium diet;Completed Nutritional Risk Screen;Patient states following a low saturated fat diet;Patient knows appropriate portion size;Provided Rate your Plate Survey;Reviewed Dietitian schedule    Height, Weight, and  BMI  Weight: 178 lb (80.7 kg)  Height: 5' 10\" (1.778 m)  BMI: 25.54  Pre BMI: 25.54     Nutrition Follow-up  Follow-up/Discharge: Pt. states he has made many positive lifestyle changes       Other Risk Factors  Other Risk Factor Assessment: Discharge    HTN Risk Factor: NA    Pre Exercise BP: 120/62  Post Exercise BP: 116/70      Tobacco Risk Factor: NA      Risk Factor Follow-up   Follow-up/Discharge: Pt. continues to do well with risk factor management.   PSYCHOSOCIAL  Psychosocial Assessment: Discharge     Jaime CHANCE Q of L Summary Score: 19        ERIC-D Score: 14      Psychosocial Risk Factor: Stress    Psychosocial Plan  Interventions  If ERIC-D > 15 send letter to MD  Interventions: Individual education and " counseling;Offer educational videos and classes;Provide written material    Education Completed  Education Completed: Relaxation/Coping Techniques;S/S of depression;Effects of stress on body      Goals Met  Goals Met: Practicing stress management skills;Identify stressors;Oriented to stress management classes;Identified Support system;[facility]    Psychosocial Follow-up  Follow-up/Discharge: Pt. states he feels positive about his recovery and feels he has the skills to deal with future stressors.           Patient involved in Goal setting?: Yes    Signature: _____________________________________________________________    Date: __________________    Time: __________________

## 2021-06-13 NOTE — TELEPHONE ENCOUNTER
ANTICOAGULATION  MANAGEMENT    Assessment     Today's INR result of 3.30 is Therapeutic (goal INR of 2.5-3.5)    - reported getting a new home INR monitor over the weekend.      Less warfarin taken than instructed which may be affecting INR    No new diet changes affecting INR    No interaction expected between Potassium supplement / Metolazone and warfarin    - increased dose of his Potassium supplement.   - on 11/5/20, increased Metolazone 2.5mg to 4 days/wk.    (4 lbs WT gain and lower extremity edema.)    Continues to tolerate warfarin with no reported s/s of bleeding or thromboembolism     Previous INR was Subtherapeutic at 2.20 on 10/13/20.    Plan:     Spoke on phone with Bandar regarding INR result and instructed:     Warfarin Dosing Instructions:   (evenings. Has 3mg tabs)   - Continue current warfarin dose 6 mg daily.    Instructed patient to follow up no later than: 1-2 wks.   - checks INR's with home monitor thru [employer].    Education provided: importance of consistent vitamin K intake, target INR goal and significance of current INR result, no interaction anticipated between warfarin and Potassium supplement and importance of notifying clinic for changes in medications    Bandar verbalizes understanding and agrees to warfarin dosing plan.    Instructed to call the [facility] for any changes, questions or concerns. (#[phone])   ?   Meg Jacinto RN    Subjective/Objective:      Bandar Wells, a 70 y.o. male is on warfarin.     Bandar reports:     Home warfarin dose: verbally confirmed home dose with Bandar and updated on anticoagulation calendar     Missed doses: No     Medication changes:  Yes: was taking less warfarin dose with 42mg/wk, instead of 45mg/wk.     S/S of bleeding or thromboembolism:  No     New Injury or illness:  No     Changes in diet or alcohol consumption:  No     Upcoming surgery, procedure or cardioversion:  No    Anticoagulation Episode Summary     Current INR goal:  2.5-3.5   TTR:   41.6 % (1 y)   Next INR check:  11/24/2020   INR from last check:  3.30 (11/10/2020)   Weekly max warfarin dose:     Target end date:  Indefinite   INR check location:     Preferred lab:     Send INR reminders to:  [facility]    Indications    Paroxysmal atrial fibrillation (H) [I48.0]  S/P MVR (mitral valve replacement) [Z95.2]  S/P mitral valve replacement with metallic valve [Z95.4]           Comments:  INR TARGET GOAL 2.5 - 3.5         Anticoagulation Care Providers     Provider Role Specialty Phone number    Jin Bhat MD Referring Internal Medicine [phone]

## 2021-06-13 NOTE — TELEPHONE ENCOUNTER
INR result is 3.3  INR   Date Value Ref Range Status   10/13/2020 2.20 (!) 0.9 - 1.1 Final       Will the patient be seen, or did they already see, MD or PATRIZIA today? Yes [facility]     Most Recent Warfarin dose day/week  Sunday Monday Tuesday Wednesday Thursday Friday Saturday     6 6 6 6 6     Sunday Monday Tuesday Wednesday Thursday Friday Saturday   6 6            Has the patient missed any doses of Coumadin, Warfarin, Jantoven in the past 7 days? No    Has the patients medications changed since the last visit? Yes Increase in potassium from 3 tabs a day to 6 tabs a day.     Has the patient experienced any bleeding recently? No    Has the patient experienced any injuries or illness recently? No    Has the patient experienced any 'new' shortness of breath, severe headaches, or changes in vision recently? No    Has the patient had any changes in their diet, or alcohol consumption? No    Is the patient here today to prepare for any type of upcoming surgery, procedure, or for a cardioversion procedure? No    What phone number can we reach the patient at today? Patient -- Patient also  got his new monitoring system over the weekend .

## 2021-06-13 NOTE — PROGRESS NOTES
ITP ASSESSMENT   Assessment Day: 150 Day  Session Number: 29  Precautions: standard cardiac  Diagnosis: Valve  Risk Stratification: High  Referring Provider: Raphael Rosenberg,   EXERCISE  Exercise Assessment: Reassessment                            Exercise Plan  Goals Next 30 days  ADL'S: Pt. will continue to move furniture out and paint spare bedroom  Leisure: Pt. will complete Phase 2   Work: Pt. will continue a regular exercise program  to get in to shape for cross-country skiing this winter.    Education Goals: All goals in this section met  Education Goals Met: Patient can state cardiac s/s and appropriate emergency response.;Has system for taking medication.;Medication review.                        Goals Met                                120 day Leisure goals met: Pt. completed participating in 3 day golf tournament without symptoms    120 Day Progress: Pt. has started moving some of the furniture out of the bedroom. pt. plans to remove rest of furniture and begin painting.    90 day ADL'S goals met: stair climbing goal met, pt will loose 2-3 lbs as pt is concerned with his recently 3 lb weight gain.  90 day Leisure goals met: golf goal met    90 Day Progress: Pt is making good progress but continues to have sob and fatigue.  Pt experienced a llittle set back in the month of July due to frequent nose bleeds, but is no longer struggling with them.    60 day ADL'S goals met: pt able to climb 2 flights slowly now, pacing self wilthout becoming too winded.  60 day Leisure goals met: has not been golfing yet due to nose bleed, pkg.   60 day Work goals met: Treadmill time and walking dog limited due to bloody nose   60 Day Progression: pt frustrated by frequent medical problems hampering his recovery.    30 day ADL'S goals met: Pt has not met goal of climbing 2 flights of stairs without sxs and would like to continue goal.  30 day Leisure goals met: Pt has not started any golfing yet but plans on starting  "this month.  30 day Work goals met: Pt has met this goal and is ready to start increasing the intensity and duration of exercise.  30 Day Progression: Pt reported being encouraged by increased stamina. \"I was able to drive to [place] which was about a 6 hour drive one way without being tired!\"      Exercise Prescription  Exercise Mode: Treadmill;Bike;Nustep;Arm Erg.;Elliptical (rower)  Frequency: 2x week  Duration: 50-60 min  Intensity / THR: 20-30 beats above resting heart rate  RPE 11-14  Progression / Met level: 4-6 mets  Resistive Training?: Yes    Current Exercise (mins/week): 120    Interventions  Home Exercise:  Mode: treadmill, outdoor walking  Frequency: 4-5x week  Duration: 30-60 min    Education Material : Educational videos;Provide written material;Individual education and counseling;Offer educational classes    Education Completed  Exercise Education Completed: Signs and Symptoms;Medication review;RPE;Emergency Plan;Home Exercise;Warm up/cool down;FITT Principles;BP/HR Reponse to exercise;Benefits of Exercise;End point of exercise;Strength training            Exercise Follow-up/Discharge  Follow up/Discharge: Plans to complete Phase 2, and get into shape for cross country skin. NUTRITION  Nutrition Assessment: Reassessment    Nutrition Risk Factors:  Nutrition Risk Factors: Dyslipidemia  Cholesterol: 197  LDL: 109  HDL: 62  Triglycerides: 130    Nutrition Plan  Interventions  Nutrition Interventions: Diet consult;Diet class;Therapist/Patient discussion;Educational videos;Provide with written material  Rate Your Plate Score: 56  Pre       Education Completed  Nutrition Education Completed: Low Saturated fat diet;Risk factor overview;Low sodium diet;Weight management;Discussed small frequent meals and nutritional supplements    Goals  Nutrition Goals (Next 30 days): Patient demonstrated understanding of cardiac nutrition, no goals identified for the next 30 days    Goals Met  Nutrition Goals Met: " "Patient can identify their risk factors for CAD;Patient follows a low sodium diet;Completed Nutritional Risk Screen;Patient states following a low saturated fat diet;Patient knows appropriate portion size;Provided Rate your Plate Survey;Reviewed Dietitian schedule    Height, Weight, and  BMI  Weight: 176 lb (79.8 kg)  Height: 5' 10\" (1.778 m)  BMI: 25.25  Pre BMI: 25.25     Nutrition Follow-up  Follow-up/Discharge: Pt stated he is following the low fat / cholesterol / salt diet. \"I'm doing well maintaining my weight and I eat a lot of fish, fruit.\"       Other Risk Factors  Other Risk Factor Assessment: Reassessment    HTN Risk Factor: NA    Pre Exercise BP: 126/74  Post Exercise BP: 114/50    Hypertension Plan  Goals    Goals Met    HTN Interventions    HTN Education Completed    Tobacco Risk Factor: NA        Tobacco Plan  Tobacco Goals      Goals Met      Tobacco Interventions    Tobacco Education Completed    Risk Factor Follow-up    PSYCHOSOCIAL  Psychosocial Assessment: Reassessment       Psychosocial Risk Factor: Stress    Psychosocial Plan  Interventions  If ERIC-D > 15 send letter to MD  Interventions: Individual education and counseling;Offer educational videos and classes;Provide written material    Education Completed  Education Completed: Relaxation/Coping Techniques;S/S of depression;Effects of stress on body    Goals  Goals (Next 30 days): Improvement in [employer] COOP score    Goals Met  Goals Met: Practicing stress management skills;Identify stressors;Oriented to stress management classes;Identified Support system    Psychosocial Follow-up  Follow-up/Discharge: Pt. states he is managing his stress better           Patient involved in Goal setting?: Yes    Signature: _____________________________________________________________    Date: __________________    Time: __________________  "

## 2021-06-13 NOTE — TELEPHONE ENCOUNTER
Prescription set up and sent in a separate encounter. Given to covering provider and denied.     Jackelyn Vasquez, CMA

## 2021-06-13 NOTE — TELEPHONE ENCOUNTER
INR result is  2.4   INR   Date Value Ref Range Status   11/10/2020 3.30 (!) 0.9 - 1.1 Final       Will the patient be seen, or did they already see, MD or CNP today? No    Most Recent Warfarin dose day/week  Sunday Monday Tuesday Wednesday Thursday Friday Saturday    6.0 6.0 6.0 6.0 6.0 6.0     Sunday Monday Tuesday Wednesday Thursday Friday Saturday   6.0             Has the patient missed any doses of Coumadin, Warfarin, Jantoven in the past 7 days? No    Has the patients medications changed since the last visit? No     Has the patient experienced any bleeding recently? No    Has the patient experienced any injuries or illness recently? No    Has the patient experienced any 'new' shortness of breath, severe headaches, or changes in vision recently? No    Has the patient had any changes in their diet, or alcohol consumption? No    Is the patient here today to prepare for any type of upcoming surgery, procedure, or for a cardioversion procedure? No    What phone number can we reach the patient at today? Ok to leave detailed message on cell or home number.

## 2021-06-13 NOTE — PROGRESS NOTES
CLINIC FOLLOW UP NOTE:    Original Reason for consultation, site patient seen:[place] for complex PVE  Interval history:  Bandar doing better, going still to cardiac rehab.  Active lifestyle, golfs some.  Will xc ski this winter.  No cardiac or infectious complaints.  Taking daily pen VK 500mg for suppression.  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      Could drop by for check up 6-9 months, but told him not critical to do so      Thank you for allowing me to continue care of this patient.    Sincerely:      BISHNU Ordonez MD  [facility]  [facility]   [phone]

## 2021-06-13 NOTE — TELEPHONE ENCOUNTER
Prescription Monitoring Program activity reviewed with no discrepancies noted.      Last fill per : 12/1/20  Quantity/days supply: 42 tablets for 7 days    Controlled Substance Agreement on file: No  Date: NA    Last office visit with provider:  10/21/20    Please advise.

## 2021-06-13 NOTE — TELEPHONE ENCOUNTER
Controlled Substance Refill Request  Medication Name:   Requested Prescriptions     Pending Prescriptions Disp Refills     oxyCODONE (ROXICODONE) 10 mg immediate release tablet 42 tablet 0     Sig: TAKE 0.5 TO 1 TABLET (5-10 MG TOTAL) BY MOUTH EVERY 4 (FOUR) HOURS AS NEEDED FOR PAIN.     Date Last Fill: 11/10/2020  Is patient out of medication?: No, 3 days left  Patient notified refills processed within 3 business days:  Yes  Requested Pharmacy: [facility]  Submit electronically to pharmacy  Controlled Substance Agreement on file:   Encounter-Level CSA Scan Date:    There are no encounter-level csa scan date.        Last office visit:  10/21/2020

## 2021-06-13 NOTE — TELEPHONE ENCOUNTER
RN cannot approve Refill Request    RN can NOT refill this medication med is not covered by policy/route to provider. Last office visit: 10/21/2020 Jin Hicks MD Last Physical: Visit date not found Last MTM visit: Visit date not found Last visit same specialty: 10/21/2020 Jin Hicks MD.  Next visit within 3 mo: Visit date not found  Next physical within 3 mo: Visit date not found      Anne Aquino, [facility] Triage/Med Refill 11/27/2020    Requested Prescriptions   Pending Prescriptions Disp Refills     ALOCANE EMERGENCY BURN 4 % Gel [Pharmacy Med Name: LIDOCAINE HCL 4% GEL 4 Gel] 75 mL 0     Sig: APPLY TOPICALLY TO AFFECTED AREA(S).       There is no refill protocol information for this order

## 2021-06-13 NOTE — TELEPHONE ENCOUNTER
Provider Review: Anticoagulation Annual Referral Renewal    ACM Renewal Decision:  Renew ACM warfarin management      INR Range:   Continue management at current INR goal   Anticipated Duration of Therapy (from today):  Long-term anticoagulation      Jin Hicks MD  8:41 AM

## 2021-06-13 NOTE — TELEPHONE ENCOUNTER
Prescription Monitoring Program activity reviewed with no discrepancies noted.      Last fill per : 11/10/20  Quantity/days supply: 42 tablets for 7 days     Controlled Substance Agreement on file: No  Date: NA    Last office visit with provider:  10/21/2020 Jin Hicks MD    Please advise.

## 2021-06-14 NOTE — PROGRESS NOTES
Bandar Wells is a 70 y.o. male who is being evaluated via a billable video visit.      How would you like to obtain your AVS? Mail a copy.  If dropped from the video visit, the video invitation should be resent by: Text to cell phone: [phone]  Will anyone else be joining your video visit? No    Video Start Time: 11:48 AM  Assessment & Plan     Left wrist pain  I think this is probably gout.  He has known elevated uric acid and chronic kidney disease.  He has been on high doses diuretics.  No specific injury.  No constitutional symptoms to suggest infection.  We will try prednisone and he will be in close contact us if this is not improving quickly.  - predniSONE (DELTASONE) 10 mg tablet; Take 10 mg by mouth daily Take 4 tabs daily for 3 days, then 3 tabs daily for 3 days, then 2 tabs daily for 3 days, then 1 tab daily for 3 days, then stop..    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      Return in about 1 week (around 1/26/2021) for office visit, if symptoms worsen/fail to improve.    Jin Hicks MD  [facility]     Bandar Wells is 70 y.o. and presents to clinic today for the following health issues   HPI had sudden onset of wrist pain.  No specific injury.  Consistent with his shoulder pain previously.  Pain rating up his arm.  Redness swelling increased warmth.  No skin breakdown.      Review of Systems      Objective       Vitals:  No vitals were obtained today due to virtual visit.    Physical Exam    His left wrist is visibly swollen and red  Currently has decreased range of motion          Video-Visit Details    Type of service:  Video Visit    Video End Time (time video stopped): 11:58 AM  Originating Location (pt. Location):  Home    Distant Location (provider location):  [facility]     Platform used for Video Visit: Artem

## 2021-06-14 NOTE — TELEPHONE ENCOUNTER
Received a faxed INR result for Bandar Wells  From [facility]  INR result dated 1/22/2021 is 2.2

## 2021-06-14 NOTE — TELEPHONE ENCOUNTER
ANTICOAGULATION  MANAGEMENT    Assessment     Today's INR result of 2.9 is Therapeutic (goal INR of 2.5-3.5)        Warfarin taken as previously instructed    No new diet changes affecting INR    No new medication/supplements affecting INR    Continues to tolerate warfarin with no reported s/s of bleeding or thromboembolism     Previous INR was Subtherapeutic    Plan:     Spoke on phone with Bandar regarding INR result and instructed:     Warfarin Dosing Instructions:  Continue current warfarin dose 6 mg daily  (0 % change)    Instructed patient to follow up no later than: two weeks.    Education provided: importance of therapeutic range, importance of following up for INR monitoring at instructed interval and importance of taking warfarin as instructed    Bandar verbalizes understanding and agrees to warfarin dosing plan.    Instructed to call the [facility] for any changes, questions or concerns. (#[phone])   ?   Mireya Santos RN    Subjective/Objective:      Bandar Wells, a 70 y.o. male is on warfarin.     Bandar reports:     Home warfarin dose: verbally confirmed home dose with Bandar and updated on anticoagulation calendar     Missed doses: No     Medication changes:  No     S/S of bleeding or thromboembolism:  No     New Injury or illness:  No     Changes in diet or alcohol consumption:  No     Upcoming surgery, procedure or cardioversion:  No    Anticoagulation Episode Summary     Current INR goal:  2.5-3.5   TTR:  47.2 % (1 y)   Next INR check:  1/14/2021   INR from last check:  2.90 (12/31/2020)   Weekly max warfarin dose:     Target end date:  Indefinite   INR check location:     Preferred lab:     Send INR reminders to:  [facility]    Indications    Paroxysmal atrial fibrillation (H) [I48.0]  S/P MVR (mitral valve replacement) [Z95.2]  S/P mitral valve replacement with metallic valve [Z95.4]           Comments:  INR TARGET GOAL 2.5 - 3.5         Anticoagulation Care Providers      Provider Role Specialty Phone number    Jin Bhat MD Referring Internal Medicine [phone]

## 2021-06-14 NOTE — TELEPHONE ENCOUNTER
ANTICOAGULATION  MANAGEMENT PROGRAM    Bandar Wells is overdue for INR check.     Spoke with Bandar. Instructed to test INR with home meter and call results in to home monitoring company as soon as possible.       Meg Jacinto RN     18-May-2019 23:49

## 2021-06-14 NOTE — TELEPHONE ENCOUNTER
Received a faxed INR result for Bandar Wells  From [facility]  INR result dated 1/31/2021 is 2.3

## 2021-06-14 NOTE — PROGRESS NOTES
Office Visit - Follow Up   Bandar Wells   70 y.o. male    Date of Visit: 1/14/2021    Chief Complaint   Patient presents with     Hospital Visit Follow Up        Assessment and Plan   1. Pain syndrome, chronic  We did have him fill out a pain contract.  He urinated red-free came to grab a urine drug screen in the future visit.  We reviewed his drug prescription history, no abnormalities  - oxyCODONE (ROXICODONE) 10 mg immediate release tablet; TAKE 0.5 TO 1 TABLET (5-10 MG TOTAL) BY MOUTH EVERY 4 (FOUR) HOURS AS NEEDED FOR PAIN.  Dispense: 42 tablet; Refill: 0    2. Cervical dystonia  Meeting with [facility] regarding treatment options    3. DRESS syndrome  Plan as outlined by the wound clinic at [facility]    4. Chronic combined systolic and diastolic congestive heart failure (H)  Appears stable today, close follow-up with cardiology    5. CKD (chronic kidney disease) stage 4, GFR 15-29 ml/min (H)  Stable, close follow-up with nephrology    6. Paroxysmal atrial fibrillation (H)  Continue with a rate control strategy and warfarin for stroke prevention    7. S/P mitral valve replacement with metallic valve  He continues on warfarin, also on Endo carditis prophylaxis with penicillin    8. Prosthetic valve endocarditis, subsequent encounter    Return in about 3 months (around 4/14/2021) for recheck.     History of Present Illness   This 70 y.o. old man comes in for follow-up.  Since I last saw him he has been seen by [facility].  We did extensive review his visit at [facility] including visits with neurology, rheumatology, dermatology, general internal medicine.  He has a plan in place for his chronic skin wounds.  Wrapping and applying treatments.  He follows up with wound clinic.  He also met with pain clinic and current treatment plan was endorsed.  Rheumatology evaluated for gout and did a CT scan of his foot which does show gouty insufficiency changes.  He is taking Uloric.  He has been evaluated for  "difficulty extending his neck.  It is felt that this is some form of dystonia and different treatment options have been discussed.  There is some difficulty in treatment including use of Botox medication because of the location of the muscles and a concern about worsening the extensor function.  He continues to meet with the heart clinic at the [facility].  He also meets with nephrology.  Overall generally feeling stable.  Struggled somewhat with his mood with one of his medical appointments on top of the global health pandemic etc.    Review of Systems: A comprehensive review of systems was negative except as noted.     Medications, Allergies and Problem List   Reviewed, reconciled and updated  Post Discharge Medication Reconciliation Status:      Physical Exam   General Appearance:   No acute distress    /58 (Patient Site: Left Arm, Patient Position: Sitting, Cuff Size: Adult Regular)   Pulse 75   Ht 5' 10\" (1.778 m)   Wt 171 lb (77.6 kg)   SpO2 96%   BMI 24.54 kg/m      Neck noted with difficulty extending, tight muscles  Lymphatic no cervical lymphadenopathy  Cardiovascular regular rate and rhythm no murmur gallop or rub, mechanical mitral valve noted.  His legs are wrapped  Pulmonary lungs are clear to auscultation bilaterally  Gastrointestinal abdomen soft nontender nondistended no organomegaly  Neurologic exam is non focal  Psychiatric pleasant, no confusion or agitation        Additional Information   Current Outpatient Medications   Medication Sig Dispense Refill     acetaminophen (TYLENOL) 500 MG tablet Take 500-1,000 mg by mouth every 6 (six) hours as needed for pain. Pain 1-5  Give 500 mg  Or pain 6-10 give 1000 mg every 6 hours ad needed not to exceed 4 grams in 24 hours       ALOCANE EMERGENCY BURN 4 % Gel APPLY TOPICALLY TO AFFECTED AREA(S). 75 mL 0     b complex vitamins tablet Take 1 tablet by mouth daily.        cholecalciferol, vitamin D3, 1,000 unit tablet Take 1,000 Units " by mouth daily.       cyanocobalamin 1000 MCG tablet Take 1,000 mcg by mouth daily.       febuxostat (ULORIC) 40 mg tablet Take 1 tablet (40 mg total) by mouth daily.  0     FLAXSEED OIL ORAL Take 1,000 mg by mouth daily.        fluticasone (FLONASE) 50 mcg/actuation nasal spray 1 spray into each nostril daily as needed.        folic acid (FOLVITE) 1 MG tablet Take 1 mg by mouth daily.       gabapentin (NEURONTIN) 300 MG capsule Take 2 capsules (600 mg total) by mouth 2 (two) times a day. 30 capsule 0     lactobacillus comb no.10 20 billion cell cap Take 1 capsule by mouth.       metOLazone (ZAROXOLYN) 2.5 MG tablet Take 2.5 mg by mouth 3 (three) times a week.       multivitamin therapeutic (THERAGRAN) tablet Take 1 tablet by mouth daily.       oxyCODONE (ROXICODONE) 10 mg immediate release tablet TAKE 0.5 TO 1 TABLET (5-10 MG TOTAL) BY MOUTH EVERY 4 (FOUR) HOURS AS NEEDED FOR PAIN. 42 tablet 0     penicillin VK (PEN VK) 500 MG tablet TAKE 1 TABLET BY MOUTH DAILY 90 tablet 0     potassium chloride (K-DUR,KLOR-CON) 20 MEQ tablet Take 2 tablets (40 mEq total) by mouth daily AND 1 tablet (20 mEq total) at bedtime. 90 tablet 2     SENNA 8.6 mg tablet TAKE 1-4 TABLETS BY MOUTH 2 (TWO) TIMES A DAY. 90 tablet 11     torsemide (DEMADEX) 20 MG tablet Take 3 tablets (60 mg total) by mouth 2 (two) times a day at 9am and 6pm.  0     warfarin ANTICOAGULANT (COUMADIN/JANTOVEN) 3 MG tablet Take 1 &1/2  Tablets (4.5mg) to 2 tablets (6mg) by mouth daily, as directed.  Adjust dose based on INR results. 170 tablet 1     No current facility-administered medications for this visit.      Allergies   Allergen Reactions     Allopurinol Rash     Clindamycin Rash     Social History     Tobacco Use     Smoking status: Never Smoker     Smokeless tobacco: Never Used   Substance Use Topics     Alcohol use: Yes     Alcohol/week: 2.0 standard drinks     Types: 2 Shots of liquor per week     Frequency: 2-3 times a week     Drug use: No       Review  and/or order of clinical lab tests:  Review and/or order of radiology tests:  Review and/or order of medicine tests:  Discussion of test results with performing physician:  Decision to obtain old records and/or obtain history from someone other than the patient:  Review and summarization of old records and/or obtaining history from someone other than the patient and.or discussion of case with another health care provider:  Independent visualization of image, tracing or specimen itself:    Time: 47 minutes spent in this encounter today     Jin Hicks MD

## 2021-06-15 ENCOUNTER — COMMUNICATION - HEALTHEAST (OUTPATIENT)
Dept: INTERNAL MEDICINE | Facility: CLINIC | Age: 71
End: 2021-06-15

## 2021-06-15 DIAGNOSIS — G89.4 PAIN SYNDROME, CHRONIC: ICD-10-CM

## 2021-06-15 NOTE — TELEPHONE ENCOUNTER
Reason for Call:  Other call back      Detailed comments: [facility] CALLING STATING THEY FAXED A PLAN OF CARE FORM TO BE SIGNED BY DR ROSS ON 02/12/2021 - WONDERING STATUS  IF IT NEEDS TO BE REFAXED PLEASE CALL [facility] - [place] - [phone]    PLEASE REFAX:  [phone]    Phone Number Patient can be reached at: Other phone number:  [phone]    Best Time: ANYTIME    Can we leave a detailed message on this number?: Yes    Call taken on 2/23/2021 at 11:50 AM by Corin Samayoa

## 2021-06-15 NOTE — TELEPHONE ENCOUNTER
Received a faxed INR result for Bandar Wells  From [facility]  INR result dated 2/22/2021 is 4.5

## 2021-06-15 NOTE — TELEPHONE ENCOUNTER
Controlled Substance Refill Request  Medication Name:   Requested Prescriptions     Pending Prescriptions Disp Refills     oxyCODONE (ROXICODONE) 10 mg immediate release tablet 42 tablet 0     Sig: TAKE 0.5 TO 1 TABLET (5-10 MG TOTAL) BY MOUTH EVERY 4 (FOUR) HOURS AS NEEDED FOR PAIN.     Date Last Fill: 2/5/21  Requested Pharmacy: [facility]  Submit electronically to pharmacy  Controlled Substance Agreement on file:   Encounter-Level CSA Scan Date:    There are no encounter-level csa scan date.        Last office visit:  1/19/21

## 2021-06-15 NOTE — PROGRESS NOTES
[employer] called stating that insurance needs a new RX for INR home monitoring in order keep patient active.  Order filled out and faxed to provider for signature.

## 2021-06-15 NOTE — TELEPHONE ENCOUNTER
Received  INR result for Bandar Wells  From [facility] where done at [place] today  INR result dated 2/4/2021 is 1.9

## 2021-06-15 NOTE — TELEPHONE ENCOUNTER
ANTICOAGULATION  MANAGEMENT PROGRAM    Bandar Wells is overdue for INR check.     Left message to check INR with home meter and call results to home monitoring company as soon as possible.      Meg Jacinto RN

## 2021-06-15 NOTE — TELEPHONE ENCOUNTER
Prescription Monitoring Program activity reviewed with no discrepancies noted.      Last fill per : 2/5/21  Quantity/days supply: 42 tabs x 7 days     Controlled Substance Agreement on file: Yes  Date: 1/14/21    Last office visit with provider:  1/19/21    Please advise.

## 2021-06-15 NOTE — PROGRESS NOTES
Assessment and Plan:   Annual wellness visit.  All information relative to the annual wellness visit was reviewed and reviewed with the patient.  No new areas of concern were noted.    Mitral valve replacement.  After a very zach 2 years he seems to be doing well.  I reviewed his notes from cardiology and infectious disease from last October.  He is continuing with his current treatment plan and will follow up with cardiology in the spring.  He will continue on his current medication.    Paroxysmal atrial fibrillation.  Stable at this time.  He will continue on his anticoagulation.    Prostate cancer screening.  We will do a PSA today.    Dyslipidemia.  We will check lipids today.    I will follow-up with him as needed.        The patient's current medical problems were reviewed.    I have had an Advance Directives discussion with the patient.  The following health maintenance schedule was reviewed with the patient and provided in printed form in the after visit summary:   Health Maintenance   Topic Date Due     COLONOSCOPY  08/20/1998     INFLUENZA VACCINE RULE BASED (1) 08/01/2017     FALL RISK ASSESSMENT  03/01/2018     DEPRESSION FOLLOW UP  07/04/2018     ADVANCE DIRECTIVES DISCUSSED WITH PATIENT  03/01/2022     TD 18+ HE  09/07/2022     PNEUMOCOCCAL POLYSACCHARIDE VACCINE AGE 65 AND OVER  Completed     PNEUMOCOCCAL CONJUGATE VACCINE FOR ADULTS (PCV13 OR PREVNAR)  Completed     ZOSTER VACCINE  Completed        Subjective:   Chief Complaint: Bandar Wells is an 67 y.o. male here for an Annual Wellness visit.   HPI: This is a 67-year-old man who comes in for his annual wellness visit.  He has had a very difficult 2 years with mitral valve disease and valve replacement.  He had issues with bacterial endocarditis.  He has been doing well in these areas since last spring.  He also has a history of paroxysmal atrial fibrillation which is been stable.  He saw both his cardiologist and infectious disease doctor last  October and I have reviewed those notes.  Both consultants felt the patient was doing relatively well.  The patient remains on daily penicillin and it sounds as though this will be a lifetime necessity.  He has had no cardiac symptoms of late.  His most significant symptoms is intermittent nosebleeds.  These have been severe enough to send him to the emergency room on at least 2 occasions.  He does see the ear nose and throat doctor on a regular basis to follow-up on this issue.  Otherwise he offers no new complaints and there have been no other changes in his medical status.  Medications are as listed.  He does have a vacation planned to [place], [place] in 1 month.  No other immediate travel plans.    Review of Systems:    Please see above.  The rest of the review of systems are negative for all systems.    Patient Care Team:  Jin Bhat MD as PCP - General (Internal Medicine)  Bull Dodson MD (Dermatology)  Andrew Interiano MD as Physician (Otolaryngology)     Patient Active Problem List   Diagnosis     Hx of bacterial endocarditis     Dyslipidemia     Mitral valve prolapse     S/P MVR (mitral valve replacement)     Recurrent pleural effusion on right     Pericardial effusion without cardiac tamponade     Status post Maze operation for atrial fibrillation     Paroxysmal atrial fibrillation     Coronary artery disease     Acute diastolic heart failure     CHF (congestive heart failure)     Hypovolemia     Hypotension     Acute respiratory failure with hypoxia     Acute blood loss anemia     Acute renal failure     Dysthymia     Prosthetic valve endocarditis, subsequent encounter     S/P mitral valve replacement with metallic valve     Persistent atrial fibrillation     Chronic combined systolic and diastolic congestive heart failure     Past Medical History:   Diagnosis Date     Abnormal liver function test      Atrial fibrillation     post naun     Atrial fibrillation with RVR 8/29/2015    S/p MAZE  Jul 2015     Bacterial endocarditis      CHF (congestive heart failure)      Dyslipidemia      Hyperlipidemia      Mitral valve prolapse      Near syncope      Pericardial effusion without cardiac tamponade 8/29/2015     S/P mitral valve replacement with metallic valve 03/16/2017     Status post Maze operation for atrial fibrillation 8/29/2015      Past Surgical History:   Procedure Laterality Date     CARDIAC SURGERY       CHG X-RAY PELVIS 3+ VW N/A 7/1/2015    Procedure: MITRAL VALVE REPLACEMENT, MAZE PROCEDURE, CARDIOLOGY TRANSESOPHAGEAL ECHOCARDIOGRAM;  Surgeon: Rm Munoz MD;  Location: [facility] OR;  Service:      LANG-MAZE MICROWAVE ABLATION       EYE SURGERY      Retial hole treatment     HERNIA REPAIR Right     inguinal     MITRAL VALVE REPLACEMENT N/A 3/16/2017    Procedure: REDO STERNOTOMY, REDO MITRAL VALVE REPLACEMENT, PLACEMENT TEMPORARY VENTRICULAR PACING WIRES, ANESTHESIA TRANSESOPHAGEAL ECHOCARDIOGRAM;  Surgeon: Raphael Rosenberg MD;  Location: [facility] OR;  Service:      MITRAL VALVE REPLACEMENT  2015    Bioprosthetic     PERICARDIAL WINDOW N/A 8/31/2015    Procedure: RIGHT PERICARDIAL WINDOW, TALC PLEURODESIS,VIDEO ASSISTED THOROSCOPY,DRAINAGE OF BILATERAL PLEURAL EFFUSIONS;  Surgeon: Rm Munoz MD;  Location: [facility];  Service:      PICC  9/3/2015          PICC  2/14/2017           Family History   Problem Relation Age of Onset     Atrial fibrillation Mother      Heart failure Mother      Emphysema Father      Heart failure Father      Kidney failure Sister      S/P Renal transplant     Alcoholism Brother      No Medical Problems Son      No Medical Problems Brother       Social History     Social History     Marital status: Single     Spouse name: N/A     Number of children: N/A     Years of education: N/A     Occupational History     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  His son is in [place] but can be present if warned.  Home 1 1/2 stories.    3/2017      Current Outpatient Prescriptions   Medication Sig Dispense Refill     acetaminophen (TYLENOL) 500 MG tablet Take 500-1,000 mg by mouth every 6 (six) hours as needed for pain. Pain 1-5  Give 500 mg  Or pain 6-10 give 1000 mg every 6 hours ad needed not to exceed 4 grams in 24 hours       atorvastatin (LIPITOR) 40 MG tablet Take 1 tablet (40 mg total) by mouth at bedtime. 90 tablet 0     b complex vitamins tablet Take 1 tablet by mouth daily.        cholecalciferol, vitamin D3, 1,000 unit tablet Take 1,000 Units by mouth daily.       clindamycin (CLEOCIN) 300 MG capsule Take 2 capsules (600 mg total) by mouth see administration instructions. Take prior to Dentist appointments 2 capsule 3     cyanocobalamin 1000 MCG tablet Take 1,000 mcg by mouth daily.       ferrous sulfate 325 (65 FE) MG tablet Take 1 tablet (325 mg total) by mouth 2 (two) times a day. 60 tablet 4     FLAXSEED OIL ORAL Take 1,000 mg by mouth daily.        FOLIC ACID ORAL Take 1 tablet by mouth daily.       furosemide (LASIX) 20 MG tablet Take 1 tablet (20 mg total) by mouth every other day. 30 tablet 11     L. ACIDOPHILUS/BIFIDO LONGUM (PROBIOTIC PEARLS ORAL) Take 1 capsule by mouth daily.       lisinopril (PRINIVIL) 10 MG tablet Take 1 tablet (10 mg total) by mouth daily. 30 tablet 11     multivitamin therapeutic (THERAGRAN) tablet Take 1 tablet by mouth daily.       oxymetazoline (AFRIN) 0.05 % nasal spray Apply 2-3 sprays into each nostril 2 (two) times a day as needed. Separate doses by at least 10-12 hours.       penicillin VK (PEN VK) 500 MG tablet Take 500 mg by mouth daily.    "    potassium chloride SA (K-DUR,KLOR-CON) 20 MEQ tablet TAKE 1 TABLET BY MOUTH DAILY. 90 tablet 1     warfarin (COUMADIN) 6 MG tablet Take 6 mg by mouth daily except for 3 mg on Tues and Thursday. Adjust dose based on INR results as directed. 30 tablet 1     warfarin (COUMADIN) 6 MG tablet TAKE 1/2 TO 1 TABLET BY MOUTH DAILY, AS INSTRUCTED. DOSE WILL BE ADJUSTED BASED ON INR RESULTS. 40 tablet 0     warfarin (COUMADIN) 6 MG tablet Take 1/2 or 1 tablet (3mg or 6mg) by mouth daily, as directed.  Dose adjusted based on INR results. 110 tablet 0     No current facility-administered medications for this visit.       Objective:   Vital Signs:   Visit Vitals     /80     Pulse (!) 59     Ht 5' 10.75\" (1.797 m)     Wt 181 lb (82.1 kg)     SpO2 99%     BMI 25.42 kg/m2        VisionScreening:  No exam data present     PHYSICAL EXAM  On examination today his blood pressure was 136/80.  Weight is 181 pounds and height is 71 inches.  BMI is 25.42.    Eyes: Pupils are equal and conjunctiva are normal.    Ears nose and throat: External ears canals and tympanic membranes are normal.  Nose and throat are normal.    Neck: Supple with no masses and no neck vein distention.  No thyroid enlargement.    Lungs: Clear.    Cardiovascular: Heart is in sinus rhythm today with rate of 60 and no ectopy.  No gallops are heard.  Carotid pulses are full with no bruits.  No peripheral edema.    GI: Abdomen is soft and nontender with no distention.  No masses or organomegaly.    Musculoskeletal: Head and neck are normal to inspection with good range of motion.  Good strength and range of motion in all 4 extremities.    Neurologic: Cranial nerves are intact.  Gait is normal.    Psychiatric: The patient is alert and oriented ×3.  Mood and affect are appropriate.    Assessment Results 1/4/2018   Activities of Daily Living No help needed   Instrumental Activities of Daily Living No help needed   Get Up and Go Score Less than 12 seconds   Mini Cog " Total Score 4   Some recent data might be hidden     A Mini-Cog score of 0-2 suggests the possibility of dementia, score of 3-5 suggests no dementia    Identified Health Risks:     The patient was counseled and encouraged to consider modifying their diet and eating habits. He was provided with information on recommended healthy diet options.  The patient was provided with written information regarding signs of hearing loss.  Patient's advanced directive was discussed and I am comfortable with the patient's wishes.

## 2021-06-15 NOTE — TELEPHONE ENCOUNTER
Controlled Substance Refill Request  Medication Name:   Requested Prescriptions     Pending Prescriptions Disp Refills     oxyCODONE (ROXICODONE) 10 mg immediate release tablet 42 tablet 0     Sig: TAKE 0.5 TO 1 TABLET (5-10 MG TOTAL) BY MOUTH EVERY 4 (FOUR) HOURS AS NEEDED FOR PAIN.     Date Last Fill: 1/4/2021  Requested Pharmacy: [facility]  Submit electronically to pharmacy  Controlled Substance Agreement on file:   Encounter-Level CSA Scan Date:    There are no encounter-level csa scan date.        Last office visit:  1/19/2021

## 2021-06-15 NOTE — TELEPHONE ENCOUNTER
INR result is 1.9  INR   Date Value Ref Range Status   02/01/2021 2.30 (!) 0.9 - 1.1 Final       Will the patient be seen, or did they already see, MD or CNP today? Yes Ceballos    Most Recent Warfarin dose day/week  Sunday Monday Tuesday Wednesday Thursday Friday Saturday       6 mg 6 mg 6 mg     Sunday Monday Tuesday Wednesday Thursday Friday Saturday   6 mg 9 mg 6 mg 6 mg          Has the patient missed any doses of Coumadin, Warfarin, Jantoven in the past 7 days? No    Has the patients medications changed since the last visit? No    Has the patient experienced any bleeding recently? No    Has the patient experienced any injuries or illness recently? No    Has the patient experienced any 'new' shortness of breath, severe headaches, or changes in vision recently? No    Has the patient had any changes in their diet, or alcohol consumption? No    Is the patient here today to prepare for any type of upcoming surgery, procedure, or for a cardioversion procedure? No    What phone number can we reach the patient at today? [phone] Bandar LOJA.

## 2021-06-16 ENCOUNTER — COMMUNICATION - HEALTHEAST (OUTPATIENT)
Dept: INTERNAL MEDICINE | Facility: CLINIC | Age: 71
End: 2021-06-16

## 2021-06-16 DIAGNOSIS — G89.4 PAIN SYNDROME, CHRONIC: ICD-10-CM

## 2021-06-16 NOTE — TELEPHONE ENCOUNTER
Telephone Encounter by Shilpi Tamayo, PharmD at 10/3/2019  2:40 PM     Author: Shilpi Tamayo, PharmD Service: -- Author Type: Pharmacist    Filed: 10/3/2019  2:43 PM Encounter Date: 10/2/2019 Status: Signed    : Shilpi Tamayo, PharmD (Pharmacist)       Anticoagulation    Returned call to Bandar and left detailed message instructing him Dr. Bhat advised to restart Lovenox 72 hours post procedure on Saturday 10/5.     Continue warfarin as previously discussed.    Call with questions [phone]    Shilpi Tamayo, PharmD    ___________    Jin Bhat MD  You 4 minutes ago (2:34 PM)      I would be okay with waiting 72 hours.

## 2021-06-16 NOTE — TELEPHONE ENCOUNTER
Telephone Encounter by Meg Jacinto RN at 3/11/2020  3:03 PM     Author: Meg Jacinto RN Service: -- Author Type: Registered Nurse    Filed: 3/11/2020  3:43 PM Encounter Date: 3/11/2020 Status: Attested Addendum    : Meg Jacinto RN (Registered Nurse)    Related Notes: Original Note by Meg Jacinto RN (Registered Nurse) filed at 3/11/2020  3:09 PM    Cosigner: Jin Bhat MD at 3/11/2020  4:04 PM    Attestation signed by Jin Bhat MD at 3/11/2020  4:04 PM    Continue anticoagulation.                Spoke with Bandar.     - having problems with bruising / bleeding from the Lovenox injection every 12 hrs.     - advised to check INR status now with home monitor and call INR results in to [employer] as well.     - then call [facility] back with INR results.     - will discuss Lovenox injections with pharmacist, Shilpi Tamayo, PharmD.    - OK'd - per CANDACE Weber to discontinue Lovenox injection when INR is >2.0, if patient continues to have increased bruising / bleeding.     - informed Bandar.     - advised to reesume his new warfarin dose with 9mg on Mon/Thurs and 6mg ROW.     - advised to check INR in one wk with home monitor. Verbalized understanding and happily agreed with plan.

## 2021-06-16 NOTE — TELEPHONE ENCOUNTER
ANTICOAGULATION  MANAGEMENT    Assessment     Today's INR result of 2.80 is Therapeutic (goal INR of 2.5-3.5)        Warfarin taken as previously instructed    No new diet changes affecting INR    No new medication/supplements affecting INR    Continues to tolerate warfarin with no reported s/s of bleeding or thromboembolism     Previous INR was Subtherapeutic at 2.00 on 3/18/21.    Plan:     Spoke on phone with Bandar regarding INR result and instructed:      Warfarin Dosing Instructions:   -  Continue current warfarin dose 6 mg daily.    Instructed patient to follow up no later than:  2 wks with home monitor thru [employer]   - however, Bandar stated he will check within 3 wks.    Education provided: importance of consistent vitamin K intake, target INR goal and significance of current INR result and importance of notifying clinic for changes in medications    Bandar verbalizes understanding and agrees to warfarin dosing plan.    Instructed to call the [facility] for any changes, questions or concerns. (#[phone])   ?   Meg Jacinto RN    Subjective/Objective:      Bandar Wells, a 70 y.o. male is on warfarin. Bandar Portillo reports:     Home warfarin dose: verbally confirmed home dose with Bandar and updated on anticoagulation calendar     Missed doses: No     Medication changes:  No     S/S of bleeding or thromboembolism:  No     New Injury or illness:  No     Changes in diet or alcohol consumption:  No     Upcoming surgery, procedure or cardioversion:  No    Anticoagulation Episode Summary     Current INR goal:  2.5-3.5   TTR:  49.8 % (1 y)   Next INR check:  4/15/2021   INR from last check:  2.80 (3/25/2021)   Weekly max warfarin dose:     Target end date:  Indefinite   INR check location:     Preferred lab:     Send INR reminders to:  [facility]    Indications    Paroxysmal atrial fibrillation (H) [I48.0]  S/P MVR (mitral valve replacement) (Resolved) [Z95.2]  S/P mitral valve replacement with metallic  valve [Z95.4]           Comments:  INR TARGET GOAL 2.5 - 3.5         Anticoagulation Care Providers     Provider Role Specialty Phone number    Jin Hicks MD [facility] Internal Medicine [phone]

## 2021-06-16 NOTE — TELEPHONE ENCOUNTER
Telephone Encounter by Meg Jacinto RN at 3/9/2020  2:02 PM     Author: Meg Jacinto RN Service: -- Author Type: Registered Nurse    Filed: 3/9/2020  2:50 PM Encounter Date: 3/9/2020 Status: Attested    : Meg Jacinto RN (Registered Nurse) Cosigner: Jin Bhat MD at 3/9/2020  2:52 PM    Attestation signed by Jin Bhat MD at 3/9/2020  2:52 PM    Continue anticoagulation.                ANTICOAGULATION  MANAGEMENT    Assessment     Today's INR result of 1.60 is Subtherapeutic (goal INR of 2.5-3.5)        Warfarin recently held as instructed which may be affecting INR    No new diet changes affecting INR    No new medication/supplements affecting INR    - currently bridging with Lovenox q12 hrs. Till INR is > 2.0, if he continues to have difficulty tolerating injection d/t site bleeding.    Continues to tolerate warfarin with no reported s/s of bleeding or thromboembolism     Previous INR was Subtherapeutic     Post Epidural injection for CS on 3/4/20.    Consulted pharmacist, Shilpi Tamayo, PharmD.    Plan:     Spoke with Sheryl regarding INR result and instructed:     Warfarin Dosing Instructions:  (evenings. Has 3mg tabs)   - continue Lovenox injections every 12 hours.   - due to take 9mg warfarin dose tonight.   - tomorrow,, 3/10 advised one time booster with 9mg warfarin dose,   - then continue current warfarin dose 9 mg daily on Mon/Thurs; and 6 mg daily rest of week.    Instructed patient to follow up no later than:  Advised to check INR this Thurs. 3/12/20.   - checks INR's with home monitor thru [employer].    Education provided: target INR goal and significance of current INR result, importance of notifying clinic for changes in medications and monitoring for bleeding signs and symptoms    Sheryl verbalizes understanding and agrees to warfarin dosing plan.    Instructed to call the [facility] for any changes, questions or concerns. (#[phone])   ?   Meg Jacinto  RN    Subjective/Objective:      Bandar Wells, a 69 y.o. male is on warfarin.     Bandar reports:     Home warfarin dose: verbally confirmed home dose with Bandar and updated on anticoagulation calendar     Missed doses: Yes:  HELD warfarin for 6 days, from 2/28 - 3/4.     Medication changes:  No     S/S of bleeding or thromboembolism:  No     New Injury or illness:  No     Changes in diet or alcohol consumption:  No     Upcoming surgery, procedure or cardioversion:  No    Anticoagulation Episode Summary     Current INR goal:   2.5-3.5   TTR:   24.1 % (1 y)   Next INR check:   3/12/2020   INR from last check:   1.60! (3/9/2020)   Weekly max warfarin dose:      Target end date:   Indefinite   INR check location:      Preferred lab:      Send INR reminders to:   [facility]    Indications    Paroxysmal atrial fibrillation (H) [I48.0]  S/P MVR (mitral valve replacement) [Z95.2]  S/P mitral valve replacement with metallic valve [Z95.4]           Comments:   INR TARGET GOAL 2.5 - 3.5         Anticoagulation Care Providers     Provider Role Specialty Phone number    Jin Bhat MD Referring Internal Medicine [phone]

## 2021-06-16 NOTE — TELEPHONE ENCOUNTER
**Duplicate request**    Controlled Substance Refill Request  Medication Name:   Requested Prescriptions     Pending Prescriptions Disp Refills     oxyCODONE (ROXICODONE) 10 mg immediate release tablet [Pharmacy Med Name: OXYCODONE HCL 10 MG TABLET 10 Tablet] 42 tablet 0     Sig: TAKE A HALF TABLET TO ONE TABLET (5-10 MG TOTAL) BY MOUTH EVERY FOUR HOURS AS NEEDED FOR PAIN.     Date Last Fill: 4/16/21  Requested Pharmacy: [facility]  Submit electronically to pharmacy  Controlled Substance Agreement on file:   Encounter-Level CSA Scan Date:    There are no encounter-level csa scan date.        Last office visit:  4/15/21

## 2021-06-16 NOTE — TELEPHONE ENCOUNTER
Prescription Monitoring Program activity reviewed with no discrepancies noted.      Last fill per : 3/4/21  Quantity/days supply: 42 tabs x 7 days     Controlled Substance Agreement on file: Yes  Date: 1/14/21    Last office visit with provider:  1/14/2021 Jin Hicks MD    Please advise.

## 2021-06-16 NOTE — TELEPHONE ENCOUNTER
Telephone Encounter by Meg Jacinto RN at 3/25/2020  4:02 PM     Author: Meg Jacinto RN Service: -- Author Type: Registered Nurse    Filed: 3/25/2020  4:12 PM Encounter Date: 3/25/2020 Status: Attested    : Meg Jacinto RN (Registered Nurse) Cosigner: Jin Bhat MD at 3/26/2020  8:49 AM    Attestation signed by Jin Bhat MD at 3/26/2020  8:49 AM    Continue anticoagulation                ANTICOAGULATION  MANAGEMENT    Assessment     Today's INR result of 5.00 is Supratherapeutic (goal INR of 2.5-3.5)        Warfarin taken as previously instructed    Decreased greens/vitamin K intake may be affecting INR    No new medication/supplements affecting INR    Continues to tolerate warfarin with no reported s/s of bleeding or thromboembolism     Previous INR was Supratherapeutic at 4.00 on 3/18/20.    Plan:     Spoke with Bandar regarding INR result and instructed:    1.  Advised not to skip meals due to warfarin therapy is highly protein bound.    Warfarin Dosing Instructions:  (evenings. Has 3mg tabs)    today, advised to HOLD warfarin dose,   - then change warfarin dose to 6 mg daily.   - (12.5 % change)    Instructed patient to follow up no later than:  7-10 days.   - advised to check INR in 7-10 days with home monitor thru [employer].    Education provided: impact of vitamin K foods on INR, target INR goal and significance of current INR result, monitoring for bleeding signs and symptoms and when to seek medical attention/emergency care    Bandar verbalizes understanding and agrees to warfarin dosing plan.    Instructed to call the [facility] for any changes, questions or concerns. (#[phone])   ?   Meg Jacinto RN    Subjective/Objective:      Bandar Wells, a 69 y.o. male is on warfarin.     Bandar reports:     Home warfarin dose: verbally confirmed home dose with Bandar and updated on anticoagulation calendar     Missed doses: No     Medication changes:  No     S/S of  bleeding or thromboembolism:  No     New Injury or illness:  No     Changes in diet or alcohol consumption:  Yes:  Reported was not hungry and skipped eating lunch.  He was too busy to eat.     Upcoming surgery, procedure or cardioversion:  No    Anticoagulation Episode Summary     Current INR goal:   2.5-3.5   TTR:   23.8 % (1 y)   Next INR check:   4/6/2020   INR from last check:   5.00! (3/25/2020)   Weekly max warfarin dose:      Target end date:   Indefinite   INR check location:      Preferred lab:      Send INR reminders to:   [facility]    Indications    Paroxysmal atrial fibrillation (H) [I48.0]  S/P MVR (mitral valve replacement) [Z95.2]  S/P mitral valve replacement with metallic valve [Z95.4]           Comments:   INR TARGET GOAL 2.5 - 3.5         Anticoagulation Care Providers     Provider Role Specialty Phone number    Jin Bhat MD Referring Internal Medicine [phone]

## 2021-06-16 NOTE — PROGRESS NOTES
Preoperative Consultation   Bandar Wells   70 y.o.  male    Date of visit: 4/15/2021  Physician: Jin Hicks MD    This is a preoperative consultation requested by Dr. Groves in preparation for bilateral cataract surgery on 4/22/21(left) and  5/13/21 (right) and  at [facility], fax [phone].       Assessment and Plan   Bandar eWlls was seen in preoperative consultation in preparation for bilateral cataract surgery.  This is a low risk surgery and the patient has increased risk for major cardiac complications based on a history of congestive heart failure and creatinine >2mg/dl Please note he will continue his anticoagulation through surgery.  Check labs today.  EKG does show a junctional rhythm which is not new and he appears stable cardiovascular hemodynamically.    1. Preoperative examination    2. Cataract of both eyes, unspecified cataract type    3. S/P mitral valve replacement with metallic valve    4. Prosthetic valve endocarditis, subsequent encounter    5. Paroxysmal atrial fibrillation (H)    6. Status post Maze operation for atrial fibrillation    7. DRESS syndrome    8. Coronary artery disease involving native coronary artery of native heart without angina pectoris    9. CKD (chronic kidney disease) stage 4, GFR 15-29 ml/min (H)    10. Chronic combined systolic and diastolic congestive heart failure (H)    11. Idiopathic chronic gout without tophus, unspecified site    12. Long term (current) use of anticoagulants         Patient Profile   Social History     Social History Narrative    Has a steady girlfriend and multiple friends .  His son is in [place] but can be present if warned.  Home 1 1/2 stories.    3/2017        Past Medical History   Patient Active Problem List   Diagnosis     Dyslipidemia     Status post Maze operation for atrial fibrillation     Paroxysmal atrial fibrillation (H)     Coronary artery disease     Dysthymia     Prosthetic valve endocarditis,  subsequent encounter     S/P mitral valve replacement with metallic valve     Chronic combined systolic and diastolic congestive heart failure (H)     Bone lesion     DRESS syndrome     CKD (chronic kidney disease) stage 4, GFR 15-29 ml/min (H)     Idiopathic chronic gout without tophus, unspecified site       Past Surgical History  He has a past surgical history that includes chg x-ray pelvis 3+ vw (N/A, 7/1/2015); Pericardial window (N/A, 8/31/2015); PICC (9/3/2015); Cardiac surgery; PICC (2/14/2017); Mitral valve replacement (N/A, 3/16/2017); Mitral valve replacement (2015); Nicholas maze microwave ablation; Eye surgery; and Hernia repair (Right).     History of Present Illness   This 70 y.o. old man comes in for preoperative evaluation.  He will be having cataract surgery.  Overall he is feeling better.  His main issue is next tightness.  He is working with [facility] and physical therapy.  He is having some improvement.  From a cardiovascular standpoint he is doing well.  His legs have healed up from his wounds related to dress syndrome and heart failure.  He is wearing compression garments and tolerating diuretics.  He does have some complications from the diuretics including elevated uric acid, probable gout and chronic kidney disease.  He follows with nephrology and things have generally been stable    Recent antiplatelet use: yes .  Personal or family history of bleeding or clotting disorders: yes He is on warfarin  Steroid use in the past year: no  Personal or family history of difficulty with anesthesia: no  Current cardiopulmonary symptoms: no    Review of Systems: A comprehensive review of systems was negative except as noted.     Medications and Allergies   Current Outpatient Medications   Medication Sig Dispense Refill     acetaminophen (TYLENOL) 500 MG tablet Take 500-1,000 mg by mouth every 6 (six) hours as needed for pain. Pain 1-5  Give 500 mg  Or pain 6-10 give 1000 mg every 6 hours ad needed not to  exceed 4 grams in 24 hours       b complex vitamins tablet Take 1 tablet by mouth daily.        cholecalciferol, vitamin D3, 1,000 unit tablet Take 1,000 Units by mouth daily.       cyanocobalamin 1000 MCG tablet Take 1,000 mcg by mouth daily.       febuxostat (ULORIC) 40 mg tablet Take 1 tablet (40 mg total) by mouth daily.  0     FLAXSEED OIL ORAL Take 1,000 mg by mouth daily.        fluticasone (FLONASE) 50 mcg/actuation nasal spray 1 spray into each nostril daily as needed.        folic acid (FOLVITE) 1 MG tablet Take 1 mg by mouth daily.       gabapentin (NEURONTIN) 300 MG capsule Take 2 capsules (600 mg total) by mouth 2 (two) times a day. 30 capsule 0     lactobacillus comb no.10 20 billion cell cap Take 1 capsule by mouth.       metOLazone (ZAROXOLYN) 2.5 MG tablet Take 2.5 mg by mouth 3 (three) times a week.       multivitamin therapeutic (THERAGRAN) tablet Take 1 tablet by mouth daily.       oxyCODONE (ROXICODONE) 10 mg immediate release tablet TAKE 0.5 TO 1 TABLET (5-10 MG TOTAL) BY MOUTH EVERY 4 (FOUR) HOURS AS NEEDED FOR PAIN. 42 tablet 0     penicillin VK (PEN VK) 500 MG tablet TAKE 1 TABLET BY MOUTH DAILY 90 tablet 0     potassium chloride (K-DUR,KLOR-CON) 20 MEQ tablet Take 2 tablets (40 mEq total) by mouth daily AND 1 tablet (20 mEq total) at bedtime. 90 tablet 2     SENNA 8.6 mg tablet TAKE 1-4 TABLETS BY MOUTH 2 (TWO) TIMES A DAY. 90 tablet 11     torsemide (DEMADEX) 20 MG tablet Take 3 tablets (60 mg total) by mouth 2 (two) times a day at 9am and 6pm.  0     warfarin ANTICOAGULANT (COUMADIN/JANTOVEN) 3 MG tablet Take 2 tablets (6mg) by mouth daily, as directed.  Adjusted dose based on INR results. 190 tablet 1     No current facility-administered medications for this visit.      Allergies   Allergen Reactions     Allopurinol Rash     Clindamycin Rash        Family and Social History   Family History   Problem Relation Age of Onset     Atrial fibrillation Mother      Heart failure Mother       "Emphysema Father      Heart failure Father      Kidney failure Sister         S/P Renal transplant     Alcoholism Brother      No Medical Problems Son      No Medical Problems Brother         Social History     Tobacco Use     Smoking status: Never Smoker     Smokeless tobacco: Never Used   Substance Use Topics     Alcohol use: Yes     Alcohol/week: 2.0 standard drinks     Types: 2 Shots of liquor per week     Frequency: 2-3 times a week     Drug use: No        Physical Exam   General Appearance:   nad    /60   Pulse 79   Ht 5' 10\" (1.778 m)   Wt 169 lb (76.7 kg)   SpO2 97%   BMI 24.25 kg/m      EYES: Eyelids, conjunctiva, and sclera were normal. Pupils were normal. Cornea, iris, and lens were normal bilaterally.  HEAD, EARS, NOSE, MOUTH, AND THROAT: Head and face were normal. Hearing was normal to voice and the ears were normal to external exam. Nose appearance was normal and there was no discharge. Oropharynx was normal.  NECK: Neck is in a flexed position  RESPIRATORY: Breathing pattern was normal and the chest moved symmetrically.  Percussion/auscultatory percussion was normal.  Lung sounds were normal and there were no abnormal sounds.  CARDIOVASCULAR: Heart rate and rhythm were normal.  S1 and S2 were normal and there were no extra sounds or murmurs. Peripheral pulses in arms and legs were normal.  Jugular venous pressure was normal.  Legs are wrapped  GASTROINTESTINAL: The abdomen was normal in contour.  Bowel sounds were present.  Percussion detected no organ enlargement or tenderness.  Palpation detected no tenderness, mass, or enlarged organs.   MUSCULOSKELETAL: Skeletal configuration was normal and muscle mass was normal for age. Joint appearance was overall normal.  LYMPHATIC: There were no enlarged nodes.  SKIN/HAIR/NAILS: Skin color was normal.  There were no skin lesions.  Hair and nails were normal.  NEUROLOGIC: The patient was alert and oriented to person, place, time, and circumstance. " Speech was normal. Cranial nerves were normal. Motor strength was normal for age. The patient was normally coordinated.  PSYCHIATRIC:  Mood and affect were normal and the patient had normal recent and remote memory. The patient's judgment and insight were normal.       Additional Tests   Laboratory: Pending labs    Electrocardiogram: To be a junctional rhythm, previous EKG also showed junctional rhythm with a normal rate  Total time: 40 chart review, history, examination, counseling, documentation     Jin Hicks MD  Internal Medicine  Contact me at [phone]

## 2021-06-16 NOTE — TELEPHONE ENCOUNTER
Received a faxed INR result for Bandar Wells  From [facility]  INR result dated 3/25/2021 is 2.8

## 2021-06-16 NOTE — TELEPHONE ENCOUNTER
ANTICOAGULATION  MANAGEMENT    Assessment     Today's INR result of 2.80 is Therapeutic (goal INR of 2.5-3.5)        Warfarin taken as previously instructed    No new diet changes affecting INR    No new medication/supplements affecting INR    - start Prednisolone eye drops on 4/22.    Continues to tolerate warfarin with no reported s/s of bleeding or thromboembolism     Previous INR was Therapeutic at 2.80 on 3/25/21.    Scheduled cataract surgery on LEFT 4/22 and RIGHT on 5/13/21 with Dr. Groves @ [facility].    Plan:     Spoke on phone with Bandar regarding INR result and instructed:      Warfarin Dosing Instructions:  (evenings. 3mg tabs)   - Continue current warfarin dose 6 mg daily    Instructed patient to follow up no later than: 7-10 days with home monitor with [employer].    Education provided: importance of consistent vitamin K intake, target INR goal and significance of current INR result, potential interaction between warfarin and Prednisolone eye drops and importance of notifying clinic for changes in medications    Gavin verbalizes understanding and agrees to warfarin dosing plan.    Instructed to call the [facility] for any changes, questions or concerns. (#[phone])   ?   Meg Jacinto RN    Subjective/Objective:      Bandar Wells, a 70 y.o. male is on warfarin. Bandar Portillo reports:     Home warfarin dose: as updated on anticoagulation calendar per template     Missed doses: No     Medication changes:  Yes:  Was given a RX for Prednisolone eye drops to instill on 4/22 for left cataract surgery.     S/S of bleeding or thromboembolism:  No     New Injury or illness:  No     Changes in diet or alcohol consumption:  No    Upcoming surgery, procedure or cardioversion:  Yes:  Cataract surgery on LEFT 4/22 and RIGHT on 5/13/21.      Anticoagulation Episode Summary     Current INR goal:  2.5-3.5   TTR:  54.6 % (1 y)   Next INR check:  4/29/2021   INR from last check:  2.80 (4/15/2021)   Weekly  max warfarin dose:     Target end date:  Indefinite   INR check location:     Preferred lab:     Send INR reminders to:  [facility]    Indications    Paroxysmal atrial fibrillation (H) [I48.0]  S/P MVR (mitral valve replacement) (Resolved) [Z95.2]  S/P mitral valve replacement with metallic valve [Z95.4]           Comments:  INR TARGET GOAL 2.5 - 3.5         Anticoagulation Care Providers     Provider Role Specialty Phone number    Jin Hicks MD [facility] Internal Medicine [phone]

## 2021-06-16 NOTE — TELEPHONE ENCOUNTER
Controlled Substance Refill Request  Medication Name:   Requested Prescriptions     Pending Prescriptions Disp Refills     oxyCODONE (ROXICODONE) 10 mg immediate release tablet 42 tablet 0     Sig: TAKE 0.5 TO 1 TABLET (5-10 MG TOTAL) BY MOUTH EVERY 4 (FOUR) HOURS AS NEEDED FOR PAIN.     Date Last Fill: 3/29/21  Requested Pharmacy: [facility]  Submit electronically to pharmacy  Controlled Substance Agreement on file:   Encounter-Level CSA Scan Date:    There are no encounter-level csa scan date.        Last office visit:  4/15/21

## 2021-06-16 NOTE — TELEPHONE ENCOUNTER
Reason for Call:  Other call back      Detailed comments: Pharmacy calling stating that:   Oxycodone will need a PA from insurance        Phone Number Patient can be reached at: Other phone number:  [phone]    Best Time: anytime    Can we leave a detailed message on this number?: Yes    Call taken on 4/16/2021 at 10:58 AM by Corin Samayoa

## 2021-06-16 NOTE — TELEPHONE ENCOUNTER
Prescription Monitoring Program activity reviewed with no discrepancies noted.      Last fill per : 3/29/21  Quantity/days supply: 42 tabs x 7 days     Controlled Substance Agreement on file: Yes  Date: 1/14/21    Last office visit with provider:  4/15/21    Please advise.

## 2021-06-16 NOTE — TELEPHONE ENCOUNTER
Controlled Substance Refill Request  Medication Name:   Requested Prescriptions     Pending Prescriptions Disp Refills     oxyCODONE (ROXICODONE) 10 mg immediate release tablet 42 tablet 0     Sig: TAKE 0.5 TO 1 TABLET (5-10 MG TOTAL) BY MOUTH EVERY 4 (FOUR) HOURS AS NEEDED FOR PAIN.     Date Last Fill: 3/4/21  Requested Pharmacy: [facility]  Submit electronically to pharmacy  Controlled Substance Agreement on file:   Encounter-Level CSA Scan Date:    There are no encounter-level csa scan date.        Last office visit:  1/19/21

## 2021-06-16 NOTE — TELEPHONE ENCOUNTER
Telephone Encounter by Meg Jacinto RN at 3/18/2020  1:24 PM     Author: Meg Jacinto RN Service: -- Author Type: Registered Nurse    Filed: 3/18/2020  2:07 PM Encounter Date: 3/18/2020 Status: Attested    : Meg Jacinto RN (Registered Nurse) Cosigner: Jin Bhat MD at 3/18/2020  2:21 PM    Attestation signed by Jin Bhat MD at 3/18/2020  2:21 PM    Continue anticoagulation                ANTICOAGULATION  MANAGEMENT    Assessment     Today's INR result of 4.00 is Supratherapeutic (goal INR of 2.5-3.5)        Warfarin taken as previously instructed    - booster doses were given after stoppage of warfarin therapy for 6 days.    No new diet changes affecting INR    No new medication/supplements affecting INR    Continues to tolerate warfarin with YES, reported s/s of bleeding.   - Epistaxis last night, took a while to stop bleeding.    Previous INR was Subtherapeutic at 2.10 on 3/11/20.    Plan:     Spoke with Bandar regarding INR result and instructed:    1.  Advised to check INR in one wk, prior to taking 9mg warfarin dose on Thurs. 3/26.    Warfarin Dosing Instructions:  (evenings. Has 3mg tabs)   - tonight, advised one time lower dose with warfarin 3mg,    - tomorrow, advised another one time lower dose with 6mg warfarin,   - then continue current warfarin dose  9 mg daily on Mon/Thurs; and 6 mg daily rest of week.    Instructed patient to follow up no later than:  Advised to check in one wk on 3/25/20.   - checks INR's with home monitor thru [employer].    Education provided: importance of consistent vitamin K intake, target INR goal and significance of current INR result, monitoring for bleeding signs and symptoms and when to seek medical attention/emergency care    Bandar verbalizes understanding and agrees to warfarin dosing plan.    Instructed to call the [facility] for any changes, questions or concerns. (#[phone])   ?   Meg Jacinto RN    Subjective/Objective:       Bandar Wells, a 69 y.o. male is on warfarin.     Bandar reports:     Home warfarin dose: verbally confirmed home dose with Bandar and updated on anticoagulation calendar     Missed doses: No     Medication changes:  No     S/S of bleeding or thromboembolism:  Yes:  Reported nosebleed with same culprit nasal.  He stated it took a while to stop the bleeding.  He absolutely did not want to go to ED.  He had bad experience one time when they could not stop nose bleed and they used rocket and packed his nose.     New Injury or illness:  No     Changes in diet or alcohol consumption:  No     Upcoming surgery, procedure or cardioversion:  No    Anticoagulation Episode Summary     Current INR goal:   2.5-3.5   TTR:   25.1 % (1 y)   Next INR check:   3/25/2020   INR from last check:   4.00! (3/18/2020)   Weekly max warfarin dose:      Target end date:   Indefinite   INR check location:      Preferred lab:      Send INR reminders to:   [facility]    Indications    Paroxysmal atrial fibrillation (H) [I48.0]  S/P MVR (mitral valve replacement) [Z95.2]  S/P mitral valve replacement with metallic valve [Z95.4]           Comments:   INR TARGET GOAL 2.5 - 3.5         Anticoagulation Care Providers     Provider Role Specialty Phone number    Jin Bhat MD Referring Internal Medicine [phone]

## 2021-06-16 NOTE — TELEPHONE ENCOUNTER
Who is calling:  Patient  Reason for Call:  The patient is returning a call to [facility] regarding today's INR.  Okay to leave a detailed message: Yes

## 2021-06-17 NOTE — PATIENT INSTRUCTIONS - HE
Patient Instructions by Jin Bhat MD at 1/17/2020  9:00 AM     Author: Jin Bhat MD Service: -- Author Type: Physician    Filed: 1/17/2020  2:06 PM Encounter Date: 1/17/2020 Status: Signed    : Jin Bhat MD (Physician)         Patient Education     Exercise for a Healthier Heart  You may wonder how you can improve the health of your heart. If youre thinking about exercise, youre on the right track. You dont need to become an athlete, but you do need a certain amount of brisk exercise to help strengthen your heart. If you have been diagnosed with a heart condition, your doctor may recommend exercise to help stabilize your condition. To help make exercise a habit, choose safe, fun activities.       Be sure to check with your health care provider before starting an exercise program.    Why exercise?  Exercising regularly offers many healthy rewards. It can help you do all of the following:    Improve your blood cholesterol levels to help prevent further heart trouble    Lower your blood pressure to help prevent a stroke or heart attack    Control diabetes, or reduce your risk of getting this disease    Improve your heart and lung function    Reach and maintain a healthy weight    Make your muscles stronger and more limber so you can stay active    Prevent falls and fractures by slowing the loss of bone mass (osteoporosis)    Manage stress better  Exercise tips  Ease into your routine. Set small goals. Then build on them.  Exercise on most days. Aim for a total of 150 or more minutes of moderate to  vigorous intensity activity each week. Consider 40 minutes, 3 to 4 times a week. For best results, activity should last for 40 minutes on average. It is OK to work up to the 40 minute period over time. Examples of moderate-intensity activity is walking one mile in 15 minutes or 30 to 45 minutes of yard work.  Step up your daily activity level. Along with your exercise program, try being more  active throughout the day. Walk instead of drive. Do more household tasks or yard work.  Choose one or more activities you enjoy. Walking is one of the easiest things you can do. You can also try swimming, riding a bike, or taking an exercise class.  Stop exercising and call your doctor if you:    Have chest pain or feel dizzy or lightheaded    Feel burning, tightness, pressure, or heaviness in your chest, neck, shoulders, back, or arms    Have unusual shortness of breath    Have increased joint or muscle pain    Have palpitations or an irregular heartbeat      3293-0401 [employer]. [address] [place] [address]. All rights reserved. This information is not intended as a substitute for professional medical care. Always follow your healthcare professional's instructions.         Patient Education   Depression and Suicide in Older Adults  Nearly 2 million older Americans have some type of depression. Sadly, some of them even take their own lives. Yet depression among older adults is often ignored. Learn the warning signs. You may help spare a loved one needless pain. You may also save a life.       What Is Depression?  Depression is a mood disorder that affects the way you think and feel. The most common symptom is a feeling of deep sadness. People who are depressed also may seem tired and listless. And nothing seems to give them pleasure. Its normal to grieve or be sad sometimes. But sadness lessens or passes with time. Depression rarely goes away or improves on its own. Other symptoms of depression are:    Sleeping more or less than normal    Eating more or less than normal    Having headaches, stomachaches, or other pains that dont go away    Feeling nervous, empty, or worthless    Crying a great deal    Thinking or talking about suicide or death    Feeling confused or forgetful  What Causes It?  The causes of depression arent fully known. Certain chemicals in the brain play a role.  Depression does run in families. And life stresses can also trigger depression in some people. That may be the case with older adults. They often face great burdens, such as the death of friends or a spouse. They may have failing health. And they are more likely to be alone, lonely, or poor.  How You Can Help  Often, depressed people may not want to ask for help. When they do, they may be ignored. Or, they may receive the wrong treatment. You can help by showing parents and older friends love and support. If they seem depressed, help them find the right treatment. Talk to your doctor. Or contact a local mental health center, social service agency, or hospital. With modern treatment, no one has to suffer from depression.  Resources:    National [place] of Mental Health  204.618.8496  www.nimh.nih.gov    National [place] on Mental Illness  152.901.9161  www.alma.org    M[facility] [place]  605.889.5742  www.[facility].org    National Suicide Hotline  709.894.7208 (800-SUICIDE)      7043-9961 [employer]. [address] [place] [address]. All rights reserved. This information is not intended as a substitute for professional medical care. Always follow your healthcare professional's instructions.           Advance Directive  Patients advance directive was discussed and I am comfortable with the patients wishes.  Patient Education   Personalized Prevention Plan  You are due for the preventive services outlined below.  Your care team is available to assist you in scheduling these services.  If you have already completed any of these items, please share that information with your care team to update in your medical record.  Health Maintenance   Topic Date Due   ? DEPRESSION ACTION PLAN  1950   ? HEART FAILURE ACTION PLAN  1950   ? ZOSTER VACCINES (2 of 3) 12/13/2016   ? CBC  05/29/2019   ? ALT  01/16/2020   ? LIPID  01/16/2020   ? MEDICARE ANNUAL WELLNESS VISIT  01/16/2020   ? BMP   05/06/2020   ? FALL RISK ASSESSMENT  12/11/2020   ? TD 18+ HE  09/07/2022   ? ADVANCE CARE PLANNING  01/16/2024   ? COLONOSCOPY  10/02/2029   ? TSH  Completed   ? HEPATITIS C SCREENING  Completed   ? PNEUMOCOCCAL IMMUNIZATION 65+ HIGH/HIGHEST RISK  Completed   ? INFLUENZA VACCINE RULE BASED  Completed

## 2021-06-17 NOTE — TELEPHONE ENCOUNTER
Telephone Encounter by Meg Jacinto RN at 1/22/2021 10:22 AM     Author: Meg Jacinto RN Service: -- Author Type: Registered Nurse    Filed: 1/22/2021 11:41 AM Encounter Date: 1/22/2021 Status: Signed    : Meg Jacinto RN (Registered Nurse)       ANTICOAGULATION  MANAGEMENT    Assessment     Today's INR result of 2.20 is Subtherapeutic (goal INR of 2.5-3.5)        Missed dose(s) may be affecting INR    No new diet changes affecting INR    Potential interaction between Prednisone and warfarin which may affect subsequent INRs    - on 1/19/21 on tapering dose with PredniSONE (DELTASONE) 10 mg tablet;     -Take 4 tabs daily for 3 days,    - then 3 tabs daily for 3 days,    - then 2 tabs daily for 3 days,     - then 1 tab daily for 3 days,     - then stop. For Chronic Gout (wrist pains)    Continues to tolerate warfarin with no reported s/s of bleeding or thromboembolism     Previous INR was Therapeutic at 2.90 on 12/31/20.    Reported ongoing neck pain (anterocollis) and possibly [facility] will do trigger point injections with ultrasound guidance of the cervical thoracic region. Risks benefits alternatives discussed with the patient. We will pursue INR check prior to the procedure. Or medical Cannabis with low-dose opioid therapies.    Plan:     Spoke on phone with Bandar regarding INR result and instructed:     Warfarin Dosing Instructions:  (evenings. 3mg tabs)   - tonight, advised one time booster with 9mg warfarin dose,   - then continue current warfarin dose 6 mg daily.    Instructed patient to follow up no later than: advised to check INR in one wk (1/29/21) with home monitor thru [employer]    Education provided: importance of consistent vitamin K intake, target INR goal and significance of current INR result, potential interaction between warfarin and Prednisone and importance of notifying clinic for changes in medications    Bandar verbalizes understanding and agrees to  warfarin dosing plan.    Instructed to call the [facility] for any changes, questions or concerns. (#[phone])   ?   Meg Jacinto RN    Subjective/Objective:      Bandar Wells, a 70 y.o. male is on warfarin.     Bandar reports:     Home warfarin dose: verbally confirmed home dose with Bandar and updated on anticoagulation calendar     Missed doses: No     Medication changes:  Yes: on tapering dose with Prednisone for gout flare up.   S/S of bleeding or thromboembolism:  No     New Injury or illness:  No     Changes in diet or alcohol consumption:  No     Upcoming surgery, procedure or cardioversion:  No    Anticoagulation Episode Summary     Current INR goal:  2.5-3.5   TTR:  48.9 % (1 y)   Next INR check:  2/5/2021   INR from last check:  2.20 (1/22/2021)   Weekly max warfarin dose:     Target end date:  Indefinite   INR check location:     Preferred lab:     Send INR reminders to:  [facility]    Indications    Paroxysmal atrial fibrillation (H) [I48.0]  S/P MVR (mitral valve replacement) (Resolved) [Z95.2]  S/P mitral valve replacement with metallic valve [Z95.4]           Comments:  INR TARGET GOAL 2.5 - 3.5         Anticoagulation Care Providers     Provider Role Specialty Phone number    Jin Bhat MD Referring Internal Medicine [phone]

## 2021-06-17 NOTE — TELEPHONE ENCOUNTER
Telephone Encounter by Galilea Hernandez RN at 4/30/2020  1:33 PM     Author: Galilea Hernandez RN Service: -- Author Type: Registered Nurse    Filed: 4/30/2020  3:09 PM Encounter Date: 4/30/2020 Status: Attested    : Galilea Hernandez RN (Registered Nurse) Cosigner: Jin Hicks MD at 4/30/2020  5:47 PM    Attestation signed by Jin Hicks MD at 4/30/2020  5:47 PM    [facility]                ANTICOAGULATION  MANAGEMENT    Assessment     Today's INR result of 5.9 is Supratherapeutic (goal INR of 2.0-3.0)   Patient had a virtual visit with Dr Hicks today and gave okay to hold Warfarin dose today and recheck INR tomorrow.      Warfarin taken as previously instructed    Decreased appetite may be affecting diet and INR     Still having pain may be affecting INR    No new medication/supplements affecting INR    Continues to tolerate warfarin with no reported s/s of  thromboembolism     Reported nose bleeding bout 2-3 days ago.    Previous INR was Supratherapeutic    Plan:     Spoke with Bandar regarding INR result and instructed:     Warfarin Dosing Instructions:  Hold warfarin dose today       Instructed patient to follow up no later than: tomorrow with home monitor.    Education provided: vitamin K content of foods, importance of therapeutic range, target INR goal and significance of current INR result, monitoring for bleeding signs and symptoms and when to seek medical attention/emergency care    Bandar verbalizes understanding and agrees to warfarin dosing plan.    Instructed to call the [facility] for any changes, questions or concerns. (#[phone])   ?   Galilea Hernandez RN    Subjective/Objective:      Bandar Wells, a 69 y.o. male is on warfarin.     Bandar reports:     Home warfarin dose: verbally confirmed home dose with Bandar and updated on anticoagulation calendar     Missed doses: No     Medication changes:  No     S/S of bleeding or thromboembolism:  Yes: nose bleeding 2-3 days  ago.     New Injury or illness:  No     Changes in diet or alcohol consumption:  Yes: still not eating well.     Upcoming surgery, procedure or cardioversion:  No    Anticoagulation Episode Summary     Current INR goal:   2.5-3.5   TTR:   22.5 % (1 y)   Next INR check:   5/1/2020   INR from last check:   5.90! (4/30/2020)   Weekly max warfarin dose:      Target end date:   Indefinite   INR check location:      Preferred lab:      Send INR reminders to:   [facility]    Indications    Paroxysmal atrial fibrillation (H) [I48.0]  S/P MVR (mitral valve replacement) [Z95.2]  S/P mitral valve replacement with metallic valve [Z95.4]           Comments:   INR TARGET GOAL 2.5 - 3.5         Anticoagulation Care Providers     Provider Role Specialty Phone number    Jin Bhat MD Referring Internal Medicine [phone]

## 2021-06-17 NOTE — TELEPHONE ENCOUNTER
Prescription Monitoring Program activity reviewed with no discrepancies noted.      Last fill per : 4/22/21  Quantity/days supply: 42 tabs x 7 days     Controlled Substance Agreement on file: Yes  Date: 1/14/21    Last office visit with provider:  4/15/21    Please advise.

## 2021-06-17 NOTE — TELEPHONE ENCOUNTER
Telephone Encounter by Karan Byers at 6/15/2020 12:06 PM     Author: Karan Byers Service: -- Author Type: --    Filed: 6/15/2020 12:07 PM Encounter Date: 6/12/2020 Status: Signed    : Karan Byers APPROVED:    Approval start date: 05/12/2020  Approval end date:  06/12/2021    Pharmacy has been notified of approval and will contact patient when medication is ready for pickup.

## 2021-06-17 NOTE — TELEPHONE ENCOUNTER
Telephone Encounter by Meg Jacinto RN at 2/22/2021  1:09 PM     Author: Meg Jacinto RN Service: -- Author Type: Registered Nurse    Filed: 2/22/2021  1:20 PM Encounter Date: 2/22/2021 Status: Addendum    : Meg Jacinto RN (Registered Nurse)    Related Notes: Original Note by Meg Jacinto RN (Registered Nurse) filed at 2/22/2021  1:17 PM       ANTICOAGULATION  MANAGEMENT    Assessment     Today's INR result of 4.50 is Supratherapeutic (goal INR of 2.5-3.5)        Warfarin taken as previously instructed    No new diet changes affecting INR    Potential interaction between Triamcinolone trigger point injections and warfarin which may affect subsequent INRs    - 2/4, trigger point Triamcinolone injections multiple sites (x4) of cervical spine.    Continues to tolerate warfarin with no reported s/s of bleeding or thromboembolism     Previous INR was Subtherapeutic at 2.40 on 2/8/21.    Plan:     Spoke on phone with Bandar regarding INR result and instructed:      Warfarin Dosing Instructions:  (evenings. 3mg tabs)   - today, advised to HOLD warfarin dose,   - tomorrow 2/23, advised one time lower dose with 3mg warfarin,   - then continue current warfarin dose 6 mg daily.    Instructed patient to follow up no later than:  2 wks with home monitor thru [employer]    Education provided: importance of consistent vitamin K intake and target INR goal and significance of current INR result    Bandar verbalizes understanding and agrees to warfarin dosing plan.    Instructed to call the [facility] for any changes, questions or concerns. (#[phone])   ?   Meg Jacinto RN    Subjective/Objective:      Bandar Wells, a 70 y.o. male is on warfarin. Bandar Portillo reports:     Home warfarin dose: verbally confirmed home dose with Bandar and updated on anticoagulation calendar     Missed doses: No     Medication changes:  No     S/S of bleeding or thromboembolism:  No     New Injury or illness:   No     Changes in diet or alcohol consumption:  No     Upcoming surgery, procedure or cardioversion:  No    Anticoagulation Episode Summary     Current INR goal:  2.5-3.5   TTR:  50.6 % (1 y)   Next INR check:  3/8/2021   INR from last check:  4.50 (2/22/2021)   Weekly max warfarin dose:     Target end date:  Indefinite   INR check location:     Preferred lab:     Send INR reminders to:  [facility]    Indications    Paroxysmal atrial fibrillation (H) [I48.0]  S/P MVR (mitral valve replacement) (Resolved) [Z95.2]  S/P mitral valve replacement with metallic valve [Z95.4]           Comments:  INR TARGET GOAL 2.5 - 3.5         Anticoagulation Care Providers     Provider Role Specialty Phone number    Jin Hicks MD [facility] Internal Medicine [phone]

## 2021-06-17 NOTE — TELEPHONE ENCOUNTER
Controlled Substance Refill Request  Medication Name:   Requested Prescriptions     Pending Prescriptions Disp Refills     oxyCODONE (ROXICODONE) 10 mg immediate release tablet [Pharmacy Med Name: OXYCODONE HCL 10 MG TABLET 10 Tablet] 42 tablet 0     Sig: TAKE 1/2 TO 1 TABLET (5-10 MG TOTAL) BY MOUTH EVERY FOUR HOURS AS NEEDED FOR PAIN.     Date Last Fill: 5/13/21  Requested Pharmacy: [facility]  Submit electronically to pharmacy  Controlled Substance Agreement on file:   Encounter-Level CSA Scan Date:    There are no encounter-level csa scan date.        Last office visit:  4/15/21         Dona Hoffmann RN, BSN Nurse Triage Advisor 3:59 PM 5/24/2021

## 2021-06-17 NOTE — TELEPHONE ENCOUNTER
Telephone Encounter by Meg Jacinto RN at 2/1/2021  9:30 AM     Author: Meg Jacinto RN Service: -- Author Type: Registered Nurse    Filed: 2/2/2021 10:20 AM Encounter Date: 2/1/2021 Status: Signed    : Meg Jacinto RN (Registered Nurse)       ANTICOAGULATION  MANAGEMENT    Assessment     Today's INR result of 2.30 is Subtherapeutic (goal INR of 2.5-3.5)        Warfarin taken as previously instructed    No new diet changes affecting INR    No new medication/supplements affecting INR    - just completed tapered Prednisone therapy for gout flare up, from 1/19-31.    Continues to tolerate warfarin with no reported s/s of bleeding or thromboembolism     Previous INR was Subtherapeutic at 2.20 on 1/22/21.    Ultrasound guided trigger point injections of the cervical thoracic spine this Thursday, 2/4/21 @ [facility].  Bandar reported, he was not advised on warfarin.  He will call to check if warfarin needs to be held prior to procedure. They will check INR prior to procedure.    Plan:     Spoke on phone with Bandar regarding INR result and instructed:   - advised to call [facility], if any prep for trigger injections of neck on 2/4/21.   - advised what range INR needs to be at?    - right now, continue same warfarin dose with 6mg daily, until warfarin instructions have been verified.      Warfarin Dosing Instructions:   (evenings. 3mg tabs)   - advised, first to check INR range prior to trigger injection of neck on 2/4.   - Change warfarin dose to 9 mg daily on Mondays; and 6 mg daily rest of week.   - (7.1 % change)    Instructed patient to follow up no later than:  2 wks.     - Checks INR's with home monitor thru [employer].    Education provided: target INR goal and significance of current INR result and importance of notifying clinic of upcoming surgeries and procedures 2 weeks in advance    Bandar verbalizes understanding and agrees to warfarin dosing plan.    Instructed to call the [facility]  for any changes, questions or concerns. (#[phone])   ?   Meg Jacinto RN    Subjective/Objective:      Bandar Wells, a 70 y.o. male is on warfarin. Bandar Portillo reports:     Home warfarin dose: verbally confirmed home dose with Bandar and updated on anticoagulation calendar     Missed doses: No     Medication changes:  Yes:  Completed tapering dose with Prednisone.     S/S of bleeding or thromboembolism:  No     New Injury or illness:  No     Changes in diet or alcohol consumption:  No     Upcoming surgery, procedure or cardioversion:  Yes:  Trigger point injections on 2/4/21 of cervical thoracic spine @ [facility].    Anticoagulation Episode Summary     Current INR goal:  2.5-3.5   TTR:  49.0 % (1 y)   Next INR check:  2/4/2021   INR from last check:  2.30 (2/1/2021)   Weekly max warfarin dose:     Target end date:  Indefinite   INR check location:     Preferred lab:     Send INR reminders to:  [facility]    Indications    Paroxysmal atrial fibrillation (H) [I48.0]  S/P MVR (mitral valve replacement) (Resolved) [Z95.2]  S/P mitral valve replacement with metallic valve [Z95.4]           Comments:  INR TARGET GOAL 2.5 - 3.5         Anticoagulation Care Providers     Provider Role Specialty Phone number    Jin Bhat MD Referring Internal Medicine [phone]

## 2021-06-17 NOTE — TELEPHONE ENCOUNTER
RN cannot approve Refill Request    RN can NOT refill this medication med is not covered by policy/route to provider. Last office visit: Visit date not found Last Physical: Visit date not found Last MTM visit: Visit date not found Last visit same specialty: 4/15/2021 Jin Hicks MD.  Next visit within 3 mo: Visit date not found  Next physical within 3 mo: Visit date not found      Mildred Murphy, [facility] Triage/Med Refill 5/12/2021    Requested Prescriptions   Pending Prescriptions Disp Refills     oxyCODONE (ROXICODONE) 10 mg immediate release tablet 42 tablet 0     Sig: TAKE A HALF TABLET TO ONE TABLET (5-10 MG TOTAL) BY MOUTH EVERY FOUR HOURS AS NEEDED FOR PAIN.       Controlled Substances Refill Protocol Failed - 5/12/2021  8:35 AM        Failed - Route all Controlled Substance Requests to Provider        Passed - Patient has controlled substance agreement in past 12 months     Encounter-Level CSA Scan Date:    There are no encounter-level csa scan date.               Passed - Visit with PCP or prescribing provider visit in past 12 months      Last office visit with prescriber/PCP: Visit date not found OR same dept: 4/15/2021 Jin Hicks MD OR same specialty: 4/15/2021 Jin Hicks MD Last physical: Visit date not found Last MTM visit: Visit date not found    Next visit within 3 mo: Visit date not found  Next physical within 3 mo: Visit date not found  Prescriber OR PCP: Ramonita Martinez MD  Last diagnosis associated with med order: 1. Pain syndrome, chronic  - oxyCODONE (ROXICODONE) 10 mg immediate release tablet; TAKE A HALF TABLET TO ONE TABLET (5-10 MG TOTAL) BY MOUTH EVERY FOUR HOURS AS NEEDED FOR PAIN.  Dispense: 42 tablet; Refill: 0

## 2021-06-17 NOTE — PROGRESS NOTES
CLINIC FOLLOW UP NOTE:      Original Reason for consultation, site patient seen:[place] for complex PVE  Interval history:  stable.  Active lifestyle, golfs some.  xc ski this winter (only couple times).  Likes music, going to see trampled by turtles [place].  No cardiac or infectious complaints.  Taking daily pen VK 500mg for suppression.    CC lately past year has been epistaxis, sees romeo and timi at [facility] for this.  Humidifier helped.       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    Come back in year for visit, if like this one it will mostly be a social visit--he happens to live 2 blocks from me also    Thank you for allowing me to continue care of this patient.    Sincerely:      BISHNU Ordonez MD  [facility]  [facility]   [phone]

## 2021-06-17 NOTE — TELEPHONE ENCOUNTER
Telephone Encounter by Meg Jacinto RN at 10/13/2020 10:24 AM     Author: Meg Jacinto RN Service: -- Author Type: Registered Nurse    Filed: 10/13/2020 10:38 AM Encounter Date: 10/13/2020 Status: Signed    : Meg Jacinto RN (Registered Nurse)       ANTICOAGULATION  MANAGEMENT    Assessment     Today's INR result of 2.20 is Subtherapeutic (goal INR of 2.5-3.5)        Warfarin taken as previously instructed    No new diet changes affecting INR    No new medication/supplements affecting INR    Continues to tolerate warfarin with no reported s/s of bleeding or thromboembolism     Previous INR was Therapeutic at 2.60 on 10/5/20.    Plan:     Spoke on phone with Bandar regarding INR result and instructed:     Warfarin Dosing Instructions:  (evenings. Has 3mg tabs)   - Change warfarin dose to 9 mg daily on Tuesdays; and 6 mg daily rest of week.   - (11.1 % change)    Instructed patient to follow up no later than:  One wk.   - continue to check wkly with home monitor thru [employer].    Education provided: importance of consistent vitamin K intake, target INR goal and significance of current INR result and importance of notifying clinic for changes in medications    Bandar verbalizes understanding and agrees to warfarin dosing plan.    Instructed to call the [facility] for any changes, questions or concerns. (#[phone])   ?   Meg Jacinto RN    Subjective/Objective:      Bandar Wells, a 70 y.o. male is on warfarin.     Bandar reports:     Home warfarin dose: verbally confirmed home dose with Bandar and updated on anticoagulation calendar     Missed doses: No     Medication changes:  No     S/S of bleeding or thromboembolism:  No     New Injury or illness:  No     Changes in diet or alcohol consumption:  No     Upcoming surgery, procedure or cardioversion:  No    Anticoagulation Episode Summary     Current INR goal:  2.5-3.5   TTR:  36.4 % (1 y)   Next INR check:  10/20/2020   INR from last  check:  2.20 (10/13/2020)   Weekly max warfarin dose:     Target end date:  Indefinite   INR check location:     Preferred lab:     Send INR reminders to:  [facility]    Indications    Paroxysmal atrial fibrillation (H) [I48.0]  S/P MVR (mitral valve replacement) [Z95.2]  S/P mitral valve replacement with metallic valve [Z95.4]           Comments:  INR TARGET GOAL 2.5 - 3.5         Anticoagulation Care Providers     Provider Role Specialty Phone number    Jin Bhat MD Referring Internal Medicine [phone]

## 2021-06-17 NOTE — TELEPHONE ENCOUNTER
Telephone Encounter by Tea Torres LPN at 4/16/2021  7:49 AM     Author: Tea Torres LPN Service: -- Author Type: Licensed Nurse    Filed: 4/16/2021  7:52 AM Encounter Date: 4/15/2021 Status: Signed    : Tea Torres LPN (Licensed Nurse)       Medication: Oxycodone 10 mg  Last Date Filled 3/29/21, #42 tablets   pulled: YES          Only PCP Prescribing? : YES  Taken as prescribed from physician notes? YES    CSA in last year: YES  Random Utox in last year: NO  Opioids + benzodiazepines? YES    Is patient on the Executive Review Team? NO      All responses suggest: Refilling prescription

## 2021-06-17 NOTE — TELEPHONE ENCOUNTER
Telephone Encounter by Meg Jacinto RN at 5/10/2021 10:04 AM     Author: Meg Jacinto RN Service: -- Author Type: Registered Nurse    Filed: 5/10/2021  2:23 PM Encounter Date: 5/10/2021 Status: Signed    : Meg Jacinto RN (Registered Nurse)       ANTICOAGULATION  MANAGEMENT    Assessment     Today's INR result of 1.90 is Subtherapeutic (goal INR of 2.5-3.5)        Warfarin taken as previously instructed    No new diet changes affecting INR    Potential interaction between Prednisolone eye drops and warfarin which may affect subsequent INRs    - had right cataract surgery on 5/13 and was instilling Prednisone eye drops.    Continues to tolerate warfarin with no reported s/s of bleeding or thromboembolism     Previous INR was Therapeutic at 2.80 on 4/15/21.    S/p Cataract surgery on left 4/22.    Scheduled for right cataract 5/13/21.    Plan:     Spoke on phone with Bandar regarding INR result and instructed:      Warfarin Dosing Instructions:  (evenings. 3mg tabs)   - tonight, advised one time booster with 9mg warfarin dose,   - then continue current warfarin dose 6 mg daily.    Instructed patient to follow up no later than:  5-7 days.   - check INR with home monitor thru [employer] in one wk.    Education provided: importance of consistent vitamin K intake, target INR goal and significance of current INR result, potential interaction between warfarin and Prednisolone and importance of notifying clinic for changes in medications    Bandar verbalizes understanding and agrees to warfarin dosing plan.    Instructed to call the [facility] for any changes, questions or concerns. (#[phone])   ?   Meg Jacinto RN    Subjective/Objective:      Bandar Wells, a 70 y.o. male is on warfarin. Bandar Portillo reports:     Home warfarin dose: verbally confirmed home dose with Bandar and updated on anticoagulation calendar     Missed doses: No     Medication changes:  Yes: finished Prednisolone eye  drops.     S/S of bleeding or thromboembolism:  No     New Injury or illness:  No     Changes in diet or alcohol consumption:  No     Upcoming surgery, procedure or cardioversion:  Yes: right cataract on 5/13/21.    Anticoagulation Episode Summary     Current INR goal:  2.5-3.5   TTR:  55.3 % (1 y)   Next INR check:  5/24/2021   INR from last check:  1.90 (5/9/2021)   Weekly max warfarin dose:     Target end date:  Indefinite   INR check location:     Preferred lab:     Send INR reminders to:  [facility]    Indications    Paroxysmal atrial fibrillation (H) [I48.0]  S/P MVR (mitral valve replacement) (Resolved) [Z95.2]  S/P mitral valve replacement with metallic valve [Z95.4]           Comments:  INR TARGET GOAL 2.5 - 3.5         Anticoagulation Care Providers     Provider Role Specialty Phone number    Jin Hicks MD [facility] Internal Medicine [phone]

## 2021-06-17 NOTE — TELEPHONE ENCOUNTER
Prescription Monitoring Program activity reviewed with no discrepancies noted.      Last fill per : 5/13/21  Quantity/days supply: 42 tabs x 9 days     Controlled Substance Agreement on file: Yes  Date: 1/14/21    Last office visit with provider:  4/15/2021 Jin Hicks MD    Please advise.

## 2021-06-17 NOTE — TELEPHONE ENCOUNTER
ANTICOAGULATION  MANAGEMENT    Assessment     Today's INR result of 2.60 is Therapeutic (goal INR of 2.5-3.5)        Warfarin taken as previously instructed    No new diet changes affecting INR    Potential interaction between Triamcinolone and warfarin which may affect subsequent INRs    - 5/19/21 Triamcinolone injection   - per [employer], Concurrent use of TRIAMCINOLONE and WARFARIN may result in increased risk of bleeding or diminished effects of warfarin    Continues to tolerate warfarin with no reported s/s of bleeding or thromboembolism     Previous INR was Subtherapeutic at 2.40 on 5/18/21.    S/p trigger point injection of CS on 5/19/21 @ [place].    Plan:     Spoke on phone with Bandar regarding INR result and instructed:      Warfarin Dosing Instructions:  (evenings. 3mg tabs)   - Continue current warfarin dose 7.5 mg daily on Wednesdays; and 6 mg daily rest of week.    Instructed patient to follow up no later than: 1-2 wks.   - advised to check INR in 7-10 days with home monitor thru [employer].    Education provided: target INR goal and significance of current INR result, potential interaction between warfarin and Triamcinolone injection and importance of notifying clinic for changes in medications    Bandar verbalizes understanding and agrees to warfarin dosing plan.    Instructed to call the [facility] for any changes, questions or concerns. (#[phone])   ?   Meg Jacinto RN    Subjective/Objective:      Bandar Wells, a 70 y.o. male is on warfarin. Bandar Portillo reports:     Home warfarin dose: verbally confirmed home dose with Bandar and updated on anticoagulation calendar     Missed doses: No     Medication changes:  Yes:  Triamcinolone injection.     S/S of bleeding or thromboembolism:  No     New Injury or illness:  No     Changes in diet or alcohol consumption:  No     Upcoming surgery, procedure or cardioversion:  No    Anticoagulation Episode Summary     Current INR goal:  2.5-3.5   TTR:  55.4 %  (1 y)   Next INR check:  5/31/2021   INR from last check:  2.60 (5/20/2021)   Weekly max warfarin dose:     Target end date:  Indefinite   INR check location:     Preferred lab:     Send INR reminders to:  [facility]    Indications    Paroxysmal atrial fibrillation (H) [I48.0]  S/P MVR (mitral valve replacement) (Resolved) [Z95.2]  S/P mitral valve replacement with metallic valve [Z95.4]           Comments:  INR TARGET GOAL 2.5 - 3.5         Anticoagulation Care Providers     Provider Role Specialty Phone number    Jin Hicks MD [facility] Internal Medicine [phone]

## 2021-06-17 NOTE — TELEPHONE ENCOUNTER
Telephone Encounter by Meg Jacinto RN at 10/2/2020 12:13 PM     Author: Meg Jacinto RN Service: -- Author Type: Registered Nurse    Filed: 10/2/2020 12:51 PM Encounter Date: 10/2/2020 Status: Signed    : Meg Jacinto RN (Registered Nurse)       ANTICOAGULATION  MANAGEMENT    Assessment     Today's INR result of 3.60 is slightly Supratherapeutic (goal INR of 2.5-3.5)        Warfarin taken as previously instructed    - Bandar was a given 2x warfarin booster dose with 8mg on 9/24-25.    No new diet changes affecting INR    - eating well.    No new medication/supplements affecting INR    - started on 9/29/20, Clindamycin d/t green drainage from wounds and temp of 100.4    - however, today 10/2, Bandar reported a rash spread all over his body since 10/1.    - per, NP from [facility] - advised to stop Clindamycin and start Benardry 25mg q8hrs, PRN.    - recently completed Keflex prior to hospital discharge on 9/29/20.  Started on Clindamycin.   - completed Enoxaparin injections from 9/2-27.    Continues to tolerate warfarin with no reported s/s of bleeding or thromboembolism     Previous INR was Therapeutic at 2.60 on 9/29/20    Recent hospitalization for fever, Cellulitis and abscess of lower extremity, from 9/22-25/20.    Plan:     Spoke on phone with Bandar regarding INR result and instructed:    1.  Bandar is awaiting virtual call visit with Dr. Hicks today.    Warfarin Dosing Instructions:  (evenings. has 3mg tabs)   - today, advised one time lower dose with 4.5mg warfarin,   - then continue current warfarin dose 4.5 mg daily on Tuesdays; and 6 mg daily rest of week.    Instructed patient to follow up no later than:   Check INR with home monitor on 10/5/20.    Education provided: target INR goal and significance of current INR result, importance of notifying clinic for changes in medications, when to seek medical attention/emergency care and importance of notifying clinic for diarrhea,  nausea/vomiting, reduced intake and/or illness    Bandar verbalizes understanding and agrees to warfarin dosing plan.    Instructed to call the [facility] for any changes, questions or concerns. (#[phone])   ?   Meg Jacinto RN    Subjective/Objective:      Bandar Wells, a 70 y.o. male is on warfarin.     Bandar reports:     Home warfarin dose: verbally confirmed home dose with Bandar and updated on anticoagulation calendar     Missed doses: No     Medication changes:  Yes:  Stopped Clindamycin today.  (developed rash all over his body on 10/1).  Started Benadryl today.     S/S of bleeding or thromboembolism:  No     New Injury or illness:  Yes:  Recent discharge from hospital d/t cellulitis.     Changes in diet or alcohol consumption:  No     Upcoming surgery, procedure or cardioversion:  No    Anticoagulation Episode Summary     Current INR goal:  2.5-3.5   TTR:  36.0 % (1 y)   Next INR check:  10/5/2020   INR from last check:  3.60 (10/2/2020)   Weekly max warfarin dose:     Target end date:  Indefinite   INR check location:     Preferred lab:     Send INR reminders to:  [facility]    Indications    Paroxysmal atrial fibrillation (H) [I48.0]  S/P MVR (mitral valve replacement) [Z95.2]  S/P mitral valve replacement with metallic valve [Z95.4]           Comments:  INR TARGET GOAL 2.5 - 3.5         Anticoagulation Care Providers     Provider Role Specialty Phone number    Jin Bhat MD Referring Internal Medicine [phone]

## 2021-06-17 NOTE — TELEPHONE ENCOUNTER
Telephone Encounter by Meg Jacinto RN at 3/19/2021  8:54 AM     Author: Meg Jacinto RN Service: -- Author Type: Registered Nurse    Filed: 3/19/2021 10:20 AM Encounter Date: 3/19/2021 Status: Signed    : Meg Jacinto RN (Registered Nurse)       ANTICOAGULATION  MANAGEMENT    Assessment     Today's INR result of 2.00 is Subtherapeutic (goal INR of 2.5-3.5)        Warfarin recently held as instructed which may be affecting INR    - held warfarin for 2 days, 3/10-11.    No new diet changes affecting INR    No new medication/supplements affecting INR    Continues to tolerate warfarin with no reported s/s of bleeding or thromboembolism     Previous INR was Supratherapeuticat 4.50 on 3/10/21.    Plan:     Spoke on phone with Bandar regarding INR result and instructed:      Warfarin Dosing Instructions:    - today, advised one time booster with 9mg warfarin dose,   - then continue current warfarin dose 6 mg daily.    Instructed patient to follow up no later than:  Will check INR in one wk with home monitor thru [employer].    Education provided: importance of consistent vitamin K intake, target INR goal and significance of current INR result, importance of notifying clinic for changes in medications and monitoring for clotting signs and symptoms    Bandar verbalizes understanding and agrees to warfarin dosing plan.    Instructed to call the [facility] for any changes, questions or concerns. (#[phone])   ?   Meg Jacinto RN    Subjective/Objective:      Bandar Wells, a 70 y.o. male is on warfarin. Bandar Portillo reports:     Home warfarin dose: verbally confirmed home dose with Bandar and updated on anticoagulation calendar     Missed doses: Yes:  Reported holding warfarin for 2 days.     Medication changes:  No     S/S of bleeding or thromboembolism:  No     New Injury or illness:  No     Changes in diet or alcohol consumption:  No     Upcoming surgery, procedure or cardioversion:   No    Anticoagulation Episode Summary     Current INR goal:  2.5-3.5   TTR:  49.3 % (1 y)   Next INR check:  3/26/2021   INR from last check:  2.00 (3/18/2021)   Weekly max warfarin dose:     Target end date:  Indefinite   INR check location:     Preferred lab:     Send INR reminders to:  [facility]    Indications    Paroxysmal atrial fibrillation (H) [I48.0]  S/P MVR (mitral valve replacement) (Resolved) [Z95.2]  S/P mitral valve replacement with metallic valve [Z95.4]           Comments:  INR TARGET GOAL 2.5 - 3.5         Anticoagulation Care Providers     Provider Role Specialty Phone number    Jin Hicks MD [facility] Internal Medicine [phone]

## 2021-06-17 NOTE — TELEPHONE ENCOUNTER
Telephone Encounter by Galilea Hernandez RN at 2/4/2021  4:55 PM     Author: Galilea Hernandez RN Service: -- Author Type: Registered Nurse    Filed: 2/5/2021  1:39 PM Encounter Date: 2/4/2021 Status: Addendum    : Galilea Hernandez RN (Registered Nurse)    Related Notes: Original Note by Galilea Hernandez RN (Registered Nurse) filed at 2/4/2021  5:25 PM       ANTICOAGULATION  MANAGEMENT    Assessment     Today's INR result of 1.9 ( venous) is Subtherapeutic (goal INR of 2.5-3.5)        Per patient he took warfarin doses as  what the AC[facility] instructed him on 2/1.    No new diet changes affecting INR-    Interaction between tapered prednisone dose completed on 1/31 and warfarin may still  be affecting INR    Patient had US guided trigger point injection of cervical thoracic spine done today at [facility]    Continues to tolerate warfarin with no reported s/s of bleeding or thromboembolism     Previous INR was Subtherapeutic    Plan:     Spoke on phone with Bandar regarding INR result and instructed:      Warfarin Dosing Instructions:  9 mg booster dose today then continue current warfarin dose    9 mg every Mon; 6 mg all other days     (0 % change)    Instructed patient to follow up no later than: 2/8 with home monitor.    Education provided: importance of therapeutic range    Bandar verbalizes understanding and agrees to warfarin dosing plan.    Instructed to call the [facility] for any changes, questions or concerns. (#[phone])   ?   Galilea Hernandez RN    Subjective/Objective:      Bandar Wells, a 70 y.o. male is on warfarin. Bandar Portillo reports:     Home warfarin dose: as updated on anticoagulation calendar per template     Missed doses: No     Medication changes:  No     S/S of bleeding or thromboembolism:  No     New Injury or illness:  No     Changes in diet or alcohol consumption:  No     Upcoming surgery, procedure or cardioversion:  No.    Anticoagulation Episode Summary     Current INR goal:   2.5-3.5   TTR:  48.9 % (1 y)   Next INR check:  2/8/2021   INR from last check:  1.90 (2/4/2021)   Weekly max warfarin dose:     Target end date:  Indefinite   INR check location:     Preferred lab:     Send INR reminders to:  [facility]    Indications    Paroxysmal atrial fibrillation (H) [I48.0]  S/P MVR (mitral valve replacement) (Resolved) [Z95.2]  S/P mitral valve replacement with metallic valve [Z95.4]           Comments:  INR TARGET GOAL 2.5 - 3.5         Anticoagulation Care Providers     Provider Role Specialty Phone number    Jin Bhat MD Referring Internal Medicine [phone]

## 2021-06-17 NOTE — TELEPHONE ENCOUNTER
Telephone Encounter by Christel La RN at 4/6/2020  1:35 PM     Author: Christel La RN Service: -- Author Type: Registered Nurse    Filed: 4/6/2020  1:39 PM Encounter Date: 4/6/2020 Status: Attested    : Christel La RN (Registered Nurse) Cosigner: Jin Bhat MD at 4/6/2020  1:50 PM    Attestation signed by Jin Bhat MD at 4/6/2020  1:50 PM    Continue anticoagulation                ANTICOAGULATION  MANAGEMENT-home monitor result    Assessment     Today's INR result of 5.6 is Supratherapeutic (goal INR of 2.5-3.5)        Warfarin taken as previously instructed    patient reports poor appetite and weight loss d/t ongoing issues with chronic pain    No interaction expected between increased oxycodone dose and warfarin     Patient is taking tylenol daily, this is new since his pain has been worsening    Continues to tolerate warfarin with no reported s/s of bleeding or thromboembolism     Previous INR was Supratherapeutic    Plan:     Spoke with Bandar regarding INR result and instructed:     Warfarin Dosing Instructions:  hold warfarin x 2, Mon 4/6 and Tue 4/7 then change warfarin dose to 3 mg daily on Mon/Thur; and 6 mg daily rest of week  (14 % change)    Instructed patient to follow up no later than: 4-7 days with home monitor. Advised sooner recheck for any concerning symptoms of bleeding/bruising    Education provided: target INR goal and significance of current INR result, potential interaction between warfarin and tylenol, no interaction anticipated between warfarin and increased oxycodone dose, monitoring for bleeding signs and symptoms, when to seek medical attention/emergency care and role of diet/oral intake as it affects his INR    Bandar verbalizes understanding and agrees to warfarin dosing plan.    Instructed to call the [facility] for any changes, questions or concerns. (#[phone])   ?   Christel La RN    Subjective/Objective:      terence Hopkins  69 y.o. male is on warfarin.     Bandar reports:     Home warfarin dose: verbally confirmed home dose with Bandar and updated on anticoagulation calendar     Missed doses: No     Medication changes:  Yes: increased oxycodone dose. Patient is taking tylenol daily, alternating with the oxycodone     S/S of bleeding or thromboembolism:  No     New Injury or illness:  Not new, ongoing issues with pain     Changes in diet or alcohol consumption:  Yes: decreased appetite d/t pain     Upcoming surgery, procedure or cardioversion:  No    Anticoagulation Episode Summary     Current INR goal:   2.5-3.5   TTR:   23.7 % (1 y)   Next INR check:   4/13/2020   INR from last check:   5.60! (4/6/2020)   Weekly max warfarin dose:      Target end date:   Indefinite   INR check location:      Preferred lab:      Send INR reminders to:   [facility]    Indications    Paroxysmal atrial fibrillation (H) [I48.0]  S/P MVR (mitral valve replacement) [Z95.2]  S/P mitral valve replacement with metallic valve [Z95.4]           Comments:   INR TARGET GOAL 2.5 - 3.5         Anticoagulation Care Providers     Provider Role Specialty Phone number    Jin Bhat MD Referring Internal Medicine [phone]

## 2021-06-17 NOTE — TELEPHONE ENCOUNTER
Telephone Encounter by Meg Jacinto RN at 5/18/2021  1:35 PM     Author: Meg Jacinto RN Service: -- Author Type: Registered Nurse    Filed: 5/18/2021  1:50 PM Encounter Date: 5/18/2021 Status: Signed    : Meg Jacinto RN (Registered Nurse)       ANTICOAGULATION  MANAGEMENT    Assessment     Today's INR result of 2.40 is Subtherapeutic (goal INR of 2.5-3.5)        Warfarin taken as previously instructed    Increased greens/vitamin K intake may be affecting INR    - reported eating more greens - d/t graduation parties.    No new medication/supplements affecting INR    - continues with Prednisone eye drops from right cataract surgery on 5/13.    Continues to tolerate warfarin with no reported s/s of bleeding or thromboembolism     Previous INR was Subtherapeutic at 1.90 on 5/9/21.    Scheduled for steroidal injection - Neck @ [facility] on 5/19/21. Needs INR to be less than 3.0 for procedure.    Plan:     Spoke on phone with Bandar  regarding INR result and instructed:      Warfarin Dosing Instructions:  (Evenings. 3mg tabs)    - started on 5/19/21 -  Change warfarin dose to 7.5 mg daily on Wednesdays; and 6 mg daily rest of week.   - (3.6 % change)    Instructed patient to follow up no later than:  2 wks with home monitor thru [employer].    Education provided: importance of consistent vitamin K intake, impact of vitamin K foods on INR, target INR goal and significance of current INR result, importance of notifying clinic for changes in medications and importance of notifying clinic of upcoming surgeries and procedures 2 weeks in advance    Bandar verbalizes understanding and agrees to warfarin dosing plan.    Instructed to call the [facility] for any changes, questions or concerns. (#[phone])   ?   Meg Jacinto RN    Subjective/Objective:      Bandar Wells, a 70 y.o. male is on warfarin. Bandar Portillo reports:     Home warfarin dose: verbally confirmed home dose with Bandar and updated on  anticoagulation calendar     Missed doses: No     Medication changes:  No     S/S of bleeding or thromboembolism:  No     New Injury or illness:  No     Changes in diet or alcohol consumption:  Yes:  Increased Vitamin-K intake.     Upcoming surgery, procedure or cardioversion:  Yes: 5/19/21 cervical spine cortisone injection @ [facility].    Anticoagulation Episode Summary     Current INR goal:  2.5-3.5   TTR:  55.1 % (1 y)   Next INR check:  6/1/2021   INR from last check:  2.40 (5/18/2021)   Weekly max warfarin dose:     Target end date:  Indefinite   INR check location:     Preferred lab:     Send INR reminders to:  [facility]    Indications    Paroxysmal atrial fibrillation (H) [I48.0]  S/P MVR (mitral valve replacement) (Resolved) [Z95.2]  S/P mitral valve replacement with metallic valve [Z95.4]           Comments:  INR TARGET GOAL 2.5 - 3.5         Anticoagulation Care Providers     Provider Role Specialty Phone number    Jin Hicks MD [facility] Internal Medicine [phone]

## 2021-06-17 NOTE — TELEPHONE ENCOUNTER
Telephone Encounter by Meg Jacinto RN at 3/10/2021 11:00 AM     Author: Meg Jacinto RN Service: -- Author Type: Registered Nurse    Filed: 3/10/2021 11:09 AM Encounter Date: 3/10/2021 Status: Signed    : Meg Jacinto RN (Registered Nurse)       ANTICOAGULATION  MANAGEMENT    Assessment     Today's INR result of 4.50 is Supratherapeutic (goal INR of 2.5-3.5)        Warfarin recently held as instructed which may be affecting INR    - held on 2/22/21, as instructed.    No new diet changes affecting INR    Potential interaction between Triamcinolone injection and warfarin which may affect subsequent INRs    - ES-Tylenol 2 tabs for neck pains.   - trigger point Triamcinolone injections (multiple sites), however, did not last long. Now getting PT for his neck pains.    Continues to tolerate warfarin with no reported s/s of bleeding or thromboembolism     Previous INR was Supratherapeutic at 4.50 on 2/22/21.    Plan:     Spoke on phone with Bandar regarding INR result and instructed:      Warfarin Dosing Instructions:    - advised to HOLD warfarin for 2 days, 3/10-11.   - then continue current warfarin dose 6 mg daily.    Instructed patient to follow up no later than: 1-2 wks with home monitor thru [employer].    Education provided: target INR goal and significance of current INR result, potential interaction between warfarin and ES-Tylenol and Triamcinolone injections and importance of notifying clinic for changes in medications    Bandar verbalizes understanding and agrees to warfarin dosing plan.    Instructed to call the [facility] for any changes, questions or concerns. (#[phone])   ?   Meg Jacinto RN    Subjective/Objective:      Bandar Wells, a 70 y.o. male is on warfarin. Bandar Portillo reports:     Home warfarin dose: verbally confirmed home dose with Bandar and updated on anticoagulation calendar     Missed doses: No     Medication changes:  Yes: taking ES-Tylenol for neck  pains.     S/S of bleeding or thromboembolism:  No     New Injury or illness:  No     Changes in diet or alcohol consumption:  No     Upcoming surgery, procedure or cardioversion:  No    Anticoagulation Episode Summary     Current INR goal:  2.5-3.5   TTR:  49.3 % (1 y)   Next INR check:  3/24/2021   INR from last check:  4.50 (3/10/2021)   Weekly max warfarin dose:     Target end date:  Indefinite   INR check location:     Preferred lab:     Send INR reminders to:  [facility]    Indications    Paroxysmal atrial fibrillation (H) [I48.0]  S/P MVR (mitral valve replacement) (Resolved) [Z95.2]  S/P mitral valve replacement with metallic valve [Z95.4]           Comments:  INR TARGET GOAL 2.5 - 3.5         Anticoagulation Care Providers     Provider Role Specialty Phone number    Jin Hicks MD [facility] Internal Medicine [phone]

## 2021-06-17 NOTE — TELEPHONE ENCOUNTER
Telephone Encounter by Meg Jacinto RN at 2/8/2021  3:52 PM     Author: Meg Jacinto RN Service: -- Author Type: Registered Nurse    Filed: 2/8/2021  4:23 PM Encounter Date: 2/8/2021 Status: Signed    : Meg Jacinto RN (Registered Nurse)       ANTICOAGULATION  MANAGEMENT    Assessment     Today's INR result of 2.40 is Subtherapeutic (goal INR of 2.0-3.0)        Warfarin taken as previously instructed    No new diet changes affecting INR     Potential interaction between Triamcinolone and warfarin which may affect subsequent INRs    - 2/4/21 s/p Triamcinolone trigger point injections x4 site, of cervical spine.   - per [employer], Concurrent use of TRIAMCINOLONE and WARFARIN may result in increased risk of bleeding or diminished effects of warfarin.     Continues to tolerate warfarin with no reported s/s of bleeding or thromboembolism     Previous INR was Subtherapeutic at 1.90 on 2/4/21.    S/p Trigger point cervical spine injections on 2/4/21, 4 sites.    Reported less neck pains.    Plan:     Spoke on phone with Bandarregarding INR result and instructed:      Warfarin Dosing Instructions:    - today, advised one time booster with 9mg warfarin dose, then continue current warfarin dose 6 mg daily.    Instructed patient to follow up no later than:  One wk.   - advised to check with home monitor thru [employer].    Education provided: target INR goal and significance of current INR result, potential interaction between warfarin and Triamcinolone injections and importance of notifying clinic for changes in medications    Bandar verbalizes understanding and agrees to warfarin dosing plan.    Instructed to call the [facility] for any changes, questions or concerns. (#[phone])   ?   Meg Jacinto RN    Subjective/Objective:      Bandar Wells, a 70 y.o. male is on warfarin. Bandar Portillo reports:     Home warfarin dose: verbally confirmed home dose with Bandar and updated on anticoagulation  calendar     Missed doses: No     Medication changes:  Yes:  Triamcinolone injections.     S/S of bleeding or thromboembolism:  No     New Injury or illness:  No     Changes in diet or alcohol consumption:  No     Upcoming surgery, procedure or cardioversion:  No    Anticoagulation Episode Summary     Current INR goal:  2.5-3.5   TTR:  48.8 % (1 y)   Next INR check:  2/15/2021   INR from last check:  2.40 (2/8/2021)   Weekly max warfarin dose:     Target end date:  Indefinite   INR check location:     Preferred lab:     Send INR reminders to:  [facility]    Indications    Paroxysmal atrial fibrillation (H) [I48.0]  S/P MVR (mitral valve replacement) (Resolved) [Z95.2]  S/P mitral valve replacement with metallic valve [Z95.4]           Comments:  INR TARGET GOAL 2.5 - 3.5         Anticoagulation Care Providers     Provider Role Specialty Phone number    Jin Bhat MD Referring Internal Medicine [phone]

## 2021-06-17 NOTE — TELEPHONE ENCOUNTER
Telephone Encounter by Anne Aquino RN at 2/15/2021  9:32 PM     Author: Anne Aquino RN Service: -- Author Type: Registered Nurse    Filed: 2/15/2021  9:33 PM Encounter Date: 2/15/2021 Status: Signed    : Anne Aquino RN (Registered Nurse)       RN cannot approve Refill Request    RN can NOT refill this medication med is not covered by policy/route to provider. Last office visit: 2/13/2020 Jin Bhat MD Last Physical: 1/17/2020 Last MTM visit: Visit date not found Last visit same specialty: 1/14/2021 Jin Hicks MD.  Next visit within 3 mo: Visit date not found  Next physical within 3 mo: Visit date not found      [facility] Connection Triage/Med Refill 2/15/2021    Requested Prescriptions   Pending Prescriptions Disp Refills   ? warfarin ANTICOAGULANT (COUMADIN/JANTOVEN) 3 MG tablet [Pharmacy Med Name: WARFARIN SODIUM 3 MG TAB ^^ 3 Tablet] 170 tablet 1     Sig: TAKE 1 AND 1/2 TABLETS (4.5MG) TO 2 TABLETS (6MG) BY MOUTH DAILY, AS DIRECTED. ADJUST DOSE BASED ON INR RESULTS.       Warfarin Refill Protocol  Failed - 2/15/2021  7:50 PM        Failed -  Route to appropriate pool/provider     Last Anticoagulation Summary:   Anticoagulation Episode Summary     Current INR goal:  2.5-3.5   TTR:  49.8 % (11.9 mo)   Next INR check:  2/15/2021   INR from last check:  2.40 (2/8/2021)   Weekly max warfarin dose:     Target end date:  Indefinite   INR check location:     Preferred lab:     [facility]    Indications    Paroxysmal atrial fibrillation (H) [I48.0]  S/P MVR (mitral valve replacement) (Resolved) [Z95.2]  S/P mitral valve replacement with metallic valve [Z95.4]           Comments:  INR TARGET GOAL 2.5 - 3.5         Anticoagulation Care Providers     Provider Role Specialty Phone number    Jin Bhat MD Referring Internal Medicine [phone]                Passed - Provider visit in last year     Last office visit with  prescriber/PCP: 2/13/2020 Jin Bhat MD OR same dept: 10/21/2020 Jin Hicks MD OR same specialty: 1/14/2021 Jin Hicks MD  Last physical: 1/17/2020 Last MTM visit: Visit date not found    Next appt within 3 mo: Visit date not found Next physical within 3 mo: Visit date not found  Prescriber OR PCP: Jin Bhat MD  Last diagnosis associated with med order: 1. S/P mitral valve replacement with metallic valve  - warfarin ANTICOAGULANT (COUMADIN/JANTOVEN) 3 MG tablet [Pharmacy Med Name: WARFARIN SODIUM 3 MG TAB ^^ 3 Tablet]; TAKE 1 AND 1/2 TABLETS (4.5MG) TO 2 TABLETS (6MG) BY MOUTH DAILY, AS DIRECTED. ADJUST DOSE BASED ON INR RESULTS.  Dispense: 170 tablet; Refill: 1    2. Long term (current) use of anticoagulants  - warfarin ANTICOAGULANT (COUMADIN/JANTOVEN) 3 MG tablet [Pharmacy Med Name: WARFARIN SODIUM 3 MG TAB ^^ 3 Tablet]; TAKE 1 AND 1/2 TABLETS (4.5MG) TO 2 TABLETS (6MG) BY MOUTH DAILY, AS DIRECTED. ADJUST DOSE BASED ON INR RESULTS.  Dispense: 170 tablet; Refill: 1    If protocol passes may refill for 6 months if within 3 months of last provider visit (or a total of 9 months).

## 2021-06-17 NOTE — TELEPHONE ENCOUNTER
Telephone Encounter by Christel La RN at 12/14/2020  3:38 PM     Author: Christel La RN Service: -- Author Type: Registered Nurse    Filed: 12/14/2020  3:40 PM Encounter Date: 12/14/2020 Status: Signed    : Christel La RN (Registered Nurse)       ANTICOAGULATION  MANAGEMENT- Home monitor result    Assessment     Today's INR result of 2.4 is Subtherapeutic (goal INR of 2.5-3.5)        Warfarin taken as previously instructed    No new diet changes affecting INR    No new medication/supplements affecting INR    Continues to tolerate warfarin with no reported s/s of bleeding or thromboembolism     Previous INR was Therapeutic    Plan:     Left a detailed message for Lucrecia aleman INR result and instructed:     Warfarin Dosing Instructions: Continue current warfarin dose 6 mg daily     Next INR to be drawn: 2 weeks with home monitor    Education provided: importance of consistent vitamin K intake, target INR goal and significance of current INR result and importance of following up for INR monitoring at instructed interval    Reminded patient to call/transmit result to [employer] if he has not already done so as this was a self-reported result.  ?   Christel La RN    Subjective/Objective:      Lucrecia Wells, a 70 y.o. male is established on warfarin.         Anticoagulation Episode Summary     Current INR goal:  2.5-3.5   TTR:  43.4 % (1 y)   Next INR check:  12/28/2020   INR from last check:  2.40 (12/14/2020)   Weekly max warfarin dose:     Target end date:  Indefinite   INR check location:     Preferred lab:     Send INR reminders to:  [facility]    Indications    Paroxysmal atrial fibrillation (H) [I48.0]  S/P MVR (mitral valve replacement) [Z95.2]  S/P mitral valve replacement with metallic valve [Z95.4]           Comments:  INR TARGET GOAL 2.5 - 3.5         Anticoagulation Care Providers     Provider Role Specialty Phone number    Jin Bhat MD Referring  Internal Medicine [phone]

## 2021-06-18 ENCOUNTER — COMMUNICATION - HEALTHEAST (OUTPATIENT)
Dept: ANTICOAGULATION | Facility: CLINIC | Age: 71
End: 2021-06-18

## 2021-06-18 ENCOUNTER — RECORDS - HEALTHEAST (OUTPATIENT)
Dept: ANTICOAGULATION | Facility: CLINIC | Age: 71
End: 2021-06-18

## 2021-06-18 ENCOUNTER — TRANSFERRED RECORDS (OUTPATIENT)
Dept: HEALTH INFORMATION MANAGEMENT | Facility: CLINIC | Age: 71
End: 2021-06-18

## 2021-06-18 DIAGNOSIS — I48.0 PAROXYSMAL ATRIAL FIBRILLATION (H): ICD-10-CM

## 2021-06-18 DIAGNOSIS — Z95.4 S/P MITRAL VALVE REPLACEMENT WITH METALLIC VALVE: ICD-10-CM

## 2021-06-18 LAB — INR PPP: 3.7 (ref 0.9–1.1)

## 2021-06-18 NOTE — LETTER
Letter by Jin Bhat MD at      Author: Jin Bhat MD Service: -- Author Type: --    Filed:  Encounter Date: 3/6/2019 Status: (Other)       Bandar Wells  [address] [place] [address]             March 6, 2019         Dear Mr. Wells,    Below are the results from your recent visit:    Resulted Orders   Basic Metabolic Panel   Result Value Ref Range    Sodium 142 136 - 145 mmol/L    Potassium 4.0 3.5 - 5.0 mmol/L    Chloride 102 98 - 107 mmol/L    CO2 24 22 - 31 mmol/L    Anion Gap, Calculation 16 5 - 18 mmol/L    Glucose 103 70 - 125 mg/dL    Calcium 10.3 8.5 - 10.5 mg/dL    BUN 50 (H) 8 - 28 mg/dL    Creatinine 1.66 (H) 0.70 - 1.30 mg/dL    GFR MDRD Af Amer 50 (L) >60 mL/min/1.73m2    GFR MDRD Non Af Amer 41 (L) >60 mL/min/1.73m2    Narrative    Fasting Glucose reference range is 70-99 mg/dL per  American Diabetes Association (ADA) guidelines.       Your potassium is good.  Your kidney tests are just a little worse so I think it is good that we are starting to cut back on your diuretics.  Have a good trip and I will await your update when you return.    Please call with questions or contact us using [facility].    Sincerely,        Electronically signed by Jin Bhat MD

## 2021-06-18 NOTE — PROGRESS NOTES
Cardiac Rehab  Phase III Treatment Plan    Risk Factors  HLD    Exercise Prescription  THR:   Frequency: 2x week  Duration: 45-55 minutes    RPE: 11-14    Phase 3:  Phase 3 Goal:  Patient will maintain 3-4 met level for 45-55 minutes of continuous/interval exercise. and Phase 3 Plan:  Adjust therapy according to patient tolerance, vital signs and symptoms. Assess functional status and recommend changes as needed.Increase/ improve cardiovascular conditioning.    Primary MD:Dr. Jin Bhat  Phone: [phone]  MD: Dr. CARLOTTA Carney  Phone: [phone]

## 2021-06-18 NOTE — PROGRESS NOTES
Cardiac Rehab  Phase III    SOC Date:5/29/2018    Diagnosis: Redo MVR  Date of Onset: 3/16/2017  Procedure:   Date of Onset:   ICD/Pacemaker: No Parameters: N/A  ECG History: A Fib EF%:40-45  Past Medical History:  Patient Active Problem List   Diagnosis     Hx of bacterial endocarditis     Dyslipidemia     Mitral valve prolapse     S/P MVR (mitral valve replacement)     Recurrent pleural effusion on right     Pericardial effusion without cardiac tamponade     Status post Maze operation for atrial fibrillation     Paroxysmal atrial fibrillation     Coronary artery disease     Acute diastolic heart failure     CHF (congestive heart failure)     Hypovolemia     Hypotension     Acute respiratory failure with hypoxia     Acute blood loss anemia     Acute renal failure     Dysthymia     Prosthetic valve endocarditis, subsequent encounter     S/P mitral valve replacement with metallic valve     Persistent atrial fibrillation     Chronic combined systolic and diastolic congestive heart failure       Precautions/ Physical Limitations: Recurrent A-Fib and S.O.B.  Oxygen: No    Social History  Living Accommodations: Home Steps: Yes  Support people at home: yes  Marital Status: single  Family is able to assist with cares  Quaker/Community involvement: Volunteer at [facility]  Recreation/Hobbies: golfing, curling, skiing    Current Activity Level  Mobility status: None  Self Cares/ ADL's:Independent  Home management: Independent  Driving: Independent  Sleeping Pattern: good   Appetite: good     Pain  Location:   Characteristics:  Intensity: (0-10 scale) 0  Current Pain Management:  Intervention:   Response:     Home exercise/Equipment: TM    Psychosocial/ Emotional Health  1. In the past 12 months, have you been in a relationship where you have been abused physically, emotionally, sexually or financially? No  notified: NA  2. Who do you turn to for emotional support?: Friends and Family  3. Do you have  cultural or spiritual needs? No  4. Have there been any major life changes in the past 12 months? Yes . Continuing health issues.

## 2021-06-18 NOTE — PROGRESS NOTES
Discharge Note    Diagnosis: S/P MVR    Functional Outcomes:  Patient has participated in Phase III Cardiac Rehab from 2/22/2016 to 1/26/2017.  Patient has progressed/maintained a 3-4 met level for a duration of 50-60 minutes.  Target heart Rate 20-30>RHR  RPE 11-14.  Resting HR Range   BP Range 88//68  Exercise HR Range   BP Range 124//80    Asymptomatic/ Symptomatic: S.O.B. At times    Comments/Concerns:     Updated Outcomes:     Modalities/MET levels: Alvarez Chauhan TM     Patient verbalized an understanding of:  Cardiac signs/symptoms and emergency plan, Home Exercise program and Basic exercise principles    Patient has chosen to discontinue Phase III Cardiac Rehabilitation due to endocarditis and Redo MVR on 3/16/17

## 2021-06-18 NOTE — LETTER
Letter by Jin Bhat MD at      Author: Jin Bhat MD Service: -- Author Type: --    Filed:  Encounter Date: 1/17/2019 Status: (Other)       Bandar Wells  [address] [place] [address]             January 17, 2019         Dear Mr. Wells,    Below are the results from your recent visit:    Resulted Orders   Comprehensive Metabolic Panel   Result Value Ref Range    Sodium 139 136 - 145 mmol/L    Potassium 4.7 3.5 - 5.0 mmol/L    Chloride 106 98 - 107 mmol/L    CO2 23 22 - 31 mmol/L    Anion Gap, Calculation 10 5 - 18 mmol/L    Glucose 112 70 - 125 mg/dL    BUN 39 (H) 8 - 28 mg/dL    Creatinine 1.27 0.70 - 1.30 mg/dL    GFR MDRD Af Amer >60 >60 mL/min/1.73m2    GFR MDRD Non Af Amer 56 (L) >60 mL/min/1.73m2    Bilirubin, Total 0.9 0.0 - 1.0 mg/dL    Calcium 10.4 8.5 - 10.5 mg/dL    Protein, Total 8.8 (H) 6.0 - 8.0 g/dL    Albumin 4.3 3.5 - 5.0 g/dL    Alkaline Phosphatase 77 45 - 120 U/L    AST 94 (H) 0 - 40 U/L    ALT 51 (H) 0 - 45 U/L    Narrative    Fasting Glucose reference range is 70-99 mg/dL per  American Diabetes Association (ADA) guidelines.   Lipid Cascade   Result Value Ref Range    Cholesterol 136 <=199 mg/dL    Triglycerides 92 <=149 mg/dL    HDL Cholesterol 37 (L) >=40 mg/dL    LDL Calculated 81 <=129 mg/dL    Patient Fasting > 8hrs? Yes    PSA (Prostatic-Specific Antigen), Annual Screen   Result Value Ref Range    PSA 0.2 0.0 - 6.5 ng/mL    Narrative    Method is Abbott Prostate-Specific Antigen (PSA)  Standard-WHO 1st International (90:10)       2 of your liver tests are somewhat elevated.  I do not have a good reason for this.  I would suggest we recheck the liver tests in about 1 month to see if they are changing.  The rest of the blood tests appears stable.    Please call with questions or contact us using [employer].    Sincerely,        Electronically signed by Jin Bhat MD

## 2021-06-18 NOTE — LETTER
Letter by Jin Bhat MD at      Author: Jin Bhat MD Service: -- Author Type: --    Filed:  Encounter Date: 2/28/2019 Status: (Other)       Bandar Wells  [address] [place] [address]             February 28, 2019         Dear Mr. Wells,    Below are the results from your recent visit:    Resulted Orders   Basic Metabolic Panel   Result Value Ref Range    Sodium 137 136 - 145 mmol/L    Potassium 3.8 3.5 - 5.0 mmol/L    Chloride 99 98 - 107 mmol/L    CO2 24 22 - 31 mmol/L    Anion Gap, Calculation 14 5 - 18 mmol/L    Glucose 105 70 - 125 mg/dL    Calcium 9.9 8.5 - 10.5 mg/dL    BUN 48 (H) 8 - 28 mg/dL    Creatinine 1.48 (H) 0.70 - 1.30 mg/dL    GFR MDRD Af Amer 57 (L) >60 mL/min/1.73m2    GFR MDRD Non Af Amer 47 (L) >60 mL/min/1.73m2    Narrative    Fasting Glucose reference range is 70-99 mg/dL per  American Diabetes Association (ADA) guidelines.   BNP(B-type Natriuretic Peptide)   Result Value Ref Range     (H) 0 - 67 pg/mL       Your kidney tests are a little worse than they were last week.  We will need to be careful with the diuretics.  The BNP which is related to heart failure has actually improved which is good.  Continue doing what you are doing and to see me next week as planned.  We will also need to recheck your kidney tests at that time.    Please call with questions or contact us using [employer].    Sincerely,        Electronically signed by Jin Bhat MD

## 2021-06-19 NOTE — LETTER
"Letter by Shilip Tamayo, PharmD at      Author: Shilpi Tamayo, PharmD Service: -- Author Type: --    Filed:  Encounter Date: 9/26/2019 Status: Signed       Bandar Wells  [address] [place] [address]      Procedure Instructions for Anticoagulation     Your healthcare provider wants you to stop warfarin prior to your upcoming Colonoscopy on 10/2. To protect you from a clot while your INR is low, your provider wants you to inject a short-acting medication called enoxaparin (Lovenox) at home, this is called \"bridging\".  Below is a copy of our plan for your warfarin (Coumadin) and enoxaparin (Lovenox).     BEFORE your procedure:       6 days before    Thursday 9/26   5 days before    Friday 9/27   4 days before    Saturday 9/28   3 days before    Sunday 9/29   2 days before    Monday 9/30   1 day   before    Tuesday  10/1   Day of Procedure    Wednesday  10/2     Warfarin  (Coumadin)     Take your regular dose of warfarin   Take your regular dose of warfarin     No warfarin     No warfarin   No warfarin     No warfarin       NO warfarin   in AM     Enoxaparin  (Lovenox)      Your dose:  90 mg   (0.9 mL -partial syringe)   No enoxaparin      -   No enoxaparin      -   No enoxaparin      -   No enoxaparin in AM      Enoxaparin in PM   Enoxaparin in AM        Enoxaparin in PM   Enoxaparin ONLY in AM      NO enoxaparin in PM   NO  Enoxaparin     Day of procedure:     Bring these instructions with you to your procedure to show the provider doing the procedure. Please discuss with the provider doing the procedure if it is okay to restart warfarin and enoxaparin (Lovenox) as we have planned. See page 2 for after procedure instructions.      AFTER your procedure:    After the procedure you will go back to taking warfarin and may need to take enoxaparin (Lovenox) until your INR is at or above 2.5.         Day of Procedure    Wednesday  10/2   1 day   after    Thursday  10/3   2 days   after    Friday  10/4   3 " days   after    Saturday  10/5   4 days   after    Michael  10/6   5 days   after    Monday  10/7     Warfarin  (Coumadin)    9 mg of warfarin     Please ask the provider doing your procedure if it is okay to restart your warfarin as planned   6 mg of warfarin   3 mg of warfarin   6 mg of warfarin    3 mg of warfarin       Check INR    warfarin as directed by INR clinic         Enoxaparin  (Lovenox)      Your dose:  90 mg   (0.9 mL-partial syringe)   NO  Enoxaparin    Please ask the provider doing your procedure if it is okay to restart your enoxaparin as planned.   ** Please call INR clinic prior to restarting injections if you have polyps removed   Enoxaparin in AM      Enoxaparin in PM   Enoxaparin in AM      Enoxaparin in PM   Enoxaparin in AM      Enoxaparin in PM   Enoxaparin in AM      Enoxaparin in PM   Enoxaparin in AM      As directed by INR clinic                               Please watch for symptoms of both bleeding and clotting.      Bleeding:    Call 911 or go to the emergency room if you: are coughing or throwing up blood, can't control bleeding from a cut or injury after putting pressure on it, have a sudden severe headache, have red or black stools, or have red or orange urine.      Call your care team if you have: bleeding gums, large bruises, pale skin.     Clotting:    Call 911 or go to the emergency room if you have:     Chest pain or shortness of breath    Any symptoms of a stroke including: sudden numbness or weakness in your face, arm or leg; sudden confusion or trouble speaking, reading or understanding; sudden blurred or decreased vision; sudden trouble walking or moving a part of the body; sudden severe headache for no reason.      Call your care team if you have: sudden pain, tenderness or swelling in your leg or arm    Please contact the Anticoagulation Clinic at [phone] if you have any questions or concerns.     Shilpi Tamayo, PharmD

## 2021-06-19 NOTE — PROGRESS NOTES
[facility] Follow Up Note    Bandar Wells   67 y.o. male    Date of Visit: 6/29/2018    Chief Complaint   Patient presents with     Bleeding/Bruising     from legs, on blood thinner     Subjective  This is a 67-year-old man with history of mitral valve disease and valve replacement.  He is on Coumadin.  Sees cardiology on a regular basis.  He phone earlier this week because of some bleeding from varicose veins near his right knee.  He has not had this problem with bleeding in the past.  There was no trauma or any unusual activity.  The only thing he thought might contribute to this is a steroid cream that the dermatologist had recommended for his leg.  He has been applying it lower on the leg but wonders if it got a little high possibly causing some thinning of the skin which led to the easy bleeding.  He did speak with cardiology to ask if he could try a different blood thinning agent but the answer was no because of the reason that he is on the blood thinner which is his mitral valve issue.  Since the bleeding stopped he has had no further problems.  There is no pain associated with this and no other changes are reported.    ROS A comprehensive review of systems was performed and was otherwise negative    Medications, allergies, and problem list were reviewed and updated    Exam  General Appearance:   On examination his blood pressure was 132/60.  Weight is 189 pounds and height is 70 inches.  BMI is 27.12.    Heart rhythm is stable with rate in the 60s and no ectopy.    No real peripheral edema.    He does have significant varicose veins with a large cluster behind his right knee.  No evidence of immediate bleeding at this time.    The patient is alert and oriented ×3.      Assessment/Plan  1. Varicose veins of both lower extremities     2. S/P mitral valve replacement with metallic valve       Bleeding from varicose veins.  We discussed this at length.  He is clearly at greater risk for  bleeding because of the Coumadin but has no choice but to stay on it.  It is not out of the question that the steroid cream was in part responsible for the bleeding from these veins earlier in the week.  He will not be applying any more of that cream.  We talked about compression but stockings and Ace wraps may actually cause more problems than they will solve due to the location of the veins.  We also discussed the possibility of seeing the vascular surgeon for some type of procedure.  He would prefer to wait on this and I am fine with that decision.  For now we will watch but should there be a recurrence he was advised to call me immediately.    Mitral valve disease with surgery.  Continue follow-up with cardiology.  Body Mass Index was not assessed due to The patient was in with an acute medical issue..    Jin Bhat MD      Current Outpatient Prescriptions on File Prior to Visit   Medication Sig     acetaminophen (TYLENOL) 500 MG tablet Take 500-1,000 mg by mouth every 6 (six) hours as needed for pain. Pain 1-5  Give 500 mg  Or pain 6-10 give 1000 mg every 6 hours ad needed not to exceed 4 grams in 24 hours     atorvastatin (LIPITOR) 40 MG tablet TAKE 1 TABLET (40 MG TOTAL) BY MOUTH AT BEDTIME.     b complex vitamins tablet Take 1 tablet by mouth daily.      cholecalciferol, vitamin D3, 1,000 unit tablet Take 1,000 Units by mouth daily.     clindamycin (CLEOCIN) 300 MG capsule Take 2 capsules (600 mg total) by mouth see administration instructions. Take prior to Dentist appointments     cyanocobalamin 1000 MCG tablet Take 1,000 mcg by mouth daily.     FLAXSEED OIL ORAL Take 1,000 mg by mouth daily.      fluticasone (FLONASE) 50 mcg/actuation nasal spray 1 spray into each nostril daily.     FOLIC ACID ORAL Take 1 tablet by mouth daily.     furosemide (LASIX) 20 MG tablet Take 1 tablet (20 mg total) by mouth daily.     L. ACIDOPHILUS/BIFIDO LONGUM (PROBIOTIC PEARLS ORAL) Take 1 capsule by mouth daily.      multivitamin therapeutic (THERAGRAN) tablet Take 1 tablet by mouth daily.     potassium chloride SA (K-DUR,KLOR-CON) 20 MEQ tablet TAKE 1 TABLET BY MOUTH DAILY.     warfarin (COUMADIN) 3 MG tablet Take one to two tablets (3 to 6 mg) by mouth daily. Adjust dose based on INR results as directed.     No current facility-administered medications on file prior to visit.      No Known Allergies  Social History   Substance Use Topics     Smoking status: Never Smoker     Smokeless tobacco: Never Used     Alcohol use 3.6 oz/week     5 Glasses of wine, 1 Cans of beer per week

## 2021-06-19 NOTE — LETTER
Letter by Jin Bhat MD at      Author: Jin Bhat MD Service: -- Author Type: --    Filed:  Encounter Date: 9/12/2019 Status: (Other)         Bandar Wells  [address] [place] [address]             September 12, 2019         Dear Mr. Wells,    Below are the results from your recent visit:    Resulted Orders   Basic Metabolic Panel   Result Value Ref Range    Sodium 140 136 - 145 mmol/L    Potassium 3.9 3.5 - 5.0 mmol/L    Chloride 101 98 - 107 mmol/L    CO2 27 22 - 31 mmol/L    Anion Gap, Calculation 12 5 - 18 mmol/L    Glucose 111 70 - 125 mg/dL    Calcium 10.4 8.5 - 10.5 mg/dL    BUN 52 (H) 8 - 28 mg/dL    Creatinine 1.65 (H) 0.70 - 1.30 mg/dL    GFR MDRD Af Amer 50 (L) >60 mL/min/1.73m2    GFR MDRD Non Af Amer 41 (L) >60 mL/min/1.73m2    Narrative    Fasting Glucose reference range is 70-99 mg/dL per  American Diabetes Association (ADA) guidelines.       Your kidney tests are little worse than they were the last time.    Please call with questions or contact us using [employer].    Sincerely,        Electronically signed by Jin Bhat MD

## 2021-06-19 NOTE — LETTER
Letter by Jin Bhat MD at      Author: Jin Bhat MD Service: -- Author Type: --    Filed:  Encounter Date: 7/18/2019 Status: (Other)         Bandar Wells  [address] [place] [address]             July 18, 2019         Dear Mr. Wells,    Below are the results from your recent visit:    Resulted Orders   Basic Metabolic Panel   Result Value Ref Range    Sodium 139 136 - 145 mmol/L    Potassium 4.1 3.5 - 5.0 mmol/L    Chloride 102 98 - 107 mmol/L    CO2 29 22 - 31 mmol/L    Anion Gap, Calculation 8 5 - 18 mmol/L    Glucose 104 70 - 125 mg/dL    Calcium 10.0 8.5 - 10.5 mg/dL    BUN 37 (H) 8 - 28 mg/dL    Creatinine 1.35 (H) 0.70 - 1.30 mg/dL    GFR MDRD Af Amer >60 >60 mL/min/1.73m2    GFR MDRD Non Af Amer 52 (L) >60 mL/min/1.73m2    Narrative    Fasting Glucose reference range is 70-99 mg/dL per  American Diabetes Association (ADA) guidelines.       Your kidney tests are just a little worse.    Please call with questions or contact us using [employer].    Sincerely,        Electronically signed by Jin Bhat MD

## 2021-06-19 NOTE — LETTER
Letter by Jin Bhat MD at      Author: Jin Bhat MD Service: -- Author Type: --    Filed:  Encounter Date: 11/8/2019 Status: Signed         Bandar Wells  [address] [place] [address]             November 8, 2019         Dear Mr. Wells,    Below are the results from your recent visit:    Resulted Orders   Basic Metabolic Panel   Result Value Ref Range    Sodium 139 136 - 145 mmol/L    Potassium 3.6 3.5 - 5.0 mmol/L    Chloride 101 98 - 107 mmol/L    CO2 26 22 - 31 mmol/L    Anion Gap, Calculation 12 5 - 18 mmol/L    Glucose 115 70 - 125 mg/dL    Calcium 9.9 8.5 - 10.5 mg/dL    BUN 51 (H) 8 - 28 mg/dL    Creatinine 1.70 (H) 0.70 - 1.30 mg/dL    GFR MDRD Af Amer 48 (L) >60 mL/min/1.73m2    GFR MDRD Non Af Amer 40 (L) >60 mL/min/1.73m2    Narrative    Fasting Glucose reference range is 70-99 mg/dL per  American Diabetes Association (ADA) guidelines.       Your kidney tests are about the same.    Please call with questions or contact us using [employer].    Sincerely,        Electronically signed by Jin Bhat MD

## 2021-06-19 NOTE — LETTER
Letter by Aleksandr Carney MD at      Author: Aleksandr Carney MD Service: -- Author Type: --    Filed:  Encounter Date: 8/28/2019 Status: (Other)         Bandar Wells  [address] [place] [address]      August 28, 2019      Dear Bandar,    This letter is to remind you that you will be due for your follow up appointment with Dr. Aleksandr Carney. To help ensure you are in the best health possible, a regular follow-up with your cardiologist is essential.     Please call our Patient Scheduling Line at [phone] to schedule your appointment at your earliest convenience.  If you have recently scheduled an appointment, please disregard this letter.    We look forward to seeing you again. As always, we are available at the number  above for any questions or concerns you may have.      Sincerely,     [facility]

## 2021-06-20 NOTE — LETTER
Letter by Jin Bhat MD at      Author: Jin Bhat MD Service: -- Author Type: --    Filed:  Encounter Date: 3/4/2020 Status: (Other)         Bandar Wells  [address] [place] [address]             March 4, 2020         Dear Mr. Wells,    Below are the results from your recent visit:    Resulted Orders   Basic Metabolic Panel   Result Value Ref Range    Sodium 140 136 - 145 mmol/L    Potassium 3.5 3.5 - 5.0 mmol/L    Chloride 96 (L) 98 - 107 mmol/L    CO2 27 22 - 31 mmol/L    Anion Gap, Calculation 17 5 - 18 mmol/L    Glucose 107 70 - 125 mg/dL    Calcium 10.4 8.5 - 10.5 mg/dL    BUN 64 (H) 8 - 28 mg/dL    Creatinine 1.95 (H) 0.70 - 1.30 mg/dL    GFR MDRD Af Amer 41 (L) >60 mL/min/1.73m2    GFR MDRD Non Af Amer 34 (L) >60 mL/min/1.73m2    Narrative    Fasting Glucose reference range is 70-99 mg/dL per  American Diabetes Association (ADA) guidelines.       Your kidney tests are just a little worse than last time.    Please call with questions or contact us using [employer].    Sincerely,        Electronically signed by Jin Bhat MD

## 2021-06-20 NOTE — LETTER
Letter by Jin Bhat MD at      Author: Jin Bhat MD Service: -- Author Type: --    Filed:  Encounter Date: 3/11/2020 Status: (Other)         Bandar Wells  [address] [place] [address]             March 11, 2020         Dear Mr. Wells,    Below are the results from your recent visit:    Resulted Orders   Basic Metabolic Panel   Result Value Ref Range    Sodium 139 136 - 145 mmol/L    Potassium 3.6 3.5 - 5.0 mmol/L    Chloride 95 (L) 98 - 107 mmol/L    CO2 22 22 - 31 mmol/L    Anion Gap, Calculation 22 (H) 5 - 18 mmol/L    Glucose 86 70 - 125 mg/dL    Calcium 10.5 8.5 - 10.5 mg/dL    BUN 73 (H) 8 - 28 mg/dL    Creatinine 1.82 (H) 0.70 - 1.30 mg/dL    GFR MDRD Af Amer 45 (L) >60 mL/min/1.73m2    GFR MDRD Non Af Amer 37 (L) >60 mL/min/1.73m2    Narrative    Fasting Glucose reference range is 70-99 mg/dL per  American Diabetes Association (ADA) guidelines.       Your blood tests look about the same.    Please call with questions or contact us using [employer].    Sincerely,        Electronically signed by Jin Bhat MD

## 2021-06-20 NOTE — LETTER
"Letter by Shilpi Tamayo, PharmD at      Author: Shilpi Tamayo, PharmD Service: -- Author Type: --    Filed:  Encounter Date: 2/26/2020 Status: (Other)       Bandar Wells  [address] [place] [address]      Procedure Instructions for Anticoagulation     Your healthcare provider wants you to stop warfarin prior to your upcoming Back Injection on 3/4/20. To protect you from a clot while your INR is low, your provider wants you to inject a short-acting medication called enoxaparin (Lovenox) at home, this is called \"bridging\".  Below is a copy of our plan for your warfarin (Coumadin) and enoxaparin (Lovenox).     BEFORE your procedure:       5 days before    Friday 2/28/20   4 days before    Saturday 2/29/20   3 days before    Michael  3/1/20   2 days before    Monday  3/2/20   1 day   before    Tuesday  3/3/20   Day of Procedure    Wednesday  3/4/20     Warfarin  (Coumadin)     No warfarin     No warfarin     No warfarin   No warfarin     No warfarin      Lab Appointment 12:30 [place]   NO warfarin   in AM     Enoxaparin  (Lovenox)      Your dose:  90 mg   (0.9 mL -partial syringe)   No enoxaparin      -   No enoxaparin      -   Enoxaparin in AM      Enoxaparin in PM   Enoxaparin in AM      Enoxaparin in PM   Enoxaparin ONLY in AM      NO enoxaparin in PM   NO  Enoxaparin     Day of procedure:     Bring these instructions with you to your procedure to show the provider doing the procedure. Please discuss with the provider doing the procedure if it is okay to restart warfarin and enoxaparin (Lovenox) as we have planned. See page 2 for after procedure instructions.      AFTER your procedure:    After the procedure you will go back to taking warfarin and may need to take enoxaparin (Lovenox) until your INR is at or above 2.5.         Day of Procedure    Wednesday  3/4/20   1 day   after    Thursday  3/5/20   2 days   after    Friday  3/6/20   3 days   after    Saturday  3/7/20   4 days "   after    Michael  3/8/20   5 days   after    Monday  3/9/20     Warfarin  (Coumadin)    9 mg of warfarin     Please ask the provider doing your procedure if it is okay to restart your warfarin as planned     9 mg of warfarin     6 mg of warfarin     6 mg of warfarin      6 mg of warfarin         Check INR    warfarin as directed by INR clinic         Enoxaparin  (Lovenox)      Your dose:  90 mg   (0.9 mL-partial syringe)   NO  Enoxaparin    *Please ask the provider doing your procedure if it is okay to restart your enoxaparin as planned   Enoxaparin in AM*      Enoxaparin in PM*    *if okay with provider doing procedure   Enoxaparin in AM      Enoxaparin in PM   Enoxaparin in AM      Enoxaparin in PM   Enoxaparin in AM      Enoxaparin in PM   Enoxaparin in AM      as directed by INR clinic     Please watch for symptoms of both bleeding and clotting.      Bleeding:    Call 911 or go to the emergency room if you: are coughing or throwing up blood, can't control bleeding from a cut or injury after putting pressure on it, have a sudden severe headache, have red or black stools, or have red or orange urine.      Call your care team if you have: bleeding gums, large bruises, pale skin.     Clotting:    Call 911 or go to the emergency room if you have:     Chest pain or shortness of breath    Any symptoms of a stroke including: sudden numbness or weakness in your face, arm or leg; sudden confusion or trouble speaking, reading or understanding; sudden blurred or decreased vision; sudden trouble walking or moving a part of the body; sudden severe headache for no reason.      Call your care team if you have: sudden pain, tenderness or swelling in your leg or arm    Please contact the Anticoagulation Clinic at [phone] if you have any questions or concern

## 2021-06-20 NOTE — LETTER
Letter by Jin Bhat MD at      Author: Jin Bhat MD Service: -- Author Type: --    Filed:  Encounter Date: 1/17/2020 Status: Signed         Bandar Wells  [address] [place] [address]             January 17, 2020         Dear Mr. Wells,    Below are the results from your recent visit:    Resulted Orders   Comprehensive Metabolic Panel   Result Value Ref Range    Sodium 139 136 - 145 mmol/L    Potassium 3.7 3.5 - 5.0 mmol/L    Chloride 99 98 - 107 mmol/L    CO2 25 22 - 31 mmol/L    Anion Gap, Calculation 15 5 - 18 mmol/L    Glucose 107 70 - 125 mg/dL    BUN 56 (H) 8 - 28 mg/dL    Creatinine 1.58 (H) 0.70 - 1.30 mg/dL    GFR MDRD Af Amer 53 (L) >60 mL/min/1.73m2    GFR MDRD Non Af Amer 43 (L) >60 mL/min/1.73m2    Bilirubin, Total 0.8 0.0 - 1.0 mg/dL    Calcium 10.0 8.5 - 10.5 mg/dL    Protein, Total 8.1 (H) 6.0 - 8.0 g/dL    Albumin 4.3 3.5 - 5.0 g/dL    Alkaline Phosphatase 78 45 - 120 U/L    AST 79 (H) 0 - 40 U/L    ALT 53 (H) 0 - 45 U/L    Narrative    Fasting Glucose reference range is 70-99 mg/dL per  American Diabetes Association (ADA) guidelines.   Lipid Cascade   Result Value Ref Range    Cholesterol 160 <=199 mg/dL    Triglycerides 99 <=149 mg/dL    HDL Cholesterol 50 >=40 mg/dL    LDL Calculated 90 <=129 mg/dL    Patient Fasting > 8hrs? Yes    PSA (Prostatic-Specific Antigen), Annual Screen   Result Value Ref Range    PSA 0.2 0.0 - 6.5 ng/mL    Narrative    Method is Abbott Prostate-Specific Antigen (PSA)  Standard-WHO 1st International (90:10)       Your kidney tests appear about the same.  Your cholesterol numbers are good.  Your PSA test is good.  A couple of your liver tests are slightly elevated.    Please call with questions or contact us using [employer].    Sincerely,        Electronically signed by Jin Bhat MD

## 2021-06-20 NOTE — LETTER
Letter by Shilpi Tamayo, PharmD at      Author: Shilpi Tamayo, PharmD Service: -- Author Type: --    Filed:  Encounter Date: 3/6/2020 Status: (Other)       Bandar Wells  [address] [place] [address]      Procedure Instructions for Anticoagulation     AFTER your procedure:              Friday  3/6/20       Saturday  3/7/20       Michael  3/8/20       Monday  3/9/20     Warfarin  (Coumadin)     9 mg of warfarin     6 mg of warfarin     6 mg of warfarin     Check INR  With home monitor      warfarin as directed by INR clinic       Enoxaparin  (Lovenox)      Your NEW dose:  80 mg   (0.8 mL-partial syringe)             Enoxaparin  in PM   Enoxaparin in AM    Enoxaparin in PM     Enoxaparin in AM      Enoxaparin in PM     Enoxaparin in AM        Enoxaparin as directed by INR clinic                       Please watch for symptoms of both bleeding and clotting.      Bleeding:    Call 911 or go to the emergency room if you: are coughing or throwing up blood, can't control bleeding from a cut or injury after putting pressure on it, have a sudden severe headache, have red or black stools, or have red or orange urine.      Call your care team if you have: bleeding gums, large bruises, pale skin.     Clotting:    Call 911 or go to the emergency room if you have:     Chest pain or shortness of breath    Any symptoms of a stroke including: sudden numbness or weakness in your face, arm or leg; sudden confusion or trouble speaking, reading or understanding; sudden blurred or decreased vision; sudden trouble walking or moving a part of the body; sudden severe headache for no reason.      Call your care team if you have: sudden pain, tenderness or swelling in your leg or arm    Please contact the Anticoagulation Clinic at [phone] if you have any questions or concerns.     Shlipi Tamayo, PharmD

## 2021-06-20 NOTE — LETTER
Letter by Peter Gottlieb DO at      Author: Peter Gottlieb DO Service: -- Author Type: --    Filed:  Encounter Date: 8/12/2020 Status: (Other)         Bandar Wells  [address] [place] [address]             August 12, 2020         Dear Mr. Wells,    Below are the results from your recent visit:    Resulted Orders   Myoglobin, Serum   Result Value Ref Range    Myoglobin 94 (H) <=90 mcg/L      Comment:         Test Performed by:  [address] [address]  : Denton Tejada M.D. Ph.D.; CLIA# [id number]   CK Total   Result Value Ref Range    CK, Total 41 30 - 190 U/L   Aldolase   Result Value Ref Range    Aldolase 4.9 1.5 - 8.1 U/L      Comment:      REFERENCE INTERVAL: Aldolase    Access complete set of age- and/or gender-specific reference   intervals for this test in the [facility] Test Directory   ([email or web address]).  Performed By: [facility]  [address] [place] [id number]  : Iván Neely MD, MS   Uric acid   Result Value Ref Range    Uric Acid 10.8 (H) 3.0 - 8.0 mg/dL   ALT (SGPT)   Result Value Ref Range    ALT 44 0 - 45 U/L   AST (SGOT)   Result Value Ref Range    AST 90 (H) 0 - 40 U/L   HM1 (CBC with Diff)   Result Value Ref Range    WBC 7.5 4.0 - 11.0 thou/uL    RBC 3.01 (L) 4.40 - 6.20 mill/uL    Hemoglobin 8.8 (L) 14.0 - 18.0 g/dL    Hematocrit 26.1 (L) 40.0 - 54.0 %    MCV 87 80 - 100 fL    MCH 29.2 27.0 - 34.0 pg    MCHC 33.7 32.0 - 36.0 g/dL    RDW 15.9 (H) 11.0 - 14.5 %    Platelets 282 140 - 440 thou/uL    MPV 8.1 7.0 - 10.0 fL    Neutrophils % 70 50 - 70 %    Lymphocytes % 13 (L) 20 - 40 %    Monocytes % 13 (H) 2 - 10 %    Eosinophils % 3 0 - 6 %    Basophils % 1 0 - 2 %    Neutrophils Absolute 5.2 2.0 - 7.7 thou/uL    Lymphocytes Absolute 1.0 0.8 - 4.4 thou/uL    Monocytes Absolute 1.0 (H) 0.0 - 0.9 thou/uL    Eosinophils Absolute 0.2 0.0 - 0.4  thou/uL    Basophils Absolute 0.1 0.0 - 0.2 thou/uL      Myoglobin, a muscle enzyme noted to be borderline elevated, with significant improved compared to prior level.   Other muscle markers aldolase and CK level were within normal limits, as in the past.  Recommend periodic  monitoring.      Uric acid level noted to be elevated, not new, latest level bit higher compared to prior level currently at 10.8 was  9.4 three weeks prior.  Noted that patient had diuretics recently held, likely contributing to some improvement of  kidney function.  This may also lead to lowering (improvement) of uric acid levels with time.      If desires, we can monitor uric acid levels for now.  Other option is starting a medication called Uloric, which may  now be better tolerated given improvement of kidney function.         Noted to have elevated AST liver enzyme with normal ALT liver enzyme.  If drinking alcoholic beverages,  recommend cutting back, this would also benefit uric acid level.  Recommend periodic monitoring and discussing  further with PCP.      Noted to have low hemoglobin/hematocrit, not new, recommend periodic monitoring and discussing further with  hematology and PCP.      Recommend rechecking myoglobin, ALT, AST, creatinine, uric acid, cbc with diff in about 2-3 weeks      If desires to start Uloric, recommend scheduling a follow-up visit to reassess and discuss further after having labs  performed. If desires, we can send patient a handout on Uloric to review.      Reassuring that bone marrow biopsy did not show signs of myeloma, recommend discussing bone marrow  results further with hematologist.           Please call with questions or contact us using [employer].    Sincerely,        Electronically signed by Peter Gottlieb,

## 2021-06-20 NOTE — LETTER
Letter by Jin Bhat MD at      Author: Jin Bhat MD Service: -- Author Type: --    Filed:  Encounter Date: 3/30/2020 Status: (Other)         Bandar Wells  [address] [place] [address]             March 30, 2020         Dear Mr. Wells,    Below are the results from your recent visit:    Resulted Orders   Basic Metabolic Panel   Result Value Ref Range    Sodium 144 136 - 145 mmol/L    Potassium 4.0 3.5 - 5.0 mmol/L    Chloride 98 98 - 107 mmol/L    CO2 28 22 - 31 mmol/L    Anion Gap, Calculation 18 5 - 18 mmol/L    Glucose 107 70 - 125 mg/dL    Calcium 10.9 (H) 8.5 - 10.5 mg/dL    BUN 74 (H) 8 - 28 mg/dL    Creatinine 2.25 (H) 0.70 - 1.30 mg/dL    GFR MDRD Af Amer 35 (L) >60 mL/min/1.73m2    GFR MDRD Non Af Amer 29 (L) >60 mL/min/1.73m2    Narrative    Fasting Glucose reference range is 70-99 mg/dL per  American Diabetes Association (ADA) guidelines.       Your calcium is up again a little bit but the kidney tests appear about the same.    Please call with questions or contact us using [employer].    Sincerely,        Electronically signed by Jin Bhat MD

## 2021-06-20 NOTE — LETTER
Letter by Nimisha Fournier RN at      Author: Nimisha Fournier RN Service: -- Author Type: --    Filed:  Encounter Date: 6/5/2020 Status: (Other)       Dear Bandar,    Thank you for choosing [facility] for your care.  We are committed to providing you with the highest quality and compassionate healthcare services.  The following information pertains to your first appointment with our clinic.    Date/Time of appointment:  Monday, Rita 15, 2020, 10:30 AM       Name of your Physician: Dr. José Miguel Solorio    What to bring to your appointment:      Completed Patient History/Initial Nursing Assessment and Medication/Allergy List (these forms were sent to you).    Any paperwork or films from your physician that we have asked you to bring.    Your current insurance card(s).    Parking:      Please refer to the map included to direct you to [facility].    You can park in any visitor/patient parking area you choose. There is no charge for parking at [facility].     Enter the hospital at the front/main entrance.      Please check in with our  representatives who will escort you to the clinic located in [address] of the [facility].    We hope these instructions are helpful to you.  If you have any questions or concerns, please call us at [phone].  It is our pleasure to assist you.    Warm Regards,  Maria Isabel Fournier RN  Nurse Navigator  [phone]

## 2021-06-20 NOTE — LETTER
Letter by Jin Bhat MD at      Author: Jin Bhat MD Service: -- Author Type: --    Filed:  Encounter Date: 3/20/2020 Status: (Other)         Bandar Wells  [address] [place] [address]             March 20, 2020         Dear Mr. Wells,    Below are the results from your recent visit:    Resulted Orders   Basic Metabolic Panel   Result Value Ref Range    Sodium 138 136 - 145 mmol/L    Potassium 3.7 3.5 - 5.0 mmol/L    Chloride 95 (L) 98 - 107 mmol/L    CO2 28 22 - 31 mmol/L    Anion Gap, Calculation 15 5 - 18 mmol/L    Glucose 114 70 - 125 mg/dL    Calcium 10.0 8.5 - 10.5 mg/dL    BUN 75 (H) 8 - 28 mg/dL    Creatinine 2.39 (H) 0.70 - 1.30 mg/dL    GFR MDRD Af Amer 33 (L) >60 mL/min/1.73m2    GFR MDRD Non Af Amer 27 (L) >60 mL/min/1.73m2    Narrative    Fasting Glucose reference range is 70-99 mg/dL per  American Diabetes Association (ADA) guidelines.   HM1 (CBC with Diff)   Result Value Ref Range    WBC 4.6 4.0 - 11.0 thou/uL    RBC 3.15 (L) 4.40 - 6.20 mill/uL    Hemoglobin 9.6 (L) 14.0 - 18.0 g/dL    Hematocrit 29.6 (L) 40.0 - 54.0 %    MCV 94 80 - 100 fL    MCH 30.3 27.0 - 34.0 pg    MCHC 32.3 32.0 - 36.0 g/dL    RDW 14.4 11.0 - 14.5 %    Platelets 199 140 - 440 thou/uL    MPV 7.8 7.0 - 10.0 fL    Neutrophils % 61 50 - 70 %    Lymphocytes % 23 20 - 40 %    Monocytes % 12 (H) 2 - 10 %    Eosinophils % 4 0 - 6 %    Basophils % 1 0 - 2 %    Neutrophils Absolute 2.8 2.0 - 7.7 thou/uL    Lymphocytes Absolute 1.1 0.8 - 4.4 thou/uL    Monocytes Absolute 0.5 0.0 - 0.9 thou/uL    Eosinophils Absolute 0.2 0.0 - 0.4 thou/uL    Basophils Absolute 0.0 0.0 - 0.2 thou/uL       Your kidney tests are just a little worse.  Everything else appears to be about the same.    Please call with questions or contact us using [employer].    Sincerely,        Electronically signed by Jin Bhat MD

## 2021-06-21 ENCOUNTER — THERAPY VISIT (OUTPATIENT)
Dept: PHYSICAL THERAPY | Facility: CLINIC | Age: 71
End: 2021-06-21
Payer: COMMERCIAL

## 2021-06-21 DIAGNOSIS — M53.82 WEAKNESS OF NECK: ICD-10-CM

## 2021-06-21 DIAGNOSIS — R21 RASH: ICD-10-CM

## 2021-06-21 PROCEDURE — 97112 NEUROMUSCULAR REEDUCATION: CPT | Mod: GP | Performed by: PHYSICAL THERAPIST

## 2021-06-21 PROCEDURE — 97110 THERAPEUTIC EXERCISES: CPT | Mod: GP | Performed by: PHYSICAL THERAPIST

## 2021-06-21 NOTE — PROGRESS NOTES
[facility] Clinic Follow Up Note    Bandar Wells   68 y.o. male    Date of Visit: 10/15/2018    Chief Complaint   Patient presents with     Follow-up     discuss iron supplement - colonoscopy      Subjective  This is a 68-year-old man with multiple medical issues that include previous mitral valve replacement, paroxysmal atrial fibrillation and chronic congestive heart failure.  He saw his cardiologist a month ago and I have reviewed those notes.  From a cardiac perspective his issues are stable.  Cardiology seems happy with his progress and made no additional recommendations.  His varicose veins have been problematic and he had significant bleeding from them earlier this year.  He is now using compression with some good response and less edema in the legs although he still has significant varicosities.  He and I had previously talked about visit to the vascular clinic for recommendation.  Following the blood loss he was anemic and on iron twice daily.  He did have a hemoglobin done last week which was 11.1.  Because he has difficulty in swallowing iron pills he has been dissolving them in his mouth which was staining his teeth.  He did stop the iron for a few months but then resumed it.  He had no additional symptoms when he was off of the iron tablets.  Today he also had questions about the shingles vaccine which I encouraged him to get.  He also wanted to discuss colonoscopy.  His most recent procedure was about 4-5 years ago and was normal.  No family history of colon cancer.  He will not be due for procedure for several years.  We discussed the possibility of using an alternative screen to avoid having to hold his Coumadin and use Lovenox.  He is otherwise feeling good and doing well with no other new complaints.  No other changes in medication.    ROS A comprehensive review of systems was performed and was otherwise negative    Medications, allergies, and problem list were reviewed and  updated    Exam  General Appearance:   On examination his blood pressure is 136/70.  Weight is 184 pounds and height is 70 inches.  BMI is 26.51.    Lungs are clear.    Heart is in sinus rhythm with a rate of 58 and no ectopy.    He does have varicose veins bilaterally.  Minimal leg edema.    The patient is alert and oriented x3.      Assessment/Plan  1. Varicose veins of both lower extremities  Ambulatory referral to Vascular Center   2. S/P mitral valve replacement with metallic valve     3. Paroxysmal atrial fibrillation (H)     4. Chronic congestive heart failure, unspecified heart failure type (H)     5. Acute blood loss anemia       Mitral valve replacement.  Doing well as per cardiology.  Follow-up with cardiology.    Paroxysmal atrial fibrillation periods stable.  He again will follow up with cardiology.    Chronic congestive heart failure.  Also stable.    Blood loss anemia.  We discussed the situation and he will hold his iron for the next 2 months and then we will recheck his hemoglobin.    Varicose veins with previous bleeding.  We discussed a referral to vascular clinic again for evaluation and recommendation.  He is agreeable to this we will set that up for him.    I will follow-up on these issues.  Total time of this office visit was 25 minutes with greater than 50% of the time spent in care coordination of patient counseling.      Jin Bhat MD      Current Outpatient Prescriptions on File Prior to Visit   Medication Sig     atorvastatin (LIPITOR) 40 MG tablet TAKE 1 TABLET (40 MG TOTAL) BY MOUTH AT BEDTIME.     b complex vitamins tablet Take 1 tablet by mouth daily.      cholecalciferol, vitamin D3, 1,000 unit tablet Take 1,000 Units by mouth daily.     cyanocobalamin 1000 MCG tablet Take 1,000 mcg by mouth daily.     FLAXSEED OIL ORAL Take 1,000 mg by mouth daily.      furosemide (LASIX) 20 MG tablet Take 1 tablet (20 mg total) by mouth daily.     L. ACIDOPHILUS/BIFIDO LONGUM (PROBIOTIC  PEARLS ORAL) Take 1 capsule by mouth daily.     multivitamin therapeutic (THERAGRAN) tablet Take 1 tablet by mouth daily.     warfarin (COUMADIN) 3 MG tablet Take one to two tablets (3 to 6 mg) by mouth daily. Adjust dose based on INR results as directed.     acetaminophen (TYLENOL) 500 MG tablet Take 500-1,000 mg by mouth every 6 (six) hours as needed for pain. Pain 1-5  Give 500 mg  Or pain 6-10 give 1000 mg every 6 hours ad needed not to exceed 4 grams in 24 hours     clindamycin (CLEOCIN) 300 MG capsule Take 2 capsules (600 mg total) by mouth see administration instructions. Take prior to Dentist appointments     ferrous sulfate 325 (65 FE) MG tablet TAKE 1 TABLET BY MOUTH TWICE DAILY     fluticasone (FLONASE) 50 mcg/actuation nasal spray 1 spray into each nostril daily.     FOLIC ACID ORAL Take 1 tablet by mouth daily.     potassium chloride SA (K-DUR,KLOR-CON) 20 MEQ tablet TAKE 1 TABLET BY MOUTH DAILY.     No current facility-administered medications on file prior to visit.      No Known Allergies  Social History   Substance Use Topics     Smoking status: Never Smoker     Smokeless tobacco: Never Used     Alcohol use 3.6 oz/week     5 Glasses of wine, 1 Cans of beer per week

## 2021-06-21 NOTE — LETTER
Letter by Jin Hicks MD at      Author: Jin Hicks MD Service: -- Author Type: --    Filed:  Encounter Date: 1/14/2021 Status: (Other)         [facility]  01/14/21    Patient: Bandar Wells  YOB: 1950  Medical Record Number: [id number]                                                                  Opioid / Opioid Plus Controlled Substance Agreement    I understand that my care provider has prescribed an opioid (narcotic) controlled substance to help manage my condition(s). I am taking this medicine to help me function or work. I know this is strong medicine, and that it can cause serious side effects. Opioid medicine can be sedating, addicting and may cause a dependency on the drug. They can affect my ability to drive or think, and cause depression. They need to be taken exactly as prescribed. Combining opioids with certain medicines or chemicals (such as cocaine, sedatives and tranquilizers, sleeping pills, meth) can be dangerous or even fatal. Also, if I stop opioids suddenly, I may have severe withdrawal symptoms. Last, I understand that opioids do not work for all types of pain nor for all patients. If not helpful, I may be asked to stop them.        The risks, benefits, and side effects of these medicine(s) were explained to me. I agree that:    1. I will take part in other treatments as advised by my care team. This may be psychiatry or counseling, physical therapy, behavioral therapy, group treatment or a referral to a pain clinic. I will reduce or stop my medicine when my care team tells me to do so.  2. I will take my medicines as prescribed. I will not change the dose or schedule unless my care team tells me to. There will be no refills if I run out early.  I may be contactedwithout warning and asked to complete a urine drug test or pill count at any time.   3. I will keep all my appointments, and understand this is part of the monitoring of  opioids. My care team may require an office visit for EVERY opioid/controlled substance refill. If I miss appointments or dont follow instructions, my care team may stop my medicine.  4. I will not ask other providers to prescribe controlled substances, and I will not accept controlled substances from other people. If I need another prescribed controlled substance for a new reason, I will tell my care team within 1 business day.  5. I will use one pharmacy to fill all of my controlled substance prescriptions, and it is up to me to make sure that I do not run out of my medicines on weekends or holidays. If my care team is willing to refill my opioid prescription without a visit, I must request refills only during office hours, refills may take up to 3 days to process, and it may take up to 5 to 7 days for my medicine to be mailed and ready at my pharmacy. Prescriptions will not be mailed anywhere except my pharmacy.        [id number]  Rev 12/18         Registration to scan to EHR                             Page 1 of 2               Controlled Substance Agreement [facility]  01/14/21  Patient: Bandar Wells  YOB: 1950  Medical Record Number: [id number]                                                                  6. I am responsible for my prescriptions. If the medicine/prescription is lost or stolen, it will not be replaced. I also agree not to share controlled substance medicines with anyone.  7. I agree to not use ANY illegal or recreational drugs. This includes marijuana, cocaine, bath salts or other drugs. I agree not to use alcohol unless my care team says I may.          I agree to give urine samples whenever asked. If I dont give a urine sample, the care team may stop my medicine.    8. If I enroll in the [place] Medical Marijuana program, I will tell my care team. I will also sign an agreement to share my medical records with my care team.   9. I will  bring in my list of medicines (or my medicine bottles) each time I come to the clinic.   10. I will tell my care team right away if I become pregnant or have a new medical problem treated outside of my regular clinic.  11. I understand that this medicine can affect my thinking and judgment. It may be unsafe for me to drive, use machinery and do dangerous tasks. I will not do any of these things until I know how the medicine affects me. If my dose changes, I will wait to see how it affects me. I will contact my care team if I have concerns about medicine side effects.    I understand that if I do not follow any of the conditions above, my prescriptions or treatment may be stopped.      I agree that my provider, clinic care team, and pharmacy may work with any city, state or federal law enforcement agency that investigates the misuse, sale, or other diversion of my controlled medicine. I will allow my provider to discuss my care with or share a copy of this agreement with any other treating provider, pharmacy or emergency room where I receive care. I agree to give up (waive) any right of privacy or confidentiality with respect to these consents.     I have read this agreement and have asked questions about anything I did not understand.      ________________________________________________________________________  Patient signature - Date/Time -  Bandar Wells                                      ________________________________________________________________________  Witness signature                                                            ________________________________________________________________________  Provider signature - Jin Hicks MD      [id number]  Rev 12/18         Registration to scan to EHR                         Page 2 of 2                   Controlled Substance Agreement Opioid           Page 1 of 2  Opioid Pain Medicines (also known as Narcotics)  What You Need to Know    What are  opioids?   Opioids are pain medicines that must be prescribed by a doctor.  They are also known as narcotics.    Examples are:     morphine (MS Contin, Sarahi)    oxycodone (Oxycontin)    oxycodone and acetaminophen (Percocet)    hydrocodone and acetaminophen (Vicodin, Norco)     fentanyl patch (Duragesic)     hydromorphone (Dilaudid)     methadone     What do opioids do well?   Opioids are best for short-term pain after a surgery or injury. They also work well for cancer pain. Unlike other pain medicines, they do not cause liver or kidney failure or ulcers. They may help some people with long-lasting (chronic) pain.     What do opioids NOT do well?   Opioids never get rid of pain entirely, and they do not work well for most patients with chronic pain. Opioids do not reduce swelling, one of the causes of pain. They also dont work well for nerve pain.                           For informational purposes only.  Not to replace the advice of your care provider.  Copyright 201 [facility]. All right reserved. [employer] 143312-Sax 02/18.      Page 2 of 2    Risks and side effects   Talk to your doctor before you start or decide to keep taking one of these medicines. Side effects include:    Lowering your breathing rate enough to cause death    Overdose, including death, especially if taking higher than prescribed doses    Long-term opioid use    Worse depression symptoms; less pleasure in things you usually enjoy    Feeling tired or sluggish    Slower thoughts or cloudy thinking    Being more sensitive to pain over time; pain is harder to control    Trouble sleeping or restless sleep    Changes in hormone levels (for example, less testosterone)    Changes in sex drive or ability to have sex    Constipation    Unsafe driving    Itching and sweating    Feeling dizzy    Nausea, vomiting and dry mouth    What else should I know about opioids?  When someone takes opioids for too long or too often, they become  dependent. This means that if you stop or reduce the medicine too quickly, you will have withdrawal symptoms.    Dependence is not the same as addiction. Addiction is when people keep using a substance that harms their body, their mind or their relations with others. If you have a history of drug or alcohol abuse, taking opioids can cause a relapse.    Over time, opioids dont work as well. Most people will need higher and higher doses. The higher the dose, the more serious the side effects. We dont know the long-term effects of opioids.      Prescribed opioids aren't the best way to manage chronic pain    Other ways to manage pain include:      Ibuprofen or acetaminophen.  You should always try this first.      Treat health problems that may be causing pain.      acupuncture or massage, deep breathing, meditation, visual imagery, aromatherapy.      Use heat or ice at the pain site      Physical therapy and exercise      Stop smoking      See a counselor or therapist                                                  People who have used opioids for a long time may have a lower quality of life, worse depression, higher levels of pain and more visits to doctors.    Never share your opioids with others. Be sure to store opioids in a secure place, locked if possible.Young children can easily swallow them and overdose.     You can overdose on opioids.  Signs of overdose include decrease or loss of consciousness, slowed breathing, trouble waking and blue lips.  If someone is worried about overdose, they should call 911.    If you are at risk for overdose, you may get naloxone (Narcan, a medicine that reverses the effects of opioids.  If you overdose, a friend or family member can give you Narcan while waiting for the ambulance.  They need to know the signs of overdose and how to give Narcan.    While you're taking opioids:    Don't use alcohol or street drugs. Taking them together can cause death.    Don't take any of these  medicines unless your doctor says its okay.  Taking these with opioids can cause death.    Benzodiazepines (such as lorazepam         or diazepam)    Muscle relaxers (such as cyclobenzaprine)    sleeping pills    other opioids    Safe disposal of opioids  Find your area drug take-back program, your pharmacy mail-back program, buy a special disposal bag (such as Deterra) from your pharmacy or flush them down the toilet.  Use the guidelines at:  www.fda.gov/drugs/resourcesforyou

## 2021-06-21 NOTE — PROGRESS NOTES
Consult;Varicose vein/swellings of both lower extremities;Dr. Bhat referring/patient on coumadin for a fib and valve replacement.right knee superficail spider vein. Came from cardiac rehab at [facility]. Has used compression for 1 year with progression of symp toms. US tba. With cardiac issues may not proceed with any vein treatment.BP redone 150/80 no symptoms. Advised to check  And report to doctors if continues.

## 2021-06-22 NOTE — TELEPHONE ENCOUNTER
ANTICOAGULATION  MANAGEMENT    Assessment     Today's INR result of 3.0 is Therapeutic (goal INR of 2.5-3.5)        Warfarin taken as previously instructed    No new diet changes affecting INR    Potential interaction between Multivitamin and warfarin which may affect subsequent INRs. Was taking two different types of Multivitamin and is now just taking one. Change happened one week ago    Continues to tolerate warfarin with no reported s/s of bleeding or thromboembolism     Previous INR was Subtherapeutic    Plan:     Spoke with Bandar regarding INR result and instructed:     Warfarin Dosing Instructions:  Continue current warfarin dose 3 mg daily on Wed/Sat; and 6 mg daily rest of week  (0 % change)    Instructed patient to follow up no later than: two weeks    Education provided: importance of therapeutic range and target INR goal and significance of current INR result    Bandar verbalizes understanding and agrees to warfarin dosing plan.    Instructed to call the [facility] for any changes, questions or concerns. (#[phone])   ?   Ilene Porter RN    Subjective/Objective:      Bandar Wells, a 68 y.o. male is on warfarin.     Bandar reports:     Home warfarin dose: verbally confirmed correct dose taken     Missed doses: No     Medication changes:  Yes: was taking two multivitamins a day by accident and is now taking one a day     S/S of bleeding or thromboembolism:  No     New Injury or illness:  No     Changes in diet or alcohol consumption:  No     Upcoming surgery, procedure or cardioversion:  No    Anticoagulation Episode Summary     Current INR goal:   2.5-3.5   TTR:   56.0 % (1.4 y)   Next INR check:   1/17/2019   INR from last check:   3.00 (1/3/2019)   Weekly max warfarin dose:      Target end date:   Indefinite   INR check location:      Preferred lab:      Send INR reminders to:   ANTICOAGULATION POOL C (DTN,VAD,CGR,GAV)    Indications    Paroxysmal atrial fibrillation (H) [I48.0]  S/P MVR (mitral  valve replacement) [Z95.2]  S/P mitral valve replacement with metallic valve [Z95.4]           Comments:   INR TARGET GOAL 2.5 - 3.5         Anticoagulation Care Providers     Provider Role Specialty Phone number    Jin Bhat MD Referring Internal Medicine [phone]

## 2021-06-22 NOTE — TELEPHONE ENCOUNTER
Incoming fax from [facility] home monitoring     INR date: 1/3    See attached document

## 2021-06-23 NOTE — TELEPHONE ENCOUNTER
Who is calling:  Bandar  Reason for Call:  Bandar is calling to let us know he used up all his test strips by getting an error message and ran out. He is expected to get more in the next 3-5 days.   Date of last appointment with primary care: 1/16/19  Has the patient been recently seen:  Yes  Okay to leave a detailed message: Yes

## 2021-06-23 NOTE — TELEPHONE ENCOUNTER
ANTICOAGULATION  MANAGEMENT    Assessment     Today's INR result of 2.60 is Therapeutic (goal INR of 2.5-3.5)        Warfarin taken as previously instructed    No new diet changes affecting INR    No new medication/supplements affecting INR    Continues to tolerate warfarin with no reported s/s of bleeding or thromboembolism     Previous INR was Therapeutic at 3.00 on 1/3/19.    Plan:     Spoke with Bandar regarding INR result and instructed:     Warfarin Dosing Instructions:    - Continue current warfarin dose 3 mg daily on Wed/Sat; and 6 mg daily rest of week.    Instructed patient to follow up no later than:  1-2 wks. (due to check on 1/30)   - checks INR's with home INR monitor thru [employer].    Education provided: importance of consistent vitamin K intake, target INR goal and significance of current INR result and importance of notifying clinic for changes in medications    Javier verbalizes understanding and agrees to warfarin dosing plan.    Instructed to call the [facility] for any changes, questions or concerns. (#[phone])   ?   Meg Jacinto RN    Subjective/Objective:      Bandar Wells, a 68 y.o. male is on warfarin.     Bandar reports:     Home warfarin dose: as updated on anticoagulation calendar per template     Missed doses: No     Medication changes:  No. Reported more than 3 wks ago, was on 2 different MVI.  He stopped the OTC MVI and just taking OTC Alpha [employer] Multi-vitamins recommended from his Pharmacist.     S/S of bleeding or thromboembolism:  No     New Injury or illness:  No     Changes in diet or alcohol consumption:  No     Upcoming surgery, procedure or cardioversion:  Yes:  Will be referred for colonoscopy.    Anticoagulation Episode Summary     Current INR goal:   2.5-3.5   TTR:   57.1 % (1.5 y)   Next INR check:   2/13/2019   INR from last check:   2.60 (1/16/2019)   Weekly max warfarin dose:      Target end date:   Indefinite   INR check location:      Preferred lab:      Send INR  reminders to:   ANTICOAGULATION POOL C (DTN,VAD,CGR,GAV)    Indications    Paroxysmal atrial fibrillation (H) [I48.0]  S/P MVR (mitral valve replacement) [Z95.2]  S/P mitral valve replacement with metallic valve [Z95.4]           Comments:   INR TARGET GOAL 2.5 - 3.5         Anticoagulation Care Providers     Provider Role Specialty Phone number    Jin Bhat MD Referring Internal Medicine [phone]

## 2021-06-23 NOTE — PROGRESS NOTES
Assessment and Plan:   Annual wellness visit.  All information related to the annual wellness visit was reviewed.  No new issues are identified.    Coronary artery disease.  Stable.  No chest pain or shortness of breath.  Continue follow-up with cardiology.    Mitral valve disease.  He is post valve replacement and continues to do well.  This also requires continued follow-up with cardiology.    Hyperlipidemia.  Check lipids today.    Paroxysmal atrial fibrillation.  Stable.  Rate is well controlled.    Insomnia.  He has had issues with this in the past but is been good for a while.  The last couple of months he has experienced increased problems and staying asleep at night.  We did discuss this at length.  He is doing the correct things but if the problem continues he could try some melatonin.        The patient's current medical problems were reviewed.    I have had an Advance Directives discussion with the patient.  The following health maintenance schedule was reviewed with the patient and provided in printed form in the after visit summary:   Health Maintenance   Topic Date Due     COLONOSCOPY  08/20/1998     ZOSTER VACCINES (2 of 3) 12/13/2016     DEPRESSION FOLLOW UP  12/29/2018     FALL RISK ASSESSMENT  06/29/2019     TD 18+ HE  09/07/2022     ADVANCE DIRECTIVES DISCUSSED WITH PATIENT  01/04/2023     PNEUMOCOCCAL POLYSACCHARIDE VACCINE AGE 65 AND OVER  Completed     INFLUENZA VACCINE RULE BASED  Completed     PNEUMOCOCCAL CONJUGATE VACCINE FOR ADULTS (PCV13 OR PREVNAR)  Completed        Subjective:   Chief Complaint: Bandar Wells is an 68 y.o. male here for an Annual Wellness visit.   HPI: This is a 68-year-old man with multiple medical issues that include coronary artery disease, prior mitral valve replacement, paroxysmal atrial fibrillation, hyperlipidemia, and significant venous disease in the lower extremities.  For the most part he has been feeling okay.  His most recent visits with cardiology were  unremarkable and no changes were noted.  He continues to have edema in both lower extremities that worsens as the day progresses.  He has seen his vascular doctor and no new recommendations were made.  He uses the compression machine and wears the compression stockings.  He does try to keep his legs elevated when sitting.  The symptoms are not worsening but are not improving.  His only new issue is a recurrence of insomnia which she had experienced a number of years ago.  This is been going on for about 2 months and there does not seem to be any particular reason for it.  He wakes up after a few hours of sleep and then has trouble getting back to sleep but eventually does so and sleeps another few hours.  He does not report any excessive fatigue during the daytime hours.  He is aware of techniques to help him with this and does apply them.  He has considered trying melatonin.    Review of Systems:    Please see above.  The rest of the review of systems are negative for all systems.    Patient Care Team:  Jin Bhat MD as PCP - General (Internal Medicine)  Bull Dodson MD (Dermatology)  Andrew Interiano MD as Physician (Otolaryngology)  Burt Velasco MD as Physician (General Surgery)     Patient Active Problem List   Diagnosis     Hx of bacterial endocarditis     Dyslipidemia     Mitral valve prolapse     S/P MVR (mitral valve replacement)     Recurrent pleural effusion on right     Pericardial effusion without cardiac tamponade     Status post Maze operation for atrial fibrillation     Paroxysmal atrial fibrillation (H)     Coronary artery disease     Acute diastolic heart failure (H)     CHF (congestive heart failure) (H)     Hypovolemia     Hypotension     Acute respiratory failure with hypoxia (H)     Acute blood loss anemia     Acute renal failure (H)     Dysthymia     Prosthetic valve endocarditis, subsequent encounter     S/P mitral valve replacement with metallic valve     Persistent atrial  fibrillation (H)     Chronic combined systolic and diastolic congestive heart failure (H)     Past Medical History:   Diagnosis Date     Abnormal liver function test      Atrial fibrillation (H)     post naun     Atrial fibrillation with RVR (H) 8/29/2015    S/p MAZE Jul 2015     Bacterial endocarditis      CHF (congestive heart failure) (H)      Dyslipidemia      Hyperlipidemia      Mitral valve prolapse      Near syncope      Pericardial effusion without cardiac tamponade 8/29/2015     S/P mitral valve replacement with metallic valve 03/16/2017     Status post Maze operation for atrial fibrillation 8/29/2015      Past Surgical History:   Procedure Laterality Date     CARDIAC SURGERY       CHG X-RAY PELVIS 3+ VW N/A 7/1/2015    Procedure: MITRAL VALVE REPLACEMENT, MAZE PROCEDURE, CARDIOLOGY TRANSESOPHAGEAL ECHOCARDIOGRAM;  Surgeon: Rm Munoz MD;  Location: [facility] Main OR;  Service:      LANG-MAZE MICROWAVE ABLATION       EYE SURGERY      Retial hole treatment     HERNIA REPAIR Right     inguinal     MITRAL VALVE REPLACEMENT N/A 3/16/2017    Procedure: REDO STERNOTOMY, REDO MITRAL VALVE REPLACEMENT, PLACEMENT TEMPORARY VENTRICULAR PACING WIRES, ANESTHESIA TRANSESOPHAGEAL ECHOCARDIOGRAM;  Surgeon: Raphael Rosenberg MD;  Location: [facility] Main OR;  Service:      MITRAL VALVE REPLACEMENT  2015    Bioprosthetic     PERICARDIAL WINDOW N/A 8/31/2015    Procedure: RIGHT PERICARDIAL WINDOW, TALC PLEURODESIS,VIDEO ASSISTED THOROSCOPY,DRAINAGE OF BILATERAL PLEURAL EFFUSIONS;  Surgeon: Rm Munoz MD;  Location: [facility] OR;  Service:      PICC  9/3/2015          [facility]  2/14/2017           Family History   Problem Relation Age of Onset     Atrial fibrillation Mother      Heart failure Mother      Emphysema Father      Heart failure Father      Kidney failure Sister         S/P Renal transplant     Alcoholism Brother      No Medical Problems Son      No Medical Problems Brother        Social History     Socioeconomic History     Marital status: Single     Spouse name: Not on file     Number of children: Not on file     Years of education: Not on file     Highest education level: Not on file   Social Needs     Financial resource strain: Not on file     Food insecurity - worry: Not on file     Food insecurity - inability: Not on file     Transportation needs - medical: Not on file     Transportation needs - non-medical: Not on file   Occupational History     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  His son is in [place] but can be present if warned.  Home 1 1/2 stories.    3/2017      Current Outpatient Medications   Medication Sig Dispense Refill     acetaminophen (TYLENOL) 500 MG tablet Take 500-1,000 mg by mouth every 6 (six) hours as needed for pain. Pain 1-5  Give 500 mg  Or pain 6-10 give 1000 mg every 6 hours ad needed not to exceed 4 grams in 24 hours       atorvastatin (LIPITOR) 40 MG tablet TAKE 1 TABLET (40 MG TOTAL) BY MOUTH AT BEDTIME. 90 tablet 3     b complex vitamins tablet Take 1 tablet by mouth daily.        cholecalciferol, vitamin D3, 1,000 unit tablet Take 1,000 Units by mouth daily.       clindamycin (CLEOCIN) 300 MG capsule Take 2 capsules (600 mg total) by mouth see administration instructions. Take prior to Dentist appointments 2 capsule 3     cyanocobalamin 1000 MCG tablet Take 1,000 mcg by mouth daily.       FLAXSEED OIL ORAL Take 1,000 mg by mouth daily.        FOLIC ACID ORAL Take 1 tablet by mouth daily.       furosemide (LASIX) 20 MG tablet Take 1 tablet (20 mg total) by mouth daily.  "90 tablet 3     L. ACIDOPHILUS/BIFIDO LONGUM (PROBIOTIC PEARLS ORAL) Take 1 capsule by mouth daily.       multivitamin therapeutic (THERAGRAN) tablet Take 1 tablet by mouth daily.       penicillin VK (PEN VK) 500 MG tablet TAKE 1 TABLET BY MOUTH DAILY FOR 30 DAYS  9     warfarin (COUMADIN/JANTOVEN) 3 MG tablet Take ONE tab (3mg) to TWO tabs (6mg) by mouth daily, as directed.  Adjust dose based on INR results.. 160 tablet 1     ferrous sulfate 325 (65 FE) MG tablet TAKE 1 TABLET BY MOUTH TWICE DAILY 60 tablet 4     fluticasone (FLONASE) 50 mcg/actuation nasal spray 1 spray into each nostril daily.       potassium chloride SA (K-DUR,KLOR-CON) 20 MEQ tablet TAKE 1 TABLET BY MOUTH DAILY. 90 tablet 1     No current facility-administered medications for this visit.       Objective:   Vital Signs:   Visit Vitals  /60   Pulse 68   Ht 5' 9.5\" (1.765 m)   Wt 194 lb (88 kg)   SpO2 98%   BMI 28.24 kg/m         VisionScreening:  No exam data present     PHYSICAL EXAM  On examination today his vital signs are as noted.    Eyes: Pupils are equal and conjunctiva are normal.    Ears nose and throat: External ears canals and tympanic membranes are normal.  Nose and throat are normal.    Neck: Supple with no masses and no neck vein distention.  No thyroid enlargement.    Lungs: Clear.    Cardiovascular: Heart is in a sinus rhythm today with a rate of 68 and no ectopy.  No new gallops or murmurs.  Carotid pulses are full with no bruits.  This morning he has 1+ edema in the lower extremities.    GI: Abdomen is soft and nontender with no distention.  No masses or organomegaly.    Musculoskeletal: Head and neck are normal to inspection with good range of motion.  Good strength and range of motion in all 4 extremities.    Neurologic: Cranial nerves are intact.    Psychiatric: The patient is alert and oriented x3.  Mood and affect are appropriate.    Assessment Results 1/16/2019   Activities of Daily Living No help needed "   Instrumental Activities of Daily Living No help needed   Get Up and Go Score Less than 12 seconds   Mini Cog Total Score 4   Some recent data might be hidden     A Mini-Cog score of 0-2 suggests the possibility of dementia, score of 3-5 suggests no dementia    Identified Health Risks:     Patient's advanced directive was discussed and I am comfortable with the patient's wishes.

## 2021-06-23 NOTE — TELEPHONE ENCOUNTER
Called and spoke with Bandar.     - ran out of test strips for his [place].     - will recheck INR (home monitor) when he gets strips in the next 3-5 days.     - had problems getting blood on the test strips.  Deferred for further education thru [facility]

## 2021-06-23 NOTE — TELEPHONE ENCOUNTER
Recommendations called to pt.  Updated rx sent to pharm.  Pt will call back if shortness of breath worsens, or edema doesn't get any better.  Pt verbalized understanding and had no questions.  -ridge    ----- Message -----  From: Aleksandr Carney MD  Sent: 2/11/2019   5:04 PM  To: Juanis Trivedi RN    Please increase Lasix from 20 to 40 mg daily.  Thanks  Hai

## 2021-06-23 NOTE — TELEPHONE ENCOUNTER
[facility]     INR date: 1/31    See attached document

## 2021-06-23 NOTE — TELEPHONE ENCOUNTER
Who is calling:  Patient Bandar  Reason for Call:  Bandar was returning Odette's call in regard to his INR. Attempted transfer, not available.  Please call Bandar back. Thanks.  Date of last appointment with primary care: [id number]  Has the patient been recently seen:  Yes  Okay to leave a detailed message: Yes

## 2021-06-23 NOTE — PATIENT INSTRUCTIONS - HE
Advance Directive  Patient s advance directive was discussed and I am comfortable with the patient s wishes.  Patient Education   Personalized Prevention Plan  You are due for the preventive services outlined below.  Your care team is available to assist you in scheduling these services.  If you have already completed any of these items, please share that information with your care team to update in your medical record.  Health Maintenance   Topic Date Due     COLONOSCOPY  08/20/1998     ZOSTER VACCINES (2 of 3) 12/13/2016     DEPRESSION FOLLOW UP  12/29/2018     FALL RISK ASSESSMENT  06/29/2019     TD 18+ HE  09/07/2022     ADVANCE DIRECTIVES DISCUSSED WITH PATIENT  01/04/2023     PNEUMOCOCCAL POLYSACCHARIDE VACCINE AGE 65 AND OVER  Completed     INFLUENZA VACCINE RULE BASED  Completed     PNEUMOCOCCAL CONJUGATE VACCINE FOR ADULTS (PCV13 OR PREVNAR)  Completed

## 2021-06-23 NOTE — TELEPHONE ENCOUNTER
Anticoagulation Annual Referral Renewal Review    Bandar Wells's chart reviewed for annual renewal of referral to anticoagulation monitoring.        Criteria for anticoagulation nurse and/or pharmacist renewal met   Warfarin indication: Atrial Fibrillation and Mechanical MVR Yes, per indication   Current with INR monitoring/compliant Yes Yes   Date of last office visit 10/15/2018 Yes, had office visit within last year   Time in Therapeutic Range (TTR) 60.73 % Yes, TTR > 60%       Bandar Wells met all criteria for anticoagulation management program initiated renewal.  New INR standing orders and anticoagulation referral renewal placed.      Meg Jacinto RN  9:13 AM

## 2021-06-23 NOTE — TELEPHONE ENCOUNTER
----- Message from Zaida Michael sent at 2/11/2019  1:33 PM CST -----  Contact: Patient  General phone call:    Caller: Patient  Primary cardiologist: RICHIE  Detailed reason for call: Bandar state that Cardiac Rehab told him he has Sx of CHF. And should be seen soon. What does  recommend? Please call patient.   New or active symptoms? yes  Best phone number:[phone]   Best time to contact: any  Ok to leave a detailed message? yes  Device? no

## 2021-06-23 NOTE — TELEPHONE ENCOUNTER
ANTICOAGULATION  MANAGEMENT    Assessment     Today's INR result of 4.70 is Supratherapeutic (goal INR of 2.5-3.5)        Warfarin taken as previously instructed    No new diet changes affecting INR    Potential interaction between Triamcinolone Acet cream  and warfarin which may affect subsequent INRs    Continues to tolerate warfarin with Yes reported s/s of bleeding.    Previous INR was Therapeutic at 2.60 on 1/16/19.  (last 5 consecutive INR's therapeutic).    Plan:     Spoke with Bandar regarding INR result and instructed:     Warfarin Dosing Instructions:  (has 3mg tabs).   - advised to HOLD warfarin dose tonight, then continue current warfarin dose 3 mg daily on Wed/Sat; and 6 mg daily rest of week.    Instructed patient to follow up no later than:  7-10 days.  Recommended to recheck in one wk.   - has home INR monitor and checks every 2 wks, thru [employer].    Education provided: target INR goal and significance of current INR result, potential interaction between warfarin and Triamcinolone Acet cream, importance of notifying clinic for changes in medications and monitoring for bleeding signs and symptoms    Bandar verbalizes understanding and agrees to warfarin dosing plan.    Instructed to call the [facility] for any changes, questions or concerns. (#[phone])   ?   Meg Jacinto RN    Subjective/Objective:      Bandar Wells, a 68 y.o. male is on warfarin.     Bandar reports:     Home warfarin dose: verbally confirmed home dose with Bandar and updated on anticoagulation calendar     Missed doses: No     Medication changes:  Yes: Bandar reported started 2 wks ago, Triamcinolone cream applying two times a day on legs, and now has resolved and will stop today.  (in the past, when Bandar used this cream, it did affect INR to supratherapeutic).   - per [employer], Concurrent use of TRIAMCINOLONE and WARFARIN may result in increased risk of bleeding or diminished effects of warfarin.        S/S of bleeding or  thromboembolism:  No     New Injury or illness:  Yes:  Had leg redness / edema of both lower legs.  However, after applying Triamcinolone cream two times a day, redness has improved.     Changes in diet or alcohol consumption:  No     Upcoming surgery, procedure or cardioversion:  No    Anticoagulation Episode Summary     Current INR goal:   2.5-3.5   TTR:   56.7 % (1.5 y)   Next INR check:   2/14/2019   INR from last check:   4.70! (1/31/2019)   Weekly max warfarin dose:      Target end date:   Indefinite   INR check location:      Preferred lab:      Send INR reminders to:   ANTICOAGULATION POOL C (DTN,VAD,CGR,GAV)    Indications    Paroxysmal atrial fibrillation (H) [I48.0]  S/P MVR (mitral valve replacement) [Z95.2]  S/P mitral valve replacement with metallic valve [Z95.4]           Comments:   INR TARGET GOAL 2.5 - 3.5         Anticoagulation Care Providers     Provider Role Specialty Phone number    Jin Bhat MD Referring Internal Medicine [phone]

## 2021-06-24 NOTE — TELEPHONE ENCOUNTER
ANTICOAGULATION  MANAGEMENT    Assessment     Today's INR result of 4.10 is Supratherapeutic (goal INR of 2.5-3.5)        Warfarin taken as previously instructed    No new diet changes affecting INR    Potential interaction between Demadex and warfarin which may affect subsequent INRs    Continues to tolerate warfarin with no reported s/s of bleeding or thromboembolism     Previous INR was Supratherapeutic at 4.70 on 1/31/19.    Plan:     Spoke with Bandar regarding INR result and instructed:     Warfarin Dosing Instructions:  (has 3mg tabs).   - advised one time lower dose with 3mg warfarin,   - then change warfarin dose to 3 mg daily on Mon/Wed/Sat; and 6 mg daily rest of week  (8/3 % change)    Instructed patient to follow up no later than:  1-2 wks.   - has home INR monitor.  Still awaiting test strips d/t to bought by new company.    Education provided: importance of consistent vitamin K intake, target INR goal and significance of current INR result, potential interaction between warfarin and Demadex and importance of notifying clinic for changes in medications    Bandar verbalizes understanding and agrees to warfarin dosing plan.    Instructed to call the [facility] for any changes, questions or concerns. (#[phone])   ?   Meg Jacinto RN    Subjective/Objective:      Bandar Wells, a 68 y.o. male is on warfarin.     Bandar reports:     Home warfarin dose: as updated on anticoagulation calendar per template     Missed doses: No     Medication changes:  Yes: 2/19/19 new RX for Metolazone (Demadex) 5mg daily 30 minutes prior to Lasix.   - On 2/11/19 per cardiologist Dr. Carney, advised to increase Lasix from 20mg to 40mg daily.   - per [employer], Concurrent use of TORSEMIDE and WARFARIN may result in increased warfarin plasma concentrations, decreased warfarin clearance, and elevated INR.        S/S of bleeding or thromboembolism:  No     New Injury or illness:  Yes: f/u today with Dr. Hampton - persistent  shortness of breath, legs/ feet swelling.   - On 2/11/19 reported -  fatigue, shortness of breath , legs / feet are more swollen   - WT in Oct 2018 was 184 lbs.  WT today 2/19/19 is up at 202 lbs.  (an increase of 18 lbs in 3 mos).     Changes in diet or alcohol consumption:  No     Upcoming surgery, procedure or cardioversion:  No    Anticoagulation Episode Summary     Current INR goal:   2.5-3.5   TTR:   54.8 % (1.6 y)   Next INR check:   3/5/2019   INR from last check:   4.10! (2/19/2019)   Weekly max warfarin dose:      Target end date:   Indefinite   INR check location:      Preferred lab:      Send INR reminders to:   ANTICOAGULATION POOL C (DTN,VAD,CGR,GAV)    Indications    Paroxysmal atrial fibrillation (H) [I48.0]  S/P MVR (mitral valve replacement) [Z95.2]  S/P mitral valve replacement with metallic valve [Z95.4]           Comments:   INR TARGET GOAL 2.5 - 3.5         Anticoagulation Care Providers     Provider Role Specialty Phone number    Jin Bhat MD Referring Internal Medicine [phone]

## 2021-06-24 NOTE — TELEPHONE ENCOUNTER
Received a faxed INR result for Bandar Wells  From [facility]  INR result dated 2/25/2019 is 2.80

## 2021-06-24 NOTE — TELEPHONE ENCOUNTER
ANTICOAGULATION  MANAGEMENT    Assessment     Today's INR result of 2.10 is Subtherapeutic (goal INR of 2.5-3.5)        Warfarin taken as previously instructed    No new diet changes affecting INR    Potential interaction between Furosemide / Metolazone / topical Triamcinolone cream and warfarin which may affect subsequent INRs    Continues to tolerate warfarin with no reported s/s of bleeding or thromboembolism     Previous INR was Therapeutic at 2.80 on 2/25/19.    Bandar reported feeling better and almost back to normal state.  Since 2/27/19, WT has dropped 10 lbs.  Breathing is better.  Edema is down significantly.    Plan:     Spoke with Bandar regarding INR result and instructed:     Warfarin Dosing Instructions:   (has 3mg tabs)   - just for tonight, advised one time booster with 9mg warfarin dose,    - then continue current warfarin dose 3 mg daily on Mon/Wed/Sat; and 6 mg daily rest of week.    Instructed patient to follow up no later than:  1-2 wks.  Advised to check in one wk.   - has home INR monitor.    Education provided: target INR goal and significance of current INR result, potential interaction between warfarin and Furosemide / Metolazone / topical Triamcinolone cream and importance of notifying clinic for changes in medications    Bandar verbalizes understanding and agrees to warfarin dosing plan.    Instructed to call the [facility] for any changes, questions or concerns. (#[phone])   ?   Meg Jacinto RN    Subjective/Objective:      Bandar Wells, a 68 y.o. male is on warfarin.     Bandar reports:     Home warfarin dose: verbally confirmed home dose with Bandar and updated on anticoagulation calendar     Missed doses: No     Medication changes:  Yes:  Reported today will resume usual Furosemide 40mg daily and STOP Metolazone.   - also reported using Topical Triamcinolone cream.   - On 2/27/19, Furosemide increased to 40mg two times a day and continue Metolazone.     S/S of bleeding or  thromboembolism:  No     New Injury or illness:  Yes:  Had f/u visit with Dr. Bhat on 2/27/19, r/t persistent dyspnea and edema, r/t CHF.     Changes in diet or alcohol consumption:  No     Upcoming surgery, procedure or cardioversion:  No    Anticoagulation Episode Summary     Current INR goal:   2.5-3.5   TTR:   54.6 % (1.6 y)   Next INR check:   3/15/2019   INR from last check:   2.10! (3/8/2019)   Weekly max warfarin dose:      Target end date:   Indefinite   INR check location:      Preferred lab:      Send INR reminders to:   ANTICOAGULATION POOL C (DTN,VAD,CGR,GAV)    Indications    Paroxysmal atrial fibrillation (H) [I48.0]  S/P MVR (mitral valve replacement) [Z95.2]  S/P mitral valve replacement with metallic valve [Z95.4]           Comments:   INR TARGET GOAL 2.5 - 3.5         Anticoagulation Care Providers     Provider Role Specialty Phone number    Jin Bhat MD Referring Internal Medicine [phone]

## 2021-06-24 NOTE — TELEPHONE ENCOUNTER
ANTICOAGULATION  MANAGEMENT    Assessment     Today's INR result of 2.80 is Therapeutic (goal INR of 2.5-3.5)        Warfarin taken as previously instructed    No new diet changes affecting INR    No new medication/supplements affecting INR    Continues to tolerate warfarin with no reported s/s of bleeding or thromboembolism     Previous INR was Supratherapeutic at 4.10 on 2/19/19.    Plan:     Spoke with Bandar regarding INR result and instructed:     Warfarin Dosing Instructions:  (has 3mg tabs).   - Continue current warfarin dose 3 mg daily on Mon/Wed/Sat; and 6 mg daily rest of week.    Instructed patient to follow up no later than:  1-2 wks.   - checks INR's q2 wks with Home INR monitor.    Education provided: importance of consistent vitamin K intake, target INR goal and significance of current INR result, no interaction anticipated between warfarin and Metolazone, Potassium supplement, and Iron tablets and importance of notifying clinic for changes in medications    Bandar verbalizes understanding and agrees to warfarin dosing plan.    Instructed to call the [facility] for any changes, questions or concerns. (#[phone])   ?   Meg Jacinto RN    Subjective/Objective:      Bandar Wells, a 68 y.o. male is on warfarin.     Bandar reports:     Home warfarin dose: verbally confirmed home dose with Bandar and updated on anticoagulation calendar     Missed doses: No     Medication changes:  Yes:  Since 2/19/19, started on Metolazone, Potassium supplement and Iron tablets.     S/S of bleeding or thromboembolism:  No     New Injury or illness:  No     Changes in diet or alcohol consumption:  No     Upcoming surgery, procedure or cardioversion:  No    Anticoagulation Episode Summary     Current INR goal:   2.5-3.5   TTR:   54.8 % (1.6 y)   Next INR check:   3/11/2019   INR from last check:   2.80 (2/25/2019)   Weekly max warfarin dose:      Target end date:   Indefinite   INR check location:      Preferred lab:       Send INR reminders to:   ANTICOAGULATION POOL C (DTN,VAD,CGR,GAV)    Indications    Paroxysmal atrial fibrillation (H) [I48.0]  S/P MVR (mitral valve replacement) [Z95.2]  S/P mitral valve replacement with metallic valve [Z95.4]           Comments:   INR TARGET GOAL 2.5 - 3.5         Anticoagulation Care Providers     Provider Role Specialty Phone number    Jin Bhat MD Referring Internal Medicine [phone]

## 2021-06-24 NOTE — PROGRESS NOTES
[facility] Follow Up Note    Bandar Wells   68 y.o. male    Date of Visit: 3/6/2019    Chief Complaint   Patient presents with     Follow-up     Subjective  This is a 68-year-old man who comes in for further follow-up in his congestive heart failure.  He had had some significant increase in edema and some shortness of breath over the past couple of weeks.  I would refer to the last 2 previous office notes for the details.  On his last visit we increased his furosemide to 80 mg daily and continued his metolazone.  Since then he has made some significant improvement.  He has dropped 7 pounds and fluid, his breathing is better.  His edema is down significantly.  He tells me that is feeling much better and almost back to normal.  No other new concerns.    ROS A comprehensive review of systems was performed and was otherwise negative    Medications, allergies, and problem list were reviewed and updated    Exam  General Appearance:   On examination his blood pressure is 122/60.  Weight is 193 pounds and height is 69.5 inches.  BMI is 28.09.    Heart rhythm is stable with a rate in the 60s and no ectopy.    Lungs are clear.    Definite decrease in peripheral edema in the lower extremities.    The patient is alert and oriented x3.      Assessment/Plan  1. Chronic congestive heart failure, unspecified heart failure type (H)     2. Lymphedema of both lower extremities     3. Medication management  Basic Metabolic Panel     Congestive heart failure.  He has improved.  We discussed medications going forward.  He will be leaving on a trip to [place] on Saturday and will be gone about 5 days.  I suggested he reduce his metolazone to every other day until he leaves and then stop it next week.  I also suggested that he continue on the 80 mg of furosemide through Thursday and then cut back to 40 mg daily until he returns.  I asked him to contact me a week from Friday when he returns with an update on his  situation.    Chronic lymphedema.  No change.    Because of medication management I would like to check a BMP.  Body Mass Index was not assessed due to Patient was in with acute medical issues..    Jin Bhat MD      Current Outpatient Medications on File Prior to Visit   Medication Sig     acetaminophen (TYLENOL) 500 MG tablet Take 500-1,000 mg by mouth every 6 (six) hours as needed for pain. Pain 1-5  Give 500 mg  Or pain 6-10 give 1000 mg every 6 hours ad needed not to exceed 4 grams in 24 hours     atorvastatin (LIPITOR) 40 MG tablet TAKE 1 TABLET (40 MG TOTAL) BY MOUTH AT BEDTIME.     b complex vitamins tablet Take 1 tablet by mouth daily.      cholecalciferol, vitamin D3, 1,000 unit tablet Take 1,000 Units by mouth daily.     clindamycin (CLEOCIN) 300 MG capsule Take 2 capsules (600 mg total) by mouth see administration instructions. Take prior to Dentist appointments     cyanocobalamin 1000 MCG tablet Take 1,000 mcg by mouth daily.     ferrous sulfate 325 (65 FE) MG tablet Take 1 tablet (325 mg total) by mouth 2 (two) times a day.     FLAXSEED OIL ORAL Take 1,000 mg by mouth daily.      fluticasone (FLONASE) 50 mcg/actuation nasal spray 1 spray into each nostril daily.     FOLIC ACID ORAL Take 1 tablet by mouth daily.     furosemide (LASIX) 20 MG tablet Take 2 tablets (40 mg total) by mouth daily.     L. ACIDOPHILUS/BIFIDO LONGUM (PROBIOTIC PEARLS ORAL) Take 1 capsule by mouth daily.     metOLazone (ZAROXOLYN) 5 MG tablet Take 1 tablet (5 mg total) by mouth daily. 30 minutes prior to furosemide     multivitamin therapeutic (THERAGRAN) tablet Take 1 tablet by mouth daily.     penicillin VK (PEN VK) 500 MG tablet TAKE 1 TABLET BY MOUTH DAILY FOR 30 DAYS     potassium chloride SA (K-DUR,KLOR-CON) 20 MEQ tablet TAKE 1 TABLET BY MOUTH DAILY.     triamcinolone (KENALOG) 0.1 % cream APPLY TOPICALLY TWICE A DAY AS NEEDED     warfarin (COUMADIN/JANTOVEN) 3 MG tablet Take ONE tab (3mg) to TWO tabs (6mg) by mouth  daily, as directed.  Adjust dose based on INR results..     No current facility-administered medications on file prior to visit.      No Known Allergies  Social History     Tobacco Use     Smoking status: Never Smoker     Smokeless tobacco: Never Used   Substance Use Topics     Alcohol use: Yes     Alcohol/week: 3.6 oz     Types: 5 Glasses of wine, 1 Cans of beer per week     Drug use: No

## 2021-06-24 NOTE — TELEPHONE ENCOUNTER
I did speak with him and explained the diagnoses and the lab tests.  He will be seeing me next week for follow-up.

## 2021-06-24 NOTE — TELEPHONE ENCOUNTER
Triage note:    68 year old male, with heart failure, called with concerns about recent lab values from yesterday.  He sent a "[employer] email today, too.    He saw Dr Hampton yesterday for bilateral leg edema and increased shortness of breath with exertion. He received the lab results via [employer] but there wasn't a provider interpretation with them. He is particularly concerns about the BNP.  He was looking it up to understand it and he sees it is very high.       Please provider patient lab result interpretation from 2/19/19 labs.   He is wondering if he needs to take any immediate action as a result of these labs? What does the BNP mean for his health now and future? Is there anything he can do anything about it now?       Jerrica Reyna RN, [facility] Refill/Triage, 2/20/2019 11:06 AM

## 2021-06-24 NOTE — TELEPHONE ENCOUNTER
Incoming fax from [employer] [facility] home monitoring     INR date: 3/8    See attached document

## 2021-06-24 NOTE — PROGRESS NOTES
Office Visit - Follow Up   Bandar Wells   68 y.o. male    Date of Visit: 2/19/2019    Chief Complaint   Patient presents with     Leg Swelling     Fatigue     Shortness of Breath        Assessment and Plan   1. Acute on chronic combined systolic and diastolic congestive heart failure (H)  Worsening bilateral lower extremity and increasing dyspnea with exertion consistent with acute on chronic congestive heart failure.  Unknown precipitating etiology.  Will obtain echocardiogram.  Checking appropriate labs.  Evaluate renal function.  In addition to increased dose of Lasix 40 mg daily, will add metolazone 5 mg daily to be taken 30 minutes prior to his loop diuretic.  Emphasized the importance of leg elevation.  He will continue pneumatic compression stockings and elastic stockings.  Follow-up in 1 week.  Recheck electrolytes and renal function at that time.  - Basic Metabolic Panel  - BNP(B-type Natriuretic Peptide)  - Hemoglobin  - Echo Complete; Future    2. Lymphedema of both lower extremities  Chronic lymphedema involving both lower extremities with significant worsening presumably related to acute on chronic congestive heart failure as above    3. S/P mitral valve replacement with metallic valve  History of mitral valve replacement with metallic valve.  Will obtain echocardiogram.  Last completed June 2018.    4. Paroxysmal atrial fibrillation (H)  Continues anticoagulation with warfarin.  Monitor INR.  Remains in normal sinus rhythm    Return in about 1 week (around 2/26/2019) for Recheck.     History of Present Illness   This 68 y.o. 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  He has  long-standing problems with bilateral lymphedema.  Has been evaluated at vascular clinic.  In addition to elastic stockings, he uses pneumatic compression stockings at home.  Chronic problem but stable over the last several months taking Lasix 20 mg daily.  Over the past 2 weeks, he has developed worsening edema involving both legs extending up above his knee.  He has gained over 10 pounds on his home scale.  He has felt himself more short of breath with exertion including a cardiac rehab.  Denies orthopnea or PND.  No chest pain or palpitations.  Denies any change in sodium intake.  He does not use NSAIDs.  Lasix was increased to 40 mg daily last week but has not seen significant improvement.  Additionally has not noticed any increase in urine output.  No other new medications.    Review of Systems:  Otherwise, a comprehensive review of systems was negative except as noted.     Medications, Allergies and Problem List   Patient Active Problem List   Diagnosis     Hx of bacterial endocarditis     Dyslipidemia     Mitral valve prolapse     S/P MVR (mitral valve replacement)     Recurrent pleural effusion on right     Pericardial effusion without cardiac tamponade     Status post Maze operation for atrial fibrillation     Paroxysmal atrial fibrillation (H)     Coronary artery disease     Acute diastolic heart failure (H)     CHF (congestive heart failure) (H)     Hypovolemia     Hypotension     Acute respiratory failure with hypoxia (H)     Acute blood loss anemia     Acute renal failure (H)     Dysthymia     Prosthetic valve endocarditis, subsequent encounter     S/P mitral valve replacement with metallic valve     Persistent atrial fibrillation (H)     Chronic combined systolic and diastolic congestive heart failure (H)     Acute on chronic combined systolic and diastolic congestive heart failure (H)     Lymphedema of both lower extremities       He has a past surgical history that includes PICC (9/3/2015); Cardiac  surgery; PICC (2/14/2017); Mitral valve replacement (2015); Nicholas maze microwave ablation; Eye surgery; Hernia repair (Right); REDO STERNOTOMY, REDO MITRAL VALVE REPLACEMENT, PLACEMENT TEMPORARY VENTRICULAR PACING WIRES, ANESTHESIA TRANSESOPHAGEAL ECHOCARDIOGRAM (N/A, 3/16/2017); RIGHT PERICARDIAL WINDOW, TALC PLEURODESIS,VIDEO ASSISTED THOROSCOPY,DRAINAGE OF BILATERAL PLEURAL EFFUSIONS (N/A, 8/31/2015); and MITRAL VALVE REPLACEMENT, MAZE PROCEDURE, CARDIOLOGY TRANSESOPHAGEAL ECHOCARDIOGRAM (N/A, 7/1/2015).    No Known Allergies    Current Outpatient Medications   Medication Sig Dispense Refill     acetaminophen (TYLENOL) 500 MG tablet Take 500-1,000 mg by mouth every 6 (six) hours as needed for pain. Pain 1-5  Give 500 mg  Or pain 6-10 give 1000 mg every 6 hours ad needed not to exceed 4 grams in 24 hours       atorvastatin (LIPITOR) 40 MG tablet TAKE 1 TABLET (40 MG TOTAL) BY MOUTH AT BEDTIME. 90 tablet 3     b complex vitamins tablet Take 1 tablet by mouth daily.        cholecalciferol, vitamin D3, 1,000 unit tablet Take 1,000 Units by mouth daily.       clindamycin (CLEOCIN) 300 MG capsule Take 2 capsules (600 mg total) by mouth see administration instructions. Take prior to Dentist appointments 2 capsule 3     cyanocobalamin 1000 MCG tablet Take 1,000 mcg by mouth daily.       ferrous sulfate 325 (65 FE) MG tablet TAKE 1 TABLET BY MOUTH TWICE DAILY 60 tablet 4     FLAXSEED OIL ORAL Take 1,000 mg by mouth daily.        fluticasone (FLONASE) 50 mcg/actuation nasal spray 1 spray into each nostril daily.       FOLIC ACID ORAL Take 1 tablet by mouth daily.       furosemide (LASIX) 20 MG tablet Take 2 tablets (40 mg total) by mouth daily. 180 tablet 3     L. ACIDOPHILUS/BIFIDO LONGUM (PROBIOTIC PEARLS ORAL) Take 1 capsule by mouth daily.       multivitamin therapeutic (THERAGRAN) tablet Take 1 tablet by mouth daily.       penicillin VK (PEN VK) 500 MG tablet TAKE 1 TABLET BY MOUTH DAILY FOR 30 DAYS  9     potassium  "chloride SA (K-DUR,KLOR-CON) 20 MEQ tablet TAKE 1 TABLET BY MOUTH DAILY. 90 tablet 1     warfarin (COUMADIN/JANTOVEN) 3 MG tablet Take ONE tab (3mg) to TWO tabs (6mg) by mouth daily, as directed.  Adjust dose based on INR results.. 160 tablet 1     metOLazone (ZAROXOLYN) 5 MG tablet Take 1 tablet (5 mg total) by mouth daily. 30 minutes prior to furosemide 30 tablet 11     No current facility-administered medications for this visit.         Physical Exam   General Appearance:   Well-appearing elderly male    /58 (Patient Site: Left Arm, Patient Position: Sitting, Cuff Size: Adult Large)   Pulse 63   Ht 5' 9.5\" (1.765 m)   Wt 202 lb (91.6 kg)   SpO2 96%   BMI 29.40 kg/m      HEENT: Normal  Respiratory: Normal respiratory effort.  Lungs are clear with no rales or wheezes.  Heart: Regular rate and rhythm with normal sounding mechanical valve  Extremities: Severe 4+ bilateral lower extremity edema extending up to his thighs with venous stasis dermatitis present  Neurologic: Grossly nonfocal  Skin: No cyanosis or pallor           Additional Information   Social History     Tobacco Use     Smoking status: Never Smoker     Smokeless tobacco: Never Used   Substance Use Topics     Alcohol use: Yes     Alcohol/week: 3.6 oz     Types: 5 Glasses of wine, 1 Cans of beer per week     Drug use: No         Review and/or order of clinical lab tests: Obtain BMP and BNP along with hemoglobin    Review and/or order of medicine tests: Schedule echocardiogram    Review and summarization of old records and/or obtaining history from someone other than the patient and.or discussion of case with another health care provider: Reviewed records including cardiology evaluation September 13, 2018.  Chronic combined systolic and diastolic congestive heart failure with EF 45% with history of mitral valve replacement.  Stable using Lasix 20 mg daily along with pneumatic compression stockings.      Time: total time spent with the patient " was 40 minutes of which >50% was spent in counseling and coordination of care     Robert Hampton MD

## 2021-06-24 NOTE — PROGRESS NOTES
[facility] Follow Up Note    Bandar Wells   68 y.o. male    Date of Visit: 2/27/2019    Chief Complaint   Patient presents with     Follow-up     saw Dr Hampton, had Echo     Subjective  This is a 68-year-old man with a complicated cardiovascular history.  He was seen last week by Dr. Robert Ayala because of some increased dyspnea with exertion and increasing edema.  He also has a extended history of chronic lymphedema.  He is on sequential compression stockings which he has used a couple of times a day with some minimal improvement.  He comes in today for follow-up.  Since last week he has lost only 2 pounds despite the adjustment in his diuretics.  There is no worsening of his breathing.  No chest pain.  I did review his lab work.  His hemoglobin is low with a low iron as it always is.  He has now resumed iron.  Kidney tests are about the same.  His echocardiogram actually looked is stable.    ROS A comprehensive review of systems was performed and was otherwise negative    Medications, allergies, and problem list were reviewed and updated    Exam  General Appearance:   On examination his blood pressure is 128/60.  Weight is 200 pounds and height is 69.5 inches.  BMI is 29.11.    Lungs are clear.    Heart rhythm is stable with a rate in the 60s and no ectopy.    No significant change in peripheral edema.    The patient is alert and oriented x3.      Assessment/Plan  1. Chronic combined systolic and diastolic congestive heart failure (H)  BNP(B-type Natriuretic Peptide)   2. Chronic congestive heart failure, unspecified heart failure type (H)     3. Lymphedema of both lower extremities     4. Medication management  Basic Metabolic Panel     Persistent dyspnea and edema.  This is consistent with his congestive heart failure.  We discussed options at this point and I would like to at least recheck his BNP.  We are going to increase his furosemide to 40 mg twice daily and continue the  metolazone.    Chronic lymphedema.  He will continue his current regimen.    We will check a BMP because of medication changes.  I would like to see him back in 1 week for follow-up.  Body Mass Index was not assessed due to Patient was in with an acute medical issue..    Jin Bhat MD      Current Outpatient Medications on File Prior to Visit   Medication Sig     acetaminophen (TYLENOL) 500 MG tablet Take 500-1,000 mg by mouth every 6 (six) hours as needed for pain. Pain 1-5  Give 500 mg  Or pain 6-10 give 1000 mg every 6 hours ad needed not to exceed 4 grams in 24 hours     atorvastatin (LIPITOR) 40 MG tablet TAKE 1 TABLET (40 MG TOTAL) BY MOUTH AT BEDTIME.     b complex vitamins tablet Take 1 tablet by mouth daily.      cholecalciferol, vitamin D3, 1,000 unit tablet Take 1,000 Units by mouth daily.     clindamycin (CLEOCIN) 300 MG capsule Take 2 capsules (600 mg total) by mouth see administration instructions. Take prior to Dentist appointments     cyanocobalamin 1000 MCG tablet Take 1,000 mcg by mouth daily.     ferrous sulfate 325 (65 FE) MG tablet Take 1 tablet (325 mg total) by mouth 2 (two) times a day.     FLAXSEED OIL ORAL Take 1,000 mg by mouth daily.      fluticasone (FLONASE) 50 mcg/actuation nasal spray 1 spray into each nostril daily.     FOLIC ACID ORAL Take 1 tablet by mouth daily.     furosemide (LASIX) 20 MG tablet Take 2 tablets (40 mg total) by mouth daily.     L. ACIDOPHILUS/BIFIDO LONGUM (PROBIOTIC PEARLS ORAL) Take 1 capsule by mouth daily.     metOLazone (ZAROXOLYN) 5 MG tablet Take 1 tablet (5 mg total) by mouth daily. 30 minutes prior to furosemide     multivitamin therapeutic (THERAGRAN) tablet Take 1 tablet by mouth daily.     penicillin VK (PEN VK) 500 MG tablet TAKE 1 TABLET BY MOUTH DAILY FOR 30 DAYS     potassium chloride SA (K-DUR,KLOR-CON) 20 MEQ tablet TAKE 1 TABLET BY MOUTH DAILY.     warfarin (COUMADIN/JANTOVEN) 3 MG tablet Take ONE tab (3mg) to TWO tabs (6mg) by mouth  daily, as directed.  Adjust dose based on INR results..     No current facility-administered medications on file prior to visit.      No Known Allergies  Social History     Tobacco Use     Smoking status: Never Smoker     Smokeless tobacco: Never Used   Substance Use Topics     Alcohol use: Yes     Alcohol/week: 3.6 oz     Types: 5 Glasses of wine, 1 Cans of beer per week     Drug use: No

## 2021-06-24 NOTE — TELEPHONE ENCOUNTER
Spoke with the patient and he states that he has been scheduled for an appointment tomorrow to see Dr. Hampton at 10 am.  He had no further questions at this time.    Elyssa HERNANDEZ CMA/BAHMAN....................4:46 PM

## 2021-06-24 NOTE — TELEPHONE ENCOUNTER
Call from pt       [facility] directed him to call      CC:  Ongoing bilateral leg swelling     > Now at 40 lasix / day (incr. 1 wk ago from 20/day) - more urine output since then with new dose     >Swelling largely above the knees   >SOB only with exertion   > Uses Seq. Compr. device  two times a day on his legs     > Able to walk and move - just some movements are more difficult (in and out of car, stairs)    > May have gained about 10# over 2 wks            A/P   Continue with previously directed at home care for this   > Legs up as much as possible   > Con't with compression devices as directed   > Appt to set him up for eval    > To ED iof any acute shortness of breath, chest pain or worsening sx        Raphael Maxwell, RN   Triage and Medication Refills            Reason for Disposition    Difficulty breathing with exertion AND worsening or new onset    Protocols used: LEG SWELLING AND EDEMA-A-OH

## 2021-06-25 NOTE — TELEPHONE ENCOUNTER
Received a faxed INR result for Bandar Wells  From [facility]  INR result dated 6/7/2021 is 2.30

## 2021-06-25 NOTE — TELEPHONE ENCOUNTER
Who is calling:  Patient  Reason for Call:  The patient is returning a call to anticoagulation. Please return his call at the number provided.  Okay to leave a detailed message: Yes

## 2021-06-25 NOTE — TELEPHONE ENCOUNTER
ANTICOAGULATION  MANAGEMENT PROGRAM    Bandar Wells is overdue for INR check.     Spoke with Bandar. Instructed to test INR with home meter and call results in to home monitoring company as soon as possible.     - he tested today and already called it in to [employer] - 2.30      Meg Jacinto RN

## 2021-06-25 NOTE — TELEPHONE ENCOUNTER
Date: 6/3/2021 Status: [facility]   Time: 2:40 PM Length: 20   Visit Type: OFFICE VISIT [[id number]] Copay: $0.00   Provider: Ronn Ordonez MD

## 2021-06-25 NOTE — PROGRESS NOTES
Progress Notes by Aleksandr Carney MD at 5/16/2017  9:30 AM     Author: Aleksandr Carney MD Service: -- Author Type: Physician    Filed: 5/16/2017 10:09 AM Encounter Date: 5/16/2017 Status: Signed    : Aleksandr Carney MD (Physician)           Click to link to [facility] HEART [facility] NOTE       Assessment/Plan:   1.  S/p [place]ector 31 mm  mitral valve replacement , secondary to endocarditis:   The patient recovered well from his surgery.  His symptoms were resolved.  He completed IV antibiotic treatment.  Currently he is on penicillin 500 mg daily.  Continue warfarin, follow-up with warfarin clinic.  Recent INR was 3.0.    2. Mild coronary artery disease: 30% stenosis in RCA. No chest pain.        3. Persistent atrial fibrillation: He had MAZE procedure and AN amputation.  Metoprolol was discontinued due to bradycardia.  Holter monitor indicated atrial fibrillation with controlled ventricular rate, average is 70 bpm.  Continue to monitor it.    4. Hyperlipidemia: He is on Lipitor 40 mg daily.  Recheck liver function in the hospital.    5.  Chronic combined systolic and diastolic congestive heart failure, LVEF of 40-45%: He is compensated well.  No indication of for fluid retention.  Reduced Lasix to 20 mg daily.  Continue to monitor his fluid status.    Thank you for the opportunity to be involving in the care of Mr. Bandar Wells.  If you have any questions please feel free to contact me.  I have you see him again in 3 months.  This note has been dictated using voice recognition software. Any grammatical or context distortions are unintentional and inherent to the software.     History of Present Illness:   It is my pleasure to see Bandar Wells at the [facility] clinic for routine cardiology follow-up post hospital discharge. Bandar Wells is a 66 y.o. male with a medical history of bacterial endocarditis, severe mitral valve prolapse with chordae rupture, s/p [place] Hector porcine  tissue mitral valve replacement on 7-1-2015, with bioprosthetic mitral valve endocarditis again, status post [place] Hector 31 mm  mitral valve replacement on March 15, 2017, dyslipidemia, and persistent atrial fibrillation s/p MAZE procedure.     The patient states that he has been doing gradually better post her surgery.  He said he recovered faster than last time from his surgery.  His symptoms including fever, chills, coughing, shortness of breath were resolved.  He is doing cardiac rehab without difficulty.  His Holter monitor was reported persistent atrial fibrillation with junctional rhythm.  Average heart rate was 70 bpm, no long pauses.    The patient has no chest pain, palpitations, dizziness, orthopnea PND or leg swelling.  His blood pressure and heart rate are in normal range.  He has follow-up with warfarin clinic, last INR was 3.0.    Past Medical History:     Patient Active Problem List   Diagnosis   ? Hx of bacterial endocarditis   ? Dyslipidemia   ? Mitral valve prolapse   ? S/P MVR (mitral valve replacement)   ? Recurrent pleural effusion on right   ? Pericardial effusion without cardiac tamponade   ? Status post Maze operation for atrial fibrillation   ? Paroxysmal atrial fibrillation   ? Coronary artery disease   ? Acute diastolic heart failure   ? CHF (congestive heart failure)   ? Hypovolemia   ? Hypotension   ? Acute respiratory failure with hypoxia   ? Acute blood loss anemia   ? Acute renal failure   ? Dysthymia   ? Prosthetic valve endocarditis, subsequent encounter   ? S/P mitral valve replacement with metallic valve       Past Surgical History:     Past Surgical History:   Procedure Laterality Date   ? CARDIAC SURGERY     ? CHG X-RAY PELVIS 3+ VW N/A 7/1/2015    Procedure: MITRAL VALVE REPLACEMENT, MAZE PROCEDURE, CARDIOLOGY TRANSESOPHAGEAL ECHOCARDIOGRAM;  Surgeon: Rm Munoz MD;  Location: [facility];  Service:    ? LANG-MAZE MICROWAVE ABLATION     ? EYE SURGERY       Retial hole treatment   ? HERNIA REPAIR Right     inguinal   ? MITRAL VALVE REPLACEMENT N/A 3/16/2017    Procedure: REDO STERNOTOMY, REDO MITRAL VALVE REPLACEMENT, PLACEMENT TEMPORARY VENTRICULAR PACING WIRES, ANESTHESIA TRANSESOPHAGEAL ECHOCARDIOGRAM;  Surgeon: Raphael Rosenberg MD;  Location: [facility];  Service:    ? MITRAL VALVE REPLACEMENT  2015    Bioprosthetic   ? PERICARDIAL WINDOW N/A 8/31/2015    Procedure: RIGHT PERICARDIAL WINDOW, TALC PLEURODESIS,VIDEO ASSISTED THOROSCOPY,DRAINAGE OF BILATERAL PLEURAL EFFUSIONS;  Surgeon: Rm Munoz MD;  Location: [facility];  Service:    ? PICC  9/3/2015        ? PICC  2/14/2017            Family History:     Family History   Problem Relation Age of Onset   ? Atrial fibrillation Mother    ? Heart failure Mother    ? Emphysema Father    ? Heart failure Father    ? Kidney failure Sister      S/P Renal transplant   ? Alcoholism Brother    ? No Medical Problems Son    ? No Medical Problems Brother     Reviewed, no family history of coronary artery disease, cardiac arrhythmia or sudden cardiac death.    Social History:    reports that he has never smoked. He does not have any smokeless tobacco history on file. He reports that he drinks about 3.6 oz of alcohol per week  He reports that he does not use illicit drugs.    Review of Systems:   General: WNL  Eyes: WNL  Ears/Nose/Throat: WNL  Lungs: WNL  Heart: WNL  Stomach: WNL  Bladder: WNL  Muscle/Joints: WNL  Skin: WNL  Nervous System: WNL  Mental Health: WNL     Blood: WNL    Meds:     Current Outpatient Prescriptions:   ?  acetaminophen (TYLENOL) 500 MG tablet, Take 500-1,000 mg by mouth every 6 (six) hours as needed for pain. Pain 1-5  Give 500 mg  Or pain 6-10 give 1000 mg every 6 hours ad needed not to exceed 4 grams in 24 hours, Disp: , Rfl:   ?  atorvastatin (LIPITOR) 40 MG tablet, Take 40 mg by mouth bedtime. , Disp: , Rfl:   ?  b complex vitamins tablet, Take 1 tablet by mouth  "daily. , Disp: , Rfl:   ?  cholecalciferol, vitamin D3, 1,000 unit tablet, Take 1,000 Units by mouth daily., Disp: , Rfl:   ?  clindamycin (CLEOCIN) 300 MG capsule, Take 600 mg by mouth see administration instructions. Take prior to Dentist appointments, Disp: , Rfl:   ?  cyanocobalamin 1000 MCG tablet, Take 1,000 mcg by mouth daily., Disp: , Rfl:   ?  ferrous sulfate 325 (65 FE) MG tablet, Take 1 tablet by mouth 2 (two) times a day., Disp: , Rfl:   ?  FLAXSEED OIL ORAL, Take 1,000 mg by mouth daily. , Disp: , Rfl:   ?  FOLIC ACID ORAL, Take 1 tablet by mouth daily., Disp: , Rfl:   ?  furosemide (LASIX) 20 MG tablet, Take 1 tablet (20 mg total) by mouth every other day., Disp: 30 tablet, Rfl: 11  ?  L. ACIDOPHILUS/BIFIDO LONGUM (PROBIOTIC PEARLS ORAL), Take 1 capsule by mouth daily., Disp: , Rfl:   ?  multivitamin therapeutic (THERAGRAN) tablet, Take 1 tablet by mouth daily., Disp: , Rfl:   ?  oxymetazoline (AFRIN) 0.05 % nasal spray, Apply 2-3 sprays into each nostril 2 (two) times a day as needed. Separate doses by at least 10-12 hours., Disp: , Rfl:   ?  traZODone (DESYREL) 50 MG tablet, Take 50 mg by mouth at bedtime as needed for sleep., Disp: , Rfl:   ?  warfarin (COUMADIN) 6 MG tablet, Take 6 mg by mouth daily except for 3 mg on Tues and Thursday. Adjust dose based on INR results as directed., Disp: 30 tablet, Rfl: 1    Allergies:   Amoxicillin      Objective:      Physical Exam  168 lb (76.2 kg)  5' 10\" (1.778 m)  Body mass index is 24.11 kg/(m^2).  /64 (Patient Site: Right Arm, Patient Position: Sitting, Cuff Size: Adult Regular)  Pulse (!) 54  Resp 16  Ht 5' 10\" (1.778 m)  Wt 168 lb (76.2 kg)  BMI 24.11 kg/m2    General Appearance:   Awake, Alert, No acute distress.   HEENT:  Pupil equal and reactive to light. No scleral icterus; the mucous membranes were moist.   Neck: No cervical bruits. No JVD. No thyromegaly.     Chest: The spine was straight. The chest was symmetric.   Lungs:   Respirations " unlabored; Lungs are clear to auscultation. No crackles. No wheezing.   Cardiovascular:   Regular rhythm and rate, normal first and second heart sounds with no murmurs. Mechanical click. No rubs or gallops.    Abdomen:  Soft. No tenderness. Non-distended. Bowels sounds are present   Extremities: Equal tibial pulses. No leg edema.   Skin: No rashes or ulcers. Warm, Dry.   Musculoskeletal: No tenderness. No deformity.   Neurologic: Mood and affect are appropriate. No focal deficits.         EKG: Personally reviewed  Atrial fibrillation   T wave abnormality, consider anterior ischemia or digitalis effect   Prolonged QT   Abnormal ECG   When compared with ECG of 17-MAR-2017 08:16,   ST no longer depressed in Anterior leads   Nonspecific T wave abnormality no longer evident in Inferior leads   QT has lengthened     Cardiac Imaging Studies  Holter on 5-8-2017:  CONCLUSION: Probably persistent atrial fibrillation with a regular rhythm at  time suggesting accelerated junctional rhythm superimposed in atrial fibrillation.   Periods of sinus rhythm with first-degree AV block cannot be excluded but atrial  fibrillation and coexistent junctional rhythm seems more likely.    ECHO on 3-4-2017:    Left ventricle ejection fraction is moderately decreased. The calculated left ventricular ejection fraction is 42%.    Mitral Valve: There is a porcine bioprosthetic mitral valve. The prosthetic valve is abnormal. The prosthetic valve has the following abnormalities: vegetation. Mobile structure associated with one strut of the bioprosthesis. It is associated with the struck closest to the left ventricular outflow tract. Vegetation is suspected but cannot exclude the possibility of retained chordal structure. Severe calcific stenosis. Mild regurgitation. Vegetation present. Suspected vegetation.    Tricuspid Valve: Mobile structure on the right ventricular side of the septal leaflet of the tricuspid valve. This is consistent with mobile  cord or vegetation. It is 2.23 cm in length.Trace tricuspid valve regurgitation. Mild pulmonary hypertension present. The estimated systolic pulmonary artery pressure is 55 mmhg.    Right Ventricle: The right ventricle is mildly dilated. The systolic function is mildly reduced. TAPSE is abnormal, which is consistent with abnormal right ventricular systolic function.    When compared to the previous study dated 2/15/2017, there are changes noted. The right ventricular systolic function appears to be diminished. The mobile density associated with the septal leaflet of the tricuspid valve is more prominent, and mobile density associated with the prosthesis in the mitral position is more prominent..    BETI on 2-:    Left ventricle ejection fraction is normal. The estimated left ventricular ejection fraction is 60%.    There is a bioprosthetic mitral valve. There is marked thickening of both leaflets of the mitral valve. Mobile echodensity is noted near the junction of the sewing ring and the anterior leaflet of the mitral valve likely consistent with vegetation. There is also a mobile echodensity noted on the ventricular surface of the sewing ring which may represent vegetation or redundant chordal tissue. Reduced mitral valve opening noted with a mean gradient of 18 mmHg. No regurgitation.    Moderate left atrial enlargement with spontaneous contrast.    Trace tricuspid regurgitation.    Coronary angiogram on 6-  Right dominant coronary arterial system.  The right coronary artery exhibits mild disease in the proximal segment.    Lab Review   Lab Results   Component Value Date     05/01/2017    K 3.9 05/01/2017     05/01/2017    CO2 22 05/01/2017    BUN 30 (H) 05/01/2017    CREATININE 1.09 05/01/2017    CALCIUM 10.0 05/01/2017     Lab Results   Component Value Date    WBC 6.2 05/01/2017    WBC 7.3 09/07/2015    HGB 9.7 (L) 05/01/2017    HCT 30.9 (L) 05/01/2017    MCV 92 05/01/2017      05/01/2017     Lab Results   Component Value Date    CHOL 197 10/18/2016    CHOL 142 07/28/2016    CHOL 261 (H) 04/06/2015     Lab Results   Component Value Date    HDL 62 10/18/2016    HDL 40 07/28/2016    HDL 32 (L) 04/06/2015     Lab Results   Component Value Date    LDLCALC 109 10/18/2016    LDLCALC 87 07/28/2016    LDLCALC 208 (H) 04/06/2015     Lab Results   Component Value Date    TRIG 130 10/18/2016    TRIG 74 07/28/2016    TRIG 106 04/06/2015     Lab Results   Component Value Date    TROPONINI 0.06 03/03/2017     Lab Results   Component Value Date     (H) 03/06/2017     Lab Results   Component Value Date    TSH 1.91 02/09/2017

## 2021-06-25 NOTE — TELEPHONE ENCOUNTER
Incoming fax from [employer] [facility] home monitoring     INR date: 3/19    See attached document

## 2021-06-25 NOTE — TELEPHONE ENCOUNTER
Controlled Substance Refill Request  Medication Name:   Requested Prescriptions     Pending Prescriptions Disp Refills     oxyCODONE (ROXICODONE) 10 mg immediate release tablet [Pharmacy Med Name: OXYCODONE HCL 10 MG TABLET 10 Tablet] 42 tablet 0     Sig: TAKE A HALF TABLET TO ONE TABLET (5-10 MG) BY MOUTH EVERY FOUR HOURS AS NEEDED FOR PAIN.     Date Last Fill: 5/25/21  Requested Pharmacy: [facility]  Submit electronically to pharmacy  Controlled Substance Agreement on file:   Encounter-Level CSA Scan Date:    There are no encounter-level csa scan date.        Last office visit:  4/15/21  Fifi Mccarthy RN, MA  [facility]    Triage Nurse Advisor

## 2021-06-25 NOTE — TELEPHONE ENCOUNTER
ANTICOAGULATION  MANAGEMENT-home monitor result    Assessment     Today's INR result of 2.1 is Subtherapeutic (goal INR of 2.5-3.5)        Warfarin taken differently than instructed, but no impact to total weekly dose    No new diet changes affecting INR    No new medication/supplements affecting INR    Continues to tolerate warfarin with no reported s/s of bleeding or thromboembolism     Previous INR was Subtherapeutic    Plan:     Spoke with Bandar regarding INR result and instructed:     Warfarin Dosing Instructions:  Change warfarin dose to    3 mg every Mon, Thu; 6 mg all other days       (9 % change). Did not recommend a boost dose today as patient got confused with his dosing 2 weeks ago and wrote it down wrong.     Instructed patient to follow up no later than: 2 weeks with home monitor    Education provided: importance of consistent vitamin K intake, importance of therapeutic range, target INR goal and significance of current INR result and monitoring for clotting signs and symptoms    Bandar verbalizes understanding and agrees to warfarin dosing plan.    Instructed to call the [facility] for any changes, questions or concerns. (#[phone])   ?   Christel La RN    Subjective/Objective:      Bandar Wells, a 68 y.o. male is on warfarin.     Bandar reports:     Home warfarin dose: verbally confirmed home dose with Bandar and updated on anticoagulation calendar.     Missed doses: No     Medication changes:  No     S/S of bleeding or thromboembolism:  No     New Injury or illness:  No     Changes in diet or alcohol consumption:  No     Upcoming surgery, procedure or cardioversion:  No    Anticoagulation Episode Summary     Current INR goal:   2.5-3.5   TTR:   53.6 % (1.6 y)   Next INR check:   4/2/2019   INR from last check:   2.10! (3/19/2019)   Weekly max warfarin dose:      Target end date:   Indefinite   INR check location:      Preferred lab:      Send INR reminders to:   ANTICOAGULATION POOL C  (DTN,VAD,CGR,GAV)    Indications    Paroxysmal atrial fibrillation (H) [I48.0]  S/P MVR (mitral valve replacement) [Z95.2]  S/P mitral valve replacement with metallic valve [Z95.4]           Comments:   INR TARGET GOAL 2.5 - 3.5         Anticoagulation Care Providers     Provider Role Specialty Phone number    Jin Bhat MD Referring Internal Medicine [phone]         [No Acute Distress] : no acute distress [Alert] : alert [Clear tympanic membranes with bony landmarks and light reflex present bilaterally] : clear tympanic membranes with bony landmarks and light reflex present bilaterally  [Pink Nasal Mucosa] : pink nasal mucosa [Normocephalic] : normocephalic [EOMI Bilateral] : EOMI bilateral [Supple, full passive range of motion] : supple, full passive range of motion [Nonerythematous Oropharynx] : nonerythematous oropharynx [No Palpable Masses] : no palpable masses [Regular Rate and Rhythm] : regular rate and rhythm [Clear to Auscultation Bilaterally] : clear to auscultation bilaterally [No Murmurs] : no murmurs [Normal S1, S2 audible] : normal S1, S2 audible [Soft] : soft [NonTender] : non tender [+2 Femoral Pulses] : +2 femoral pulses [Normoactive Bowel Sounds] : normoactive bowel sounds [Non Distended] : non distended [No Abnormal Lymph Nodes Palpated] : no abnormal lymph nodes palpated [No Hepatomegaly] : no hepatomegaly [No Splenomegaly] : no splenomegaly [No Gait Asymmetry] : no gait asymmetry [No pain or deformities with palpation of bone, muscles, joints] : no pain or deformities with palpation of bone, muscles, joints [Normal Muscle Tone] : normal muscle tone [Straight] : straight [+2 Patella DTR] : +2 patella DTR [Cranial Nerves Grossly Intact] : cranial nerves grossly intact [No Rash or Lesions] : no rash or lesions

## 2021-06-25 NOTE — PROGRESS NOTES
Progress Notes by Aleksandr Carney MD at 2/9/2017  8:50 AM     Author: Aleksandr Carney MD Service: -- Author Type: Physician    Filed: 2/9/2017 10:25 AM Encounter Date: 2/9/2017 Status: Signed    : Aleksandr Carney MD (Physician)           Click to link to [facility] HEART [facility] NOTE       Assessment/Plan:   1.  Fever or chills, fatigue, weight loss: The patient has no symptoms for last 3 weeks, gradually getting worse.  The patient states that he had a similar symptoms at the previous bacterial endocarditis.  The patient was not able to sleep due to severe cough.  Physical examination found crackers in right lung.  I think the patient should be evaluated to rule out to recurrent bacterial endocarditis, pneumonia, etc.  There is no hospital bed available now.  The patient complains of chills in the clinic.  Talked to ER physician and send the patient to ER for evaluation and possible admission.  Blood cultures, ECHO, routine labs, CXR for initial evaluation are recommended.    2. S/p porcine bioprosthetic mitral valve replacement, secondary to endocarditis: Echocardiogram for evaluation of possible recurrent bacterial endocarditis.    3. Mild coronary artery disease: 30% stenosis in RCA. No chest pain.        4. Persistent atrial fibrillation: He had MAZE procedure and AN amputation.  EKG indicated atrial flutter 2-1 conduction with slightly elevated ventricular rate.  Calcium channel blocker and beta blocker as indicated.  Continue anticoagulation Eliquis.    5. Hyperlipidemia: He is on Lipitor 40 mg daily.  Recheck liver function in the hospital.    6. History of pericardial effusion: s/p pericardial window.  Echocardiogram.    7. History of bilateral pleural effusion: S/p bilateral VATs pleurodesis with talcum powder.     The patient is transferred to ER evaluation as mentioned above. Cardiology service will continue to follow up with the patient if he is admitted.     History of Present  Illness:   It is my pleasure to see Bandar Wells at the [facility] clinic for complaining of fatigue, fever or chills, cough for 3 weeks. Bandar Wells is a 66 y.o. male with a medical history of bacterial endocarditis, severe mitral valve prolapse with chordae rupture, s/p St Hector porcine tissue mitral valve replacement on 7-1-2015, dyslipidemia, and persistent atrial fibrillation s/p MAZE procedure. The patient was complicated with pericardial effusion s/p pericardial window. Bilateral pleural effusion s/p bilateral VATs pleurodesis with talcum powder.    The patient states that he was doing fine until the mid of January 2017.  Since then, he started to have fatigue, chills, fever, sweat after taking Tylenol, cough, worsening shortness of breath.  His symptoms have been gradually getting worse over last 3 weeks.  He has no chest pain.  Has lightheadedness, occasional palpitations.  He noticed his heart rate has been elevated over last 3 weeks.  He has no orthopnea or PND or leg edema.  The patient has no skin rash.  He has no distal extremity discolor.  He lost at least 5 pounds over last 3 weeks.    His ECG in clinic today showed atrial flutter 2-1 conduction, ventricular rate 106 bpm, nonspecific ST-T change.  His blood pressure is stable.    Past Medical History:     Patient Active Problem List   Diagnosis   ? Hx of bacterial endocarditis   ? Dyslipidemia   ? Abnormal liver function tests   ? Mitral valve prolapse   ? S/P MVR (mitral valve replacement)   ? Post-op bleeding   ? Acute postoperative respiratory insufficiency   ? Hives   ? Recurrent pleural effusion on right   ? Pericardial effusion without cardiac tamponade   ? Atrial fibrillation with RVR   ? Status post Maze operation for atrial fibrillation   ? Pericardial effusion   ? Paroxysmal atrial fibrillation   ? Coronary artery disease   ? Persistent atrial fibrillation       Past Surgical History:     Past Surgical History:   Procedure  Laterality Date   ? CHG X-RAY PELVIS 3+ VW N/A 7/1/2015    Procedure: MITRAL VALVE REPLACEMENT, MAZE PROCEDURE, CARDIOLOGY TRANSESOPHAGEAL ECHOCARDIOGRAM;  Surgeon: Rm Munoz MD;  Location: [facility];  Service:    ? EYE SURGERY     ? HERNIA REPAIR Right     inguinal   ? PERICARDIAL WINDOW N/A 8/31/2015    Procedure: RIGHT PERICARDIAL WINDOW, TALC PLEURODESIS,VIDEO ASSISTED THOROSCOPY,DRAINAGE OF BILATERAL PLEURAL EFFUSIONS;  Surgeon: Rm Munoz MD;  Location: [facility];  Service:    ? PICC  9/3/2015            Family History:     Family History   Problem Relation Age of Onset   ? Atrial fibrillation Mother    ? Heart failure Mother        Social History:    reports that he has never smoked. He does not have any smokeless tobacco history on file. He reports that he drinks about 3.0 oz of alcohol per week  He reports that he does not use illicit drugs.    Review of Systems:   General: Fever, Night Sweats, Weight Loss  Eyes: WNL  Ears/Nose/Throat: WNL  Lungs: Cough, Shortness of Breath  Heart: Shortness of Breath with activity  Stomach: WNL  Bladder: Frequent Urination at Night  Muscle/Joints: Muscle Weakness  Skin: WNL  Nervous System: WNL  Mental Health: Anxiety     Blood: WNL    Meds:     Current Outpatient Prescriptions:   ?  apixaban (ELIQUIS) 5 mg Tab tablet, Take 1 tablet (5 mg total) by mouth 2 (two) times a day., Disp: 60 tablet, Rfl: 11  ?  atorvastatin (LIPITOR) 40 MG tablet, Take 1 tablet by mouth bedtime., Disp: , Rfl:   ?  b complex vitamins tablet, Take 1 tablet by mouth every morning. , Disp: , Rfl:   ?  cholecalciferol, vitamin D3, 1,000 unit tablet, Take 1,000 Units by mouth daily., Disp: , Rfl:   ?  clindamycin (CLEOCIN) 300 MG capsule, Take prior to Dentist appointments, Disp: , Rfl:   ?  coenzyme Q10 10 mg capsule, , Disp: , Rfl:   ?  cyanocobalamin 1000 MCG tablet, Take 1,000 mcg by mouth daily., Disp: , Rfl:   ?  diltiazem (CARDIZEM CD) 180 MG 24  "hr capsule, Take 1 capsule (180 mg total) by mouth daily., Disp: 30 capsule, Rfl: 11  ?  docoshexanoic acid-eicosapent 500 mg (FISH OIL) 500-100 mg cap capsule, Take 500 mg by mouth daily., Disp: , Rfl:   ?  ferrous sulfate 325 (65 FE) MG tablet, Take 1 tablet by mouth 2 (two) times a day., Disp: , Rfl:   ?  FLAXSEED OIL ORAL, Take 1,000 mg by mouth daily. , Disp: , Rfl:   ?  folic acid (FOLVITE) 1 MG tablet, Take 1 mg by mouth daily., Disp: , Rfl:   ?  furosemide (LASIX) 20 MG tablet, Take 1 tablet (20 mg total) by mouth daily., Disp: 30 tablet, Rfl: 3  ?  MULTIVITAMIN (MULTIPLE VITAMINS DAILY ORAL), Take 1 tablet by mouth daily., Disp: , Rfl:   ?  potassium chloride SA (K-DUR,KLOR-CON) 20 MEQ tablet, TAKE 1 TABLET BY MOUTH DAILY., Disp: 90 tablet, Rfl: 1  ?  VITAMIN B COMPLEX (B COMPLEX VITAMINS ORAL), , Disp: , Rfl:     Allergies:   Amoxicillin      Objective:      Physical Exam  180 lb 9.6 oz (81.9 kg)  5' 10.75\" (1.797 m)  Body mass index is 25.37 kg/(m^2).  Visit Vitals   ? /70 (Patient Site: Left Arm, Patient Position: Sitting, Cuff Size: Adult Regular)   ? Pulse (!) 105   ? Ht 5' 10.75\" (1.797 m)   ? Wt 180 lb 9.6 oz (81.9 kg)   ? SpO2 97%   ? BMI 25.37 kg/m2       General Appearance:   Awake, Alert, No acute distress.   HEENT:  Pupil equal and reactive to light. No scleral icterus; the mucous membranes were moist.   Neck: No cervical bruits. No JVD. No thyromegaly.     Chest: The spine was straight. The chest was symmetric.   Lungs:   Some crackles in right base. No wheezing.   Cardiovascular:   Irregular rhythm and tachycardiac, normal first and second heart sounds with no murmurs. No rubs or gallops.    Abdomen:  Soft. No tenderness. Non-distended. Bowels sounds are present   Extremities: Equal tibial pulses. No leg edema.   Skin: No rashes or ulcers. Warm, Dry.   Musculoskeletal: No tenderness. No deformity.   Neurologic: Mood and affect are appropriate. No focal deficits.         EKG: Personally " reviewed  Atrial flutter 2:1 conduction with RVR  ST abnormality, nonspecific  Abnormal ECG  When compared with ECG of 08-JAN-2016 11:33,  Nonspecific T wave abnormality no longer evident in Inferior leads  Nonspecific T wave abnormality now evident in Anterior leads  QT has lengthened    ECHO on 10/6/2015:  Summary  Uncontrollable gagging provoked by viscous lidocaine; remainder of  procedure completed without incident.  Sinus rhythm at 94 beats per minute.  Left ventricular ejection fraction is visually estimated to be 60 %.  No vegetation seen on the mitral valve. sutures well visualized, with no  paravalvular leak.  Shaggy appearing remnant chordal apparatus is visualized to extend from  both papillary muscles to the mitral valve ring. This appears unchanged  from images from 8/31/2015.  NO evidence of endocarditis.    Lab Review   Lab Results   Component Value Date     10/18/2016    K 4.0 10/18/2016     10/18/2016    CO2 25 10/18/2016    BUN 18 10/18/2016    CREATININE 1.18 10/18/2016    CALCIUM 10.2 10/18/2016     Lab Results   Component Value Date    WBC 4.8 01/08/2016    WBC 7.3 09/07/2015    HGB 9.9 (L) 01/08/2016    HCT 29.8 (L) 01/08/2016    MCV 95 01/08/2016     (L) 01/08/2016     Lab Results   Component Value Date    CHOL 197 10/18/2016    CHOL 142 07/28/2016    CHOL 261 (H) 04/06/2015     Lab Results   Component Value Date    HDL 62 10/18/2016    HDL 40 07/28/2016    HDL 32 (L) 04/06/2015     Lab Results   Component Value Date    LDLCALC 109 10/18/2016    LDLCALC 87 07/28/2016    LDLCALC 208 (H) 04/06/2015     Lab Results   Component Value Date    TRIG 130 10/18/2016    TRIG 74 07/28/2016    TRIG 106 04/06/2015     Lab Results   Component Value Date    TROPONINI <0.01 05/13/2015     Lab Results   Component Value Date    BNP 97 (H) 03/15/2016     Lab Results   Component Value Date    TSH 3.4 01/15/2014

## 2021-06-25 NOTE — PROGRESS NOTES
Progress Notes by Aleksandr Carney MD at 10/18/2017  9:10 AM     Author: Aleksandr Carney MD Service: -- Author Type: Physician    Filed: 10/18/2017  9:33 AM Encounter Date: 10/18/2017 Status: Signed    : Aleksandr Carney MD (Physician)           Click to link to [facility] HEART [facility] NOTE       Assessment/Plan:   1.  S/p [place] Hector 31 mm  mitral valve replacement , secondary to endocarditis:   The patient recovered well from his surgery.  His symptoms were resolved.  Currently he is on penicillin 500 mg daily.  Continue warfarin, follow-up with warfarin clinic.  Recent INR was 1.7.  The target of INR is between 2.5 and 3.5.  Follow-up with warfarin clinic to adjust the dosage of warfarin.    2. Mild coronary artery disease: 30% stenosis in RCA. No chest pain.        3. Persistent atrial fibrillation: He had MAZE procedure and AN amputation.  Metoprolol was discontinued due to bradycardia.    The patient is in regular rhythm this morning.  He has no palpitations.    4. Hyperlipidemia: He is on Lipitor 40 mg daily.       5.  Chronic combined systolic and diastolic congestive heart failure, LVEF of 40-45%: He is compensated well.  No indication of for fluid retention.  Continue Lasix 20 mg every other day.  Continue to monitor his fluid status.  Due to his blood pressure, lisinopril 10 mg daily is added for congestive heart failure and also high blood pressure.    Thank you for the opportunity to be involving in the care of Mr. Bandar Wells.  If you have any questions please feel free to contact me.  I have you see him again in 6 months.  This note has been dictated using voice recognition software. Any grammatical or context distortions are unintentional and inherent to the software.     History of Present Illness:   It is my pleasure to see Bandar Wells at the [facility] for routine cardiology follow-up. Bandar Wells is a 67 y.o. male with a medical history of  bacterial endocarditis, severe mitral valve prolapse with chordae rupture, s/p [employer] Hector porcine tissue mitral valve replacement on 7-1-2015, with bioprosthetic mitral valve endocarditis again, status post [facility] Hector 31 mm  mitral valve replacement on March 15, 2017, dyslipidemia, chronic combined systolic and diastolic congestive heart failure, LVEF of 40-45%, and persistent atrial fibrillation s/p MAZE procedure.     The patient states that he has been doing well since last visit.  He completed cardiac rehab, reached to 6 mets without difficulty.  He has no fevers, chills.  He has no chest pain, shortness of breath, palpitations, dizziness, orthopnea, PND or leg edema.  His blood pressure usually is around 120 over 60s.  It is a slightly high at 150/60 mmHg this morning.  His heart rate is 60s.  Currently he has been off beta blocker because of bradycardia.     Past Medical History:     Patient Active Problem List   Diagnosis   ? Hx of bacterial endocarditis   ? Dyslipidemia   ? Mitral valve prolapse   ? S/P MVR (mitral valve replacement)   ? Recurrent pleural effusion on right   ? Pericardial effusion without cardiac tamponade   ? Status post Maze operation for atrial fibrillation   ? Paroxysmal atrial fibrillation   ? Coronary artery disease   ? Acute diastolic heart failure   ? CHF (congestive heart failure)   ? Hypovolemia   ? Hypotension   ? Acute respiratory failure with hypoxia   ? Acute blood loss anemia   ? Acute renal failure   ? Dysthymia   ? Prosthetic valve endocarditis, subsequent encounter   ? S/P mitral valve replacement with metallic valve   ? Persistent atrial fibrillation       Past Surgical History:     Past Surgical History:   Procedure Laterality Date   ? CARDIAC SURGERY     ? CHG X-RAY PELVIS 3+ VW N/A 7/1/2015    Procedure: MITRAL VALVE REPLACEMENT, MAZE PROCEDURE, CARDIOLOGY TRANSESOPHAGEAL ECHOCARDIOGRAM;  Surgeon: Rm Muonz MD;  Location: [facility];  Service:     ? LANG-MAZE MICROWAVE ABLATION     ? EYE SURGERY      Retial hole treatment   ? HERNIA REPAIR Right     inguinal   ? MITRAL VALVE REPLACEMENT N/A 3/16/2017    Procedure: REDO STERNOTOMY, REDO MITRAL VALVE REPLACEMENT, PLACEMENT TEMPORARY VENTRICULAR PACING WIRES, ANESTHESIA TRANSESOPHAGEAL ECHOCARDIOGRAM;  Surgeon: Raphael Rosenberg MD;  Location: [facility];  Service:    ? MITRAL VALVE REPLACEMENT  2015    Bioprosthetic   ? PERICARDIAL WINDOW N/A 8/31/2015    Procedure: RIGHT PERICARDIAL WINDOW, TALC PLEURODESIS,VIDEO ASSISTED THOROSCOPY,DRAINAGE OF BILATERAL PLEURAL EFFUSIONS;  Surgeon: Rm Munoz MD;  Location: [facility];  Service:    ? PICC  9/3/2015        ? PICC  2/14/2017            Family History:     Family History   Problem Relation Age of Onset   ? Atrial fibrillation Mother    ? Heart failure Mother    ? Emphysema Father    ? Heart failure Father    ? Kidney failure Sister      S/P Renal transplant   ? Alcoholism Brother    ? No Medical Problems Son    ? No Medical Problems Brother         Social History:    reports that he has never smoked. He has never used smokeless tobacco. He reports that he drinks about 3.6 oz of alcohol per week  He reports that he does not use illicit drugs.    Review of Systems:   General: WNL  Eyes: WNL  Ears/Nose/Throat: WNL  Lungs: WNL  Heart: WNL  Stomach: WNL  Bladder: WNL  Muscle/Joints: WNL  Skin: WNL  Nervous System: WNL  Mental Health: WNL     Blood: WNL    Meds:     Current Outpatient Prescriptions:   ?  acetaminophen (TYLENOL) 500 MG tablet, Take 500-1,000 mg by mouth every 6 (six) hours as needed for pain. Pain 1-5  Give 500 mg  Or pain 6-10 give 1000 mg every 6 hours ad needed not to exceed 4 grams in 24 hours, Disp: , Rfl:   ?  atorvastatin (LIPITOR) 40 MG tablet, Take 40 mg by mouth bedtime. , Disp: , Rfl:   ?  b complex vitamins tablet, Take 1 tablet by mouth daily. , Disp: , Rfl:   ?  cholecalciferol, vitamin D3, 1,000 unit  tablet, Take 1,000 Units by mouth daily., Disp: , Rfl:   ?  clindamycin (CLEOCIN) 300 MG capsule, Take 2 capsules (600 mg total) by mouth see administration instructions. Take prior to Dentist appointments, Disp: 2 capsule, Rfl: 3  ?  cyanocobalamin 1000 MCG tablet, Take 1,000 mcg by mouth daily., Disp: , Rfl:   ?  ferrous sulfate 325 (65 FE) MG tablet, Take 1 tablet (325 mg total) by mouth 2 (two) times a day., Disp: 60 tablet, Rfl: 4  ?  FLAXSEED OIL ORAL, Take 1,000 mg by mouth daily. , Disp: , Rfl:   ?  FOLIC ACID ORAL, Take 1 tablet by mouth daily., Disp: , Rfl:   ?  furosemide (LASIX) 20 MG tablet, Take 1 tablet (20 mg total) by mouth every other day., Disp: 30 tablet, Rfl: 11  ?  L. ACIDOPHILUS/BIFIDO LONGUM (PROBIOTIC PEARLS ORAL), Take 1 capsule by mouth daily., Disp: , Rfl:   ?  multivitamin therapeutic (THERAGRAN) tablet, Take 1 tablet by mouth daily., Disp: , Rfl:   ?  oxymetazoline (AFRIN) 0.05 % nasal spray, Apply 2-3 sprays into each nostril 2 (two) times a day as needed. Separate doses by at least 10-12 hours., Disp: , Rfl:   ?  penicillin VK (PEN VK) 500 MG tablet, Take 500 mg by mouth daily., Disp: , Rfl:   ?  potassium chloride SA (K-DUR,KLOR-CON) 20 MEQ tablet, TAKE 1 TABLET BY MOUTH DAILY., Disp: 90 tablet, Rfl: 1  ?  warfarin (COUMADIN) 6 MG tablet, Take 6 mg by mouth daily except for 3 mg on Tues and Thursday. Adjust dose based on INR results as directed., Disp: 30 tablet, Rfl: 1  ?  warfarin (COUMADIN) 6 MG tablet, TAKE 1/2 TO 1 TABLET BY MOUTH DAILY, AS INSTRUCTED. DOSE WILL BE ADJUSTED BASED ON INR RESULTS., Disp: 40 tablet, Rfl: 0  ?  lisinopril (PRINIVIL) 10 MG tablet, Take 1 tablet (10 mg total) by mouth daily., Disp: 30 tablet, Rfl: 11  ?  warfarin (COUMADIN) 6 MG tablet, Take 1/2 or 1 tablet (3mg or 6mg) by mouth daily, as directed.  Dose adjusted based on INR results., Disp: 110 tablet, Rfl: 0    Allergies:   Review of patient's allergies indicates no known allergies.      Objective:  "     Physical Exam  179 lb 3.2 oz (81.3 kg)  5' 10.75\" (1.797 m)  Body mass index is 25.17 kg/(m^2).  /60  Pulse 64  Resp 12  Ht 5' 10.75\" (1.797 m) Comment: shoes on  Wt 179 lb 3.2 oz (81.3 kg) Comment: shoes on  BMI 25.17 kg/m2    General Appearance:   Awake, Alert, No acute distress.   HEENT:  Pupil equal and reactive to light. No scleral icterus; the mucous membranes were moist.   Neck: No cervical bruits. No JVD. No thyromegaly.     Chest: The spine was straight. The chest was symmetric.   Lungs:   Respirations unlabored; Lungs are clear to auscultation. No crackles. No wheezing.   Cardiovascular:   Regular rhythm and rate, normal first and second heart sounds with no murmurs, mechanical valve sounds. No rubs or gallops.    Abdomen:  Soft. No tenderness. Non-distended. Bowels sounds are present   Extremities: Equal tibial pulses. No leg edema.   Skin: No rashes or ulcers. Warm, Dry.   Musculoskeletal: No tenderness. No deformity.   Neurologic: Mood and affect are appropriate. No focal deficits.         Cardiac Imaging Studies  Holter on 5-8-2017:  CONCLUSION: Probably persistent atrial fibrillation with a regular rhythm at  time suggesting accelerated junctional rhythm superimposed in atrial fibrillation.   Periods of sinus rhythm with first-degree AV block cannot be excluded but atrial  fibrillation and coexistent junctional rhythm seems more likely.    ECHO on 3-4-2017:    Left ventricle ejection fraction is moderately decreased. The calculated left ventricular ejection fraction is 42%.    Mitral Valve: There is a porcine bioprosthetic mitral valve. The prosthetic valve is abnormal. The prosthetic valve has the following abnormalities: vegetation. Mobile structure associated with one strut of the bioprosthesis. It is associated with the struck closest to the left ventricular outflow tract. Vegetation is suspected but cannot exclude the possibility of retained chordal structure. Severe calcific " stenosis. Mild regurgitation. Vegetation present. Suspected vegetation.    Tricuspid Valve: Mobile structure on the right ventricular side of the septal leaflet of the tricuspid valve. This is consistent with mobile cord or vegetation. It is 2.23 cm in length.Trace tricuspid valve regurgitation. Mild pulmonary hypertension present. The estimated systolic pulmonary artery pressure is 55 mmhg.    Right Ventricle: The right ventricle is mildly dilated. The systolic function is mildly reduced. TAPSE is abnormal, which is consistent with abnormal right ventricular systolic function.    When compared to the previous study dated 2/15/2017, there are changes noted. The right ventricular systolic function appears to be diminished. The mobile density associated with the septal leaflet of the tricuspid valve is more prominent, and mobile density associated with the prosthesis in the mitral position is more prominent..    BETI on 2-:    Left ventricle ejection fraction is normal. The estimated left ventricular ejection fraction is 60%.    There is a bioprosthetic mitral valve. There is marked thickening of both leaflets of the mitral valve. Mobile echodensity is noted near the junction of the sewing ring and the anterior leaflet of the mitral valve likely consistent with vegetation. There is also a mobile echodensity noted on the ventricular surface of the sewing ring which may represent vegetation or redundant chordal tissue. Reduced mitral valve opening noted with a mean gradient of 18 mmHg. No regurgitation.    Moderate left atrial enlargement with spontaneous contrast.    Trace tricuspid regurgitation.    Coronary angiogram on 6-  Right dominant coronary arterial system.  The right coronary artery exhibits mild disease in the proximal segment.    Lab Review   Lab Results   Component Value Date     05/01/2017    K 3.9 05/01/2017     05/01/2017    CO2 22 05/01/2017    BUN 30 (H) 05/01/2017     CREATININE 1.09 05/01/2017    CALCIUM 10.0 05/01/2017     Lab Results   Component Value Date    WBC 6.2 05/01/2017    WBC 7.3 09/07/2015    HGB 9.7 (L) 05/01/2017    HCT 30.9 (L) 05/01/2017    MCV 92 05/01/2017     05/01/2017     Lab Results   Component Value Date    CHOL 197 10/18/2016    CHOL 142 07/28/2016    CHOL 261 (H) 04/06/2015     Lab Results   Component Value Date    HDL 62 10/18/2016    HDL 40 07/28/2016    HDL 32 (L) 04/06/2015     Lab Results   Component Value Date    LDLCALC 109 10/18/2016    LDLCALC 87 07/28/2016    LDLCALC 208 (H) 04/06/2015     Lab Results   Component Value Date    TRIG 130 10/18/2016    TRIG 74 07/28/2016    TRIG 106 04/06/2015     Lab Results   Component Value Date    TROPONINI 0.06 03/03/2017     Lab Results   Component Value Date     (H) 03/06/2017     Lab Results   Component Value Date    TSH 1.91 02/09/2017

## 2021-06-26 NOTE — PROGRESS NOTES
Progress Notes by Aleksandr Carney MD at 5/29/2018 11:30 AM     Author: Aleksandr Carney MD Service: -- Author Type: Physician    Filed: 5/29/2018 12:30 PM Encounter Date: 5/29/2018 Status: Signed    : Aleksandr Carney MD (Physician)           Click to link to [facility]     [facility] HEART CARE NOTE       Assessment/Plan:   1.  Bilateral leg edema, red skin rashes in both legs with mild itching: The patient has no fever or chills, no fatigue, still able to do exercise as before.  Her leg edema and skin rashes are most likely not related to cardiac condition.  The patient developed those symptoms over last month, gradually getting worse.  It is less likely DVT related since the patient is on warfarin, INR at therapeutic range.  Still requested ultrasound of both legs to rule out DVT.  Echocardiogram is requested for evaluation of the function of  mitral valve and also heart function. Routine labs including BMP, CBC and BNP.    Warfarin related skin side effects versus penicillin side effects versus other etiologycannot be excluded. Rule out DVT as mentioned above. ECHO is requested.  Less likely infection related. The patient will call his Dermatologist and see Dermatology clinic this afternoon.  Increased lasix from 20 to 40 mg daily for 7 days and then back to 20 mg daily as indicated.    2.  S/p [place]ector 31 mm  mitral valve replacement , secondary to endocarditis:   Currently he is on penicillin 500 mg daily.  Continue warfarin, follow-up with warfarin clinic.  The target of INR is between 2.5 and 3.5.  Follow-up with warfarin clinic to adjust the dosage of warfarin.  INR on 5- was 3.6.    3. Mild coronary artery disease: 30% stenosis in RCA. No chest pain.        3. Persistent atrial fibrillation: He had MAZE procedure and AN amputation.  Metoprolol was discontinued due to bradycardia.    The patient is in regular rhythm.  He has no palpitations.    4. Hyperlipidemia: Continue  Lipitor 40 mg daily.      5.  Chronic combined systolic and diastolic congestive heart failure, LVEF of 40-45%: Increased lasix from 20 to 40 mg daily as discussed above.  Routine labs as mentioned. ECHO to evaluate his heart function.    Thank you for the opportunity to be involving in the care of Mr. Bandar Wells.  If you have any questions please feel free to contact me.  I have you see him again in 3 months and as needed.  This note has been dictated using voice recognition software. Any grammatical or context distortions are unintentional and inherent to the software.     History of Present Illness:   It is my pleasure to see Bandar Wells at the [facility] clinic for evaluation of bilateral leg edema. Bandar Wells is a 67 y.o. male with a medical history of bacterial endocarditis, severe mitral valve prolapse with chordae rupture, s/p [place] Hector porcine tissue mitral valve replacement on 7-1-2015, with bioprosthetic mitral valve endocarditis again, status post [place] Hector 31 mm  mitral valve replacement on March 15, 2017, dyslipidemia, chronic combined systolic and diastolic congestive heart failure, LVEF of 40-45%, and persistent atrial fibrillation s/p MAZE procedure.     The patient states that he developed bilateral leg edema for 1 month.  He also developed skin rashes in both legs, gradually getting worse, red color, mild itching and swollen.  He has no fever or chills, no fatigue.  He is able to do exercise as before.  He has no chest pain, shortness of breath, palpitations, dizziness, orthopnea, PND.  His weight is stable at 191 pounds.  His blood pressure and heart rate are controlled.      Past Medical History:     Patient Active Problem List   Diagnosis   ? Hx of bacterial endocarditis   ? Dyslipidemia   ? Mitral valve prolapse   ? S/P MVR (mitral valve replacement)   ? Recurrent pleural effusion on right   ? Pericardial effusion without cardiac tamponade   ? Status post Maze operation for  atrial fibrillation   ? Paroxysmal atrial fibrillation   ? Coronary artery disease   ? Acute diastolic heart failure   ? CHF (congestive heart failure)   ? Hypovolemia   ? Hypotension   ? Acute respiratory failure with hypoxia   ? Acute blood loss anemia   ? Acute renal failure   ? Dysthymia   ? Prosthetic valve endocarditis, subsequent encounter   ? S/P mitral valve replacement with metallic valve   ? Persistent atrial fibrillation   ? Chronic combined systolic and diastolic congestive heart failure       Past Surgical History:     Past Surgical History:   Procedure Laterality Date   ? CARDIAC SURGERY     ? CHG X-RAY PELVIS 3+ VW N/A 7/1/2015    Procedure: MITRAL VALVE REPLACEMENT, MAZE PROCEDURE, CARDIOLOGY TRANSESOPHAGEAL ECHOCARDIOGRAM;  Surgeon: Rm Munoz MD;  Location: [facility] Main OR;  Service:    ? LANG-MAZE MICROWAVE ABLATION     ? EYE SURGERY      Retial hole treatment   ? HERNIA REPAIR Right     inguinal   ? MITRAL VALVE REPLACEMENT N/A 3/16/2017    Procedure: REDO STERNOTOMY, REDO MITRAL VALVE REPLACEMENT, PLACEMENT TEMPORARY VENTRICULAR PACING WIRES, ANESTHESIA TRANSESOPHAGEAL ECHOCARDIOGRAM;  Surgeon: Raphael Rosenberg MD;  Location: [facility] OR;  Service:    ? MITRAL VALVE REPLACEMENT  2015    Bioprosthetic   ? PERICARDIAL WINDOW N/A 8/31/2015    Procedure: RIGHT PERICARDIAL WINDOW, TALC PLEURODESIS,VIDEO ASSISTED THOROSCOPY,DRAINAGE OF BILATERAL PLEURAL EFFUSIONS;  Surgeon: Rm Munoz MD;  Location: [facility] OR;  Service:    ? PICC  9/3/2015        ? PICC  2/14/2017            Family History:     Family History   Problem Relation Age of Onset   ? Atrial fibrillation Mother    ? Heart failure Mother    ? Emphysema Father    ? Heart failure Father    ? Kidney failure Sister      S/P Renal transplant   ? Alcoholism Brother    ? No Medical Problems Son    ? No Medical Problems Brother         Social History:    reports that he has never smoked. He has  never used smokeless tobacco. He reports that he drinks about 3.6 oz of alcohol per week  He reports that he does not use illicit drugs.    Review of Systems:   General: Weight Gain  Eyes: WNL  Ears/Nose/Throat: Nosebleeds  Lungs: Shortness of Breath  Heart: Shortness of Breath with activity, Leg Swelling  Stomach: WNL  Bladder: WNL  Muscle/Joints: WNL  Skin: WNL  Nervous System: WNL  Mental Health: Anxiety     Blood: WNL    Meds:     Current Outpatient Prescriptions:   ?  acetaminophen (TYLENOL) 500 MG tablet, Take 500-1,000 mg by mouth every 6 (six) hours as needed for pain. Pain 1-5  Give 500 mg  Or pain 6-10 give 1000 mg every 6 hours ad needed not to exceed 4 grams in 24 hours, Disp: , Rfl:   ?  atorvastatin (LIPITOR) 40 MG tablet, TAKE 1 TABLET (40 MG TOTAL) BY MOUTH AT BEDTIME., Disp: 90 tablet, Rfl: 3  ?  b complex vitamins tablet, Take 1 tablet by mouth daily. , Disp: , Rfl:   ?  cholecalciferol, vitamin D3, 1,000 unit tablet, Take 1,000 Units by mouth daily., Disp: , Rfl:   ?  clindamycin (CLEOCIN) 300 MG capsule, Take 2 capsules (600 mg total) by mouth see administration instructions. Take prior to Dentist appointments, Disp: 2 capsule, Rfl: 3  ?  cyanocobalamin 1000 MCG tablet, Take 1,000 mcg by mouth daily., Disp: , Rfl:   ?  FLAXSEED OIL ORAL, Take 1,000 mg by mouth daily. , Disp: , Rfl:   ?  fluticasone (FLONASE) 50 mcg/actuation nasal spray, 1 spray into each nostril daily., Disp: , Rfl:   ?  FOLIC ACID ORAL, Take 1 tablet by mouth daily., Disp: , Rfl:   ?  furosemide (LASIX) 20 MG tablet, Take 1 tablet (20 mg total) by mouth daily., Disp: 30 tablet, Rfl: 11  ?  L. ACIDOPHILUS/BIFIDO LONGUM (PROBIOTIC PEARLS ORAL), Take 1 capsule by mouth daily., Disp: , Rfl:   ?  multivitamin therapeutic (THERAGRAN) tablet, Take 1 tablet by mouth daily., Disp: , Rfl:   ?  potassium chloride SA (K-DUR,KLOR-CON) 20 MEQ tablet, TAKE 1 TABLET BY MOUTH DAILY., Disp: 90 tablet, Rfl: 1  ?  warfarin (COUMADIN) 6 MG tablet,  "TAKE 1/2 (3MG) TO 1 TABLET (6MG) BY MOUTH DAILY AS DIRECTED. ADJUST DOSE BASED ON INR RESULTS., Disp: 90 tablet, Rfl: 1    Allergies:   Review of patient's allergies indicates no known allergies.      Objective:      Physical Exam  191 lb (86.6 kg)  5' 10\" (1.778 m)  Body mass index is 27.41 kg/(m^2).  /72 (Patient Site: Right Arm, Patient Position: Sitting, Cuff Size: Adult Regular)  Pulse 60  Resp 18  Ht 5' 10\" (1.778 m)  Wt 191 lb (86.6 kg)  BMI 27.41 kg/m2    General Appearance:   Awake, Alert, No acute distress.   HEENT:  Pupil equal and reactive to light. No scleral icterus; the mucous membranes were moist.   Neck: No cervical bruits. + JVD. No thyromegaly.     Chest: The spine was straight. The chest was symmetric.   Lungs:   Respirations unlabored; Lungs are clear to auscultation. No crackles. No wheezing.   Cardiovascular:   Regular rhythm and rate, normal first and second heart sounds with mechanical heart sounds and no murmurs. No rubs or gallops.    Abdomen:  Soft. No tenderness. Non-distended. Bowels sounds are present   Extremities: Equal tibial pulses. Bilateral leg edema.   Skin: Pachy reddish fused skin rashes in both legs, edema, no blisters.   Musculoskeletal: No tenderness. No deformity.   Neurologic: Mood and affect are appropriate. No focal deficits.         EKG: Personally reviewed  Atrial fibrillation   T wave abnormality, consider anterior ischemia or digitalis effect   Prolonged QT   Abnormal ECG   When compared with ECG of 17-MAR-2017 08:16,   ST no longer depressed in Anterior leads   Nonspecific T wave abnormality no longer evident in Inferior leads   QT has lengthened    Cardiac Imaging Studies  Holter on 5-8-2017:  CONCLUSION: Probably persistent atrial fibrillation with a regular rhythm at  time suggesting accelerated junctional rhythm superimposed in atrial fibrillation.   Periods of sinus rhythm with first-degree AV block cannot be excluded but atrial  fibrillation and " coexistent junctional rhythm seems more likely.    ECHO on 3-4-2017:    Left ventricle ejection fraction is moderately decreased. The calculated left ventricular ejection fraction is 42%.    Mitral Valve: There is a porcine bioprosthetic mitral valve. The prosthetic valve is abnormal. The prosthetic valve has the following abnormalities: vegetation. Mobile structure associated with one strut of the bioprosthesis. It is associated with the struck closest to the left ventricular outflow tract. Vegetation is suspected but cannot exclude the possibility of retained chordal structure. Severe calcific stenosis. Mild regurgitation. Vegetation present. Suspected vegetation.    Tricuspid Valve: Mobile structure on the right ventricular side of the septal leaflet of the tricuspid valve. This is consistent with mobile cord or vegetation. It is 2.23 cm in length.Trace tricuspid valve regurgitation. Mild pulmonary hypertension present. The estimated systolic pulmonary artery pressure is 55 mmhg.    Right Ventricle: The right ventricle is mildly dilated. The systolic function is mildly reduced. TAPSE is abnormal, which is consistent with abnormal right ventricular systolic function.    When compared to the previous study dated 2/15/2017, there are changes noted. The right ventricular systolic function appears to be diminished. The mobile density associated with the septal leaflet of the tricuspid valve is more prominent, and mobile density associated with the prosthesis in the mitral position is more prominent..    BETI on 2-:    Left ventricle ejection fraction is normal. The estimated left ventricular ejection fraction is 60%.    There is a bioprosthetic mitral valve. There is marked thickening of both leaflets of the mitral valve. Mobile echodensity is noted near the junction of the sewing ring and the anterior leaflet of the mitral valve likely consistent with vegetation. There is also a mobile echodensity noted on the  ventricular surface of the sewing ring which may represent vegetation or redundant chordal tissue. Reduced mitral valve opening noted with a mean gradient of 18 mmHg. No regurgitation.    Moderate left atrial enlargement with spontaneous contrast.    Trace tricuspid regurgitation.    Coronary angiogram on 6-  Right dominant coronary arterial system.  The right coronary artery exhibits mild disease in the proximal segment.    Lab Review   Lab Results   Component Value Date     01/04/2018    K 4.9 01/17/2018     (H) 01/04/2018    CO2 18 (L) 01/04/2018    BUN 56 (H) 01/04/2018    CREATININE 1.63 (H) 01/04/2018    CALCIUM 10.4 01/04/2018     Lab Results   Component Value Date    WBC 6.2 05/01/2017    WBC 7.3 09/07/2015    HGB 9.7 (L) 05/01/2017    HCT 30.9 (L) 05/01/2017    MCV 92 05/01/2017     05/01/2017     Lab Results   Component Value Date    CHOL 191 01/04/2018    CHOL 197 10/18/2016    CHOL 142 07/28/2016     Lab Results   Component Value Date    HDL 65 01/04/2018    HDL 62 10/18/2016    HDL 40 07/28/2016     Lab Results   Component Value Date    LDLCALC 113 01/04/2018    LDLCALC 109 10/18/2016    LDLCALC 87 07/28/2016     Lab Results   Component Value Date    TRIG 65 01/04/2018    TRIG 130 10/18/2016    TRIG 74 07/28/2016     Lab Results   Component Value Date    TROPONINI 0.06 03/03/2017     Lab Results   Component Value Date     (H) 03/06/2017     Lab Results   Component Value Date    TSH 1.91 02/09/2017

## 2021-06-26 NOTE — PROGRESS NOTES
Progress Notes by Burt Velasco MD at 11/27/2018  9:40 AM     Author: Burt Velasco MD Service: -- Author Type: Physician    Filed: 11/27/2018 11:44 PM Encounter Date: 11/27/2018 Status: Signed    : Burt Velasco MD (Physician)       [facility] Surgery Follow up    HPI:    68 y.o. year old male who returns for a follow up.     Allergies:Patient has no known allergies.    Past Medical History:   Diagnosis Date   ? Abnormal liver function test    ? Atrial fibrillation (H)     post naun   ? Atrial fibrillation with RVR (H) 8/29/2015    S/p MAZE Jul 2015   ? Bacterial endocarditis    ? CHF (congestive heart failure) (H)    ? Dyslipidemia    ? Hyperlipidemia    ? Mitral valve prolapse    ? Near syncope    ? Pericardial effusion without cardiac tamponade 8/29/2015   ? S/P mitral valve replacement with metallic valve 03/16/2017   ? Status post Maze operation for atrial fibrillation 8/29/2015       Past Surgical History:   Procedure Laterality Date   ? CARDIAC SURGERY     ? CHG X-RAY PELVIS 3+ VW N/A 7/1/2015    Procedure: MITRAL VALVE REPLACEMENT, MAZE PROCEDURE, CARDIOLOGY TRANSESOPHAGEAL ECHOCARDIOGRAM;  Surgeon: Rm Munoz MD;  Location: [facility] OR;  Service:    ? LANG-MAZE MICROWAVE ABLATION     ? EYE SURGERY      Retial hole treatment   ? HERNIA REPAIR Right     inguinal   ? MITRAL VALVE REPLACEMENT N/A 3/16/2017    Procedure: REDO STERNOTOMY, REDO MITRAL VALVE REPLACEMENT, PLACEMENT TEMPORARY VENTRICULAR PACING WIRES, ANESTHESIA TRANSESOPHAGEAL ECHOCARDIOGRAM;  Surgeon: Raphael Rosenberg MD;  Location: [facility] Main OR;  Service:    ? MITRAL VALVE REPLACEMENT  2015    Bioprosthetic   ? PERICARDIAL WINDOW N/A 8/31/2015    Procedure: RIGHT PERICARDIAL WINDOW, TALC PLEURODESIS,VIDEO ASSISTED THOROSCOPY,DRAINAGE OF BILATERAL PLEURAL EFFUSIONS;  Surgeon: Rm Munoz MD;  Location: [facility] Main OR;  Service:    ? PICC  9/3/2015        ? PICC  2/14/2017             CURRENT MEDS:  Current Outpatient Medications on File Prior to Visit   Medication Sig Dispense Refill   ? acetaminophen (TYLENOL) 500 MG tablet Take 500-1,000 mg by mouth every 6 (six) hours as needed for pain. Pain 1-5  Give 500 mg  Or pain 6-10 give 1000 mg every 6 hours ad needed not to exceed 4 grams in 24 hours     ? atorvastatin (LIPITOR) 40 MG tablet TAKE 1 TABLET (40 MG TOTAL) BY MOUTH AT BEDTIME. 90 tablet 3   ? b complex vitamins tablet Take 1 tablet by mouth daily.      ? cholecalciferol, vitamin D3, 1,000 unit tablet Take 1,000 Units by mouth daily.     ? clindamycin (CLEOCIN) 300 MG capsule Take 2 capsules (600 mg total) by mouth see administration instructions. Take prior to Dentist appointments 2 capsule 3   ? cyanocobalamin 1000 MCG tablet Take 1,000 mcg by mouth daily.     ? ferrous sulfate 325 (65 FE) MG tablet TAKE 1 TABLET BY MOUTH TWICE DAILY 60 tablet 4   ? FLAXSEED OIL ORAL Take 1,000 mg by mouth daily.      ? fluticasone (FLONASE) 50 mcg/actuation nasal spray 1 spray into each nostril daily.     ? FOLIC ACID ORAL Take 1 tablet by mouth daily.     ? furosemide (LASIX) 20 MG tablet Take 1 tablet (20 mg total) by mouth daily. 90 tablet 3   ? L. ACIDOPHILUS/BIFIDO LONGUM (PROBIOTIC PEARLS ORAL) Take 1 capsule by mouth daily.     ? multivitamin therapeutic (THERAGRAN) tablet Take 1 tablet by mouth daily.     ? penicillin VK (PEN VK) 500 MG tablet TAKE 1 TABLET BY MOUTH DAILY FOR 30 DAYS  9   ? potassium chloride SA (K-DUR,KLOR-CON) 20 MEQ tablet TAKE 1 TABLET BY MOUTH DAILY. 90 tablet 1   ? warfarin (COUMADIN) 3 MG tablet Take one to two tablets (3 to 6 mg) by mouth daily. Adjust dose based on INR results as directed. 180 tablet 1     No current facility-administered medications on file prior to visit.        Family History   Problem Relation Age of Onset   ? Atrial fibrillation Mother    ? Heart failure Mother    ? Emphysema Father    ? Heart failure Father    ? Kidney failure Sister          S/P Renal transplant   ? Alcoholism Brother    ? No Medical Problems Son    ? No Medical Problems Brother         reports that  has never smoked. he has never used smokeless tobacco. He reports that he drinks about 3.6 oz of alcohol per week. He reports that he does not use drugs.    Review of Systems:  Negative except leg swelling and spider veins.    OBJECTIVE:  Vitals:    11/27/18 0928   BP: 150/70   Pulse: 72   Temp: 98.2  F (36.8  C)   TempSrc: Oral     There is no height or weight on file to calculate BMI.    EXAM:  GENERAL: This is a well-developed 68 y.o. male who appears his stated age  HEAD: normocephalic  HEENT: Pupils equal and reactive bilaterally  CARDIAC: RRR without murmur  CHEST/LUNG:  Clear to auscultation  ABDOMEN: Soft, nontender, nondistended, no masses    NEUROLOGIC: Focally intact, nonfocal  VASCULAR: Pulses intact, symmetrical upper and lower extremities.                        LABS:  Lab Results   Component Value Date    WBC 5.9 05/29/2018    WBC 7.3 09/07/2015    HGB 11.1 (L) 10/11/2018    HCT 28.3 (L) 05/29/2018    MCV 94 05/29/2018     05/29/2018     INR/Prothrombin Time      Lab Results   Component Value Date    HGBA1C 5.4 03/17/2017     Lab Results   Component Value Date     (H) 01/04/2018     (H) 01/04/2018    ALKPHOS 82 01/04/2018    BILITOT 0.8 01/04/2018        Images:     US Venous Insufficiency Legs Bilateral (Order [id number])   Imaging   Date: 11/27/2018 Department: [facility] Ultrasound [place] Released By: Lisa Rod Authorizing: Burt Velasco MD   Study Result        [facility] OUTPATIENT     EXAM: BILATERAL LOWER EXTREMITY DEEP AND SUPERFICIAL VENOUS DUPLEX ULTRASOUND WITH PHYSIOLOGIC TESTING      INDICATION: Symptomatic varicose veins. Assess for incompetent veins.      TECHNIQUE: Supine and upright ultrasound of the deep and superficial veins with Valsalva and compression augmentation maneuvers. Duplex imaging is  performed utilizing gray-scale, two-dimensional images, color-flow imaging, Doppler waveform analysis, and   spectral Doppler imaging.      INCOMPETENCY CRITERIA: Deep vein reflux reported when greater than 1,000 ms flow reversal. Superficial vein reflux reported when greater than 600 ms flow reversal.  vein reflux reported as greater than 350 ms flow reversal.     RIGHT DEEP VEIN FINDINGS:     Normal compressibility, augmentation, and phasicity of the common femoral, profunda femoris, femoral, popliteal, and posterior tibial veins, without deep venous thrombus.     LEFT DEEP VEIN FINDINGS:     Normal compressibility, augmentation, and phasicity of the common femoral, profunda femoris, femoral, popliteal, and posterior tibial veins, without deep venous thrombus.     RIGHT SUPERFICIAL VEIN FINDINGS:  Great saphenous vein: Competent from the saphenofemoral junction to the mid calf.     Small saphenous vein: Competent from the saphenopopliteal junction to the mid calf.     No incompetent perforating veins or abnormal accessory veins identified.     LEFT SUPERFICIAL VEIN FINDINGS:  Great saphenous vein: Competent from the saphenofemoral junction through the proximal thigh, incompetent at the knee and competent at the mid calf. The vessel measures 3 mm at the knee where incompetent.     Small saphenous vein: Competent from the saphenopopliteal junction to the mid calf.     No incompetent perforating veins or abnormal accessory veins identified.     IMPRESSION:   CONCLUSION:   1.  No deep venous thrombosis of either lower extremity.  2.  The right superficial venous system is patent, without incompetence.  3.  The left great saphenous vein is incompetent over a short segment at the knee and otherwise competent. The left small saphenous vein is competent.         Assessment/Plan:   Leg swelling   Spider veins bilateral  Ultrasound today looks good both deep and superficial systems  Continue compression, exercise  and weight control  Cosmetic spider vein treatment discussed. Understands this considered cosmetic     Burt Velasco MD  [facility] Department of Surgery

## 2021-06-26 NOTE — PROGRESS NOTES
Progress Notes by Burt Velasco MD at 11/20/2018  8:40 AM     Author: Burt Velasco MD Service: -- Author Type: Physician    Filed: 11/20/2018 10:34 AM Encounter Date: 11/20/2018 Status: Signed    : Burt Velasco MD (Physician)       [facility]      Assessment:     1. varicose veins, bilateral   2. spider veins, bilateral   3, heart valve disease.    Plan:     1. Treatment options of conservative therapy of stockings use, exercise, weight loss, elevating legs when possible.    2. Script for compression stockings 20-30 mm hg  3. Ultrasound to evaluate legs for incompetency of both deep and superficial system .   4. Surgical treatment, discussed briefly today  5. Follow up: after ultrasound .   6. Call for any questions concerns or issues    Subjective:      Bandar Wells is a 68 y.o. male  who was referred by Jin Bhat MD  for evaluation of varicose veins. Symptoms include pain, aching, fatigue, burning, edema, dermatitis, external bleeding and episodes of superficial thrombophlebitis. Patient has history of leg selling, pain and vein issues that have progressed. Pain and symptoms have affected daily living and work activities needing medications. Here for evaluation today. Stocking use with compression stockings of 20-30 mm hg or greater for greater then 3 months    Allergies:Patient has no known allergies.    Past Medical History:   Diagnosis Date   ? Abnormal liver function test    ? Atrial fibrillation (H)     post naun   ? Atrial fibrillation with RVR (H) 8/29/2015    S/p MAZE Jul 2015   ? Bacterial endocarditis    ? CHF (congestive heart failure) (H)    ? Dyslipidemia    ? Hyperlipidemia    ? Mitral valve prolapse    ? Near syncope    ? Pericardial effusion without cardiac tamponade 8/29/2015   ? S/P mitral valve replacement with metallic valve 03/16/2017   ? Status post Maze operation for atrial fibrillation 8/29/2015       Past Surgical History:   Procedure Laterality  Date   ? CARDIAC SURGERY     ? CHG X-RAY PELVIS 3+ VW N/A 7/1/2015    Procedure: MITRAL VALVE REPLACEMENT, MAZE PROCEDURE, CARDIOLOGY TRANSESOPHAGEAL ECHOCARDIOGRAM;  Surgeon: Rm Munoz MD;  Location: [facility];  Service:    ? LANG-MAZE MICROWAVE ABLATION     ? EYE SURGERY      Retial hole treatment   ? HERNIA REPAIR Right     inguinal   ? MITRAL VALVE REPLACEMENT N/A 3/16/2017    Procedure: REDO STERNOTOMY, REDO MITRAL VALVE REPLACEMENT, PLACEMENT TEMPORARY VENTRICULAR PACING WIRES, ANESTHESIA TRANSESOPHAGEAL ECHOCARDIOGRAM;  Surgeon: Raphael Rosenberg MD;  Location: [facility] OR;  Service:    ? MITRAL VALVE REPLACEMENT  2015    Bioprosthetic   ? PERICARDIAL WINDOW N/A 8/31/2015    Procedure: RIGHT PERICARDIAL WINDOW, TALC PLEURODESIS,VIDEO ASSISTED THOROSCOPY,DRAINAGE OF BILATERAL PLEURAL EFFUSIONS;  Surgeon: Rm Munoz MD;  Location: [facility] OR;  Service:    ? PICC  9/3/2015        ? PICC  2/14/2017            Current Outpatient Medications   Medication Sig Note   ? acetaminophen (TYLENOL) 500 MG tablet Take 500-1,000 mg by mouth every 6 (six) hours as needed for pain. Pain 1-5  Give 500 mg  Or pain 6-10 give 1000 mg every 6 hours ad needed not to exceed 4 grams in 24 hours    ? atorvastatin (LIPITOR) 40 MG tablet TAKE 1 TABLET (40 MG TOTAL) BY MOUTH AT BEDTIME.    ? b complex vitamins tablet Take 1 tablet by mouth daily.     ? cholecalciferol, vitamin D3, 1,000 unit tablet Take 1,000 Units by mouth daily.    ? clindamycin (CLEOCIN) 300 MG capsule Take 2 capsules (600 mg total) by mouth see administration instructions. Take prior to Dentist appointments    ? cyanocobalamin 1000 MCG tablet Take 1,000 mcg by mouth daily.    ? ferrous sulfate 325 (65 FE) MG tablet TAKE 1 TABLET BY MOUTH TWICE DAILY    ? FLAXSEED OIL ORAL Take 1,000 mg by mouth daily.     ? fluticasone (FLONASE) 50 mcg/actuation nasal spray 1 spray into each nostril daily.    ? FOLIC ACID  ORAL Take 1 tablet by mouth daily.    ? furosemide (LASIX) 20 MG tablet Take 1 tablet (20 mg total) by mouth daily.    ? L. ACIDOPHILUS/BIFIDO LONGUM (PROBIOTIC PEARLS ORAL) Take 1 capsule by mouth daily.    ? multivitamin therapeutic (THERAGRAN) tablet Take 1 tablet by mouth daily.    ? penicillin VK (PEN VK) 500 MG tablet TAKE 1 TABLET BY MOUTH DAILY FOR 30 DAYS 10/15/2018: Received from: External Pharmacy   ? potassium chloride SA (K-DUR,KLOR-CON) 20 MEQ tablet TAKE 1 TABLET BY MOUTH DAILY.    ? warfarin (COUMADIN) 3 MG tablet Take one to two tablets (3 to 6 mg) by mouth daily. Adjust dose based on INR results as directed.        Family History   Problem Relation Age of Onset   ? Atrial fibrillation Mother    ? Heart failure Mother    ? Emphysema Father    ? Heart failure Father    ? Kidney failure Sister         S/P Renal transplant   ? Alcoholism Brother    ? No Medical Problems Son    ? No Medical Problems Brother         reports that  has never smoked. he has never used smokeless tobacco. He reports that he drinks about 3.6 oz of alcohol per week. He reports that he does not use drugs.      Review of Systems  Pertinent items are noted in HPI.  A 12 point comprehensive review of systems was negative except as noted.      Objective:     Vitals:    11/20/18 0855 11/20/18 0930   BP: 160/70 150/80   Pulse: 74    Resp: 12    Temp: 98.1  F (36.7  C)    TempSrc: Oral      There is no height or weight on file to calculate BMI.    EXAM:  GENERAL: This is a well-developed 68 y.o. male who appears his stated age  HEAD: normocephalic  HEENT: Pupils equal and reactive bilaterally  MOUTH: mucus membranes intact. Normal dentation  CARDIAC: RRR without murmur  CHEST/LUNG:  Clear to auscultation bilaterally  ABDOMEN: Soft, nontender, nondistended, no masses noted   NEUROLOGIC: Focally intact, nonfocal, alert and oriented x 3  INTEGUMENT: No open lesions or ulcers  VASCULAR: Pulses intact, symmetrical upper and lower  extremities. There areskin changes consistent with chronic venous insufficiency. Varicose veins present in bilateral greater saphenous distribution. Spider veins present bilateral.                        Imaging:  pending    Burt Velasco MD  [facility] Surgery Dept.

## 2021-06-26 NOTE — PROGRESS NOTES
Progress Notes by Aleksandr Carney MD at 9/13/2018  8:10 AM     Author: Aleksandr Carney MD Service: -- Author Type: Physician    Filed: 9/13/2018  8:41 AM Encounter Date: 9/13/2018 Status: Signed    : Aleksandr Carney MD (Physician)           Click to link to [facility] Heart [facility] HEART [facility] NOTE       Assessment/Plan:   1.  Bilateral leg edema, red skin rashes in both legs with mild itching: The patient has seen dermatologist to Dr. Dodson.  She diagnosed the patient had stasis dermatitis which most likely was caused by fluid retention.  The patient was treated with LUIS and is significantly improved.    2.  S/p [employer] 31 mm  mitral valve replacement , secondary to endocarditis:   Currently he is on penicillin 500 mg daily.  Continue warfarin, follow-up with warfarin clinic.  The target of INR is between 2.5 and 3.5.  Follow-up with warfarin clinic to adjust the dosage of warfarin.  INR today is 3.1.    3. Mild coronary artery disease: 30% stenosis in RCA. No chest pain.        3. Persistent atrial fibrillation: He had MAZE procedure and AN amputation.  Metoprolol was discontinued due to bradycardia.    The patient is in regular rhythm.  He has no palpitations.    4. Hyperlipidemia: Continue Lipitor 40 mg daily.      5.  Chronic combined systolic and diastolic congestive heart failure, LVEF of 40-45%: Continue Lasix 20 mg daily.  His laboratory tests were reviewed.  No labs needed today.    Thank you for the opportunity to be involving in the care of Mr. Bandar Wells.  If you have any questions please feel free to contact me.  I have you see him again in 6 months and as needed.  This note has been dictated using voice recognition software. Any grammatical or context distortions are unintentional and inherent to the software.     History of Present Illness:   It is my pleasure to see Bandar Wells at the [facility] for routine cardiology follow-up. Bandar Wells is a 68y.o.  male with a medical history of bacterial endocarditis, severe mitral valve prolapse with chordae rupture, s/p [employer]ector porcine tissue mitral valve replacement on 7-1-2015, with bioprosthetic mitral valve endocarditis again, status post [facility] [employer] 31 mm  mitral valve replacement on March 15, 2017, dyslipidemia, chronic combined systolic and diastolic congestive heart failure, LVEF of 40-45%, and persistent atrial fibrillation s/p MAZE procedure.     The patient states that his bilateral leg skin rash was resolved.  He still use LUIS 1 hour every day.  His leg edema almost resolved.  He had no chest pain, shortness of breath, palpitations, dizziness, orthopnea, PND.  His weight was down from a 191-178 pounds.  His blood pressure and heart rate are controlled.      Past Medical History:     Patient Active Problem List   Diagnosis   ? Hx of bacterial endocarditis   ? Dyslipidemia   ? Mitral valve prolapse   ? S/P MVR (mitral valve replacement)   ? Recurrent pleural effusion on right   ? Pericardial effusion without cardiac tamponade   ? Status post Maze operation for atrial fibrillation   ? Paroxysmal atrial fibrillation (H)   ? Coronary artery disease   ? Acute diastolic heart failure (H)   ? CHF (congestive heart failure) (H)   ? Hypovolemia   ? Hypotension   ? Acute respiratory failure with hypoxia (H)   ? Acute blood loss anemia   ? Acute renal failure (H)   ? Dysthymia   ? Prosthetic valve endocarditis, subsequent encounter   ? S/P mitral valve replacement with metallic valve   ? Persistent atrial fibrillation (H)   ? Chronic combined systolic and diastolic congestive heart failure (H)       Past Surgical History:     Past Surgical History:   Procedure Laterality Date   ? CARDIAC SURGERY     ? CHG X-RAY PELVIS 3+ VW N/A 7/1/2015    Procedure: MITRAL VALVE REPLACEMENT, MAZE PROCEDURE, CARDIOLOGY TRANSESOPHAGEAL ECHOCARDIOGRAM;  Surgeon: Rm Munoz MD;  Location: [facility];  Service:    ?  LANG-MAZE MICROWAVE ABLATION     ? EYE SURGERY      Retial hole treatment   ? HERNIA REPAIR Right     inguinal   ? MITRAL VALVE REPLACEMENT N/A 3/16/2017    Procedure: REDO STERNOTOMY, REDO MITRAL VALVE REPLACEMENT, PLACEMENT TEMPORARY VENTRICULAR PACING WIRES, ANESTHESIA TRANSESOPHAGEAL ECHOCARDIOGRAM;  Surgeon: Raphael Rosenberg MD;  Location: [facility];  Service:    ? MITRAL VALVE REPLACEMENT  2015    Bioprosthetic   ? PERICARDIAL WINDOW N/A 8/31/2015    Procedure: RIGHT PERICARDIAL WINDOW, TALC PLEURODESIS,VIDEO ASSISTED THOROSCOPY,DRAINAGE OF BILATERAL PLEURAL EFFUSIONS;  Surgeon: Rm Munoz MD;  Location: [facility] OR;  Service:    ? PICC  9/3/2015        ? PICC  2/14/2017            Family History:     Family History   Problem Relation Age of Onset   ? Atrial fibrillation Mother    ? Heart failure Mother    ? Emphysema Father    ? Heart failure Father    ? Kidney failure Sister      S/P Renal transplant   ? Alcoholism Brother    ? No Medical Problems Son    ? No Medical Problems Brother         Social History:    reports that he has never smoked. He has never used smokeless tobacco. He reports that he drinks about 3.6 oz of alcohol per week  He reports that he does not use illicit drugs.    Review of Systems:   General: WNL  Eyes: WNL  Ears/Nose/Throat: WNL  Lungs: WNL  Heart: WNL  Stomach: WNL  Bladder: WNL  Muscle/Joints: WNL  Skin: WNL  Nervous System: WNL  Mental Health: WNL     Blood: WNL    Meds:     Current Outpatient Prescriptions:   ?  acetaminophen (TYLENOL) 500 MG tablet, Take 500-1,000 mg by mouth every 6 (six) hours as needed for pain. Pain 1-5  Give 500 mg  Or pain 6-10 give 1000 mg every 6 hours ad needed not to exceed 4 grams in 24 hours, Disp: , Rfl:   ?  atorvastatin (LIPITOR) 40 MG tablet, TAKE 1 TABLET (40 MG TOTAL) BY MOUTH AT BEDTIME., Disp: 90 tablet, Rfl: 3  ?  b complex vitamins tablet, Take 1 tablet by mouth daily. , Disp: , Rfl:   ?   "cholecalciferol, vitamin D3, 1,000 unit tablet, Take 1,000 Units by mouth daily., Disp: , Rfl:   ?  cyanocobalamin 1000 MCG tablet, Take 1,000 mcg by mouth daily., Disp: , Rfl:   ?  ferrous sulfate 325 (65 FE) MG tablet, TAKE 1 TABLET BY MOUTH TWICE DAILY, Disp: 60 tablet, Rfl: 4  ?  FLAXSEED OIL ORAL, Take 1,000 mg by mouth daily. , Disp: , Rfl:   ?  fluticasone (FLONASE) 50 mcg/actuation nasal spray, 1 spray into each nostril daily., Disp: , Rfl:   ?  FOLIC ACID ORAL, Take 1 tablet by mouth daily., Disp: , Rfl:   ?  furosemide (LASIX) 20 MG tablet, Take 1 tablet (20 mg total) by mouth daily., Disp: 90 tablet, Rfl: 3  ?  L. ACIDOPHILUS/BIFIDO LONGUM (PROBIOTIC PEARLS ORAL), Take 1 capsule by mouth daily., Disp: , Rfl:   ?  multivitamin therapeutic (THERAGRAN) tablet, Take 1 tablet by mouth daily., Disp: , Rfl:   ?  warfarin (COUMADIN) 3 MG tablet, Take one to two tablets (3 to 6 mg) by mouth daily. Adjust dose based on INR results as directed., Disp: 180 tablet, Rfl: 1  ?  clindamycin (CLEOCIN) 300 MG capsule, Take 2 capsules (600 mg total) by mouth see administration instructions. Take prior to Dentist appointments, Disp: 2 capsule, Rfl: 3  ?  potassium chloride SA (K-DUR,KLOR-CON) 20 MEQ tablet, TAKE 1 TABLET BY MOUTH DAILY., Disp: 90 tablet, Rfl: 1    Allergies:   Review of patient's allergies indicates no known allergies.      Objective:      Physical Exam  178 lb (80.7 kg)  5' 10\" (1.778 m)  Body mass index is 25.54 kg/(m^2).  /60 (Patient Site: Left Arm, Patient Position: Sitting, Cuff Size: Adult Regular)  Pulse 62  Resp 16  Ht 5' 10\" (1.778 m)  Wt 178 lb (80.7 kg)  BMI 25.54 kg/m2    General Appearance:   Awake, Alert, No acute distress.   HEENT:  Pupil equal and reactive to light. No scleral icterus; the mucous membranes were moist.   Neck: No cervical bruits. No JVD. No thyromegaly.     Chest: The spine was straight. The chest was symmetric.   Lungs:   Respirations unlabored; Lungs are clear to " auscultation. No crackles. No wheezing.   Cardiovascular:   Regular rhythm and rate, normal mechanical heart sounds with no murmurs. No rubs or gallops.    Abdomen:  Soft. No tenderness. Non-distended. Bowels sounds are present   Extremities: Equal tibial pulses. Mild leg edema.   Skin: No rashes or ulcers. Warm, Dry.   Musculoskeletal: No tenderness. No deformity.   Neurologic: Mood and affect are appropriate. No focal deficits.         EKG: Personally reviewed  Atrial fibrillation   T wave abnormality, consider anterior ischemia or digitalis effect   Prolonged QT   Abnormal ECG   When compared with ECG of 17-MAR-2017 08:16,   ST no longer depressed in Anterior leads   Nonspecific T wave abnormality no longer evident in Inferior leads   QT has lengthened    Cardiac Imaging Studies  ECHO on 6-1-2018:  1. Normal left ventricular size and systolic performance with a visually estimated ejection fraction of 60%.   2. There is a mechanical mitral valve prosthesis (Documented 31 mm [employer] valve).   Probably normal mitral valve prosthesis function; mean diastolic gradient is 4-5 mmHg.   Although mild-moderate mitral insufficiency cannot be excluded due to acoustic artifact generated by the prosthetic sewing ring, moderate or severe mitral insufficiency is unlikely due to the low transmitral gradient.  3. Normal right ventricular size with probable mild reduction right ventricular systolic performance.  4. There is mild to moderate left atrial enlargement. There is mild right atrial enlargement.   5. Right ventricular systolic pressure relative to right atrial pressure is mildly increased.  The pulmonary artery pressure is estimated to be 40-45 mmHg plus right atrial pressure (The IVC appears moderately dilated with decreased phasic variation in caval diameter consistent with elevated right atrial pressure).      When compared to the prior real-time surface echocardiogram dated for March 2017, there has been  interval replacement of the mitral valve bioprosthesis with a mechanical mitral valve.  There has been an interval improvement in left ventricular systolic performance.    Holter on 5-8-2017:  CONCLUSION: Probably persistent atrial fibrillation with a regular rhythm at  time suggesting accelerated junctional rhythm superimposed in atrial fibrillation.   Periods of sinus rhythm with first-degree AV block cannot be excluded but atrial  fibrillation and coexistent junctional rhythm seems more likely.    ECHO on 3-4-2017:    Left ventricle ejection fraction is moderately decreased. The calculated left ventricular ejection fraction is 42%.    Mitral Valve: There is a porcine bioprosthetic mitral valve. The prosthetic valve is abnormal. The prosthetic valve has the following abnormalities: vegetation. Mobile structure associated with one strut of the bioprosthesis. It is associated with the struck closest to the left ventricular outflow tract. Vegetation is suspected but cannot exclude the possibility of retained chordal structure. Severe calcific stenosis. Mild regurgitation. Vegetation present. Suspected vegetation.    Tricuspid Valve: Mobile structure on the right ventricular side of the septal leaflet of the tricuspid valve. This is consistent with mobile cord or vegetation. It is 2.23 cm in length.Trace tricuspid valve regurgitation. Mild pulmonary hypertension present. The estimated systolic pulmonary artery pressure is 55 mmhg.    Right Ventricle: The right ventricle is mildly dilated. The systolic function is mildly reduced. TAPSE is abnormal, which is consistent with abnormal right ventricular systolic function.    When compared to the previous study dated 2/15/2017, there are changes noted. The right ventricular systolic function appears to be diminished. The mobile density associated with the septal leaflet of the tricuspid valve is more prominent, and mobile density associated with the prosthesis in the mitral  position is more prominent..    BETI on 2-:    Left ventricle ejection fraction is normal. The estimated left ventricular ejection fraction is 60%.    There is a bioprosthetic mitral valve. There is marked thickening of both leaflets of the mitral valve. Mobile echodensity is noted near the junction of the sewing ring and the anterior leaflet of the mitral valve likely consistent with vegetation. There is also a mobile echodensity noted on the ventricular surface of the sewing ring which may represent vegetation or redundant chordal tissue. Reduced mitral valve opening noted with a mean gradient of 18 mmHg. No regurgitation.    Moderate left atrial enlargement with spontaneous contrast.    Trace tricuspid regurgitation.    Coronary angiogram on 6-  Right dominant coronary arterial system.  The right coronary artery exhibits mild disease in the proximal segment.    Lab Review   Lab Results   Component Value Date     05/29/2018    K 4.2 05/29/2018     05/29/2018    CO2 17 (L) 05/29/2018    BUN 40 (H) 05/29/2018    CREATININE 1.29 05/29/2018    CALCIUM 10.0 05/29/2018     Lab Results   Component Value Date    WBC 5.9 05/29/2018    WBC 7.3 09/07/2015    HGB 8.7 (L) 05/29/2018    HCT 28.3 (L) 05/29/2018    MCV 94 05/29/2018     05/29/2018     Lab Results   Component Value Date    CHOL 191 01/04/2018    CHOL 197 10/18/2016    CHOL 142 07/28/2016     Lab Results   Component Value Date    HDL 65 01/04/2018    HDL 62 10/18/2016    HDL 40 07/28/2016     Lab Results   Component Value Date    LDLCALC 113 01/04/2018    LDLCALC 109 10/18/2016    LDLCALC 87 07/28/2016     Lab Results   Component Value Date    TRIG 65 01/04/2018    TRIG 130 10/18/2016    TRIG 74 07/28/2016     No components found for: CHOLHDL  Lab Results   Component Value Date    TROPONINI 0.06 03/03/2017     Lab Results   Component Value Date     (H) 05/29/2018     Lab Results   Component Value Date    TSH 1.91 02/09/2017

## 2021-06-26 NOTE — PROGRESS NOTES
Progress Notes by Aleksandr Carney MD at 4/20/2018 12:50 PM     Author: Aleksandr Carney MD Service: -- Author Type: Physician    Filed: 4/20/2018  1:11 PM Encounter Date: 4/20/2018 Status: Signed    : Aleksandr Carney MD (Physician)           Click to link to [facility] NOTE       Assessment/Plan:   1.  S/p [place] Hector 31 mm  mitral valve replacement , secondary to endocarditis:   The patient recovered well from his surgery.  His symptoms were resolved.  Currently he is on penicillin 500 mg daily.  Continue warfarin, follow-up with warfarin clinic.  The target of INR is between 2.5 and 3.5.  Follow-up with warfarin clinic to adjust the dosage of warfarin.  Check INR today.    2. Mild coronary artery disease: 30% stenosis in RCA. No chest pain.        3. Persistent atrial fibrillation: He had MAZE procedure and AN amputation.  Metoprolol was discontinued due to bradycardia.    The patient is in regular rhythm.  He has no palpitations.    4. Hyperlipidemia: He is on Lipitor 40 mg daily.  Last LDL was 113.  Discussed lifestyle modification, diet control, regular exercise and weight loss.  The patient gained 12 pounds over 6 months.  He will repeat lipid profile after 6 months of lifestyle modification.  If his LDL still higher than 100, we will increase Lipitor to 80 mg daily.    5.  Chronic combined systolic and diastolic congestive heart failure, LVEF of 40-45%: He is compensated well.  No indication of for fluid retention.  Continue Lasix 20 mg every other day.  Continue to monitor his fluid status.    Thank you for the opportunity to be involving in the care of Mr. Bandar Wells.  If you have any questions please feel free to contact me.  I have you see him again in 12 months.  This note has been dictated using voice recognition software. Any grammatical or context distortions are unintentional and inherent to the software.     History of Present Illness:   It is my pleasure  to see Bandar Wells at the [facility] for routine cardiology follow-up. Bandar Wells is a 67 y.o. male with a medical history of bacterial endocarditis, severe mitral valve prolapse with chordae rupture, s/p St Hector porcine tissue mitral valve replacement on 7-1-2015, with bioprosthetic mitral valve endocarditis again, status post St Hector 31 mm  mitral valve replacement on March 15, 2017, dyslipidemia, chronic combined systolic and diastolic congestive heart failure, LVEF of 40-45%, and persistent atrial fibrillation s/p MAZE procedure.     The patient states that he has been doing well over last 6 months.  He has no fevers, chills.  He has no chest pain, shortness of breath, palpitations, dizziness, orthopnea, PND or leg edema.  His blood pressure and heart rate are in normal range.  He did not do regular exercise, gained 12 pounds over last 6 months.  His LDL was 113.    Past Medical History:     Patient Active Problem List   Diagnosis   ? Hx of bacterial endocarditis   ? Dyslipidemia   ? Mitral valve prolapse   ? S/P MVR (mitral valve replacement)   ? Recurrent pleural effusion on right   ? Pericardial effusion without cardiac tamponade   ? Status post Maze operation for atrial fibrillation   ? Paroxysmal atrial fibrillation   ? Coronary artery disease   ? Acute diastolic heart failure   ? CHF (congestive heart failure)   ? Hypovolemia   ? Hypotension   ? Acute respiratory failure with hypoxia   ? Acute blood loss anemia   ? Acute renal failure   ? Dysthymia   ? Prosthetic valve endocarditis, subsequent encounter   ? S/P mitral valve replacement with metallic valve   ? Persistent atrial fibrillation   ? Chronic combined systolic and diastolic congestive heart failure       Past Surgical History:     Past Surgical History:   Procedure Laterality Date   ? CARDIAC SURGERY     ? CHG X-RAY PELVIS 3+ VW N/A 7/1/2015    Procedure: MITRAL VALVE REPLACEMENT, MAZE PROCEDURE, CARDIOLOGY  TRANSESOPHAGEAL ECHOCARDIOGRAM;  Surgeon: Rm Munoz MD;  Location: [facility] OR;  Service:    ? LANG-MAZE MICROWAVE ABLATION     ? EYE SURGERY      Retial hole treatment   ? HERNIA REPAIR Right     inguinal   ? MITRAL VALVE REPLACEMENT N/A 3/16/2017    Procedure: REDO STERNOTOMY, REDO MITRAL VALVE REPLACEMENT, PLACEMENT TEMPORARY VENTRICULAR PACING WIRES, ANESTHESIA TRANSESOPHAGEAL ECHOCARDIOGRAM;  Surgeon: Raphael Rosenberg MD;  Location: [facility] OR;  Service:    ? MITRAL VALVE REPLACEMENT  2015    Bioprosthetic   ? PERICARDIAL WINDOW N/A 8/31/2015    Procedure: RIGHT PERICARDIAL WINDOW, TALC PLEURODESIS,VIDEO ASSISTED THOROSCOPY,DRAINAGE OF BILATERAL PLEURAL EFFUSIONS;  Surgeon: Rm Munoz MD;  Location: [facility] OR;  Service:    ? PICC  9/3/2015        ? PICC  2/14/2017            Family History:     Family History   Problem Relation Age of Onset   ? Atrial fibrillation Mother    ? Heart failure Mother    ? Emphysema Father    ? Heart failure Father    ? Kidney failure Sister      S/P Renal transplant   ? Alcoholism Brother    ? No Medical Problems Son    ? No Medical Problems Brother         Social History:    reports that he has never smoked. He has never used smokeless tobacco. He reports that he drinks about 3.6 oz of alcohol per week  He reports that he does not use illicit drugs.    Review of Systems:   General: Weight Gain  Eyes: WNL  Ears/Nose/Throat: Nosebleeds  Lungs: WNL  Heart: WNL  Stomach: WNL  Bladder: WNL  Muscle/Joints: WNL  Skin: WNL  Nervous System: WNL  Mental Health: WNL     Blood: WNL    Meds:     Current Outpatient Prescriptions:   ?  acetaminophen (TYLENOL) 500 MG tablet, Take 500-1,000 mg by mouth every 6 (six) hours as needed for pain. Pain 1-5  Give 500 mg  Or pain 6-10 give 1000 mg every 6 hours ad needed not to exceed 4 grams in 24 hours, Disp: , Rfl:   ?  atorvastatin (LIPITOR) 40 MG tablet, TAKE 1 TABLET (40 MG TOTAL) BY MOUTH  "AT BEDTIME., Disp: 90 tablet, Rfl: 3  ?  b complex vitamins tablet, Take 1 tablet by mouth daily. , Disp: , Rfl:   ?  cholecalciferol, vitamin D3, 1,000 unit tablet, Take 1,000 Units by mouth daily., Disp: , Rfl:   ?  clindamycin (CLEOCIN) 300 MG capsule, Take 2 capsules (600 mg total) by mouth see administration instructions. Take prior to Dentist appointments, Disp: 2 capsule, Rfl: 3  ?  cyanocobalamin 1000 MCG tablet, Take 1,000 mcg by mouth daily., Disp: , Rfl:   ?  FLAXSEED OIL ORAL, Take 1,000 mg by mouth daily. , Disp: , Rfl:   ?  fluticasone (FLONASE) 50 mcg/actuation nasal spray, 1 spray into each nostril daily., Disp: , Rfl:   ?  FOLIC ACID ORAL, Take 1 tablet by mouth daily., Disp: , Rfl:   ?  furosemide (LASIX) 20 MG tablet, Take 1 tablet (20 mg total) by mouth every other day., Disp: 30 tablet, Rfl: 11  ?  L. ACIDOPHILUS/BIFIDO LONGUM (PROBIOTIC PEARLS ORAL), Take 1 capsule by mouth daily., Disp: , Rfl:   ?  multivitamin therapeutic (THERAGRAN) tablet, Take 1 tablet by mouth daily., Disp: , Rfl:   ?  penicillin VK (PEN VK) 500 MG tablet, Take 1 tablet (500 mg total) by mouth daily., Disp: 90 tablet, Rfl: 0  ?  warfarin (COUMADIN) 6 MG tablet, Take 1/2 tab (3mg) to 1 tab (6mg), by mouth daily, as directed.  Adjust dose based on INR results., Disp: 90 tablet, Rfl: 1  ?  potassium chloride SA (K-DUR,KLOR-CON) 20 MEQ tablet, TAKE 1 TABLET BY MOUTH DAILY., Disp: 90 tablet, Rfl: 1    Allergies:   Review of patient's allergies indicates no known allergies.      Objective:      Physical Exam  191 lb (86.6 kg)  5' 10\" (1.778 m)  Body mass index is 27.41 kg/(m^2).  /72 (Patient Site: Left Arm, Patient Position: Sitting, Cuff Size: Adult Regular)  Pulse 60  Resp 18  Ht 5' 10\" (1.778 m)  Wt 191 lb (86.6 kg)  BMI 27.41 kg/m2    General Appearance:   Awake, Alert, No acute distress.   HEENT:  Pupil equal and reactive to light. No scleral icterus; the mucous membranes were moist.   Neck: No cervical bruits. " No JVD. No thyromegaly.     Chest: The spine was straight. The chest was symmetric.   Lungs:   Respirations unlabored; Lungs are clear to auscultation. No crackles. No wheezing.   Cardiovascular:   Regular rhythm and rate, normal first and second heart sounds with no murmurs. No rubs or gallops.    Abdomen:  Soft. No tenderness. Non-distended. Bowels sounds are present   Extremities: Equal tibial pulses. Trace leg edema.   Skin: No rashes or ulcers. Warm, Dry.   Musculoskeletal: No tenderness. No deformity.   Neurologic: Mood and affect are appropriate. No focal deficits.         EKG: Personally reviewed  Atrial fibrillation   T wave abnormality, consider anterior ischemia or digitalis effect   Prolonged QT   Abnormal ECG   When compared with ECG of 17-MAR-2017 08:16,   ST no longer depressed in Anterior leads   Nonspecific T wave abnormality no longer evident in Inferior leads   QT has lengthened    Cardiac Imaging Studies  Holter on 5-8-2017:  CONCLUSION: Probably persistent atrial fibrillation with a regular rhythm at  time suggesting accelerated junctional rhythm superimposed in atrial fibrillation.   Periods of sinus rhythm with first-degree AV block cannot be excluded but atrial  fibrillation and coexistent junctional rhythm seems more likely.    ECHO on 3-4-2017:    Left ventricle ejection fraction is moderately decreased. The calculated left ventricular ejection fraction is 42%.    Mitral Valve: There is a porcine bioprosthetic mitral valve. The prosthetic valve is abnormal. The prosthetic valve has the following abnormalities: vegetation. Mobile structure associated with one strut of the bioprosthesis. It is associated with the struck closest to the left ventricular outflow tract. Vegetation is suspected but cannot exclude the possibility of retained chordal structure. Severe calcific stenosis. Mild regurgitation. Vegetation present. Suspected vegetation.    Tricuspid Valve: Mobile structure on the right  ventricular side of the septal leaflet of the tricuspid valve. This is consistent with mobile cord or vegetation. It is 2.23 cm in length.Trace tricuspid valve regurgitation. Mild pulmonary hypertension present. The estimated systolic pulmonary artery pressure is 55 mmhg.    Right Ventricle: The right ventricle is mildly dilated. The systolic function is mildly reduced. TAPSE is abnormal, which is consistent with abnormal right ventricular systolic function.    When compared to the previous study dated 2/15/2017, there are changes noted. The right ventricular systolic function appears to be diminished. The mobile density associated with the septal leaflet of the tricuspid valve is more prominent, and mobile density associated with the prosthesis in the mitral position is more prominent..    BETI on 2-:    Left ventricle ejection fraction is normal. The estimated left ventricular ejection fraction is 60%.    There is a bioprosthetic mitral valve. There is marked thickening of both leaflets of the mitral valve. Mobile echodensity is noted near the junction of the sewing ring and the anterior leaflet of the mitral valve likely consistent with vegetation. There is also a mobile echodensity noted on the ventricular surface of the sewing ring which may represent vegetation or redundant chordal tissue. Reduced mitral valve opening noted with a mean gradient of 18 mmHg. No regurgitation.    Moderate left atrial enlargement with spontaneous contrast.    Trace tricuspid regurgitation.    Coronary angiogram on 6-  Right dominant coronary arterial system.  The right coronary artery exhibits mild disease in the proximal segment.    Lab Review   Lab Results   Component Value Date     01/04/2018    K 4.9 01/17/2018     (H) 01/04/2018    CO2 18 (L) 01/04/2018    BUN 56 (H) 01/04/2018    CREATININE 1.63 (H) 01/04/2018    CALCIUM 10.4 01/04/2018     Lab Results   Component Value Date    WBC 6.2 05/01/2017     WBC 7.3 09/07/2015    HGB 9.7 (L) 05/01/2017    HCT 30.9 (L) 05/01/2017    MCV 92 05/01/2017     05/01/2017     Lab Results   Component Value Date    CHOL 191 01/04/2018    CHOL 197 10/18/2016    CHOL 142 07/28/2016     Lab Results   Component Value Date    HDL 65 01/04/2018    HDL 62 10/18/2016    HDL 40 07/28/2016     Lab Results   Component Value Date    LDLCALC 113 01/04/2018    LDLCALC 109 10/18/2016    LDLCALC 87 07/28/2016     Lab Results   Component Value Date    TRIG 65 01/04/2018    TRIG 130 10/18/2016    TRIG 74 07/28/2016     Lab Results   Component Value Date    TROPONINI 0.06 03/03/2017     Lab Results   Component Value Date     (H) 03/06/2017     Lab Results   Component Value Date    TSH 1.91 02/09/2017

## 2021-06-27 NOTE — PROGRESS NOTES
Progress Notes by Aleksandr Carney MD at 5/2/2019  8:10 AM     Author: Aleksandr Carney MD Service: -- Author Type: Physician    Filed: 5/2/2019  8:56 AM Encounter Date: 5/2/2019 Status: Signed    : Aleksandr Carney MD (Physician)           Click to link to [facility]     [facility] HEART CARE NOTE       Assessment/Plan:   1.  Chronic combined systolic and diastolic congestive heart failure, LVEF of 40-45%, improved to 58% per recent echo in February 2019: The patient has chronic kidney disease stage III.  Currently he is on Lasix 20 mg daily which may not enough.  We discussed more aggressive diuresis to get rid of 10 to 12 pounds of actual fluid.  His dry weight is less than 180 pounds.  Increase Lasix to 40 mg daily.  Routine labs BMP and BMP.  The patient will repeat BMP in 2 weeks after his Lasix was increased to 40 mg daily.  He also taken potassium supplement.  Continue to use LUIS and elastic support socks.    2.  S/p [employer] 31 mm  mitral valve replacement , secondary to endocarditis:   He is on he is on penicillin 500 mg daily.  Continue warfarin, follow-up with warfarin clinic.      3. Mild coronary artery disease: 30% stenosis in RCA. No chest pain.        3. Persistent atrial fibrillation: He had MAZE procedure and AN amputation.  Metoprolol was discontinued due to bradycardia. The patient is in regular rhythm.  He has no palpitations.    4. Hyperlipidemia: Continue Lipitor 40 mg daily.      5.  Chronic kidney disease stage III: Monitor his creatinine level.    Thank you for the opportunity to be involving in the care of Mr. Bandar Wells.  If you have any questions please feel free to contact me.  I have you see him again in 3 months and as needed.  This note has been dictated using voice recognition software. Any grammatical or context distortions are unintentional and inherent to the software.     History of Present Illness:   It is my pleasure to see Bandar Wells at the [facility] clinic for routine cardiology follow-up. Bandar Wells is a 68 y.o. male with a medical history of bacterial endocarditis, severe mitral valve prolapse with chordae rupture, s/p [employer] Hector porcine tissue mitral valve replacement on 7-1-2015, with bioprosthetic mitral valve endocarditis again, status post [employer] Hector 31 mm  mitral valve replacement on March 15, 2017, dyslipidemia, chronic combined systolic and diastolic congestive heart failure, LVEF of 40-45%, and persistent atrial fibrillation s/p MAZE procedure.     The patient states that he gained 20 pounds in January 2019.  With diuresis, his weight still 14 pounds higher than his dry weight.  He complains of for fatigue, dyspnea on exertion.  He had no chest pain, palpitations, dizziness, orthopnea, PND.  Has a bilateral leg edema.  He has no fever chills no skin rash.  His blood pressure and heart rate are controlled.  He did not have side effects from his current medications.         Past Medical History:     Patient Active Problem List   Diagnosis   ? Hx of bacterial endocarditis   ? Dyslipidemia   ? Mitral valve prolapse   ? S/P MVR (mitral valve replacement)   ? Recurrent pleural effusion on right   ? Pericardial effusion without cardiac tamponade   ? Status post Maze operation for atrial fibrillation   ? Paroxysmal atrial fibrillation (H)   ? Coronary artery disease   ? Acute diastolic heart failure (H)   ? CHF (congestive heart failure) (H)   ? Hypovolemia   ? Hypotension   ? Acute respiratory failure with hypoxia (H)   ? Acute blood loss anemia   ? Acute renal failure (H)   ? Dysthymia   ? Prosthetic valve endocarditis, subsequent encounter   ? S/P mitral valve replacement with metallic valve   ? Persistent atrial fibrillation (H)   ? Chronic combined systolic and diastolic congestive heart failure (H)   ? Acute on chronic combined systolic and diastolic congestive heart failure (H)   ? Lymphedema of both lower extremities       Past  Surgical History:     Past Surgical History:   Procedure Laterality Date   ? CARDIAC SURGERY     ? CHG X-RAY PELVIS 3+ VW N/A 7/1/2015    Procedure: MITRAL VALVE REPLACEMENT, MAZE PROCEDURE, CARDIOLOGY TRANSESOPHAGEAL ECHOCARDIOGRAM;  Surgeon: Rm Munoz MD;  Location: [facility];  Service:    ? LANG-MAZE MICROWAVE ABLATION     ? EYE SURGERY      Retial hole treatment   ? HERNIA REPAIR Right     inguinal   ? MITRAL VALVE REPLACEMENT N/A 3/16/2017    Procedure: REDO STERNOTOMY, REDO MITRAL VALVE REPLACEMENT, PLACEMENT TEMPORARY VENTRICULAR PACING WIRES, ANESTHESIA TRANSESOPHAGEAL ECHOCARDIOGRAM;  Surgeon: Raphael Rosenberg MD;  Location: [facility] OR;  Service:    ? MITRAL VALVE REPLACEMENT  2015    Bioprosthetic   ? PERICARDIAL WINDOW N/A 8/31/2015    Procedure: RIGHT PERICARDIAL WINDOW, TALC PLEURODESIS,VIDEO ASSISTED THOROSCOPY,DRAINAGE OF BILATERAL PLEURAL EFFUSIONS;  Surgeon: Rm Munoz MD;  Location: [facility];  Service:    ? PICC  9/3/2015        ? PICC  2/14/2017            Family History:     Family History   Problem Relation Age of Onset   ? Atrial fibrillation Mother    ? Heart failure Mother    ? Emphysema Father    ? Heart failure Father    ? Kidney failure Sister         S/P Renal transplant   ? Alcoholism Brother    ? No Medical Problems Son    ? No Medical Problems Brother         Social History:    reports that he has never smoked. He has never used smokeless tobacco. He reports that he drinks about 3.6 oz of alcohol per week. He reports that he does not use drugs.    Review of Systems:   General: Weight Gain, Weight Loss  Eyes: WNL  Ears/Nose/Throat: Nosebleeds  Lungs: Shortness of Breath, Wheezing  Heart: Shortness of Breath with activity, Leg Swelling  Stomach: WNL  Bladder: WNL  Muscle/Joints: WNL  Skin: WNL  Nervous System: WNL  Mental Health: Anxiety     Blood: WNL    Meds:     Current Outpatient Medications:   ?  acetaminophen (TYLENOL)  500 MG tablet, Take 500-1,000 mg by mouth every 6 (six) hours as needed for pain. Pain 1-5  Give 500 mg  Or pain 6-10 give 1000 mg every 6 hours ad needed not to exceed 4 grams in 24 hours, Disp: , Rfl:   ?  atorvastatin (LIPITOR) 40 MG tablet, TAKE 1 TABLET (40 MG TOTAL) BY MOUTH AT BEDTIME., Disp: 90 tablet, Rfl: 3  ?  b complex vitamins tablet, Take 1 tablet by mouth daily. , Disp: , Rfl:   ?  cholecalciferol, vitamin D3, 1,000 unit tablet, Take 1,000 Units by mouth daily., Disp: , Rfl:   ?  clindamycin (CLEOCIN) 300 MG capsule, Take 2 capsules (600 mg total) by mouth see administration instructions. Take prior to Dentist appointments, Disp: 2 capsule, Rfl: 3  ?  cyanocobalamin 1000 MCG tablet, Take 1,000 mcg by mouth daily., Disp: , Rfl:   ?  ferrous sulfate 325 (65 FE) MG tablet, Take 1 tablet (325 mg total) by mouth 2 (two) times a day. (Patient taking differently: Take 1 tablet by mouth daily with breakfast.    ), Disp: 60 tablet, Rfl: 4  ?  FLAXSEED OIL ORAL, Take 1,000 mg by mouth daily. , Disp: , Rfl:   ?  fluticasone (FLONASE) 50 mcg/actuation nasal spray, 1 spray into each nostril daily., Disp: , Rfl:   ?  FOLIC ACID ORAL, Take 1 tablet by mouth daily., Disp: , Rfl:   ?  furosemide (LASIX) 40 MG tablet, Take 1 tablet (40 mg total) by mouth daily., Disp: 30 tablet, Rfl: 11  ?  L. ACIDOPHILUS/BIFIDO LONGUM (PROBIOTIC PEARLS ORAL), Take 1 capsule by mouth daily., Disp: , Rfl:   ?  multivitamin therapeutic (THERAGRAN) tablet, Take 1 tablet by mouth daily., Disp: , Rfl:   ?  penicillin VK (PEN VK) 500 MG tablet, TAKE 1 TABLET BY MOUTH DAILY FOR 30 DAYS (Patient taking differently: TAKE 1 TABLET BY MOUTH DAILY   ), Disp: 30 tablet, Rfl: 9  ?  potassium chloride (K-DUR,KLOR-CON) 20 MEQ tablet, TAKE 1 TABLET BY MOUTH DAILY., Disp: 90 tablet, Rfl: 1  ?  triamcinolone (KENALOG) 0.1 % cream, APPLY TOPICALLY TWICE A DAY AS NEEDED, Disp: , Rfl: 2  ?  warfarin (COUMADIN/JANTOVEN) 3 MG tablet, Take ONE tab (3mg) to TWO  "tabs (6mg) by mouth daily, as directed.  Adjust dose based on INR results.., Disp: 160 tablet, Rfl: 1    Allergies:   Patient has no known allergies.      Objective:      Physical Exam  192 lb (87.1 kg)  5' 9.5\" (1.765 m)  Body mass index is 27.95 kg/m .  /78 (Patient Site: Left Arm, Patient Position: Sitting, Cuff Size: Adult Large)   Pulse 84   Resp 16   Ht 5' 9.5\" (1.765 m)   Wt 192 lb (87.1 kg)   BMI 27.95 kg/m      General Appearance:   Awake, Alert, No acute distress.   HEENT:  Pupil equal and reactive to light. No scleral icterus; the mucous membranes were moist.   Neck: No cervical bruits. + JVD. No thyromegaly.     Chest: The spine was straight. The chest was symmetric.   Lungs:   Respirations unlabored; Lungs are clear to auscultation. No crackles. No wheezing.   Cardiovascular:   Regular rhythm and rate, normal mechanical heart sounds with no murmurs. No rubs or gallops.    Abdomen:  Soft. No tenderness. Non-distended. Bowels sounds are present   Extremities: Equal tibial pulses. Bilateral leg edema.   Skin: No rashes or ulcers. Warm, Dry.   Musculoskeletal: No tenderness. No deformity.   Neurologic: Mood and affect are appropriate. No focal deficits.         EKG: Personally reviewed  Atrial fibrillation   T wave abnormality, consider anterior ischemia or digitalis effect   Prolonged QT   Abnormal ECG   When compared with ECG of 17-MAR-2017 08:16,   ST no longer depressed in Anterior leads   Nonspecific T wave abnormality no longer evident in Inferior leads   QT has lengthened    Cardiac Imaging Studies  ECHO on 2-:    When compared to the previous study dated 6/1/2018, no significant change.    Left ventricle ejection fraction is normal. The calculated left ventricular ejection fraction is 58%.    Normal left ventricular size and systolic function.    Normal right ventricular size and systolic function.    Left atrial volume is mildly increased.    Mitral Valve: There is a mechanical " mitral valve prosthesis. Mean gradient is normal for this prosthesis and unchanged compared to prior. Mild paravalvular regurgitation. This is difficult to assess due to acoustic shadowing.    The aortic root is mildly dilated.      ECHO on 6-1-2018:  1. Normal left ventricular size and systolic performance with a visually estimated ejection fraction of 60%.   2. There is a mechanical mitral valve prosthesis (Documented 31 mm [employer] valve).   Probably normal mitral valve prosthesis function; mean diastolic gradient is 4-5 mmHg.   Although mild-moderate mitral insufficiency cannot be excluded due to acoustic artifact generated by the prosthetic sewing ring, moderate or severe mitral insufficiency is unlikely due to the low transmitral gradient.  3. Normal right ventricular size with probable mild reduction right ventricular systolic performance.  4. There is mild to moderate left atrial enlargement. There is mild right atrial enlargement.   5. Right ventricular systolic pressure relative to right atrial pressure is mildly increased.  The pulmonary artery pressure is estimated to be 40-45 mmHg plus right atrial pressure (The IVC appears moderately dilated with decreased phasic variation in caval diameter consistent with elevated right atrial pressure).      When compared to the prior real-time surface echocardiogram dated for March 2017, there has been interval replacement of the mitral valve bioprosthesis with a mechanical mitral valve.  There has been an interval improvement in left ventricular systolic performance.    Holter on 5-8-2017:  CONCLUSION: Probably persistent atrial fibrillation with a regular rhythm at  time suggesting accelerated junctional rhythm superimposed in atrial fibrillation.   Periods of sinus rhythm with first-degree AV block cannot be excluded but atrial  fibrillation and coexistent junctional rhythm seems more likely.    ECHO on 3-4-2017:    Left ventricle ejection fraction is  moderately decreased. The calculated left ventricular ejection fraction is 42%.    Mitral Valve: There is a porcine bioprosthetic mitral valve. The prosthetic valve is abnormal. The prosthetic valve has the following abnormalities: vegetation. Mobile structure associated with one strut of the bioprosthesis. It is associated with the struck closest to the left ventricular outflow tract. Vegetation is suspected but cannot exclude the possibility of retained chordal structure. Severe calcific stenosis. Mild regurgitation. Vegetation present. Suspected vegetation.    Tricuspid Valve: Mobile structure on the right ventricular side of the septal leaflet of the tricuspid valve. This is consistent with mobile cord or vegetation. It is 2.23 cm in length.Trace tricuspid valve regurgitation. Mild pulmonary hypertension present. The estimated systolic pulmonary artery pressure is 55 mmhg.    Right Ventricle: The right ventricle is mildly dilated. The systolic function is mildly reduced. TAPSE is abnormal, which is consistent with abnormal right ventricular systolic function.    When compared to the previous study dated 2/15/2017, there are changes noted. The right ventricular systolic function appears to be diminished. The mobile density associated with the septal leaflet of the tricuspid valve is more prominent, and mobile density associated with the prosthesis in the mitral position is more prominent..    BETI on 2-:    Left ventricle ejection fraction is normal. The estimated left ventricular ejection fraction is 60%.    There is a bioprosthetic mitral valve. There is marked thickening of both leaflets of the mitral valve. Mobile echodensity is noted near the junction of the sewing ring and the anterior leaflet of the mitral valve likely consistent with vegetation. There is also a mobile echodensity noted on the ventricular surface of the sewing ring which may represent vegetation or redundant chordal tissue. Reduced  mitral valve opening noted with a mean gradient of 18 mmHg. No regurgitation.    Moderate left atrial enlargement with spontaneous contrast.    Trace tricuspid regurgitation.    Coronary angiogram on 6-  Right dominant coronary arterial system.  The right coronary artery exhibits mild disease in the proximal segment.    Lab Review   Lab Results   Component Value Date     03/06/2019    K 4.0 03/06/2019     03/06/2019    CO2 24 03/06/2019    BUN 50 (H) 03/06/2019    CREATININE 1.66 (H) 03/06/2019    CALCIUM 10.3 03/06/2019     Lab Results   Component Value Date    WBC 5.9 05/29/2018    WBC 7.3 09/07/2015    HGB 9.3 (L) 02/19/2019    HCT 28.3 (L) 05/29/2018    MCV 94 05/29/2018     05/29/2018     Lab Results   Component Value Date    CHOL 136 01/16/2019    CHOL 191 01/04/2018    CHOL 197 10/18/2016     Lab Results   Component Value Date    HDL 37 (L) 01/16/2019    HDL 65 01/04/2018    HDL 62 10/18/2016     Lab Results   Component Value Date    LDLCALC 81 01/16/2019    LDLCALC 113 01/04/2018    LDLCALC 109 10/18/2016     Lab Results   Component Value Date    TRIG 92 01/16/2019    TRIG 65 01/04/2018    TRIG 130 10/18/2016     No components found for: CHOLHDL  Lab Results   Component Value Date    TROPONINI 0.06 03/03/2017     Lab Results   Component Value Date     (H) 02/27/2019     Lab Results   Component Value Date    TSH 1.91 02/09/2017

## 2021-06-29 ENCOUNTER — THERAPY VISIT (OUTPATIENT)
Dept: PHYSICAL THERAPY | Facility: CLINIC | Age: 71
End: 2021-06-29
Payer: COMMERCIAL

## 2021-06-29 DIAGNOSIS — R21 RASH: ICD-10-CM

## 2021-06-29 DIAGNOSIS — M53.82 WEAKNESS OF NECK: ICD-10-CM

## 2021-06-29 PROCEDURE — 97110 THERAPEUTIC EXERCISES: CPT | Mod: GP | Performed by: PHYSICAL THERAPIST

## 2021-06-29 PROCEDURE — 97112 NEUROMUSCULAR REEDUCATION: CPT | Mod: GP | Performed by: PHYSICAL THERAPIST

## 2021-07-01 ENCOUNTER — COMMUNICATION - HEALTHEAST (OUTPATIENT)
Dept: INFECTIOUS DISEASES | Facility: CLINIC | Age: 71
End: 2021-07-01

## 2021-07-01 DIAGNOSIS — T82.6XXD PROSTHETIC VALVE ENDOCARDITIS, SUBSEQUENT ENCOUNTER: ICD-10-CM

## 2021-07-01 DIAGNOSIS — I38 PROSTHETIC VALVE ENDOCARDITIS, SUBSEQUENT ENCOUNTER: ICD-10-CM

## 2021-07-03 NOTE — ADDENDUM NOTE
Addendum Note by Gustavo Crespo RN at 12/24/2020 10:26 AM     Author: Gustavo Crespo RN Service: -- Author Type: Registered Nurse    Filed: 12/24/2020 10:26 AM Encounter Date: 12/17/2020 Status: Signed    : Gustavo Crespo RN (Registered Nurse)    Addended by: GUSTAVO CRESPO on: 12/24/2020 10:26 AM        Modules accepted: Orders

## 2021-07-03 NOTE — ADDENDUM NOTE
Addendum Note by Jin Ross MD at 6/2/2020  9:40 AM     Author: Jin Ross MD Service: -- Author Type: Physician    Filed: 6/3/2020  8:23 AM Encounter Date: 6/2/2020 Status: Signed    : Jin Ross MD (Physician)    Addended by: JIN ROSS on: 6/3/2020 08:23 AM        Modules accepted: Orders

## 2021-07-03 NOTE — ADDENDUM NOTE
Addendum Note by Karlene Connelly PharmD at 7/3/2019  4:07 PM     Author: Karlene Connelly PharmD Service: -- Author Type: Pharmacist    Filed: 7/3/2019  4:07 PM Encounter Date: 7/2/2019 Status: Signed    : Karlene Connelly PharmD (Pharmacist)    Addended by: KARLENE CONNELLY on: 7/3/2019 04:07 PM        Modules accepted: Orders

## 2021-07-03 NOTE — ADDENDUM NOTE
Addendum Note by Robert Hampton MD at 2/19/2019 10:00 AM     Author: Robert Hampton MD Service: -- Author Type: Physician    Filed: 2/19/2019  4:25 PM Encounter Date: 2/19/2019 Status: Signed    : Robert Hampton MD (Physician)    Addended by: ROBERT HAMPTON on: 2/19/2019 04:25 PM        Modules accepted: Orders

## 2021-07-03 NOTE — ADDENDUM NOTE
Addendum Note by Shwetha Vasquez CMA at 11/10/2020 11:10 AM     Author: Shwetha Vasquez CMA Service: -- Author Type: Certified Medical Assistant    Filed: 11/10/2020 11:10 AM Encounter Date: 11/9/2020 Status: Signed    : Shwetha Vasquez CMA (Certified Medical Assistant)    Addended by: SHWETHA VASQUEZ on: 11/10/2020 11:10 AM        Modules accepted: Orders

## 2021-07-03 NOTE — ADDENDUM NOTE
Addendum Note by Britta Archer, PharmD at 2/21/2020  2:37 PM     Author: Britta Archer, PharmD Service: -- Author Type: Pharmacist    Filed: 2/21/2020  2:37 PM Encounter Date: 2/20/2020 Status: Signed    : Britta Archer, PharmD (Pharmacist)    Addended by: BRITTA ARCHER on: 2/21/2020 02:37 PM        Modules accepted: Orders

## 2021-07-03 NOTE — ADDENDUM NOTE
Addendum Note by Farhad Espinosa MLT at 2/19/2019 10:00 AM     Author: Farhad Espinosa MLT Service: -- Author Type:     Filed: 2/20/2019  6:46 AM Encounter Date: 2/19/2019 Status: Signed    : Farhad Espinosa MLT ()    Addended by: FARHAD ESPINOSA on: 2/20/2019 06:46 AM        Modules accepted: Orders

## 2021-07-03 NOTE — ADDENDUM NOTE
Addendum Note by Emmie Alexander RT (R) at 9/11/2019  7:45 AM     Author: Emmie Alexander RT (R) Service: -- Author Type: Radiologic Technologist    Filed: 10/31/2019  4:27 PM Encounter Date: 9/11/2019 Status: Signed    : Emmie Alexander RT (R) (Radiologic Technologist)    Addended by: EMMIE ALEXANDER on: 10/31/2019 04:27 PM        Modules accepted: Orders

## 2021-07-03 NOTE — ADDENDUM NOTE
Addendum Note by Jess Watkins MLT at 6/26/2020  9:00 AM     Author: Jess Watkins MLT Service: -- Author Type:     Filed: 6/29/2020  9:03 AM Encounter Date: 6/26/2020 Status: Signed    : Jess Watkins MLT ()    Addended by: JESS WATKINS on: 6/29/2020 09:03 AM        Modules accepted: Orders

## 2021-07-03 NOTE — ADDENDUM NOTE
Addendum Note by Jin Ross MD at 6/2/2020  9:40 AM     Author: Jin Ross MD Service: -- Author Type: Physician    Filed: 6/2/2020  2:12 PM Encounter Date: 6/2/2020 Status: Signed    : Jin Ross MD (Physician)    Addended by: JIN ROSS on: 6/2/2020 02:12 PM        Modules accepted: Orders

## 2021-07-04 NOTE — TELEPHONE ENCOUNTER
Telephone Encounter by Tea Torres LPN at 6/16/2021  8:14 AM     Author: Tea Torres LPN Service: -- Author Type: Licensed Nurse    Filed: 6/16/2021  8:18 AM Encounter Date: 6/15/2021 Status: Signed    : Tea Torres LPN (Licensed Nurse)       Patient has an appointment with Dr. Hicks on 7/15/21.        Medication: Oxycodone 10 mg  Last Date Filled 5/26/21, 42 tablets for 9 days   pulled: YES         Only PCP Prescribing? : YES  Taken as prescribed from physician notes? YES    CSA in last year: YES  Random Utox in last year: NO  Opioids + benzodiazepines? YES    Is patient on the Executive Review Team? NO      All responses suggest: Refilling prescription

## 2021-07-04 NOTE — TELEPHONE ENCOUNTER
Telephone Encounter by Mira Santoyo RN at 7/1/2021  2:53 PM     Author: Mira Santoyo RN Service: -- Author Type: Registered Nurse    Filed: 7/1/2021  2:54 PM Encounter Date: 7/1/2021 Status: Signed    : Mira Santoyo, RN (Registered Nurse)       Per Dr Ordonez, pt should be seen once yearly for continued PCN refills.

## 2021-07-04 NOTE — TELEPHONE ENCOUNTER
Telephone Encounter by Shmuel Jeffers RN at 6/18/2021 11:33 AM     Author: Shmuel Jeffers RN Service: -- Author Type: Registered Nurse    Filed: 6/18/2021 11:42 AM Encounter Date: 6/18/2021 Status: Signed    : Shmuel Jeffers RN (Registered Nurse)       ANTICOAGULATION  MANAGEMENT    Assessment     Today's INR result of 3.7 is Supratherapeutic (goal INR of 2.5-3.5)        Warfarin taken as previously instructed    No new diet changes affecting INR    No new medication/supplements affecting INR    Continues to tolerate warfarin with no reported s/s of bleeding or thromboembolism     Previous INR was Subtherapeutic    Plan:     Spoke on phone with Bandar regarding INR result and instructed:      Warfarin Dosing Instructions:  3 mg today to lower inr then continue current warfarin dose 7.5 mg daily on wed; and 6 mg daily rest of week      Instructed patient to follow up no later than: two weeks, acelis  :     Bandar verbalizes understanding and agrees to warfarin dosing plan.    Instructed to call the [facility] for any changes, questions or concerns. (#[phone])   ?   Shmuel Jeffers RN    Subjective/Objective:      Bandar Wells, a 70 y.o. male is on warfarin. Bandar      Bandar reports:         Anticoagulation Episode Summary     Current INR goal:  2.5-3.5   TTR:  55.2 % (1 y)   Next INR check:  7/2/2021   INR from last check:  3.70 (6/18/2021)   Weekly max warfarin dose:     Target end date:  Indefinite   INR check location:     Preferred lab:     Send INR reminders to:  [facility]    Indications    Paroxysmal atrial fibrillation (H) [I48.0]  S/P MVR (mitral valve replacement) (Resolved) [Z95.2]  S/P mitral valve replacement with metallic valve [Z95.4]           Comments:  INR TARGET GOAL 2.5 - 3.5         Anticoagulation Care Providers     Provider Role Specialty Phone number    Jin Hicks MD [facility] [phone]

## 2021-07-04 NOTE — ADDENDUM NOTE
Addendum Note by Rachael Wick CMA at 5/27/2021 10:48 AM     Author: Rachael Wick CMA Service: -- Author Type: Certified Medical Assistant    Filed: 5/27/2021 10:48 AM Encounter Date: 5/24/2021 Status: Signed    : Rachael Wick CMA (Certified Medical Assistant)    Addended by: RCAHAEL WICK on: 5/27/2021 10:48 AM        Modules accepted: Orders

## 2021-07-04 NOTE — TELEPHONE ENCOUNTER
Telephone Encounter by Meg Jacinto RN at 6/7/2021  3:56 PM     Author: Meg Jacinto RN Service: -- Author Type: Registered Nurse    Filed: 6/7/2021  4:04 PM Encounter Date: 6/7/2021 Status: Signed    : Meg Jaicnto RN (Registered Nurse)       ANTICOAGULATION  MANAGEMENT    Assessment     Today's INR result of 2.30 is Subtherapeutic (goal INR of 2.0-3.0)        Warfarin taken as previously instructed    No new diet changes affecting INR    No new medication/supplements affecting INR    Continues to tolerate warfarin with no reported s/s of bleeding or thromboembolism     Previous INR was Therapeutic at 2.60 on     Plan:     Spoke on phone with Bandar regarding INR result and instructed:      Warfarin Dosing Instructions:   (evenings. 3mg tabs)   - Tonight, advised one time booster with 7.5mg warfarin dose, then continue current warfarin dose 7.5 mg daily on Wednesdays; and 6 mg daily rest of week.    Instructed patient to follow up no later than:  2 wks with home monitor thru [employer].    Education provided: importance of consistent vitamin K intake and target INR goal and significance of current INR result    Bandar verbalizes understanding and agrees to warfarin dosing plan.    Instructed to call the [facility] for any changes, questions or concerns. (#[phone])   ?   Meg Jacinto RN    Subjective/Objective:      Bandar Wells, a 70 y.o. male is on warfarin. Bandar Portillo reports:     Home warfarin dose: verbally confirmed home dose with Bandar and updated on anticoagulation calendar     Missed doses: No     Medication changes:  No     S/S of bleeding or thromboembolism:  No     New Injury or illness:  No     Changes in diet or alcohol consumption:  No     Upcoming surgery, procedure or cardioversion:  No    Anticoagulation Episode Summary     Current INR goal:  2.5-3.5   TTR:  53.1 % (1 y)   Next INR check:  6/21/2021   INR from last check:  2.30 (6/7/2021)   Weekly max warfarin  dose:     Target end date:  Indefinite   INR check location:     Preferred lab:     Send INR reminders to:  [facility]    Indications    Paroxysmal atrial fibrillation (H) [I48.0]  S/P MVR (mitral valve replacement) (Resolved) [Z95.2]  S/P mitral valve replacement with metallic valve [Z95.4]           Comments:  INR TARGET GOAL 2.5 - 3.5         Anticoagulation Care Providers     Provider Role Specialty Phone number    Jin Hicks MD [facility] Internal Medicine [phone]

## 2021-07-04 NOTE — PROGRESS NOTES
Progress Notes by Ronn Ordonez MD at 6/3/2021  2:40 PM     Author: Ronn Ordonez MD Service: -- Author Type: Physician    Filed: 6/3/2021  3:18 PM Encounter Date: 6/3/2021 Status: Signed    : Ronn Ordonez MD (Physician)       [facility] Progress Note    SUBJECTIVE:  Pt has had a very eventful year and I don't have all the outside notes but sees mariella JACQUES at [facility] and doing therapy via [place].  Had horrible DRESS syndrome to possibly allopurinol.  Most of this comes from the pt verbally and I have not yet read all his records.      Remains on daily SBE medication I have had on for years.  He is CLEARLY much different globally today as far as weak and sxs of failure to thrive, CHF are concerned.  Lives still about 3 blocks from me.       REVIEW OF SYSTEMS:  Negative unless as listed above.  Social history, Family history, Medications: reviewed.    OBJECTIVE:  Vitals:    06/03/21 1452   BP: 122/70   Pulse: 64   Temp: 98.2  F (36.8  C)                 PHYSICAL EXAM:  Alert, awake  Vitals tabulated above, reviewed  Neck supple without lymphadenopathy  Sclera normal color, not injected  CARDIOVASCULAR regular rate and rhythm, no murmur  Lungs CLEAR TO AUSCULTATION   Abdomen soft, NT/ND, absent HEPATOSPLENOMEGALY  Skin normal  Joints normal  Neurologic exam non focal  Irregular pulse    Antibiotics:  PCN  Pertinent labs:                          MICROBIOLOGY DATA:        IMAGING/RADIOLOGY:  Comprehensive Metabolic Panel (Order [id number])  Contains abnormal data Comprehensive Metabolic Panel  Order: [id number]  Status:  Final result   Visible to patient:  Yes (MyChart) Next appt:  07/15/2021 at 10:00 AM in Internal Medicine (Jin Hicks MD) Dx:  CKD (chronic kidney disease) stage 4,...    Ref Range & Units 4/15/21 1127    Sodium 136 - 145 mmol/L 141     Potassium 3.5 - 5.0 mmol/L 4.2     Chloride 98 - 107 mmol/L 100     CO2 22 - 31 mmol/L 25     Anion Gap, Calculation 5 - 18 mmol/L 16      Glucose 70 - 125 mg/dL 99     BUN 8 - 28 mg/dL 46High      Creatinine 0.70 - 1.30 mg/dL 2.13High      GFR MDRD Af Amer >60 mL/min/1.73m2 37Low      GFR MDRD Non Af Amer >60 mL/min/1.73m2 31Low      Bilirubin, Total 0.0 - 1.0 mg/dL 0.9     Calcium 8.5 - 10.5 mg/dL 10.3     Protein, Total 6.0 - 8.0 g/dL 8.6High      Albumin 3.5 - 5.0 g/dL 4.2     Alkaline Phosphatase 45 - 120 U/L 90     AST 0 - 40 U/L 45High      ALT 0 - 45 U/L 19    Resulting Agency  [facility]      Narrative  Performed by: [facility]  Fasting Glucose reference range is 70-99 mg/dL per   American Diabetes Association (ADA) guidelines.      Specimen Collected: 04/15/21 11:27 Last Resulted: 04/15/21 16:03 Lab Flowsheet Order Details View Encounter Lab and Collection Details Routing Result History         Related Result Highlights           Thyroid Cascade  Final result 4/15/2021             Lipid Cascade  Final result 4/15/2021             Uric Acid  Final result 4/15/2021             Phosphorus  Final result 4/15/2021                  Other Results from 4/15/2021    Contains abnormal data INR  Order: [id number]    Status:  Final result   Visible to patient:  Yes (MyChart) Next appt:  07/15/2021 at 10:00 AM in Internal Medicine (Jin Hicks MD) Dx:  Long term (current) use of anticoagul...  (important suggestion)  Newer results are available. Click to view them now.     Ref Range & Units 4/15/21 1132    INR 0.90 - 1.10 2.80High     Resulting Agency  [facility]      Narrative  Performed by: [facility]  INR Therapeutic Ranges:   Mech. Valve 2.5-3.5   Post Surg.  2.0-3.0   DVT/PE      2.0-3.0      Specimen Collected: 04/15/21 11:32 Last Resulted: 04/15/21 11:39 Lab Flowsheet Order Details View Encounter Lab and Collection Details Routing Result History               Contains abnormal data HM2(CBC w/o Differential)  Order: [id number]    Status:  Final result   Visible to patient:  Yes ([facility]) Next appt:  07/15/2021 at 10:00 AM in Internal Medicine (Jin King  MD Kurt) Dx:  CKD (chronic kidney disease) stage 4,...    Ref Range & Units 4/15/21 1127    WBC 4.0 - 11.0 thou/uL 5.0     RBC 4.40 - 6.20 mill/uL 3.55Low      Hemoglobin 14.0 - 18.0 g/dL 10.8Low      Hematocrit 40.0 - 54.0 % 34.4Low      MCV 80 - 100 fL 97     MCH 27.0 - 34.0 pg 30.4     MCHC 32.0 - 36.0 g/dL 31.4Low      RDW 11.0 - 14.5 % 15.7High      Platelets 140 - 440 thou/uL 192     MPV 7.0 - 10.0 fL 9.3    Resulting Agency  [facility]         Specimen Collected: 04/15/21 11:27 Last Resulted: 04/15/21 11:37 Lab Flowsheet Order Details View Encounter Lab and Collection Details Routing Result History               Contains abnormal data Lipid Cascade  Order: [id number]    Status:  Final result   Visible to patient:  Yes (Nick) Next appt:  07/15/2021 at 10:00 AM in Internal Medicine (Jin Hicks MD) Dx:  Coronary artery disease involving carlos...    Ref Range & Units 4/15/21 1127    Cholesterol <=199 mg/dL 237High      Triglycerides <=149 mg/dL 162High      HDL Cholesterol >=40 mg/dL 39Low      LDL Calculated <=129 mg/dL 166High      Patient Fasting > 8hrs?  Yes    Resulting Agency  [facility]         Specimen Collected: 04/15/21 11:27 Last Resulted: 04/15/21 16:03 Lab Flowsheet Order Details View Encounter Lab and Collection Details Routing Result History         Related Result Highlights           Thyroid Cascade  Final result 4/15/2021             Comprehensive Metabolic Panel  Final result 4/15/2021             Uric Acid  Final result 4/15/2021             Phosphorus  Final result 4/15/2021                     Thyroid Cascade  Order: [id number]    Status:  Final result   Visible to patient:  Yes (Nick) Next appt:  07/15/2021 at 10:00 AM in Internal Medicine (Jin Hicks MD) Dx:  Paroxysmal atrial fibrillation (H)    Ref Range & Units 4/15/21 1127    TSH 0.30 - 5.00 uIU/mL 3.05    Resulting Agency  [facility]         Specimen Collected: 04/15/21 11:27 Last Resulted: 04/15/21 16:11 Lab Flowsheet Order  Details View Encounter Lab and Collection Details Routing Result History         Related Result Highlights           Lipid Cascade  Final result 4/15/2021             Comprehensive Metabolic Panel  Final result 4/15/2021             Uric Acid  Final result 4/15/2021             Phosphorus  Final result 4/15/2021                     Contains abnormal data Uric Acid  Order: [id number]    Status:  Final result   Visible to patient:  Yes (Leslit) Next appt:  07/15/2021 at 10:00 AM in Internal Medicine (Jin Hicks MD) Dx:  CKD (chronic kidney disease) stage 4,...    Ref Range & Units 4/15/21 1127    Uric Acid 3.0 - 8.0 mg/dL 8.3High     Resulting Agency  [facility]         Specimen Collected: 04/15/21 11:27 Last Resulted: 04/15/21 16:03 Lab Flowsheet Order Details View Encounter Lab and Collection Details Routing Result History         Related Result Highlights           Thyroid Cascade  Final result 4/15/2021             Lipid Cascade  Final result 4/15/2021             Comprehensive Metabolic Panel  Final result 4/15/2021             Phosphorus  Final result 4/15/2021                     Urinalysis-UC if Indicated  Order: [id number]    Status:  Final result   Visible to patient:  Yes (Leslit) Next appt:  07/15/2021 at 10:00 AM in Internal Medicine (Jin Hicks MD) Dx:  CKD (chronic kidney disease) stage 4,...    Ref Range & Units 4/15/21 1127    Color, UA Colorless, Yellow, Straw, Light Yellow Yellow     Clarity, UA Clear Clear     Glucose, UA Negative Negative     Protein, UA Negative                    ASSESSMENT:  CHF  Weakness and failure to thrive, probably better than at its [place]  DRESS syndrome with terrible skin ulcers  Peripheral edema  SBE (times 2) on lifetime PCN  CRI seeing DR. Florencia Pinto    RECOMMENDATION:  Continue same  Bone marrow didn't have cancer  Neck and back and shoulder pain improved with trigger injections at [facility]    BISHNU Ordonez MD  Office [phone] option 2 to desk staff

## 2021-07-05 ENCOUNTER — COMMUNICATION - HEALTHEAST (OUTPATIENT)
Dept: INTERNAL MEDICINE | Facility: CLINIC | Age: 71
End: 2021-07-05

## 2021-07-05 DIAGNOSIS — G89.4 PAIN SYNDROME, CHRONIC: ICD-10-CM

## 2021-07-05 NOTE — TELEPHONE ENCOUNTER
Telephone Encounter by Trish Spain RN at 7/5/2021 12:34 PM     Author: Trish Spain RN Service: -- Author Type: Registered Nurse    Filed: 7/5/2021 12:36 PM Encounter Date: 7/5/2021 Status: Signed    : Trish Spain RN (Registered Nurse)       Controlled Substance Refill Request  Medication Name:   Requested Prescriptions     Pending Prescriptions Disp Refills   ? oxyCODONE (ROXICODONE) 10 mg immediate release tablet 42 tablet 0     Sig: TAKE A HALF TABLET TO ONE TABLET (5-10 MG) BY MOUTH EVERY FOUR HOURS AS NEEDED FOR PAIN.     Date Last Fill: 06/16/21  Requested Pharmacy: [facility]  Submit electronically to pharmacy  Controlled Substance Agreement on file:   Encounter-Level CSA Scan Date:    There are no encounter-level csa scan date.        Last office visit:  04/15/21

## 2021-07-05 NOTE — TELEPHONE ENCOUNTER
Telephone Encounter by Selina Fink LPN at 7/5/2021  4:00 PM     Author: Selina Fink LPN Service: -- Author Type: Licensed Nurse    Filed: 7/5/2021  4:02 PM Encounter Date: 7/5/2021 Status: Signed    : Selina Fink LPN (Licensed Nurse)       Medication: Oxycodone  Last Date Filled 6/16/21   pulled: YES       Only PCP Prescribing? : YES  Taken as prescribed from physician notes? YES    CSA in last year: YES  Random Utox in last year: NO  (if any of the above answer NO - schedule with PCP)     Opioids + benzodiazepines? NO  (if the above answer YES - schedule with PCP every 6 months)     Is patient on the Executive Review Team? no      All responses suggest: Refilling prescription

## 2021-07-06 ENCOUNTER — COMMUNICATION - HEALTHEAST (OUTPATIENT)
Dept: ANTICOAGULATION | Facility: CLINIC | Age: 71
End: 2021-07-06

## 2021-07-06 ENCOUNTER — TRANSFERRED RECORDS (OUTPATIENT)
Dept: HEALTH INFORMATION MANAGEMENT | Facility: CLINIC | Age: 71
End: 2021-07-06

## 2021-07-06 VITALS
SYSTOLIC BLOOD PRESSURE: 122 MMHG | WEIGHT: 165 LBS | TEMPERATURE: 98.2 F | HEART RATE: 64 BPM | BODY MASS INDEX: 23.68 KG/M2 | DIASTOLIC BLOOD PRESSURE: 70 MMHG

## 2021-07-06 DIAGNOSIS — I48.0 PAROXYSMAL ATRIAL FIBRILLATION (H): ICD-10-CM

## 2021-07-06 DIAGNOSIS — Z95.4 S/P MITRAL VALVE REPLACEMENT WITH METALLIC VALVE: ICD-10-CM

## 2021-07-06 LAB — INR PPP: 2.4 (ref 0.9–1.1)

## 2021-07-06 NOTE — TELEPHONE ENCOUNTER
Telephone Encounter by Shmuel Jeffers RN at 7/6/2021  3:42 PM     Author: Shmuel Jeffers RN Service: -- Author Type: Registered Nurse    Filed: 7/6/2021  3:58 PM Encounter Date: 7/6/2021 Status: Signed    : Shmuel Jeffers RN (Registered Nurse)       ANTICOAGULATION MANAGEMENT     Bandar Wells 70 y.o., male is on warfarin with Subtherapeutic INR result (goal range 2.5-3.5)    Recent labs: (last 7 days)     07/06/21   INR 2.40*       ASSESSMENT     Source: Chart Review and Patient/Caregiver Call      Warfarin dosing taken: Warfarin taken as instructed    Diet: No new diet changes affecting INR    Illness, Injury or hospitalization: No    Medication changes: None    Signs or symptoms of bleeding or clotting: No    Previous INR: supratherapeutic    Additional findings: None     PLAN     Recommended plan for no diet, medication or health factor changes affecting INR:     Dosing instructions: Continue your current warfarin dose 7.5 mg daily on wed; and 5 mg daily rest of week (0% change)    Follow up no later than: 1 week on thu with ov. Maybe this is the low pt before higher dose on wed    Telephone call with Bandar who verbalizes understanding and agrees to plan    Check at provider office visit 7/15        Plan made per [facility] anticoagulation protocol    Shmuel Jeffers  Anticoagulation Clinic   [phone]    Anticoagulation Episode Summary     Current INR goal:  2.5-3.5   TTR:  56.6 % (1 y)   Next INR check:  7/15/2021   INR from last check:  2.40 (7/6/2021)   Weekly max warfarin dose:     Target end date:  Indefinite   INR check location:     Preferred lab:     Send INR reminders to:  [facility]    Indications    Paroxysmal atrial fibrillation (H) [I48.0]  S/P MVR (mitral valve replacement) (Resolved) [Z95.2]  S/P mitral valve replacement with metallic valve [Z95.4]           Comments:  INR TARGET GOAL 2.5 - 3.5         Anticoagulation Care Providers     Provider Role Specialty Phone number    Jin Hicks  MD [facility] Internal Medicine [phone]

## 2021-07-06 NOTE — TELEPHONE ENCOUNTER
Telephone Encounter by Galilea Hernandez RN at 7/6/2021  3:16 PM     Author: Galilea Hernandez RN Service: -- Author Type: Registered Nurse    Filed: 7/6/2021  3:16 PM Encounter Date: 7/6/2021 Status: Signed    : Galilea Hernandez RN (Registered Nurse)       Received a faxed INR result for Bandar Wells  From [facility]  INR result dated 7/6/2021 is 2.4

## 2021-07-11 PROBLEM — Z95.4 S/P MITRAL VALVE REPLACEMENT WITH METALLIC VALVE: Status: ACTIVE | Noted: 2021-07-11

## 2021-07-11 PROBLEM — I48.0 PAROXYSMAL ATRIAL FIBRILLATION (H): Status: ACTIVE | Noted: 2021-07-11

## 2021-07-13 ENCOUNTER — THERAPY VISIT (OUTPATIENT)
Dept: PHYSICAL THERAPY | Facility: CLINIC | Age: 71
End: 2021-07-13
Payer: COMMERCIAL

## 2021-07-13 DIAGNOSIS — R21 RASH: ICD-10-CM

## 2021-07-13 DIAGNOSIS — M53.82 WEAKNESS OF NECK: ICD-10-CM

## 2021-07-13 PROCEDURE — 97112 NEUROMUSCULAR REEDUCATION: CPT | Mod: GP | Performed by: PHYSICAL THERAPIST

## 2021-07-13 PROCEDURE — 97110 THERAPEUTIC EXERCISES: CPT | Mod: GP | Performed by: PHYSICAL THERAPIST

## 2021-07-13 NOTE — PROGRESS NOTES
PROGRESS  REPORT    Progress reporting period is from 3/4/21 to 7/13.       SUBJECTIVE  Subjective changes noted by patient:  Bandar has attended 16 visits of PT. He reports an 80% improvement in function overall. He notes an improvement in his posture and endurance throughout the day in sitting and standing. He reports he has been working on the treadmill walking up to 30 minutes with fatigue.   He continues to have difficulty sitting/standing with good posture for more than 20 minutes and walking outside because of his head drop. He is still not driving.  Current pain level is 3/10  .   .   Changes in function:  Yes (See Goal flowsheet attached for changes in current functional level)  Adverse reaction to treatment or activity: None    OBJECTIVE  Changes noted in objective findings:  Yes,   CERVICAL:    Posture:  20 degrees of cervical flexion, 5 degrees of right sidebend      AROM supine: (Major, Moderate, Minimal or Nil loss)  Movement Loss Jose Carlos Mod Min Nil Pain   Protrusion    x    Flexion    x    Retraction  x   Neck/left shoudler   Extension lacking 5 from full extension     Neck/left shoudler   Left Rotation 10 degrees    Neck/left shoudler   Right Rotation  35 degrees   Neck/left shoudler   Left Side Bending 5 degrees    Neck/left shoudler   Right Side bending  35 degrees   Neck/left shoudler       AROM standing: (Major, Moderate, Minimal or Nil loss)    Movement Loss Jose Carlos Mod Min Nil Pain   Protrusion    x    Flexion   55 degrees     Retraction x       Extension 5 (no lumbar extension and max effort)  Up to 20 with lumbar extension       Left Rotation 12       Right Rotation  45                 ASSESSMENT/PLAN  Updated problem list and treatment plan: Diagnosis 1:  Neck weakness  Pain -  manual therapy, splint/taping/bracing/orthotics, education, directional preference exercise and home program  Decreased ROM/flexibility - manual therapy and therapeutic exercise  Decreased strength - therapeutic exercise and  therapeutic activities  Decreased proprioception - neuro re-education and therapeutic activities  Impaired muscle performance - neuro re-education  Decreased function - therapeutic activities  Impaired posture - neuro re-education  STG/LTGs have been met or progress has been made towards goals:  Yes (See Goal flow sheet completed today.)  Assessment of Progress: The patient's progress has slowed.  Self Management Plans:  Patient has been instructed in a home treatment program.  I have re-evaluated this patient and find that the nature, scope, duration and intensity of the therapy is appropriate for the medical condition of the patient.  Bandar continues to require the following intervention to meet STG and LTG's:  PT    Recommendations:  This patient would benefit from continued therapy.     Frequency:  2 X a month, once daily  Duration:  for 2 months      Bandar has demonstrated slow and steady improvement in passive and active neck range of motion, but still presents with a cervical flexion deformity when standing and sitting.  I strongly recommend Bandar follow up with an orthopedic or sports medicine provider for further assessment.     Please refer to the daily flowsheet for treatment today, total treatment time and time spent performing 1:1 timed codes.

## 2021-07-14 DIAGNOSIS — Z95.4 S/P MITRAL VALVE REPLACEMENT WITH METALLIC VALVE: Primary | ICD-10-CM

## 2021-07-14 PROBLEM — I50.43 ACUTE ON CHRONIC COMBINED SYSTOLIC AND DIASTOLIC CONGESTIVE HEART FAILURE (H): Status: RESOLVED | Noted: 2019-02-19 | Resolved: 2020-05-26

## 2021-07-14 PROBLEM — J96.01 ACUTE RESPIRATORY FAILURE WITH HYPOXIA (H): Status: RESOLVED | Noted: 2017-03-16 | Resolved: 2020-05-26

## 2021-07-14 PROBLEM — Z86.79: Status: RESOLVED | Noted: 2017-02-09 | Resolved: 2017-03-03

## 2021-07-14 PROBLEM — I50.31 ACUTE DIASTOLIC HEART FAILURE (H): Status: RESOLVED | Noted: 2017-03-03 | Resolved: 2020-05-26

## 2021-07-14 PROBLEM — I50.9 CHF (CONGESTIVE HEART FAILURE) (H): Status: RESOLVED | Noted: 2017-03-03 | Resolved: 2020-05-26

## 2021-07-14 PROBLEM — N18.30 CKD (CHRONIC KIDNEY DISEASE) STAGE 3, GFR 30-59 ML/MIN (H): Status: RESOLVED | Noted: 2020-06-19 | Resolved: 2021-04-15

## 2021-07-14 PROBLEM — Z87.59: Status: RESOLVED | Noted: 2017-02-09 | Resolved: 2017-03-03

## 2021-07-14 NOTE — PROGRESS NOTES
Standing POCT INR order placed; transferred order(s) discontinued.     Joana Colmenares RN  [facility] Anticoagulation  [phone]

## 2021-07-15 ENCOUNTER — ANTICOAGULATION THERAPY VISIT (OUTPATIENT)
Dept: ANTICOAGULATION | Facility: CLINIC | Age: 71
End: 2021-07-15

## 2021-07-15 ENCOUNTER — OFFICE VISIT (OUTPATIENT)
Dept: INTERNAL MEDICINE | Facility: CLINIC | Age: 71
End: 2021-07-15
Payer: COMMERCIAL

## 2021-07-15 VITALS
DIASTOLIC BLOOD PRESSURE: 66 MMHG | OXYGEN SATURATION: 95 % | WEIGHT: 167.7 LBS | BODY MASS INDEX: 24.01 KG/M2 | HEIGHT: 70 IN | SYSTOLIC BLOOD PRESSURE: 122 MMHG | HEART RATE: 48 BPM

## 2021-07-15 DIAGNOSIS — Z95.2 PRESENCE OF PROSTHETIC HEART VALVE: ICD-10-CM

## 2021-07-15 DIAGNOSIS — I48.0 PAROXYSMAL ATRIAL FIBRILLATION (H): ICD-10-CM

## 2021-07-15 DIAGNOSIS — I48.0 PAROXYSMAL ATRIAL FIBRILLATION (H): Primary | ICD-10-CM

## 2021-07-15 DIAGNOSIS — I25.10 CORONARY ARTERY DISEASE INVOLVING NATIVE CORONARY ARTERY OF NATIVE HEART WITHOUT ANGINA PECTORIS: ICD-10-CM

## 2021-07-15 DIAGNOSIS — G24.8 FOCAL DYSTONIA: Primary | ICD-10-CM

## 2021-07-15 DIAGNOSIS — N18.4 CHRONIC KIDNEY DISEASE, STAGE 4 (SEVERE) (H): ICD-10-CM

## 2021-07-15 DIAGNOSIS — I25.5 ISCHEMIC CARDIOMYOPATHY: ICD-10-CM

## 2021-07-15 DIAGNOSIS — N18.4 ANEMIA OF CHRONIC RENAL FAILURE, STAGE 4 (SEVERE) (H): ICD-10-CM

## 2021-07-15 DIAGNOSIS — D72.12 DRESS SYNDROME: ICD-10-CM

## 2021-07-15 DIAGNOSIS — L97.909 ULCER OF LOWER EXTREMITY, UNSPECIFIED LATERALITY, UNSPECIFIED ULCER STAGE (H): ICD-10-CM

## 2021-07-15 DIAGNOSIS — I38 PROSTHETIC VALVE ENDOCARDITIS, SEQUELA: ICD-10-CM

## 2021-07-15 DIAGNOSIS — Z86.79 HISTORY OF BACTERIAL ENDOCARDITIS: ICD-10-CM

## 2021-07-15 DIAGNOSIS — Z95.4 S/P MITRAL VALVE REPLACEMENT WITH METALLIC VALVE: ICD-10-CM

## 2021-07-15 DIAGNOSIS — T50.905A DRESS SYNDROME: ICD-10-CM

## 2021-07-15 DIAGNOSIS — M40.03 POSTURAL KYPHOSIS OF CERVICOTHORACIC REGION: ICD-10-CM

## 2021-07-15 DIAGNOSIS — E78.5 DYSLIPIDEMIA: ICD-10-CM

## 2021-07-15 DIAGNOSIS — I89.0 LYMPHEDEMA: ICD-10-CM

## 2021-07-15 DIAGNOSIS — T82.6XXS PROSTHETIC VALVE ENDOCARDITIS, SEQUELA: ICD-10-CM

## 2021-07-15 DIAGNOSIS — D63.1 ANEMIA OF CHRONIC RENAL FAILURE, STAGE 4 (SEVERE) (H): ICD-10-CM

## 2021-07-15 DIAGNOSIS — M1A.00X0 IDIOPATHIC CHRONIC GOUT WITHOUT TOPHUS, UNSPECIFIED SITE: ICD-10-CM

## 2021-07-15 PROBLEM — R80.9 PROTEINURIA: Status: ACTIVE | Noted: 2020-08-18

## 2021-07-15 PROBLEM — D89.2 PARAPROTEINEMIA: Status: ACTIVE | Noted: 2020-08-18

## 2021-07-15 PROBLEM — I42.9 CARDIOMYOPATHY (H): Status: ACTIVE | Noted: 2019-06-16

## 2021-07-15 PROBLEM — N52.9 ERECTILE DYSFUNCTION: Status: ACTIVE | Noted: 2017-01-26

## 2021-07-15 PROBLEM — M89.9 BONE LESION: Status: ACTIVE | Noted: 2020-07-06

## 2021-07-15 PROBLEM — R21 RASH: Status: RESOLVED | Noted: 2021-03-23 | Resolved: 2021-07-15

## 2021-07-15 PROBLEM — F34.1 DYSTHYMIA: Status: ACTIVE | Noted: 2020-10-27

## 2021-07-15 PROBLEM — L97.321: Status: RESOLVED | Noted: 2020-09-16 | Resolved: 2021-07-15

## 2021-07-15 PROBLEM — T82.6XXA PROSTHETIC VALVE ENDOCARDITIS (H): Status: ACTIVE | Noted: 2020-10-27

## 2021-07-15 PROBLEM — M53.82 WEAKNESS OF NECK: Status: RESOLVED | Noted: 2021-03-23 | Resolved: 2021-07-15

## 2021-07-15 PROBLEM — K59.09 OTHER CONSTIPATION: Status: RESOLVED | Noted: 2020-07-23 | Resolved: 2021-07-15

## 2021-07-15 PROBLEM — M40.00 POSTURAL KYPHOSIS: Status: ACTIVE | Noted: 2021-04-28

## 2021-07-15 PROBLEM — D64.9 ANEMIA: Status: ACTIVE | Noted: 2020-08-18

## 2021-07-15 LAB
ALBUMIN SERPL-MCNC: 4 G/DL (ref 3.5–5)
ANION GAP SERPL CALCULATED.3IONS-SCNC: 17 MMOL/L (ref 5–18)
BUN SERPL-MCNC: 66 MG/DL (ref 8–28)
CALCIUM SERPL-MCNC: 10.7 MG/DL (ref 8.5–10.5)
CHLORIDE BLD-SCNC: 101 MMOL/L (ref 98–107)
CHOLEST SERPL-MCNC: 186 MG/DL
CO2 SERPL-SCNC: 25 MMOL/L (ref 22–31)
CREAT SERPL-MCNC: 2.25 MG/DL (ref 0.7–1.3)
ERYTHROCYTE [DISTWIDTH] IN BLOOD BY AUTOMATED COUNT: 15.2 % (ref 10–15)
GFR SERPL CREATININE-BSD FRML MDRD: 28 ML/MIN/1.73M2
GLUCOSE BLD-MCNC: 108 MG/DL (ref 70–125)
HCT VFR BLD AUTO: 32.5 % (ref 40–53)
HDLC SERPL-MCNC: 44 MG/DL
HGB BLD-MCNC: 10.5 G/DL (ref 13.3–17.7)
INR BLD: 3.7 (ref 0.9–1.1)
LDLC SERPL CALC-MCNC: 119 MG/DL
MCH RBC QN AUTO: 30.9 PG (ref 26.5–33)
MCHC RBC AUTO-ENTMCNC: 32.3 G/DL (ref 31.5–36.5)
MCV RBC AUTO: 96 FL (ref 78–100)
PHOSPHATE SERPL-MCNC: 3.4 MG/DL (ref 2.5–4.5)
PLATELET # BLD AUTO: 161 10E3/UL (ref 150–450)
POTASSIUM BLD-SCNC: 3.7 MMOL/L (ref 3.5–5)
PTH-INTACT SERPL-MCNC: 54 PG/ML (ref 10–86)
RBC # BLD AUTO: 3.4 10E6/UL (ref 4.4–5.9)
SODIUM SERPL-SCNC: 143 MMOL/L (ref 136–145)
TRIGL SERPL-MCNC: 116 MG/DL
URATE SERPL-MCNC: 8.8 MG/DL (ref 3–8)
WBC # BLD AUTO: 4.6 10E3/UL (ref 4–11)

## 2021-07-15 PROCEDURE — 99214 OFFICE O/P EST MOD 30 MIN: CPT | Performed by: INTERNAL MEDICINE

## 2021-07-15 PROCEDURE — 82306 VITAMIN D 25 HYDROXY: CPT | Performed by: INTERNAL MEDICINE

## 2021-07-15 PROCEDURE — 36415 COLL VENOUS BLD VENIPUNCTURE: CPT | Performed by: INTERNAL MEDICINE

## 2021-07-15 PROCEDURE — 85610 PROTHROMBIN TIME: CPT | Performed by: INTERNAL MEDICINE

## 2021-07-15 PROCEDURE — 80069 RENAL FUNCTION PANEL: CPT | Performed by: INTERNAL MEDICINE

## 2021-07-15 PROCEDURE — 85027 COMPLETE CBC AUTOMATED: CPT | Performed by: INTERNAL MEDICINE

## 2021-07-15 PROCEDURE — 84550 ASSAY OF BLOOD/URIC ACID: CPT | Performed by: INTERNAL MEDICINE

## 2021-07-15 PROCEDURE — 80061 LIPID PANEL: CPT | Performed by: INTERNAL MEDICINE

## 2021-07-15 PROCEDURE — 83970 ASSAY OF PARATHORMONE: CPT | Performed by: INTERNAL MEDICINE

## 2021-07-15 RX ORDER — METOLAZONE 2.5 MG/1
2.5 TABLET ORAL WEEKLY
Start: 2021-07-15 | End: 2022-04-11

## 2021-07-15 RX ORDER — FEBUXOSTAT 40 MG/1
40 TABLET, FILM COATED ORAL
COMMUNITY
Start: 2020-08-19 | End: 2021-07-15

## 2021-07-15 RX ORDER — FEBUXOSTAT 40 MG/1
40 TABLET, FILM COATED ORAL DAILY
Start: 2021-07-15 | End: 2024-01-01

## 2021-07-15 RX ORDER — GABAPENTIN 300 MG/1
600 CAPSULE ORAL 2 TIMES DAILY
Start: 2021-07-15 | End: 2021-09-24

## 2021-07-15 RX ORDER — METOLAZONE 2.5 MG/1
2.5 TABLET ORAL
COMMUNITY
Start: 2020-10-07 | End: 2021-07-15

## 2021-07-15 RX ORDER — TORSEMIDE 20 MG/1
60 TABLET ORAL 2 TIMES DAILY
Start: 2021-07-15 | End: 2022-10-25

## 2021-07-15 ASSESSMENT — MIFFLIN-ST. JEOR: SCORE: 1516.93

## 2021-07-15 NOTE — PROGRESS NOTES
Office Visit - Follow Up   Bandar Wells   70 year old male    Date of Visit: 7/15/2021    Chief Complaint   Patient presents with     RECHECK        Assessment and Plan   1. Focal dystonia  Continue with physical therapy.  He would like to meet with orthopedic surgery for another opinion.  I did discuss and review his neurology evaluation extensively.  Not certain that this is an orthopedic problem but happy to get a second opinion.  We discussed the role of physical therapy.  - gabapentin (NEURONTIN) 300 MG capsule; Take 2 capsules (600 mg) by mouth 2 times daily  - Orthopedic  Referral; Future    2. Postural kyphosis of cervicothoracic region  - Orthopedic  Referral; Future    3. DRESS syndrome  4. Ulcer of lower extremity, unspecified laterality, unspecified ulcer stage (H)  5. Lymphedema  Improving very nicely, continue with wraps    6. Paroxysmal atrial fibrillation (H)  Continue with rate control strategy and warfarin for stroke prevention    7. Coronary artery disease involving native coronary artery of native heart without angina pectoris  Recheck lipids  - evolocumab (REPATHA) 140 MG/ML prefilled autoinjector; Inject 1 mL (140 mg) Subcutaneous every 14 days    8. Dyslipidemia      9. S/P mitral valve replacement with metallic valve  - INR point of care    10. Prosthetic valve endocarditis, sequela  11. Presence of prosthetic heart valve  12. History of bacterial endocarditis  Reviewed notes from Dr. Ronn Ordonez, continue antibiotic prophylaxis/suppression    13. Anemia of chronic renal failure, stage 4 (severe) (H)  - CBC with platelets; Future  - CBC with platelets    14. Chronic kidney disease, stage 4 (severe) (H)  - Renal panel (Alb, BUN, Ca, Cl, CO2, Creat, Gluc, Phos, K, Na); Future  - Vitamin D Deficiency; Future  - Parathyroid Hormone Intact; Future  - Renal panel (Alb, BUN, Ca, Cl, CO2, Creat, Gluc, Phos, K, Na)  - Vitamin D Deficiency  - Parathyroid Hormone Intact    15.  "Idiopathic chronic gout without tophus, unspecified site  - febuxostat (ULORIC) 40 MG TABS tablet; Take 1 tablet (40 mg) by mouth daily  - Uric acid; Future  - Uric acid    16. Ischemic cardiomyopathy  Appears euvolemic  - metolazone (ZAROXOLYN) 2.5 MG tablet; Take 1 tablet (2.5 mg) by mouth once a week  - torsemide (DEMADEX) 20 MG tablet; Take 3 tablets (60 mg) by mouth 2 times daily    Return in about 3 months (around 10/15/2021) for Follow up.     History of Present Illness   This 70 year old old man comes in for follow-up.  Overall he seems to be doing much better.  His leg swelling and ulcers have resolved.  He continues to use compression garments.  His breathing is generally stable.  He is making some progress with his neck flexibility.  He still is unable to lift his head up.  We did review again his neurology visit suggesting some focal dystonia hypotonia of the cervical flexors in his neck.  The recommendation was physical therapy.  Options such as Botox were difficult because of location of these muscles.  It was felt that there is limited role for muscle relaxer such as baclofen.  Does follow-up with his nephrologist later in the summer.  He does see the pain specialist at [facility] and is doing well on his oxycodone pain medication.    Review of Systems: A comprehensive review of systems was negative except as noted.     Medications, Allergies and Problem List   Reviewed, reconciled and updated  Post Discharge Medication Reconciliation Status:      Physical Exam   General Appearance:   No acute distress    /66 (BP Location: Left arm, Patient Position: Sitting, Cuff Size: Adult Regular)   Pulse (!) 48   Ht 1.778 m (5' 10\")   Wt 76.1 kg (167 lb 11.2 oz)   SpO2 95%   BMI 24.06 kg/m      His neck is anterior flex.  Unable to lift both passively and actively.  He does carry his shoulders and unbalanced weight.  His legs are wrapped with lymphedema wraps and there is no swelling.  Heart rate and " lungs are generally clear to auscultation bilaterally     Additional Information   Current Outpatient Medications   Medication Sig Dispense Refill     Cholecalciferol 1.25 MG (36395 UT) TABS Take 50,000 Units by mouth       evolocumab (REPATHA) 140 MG/ML prefilled autoinjector Inject 1 mL (140 mg) Subcutaneous every 14 days       febuxostat (ULORIC) 40 MG TABS tablet Take 1 tablet (40 mg) by mouth daily       folic acid (FOLVITE) 1 MG tablet Take 1 mg by mouth daily       gabapentin (NEURONTIN) 300 MG capsule Take 2 capsules (600 mg) by mouth 2 times daily       gentamicin (GARAMYCIN) 0.1 % external ointment Apply topically 2 times daily 30 g 3     metolazone (ZAROXOLYN) 2.5 MG tablet Take 1 tablet (2.5 mg) by mouth once a week       mupirocin (BACTROBAN) 2 % external ointment Use 2 times a day to affected area. 30 g 3     oxyCODONE IR (ROXICODONE) 10 MG tablet Take 5-10 mg by mouth every 4 hours as needed       penicillin V (VEETID) 500 MG tablet Take 500 mg by mouth daily Hx endocarditis       polyethylene glycol (MIRALAX) 17 g packet Take 17 g by mouth daily as needed for constipation 30 packet 0     potassium chloride (KLOR-CON) 20 MEQ packet Take 20 mEq by mouth daily       sennosides (SENOKOT) 8.6 MG tablet Take 1-4 tablets by mouth 2 times daily       torsemide (DEMADEX) 20 MG tablet Take 3 tablets (60 mg) by mouth 2 times daily       triamcinolone (KENALOG) 0.1 % external ointment Apply topically 2 times daily 453 g 5     triamcinolone (KENALOG) 0.1 % external ointment Apply topically 2 times daily 453.6 g 3     warfarin ANTICOAGULANT (COUMADIN) 3 MG tablet Take 1.5-2 tablets (4.5-6 mg) by mouth daily Per PTA regimen (4.5mg on Tue/Fri, 6mg on other days of the week)  0     Wound Dressings (VASELINE PETROLATUM GAUZE) PADS Apply to open wounds on the legs 12 each 5     Allergies   Allergen Reactions     Allopurinol Rash     Social History     Tobacco Use     Smoking status: Never Smoker     Smokeless tobacco:  Never Used   Substance Use Topics     Alcohol use: Yes     Alcohol/week: 2.0 standard drinks     Drug use: No       Review and/or order of clinical lab tests:  Review and/or order of radiology tests:  Review and/or order of medicine tests:  Discussion of test results with performing physician:  Decision to obtain old records and/or obtain history from someone other than the patient:  Review and summarization of old records and/or obtaining history from someone other than the patient and.or discussion of case with another health care provider:  Independent visualization of image, tracing or specimen itself:    Time:      Jin Hicks MD

## 2021-07-15 NOTE — PROGRESS NOTES
ANTICOAGULATION MANAGEMENT     Bandar Wells 70 year old male is on warfarin with supratherapeutic INR result. (Goal INR 2.5-3.5)    Recent labs: (last 7 days)     07/15/21  1055   INR 3.7*       ASSESSMENT     Source(s): Patient/Caregiver Call       Warfarin doses taken: Warfarin taken differently.   - reported taking only 6mg warfarin daily.    Diet: No new diet changes identified    New illness, injury, or hospitalization: No    Medication/supplement changes: None noted    Signs or symptoms of bleeding or clotting: No    Previous INR: Subtherapeutic at 2.40 on     Additional findings: None     PLAN     Recommended plan for no diet, medication or health factor changes affecting INR     Dosing Instructions: tonight, advised one time lower dose with 3mg warfarin, then continue your current warfarin dose with next INR in 2 weeks       Summary  As of 7/15/2021    Full warfarin instructions:  7/15: 3 mg; Otherwise 7.5 mg every Wed; 6 mg all other days   Next INR check:               Telephone call with Bandar who verbalizes understanding and agrees to plan    Patient to recheck with home meter on 7/29/21 ([employer])    Education provided: Importance of consistent vitamin K intake and Target INR goal and significance of current INR result    Plan made per [facility] anticoagulation protocol    Meg Jacinto RN  Anticoagulation Clinic  7/15/2021    _______________________________________________________________________     Anticoagulation Episode Summary     Current INR goal:  2.5-3.5   TTR:  57.9 % (11.9 mo)   Target end date:  Indefinite   Send INR reminders to:  [facility]    Indications    Paroxysmal atrial fibrillation (H) [I48.0]  S/P mitral valve replacement with metallic valve [Z95.4]           Comments:  INR TARGET GOAL 2.5 - 3.5         Anticoagulation Care Providers     Provider Role Specialty Phone number    Jin Hicks MD [facility] Internal Medicine [phone]

## 2021-07-16 LAB — DEPRECATED CALCIDIOL+CALCIFEROL SERPL-MC: 95 UG/L (ref 30–80)

## 2021-07-18 ENCOUNTER — HEALTH MAINTENANCE LETTER (OUTPATIENT)
Age: 71
End: 2021-07-18

## 2021-07-19 ENCOUNTER — TELEPHONE (OUTPATIENT)
Dept: INTERNAL MEDICINE | Facility: CLINIC | Age: 71
End: 2021-07-19

## 2021-07-19 NOTE — TELEPHONE ENCOUNTER
Reason for Call:  Other call back    Detailed comments: pt will be seeing his dentist on 07/26/2021 - requesting an antibbiotic which would not include Cyclomyacin or Amoxicillin    Pharm:  [facility] -    Phone Number Patient can be reached at: Home number on file [phone] (home)    Best Time: anytime    Can we leave a detailed message on this number? YES    Call taken on 7/19/2021 at 9:21 AM by Corin Samayoa

## 2021-07-21 DIAGNOSIS — G89.4 PAIN SYNDROME, CHRONIC: Primary | ICD-10-CM

## 2021-07-22 NOTE — TELEPHONE ENCOUNTER
oxyCODONE (ROXICODONE) 10 mg immediate release [id number]/6/2021NoSig: TAKE A HALF TABLET TO ONE TABLET (5-10 MG) BY MOUTH EVERY FOUR HOURS AS NEEDED FOR PAIN.Sent to pharmacy as: oxyCODONE 10 mg tablet (ROXICODONE)Earliest Fill Date: 7/6/2021E-Prescribing Status: Receipt confirmed by pharmacy (7/6/2021  7:46 AM CDT)     Routing refill request to provider for review/approval because:  Controlled substance request    Last Written Prescription Date:  7/6/21  Last Fill Quantity: 42,  # refills: 0   Last office visit provider:  4/15/21     Requested Prescriptions   Pending Prescriptions Disp Refills     oxyCODONE IR (ROXICODONE) 10 MG tablet [Pharmacy Med Name: OXYCODONE HCL 10 MG TABLET 10 Tablet] 42 tablet 0     Sig: TAKE ONE-HALF TO ONE TABLET (5-10 MG) BY MOUTH EVERY FOUR HOURS AS NEEDED FOR PAIN.       There is no refill protocol information for this order          Jabier Tatum RN 07/22/21 2:28 PM

## 2021-07-22 NOTE — TELEPHONE ENCOUNTER
Patient is calling for follow up on medication refill. Has not heard from [facility], and writer not able to find anything sent...  Please call patient back when done. [phone].

## 2021-07-23 RX ORDER — OXYCODONE HYDROCHLORIDE 10 MG/1
TABLET ORAL
Qty: 42 TABLET | Refills: 0 | Status: SHIPPED | OUTPATIENT
Start: 2021-07-23 | End: 2021-08-06

## 2021-07-23 NOTE — TELEPHONE ENCOUNTER
Please let Bandar know that he will double his penicillin 1/2-hour before his dental procedure.  This is as dictated by Dr. Ronn Ordonez.

## 2021-07-23 NOTE — TELEPHONE ENCOUNTER
Medication: Oxycodone 10 mg  Last Date Filled 7/6/21, 42 tablets for 9 days   pulled: YES           Only PCP Prescribing? : YES  Taken as prescribed from physician notes? YES    CSA in last year: YES  Random Utox in last year: NO  Opioids + benzodiazepines? YES    Is patient on the Executive Review Team? NO      All responses suggest: Refilling prescription

## 2021-07-23 NOTE — TELEPHONE ENCOUNTER
Writer spoke with:  Bandar  Message relayed from provider: Please let Bandar know that he will double his penicillin 1/2-hour before his dental procedure.  This is as dictated by Dr. Ronn Ordonez.  Any further questions?  No

## 2021-08-03 PROBLEM — I48.19 PERSISTENT ATRIAL FIBRILLATION (H): Status: RESOLVED | Noted: 2017-05-16 | Resolved: 2020-05-26

## 2021-08-05 ENCOUNTER — TELEPHONE (OUTPATIENT)
Dept: ANTICOAGULATION | Facility: CLINIC | Age: 71
End: 2021-08-05

## 2021-08-05 ENCOUNTER — MYC REFILL (OUTPATIENT)
Dept: INTERNAL MEDICINE | Facility: CLINIC | Age: 71
End: 2021-08-05

## 2021-08-05 DIAGNOSIS — G89.4 PAIN SYNDROME, CHRONIC: ICD-10-CM

## 2021-08-05 DIAGNOSIS — I48.0 PAROXYSMAL ATRIAL FIBRILLATION (H): Primary | ICD-10-CM

## 2021-08-05 DIAGNOSIS — Z95.4 S/P MITRAL VALVE REPLACEMENT WITH METALLIC VALVE: ICD-10-CM

## 2021-08-05 RX ORDER — OXYCODONE HYDROCHLORIDE 10 MG/1
TABLET ORAL
Qty: 42 TABLET | Refills: 0 | Status: CANCELLED | OUTPATIENT
Start: 2021-08-05

## 2021-08-05 NOTE — TELEPHONE ENCOUNTER
Patient is calling to follow up on refill request, he is out of medication, would appreciate a covering provider to address.  [facility].

## 2021-08-05 NOTE — TELEPHONE ENCOUNTER
ANTICOAGULATION     Bandar Wells is overdue for INR check.      Spoke with Bandar instructed to test INR with home meter and call results to home monitoring company as soon as possible.    - will check in 8/6    Meg Jacinto RN

## 2021-08-06 ENCOUNTER — ANTICOAGULATION THERAPY VISIT (OUTPATIENT)
Dept: INTERNAL MEDICINE | Facility: CLINIC | Age: 71
End: 2021-08-06

## 2021-08-06 ENCOUNTER — TRANSFERRED RECORDS (OUTPATIENT)
Dept: HEALTH INFORMATION MANAGEMENT | Facility: CLINIC | Age: 71
End: 2021-08-06

## 2021-08-06 DIAGNOSIS — Z95.4 S/P MITRAL VALVE REPLACEMENT WITH METALLIC VALVE: ICD-10-CM

## 2021-08-06 DIAGNOSIS — I48.0 PAROXYSMAL ATRIAL FIBRILLATION (H): Primary | ICD-10-CM

## 2021-08-06 LAB — INR (EXTERNAL): 3.7 (ref 0.9–1.1)

## 2021-08-06 RX ORDER — OXYCODONE HYDROCHLORIDE 10 MG/1
TABLET ORAL
Qty: 42 TABLET | Refills: 0 | Status: SHIPPED | OUTPATIENT
Start: 2021-08-06 | End: 2021-08-23

## 2021-08-06 NOTE — TELEPHONE ENCOUNTER
Routing refill request to provider for review/approval because:  Controlled substance request    Last Written Prescription Date:  7/23/21  Last Fill Quantity: 42,  # refills: 0   Last office visit provider:  7/15/21     Requested Prescriptions   Pending Prescriptions Disp Refills     oxyCODONE IR (ROXICODONE) 10 MG tablet [Pharmacy Med Name: OXYCODONE HCL 10 MG TABLET 10 Tablet] 42 tablet 0     Sig: TAKE 1/2 TO 1 TABLET (5-10 MG) BY MOUTH EVERY FOUR HOURS AS NEEDED FOR PAIN.       There is no refill protocol information for this order          Jabier Tatum RN 08/06/21 9:20 AM

## 2021-08-06 NOTE — TELEPHONE ENCOUNTER
Called and spoke with Bandar     - he tried to test INR today with home monitor.     - he did report an INR on 3.7 today.  (INR target goal of 2.5 -3.5)    - however, machine kept beeping on him and ran out of test strips and not able to retest.  He called [employer] to send more STAT.     - assessment on Bandar with subjective complaints of back pains again.  Not much of an appetite.  Denied any s/sx of any abnormal bleeding or bruisings.     - reported, has an appt @ [facility] for cortisone injectons  Last injections of multiple sites was in May 2021.     - due to above complaints, advised to take one time lower dose with 3mg warfarin, then resume usual dose.     - advised to check INR when he gets test strips, otherwise, to keep INR appt on 8/10/21.     - verbalized understanding and agreed with plan.

## 2021-08-06 NOTE — TELEPHONE ENCOUNTER
Pt is calling back to check status. Can covering MD refill? Pt is out of med and would like a callback once sent. Thanks.

## 2021-08-06 NOTE — TELEPHONE ENCOUNTER
Medication: Oxycodone 10 mg  Last Date Filled 7/23/21, 42 tablets for 9 days   pulled: YES         Only PCP Prescribing? : YES  Taken as prescribed from physician notes? YES    CSA in last year: YES  Random Utox in last year: NO  Opioids + benzodiazepines? YES    Is patient on the Executive Review Team?NO      All responses suggest: Refilling prescription

## 2021-08-06 NOTE — TELEPHONE ENCOUNTER
Patient called back and said that his moniter keeps giving hir error signals he did schedule an INR lab appt for next Tuesday but would like to talk with Meg as soon as possible he is now out of strips

## 2021-08-07 NOTE — TELEPHONE ENCOUNTER
Routing refill request to provider for review/approval because:  Controlled medication refill request.  Routing to provider's care team.  LIKELY A DUPLICATE REQUEST  ___________________________________________________________    Copy of Last Rx:          Last office visit with provider:  7/15/21   ___________________________________________________________      Requested Prescriptions   Pending Prescriptions Disp Refills     oxyCODONE IR (ROXICODONE) 10 MG tablet 42 tablet 0       There is no refill protocol information for this order            Narda Robins RN 08/07/21 4:32 AM

## 2021-08-10 ENCOUNTER — ANTICOAGULATION THERAPY VISIT (OUTPATIENT)
Dept: ANTICOAGULATION | Facility: CLINIC | Age: 71
End: 2021-08-10

## 2021-08-10 ENCOUNTER — LAB (OUTPATIENT)
Dept: LAB | Facility: CLINIC | Age: 71
End: 2021-08-10

## 2021-08-10 DIAGNOSIS — Z95.4 S/P MITRAL VALVE REPLACEMENT WITH METALLIC VALVE: ICD-10-CM

## 2021-08-10 DIAGNOSIS — I48.0 PAROXYSMAL ATRIAL FIBRILLATION (H): Primary | ICD-10-CM

## 2021-08-10 LAB — INR BLD: 4.8 (ref 0.9–1.1)

## 2021-08-10 PROCEDURE — 36416 COLLJ CAPILLARY BLOOD SPEC: CPT

## 2021-08-10 PROCEDURE — 85610 PROTHROMBIN TIME: CPT

## 2021-08-10 NOTE — PROGRESS NOTES
ANTICOAGULATION MANAGEMENT     Bandar Wells 70 year old male is on warfarin with supratherapeutic INR result. (Goal INR 2.5-3.5)    Recent labs: (last 7 days)     08/10/21  1447   INR 4.8*       ASSESSMENT     Source(s): Patient/Caregiver Call and Template       Warfarin doses taken: Warfarin taken as instructed    Diet: Decreased appetite may be affecting diet and INR    New illness, injury, or hospitalization: Yes: experiencing a lot of neck / shoulder pain.   - Appt for cortisone injections [facility] sites @ [facility] on 8/17/21    Medication/supplement changes: None noted    Signs or symptoms of bleeding or clotting: No    Previous INR: Supratherapeutic at 3.7 on 8/6/21.    Additional findings: None     PLAN     Recommended plan for ongoing change(s) affecting INR     Dosing Instructions: Hold dose then Decrease your warfarin dose (10.3% change) with next INR in 1 week       Summary  As of 8/10/2021    Full warfarin instructions:  8/10: Hold; Otherwise 3 mg every Wed; 6 mg all other days   Next INR check:  8/17/2021             Telephone call with Bandar who verbalizes understanding and agrees to plan   - needs to check INR status prior to going to [facility], to ensure INR stability.  He does not want to drive down and be informed, they cant do injections.   - has home monitor thru [employer], however, he is out of test strips.    Lab visit scheduled - INR on 8/13/21 @ [facility]    Education provided: Target INR goal and significance of current INR result    Plan made per [facility] anticoagulation protocol    Meg Jacinto RN  Anticoagulation Clinic  8/10/2021    _______________________________________________________________________     Anticoagulation Episode Summary     Current INR goal:  2.5-3.5   TTR:  54.8 % (1 y)   Target end date:  Indefinite   Send INR reminders to:  [facility]    Indications    Paroxysmal atrial fibrillation (H) [I48.0]  S/P mitral valve replacement with metallic valve [Z95.4]            Comments:  INR TARGET GOAL 2.5 - 3.5         Anticoagulation Care Providers     Provider Role Specialty Phone number    Jin Hicks MD [facility] Internal Medicine [phone]

## 2021-08-13 ENCOUNTER — ANTICOAGULATION THERAPY VISIT (OUTPATIENT)
Dept: ANTICOAGULATION | Facility: CLINIC | Age: 71
End: 2021-08-13

## 2021-08-13 ENCOUNTER — LAB (OUTPATIENT)
Dept: LAB | Facility: CLINIC | Age: 71
End: 2021-08-13
Payer: COMMERCIAL

## 2021-08-13 DIAGNOSIS — I48.0 PAROXYSMAL ATRIAL FIBRILLATION (H): Primary | ICD-10-CM

## 2021-08-13 DIAGNOSIS — Z95.4 S/P MITRAL VALVE REPLACEMENT WITH METALLIC VALVE: ICD-10-CM

## 2021-08-13 LAB — INR BLD: 2.6 (ref 0.9–1.1)

## 2021-08-13 PROCEDURE — 36416 COLLJ CAPILLARY BLOOD SPEC: CPT

## 2021-08-13 PROCEDURE — 85610 PROTHROMBIN TIME: CPT

## 2021-08-13 NOTE — PROGRESS NOTES
ANTICOAGULATION MANAGEMENT     Bandar Wells 70 year old male is on warfarin with therapeutic INR result. (Goal INR 2.5-3.5)    Recent labs: (last 7 days)     08/13/21  0915   INR 2.6*       ASSESSMENT     Source(s): Chart Review, Patient/Caregiver Call and Template       Warfarin doses taken: Warfarin recently held for 1 day which may be affecting INR    - held warfarin dose on 8/10, as instructed.    Diet: No new diet changes identified    New illness, injury, or hospitalization: No    Medication/supplement changes: None noted    Signs or symptoms of bleeding or clotting: No    Previous INR: Supratherapeutic at 4.80 on  8/10/21.    Additional findings: Upcoming surgery/procedure - Yes.  Scheduled on 8/17/21 for cortisone injections (multiple sites) @ [facility].  Needs INR to be less than 3.0     PLAN     Recommended plan for no diet, medication or health factor changes affecting INR     Dosing Instructions:    - Decrease warfarin dose to 3mg on Wed/Sat and 6mg all other days.   - (7.7% change)   - next INR in 3 days.       Summary  As of 8/13/2021    Full warfarin instructions:  3 mg every Wed, Sat; 6 mg all other days   Next INR check:  8/16/2021             Telephone call with Bandar who verbalizes understanding and agrees to plan   - ran out of test strips to check INR with home monitor thru [employer].    Lab visit scheduled - INR on 8/16/21 @ [facility] prior to procedure @ [place] on 8/17/21.    Education provided: Importance of consistent vitamin K intake and Target INR goal and significance of current INR result    Plan made per [facility] anticoagulation protocol    Meg Jacinto RN  Anticoagulation Clinic  8/13/2021    _______________________________________________________________________     Anticoagulation Episode Summary     Current INR goal:  2.5-3.5   TTR:  55.0 % (1 y)   Target end date:  Indefinite   Send INR reminders to:  [facility]    Indications    Paroxysmal atrial fibrillation (H) [I48.0]  S/P mitral  valve replacement with metallic valve [Z95.4]           Comments:  INR TARGET GOAL 2.5 - 3.5         Anticoagulation Care Providers     Provider Role Specialty Phone number    Jin Hicks MD [facility] Internal Medicine [phone]

## 2021-08-16 ENCOUNTER — ANTICOAGULATION THERAPY VISIT (OUTPATIENT)
Dept: ANTICOAGULATION | Facility: CLINIC | Age: 71
End: 2021-08-16

## 2021-08-16 ENCOUNTER — LAB (OUTPATIENT)
Dept: LAB | Facility: CLINIC | Age: 71
End: 2021-08-16
Payer: COMMERCIAL

## 2021-08-16 DIAGNOSIS — Z95.4 S/P MITRAL VALVE REPLACEMENT WITH METALLIC VALVE: ICD-10-CM

## 2021-08-16 DIAGNOSIS — I48.0 PAROXYSMAL ATRIAL FIBRILLATION (H): Primary | ICD-10-CM

## 2021-08-16 LAB — INR BLD: 1.9 (ref 0.9–1.1)

## 2021-08-16 PROCEDURE — 85610 PROTHROMBIN TIME: CPT

## 2021-08-16 PROCEDURE — 36416 COLLJ CAPILLARY BLOOD SPEC: CPT

## 2021-08-16 NOTE — PROGRESS NOTES
ANTICOAGULATION MANAGEMENT     Bandar Wells 70 year old male is on warfarin with subtherapeutic INR result. (Goal INR 2.5-3.5)    Recent labs: (last 7 days)     08/16/21  0927   INR 1.9*       ASSESSMENT     Source(s): Chart Review, Patient/Caregiver Call and Template       Warfarin doses taken: Warfarin taken as instructed    - however, was unsure if he missed 6mg warfarin dose on 8/13.  Got distracted going to a meeting in the evening.    Diet: No new diet changes identified    New illness, injury, or hospitalization: No   - reported neck pain has improved a little.    Medication/supplement changes: None noted    Signs or symptoms of bleeding or clotting: No    Previous INR: Therapeutic last visit; previously outside of goal range    Additional findings: Upcoming surgery/procedure Yes.  On 8/17/21 scheduled for trigger point injection of CS (multiple sites) @ [place].  INR needs to be less than 3.0 prior to injection.     PLAN     Recommended plan for temporary change(s) affecting INR     Dosing Instructions: Continue your current warfarin dose with next INR in 2 weeks       Summary  As of 8/16/2021    Full warfarin instructions:  3 mg every Wed, Sat; 6 mg all other days   Next INR check:  8/23/2021             Telephone call with Bandar who verbalizes understanding and agrees to plan   - will continue warfarin dose.  Bandar wants to ensure cervical spine injections will not get cancelled @ [place].  Needs INR to be less than 3.0   - to take higher warfarin dose with 6mg tonight.    Patient to recheck with home meter in 1 wk thru [employer].    Education provided: Importance of consistent vitamin K intake and Target INR goal and significance of current INR result    Plan made per [facility] anticoagulation protocol    Meg Jacinto, RN  Anticoagulation Clinic  8/16/2021    _______________________________________________________________________     Anticoagulation Episode Summary     Current INR goal:  2.5-3.5   TTR:  54.6 % (1 y)    Target end date:  Indefinite   Send INR reminders to:  [facility]    Indications    Paroxysmal atrial fibrillation (H) [I48.0]  S/P mitral valve replacement with metallic valve [Z95.4]           Comments:  INR TARGET GOAL 2.5 - 3.5         Anticoagulation Care Providers     Provider Role Specialty Phone number    Jin Hicks MD [facility] Internal Medicine [phone]

## 2021-08-23 ENCOUNTER — MYC REFILL (OUTPATIENT)
Dept: INTERNAL MEDICINE | Facility: CLINIC | Age: 71
End: 2021-08-23

## 2021-08-23 DIAGNOSIS — Z95.4 S/P MITRAL VALVE REPLACEMENT WITH METALLIC VALVE: ICD-10-CM

## 2021-08-23 DIAGNOSIS — G89.4 PAIN SYNDROME, CHRONIC: ICD-10-CM

## 2021-08-23 DIAGNOSIS — I48.0 PAROXYSMAL ATRIAL FIBRILLATION (H): Primary | ICD-10-CM

## 2021-08-24 ENCOUNTER — THERAPY VISIT (OUTPATIENT)
Dept: PHYSICAL THERAPY | Facility: CLINIC | Age: 71
End: 2021-08-24
Payer: COMMERCIAL

## 2021-08-24 DIAGNOSIS — M53.82 NECK MUSCLE WEAKNESS: ICD-10-CM

## 2021-08-24 DIAGNOSIS — M53.82 WEAKNESS OF NECK: Primary | ICD-10-CM

## 2021-08-24 PROCEDURE — 97110 THERAPEUTIC EXERCISES: CPT | Mod: GP | Performed by: PHYSICAL THERAPIST

## 2021-08-24 PROCEDURE — 97112 NEUROMUSCULAR REEDUCATION: CPT | Mod: GP | Performed by: PHYSICAL THERAPIST

## 2021-08-24 NOTE — PROGRESS NOTES
PROGRESS  REPORT    Progress reporting period is from 7/13/21 to 8/24/21.       SUBJECTIVE  Subjective changes noted by patient:  Bandar returns from a 6 week gap in PT. He stopped gabapentin and that has caused his pain to increase.  He has maintained his range of motion during the gap in PT. He is going to see a spine specialist soon to get more information.       Current pain level is 4/10  .     Changes in function:  Yes (See Goal flowsheet attached for changes in current functional level)  Adverse reaction to treatment or activity: None    OBJECTIVE  Changes noted in objective findings:  Yes,      AROM supine: (Major, Moderate, Minimal or Nil loss)  Movement Loss Jose Carlos Mod Min Nil Pain   Protrusion       x     Flexion       x     Retraction   x     Neck/left shoudler   Extension lacking 8 from full extension        Neck/left shoudler   Left Rotation 15 degrees       Neck/left shoudler   Right Rotation   35 degrees     Neck/left shoudler   Left Side Bending 9 degrees       Neck/left shoudler   Right Side bending   35 degrees     Neck/left shoudler         AROM standing: (Major, Moderate, Minimal or Nil loss)     Movement Loss Jose Carlos Mod Min Nil Pain   Extension Lacking 20 without lumbar extension                         ASSESSMENT/PLAN  Updated problem list and treatment plan: Diagnosis 1:  Neck weakness  Pain -  manual therapy, splint/taping/bracing/orthotics, education, directional preference exercise and home program  Decreased ROM/flexibility - manual therapy and therapeutic exercise  Decreased strength - therapeutic exercise and therapeutic activities  Decreased proprioception - neuro re-education and therapeutic activities  Impaired muscle performance - neuro re-education  Decreased function - therapeutic activities  Impaired posture - neuro re-education  STG/LTGs have been met or progress has been made towards goals:  Yes (See Goal flow sheet completed today.)  Assessment of Progress: The patient's condition is  improving.  The patient's progress has slowed.  Self Management Plans:  Patient has been instructed in a home treatment program.  I have re-evaluated this patient and find that the nature, scope, duration and intensity of the therapy is appropriate for the medical condition of the patient.  Bandar continues to require the following intervention to meet STG and LTG's:  PT    Recommendations:  This patient would benefit from continued therapy.     Frequency:  2 X a month, once daily  Duration:  for 2 months      This patient would benefit from further evaluation. Passive neck extension has continued to improve, but active range of motion remains limited despite maximal effort.      Please refer to the daily flowsheet for treatment today, total treatment time and time spent performing 1:1 timed codes.

## 2021-08-25 ENCOUNTER — TELEPHONE (OUTPATIENT)
Dept: INTERNAL MEDICINE | Facility: CLINIC | Age: 71
End: 2021-08-25

## 2021-08-25 NOTE — TELEPHONE ENCOUNTER
ANTICOAGULATION     Bandar Wells is overdue for INR check.      Spoke with Bandar instructed to test INR with home meter and call results to home monitoring company as soon as possible.    Christel La RN

## 2021-08-25 NOTE — TELEPHONE ENCOUNTER
Reason for Call:  Medication or medication refill:    Do you use a [facility]?  Name of the pharmacy and phone number for the current request:  [facility] drug-updated pharm    Name of the medication requested: Vit d3 -26046 unites-take one capslue ever Monday and Thursday    Pt is out of this med.    Other request: pharm requested but no response    Can we leave a detailed message on this number? YES    Phone number patient can be reached at: Home number on file [phone] (home)    Best Time: yes    Call taken on 8/25/2021 at 8:10 AM by Pam J. Behr

## 2021-08-26 RX ORDER — WARFARIN SODIUM 3 MG/1
TABLET ORAL
Qty: 180 TABLET | Refills: 1 | Status: SHIPPED | OUTPATIENT
Start: 2021-08-26 | End: 2022-03-11

## 2021-08-26 NOTE — TELEPHONE ENCOUNTER
Medication: Oxycodone 10 mg  Last Date Filled 8/6/21, 42 tablets for 9 days   pulled: YES           Only PCP Prescribing? : YES  Taken as prescribed from physician notes? YES    CSA in last year: YES  Random Utox in last year: NO  Opioids + benzodiazepines? YES    Is patient on the Executive Review Team? NO    Associated Diagnosis:  Chronic pain syndrome.  Use of oxycodone was last discussed with Dr. Hicks on 1/14/21. Patient was referred PT for neck pain.  Patient was seen on 8/24/21 by a provider there.    All responses suggest: Refilling prescription

## 2021-08-26 NOTE — TELEPHONE ENCOUNTER
Routing refill request to provider for review/approval because:  Controlled Substance Request    Last Written Prescription Date:  8/6/21  Last Fill Quantity: 42,  # refills: 0   Last office visit provider: 6/3/21      Requested Prescriptions   Pending Prescriptions Disp Refills     oxyCODONE IR (ROXICODONE) 10 MG tablet 42 tablet 0       There is no refill protocol information for this order          Dona Hoffmann RN 08/26/21 11:48 AM

## 2021-08-27 ENCOUNTER — OFFICE VISIT (OUTPATIENT)
Dept: PHYSICAL MEDICINE AND REHAB | Facility: CLINIC | Age: 71
End: 2021-08-27
Payer: COMMERCIAL

## 2021-08-27 ENCOUNTER — MEDICAL CORRESPONDENCE (OUTPATIENT)
Dept: HEALTH INFORMATION MANAGEMENT | Facility: CLINIC | Age: 71
End: 2021-08-27

## 2021-08-27 ENCOUNTER — TRANSFERRED RECORDS (OUTPATIENT)
Dept: HEALTH INFORMATION MANAGEMENT | Facility: CLINIC | Age: 71
End: 2021-08-27

## 2021-08-27 ENCOUNTER — ANTICOAGULATION THERAPY VISIT (OUTPATIENT)
Dept: ANTICOAGULATION | Facility: CLINIC | Age: 71
End: 2021-08-27

## 2021-08-27 VITALS
DIASTOLIC BLOOD PRESSURE: 64 MMHG | HEART RATE: 66 BPM | HEIGHT: 70 IN | BODY MASS INDEX: 22.81 KG/M2 | SYSTOLIC BLOOD PRESSURE: 142 MMHG | WEIGHT: 159.3 LBS

## 2021-08-27 DIAGNOSIS — Z95.4 S/P MITRAL VALVE REPLACEMENT WITH METALLIC VALVE: ICD-10-CM

## 2021-08-27 DIAGNOSIS — I48.0 PAROXYSMAL ATRIAL FIBRILLATION (H): Primary | ICD-10-CM

## 2021-08-27 DIAGNOSIS — M40.03 POSTURAL KYPHOSIS OF CERVICOTHORACIC REGION: ICD-10-CM

## 2021-08-27 DIAGNOSIS — G24.8 FOCAL DYSTONIA: ICD-10-CM

## 2021-08-27 LAB — INR (EXTERNAL): 3.2 (ref 0.1–1.1)

## 2021-08-27 PROCEDURE — 99204 OFFICE O/P NEW MOD 45 MIN: CPT | Performed by: PHYSICIAN ASSISTANT

## 2021-08-27 RX ORDER — OXYCODONE HYDROCHLORIDE 10 MG/1
5 TABLET ORAL EVERY 4 HOURS PRN
Qty: 42 TABLET | Refills: 0 | Status: SHIPPED | OUTPATIENT
Start: 2021-08-27 | End: 2021-09-13

## 2021-08-27 ASSESSMENT — PAIN SCALES - GENERAL: PAINLEVEL: MODERATE PAIN (4)

## 2021-08-27 ASSESSMENT — MIFFLIN-ST. JEOR: SCORE: 1473.83

## 2021-08-27 NOTE — PROGRESS NOTES
ANTICOAGULATION MANAGEMENT     Bandar Wells 71 year old male is on warfarin with therapeutic INR result. (Goal INR 2.5-3.5)    Recent labs: (last 7 days)     08/27/21  0000   INR 3.2*       ASSESSMENT     Source(s): Chart Review       Warfarin doses taken: Reviewed in chart    Diet: No new diet changes identified    New illness, injury, or hospitalization: No    Medication/supplement changes: None noted    Signs or symptoms of bleeding or clotting: No    Previous INR: Subtherapeutic    Additional findings: did have injection at [facility], now physical therapy     PLAN     Recommended plan for no diet, medication or health factor changes affecting INR     Dosing Instructions: Continue your current warfarin dose with next INR in 1 week       Summary  As of 8/27/2021    Full warfarin instructions:  3 mg every Wed, Sat; 6 mg all other days   Next INR check:  9/3/2021             Detailed voice message left for Bandar with dosing instructions and follow up date.     Patient to recheck with home meter    Education provided: None required    Plan made per [facility] anticoagulation protocol    Shmuel Jeffers RN  Anticoagulation Clinic  8/27/2021    _______________________________________________________________________     Anticoagulation Episode Summary     Current INR goal:  2.5-3.5   TTR:  55.3 % (1 y)   Target end date:  Indefinite   Send INR reminders to:  [facility]    Indications    Paroxysmal atrial fibrillation (H) [I48.0]  S/P mitral valve replacement with metallic valve [Z95.4]           Comments:  INR TARGET GOAL 2.5 - 3.5         Anticoagulation Care Providers     Provider Role Specialty Phone number    Jin Hicks MD [facility] Internal Medicine [phone]        Incoming fax from [employer] 8/27/21    inr 3.2

## 2021-08-27 NOTE — PROGRESS NOTES
ASSESSMENT: Bandar Wells is a 71 year old male with past medical history significant for dyslipidemia, gout, atrial fibrillation (on warfarin), status post mitral valve replacement with metal valve, coronary artery disease, cardiomyopathy, chronic kidney disease stage IV, anemia, lymphedema, DRESS syndrome who presents today for new patient evaluation of cervical dystonia.  Neck is flexed forward and side bent to the right.  Head drop is due to dystonia of anterior and lateral cervical spine musculature rather than weakness of the posterior cervical spinous musculature.  MRI cervical spine shows reversal of normal cervical lordosis and dextrocurvature lower cervical spine.  There is mild spinal canal stenosis C5-6 but no high-grade spinal canal stenosis.  There is mild to moderate left foraminal stenosis C3-C7.  Patient has myofascial pain in the left upper trapezius muscle secondary to his anterocollis.  He had a left upper trapezius muscle trigger point injection at the [facility] August 17, 2021 which has not provided relief.  He is disappointed because the same procedure did provide relief of his pain earlier this year.      PLAN:  A shared decision making model was used.  The patient's values and choices were respected.  The following represents what was discussed and decided upon by the physician assistant and the patient.      1.  DIAGNOSTIC TESTS: I reviewed the report of the MRI cervical spine.  -I reviewed records from [facility] neurology and pain clinic.  -No further diagnostic tests were ordered.    2.  PHYSICAL THERAPY: Patient is currently in physical therapy.  He goes to [facility] rehab.  He has had 20 sessions of far.  This is helpful.    3.  MEDICATIONS:  -I offered for the patient to trial baclofen.  He declined.  He is concerned about risk for increased falls.  -Patient takes oxycodone 10 mg twice daily which is managed by his primary care provider.      4.  INTERVENTIONS:    -I did not  See oncology history   "order any injections for the patient.  I would like the patient to see Dr. Cervantes, physiatry a the [facility] to see if he may be a candidate for botox injections for his dystonia.    5.  PATIENT EDUCATION: Patient is in agreement the above plan.  All questions were answered.    6.  REFERRALS: I entered a referral for the patient to see Dr. Bertha Valentin, physiatry at the [facility] for consideration for botox injections.    7.  FOLLOW-UP:   Patient will follow up with me as needed.  We will await recommendations from the [facility].  He is already established with [facility] pain and neurology.  If he has questions or concerns, he should not hesitate to call.        SUBJECTIVE:  Bandar Wlels  Is a 71 year old male who presents today in consultation request of Dr. Hicks for new patient evaluation of neck pain and head drop.  Patient reports that he has had chronic neck pain.  Apparently he had some sort of injection in the neck or upper back at [facility] in early 2020.  2 weeks after his injection he had abrupt onset of \"head drop.\"  He has been working with the [facility].  He saw neurology and I reviewed their notes.  He has been working with the pain clinic.  They have performed trigger point injections to help with pain in the upper trapezius muscles related to the anterocollis.  Trigger point injections were first on the right because I was the more painful side.  After those injections the pain switched to the left.  He had left upper trapezius trigger point injections in May which helped.  He just had repeat left upper trapezius muscle trigger point injections last week and they have not provided relief.  He has been working with physical therapy and has made some progress with his head position.  He was referred to our clinic as a second opinion.    Patient complains of pain in the left upper trapezius muscle.  He denies any pain down the arms.  His head is flexed " forward and tilted to the right.  He states that his head is in this position even when he lies flat on the bed.  He reports that with a lot of work with his physical therapist they are able to relax his neck muscles so that he can lie on the bed with one pillow, but otherwise he needs multiple pillows for support.  He denies any numbness, tingling, weakness down the arms.  Denies any aggravating or alleviating factors for his symptoms.  Denies loss of bowel or bladder control.  Denies any recent fevers or chills.      Patient is currently in physical therapy. He has had 20 sessions so far.  This is helpful.  He has had trigger point injections as described above.  He has not had Botox injections.  He uses oxycodone 10 mg on average twice per day which is managed by primary care provider.  He is not taking any muscle relaxers.    Current Outpatient Medications   Medication     Cholecalciferol 1.25 MG (33505 UT) TABS     evolocumab (REPATHA) 140 MG/ML prefilled autoinjector     febuxostat (ULORIC) 40 MG TABS tablet     folic acid (FOLVITE) 1 MG tablet     gabapentin (NEURONTIN) 300 MG capsule     gentamicin (GARAMYCIN) 0.1 % external ointment     metolazone (ZAROXOLYN) 2.5 MG tablet     mupirocin (BACTROBAN) 2 % external ointment     oxyCODONE IR (ROXICODONE) 10 MG tablet     penicillin V (VEETID) 500 MG tablet     polyethylene glycol (MIRALAX) 17 g packet     potassium chloride (KLOR-CON) 20 MEQ packet     sennosides (SENOKOT) 8.6 MG tablet     torsemide (DEMADEX) 20 MG tablet     triamcinolone (KENALOG) 0.1 % external ointment     triamcinolone (KENALOG) 0.1 % external ointment     warfarin ANTICOAGULANT (COUMADIN) 3 MG tablet     Wound Dressings (VASELINE PETROLATUM GAUZE) PADS         Allergies   Allergen Reactions     Allopurinol Rash       Past Medical History:   Diagnosis Date     Abnormal liver function test      Atrial fibrillation (H)     s/p Maze procedure     Atrial fibrillation (H)     post naun      Atrial fibrillation with RVR (H) 8/29/2015    S/p MAZE Jul 2015     Bacterial endocarditis      CHF (congestive heart failure) (H)      Chronic combined systolic and diastolic heart failure (H)      CKD (chronic kidney disease)      Dyslipidemia      H/O mitral valve replacement with mechanical valve      Hyperlipidemia      Hyperlipidemia      Idiopathic chronic gout without tophus, unspecified site 4/15/2021     Mitral valve prolapse      Near syncope      Pericardial effusion without cardiac tamponade 8/29/2015     S/P mitral valve replacement with metallic valve 03/16/2017     Status post Maze operation for atrial fibrillation 8/29/2015        Patient Active Problem List   Diagnosis     DRESS syndrome     Paroxysmal atrial fibrillation (H)     S/P mitral valve replacement with metallic valve     Focal dystonia / anterior cervical muscles - Neuro - Ceballos, PT      Anemia of chronic renal failure, stage 4 (severe) (H)     Coronary artery disease involving native coronary artery of native heart without angina pectoris     Bone lesion     Cardiomyopathy (H)     Dyslipidemia     Dysthymia     Erectile dysfunction     History of bacterial endocarditis     Idiopathic chronic gout without tophus, unspecified site     Lymphedema     Mitral valve prolapse     Paraproteinemia     Postural kyphosis     Presence of prosthetic heart valve     Prosthetic valve endocarditis (H)     Proteinuria       Past Surgical History:   Procedure Laterality Date     CARDIAC SURGERY       CHG X-RAY PELVIS 3+ VW N/A 7/1/2015    Procedure: MITRAL VALVE REPLACEMENT, MAZE PROCEDURE, CARDIOLOGY TRANSESOPHAGEAL ECHOCARDIOGRAM;  Surgeon: Rm Munoz MD;  Location: [facility];  Service:      LANG-MAZE MICROWAVE ABLATION       CREATION PERICARDIAL WINDOW N/A 8/31/2015    Procedure: RIGHT PERICARDIAL WINDOW, TALC PLEURODESIS,VIDEO ASSISTED THOROSCOPY,DRAINAGE OF BILATERAL PLEURAL EFFUSIONS;  Surgeon: Rm Munoz MD;   Location: [facility];  Service:      EYE SURGERY      Retial hole treatment     HERNIA REPAIR Right     inguinal     Mechanical mitral valve       [facility]  9/3/2015          [facility]  2/14/2017          REPLACE VALVE MITRAL N/A 3/16/2017    Procedure: REDO STERNOTOMY, REDO MITRAL VALVE REPLACEMENT, PLACEMENT TEMPORARY VENTRICULAR PACING WIRES, ANESTHESIA TRANSESOPHAGEAL ECHOCARDIOGRAM;  Surgeon: Raphael Rosenberg MD;  Location: [facility];  Service:      REPLACE VALVE MITRAL  2015    Bioprosthetic       Family History   Problem Relation Age of Onset     Heart Failure Mother      Atrial fibrillation Mother      Emphysema Father      Heart Failure Father      Kidney failure Sister         S/P Renal transplant     Alcoholism Brother      No Known Problems Son      No Known Problems Brother        Social history: Patient is .  He is a retired .  He drinks wine or beer 4 times per week.  He denies a tobacco use.  Denies illicit drug use.    ROS: Positive for insomnia.  Specifically negative for dysphagia, imbalance, fine motor skill difficulties, bowel/bladder dysfunction, fevers,chills, appetite changes, unexplained weight loss.   Otherwise 13 systems reviewed are negative.  Please see the patient's intake questionnaire from today for details.      OBJECTIVE:  PHYSICAL EXAMINATION:  CONSTITUTIONAL:  Vital signs as above.  No acute distress.  The patient is well nourished and well groomed.  PSYCHIATRIC:  The patient is awake, alert, oriented to person, place, time and answering questions appropriately with clear speech.    HEENT:  Normocephalic, atraumatic.  Sclera clear.    SKIN:  Skin over the face, bilateral upper extremities, and neck is clean, dry, intact without rashes.  LYMPH NODES:  No palpable or tender anterior/posterior cervical, submandibular, or supraclavicular lymph nodes.    MUSCLE STRENGTH:  5/5 strength for the bilateral shoulder abductors, elbow  flexors/extensors, wrist extensors, finger flexors/abductors.  NEURO:  CN III-XII are grossly intact.  1-2+ symmetric biceps, brachioradialis, triceps reflexes bilaterally.  Sensation to intact bilateral upper extremities 0.  Negative Gupta's bilaterally.    VASCULAR:  2/4 radial pulses bilaterally.  Warm upper limbs bilaterally.  Capillary refill in the upper extremities is less than 1 second.  MUSCULOSKELETAL: Neck is flexed forward and side bent to the right.  He has severely restricted range of motion in all directions.  He has severe hypertonicity right anterior and lateral cervical musculature.  Tender to palpation left greater than right upper trapezius muscle.    RESULTS: I reviewed the report of an MRI cervical spine from [facility] dated February 10, 2020.  This shows reversal of usual cervical lordosis and mild dextrocurvature of the lower thoracic spine.  There is minimal spondylolisthesis C3-4.  There is mild multilevel disc degeneration and facet arthropathy.  Facet arthropathy is mild to moderate on the left at C3-4 and C4-5.  There is mild to moderate foraminal stenosis on the left C3-C7.  There is mild spinal canal stenosis C5-6.  There is no high-grade spinal canal stenosis.

## 2021-08-27 NOTE — LETTER
8/27/2021         RE: Bandar Wells  [address] [place] [address]        Dear Colleague,    Thank you for referring your patient, Bandar Wells, to the [facility] [place]. Please see a copy of my visit note below.    ASSESSMENT: Bandar Wells is a 71 year old male with past medical history significant for dyslipidemia, gout, atrial fibrillation (on warfarin), status post mitral valve replacement with metal valve, coronary artery disease, cardiomyopathy, chronic kidney disease stage IV, anemia, lymphedema, DRESS syndrome who presents today for new patient evaluation of cervical dystonia.  Neck is flexed forward and side bent to the right.  Head drop is due to dystonia of anterior and lateral cervical spine musculature rather than weakness of the posterior cervical spinous musculature.  MRI cervical spine shows reversal of normal cervical lordosis and dextrocurvature lower cervical spine.  There is mild spinal canal stenosis C5-6 but no high-grade spinal canal stenosis.  There is mild to moderate left foraminal stenosis C3-C7.  Patient has myofascial pain in the left upper trapezius muscle secondary to his anterocollis.  He had a left upper trapezius muscle trigger point injection at the [facility] August 17, 2021 which has not provided relief.  He is disappointed because the same procedure did provide relief of his pain earlier this year.      PLAN:  A shared decision making model was used.  The patient's values and choices were respected.  The following represents what was discussed and decided upon by the physician assistant and the patient.      1.  DIAGNOSTIC TESTS: I reviewed the report of the MRI cervical spine.  -I reviewed records from [facility] neurology and pain clinic.  -No further diagnostic tests were ordered.    2.  PHYSICAL THERAPY: Patient is currently in physical therapy.  He goes to [facility] rehab.  He has had 20 sessions of far.  This is helpful.    3.   "MEDICATIONS:  -I offered for the patient to trial baclofen.  He declined.  He is concerned about risk for increased falls.  -Patient takes oxycodone 10 mg twice daily which is managed by his primary care provider.      4.  INTERVENTIONS:    -I did not order any injections for the patient.  I would like the patient to see Dr. Cervantes, physiatry a the [facility] to see if he may be a candidate for botox injections for his dystonia.    5.  PATIENT EDUCATION: Patient is in agreement the above plan.  All questions were answered.    6.  REFERRALS: I entered a referral for the patient to see Dr. Bertha Valentin, physiatry at the [facility] for consideration for botox injections.    7.  FOLLOW-UP:   Patient will follow up with me as needed.  We will await recommendations from the [facility].  He is already established with [facility] pain and neurology.  If he has questions or concerns, he should not hesitate to call.        SUBJECTIVE:  Bandar Wells  Is a 71 year old male who presents today in consultation request of Dr. Hicks for new patient evaluation of neck pain and head drop.  Patient reports that he has had chronic neck pain.  Apparently he had some sort of injection in the neck or upper back at [facility] in early 2020.  2 weeks after his injection he had abrupt onset of \"head drop.\"  He has been working with the [facility].  He saw neurology and I reviewed their notes.  He has been working with the pain clinic.  They have performed trigger point injections to help with pain in the upper trapezius muscles related to the anterocollis.  Trigger point injections were first on the right because I was the more painful side.  After those injections the pain switched to the left.  He had left upper trapezius trigger point injections in May which helped.  He just had repeat left upper trapezius muscle trigger point injections last week and they have not provided relief.  He has been working " with physical therapy and has made some progress with his head position.  He was referred to our clinic as a second opinion.    Patient complains of pain in the left upper trapezius muscle.  He denies any pain down the arms.  His head is flexed forward and tilted to the right.  He states that his head is in this position even when he lies flat on the bed.  He reports that with a lot of work with his physical therapist they are able to relax his neck muscles so that he can lie on the bed with one pillow, but otherwise he needs multiple pillows for support.  He denies any numbness, tingling, weakness down the arms.  Denies any aggravating or alleviating factors for his symptoms.  Denies loss of bowel or bladder control.  Denies any recent fevers or chills.      Patient is currently in physical therapy. He has had 20 sessions so far.  This is helpful.  He has had trigger point injections as described above.  He has not had Botox injections.  He uses oxycodone 10 mg on average twice per day which is managed by primary care provider.  He is not taking any muscle relaxers.    Current Outpatient Medications   Medication     Cholecalciferol 1.25 MG (50640 UT) TABS     evolocumab (REPATHA) 140 MG/ML prefilled autoinjector     febuxostat (ULORIC) 40 MG TABS tablet     folic acid (FOLVITE) 1 MG tablet     gabapentin (NEURONTIN) 300 MG capsule     gentamicin (GARAMYCIN) 0.1 % external ointment     metolazone (ZAROXOLYN) 2.5 MG tablet     mupirocin (BACTROBAN) 2 % external ointment     oxyCODONE IR (ROXICODONE) 10 MG tablet     penicillin V (VEETID) 500 MG tablet     polyethylene glycol (MIRALAX) 17 g packet     potassium chloride (KLOR-CON) 20 MEQ packet     sennosides (SENOKOT) 8.6 MG tablet     torsemide (DEMADEX) 20 MG tablet     triamcinolone (KENALOG) 0.1 % external ointment     triamcinolone (KENALOG) 0.1 % external ointment     warfarin ANTICOAGULANT (COUMADIN) 3 MG tablet     Wound Dressings (VASELINE PETROLATUM GAUZE)  PADS         Allergies   Allergen Reactions     Allopurinol Rash       Past Medical History:   Diagnosis Date     Abnormal liver function test      Atrial fibrillation (H)     s/p Maze procedure     Atrial fibrillation (H)     post naun     Atrial fibrillation with RVR (H) 8/29/2015    S/p MAZE Jul 2015     Bacterial endocarditis      CHF (congestive heart failure) (H)      Chronic combined systolic and diastolic heart failure (H)      CKD (chronic kidney disease)      Dyslipidemia      H/O mitral valve replacement with mechanical valve      Hyperlipidemia      Hyperlipidemia      Idiopathic chronic gout without tophus, unspecified site 4/15/2021     Mitral valve prolapse      Near syncope      Pericardial effusion without cardiac tamponade 8/29/2015     S/P mitral valve replacement with metallic valve 03/16/2017     Status post Maze operation for atrial fibrillation 8/29/2015        Patient Active Problem List   Diagnosis     DRESS syndrome     Paroxysmal atrial fibrillation (H)     S/P mitral valve replacement with metallic valve     Focal dystonia / anterior cervical muscles - Neuro - Ceballos, PT      Anemia of chronic renal failure, stage 4 (severe) (H)     Coronary artery disease involving native coronary artery of native heart without angina pectoris     Bone lesion     Cardiomyopathy (H)     Dyslipidemia     Dysthymia     Erectile dysfunction     History of bacterial endocarditis     Idiopathic chronic gout without tophus, unspecified site     Lymphedema     Mitral valve prolapse     Paraproteinemia     Postural kyphosis     Presence of prosthetic heart valve     Prosthetic valve endocarditis (H)     Proteinuria       Past Surgical History:   Procedure Laterality Date     CARDIAC SURGERY       CHG X-RAY PELVIS 3+ VW N/A 7/1/2015    Procedure: MITRAL VALVE REPLACEMENT, MAZE PROCEDURE, CARDIOLOGY TRANSESOPHAGEAL ECHOCARDIOGRAM;  Surgeon: Rm Munoz MD;  Location: [facility];  Service:       LANG-MAZE MICROWAVE ABLATION       CREATION PERICARDIAL WINDOW N/A 8/31/2015    Procedure: RIGHT PERICARDIAL WINDOW, TALC PLEURODESIS,VIDEO ASSISTED THOROSCOPY,DRAINAGE OF BILATERAL PLEURAL EFFUSIONS;  Surgeon: Rm Munoz MD;  Location: [facility];  Service:      EYE SURGERY      Retial hole treatment     HERNIA REPAIR Right     inguinal     Mechanical mitral valve       [facility]  9/3/2015          PIC  2/14/2017          REPLACE VALVE MITRAL N/A 3/16/2017    Procedure: REDO STERNOTOMY, REDO MITRAL VALVE REPLACEMENT, PLACEMENT TEMPORARY VENTRICULAR PACING WIRES, ANESTHESIA TRANSESOPHAGEAL ECHOCARDIOGRAM;  Surgeon: Raphael Rosenberg MD;  Location: [facility];  Service:      REPLACE VALVE MITRAL  2015    Bioprosthetic       Family History   Problem Relation Age of Onset     Heart Failure Mother      Atrial fibrillation Mother      Emphysema Father      Heart Failure Father      Kidney failure Sister         S/P Renal transplant     Alcoholism Brother      No Known Problems Son      No Known Problems Brother        Social history: Patient is .  He is a retired .  He drinks wine or beer 4 times per week.  He denies a tobacco use.  Denies illicit drug use.    ROS: Positive for insomnia.  Specifically negative for dysphagia, imbalance, fine motor skill difficulties, bowel/bladder dysfunction, fevers,chills, appetite changes, unexplained weight loss.   Otherwise 13 systems reviewed are negative.  Please see the patient's intake questionnaire from today for details.      OBJECTIVE:  PHYSICAL EXAMINATION:  CONSTITUTIONAL:  Vital signs as above.  No acute distress.  The patient is well nourished and well groomed.  PSYCHIATRIC:  The patient is awake, alert, oriented to person, place, time and answering questions appropriately with clear speech.    HEENT:  Normocephalic, atraumatic.  Sclera clear.    SKIN:  Skin over the face, bilateral upper extremities, and neck  is clean, dry, intact without rashes.  LYMPH NODES:  No palpable or tender anterior/posterior cervical, submandibular, or supraclavicular lymph nodes.    MUSCLE STRENGTH:  5/5 strength for the bilateral shoulder abductors, elbow flexors/extensors, wrist extensors, finger flexors/abductors.  NEURO:  CN III-XII are grossly intact.  1-2+ symmetric biceps, brachioradialis, triceps reflexes bilaterally.  Sensation to intact bilateral upper extremities 0.  Negative Gupta's bilaterally.    VASCULAR:  2/4 radial pulses bilaterally.  Warm upper limbs bilaterally.  Capillary refill in the upper extremities is less than 1 second.  MUSCULOSKELETAL: Neck is flexed forward and side bent to the right.  He has severely restricted range of motion in all directions.  He has severe hypertonicity right anterior and lateral cervical musculature.  Tender to palpation left greater than right upper trapezius muscle.    RESULTS: I reviewed the report of an MRI cervical spine from [facility] dated February 10, 2020.  This shows reversal of usual cervical lordosis and mild dextrocurvature of the lower thoracic spine.  There is minimal spondylolisthesis C3-4.  There is mild multilevel disc degeneration and facet arthropathy.  Facet arthropathy is mild to moderate on the left at C3-4 and C4-5.  There is mild to moderate foraminal stenosis on the left C3-C7.  There is mild spinal canal stenosis C5-6.  There is no high-grade spinal canal stenosis.        Again, thank you for allowing me to participate in the care of your patient.        Sincerely,        Kasandra Merchant PA-C

## 2021-08-27 NOTE — TELEPHONE ENCOUNTER
Patient is calling to follow up on med refill request. Patient is out of medication. States he only takes 1 - 2 per day, rx says he can take up to 4 per day. Would appreciate a refill asa.  [facility].

## 2021-08-30 ENCOUNTER — TELEPHONE (OUTPATIENT)
Dept: PHYSICAL MEDICINE AND REHAB | Facility: CLINIC | Age: 71
End: 2021-08-30

## 2021-08-30 NOTE — TELEPHONE ENCOUNTER
[facility] Call Center    Phone Message    May a detailed message be left on voicemail: yes     Reason for Call: Appointment Intake    Referring Provider Name: Kasandra Merchant PA-C  Diagnosis and/or Symptoms: Focal dystonia   Postural kyphosis of cervicothoracic region     Patient is being referred to Dr. Cervantes. Please review and contact the patient to schedule.    Action Taken: Other: PMR    Travel Screening: Not Applicable

## 2021-08-30 NOTE — TELEPHONE ENCOUNTER
Attempted phone call to patient to schedule with Dr Cervantes, and left voicemail message with contact information to return call.

## 2021-09-02 DIAGNOSIS — N18.4 CHRONIC KIDNEY DISEASE, STAGE IV (SEVERE) (H): Primary | ICD-10-CM

## 2021-09-02 DIAGNOSIS — D64.9 ANEMIA, UNSPECIFIED: ICD-10-CM

## 2021-09-02 DIAGNOSIS — I50.9 HEART FAILURE, UNSPECIFIED (H): ICD-10-CM

## 2021-09-02 DIAGNOSIS — E79.0 URICACIDEMIA: ICD-10-CM

## 2021-09-08 ENCOUNTER — TRANSFERRED RECORDS (OUTPATIENT)
Dept: HEALTH INFORMATION MANAGEMENT | Facility: CLINIC | Age: 71
End: 2021-09-08

## 2021-09-08 ENCOUNTER — TELEPHONE (OUTPATIENT)
Dept: ANTICOAGULATION | Facility: CLINIC | Age: 71
End: 2021-09-08

## 2021-09-08 ENCOUNTER — ANTICOAGULATION THERAPY VISIT (OUTPATIENT)
Dept: INTERNAL MEDICINE | Facility: CLINIC | Age: 71
End: 2021-09-08

## 2021-09-08 DIAGNOSIS — Z95.4 S/P MITRAL VALVE REPLACEMENT WITH METALLIC VALVE: ICD-10-CM

## 2021-09-08 DIAGNOSIS — I48.0 PAROXYSMAL ATRIAL FIBRILLATION (H): Primary | ICD-10-CM

## 2021-09-08 NOTE — TELEPHONE ENCOUNTER
ANTICOAGULATION     Bandar Wells is overdue for INR check.      Left message  reminding patient to check INR with their home meter and call results to the home monitoring company as soon as possible.     Meg Jacinto RN

## 2021-09-09 LAB — INR (EXTERNAL): 2.9 (ref 0.9–1.1)

## 2021-09-09 NOTE — PROGRESS NOTES
ANTICOAGULATION  MANAGEMENT-Patient Home Monitoring Result    Assessment     Therapeutic INR result of 2.9 . Goal range 2.5-3.5. Received via fax from [employer] home INR monitoring company.        Previous INR was therapeutic at 3.2 on 21.    Bandar was last contacted by phone: 21.    Plan     Per home monitoring agreement with patient, patient was NOT contacted regarding therapeutic result today.  Patient is to continue current dose and continue to check INR with home monitor per protocol.  ?       OBJECTIVE    INR (External)   Date Value Ref Range Status   2021 2.9 (A) 0.9 - 1.1 Final       ASSESSMENT / PLAN  No question data found.  Anticoagulation Summary  As of 2021    INR goal:  2.5-3.5   TTR:  55.2 % (1 y)   INR used for dosin.9 (2021)   Warfarin maintenance plan:  3 mg (3 mg x 1) every Wed, Sat; 6 mg (3 mg x 2) all other days   Full warfarin instructions:  3 mg every Wed, Sat; 6 mg all other days   Weekly warfarin total:  36 mg   Plan last modified:  Meg Jacinto RN (2021)   Next INR check:  2021   Priority:  Maintenance   Target end date:  Indefinite    Indications    Paroxysmal atrial fibrillation (H) [I48.0]  S/P mitral valve replacement with metallic valve [Z95.4]             Anticoagulation Episode Summary     INR check location:      Preferred lab:      Send INR reminders to:  ILAN MIDWAY    Comments:  INR TARGET GOAL 2.5 - 3.5      Anticoagulation Care Providers     Provider Role Specialty Phone number    Jin Hicks MD [facility] [phone]

## 2021-09-12 ENCOUNTER — HEALTH MAINTENANCE LETTER (OUTPATIENT)
Age: 71
End: 2021-09-12

## 2021-09-13 ENCOUNTER — MYC REFILL (OUTPATIENT)
Dept: INTERNAL MEDICINE | Facility: CLINIC | Age: 71
End: 2021-09-13

## 2021-09-13 DIAGNOSIS — G89.4 PAIN SYNDROME, CHRONIC: ICD-10-CM

## 2021-09-14 RX ORDER — OXYCODONE HYDROCHLORIDE 10 MG/1
5 TABLET ORAL EVERY 4 HOURS PRN
Qty: 42 TABLET | Refills: 0 | Status: SHIPPED | OUTPATIENT
Start: 2021-09-14 | End: 2021-09-24

## 2021-09-14 NOTE — TELEPHONE ENCOUNTER
Medication: Oxycodone 10 mg  Last Date Filled 8/27/21, 42 tablets for 14 days   pulled: YES             Only PCP Prescribing? : YES  Taken as prescribed from physician notes? YES    CSA in last year: YES  Random Utox in last year: NO  Opioids + benzodiazepines? YES    Is patient on the Executive Review Team? NO    All responses suggest: Refilling prescription

## 2021-09-14 NOTE — TELEPHONE ENCOUNTER
Routing refill request to provider for review/approval because:  Controlled medication refill request.  Routing to provider's care team.    Last Written Prescription Date:  8/27/21  Last Fill Quantity: 42,  # refills: 0   Last office visit provider:  7/15/21       Requested Prescriptions   Pending Prescriptions Disp Refills     oxyCODONE IR (ROXICODONE) 10 MG tablet 42 tablet 0     Sig: Take 0.5 tablets (5 mg) by mouth every 4 hours as needed for severe pain       There is no refill protocol information for this order            Narda Robins RN 09/13/21 7:17 PM

## 2021-09-21 ENCOUNTER — OFFICE VISIT (OUTPATIENT)
Dept: PHYSICAL MEDICINE AND REHAB | Facility: CLINIC | Age: 71
End: 2021-09-21
Payer: COMMERCIAL

## 2021-09-21 DIAGNOSIS — G24.1 GENETIC TORSION DYSTONIA: ICD-10-CM

## 2021-09-21 DIAGNOSIS — G24.3 CERVICAL DYSTONIA: Primary | ICD-10-CM

## 2021-09-21 PROCEDURE — 99205 OFFICE O/P NEW HI 60 MIN: CPT | Performed by: PHYSICAL MEDICINE & REHABILITATION

## 2021-09-21 NOTE — PROGRESS NOTES
[facility]  Clinics & Surgery Center  Physical Medicine & Rehabilitation Consultation    Bandar Wells   YOB: 1950  MRN: [id number]   Date of Service: 09/21/21    Requesting Provider: Kasandra Merchant PA-C, [facility]      History of Present Illness:  Bandar Wells is a 71 year old male with a history of cervical dystonia who presents for consideration of botulinum toxin injections for management of involuntary muscle spasms affecting his neck and shoulder girdle musculature.  The patient has a complex medical history that is also significant for A. Fib (on warfarin), mitral valve replacement, coronary artery disease, cardiomyopathy, chronic kidney disease stage IV, anemia, lymphedema, and DRESS syndrome.  He also has a history of chronic lower back pain, mild spinal canal stenosis at C5-6, and mild to moderate left foraminal stenosis at C3-C7.    The patient states that the symptoms her presents with today in his neck started in 3/2020, approximately 2 weeks following a steroid injection into his mid back.  Over the course of a few weeks, he developed involuntary neck rotation to the right, and an anterocollis that was so severe that his chin was resting on his chest.  He also developed associated neck pain, primarily in the left posterior aspect of his upper trapezius muscle. He also has difficulty with balance and tripping over objects due to the fact that he acnnot see where he is going due to his involuntary neck rotation. He also finds it quite difficult to sleep at night due to discomfort, and driving has also become quite challenging.     Since the onset of his symptoms, he did see a spine specialist who diagnosed him with cervical dystonia.  He enrolled in physical therapy at [place] [facility], and has completed about 20 sessions, with significant improvement.  He states that with hard work in physical therapy, his neck has straightened out  significantly and his chin is no longer resting on his chest.  Currently however, his physical therapist is tapering down his PT because she feels he is hitting a plateau.    For his pain, he has had trigger point injections at [place] with only temporary improvement in his pain.  Additionally, he takes oxycodone as needed for severe pain.      Past Medical History:   Diagnosis Date     Abnormal liver function test      Atrial fibrillation (H)     s/p Maze procedure     Atrial fibrillation (H)     post naun     Atrial fibrillation with RVR (H) 8/29/2015    S/p MAZE Jul 2015     Bacterial endocarditis      CHF (congestive heart failure) (H)      Chronic combined systolic and diastolic heart failure (H)      CKD (chronic kidney disease)      Dyslipidemia      H/O mitral valve replacement with mechanical valve      Hyperlipidemia      Hyperlipidemia      Idiopathic chronic gout without tophus, unspecified site 4/15/2021     Mitral valve prolapse      Near syncope      Pericardial effusion without cardiac tamponade 8/29/2015     S/P mitral valve replacement with metallic valve 03/16/2017     Status post Maze operation for atrial fibrillation 8/29/2015        Family History   Problem Relation Age of Onset     Heart Failure Mother      Atrial fibrillation Mother      Emphysema Father      Heart Failure Father      Kidney failure Sister         S/P Renal transplant     Alcoholism Brother      No Known Problems Son      No Known Problems Brother        Social History     Socioeconomic History     Marital status: Single     Spouse name: Not on file     Number of children: Not on file     Years of education: Not on file     Highest education level: Not on file   Occupational History     Not on file   Tobacco Use     Smoking status: Never Smoker     Smokeless tobacco: Never Used   Substance and Sexual Activity     Alcohol use: Yes     Alcohol/week: 2.0 standard drinks     Drug use: No     Sexual activity: Not Currently   Other  Topics Concern     Not on file   Social History Narrative    Has a steady girlfriend and multiple friends .  His son is in [place] but can be present if warned.  Home 1 1/2 stories.    3/2017     Social Determinants of Health     Financial Resource Strain:      Difficulty of Paying Living Expenses:    Food Insecurity:      Worried About Running Out of Food in the Last Year:      Ran Out of Food in the Last Year:    Transportation Needs:      Lack of Transportation (Medical):      Lack of Transportation (Non-Medical):    Physical Activity:      Days of Exercise per Week:      Minutes of Exercise per Session:    Stress:      Feeling of Stress :    Social Connections:      Frequency of Communication with Friends and Family:      Frequency of Social Gatherings with Friends and Family:      Attends Rastafari Services:      Active Member of Clubs or Organizations:      Attends Club or Organization Meetings:      Marital Status:    Intimate Partner Violence:      Fear of Current or Ex-Partner:      Emotionally Abused:      Physically Abused:      Sexually Abused:        Current Outpatient Medications   Medication Sig Dispense Refill     Cholecalciferol 1.25 MG (60784 UT) TABS Take 50,000 Units by mouth       evolocumab (REPATHA) 140 MG/ML prefilled autoinjector Inject 1 mL (140 mg) Subcutaneous every 14 days       febuxostat (ULORIC) 40 MG TABS tablet Take 1 tablet (40 mg) by mouth daily       folic acid (FOLVITE) 1 MG tablet Take 1 mg by mouth daily       gabapentin (NEURONTIN) 300 MG capsule Take 2 capsules (600 mg) by mouth 2 times daily (Patient not taking: Reported on 8/27/2021)       gentamicin (GARAMYCIN) 0.1 % external ointment Apply topically 2 times daily 30 g 3     metolazone (ZAROXOLYN) 2.5 MG tablet Take 1 tablet (2.5 mg) by mouth once a week       mupirocin (BACTROBAN) 2 % external ointment Use 2 times a day to affected area. 30 g 3     oxyCODONE IR (ROXICODONE) 10 MG tablet Take 0.5 tablets (5 mg) by mouth  every 4 hours as needed for severe pain 42 tablet 0     penicillin V (VEETID) 500 MG tablet Take 500 mg by mouth daily Hx endocarditis       polyethylene glycol (MIRALAX) 17 g packet Take 17 g by mouth daily as needed for constipation 30 packet 0     potassium chloride (KLOR-CON) 20 MEQ packet Take 20 mEq by mouth daily       sennosides (SENOKOT) 8.6 MG tablet Take 1-4 tablets by mouth 2 times daily       torsemide (DEMADEX) 20 MG tablet Take 3 tablets (60 mg) by mouth 2 times daily       triamcinolone (KENALOG) 0.1 % external ointment Apply topically 2 times daily 453 g 5     triamcinolone (KENALOG) 0.1 % external ointment Apply topically 2 times daily 453.6 g 3     warfarin ANTICOAGULANT (COUMADIN) 3 MG tablet Take 1-2 tablets (3mg-6mg) by mouth daily or as directed by the INR clinic 180 tablet 1     Wound Dressings (VASELINE PETROLATUM GAUZE) PADS Apply to open wounds on the legs 12 each 5         Allergies   Allergen Reactions     Allopurinol Rash     Clindamycin Rash     Family History: No family history of cervical dystonia, Parkinson's disease, tremors, or other movement disorders. He has a brother with rheumatoid arthritis.       Physical Examination:  VS: There were no vitals taken for this visit.     Gen: Pleasant and cooperative, in no acute distress  Cervical spine:    Anterocollis with 20 degrees of right rotation, and mild right side bending with right shoulder elevation. Upper trapezius hypertrophy bilaterally, L>R, Hypertonicity in bilateral levator scapulae, L>R.    ROM: Significantly limited in right/left rotation, right/left side bending, and extension. Normal cervical flexion. He is unable to fully relax his posterior neck muscles to lay in supine position, and therefore minimal cervical extension was appreciated.       Assessment & Recommendations:  Bandar Wells is a 71 year old male who presents to rehabilitation clinic for consideration of botulinum toxin injections for management of cervical  dystonia, which started in 3/2020 approximately two weeks following a steroid injection into thoracic spine (unclear whether this is related, although suspicion is that it is not).  He has had some improvement with physical therapy, but is now reaching a plateau with regards to his range of motion. On examination, he has a significant anterocollis posture, with involuntary right rotation and right side bending.  He has very limited cervical range of motion, which I suspect is due to misalignment of C1/C2.    Based on his symptoms, which are consistent with a rather severe cervical dystonia, he would be an excellent candidate for a trial of botulinum toxin injections for management of the involuntary muscle spasms in his neck and shoulder girdle musculature, as well as the associated pain.  Focus of my injections initially would be on the right and left levator scapulae, right splenius, right inferior oblique, and left sternocleidomastoid, depending on EMG activity. Once Botox is administered, I recommend that he restart physical therapy (include cervical traction), as he should be able obtain more range of motion once Botox is on board. PT referral place today to [facility] - Shilpi Matute.      Plan will be to request prior authorization for 300 units of Botox for management of cervical dystonia and genetic torsion dystonia. He is tentatively scheduled for 10/12/21 for his first series of injections pending insurance approval.       Thank you for this consultation. Please do not hesitate to contact me with further questions.   Bertha Cervantes MD  Physical Medicine and Rehabilitation  [phone]      I spent a total of 60 minutes face-to-face and managing the care of Bandar Wells. Over 50% of my time was spent counseling the patient and coordinating care. Please see note for details.

## 2021-09-21 NOTE — LETTER
9/21/2021       RE: Bandar Wells  [address] [place] [address]     Dear Colleague,    Thank you for referring your patient, Bandar Wells, to the [facility] PHYSICAL MEDICINE AND REHABILITATION CLINIC [place] [facility]. Please see a copy of my visit note below.    [facility]  Clinics & Surgery Center  Physical Medicine & Rehabilitation Consultation    Bandar Wells   YOB: 1950  MRN: [id number]   Date of Service: 09/21/21    Requesting Provider: Kasandra Merchant PA-C, [facility]    History of Present Illness:  Bandar Wells is a 71 year old male with a history of cervical dystonia who presents for consideration of botulinum toxin injections for management of involuntary muscle spasms affecting his neck and shoulder girdle musculature.  The patient has a complex medical history that is also significant for A. Fib (on warfarin), mitral valve replacement, coronary artery disease, cardiomyopathy, chronic kidney disease stage IV, anemia, lymphedema, and DRESS syndrome.  He also has a history of chronic lower back pain, mild spinal canal stenosis at C5-6, and mild to moderate left foraminal stenosis at C3-C7.    The patient states that the symptoms her presents with today in his neck started in 3/2020, approximately 2 weeks following a steroid injection into his mid back.  Over the course of a few weeks, he developed involuntary neck rotation to the right, and an anterocollis that was so severe that his chin was resting on his chest.  He also developed associated neck pain, primarily in the left posterior aspect of his upper trapezius muscle. He also has difficulty with balance and tripping over objects due to the fact that he acnnot see where he is going due to his involuntary neck rotation. He also finds it quite difficult to sleep at night due to discomfort, and driving has also become quite  challenging.     Since the onset of his symptoms, he did see a spine specialist who diagnosed him with cervical dystonia.  He enrolled in physical therapy at [facility], and has completed about 20 sessions, with significant improvement.  He states that with hard work in physical therapy, his neck has straightened out significantly and his chin is no longer resting on his chest.  Currently however, his physical therapist is tapering down his PT because she feels he is hitting a plateau.    For his pain, he has had trigger point injections at [place] with only temporary improvement in his pain.  Additionally, he takes oxycodone as needed for severe pain.      Past Medical History:   Diagnosis Date     Abnormal liver function test      Atrial fibrillation (H)     s/p Maze procedure     Atrial fibrillation (H)     post naun     Atrial fibrillation with RVR (H) 8/29/2015    S/p MAZE Jul 2015     Bacterial endocarditis      CHF (congestive heart failure) (H)      Chronic combined systolic and diastolic heart failure (H)      CKD (chronic kidney disease)      Dyslipidemia      H/O mitral valve replacement with mechanical valve      Hyperlipidemia      Hyperlipidemia      Idiopathic chronic gout without tophus, unspecified site 4/15/2021     Mitral valve prolapse      Near syncope      Pericardial effusion without cardiac tamponade 8/29/2015     S/P mitral valve replacement with metallic valve 03/16/2017     Status post Maze operation for atrial fibrillation 8/29/2015        Family History   Problem Relation Age of Onset     Heart Failure Mother      Atrial fibrillation Mother      Emphysema Father      Heart Failure Father      Kidney failure Sister         S/P Renal transplant     Alcoholism Brother      No Known Problems Son      No Known Problems Brother        Social History     Socioeconomic History     Marital status: Single     Spouse name: Not on file     Number of children: Not on file     Years of  education: Not on file     Highest education level: Not on file   Occupational History     Not on file   Tobacco Use     Smoking status: Never Smoker     Smokeless tobacco: Never Used   Substance and Sexual Activity     Alcohol use: Yes     Alcohol/week: 2.0 standard drinks     Drug use: No     Sexual activity: Not Currently   Other Topics Concern     Not on file   Social History Narrative    Has a steady girlfriend and multiple friends .  His son is in [place] but can be present if warned.  Home 1 1/2 stories.    3/2017     Social Determinants of Health     Financial Resource Strain:      Difficulty of Paying Living Expenses:    Food Insecurity:      Worried About Running Out of Food in the Last Year:      Ran Out of Food in the Last Year:    Transportation Needs:      Lack of Transportation (Medical):      Lack of Transportation (Non-Medical):    Physical Activity:      Days of Exercise per Week:      Minutes of Exercise per Session:    Stress:      Feeling of Stress :    Social Connections:      Frequency of Communication with Friends and Family:      Frequency of Social Gatherings with Friends and Family:      Attends Hinduism Services:      Active Member of Clubs or Organizations:      Attends Club or Organization Meetings:      Marital Status:    Intimate Partner Violence:      Fear of Current or Ex-Partner:      Emotionally Abused:      Physically Abused:      Sexually Abused:        Current Outpatient Medications   Medication Sig Dispense Refill     Cholecalciferol 1.25 MG (82373 UT) TABS Take 50,000 Units by mouth       evolocumab (REPATHA) 140 MG/ML prefilled autoinjector Inject 1 mL (140 mg) Subcutaneous every 14 days       febuxostat (ULORIC) 40 MG TABS tablet Take 1 tablet (40 mg) by mouth daily       folic acid (FOLVITE) 1 MG tablet Take 1 mg by mouth daily       gabapentin (NEURONTIN) 300 MG capsule Take 2 capsules (600 mg) by mouth 2 times daily (Patient not taking: Reported on 8/27/2021)        gentamicin (GARAMYCIN) 0.1 % external ointment Apply topically 2 times daily 30 g 3     metolazone (ZAROXOLYN) 2.5 MG tablet Take 1 tablet (2.5 mg) by mouth once a week       mupirocin (BACTROBAN) 2 % external ointment Use 2 times a day to affected area. 30 g 3     oxyCODONE IR (ROXICODONE) 10 MG tablet Take 0.5 tablets (5 mg) by mouth every 4 hours as needed for severe pain 42 tablet 0     penicillin V (VEETID) 500 MG tablet Take 500 mg by mouth daily Hx endocarditis       polyethylene glycol (MIRALAX) 17 g packet Take 17 g by mouth daily as needed for constipation 30 packet 0     potassium chloride (KLOR-CON) 20 MEQ packet Take 20 mEq by mouth daily       sennosides (SENOKOT) 8.6 MG tablet Take 1-4 tablets by mouth 2 times daily       torsemide (DEMADEX) 20 MG tablet Take 3 tablets (60 mg) by mouth 2 times daily       triamcinolone (KENALOG) 0.1 % external ointment Apply topically 2 times daily 453 g 5     triamcinolone (KENALOG) 0.1 % external ointment Apply topically 2 times daily 453.6 g 3     warfarin ANTICOAGULANT (COUMADIN) 3 MG tablet Take 1-2 tablets (3mg-6mg) by mouth daily or as directed by the INR clinic 180 tablet 1     Wound Dressings (VASELINE PETROLATUM GAUZE) PADS Apply to open wounds on the legs 12 each 5     Allergies   Allergen Reactions     Allopurinol Rash     Clindamycin Rash     Family History: No family history of cervical dystonia, Parkinson's disease, tremors, or other movement disorders. He has a brother with rheumatoid arthritis.       Physical Examination:  VS: There were no vitals taken for this visit.     Gen: Pleasant and cooperative, in no acute distress  Cervical spine:    Anterocollis with 20 degrees of right rotation, and mild right side bending with right shoulder elevation. Upper trapezius hypertrophy bilaterally, L>R, Hypertonicity in bilateral levator scapulae, L>R.    ROM: Significantly limited in right/left rotation, right/left side bending, and extension. Normal cervical  flexion. He is unable to fully relax his posterior neck muscles to lay in supine position, and therefore minimal cervical extension was appreciated.       Assessment & Recommendations:  Bandar Wells is a 71 year old male who presents to rehabilitation clinic for consideration of botulinum toxin injections for management of cervical dystonia, which started in 3/2020 approximately two weeks following a steroid injection into thoracic spine (unclear whether this is related, although suspicion is that it is not).  He has had some improvement with physical therapy, but is now reaching a plateau with regards to his range of motion. On examination, he has a significant anterocollis posture, with involuntary right rotation and right side bending.  He has very limited cervical range of motion, which I suspect is due to misalignment of C1/C2.    Based on his symptoms, which are consistent with a rather severe cervical dystonia, he would be an excellent candidate for a trial of botulinum toxin injections for management of the involuntary muscle spasms in his neck and shoulder girdle musculature, as well as the associated pain.  Focus of my injections initially would be on the right and left levator scapulae, right splenius, right inferior oblique, and left sternocleidomastoid, depending on EMG activity. Once Botox is administered, I recommend that he restart physical therapy (include cervical traction), as he should be able obtain more range of motion once Botox is on board. PT referral place today to [facility] - Shilpi Matute.      Plan will be to request prior authorization for 300 units of Botox for management of cervical dystonia and genetic torsion dystonia. He is tentatively scheduled for 10/12/21 for his first series of injections pending insurance approval.       Thank you for this consultation. Please do not hesitate to contact me with further questions.   Bertha Cervantes MD  Physical Medicine and  Rehabilitation  [phone]      I spent a total of 60 minutes face-to-face and managing the care of Bandar Wells. Over 50% of my time was spent counseling the patient and coordinating care. Please see note for details.      Again, thank you for allowing me to participate in the care of your patient.      Sincerely,    Bertha Cervantes MD

## 2021-09-23 ENCOUNTER — THERAPY VISIT (OUTPATIENT)
Dept: PHYSICAL THERAPY | Facility: CLINIC | Age: 71
End: 2021-09-23
Payer: COMMERCIAL

## 2021-09-23 DIAGNOSIS — M53.82 WEAKNESS OF NECK: Primary | ICD-10-CM

## 2021-09-23 DIAGNOSIS — M53.82 NECK MUSCLE WEAKNESS: ICD-10-CM

## 2021-09-23 DIAGNOSIS — G89.4 PAIN SYNDROME, CHRONIC: ICD-10-CM

## 2021-09-23 PROCEDURE — 97140 MANUAL THERAPY 1/> REGIONS: CPT | Mod: GP | Performed by: PHYSICAL THERAPIST

## 2021-09-23 PROCEDURE — 97110 THERAPEUTIC EXERCISES: CPT | Mod: GP | Performed by: PHYSICAL THERAPIST

## 2021-09-23 PROCEDURE — 97112 NEUROMUSCULAR REEDUCATION: CPT | Mod: GP | Performed by: PHYSICAL THERAPIST

## 2021-09-24 NOTE — TELEPHONE ENCOUNTER
oxyCODONE IR (ROXICODONE) 10 MG tablet  5 mg, EVERY 4 HOURS PRN 0 ordered  Edit       Summary: Take 0.5 tablets (5 mg) by mouth every 4 hours as needed for severe pain, Disp-42 tablet, R-0, E-Prescribe     Dose, Route, Frequency: 5 mg, Oral, EVERY 4 HOURS PRN    Start: 9/14/2021    Ord/Sold: 9/14/2021 (O)      Report    Adh:     Taking:     Long-term:       Pharmacy: [place], [place] - [address]    Med Dose History         Patient Sig: Take 0.5 tablets (5 mg) by mouth every 4 hours as needed for severe pain       Ordered on: 9/14/2021       Authorized by: KATHRYN ROSS       Dispense: 42 tablet        Routing refill request to provider for review/approval because:  Controlled Substance Refill request.    Sheryl HAS BEEN TAKING OXYCODONE ON A REGULAR BASIS FOR >1 YEAR - WOULD IT BE POSSIBLE FOR HIM TO GET A 28 OR 30 DAY SUPPLY? HE HAS HIS PRESCRIPTIONS DELIVERED AND IT WOULD BE MORE CONVENIENT AND COST-EFFECTIVE (pharmacy note)      Last Written Prescription Date:  09/14/2021  Last Fill Quantity: 42,  # refills: 0   Last office visit provider:  07/15/2021 with Dr Ross.     Requested Prescriptions   Pending Prescriptions Disp Refills     oxyCODONE IR (ROXICODONE) 10 MG tablet [Pharmacy Med Name: OXYCODONE HCL 10 MG TABLET 10 Tablet] 42 tablet 0     Sig: TAKE A HALF TABLET (5 MG) BY MOUTH EVERY 4 HOURS AS NEEDED FOR SEVERE PAIN       There is no refill protocol information for this order          Anne Aquino 09/24/21 4:10 AM

## 2021-09-27 ENCOUNTER — DOCUMENTATION ONLY (OUTPATIENT)
Dept: ANTICOAGULATION | Facility: CLINIC | Age: 71
End: 2021-09-27

## 2021-09-27 ENCOUNTER — TRANSFERRED RECORDS (OUTPATIENT)
Dept: HEALTH INFORMATION MANAGEMENT | Facility: CLINIC | Age: 71
End: 2021-09-27

## 2021-09-27 DIAGNOSIS — I48.0 PAROXYSMAL ATRIAL FIBRILLATION (H): Primary | ICD-10-CM

## 2021-09-27 DIAGNOSIS — Z95.4 S/P MITRAL VALVE REPLACEMENT WITH METALLIC VALVE: ICD-10-CM

## 2021-09-27 LAB — INR (EXTERNAL): 3.5 (ref 2.5–3.5)

## 2021-09-27 RX ORDER — OXYCODONE HYDROCHLORIDE 10 MG/1
TABLET ORAL
Qty: 90 TABLET | Refills: 0 | Status: SHIPPED | OUTPATIENT
Start: 2021-09-27 | End: 2021-10-27

## 2021-09-27 NOTE — PROGRESS NOTES
ANTICOAGULATION  MANAGEMENT-Home Monitor Managed by Exception    Bandar YUDITH Wells 71 year old male is on warfarin with therapeutic INR result. (Goal INR 2.5-3.5)    Recent labs: (last 7 days)     09/27/21  1400   INR 3.5*         Previous INR was Therapeutic    Medication, diet, health changes since last INR:chart reviewed; none idientified    Contacted within the last 12 weeks by phone on 8/27/21      ROSE Portillo was NOT contacted regarding therapeutic result today per home monitoring policy manage by exception agreement.   Current warfarin dose is to be continued:     Summary  As of 9/27/2021    Full warfarin instructions:  3 mg every Wed, Sat; 6 mg all other days   Next INR check:  10/11/2021           ?   Mireya Santos RN  Anticoagulation Clinic  9/27/2021    _______________________________________________________________________     Anticoagulation Episode Summary     Current INR goal:  2.5-3.5   TTR:  55.4 % (1 y)   Target end date:  Indefinite   Send INR reminders to:  [facility]    Indications    Paroxysmal atrial fibrillation (H) [I48.0]  S/P mitral valve replacement with metallic valve [Z95.4]           Comments:  INR TARGET GOAL 2.5 - 3.5         Anticoagulation Care Providers     Provider Role Specialty Phone number    Jin Hicks MD [facility] Internal Medicine [phone]

## 2021-09-28 ENCOUNTER — MYC REFILL (OUTPATIENT)
Dept: INTERNAL MEDICINE | Facility: CLINIC | Age: 71
End: 2021-09-28

## 2021-09-28 DIAGNOSIS — G89.4 PAIN SYNDROME, CHRONIC: ICD-10-CM

## 2021-09-30 RX ORDER — OXYCODONE HYDROCHLORIDE 10 MG/1
TABLET ORAL
Qty: 90 TABLET | Refills: 0 | OUTPATIENT
Start: 2021-09-30

## 2021-09-30 NOTE — TELEPHONE ENCOUNTER
Routing refill request to provider for review/approval because:  Controlled Substance refill request.  Duplicate. Script was sent on 09/27/2021     Last Written Prescription Date:  09/27/2021  Last Fill Quantity: 90,  # refills: 0   Last office visit provider:  07/15/2021 with Dr Hicks.    Requested Prescriptions   Pending Prescriptions Disp Refills     oxyCODONE IR (ROXICODONE) 10 MG tablet 90 tablet 0       There is no refill protocol information for this order          Anne Aquino 09/30/21 7:42 AM

## 2021-10-05 ENCOUNTER — THERAPY VISIT (OUTPATIENT)
Dept: PHYSICAL THERAPY | Facility: CLINIC | Age: 71
End: 2021-10-05
Payer: COMMERCIAL

## 2021-10-05 DIAGNOSIS — M53.82 WEAKNESS OF NECK: Primary | ICD-10-CM

## 2021-10-05 DIAGNOSIS — M53.82 NECK MUSCLE WEAKNESS: ICD-10-CM

## 2021-10-05 PROCEDURE — 97112 NEUROMUSCULAR REEDUCATION: CPT | Mod: GP | Performed by: PHYSICAL THERAPIST

## 2021-10-05 PROCEDURE — 97110 THERAPEUTIC EXERCISES: CPT | Mod: GP | Performed by: PHYSICAL THERAPIST

## 2021-10-11 DIAGNOSIS — G24.3 CERVICAL DYSTONIA: Primary | ICD-10-CM

## 2021-10-11 DIAGNOSIS — G24.1 GENETIC TORSION DYSTONIA: ICD-10-CM

## 2021-10-18 ENCOUNTER — TRANSFERRED RECORDS (OUTPATIENT)
Dept: HEALTH INFORMATION MANAGEMENT | Facility: CLINIC | Age: 71
End: 2021-10-18

## 2021-10-18 ENCOUNTER — ANTICOAGULATION THERAPY VISIT (OUTPATIENT)
Dept: ANTICOAGULATION | Facility: CLINIC | Age: 71
End: 2021-10-18

## 2021-10-18 ENCOUNTER — OFFICE VISIT (OUTPATIENT)
Dept: INTERNAL MEDICINE | Facility: CLINIC | Age: 71
End: 2021-10-18
Payer: COMMERCIAL

## 2021-10-18 ENCOUNTER — TELEPHONE (OUTPATIENT)
Dept: INTERNAL MEDICINE | Facility: CLINIC | Age: 71
End: 2021-10-18

## 2021-10-18 VITALS
SYSTOLIC BLOOD PRESSURE: 136 MMHG | HEIGHT: 70 IN | WEIGHT: 159 LBS | BODY MASS INDEX: 22.76 KG/M2 | OXYGEN SATURATION: 97 % | HEART RATE: 58 BPM | DIASTOLIC BLOOD PRESSURE: 62 MMHG

## 2021-10-18 DIAGNOSIS — N18.4 CHRONIC KIDNEY DISEASE, STAGE 4 (SEVERE) (H): ICD-10-CM

## 2021-10-18 DIAGNOSIS — T14.8XXA HEMATOMA OF SKIN: Primary | ICD-10-CM

## 2021-10-18 DIAGNOSIS — L02.419 CELLULITIS AND ABSCESS OF LEG: ICD-10-CM

## 2021-10-18 DIAGNOSIS — Z95.4 S/P MITRAL VALVE REPLACEMENT WITH METALLIC VALVE: ICD-10-CM

## 2021-10-18 DIAGNOSIS — D63.1 ANEMIA OF CHRONIC RENAL FAILURE, STAGE 4 (SEVERE) (H): ICD-10-CM

## 2021-10-18 DIAGNOSIS — N18.4 ANEMIA OF CHRONIC RENAL FAILURE, STAGE 4 (SEVERE) (H): ICD-10-CM

## 2021-10-18 DIAGNOSIS — I48.0 PAROXYSMAL ATRIAL FIBRILLATION (H): Primary | ICD-10-CM

## 2021-10-18 DIAGNOSIS — G24.8 FOCAL DYSTONIA: ICD-10-CM

## 2021-10-18 DIAGNOSIS — M1A.00X0 IDIOPATHIC CHRONIC GOUT WITHOUT TOPHUS, UNSPECIFIED SITE: ICD-10-CM

## 2021-10-18 DIAGNOSIS — L03.119 CELLULITIS AND ABSCESS OF LEG: ICD-10-CM

## 2021-10-18 DIAGNOSIS — I48.0 PAROXYSMAL ATRIAL FIBRILLATION (H): ICD-10-CM

## 2021-10-18 DIAGNOSIS — G89.4 PAIN SYNDROME, CHRONIC: ICD-10-CM

## 2021-10-18 DIAGNOSIS — M40.03 POSTURAL KYPHOSIS OF CERVICOTHORACIC REGION: ICD-10-CM

## 2021-10-18 LAB
ALBUMIN SERPL-MCNC: 3.7 G/DL (ref 3.5–5)
ALBUMIN UR-MCNC: NEGATIVE MG/DL
ALP SERPL-CCNC: 77 U/L (ref 45–120)
ALT SERPL W P-5'-P-CCNC: 22 U/L (ref 0–45)
AMPHETAMINES UR QL SCN: ABNORMAL
ANION GAP SERPL CALCULATED.3IONS-SCNC: 12 MMOL/L (ref 5–18)
APPEARANCE UR: CLEAR
AST SERPL W P-5'-P-CCNC: 54 U/L (ref 0–40)
BACTERIA #/AREA URNS HPF: ABNORMAL /HPF
BARBITURATES UR QL: ABNORMAL
BENZODIAZ UR QL: ABNORMAL
BILIRUB SERPL-MCNC: 0.5 MG/DL (ref 0–1)
BILIRUB UR QL STRIP: NEGATIVE
BUN SERPL-MCNC: 71 MG/DL (ref 8–28)
CALCIUM SERPL-MCNC: 9.9 MG/DL (ref 8.5–10.5)
CANNABINOIDS UR QL SCN: ABNORMAL
CHLORIDE BLD-SCNC: 102 MMOL/L (ref 98–107)
CO2 SERPL-SCNC: 25 MMOL/L (ref 22–31)
COCAINE UR QL: ABNORMAL
COLOR UR AUTO: YELLOW
CREAT SERPL-MCNC: 2.72 MG/DL (ref 0.7–1.3)
CREAT UR-MCNC: 83 MG/DL
ERYTHROCYTE [DISTWIDTH] IN BLOOD BY AUTOMATED COUNT: 14.6 % (ref 10–15)
GFR SERPL CREATININE-BSD FRML MDRD: 22 ML/MIN/1.73M2
GLUCOSE BLD-MCNC: 110 MG/DL (ref 70–125)
GLUCOSE UR STRIP-MCNC: NEGATIVE MG/DL
HCT VFR BLD AUTO: 23.1 % (ref 40–53)
HGB BLD-MCNC: 7.3 G/DL (ref 13.3–17.7)
HGB UR QL STRIP: NEGATIVE
INR (EXTERNAL): 6.7 (ref 0.9–1.1)
KETONES UR STRIP-MCNC: NEGATIVE MG/DL
LEUKOCYTE ESTERASE UR QL STRIP: NEGATIVE
MCH RBC QN AUTO: 31.7 PG (ref 26.5–33)
MCHC RBC AUTO-ENTMCNC: 31.6 G/DL (ref 31.5–36.5)
MCV RBC AUTO: 100 FL (ref 78–100)
NITRATE UR QL: NEGATIVE
OPIATES UR QL SCN: ABNORMAL
OXYCODONE UR QL: ABNORMAL
PCP UR QL SCN: ABNORMAL
PH UR STRIP: 7 [PH] (ref 5–8)
PLATELET # BLD AUTO: 213 10E3/UL (ref 150–450)
POTASSIUM BLD-SCNC: 4.6 MMOL/L (ref 3.5–5)
PROT SERPL-MCNC: 7.8 G/DL (ref 6–8)
RBC # BLD AUTO: 2.3 10E6/UL (ref 4.4–5.9)
RBC #/AREA URNS AUTO: ABNORMAL /HPF
SODIUM SERPL-SCNC: 139 MMOL/L (ref 136–145)
SP GR UR STRIP: 1.01 (ref 1–1.03)
SQUAMOUS #/AREA URNS AUTO: ABNORMAL /LPF
URATE SERPL-MCNC: 7.7 MG/DL (ref 3–8)
UROBILINOGEN UR STRIP-ACNC: 0.2 E.U./DL
WBC # BLD AUTO: 4 10E3/UL (ref 4–11)
WBC #/AREA URNS AUTO: ABNORMAL /HPF

## 2021-10-18 PROCEDURE — 99214 OFFICE O/P EST MOD 30 MIN: CPT | Performed by: INTERNAL MEDICINE

## 2021-10-18 PROCEDURE — 84550 ASSAY OF BLOOD/URIC ACID: CPT | Performed by: INTERNAL MEDICINE

## 2021-10-18 PROCEDURE — 80307 DRUG TEST PRSMV CHEM ANLYZR: CPT | Performed by: INTERNAL MEDICINE

## 2021-10-18 PROCEDURE — 85027 COMPLETE CBC AUTOMATED: CPT | Performed by: INTERNAL MEDICINE

## 2021-10-18 PROCEDURE — 80053 COMPREHEN METABOLIC PANEL: CPT | Performed by: INTERNAL MEDICINE

## 2021-10-18 PROCEDURE — 82306 VITAMIN D 25 HYDROXY: CPT | Performed by: INTERNAL MEDICINE

## 2021-10-18 PROCEDURE — 81001 URINALYSIS AUTO W/SCOPE: CPT | Performed by: INTERNAL MEDICINE

## 2021-10-18 PROCEDURE — 36415 COLL VENOUS BLD VENIPUNCTURE: CPT | Performed by: INTERNAL MEDICINE

## 2021-10-18 RX ORDER — CEPHALEXIN 500 MG/1
500 CAPSULE ORAL 2 TIMES DAILY
Qty: 28 CAPSULE | Refills: 0 | Status: SHIPPED | OUTPATIENT
Start: 2021-10-18 | End: 2021-11-01

## 2021-10-18 ASSESSMENT — MIFFLIN-ST. JEOR: SCORE: 1472.47

## 2021-10-18 ASSESSMENT — PATIENT HEALTH QUESTIONNAIRE - PHQ9: SUM OF ALL RESPONSES TO PHQ QUESTIONS 1-9: 4

## 2021-10-18 NOTE — TELEPHONE ENCOUNTER
[facility] called to report a patient's INR which was 6.7 taken today. Please follow up with patient at [phone].  Anne Fournier   [facility]  Central Scheduler

## 2021-10-18 NOTE — PROGRESS NOTES
ANTICOAGULATION MANAGEMENT     Bandar Wells 71 year old male is on warfarin with supratherapeutic INR result. (Goal INR 2.5-3.5)    Recent labs: (last 7 days)     10/18/21  1459   INR 6.7*       ASSESSMENT     Source(s): Chart Review and Patient/Caregiver Call       Warfarin doses taken: Warfarin taken as instructed    Diet: No new diet changes identified    New illness, injury, or hospitalization: Yes: Injury to leg - cellulitis.     Medication/supplement changes: Keflex 14 day course (dates: 10/18-11/1) which has potential for interaction; increasing INR    Signs or symptoms of bleeding or clotting: No    Previous INR: Therapeutic last 2(+) visits    Additional findings: None     PLAN     Recommended plan for temporary change(s) affecting INR     Dosing Instructions: Hold 2 doses then continue your current warfarin dose with next INR in 2 days       Summary  As of 10/18/2021    Full warfarin instructions:  10/18: Hold; 10/19: Hold; Otherwise 3 mg every Wed, Sat; 6 mg all other days   Next INR check:  10/20/2021             Telephone call with Bandar who verbalizes understanding and agrees to plan and who agrees to plan and repeated back plan correctly    Patient to recheck with home meter    Education provided: Please call back if any changes to your diet, medications or how you've been taking warfarin, Goal range and significance of current result, Importance of therapeutic range, Importance of following up at instructed interval, Potential interaction between warfarin and Keflex and Monitoring for bleeding signs and symptoms    Plan made per [facility] anticoagulation protocol    Vivian Pizarro RN  Anticoagulation Clinic  10/18/2021    _______________________________________________________________________     Anticoagulation Episode Summary     Current INR goal:  2.5-3.5   TTR:  52.8 % (1 y)   Target end date:  Indefinite   Send INR reminders to:  [facility]    Indications    Paroxysmal atrial fibrillation (H)  [I48.0]  S/P mitral valve replacement with metallic valve [Z95.4]           Comments:  INR TARGET GOAL 2.5 - 3.5         Anticoagulation Care Providers     Provider Role Specialty Phone number    Jin Hicks MD [facility] Internal Medicine [phone]

## 2021-10-18 NOTE — PROGRESS NOTES
Office Visit - Follow Up   Bandar Wells   71 year old male    Date of Visit: 10/18/2021    Chief Complaint   Patient presents with     RECHECK     Leg Problem     right lower leg x 1 week        Assessment and Plan   1. Hematoma of skin  2. Cellulitis and abscess of leg  Given the redness swelling and warmth I would like to treat with antibiotics.  Discussed local wound care, elevation, compression.  I will see him back in 2 weeks and he will also see the wound clinic and male  - cephALEXin (KEFLEX) 500 MG capsule; Take 1 capsule (500 mg) by mouth 2 times daily for 14 days  Dispense: 28 capsule; Refill: 0    3. Paroxysmal atrial fibrillation (H)  Sinew with current strategy    4. Chronic kidney disease, stage 4 (severe) (H)  He has not had labs since July, recheck as below follow-up with nephrology  - Comprehensive metabolic panel; Future  - CBC with platelets; Future  - UA with Microscopic reflex to Culture - lab collect; Future  - Vitamin D Deficiency; Future  - Cholecalciferol (VITAMIN D3) 25 MCG (1000 UT) CAPS; Take 1,000 Units by mouth daily  - Comprehensive metabolic panel  - CBC with platelets  - UA with Microscopic reflex to Culture - lab collect  - Vitamin D Deficiency    5. Anemia of chronic renal failure, stage 4 (severe) (H)  As above    6. Postural kyphosis of cervicothoracic region  7. Focal dystonia  He is working with the pain clinic spine clinic and will be getting Botox injections    8. Pain syndrome, chronic  Using oxycodone as prescribed  - Urine Drugs of Abuse Screen Panel 1 - Drug Screen (Full); Future  - Urine Drugs of Abuse Screen Panel 1 - Drug Screen (Full)    9. Idiopathic chronic gout without tophus, unspecified site  - Uric acid; Future  - Uric acid      Return in about 2 weeks (around 11/1/2021) for Follow up.     History of Present Illness   This 71 year old man comes in with his friend for follow-up.  He injured his right leg about 1 week ago.  He bumped into something.  Developed a  "large hematoma.  Leg started swelling and became red.  It is painful.  Has been wrapping it and applying some antibiotic ointment.  He has an appoint with the wound clinic at [facility] on November 15.  Otherwise things are generally stable.  He will be getting some Botox injections in his neck.  Using oxycodone as prescribed for chronic pain.  Sees nephrologist in December    Review of Systems: A comprehensive review of systems was negative except as noted.     Medications, Allergies and Problem List   Reviewed, reconciled and updated  Post Discharge Medication Reconciliation Status:      Physical Exam   General Appearance:   No acute distress    /62 (BP Location: Left arm, Patient Position: Sitting, Cuff Size: Adult Regular)   Pulse 58   Ht 1.778 m (5' 10\")   Wt 72.1 kg (159 lb)   SpO2 97%   BMI 22.81 kg/m      Right lower extremity is red and swollen area of hematoma about 3 cm in diameter.  Small ulceration.     Additional Information   Current Outpatient Medications   Medication Sig Dispense Refill     cephALEXin (KEFLEX) 500 MG capsule Take 1 capsule (500 mg) by mouth 2 times daily for 14 days 28 capsule 0     Cholecalciferol (VITAMIN D3) 25 MCG (1000 UT) CAPS Take 1,000 Units by mouth daily       evolocumab (REPATHA) 140 MG/ML prefilled autoinjector Inject 1 mL (140 mg) Subcutaneous every 14 days       febuxostat (ULORIC) 40 MG TABS tablet Take 1 tablet (40 mg) by mouth daily       folic acid (FOLVITE) 1 MG tablet Take 1 mg by mouth daily       gentamicin (GARAMYCIN) 0.1 % external ointment Apply topically 2 times daily 30 g 3     metolazone (ZAROXOLYN) 2.5 MG tablet Take 1 tablet (2.5 mg) by mouth once a week       mupirocin (BACTROBAN) 2 % external ointment Use 2 times a day to affected area. 30 g 3     oxyCODONE IR (ROXICODONE) 10 MG tablet TAKE A HALF TABLET (5 MG) BY MOUTH EVERY 4 HOURS AS NEEDED FOR SEVERE PAIN 90 tablet 0     penicillin V (VEETID) 500 MG tablet Take 500 mg by mouth daily Hx " endocarditis       polyethylene glycol (MIRALAX) 17 g packet Take 17 g by mouth daily as needed for constipation 30 packet 0     potassium chloride (KLOR-CON) 20 MEQ packet Take 20 mEq by mouth daily       sennosides (SENOKOT) 8.6 MG tablet Take 1-4 tablets by mouth 2 times daily       torsemide (DEMADEX) 20 MG tablet Take 3 tablets (60 mg) by mouth 2 times daily       warfarin ANTICOAGULANT (COUMADIN) 3 MG tablet Take 1-2 tablets (3mg-6mg) by mouth daily or as directed by the INR clinic 180 tablet 1     Wound Dressings (VASELINE PETROLATUM GAUZE) PADS Apply to open wounds on the legs 12 each 5     Allergies   Allergen Reactions     Allopurinol Rash     Clindamycin Rash     Social History     Tobacco Use     Smoking status: Never Smoker     Smokeless tobacco: Never Used   Substance Use Topics     Alcohol use: Yes     Alcohol/week: 2.0 standard drinks     Drug use: No       Review and/or order of clinical lab tests:  Review and/or order of radiology tests:  Review and/or order of medicine tests:  Discussion of test results with performing physician:  Decision to obtain old records and/or obtain history from someone other than the patient:  Review and summarization of old records and/or obtaining history from someone other than the patient and.or discussion of case with another health care provider:  Independent visualization of image, tracing or specimen itself:    Time:      Jin Hicks MD

## 2021-10-19 LAB — DEPRECATED CALCIDIOL+CALCIFEROL SERPL-MC: 80 UG/L (ref 30–80)

## 2021-10-20 ENCOUNTER — TRANSFERRED RECORDS (OUTPATIENT)
Dept: HEALTH INFORMATION MANAGEMENT | Facility: CLINIC | Age: 71
End: 2021-10-20
Payer: COMMERCIAL

## 2021-10-20 ENCOUNTER — ANTICOAGULATION THERAPY VISIT (OUTPATIENT)
Dept: ANTICOAGULATION | Facility: CLINIC | Age: 71
End: 2021-10-20

## 2021-10-20 DIAGNOSIS — Z95.4 S/P MITRAL VALVE REPLACEMENT WITH METALLIC VALVE: ICD-10-CM

## 2021-10-20 DIAGNOSIS — I48.0 PAROXYSMAL ATRIAL FIBRILLATION (H): Primary | ICD-10-CM

## 2021-10-20 LAB — INR (EXTERNAL): 3.8 (ref 0.9–1.1)

## 2021-10-20 NOTE — PROGRESS NOTES
ANTICOAGULATION MANAGEMENT     Bandar Wells 71 year old male is on warfarin with supratherapeutic INR result. (Goal INR 2.5-3.5)    Recent labs: (last 7 days)     10/20/21  1200   INR 3.8*       ASSESSMENT     Source(s): Chart Review and Patient/Caregiver Call       Warfarin doses taken: Warfarin recently held for 2 days which may be affecting INR    Held warfarin doses for 2 days on 10/18-19, as instructed.  He verified current warfarin dose, however, noted he is taking more than ordered.  Reported has been taking this dose for quite a while now.    Diet: No new diet changes identified    New illness, injury, or hospitalization: Yes:    Still some pain, but not as bad as before.    Noted healing process.   Had injury to right leg and wound developed to cellulitis.    Medication/supplement changes:  Yes.   Will Complete ABX on 11/1/21.    Cephalexin 14 day course (dates: 10/18) which has potential for interaction;  increasing INR    Signs or symptoms of bleeding or clotting: Yes:   Hematoma improving.   Developed large hematoma on right lower leg injury.    Previous INR: Supratherapeutic at 6.7 on 10/18/21.    Additional findings: None     PLAN     Recommended plan for temporary change(s) affecting INR     Dosing Instructions:  (evenings. 3mg tabs)    Partial hold then continue your current warfarin dose with next INR in 5-6 days       Summary  As of 10/20/2021    Full warfarin instructions:  10/20: 1.5 mg; Otherwise 3 mg every Wed; 6 mg all other days   Next INR check:  10/27/2021             Telephone call with Bandar ([phone]) who verbalizes understanding and agrees to plan   Advised to monitor for s/sx of infection, redness, swelling, purulent drainage.    Patient to recheck with home meter thru [employer] on Mon 10/25 or Tues 10/26.    Education provided: Goal range and significance of current result, Potential interaction between warfarin and Cephalexin and Monitoring for bleeding signs and symptoms    Plan  made per [facility] anticoagulation protocol    Meg Jacinto, RN  Anticoagulation Clinic  10/20/2021    _______________________________________________________________________     Anticoagulation Episode Summary     Current INR goal:  2.5-3.5   TTR:  52.8 % (1 y)   Target end date:  Indefinite   Send INR reminders to:  [facility]    Indications    Paroxysmal atrial fibrillation (H) [I48.0]  S/P mitral valve replacement with metallic valve [Z95.4]           Comments:  INR TARGET GOAL 2.5 - 3.5         Anticoagulation Care Providers     Provider Role Specialty Phone number    Jin Hicks MD [facility] Internal Medicine [phone]

## 2021-10-23 NOTE — TELEPHONE ENCOUNTER
Left message for pt to call back   Physical Therapy  Patient not seen in therapy.     Unavailable due to request no therapy today.      Re-attempt plan: later today and tomorrow    Pt initially refusing therapy due to fatigue. Dr. Mcguire arrived during discussion and encouraged pt to mobilize as able. Pt continued to refuse as this time but said she would possibly agree to limited mobility later this date. Will re-attempt later this date if able.        OBJECTIVE                  Documented in the chart in the following areas: Assessment.      Therapy procedure time and total treatment time can be found documented on the Time Entry flowsheet

## 2021-10-25 DIAGNOSIS — G89.4 PAIN SYNDROME, CHRONIC: ICD-10-CM

## 2021-10-26 ENCOUNTER — OFFICE VISIT (OUTPATIENT)
Dept: PHYSICAL MEDICINE AND REHAB | Facility: CLINIC | Age: 71
End: 2021-10-26
Payer: COMMERCIAL

## 2021-10-26 DIAGNOSIS — G24.3 CERVICAL DYSTONIA: Primary | ICD-10-CM

## 2021-10-26 PROCEDURE — 64616 CHEMODENERV MUSC NECK DYSTON: CPT | Mod: 59 | Performed by: PHYSICAL MEDICINE & REHABILITATION

## 2021-10-26 PROCEDURE — 95874 GUIDE NERV DESTR NEEDLE EMG: CPT | Performed by: PHYSICAL MEDICINE & REHABILITATION

## 2021-10-26 PROCEDURE — 96372 THER/PROPH/DIAG INJ SC/IM: CPT | Performed by: PHYSICAL MEDICINE & REHABILITATION

## 2021-10-26 NOTE — PROGRESS NOTES
BOTULINUM TOXIN PROCEDURE NOTE      Current Outpatient Medications:      cephALEXin (KEFLEX) 500 MG capsule, Take 1 capsule (500 mg) by mouth 2 times daily for 14 days, Disp: 28 capsule, Rfl: 0     Cholecalciferol (VITAMIN D3) 25 MCG (1000 UT) CAPS, Take 1,000 Units by mouth daily, Disp: , Rfl:      evolocumab (REPATHA) 140 MG/ML prefilled autoinjector, Inject 1 mL (140 mg) Subcutaneous every 14 days, Disp: , Rfl:      febuxostat (ULORIC) 40 MG TABS tablet, Take 1 tablet (40 mg) by mouth daily, Disp: , Rfl:      folic acid (FOLVITE) 1 MG tablet, Take 1 mg by mouth daily, Disp: , Rfl:      gentamicin (GARAMYCIN) 0.1 % external ointment, Apply topically 2 times daily, Disp: 30 g, Rfl: 3     metolazone (ZAROXOLYN) 2.5 MG tablet, Take 1 tablet (2.5 mg) by mouth once a week, Disp: , Rfl:      mupirocin (BACTROBAN) 2 % external ointment, Use 2 times a day to affected area., Disp: 30 g, Rfl: 3     oxyCODONE IR (ROXICODONE) 10 MG tablet, TAKE A HALF TABLET (5 MG) BY MOUTH EVERY 4 HOURS AS NEEDED FOR SEVERE PAIN, Disp: 90 tablet, Rfl: 0     penicillin V (VEETID) 500 MG tablet, Take 500 mg by mouth daily Hx endocarditis, Disp: , Rfl:      polyethylene glycol (MIRALAX) 17 g packet, Take 17 g by mouth daily as needed for constipation, Disp: 30 packet, Rfl: 0     potassium chloride (KLOR-CON) 20 MEQ packet, Take 20 mEq by mouth daily, Disp: , Rfl:      sennosides (SENOKOT) 8.6 MG tablet, Take 1-4 tablets by mouth 2 times daily, Disp: , Rfl:      torsemide (DEMADEX) 20 MG tablet, Take 3 tablets (60 mg) by mouth 2 times daily, Disp: , Rfl:      warfarin ANTICOAGULANT (COUMADIN) 3 MG tablet, Take 1-2 tablets (3mg-6mg) by mouth daily or as directed by the INR clinic, Disp: 180 tablet, Rfl: 1     Wound Dressings (VASELINE PETROLATUM GAUZE) PADS, Apply to open wounds on the legs, Disp: 12 each, Rfl: 5    Current Facility-Administered Medications:      botulinum toxin type A (BOTOX) 100 units injection 300 Units, 300 Units,  Intramuscular, Q90 Days, Bertha Cervantes MD        Allergies   Allergen Reactions     Allopurinol Rash     Clindamycin Rash        PHYSICAL EXAM    HEAD, NECK AND TRUNK PATTERN:   Head & Neck Flexion:  Present   Head turned to the right side with chin pointing towards the left side. Significant hypertonicity in bilateral upper trapezius (L>R), point tenderness at levator insertion bilaterally.      HPI  Bandar Wells is a 71 year old with Parkinson's disease and cervical dystonia who presents for his initial injections of Botox for management of cervical dystonia.     Today, he reports more tightness in the left trapezius, and head nodding in the 'yes' pattern. He also has difficulty with walking as head is downward and toward sthe right side. He took an oxycodone this morning and pain in the left neck is around 3/10 - he usually takes 10 mg oxycodone two times a day. He hit his right shin over the left leg 2 weeks ago and is getting daily dressing over it. On Keflex started on (10/18-). He has completed 20 sessions with PT and this has helped with his head tremor.     We reviewed the recommended safety guidelines for  Botox from any vaccine injection, such as the seasonal flu vaccine, by a minimum of 10-14 days with Bandar Wells. He acknowledged understanding.    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  His baseline symptoms have been recalcitrant to oral medications and conservative therapy.  He is here today for reinjection with Botox.    GOAL  OF PROCEDURE:  The goal of this procedure is to improve increase active range of motion, decrease pain  and enhance functional independence associated with dystonic movements and spasticity.    TOTAL DOSE: 100 UNITS BOTOX  Dose Administered:  80 units Botox    Diluent Used:  Preservative Free Normal Saline  Total Volume of Diluent Used:  1.6 ml  Lot # [id number] with Expiration Date:  02/2024  NDC #: Botox 100u ([address]-43)    Was there drug waste? Yes  Amount of drug waste (mL): 20 units Botox.  Reason for waste:  Single use vial  Multi-dose vial: No    Bertha Cervantes MD  October 26, 2021     Medication guide was offered to patient and was accepted.    CONSENT:  The risks, benefits, and treatment options were discussed with Bandar Wells and he agreed to proceed.    EQUIPMENT USED:  Needle-25mm stimulating/recording  Needle-30 gauge  EMG/NCS Machine    SKIN PREPARATION:  Skin preparation was performed using an alcohol wipe.      GUIDANCE DESCRIPTION:  Electro-myographic guidance was necessary throughout the procedure to accurately identify all areas of dystonic and spastic muscles while avoiding injection of non-dystonic muscles and non-spastic muscles, neighboring nerves and nearby vascular structures.     AREA/MUSCLE INJECTED: 80 UNITS BOTOX = TOTAL DOSE  1. HEAD, NECK & SHOULDER GIRDLE MUSCLES: 80 UNITS BOTOX = TOTAL DOSE, 2:1 DILUTION   Left Lateral Trapezius (low cervical) - 5 units of Botox at 1 site/s.    Right Splenius Cervicis - 30 units of Botox at 1 site/s.   Left Splenius Cervicis - 15 units of Botox at 1 site/s.    Right Levator Scapulae - 20 units of Botox at 1 site/s.   Left Levator Scapulae - 5 units of Botox at 1 site/s.    Right Inferior Obliquus Capitis - 5 units of Botox at 1 site/s.     RESPONSE TO PROCEDURE:  Bandar Wells tolerated the procedure well and there were no immediate complications. He was allowed to recover for an appropriate period of time and was discharged home in stable  condition.     FOLLOW UP: Bandar Wells was asked to follow up by phone in 7-14 days with Aletha Katz PT, Care Coordinator, to report his response to this series of injections.  Based on the patient's previous response to this therapy, Bandar Wells was rescheduled for the next series of injections in 12 weeks.    PLAN (Medication Changes, Therapy Orders, Work or Disability Issues, etc.): Conservative dose of 80 units given today for first series of injections. He will continue with physical therapy and report response at next appointment in 12 weeks. Based on response, may consider dose escalation at next appointment.

## 2021-10-26 NOTE — TELEPHONE ENCOUNTER
Routing refill request to provider for review/approval because:  Controlled substance request    Last Written Prescription Date:  9/27/21  Last Fill Quantity: 90,  # refills: 0   Last office visit provider:  10/18/21     Requested Prescriptions   Pending Prescriptions Disp Refills     oxyCODONE IR (ROXICODONE) 10 MG tablet [Pharmacy Med Name: OXYCODONE HCL 10 MG TABLET 10 Tablet] 90 tablet 0     Sig: TAKE A HALF TABLET (5 MG) BY MOUTH EVERY 4 HOURS AS NEEDED FOR SEVERE PAIN       There is no refill protocol information for this order          Jabier Tatum RN 10/26/21 2:20 PM

## 2021-10-26 NOTE — LETTER
10/26/2021       RE: Bandar Wells  [address] [place] [address]     Dear Colleague,    Thank you for referring your patient, Bandar Wells, to the [facility] PHYSICAL MEDICINE AND [facility]HABILITATION CLINIC [place] at [facility]. Please see a copy of my visit note below.    BOTULINUM TOXIN PROCEDURE NOTE      Current Outpatient Medications:      cephALEXin (KEFLEX) 500 MG capsule, Take 1 capsule (500 mg) by mouth 2 times daily for 14 days, Disp: 28 capsule, Rfl: 0     Cholecalciferol (VITAMIN D3) 25 MCG (1000 UT) CAPS, Take 1,000 Units by mouth daily, Disp: , Rfl:      evolocumab (REPATHA) 140 MG/ML prefilled autoinjector, Inject 1 mL (140 mg) Subcutaneous every 14 days, Disp: , Rfl:      febuxostat (ULORIC) 40 MG TABS tablet, Take 1 tablet (40 mg) by mouth daily, Disp: , Rfl:      folic acid (FOLVITE) 1 MG tablet, Take 1 mg by mouth daily, Disp: , Rfl:      gentamicin (GARAMYCIN) 0.1 % external ointment, Apply topically 2 times daily, Disp: 30 g, Rfl: 3     metolazone (ZAROXOLYN) 2.5 MG tablet, Take 1 tablet (2.5 mg) by mouth once a week, Disp: , Rfl:      mupirocin (BACTROBAN) 2 % external ointment, Use 2 times a day to affected area., Disp: 30 g, Rfl: 3     oxyCODONE IR (ROXICODONE) 10 MG tablet, TAKE A HALF TABLET (5 MG) BY MOUTH EVERY 4 HOURS AS NEEDED FOR SEVERE PAIN, Disp: 90 tablet, Rfl: 0     penicillin V (VEETID) 500 MG tablet, Take 500 mg by mouth daily Hx endocarditis, Disp: , Rfl:      polyethylene glycol (MIRALAX) 17 g packet, Take 17 g by mouth daily as needed for constipation, Disp: 30 packet, Rfl: 0     potassium chloride (KLOR-CON) 20 MEQ packet, Take 20 mEq by mouth daily, Disp: , Rfl:      sennosides (SENOKOT) 8.6 MG tablet, Take 1-4 tablets by mouth 2 times daily, Disp: , Rfl:      torsemide (DEMADEX) 20 MG tablet, Take 3 tablets (60 mg) by mouth 2 times daily, Disp: , Rfl:      warfarin ANTICOAGULANT (COUMADIN) 3 MG tablet, Take 1-2  tablets (3mg-6mg) by mouth daily or as directed by the INR clinic, Disp: 180 tablet, Rfl: 1     Wound Dressings (VASELINE PETROLATUM GAUZE) PADS, Apply to open wounds on the legs, Disp: 12 each, Rfl: 5    Current Facility-Administered Medications:      botulinum toxin type A (BOTOX) 100 units injection 300 Units, 300 Units, Intramuscular, Q90 Days, Bertha Cervantes MD        Allergies   Allergen Reactions     Allopurinol Rash     Clindamycin Rash        PHYSICAL EXAM    HEAD, NECK AND TRUNK PATTERN:   Head & Neck Flexion:  Present   Head turned to the right side with chin pointing towards the left side. Significant hypertonicity in bilateral upper trapezius (L>R), point tenderness at levator insertion bilaterally.      HPI  Bandar Wells is a 71 year old with Parkinson's disease and cervical dystonia who presents for his initial injections of Botox for management of cervical dystonia.     Today, he reports more tightness in the left trapezius, and head nodding in the 'yes' pattern. He also has difficulty with walking as head is downward and toward sthe right side. He took an oxycodone this morning and pain in the left neck is around 3/10 - he usually takes 10 mg oxycodone two times a day. He hit his right shin over the left leg 2 weeks ago and is getting daily dressing over it. On Keflex started on (10/18-). He has completed 20 sessions with PT and this has helped with his head tremor.     We reviewed the recommended safety guidelines for  Botox from any vaccine injection, such as the seasonal flu vaccine, by a minimum of 10-14 days with Bandar Wells. He acknowledged understanding.    RESPONSE TO PREVIOUS TREATMENT: N/A - Initial treatment      BOTULINUM NEUROTOXIN INJECTION PROCEDURES:    VERIFICATION OF PATIENT IDENTIFICATION AND PROCEDURE     Initials   Patient Name ses   Patient  ses   Procedure Verified by: ses       INDICATIONS FOR PROCEDURES:  Bandar Wells is a 71 year old patient with  segmental dystonia and spasticity affecting the head, neck and shoulder girdle musculature secondary to a diagnosis of Cervical dystonia with associated pain, tremor and spasms.  His baseline symptoms have been recalcitrant to oral medications and conservative therapy.  He is here today for reinjection with Botox.    GOAL OF PROCEDURE:  The goal of this procedure is to improve increase active range of motion, decrease pain  and enhance functional independence associated with dystonic movements and spasticity.    TOTAL DOSE: 100 UNITS BOTOX  Dose Administered:  80 units Botox    Diluent Used:  Preservative Free Normal Saline  Total Volume of Diluent Used:  1.6 ml  Lot # [id number] with Expiration Date:  02/2024  NDC #: Botox 100u ([address]-29)    Was there drug waste? Yes  Amount of drug waste (mL): 20 units Botox.  Reason for waste:  Single use vial  Multi-dose vial: No    Bertha Cervantes MD  October 26, 2021     Medication guide was offered to patient and was accepted.    CONSENT:  The risks, benefits, and treatment options were discussed with Bandar Wells and he agreed to proceed.    EQUIPMENT USED:  Needle-25mm stimulating/recording  Needle-30 gauge  EMG/NCS Machine    SKIN PREPARATION:  Skin preparation was performed using an alcohol wipe.      GUIDANCE DESCRIPTION:  Electro-myographic guidance was necessary throughout the procedure to accurately identify all areas of dystonic and spastic muscles while avoiding injection of non-dystonic muscles and non-spastic muscles, neighboring nerves and nearby vascular structures.     AREA/MUSCLE INJECTED: 80 UNITS BOTOX = TOTAL DOSE  1. HEAD, NECK & SHOULDER GIRDLE MUSCLES: 80 UNITS BOTOX = TOTAL DOSE, 2:1 DILUTION   Left Lateral Trapezius (low cervical) - 5 units of Botox at 1 site/s.    Right Splenius Cervicis - 30 units of Botox at 1 site/s.   Left Splenius Cervicis - 15 units of Botox at 1 site/s.    Right Levator Scapulae - 20 units of Botox at 1 site/s.   Left  Levator Scapulae - 5 units of Botox at 1 site/s.    Right Inferior Obliquus Capitis - 5 units of Botox at 1 site/s.     RESPONSE TO PROCEDURE:  Bandar Wells tolerated the procedure well and there were no immediate complications. He was allowed to recover for an appropriate period of time and was discharged home in stable condition.     FOLLOW UP: Bandar Wells was asked to follow up by phone in 7-14 days with Aletha Katz PT, Care Coordinator, to report his response to this series of injections.  Based on the patient's previous response to this therapy, Bandar Wells was rescheduled for the next series of injections in 12 weeks.    PLAN (Medication Changes, Therapy Orders, Work or Disability Issues, etc.): Conservative dose of 80 units given today for first series of injections. He will continue with physical therapy and report response at next appointment in 12 weeks. Based on response, may consider dose escalation at next appointment.             Again, thank you for allowing me to participate in the care of your patient.      Sincerely,    Bertha Cervantes MD

## 2021-10-27 ENCOUNTER — TRANSFERRED RECORDS (OUTPATIENT)
Dept: HEALTH INFORMATION MANAGEMENT | Facility: CLINIC | Age: 71
End: 2021-10-27
Payer: COMMERCIAL

## 2021-10-27 ENCOUNTER — ANTICOAGULATION THERAPY VISIT (OUTPATIENT)
Dept: ANTICOAGULATION | Facility: CLINIC | Age: 71
End: 2021-10-27

## 2021-10-27 DIAGNOSIS — I48.0 PAROXYSMAL ATRIAL FIBRILLATION (H): Primary | ICD-10-CM

## 2021-10-27 DIAGNOSIS — Z95.4 S/P MITRAL VALVE REPLACEMENT WITH METALLIC VALVE: ICD-10-CM

## 2021-10-27 LAB — INR (EXTERNAL): 3.7 (ref 0.9–1.1)

## 2021-10-27 RX ORDER — OXYCODONE HYDROCHLORIDE 10 MG/1
TABLET ORAL
Qty: 90 TABLET | Refills: 0 | Status: SHIPPED | OUTPATIENT
Start: 2021-10-27 | End: 2021-11-22

## 2021-10-27 NOTE — PROGRESS NOTES
Received a faxed INR result for Bandar Wells    From [facility]    Result 10/27/2021 is 3.7

## 2021-10-27 NOTE — PROGRESS NOTES
ANTICOAGULATION MANAGEMENT     Bandar Wells 71 year old male is on warfarin with supratherapeutic INR result. (Goal INR 2.5-3.5)    Recent labs: (last 7 days)     10/27/21  0000   INR 3.7*       ASSESSMENT     Source(s): Chart Review and Patient/Caregiver Call       Warfarin doses taken: Warfarin taken as instructed    Diet: No new diet changes identified    New illness, injury, or hospitalization: No.   Cervial Dystonia - goes to [facility] for treatment.    Medication/supplement changes: Yes.  (Methylprednisone)   Reported recent multiple trigger  point cervical injections in 10 sites.    Signs or symptoms of bleeding or clotting: No    Previous INR: Supratherapeutic last 2 INR results.    Additional findings: None     PLAN     Recommended plan for temporary change(s) affecting INR     Dosing Instructions:  (evenings. 3mg tabs)     Decrease your warfarin dose (7.7% change) with next INR in 2 weeks       Summary  As of 10/27/2021    Full warfarin instructions:  10/27: Hold; 10/28: 3 mg; Otherwise 3 mg every Wed, Sat; 6 mg all other days   Next INR check:  11/10/2021             Telephone call with Bandar  ([phone]) who verbalizes understanding and agrees to plan;   - advised to check INR in one wk, instead of 2 wks.  D/t effects of Methylprednisone injections with multiple sites, can increase risk for bleeding.    Patient to recheck with home meter thru [employer] every 2 wks.    Education provided: Goal range and significance of current result    Plan made per [facility] anticoagulation protocol    Meg Jacinto RN  Anticoagulation Clinic  10/27/2021    _______________________________________________________________________     Anticoagulation Episode Summary     Current INR goal:  2.5-3.5   TTR:  51.2 % (1 y)   Target end date:  Indefinite   Send INR reminders to:  [facility]    Indications    Paroxysmal atrial fibrillation (H) [I48.0]  S/P mitral valve replacement with metallic valve [Z95.4]            Comments:  INR TARGET GOAL 2.5 - 3.5         Anticoagulation Care Providers     Provider Role Specialty Phone number    Jin Hicks MD [facility] Internal Medicine [phone]

## 2021-11-02 ENCOUNTER — THERAPY VISIT (OUTPATIENT)
Dept: PHYSICAL THERAPY | Facility: CLINIC | Age: 71
End: 2021-11-02
Payer: COMMERCIAL

## 2021-11-02 DIAGNOSIS — M53.82 WEAKNESS OF NECK: Primary | ICD-10-CM

## 2021-11-02 DIAGNOSIS — M53.82 NECK MUSCLE WEAKNESS: ICD-10-CM

## 2021-11-02 PROCEDURE — 97112 NEUROMUSCULAR REEDUCATION: CPT | Mod: GP | Performed by: PHYSICAL THERAPIST

## 2021-11-02 PROCEDURE — 97110 THERAPEUTIC EXERCISES: CPT | Mod: GP | Performed by: PHYSICAL THERAPIST

## 2021-11-03 ENCOUNTER — OFFICE VISIT (OUTPATIENT)
Dept: INTERNAL MEDICINE | Facility: CLINIC | Age: 71
End: 2021-11-03
Payer: COMMERCIAL

## 2021-11-03 VITALS
SYSTOLIC BLOOD PRESSURE: 118 MMHG | DIASTOLIC BLOOD PRESSURE: 64 MMHG | WEIGHT: 162.6 LBS | HEIGHT: 70 IN | BODY MASS INDEX: 23.28 KG/M2

## 2021-11-03 DIAGNOSIS — Z23 HIGH PRIORITY FOR 2019-NCOV VACCINE: Primary | ICD-10-CM

## 2021-11-03 DIAGNOSIS — T14.8XXA HEMATOMA OF SKIN: ICD-10-CM

## 2021-11-03 DIAGNOSIS — L02.419 CELLULITIS AND ABSCESS OF LEG: ICD-10-CM

## 2021-11-03 DIAGNOSIS — N18.4 ANEMIA OF CHRONIC RENAL FAILURE, STAGE 4 (SEVERE) (H): ICD-10-CM

## 2021-11-03 DIAGNOSIS — N18.4 CHRONIC KIDNEY DISEASE, STAGE 4 (SEVERE) (H): ICD-10-CM

## 2021-11-03 DIAGNOSIS — L03.119 CELLULITIS AND ABSCESS OF LEG: ICD-10-CM

## 2021-11-03 DIAGNOSIS — D63.1 ANEMIA OF CHRONIC RENAL FAILURE, STAGE 4 (SEVERE) (H): ICD-10-CM

## 2021-11-03 LAB
FERRITIN SERPL-MCNC: 65 NG/ML (ref 27–300)
FOLATE SERPL-MCNC: 19.8 NG/ML
IRON SATN MFR SERPL: 4 % (ref 15–46)
IRON SERPL-MCNC: 18 UG/DL (ref 35–180)
TIBC SERPL-MCNC: 421 UG/DL (ref 240–430)
VIT B12 SERPL-MCNC: 996 PG/ML (ref 213–816)

## 2021-11-03 PROCEDURE — 36415 COLL VENOUS BLD VENIPUNCTURE: CPT | Performed by: INTERNAL MEDICINE

## 2021-11-03 PROCEDURE — 0013A PR ADMIN COVID VAC MODERNA, 3RD DOSE IMM COMP PT: CPT | Performed by: INTERNAL MEDICINE

## 2021-11-03 PROCEDURE — 91301 COVID-19,PF,MODERNA (18+ YRS BOOSTER .25ML): CPT | Performed by: INTERNAL MEDICINE

## 2021-11-03 PROCEDURE — 84238 ASSAY NONENDOCRINE RECEPTOR: CPT | Mod: 90 | Performed by: INTERNAL MEDICINE

## 2021-11-03 PROCEDURE — 82728 ASSAY OF FERRITIN: CPT | Performed by: INTERNAL MEDICINE

## 2021-11-03 PROCEDURE — 83550 IRON BINDING TEST: CPT | Performed by: INTERNAL MEDICINE

## 2021-11-03 PROCEDURE — 82607 VITAMIN B-12: CPT | Performed by: INTERNAL MEDICINE

## 2021-11-03 PROCEDURE — 99000 SPECIMEN HANDLING OFFICE-LAB: CPT | Performed by: INTERNAL MEDICINE

## 2021-11-03 PROCEDURE — 82746 ASSAY OF FOLIC ACID SERUM: CPT | Performed by: INTERNAL MEDICINE

## 2021-11-03 PROCEDURE — 99214 OFFICE O/P EST MOD 30 MIN: CPT | Mod: 25 | Performed by: INTERNAL MEDICINE

## 2021-11-03 ASSESSMENT — MIFFLIN-ST. JEOR: SCORE: 1488.8

## 2021-11-03 NOTE — PROGRESS NOTES
Office Visit - Follow Up   Bandar Wells   71 year old male    Date of Visit: 11/3/2021    Chief Complaint   Patient presents with     RECHECK     Imm/Inj     COVID-19 VACCINE        Assessment and Plan   1. High priority for 2019-nCoV vaccine  - COVID-19,PF,MODERNA (18+ Yrs BOOSTER .25mL)    2. Hematoma of skin  Continue with current care, will also be following up with vascular clinic at [facility]    3. Cellulitis and abscess of leg  Resolved    4. Chronic kidney disease, stage 4 (severe) (H)  Generally stable although hemoglobin a bit low, check labs as below and follow-up with nephrology  - Ferritin; Future  - Soluble transferrin receptor; Future  - Vitamin B12; Future  - Folate; Future  - Iron and iron binding capacity; Future  - Ferritin  - Soluble transferrin receptor  - Vitamin B12  - Folate  - Iron and iron binding capacity    5. Anemia of chronic renal failure, stage 4 (severe) (H)  - Ferritin; Future  - Soluble transferrin receptor; Future  - Vitamin B12; Future  - Folate; Future  - Iron and iron binding capacity; Future  - Ferritin  - Soluble transferrin receptor  - Vitamin B12  - Folate  - Iron and iron binding capacity        Return in about 4 weeks (around 12/1/2021) for Follow up.     History of Present Illness   This 71 year old man comes in for follow-up.  He had a large hematoma in his right lower extremity with what I felt to be associated cellulitis last visit.  He is finished Keflex and he has been bandaging the wound and wearing compression garments.  He said some improvement.  He recently had Botox injections in his neck and is still recovering from this.  He is not sure if he has had much improvement although his physical therapist thinks he has.  His hemoglobin was 7.3 last visit likely related to his chronic kidney disease with a creatinine 2.72.  He has a call out to his nephrologist.    Review of Systems: A comprehensive review of systems was negative except as noted.     Medications,  "Allergies and Problem List   Reviewed, reconciled and updated  Post Discharge Medication Reconciliation Status:      Physical Exam   General Appearance:   No acute distress    /64 (BP Location: Left arm, Patient Position: Sitting, Cuff Size: Adult Regular)   Ht 1.778 m (5' 10\")   Wt 73.8 kg (162 lb 9.6 oz)   BMI 23.33 kg/m      Hematoma has decreased in size along with redness and inflammation around it.      Additional Information   Current Outpatient Medications   Medication Sig Dispense Refill     Cholecalciferol (VITAMIN D3) 25 MCG (1000 UT) CAPS Take 1,000 Units by mouth daily       evolocumab (REPATHA) 140 MG/ML prefilled autoinjector Inject 1 mL (140 mg) Subcutaneous every 14 days       febuxostat (ULORIC) 40 MG TABS tablet Take 1 tablet (40 mg) by mouth daily       folic acid (FOLVITE) 1 MG tablet Take 1 mg by mouth daily       gentamicin (GARAMYCIN) 0.1 % external ointment Apply topically 2 times daily 30 g 3     metolazone (ZAROXOLYN) 2.5 MG tablet Take 1 tablet (2.5 mg) by mouth once a week       mupirocin (BACTROBAN) 2 % external ointment Use 2 times a day to affected area. 30 g 3     oxyCODONE IR (ROXICODONE) 10 MG tablet TAKE A HALF TABLET (5 MG) BY MOUTH EVERY 4 HOURS AS NEEDED FOR SEVERE PAIN 90 tablet 0     penicillin V (VEETID) 500 MG tablet Take 500 mg by mouth daily Hx endocarditis       polyethylene glycol (MIRALAX) 17 g packet Take 17 g by mouth daily as needed for constipation 30 packet 0     potassium chloride (KLOR-CON) 20 MEQ packet Take 20 mEq by mouth daily       sennosides (SENOKOT) 8.6 MG tablet Take 1-4 tablets by mouth 2 times daily       torsemide (DEMADEX) 20 MG tablet Take 3 tablets (60 mg) by mouth 2 times daily       warfarin ANTICOAGULANT (COUMADIN) 3 MG tablet Take 1-2 tablets (3mg-6mg) by mouth daily or as directed by the INR clinic 180 tablet 1     Wound Dressings (VASELINE PETROLATUM GAUZE) PADS Apply to open wounds on the legs 12 each 5     Allergies   Allergen " Reactions     Allopurinol Rash     Clindamycin Rash     Social History     Tobacco Use     Smoking status: Never Smoker     Smokeless tobacco: Never Used   Substance Use Topics     Alcohol use: Yes     Alcohol/week: 2.0 standard drinks     Drug use: No       Review and/or order of clinical lab tests:  Review and/or order of radiology tests:  Review and/or order of medicine tests:  Discussion of test results with performing physician:  Decision to obtain old records and/or obtain history from someone other than the patient:  Review and summarization of old records and/or obtaining history from someone other than the patient and.or discussion of case with another health care provider:  Independent visualization of image, tracing or specimen itself:    Time:      Jin Hicks MD

## 2021-11-05 LAB — STFR SERPL-MCNC: 11.1 MG/L

## 2021-11-11 ENCOUNTER — TELEPHONE (OUTPATIENT)
Dept: ANTICOAGULATION | Facility: CLINIC | Age: 71
End: 2021-11-11
Payer: COMMERCIAL

## 2021-11-12 ENCOUNTER — ANTICOAGULATION THERAPY VISIT (OUTPATIENT)
Dept: ANTICOAGULATION | Facility: CLINIC | Age: 71
End: 2021-11-12
Payer: COMMERCIAL

## 2021-11-12 DIAGNOSIS — I48.0 PAROXYSMAL ATRIAL FIBRILLATION (H): Primary | ICD-10-CM

## 2021-11-12 DIAGNOSIS — Z95.4 S/P MITRAL VALVE REPLACEMENT WITH METALLIC VALVE: ICD-10-CM

## 2021-11-12 LAB — INR (EXTERNAL): 4.6 (ref 2.5–3.5)

## 2021-11-12 NOTE — PROGRESS NOTES
ANTICOAGULATION MANAGEMENT     Bandar Wells 71 year old male is on warfarin with supratherapeutic INR result. (Goal INR 2.5-3.5)    Recent labs: (last 7 days)     11/12/21  1218   INR 4.6*       ASSESSMENT     Source(s): Chart Review and Patient/Caregiver Call       Warfarin doses taken: Warfarin taken as instructed    Diet: pt states his appetite has decreased, so he isnt eating much     New illness, injury, or hospitalization: No    Medication/supplement changes: None noted    Signs or symptoms of bleeding or clotting: Yes: slight nose bleed    Previous INR: Supratherapeutic    Additional findings: None     PLAN     Recommended plan for temporary change(s) affecting INR     Dosing Instructions:  Decrease your warfarin dose (8.3% change) with next INR in 10 days       Summary  As of 11/12/2021    Full warfarin instructions:  11/12: 3 mg; Otherwise 3 mg every Mon, Wed, Sat; 6 mg all other days   Next INR check:  11/22/2021             Telephone call with Bandar who verbalizes understanding and agrees to plan    Patient to recheck with home meter    Education provided: Please call back if any changes to your diet, medications or how you've been taking warfarin, Impact of vitamin K foods on INR and Monitoring for bleeding signs and symptoms    Plan made per [facility] anticoagulation protocol    Jerrica Varma RN  Anticoagulation Clinic  11/12/2021    _______________________________________________________________________     Anticoagulation Episode Summary     Current INR goal:  2.5-3.5   TTR:  46.7 % (1 y)   Target end date:  Indefinite   Send INR reminders to:  [facility]    Indications    Paroxysmal atrial fibrillation (H) [I48.0]  S/P mitral valve replacement with metallic valve [Z95.4]           Comments:  INR TARGET GOAL 2.5 - 3.5         Anticoagulation Care Providers     Provider Role Specialty Phone number    Jin Hicks MD [facility] Internal Medicine [phone]

## 2021-11-12 NOTE — PROGRESS NOTES
Anticoagulation Management    Unable to reach pt today.    Today's INR result of 4.6 is supratherapeutic (goal INR of 2.5-3.5).  Result received from: Home Monitor - [employer]    Follow up required to confirm warfarin dose taken and assess for changes    VM left to call [facility] back. Will need to try again later.       Anticoagulation clinic to follow up    Jerrica Varma RN

## 2021-11-17 DIAGNOSIS — G89.4 PAIN SYNDROME, CHRONIC: ICD-10-CM

## 2021-11-19 NOTE — TELEPHONE ENCOUNTER
Routing refill request to provider for review/approval because:  Drug not on the G refill protocol   Drug not active on patient's medication list    Last Written Prescription Date:  ???  Last Fill Quantity: ???,  # refills: ???   Last office visit provider:  11/3/21     Requested Prescriptions   Pending Prescriptions Disp Refills     oxyCODONE IR (ROXICODONE) 10 MG tablet [Pharmacy Med Name: OXYCODONE HCL 10 MG TABLET 10 Tablet] 90 tablet 0     Sig: TAKE 1/2 TABLET (5 MG) BY MOUTH EVERY 4 HOURS AS NEEDED FOR SEVERE PAIN       There is no refill protocol information for this order        ALOCANE EMERGENCY BURN MAX STR 4 % GEL [Pharmacy Med Name: LIDOCAINE HCL 4% GEL 4 Gel] 75 mL 0     Sig: APPLY TOPICALLY TO AFFECTED AREA(S).       There is no refill protocol information for this order          Jabier Tatum RN 11/19/21 2:46 PM

## 2021-11-19 NOTE — TELEPHONE ENCOUNTER
Routing refill request to provider for review/approval because:  Controlled substance request    Last Written Prescription Date:  10/27/21  Last Fill Quantity: 90,  # refills: 0   Last office visit provider:  11/3/21     Requested Prescriptions   Pending Prescriptions Disp Refills     oxyCODONE IR (ROXICODONE) 10 MG tablet [Pharmacy Med Name: OXYCODONE HCL 10 MG TABLET 10 Tablet] 90 tablet 0     Sig: TAKE 1/2 TABLET (5 MG) BY MOUTH EVERY 4 HOURS AS NEEDED FOR SEVERE PAIN       There is no refill protocol information for this order        ALOCANE EMERGENCY BURN MAX STR 4 % GEL [Pharmacy Med Name: LIDOCAINE HCL 4% GEL 4 Gel] 75 mL 0     Sig: APPLY TOPICALLY TO AFFECTED AREA(S).       There is no refill protocol information for this order          Jabier Tatum RN 11/19/21 2:47 PM

## 2021-11-22 RX ORDER — LIDOCAINE HYDROCHLORIDE 4 G/100ML
GEL TOPICAL
Qty: 75 ML | Refills: 0 | Status: SHIPPED | OUTPATIENT
Start: 2021-11-22 | End: 2022-04-11

## 2021-11-22 RX ORDER — OXYCODONE HYDROCHLORIDE 10 MG/1
TABLET ORAL
Qty: 90 TABLET | Refills: 0 | Status: SHIPPED | OUTPATIENT
Start: 2021-11-22 | End: 2021-12-21

## 2021-11-23 ENCOUNTER — TELEPHONE (OUTPATIENT)
Dept: ANTICOAGULATION | Facility: CLINIC | Age: 71
End: 2021-11-23
Payer: COMMERCIAL

## 2021-11-23 NOTE — TELEPHONE ENCOUNTER
ANTICOAGULATION     Bandar Wells is overdue for INR check.      Spoke with Bandar instructed to test INR with home meter and call results to home monitoring company as soon as possible.    - said he will check on 11/24.    Meg Jacinto RN

## 2021-11-24 ENCOUNTER — DOCUMENTATION ONLY (OUTPATIENT)
Dept: INTERNAL MEDICINE | Facility: CLINIC | Age: 71
End: 2021-11-24
Payer: COMMERCIAL

## 2021-11-24 ENCOUNTER — TRANSFERRED RECORDS (OUTPATIENT)
Dept: HEALTH INFORMATION MANAGEMENT | Facility: CLINIC | Age: 71
End: 2021-11-24
Payer: COMMERCIAL

## 2021-11-24 ENCOUNTER — ANTICOAGULATION THERAPY VISIT (OUTPATIENT)
Dept: ANTICOAGULATION | Facility: CLINIC | Age: 71
End: 2021-11-24
Payer: COMMERCIAL

## 2021-11-24 DIAGNOSIS — I48.0 PAROXYSMAL ATRIAL FIBRILLATION (H): Primary | ICD-10-CM

## 2021-11-24 DIAGNOSIS — Z95.4 S/P MITRAL VALVE REPLACEMENT WITH METALLIC VALVE: ICD-10-CM

## 2021-11-24 LAB — INR (EXTERNAL): 2.3 (ref 0.9–1.1)

## 2021-11-24 NOTE — PROGRESS NOTES
ANTICOAGULATION MANAGEMENT     Bandar Wells 71 year old male is on warfarin with subtherapeutic INR result. (Goal INR 2.5-3.5)    Recent labs: (last 7 days)     11/24/21  0800   INR 2.3*       ASSESSMENT     Source(s): Chart Review and Patient/Caregiver Call       Warfarin doses taken: Missed dose(s) may be affecting INR    Reported skipping warfarin dose last night.    Diet: No new diet changes identified    New illness, injury, or hospitalization: No    Medication/supplement changes: None noted    Signs or symptoms of bleeding or clotting: Yes:   Had a nose bleed last night.    Previous INR: Supratherapeutic at 4.6 on 11/12/21.    Additional findings: None     PLAN     Recommended plan for temporary change(s) affecting INR     Dosing Instructions:   (evenings. 3mg tabs)    Booster dose then continue your current warfarin dose with next INR in 1 week       Summary  As of 11/24/2021    Full warfarin instructions:  11/24: 6 mg; Otherwise 3 mg every Mon, Wed, Sat; 6 mg all other days   Next INR check:  12/1/2021             Telephone call with  Bandar ([phone])  who verbalizes understanding and agrees to plan    Patient to recheck with home meter thru [employer] in one week.    Education provided: Importance of consistent vitamin K intake, Goal range and significance of current result, Monitoring for bleeding signs and symptoms and When to seek medical attention/emergency care    Plan made per [facility] anticoagulation protocol    Meg Jacinto RN  Anticoagulation Clinic  11/24/2021    _______________________________________________________________________     Anticoagulation Episode Summary     Current INR goal:  2.5-3.5   TTR:  44.8 % (1 y)   Target end date:  Indefinite   Send INR reminders to:  [facility]    Indications    Paroxysmal atrial fibrillation (H) [I48.0]  S/P mitral valve replacement with metallic valve [Z95.4]           Comments:  INR TARGET GOAL 2.5 - 3.5         Anticoagulation Care Providers      Provider Role Specialty Phone number    Jin Hicks MD [facility] Internal Medicine [phone]

## 2021-11-24 NOTE — PROGRESS NOTES
ANTICOAGULATION MANAGEMENT      Bandar Wells due for annual renewal of referral to anticoagulation monitoring. Order pended for your review and signature.      ANTICOAGULATION SUMMARY      Warfarin indication(s)     Atrial fibrillation, paroxysmal.  Heart Valve Replacement    Heart valve present?  Mechanical MVR       Current goal range   INR: 2.5-3.5     Goal appropriate for indication? Yes, INR 2.5-3.5 appropriate for hx  Mechanical MVR, Mechanical AVR with risk factors or older generation (Pro-Shiley (Tilting disc), Gramajo-Bill (Caged Ball) or Monoleaflet valve)     Current duration of therapy Indefinite/long term therapy   Time in Therapeutic Range (TTR)  (Goal > 60%) 44.8%       Office visit with referring provider's group within last year yes on 11/3/21       Meg Jacinto RN

## 2021-11-26 ENCOUNTER — TRANSFERRED RECORDS (OUTPATIENT)
Dept: HEALTH INFORMATION MANAGEMENT | Facility: CLINIC | Age: 71
End: 2021-11-26
Payer: COMMERCIAL

## 2021-11-26 DIAGNOSIS — G24.3 CERVICAL DYSTONIA: Primary | ICD-10-CM

## 2021-12-02 ENCOUNTER — TELEPHONE (OUTPATIENT)
Dept: ANTICOAGULATION | Facility: CLINIC | Age: 71
End: 2021-12-02
Payer: COMMERCIAL

## 2021-12-02 NOTE — TELEPHONE ENCOUNTER
ANTICOAGULATION     Bandar Wells is overdue for INR check.      Spoke with Bandar instructed to test INR with home meter and call results to home monitoring company as soon as possible.    Hai Davis RN

## 2021-12-03 ENCOUNTER — TELEPHONE (OUTPATIENT)
Dept: ANTICOAGULATION | Facility: CLINIC | Age: 71
End: 2021-12-03

## 2021-12-03 ENCOUNTER — TRANSFERRED RECORDS (OUTPATIENT)
Dept: HEALTH INFORMATION MANAGEMENT | Facility: CLINIC | Age: 71
End: 2021-12-03

## 2021-12-03 ENCOUNTER — ANTICOAGULATION THERAPY VISIT (OUTPATIENT)
Dept: ANTICOAGULATION | Facility: CLINIC | Age: 71
End: 2021-12-03
Payer: COMMERCIAL

## 2021-12-03 ENCOUNTER — OFFICE VISIT (OUTPATIENT)
Dept: INTERNAL MEDICINE | Facility: CLINIC | Age: 71
End: 2021-12-03
Payer: COMMERCIAL

## 2021-12-03 VITALS
SYSTOLIC BLOOD PRESSURE: 114 MMHG | WEIGHT: 164.5 LBS | OXYGEN SATURATION: 96 % | BODY MASS INDEX: 23.55 KG/M2 | HEIGHT: 70 IN | DIASTOLIC BLOOD PRESSURE: 62 MMHG | HEART RATE: 70 BPM

## 2021-12-03 DIAGNOSIS — L02.419 CELLULITIS AND ABSCESS OF LEG: Primary | ICD-10-CM

## 2021-12-03 DIAGNOSIS — D63.1 ANEMIA OF CHRONIC RENAL FAILURE, STAGE 4 (SEVERE) (H): ICD-10-CM

## 2021-12-03 DIAGNOSIS — Z95.4 S/P MITRAL VALVE REPLACEMENT WITH METALLIC VALVE: ICD-10-CM

## 2021-12-03 DIAGNOSIS — D50.9 IRON DEFICIENCY ANEMIA, UNSPECIFIED IRON DEFICIENCY ANEMIA TYPE: ICD-10-CM

## 2021-12-03 DIAGNOSIS — I48.0 PAROXYSMAL ATRIAL FIBRILLATION (H): Primary | ICD-10-CM

## 2021-12-03 DIAGNOSIS — N18.4 ANEMIA OF CHRONIC RENAL FAILURE, STAGE 4 (SEVERE) (H): ICD-10-CM

## 2021-12-03 DIAGNOSIS — G60.3 IDIOPATHIC PROGRESSIVE POLYNEUROPATHY: ICD-10-CM

## 2021-12-03 DIAGNOSIS — L03.119 CELLULITIS AND ABSCESS OF LEG: Primary | ICD-10-CM

## 2021-12-03 LAB — INR (EXTERNAL): 1.7 (ref 0.9–1.1)

## 2021-12-03 PROCEDURE — 99214 OFFICE O/P EST MOD 30 MIN: CPT | Performed by: INTERNAL MEDICINE

## 2021-12-03 RX ORDER — GABAPENTIN 100 MG/1
100-300 CAPSULE ORAL AT BEDTIME
Qty: 90 CAPSULE | Refills: 3 | Status: SHIPPED | OUTPATIENT
Start: 2021-12-03 | End: 2022-01-04

## 2021-12-03 RX ORDER — CIPROFLOXACIN 250 MG/1
250 TABLET, FILM COATED ORAL 2 TIMES DAILY
Qty: 14 TABLET | Refills: 0 | Status: SHIPPED | OUTPATIENT
Start: 2021-12-03 | End: 2022-02-21

## 2021-12-03 RX ORDER — SULFAMETHOXAZOLE/TRIMETHOPRIM 800-160 MG
0.5 TABLET ORAL 2 TIMES DAILY
Qty: 7 TABLET | Refills: 0 | Status: SHIPPED | OUTPATIENT
Start: 2021-12-03 | End: 2022-02-21

## 2021-12-03 ASSESSMENT — MIFFLIN-ST. JEOR: SCORE: 1497.42

## 2021-12-03 NOTE — PROGRESS NOTES
"  Office Visit - Follow Up   Bandar Wells   71 year old male    Date of Visit: 12/3/2021    Chief Complaint   Patient presents with     RECHECK        Assessment and Plan   1. Cellulitis and abscess of leg  Continue with current wound care, follow-up in the vascular clinic at [facility], given the redness drainage and odor, recommend antibiotics we will treat for both gram-positive organisms and gram-negative  - sulfamethoxazole-trimethoprim (BACTRIM DS) 800-160 MG tablet; Take 0.5 tablets by mouth 2 times daily for 7 days  Dispense: 7 tablet; Refill: 0  - ciprofloxacin (CIPRO) 250 MG tablet; Take 1 tablet (250 mg) by mouth 2 times daily for 7 days  Dispense: 14 tablet; Refill: 0    2. Idiopathic progressive polyneuropathy  Not mention below having some neuropathic pain at night, to start low-dose gabapentin  - gabapentin (NEURONTIN) 100 MG capsule; Take 1-3 capsules (100-300 mg) by mouth At Bedtime  Dispense: 90 capsule; Refill: 3    3. Anemia of chronic renal failure, stage 4 (severe) (H)  4. Iron deficiency anemia, unspecified iron deficiency anemia type  As with nephrology receiving intravenous iron      Return in about 4 weeks (around 12/31/2021) for Follow up.     History of Present Illness   This 71 year old comes in for follow-up.  He has an ulcer in his right leg.  Recently debrided at [facility].  Had been on Keflex.  Still with some redness and drainage.  Still painful.  Using short stretch compression garments.  Recently found to be iron deficient receiving iron infusions from to nephrology.    Review of Systems: A comprehensive review of systems was negative except as noted.     Medications, Allergies and Problem List   Reviewed, reconciled and updated  Post Discharge Medication Reconciliation Status:      Physical Exam   General Appearance:   No acute distress    /62 (BP Location: Left arm, Patient Position: Sitting, Cuff Size: Adult Regular)   Pulse 70   Ht 1.778 m (5' 10\")   Wt 74.6 kg " (164 lb 8 oz)   SpO2 96%   BMI 23.60 kg/m      He has a ulcer on his right leg which has some older and drainage and surrounding erythema, smaller more shallow ulcer lateral right lower leg.     Additional Information   Current Outpatient Medications   Medication Sig Dispense Refill     ALOCANE EMERGENCY BURN MAX STR 4 % GEL APPLY TOPICALLY TO AFFECTED AREA(S). 75 mL 0     Cholecalciferol (VITAMIN D3) 25 MCG (1000 UT) CAPS Take 1,000 Units by mouth daily       ciprofloxacin (CIPRO) 250 MG tablet Take 1 tablet (250 mg) by mouth 2 times daily for 7 days 14 tablet 0     evolocumab (REPATHA) 140 MG/ML prefilled autoinjector Inject 1 mL (140 mg) Subcutaneous every 14 days       febuxostat (ULORIC) 40 MG TABS tablet Take 1 tablet (40 mg) by mouth daily       folic acid (FOLVITE) 1 MG tablet Take 1 mg by mouth daily       gabapentin (NEURONTIN) 100 MG capsule Take 1-3 capsules (100-300 mg) by mouth At Bedtime 90 capsule 3     gentamicin (GARAMYCIN) 0.1 % external ointment Apply topically 2 times daily 30 g 3     metolazone (ZAROXOLYN) 2.5 MG tablet Take 1 tablet (2.5 mg) by mouth once a week       mupirocin (BACTROBAN) 2 % external ointment Use 2 times a day to affected area. 30 g 3     oxyCODONE IR (ROXICODONE) 10 MG tablet TAKE 1/2 TABLET (5 MG) BY MOUTH EVERY 4 HOURS AS NEEDED FOR SEVERE PAIN 90 tablet 0     penicillin V (VEETID) 500 MG tablet Take 500 mg by mouth daily Hx endocarditis       polyethylene glycol (MIRALAX) 17 g packet Take 17 g by mouth daily as needed for constipation 30 packet 0     potassium chloride (KLOR-CON) 20 MEQ packet Take 20 mEq by mouth daily       sennosides (SENOKOT) 8.6 MG tablet Take 1-4 tablets by mouth 2 times daily       sulfamethoxazole-trimethoprim (BACTRIM DS) 800-160 MG tablet Take 0.5 tablets by mouth 2 times daily for 7 days 7 tablet 0     torsemide (DEMADEX) 20 MG tablet Take 3 tablets (60 mg) by mouth 2 times daily       warfarin ANTICOAGULANT (COUMADIN) 3 MG tablet Take 1-2  tablets (3mg-6mg) by mouth daily or as directed by the INR clinic 180 tablet 1     Wound Dressings (VASELINE PETROLATUM GAUZE) PADS Apply to open wounds on the legs 12 each 5     Allergies   Allergen Reactions     Allopurinol Rash     Clindamycin Rash     Social History     Tobacco Use     Smoking status: Never Smoker     Smokeless tobacco: Never Used   Substance Use Topics     Alcohol use: Yes     Alcohol/week: 2.0 standard drinks     Drug use: No       Review and/or order of clinical lab tests:  Review and/or order of radiology tests:  Review and/or order of medicine tests:  Discussion of test results with performing physician:  Decision to obtain old records and/or obtain history from someone other than the patient:  Review and summarization of old records and/or obtaining history from someone other than the patient and.or discussion of case with another health care provider:  Independent visualization of image, tracing or specimen itself:    Time:      Jin Hicks MD

## 2021-12-03 NOTE — PROGRESS NOTES
ANTICOAGULATION MANAGEMENT     Bandar Wells 71 year old male is on warfarin with subtherapeutic INR result. (Goal INR 2.5-3.5)    Recent labs: (last 7 days)     12/03/21  0800   INR 1.7*       ASSESSMENT     Source(s): Chart Review and Patient/Caregiver Call       Warfarin doses taken: Warfarin taken as instructed    Diet: No new diet changes identified    New illness, injury, or hospitalization: Yes:   Anemia d/t chronic kidney disease stage 3.    Medication/supplement changes:  Yes.     11/30/21 - 2nd dose of Feraheme infusion.    Signs or symptoms of bleeding or clotting: No    Previous INR: Subtherapeutic at 2.3 on 11/24/21 d/t one missed dose on 11/23.    Additional findings: None     PLAN     Recommended plan for temporary change(s) affecting INR     Dosing Instructions:   (evenings. 3mg tabs)    Booster dose then continue your current warfarin dose with next INR in 5-7 days       Summary  As of 12/3/2021    Full warfarin instructions:  12/3: 9 mg; Otherwise 3 mg every Mon, Wed, Sat; 6 mg all other days   Next INR check:  12/17/2021             Telephone call with  Bandar ([phone]) who verbalizes understanding and agrees to plan    Patient to recheck with home meter thru [employer] in 5-7 days.    Education provided: Importance of consistent vitamin K intake, Goal range and significance of current result, Monitoring for clotting signs and symptoms and When to seek medical attention/emergency care    Plan made per [facility] anticoagulation protocol    Meg Jacinto RN  Anticoagulation Clinic  12/3/2021    _______________________________________________________________________     Anticoagulation Episode Summary     Current INR goal:  2.5-3.5   TTR:  42.4 % (1 y)   Target end date:  Indefinite   Send INR reminders to:  [facility]    Indications    Paroxysmal atrial fibrillation (H) [I48.0]  S/P mitral valve replacement with metallic valve [Z95.4]           Comments:  INR TARGET GOAL 2.5 - 3.5          Anticoagulation Care Providers     Provider Role Specialty Phone number    Jin Hicks MD Referring Internal Medicine [phone]

## 2021-12-03 NOTE — TELEPHONE ENCOUNTER
ANTICOAGULATION  MANAGEMENT     Interacting Medication Review    Interacting medication(s): Ciprofloxacin (Cipro) 250mg BID with warfarin.   And Bactrim-/160mg takes 1/2 tablet     Duration: Cipro -  7 days from 12/3 to 10.        Bactrim-DS - 7 days from 12/3 to 10.     Indication: Cellulitis and abscess of leg.    New medication?: Yes, interaction may increase INR and risk of bleeding       PLAN     Continue current warfarin dose. Recommend to check INR on Mon or Tues (12/6 or 7 )    Left a message for Bandar requesting call back   - advised Bandar to check INR sooner on Mon. 12/6 or Tues. 12/7.  Instead of 12/10/21.    Anticoagulation Calendar updated    Meg Jacinto RN

## 2021-12-06 ENCOUNTER — ANTICOAGULATION THERAPY VISIT (OUTPATIENT)
Dept: ANTICOAGULATION | Facility: CLINIC | Age: 71
End: 2021-12-06
Payer: COMMERCIAL

## 2021-12-06 ENCOUNTER — TRANSFERRED RECORDS (OUTPATIENT)
Dept: HEALTH INFORMATION MANAGEMENT | Facility: CLINIC | Age: 71
End: 2021-12-06
Payer: COMMERCIAL

## 2021-12-06 DIAGNOSIS — Z95.4 S/P MITRAL VALVE REPLACEMENT WITH METALLIC VALVE: ICD-10-CM

## 2021-12-06 DIAGNOSIS — I48.0 PAROXYSMAL ATRIAL FIBRILLATION (H): Primary | ICD-10-CM

## 2021-12-06 LAB — INR (EXTERNAL): 2 (ref 0.9–1.1)

## 2021-12-06 NOTE — PROGRESS NOTES
ANTICOAGULATION MANAGEMENT     Bandar Wells 71 year old male is on warfarin with subtherapeutic INR result. (Goal INR 2.5-3.5)    Recent labs: (last 7 days)     12/06/21  0800   INR 2.0*       ASSESSMENT     Source(s): Chart Review and Patient/Caregiver Call       Warfarin doses taken: Warfarin taken as instructed    Diet: No new diet changes identified     New illness, injury, or hospitalization: Yes:   Reported some improvement of abcess.   Right leg cellulitis and abcess with drainage, odor, and pain. Recommended to f/u @ [facility]; Appt scheduled on 12/10.   Idiopathic progressive neuropathy.    Medication/supplement changes:  Yes.    12/3/21 prescribed Cipro and Bactrim-DS for 7 days from 12/3-10. (known interaction with warfarin).   And Gabapentin 100mg 1-3 capsules at bedtime.    Signs or symptoms of bleeding or clotting: No    Previous INR: Subtherapeutic last 2 INR results.    Additional findings: None     PLAN     Recommended plan for ongoing change(s) affecting INR     Dosing Instructions:   (evenings. 3mg tabs)     Increase your warfarin dose (9.1% change) with next INR in 1 week       Summary  As of 12/6/2021    Full warfarin instructions:  3 mg every Wed, Sat; 6 mg all other days   Next INR check:  12/20/2021             Telephone call with  Bandar ([phone]) who verbalizes understanding and agrees to plan    Patient to recheck with home meter thru [employer] and will check in 7-10 days.   - he ran out of tests strip and is awaiting for more to be delivered.    Education provided: Importance of consistent vitamin K intake, Goal range and significance of current result, Monitoring for clotting signs and symptoms and When to seek medical attention/emergency care    Plan made per [facility] anticoagulation protocol    Meg Jacinto, RN  Anticoagulation Clinic  12/6/2021    _______________________________________________________________________     Anticoagulation Episode Summary     Current  INR goal:  2.5-3.5   TTR:  41.6 % (1 y)   Target end date:  Indefinite   Send INR reminders to:  [facility]    Indications    Paroxysmal atrial fibrillation (H) [I48.0]  S/P mitral valve replacement with metallic valve [Z95.4]           Comments:  INR TARGET GOAL 2.5 - 3.5         Anticoagulation Care Providers     Provider Role Specialty Phone number    Jin Hicks MD Referring Internal Medicine [phone]

## 2021-12-06 NOTE — PROGRESS NOTES
Received a faxed INR result for Bandar Wells    From [facility]    Result 12/6/2021 is 2.0

## 2021-12-07 ENCOUNTER — THERAPY VISIT (OUTPATIENT)
Dept: PHYSICAL THERAPY | Facility: CLINIC | Age: 71
End: 2021-12-07
Payer: COMMERCIAL

## 2021-12-07 DIAGNOSIS — M53.82 WEAKNESS OF NECK: Primary | ICD-10-CM

## 2021-12-07 DIAGNOSIS — M53.82 NECK MUSCLE WEAKNESS: ICD-10-CM

## 2021-12-07 PROCEDURE — 97112 NEUROMUSCULAR REEDUCATION: CPT | Mod: GP | Performed by: PHYSICAL THERAPIST

## 2021-12-07 PROCEDURE — 97110 THERAPEUTIC EXERCISES: CPT | Mod: GP | Performed by: PHYSICAL THERAPIST

## 2021-12-07 NOTE — PROGRESS NOTES
DISCHARGE REPORT    Progress reporting period is from 8/24/21 to 12/7/21.       SUBJECTIVE  Subjective changes noted by patient:  Bandar has attended 21 visits of PT. He notes significant improvement in pain, posture and range of motion. He has been very consistent with his home exercises over the past 5 weeks between PT sessions.    Current pain level is 0/10 at rest, 5/10 with end range movement.    Changes in function:  Yes (See Goal flowsheet attached for changes in current functional level)  Adverse reaction to treatment or activity: None    OBJECTIVE  Changes noted in objective findings:  Yes,     AROM supine: (Major, Moderate, Minimal or Nil loss)  Movement Loss Jose Carlos Mod Min Nil Pain   Protrusion       x     Flexion       x     Retraction   x        Extension Lacking 3  from full extension (able to lay for a few seconds without any pillow)          Left Rotation   32 degrees        Right Rotation   40 degrees        Left Side Bending 10 degrees          Right Side bending   40 degrees              AROM standing: (Major, Moderate, Minimal or Nil loss)     Movement Loss Jose Carlos Mod Min Nil Pain   Extension 5 degrees of neck extension without lumbar extension 20 degrees of neck extension   lumbar extension                       ASSESSMENT/PLAN  Updated problem list and treatment plan: Diagnosis 1:  Diagnosis 1:  Neck weakness    Decreased ROM/flexibility - home program  Decreased strength - home program  Impaired posture - home program  STG/LTGs have been met or progress has been made towards goals:  Yes (See Goal flow sheet completed today.)  Assessment of Progress: The patient's condition is improving.  Self Management Plans:  Patient is independent in a home treatment program.  I have re-evaluated this patient and find that the nature, scope, duration and intensity of the therapy is appropriate for the medical condition of the patient.  Bandar continues to require the following intervention to meet STG and LTG's:  PT  intervention is no longer required to meet STG/LTG.    Recommendations:  This patient is ready to be discharged from therapy and continue their home treatment program. He has not only maintained range of motion but increased range of motion with a 5 week break in PT. In addition to regular home exercises, I recommend Bandar continue with botox injections. The first round has helped significantly with pain control.     Please refer to the daily flowsheet for treatment today, total treatment time and time spent performing 1:1 timed codes.

## 2021-12-17 ENCOUNTER — TRANSFERRED RECORDS (OUTPATIENT)
Dept: HEALTH INFORMATION MANAGEMENT | Facility: CLINIC | Age: 71
End: 2021-12-17
Payer: COMMERCIAL

## 2021-12-17 ENCOUNTER — ANTICOAGULATION THERAPY VISIT (OUTPATIENT)
Dept: ANTICOAGULATION | Facility: CLINIC | Age: 71
End: 2021-12-17
Payer: COMMERCIAL

## 2021-12-17 DIAGNOSIS — Z95.4 S/P MITRAL VALVE REPLACEMENT WITH METALLIC VALVE: ICD-10-CM

## 2021-12-17 DIAGNOSIS — I48.0 PAROXYSMAL ATRIAL FIBRILLATION (H): Primary | ICD-10-CM

## 2021-12-17 LAB — INR HOME MONITORING: 2.7 (ref 2.5–3.5)

## 2021-12-17 NOTE — PROGRESS NOTES
ANTICOAGULATION MANAGEMENT     Bandar Wells 71 year old male is on warfarin with therapeutic INR result. (Goal INR 2.5-3.5)    Recent labs: (last 7 days)     12/17/21  0000   INR 2.70       ASSESSMENT     Source(s): Chart Review and Patient/Caregiver Call       Warfarin doses taken: Warfarin taken as instructed    Diet: No new diet changes identified    New illness, injury, or hospitalization: Yes.   Reported wound is improving.   Right leg cellulitis / abcess - d/t trauma after he bumped shin on his bed.    Medication/supplement changes: None noted.   Completed Cipro and Bactrim on 12/10/21.    Signs or symptoms of bleeding or clotting: No    Previous INR: Subtherapeutic at 2.0 on 12/6/21.    Additional findings:  Yes.  he did f/u @ [facility] on 12/13/21.  He will return for f/u in 6 wks.     PLAN     Recommended plan for temporary change(s) affecting INR     Dosing Instructions:   (evenings. 3mg tabs)    Continue your current warfarin dose with next INR in 2 weeks       Summary  As of 12/17/2021    Full warfarin instructions:  3 mg every Wed, Sat; 6 mg all other days   Next INR check:  12/31/2021             Telephone call with  Bandar ([phone]) who verbalizes understanding and agrees to plan    Patient to recheck with home meter thru [employer] and checks every 2 wks.    Education provided: Goal range and significance of current result and Importance of notifying clinic for changes in medications; a sooner lab recheck maybe needed.    Plan made per [facility] anticoagulation protocol    Meg Jacinto RN  Anticoagulation Clinic  12/17/2021    _______________________________________________________________________     Anticoagulation Episode Summary     Current INR goal:  2.5-3.5   TTR:  41.3 % (1 y)   Target end date:  Indefinite   Send INR reminders to:  [facility]    Indications    Paroxysmal atrial fibrillation (H) [I48.0]  S/P mitral valve replacement with metallic valve [Z95.4]            Comments:  INR TARGET GOAL 2.5 - 3.5         Anticoagulation Care Providers     Provider Role Specialty Phone number    Jin Hicks MD Referring Internal Medicine [phone]

## 2021-12-20 DIAGNOSIS — G89.4 PAIN SYNDROME, CHRONIC: ICD-10-CM

## 2021-12-21 RX ORDER — OXYCODONE HYDROCHLORIDE 10 MG/1
TABLET ORAL
Qty: 90 TABLET | Refills: 0 | Status: SHIPPED | OUTPATIENT
Start: 2021-12-21 | End: 2022-01-20

## 2021-12-21 NOTE — TELEPHONE ENCOUNTER
Routing refill request to provider for review/approval because:  Drug not on the [facility] refill protocol   oxyCODONE IR (ROXICODONE) 10 MG tablet 90 tablet 0 11/22/2021  No   Sig: TAKE 1/2 TABLET (5 MG) BY MOUTH EVERY 4 HOURS AS NEEDED FOR SEVERE PAIN   LAST OV-12/3/21

## 2021-12-27 ENCOUNTER — TRANSFERRED RECORDS (OUTPATIENT)
Dept: HEALTH INFORMATION MANAGEMENT | Facility: CLINIC | Age: 71
End: 2021-12-27
Payer: COMMERCIAL

## 2021-12-27 ENCOUNTER — ANTICOAGULATION THERAPY VISIT (OUTPATIENT)
Dept: ANTICOAGULATION | Facility: CLINIC | Age: 71
End: 2021-12-27
Payer: COMMERCIAL

## 2021-12-27 DIAGNOSIS — I48.0 PAROXYSMAL ATRIAL FIBRILLATION (H): Primary | ICD-10-CM

## 2021-12-27 DIAGNOSIS — Z95.4 S/P MITRAL VALVE REPLACEMENT WITH METALLIC VALVE: ICD-10-CM

## 2021-12-27 LAB — INR HOME MONITORING: 2.1 (ref 2.5–3.5)

## 2021-12-27 NOTE — PROGRESS NOTES
ANTICOAGULATION MANAGEMENT     Bandar Wells 71 year old male is on warfarin with subtherapeutic INR result. (Goal INR 2.5-3.5)    Recent labs: (last 7 days)     12/27/21  0000   INR 2.10*       ASSESSMENT     Source(s): Chart Review and Patient/Caregiver Call       Warfarin doses taken: Warfarin taken as instructed    However, reported last night he forgot to take warfarin at dinner time and remembered to take it after midnight.    Diet: No new diet changes identified    New illness, injury, or hospitalization: Yes:    Right leg cellulitis - reported wound is improving each day.    Medication/supplement changes: None noted    Signs or symptoms of bleeding or clotting: No    Previous INR: Therapeutic last visit at 2.7; previously outside of goal range at 2.0    Additional findings: None     PLAN     Recommended plan for temporary change(s) affecting INR     Dosing Instructions:   (evenings. 3mg tabs)    Continue your current warfarin dose with next INR in 1-2 weeks.       Summary  As of 12/27/2021    Full warfarin instructions:  12/27: 9 mg; Otherwise 3 mg every Wed, Sat; 6 mg all other days   Next INR check:  1/7/2022             Telephone call with  Bandar ([phone]) who verbalizes understanding and agrees to plan.   - he is due to take 6mg warfarin at dinner, will continue same dose.   - however, advised to check INR the week of 1/3/22.    Patient to recheck with home meter thru [employer] every 1-2 wks.    Education provided: Importance of consistent vitamin K intake, Goal range and significance of current result, Importance of therapeutic range and Importance of taking warfarin as instructed    Plan made per [facility] anticoagulation protocol    Meg Jacinto, RN  Anticoagulation Clinic  12/27/2021    _______________________________________________________________________     Anticoagulation Episode Summary     Current INR goal:  2.5-3.5   TTR:  39.6 % (1 y)   Target end date:  Indefinite   Send INR reminders to:   [facility]    Indications    Paroxysmal atrial fibrillation (H) [I48.0]  S/P mitral valve replacement with metallic valve [Z95.4]           Comments:  INR TARGET GOAL 2.5 - 3.5         Anticoagulation Care Providers     Provider Role Specialty Phone number    Jin Hicks MD Referring Internal Medicine [phone]

## 2022-01-04 ENCOUNTER — OFFICE VISIT (OUTPATIENT)
Dept: INTERNAL MEDICINE | Facility: CLINIC | Age: 72
End: 2022-01-04
Payer: COMMERCIAL

## 2022-01-04 VITALS
DIASTOLIC BLOOD PRESSURE: 44 MMHG | BODY MASS INDEX: 23.53 KG/M2 | HEART RATE: 60 BPM | SYSTOLIC BLOOD PRESSURE: 106 MMHG | OXYGEN SATURATION: 98 % | WEIGHT: 164 LBS

## 2022-01-04 DIAGNOSIS — G24.8 FOCAL DYSTONIA: ICD-10-CM

## 2022-01-04 DIAGNOSIS — N18.4 CHRONIC KIDNEY DISEASE, STAGE 4 (SEVERE) (H): ICD-10-CM

## 2022-01-04 DIAGNOSIS — D50.9 IRON DEFICIENCY ANEMIA, UNSPECIFIED IRON DEFICIENCY ANEMIA TYPE: ICD-10-CM

## 2022-01-04 DIAGNOSIS — D63.1 ANEMIA OF CHRONIC RENAL FAILURE, STAGE 4 (SEVERE) (H): ICD-10-CM

## 2022-01-04 DIAGNOSIS — N18.4 ANEMIA OF CHRONIC RENAL FAILURE, STAGE 4 (SEVERE) (H): ICD-10-CM

## 2022-01-04 DIAGNOSIS — G60.3 IDIOPATHIC PROGRESSIVE POLYNEUROPATHY: ICD-10-CM

## 2022-01-04 DIAGNOSIS — L03.119 CELLULITIS AND ABSCESS OF LEG: Primary | ICD-10-CM

## 2022-01-04 DIAGNOSIS — L02.419 CELLULITIS AND ABSCESS OF LEG: Primary | ICD-10-CM

## 2022-01-04 DIAGNOSIS — M40.03 POSTURAL KYPHOSIS OF CERVICOTHORACIC REGION: ICD-10-CM

## 2022-01-04 LAB
ALBUMIN SERPL-MCNC: 3.7 G/DL (ref 3.5–5)
ANION GAP SERPL CALCULATED.3IONS-SCNC: 14 MMOL/L (ref 5–18)
BUN SERPL-MCNC: 42 MG/DL (ref 8–28)
CALCIUM SERPL-MCNC: 10.2 MG/DL (ref 8.5–10.5)
CHLORIDE BLD-SCNC: 103 MMOL/L (ref 98–107)
CO2 SERPL-SCNC: 24 MMOL/L (ref 22–31)
CREAT SERPL-MCNC: 2.01 MG/DL (ref 0.7–1.3)
ERYTHROCYTE [DISTWIDTH] IN BLOOD BY AUTOMATED COUNT: 19.6 % (ref 10–15)
FERRITIN SERPL-MCNC: 212 NG/ML (ref 27–300)
GFR SERPL CREATININE-BSD FRML MDRD: 34 ML/MIN/1.73M2
GLUCOSE BLD-MCNC: 103 MG/DL (ref 70–125)
HCT VFR BLD AUTO: 31.2 % (ref 40–53)
HGB BLD-MCNC: 9.6 G/DL (ref 13.3–17.7)
MCH RBC QN AUTO: 30.3 PG (ref 26.5–33)
MCHC RBC AUTO-ENTMCNC: 30.8 G/DL (ref 31.5–36.5)
MCV RBC AUTO: 98 FL (ref 78–100)
PHOSPHATE SERPL-MCNC: 2.9 MG/DL (ref 2.5–4.5)
PLATELET # BLD AUTO: 182 10E3/UL (ref 150–450)
POTASSIUM BLD-SCNC: 3.7 MMOL/L (ref 3.5–5)
RBC # BLD AUTO: 3.17 10E6/UL (ref 4.4–5.9)
SODIUM SERPL-SCNC: 141 MMOL/L (ref 136–145)
WBC # BLD AUTO: 4 10E3/UL (ref 4–11)

## 2022-01-04 PROCEDURE — 84238 ASSAY NONENDOCRINE RECEPTOR: CPT | Mod: 90 | Performed by: INTERNAL MEDICINE

## 2022-01-04 PROCEDURE — 80069 RENAL FUNCTION PANEL: CPT | Performed by: INTERNAL MEDICINE

## 2022-01-04 PROCEDURE — 99214 OFFICE O/P EST MOD 30 MIN: CPT | Performed by: INTERNAL MEDICINE

## 2022-01-04 PROCEDURE — 99000 SPECIMEN HANDLING OFFICE-LAB: CPT | Performed by: INTERNAL MEDICINE

## 2022-01-04 PROCEDURE — 82728 ASSAY OF FERRITIN: CPT | Performed by: INTERNAL MEDICINE

## 2022-01-04 PROCEDURE — 36415 COLL VENOUS BLD VENIPUNCTURE: CPT | Performed by: INTERNAL MEDICINE

## 2022-01-04 PROCEDURE — 85027 COMPLETE CBC AUTOMATED: CPT | Performed by: INTERNAL MEDICINE

## 2022-01-04 RX ORDER — GABAPENTIN 300 MG/1
600 CAPSULE ORAL AT BEDTIME
Qty: 180 CAPSULE | Refills: 3 | Status: SHIPPED | OUTPATIENT
Start: 2022-01-04 | End: 2022-09-09

## 2022-01-04 NOTE — PROGRESS NOTES
Office Visit - Follow Up   Bandar Wells   71 year old male    Date of Visit: 1/4/2022    Chief Complaint   Patient presents with     Follow Up     Injury to shin         Assessment and Plan   1. Cellulitis and abscess of leg  Doing much better after antibiotics continue with wound care at [facility]    2. Idiopathic progressive polyneuropathy  Increase gabapentin to 600 mg at night  - gabapentin (NEURONTIN) 300 MG capsule; Take 2 capsules (600 mg) by mouth At Bedtime  Dispense: 180 capsule; Refill: 3    3. Chronic kidney disease, stage 4 (severe) (H)  - Renal panel (Alb, BUN, Ca, Cl, CO2, Creat, Gluc, Phos, K, Na); Future  - Renal panel (Alb, BUN, Ca, Cl, CO2, Creat, Gluc, Phos, K, Na)    4. Anemia of chronic renal failure, stage 4 (severe) (H)  5. Iron deficiency anemia, unspecified iron deficiency anemia type  Status post iron infusion recheck lab  - CBC with platelets; Future  - Soluble transferrin receptor; Future  - Ferritin; Future  - CBC with platelets  - Soluble transferrin receptor  - Ferritin    6. Postural kyphosis of cervicothoracic region  7. Focal dystonia  He states it is better after Botox injection    Return in about 3 months (around 4/4/2022) for Follow up.     History of Present Illness   This 71 year old man comes in for follow-up.  Last visit I put him on antibiotics for an infected wound of his right lower extremity.  He states he had marked improvement with the antibiotics and the wound is almost healed.  He is following with vascular medicine at [place] and recently was there and had a little bit of debridement.  He has follow-up again in January.  His partner is doing an excellent job of bandaging the wound and applying compression garments.  I started him on gabapentin for peripheral neuropathy.  Initially this was quite helpful but is no longer working as well.  He also recently received iron infusions for iron deficiency anemia.  He also has anemia of chronic kidney disease.  Recent Botox  injections in his neck for focal dystonia    Review of Systems: A comprehensive review of systems was negative except as noted.     Medications, Allergies and Problem List   Reviewed, reconciled and updated  Post Discharge Medication Reconciliation Status:      Physical Exam   General Appearance:   No acute distress    /44 (BP Location: Left arm, Patient Position: Sitting, Cuff Size: Adult Regular)   Pulse 60   Wt 74.4 kg (164 lb)   SpO2 98%   BMI 23.53 kg/m      His wound is bandaged, not fully examined, patient states that this is healing quite nicely.  Left leg with compression bandage.  Kyphosis and dystonia of his neck     Additional Information   Current Outpatient Medications   Medication Sig Dispense Refill     ALOCANE EMERGENCY BURN MAX STR 4 % GEL APPLY TOPICALLY TO AFFECTED AREA(S). 75 mL 0     Cholecalciferol (VITAMIN D3) 25 MCG (1000 UT) CAPS Take 1,000 Units by mouth daily       evolocumab (REPATHA) 140 MG/ML prefilled autoinjector Inject 1 mL (140 mg) Subcutaneous every 14 days       febuxostat (ULORIC) 40 MG TABS tablet Take 1 tablet (40 mg) by mouth daily       folic acid (FOLVITE) 1 MG tablet Take 1 mg by mouth daily       gabapentin (NEURONTIN) 300 MG capsule Take 2 capsules (600 mg) by mouth At Bedtime 180 capsule 3     gentamicin (GARAMYCIN) 0.1 % external ointment Apply topically 2 times daily 30 g 3     metolazone (ZAROXOLYN) 2.5 MG tablet Take 1 tablet (2.5 mg) by mouth once a week       mupirocin (BACTROBAN) 2 % external ointment Use 2 times a day to affected area. 30 g 3     oxyCODONE IR (ROXICODONE) 10 MG tablet TAKE ONE-HALF TABLET (5 MG) BY MOUTH EVERY 4 HOURS AS NEEDED FOR SEVERE PAIN 90 tablet 0     penicillin V (VEETID) 500 MG tablet Take 500 mg by mouth daily Hx endocarditis       polyethylene glycol (MIRALAX) 17 g packet Take 17 g by mouth daily as needed for constipation 30 packet 0     potassium chloride (KLOR-CON) 20 MEQ packet Take 20 mEq by mouth daily        sennosides (SENOKOT) 8.6 MG tablet Take 1-4 tablets by mouth 2 times daily       torsemide (DEMADEX) 20 MG tablet Take 3 tablets (60 mg) by mouth 2 times daily       warfarin ANTICOAGULANT (COUMADIN) 3 MG tablet Take 1-2 tablets (3mg-6mg) by mouth daily or as directed by the INR clinic 180 tablet 1     Wound Dressings (VASELINE PETROLATUM GAUZE) PADS Apply to open wounds on the legs 12 each 5     Allergies   Allergen Reactions     Allopurinol Rash     Clindamycin Rash     Social History     Tobacco Use     Smoking status: Never Smoker     Smokeless tobacco: Never Used   Substance Use Topics     Alcohol use: Yes     Alcohol/week: 2.0 standard drinks     Drug use: No       Review and/or order of clinical lab tests:  Review and/or order of radiology tests:  Review and/or order of medicine tests:  Discussion of test results with performing physician:  Decision to obtain old records and/or obtain history from someone other than the patient:  Review and summarization of old records and/or obtaining history from someone other than the patient and.or discussion of case with another health care provider:  Independent visualization of image, tracing or specimen itself:    Time:      Jin Hicks MD

## 2022-01-05 LAB — STFR SERPL-MCNC: 6.6 MG/L

## 2022-01-12 ENCOUNTER — TRANSFERRED RECORDS (OUTPATIENT)
Dept: HEALTH INFORMATION MANAGEMENT | Facility: CLINIC | Age: 72
End: 2022-01-12
Payer: COMMERCIAL

## 2022-01-12 ENCOUNTER — ANTICOAGULATION THERAPY VISIT (OUTPATIENT)
Dept: ANTICOAGULATION | Facility: CLINIC | Age: 72
End: 2022-01-12
Payer: COMMERCIAL

## 2022-01-12 DIAGNOSIS — Z95.4 S/P MITRAL VALVE REPLACEMENT WITH METALLIC VALVE: ICD-10-CM

## 2022-01-12 DIAGNOSIS — I48.0 PAROXYSMAL ATRIAL FIBRILLATION (H): Primary | ICD-10-CM

## 2022-01-12 LAB — INR HOME MONITORING: 2.8 (ref 2.5–3.5)

## 2022-01-12 NOTE — PROGRESS NOTES
ANTICOAGULATION MANAGEMENT     Bandar Wells 71 year old male is on warfarin with therapeutic INR result. (Goal INR 2.5-3.5)    Recent labs: (last 7 days)     01/12/22  0000   INR 2.80       ASSESSMENT     Source(s): Chart Review and Patient/Caregiver Call       Warfarin doses taken: Warfarin taken as instructed    Diet: No new diet changes identified    New illness, injury, or hospitalization: No    Medication/supplement changes: gabapentin, started one weekago    Signs or symptoms of bleeding or clotting: No    Previous INR: Subtherapeutic    Additional findings: None     PLAN     Recommended plan for no diet, medication or health factor changes affecting INR     Dosing Instructions: Continue your current warfarin dose with next INR in 2 weeks       Summary  As of 1/12/2022    Full warfarin instructions:  3 mg every Wed, Sat; 6 mg all other days   Next INR check:  1/26/2022             Telephone call with Bandar who verbalizes understanding and agrees to plan    Patient to recheck with home meter    Education provided: discussed with the patient how he does not like getting bruises with bumping himself.    Plan made per [facility] anticoagulation protocol    Jackelyn Ayala RN  Anticoagulation Clinic  1/12/2022    _______________________________________________________________________     Anticoagulation Episode Summary     Current INR goal:  2.5-3.5   TTR:  37.0 % (1 y)   Target end date:  Indefinite   Send INR reminders to:  ILAN PATEL    Indications    Paroxysmal atrial fibrillation (H) [I48.0]  S/P mitral valve replacement with metallic valve [Z95.4]           Comments:  INR TARGET GOAL 2.5 - 3.5         Anticoagulation Care Providers     Provider Role Specialty Phone number    Jin Hicks MD Referring Internal Medicine [phone]

## 2022-01-18 DIAGNOSIS — G89.4 PAIN SYNDROME, CHRONIC: ICD-10-CM

## 2022-01-20 ENCOUNTER — OFFICE VISIT (OUTPATIENT)
Dept: NEUROLOGY | Facility: CLINIC | Age: 72
End: 2022-01-20
Payer: COMMERCIAL

## 2022-01-20 VITALS — SYSTOLIC BLOOD PRESSURE: 127 MMHG | HEART RATE: 62 BPM | DIASTOLIC BLOOD PRESSURE: 58 MMHG

## 2022-01-20 DIAGNOSIS — G24.3 CERVICAL DYSTONIA: Primary | ICD-10-CM

## 2022-01-20 PROCEDURE — 95874 GUIDE NERV DESTR NEEDLE EMG: CPT | Performed by: PHYSICAL MEDICINE & REHABILITATION

## 2022-01-20 PROCEDURE — 64616 CHEMODENERV MUSC NECK DYSTON: CPT | Mod: 50 | Performed by: PHYSICAL MEDICINE & REHABILITATION

## 2022-01-20 RX ORDER — OXYCODONE HYDROCHLORIDE 10 MG/1
TABLET ORAL
Qty: 90 TABLET | Refills: 0 | Status: SHIPPED | OUTPATIENT
Start: 2022-01-20 | End: 2022-02-21

## 2022-01-20 NOTE — PROGRESS NOTES
BOTULINUM TOXIN PROCEDURE NOTE      Current Outpatient Medications:      ALOCANE EMERGENCY BURN MAX STR 4 % GEL, APPLY TOPICALLY TO AFFECTED AREA(S)., Disp: 75 mL, Rfl: 0     Cholecalciferol (VITAMIN D3) 25 MCG (1000 UT) CAPS, Take 1,000 Units by mouth daily, Disp: , Rfl:      evolocumab (REPATHA) 140 MG/ML prefilled autoinjector, Inject 1 mL (140 mg) Subcutaneous every 14 days, Disp: , Rfl:      febuxostat (ULORIC) 40 MG TABS tablet, Take 1 tablet (40 mg) by mouth daily, Disp: , Rfl:      folic acid (FOLVITE) 1 MG tablet, Take 1 mg by mouth daily, Disp: , Rfl:      gabapentin (NEURONTIN) 300 MG capsule, Take 2 capsules (600 mg) by mouth At Bedtime, Disp: 180 capsule, Rfl: 3     metolazone (ZAROXOLYN) 2.5 MG tablet, Take 1 tablet (2.5 mg) by mouth once a week (Patient taking differently: Take 2.5 mg by mouth twice a week ), Disp: , Rfl:      oxyCODONE IR (ROXICODONE) 10 MG tablet, TAKE ONE-HALF TABLET (5 MG) BY MOUTH EVERY 4 HOURS AS NEEDED FOR SEVERE PAIN, Disp: 90 tablet, Rfl: 0     penicillin V (VEETID) 500 MG tablet, Take 500 mg by mouth daily Hx endocarditis, Disp: , Rfl:      potassium chloride (KLOR-CON) 20 MEQ packet, Take 20 mEq by mouth daily, Disp: , Rfl:      sennosides (SENOKOT) 8.6 MG tablet, Take 1-4 tablets by mouth 2 times daily, Disp: , Rfl:      torsemide (DEMADEX) 20 MG tablet, Take 3 tablets (60 mg) by mouth 2 times daily, Disp: , Rfl:      warfarin ANTICOAGULANT (COUMADIN) 3 MG tablet, Take 1-2 tablets (3mg-6mg) by mouth daily or as directed by the INR clinic, Disp: 180 tablet, Rfl: 1     Wound Dressings (VASELINE PETROLATUM GAUZE) PADS, Apply to open wounds on the legs, Disp: 12 each, Rfl: 5     gentamicin (GARAMYCIN) 0.1 % external ointment, Apply topically 2 times daily (Patient not taking: Reported on 1/20/2022), Disp: 30 g, Rfl: 3     mupirocin (BACTROBAN) 2 % external ointment, Use 2 times a day to affected area. (Patient not taking: Reported on 1/20/2022), Disp: 30 g, Rfl: 3      polyethylene glycol (MIRALAX) 17 g packet, Take 17 g by mouth daily as needed for constipation (Patient not taking: Reported on 1/20/2022), Disp: 30 packet, Rfl: 0    Current Facility-Administered Medications:      botulinum toxin type A (BOTOX) 100 units injection 300 Units, 300 Units, Intramuscular, Q90 Days, Bertha Cervantes MD     botulinum toxin type A (BOTOX) 100 units injection 300 Units, 300 Units, Intramuscular, Q90 Days, Bertha Cervantes MD        Allergies   Allergen Reactions     Allopurinol Rash     Clindamycin Rash        PHYSICAL EXAM  VS: /58 (BP Location: Right arm, Patient Position: Sitting, Cuff Size: Adult Regular)   Pulse 62    HEAD, NECK AND TRUNK PATTERN:   Head & Neck Flexion:  Present   Head turned to the right side with chin pointing towards the left side. Significant hypertonicity in bilateral upper trapezius (L>R), point tenderness at levator insertion bilaterally.      HPI  Bandar Wells is a 71 year old male with Parkinson's disease and cervical dystonia who presents for injections of Botox into the neck and shoulder girdle musculature for management of cervical dystonia.     Since his last series of Botox injections, he injured his right shin, which resulted in a large abrasion with hematoma which became infected. He is being treated by wound care and it is healing nicely.     We reviewed the recommended safety guidelines for  Botox from any vaccine injection, such as the seasonal flu vaccine, by a minimum of 10-14 days with Bandar Wells. He acknowledged understanding.    RESPONSE TO PREVIOUS TREATMENT:  Bandar Wells received 80 units of Botox on 10/26/2021.    Problems following the previous series of neurotoxin injections included:  No problems reported    BENEFITS BY PATIENT REPORT:    Pain Improvement: Yes.  Percent Improvement: 90 %    Duration of Benefit:  ongoing to date.    Dystonia Improvement: Yes.  Percent Improvement: 30 %    Duration of  Benefit:  10 weeks and followed by a gradual reduction in benefit, although his head position is not back to baseline.      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  His baseline symptoms have been recalcitrant to oral medications and conservative therapy.  He is here today for reinjection with Botox.    GOAL OF PROCEDURE:  The goal of this procedure is to improve increase active range of motion, decrease pain  and enhance functional independence associated with dystonic movements and spasticity.    TOTAL DOSE: 100 UNITS BOTOX  Dose Administered:  100 units Botox    Diluent Used:  Preservative Free Normal Saline  Total Volume of Diluent Used:  1 ml  Lot # [id number] with Expiration Date:  2024  NDC #: Botox 100u ([address]-20)    Medication guide was offered to patient and was accepted.    CONSENT:  The risks, benefits, and treatment options were discussed with Bandar Wells and he agreed to proceed.    EQUIPMENT USED:  Needle-25mm stimulating/recording  Needle-30 gauge  EMG/NCS Machine    SKIN PREPARATION:  Skin preparation was performed using an alcohol wipe.      GUIDANCE DESCRIPTION:  Electro-myographic guidance was necessary throughout the procedure to accurately identify all areas of dystonic and spastic muscles while avoiding injection of non-dystonic muscles and non-spastic muscles, neighboring nerves and nearby vascular structures.     AREA/MUSCLE INJECTED: 100 UNITS BOTOX = TOTAL DOSE  1. HEAD, NECK & SHOULDER GIRDLE MUSCLES: 100 UNITS BOTOX = TOTAL DOSE.  Left Lateral Trapezius (low cervical) - 5 units of Botox at 1  site/s.    Right Splenius Cervicis - 30 units of Botox at 1 site/s.   Left Splenius Cervicis - 15 units of Botox at 1 site/s.    Right Levator Scapulae - 40 units of Botox at 2 site/s.   Left Levator Scapulae - 5 units of Botox at 1 site/s.    Right Inferior Obliquus Capitis - 5 units of Botox at 1 site/s.       RESPONSE TO PROCEDURE:  Bandar Wells tolerated the procedure well and there were no immediate complications. He was allowed to recover for an appropriate period of time and was discharged home in stable condition.     FOLLOW UP: Bandar Wells was asked to follow up by phone in 7-14 days with Aletha Katz PT, Care Coordinator, to report his response to this series of injections.  Based on the patient's previous response to this therapy, Bandar Wells was rescheduled for the next series of injections in 12 weeks.    PLAN (Medication Changes, Therapy Orders, Work or Disability Issues, etc.): Dose of Botox was increased from 80 units to 100 units in hopes of increasing effectiveness and prolonging duration of benefit of injections.

## 2022-01-20 NOTE — LETTER
1/20/2022         RE: Bandar Wells  [address] [place] [address]        Dear Colleague,    Thank you for referring your patient, Bandar Wells, to the [facility]. Please see a copy of my visit note below.    BOTULINUM TOXIN PROCEDURE NOTE      Current Outpatient Medications:      ALOCANE EMERGENCY BURN MAX STR 4 % GEL, APPLY TOPICALLY TO AFFECTED AREA(S)., Disp: 75 mL, Rfl: 0     Cholecalciferol (VITAMIN D3) 25 MCG (1000 UT) CAPS, Take 1,000 Units by mouth daily, Disp: , Rfl:      evolocumab (REPATHA) 140 MG/ML prefilled autoinjector, Inject 1 mL (140 mg) Subcutaneous every 14 days, Disp: , Rfl:      febuxostat (ULORIC) 40 MG TABS tablet, Take 1 tablet (40 mg) by mouth daily, Disp: , Rfl:      folic acid (FOLVITE) 1 MG tablet, Take 1 mg by mouth daily, Disp: , Rfl:      gabapentin (NEURONTIN) 300 MG capsule, Take 2 capsules (600 mg) by mouth At Bedtime, Disp: 180 capsule, Rfl: 3     metolazone (ZAROXOLYN) 2.5 MG tablet, Take 1 tablet (2.5 mg) by mouth once a week (Patient taking differently: Take 2.5 mg by mouth twice a week ), Disp: , Rfl:      oxyCODONE IR (ROXICODONE) 10 MG tablet, TAKE ONE-HALF TABLET (5 MG) BY MOUTH EVERY 4 HOURS AS NEEDED FOR SEVERE PAIN, Disp: 90 tablet, Rfl: 0     penicillin V (VEETID) 500 MG tablet, Take 500 mg by mouth daily Hx endocarditis, Disp: , Rfl:      potassium chloride (KLOR-CON) 20 MEQ packet, Take 20 mEq by mouth daily, Disp: , Rfl:      sennosides (SENOKOT) 8.6 MG tablet, Take 1-4 tablets by mouth 2 times daily, Disp: , Rfl:      torsemide (DEMADEX) 20 MG tablet, Take 3 tablets (60 mg) by mouth 2 times daily, Disp: , Rfl:      warfarin ANTICOAGULANT (COUMADIN) 3 MG tablet, Take 1-2 tablets (3mg-6mg) by mouth daily or as directed by the INR clinic, Disp: 180 tablet, Rfl: 1     Wound Dressings (VASELINE PETROLATUM GAUZE) PADS, Apply to open wounds on the legs, Disp: 12 each, Rfl: 5     gentamicin (GARAMYCIN) 0.1 % external ointment,  Apply topically 2 times daily (Patient not taking: Reported on 1/20/2022), Disp: 30 g, Rfl: 3     mupirocin (BACTROBAN) 2 % external ointment, Use 2 times a day to affected area. (Patient not taking: Reported on 1/20/2022), Disp: 30 g, Rfl: 3     polyethylene glycol (MIRALAX) 17 g packet, Take 17 g by mouth daily as needed for constipation (Patient not taking: Reported on 1/20/2022), Disp: 30 packet, Rfl: 0    Current Facility-Administered Medications:      botulinum toxin type A (BOTOX) 100 units injection 300 Units, 300 Units, Intramuscular, Q90 Days, Bertha Cervantes MD     botulinum toxin type A (BOTOX) 100 units injection 300 Units, 300 Units, Intramuscular, Q90 Days, Bertha Cervantes MD        Allergies   Allergen Reactions     Allopurinol Rash     Clindamycin Rash        PHYSICAL EXAM  VS: /58 (BP Location: Right arm, Patient Position: Sitting, Cuff Size: Adult Regular)   Pulse 62    HEAD, NECK AND TRUNK PATTERN:   Head & Neck Flexion:  Present   Head turned to the right side with chin pointing towards the left side. Significant hypertonicity in bilateral upper trapezius (L>R), point tenderness at levator insertion bilaterally.      HPI  Bandar Wells is a 71 year old male with Parkinson's disease and cervical dystonia who presents for injections of Botox into the neck and shoulder girdle musculature for management of cervical dystonia.     Since his last series of Botox injections, he injured his right shin, which resulted in a large abrasion with hematoma which became infected. He is being treated by wound care and it is healing nicely.     We reviewed the recommended safety guidelines for  Botox from any vaccine injection, such as the seasonal flu vaccine, by a minimum of 10-14 days with Banadr Wells. He acknowledged understanding.    RESPONSE TO PREVIOUS TREATMENT:  Bandar Wells received 80 units of Botox on 10/26/2021.    Problems following the previous series of  neurotoxin injections included:  No problems reported    BENEFITS BY PATIENT REPORT:    Pain Improvement: Yes.  Percent Improvement: 90 %    Duration of Benefit:  ongoing to date.    Dystonia Improvement: Yes.  Percent Improvement: 30 %    Duration of Benefit:  10 weeks and followed by a gradual reduction in benefit, although his head position is not back to baseline.      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  His baseline symptoms have been recalcitrant to oral medications and conservative therapy.  He is here today for reinjection with Botox.    GOAL OF PROCEDURE:  The goal of this procedure is to improve increase active range of motion, decrease pain  and enhance functional independence associated with dystonic movements and spasticity.    TOTAL DOSE: 100 UNITS BOTOX  Dose Administered:  100 units Botox    Diluent Used:  Preservative Free Normal Saline  Total Volume of Diluent Used:  1 ml  Lot # [id number] with Expiration Date:  2024  NDC #: Botox 100u ([address]-91)    Medication guide was offered to patient and was accepted.    CONSENT:  The risks, benefits, and treatment options were discussed with Bandar Wells and he agreed to proceed.    EQUIPMENT USED:  Needle-25mm stimulating/recording  Needle-30 gauge  EMG/NCS Machine    SKIN PREPARATION:  Skin preparation was performed using an alcohol wipe.      GUIDANCE DESCRIPTION:  Electro-myographic guidance was necessary throughout the procedure to accurately identify all areas of dystonic and spastic muscles while avoiding injection of non-dystonic muscles and non-spastic  muscles, neighboring nerves and nearby vascular structures.     AREA/MUSCLE INJECTED: 100 UNITS BOTOX = TOTAL DOSE  1. HEAD, NECK & SHOULDER GIRDLE MUSCLES: 100 UNITS BOTOX = TOTAL DOSE.  Left Lateral Trapezius (low cervical) - 5 units of Botox at 1 site/s.    Right Splenius Cervicis - 30 units of Botox at 1 site/s.   Left Splenius Cervicis - 15 units of Botox at 1 site/s.    Right Levator Scapulae - 40 units of Botox at 2 site/s.   Left Levator Scapulae - 5 units of Botox at 1 site/s.    Right Inferior Obliquus Capitis - 5 units of Botox at 1 site/s.       RESPONSE TO PROCEDURE:  Bandar Wells tolerated the procedure well and there were no immediate complications. He was allowed to recover for an appropriate period of time and was discharged home in stable condition.     FOLLOW UP: Bandar Wells was asked to follow up by phone in 7-14 days with Aletha Katz PT, Care Coordinator, to report his response to this series of injections.  Based on the patient's previous response to this therapy, Bandar Wells was rescheduled for the next series of injections in 12 weeks.    PLAN (Medication Changes, Therapy Orders, Work or Disability Issues, etc.): Dose of Botox was increased from 80 units to 100 units in hopes of increasing effectiveness and prolonging duration of benefit of injections.            Again, thank you for allowing me to participate in the care of your patient.        Sincerely,        Bertha Cervantes MD

## 2022-01-20 NOTE — TELEPHONE ENCOUNTER
Routing refill request to provider for review/approval because:  Controlled substance request    Last Written Prescription Date:  12/21/21  Last Fill Quantity: 90,  # refills: 0   Last office visit provider:  1/4/22     Requested Prescriptions   Pending Prescriptions Disp Refills     oxyCODONE IR (ROXICODONE) 10 MG tablet [Pharmacy Med Name: OXYCODONE HCL 10 MG TABLET 10 Tablet] 90 tablet 0     Sig: TAKE 1/2 TABLET (5 MG) BY MOUTH EVERY 4 HOURS AS NEEDED FOR SEVERE PAIN       There is no refill protocol information for this order          Jabier Tatum RN 01/20/22 9:38 AM

## 2022-01-20 NOTE — TELEPHONE ENCOUNTER
Medication: Oxycodone  Last Date Filled 12/21/21   pulled: PROVIDER TO PULL FROM Epic.   Only PCP Prescribing? PROVIDER TO PULL  FROM Epic.    Taken as prescribed from physician notes? YES    CSA in last year: NO  Random Utox in last year: YES  (if any of the above answer NO - schedule with PCP)     On opioids in addition to benzodiazepines? YES  (if the above answer YES - schedule with PCP every 6 months)     Is patient on the Executive Review Team? no    All responses suggest: Refilling prescription

## 2022-01-26 ENCOUNTER — ANTICOAGULATION THERAPY VISIT (OUTPATIENT)
Dept: INTERNAL MEDICINE | Facility: CLINIC | Age: 72
End: 2022-01-26
Payer: COMMERCIAL

## 2022-01-26 DIAGNOSIS — I48.0 PAROXYSMAL ATRIAL FIBRILLATION (H): Primary | ICD-10-CM

## 2022-01-26 DIAGNOSIS — Z95.4 S/P MITRAL VALVE REPLACEMENT WITH METALLIC VALVE: ICD-10-CM

## 2022-01-26 LAB — INR HOME MONITORING: 4.5 (ref 2.5–3.5)

## 2022-01-26 NOTE — PROGRESS NOTES
ANTICOAGULATION MANAGEMENT     Bandar Wells 71 year old male is on warfarin with supratherapeutic INR result. (Goal INR 2.5-3.5)    Recent labs: (last 7 days)     01/26/22  0000   INR 4.50*       ASSESSMENT     Source(s): Chart Review and Patient/Caregiver Call       Warfarin doses taken: Warfarin taken as instructed    Diet: Change in alcohol intake may be affecting INR. pt drank some wine and beer since last check     New illness, injury, or hospitalization: patient has been having increase in neuropathy pain    Medication/supplement changes: Gabapentin--no interaction anticipated.     Signs or symptoms of bleeding or clotting: No    Previous INR: Therapeutic last visit; previously outside of goal range    Additional findings: None     PLAN     Recommended plan for temporary change(s) affecting INR     Dosing Instructions: Hold dose then continue your current warfarin dose with next INR in 1 week. Subsequent results needed before determining maintenance reduction.    Summary  As of 1/26/2022    Full warfarin instructions:  3 mg every Wed, Sat; 6 mg all other days   Next INR check:               Telephone call with Bandar who verbalizes understanding and agrees to plan and who agrees to plan and repeated back plan correctly    Patient to recheck with home meter    Education provided: Please call back if any changes to your diet, medications or how you've been taking warfarin    Plan made per [facility] anticoagulation protocol    Janel Jara, RN  Anticoagulation Clinic  1/26/2022    _______________________________________________________________________     Anticoagulation Episode Summary     Current INR goal:  2.5-3.5   TTR:  38.5 % (1 y)   Target end date:  Indefinite   Send INR reminders to:  ILAN PATEL    Indications    Paroxysmal atrial fibrillation (H) [I48.0]  S/P mitral valve replacement with metallic valve [Z95.4]           Comments:  INR TARGET GOAL 2.5 - 3.5         Anticoagulation Care Providers      Provider Role Specialty Phone number    Jin Hicks MD Referring Internal Medicine [phone]

## 2022-02-02 ENCOUNTER — ANTICOAGULATION THERAPY VISIT (OUTPATIENT)
Dept: INTERNAL MEDICINE | Facility: CLINIC | Age: 72
End: 2022-02-02
Payer: COMMERCIAL

## 2022-02-02 DIAGNOSIS — I48.0 PAROXYSMAL ATRIAL FIBRILLATION (H): Primary | ICD-10-CM

## 2022-02-02 DIAGNOSIS — Z95.4 S/P MITRAL VALVE REPLACEMENT WITH METALLIC VALVE: ICD-10-CM

## 2022-02-02 LAB — INR HOME MONITORING: 3.4 (ref 2.5–3.5)

## 2022-02-02 NOTE — PROGRESS NOTES
ANTICOAGULATION MANAGEMENT     Bandar Wells 71 year old male is on warfarin with therapeutic INR result. (Goal INR 2.5-3.5)    Recent labs: (last 7 days)     02/02/22  0000   INR 3.40       ASSESSMENT     Source(s): Chart Review and Patient/Caregiver Call       Warfarin doses taken: Warfarin taken as instructed    Diet: No new diet changes identified    New illness, injury, or hospitalization: No    Medication/supplement changes: None noted    Signs or symptoms of bleeding or clotting: No    Previous INR: Supratherapeutic    Additional findings: None     PLAN     Recommended plan for no diet, medication or health factor changes affecting INR     Dosing Instructions:  Decrease your warfarin dose (8.3% change) with next INR in 1 week   Due to patient having held last wed and still being on high end of goal, will continue the 33mg/week going forward.    Summary  As of 2/2/2022    Full warfarin instructions:  3 mg every Wed, Sat; 6 mg all other days   Next INR check:               Telephone call with Bandar who verbalizes understanding and agrees to plan    Patient to recheck with home meter    Education provided: Please call back if any changes to your diet, medications or how you've been taking warfarin    Plan made per [facility] anticoagulation protocol    Mary Stinson, RN  Anticoagulation Clinic  2/2/2022    _______________________________________________________________________     Anticoagulation Episode Summary     Current INR goal:  2.5-3.5   TTR:  38.6 % (1 y)   Target end date:  Indefinite   [facility]    Indications    Paroxysmal atrial fibrillation (H) [I48.0]  S/P mitral valve replacement with metallic valve [Z95.4]           Comments:  INR TARGET GOAL 2.5 - 3.5         Anticoagulation Care Providers     Provider Role Specialty Phone number    Jin Hicks MD Referring Internal Medicine [phone]

## 2022-02-08 ENCOUNTER — ANTICOAGULATION THERAPY VISIT (OUTPATIENT)
Dept: ANTICOAGULATION | Facility: CLINIC | Age: 72
End: 2022-02-08
Payer: COMMERCIAL

## 2022-02-08 DIAGNOSIS — I48.0 PAROXYSMAL ATRIAL FIBRILLATION (H): Primary | ICD-10-CM

## 2022-02-08 DIAGNOSIS — Z95.4 S/P MITRAL VALVE REPLACEMENT WITH METALLIC VALVE: ICD-10-CM

## 2022-02-08 LAB — INR HOME MONITORING: 2.7 (ref 2.5–3.5)

## 2022-02-08 NOTE — PROGRESS NOTES
ANTICOAGULATION MANAGEMENT     Bandar Wells 71 year old male is on warfarin with therapeutic INR result. (Goal INR 2.5-3.5)    Recent labs: (last 7 days)     02/08/22  0000   INR 2.70       ASSESSMENT     Source(s): Chart Review and Patient/Caregiver Call       Warfarin doses taken: Warfarin taken as instructed    Diet: No new diet changes identified    New illness, injury, or hospitalization: No    Medication/supplement changes: None noted    Signs or symptoms of bleeding or clotting: No    Previous INR: Therapeutic last visit; previously outside of goal range    Additional findings: None     PLAN     Recommended plan for no diet, medication or health factor changes affecting INR     Dosing Instructions: Continue your current warfarin dose with next INR in 2 weeks       Summary  As of 2/8/2022    Full warfarin instructions:  3 mg every Mon, Wed, Sat; 6 mg all other days   Next INR check:  2/22/2022             Telephone call with Bandar who verbalizes understanding and agrees to plan    Patient to recheck with home meter    Education provided: None required    Plan made per [facility] anticoagulation protocol    Jackelyn Ayala RN  Anticoagulation Clinic  2/8/2022    _______________________________________________________________________     Anticoagulation Episode Summary     Current INR goal:  2.5-3.5   TTR:  40.3 % (1 y)   Target end date:  Indefinite   Send INR reminders to:  YUNIER AMANDA    Indications    Paroxysmal atrial fibrillation (H) [I48.0]  S/P mitral valve replacement with metallic valve [Z95.4]           Comments:  INR TARGET GOAL 2.5 - 3.5         Anticoagulation Care Providers     Provider Role Specialty Phone number    Jin Hicks MD Referring Internal Medicine [phone]

## 2022-02-17 DIAGNOSIS — G89.4 PAIN SYNDROME, CHRONIC: ICD-10-CM

## 2022-02-21 ENCOUNTER — VIRTUAL VISIT (OUTPATIENT)
Dept: INTERNAL MEDICINE | Facility: CLINIC | Age: 72
End: 2022-02-21
Payer: COMMERCIAL

## 2022-02-21 ENCOUNTER — DOCUMENTATION ONLY (OUTPATIENT)
Dept: ANTICOAGULATION | Facility: CLINIC | Age: 72
End: 2022-02-21

## 2022-02-21 DIAGNOSIS — D63.1 ANEMIA OF CHRONIC RENAL FAILURE, STAGE 4 (SEVERE) (H): ICD-10-CM

## 2022-02-21 DIAGNOSIS — L02.419 CELLULITIS AND ABSCESS OF LEG: Primary | ICD-10-CM

## 2022-02-21 DIAGNOSIS — N18.4 ANEMIA OF CHRONIC RENAL FAILURE, STAGE 4 (SEVERE) (H): ICD-10-CM

## 2022-02-21 DIAGNOSIS — L03.119 CELLULITIS AND ABSCESS OF LEG: Primary | ICD-10-CM

## 2022-02-21 DIAGNOSIS — Z95.4 S/P MITRAL VALVE REPLACEMENT WITH METALLIC VALVE: ICD-10-CM

## 2022-02-21 DIAGNOSIS — I48.0 PAROXYSMAL ATRIAL FIBRILLATION (H): Primary | ICD-10-CM

## 2022-02-21 DIAGNOSIS — I89.0 LYMPHEDEMA: ICD-10-CM

## 2022-02-21 PROCEDURE — 99213 OFFICE O/P EST LOW 20 MIN: CPT | Mod: TEL | Performed by: INTERNAL MEDICINE

## 2022-02-21 RX ORDER — SULFAMETHOXAZOLE/TRIMETHOPRIM 800-160 MG
0.5 TABLET ORAL 2 TIMES DAILY
Qty: 7 TABLET | Refills: 0 | Status: SHIPPED | OUTPATIENT
Start: 2022-02-21 | End: 2022-03-22

## 2022-02-21 RX ORDER — CIPROFLOXACIN 250 MG/1
250 TABLET, FILM COATED ORAL 2 TIMES DAILY
Qty: 14 TABLET | Refills: 0 | Status: SHIPPED | OUTPATIENT
Start: 2022-02-21 | End: 2022-04-11

## 2022-02-21 RX ORDER — OXYCODONE HYDROCHLORIDE 10 MG/1
TABLET ORAL
Qty: 90 TABLET | Refills: 0 | Status: SHIPPED | OUTPATIENT
Start: 2022-02-21 | End: 2022-03-22

## 2022-02-21 NOTE — PROGRESS NOTES
ANTICOAGULATION  MANAGEMENT     Interacting Medication Review    Interacting medication(s): Ciprofloxacin (Cipro) and Sulfamethoxazole-Trimethoprim (Bactrim) with warfarin.    Duration: Cipro for one week, Bactrim for three days.    Indication: Cellulitis    New medication?: Yes, interaction may increase INR and risk of bleeding       PLAN     Continue current warfarin dose. Recommend to check INR on 02/22 as planned. Should test again 02/25/2022.    Patient was not contacted    Anticoagulation Calendar updated    Driss Gonzalez RN

## 2022-02-21 NOTE — PROGRESS NOTES
Bandar is a 71 year old who is being evaluated via a billable telephone visit.      What phone number would you like to be contacted at? [phone]  How would you like to obtain your AVS? MyChart    1. Cellulitis and abscess of leg  Reviewed at length, trial of antibiotics and he will let me know if it is not improved prior to his travels  - sulfamethoxazole-trimethoprim (BACTRIM DS) 800-160 MG tablet; Take 0.5 tablets by mouth 2 times daily  Dispense: 7 tablet; Refill: 0  - ciprofloxacin (CIPRO) 250 MG tablet; Take 1 tablet (250 mg) by mouth 2 times daily  Dispense: 14 tablet; Refill: 0    2. Lymphedema  Continue with current plan and compression    3. Anemia of chronic renal failure, stage 4 (severe) (H)  Antibiotics dosed renally    4. S/P mitral valve replacement with metallic valve  He will have his INR checked on Wednesday, discussed warfarin interactions with antibiotics    Subjective   Bandar is a 71 year old who presents for the following health issues this 71-year-old woman is seen telephonically.  He has a very complicated history.  He has had some infection and swelling of his right leg and wounds.  He has followed at the wound clinic at [facility].  Intermittently this has been infected and responded to antibiotics.  His significant other who provides wound care is concerned that it may be infected now.  He has had more swelling redness drainage.  No fever or chills.  Will be traveling to [place] and then to [place] in the near future.  Otherwise actually feeling quite well better than he has in a long time.  HPI           Review of Systems         Objective           Vitals:  No vitals were obtained today due to virtual visit.    Physical Exam           Phone call duration: 21 minutes

## 2022-02-22 ENCOUNTER — ANTICOAGULATION THERAPY VISIT (OUTPATIENT)
Dept: INTERNAL MEDICINE | Facility: CLINIC | Age: 72
End: 2022-02-22
Payer: COMMERCIAL

## 2022-02-22 DIAGNOSIS — I48.0 PAROXYSMAL ATRIAL FIBRILLATION (H): Primary | ICD-10-CM

## 2022-02-22 DIAGNOSIS — Z95.4 S/P MITRAL VALVE REPLACEMENT WITH METALLIC VALVE: ICD-10-CM

## 2022-02-22 LAB — INR HOME MONITORING: 3.3 (ref 2.5–3.5)

## 2022-02-22 NOTE — PROGRESS NOTES
ANTICOAGULATION MANAGEMENT     Bandar Wells 71 year old male is on warfarin with therapeutic INR result. (Goal INR 2.5-3.5)    Recent labs: (last 7 days)     02/22/22  0000   INR 3.30       ASSESSMENT     Source(s): Chart Review and Patient/Caregiver Call       Warfarin doses taken: Warfarin taken as instructed    Diet: No new diet changes identified    New illness, injury, or hospitalization: Yes: Cellulitis    Medication/supplement changes: Bactrim 1/2 tab BID for 7 days; Cipro 1 tab BID for 7 days; both expected to be completed on 2/28    Signs or symptoms of bleeding or clotting: No    Previous INR: Therapeutic last 2(+) visits    Additional findings: None     PLAN     Recommended plan for temporary change(s) affecting INR     Dosing Instructions:  reduce doses as followed: 3 mg today 2/22; 1.5 mg 2/23; 3mg 2/24. Recheck your INR 2/25    Summary  As of 2/22/2022    Full warfarin instructions:  2/22: 3 mg; 2/23: 1.5 mg; 2/24: 3 mg; Otherwise 3 mg every Mon, Wed, Sat; 6 mg all other days   Next INR check:  2/25/2022             Telephone call with Bandar who verbalizes understanding and agrees to plan and who agrees to plan and repeated back plan correctly    Patient to recheck with home meter    Education provided: Please call back if any changes to your diet, medications or how you've been taking warfarin and Potential interaction between warfarin and Cipro and Bactrim    Plan made with [facility] Pharmacist Janel German, RN  Anticoagulation Clinic  2/22/2022    _______________________________________________________________________     Anticoagulation Episode Summary     Current INR goal:  2.5-3.5   TTR:  42.4 % (1 y)   Target end date:  Indefinite   Send INR reminders to:  ILAN PATEL    Indications    Paroxysmal atrial fibrillation (H) [I48.0]  S/P mitral valve replacement with metallic valve [Z95.4]           Comments:  INR TARGET GOAL 2.5 - 3.5         Anticoagulation Care Providers      Provider Role Specialty Phone number    Jin Hicks MD Referring Internal Medicine [phone]

## 2022-02-22 NOTE — PROGRESS NOTES
Chart reviewed with [facility] RN at time of encounter.    Agree with dose reduction and INR recheck end of week.    Batool Connelly, PharmD BCACP  Anticoagulation Clinical Pharmacist

## 2022-02-25 ENCOUNTER — ANTICOAGULATION THERAPY VISIT (OUTPATIENT)
Dept: INTERNAL MEDICINE | Facility: CLINIC | Age: 72
End: 2022-02-25
Payer: COMMERCIAL

## 2022-02-25 DIAGNOSIS — I48.0 PAROXYSMAL ATRIAL FIBRILLATION (H): Primary | ICD-10-CM

## 2022-02-25 DIAGNOSIS — Z95.4 S/P MITRAL VALVE REPLACEMENT WITH METALLIC VALVE: ICD-10-CM

## 2022-02-25 LAB — INR HOME MONITORING: 2.9 (ref 2.5–3.5)

## 2022-02-25 NOTE — PROGRESS NOTES
ANTICOAGULATION MANAGEMENT     Bandar Wells 71 year old male is on warfarin with therapeutic INR result. (Goal INR 2.5-3.5)    Recent labs: (last 7 days)     02/25/22  0000   INR 2.90       ASSESSMENT     Source(s): Chart Review and Patient/Caregiver Call       Warfarin doses taken: Warfarin taken as instructed    Diet: No new diet changes identified    New illness, injury, or hospitalization: Yes: Patient is currently being treated for cellulitis of the leg    Medication/supplement changes: CIPRO 02/21/2022-02/28/2022 and BACTRIM 02/21/2022-02/24/2022    Signs or symptoms of bleeding or clotting: No    Previous INR: Therapeutic last 2(+) visits    Additional findings: Patient states he will be flying out of town on Monday 2/28/22, and will not return until 3/4/22     PLAN     Recommended plan for temporary change(s) affecting INR     Dosing Instructions: Continue your current warfarin dose with next INR in 3 days. Patient declined recheck early next week because he will be out of town and will not recheck until 3/4/22.    Summary  As of 2/25/2022    Full warfarin instructions:  3 mg every Mon, Wed, Sat; 6 mg all other days   Next INR check:  3/4/2022             Telephone call with Bandar who verbalizes understanding and agrees to plan    Patient to recheck with home meter    Education provided: Please call back if any changes to your diet, medications or how you've been taking warfarin, Monitoring for bleeding signs and symptoms, Monitoring for clotting signs and symptoms, Importance of notifying clinic for changes in medications; a sooner lab recheck maybe needed. and Importance of notifying clinic for diarrhea, nausea/vomiting, reduced intake, and/or illness; a sooner lab recheck maybe needed.    Plan made per [facility] anticoagulation protocol    Galilea Jarrett, RN  Anticoagulation Clinic  2/25/2022    _______________________________________________________________________     Anticoagulation Episode Summary     Current  INR goal:  2.5-3.5   TTR:  43.2 % (1 y)   Target end date:  Indefinite   Send INR reminders to:  ILAN PATEL    Indications    Paroxysmal atrial fibrillation (H) [I48.0]  S/P mitral valve replacement with metallic valve [Z95.4]           Comments:  INR TARGET GOAL 2.5 - 3.5         Anticoagulation Care Providers     Provider Role Specialty Phone number    Jin Hicks MD Referring Internal Medicine [phone]

## 2022-03-07 ENCOUNTER — DOCUMENTATION ONLY (OUTPATIENT)
Dept: ANTICOAGULATION | Facility: CLINIC | Age: 72
End: 2022-03-07
Payer: COMMERCIAL

## 2022-03-07 ENCOUNTER — ANTICOAGULATION THERAPY VISIT (OUTPATIENT)
Dept: ANTICOAGULATION | Facility: CLINIC | Age: 72
End: 2022-03-07
Payer: COMMERCIAL

## 2022-03-07 DIAGNOSIS — Z95.4 S/P MITRAL VALVE REPLACEMENT WITH METALLIC VALVE: ICD-10-CM

## 2022-03-07 DIAGNOSIS — I48.0 PAROXYSMAL ATRIAL FIBRILLATION (H): Primary | ICD-10-CM

## 2022-03-07 LAB — INR HOME MONITORING: 2.1 (ref 2.5–3.5)

## 2022-03-07 NOTE — PROGRESS NOTES
ANTICOAGULATION MANAGEMENT     Bandar Wells 71 year old male is on warfarin with subtherapeutic INR result. (Goal INR 2.5-3.5)    Recent labs: (last 7 days)     03/07/22  0000   INR 2.10*       ASSESSMENT       Source(s): Chart Review and Patient/Caregiver Call       Warfarin doses taken: Warfarin taken as instructed    Diet: Traveling recently. Changes in diet due to being on vacation.     New illness, injury, or hospitalization: No    Medication/supplement changes: Completed courses of antibiotics     Signs or symptoms of bleeding or clotting: No    Previous INR: Therapeutic last 2(+) visits    Additional findings: None       PLAN     Recommended plan for temporary change(s) affecting INR     Dosing Instructions: Booster dose then continue your current warfarin dose with next INR in 2 weeks       Summary  As of 3/7/2022    Full warfarin instructions:  3/7: 6 mg; Otherwise 3 mg every Mon, Wed, Sat; 6 mg all other days   Next INR check:  3/21/2022             Telephone call with Bandar who verbalizes understanding and agrees to plan    Patient to recheck with home meter    Education provided: Goal range and significance of current result and Importance of therapeutic range    Plan made per [facility] anticoagulation protocol    Hia Davis RN  Anticoagulation Clinic  3/7/2022    _______________________________________________________________________     Anticoagulation Episode Summary     Current INR goal:  2.5-3.5   TTR:  44.6 % (1 y)   Target end date:  Indefinite   [facility]    Indications    Paroxysmal atrial fibrillation (H) [I48.0]  S/P mitral valve replacement with metallic valve [Z95.4]           Comments:  INR TARGET GOAL 2.5 - 3.5         Anticoagulation Care Providers     Provider Role Specialty Phone number    Jin Hicks MD Referring Internal Medicine [phone]

## 2022-03-10 DIAGNOSIS — Z95.4 S/P MITRAL VALVE REPLACEMENT WITH METALLIC VALVE: ICD-10-CM

## 2022-03-10 DIAGNOSIS — I48.0 PAROXYSMAL ATRIAL FIBRILLATION (H): ICD-10-CM

## 2022-03-11 RX ORDER — WARFARIN SODIUM 3 MG/1
TABLET ORAL
Qty: 180 TABLET | Refills: 1 | Status: SHIPPED | OUTPATIENT
Start: 2022-03-11 | End: 2022-10-26

## 2022-03-11 NOTE — TELEPHONE ENCOUNTER
"Routing refill request to provider for review/approval because:  Labs not current:  INR    Last Written Prescription Date:  8/26/2021  Last Fill Quantity: 180,  # refills: 1   Last office visit provider:  2/21/2022     Requested Prescriptions   Pending Prescriptions Disp Refills     warfarin ANTICOAGULANT (COUMADIN) 3 MG tablet [Pharmacy Med Name: WARFARIN SODIUM 3 MG TABLET 3 Tablet] 180 tablet 1     Sig: TAKE 1-2 TABLETS (3MG-6MG) BY MOUTH DAILY OR AS DIRECTED BY THE INR CLINIC       Vitamin K Antagonists Failed - 3/10/2022  1:32 PM        Failed - INR is within goal in the past 6 weeks     Confirm INR is within goal in the past 6 weeks.     Recent Labs   Lab Test 03/07/22  0000   INR 2.10*                       Passed - Recent (12 mo) or future (30 days) visit within the authorizing provider's specialty     Patient has had an office visit with the authorizing provider or a provider within the authorizing providers department within the previous 12 mos or has a future within next 30 days. See \"Patient Info\" tab in inbasket, or \"Choose Columns\" in Meds & Orders section of the refill encounter.              Passed - Medication is active on med list        Passed - Patient is 18 years of age or older             Brenda Ford RN 03/10/22 11:33 PM  " Continue Nystatin.

## 2022-03-15 DIAGNOSIS — I50.22 CHRONIC SYSTOLIC HEART FAILURE (H): Primary | ICD-10-CM

## 2022-03-21 ENCOUNTER — ANTICOAGULATION THERAPY VISIT (OUTPATIENT)
Dept: ANTICOAGULATION | Facility: CLINIC | Age: 72
End: 2022-03-21
Payer: COMMERCIAL

## 2022-03-21 DIAGNOSIS — I48.0 PAROXYSMAL ATRIAL FIBRILLATION (H): Primary | ICD-10-CM

## 2022-03-21 DIAGNOSIS — G89.4 PAIN SYNDROME, CHRONIC: ICD-10-CM

## 2022-03-21 DIAGNOSIS — Z95.4 S/P MITRAL VALVE REPLACEMENT WITH METALLIC VALVE: ICD-10-CM

## 2022-03-21 LAB — INR HOME MONITORING: 3.3 (ref 2.5–3.5)

## 2022-03-21 NOTE — PROGRESS NOTES
ANTICOAGULATION MANAGEMENT     Bandar Wells 71 year old male is on warfarin with therapeutic INR result. (Goal INR 2.5-3.5)    Recent labs: (last 7 days)     03/21/22  0000   INR 3.30       ASSESSMENT       Source(s): Chart Review and Patient/Caregiver Call       Warfarin doses taken: Warfarin taken as instructed    Diet: No new diet changes identified    New illness, injury, or hospitalization: No    Medication/supplement changes: None noted    Signs or symptoms of bleeding or clotting: No    Previous INR: Subtherapeutic    Additional findings: Patient reports he will be traveling out of the country for a few weeks       PLAN     Recommended plan for no diet, medication or health factor changes affecting INR     Dosing Instructions: Continue your current warfarin dose with next INR in 2 weeks. Patient declined, stating he will recheck on April 12 due to being out of the country. Educated patient on the importance of rechecking as directed, patient declined.    Summary  As of 3/21/2022    Full warfarin instructions:  3 mg every Mon, Wed, Sat; 6 mg all other days   Next INR check:               Telephone call with Bandar who verbalizes understanding and agrees to plan    Patient to recheck with home meter    Education provided: Please call back if any changes to your diet, medications or how you've been taking warfarin, Importance of following up at instructed interval, Importance of taking warfarin as instructed, Monitoring for bleeding signs and symptoms, Monitoring for clotting signs and symptoms and Resume manage by exception with home monitor. Continue to submit INR results to home monitor company.You will only be called when your result is out of range. Please call and notify [facility] if new medication started, dose missed, signs or symptoms of bleeding or clotting, or a surgery/procedure is scheduled.    Plan made per [facility] anticoagulation protocol    Galilea Jarrett RN  Anticoagulation  Clinic  3/21/2022    _______________________________________________________________________     Anticoagulation Episode Summary     Current INR goal:  2.5-3.5   TTR:  46.2 % (1 y)   Target end date:  Indefinite   Send INR reminders to:  ILAN PATEL    Indications    Paroxysmal atrial fibrillation (H) [I48.0]  S/P mitral valve replacement with metallic valve [Z95.4]           Comments:  INR TARGET GOAL 2.5 - 3.5         Anticoagulation Care Providers     Provider Role Specialty Phone number    Jin Hicks MD Referring Internal Medicine [phone]

## 2022-03-22 ENCOUNTER — MYC REFILL (OUTPATIENT)
Dept: INTERNAL MEDICINE | Facility: CLINIC | Age: 72
End: 2022-03-22
Payer: COMMERCIAL

## 2022-03-22 DIAGNOSIS — L03.119 CELLULITIS AND ABSCESS OF LEG: ICD-10-CM

## 2022-03-22 DIAGNOSIS — G89.4 PAIN SYNDROME, CHRONIC: ICD-10-CM

## 2022-03-22 DIAGNOSIS — L02.419 CELLULITIS AND ABSCESS OF LEG: ICD-10-CM

## 2022-03-22 NOTE — TELEPHONE ENCOUNTER
Routing refill request to provider for review/approval because:  Drug not on the G refill protocol - controlled substance    Last Written Prescription Date:  2/21/2022  Last Fill Quantity: 90,  # refills: 0   Last office visit provider:  2/21/2022 virtual visit Dr. Hicks     Requested Prescriptions   Pending Prescriptions Disp Refills     oxyCODONE IR (ROXICODONE) 10 MG tablet [Pharmacy Med Name: OXYCODONE HCL 10 MG TABLET 10 Tablet] 90 tablet 0     Sig: TAKE 1/2 TABLET (5 MG) BY MOUTH EVERY 4 HOURS AS NEEDED FOR SEVERE PAIN       There is no refill protocol information for this order          Constanza Latham RN 03/22/22 4:03 PM

## 2022-03-23 ENCOUNTER — DOCUMENTATION ONLY (OUTPATIENT)
Dept: ANTICOAGULATION | Facility: CLINIC | Age: 72
End: 2022-03-23
Payer: COMMERCIAL

## 2022-03-23 DIAGNOSIS — Z95.4 S/P MITRAL VALVE REPLACEMENT WITH METALLIC VALVE: ICD-10-CM

## 2022-03-23 DIAGNOSIS — I48.0 PAROXYSMAL ATRIAL FIBRILLATION (H): Primary | ICD-10-CM

## 2022-03-23 RX ORDER — OXYCODONE HYDROCHLORIDE 10 MG/1
TABLET ORAL
Qty: 90 TABLET | Refills: 0 | Status: SHIPPED | OUTPATIENT
Start: 2022-03-23 | End: 2022-05-13

## 2022-03-23 RX ORDER — SULFAMETHOXAZOLE/TRIMETHOPRIM 800-160 MG
0.5 TABLET ORAL 2 TIMES DAILY
Qty: 7 TABLET | Refills: 0 | Status: SHIPPED | OUTPATIENT
Start: 2022-03-23 | End: 2022-04-11

## 2022-03-23 RX ORDER — OXYCODONE HYDROCHLORIDE 10 MG/1
5 TABLET ORAL EVERY 4 HOURS PRN
Qty: 90 TABLET | Refills: 0 | Status: SHIPPED | OUTPATIENT
Start: 2022-03-23 | End: 2022-04-11

## 2022-03-23 NOTE — PROGRESS NOTES
ANTICOAGULATION  MANAGEMENT     Interacting Medication Review    Interacting medication(s): Sulfamethoxazole-Trimethoprim (Bactrim) with warfarin.    Duration: 7 days (3/23 to 3/30)    Indication: Cellulitis    New medication?: Yes, interaction may increase INR and risk of bleeding       PLAN     No adjustment to anticoagulation plan needed; previously scheduled follow up on 4/12/22 is appropriate.    Spoke with Bandar who reports he didnt mean to reorder the bactrim, he meant to discontinue it from his medication list. He will not be taking this medication. Will have him check INR as planned on 4/12/22.    Anticoagulation Calendar updated    Mary Stinson RN

## 2022-03-23 NOTE — TELEPHONE ENCOUNTER
"Routing refill request to provider for review/approval because:  Diagnosis warning    Last Written Prescription Date:  2/21/22  Last Fill Quantity: 7,  # refills: 0   Last office visit provider:  2/21/22     Requested Prescriptions   Pending Prescriptions Disp Refills     oxyCODONE IR (ROXICODONE) 10 MG tablet 90 tablet 0       There is no refill protocol information for this order        sulfamethoxazole-trimethoprim (BACTRIM DS) 800-160 MG tablet 7 tablet 0     Sig: Take 0.5 tablets by mouth 2 times daily       Oral Acne/Rosacea Medications Protocol Failed - 3/22/2022  9:57 AM        Failed - Confirmation of diagnosis is required     Please confirm diagnosis is acne or rosacea.     If NOT acne or rosacea; refer request to provider for further evaluation.    If diagnosis IS acne or rosacea, OK to refill BASED ON PREVIOUS REFILL CLINICAL NOTE RECOMMENDATION.          Passed - Patient is 12 years of age or older        Passed - Recent (12 mo) or future (30 days) visit within the authorizing provider's specialty     Patient has had an office visit with the authorizing provider or a provider within the authorizing providers department within the previous 12 mos or has a future within next 30 days. See \"Patient Info\" tab in inbasket, or \"Choose Columns\" in Meds & Orders section of the refill encounter.              Passed - Medication is active on med list             Jabier Tatum RN 03/23/22 11:05 AM  "

## 2022-03-23 NOTE — TELEPHONE ENCOUNTER
"Routing refill request to provider for review/approval because:  Controlled substance request    Last Written Prescription Date:  2/21/22  Last Fill Quantity: 90,  # refills: 0   Last office visit provider:  2/21/22     Requested Prescriptions   Pending Prescriptions Disp Refills     oxyCODONE IR (ROXICODONE) 10 MG tablet 90 tablet 0       There is no refill protocol information for this order        sulfamethoxazole-trimethoprim (BACTRIM DS) 800-160 MG tablet 7 tablet 0     Sig: Take 0.5 tablets by mouth 2 times daily       Oral Acne/Rosacea Medications Protocol Failed - 3/22/2022  9:57 AM        Failed - Confirmation of diagnosis is required     Please confirm diagnosis is acne or rosacea.     If NOT acne or rosacea; refer request to provider for further evaluation.    If diagnosis IS acne or rosacea, OK to refill BASED ON PREVIOUS REFILL CLINICAL NOTE RECOMMENDATION.          Passed - Patient is 12 years of age or older        Passed - Recent (12 mo) or future (30 days) visit within the authorizing provider's specialty     Patient has had an office visit with the authorizing provider or a provider within the authorizing providers department within the previous 12 mos or has a future within next 30 days. See \"Patient Info\" tab in inbasket, or \"Choose Columns\" in Meds & Orders section of the refill encounter.              Passed - Medication is active on med list             Jabier Tatum RN 03/23/22 11:06 AM  "

## 2022-03-24 ENCOUNTER — TELEPHONE (OUTPATIENT)
Dept: ANTICOAGULATION | Facility: CLINIC | Age: 72
End: 2022-03-24
Payer: COMMERCIAL

## 2022-03-24 NOTE — TELEPHONE ENCOUNTER
Reason for Call:  Other call back    Detailed comments: pt calling requesting to speak with INR nurse, no callback number left on previous encounter    Pt requesting to speak about concerns and medications with Odette if possible    Phone Number Patient can be reached at: Home number on file [phone] (home) or Cell number on file:    Telephone Information:   Mobile [phone]       Best Time: Anytime today    Can we leave a detailed message on this number? YES    Call taken on 3/24/2022 at 4:18 PM by Juany Danielle

## 2022-03-24 NOTE — TELEPHONE ENCOUNTER
Patient reports he took the Bactrim DS and will be taking for 7 days, 3/24-3/31. Patient will be starting medication tonight. Patient was advised to recheck INR on 3/25 with home meter and that dosing will be adjusted at the time result is received. Discussed possibility of diarrhea and to make sure he is pushing fluids while on abx, using yogurt/probiotics if needed for upset stomach, taking medication on a full stomach, and signs of bleeding. Patient verbalizes understanding.  Mary Stinson RN

## 2022-03-25 ENCOUNTER — ANTICOAGULATION THERAPY VISIT (OUTPATIENT)
Dept: ANTICOAGULATION | Facility: CLINIC | Age: 72
End: 2022-03-25
Payer: COMMERCIAL

## 2022-03-25 DIAGNOSIS — I48.0 PAROXYSMAL ATRIAL FIBRILLATION (H): Primary | ICD-10-CM

## 2022-03-25 DIAGNOSIS — Z95.4 S/P MITRAL VALVE REPLACEMENT WITH METALLIC VALVE: ICD-10-CM

## 2022-03-25 LAB — INR HOME MONITORING: 2 (ref 2.5–3.5)

## 2022-03-25 NOTE — PROGRESS NOTES
ANTICOAGULATION MANAGEMENT     Bandar Wells 71 year old male is on warfarin with subtherapeutic INR result. (Goal INR 2.5-3.5)    Recent labs: (last 7 days)     03/25/22  0000   INR 2.00*       ASSESSMENT       Source(s): Chart Review and Patient/Caregiver Call       Warfarin doses taken: Warfarin taken as instructed    Diet: No new diet changes identified    New illness, injury, or hospitalization: Yes: Cellulitis    Medication/supplement changes: Bactrim from 3/23-3/30    Signs or symptoms of bleeding or clotting: No    Previous INR: Therapeutic last visit; previously outside of goal range    Additional findings: None       PLAN     Recommended plan for temporary change(s) affecting INR     Dosing Instructions: Booster dose then continue your current warfarin dose with next INR in 3 days       Summary  As of 3/25/2022    Full warfarin instructions:  3/25: 9 mg; Otherwise 3 mg every Mon, Wed, Sat; 6 mg all other days   Next INR check:               Telephone call with Bandar who verbalizes understanding and agrees to plan    Patient to recheck with home meter    Education provided: Please call back if any changes to your diet, medications or how you've been taking warfarin    Plan made per [facility] anticoagulation protocol    Mary Stinson, RN  Anticoagulation Clinic  3/25/2022    _______________________________________________________________________     Anticoagulation Episode Summary     Current INR goal:  2.5-3.5   TTR:  46.2 % (1 y)   Target end date:  Indefinite   Send INR reminders to:  YUNIER AMANDA    Indications    Paroxysmal atrial fibrillation (H) [I48.0]  S/P mitral valve replacement with metallic valve [Z95.4]           Comments:  INR TARGET GOAL 2.5 - 3.5         Anticoagulation Care Providers     Provider Role Specialty Phone number    Jin Hicks MD Referring Internal Medicine [phone]

## 2022-03-28 ENCOUNTER — ANTICOAGULATION THERAPY VISIT (OUTPATIENT)
Dept: ANTICOAGULATION | Facility: CLINIC | Age: 72
End: 2022-03-28
Payer: COMMERCIAL

## 2022-03-28 DIAGNOSIS — Z95.4 S/P MITRAL VALVE REPLACEMENT WITH METALLIC VALVE: ICD-10-CM

## 2022-03-28 DIAGNOSIS — I48.0 PAROXYSMAL ATRIAL FIBRILLATION (H): Primary | ICD-10-CM

## 2022-03-28 LAB — INR HOME MONITORING: 3.8 (ref 2.5–3.5)

## 2022-03-28 NOTE — PROGRESS NOTES
"ANTICOAGULATION MANAGEMENT     Bandar Wells 71 year old male is on warfarin with supratherapeutic INR result. (Goal INR 2.5-3.5)    Recent labs: (last 7 days)     03/28/22  0000   INR 3.80*       ASSESSMENT       Source(s): Chart Review and Patient/Caregiver Call       Warfarin doses taken: Warfarin taken as instructed    Diet: No new diet changes identified    New illness, injury, or hospitalization: No, reports \"a little diarhea\"    Medication/supplement changes: Bactrim until 3/30    Signs or symptoms of bleeding or clotting: No    Previous INR: Subtherapeutic    Additional findings: None       PLAN     Recommended plan for temporary change(s) affecting INR     Dosing Instructions: Partial hold then continue your current warfarin dose with next INR in 2 days       Summary  As of 3/28/2022    Full warfarin instructions:  3/28: 1.5 mg; Otherwise 3 mg every Mon, Wed, Sat; 6 mg all other days   Next INR check:  3/30/2022             Telephone call with Bandar who verbalizes understanding and agrees to plan    Patient to recheck with home meter    Education provided: Please call back if any changes to your diet, medications or how you've been taking warfarin    Plan made per [facility] anticoagulation protocol    Mary Stinson, RN  Anticoagulation Clinic  3/28/2022    _______________________________________________________________________     Anticoagulation Episode Summary     Current INR goal:  2.5-3.5   TTR:  45.8 % (1 y)   Target end date:  Indefinite   Send INR reminders to:  [facility]    Indications    Paroxysmal atrial fibrillation (H) [I48.0]  S/P mitral valve replacement with metallic valve [Z95.4]           Comments:  INR TARGET GOAL 2.5 - 3.5         Anticoagulation Care Providers     Provider Role Specialty Phone number    Jin Hicks MD Referring Internal Medicine [phone]            "

## 2022-03-31 ENCOUNTER — ANTICOAGULATION THERAPY VISIT (OUTPATIENT)
Dept: ANTICOAGULATION | Facility: CLINIC | Age: 72
End: 2022-03-31
Payer: COMMERCIAL

## 2022-03-31 DIAGNOSIS — I48.0 PAROXYSMAL ATRIAL FIBRILLATION (H): Primary | ICD-10-CM

## 2022-03-31 DIAGNOSIS — Z95.4 S/P MITRAL VALVE REPLACEMENT WITH METALLIC VALVE: ICD-10-CM

## 2022-03-31 LAB — INR HOME MONITORING: 3.5 (ref 2.5–3.5)

## 2022-03-31 NOTE — PROGRESS NOTES
ANTICOAGULATION MANAGEMENT     Bandar Wells 71 year old male is on warfarin with supratherapeutic INR result. (Goal INR 2.5-3.5)    Recent labs: (last 7 days)     03/31/22  0000   INR 3.50       ASSESSMENT       Source(s): Chart Review and Patient/Caregiver Call       Warfarin doses taken: Warfarin taken as instructed    Diet: No new diet changes identified    New illness, injury, or hospitalization: Yes: Cellulitis    Medication/supplement changes: Bactrim completed on 3/30    Signs or symptoms of bleeding or clotting: No    Previous INR: Supratherapeutic    Additional findings: Patient is going to [place] tomorrow, educated on in flight exercises and get up once or twice to walk around to prevent clotting        PLAN     Recommended plan for temporary change(s) affecting INR     Dosing Instructions: continue your current warfarin dose with next INR in 2 weeks  Due to patient out of the country until 4/11, will recheck the week he is back    Summary  As of 3/31/2022    Full warfarin instructions:  3 mg every Mon, Wed, Sat; 6 mg all other days   Next INR check:  4/14/2022             Telephone call with Bandar who agrees to plan and repeated back plan correctly    Patient to recheck with home meter    Education provided: Please call back if any changes to your diet, medications or how you've been taking warfarin, Monitoring for clotting signs and symptoms and Travel related clotting risk and prevention    Plan made per [facility] anticoagulation protocol    Mary Stinson RN  Anticoagulation Clinic  3/31/2022    _______________________________________________________________________     Anticoagulation Episode Summary     Current INR goal:  2.5-3.5   TTR:  45.0 % (1 y)   Target end date:  Indefinite   Send INR reminders to:  ILAN PATEL    Indications    Paroxysmal atrial fibrillation (H) [I48.0]  S/P mitral valve replacement with metallic valve [Z95.4]           Comments:  INR TARGET GOAL 2.5 - 3.5          Anticoagulation Care Providers     Provider Role Specialty Phone number    Jin Hicks MD Referring Internal Medicine [phone]

## 2022-04-11 ENCOUNTER — OFFICE VISIT (OUTPATIENT)
Dept: INTERNAL MEDICINE | Facility: CLINIC | Age: 72
End: 2022-04-11
Payer: COMMERCIAL

## 2022-04-11 ENCOUNTER — ANTICOAGULATION THERAPY VISIT (OUTPATIENT)
Dept: ANTICOAGULATION | Facility: CLINIC | Age: 72
End: 2022-04-11

## 2022-04-11 VITALS
SYSTOLIC BLOOD PRESSURE: 128 MMHG | WEIGHT: 179.7 LBS | DIASTOLIC BLOOD PRESSURE: 54 MMHG | HEART RATE: 60 BPM | BODY MASS INDEX: 25.78 KG/M2 | OXYGEN SATURATION: 95 %

## 2022-04-11 DIAGNOSIS — Z95.4 S/P MITRAL VALVE REPLACEMENT WITH METALLIC VALVE: ICD-10-CM

## 2022-04-11 DIAGNOSIS — L02.419 CELLULITIS AND ABSCESS OF LEG: ICD-10-CM

## 2022-04-11 DIAGNOSIS — I48.0 PAROXYSMAL ATRIAL FIBRILLATION (H): ICD-10-CM

## 2022-04-11 DIAGNOSIS — Z23 HIGH PRIORITY FOR 2019-NCOV VACCINE: ICD-10-CM

## 2022-04-11 DIAGNOSIS — N18.4 ANEMIA OF CHRONIC RENAL FAILURE, STAGE 4 (SEVERE) (H): ICD-10-CM

## 2022-04-11 DIAGNOSIS — I25.5 ISCHEMIC CARDIOMYOPATHY: ICD-10-CM

## 2022-04-11 DIAGNOSIS — I50.22 CHRONIC SYSTOLIC HEART FAILURE (H): Primary | ICD-10-CM

## 2022-04-11 DIAGNOSIS — L03.119 CELLULITIS AND ABSCESS OF LEG: ICD-10-CM

## 2022-04-11 DIAGNOSIS — D63.1 ANEMIA OF CHRONIC RENAL FAILURE, STAGE 4 (SEVERE) (H): ICD-10-CM

## 2022-04-11 DIAGNOSIS — I48.0 PAROXYSMAL ATRIAL FIBRILLATION (H): Primary | ICD-10-CM

## 2022-04-11 PROBLEM — M89.9 BONE LESION: Status: RESOLVED | Noted: 2020-07-06 | Resolved: 2022-04-11

## 2022-04-11 PROBLEM — F34.1 DYSTHYMIA: Status: RESOLVED | Noted: 2020-10-27 | Resolved: 2022-04-11

## 2022-04-11 LAB
ANION GAP SERPL CALCULATED.3IONS-SCNC: 16 MMOL/L (ref 5–18)
BUN SERPL-MCNC: 54 MG/DL (ref 8–28)
CALCIUM SERPL-MCNC: 9.6 MG/DL (ref 8.5–10.5)
CHLORIDE BLD-SCNC: 103 MMOL/L (ref 98–107)
CO2 SERPL-SCNC: 21 MMOL/L (ref 22–31)
CREAT SERPL-MCNC: 2.23 MG/DL (ref 0.7–1.3)
ERYTHROCYTE [DISTWIDTH] IN BLOOD BY AUTOMATED COUNT: 15.4 % (ref 10–15)
GFR SERPL CREATININE-BSD FRML MDRD: 30 ML/MIN/1.73M2
GLUCOSE BLD-MCNC: 113 MG/DL (ref 70–125)
HCT VFR BLD AUTO: 29.7 % (ref 40–53)
HGB BLD-MCNC: 9.3 G/DL (ref 13.3–17.7)
INR BLD: 2.7 (ref 0.9–1.1)
MCH RBC QN AUTO: 31.7 PG (ref 26.5–33)
MCHC RBC AUTO-ENTMCNC: 31.3 G/DL (ref 31.5–36.5)
MCV RBC AUTO: 101 FL (ref 78–100)
PLATELET # BLD AUTO: 162 10E3/UL (ref 150–450)
POTASSIUM BLD-SCNC: 3.5 MMOL/L (ref 3.5–5)
RBC # BLD AUTO: 2.93 10E6/UL (ref 4.4–5.9)
SODIUM SERPL-SCNC: 140 MMOL/L (ref 136–145)
WBC # BLD AUTO: 4.3 10E3/UL (ref 4–11)

## 2022-04-11 PROCEDURE — 80048 BASIC METABOLIC PNL TOTAL CA: CPT | Performed by: INTERNAL MEDICINE

## 2022-04-11 PROCEDURE — 99214 OFFICE O/P EST MOD 30 MIN: CPT | Performed by: INTERNAL MEDICINE

## 2022-04-11 PROCEDURE — 85610 PROTHROMBIN TIME: CPT | Performed by: INTERNAL MEDICINE

## 2022-04-11 PROCEDURE — 91306 COVID-19,PF,MODERNA (18+ YRS BOOSTER .25ML): CPT | Performed by: INTERNAL MEDICINE

## 2022-04-11 PROCEDURE — 85027 COMPLETE CBC AUTOMATED: CPT | Performed by: INTERNAL MEDICINE

## 2022-04-11 PROCEDURE — 0064A COVID-19,PF,MODERNA (18+ YRS BOOSTER .25ML): CPT | Performed by: INTERNAL MEDICINE

## 2022-04-11 PROCEDURE — 36415 COLL VENOUS BLD VENIPUNCTURE: CPT | Performed by: INTERNAL MEDICINE

## 2022-04-11 RX ORDER — METOLAZONE 2.5 MG/1
2.5 TABLET ORAL
Start: 2022-04-11 | End: 2022-09-30

## 2022-04-11 RX ORDER — ROSUVASTATIN CALCIUM 10 MG/1
10 TABLET, COATED ORAL DAILY
Qty: 90 TABLET | Refills: 3 | Status: SHIPPED | OUTPATIENT
Start: 2022-04-11 | End: 2022-09-30

## 2022-04-11 ASSESSMENT — PATIENT HEALTH QUESTIONNAIRE - PHQ9: SUM OF ALL RESPONSES TO PHQ QUESTIONS 1-9: 5

## 2022-04-11 NOTE — PROGRESS NOTES
Office Visit - Follow Up   Bandar Wells   71 year old male    Date of Visit: 4/11/2022    Chief Complaint   Patient presents with     Follow Up     3 months     Imm/Inj     COVID-19 VACCINE        Assessment and Plan   1. Chronic systolic heart failure (H)  May be a little bit hypervolemic.  Would like to check a basic metabolic panel and he is going to follow-up closely with his cardiologist.  Continue diuretics at current dose  - Basic metabolic panel    2. Ischemic cardiomyopathy  As  - rosuvastatin (CRESTOR) 10 MG tablet; Take 1 tablet (10 mg) by mouth in the morning.  Dispense: 90 tablet; Refill: 3  - metolazone (ZAROXOLYN) 2.5 MG tablet; Take 1 tablet (2.5 mg) by mouth twice a week  - CBC with platelets; Future  - CBC with platelets    3. Anemia of chronic renal failure, stage 4 (severe) (H)  Checking CBC and BMP    4. Paroxysmal atrial fibrillation (H)  Patient on warfarin, rate controlled  - INR point of care    5. Cellulitis and abscess of leg  Doing well after antibiotic    6. High priority for 2019-nCoV vaccine  - COVID-19,PF,MODERNA (18+ Yrs BOOSTER .25mL)    Return in about 3 months (around 7/11/2022) for Follow up.     History of Present Illness   This 71 year old man comes in for follow-up.  Recently at [facility] with some leg swelling.  Had an ultrasound which showed no DVT.  He has been traveling a bit and not wearing his compression garments.  He is now wearing his compression garments but has a little bit of swelling especially in the upper thighs above the compression garments.  His cardiologist increased his metolazone to 3 times a day now is back to 2 times a day.  He was supposed to get a BMP ordered.  He has not yet had this done.  Wounds are doing well in his lower extremity.  States no concern about infection at this time.    Review of Systems: A comprehensive review of systems was negative except as noted.     Medications, Allergies and Problem List   Reviewed, reconciled and  updated  Post Discharge Medication Reconciliation Status:   Social History     Social History Narrative    Has a steady girlfriend and multiple friends .  His son is in [place] but can be present if warned.  Home 1 1/2 stories.    3/2017        Physical Exam   General Appearance:   No acute distress    /54 (BP Location: Left arm, Patient Position: Sitting, Cuff Size: Adult Regular)   Pulse 60   Wt 81.5 kg (179 lb 11.2 oz)   SpO2 95%   BMI 25.78 kg/m      Heart rate controlled, lungs clear abdomen soft, some edema in the upper thighs, lower extremities are wrapped     Additional Information   Current Outpatient Medications   Medication Sig Dispense Refill     Cholecalciferol (VITAMIN D3) 25 MCG (1000 UT) CAPS Take 1,000 Units by mouth daily       febuxostat (ULORIC) 40 MG TABS tablet Take 1 tablet (40 mg) by mouth daily       folic acid (FOLVITE) 1 MG tablet Take 1 mg by mouth daily       gabapentin (NEURONTIN) 300 MG capsule Take 2 capsules (600 mg) by mouth At Bedtime 180 capsule 3     metolazone (ZAROXOLYN) 2.5 MG tablet Take 1 tablet (2.5 mg) by mouth twice a week       oxyCODONE IR (ROXICODONE) 10 MG tablet TAKE 1/2 TABLET (5 MG) BY MOUTH EVERY 4 HOURS AS NEEDED FOR SEVERE PAIN 90 tablet 0     penicillin V (VEETID) 500 MG tablet Take 500 mg by mouth daily Hx endocarditis       polyethylene glycol (MIRALAX) 17 g packet Take 17 g by mouth daily as needed for constipation 30 packet 0     potassium chloride (KLOR-CON) 20 MEQ packet Take 20 mEq by mouth daily       rosuvastatin (CRESTOR) 10 MG tablet Take 1 tablet (10 mg) by mouth in the morning. 90 tablet 3     sennosides (SENOKOT) 8.6 MG tablet Take 1-4 tablets by mouth 2 times daily       torsemide (DEMADEX) 20 MG tablet Take 3 tablets (60 mg) by mouth 2 times daily       warfarin ANTICOAGULANT (COUMADIN) 3 MG tablet TAKE 1-2 TABLETS (3MG-6MG) BY MOUTH DAILY OR AS DIRECTED BY THE INR CLINIC 180 tablet 1     Allergies   Allergen Reactions     Allopurinol  Rash     Clindamycin Rash     Social History     Tobacco Use     Smoking status: Never Smoker     Smokeless tobacco: Never Used   Substance Use Topics     Alcohol use: Yes     Alcohol/week: 2.0 standard drinks     Drug use: No       Review and/or order of clinical lab tests:  Review and/or order of radiology tests:  Review and/or order of medicine tests:  Discussion of test results with performing physician:  Decision to obtain old records and/or obtain history from someone other than the patient:  Review and summarization of old records and/or obtaining history from someone other than the patient and.or discussion of case with another health care provider:  Independent visualization of image, tracing or specimen itself:    Time:      Jin Hicks MD

## 2022-04-11 NOTE — PROGRESS NOTES
ANTICOAGULATION MANAGEMENT     Bandar Wells 71 year old male is on warfarin with therapeutic INR result. (Goal INR 2.5-3.5)    Recent labs: (last 7 days)     04/11/22  1111   INR 2.7*       ASSESSMENT       Source(s): Chart Review and Patient/Caregiver Call       Warfarin doses taken: Warfarin taken as instructed    Diet: No new diet changes identified    New illness, injury, or hospitalization: No    Medication/supplement changes: will be going on a statin    Signs or symptoms of bleeding or clotting: No    Previous INR: Therapeutic last visit; previously outside of goal range    Additional findings: None       PLAN     Recommended plan for no diet, medication or health factor changes affecting INR     Dosing Instructions: continue your current warfarin dose with next INR in 2 weeks       Summary  As of 4/11/2022    Full warfarin instructions:  3 mg every Mon, Wed, Sat; 6 mg all other days   Next INR check:  4/25/2022             Telephone call with Bandar who verbalizes understanding and agrees to plan    Patient to recheck with home meter    Education provided: None required    Plan made per [facility] anticoagulation protocol    Jackelyn Ayala RN  Anticoagulation Clinic  4/11/2022    _______________________________________________________________________     Anticoagulation Episode Summary     Current INR goal:  2.5-3.5   TTR:  45.1 % (1 y)   Target end date:  Indefinite   Send INR reminders to:  ANTICO [facility]AS[facility]    Indications    Paroxysmal atrial fibrillation (H) [I48.0]  S/P mitral valve replacement with metallic valve [Z95.4]           Comments:  INR TARGET GOAL 2.5 - 3.5         Anticoagulation Care Providers     Provider Role Specialty Phone number    Jin Hicks MD Referring Internal Medicine [phone]

## 2022-04-12 ENCOUNTER — TELEPHONE (OUTPATIENT)
Dept: INTERNAL MEDICINE | Facility: CLINIC | Age: 72
End: 2022-04-12
Payer: COMMERCIAL

## 2022-04-12 DIAGNOSIS — R04.0 EPISTAXIS: Primary | ICD-10-CM

## 2022-04-12 NOTE — TELEPHONE ENCOUNTER
It sounds like his bloody noses stopped.  If it starts again he can apply pressure.  If it continues to bleed we would have him see ear nose and throat physician

## 2022-04-12 NOTE — TELEPHONE ENCOUNTER
"Patient is calling in noting that he had a bloody nose yesterday. Notes not today. Notes that times it was dripping. He calls is \"slight\". It did last through the night.    He is calling because he did not inform [facility] of this yesterday. He also notes he had it at his appointment with PCP and did not inform him.    Advised triage through primary care or if still Urgent Care or appointment with provider who can address.    Jackelyn Ayala RN    [facility]     "

## 2022-04-13 NOTE — TELEPHONE ENCOUNTER
Spoke with patient who states he does not currently have any nose bleeds. Relayed message from Dr Hicks and patient states he would like the referral for ENT.

## 2022-04-14 ENCOUNTER — OFFICE VISIT (OUTPATIENT)
Dept: NEUROLOGY | Facility: CLINIC | Age: 72
End: 2022-04-14
Payer: COMMERCIAL

## 2022-04-14 VITALS — SYSTOLIC BLOOD PRESSURE: 144 MMHG | HEART RATE: 65 BPM | DIASTOLIC BLOOD PRESSURE: 65 MMHG

## 2022-04-14 DIAGNOSIS — G24.3 CERVICAL DYSTONIA: Primary | ICD-10-CM

## 2022-04-14 PROCEDURE — 96372 THER/PROPH/DIAG INJ SC/IM: CPT | Performed by: PHYSICAL MEDICINE & REHABILITATION

## 2022-04-14 PROCEDURE — 95874 GUIDE NERV DESTR NEEDLE EMG: CPT | Performed by: PHYSICAL MEDICINE & REHABILITATION

## 2022-04-14 PROCEDURE — 64616 CHEMODENERV MUSC NECK DYSTON: CPT | Mod: 59 | Performed by: PHYSICAL MEDICINE & REHABILITATION

## 2022-04-14 NOTE — PROGRESS NOTES
[facility]    PM&R CLINIC NOTE  BOTULINUM TOXIN PROCEDURE      HPI  Chief Complaint   Patient presents with     Botox     Cervical Dystonia     Bandar Wells is a 71 year old year old male with a history of involuntary neck muscle spasms resulting in abnormal posturing of his head and neck who presents to clinic for botulinum toxin injections for management of cervical dystonia (anterocollis primarily).     SINCE LAST VISIT  Bandar Wells was last seen here in clinic on 1/20/2022, at which time he received 100 units of Botox, which was an increase from previous dose of 80 units of Botox    Patient denies new medical diagnoses, illnesses, hospitalizations, emergency room visits, and injuries since the previous injection with botulinum neurotoxin.    RESPONSE TO PREVIOUS TREATMENT    Side effects: No problems reported    Pain Improvement: Yes.  Percent Improvement: 90 %    Duration of Benefit:  8 weeks and followed by a gradual reduction in benefit.    Dystonia Improvement: Yes.  Percent Improvement: 30 %    Duration of Benefit:  8 weeks and followed by a gradual reduction in benefit. His significant other noted that he was able to straighten his head much better for a few weeks when the Botox was in effect.       PHYSICAL EXAM  VS: BP (!) 144/65 (BP Location: Right arm, Patient Position: Sitting, Cuff Size: Adult Regular)   Pulse 65    GEN: Pleasant and cooperative, in no acute distress  HEENT: No facial asymmetry  HEAD, NECK AND TRUNK PATTERN:   Head & Neck Flexion:  Present   Head turned to the right side with chin pointing towards the left side. Significant hypertonicity in bilateral upper trapezius (L>R), point tenderness at levator insertion bilaterally.    ALLERGIES  Allergies   Allergen Reactions     Allopurinol Rash     Clindamycin Rash       CURRENT MEDICATIONS    Current Outpatient Medications:      Cholecalciferol (VITAMIN D3) 25 MCG (1000 UT) CAPS, Take 1,000 Units by mouth  daily, Disp: , Rfl:      febuxostat (ULORIC) 40 MG TABS tablet, Take 1 tablet (40 mg) by mouth daily, Disp: , Rfl:      folic acid (FOLVITE) 1 MG tablet, Take 1 mg by mouth daily, Disp: , Rfl:      gabapentin (NEURONTIN) 300 MG capsule, Take 2 capsules (600 mg) by mouth At Bedtime, Disp: 180 capsule, Rfl: 3     metolazone (ZAROXOLYN) 2.5 MG tablet, Take 1 tablet (2.5 mg) by mouth twice a week, Disp: , Rfl:      oxyCODONE IR (ROXICODONE) 10 MG tablet, TAKE 1/2 TABLET (5 MG) BY MOUTH EVERY 4 HOURS AS NEEDED FOR SEVERE PAIN, Disp: 90 tablet, Rfl: 0     penicillin V (VEETID) 500 MG tablet, Take 500 mg by mouth daily Hx endocarditis, Disp: , Rfl:      potassium chloride (KLOR-CON) 20 MEQ packet, Take 20 mEq by mouth daily, Disp: , Rfl:      rosuvastatin (CRESTOR) 10 MG tablet, Take 1 tablet (10 mg) by mouth in the morning., Disp: 90 tablet, Rfl: 3     sennosides (SENOKOT) 8.6 MG tablet, Take 1-4 tablets by mouth 2 times daily, Disp: , Rfl:      torsemide (DEMADEX) 20 MG tablet, Take 3 tablets (60 mg) by mouth 2 times daily, Disp: , Rfl:      warfarin ANTICOAGULANT (COUMADIN) 3 MG tablet, TAKE 1-2 TABLETS (3MG-6MG) BY MOUTH DAILY OR AS DIRECTED BY THE INR CLINIC, Disp: 180 tablet, Rfl: 1     polyethylene glycol (MIRALAX) 17 g packet, Take 17 g by mouth daily as needed for constipation (Patient not taking: Reported on 2022), Disp: 30 packet, Rfl: 0    Current Facility-Administered Medications:      botulinum toxin type A (BOTOX) 100 units injection 300 Units, 300 Units, Intramuscular, Q90 Days, Bertha Cervantes MD, 100 Units at 22 1615     botulinum toxin type A (BOTOX) 100 units injection 300 Units, 300 Units, Intramuscular, Q90 Days, Bertha Cervantes MD       BOTULINUM NEUROTOXIN INJECTION PROCEDURES    VERIFICATION OF PATIENT IDENTIFICATION AND PROCEDURE     Initials   Patient Name SES   Patient  SES   Procedure Verified by: SES     Prior to the start of the procedure and with procedural  staff participation, I verbally confirmed the patient s identity using two indicators, relevant allergies, that the procedure was appropriate and matched the consent or emergent situation, and that the correct equipment/implants were available. Immediately prior to starting the procedure I conducted the Time Out with the procedural staff and re-confirmed the patient s name, procedure, and site/side. (The Joint Commission universal protocol was followed.)  Yes    Sedation (Moderate or Deep): None    ABOVE ASSESSMENTS PERFORMED BY    Bertha Cervantes MD      INDICATIONS FOR PROCEDURES  Bandar Wells is a 71 year old patient with involuntary neck muscle spasms and loss of volitional motor control secondary to the diagnosis of cervical dystonia. His baseline symptoms have been recalcitrant to oral medications and conservative therapy.  He is here today for reinjection with Botox.    GOAL OF PROCEDURE  The goal of this procedure is to increase active range of motion, improve volitional motor control, decrease pain  and enhance functional independence.      TOTAL DOSE ADMINISTERED  Dose Administered:  125 units  Botox (Botulinum Toxin Type A)       1:1 Dilution   Unavoidable Drug Waste: Yes  Amount of drug waste (mL): 75 units Botox.  Reason for waste:  Single use vial  Diluent Used:  Preservative Free Normal Saline  Total Volume of Diluent Used:  1.25 ml  Lot # [id number] with Expiration Date:  05/2024  NDC #: Botox 100u ([address]-05)      CONSENT  The risks, benefits, and treatment options were discussed with Bandar Wells and he agreed to proceed.    Written consent was obtained by [facility].     EQUIPMENT USED  Needle-37mm stimulating/recording  EMG/NCS Machine    SKIN PREPARATION  Skin preparation was performed using an alcohol wipe.    GUIDANCE DESCRIPTION  Electro-myographic guidance was necessary throughout the procedure to accurately identify all areas of dystonic muscles while avoiding injection of non-dystonic  muscles, neighboring nerves and nearby vascular structures.       AREA/MUSCLE INJECTED: 125 UNITS BOTOX = TOTAL DOSE, 1:1 DILUTION  1. HEAD, NECK and SHOULDER GIRDLE MUSCLES: 125 UNITS BOTOX = TOTAL DOSE, 1:1 DILUTION   Right Upper Trapezius (low lateral cervical) - 5 units of Botox at 1 site/s.   Left Upper Trapezius (upper, mid & low cervical) - 5 units of Botox at 3 site/s.    Right Splenius Cervicis - 30 units of Botox at 2 site/s.   Left Splenius Cervicis - 15 units of Botox at 2 site/s.    Right Levator Scapulae - 50 units of Botox at 3 site/s.   Left Levator Scapulae - 10 units of Botox at 1 site/s.    Right Posterior Scalene - 5 units of Botox at 1 site/s.     Right Inferior Obliquus Capitis - 5 units of Botox at 1 site/s.       RESPONSE TO PROCEDURE  Bandar Wells tolerated the procedure well and there were no immediate complications. He was allowed to recover for an appropriate period of time and was discharged home in stable condition.    ASSESSMENT AND PLAN   1. Botulinum toxin injections: Dose of Botox was increased from 100 units to 125 units in hopes of increasing effectiveness and prolonging duration of benefit of injections. A small amount was placed into the left upper trapezius/cervical paraspinals as this is an area where he has the most pain, however, too much Botox in that area may worsen his anterocollis by weakening the posterior neck muscles, and therefore extreme caution was used. He may benefit from trigger point injections in that area next time. Patient will continue to monitor response and report at next appointment.   2. Referrals: None.   3. Follow up: Bandar Wells was rescheduled for the next series of injections in 12 weeks, at which time we will evaluate response to today's injections. he may call the clinic prior with any questions or concerns prior to the next appointment.

## 2022-04-14 NOTE — LETTER
4/14/2022         RE: Bandar Wells  [address] [place] [address]        Dear Colleague,    Thank you for referring your patient, Bandar Wells, to the [facility]. Please see a copy of my visit note below.        [facility]    PM&R CLINIC NOTE  BOTULINUM TOXIN PROCEDURE      HPI  Chief Complaint   Patient presents with     Botox     Cervical Dystonia     Bandar Wells is a 71 year old year old male with a history of involuntary neck muscle spasms resulting in abnormal posturing of his head and neck who presents to clinic for botulinum toxin injections for management of cervical dystonia (anterocollis primarily).     SINCE LAST VISIT  Bandar Wells was last seen here in clinic on 1/20/2022, at which time he received 100 units of Botox, which was an increase from previous dose of 80 units of Botox    Patient denies new medical diagnoses, illnesses, hospitalizations, emergency room visits, and injuries since the previous injection with botulinum neurotoxin.    RESPONSE TO PREVIOUS TREATMENT    Side effects: No problems reported    Pain Improvement: Yes.  Percent Improvement: 90 %    Duration of Benefit:  8 weeks and followed by a gradual reduction in benefit.    Dystonia Improvement: Yes.  Percent Improvement: 30 %    Duration of Benefit:  8 weeks and followed by a gradual reduction in benefit. His significant other noted that he was able to straighten his head much better for a few weeks when the Botox was in effect.       PHYSICAL EXAM  VS: BP (!) 144/65 (BP Location: Right arm, Patient Position: Sitting, Cuff Size: Adult Regular)   Pulse 65    GEN: Pleasant and cooperative, in no acute distress  HEENT: No facial asymmetry  HEAD, NECK AND TRUNK PATTERN:   Head & Neck Flexion:  Present   Head turned to the right side with chin pointing towards the left side. Significant hypertonicity in bilateral upper trapezius (L>R), point tenderness at levator  insertion bilaterally.    ALLERGIES  Allergies   Allergen Reactions     Allopurinol Rash     Clindamycin Rash       CURRENT MEDICATIONS    Current Outpatient Medications:      Cholecalciferol (VITAMIN D3) 25 MCG (1000 UT) CAPS, Take 1,000 Units by mouth daily, Disp: , Rfl:      febuxostat (ULORIC) 40 MG TABS tablet, Take 1 tablet (40 mg) by mouth daily, Disp: , Rfl:      folic acid (FOLVITE) 1 MG tablet, Take 1 mg by mouth daily, Disp: , Rfl:      gabapentin (NEURONTIN) 300 MG capsule, Take 2 capsules (600 mg) by mouth At Bedtime, Disp: 180 capsule, Rfl: 3     metolazone (ZAROXOLYN) 2.5 MG tablet, Take 1 tablet (2.5 mg) by mouth twice a week, Disp: , Rfl:      oxyCODONE IR (ROXICODONE) 10 MG tablet, TAKE 1/2 TABLET (5 MG) BY MOUTH EVERY 4 HOURS AS NEEDED FOR SEVERE PAIN, Disp: 90 tablet, Rfl: 0     penicillin V (VEETID) 500 MG tablet, Take 500 mg by mouth daily Hx endocarditis, Disp: , Rfl:      potassium chloride (KLOR-CON) 20 MEQ packet, Take 20 mEq by mouth daily, Disp: , Rfl:      rosuvastatin (CRESTOR) 10 MG tablet, Take 1 tablet (10 mg) by mouth in the morning., Disp: 90 tablet, Rfl: 3     sennosides (SENOKOT) 8.6 MG tablet, Take 1-4 tablets by mouth 2 times daily, Disp: , Rfl:      torsemide (DEMADEX) 20 MG tablet, Take 3 tablets (60 mg) by mouth 2 times daily, Disp: , Rfl:      warfarin ANTICOAGULANT (COUMADIN) 3 MG tablet, TAKE 1-2 TABLETS (3MG-6MG) BY MOUTH DAILY OR AS DIRECTED BY THE INR CLINIC, Disp: 180 tablet, Rfl: 1     polyethylene glycol (MIRALAX) 17 g packet, Take 17 g by mouth daily as needed for constipation (Patient not taking: Reported on 4/14/2022), Disp: 30 packet, Rfl: 0    Current Facility-Administered Medications:      botulinum toxin type A (BOTOX) 100 units injection 300 Units, 300 Units, Intramuscular, Q90 Days, Standal, Bertha Guerrero MD, 100 Units at 01/19/22 1615     botulinum toxin type A (BOTOX) 100 units injection 300 Units, 300 Units, Intramuscular, Q90 Days, Bertha Cervantes  MD Yolanda       BOTULINUM NEUROTOXIN INJECTION PROCEDURES    VERIFICATION OF PATIENT IDENTIFICATION AND PROCEDURE     Initials   Patient Name SES   Patient  SES   Procedure Verified by: SES     Prior to the start of the procedure and with procedural staff participation, I verbally confirmed the patient s identity using two indicators, relevant allergies, that the procedure was appropriate and matched the consent or emergent situation, and that the correct equipment/implants were available. Immediately prior to starting the procedure I conducted the Time Out with the procedural staff and re-confirmed the patient s name, procedure, and site/side. (The Joint Commission universal protocol was followed.)  Yes    Sedation (Moderate or Deep): None    ABOVE ASSESSMENTS PERFORMED BY    Bertha Cervantes MD      INDICATIONS FOR PROCEDURES  Bandar Wells is a 71 year old patient with involuntary neck muscle spasms and loss of volitional motor control secondary to the diagnosis of cervical dystonia. His baseline symptoms have been recalcitrant to oral medications and conservative therapy.  He is here today for reinjection with Botox.    GOAL OF PROCEDURE  The goal of this procedure is to increase active range of motion, improve volitional motor control, decrease pain  and enhance functional independence.      TOTAL DOSE ADMINISTERED  Dose Administered:  125 units  Botox (Botulinum Toxin Type A)       1:1 Dilution   Unavoidable Drug Waste: Yes  Amount of drug waste (mL): 75 units Botox.  Reason for waste:  Single use vial  Diluent Used:  Preservative Free Normal Saline  Total Volume of Diluent Used:  1.25 ml  Lot # [id number] with Expiration Date:  2024  NDC #: Botox 100u ([address]-85)      CONSENT  The risks, benefits, and treatment options were discussed with Bandar Wells and he agreed to proceed.    Written consent was obtained by [facility].     EQUIPMENT USED  Needle-37mm stimulating/recording  EMG/NCS Machine    SKIN  PREPARATION  Skin preparation was performed using an alcohol wipe.    GUIDANCE DESCRIPTION  Electro-myographic guidance was necessary throughout the procedure to accurately identify all areas of dystonic muscles while avoiding injection of non-dystonic muscles, neighboring nerves and nearby vascular structures.       AREA/MUSCLE INJECTED: 125 UNITS BOTOX = TOTAL DOSE, 1:1 DILUTION  1. HEAD, NECK and SHOULDER GIRDLE MUSCLES: 125 UNITS BOTOX = TOTAL DOSE, 1:1 DILUTION   Right Upper Trapezius (low lateral cervical) - 5 units of Botox at 1 site/s.   Left Upper Trapezius (upper, mid & low cervical) - 5 units of Botox at 3 site/s.    Right Splenius Cervicis - 30 units of Botox at 2 site/s.   Left Splenius Cervicis - 15 units of Botox at 2 site/s.    Right Levator Scapulae - 50 units of Botox at 3 site/s.   Left Levator Scapulae - 10 units of Botox at 1 site/s.    Right Posterior Scalene - 5 units of Botox at 1 site/s.     Right Inferior Obliquus Capitis - 5 units of Botox at 1 site/s.       RESPONSE TO PROCEDURE  Bandar Wells tolerated the procedure well and there were no immediate complications. He was allowed to recover for an appropriate period of time and was discharged home in stable condition.    ASSESSMENT AND PLAN   1. Botulinum toxin injections: Dose of Botox was increased from 100 units to 125 units in hopes of increasing effectiveness and prolonging duration of benefit of injections. A small amount was placed into the left upper trapezius/cervical paraspinals as this is an area where he has the most pain, however, too much Botox in that area may worsen his anterocollis by weakening the posterior neck muscles, and therefore extreme caution was used. He may benefit from trigger point injections in that area next time. Patient will continue to monitor response and report at next appointment.   2. Referrals: None.   3. Follow up: Bandar Wells was rescheduled for the next series of injections in 12 weeks, at which  time we will evaluate response to today's injections. he may call the clinic prior with any questions or concerns prior to the next appointment.           Again, thank you for allowing me to participate in the care of your patient.        Sincerely,        Bertha Cervantes MD

## 2022-04-19 LAB — INR HOME MONITORING: 2.7 (ref 2.5–3.5)

## 2022-04-25 ENCOUNTER — TELEPHONE (OUTPATIENT)
Dept: INTERNAL MEDICINE | Facility: CLINIC | Age: 72
End: 2022-04-25
Payer: COMMERCIAL

## 2022-04-25 DIAGNOSIS — I48.0 PAROXYSMAL ATRIAL FIBRILLATION (H): Primary | ICD-10-CM

## 2022-04-25 DIAGNOSIS — Z95.4 S/P MITRAL VALVE REPLACEMENT WITH METALLIC VALVE: ICD-10-CM

## 2022-04-25 LAB — INR HOME MONITORING: 5.2 (ref 2.5–3.5)

## 2022-04-25 NOTE — TELEPHONE ENCOUNTER
ANTICOAGULATION MANAGEMENT     Bandar Wells 71 year old male is on warfarin with supratherapeutic INR result. (Goal INR of 2.5 - 3.5 )    Recent labs: (last 7 days)     04/25/22  0000   INR 5.20*       ASSESSMENT       Source(s): Chart Review and Patient/Caregiver Call       Warfarin doses taken: Warfarin taken as instructed    Diet: No new diet changes identified    New illness, injury, or hospitalization: Yes   Also reported, this morning had molar crown removed and took 2 tablets of PCN for prophylaxis.   Reported pains of neck and legs last night, but better today with no pains.   Neck pains d/t cervical dystonia.  Gets Botox trigger point injections every 3months, which is effective in relieving pain.    Medication/supplement changes:  Yes.    In the past, PCN did increase his INRs.   On 4/14/22 botox injections dose was increased from 100mg units to 125 units - multiple sites - head, neck, and shoulder girdle muscles.    Signs or symptoms of bleeding or clotting: No    Previous INR: Therapeutic last 2 visits.    Additional findings: None       PLAN     Recommended plan for temporary change(s) affecting INR     Dosing Instructions:    hold dose then continue your current warfarin dose with next INR in 3 days       Summary  As of 4/25/2022    Full warfarin instructions:  4/25: Hold; Otherwise 3 mg every Mon, Wed, Sat; 6 mg all other days   Next INR check:  4/28/2022             Telephone call with  Bandar ([phone]) who verbalizes understanding and agrees to plan    Patient to recheck with home meter thru [employer] in 3 days on Thurs. 4/28/22.    Education provided: Importance of consistent vitamin K intake, Goal range and significance of current result, Potential interaction between warfarin and PCN  (Pen-VK) and Monitoring for bleeding signs and symptoms    Plan made with [facility] Pharmacist Shilpi Jacinto, RN  Anticoagulation  Clinic  4/25/2022    _______________________________________________________________________     Anticoagulation Episode Summary     Current INR goal:  2.5-3.5   TTR:  42.9 % (1 y)   Target end date:  Indefinite   Send INR reminders to:  ILAN PATEL    Indications    Paroxysmal atrial fibrillation (H) [I48.0]  S/P mitral valve replacement with metallic valve [Z95.4]           Comments:  INR TARGET GOAL 2.5 - 3.5         Anticoagulation Care Providers     Provider Role Specialty Phone number    Jin Hicks MD Referring Internal Medicine [phone]

## 2022-04-25 NOTE — TELEPHONE ENCOUNTER
Reason for Call:  INR    Who is calling?  Home Care: [facility]    Phone number:  [phone]    INR Value:  5.2    Are there any other concerns:  Yes: CRITICAL INR VALUE, call was made by [employer], please call patient    Can we leave a detailed message on this number? YES    Phone number patient can be reached at: Home number on file [phone] (home)      Call taken on 4/25/2022 at 11:21 AM by Kaden Ardon

## 2022-04-28 ENCOUNTER — ANTICOAGULATION THERAPY VISIT (OUTPATIENT)
Dept: ANTICOAGULATION | Facility: CLINIC | Age: 72
End: 2022-04-28
Payer: COMMERCIAL

## 2022-04-28 DIAGNOSIS — Z95.4 S/P MITRAL VALVE REPLACEMENT WITH METALLIC VALVE: ICD-10-CM

## 2022-04-28 DIAGNOSIS — I48.0 PAROXYSMAL ATRIAL FIBRILLATION (H): Primary | ICD-10-CM

## 2022-04-28 LAB — INR HOME MONITORING: 5.2 (ref 2.5–3.5)

## 2022-04-28 NOTE — PROGRESS NOTES
ANTICOAGULATION MANAGEMENT     Bandar Wells 71 year old male is on warfarin with supratherapeutic INR result. (Goal INR 2.5-3.5)    Recent labs: (last 7 days)     04/28/22  0000   INR 5.20*       ASSESSMENT       Source(s): Chart Review and Patient/Caregiver Call       Warfarin doses taken: More warfarin taken than planned which may be affecting INR    Diet: No new diet changes identified    New illness, injury, or hospitalization: No    Medication/supplement changes: None noted    Signs or symptoms of bleeding or clotting: Yes: patient had a nose bleed yesterday for most of the day but it did finally stop. Patient states he didn't go in because he knew what to do    Previous INR: Supratherapeutic    Additional findings: None       PLAN     Recommended plan for temporary change(s) affecting INR     Dosing Instructions: Hold today with next INR in 1 day.  Patient preferred to check INR tomorrow vs taking a lower dose on Friday, resuming and rechecking on Monday.      Summary  As of 4/28/2022    Full warfarin instructions:  4/28: Hold; Otherwise 3 mg every Mon, Wed, Sat; 6 mg all other days   Next INR check:  4/29/2022             Telephone call with Bandar who verbalizes understanding and agrees to plan    Patient to recheck with home meter    Education provided: Importance of consistent vitamin K intake, Impact of vitamin K foods on INR, Vitamin K content of foods, Goal range and significance of current result, Importance of therapeutic range, Importance of following up at instructed interval, Importance of taking warfarin as instructed, Monitoring for bleeding signs and symptoms and When to seek medical attention/emergency care    Plan made with [facility] Pharmacist Batool Steiner, RN  Anticoagulation Clinic  4/28/2022    _______________________________________________________________________     Anticoagulation Episode Summary     Current INR goal:  2.5-3.5   TTR:  42.9 % (1 y)   Target end date:   Indefinite   Send INR reminders to:  [facility]    Indications    Paroxysmal atrial fibrillation (H) [I48.0]  S/P mitral valve replacement with metallic valve [Z95.4]           Comments:  INR TARGET GOAL 2.5 - 3.5         Anticoagulation Care Providers     Provider Role Specialty Phone number    Jin Hicks MD Referring Internal Medicine [phone]

## 2022-04-29 ENCOUNTER — ANTICOAGULATION THERAPY VISIT (OUTPATIENT)
Dept: ANTICOAGULATION | Facility: CLINIC | Age: 72
End: 2022-04-29
Payer: COMMERCIAL

## 2022-04-29 DIAGNOSIS — I48.0 PAROXYSMAL ATRIAL FIBRILLATION (H): Primary | ICD-10-CM

## 2022-04-29 DIAGNOSIS — Z95.4 S/P MITRAL VALVE REPLACEMENT WITH METALLIC VALVE: ICD-10-CM

## 2022-04-29 LAB — INR HOME MONITORING: 4.7 (ref 2.5–3.5)

## 2022-04-29 NOTE — PROGRESS NOTES
ANTICOAGULATION MANAGEMENT     Bandar Wells 71 year old male is on warfarin with supratherapeutic INR result. (Goal INR 2.5-3.5)    Recent labs: (last 7 days)     04/29/22  0000   INR 4.70*       ASSESSMENT       Source(s): Chart Review and Patient/Caregiver Call       Warfarin doses taken: Warfarin taken as instructed and Held yesterday as instructed    Diet: No new diet changes identified, chewing on one side of his mouth d/t recent dental work but appetite not affected    New illness, injury, or hospitalization: No    Medication/supplement changes: Noted abx earlier this week for dental work    Signs or symptoms of bleeding or clotting: No    Previous INR: Supratherapeutic    Additional findings: None       PLAN     Recommended plan for temporary change(s) affecting INR     Dosing Instructions: partial hold then continue your current warfarin dose with next INR in 5 days       Summary  As of 4/29/2022    Full warfarin instructions:  4/29: 3 mg; Otherwise 3 mg every Mon, Wed, Sat; 6 mg all other days   Next INR check:  5/4/2022             Telephone call with Bandar who verbalizes understanding and agrees to plan    Patient to recheck with home meter    Education provided: Monitoring for bleeding signs and symptoms and Contact [phone]  with any changes, questions or concerns.     Plan made per [facility] anticoagulation protocol    Christel La RN  Anticoagulation Clinic  4/29/2022    _______________________________________________________________________     Anticoagulation Episode Summary     Current INR goal:  2.5-3.5   TTR:  42.9 % (1 y)   Target end date:  Indefinite   [facility]    Indications    Paroxysmal atrial fibrillation (H) [I48.0]  S/P mitral valve replacement with metallic valve [Z95.4]           Comments:  INR TARGET GOAL 2.5 - 3.5         Anticoagulation Care Providers     Provider Role Specialty Phone number    Jin Hicks MD Referring Internal Medicine  [phone]

## 2022-04-30 NOTE — PROGRESS NOTES
ANTICOAGULATION     Bandar Wells is overdue for INR check.      Mychart sent    Mary Stinson RN     Assisted to turn on his left side to relieve back pain.  Preferred to be semi langston's due to  Sleep apnea.  Noted reddened right eye which is typical of this eye .  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  Instructed to call  For any assistance.  Call light within reach

## 2022-05-05 ENCOUNTER — ANTICOAGULATION THERAPY VISIT (OUTPATIENT)
Dept: ANTICOAGULATION | Facility: CLINIC | Age: 72
End: 2022-05-05
Payer: COMMERCIAL

## 2022-05-05 DIAGNOSIS — I48.0 PAROXYSMAL ATRIAL FIBRILLATION (H): Primary | ICD-10-CM

## 2022-05-05 DIAGNOSIS — Z95.4 S/P MITRAL VALVE REPLACEMENT WITH METALLIC VALVE: ICD-10-CM

## 2022-05-05 LAB — INR HOME MONITORING: 3.7 (ref 2.5–3.5)

## 2022-05-05 NOTE — PROGRESS NOTES
ANTICOAGULATION MANAGEMENT     Bandar Wells 71 year old male is on warfarin with supratherapeutic INR result. (Goal INR 2.5-3.5)    Recent labs: (last 7 days)     05/05/22  0000   INR 3.70*       ASSESSMENT       Source(s): Chart Review and Patient/Caregiver Call       Warfarin doses taken: More warfarin taken than planned which may be affecting INR    Diet: No new diet changes identified    New illness, injury, or hospitalization: No    Medication/supplement changes: None noted    Signs or symptoms of bleeding or clotting: No    Previous INR: Supratherapeutic    Additional findings: Advised patient if elevated at next check then dose will need to be decreased  Patient wanted to try to decrease INR with adding more greens into diet.       PLAN     Recommended plan for temporary change(s) affecting INR     Dosing Instructions: continue your current warfarin dose with next INR in 2 weeks       Summary  As of 5/5/2022    Full warfarin instructions:  3 mg every Mon, Wed, Sat; 6 mg all other days   Next INR check:  5/12/2022             Telephone call with Bandar who agrees to plan and repeated back plan correctly    Patient to recheck with home meter    Education provided: Importance of consistent vitamin K intake, Impact of vitamin K foods on INR and Vitamin K content of foods    Plan made per [facility] anticoagulation protocol    Brenda Steiner, RN  Anticoagulation Clinic  5/5/2022    _______________________________________________________________________     Anticoagulation Episode Summary     Current INR goal:  2.5-3.5   TTR:  42.9 % (1 y)   Target end date:  Indefinite   Send INR reminders to:  [facility]    Indications    Paroxysmal atrial fibrillation (H) [I48.0]  S/P mitral valve replacement with metallic valve [Z95.4]           Comments:  INR TARGET GOAL 2.5 - 3.5         Anticoagulation Care Providers     Provider Role Specialty Phone number    Jin Hicks MD Referring Internal Medicine [phone]

## 2022-05-10 DIAGNOSIS — G89.4 PAIN SYNDROME, CHRONIC: ICD-10-CM

## 2022-05-13 ENCOUNTER — DOCUMENTATION ONLY (OUTPATIENT)
Dept: ANTICOAGULATION | Facility: CLINIC | Age: 72
End: 2022-05-13
Payer: COMMERCIAL

## 2022-05-13 RX ORDER — OXYCODONE HYDROCHLORIDE 10 MG/1
TABLET ORAL
Qty: 90 TABLET | Refills: 0 | Status: SHIPPED | OUTPATIENT
Start: 2022-05-13 | End: 2022-06-14

## 2022-05-13 NOTE — TELEPHONE ENCOUNTER
Controlled Substance Refill Request for oxycodone IR    Last refill: 3/23/2022 for 90 with 0    Last clinic visit: 4/11/2022     Clinic visit frequency required: Q 3 months  Next appt: 7/25/2022    Controlled substance agreement on file: Yes:  Date 1/19/2021.    Documentation in problem list reviewed:  Yes    Processing:  Rx to be electronically transmitted to pharmacy by provider      David Mitchell RN  [facility]

## 2022-05-13 NOTE — PROGRESS NOTES
ANTICOAGULATION     Bandar Wells is overdue for INR check.      Left message  reminding patient to check INR with their home meter and call results to the home monitoring company as soon as possible.     Yarely Powell RN

## 2022-05-13 NOTE — TELEPHONE ENCOUNTER
Routing refill request to provider for review/approval because:  Drug not on the [facility] refill protocol     Last Written Prescription Date:  3/23/22  Last Fill Quantity: 90,  # refills: 0   Last office visit provider:  4/11/22     Requested Prescriptions   Pending Prescriptions Disp Refills     oxyCODONE IR (ROXICODONE) 10 MG tablet [Pharmacy Med Name: OXYCODONE HCL 10 MG TABLET 10 Tablet] 90 tablet 0     Sig: TAKE A HALF TABLET (5 MG) BY MOUTH EVERY 4 HOURS AS NEEDED FOR SEVERE PAIN.       There is no refill protocol information for this order          Elin Camarillo, RN 05/12/22 8:20 PM

## 2022-05-16 ENCOUNTER — ANTICOAGULATION THERAPY VISIT (OUTPATIENT)
Dept: ANTICOAGULATION | Facility: CLINIC | Age: 72
End: 2022-05-16
Payer: COMMERCIAL

## 2022-05-16 DIAGNOSIS — I48.0 PAROXYSMAL ATRIAL FIBRILLATION (H): Primary | ICD-10-CM

## 2022-05-16 DIAGNOSIS — Z95.4 S/P MITRAL VALVE REPLACEMENT WITH METALLIC VALVE: ICD-10-CM

## 2022-05-16 LAB — INR HOME MONITORING: 5.7 (ref 2.5–3.5)

## 2022-05-16 NOTE — PROGRESS NOTES
"ANTICOAGULATION MANAGEMENT     Bandar Wells 71 year old male is on warfarin with supratherapeutic INR result. (Goal INR 2.5-3.5)    Recent labs: (last 7 days)     05/16/22  0000   INR 5.70*       ASSESSMENT       Source(s): Chart Review and Patient/Caregiver Call       Warfarin doses taken: Warfarin taken as instructed    Diet: Decreased greens/vitamin K in diet; would like to resume previous intake but is unsure if he will be able to at this time-- just not feeling well or eating much since the dentist last week; is hoping to start eating more in general.     New illness, injury, or hospitalization: No    Medication/supplement changes: None noted    Signs or symptoms of bleeding or clotting: Yes: \"severe nose bleed\" on 5/8-- stated it took a couple days to fully be done; bled after crown placed at dentist on 5/9 and that also took a couple days to fully stop. No bleeding concerns since 5/11.    Previous INR: Supratherapeutic    Additional findings: None       PLAN     Recommended plan for temporary change(s) and ongoing change(s) affecting INR     Dosing Instructions: full hold x1, partial hold x1 then decrease your warfarin dose (9% change) with next INR in 3 days       Summary  As of 5/16/2022    Full warfarin instructions:  5/16: Hold; 5/17: 3 mg; Otherwise 6 mg every Sun, Tue, Thu; 3 mg all other days   Next INR check:  5/19/2022             Telephone call with Bandar who agrees to plan and repeated back plan correctly    Patient to recheck with home meter    Education provided: Goal range and significance of current result, Monitoring for bleeding signs and symptoms, When to seek medical attention/emergency care and Contact [phone]  with any changes, questions or concerns.     Plan made with [facility] Pharmacist Batool Connelly   Due to significantly elevated INR result, routing to PCP as FYI per [facility] protocol.      Joana Colmenares RN  Anticoagulation " Clinic  5/16/2022    _______________________________________________________________________     Anticoagulation Episode Summary     Current INR goal:  2.5-3.5   TTR:  42.9 % (1 y)   Target end date:  Indefinite   Send INR reminders to:  [facility] PATEL    Indications    Paroxysmal atrial fibrillation (H) [I48.0]  S/P mitral valve replacement with metallic valve [Z95.4]           Comments:  INR TARGET GOAL 2.5 - 3.5         Anticoagulation Care Providers     Provider Role Specialty Phone number    Jin Hicks MD Referring Internal Medicine [phone]

## 2022-05-19 ENCOUNTER — ANTICOAGULATION THERAPY VISIT (OUTPATIENT)
Dept: ANTICOAGULATION | Facility: CLINIC | Age: 72
End: 2022-05-19
Payer: COMMERCIAL

## 2022-05-19 DIAGNOSIS — Z95.4 S/P MITRAL VALVE REPLACEMENT WITH METALLIC VALVE: ICD-10-CM

## 2022-05-19 DIAGNOSIS — I48.0 PAROXYSMAL ATRIAL FIBRILLATION (H): Primary | ICD-10-CM

## 2022-05-19 LAB — INR HOME MONITORING: 3.5 (ref 2.5–3.5)

## 2022-05-19 NOTE — PROGRESS NOTES
ANTICOAGULATION MANAGEMENT     Bandar Wells 71 year old male is on warfarin with therapeutic INR result. (Goal INR 2.5-3.5)    Recent labs: (last 7 days)     05/19/22  0000   INR 3.50       ASSESSMENT       Source(s): Chart Review and Patient/Caregiver Call       Warfarin doses taken: Warfarin taken as instructed    Diet: started eating greens again    New illness, injury, or hospitalization: No    Medication/supplement changes: None noted    Signs or symptoms of bleeding or clotting: Yes: bloody nose, will be going in and having it cauterized.     Previous INR: Supratherapeutic    Additional findings: None       PLAN     Recommended plan for ongoing change(s) affecting INR     Dosing Instructions: continue your current warfarin dose with next INR in 1 week       Summary  As of 5/19/2022    Full warfarin instructions:  6 mg every Sun, Tue, Thu; 3 mg all other days   Next INR check:  5/26/2022             Telephone call with Bandar who verbalizes understanding and agrees to plan    Patient to recheck with home meter    Education provided: Importance of consistent vitamin K intake    Plan made per [facility] anticoagulation protocol    Jackelyn Ayala RN  Anticoagulation Clinic  5/19/2022    _______________________________________________________________________     Anticoagulation Episode Summary     Current INR goal:  2.5-3.5   TTR:  43.0 % (1 y)   Target end date:  Indefinite   Send INR reminders to:  [facility]    Indications    Paroxysmal atrial fibrillation (H) [I48.0]  S/P mitral valve replacement with metallic valve [Z95.4]           Comments:  INR TARGET GOAL 2.5 - 3.5         Anticoagulation Care Providers     Provider Role Specialty Phone number    Jin Hicks MD Referring Internal Medicine [phone]

## 2022-05-25 ENCOUNTER — TRANSFERRED RECORDS (OUTPATIENT)
Dept: HEALTH INFORMATION MANAGEMENT | Facility: CLINIC | Age: 72
End: 2022-05-25
Payer: COMMERCIAL

## 2022-05-26 ENCOUNTER — ANTICOAGULATION THERAPY VISIT (OUTPATIENT)
Dept: ANTICOAGULATION | Facility: CLINIC | Age: 72
End: 2022-05-26
Payer: COMMERCIAL

## 2022-05-26 DIAGNOSIS — I48.0 PAROXYSMAL ATRIAL FIBRILLATION (H): Primary | ICD-10-CM

## 2022-05-26 DIAGNOSIS — Z95.4 S/P MITRAL VALVE REPLACEMENT WITH METALLIC VALVE: ICD-10-CM

## 2022-05-26 LAB — INR HOME MONITORING: 4.5 (ref 2.5–3.5)

## 2022-05-26 NOTE — PROGRESS NOTES
ANTICOAGULATION MANAGEMENT     Bandar Wells 71 year old male is on warfarin with supratherapeutic INR result. (Goal INR 2.5-3.5)    Recent labs: (last 7 days)     05/26/22  0000   INR 4.50*       ASSESSMENT       Source(s): Chart Review and Patient/Caregiver Call       Warfarin doses taken: Warfarin taken as instructed    Diet: No new diet changes identified    New illness, injury, or hospitalization: No    Medication/supplement changes: None noted    Signs or symptoms of bleeding or clotting: Yes: episodes of epistaxis every 1-2 weeks, he knows how to treat at home but it lasts for several hours. He has an appointment with ENT for cautery.    Previous INR: Therapeutic last visit; previously outside of goal range    Additional findings: None       PLAN     Recommended plan for temporary change(s) affecting INR     Dosing Instructions: hold dose then decrease your warfarin dose (10% change) with next INR in 1 week       Summary  As of 5/26/2022    Full warfarin instructions:  5/26: Hold; Otherwise 6 mg every Sun, Thu; 3 mg all other days   Next INR check:  6/2/2022             Telephone call with Bandar who verbalizes understanding and agrees to plan    Patient to recheck with home meter    Education provided: Goal range and significance of current result, Monitoring for bleeding signs and symptoms, When to seek medical attention/emergency care and Contact [phone]  with any changes, questions or concerns.     Plan made per [facility] anticoagulation protocol    Christel La RN  Anticoagulation Clinic  5/26/2022    _______________________________________________________________________     Anticoagulation Episode Summary     Current INR goal:  2.5-3.5   TTR:  41.0 % (1 y)   Target end date:  Indefinite   Send INR reminders to:  [facility]    Indications    Paroxysmal atrial fibrillation (H) [I48.0]  S/P mitral valve replacement with metallic valve [Z95.4]           Comments:           Anticoagulation Care  Providers     Provider Role Specialty Phone number    Jin Hicks MD Referring Internal Medicine [phone]

## 2022-06-02 ENCOUNTER — ANTICOAGULATION THERAPY VISIT (OUTPATIENT)
Dept: ANTICOAGULATION | Facility: CLINIC | Age: 72
End: 2022-06-02
Payer: COMMERCIAL

## 2022-06-02 DIAGNOSIS — Z95.4 S/P MITRAL VALVE REPLACEMENT WITH METALLIC VALVE: ICD-10-CM

## 2022-06-02 DIAGNOSIS — I48.0 PAROXYSMAL ATRIAL FIBRILLATION (H): Primary | ICD-10-CM

## 2022-06-02 LAB — INR HOME MONITORING: 2.5 (ref 2.5–3.5)

## 2022-06-02 NOTE — PROGRESS NOTES
ANTICOAGULATION MANAGEMENT     Bandar Wells 71 year old male is on warfarin with supratherapeutic INR result. (Goal INR 2.5-3.5)    Recent labs: (last 7 days)     06/02/22  0000   INR 2.50       ASSESSMENT       Source(s): Chart Review and Patient/Caregiver Call       Warfarin doses taken: Warfarin taken as instructed    Diet: Increased greens/vitamin K in diet; ongoing change    New illness, injury, or hospitalization: No    Medication/supplement changes: None noted    Signs or symptoms of bleeding or clotting: Yes: Patient reports one nose bleed last week, reports it lasted about 10-20 minutes, on and off bleeding    Previous INR: Supratherapeutic    Additional findings: Patient is having nose bleeds cauterized with ENT tomorrow.       PLAN     Recommended plan for temporary change(s) and ongoing change(s) affecting INR     Dosing Instructions: continue your current warfarin dose with next INR in 1 week       Summary  As of 6/2/2022    Full warfarin instructions:  6 mg every Sun, Thu; 3 mg all other days   Next INR check:  6/9/2022             Telephone call with Bandar who verbalizes understanding and agrees to plan    Patient to recheck with home meter    Education provided: Please call back if any changes to your diet, medications or how you've been taking warfarin, Importance of consistent vitamin K intake and Monitoring for bleeding signs and symptoms    Plan made per [facility] anticoagulation protocol    Mary Stinson RN  Anticoagulation Clinic  6/2/2022    _______________________________________________________________________     Anticoagulation Episode Summary     Current INR goal:  2.5-3.5   TTR:  42.1 % (1 y)   Target end date:  Indefinite   Send INR reminders to:  [facility]    Indications    Paroxysmal atrial fibrillation (H) [I48.0]  S/P mitral valve replacement with metallic valve [Z95.4]           Comments:           Anticoagulation Care Providers     Provider Role Specialty Phone number    Sumanmartha  Jin MURRAY MD [facility] Internal Medicine [phone]

## 2022-06-09 ENCOUNTER — ANTICOAGULATION THERAPY VISIT (OUTPATIENT)
Dept: ANTICOAGULATION | Facility: CLINIC | Age: 72
End: 2022-06-09
Payer: COMMERCIAL

## 2022-06-09 DIAGNOSIS — I48.0 PAROXYSMAL ATRIAL FIBRILLATION (H): Primary | ICD-10-CM

## 2022-06-09 DIAGNOSIS — Z95.4 S/P MITRAL VALVE REPLACEMENT WITH METALLIC VALVE: ICD-10-CM

## 2022-06-09 LAB — INR HOME MONITORING: 2.1 (ref 2.5–3.5)

## 2022-06-09 NOTE — PROGRESS NOTES
ANTICOAGULATION MANAGEMENT     Bandar Wells 71 year old male is on warfarin with subtherapeutic INR result. (Goal INR 2.5-3.5)    Recent labs: (last 7 days)     06/09/22  0000   INR 2.10*       ASSESSMENT       Source(s): Chart Review and Patient/Caregiver Call       Warfarin doses taken: Warfarin taken as instructed    Diet: No new diet changes identified, no licorice, no CBD, no melantonin    New illness, injury, or hospitalization: No    Medication/supplement changes: None noted    Signs or symptoms of bleeding or clotting: Yes: slight nose bleed    Previous INR: Therapeutic last visit; previously outside of goal range    Additional findings: None       PLAN     Recommended plan for no diet, medication or health factor changes affecting INR     Dosing Instructions: booster dose then Increase your warfarin dose (11.1% change) with next INR in 1 week       Summary  As of 6/9/2022    Full warfarin instructions:  6/9: 7.5 mg; Otherwise 6 mg every Sun, Tue, Thu; 3 mg all other days   Next INR check:  6/16/2022             Telephone call with Bandar who verbalizes understanding and agrees to plan    Patient to recheck with home meter    Education provided: None required    Plan made per [facility] anticoagulation protocol    Jackelyn Ayala, RN  Anticoagulation Clinic  6/9/2022    _______________________________________________________________________     Anticoagulation Episode Summary     Current INR goal:  2.5-3.5   TTR:  41.9 % (1 y)   Target end date:  Indefinite   Send INR reminders to:  [facility]    Indications    Paroxysmal atrial fibrillation (H) [I48.0]  S/P mitral valve replacement with metallic valve [Z95.4]           Comments:  [employer]- wants a call every time         Anticoagulation Care Providers     Provider Role Specialty Phone number    Jin Hicks MD Referring Internal Medicine [phone]

## 2022-06-13 DIAGNOSIS — G89.4 PAIN SYNDROME, CHRONIC: ICD-10-CM

## 2022-06-14 NOTE — TELEPHONE ENCOUNTER
Routing refill request to provider for review/approval because:  Controlled substance request    Last Written Prescription Date:  5/13/22  Last Fill Quantity: 90,  # refills: 0   Last office visit provider:  4/11/22     Requested Prescriptions   Pending Prescriptions Disp Refills     oxyCODONE IR (ROXICODONE) 10 MG tablet [Pharmacy Med Name: OXYCODONE HCL 10 MG TABLET 10 Tablet] 90 tablet 0     Sig: TAKE A HALF TABLET (5 MG) BY MOUTH EVERY 4 HOURS AS NEEDED FOR SEVERE PAIN.       There is no refill protocol information for this order          Jabier Tatum RN 06/14/22 10:04 AM

## 2022-06-15 RX ORDER — OXYCODONE HYDROCHLORIDE 10 MG/1
TABLET ORAL
Qty: 90 TABLET | Refills: 0 | Status: SHIPPED | OUTPATIENT
Start: 2022-06-15 | End: 2022-07-11

## 2022-06-16 ENCOUNTER — ANTICOAGULATION THERAPY VISIT (OUTPATIENT)
Dept: ANTICOAGULATION | Facility: CLINIC | Age: 72
End: 2022-06-16
Payer: COMMERCIAL

## 2022-06-16 DIAGNOSIS — I48.0 PAROXYSMAL ATRIAL FIBRILLATION (H): Primary | ICD-10-CM

## 2022-06-16 DIAGNOSIS — Z95.4 S/P MITRAL VALVE REPLACEMENT WITH METALLIC VALVE: ICD-10-CM

## 2022-06-16 LAB — INR HOME MONITORING: 2.4 (ref 2.5–3.5)

## 2022-06-16 NOTE — PROGRESS NOTES
"ANTICOAGULATION MANAGEMENT     Bandar Wells 71 year old male is on warfarin with subtherapeutic INR result. (Goal INR 2.5-3.5)    Recent labs: (last 7 days)     06/16/22  0000   INR 2.4*       ASSESSMENT       Source(s): Chart Review and Patient/Caregiver Call       Warfarin doses taken: Less warfarin taken than planned which may be affecting INR    Diet: patient did not eat anything on Sunday/Monday and Tuesday this week    New illness, injury, or hospitalization: No    Medication/supplement changes: None noted    Signs or symptoms of bleeding or clotting: Yes: patient had one bloody nose this past week stating it lasted \"all day\".  The nose bleed was not continuous but was intermittent    Previous INR: Subtherapeutic    Additional findings: None       PLAN     Recommended plan for no diet, medication or health factor changes affecting INR     Dosing Instructions: Increase your warfarin dose (10% change) with next INR in 1 week       Summary  As of 6/16/2022    Full warfarin instructions:  3 mg every Mon, Wed, Fri; 6 mg all other days   Next INR check:  6/23/2022             Telephone call with Bandar who verbalizes understanding and agrees to plan    Patient to recheck with home meter    Education provided: Goal range and significance of current result, Importance of therapeutic range, Importance of following up at instructed interval and Importance of taking warfarin as instructed    Plan made per [facility] anticoagulation protocol    Brenda Steiner RN  Anticoagulation Clinic  6/16/2022    _______________________________________________________________________     Anticoagulation Episode Summary     Current INR goal:  2.5-3.5   TTR:  40.0 % (1 y)   Target end date:  Indefinite   Send INR reminders to:  ILAN PATEL    Indications    Paroxysmal atrial fibrillation (H) [I48.0]  S/P mitral valve replacement with metallic valve [Z95.4]           Comments:  [facility]- wants a call every time         Anticoagulation Care " Providers     Provider Role Specialty Phone number    Jin Hicks MD Referring Internal Medicine [phone]

## 2022-06-23 ENCOUNTER — OFFICE VISIT (OUTPATIENT)
Dept: INFECTIOUS DISEASES | Facility: CLINIC | Age: 72
End: 2022-06-23
Payer: COMMERCIAL

## 2022-06-23 ENCOUNTER — ANTICOAGULATION THERAPY VISIT (OUTPATIENT)
Dept: ANTICOAGULATION | Facility: CLINIC | Age: 72
End: 2022-06-23

## 2022-06-23 VITALS
DIASTOLIC BLOOD PRESSURE: 60 MMHG | HEIGHT: 70 IN | TEMPERATURE: 98.6 F | SYSTOLIC BLOOD PRESSURE: 128 MMHG | BODY MASS INDEX: 23.48 KG/M2 | WEIGHT: 164 LBS | HEART RATE: 60 BPM

## 2022-06-23 DIAGNOSIS — I48.0 PAROXYSMAL ATRIAL FIBRILLATION (H): Primary | ICD-10-CM

## 2022-06-23 DIAGNOSIS — Z95.4 S/P MITRAL VALVE REPLACEMENT WITH METALLIC VALVE: Primary | ICD-10-CM

## 2022-06-23 DIAGNOSIS — Z95.4 S/P MITRAL VALVE REPLACEMENT WITH METALLIC VALVE: ICD-10-CM

## 2022-06-23 LAB — INR HOME MONITORING: 3.5 (ref 2.5–3.5)

## 2022-06-23 PROCEDURE — 99213 OFFICE O/P EST LOW 20 MIN: CPT | Performed by: INTERNAL MEDICINE

## 2022-06-23 NOTE — PROGRESS NOTES
"[facility] Progress Note    SUBJECTIVE: Has failed a bit more in global condition than when I last saw.  I reviewed other MD notes prior to entering room. He is not clear why bottom teeth are black colored, they look very ground down to me.       REVIEW OF SYSTEMS:  Negative unless as listed above.  Social history, Family history, Medications: reviewed.    OBJECTIVE:  /60 (BP Location: Right arm, Patient Position: Sitting, Cuff Size: Adult Regular)   Pulse 60   Temp 98.6  F (37  C)   Ht 1.778 m (5' 10\")   Wt 74.4 kg (164 lb)   BMI 23.53 kg/m                PHYSICAL EXAM:  Alert, awake  Vitals tabulated above, reviewed  Neck supple without lymphadenopathy  Sclera normal color, not injected  CARDIOVASCULAR regular rate and rhythm, no murmur--he has a crisp click from Pros valve  Lungs CLEAR TO AUSCULTATION   Abdomen soft, NT/ND, absent HEPATOSPLENOMEGALY  Skin normal  Joints normal  Neurologic exam non focal    Antibiotics:  Daily PCN  Pertinent labs:  Lab Results   Component Value Date    WBC 4.3 04/11/2022    WBC 6.2 08/17/2020     Lab Results   Component Value Date    RBC 2.93 04/11/2022    RBC 3.22 08/17/2020     Lab Results   Component Value Date    HGB 9.3 04/11/2022    HGB 9.0 08/17/2020     Lab Results   Component Value Date    HCT 29.7 04/11/2022    HCT 28.7 08/17/2020     No components found for: MCT  Lab Results   Component Value Date     04/11/2022    MCV 89 08/17/2020     Lab Results   Component Value Date    MCH 31.7 04/11/2022    MCH 28.0 08/17/2020     Lab Results   Component Value Date    MCHC 31.3 04/11/2022    MCHC 31.4 08/17/2020     Lab Results   Component Value Date    RDW 15.4 04/11/2022    RDW 17.6 08/17/2020     Lab Results   Component Value Date     04/11/2022     08/17/2020       Last Comprehensive Metabolic Panel:  Sodium   Date Value Ref Range Status   04/11/2022 140 136 - 145 mmol/L Final   08/17/2020 137 133 - 144 mmol/L Final "     Potassium   Date Value Ref Range Status   04/11/2022 3.5 3.5 - 5.0 mmol/L Final   08/17/2020 3.6 3.4 - 5.3 mmol/L Final     Chloride   Date Value Ref Range Status   04/11/2022 103 98 - 107 mmol/L Final   08/17/2020 101 94 - 109 mmol/L Final     Carbon Dioxide   Date Value Ref Range Status   08/17/2020 28 20 - 32 mmol/L Final     Carbon Dioxide (CO2)   Date Value Ref Range Status   04/11/2022 21 (L) 22 - 31 mmol/L Final     Anion Gap   Date Value Ref Range Status   04/11/2022 16 5 - 18 mmol/L Final   08/17/2020 8 3 - 14 mmol/L Final     Glucose   Date Value Ref Range Status   04/11/2022 113 70 - 125 mg/dL Final   08/17/2020 116 (H) 70 - 99 mg/dL Final     Urea Nitrogen   Date Value Ref Range Status   04/11/2022 54 (H) 8 - 28 mg/dL Final   08/17/2020 36 (H) 7 - 30 mg/dL Final     Creatinine   Date Value Ref Range Status   04/11/2022 2.23 (H) 0.70 - 1.30 mg/dL Final   08/17/2020 1.30 (H) 0.66 - 1.25 mg/dL Final     GFR Estimate   Date Value Ref Range Status   04/11/2022 30 (L) >60 mL/min/1.73m2 Final     Comment:     Effective December 21, 2021 eGFRcr in adults is calculated using the 2021 CKD-EPI creatinine equation which includes age and gender (Sherie et al., NEJM, DOI: 10.1056/WPKHio1270754)   04/15/2021 31 (L) >60 mL/min/1.73m2 Final   08/17/2020 55 (L) >60 mL/min/[1.73_m2] Final     Comment:     Non  GFR Calc  Starting 12/18/2018, serum creatinine based estimated GFR (eGFR) will be   calculated using the Chronic Kidney Disease Epidemiology Collaboration   (CKD-EPI) equation.       Calcium   Date Value Ref Range Status   04/11/2022 9.6 8.5 - 10.5 mg/dL Final   08/17/2020 9.5 8.5 - 10.1 mg/dL Final       Liver Function Studies -   Recent Labs   Lab Test 01/04/22  1052 10/18/21  1125   PROTTOTAL  --  7.8   ALBUMIN 3.7 3.7   BILITOTAL  --  0.5   ALKPHOS  --  77   AST  --  54*   ALT  --  22       Sed Rate   Date Value Ref Range Status   07/19/2020 55 (H) 0 - 20 mm/h Final       CRP Inflammation    Date Value Ref Range Status   07/23/2020 23.0 (H) 0.0 - 8.0 mg/L Final     CRP   Date Value Ref Range Status   10/02/2020 8.5 (H) 0.0 - 0.8 mg/dL Final               MICROBIOLOGY DATA:        IMAGING/RADIOLOGY:      ASSESSMENT:  CHF  Weakness and failure to thrive,ongoing  Droopy neck posture is a chief complaint of his now    Peripheral edema BLE  SBE (times 2) on lifetime PCN--MVR  CRI seeing DR. Florencia Nguyen  Appreciate all the coord care Dr. Hicks gives Bandar     RECOMMENDATION:  Continue same  He is fading but can do ADLs is well groomed but for ground down likely rotting teeth.    I will see again in one year's time.  He agrees and likes to come in once a year.   BISHNU Ordonez MD  Office [phone] option 2 to desk staff

## 2022-06-23 NOTE — PROGRESS NOTES
ANTICOAGULATION MANAGEMENT     Bandar Wells 71 year old male is on warfarin with therapeutic INR result. (Goal INR 2.5-3.5)    Recent labs: (last 7 days)     06/23/22  0000   INR 3.5       ASSESSMENT       Source(s): Chart Review and Patient/Caregiver Call       Warfarin doses taken: Warfarin taken as instructed    Diet: His diet is still not the best since being ill.     New illness, injury, or hospitalization: No    Medication/supplement changes: None noted    Signs or symptoms of bleeding or clotting: No    Previous INR: Subtherapeutic    Additional findings: None       PLAN     Recommended plan for no diet, medication or health factor changes affecting INR     Dosing Instructions: continue your current warfarin dose with next INR in 1 week       Summary  As of 6/23/2022    Full warfarin instructions:  3 mg every Mon, Wed, Fri; 6 mg all other days   Next INR check:  6/30/2022             Telephone call with Bandar who verbalizes understanding and agrees to plan    Patient to recheck with home meter    Education provided: Please call back if any changes to your diet, medications or how you've been taking warfarin and Importance of therapeutic range    Plan made per [facility] anticoagulation protocol    Mireya Santos RN  Anticoagulation Clinic  6/23/2022    _______________________________________________________________________     Anticoagulation Episode Summary     Current INR goal:  2.5-3.5   TTR:  41.0 % (1 y)   Target end date:  Indefinite   Send INR reminders to:  YUNIER AMANDA    Indications    Paroxysmal atrial fibrillation (H) [I48.0]  S/P mitral valve replacement with metallic valve [Z95.4]           Comments:  [facility]- wants a call every time         Anticoagulation Care Providers     Provider Role Specialty Phone number    Jin Hicks MD Referring Internal Medicine [phone]

## 2022-07-05 ENCOUNTER — ANTICOAGULATION THERAPY VISIT (OUTPATIENT)
Dept: ANTICOAGULATION | Facility: CLINIC | Age: 72
End: 2022-07-05

## 2022-07-05 LAB — INR HOME MONITORING: 2.4 (ref 2.5–3.5)

## 2022-07-05 NOTE — PROGRESS NOTES
ANTICOAGULATION MANAGEMENT     Bandar Wells 71 year old male is on warfarin with subtherapeutic INR result. (Goal INR 2.5-3.5)    Recent labs: (last 7 days)     07/05/22  0000   INR 2.4*       ASSESSMENT       Source(s): Chart Review and Patient/Caregiver Call       Warfarin doses taken: Missed dose(s) may be affecting INR    Diet: No new diet changes identified    New illness, injury, or hospitalization: Yes: 6/23/22 progress note from infectious disease notes possible rotting bottom teeth.    Medication/supplement changes: None noted    Signs or symptoms of bleeding or clotting: No    Previous INR: Therapeutic last visit; previously outside of goal range    Additional findings: Pt repots that he missed a warfarin dose on 6/30/22. Pt reports that he is leaving on vacation from 7/12/22 to 7/18/22.       PLAN       Dosing Instructions: continue your current warfarin dose with next INR in 10-14 days       Summary  As of 7/5/2022    Full warfarin instructions:  3 mg every Mon, Wed, Fri; 6 mg all other days   Next INR check:  7/19/2022             Telephone call with Bandar who agrees to plan and repeated back plan correctly    Patient to recheck with home meter    Education provided: Importance of notifying clinic for changes in medications; a sooner lab recheck maybe needed. and Contact [phone]  with any changes, questions or concerns.     Plan made per [facility] anticoagulation protocol    Julieta Muñoz RN  Anticoagulation Clinic  7/5/2022    _______________________________________________________________________     Anticoagulation Episode Summary     Current INR goal:  2.5-3.5   TTR:  40.8 % (1 y)   Target end date:  Indefinite   Send INR reminders to:  ILAN PATEL    Indications    Paroxysmal atrial fibrillation (H) [I48.0]  S/P mitral valve replacement with metallic valve [Z95.4]           Comments:  [facility]- wants a call every time         Anticoagulation Care Providers     Provider Role Specialty Phone  number    Jin Hicks MD [facility] Internal Medicine [phone]

## 2022-07-07 ENCOUNTER — OFFICE VISIT (OUTPATIENT)
Dept: PHYSICAL MEDICINE AND REHAB | Facility: CLINIC | Age: 72
End: 2022-07-07
Payer: COMMERCIAL

## 2022-07-07 VITALS — SYSTOLIC BLOOD PRESSURE: 126 MMHG | HEART RATE: 58 BPM | DIASTOLIC BLOOD PRESSURE: 62 MMHG

## 2022-07-07 DIAGNOSIS — G24.3 CERVICAL DYSTONIA: Primary | ICD-10-CM

## 2022-07-07 PROCEDURE — 64616 CHEMODENERV MUSC NECK DYSTON: CPT | Mod: 59 | Performed by: PHYSICAL MEDICINE & REHABILITATION

## 2022-07-07 PROCEDURE — 96372 THER/PROPH/DIAG INJ SC/IM: CPT | Performed by: PHYSICAL MEDICINE & REHABILITATION

## 2022-07-07 PROCEDURE — 95874 GUIDE NERV DESTR NEEDLE EMG: CPT | Performed by: PHYSICAL MEDICINE & REHABILITATION

## 2022-07-07 NOTE — LETTER
7/7/2022         RE: Bandar Wells  [address] [place] [address]        Dear Colleague,    Thank you for referring your patient, Bandar Wells, to the [facility]. Please see a copy of my visit note below.        [facility]    PM&R CLINIC NOTE  BOTULINUM TOXIN PROCEDURE      HPI  Chief Complaint   Patient presents with     Botox     Procedure     Bandar Wells is a 71 year old male with a history of involuntary neck muscle spasms resulting in abnormal posturing of his head and neck who presents to clinic for botulinum toxin injections for management of cervical dystonia (anterocollis primarily).     SINCE LAST VISIT  Bandar Wells was last seen here in clinic on 4/14/2022, at which time he received 125 units of Botox.    Patient denies new medical diagnoses, illnesses, hospitalizations, emergency room visits, and injuries since the previous injection with botulinum neurotoxin. 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     RESPONSE TO PREVIOUS TREATMENT    Side effects: No problems reported    Pain Improvement: Yes.  Percent Improvement: He has been having minimal pain, which is localized to the posterior left side of his neck around the shoulder region.    Duration of Benefit:  ongoing to date    Dystonia Improvement: Yes.  Percent Improvement: Difficult to quantify dystonia improvement, as his head is quite difficult to straight upward.  This round of Botox seemed to be less effective as compared to previous rounds, however, his head has been more difficult to keep upright in recent weeks, indicating that the Botox must have been helping.     PHYSICAL EXAM  VS: /62 (BP Location: Right arm, Patient Position: Sitting, Cuff Size: Adult Regular)   Pulse 58    GEN: Pleasant and cooperative, in no acute distress  HEENT: No facial asymmetry. Well-healing abrasions over top of scalp.   HEAD, NECK AND  TRUNK PATTERN:   Head & Neck Flexion:  Present   Head turned to the right side with chin deviated towards the left side. Significant hypertonicity in bilateral upper trapezius (L>R), point tenderness at levator insertion bilaterally.    ALLERGIES  Allergies   Allergen Reactions     Allopurinol Rash     Clindamycin Rash       CURRENT MEDICATIONS    Current Outpatient Medications:      Cholecalciferol (VITAMIN D3) 25 MCG (1000 UT) CAPS, Take 1,000 Units by mouth daily, Disp: , Rfl:      febuxostat (ULORIC) 40 MG TABS tablet, Take 1 tablet (40 mg) by mouth daily, Disp: , Rfl:      folic acid (FOLVITE) 1 MG tablet, Take 1 mg by mouth daily, Disp: , Rfl:      gabapentin (NEURONTIN) 300 MG capsule, Take 2 capsules (600 mg) by mouth At Bedtime, Disp: 180 capsule, Rfl: 3     metolazone (ZAROXOLYN) 2.5 MG tablet, Take 1 tablet (2.5 mg) by mouth twice a week, Disp: , Rfl:      oxyCODONE IR (ROXICODONE) 10 MG tablet, TAKE A HALF TABLET (5 MG) BY MOUTH EVERY 4 HOURS AS NEEDED FOR SEVERE PAIN., Disp: 90 tablet, Rfl: 0     penicillin V (VEETID) 500 MG tablet, Take 500 mg by mouth daily Hx endocarditis, Disp: , Rfl:      potassium chloride (KLOR-CON) 20 MEQ packet, Take 20 mEq by mouth daily, Disp: , Rfl:      rosuvastatin (CRESTOR) 10 MG tablet, Take 1 tablet (10 mg) by mouth in the morning., Disp: 90 tablet, Rfl: 3     sennosides (SENOKOT) 8.6 MG tablet, Take 1-4 tablets by mouth 2 times daily, Disp: , Rfl:      torsemide (DEMADEX) 20 MG tablet, Take 3 tablets (60 mg) by mouth 2 times daily, Disp: , Rfl:      warfarin ANTICOAGULANT (COUMADIN) 3 MG tablet, TAKE 1-2 TABLETS (3MG-6MG) BY MOUTH DAILY OR AS DIRECTED BY THE INR CLINIC, Disp: 180 tablet, Rfl: 1     polyethylene glycol (MIRALAX) 17 g packet, Take 17 g by mouth daily as needed for constipation (Patient not taking: No sig reported), Disp: 30 packet, Rfl: 0    Current Facility-Administered Medications:      botulinum toxin type A (BOTOX) 100 units injection 300 Units, 300  Units, Intramuscular, Q90 Days, Bertha Cervantes MD, 100 Units at 22 1615     botulinum toxin type A (BOTOX) 100 units injection 300 Units, 300 Units, Intramuscular, Q90 Days, Bertha Cervantes MD, 125 Units at 22 1139       BOTULINUM NEUROTOXIN INJECTION PROCEDURES    VERIFICATION OF PATIENT IDENTIFICATION AND PROCEDURE     Initials   Patient Name SES   Patient  SES   Procedure Verified by: SES     Prior to the start of the procedure and with procedural staff participation, I verbally confirmed the patient s identity using two indicators, relevant allergies, that the procedure was appropriate and matched the consent or emergent situation, and that the correct equipment/implants were available. Immediately prior to starting the procedure I conducted the Time Out with the procedural staff and re-confirmed the patient s name, procedure, and site/side. (The Joint Commission universal protocol was followed.)  Yes    Sedation (Moderate or Deep): None    ABOVE ASSESSMENTS PERFORMED BY    Bertha Cervantes MD      INDICATIONS FOR PROCEDURES  Bandar Wells is a 71 year old patient with involuntary neck muscle spasms and loss of volitional motor control secondary to the diagnosis of cervical dystonia. His baseline symptoms have been recalcitrant to oral medications and conservative therapy.  He is here today for reinjection with Botox.    GOAL OF PROCEDURE  The goal of this procedure is to increase active range of motion, improve volitional motor control, decrease pain  and enhance functional independence.      TOTAL DOSE ADMINISTERED  Dose Administered:  100 units  Botox (Botulinum Toxin Type A)       1:1 Dilution   Unavoidable Drug Waste: No  Diluent Used:  Preservative Free Normal Saline  Total Volume of Diluent Used:  1 ml  Lot # [id number] with Expiration Date:  2024  NDC #: Botox 100u ([address]-03)      CONSENT  The risks, benefits, and treatment options were discussed with Bandar Wells  and he agreed to proceed.    Written consent was obtained by [facility].     EQUIPMENT USED  Needle-37mm stimulating/recording  EMG/NCS Machine    SKIN PREPARATION  Skin preparation was performed using an alcohol wipe.    GUIDANCE DESCRIPTION  Electro-myographic guidance was necessary throughout the procedure to accurately identify all areas of dystonic muscles while avoiding injection of non-dystonic muscles, neighboring nerves and nearby vascular structures.       AREA/MUSCLE INJECTED: 100 UNITS BOTOX = TOTAL DOSE, 1:1 DILUTION  1. HEAD, NECK and SHOULDER GIRDLE MUSCLES: 100 UNITS BOTOX = TOTAL DOSE, 1:1 DILUTION   Right Splenius Cervicis - 20 units of Botox at 2 site/s.     Right Levator Scapulae - 50 units of Botox at 3 site/s.   Left Levator Scapulae - 10 units of Botox at 1 site/s.    Right Posterior Scalene - 10 units of Botox at 1 site/s.     Right Inferior Obliquus Capitis - 10 units of Botox at 1 site/s.       RESPONSE TO PROCEDURE  Bandar Wells tolerated the procedure well and there were no immediate complications. He was allowed to recover for an appropriate period of time and was discharged home in stable condition.    ASSESSMENT AND PLAN   1. Botulinum toxin injections: Dose of Botox decreased back from 125 units to 100 units, as I am concerned that he may have developed some weakness with the most recent round of Botox, which was an increase in dose from prior visits. He will monitor his response to today's injections and report at next visit.   2. Referrals: None, however we did discuss the possibility of re-enrolling in physical therapy, which may be of benefit. He will think about it and let us know via [employer] if he wishes to have a referral placed.   3. Follow up: Bandar Wells was rescheduled for the next series of injections in 12 weeks, at which time we will evaluate response to today's injections. he may call the clinic prior with any questions or concerns prior to the next appointment.          Again, thank you for allowing me to participate in the care of your patient.        Sincerely,        Bertha Cervantes MD

## 2022-07-07 NOTE — PROGRESS NOTES
[facility]    PM&R CLINIC NOTE  BOTULINUM TOXIN PROCEDURE      HPI  Chief Complaint   Patient presents with     Botox     Procedure     Bandar Wells is a 71 year old male with a history of involuntary neck muscle spasms resulting in abnormal posturing of his head and neck who presents to clinic for botulinum toxin injections for management of cervical dystonia (anterocollis primarily).     SINCE LAST VISIT  Bandar Wells was last seen here in clinic on 4/14/2022, at which time he received 125 units of Botox.    Patient denies new medical diagnoses, illnesses, hospitalizations, emergency room visits, and injuries since the previous injection with botulinum neurotoxin. 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     RESPONSE TO PREVIOUS TREATMENT    Side effects: No problems reported    Pain Improvement: Yes.  Percent Improvement: He has been having minimal pain, which is localized to the posterior left side of his neck around the shoulder region.    Duration of Benefit:  ongoing to date    Dystonia Improvement: Yes.  Percent Improvement: Difficult to quantify dystonia improvement, as his head is quite difficult to straight upward.  This round of Botox seemed to be less effective as compared to previous rounds, however, his head has been more difficult to keep upright in recent weeks, indicating that the Botox must have been helping.     PHYSICAL EXAM  VS: /62 (BP Location: Right arm, Patient Position: Sitting, Cuff Size: Adult Regular)   Pulse 58    GEN: Pleasant and cooperative, in no acute distress  HEENT: No facial asymmetry. Well-healing abrasions over top of scalp.   HEAD, NECK AND TRUNK PATTERN:   Head & Neck Flexion:  Present   Head turned to the right side with chin deviated towards the left side. Significant hypertonicity in bilateral upper trapezius (L>R), point tenderness at levator insertion  bilaterally.    ALLERGIES  Allergies   Allergen Reactions     Allopurinol Rash     Clindamycin Rash       CURRENT MEDICATIONS    Current Outpatient Medications:      Cholecalciferol (VITAMIN D3) 25 MCG (1000 UT) CAPS, Take 1,000 Units by mouth daily, Disp: , Rfl:      febuxostat (ULORIC) 40 MG TABS tablet, Take 1 tablet (40 mg) by mouth daily, Disp: , Rfl:      folic acid (FOLVITE) 1 MG tablet, Take 1 mg by mouth daily, Disp: , Rfl:      gabapentin (NEURONTIN) 300 MG capsule, Take 2 capsules (600 mg) by mouth At Bedtime, Disp: 180 capsule, Rfl: 3     metolazone (ZAROXOLYN) 2.5 MG tablet, Take 1 tablet (2.5 mg) by mouth twice a week, Disp: , Rfl:      oxyCODONE IR (ROXICODONE) 10 MG tablet, TAKE A HALF TABLET (5 MG) BY MOUTH EVERY 4 HOURS AS NEEDED FOR SEVERE PAIN., Disp: 90 tablet, Rfl: 0     penicillin V (VEETID) 500 MG tablet, Take 500 mg by mouth daily Hx endocarditis, Disp: , Rfl:      potassium chloride (KLOR-CON) 20 MEQ packet, Take 20 mEq by mouth daily, Disp: , Rfl:      rosuvastatin (CRESTOR) 10 MG tablet, Take 1 tablet (10 mg) by mouth in the morning., Disp: 90 tablet, Rfl: 3     sennosides (SENOKOT) 8.6 MG tablet, Take 1-4 tablets by mouth 2 times daily, Disp: , Rfl:      torsemide (DEMADEX) 20 MG tablet, Take 3 tablets (60 mg) by mouth 2 times daily, Disp: , Rfl:      warfarin ANTICOAGULANT (COUMADIN) 3 MG tablet, TAKE 1-2 TABLETS (3MG-6MG) BY MOUTH DAILY OR AS DIRECTED BY THE INR CLINIC, Disp: 180 tablet, Rfl: 1     polyethylene glycol (MIRALAX) 17 g packet, Take 17 g by mouth daily as needed for constipation (Patient not taking: No sig reported), Disp: 30 packet, Rfl: 0    Current Facility-Administered Medications:      botulinum toxin type A (BOTOX) 100 units injection 300 Units, 300 Units, Intramuscular, Q90 Days, Bertha Cervantes MD, 100 Units at 01/19/22 1615     botulinum toxin type A (BOTOX) 100 units injection 300 Units, 300 Units, Intramuscular, Q90 Days, StandBertha hancock MD,  125 Units at 22 1139       BOTULINUM NEUROTOXIN INJECTION PROCEDURES    VERIFICATION OF PATIENT IDENTIFICATION AND PROCEDURE     Initials   Patient Name SES   Patient  SES   Procedure Verified by: SES     Prior to the start of the procedure and with procedural staff participation, I verbally confirmed the patient s identity using two indicators, relevant allergies, that the procedure was appropriate and matched the consent or emergent situation, and that the correct equipment/implants were available. Immediately prior to starting the procedure I conducted the Time Out with the procedural staff and re-confirmed the patient s name, procedure, and site/side. (The Joint Commission universal protocol was followed.)  Yes    Sedation (Moderate or Deep): None    ABOVE ASSESSMENTS PERFORMED BY    Bertha Cervantes MD      INDICATIONS FOR PROCEDURES  Bandar Wells is a 71 year old patient with involuntary neck muscle spasms and loss of volitional motor control secondary to the diagnosis of cervical dystonia. His baseline symptoms have been recalcitrant to oral medications and conservative therapy.  He is here today for reinjection with Botox.    GOAL OF PROCEDURE  The goal of this procedure is to increase active range of motion, improve volitional motor control, decrease pain  and enhance functional independence.      TOTAL DOSE ADMINISTERED  Dose Administered:  100 units  Botox (Botulinum Toxin Type A)       1:1 Dilution   Unavoidable Drug Waste: No  Diluent Used:  Preservative Free Normal Saline  Total Volume of Diluent Used:  1 ml  Lot # [id number] with Expiration Date:  2024  NDC #: Botox 100u ([address]-72)      CONSENT  The risks, benefits, and treatment options were discussed with Bandar Wells and he agreed to proceed.    Written consent was obtained by [facility].     EQUIPMENT USED  Needle-37mm stimulating/recording  EMG/NCS Machine    SKIN PREPARATION  Skin preparation was performed using an alcohol  wipe.    GUIDANCE DESCRIPTION  Electro-myographic guidance was necessary throughout the procedure to accurately identify all areas of dystonic muscles while avoiding injection of non-dystonic muscles, neighboring nerves and nearby vascular structures.       AREA/MUSCLE INJECTED: 100 UNITS BOTOX = TOTAL DOSE, 1:1 DILUTION  1. HEAD, NECK and SHOULDER GIRDLE MUSCLES: 100 UNITS BOTOX = TOTAL DOSE, 1:1 DILUTION   Right Splenius Cervicis - 20 units of Botox at 2 site/s.     Right Levator Scapulae - 50 units of Botox at 3 site/s.   Left Levator Scapulae - 10 units of Botox at 1 site/s.    Right Posterior Scalene - 10 units of Botox at 1 site/s.     Right Inferior Obliquus Capitis - 10 units of Botox at 1 site/s.       RESPONSE TO PROCEDURE  Bandar Wells tolerated the procedure well and there were no immediate complications. He was allowed to recover for an appropriate period of time and was discharged home in stable condition.    ASSESSMENT AND PLAN   1. Botulinum toxin injections: Dose of Botox decreased back from 125 units to 100 units, as I am concerned that he may have developed some weakness with the most recent round of Botox, which was an increase in dose from prior visits. He will monitor his response to today's injections and report at next visit.   2. Referrals: None, however we did discuss the possibility of re-enrolling in physical therapy, which may be of benefit. He will think about it and let us know via [employer] if he wishes to have a referral placed.   3. Follow up: Bandar Wells was rescheduled for the next series of injections in 12 weeks, at which time we will evaluate response to today's injections. he may call the clinic prior with any questions or concerns prior to the next appointment.

## 2022-07-09 DIAGNOSIS — G89.4 PAIN SYNDROME, CHRONIC: ICD-10-CM

## 2022-07-11 RX ORDER — OXYCODONE HYDROCHLORIDE 10 MG/1
TABLET ORAL
Qty: 90 TABLET | Refills: 0 | Status: SHIPPED | OUTPATIENT
Start: 2022-07-11 | End: 2022-08-15

## 2022-07-18 ENCOUNTER — ANTICOAGULATION THERAPY VISIT (OUTPATIENT)
Dept: ANTICOAGULATION | Facility: CLINIC | Age: 72
End: 2022-07-18

## 2022-07-18 DIAGNOSIS — I48.0 PAROXYSMAL ATRIAL FIBRILLATION (H): Primary | ICD-10-CM

## 2022-07-18 DIAGNOSIS — Z95.4 S/P MITRAL VALVE REPLACEMENT WITH METALLIC VALVE: ICD-10-CM

## 2022-07-18 LAB — INR HOME MONITORING: 4.4 (ref 2.5–3.5)

## 2022-07-25 ENCOUNTER — OFFICE VISIT (OUTPATIENT)
Dept: INTERNAL MEDICINE | Facility: CLINIC | Age: 72
End: 2022-07-25
Payer: COMMERCIAL

## 2022-07-25 ENCOUNTER — TELEPHONE (OUTPATIENT)
Dept: ANTICOAGULATION | Facility: CLINIC | Age: 72
End: 2022-07-25

## 2022-07-25 VITALS
HEART RATE: 73 BPM | BODY MASS INDEX: 23.96 KG/M2 | WEIGHT: 167 LBS | OXYGEN SATURATION: 94 % | SYSTOLIC BLOOD PRESSURE: 124 MMHG | RESPIRATION RATE: 18 BRPM | TEMPERATURE: 97.5 F | DIASTOLIC BLOOD PRESSURE: 64 MMHG

## 2022-07-25 DIAGNOSIS — I87.8 VENOUS STASIS: ICD-10-CM

## 2022-07-25 DIAGNOSIS — I48.0 PAROXYSMAL ATRIAL FIBRILLATION (H): ICD-10-CM

## 2022-07-25 DIAGNOSIS — I34.1 MITRAL VALVE PROLAPSE: ICD-10-CM

## 2022-07-25 DIAGNOSIS — L02.419 CELLULITIS AND ABSCESS OF LEG: ICD-10-CM

## 2022-07-25 DIAGNOSIS — N18.4 ANEMIA OF CHRONIC RENAL FAILURE, STAGE 4 (SEVERE) (H): ICD-10-CM

## 2022-07-25 DIAGNOSIS — Z95.4 S/P MITRAL VALVE REPLACEMENT WITH METALLIC VALVE: ICD-10-CM

## 2022-07-25 DIAGNOSIS — D63.1 ANEMIA OF CHRONIC RENAL FAILURE, STAGE 4 (SEVERE) (H): ICD-10-CM

## 2022-07-25 DIAGNOSIS — L03.119 CELLULITIS AND ABSCESS OF LEG: ICD-10-CM

## 2022-07-25 DIAGNOSIS — L97.311 VENOUS STASIS ULCER OF RIGHT ANKLE LIMITED TO BREAKDOWN OF SKIN WITHOUT VARICOSE VEINS (H): ICD-10-CM

## 2022-07-25 DIAGNOSIS — I25.10 CORONARY ARTERY DISEASE INVOLVING NATIVE CORONARY ARTERY OF NATIVE HEART WITHOUT ANGINA PECTORIS: ICD-10-CM

## 2022-07-25 DIAGNOSIS — N18.4 CHRONIC KIDNEY DISEASE, STAGE 4 (SEVERE) (H): ICD-10-CM

## 2022-07-25 DIAGNOSIS — I87.2 VENOUS STASIS ULCER OF RIGHT ANKLE LIMITED TO BREAKDOWN OF SKIN WITHOUT VARICOSE VEINS (H): ICD-10-CM

## 2022-07-25 DIAGNOSIS — I48.0 PAROXYSMAL ATRIAL FIBRILLATION (H): Primary | ICD-10-CM

## 2022-07-25 DIAGNOSIS — I50.22 CHRONIC SYSTOLIC HEART FAILURE (H): Primary | ICD-10-CM

## 2022-07-25 LAB
ANION GAP SERPL CALCULATED.3IONS-SCNC: 16 MMOL/L (ref 7–15)
BUN SERPL-MCNC: 61.9 MG/DL (ref 8–23)
CALCIUM SERPL-MCNC: 9.9 MG/DL (ref 8.8–10.2)
CHLORIDE SERPL-SCNC: 97 MMOL/L (ref 98–107)
CREAT SERPL-MCNC: 3.18 MG/DL (ref 0.67–1.17)
DEPRECATED HCO3 PLAS-SCNC: 24 MMOL/L (ref 22–29)
ERYTHROCYTE [DISTWIDTH] IN BLOOD BY AUTOMATED COUNT: 14.4 % (ref 10–15)
GFR SERPL CREATININE-BSD FRML MDRD: 20 ML/MIN/1.73M2
GLUCOSE SERPL-MCNC: 109 MG/DL (ref 70–99)
HCT VFR BLD AUTO: 28 % (ref 40–53)
HGB BLD-MCNC: 8.9 G/DL (ref 13.3–17.7)
INR BLD: 5.6 (ref 0.9–1.1)
MCH RBC QN AUTO: 30.5 PG (ref 26.5–33)
MCHC RBC AUTO-ENTMCNC: 31.8 G/DL (ref 31.5–36.5)
MCV RBC AUTO: 96 FL (ref 78–100)
PLATELET # BLD AUTO: 189 10E3/UL (ref 150–450)
POTASSIUM SERPL-SCNC: 3.2 MMOL/L (ref 3.4–5.3)
RBC # BLD AUTO: 2.92 10E6/UL (ref 4.4–5.9)
SODIUM SERPL-SCNC: 137 MMOL/L (ref 136–145)
URATE SERPL-MCNC: 7.8 MG/DL (ref 3.4–7)
WBC # BLD AUTO: 4.8 10E3/UL (ref 4–11)

## 2022-07-25 PROCEDURE — 99214 OFFICE O/P EST MOD 30 MIN: CPT | Performed by: INTERNAL MEDICINE

## 2022-07-25 PROCEDURE — 36415 COLL VENOUS BLD VENIPUNCTURE: CPT | Performed by: INTERNAL MEDICINE

## 2022-07-25 PROCEDURE — 80048 BASIC METABOLIC PNL TOTAL CA: CPT | Performed by: INTERNAL MEDICINE

## 2022-07-25 PROCEDURE — 84550 ASSAY OF BLOOD/URIC ACID: CPT | Performed by: INTERNAL MEDICINE

## 2022-07-25 PROCEDURE — 85027 COMPLETE CBC AUTOMATED: CPT | Performed by: INTERNAL MEDICINE

## 2022-07-25 PROCEDURE — 85610 PROTHROMBIN TIME: CPT | Performed by: INTERNAL MEDICINE

## 2022-07-25 RX ORDER — CIPROFLOXACIN 250 MG/1
250 TABLET, FILM COATED ORAL 2 TIMES DAILY
Qty: 14 TABLET | Refills: 0 | Status: SHIPPED | OUTPATIENT
Start: 2022-07-25 | End: 2022-10-25

## 2022-07-25 RX ORDER — SULFAMETHOXAZOLE/TRIMETHOPRIM 800-160 MG
0.5 TABLET ORAL 2 TIMES DAILY
Qty: 7 TABLET | Refills: 0 | Status: SHIPPED | OUTPATIENT
Start: 2022-07-25 | End: 2022-09-30

## 2022-07-25 NOTE — PROGRESS NOTES
Office Visit - Follow Up   Bandar Wells   71 year old male    Date of Visit: 7/25/2022    Chief Complaint   Patient presents with     RECHECK     3 month     Leg Swelling     Both legs x 1 week        Assessment and Plan   1. Chronic systolic heart failure (H)  He is in a little bit of heart failure because he is off of his diuretics.  This is improving back on the diuretics and I recommend he continue with current dosing, leg elevation compression  - Basic metabolic panel; Future  - CBC with platelets; Future  - Basic metabolic panel  - CBC with platelets    2. Venous stasis    3. Venous stasis ulcer of right ankle limited to breakdown of skin without varicose veins (H)  Continue with local wound care, antibiotics for possible infection    4. Chronic kidney disease, stage 4 (severe) (H)  Checking labs as above  - Uric acid; Future  - Uric acid    5. Anemia of chronic renal failure, stage 4 (severe) (H)  Received IV iron    6. Paroxysmal atrial fibrillation (H)  Continue with current strategy  - INR point of care    7. Mitral valve prolapse  8. S/P mitral valve replacement with metallic valve  On warfarin    9. Coronary artery disease involving native coronary artery of native heart without angina pectoris  Continue secondary prevention    10. Cellulitis and abscess of leg  - sulfamethoxazole-trimethoprim (BACTRIM DS) 800-160 MG tablet; Take 0.5 tablets by mouth 2 times daily  Dispense: 7 tablet; Refill: 0  - ciprofloxacin (CIPRO) 250 MG tablet; Take 1 tablet (250 mg) by mouth 2 times daily  Dispense: 14 tablet; Refill: 0    Return in about 3 months (around 10/25/2022) for Follow up.     History of Present Illness   This 71 year old man comes in for follow-up.  He returned home from [place] about a week ago.  His legs were swollen and his right leg was quite red.  He has an ulcer on the lateral aspect of his right ankle.  He had stopped taking his diuretics while in [place] because of issues with  urination.  He is back on diuretics and his swelling has improved and the redness has improved but it still increased from baseline.  He does do compression wraps as well.    Review of Systems: A comprehensive review of systems was negative except as noted.     Medications, Allergies and Problem List   Reviewed, reconciled and updated  Post Discharge Medication Reconciliation Status:      Physical Exam   General Appearance:   No acute distress    /64   Pulse 73   Temp 97.5  F (36.4  C) (Tympanic)   Resp 18   Wt 75.8 kg (167 lb)   SpO2 94%   BMI 23.96 kg/m      He has swelling in both lower extremities a little bit more on the right than the left.  Some shallow ulcerations of the right ankle.  Some mild erythema a little bit more on the right than the left.  Heart rate controlled, mechanical mitral valve, lungs clear abdomen soft     Additional Information   Current Outpatient Medications   Medication Sig Dispense Refill     Cholecalciferol (VITAMIN D3) 25 MCG (1000 UT) CAPS Take 1,000 Units by mouth daily       ciprofloxacin (CIPRO) 250 MG tablet Take 1 tablet (250 mg) by mouth 2 times daily 14 tablet 0     febuxostat (ULORIC) 40 MG TABS tablet Take 1 tablet (40 mg) by mouth daily       folic acid (FOLVITE) 1 MG tablet Take 1 mg by mouth daily       gabapentin (NEURONTIN) 300 MG capsule Take 2 capsules (600 mg) by mouth At Bedtime 180 capsule 3     metolazone (ZAROXOLYN) 2.5 MG tablet Take 1 tablet (2.5 mg) by mouth twice a week       oxyCODONE IR (ROXICODONE) 10 MG tablet TAKE A HALF TABLET (5 MG) BY MOUTH EVERY 4 HOURS AS NEEDED FOR SEVERE PAIN. 90 tablet 0     penicillin V (VEETID) 500 MG tablet Take 500 mg by mouth daily Hx endocarditis       polyethylene glycol (MIRALAX) 17 g packet Take 17 g by mouth daily as needed for constipation 30 packet 0     potassium chloride (KLOR-CON) 20 MEQ packet Take 20 mEq by mouth daily       rosuvastatin (CRESTOR) 10 MG tablet Take 1 tablet (10 mg) by mouth in the  morning. 90 tablet 3     sennosides (SENOKOT) 8.6 MG tablet Take 1-4 tablets by mouth 2 times daily       sulfamethoxazole-trimethoprim (BACTRIM DS) 800-160 MG tablet Take 0.5 tablets by mouth 2 times daily 7 tablet 0     torsemide (DEMADEX) 20 MG tablet Take 3 tablets (60 mg) by mouth 2 times daily       warfarin ANTICOAGULANT (COUMADIN) 3 MG tablet TAKE 1-2 TABLETS (3MG-6MG) BY MOUTH DAILY OR AS DIRECTED BY THE INR CLINIC 180 tablet 1     Allergies   Allergen Reactions     Allopurinol Rash     Clindamycin Rash     Social History     Tobacco Use     Smoking status: Never Smoker     Smokeless tobacco: Never Used   Substance Use Topics     Alcohol use: Yes     Alcohol/week: 2.0 standard drinks     Drug use: No       Time:      Jin Hicks MD  Answers for HPI/ROS submitted by the patient on 7/25/2022  Nitroglycerin use:: never  Do you take an aspirin every day?: No  How many servings of fruits and vegetables do you eat daily?: 0-1  On average, how many sweetened beverages do you drink each day (Examples: soda, juice, sweet tea, etc.  Do NOT count diet or artificially sweetened beverages)?: 3  How many minutes a day do you exercise enough to make your heart beat faster?: 20 to 29  How many days per week do you miss taking your medication?: 0

## 2022-07-25 NOTE — TELEPHONE ENCOUNTER
ANTICOAGULATION  MANAGEMENT     Interacting Medication Review    Interacting medication(s): Ciprofloxacin (Cipro) and Sulfamethoxazole-Trimethoprim (Bactrim) with warfarin.    Duration: 07/25/22-08/01/2022    Indication: Cellulitis    New medication?: Yes, interaction may increase INR and risk of bleeding. Closer INR monitoring recommended.

## 2022-07-27 ENCOUNTER — ANTICOAGULATION THERAPY VISIT (OUTPATIENT)
Dept: ANTICOAGULATION | Facility: CLINIC | Age: 72
End: 2022-07-27

## 2022-07-27 DIAGNOSIS — I48.0 PAROXYSMAL ATRIAL FIBRILLATION (H): Primary | ICD-10-CM

## 2022-07-27 DIAGNOSIS — Z95.4 S/P MITRAL VALVE REPLACEMENT WITH METALLIC VALVE: ICD-10-CM

## 2022-07-27 LAB — INR HOME MONITORING: 4.2 (ref 2.5–3.5)

## 2022-07-27 NOTE — PROGRESS NOTES
ANTICOAGULATION MANAGEMENT     Bandar Wells 71 year old male is on warfarin with supratherapeutic INR result. (Goal INR 2.5-3.5)    Recent labs: (last 7 days)     07/27/22  0000   INR 4.2*       ASSESSMENT       Source(s): Chart Review and Patient/Caregiver Call       Warfarin doses taken: Warfarin taken as instructed    Diet: No new diet changes identified    New illness, injury, or hospitalization: Yes: Cellulitis and abcess of RLE    Medication/supplement changes: Cipro and Bactrim--both complete on 8/1    Signs or symptoms of bleeding or clotting: No    Previous INR: Supratherapeutic    Additional findings: None       PLAN     Recommended plan for temporary change(s) affecting INR     Dosing Instructions: Hold today's dose and reduce tomorrows dose to 3 mg, recheck INR on Friday 7/29    Summary  As of 7/27/2022    Full warfarin instructions:  7/27: Hold; 7/28: 3 mg; 7/30: 3 mg; 7/31: 3 mg; Otherwise 3 mg every Mon, Wed, Fri; 6 mg all other days   Next INR check:  7/29/2022             Telephone call with Bandar who verbalizes understanding and agrees to plan    Patient to recheck with home meter    Education provided: Please call back if any changes to your diet, medications or how you've been taking warfarin and Potential interaction between warfarin and Cipro/Bactrim    Plan made with [facility] Pharmacist Janel Kwok, RN  Anticoagulation Clinic  7/27/2022    _______________________________________________________________________     Anticoagulation Episode Summary     Current INR goal:  2.5-3.5   TTR:  40.5 % (1 y)   Target end date:  Indefinite   Send INR reminders to:  ILAN PATEL    Indications    Paroxysmal atrial fibrillation (H) [I48.0]  S/P mitral valve replacement with metallic valve [Z95.4]           Comments:  [employer]- wants a call every time         Anticoagulation Care Providers     Provider Role Specialty Phone number    Jin Hicks MD Referring Internal Medicine [phone]

## 2022-07-27 NOTE — PROGRESS NOTES
Anticoagulation    Chart reviewed INR 4.2 down from 5.6 two days ago after holding 7.5 mg (full hold/partial hold) with 7 day course of cipro/bactrim started 7/25-8/1.    Recommend     Hold warfarin x 1 today then 3 mg daily while ongoing interaction. Recheck INR 7/29.    Shilpi Tamayo, RP

## 2022-07-27 NOTE — PROGRESS NOTES
Shilpi Tamayo [facility],     Patient on Cipro 250 mg BID and  sulfamethoxazole-trimethoprim (BACTRIM DS) 800-160 MG tablet until 8/1    Please advise if further holds (outside of protocol) needed.    Thanks! Janel Jara RN

## 2022-07-29 ENCOUNTER — ANTICOAGULATION THERAPY VISIT (OUTPATIENT)
Dept: ANTICOAGULATION | Facility: CLINIC | Age: 72
End: 2022-07-29

## 2022-07-29 DIAGNOSIS — I48.0 PAROXYSMAL ATRIAL FIBRILLATION (H): Primary | ICD-10-CM

## 2022-07-29 DIAGNOSIS — Z95.4 S/P MITRAL VALVE REPLACEMENT WITH METALLIC VALVE: ICD-10-CM

## 2022-07-29 LAB — INR HOME MONITORING: 3.3 (ref 2.5–3.5)

## 2022-07-29 NOTE — PROGRESS NOTES
ANTICOAGULATION MANAGEMENT     Bandar Wells 71 year old male is on warfarin with supratherapeutic INR result. (Goal INR 2.5-3.5)    Recent labs: (last 7 days)     07/29/22  0000   INR 3.3       ASSESSMENT       Source(s): Chart Review and Patient/Caregiver Call       Warfarin doses taken: Warfarin taken as instructed    Diet: No new diet changes identified    New illness, injury, or hospitalization: Yes: Cellulitis of RLE    Medication/supplement changes: Cipro and Bactrim 07/25/2022-08/01/2022    Signs or symptoms of bleeding or clotting: No    Previous INR: Supratherapeutic    Additional findings: None       PLAN     Recommended plan for temporary change(s) affecting INR     Dosing Instructions: 3 mg daily with a recheck on monday per [facility] plan on 7/27/22 with next INR in 3 days       Summary  As of 7/29/2022    Full warfarin instructions:  7/30: 3 mg; 7/31: 3 mg; Otherwise 3 mg every Mon, Wed, Fri; 6 mg all other days   Next INR check:  8/1/2022             Telephone call with Bandar who verbalizes understanding and agrees to plan    Patient to recheck with home meter    Education provided: Please call back if any changes to your diet, medications or how you've been taking warfarin    Plan made per [facility] anticoagulation protocol    Mary Stinson RN  Anticoagulation Clinic  7/29/2022    _______________________________________________________________________     Anticoagulation Episode Summary     Current INR goal:  2.5-3.5   TTR:  40.8 % (1 y)   Target end date:  Indefinite   Send INR reminders to:  [facility]    Indications    Paroxysmal atrial fibrillation (H) [I48.0]  S/P mitral valve replacement with metallic valve [Z95.4]           Comments:  [employer]- wants a call every time         Anticoagulation Care Providers     Provider Role Specialty Phone number    Jin Hicks MD Referring Internal Medicine [phone]

## 2022-08-01 ENCOUNTER — LAB (OUTPATIENT)
Dept: LAB | Facility: CLINIC | Age: 72
End: 2022-08-01
Payer: COMMERCIAL

## 2022-08-01 ENCOUNTER — ANTICOAGULATION THERAPY VISIT (OUTPATIENT)
Dept: ANTICOAGULATION | Facility: CLINIC | Age: 72
End: 2022-08-01

## 2022-08-01 DIAGNOSIS — I48.0 PAROXYSMAL ATRIAL FIBRILLATION (H): Primary | ICD-10-CM

## 2022-08-01 DIAGNOSIS — I48.0 PAROXYSMAL ATRIAL FIBRILLATION (H): ICD-10-CM

## 2022-08-01 DIAGNOSIS — N18.4 CHRONIC KIDNEY DISEASE, STAGE 4 (SEVERE) (H): ICD-10-CM

## 2022-08-01 DIAGNOSIS — Z95.4 S/P MITRAL VALVE REPLACEMENT WITH METALLIC VALVE: ICD-10-CM

## 2022-08-01 LAB
ANION GAP SERPL CALCULATED.3IONS-SCNC: 15 MMOL/L (ref 7–15)
BUN SERPL-MCNC: 49.7 MG/DL (ref 8–23)
CALCIUM SERPL-MCNC: 9.6 MG/DL (ref 8.8–10.2)
CHLORIDE SERPL-SCNC: 100 MMOL/L (ref 98–107)
CREAT SERPL-MCNC: 3.27 MG/DL (ref 0.67–1.17)
DEPRECATED HCO3 PLAS-SCNC: 22 MMOL/L (ref 22–29)
GFR SERPL CREATININE-BSD FRML MDRD: 19 ML/MIN/1.73M2
GLUCOSE SERPL-MCNC: 99 MG/DL (ref 70–99)
INR BLD: 3.8 (ref 0.9–1.1)
POTASSIUM SERPL-SCNC: 3.8 MMOL/L (ref 3.4–5.3)
SODIUM SERPL-SCNC: 137 MMOL/L (ref 136–145)

## 2022-08-01 PROCEDURE — 36415 COLL VENOUS BLD VENIPUNCTURE: CPT

## 2022-08-01 PROCEDURE — 80048 BASIC METABOLIC PNL TOTAL CA: CPT

## 2022-08-01 PROCEDURE — 36416 COLLJ CAPILLARY BLOOD SPEC: CPT

## 2022-08-01 PROCEDURE — 85610 PROTHROMBIN TIME: CPT

## 2022-08-01 NOTE — PROGRESS NOTES
"ANTICOAGULATION MANAGEMENT     Bandar Wells 71 year old male is on warfarin with supratherapeutic INR result. (Goal INR 2.5-3.5)    Recent labs: (last 7 days)     08/01/22  0854   INR 3.8*       ASSESSMENT       Source(s): Chart Review and Patient/Caregiver Call       Warfarin doses taken: Warfarin taken as instructed    Diet: No new diet changes identified    New illness, injury, or hospitalization: patient states right leg infection is resolved-\"looks good\"    Medication/supplement changes: Cipro and Bactrim completed today    Signs or symptoms of bleeding or clotting: Yes: patient had nose bleeds all day on 7/29--resolved now    Previous INR: Supratherapeutic    Additional findings: None       PLAN     Recommended plan for temporary change(s) affecting INR     Dosing Instructions: partial hold then continue your current warfarin dose with next INR in 3 days       Summary  As of 8/1/2022    Full warfarin instructions:  8/1: 1.5 mg; Otherwise 3 mg every Mon, Wed, Fri; 6 mg all other days   Next INR check:  8/4/2022             Telephone call with Bandar who verbalizes understanding and agrees to plan and who agrees to plan and repeated back plan correctly    Patient to recheck with home meter    Education provided: Please call back if any changes to your diet, medications or how you've been taking warfarin    Plan made per [facility] anticoagulation protocol    Janel Jara, RN  Anticoagulation Clinic  8/1/2022    _______________________________________________________________________     Anticoagulation Episode Summary     Current INR goal:  2.5-3.5   TTR:  41.1 % (1 y)   Target end date:  Indefinite   Send INR reminders to:  [facility]    Indications    Paroxysmal atrial fibrillation (H) [I48.0]  S/P mitral valve replacement with metallic valve [Z95.4]           Comments:  Anas- wants a call every time         Anticoagulation Care Providers     Provider Role Specialty Phone number    Jin Hicks MD " [facility] Internal Medicine [phone]

## 2022-08-04 ENCOUNTER — ANTICOAGULATION THERAPY VISIT (OUTPATIENT)
Dept: ANTICOAGULATION | Facility: CLINIC | Age: 72
End: 2022-08-04

## 2022-08-04 DIAGNOSIS — Z95.4 S/P MITRAL VALVE REPLACEMENT WITH METALLIC VALVE: ICD-10-CM

## 2022-08-04 DIAGNOSIS — I48.0 PAROXYSMAL ATRIAL FIBRILLATION (H): Primary | ICD-10-CM

## 2022-08-04 LAB — INR HOME MONITORING: 3.4 (ref 2.5–3.5)

## 2022-08-04 NOTE — PROGRESS NOTES
ANTICOAGULATION MANAGEMENT     Bandar Wells 71 year old male is on warfarin with therapeutic INR result. (Goal INR 2.5-3.5)    Recent labs: (last 7 days)     08/04/22  0000   INR 3.4       ASSESSMENT       Source(s): Chart Review and Patient/Caregiver Call       Warfarin doses taken: Warfarin taken as instructed    Diet: No new diet changes identified    New illness, injury, or hospitalization: R leg wound has resolved.    Medication/supplement changes: Cipro and Bactrim 07/25/22-08/01/22    Signs or symptoms of bleeding or clotting: Yes: Has had a couple nose bleeds. Most recently on Friday.     Previous INR: Supratherapeutic    Additional findings: None       PLAN     Recommended plan for no diet, medication or health factor changes affecting INR     Dosing Instructions: Continue your current warfarin dose with next INR in 1 week       Summary  As of 8/4/2022    Full warfarin instructions:  3 mg every Mon, Wed, Fri; 6 mg all other days   Next INR check:  8/11/2022             Telephone call with Bandar who verbalizes understanding and agrees to plan    Patient to recheck with home meter    Education provided: Goal range and significance of current result    Plan made per [facility] anticoagulation protocol    Driss Gonzalez RN  Anticoagulation Clinic  8/4/2022    _______________________________________________________________________     Anticoagulation Episode Summary     Current INR goal:  2.5-3.5   TTR:  41.4 % (1 y)   Target end date:  Indefinite   Send INR reminders to:  ILAN PATEL    Indications    Paroxysmal atrial fibrillation (H) [I48.0]  S/P mitral valve replacement with metallic valve [Z95.4]           Comments:  Anas- wants a call every time         Anticoagulation Care Providers     Provider Role Specialty Phone number    Jin Hicks MD Referring Internal Medicine [phone]

## 2022-08-08 ENCOUNTER — TRANSFERRED RECORDS (OUTPATIENT)
Dept: HEALTH INFORMATION MANAGEMENT | Facility: CLINIC | Age: 72
End: 2022-08-08

## 2022-08-11 ENCOUNTER — ANTICOAGULATION THERAPY VISIT (OUTPATIENT)
Dept: ANTICOAGULATION | Facility: CLINIC | Age: 72
End: 2022-08-11

## 2022-08-11 DIAGNOSIS — Z95.4 S/P MITRAL VALVE REPLACEMENT WITH METALLIC VALVE: ICD-10-CM

## 2022-08-11 DIAGNOSIS — I48.0 PAROXYSMAL ATRIAL FIBRILLATION (H): Primary | ICD-10-CM

## 2022-08-11 DIAGNOSIS — G89.4 PAIN SYNDROME, CHRONIC: ICD-10-CM

## 2022-08-11 LAB — INR HOME MONITORING: 2 (ref 2.5–3.5)

## 2022-08-11 NOTE — PROGRESS NOTES
ANTICOAGULATION MANAGEMENT     Bandar Wells 71 year old male is on warfarin with subtherapeutic INR result. (Goal INR 2.5-3.5)    Recent labs: (last 7 days)     08/11/22  0000   INR 2.0*       ASSESSMENT       Source(s): Chart Review and Patient/Caregiver Call       Warfarin doses taken: Warfarin taken as instructed    Diet: No new diet changes identified    New illness, injury, or hospitalization: No    Medication/supplement changes: None noted    Signs or symptoms of bleeding or clotting: No    Previous INR: Therapeutic last visit; previously outside of goal range    Additional findings: None       PLAN     Recommended plan for no diet, medication or health factor changes affecting INR     Dosing Instructions: booster dose then continue your current warfarin dose with next INR in 1 week       Summary  As of 8/11/2022    Full warfarin instructions:  8/11: 9 mg; Otherwise 3 mg every Mon, Wed, Fri; 6 mg all other days   Next INR check:  8/18/2022             Telephone call with Bandar who agrees to plan and repeated back plan correctly    Patient to recheck with home meter    Education provided: Goal range and significance of current result, Importance of following up at instructed interval and Importance of taking warfarin as instructed    Plan made per [facility] anticoagulation protocol    Anuradha Zheng RN  Anticoagulation Clinic  8/11/2022    _______________________________________________________________________     Anticoagulation Episode Summary     Current INR goal:  2.5-3.5   TTR:  42.5 % (1 y)   Target end date:  Indefinite   Send INR reminders to:  ILAN PATEL    Indications    Paroxysmal atrial fibrillation (H) [I48.0]  S/P mitral valve replacement with metallic valve [Z95.4]           Comments:  Anas- wants a call every time         Anticoagulation Care Providers     Provider Role Specialty Phone number    Jin Hicks MD Referring Internal Medicine [phone]

## 2022-08-14 ENCOUNTER — HEALTH MAINTENANCE LETTER (OUTPATIENT)
Age: 72
End: 2022-08-14

## 2022-08-15 RX ORDER — OXYCODONE HYDROCHLORIDE 10 MG/1
TABLET ORAL
Qty: 90 TABLET | Refills: 0 | Status: SHIPPED | OUTPATIENT
Start: 2022-08-15 | End: 2022-09-09

## 2022-08-18 ENCOUNTER — ANTICOAGULATION THERAPY VISIT (OUTPATIENT)
Dept: ANTICOAGULATION | Facility: CLINIC | Age: 72
End: 2022-08-18

## 2022-08-18 DIAGNOSIS — Z95.4 S/P MITRAL VALVE REPLACEMENT WITH METALLIC VALVE: ICD-10-CM

## 2022-08-18 DIAGNOSIS — I48.0 PAROXYSMAL ATRIAL FIBRILLATION (H): Primary | ICD-10-CM

## 2022-08-18 LAB — INR HOME MONITORING: 3.4 (ref 2.5–3.5)

## 2022-08-18 NOTE — PROGRESS NOTES
ANTICOAGULATION MANAGEMENT     Bandar Wells 71 year old male is on warfarin with therapeutic INR result. (Goal INR 2.5-3.5)    Recent labs: (last 7 days)     08/18/22  0000   INR 3.4       ASSESSMENT       Source(s): Chart Review and Patient/Caregiver Call       Warfarin doses taken: Warfarin taken as instructed    Diet: No new diet changes identified    New illness, injury, or hospitalization: No    Medication/supplement changes: Torsemide 100 mg BID increase, up to date notes increase in INR on initiation therapy    Signs or symptoms of bleeding or clotting: Yes: nose bleed on Friday, notes that it was bad. Advised to be seen after 30 notes. He has had back bad experiences and will not go in.    Previous INR: Subtherapeutic    Additional findings: Iron infusion 9/29 and 8/30       PLAN     Recommended plan for ongoing change(s) affecting INR     Dosing Instructions: Continue your current warfarin dose with next INR in 1 week       Summary  As of 8/18/2022    Full warfarin instructions:  3 mg every Mon, Wed, Fri; 6 mg all other days   Next INR check:  8/25/2022             Telephone call with Bandar who verbalizes understanding and agrees to plan    Patient to recheck with home meter    Education provided: When to go in for nose bleed.    Plan made per [facility] anticoagulation protocol    Jackelyn Ayala RN  Anticoagulation Clinic  8/18/2022    _______________________________________________________________________     Anticoagulation Episode Summary     Current INR goal:  2.5-3.5   TTR:  43.4 % (1 y)   Target end date:  Indefinite   Send INR reminders to:  [facility]    Indications    Paroxysmal atrial fibrillation (H) [I48.0]  S/P mitral valve replacement with metallic valve [Z95.4]           Comments:  Aviva- wants a call every time         Anticoagulation Care Providers     Provider Role Specialty Phone number    Jin Hicks MD Referring Internal Medicine [phone]

## 2022-08-25 ENCOUNTER — ANTICOAGULATION THERAPY VISIT (OUTPATIENT)
Dept: ANTICOAGULATION | Facility: CLINIC | Age: 72
End: 2022-08-25

## 2022-08-25 DIAGNOSIS — I48.0 PAROXYSMAL ATRIAL FIBRILLATION (H): Primary | ICD-10-CM

## 2022-08-25 DIAGNOSIS — Z95.4 S/P MITRAL VALVE REPLACEMENT WITH METALLIC VALVE: ICD-10-CM

## 2022-08-25 LAB — INR HOME MONITORING: 3.3 (ref 2.5–3.5)

## 2022-08-25 NOTE — PROGRESS NOTES
ANTICOAGULATION MANAGEMENT     Bandar Wells 72 year old male is on warfarin with therapeutic INR result. (Goal INR 2.5-3.5)    Recent labs: (last 7 days)     08/25/22  0000   INR 3.3       ASSESSMENT       Source(s): Chart Review and Patient/Caregiver Call       Warfarin doses taken: Warfarin taken as instructed    Diet: No new diet changes identified    New illness, injury, or hospitalization: yes, infection     Medication/supplement changes: None noted    Signs or symptoms of bleeding or clotting: No, patient reports the nose bleeds have resolved from last     Previous INR: Patient started doxycycline 8/24-8/28/22    Additional findings: None       PLAN     Recommended plan for temporary change(s) affecting INR     Dosing Instructions: Continue your current warfarin dose with next INR in 4 days       Summary  As of 8/25/2022    Full warfarin instructions:  3 mg every Mon, Wed, Fri; 6 mg all other days   Next INR check:  8/29/2022             Telephone call with Bandar who verbalizes understanding and agrees to plan    Patient to recheck with home meter    Education provided: Please call back if any changes to your diet, medications or how you've been taking warfarin, potential interaction with abx and warfarin.     Plan made per [facility]agulation protocol    Mary Stinson RN  Anticoagulation Clinic  8/25/2022    _______________________________________________________________________     Anticoagulation Episode Summary     Current INR goal:  2.5-3.5   TTR:  44.3 % (1 y)   Target end date:  Indefinite   Send INR reminders to:  [facility]    Indications    Paroxysmal atrial fibrillation (H) [I48.0]  S/P mitral valve replacement with metallic valve [Z95.4]           Comments:  Aviva- wants a call every time         Anticoagulation Care Providers     Provider Role Specialty Phone number    Jin Hicks MD Referring Internal Medicine [phone]

## 2022-08-29 ENCOUNTER — ANTICOAGULATION THERAPY VISIT (OUTPATIENT)
Dept: ANTICOAGULATION | Facility: CLINIC | Age: 72
End: 2022-08-29

## 2022-08-29 DIAGNOSIS — Z95.4 S/P MITRAL VALVE REPLACEMENT WITH METALLIC VALVE: ICD-10-CM

## 2022-08-29 DIAGNOSIS — I48.0 PAROXYSMAL ATRIAL FIBRILLATION (H): Primary | ICD-10-CM

## 2022-08-29 LAB — INR HOME MONITORING: 4.2 (ref 2.5–3.5)

## 2022-08-29 NOTE — PROGRESS NOTES
ANTICOAGULATION MANAGEMENT     Bandar Wells 72 year old male is on warfarin with supratherapeutic INR result. (Goal INR 2.5-3.5)    Recent labs: (last 7 days)     08/29/22  0000   INR 4.2*       ASSESSMENT       Source(s): Chart Review and Patient/Caregiver Call       Warfarin doses taken: Warfarin taken as instructed    Diet: No new diet changes identified    New illness, injury, or hospitalization: Yes: infection - pt doesn't feel that much better.    Medication/supplement changes: finished doxycycline     Signs or symptoms of bleeding or clotting: No    Previous INR: Therapeutic last 2(+) visits    Additional findings: None     PLAN     Recommended plan for temporary change(s) affecting INR     Dosing Instructions: hold dose then continue your current warfarin dose with next INR in 1 week       Discuss with provider that pt states he isn't feeling well    Summary  As of 8/29/2022    Full warfarin instructions:  8/29: Hold; Otherwise 3 mg every Mon, Wed, Fri; 6 mg all other days   Next INR check:  9/5/2022             Telephone call with Bandar who verbalizes understanding and agrees to plan    Patient to recheck with home meter    Education provided: Please call back if any changes to your diet, medications or how you've been taking warfarin and Monitoring for bleeding signs and symptoms    Plan made per [facility] anticoagulation protocol    Radha Mayers RN  Anticoagulation Clinic  8/29/2022    _______________________________________________________________________     Anticoagulation Episode Summary     Current INR goal:  2.5-3.5   TTR:  43.4 % (1 y)   Target end date:  Indefinite   Send INR reminders to:  [facility]    Indications    Paroxysmal atrial fibrillation (H) [I48.0]  S/P mitral valve replacement with metallic valve [Z95.4]           Comments:  Aviva- wants a call every time         Anticoagulation Care Providers     Provider Role Specialty Phone number    Jin Hicks MD Referring Internal  Medicine [phone]

## 2022-09-06 ENCOUNTER — ANTICOAGULATION THERAPY VISIT (OUTPATIENT)
Dept: ANTICOAGULATION | Facility: CLINIC | Age: 72
End: 2022-09-06

## 2022-09-06 DIAGNOSIS — Z95.4 S/P MITRAL VALVE REPLACEMENT WITH METALLIC VALVE: ICD-10-CM

## 2022-09-06 DIAGNOSIS — I48.0 PAROXYSMAL ATRIAL FIBRILLATION (H): Primary | ICD-10-CM

## 2022-09-06 LAB — INR HOME MONITORING: 4.8 (ref 2.5–3.5)

## 2022-09-06 NOTE — PROGRESS NOTES
ANTICOAGULATION MANAGEMENT     Bandar Wells 72 year old male is on warfarin with supratherapeutic INR result. (Goal INR 2.5-3.5)    Recent labs: (last 7 days)     09/06/22  0000   INR 4.8*       ASSESSMENT       Source(s): Chart Review    Previous INR was Supratherapeutic    Medication, diet, health changes since last INR chart reviewed; none identified           PLAN     Unable to reach Bandar today.    Left message to hold warfarin tonight. Request call back for assessment. [employer] message sent also.    Follow up required to confirm warfarin dose taken and assess for changes, discuss out of range result  and discuss dosing instructions and confirm understanding of instructions    Ирина Lopez RN  Anticoagulation Clinic  9/6/2022

## 2022-09-07 NOTE — PROGRESS NOTES
"ANTICOAGULATION MANAGEMENT     Bandar Wells 72 year old male is on warfarin with supratherapeutic INR result. (Goal INR 2.5-3.5)    Recent labs: (last 7 days)     09/06/22  0000   INR 4.8*       ASSESSMENT       Source(s): Chart Review and Patient/Caregiver Call       Warfarin doses taken: Warfarin taken as instructed and pt received VM yesterday and theresa was held    Diet: diet reported as \"fair\"    New illness, injury, or hospitalization: Yes: Patient has RLE edema    Medication/supplement changes: per pt: Torsemide increased to 200 mg daily from 100 mg daily- This may increase INR; Spironolactone 12.5 mg added to regimen-this has potential to decrease INR; Metolazone increased to 1 tablet daily-no anticipated interaction    Signs or symptoms of bleeding or clotting: No    Previous INR: Supratherapeutic    Additional findings: None       PLAN     Recommended plan for ongoing change(s) affecting INR     Dosing Instructions: dose held yesterday (per previous RN instruction) then decrease your warfarin dose (13.6% change) with next INR in 6 days       Summary  As of 9/6/2022    Full warfarin instructions:  9/6: Hold; Otherwise 3 mg every Tue, Fri; 4.5 mg all other days   Next INR check:  9/12/2022             Telephone call with Bandar who verbalizes understanding and agrees to plan and who agrees to plan and repeated back plan correctly    Patient to recheck with home meter    Education provided: Please call back if any changes to your diet, medications or how you've been taking warfarin    Plan made per [facility]agulation protocol    Janel Jara, RN  Anticoagulation Clinic  9/7/2022    _______________________________________________________________________     Anticoagulation Episode Summary     Current INR goal:  2.5-3.5   TTR:  41.1 % (1 y)   Target end date:  Indefinite   Send INR reminders to:  [facility]    Indications    Paroxysmal atrial fibrillation (H) [I48.0]  S/P mitral valve replacement with " metallic valve [Z95.4]           Comments:  Aviva- wants a call every time         Anticoagulation Care Providers     Provider Role Specialty Phone number    Jin Hicks MD Referring Internal Medicine [phone]

## 2022-09-08 DIAGNOSIS — G60.3 IDIOPATHIC PROGRESSIVE POLYNEUROPATHY: ICD-10-CM

## 2022-09-08 DIAGNOSIS — G89.4 PAIN SYNDROME, CHRONIC: ICD-10-CM

## 2022-09-09 RX ORDER — OXYCODONE HYDROCHLORIDE 10 MG/1
TABLET ORAL
Qty: 90 TABLET | Refills: 0 | Status: SHIPPED | OUTPATIENT
Start: 2022-09-09 | End: 2022-09-30

## 2022-09-09 RX ORDER — GABAPENTIN 300 MG/1
600 CAPSULE ORAL AT BEDTIME
Qty: 180 CAPSULE | Refills: 3 | Status: SHIPPED | OUTPATIENT
Start: 2022-09-09 | End: 2023-02-13

## 2022-09-14 ENCOUNTER — ANTICOAGULATION THERAPY VISIT (OUTPATIENT)
Dept: ANTICOAGULATION | Facility: CLINIC | Age: 72
End: 2022-09-14

## 2022-09-14 DIAGNOSIS — Z95.4 S/P MITRAL VALVE REPLACEMENT WITH METALLIC VALVE: ICD-10-CM

## 2022-09-14 DIAGNOSIS — I48.0 PAROXYSMAL ATRIAL FIBRILLATION (H): Primary | ICD-10-CM

## 2022-09-14 LAB — INR HOME MONITORING: 2.8 (ref 2.5–3.5)

## 2022-09-14 NOTE — PROGRESS NOTES
"ANTICOAGULATION MANAGEMENT     Bandar Wells 72 year old male is on warfarin with therapeutic INR result. (Goal INR 2.5-3.5)    Recent labs: (last 7 days)     09/14/22  0000   INR 2.8       ASSESSMENT       Source(s): Chart Review and Patient/Caregiver Call       Warfarin doses taken: Warfarin taken as instructed    Diet: No new diet changes identified    New illness, injury, or hospitalization: No, edema in lower legs has gotten better    Medication/supplement changes: Torsemide was increased at last check, continues on spironolactone    Signs or symptoms of bleeding or clotting: Yes: nose bleeds last week lasting 1-2 minutes, reports \"I dont know how many\" asked if less than 5, reports yes. Reports last nose bleed yesterday. Advised if this continues or lasts longer than 10 minutes he should be seen in clinic. May be due to elevated INR last week.    Previous INR: Supratherapeutic    Additional findings: Patient reports a 3 lb weight loss since last check       PLAN     Recommended plan for temporary change(s) affecting INR     Dosing Instructions: Continue your current warfarin dose with next INR in 1 week       Summary  As of 9/14/2022    Full warfarin instructions:  3 mg every Tue, Fri; 4.5 mg all other days   Next INR check:  9/21/2022             Telephone call with Bandar who verbalizes understanding and agrees to plan    Patient to recheck with home meter    Education provided: Please call back if any changes to your diet, medications or how you've been taking warfarin    Plan made per [facility]agulation protocol    Mary Stinson RN  Anticoagulation Clinic  9/14/2022    _______________________________________________________________________     Anticoagulation Episode Summary     Current INR goal:  2.5-3.5   TTR:  39.9 % (1 y)   Target end date:  Indefinite   Send INR reminders to:  ILAN PATEL    Indications    Paroxysmal atrial fibrillation (H) [I48.0]  S/P mitral valve replacement with metallic valve " [Z95.4]           Comments:  Aviva- wants a call every time         Anticoagulation Care Providers     Provider Role Specialty Phone number    Jin Hicks MD Referring Internal Medicine [phone]

## 2022-09-23 ENCOUNTER — NURSE TRIAGE (OUTPATIENT)
Dept: NURSING | Facility: CLINIC | Age: 72
End: 2022-09-23

## 2022-09-23 ENCOUNTER — ANTICOAGULATION THERAPY VISIT (OUTPATIENT)
Dept: ANTICOAGULATION | Facility: CLINIC | Age: 72
End: 2022-09-23

## 2022-09-23 DIAGNOSIS — I48.0 PAROXYSMAL ATRIAL FIBRILLATION (H): Primary | ICD-10-CM

## 2022-09-23 DIAGNOSIS — Z95.4 S/P MITRAL VALVE REPLACEMENT WITH METALLIC VALVE: ICD-10-CM

## 2022-09-23 LAB — INR HOME MONITORING: 6.8 (ref 2.5–3.5)

## 2022-09-23 NOTE — PROGRESS NOTES
ANTICOAGULATION MANAGEMENT     Bandar Wells 72 year old male is on warfarin with supratherapeutic INR result. (Goal INR 2.5-3.5)    Recent labs: (last 7 days)     09/23/22  0000   INR 6.8*       ASSESSMENT       Source(s): Chart Review    Previous INR was Therapeutic last visit; previously outside of goal range    Medication, diet, health changes since last INR ;Torsemide increased on 9/6 and Spironolactone  12.5 mg added on 9/6--edema better on 9/14 and 3 pound weight loss--These are most recent changes           PLAN     Unable to reach Bandar today.    LVM to hold today tomorrow and maintenance on Sunday, recheck on Monday. Requested call back to discuss reason for INR elevation    Follow up required to confirm warfarin dose taken and assess for changes    Janel Jara, RN  Anticoagulation Clinic  9/23/2022

## 2022-09-23 NOTE — PROGRESS NOTES
ANTICOAGULATION MANAGEMENT     Bandar Wells 72 year old male is on warfarin with supratherapeutic INR result. (Goal INR 2.5-3.5)    Recent labs: (last 7 days)     09/23/22  0000   INR 6.8*       ASSESSMENT       Source(s): Chart Review and Patient/Caregiver Call       Warfarin doses taken: Warfarin taken as instructed    Diet: No new diet changes identified    New illness, injury, or hospitalization: Yes: continued pain in RLE r/t vascular insufficiency with ulcer in RLL; pain rated 6/10 (ill mychart PCP if no improvement with elevation. Patient is able to take another 1/2 tab Oxycodone as last dose was at 10 am-discussed leg elevation. Has F/U appt next week at [facility] to address; of note: patient mentioned he fell the first week of September and hit head on kichen floor- denies LOC, denies HA, vision issues, photosensitivity, nausea, vomiting--writer cautioned patient to be very careful with falls due to critical INR level--patient to go to ER if any falls. Patient lives alone and does have family/friends who could drive him    Medication/supplement changes: Oxycodone; pt reported taked (2) 500 mg tabs of Tylenol q 6 hours--discussed pt taking 2 tabs q 8 hours due to liver toxicity-pt verbalized understanding    Signs or symptoms of bleeding or clotting: No    Previous INR: Therapeutic last visit; previously outside of goal range    Additional findings: None       PLAN     Recommended plan for temporary change(s) and ongoing change(s) affecting INR     Dosing Instructions: hold 2 doses then decrease your warfarin dose (15.8% change) with next INR in 1 week       Summary  As of 9/23/2022    Full warfarin instructions:  9/23: Hold; 9/24: Hold; Otherwise 4.5 mg every Wed, Sat; 3 mg all other days   Next INR check:  9/26/2022             Telephone call with Bandar who verbalizes understanding and agrees to plan and who agrees to plan and repeated back plan correctly    Patient to recheck with home meter    Education  provided: Please call back if any changes to your diet, medications or how you've been taking warfarin, Monitoring for bleeding signs and symptoms and When to seek medical attention/emergency care    Plan made per [facility] anticoagulation protocol    Janel Jara, RN  Anticoagulation Clinic  9/23/2022    _______________________________________________________________________     Anticoagulation Episode Summary     Current INR goal:  2.5-3.5   TTR:  37.8 % (1 y)   Target end date:  Indefinite   Send INR reminders to:  ILAN PATEL    Indications    Paroxysmal atrial fibrillation (H) [I48.0]  S/P mitral valve replacement with metallic valve [Z95.4]           Comments:  Aviva- wants a call every time         Anticoagulation Care Providers     Provider Role Specialty Phone number    Jin Hicks MD Referring Internal Medicine [phone]

## 2022-09-23 NOTE — TELEPHONE ENCOUNTER
Nurse Triage SBAR    Is this a 2nd Level Triage? YES, LICENSED PRACTITIONER REVIEW IS REQUIRED    Situation: Patient calling with leg pain and swelling in his right leg    Background: kidney history  Anticoag patient    Assessment: swelling in his right leg  Started about one month ago  States that this swelling is causing pain this AM - started to worsen this AM  States that there is drainage- but not increased  States he has been doing leg wraps   Swelling doesn't go past his knee  Mild to moderate swelling  Rating his pain 6/10- ankle and right side of the right leg  States the sensation of pain can vary   States he is able to walk ok even with the pain  States he has kidney issues and is seeing a specialist  Equal temperature in both legs  Denies fever  States his leg is scabbed but no new redness  INR is elevated as well 6.8 checked today at the  clinic       Protocol Recommended Disposition:   No disposition on file.    Recommendation: Second level triage- please review and advise- ADS? ER? UCC?     Routed to provider    Does the patient meet one of the following criteria for ADS visit consideration? 16+ years old, with an [facility] PCP     TIP  Providers, please consider if this condition is appropriate for management at one of our Acute and Diagnostic Services sites.     If patient is a good candidate, please use dotphrase <dot>triageresponse and select Refer to ADS to document.    Reason for Disposition    Thigh or calf pain and only 1 side and present > 1 hour    Additional Information    Negative: Sounds like a life-threatening emergency to the triager    Negative: Chest pain    Negative: Small area of swelling and followed an insect bite to the area    Negative: Followed a knee injury    Negative: Ankle or foot injury    Negative: Pregnant with leg swelling or edema    Negative: Difficulty breathing at rest    Negative: Entire foot is cool or blue in comparison to other side    Negative: SEVERE swelling  (e.g., swelling extends above knee, entire leg is swollen, weeping fluid)    Protocols used: LEG SWELLING AND EDEMA-A-OH

## 2022-09-26 NOTE — TELEPHONE ENCOUNTER
Spoke with patient and relayed message below from Dr Hicks. Patient is currently admitted to [facility] and is unsure when he will be discharged. Instructed patient to call and make a hospital follow up with Dr Hicks when he is discharged. Patient verbalized understanding.

## 2022-09-29 ENCOUNTER — LAB REQUISITION (OUTPATIENT)
Dept: LAB | Facility: CLINIC | Age: 72
End: 2022-09-29
Payer: COMMERCIAL

## 2022-09-29 DIAGNOSIS — Z51.81 ENCOUNTER FOR THERAPEUTIC DRUG LEVEL MONITORING: ICD-10-CM

## 2022-09-30 ENCOUNTER — ANTICOAGULATION THERAPY VISIT (OUTPATIENT)
Dept: ANTICOAGULATION | Facility: CLINIC | Age: 72
End: 2022-09-30

## 2022-09-30 ENCOUNTER — TRANSITIONAL CARE UNIT VISIT (OUTPATIENT)
Dept: GERIATRICS | Facility: CLINIC | Age: 72
End: 2022-09-30

## 2022-09-30 ENCOUNTER — NURSE TRIAGE (OUTPATIENT)
Dept: NURSING | Facility: CLINIC | Age: 72
End: 2022-09-30

## 2022-09-30 VITALS
HEART RATE: 60 BPM | DIASTOLIC BLOOD PRESSURE: 46 MMHG | WEIGHT: 147 LBS | TEMPERATURE: 96.7 F | RESPIRATION RATE: 16 BRPM | BODY MASS INDEX: 21.05 KG/M2 | SYSTOLIC BLOOD PRESSURE: 94 MMHG | HEIGHT: 70 IN | OXYGEN SATURATION: 96 %

## 2022-09-30 DIAGNOSIS — I89.0 LYMPHEDEMA: ICD-10-CM

## 2022-09-30 DIAGNOSIS — L97.915 ULCER OF RIGHT LOWER EXTREMITY WITH MUSCLE INVOLVEMENT WITHOUT EVIDENCE OF NECROSIS (H): Primary | ICD-10-CM

## 2022-09-30 DIAGNOSIS — I50.22 CHRONIC HFREF (HEART FAILURE WITH REDUCED EJECTION FRACTION) (H): ICD-10-CM

## 2022-09-30 DIAGNOSIS — I48.0 PAROXYSMAL ATRIAL FIBRILLATION (H): Primary | ICD-10-CM

## 2022-09-30 DIAGNOSIS — N18.4 CHRONIC KIDNEY DISEASE, STAGE IV (SEVERE) (H): ICD-10-CM

## 2022-09-30 DIAGNOSIS — E87.6 HYPOKALEMIA: ICD-10-CM

## 2022-09-30 DIAGNOSIS — Z95.4 S/P MITRAL VALVE REPLACEMENT WITH METALLIC VALVE: ICD-10-CM

## 2022-09-30 DIAGNOSIS — I48.0 PAROXYSMAL ATRIAL FIBRILLATION (H): ICD-10-CM

## 2022-09-30 LAB — INR PPP: 2.23 (ref 0.85–1.15)

## 2022-09-30 PROCEDURE — 85610 PROTHROMBIN TIME: CPT | Mod: ORL | Performed by: FAMILY MEDICINE

## 2022-09-30 PROCEDURE — 36415 COLL VENOUS BLD VENIPUNCTURE: CPT | Mod: ORL | Performed by: FAMILY MEDICINE

## 2022-09-30 PROCEDURE — P9603 ONE-WAY ALLOW PRORATED MILES: HCPCS | Mod: ORL | Performed by: FAMILY MEDICINE

## 2022-09-30 PROCEDURE — 99305 1ST NF CARE MODERATE MDM 35: CPT | Performed by: FAMILY MEDICINE

## 2022-09-30 RX ORDER — SPIRONOLACTONE 25 MG/1
12.5 TABLET ORAL DAILY
COMMUNITY
End: 2023-02-13

## 2022-09-30 RX ORDER — FLUTICASONE PROPIONATE 50 MCG
1 SPRAY, SUSPENSION (ML) NASAL DAILY
COMMUNITY
End: 2022-10-25

## 2022-09-30 RX ORDER — LINEZOLID 600 MG/1
600 TABLET, FILM COATED ORAL 2 TIMES DAILY
COMMUNITY
End: 2022-10-25

## 2022-09-30 RX ORDER — ACETAMINOPHEN 500 MG
1000 TABLET ORAL 3 TIMES DAILY
COMMUNITY

## 2022-09-30 RX ORDER — LACTOBACILLUS RHAMNOSUS GG 10B CELL
1 CAPSULE ORAL 2 TIMES DAILY
COMMUNITY
End: 2024-01-01

## 2022-09-30 RX ORDER — OXYCODONE HYDROCHLORIDE 5 MG/1
10 TABLET ORAL EVERY 4 HOURS
COMMUNITY
End: 2022-10-05

## 2022-09-30 NOTE — TELEPHONE ENCOUNTER
ANTICOAGULATION MANAGEMENT     Bandar Wells 72 year old male is on warfarin with subtherapeutic INR result. (Goal INR )    Recent labs: (last 7 days)     09/30/22  0557   INR 2.23*       ASSESSMENT       Source(s): Chart Review and Home Care/Facility Nurse       Warfarin doses taken: Warfarin taken as instructed    Diet: New admit to TCU    New illness, injury, or hospitalization: Yes: hospitalized 9/24-9/29 for Ulcer of lower extremity    Medication/supplement changes: Cipro 9 day course (dates: 9/29-10/7) which may be increasing INR today    Linezolid 9 day course (dates: 9/29-10/7) which may be increasing INR today    Signs or symptoms of bleeding or clotting: No    Previous INR: Supratherapeutic    Additional findings: Patient is a new admit to [facility] and the provider at the facility would like [facility] to continue dosing the patient while he is admitted     Did not adjust dose for today as both medications listed above can increase the INR       PLAN     Recommended plan for temporary change(s) affecting INR     Dosing Instructions: Continue your current warfarin dose with next INR in 3 days       Summary  As of 9/30/2022    Full warfarin instructions:  4.5 mg every Wed, Sat; 3 mg all other days   Next INR check:  10/3/2022             Telephone call with [facility] nurse who agrees to plan and repeated back plan correctly    Orders given to  Homecare nurse/facility to recheck    Education provided: Potential interaction between warfarin and cipro and linezolid    Plan made per [facility] anticoagulation protocol    Brenda Steiner, RN  Anticoagulation Clinic  9/30/2022    _______________________________________________________________________     Anticoagulation Episode Summary     Current INR goal:  2.5-3.5   TTR:  37.0 % (1 y)   Target end date:  Indefinite   Send INR reminders to:  ILAN PATEL    Indications    Paroxysmal atrial fibrillation (H) [I48.0]  S/P mitral valve replacement with  metallic valve [Z95.4]           Comments:  Aviva- wants a call every time         Anticoagulation Care Providers     Provider Role Specialty Phone number    Jin Hicks MD Referring Internal Medicine [phone]

## 2022-09-30 NOTE — PROGRESS NOTES
----- Message from Jeremias Arredondo MD sent at 4/15/2022 12:03 PM CDT -----  Mild PILY with AHI 5.6 per hour Nocturnal hypoxia of 81%   AutoPAP 6-12 cm pressure with nasal pillows with heated humidity    Error - opened in error. See Nurse triage visit 9/30.  Radha Mayers RN on 9/30/2022 at 2:22 PM

## 2022-09-30 NOTE — PROGRESS NOTES
[facility] GERIATRIC SERVICES    Facility:  [facility] ([facility]) [[id number]]  Code Status: FULL CODE      CHIEF COMPLAINT/REASON FOR VISIT:  Chief Complaint   Patient presents with     Hospital F/U       HPI:   Bandar is a 72 year old male who was recently admitted to hospital 9/24/2022.  He does have chronic lower extremity lymphedema and chronic kidney disease.  He also has paroxysmal atrial fibrillation does have mechanical mitral valve replacement and is being followed by the Coumadin clinic at [facility].  He does have chronic kidney disease stage IV but I am unclear of his baseline creatinine.  He does and followed by the [facility] for his wounds and right lower extremity and was recently recently prescribed antibiotics for infection.  Strong odor did get worse and was seen in the hospital.  He did grew out Citrobacter as well as Pseudomonas and he was changed to linezolid and continued on ciprofloxacin.  He was then treated probably and transferred here to the TCU at [facility] in stable condition.    Patient is sitting in chair does not appear to be in acute distress.  He denies any other issues at this time claims his pain is well managed when he gets his medications.  He denies any other issues at this time.    Past Medical History:  Past Medical History:   Diagnosis Date     Abnormal liver function test      Atrial fibrillation (H)     s/p Maze procedure     Atrial fibrillation (H)     post naun     Atrial fibrillation with RVR (H) 8/29/2015    S/p MAZE Jul 2015     Bacterial endocarditis      CHF (congestive heart failure) (H)      Chronic combined systolic and diastolic heart failure (H)      CKD (chronic kidney disease)      Dyslipidemia      H/O mitral valve replacement with mechanical valve      Hyperlipidemia      Hyperlipidemia      Idiopathic chronic gout without tophus, unspecified site 4/15/2021     Mitral valve prolapse      Near syncope      Pericardial effusion without cardiac  tamponade 8/29/2015     S/P mitral valve replacement with metallic valve 03/16/2017     Status post Maze operation for atrial fibrillation 8/29/2015           Surgical History:  Past Surgical History:   Procedure Laterality Date     CARDIAC SURGERY       CHG X-RAY PELVIS 3+ VW N/A 7/1/2015    Procedure: MITRAL VALVE REPLACEMENT, MAZE PROCEDURE, CARDIOLOGY TRANSESOPHAGEAL ECHOCARDIOGRAM;  Surgeon: Rm Munoz MD;  Location: [facility] Main OR;  Service:      LANG-MAZE MICROWAVE ABLATION       CREATION PERICARDIAL WINDOW N/A 8/31/2015    Procedure: RIGHT PERICARDIAL WINDOW, TALC PLEURODESIS,VIDEO ASSISTED THOROSCOPY,DRAINAGE OF BILATERAL PLEURAL EFFUSIONS;  Surgeon: Rm Munoz MD;  Location: [facility] OR;  Service:      EYE SURGERY      Retial hole treatment     HERNIA REPAIR Right     inguinal     Mechanical mitral valve       [facility]  9/3/2015          [facility]  2/14/2017          REPLACE VALVE MITRAL N/A 3/16/2017    Procedure: REDO STERNOTOMY, REDO MITRAL VALVE REPLACEMENT, PLACEMENT TEMPORARY VENTRICULAR PACING WIRES, ANESTHESIA TRANSESOPHAGEAL ECHOCARDIOGRAM;  Surgeon: Raphael Rosenberg MD;  Location: [facility];  Service:      REPLACE VALVE MITRAL  2015    Bioprosthetic       Family History:   Family History   Problem Relation Age of Onset     Heart Failure Mother      Atrial fibrillation Mother      Emphysema Father      Heart Failure Father      Kidney failure Sister         S/P Renal transplant     Alcoholism Brother      No Known Problems Brother      No Known Problems Son        Social History:    Social History     Socioeconomic History     Marital status: Single   Tobacco Use     Smoking status: Never Smoker     Smokeless tobacco: Never Used   Substance and Sexual Activity     Alcohol use: Yes     Alcohol/week: 2.0 standard drinks     Drug use: No     Sexual activity: Not Currently   Social History Narrative    Has a steady girlfriend and multiple friends .  His  son is in [place] but can be present if warned.  Home 1 1/2 stories.    3/2017       Post Discharge Medication Reconciliation Status: discharge medications reconciled, continue medications without change    Current Outpatient Medications   Medication Sig     acetaminophen (TYLENOL) 500 MG tablet Take 1,000 mg by mouth 3 times daily     Cholecalciferol (VITAMIN D3) 25 MCG (1000 UT) CAPS Take 1,000 Units by mouth daily     ciprofloxacin (CIPRO) 250 MG tablet Take 1 tablet (250 mg) by mouth 2 times daily     febuxostat (ULORIC) 40 MG TABS tablet Take 1 tablet (40 mg) by mouth daily     fluticasone (FLONASE) 50 MCG/ACT nasal spray Spray 1 spray into both nostrils daily     folic acid (FOLVITE) 1 MG tablet Take 1 mg by mouth daily     gabapentin (NEURONTIN) 300 MG capsule TAKE 2 CAPSULES (600 MG) BY MOUTH AT BEDTIME     lactobacillus rhamnosus, GG, (CULTURELL) capsule Take 1 capsule by mouth 2 times daily     linezolid (ZYVOX) 600 MG tablet Take 600 mg by mouth 2 times daily     oxyCODONE (ROXICODONE) 5 MG tablet Take 10 mg by mouth every 4 hours     penicillin V (VEETID) 500 MG tablet Take 1 tablet (500 mg) by mouth daily for 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     polyethylene glycol (MIRALAX) 17 g packet Take 17 g by mouth daily as needed for constipation     potassium chloride (KLOR-CON) 20 MEQ packet Take 40 mEq by mouth 2 times daily     sennosides (SENOKOT) 8.6 MG tablet Take 1-4 tablets by mouth 2 times daily     spironolactone (ALDACTONE) 25 MG tablet Take 12.5 mg by mouth daily     torsemide (DEMADEX) 20 MG tablet Take 3 tablets (60 mg) by mouth 2 times daily (Patient taking differently: Take 100 mg by mouth 2 times daily)     vitamin B complex with vitamin C (VITAMIN  B COMPLEX) tablet Take 1 tablet by mouth daily     warfarin ANTICOAGULANT (COUMADIN) 3 MG tablet TAKE 1-2 TABLETS (3MG-6MG) BY MOUTH DAILY OR AS DIRECTED BY THE INR CLINIC     Current Facility-Administered Medications   Medication     botulinum toxin type A  (BOTOX) 100 units injection 300 Units     botulinum toxin type A (BOTOX) 100 units injection 300 Units       REVIEW OF SYSTEM: Patient claims his pain is under adequate control denies any fevers chills nausea vomit diarrhea change in vision hearing taste or smell weakness one-sided other chest pain shortness of breath.  He denies any current shortness stool polyphasia polydipsia polyuria depression or anxiety remainder the review of systems is negative.      PHYSICAL EXAM: Is alert pleasant not appear to be in acute distress head is normocephalic and atraumatic sclera conjunctive is clear mucosas moist nasal discharge.  Heart sounds were irregularly irregular with adequate rate control lungs were clear to auscultation abdomen soft nontender.  Extremity was wrapped at this time and we will discuss with wound nurse on wound rounds today.    Neurologic Hernandez was baseline and affect was pleasant.        LABS: On 9/24/2022 white count was 8.4, hemoglobin is 9.9, platelets of 243,000.    Sodium was 140, potassium 3.2, calcium was 28, creatinine 2.95, glucose was 121.  Vitals; blood pressure 101/50    Pulse 64    Temperature is 96.7    Respiration 16    O2 sats 96%.      ASSESSMENT:    Encounter Diagnoses   Name Primary?     Ulcer of right lower extremity with muscle involvement without evidence of necrosis (H) Yes     Chronic HFrEF (heart failure with reduced ejection fraction) (H)      Lymphedema      Paroxysmal atrial fibrillation (H)      Chronic kidney disease, stage IV (severe) (H)      Hypokalemia         PLAN: Plan at this time check CRP basic metabolic profile CBC on Monday second acute kidney injury and infected wounds.    [facility] [place] [facility] will manage INR.  Wound nurses to follow.  INR be done today and that will be faxed into the Coumadin clinic as soon as possible and weights to be done daily.  We will continue to push fluids at this time.    Will incorporate physical and Occupational  Therapy with no other changes to care plan at this time.  Care plan was reviewed and is appropriate.        Electronically signed by: RADHAMES CRONIN DO

## 2022-09-30 NOTE — LETTER
9/30/2022        RE: Bandar Wells  [address] [place] [address]        [facility] GERIATRIC SERVICES    Facility:  [facility] ([facility]) [[id number]]  Code Status: FULL CODE      CHIEF COMPLAINT/REASON FOR VISIT:  Chief Complaint   Patient presents with     Hospital F/U       HPI:   Bandar is a 72 year old male who was recently admitted to hospital 9/24/2022.  He does have chronic lower extremity lymphedema and chronic kidney disease.  He also has paroxysmal atrial fibrillation does have mechanical mitral valve replacement and is being followed by the Coumadin clinic at [facility].  He does have chronic kidney disease stage IV but I am unclear of his baseline creatinine.  He does and followed by the [facility] for his wounds and right lower extremity and was recently recently prescribed antibiotics for infection.  Strong odor did get worse and was seen in the hospital.  He did grew out Citrobacter as well as Pseudomonas and he was changed to linezolid and continued on ciprofloxacin.  He was then treated probably and transferred here to the TCU at [facility] in stable condition.    Patient is sitting in chair does not appear to be in acute distress.  He denies any other issues at this time claims his pain is well managed when he gets his medications.  He denies any other issues at this time.    Past Medical History:  Past Medical History:   Diagnosis Date     Abnormal liver function test      Atrial fibrillation (H)     s/p Maze procedure     Atrial fibrillation (H)     post naun     Atrial fibrillation with RVR (H) 8/29/2015    S/p MAZE Jul 2015     Bacterial endocarditis      CHF (congestive heart failure) (H)      Chronic combined systolic and diastolic heart failure (H)      CKD (chronic kidney disease)      Dyslipidemia      H/O mitral valve replacement with mechanical valve      Hyperlipidemia      Hyperlipidemia      Idiopathic chronic gout without tophus, unspecified site 4/15/2021      Mitral valve prolapse      Near syncope      Pericardial effusion without cardiac tamponade 8/29/2015     S/P mitral valve replacement with metallic valve 03/16/2017     Status post Maze operation for atrial fibrillation 8/29/2015           Surgical History:  Past Surgical History:   Procedure Laterality Date     CARDIAC SURGERY       CHG X-RAY PELVIS 3+ VW N/A 7/1/2015    Procedure: MITRAL VALVE REPLACEMENT, MAZE PROCEDURE, CARDIOLOGY TRANSESOPHAGEAL ECHOCARDIOGRAM;  Surgeon: Rm Munoz MD;  Location: [facility] Main OR;  Service:      LANG-MAZE MICROWAVE ABLATION       CREATION PERICARDIAL WINDOW N/A 8/31/2015    Procedure: RIGHT PERICARDIAL WINDOW, TALC PLEURODESIS,VIDEO ASSISTED THOROSCOPY,DRAINAGE OF BILATERAL PLEURAL EFFUSIONS;  Surgeon: Rm Munoz MD;  Location: [facility] OR;  Service:      EYE SURGERY      Retial hole treatment     HERNIA REPAIR Right     inguinal     Mechanical mitral valve       [facility]  9/3/2015          [facility]  2/14/2017          REPLACE VALVE MITRAL N/A 3/16/2017    Procedure: REDO STERNOTOMY, REDO MITRAL VALVE REPLACEMENT, PLACEMENT TEMPORARY VENTRICULAR PACING WIRES, ANESTHESIA TRANSESOPHAGEAL ECHOCARDIOGRAM;  Surgeon: Raphael Rosenberg MD;  Location: [facility] OR;  Service:      REPLACE VALVE MITRAL  2015    Bioprosthetic       Family History:   Family History   Problem Relation Age of Onset     Heart Failure Mother      Atrial fibrillation Mother      Emphysema Father      Heart Failure Father      Kidney failure Sister         S/P Renal transplant     Alcoholism Brother      No Known Problems Brother      No Known Problems Son        Social History:    Social History     Socioeconomic History     Marital status: Single   Tobacco Use     Smoking status: Never Smoker     Smokeless tobacco: Never Used   Substance and Sexual Activity     Alcohol use: Yes     Alcohol/week: 2.0 standard drinks     Drug use: No     Sexual activity: Not  Currently   Social History Narrative    Has a steady girlfriend and multiple friends .  His son is in [place] but can be present if warned.  Home 1 1/2 stories.    3/2017       Post Discharge Medication Reconciliation Status: discharge medications reconciled, continue medications without change    Current Outpatient Medications   Medication Sig     acetaminophen (TYLENOL) 500 MG tablet Take 1,000 mg by mouth 3 times daily     Cholecalciferol (VITAMIN D3) 25 MCG (1000 UT) CAPS Take 1,000 Units by mouth daily     ciprofloxacin (CIPRO) 250 MG tablet Take 1 tablet (250 mg) by mouth 2 times daily     febuxostat (ULORIC) 40 MG TABS tablet Take 1 tablet (40 mg) by mouth daily     fluticasone (FLONASE) 50 MCG/ACT nasal spray Spray 1 spray into both nostrils daily     folic acid (FOLVITE) 1 MG tablet Take 1 mg by mouth daily     gabapentin (NEURONTIN) 300 MG capsule TAKE 2 CAPSULES (600 MG) BY MOUTH AT BEDTIME     lactobacillus rhamnosus, GG, (CULTURELL) capsule Take 1 capsule by mouth 2 times daily     linezolid (ZYVOX) 600 MG tablet Take 600 mg by mouth 2 times daily     oxyCODONE (ROXICODONE) 5 MG tablet Take 10 mg by mouth every 4 hours     penicillin V (VEETID) 500 MG tablet Take 1 tablet (500 mg) by mouth daily for 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     polyethylene glycol (MIRALAX) 17 g packet Take 17 g by mouth daily as needed for constipation     potassium chloride (KLOR-CON) 20 MEQ packet Take 40 mEq by mouth 2 times daily     sennosides (SENOKOT) 8.6 MG tablet Take 1-4 tablets by mouth 2 times daily     spironolactone (ALDACTONE) 25 MG tablet Take 12.5 mg by mouth daily     torsemide (DEMADEX) 20 MG tablet Take 3 tablets (60 mg) by mouth 2 times daily (Patient taking differently: Take 100 mg by mouth 2 times daily)     vitamin B complex with vitamin C (VITAMIN  B COMPLEX) tablet Take 1 tablet by mouth daily     warfarin ANTICOAGULANT (COUMADIN) 3 MG tablet TAKE 1-2 TABLETS (3MG-6MG) BY MOUTH DAILY OR AS DIRECTED BY THE INR  CLINIC     Current Facility-Administered Medications   Medication     botulinum toxin type A (BOTOX) 100 units injection 300 Units     botulinum toxin type A (BOTOX) 100 units injection 300 Units       REVIEW OF SYSTEM: Patient claims his pain is under adequate control denies any fevers chills nausea vomit diarrhea change in vision hearing taste or smell weakness one-sided other chest pain shortness of breath.  He denies any current shortness stool polyphasia polydipsia polyuria depression or anxiety remainder the review of systems is negative.      PHYSICAL EXAM: Is alert pleasant not appear to be in acute distress head is normocephalic and atraumatic sclera conjunctive is clear mucosas moist nasal discharge.  Heart sounds were irregularly irregular with adequate rate control lungs were clear to auscultation abdomen soft nontender.  Extremity was wrapped at this time and we will discuss with wound nurse on wound rounds today.    Neurologic Hernandez was baseline and affect was pleasant.        LABS: On 9/24/2022 white count was 8.4, hemoglobin is 9.9, platelets of 243,000.    Sodium was 140, potassium 3.2, calcium was 28, creatinine 2.95, glucose was 121.  Vitals; blood pressure 101/50    Pulse 64    Temperature is 96.7    Respiration 16    O2 sats 96%.      ASSESSMENT:    Encounter Diagnoses   Name Primary?     Ulcer of right lower extremity with muscle involvement without evidence of necrosis (H) Yes     Chronic HFrEF (heart failure with reduced ejection fraction) (H)      Lymphedema      Paroxysmal atrial fibrillation (H)      Chronic kidney disease, stage IV (severe) (H)      Hypokalemia         PLAN: Plan at this time check CRP basic metabolic profile CBC on Monday second acute kidney injury and infected wounds.    [facility] Coumad[facility] clinic will manage INR.  Wound nurses to follow.  INR be done today and that will be faxed into the Coumadin clinic as soon as possible and weights to be done daily.  We  will continue to push fluids at this time.    Will incorporate physical and Occupational Therapy with no other changes to care plan at this time.  Care plan was reviewed and is appropriate.        Electronically signed by: RADHAMES CRONIN DO          Sincerely,        RADHAMES CRONIN DO

## 2022-09-30 NOTE — TELEPHONE ENCOUNTER
Heather PEREZ from [facility] calling in for Pt today to nurse triage with a request to speak to INR Nurse. This caller is trying to find to which INR clinic Pt is following with to obtain Warfarin dosage at this time. This RN attempted to connect caller with [facility] INR nurse. Left VM with INR RN to return call to Heather directly about Pt. RE; dosing.  Caller will contacted by INR RN. Caller is amenable to plan and verbalizes understanding and agreement.    Jessica Temple, RN, [place], PHN on 9/30/2022 at 1:56 PM  [facility]  RN utilized sound nursing judgement based on facility triage protocols during this encounter.           Reason for Disposition    Non-urgent call redirected to specialist's office because it is open    Additional Information    Negative: Caller has already spoken with the PCP (or office), and has no further questions    Negative: Caller has already spoken with another triager and has no further questions    Negative: Caller has already spoken with another triager or PCP (or office), and has further questions and triager able to answer questions.    Negative: Busy signal.  First attempt to contact caller.  Follow-up call scheduled within 15 minutes.    Negative: No answer.  First attempt to contact caller.  Follow-up call scheduled within 15 minutes.    Negative: Message left on identified voicemail    Negative: Message left on unidentified voice mail. Phone number verified.    Negative: Message left with person in household    Negative: Wrong number reached. Phone number verified.    Negative: Second attempt to contact caller AND no contact made. Phone number verified.    Negative: Third attempt to contact caller AND no contact made. Phone number verified.    Negative: Cell phone out of range. Phone number verified.    Negative: Patient already left for the hospital/clinic    Negative: Caller has cancelled the call before the first contact    Negative: Unable to complete  "triage due to phone connection issues    Protocols used: NO CONTACT OR DUPLICATE CONTACT CALL-A-OH    COVID 19 Nurse Triage Plan/Patient Instructions    Please be aware that novel coronavirus (COVID-19) may be circulating in the community. If you develop symptoms such as fever, cough, or SOB or if you have concerns about the presence of another infection including coronavirus (COVID-19), please contact your health care provider or visit [email or web address].     Disposition/Instructions    Additional COVID19 information to add for patients.   How can I protect others?  If you have symptoms (fever, cough, body aches or trouble breathing): Stay home and away from others (self-isolate) until:    At least 10 days have passed since your symptoms started, And     You ve had no fever--and no medicine that reduces fever--for 1 full day (24 hours), And      Your other symptoms have resolved (gotten better).     If you don t have symptoms, but a test showed that you have COVID-19 (you tested positive):    Stay home and away from others (self-isolate). Follow the tips under \"How do I self-isolate?\" below for 10 days (20 days if you have a weak immune system).    You don't need to be retested for COVID-19 before going back to school or work. As long as you're fever-free and feeling better, you can go back to school, work and other activities after waiting the 10 or 20 days.     How do I self-isolate?    Stay in your own room, even for meals. Use your own bathroom if you can.     Stay away from others in your home. No hugging, kissing or shaking hands. No visitors.    Don t go to work, school or anywhere else.     Clean  high touch  surfaces often (doorknobs, counters, handles, etc.). Use a household cleaning spray or wipes. You ll find a full list on the EPA website:  www.epa.gov/pesticide-registration/list-n-disinfectants-use-against-sars-cov-2.    Cover your mouth and nose with a mask, tissue or washcloth to avoid " spreading germs.    Wash your hands and face often. Use soap and water.    Caregivers in these groups are at risk for severe illness due to COVID-19:  o People 65 years and older  o People who live in a nursing home or long-term care facility  o People with chronic disease (lung, heart, cancer, diabetes, kidney, liver, immunologic)  o People who have a weakened immune system, including those who:  - Are in cancer treatment  - Take medicine that weakens the immune system, such as corticosteroids  - Had a bone marrow or organ transplant  - Have an immune deficiency  - Have poorly controlled HIV or AIDS  - Are obese (body mass index of 40 or higher)  - Smoke regularly    Caregivers should wear gloves while washing dishes, handling laundry and cleaning bedrooms and bathrooms.    Use caution when washing and drying laundry: Don t shake dirty laundry, and use the warmest water setting that you can.    For more tips, go to www.cdc.gov/coronavirus/2019-ncov/downloads/10Things.pdf.    How can I take care of myself?  1. Get lots of rest. Drink extra fluids (unless a doctor has told you not to).     2. Take Tylenol (acetaminophen) for fever or pain. If you have liver or kidney problems, ask your family doctor if it s okay to take Tylenol.     Adults can take either:     650 mg (two 325 mg pills) every 4 to 6 hours, or     1,000 mg (two 500 mg pills) every 8 hours as needed.     Note: Don t take more than 3,000 mg in one day.   Acetaminophen is found in many medicines (both prescribed and over-the-counter medicines). Read all labels to be sure you don t take too much.     For children, check the Tylenol bottle for the right dose. The dose is based on the child s age or weight.    3. If you have other health problems (like cancer, heart failure, an organ transplant or severe kidney disease): Call your specialty clinic if you don t feel better in the next 2 days.    4. Know when to call 911: Emergency warning signs  include:    Trouble breathing or shortness of breath    Pain or pressure in the chest that doesn t go away    Feeling confused like you haven t felt before, or not being able to wake up    Bluish-colored lips or face    What are the symptoms of COVID-19?     The most common symptoms are cough, fever and trouble breathing.     Less common symptoms include body aches, chills, diarrhea (loose, watery poops), fatigue (feeling very tired), headache, runny nose, sore throat and loss of smell.    COVID-19 can cause severe coughing (bronchitis) and lung infection (pneumonia).    How does it spread?     The virus may spread when a person coughs or sneezes into the air. The virus can travel about 6 feet this way, and it can live on surfaces.      Common  (household disinfectants) will kill the virus.    Who is at risk?  Anyone can catch COVID-19 if they re around someone who has the virus.    How can others protect themselves?     Stay away from people who have COVID-19 (or symptoms of COVID-19).    Wash hands often with soap and water. Or, use hand  with at least 60% alcohol.    Avoid touching the eyes, nose or mouth.     Wear a face mask when you go out in public, when sick or when caring for a sick person.    Where can I get more information?    [facility]: About COVID-19: [email or web address]    CDC: What to Do If You re Sick: www.cdc.gov/coronavirus/2019-ncov/about/steps-when-sick.html    CDC: Ending Home Isolation: www.cdc.gov/coronavirus/2019-ncov/hcp/disposition-in-home-patients.html     CDC: Caring for Someone: www.cdc.gov/coronavirus/2019-ncov/if-you-are-sick/care-for-someone.html     [facility]: Interim Guidance for Hospital Discharge to Home: www.health.[facility].[place].us/diseases/coronavirus/hcp/hospdischarge.pdf    [facility] clinical trials (COVID-19 research studies): clinicalaffairs.[facility]/n-clinical-trials     Below are the COVID-19 hotlines at the [facility]  [facility] ([facility]). Interpreters are available.   o For health questions: Call 892-959-3543 or 1-513.615.8156 (7 a.m. to 7 p.m.)  o For questions about schools and childcare: Call 142-096-9421 or 1-636.315.7521 (7 a.m. to 7 p.m.)          Thank you for taking steps to prevent the spread of this virus.  o Limit your contact with others.  o Wear a simple mask to cover your cough.  o Wash your hands well and often.    Resources    [facility]: About COVID-19: [email or web address]    CDC: What to Do If You're Sick: www.cdc.gov/coronavirus/2019-ncov/about/steps-when-sick.html    CDC: Ending Home Isolation: www.cdc.gov/coronavirus/2019-ncov/hcp/disposition-in-home-patients.html     CDC: Caring for Someone: www.cdc.gov/coronavirus/2019-ncov/if-you-are-sick/care-for-someone.html     [facility]: Interim Guidance for Hospital Discharge to Home: www.health.[facility].mn./diseases/coronavirus/hcp/hospdischarge.pdf    [facility] clinical trials (COVID-19 research studies): clinicalaffairs.[facility]/n-clinical-trials     Below are the COVID-19 hotlines at the [place] [facility] ([facility]). Interpreters are available.   o For health questions: Call 525-222-0000 or 1-162.748.4884 (7 a.m. to 7 p.m.)  o For questions about schools and childcare: Call 403-220-8068 or 1-724.401.6143 (7 a.m. to 7 p.m.)

## 2022-10-01 ENCOUNTER — LAB REQUISITION (OUTPATIENT)
Dept: LAB | Facility: CLINIC | Age: 72
End: 2022-10-01
Payer: COMMERCIAL

## 2022-10-01 DIAGNOSIS — I48.91 UNSPECIFIED ATRIAL FIBRILLATION (H): ICD-10-CM

## 2022-10-02 ENCOUNTER — LAB REQUISITION (OUTPATIENT)
Dept: LAB | Facility: CLINIC | Age: 72
End: 2022-10-02
Payer: COMMERCIAL

## 2022-10-02 DIAGNOSIS — Z79.01 LONG TERM (CURRENT) USE OF ANTICOAGULANTS: ICD-10-CM

## 2022-10-02 LAB
ANION GAP SERPL CALCULATED.3IONS-SCNC: 15 MMOL/L (ref 7–15)
BUN SERPL-MCNC: 93 MG/DL (ref 8–23)
CALCIUM SERPL-MCNC: 9.3 MG/DL (ref 8.8–10.2)
CHLORIDE SERPL-SCNC: 103 MMOL/L (ref 98–107)
CREAT SERPL-MCNC: 2.87 MG/DL (ref 0.67–1.17)
DEPRECATED HCO3 PLAS-SCNC: 19 MMOL/L (ref 22–29)
GFR SERPL CREATININE-BSD FRML MDRD: 22 ML/MIN/1.73M2
GLUCOSE SERPL-MCNC: 86 MG/DL (ref 70–99)
POTASSIUM SERPL-SCNC: 4 MMOL/L (ref 3.4–5.3)
SODIUM SERPL-SCNC: 137 MMOL/L (ref 136–145)

## 2022-10-02 PROCEDURE — 36415 COLL VENOUS BLD VENIPUNCTURE: CPT | Mod: ORL | Performed by: FAMILY MEDICINE

## 2022-10-02 PROCEDURE — 80048 BASIC METABOLIC PNL TOTAL CA: CPT | Mod: ORL | Performed by: FAMILY MEDICINE

## 2022-10-03 ENCOUNTER — TRANSITIONAL CARE UNIT VISIT (OUTPATIENT)
Dept: GERIATRICS | Facility: CLINIC | Age: 72
End: 2022-10-03

## 2022-10-03 ENCOUNTER — ANTICOAGULATION THERAPY VISIT (OUTPATIENT)
Dept: ANTICOAGULATION | Facility: CLINIC | Age: 72
End: 2022-10-03

## 2022-10-03 ENCOUNTER — LAB REQUISITION (OUTPATIENT)
Dept: LAB | Facility: CLINIC | Age: 72
End: 2022-10-03
Payer: COMMERCIAL

## 2022-10-03 VITALS
RESPIRATION RATE: 16 BRPM | DIASTOLIC BLOOD PRESSURE: 55 MMHG | SYSTOLIC BLOOD PRESSURE: 108 MMHG | HEART RATE: 78 BPM | BODY MASS INDEX: 21.3 KG/M2 | TEMPERATURE: 97.8 F | HEIGHT: 70 IN | WEIGHT: 148.8 LBS | OXYGEN SATURATION: 98 %

## 2022-10-03 DIAGNOSIS — N18.4 CHRONIC KIDNEY DISEASE, STAGE IV (SEVERE) (H): ICD-10-CM

## 2022-10-03 DIAGNOSIS — I50.22 CHRONIC HFREF (HEART FAILURE WITH REDUCED EJECTION FRACTION) (H): ICD-10-CM

## 2022-10-03 DIAGNOSIS — I89.0 LYMPHEDEMA: ICD-10-CM

## 2022-10-03 DIAGNOSIS — I48.0 PAROXYSMAL ATRIAL FIBRILLATION (H): ICD-10-CM

## 2022-10-03 DIAGNOSIS — L97.915 ULCER OF RIGHT LOWER EXTREMITY WITH MUSCLE INVOLVEMENT WITHOUT EVIDENCE OF NECROSIS (H): Primary | ICD-10-CM

## 2022-10-03 DIAGNOSIS — Z95.4 S/P MITRAL VALVE REPLACEMENT WITH METALLIC VALVE: ICD-10-CM

## 2022-10-03 DIAGNOSIS — E87.6 HYPOKALEMIA: ICD-10-CM

## 2022-10-03 DIAGNOSIS — I48.0 PAROXYSMAL ATRIAL FIBRILLATION (H): Primary | ICD-10-CM

## 2022-10-03 DIAGNOSIS — N18.4 CHRONIC KIDNEY DISEASE, STAGE 4 (SEVERE) (H): ICD-10-CM

## 2022-10-03 LAB — INR PPP: 2.35 (ref 0.85–1.15)

## 2022-10-03 PROCEDURE — P9604 ONE-WAY ALLOW PRORATED TRIP: HCPCS | Mod: ORL | Performed by: INTERNAL MEDICINE

## 2022-10-03 PROCEDURE — 85610 PROTHROMBIN TIME: CPT | Mod: ORL | Performed by: INTERNAL MEDICINE

## 2022-10-03 PROCEDURE — 36415 COLL VENOUS BLD VENIPUNCTURE: CPT | Mod: ORL | Performed by: INTERNAL MEDICINE

## 2022-10-03 PROCEDURE — 99309 SBSQ NF CARE MODERATE MDM 30: CPT | Performed by: FAMILY MEDICINE

## 2022-10-03 NOTE — LETTER
10/3/2022        RE: Bandar Wells  [address] [place] [address]        [facility] GERIATRIC SERVICES    Facility:  [facility] ([facility]) [[id number]]  Code Status: FULL CODE      CHIEF COMPLAINT/REASON FOR VISIT:  Chief Complaint   Patient presents with     RECHECK       HISTORY:      HPI: Bandar is a 72 year old male who resides here with multiple medical problems at the [facility].  He does have chronic kidney disease stage IV and its worsening however he is eating and drinking without difficulty also has a mechanical mitral valve is on Coumadin at this time.  Is being followed by the Coumadin clinic at [facility].  He also has a very extreme wound in his legs and his legs are dusky but he is followed he was followed by the [facility] and now is can be followed by [facility].  Does not appear to be infected.  He did have a strong odor in the hospital and he grew out Citrobacter and Pseudomonas is on antibiotics which are appropriate at this time.    Pain is well controlled at this time he denies any fevers chills nausea vomiting diarrhea change in vision hearing taste or smell weakness one-sided other chest pain or shortness of breath.    Past Medical History:   Diagnosis Date     Abnormal liver function test      Atrial fibrillation (H)     s/p Maze procedure     Atrial fibrillation (H)     post naun     Atrial fibrillation with RVR (H) 8/29/2015    S/p MAZE Jul 2015     Bacterial endocarditis      CHF (congestive heart failure) (H)      Chronic combined systolic and diastolic heart failure (H)      CKD (chronic kidney disease)      Dyslipidemia      H/O mitral valve replacement with mechanical valve      Hyperlipidemia      Hyperlipidemia      Idiopathic chronic gout without tophus, unspecified site 4/15/2021     Mitral valve prolapse      Near syncope      Pericardial effusion without cardiac tamponade 8/29/2015     S/P mitral valve replacement with metallic valve 03/16/2017     Status  post Maze operation for atrial fibrillation 8/29/2015             Family History   Problem Relation Age of Onset     Heart Failure Mother      Atrial fibrillation Mother      Emphysema Father      Heart Failure Father      Kidney failure Sister         S/P Renal transplant     Alcoholism Brother      No Known Problems Brother      No Known Problems Son      Social History     Socioeconomic History     Marital status: Single   Tobacco Use     Smoking status: Never Smoker     Smokeless tobacco: Never Used   Substance and Sexual Activity     Alcohol use: Yes     Alcohol/week: 2.0 standard drinks     Drug use: No     Sexual activity: Not Currently   Social History Narrative    Has a steady girlfriend and multiple friends .  His son is in [place] but can be present if warned.  Home 1 1/2 stories.    3/2017         REVIEW OF SYSTEM: Remainder review of systems is negative.  Please see HPI      PHYSICAL EXAM: Patient is alert pleasant does not appear to be in acute distress head is normocephalic and atraumatic sclera was clear.  Heart sounds are irregularly irregular with mechanical click lungs are clear to station abdomen soft nontender.  Extremities were dusky and right leg was wrapped and I did discuss with the wound nurse today on the appearance of this wound.  And it can be changed again on Wednesday.  No signs of infection according to our wound nurse.  Neurologically was baseline affect is pleasant.        LABS: Recent creatinine has been high as a threes his creatinine was 2.87 which is up from the hospital.  GFR is 22 around baseline at this time and sodium potassium within normal limits.    Vital; blood pressure 108/55    Pulse 78    Temperature is 97.9    Respiration 16    O2 sats 98%.  On 10 3 which is today INR was 2.35.      ASSESSMENT:   Encounter Diagnoses   Name Primary?     Ulcer of right lower extremity with muscle involvement without evidence of necrosis (H) Yes     Chronic HFrEF (heart failure with  reduced ejection fraction) (H)      Chronic kidney disease, stage IV (severe) (H)      Lymphedema      Paroxysmal atrial fibrillation (H)      Hypokalemia         PLAN: Plan at this time a basic metabolic profile done tomorrow second acute kidney injury chronic kidney disease.  We will continue wound wraps at this time and follow-up with wound clinic which is on point for this week.  We will push fluids at this time and continue pain medications as ordered.  No other changes to care plan at this time.        Electronically signed by: Lashawn Mallory          Sincerely,        RADHAMES CRONIN DO

## 2022-10-03 NOTE — PROGRESS NOTES
[facility] GERIATRIC SERVICES    Facility:  [facility] ([facility]) [[id number]]  Code Status: FULL CODE      CHIEF COMPLAINT/REASON FOR VISIT:  Chief Complaint   Patient presents with     RECHECK       HISTORY:      HPI: Bandar is a 72 year old male who resides here with multiple medical problems at the [facility].  He does have chronic kidney disease stage IV and its worsening however he is eating and drinking without difficulty also has a mechanical mitral valve is on Coumadin at this time.  Is being followed by the Coumadin clinic at [facility].  He also has a very extreme wound in his legs and his legs are dusky but he is followed he was followed by the [facility] and now is can be followed by [facility].  Does not appear to be infected.  He did have a strong odor in the hospital and he grew out Citrobacter and Pseudomonas is on antibiotics which are appropriate at this time.    Pain is well controlled at this time he denies any fevers chills nausea vomiting diarrhea change in vision hearing taste or smell weakness one-sided other chest pain or shortness of breath.    Past Medical History:   Diagnosis Date     Abnormal liver function test      Atrial fibrillation (H)     s/p Maze procedure     Atrial fibrillation (H)     post naun     Atrial fibrillation with RVR (H) 8/29/2015    S/p MAZE Jul 2015     Bacterial endocarditis      CHF (congestive heart failure) (H)      Chronic combined systolic and diastolic heart failure (H)      CKD (chronic kidney disease)      Dyslipidemia      H/O mitral valve replacement with mechanical valve      Hyperlipidemia      Hyperlipidemia      Idiopathic chronic gout without tophus, unspecified site 4/15/2021     Mitral valve prolapse      Near syncope      Pericardial effusion without cardiac tamponade 8/29/2015     S/P mitral valve replacement with metallic valve 03/16/2017     Status post Maze operation for atrial fibrillation 8/29/2015             Family History    Problem Relation Age of Onset     Heart Failure Mother      Atrial fibrillation Mother      Emphysema Father      Heart Failure Father      Kidney failure Sister         S/P Renal transplant     Alcoholism Brother      No Known Problems Brother      No Known Problems Son      Social History     Socioeconomic History     Marital status: Single   Tobacco Use     Smoking status: Never Smoker     Smokeless tobacco: Never Used   Substance and Sexual Activity     Alcohol use: Yes     Alcohol/week: 2.0 standard drinks     Drug use: No     Sexual activity: Not Currently   Social History Narrative    Has a steady girlfriend and multiple friends .  His son is in [place] but can be present if warned.  Home 1 1/2 stories.    3/2017         REVIEW OF SYSTEM: Remainder review of systems is negative.  Please see HPI      PHYSICAL EXAM: Patient is alert pleasant does not appear to be in acute distress head is normocephalic and atraumatic sclera was clear.  Heart sounds are irregularly irregular with mechanical click lungs are clear to station abdomen soft nontender.  Extremities were dusky and right leg was wrapped and I did discuss with the wound nurse today on the appearance of this wound.  And it can be changed again on Wednesday.  No signs of infection according to our wound nurse.  Neurologically was baseline affect is pleasant.        LABS: Recent creatinine has been high as a threes his creatinine was 2.87 which is up from the hospital.  GFR is 22 around baseline at this time and sodium potassium within normal limits.    Vital; blood pressure 108/55    Pulse 78    Temperature is 97.9    Respiration 16    O2 sats 98%.  On 10 3 which is today INR was 2.35.      ASSESSMENT:   Encounter Diagnoses   Name Primary?     Ulcer of right lower extremity with muscle involvement without evidence of necrosis (H) Yes     Chronic HFrEF (heart failure with reduced ejection fraction) (H)      Chronic kidney disease, stage IV (severe) (H)       Lymphedema      Paroxysmal atrial fibrillation (H)      Hypokalemia         PLAN: Plan at this time a basic metabolic profile done tomorrow second acute kidney injury chronic kidney disease.  We will continue wound wraps at this time and follow-up with wound clinic which is on point for this week.  We will push fluids at this time and continue pain medications as ordered.  No other changes to care plan at this time.        Electronically signed by: Lashawn Mallory

## 2022-10-03 NOTE — PROGRESS NOTES
ANTICOAGULATION MANAGEMENT     Bandar Wells 72 year old male is on warfarin with subtherapeutic INR result. (Goal INR 2.5-3.5)    Recent labs: (last 7 days)     10/03/22  0510   INR 2.35*       ASSESSMENT       Source(s): Chart Review and Home Care/Facility Nurse       Warfarin doses taken: Warfarin taken as instructed    Diet: No new diet changes identified    New illness, injury, or hospitalization: Yes:  Yes: hospitalized 9/24-9/29 for Ulcer of lower extremity        Medication/supplement changes:   Cipro 9 day course (dates: 9/29-10/7) which may be increasing INR today    Signs or symptoms of bleeding or clotting: No    Previous INR: Subtherapeutic    Additional findings: None       PLAN     Recommended plan for temporary change(s) affecting INR     Dosing Instructions: Increase your warfarin dose (6.2% change) with next INR in 4 days       Summary  As of 10/3/2022    Full warfarin instructions:  4.5 mg every Mon, Wed, Sat; 3 mg all other days   Next INR check:  10/7/2022             Telephone call with Heather PEREZ LPN facility nurse who verbalizes understanding and agrees to plan    Orders given to  Homecare nurse/facility to recheck    Education provided: None required    Plan made per [facility] anticoagulation protocol    Jackelyn Ayala, RN  Anticoagulation Clinic  10/3/2022    _______________________________________________________________________     Anticoagulation Episode Summary     Current INR goal:  2.5-3.5   TTR:  37.0 % (1 y)   Target end date:  Indefinite   Send INR reminders to:  ILAN KASDAVI    Indications    Paroxysmal atrial fibrillation (H) [I48.0]  S/P mitral valve replacement with metallic valve [Z95.4]           Comments:  [facility]- wants a call every time         Anticoagulation Care Providers     Provider Role Specialty Phone number    Jin Hicks MD Referring Internal Medicine [phone]

## 2022-10-04 LAB
ANION GAP SERPL CALCULATED.3IONS-SCNC: 13 MMOL/L (ref 7–15)
BUN SERPL-MCNC: 91.1 MG/DL (ref 8–23)
CALCIUM SERPL-MCNC: 9.1 MG/DL (ref 8.8–10.2)
CHLORIDE SERPL-SCNC: 103 MMOL/L (ref 98–107)
CREAT SERPL-MCNC: 2.88 MG/DL (ref 0.67–1.17)
DEPRECATED HCO3 PLAS-SCNC: 20 MMOL/L (ref 22–29)
GFR SERPL CREATININE-BSD FRML MDRD: 22 ML/MIN/1.73M2
GLUCOSE SERPL-MCNC: 93 MG/DL (ref 70–99)
POTASSIUM SERPL-SCNC: 3.8 MMOL/L (ref 3.4–5.3)
SODIUM SERPL-SCNC: 136 MMOL/L (ref 136–145)

## 2022-10-04 PROCEDURE — 80048 BASIC METABOLIC PNL TOTAL CA: CPT | Mod: ORL | Performed by: FAMILY MEDICINE

## 2022-10-04 PROCEDURE — P9604 ONE-WAY ALLOW PRORATED TRIP: HCPCS | Mod: ORL | Performed by: FAMILY MEDICINE

## 2022-10-04 PROCEDURE — 36415 COLL VENOUS BLD VENIPUNCTURE: CPT | Mod: ORL | Performed by: FAMILY MEDICINE

## 2022-10-05 ENCOUNTER — TRANSITIONAL CARE UNIT VISIT (OUTPATIENT)
Dept: GERIATRICS | Facility: CLINIC | Age: 72
End: 2022-10-05
Payer: COMMERCIAL

## 2022-10-05 VITALS
SYSTOLIC BLOOD PRESSURE: 116 MMHG | TEMPERATURE: 97.9 F | BODY MASS INDEX: 21.3 KG/M2 | HEIGHT: 70 IN | DIASTOLIC BLOOD PRESSURE: 60 MMHG | WEIGHT: 148.8 LBS | OXYGEN SATURATION: 98 % | RESPIRATION RATE: 16 BRPM | HEART RATE: 62 BPM

## 2022-10-05 DIAGNOSIS — I89.0 LYMPHEDEMA: ICD-10-CM

## 2022-10-05 DIAGNOSIS — R52 PAIN: Primary | ICD-10-CM

## 2022-10-05 DIAGNOSIS — I50.22 CHRONIC HFREF (HEART FAILURE WITH REDUCED EJECTION FRACTION) (H): ICD-10-CM

## 2022-10-05 DIAGNOSIS — N18.4 CHRONIC KIDNEY DISEASE, STAGE IV (SEVERE) (H): Primary | ICD-10-CM

## 2022-10-05 DIAGNOSIS — L97.915 ULCER OF RIGHT LOWER EXTREMITY WITH MUSCLE INVOLVEMENT WITHOUT EVIDENCE OF NECROSIS (H): ICD-10-CM

## 2022-10-05 DIAGNOSIS — I48.0 PAROXYSMAL ATRIAL FIBRILLATION (H): ICD-10-CM

## 2022-10-05 PROCEDURE — 99309 SBSQ NF CARE MODERATE MDM 30: CPT | Performed by: FAMILY MEDICINE

## 2022-10-05 RX ORDER — OXYCODONE HYDROCHLORIDE 5 MG/1
10 TABLET ORAL EVERY 4 HOURS
Qty: 30 TABLET | Refills: 0 | Status: SHIPPED | OUTPATIENT
Start: 2022-10-05 | End: 2022-10-09

## 2022-10-05 NOTE — LETTER
10/5/2022        RE: Bandar Wells  [address] [place] [address]        [facility] GERIATRIC SERVICES    Facility:  [facility] ([facility]) [[id number]]  Code Status: FULL CODE      CHIEF COMPLAINT/REASON FOR VISIT:  Chief Complaint   Patient presents with     RECHECK       HISTORY:      HPI: Bandar is a 72 year old male who has a history of chronic kidney disease stage IV who was probably likely around baseline at this time eating and drinking well.  He also has a mechanical mitral valve on Coumadin INR is pending.  He has been followed here for extreme wound I did just change the dressing and he was recently hospitalized and grew out Citrobacter and Pseudomonas on antibiotics.  Wound did not look infected at this time but is severe venous stasis changes lower extremity.  On last visit pain was well controlled and there were no other new issues.    Patient is doing okay at this time.  I did discuss with his creatinine is likely he feels this is about baseline for him at this time.  He is likely down the road to dialysis at some point for holding off for now pretty steady.  Pain is well controlled at this time and we can recheck his wounds today at wound rounds when nurse manager is ready.    Patient has no other concerns and staff have no other concerns.        Past Medical History:   Diagnosis Date     Abnormal liver function test      Atrial fibrillation (H)     s/p Maze procedure     Atrial fibrillation (H)     post naun     Atrial fibrillation with RVR (H) 8/29/2015    S/p MAZE Jul 2015     Bacterial endocarditis      CHF (congestive heart failure) (H)      Chronic combined systolic and diastolic heart failure (H)      CKD (chronic kidney disease)      Dyslipidemia      H/O mitral valve replacement with mechanical valve      Hyperlipidemia      Hyperlipidemia      Idiopathic chronic gout without tophus, unspecified site 4/15/2021     Mitral valve prolapse      Near syncope      Pericardial effusion  without cardiac tamponade 8/29/2015     S/P mitral valve replacement with metallic valve 03/16/2017     Status post Maze operation for atrial fibrillation 8/29/2015             Family History   Problem Relation Age of Onset     Heart Failure Mother      Atrial fibrillation Mother      Emphysema Father      Heart Failure Father      Kidney failure Sister         S/P Renal transplant     Alcoholism Brother      No Known Problems Brother      No Known Problems Son      Social History     Socioeconomic History     Marital status: Single   Tobacco Use     Smoking status: Never Smoker     Smokeless tobacco: Never Used   Substance and Sexual Activity     Alcohol use: Yes     Alcohol/week: 2.0 standard drinks     Drug use: No     Sexual activity: Not Currently   Social History Narrative    Has a steady girlfriend and multiple friends .  His son is in [place] but can be present if warned.  Home 1 1/2 stories.    3/2017         REVIEW OF SYSTEM: Pain is well controlled on medications patient denies any fevers chills nausea vomit diarrhea change in vision hearing taste or smell weakness one-sided chest pain shortness of breath.  He is making lots of urine at this time moving his bowels without difficulty and the remainder the review of systems is negative.      PHYSICAL EXAM: Patient is alert pleasant does not appear to be in acute distress has normocephalic and atraumatic oromucosa was moist nasal discharge.  Heart sounds were irregular with mechanical click noted lungs are clear to auscultation abdomen soft nontender bowel sounds are positive.  Extremities show 2+ pitting edema however the wraps are on at this time and we can see his wounds this afternoon.  Neurologic Ehrnandez is baseline and affect is pleasant.      LABS: On 9/16/2022 hemoglobin is 9.7.    Comparison on 10/4/2022 basic metabolic profile is as follows creatinine was 2.88 was 3.05 approximately 2 months ago.  In approximately a month ago.  Was also 2.05 unclear  baseline GFR is 22.    Vital; blood pressure 95/41    Pulse of 67    Temperature is 97    Respiration 16    O2 sats 97%    On 10/3/2022 INR was 2.35 and pending for today..      ASSESSMENT:   Encounter Diagnoses   Name Primary?     Chronic kidney disease, stage IV (severe) (H) Yes     Chronic HFrEF (heart failure with reduced ejection fraction) (H)      Paroxysmal atrial fibrillation (H)      Lymphedema      Ulcer of right lower extremity with muscle involvement without evidence of necrosis (H)         PLAN: Plan at this time we will continue to push fluids and continue to monitor above medical problems.  Given his edema at this time I do not do go down on his diuretic at this time and will continue to monitor his kidneys we will check another basic metabolic profile next week.    I will continue to monitor above medical problems and no other changes to care plan at this time.  Care plan was reviewed and is appropriate.        Electronically signed by: RADHAMES CRONIN DO          Sincerely,        RADHAMES CRONIN DO

## 2022-10-05 NOTE — PROGRESS NOTES
[facility] GERIATRIC SERVICES    Facility:  [facility] ([facility]) [[id number]]  Code Status: FULL CODE      CHIEF COMPLAINT/REASON FOR VISIT:  Chief Complaint   Patient presents with     RECHECK       HISTORY:      HPI: Bandar is a 72 year old male who has a history of chronic kidney disease stage IV who was probably likely around baseline at this time eating and drinking well.  He also has a mechanical mitral valve on Coumadin INR is pending.  He has been followed here for extreme wound I did just change the dressing and he was recently hospitalized and grew out Citrobacter and Pseudomonas on antibiotics.  Wound did not look infected at this time but is severe venous stasis changes lower extremity.  On last visit pain was well controlled and there were no other new issues.    Patient is doing okay at this time.  I did discuss with his creatinine is likely he feels this is about baseline for him at this time.  He is likely down the road to dialysis at some point for holding off for now pretty steady.  Pain is well controlled at this time and we can recheck his wounds today at wound rounds when nurse manager is ready.    Patient has no other concerns and staff have no other concerns.        Past Medical History:   Diagnosis Date     Abnormal liver function test      Atrial fibrillation (H)     s/p Maze procedure     Atrial fibrillation (H)     post naun     Atrial fibrillation with RVR (H) 8/29/2015    S/p MAZE Jul 2015     Bacterial endocarditis      CHF (congestive heart failure) (H)      Chronic combined systolic and diastolic heart failure (H)      CKD (chronic kidney disease)      Dyslipidemia      H/O mitral valve replacement with mechanical valve      Hyperlipidemia      Hyperlipidemia      Idiopathic chronic gout without tophus, unspecified site 4/15/2021     Mitral valve prolapse      Near syncope      Pericardial effusion without cardiac tamponade 8/29/2015     S/P mitral valve replacement with metallic valve  03/16/2017     Status post Maze operation for atrial fibrillation 8/29/2015             Family History   Problem Relation Age of Onset     Heart Failure Mother      Atrial fibrillation Mother      Emphysema Father      Heart Failure Father      Kidney failure Sister         S/P Renal transplant     Alcoholism Brother      No Known Problems Brother      No Known Problems Son      Social History     Socioeconomic History     Marital status: Single   Tobacco Use     Smoking status: Never Smoker     Smokeless tobacco: Never Used   Substance and Sexual Activity     Alcohol use: Yes     Alcohol/week: 2.0 standard drinks     Drug use: No     Sexual activity: Not Currently   Social History Narrative    Has a steady girlfriend and multiple friends .  His son is in [place] but can be present if warned.  Home 1 1/2 stories.    3/2017         REVIEW OF SYSTEM: Pain is well controlled on medications patient denies any fevers chills nausea vomit diarrhea change in vision hearing taste or smell weakness one-sided chest pain shortness of breath.  He is making lots of urine at this time moving his bowels without difficulty and the remainder the review of systems is negative.      PHYSICAL EXAM: Patient is alert pleasant does not appear to be in acute distress has normocephalic and atraumatic oromucosa was moist nasal discharge.  Heart sounds were irregular with mechanical click noted lungs are clear to auscultation abdomen soft nontender bowel sounds are positive.  Extremities show 2+ pitting edema however the wraps are on at this time and we can see his wounds this afternoon.  Neurologic Hernandez is baseline and affect is pleasant.      LABS: On 9/16/2022 hemoglobin is 9.7.    Comparison on 10/4/2022 basic metabolic profile is as follows creatinine was 2.88 was 3.05 approximately 2 months ago.  In approximately a month ago.  Was also 2.05 unclear baseline GFR is 22.    Vital; blood pressure 95/41    Pulse of 67    Temperature is  97    Respiration 16    O2 sats 97%    On 10/3/2022 INR was 2.35 and pending for today..      ASSESSMENT:   Encounter Diagnoses   Name Primary?     Chronic kidney disease, stage IV (severe) (H) Yes     Chronic HFrEF (heart failure with reduced ejection fraction) (H)      Paroxysmal atrial fibrillation (H)      Lymphedema      Ulcer of right lower extremity with muscle involvement without evidence of necrosis (H)         PLAN: Plan at this time we will continue to push fluids and continue to monitor above medical problems.  Given his edema at this time I do not do go down on his diuretic at this time and will continue to monitor his kidneys we will check another basic metabolic profile next week.    I will continue to monitor above medical problems and no other changes to care plan at this time.  Care plan was reviewed and is appropriate.        Electronically signed by: RADHAMES CRONIN DO

## 2022-10-06 ENCOUNTER — LAB REQUISITION (OUTPATIENT)
Dept: LAB | Facility: CLINIC | Age: 72
End: 2022-10-06
Payer: COMMERCIAL

## 2022-10-06 DIAGNOSIS — Z79.01 LONG TERM (CURRENT) USE OF ANTICOAGULANTS: ICD-10-CM

## 2022-10-06 NOTE — TELEPHONE ENCOUNTER
Nursing called to ask for the oxycodone script to be sent to [facility].  Just signed the one that was cued up for provider from triage RN.      Electronically signed by Elena Diaz RN, CNP

## 2022-10-07 ENCOUNTER — ANTICOAGULATION THERAPY VISIT (OUTPATIENT)
Dept: ANTICOAGULATION | Facility: CLINIC | Age: 72
End: 2022-10-07

## 2022-10-07 DIAGNOSIS — Z95.4 S/P MITRAL VALVE REPLACEMENT WITH METALLIC VALVE: ICD-10-CM

## 2022-10-07 DIAGNOSIS — I48.0 PAROXYSMAL ATRIAL FIBRILLATION (H): Primary | ICD-10-CM

## 2022-10-07 LAB — INR PPP: 2.01 (ref 0.85–1.15)

## 2022-10-07 PROCEDURE — 85610 PROTHROMBIN TIME: CPT | Mod: ORL | Performed by: FAMILY MEDICINE

## 2022-10-07 PROCEDURE — P9603 ONE-WAY ALLOW PRORATED MILES: HCPCS | Mod: ORL | Performed by: FAMILY MEDICINE

## 2022-10-07 PROCEDURE — 36415 COLL VENOUS BLD VENIPUNCTURE: CPT | Mod: ORL | Performed by: FAMILY MEDICINE

## 2022-10-07 NOTE — PROGRESS NOTES
ANTICOAGULATION MANAGEMENT     Bandar Wells 72 year old male is on warfarin with subtherapeutic INR result. (Goal INR 2.5-3.5)    Recent labs: (last 7 days)     10/07/22  0549   INR 2.01*       ASSESSMENT       Source(s): Chart Review and Home Care/Facility Nurse       Warfarin doses taken: Warfarin taken as instructed    Diet: No new diet changes identified    New illness, injury, or hospitalization: No    Medication/supplement changes: Cipro 9 day course (dates: 9/29-10/7    Signs or symptoms of bleeding or clotting: No    Previous INR: Subtherapeutic    Additional findings: None       PLAN     Recommended plan for temporary change(s) affecting INR     Dosing Instructions: booster dose then Increase your warfarin dose (11.1% change) with next INR in 3 days (prior to infection patient was taking  33 mg/7 days, may need to slowly increase closer to this now that abx are completed)     Summary  As of 10/7/2022    Full warfarin instructions:  10/7: 4.5 mg; Otherwise 3 mg every Mon, Fri; 4.5 mg all other days   Next INR check:  10/10/2022             Telephone call with [facility] home care nurse who verbalizes understanding and agrees to plan    Orders given to  Homecare nurse/facility to recheck    Education provided: Please call back if any changes to your diet, medications or how you've been taking warfarin    Plan made per [facility] anticoagulation protocol    Mary Stinson RN  Anticoagulation Clinic  10/7/2022    _______________________________________________________________________     Anticoagulation Episode Summary     Current INR goal:  2.5-3.5   TTR:  37.0 % (1 y)   Target end date:  Indefinite   Send INR reminders to:  ILAN PATEL    Indications    Paroxysmal atrial fibrillation (H) [I48.0]  S/P mitral valve replacement with metallic valve [Z95.4]           Comments:  Anas- wants a call every time         Anticoagulation Care Providers     Provider Role Specialty Phone number    Jin Hicks MD Referring  Internal Medicine [phone]

## 2022-10-09 ENCOUNTER — LAB REQUISITION (OUTPATIENT)
Dept: LAB | Facility: CLINIC | Age: 72
End: 2022-10-09
Payer: COMMERCIAL

## 2022-10-09 DIAGNOSIS — R52 PAIN: ICD-10-CM

## 2022-10-09 DIAGNOSIS — I10 ESSENTIAL (PRIMARY) HYPERTENSION: ICD-10-CM

## 2022-10-09 DIAGNOSIS — Z51.81 ENCOUNTER FOR THERAPEUTIC DRUG LEVEL MONITORING: ICD-10-CM

## 2022-10-09 PROCEDURE — U0003 INFECTIOUS AGENT DETECTION BY NUCLEIC ACID (DNA OR RNA); SEVERE ACUTE RESPIRATORY SYNDROME CORONAVIRUS 2 (SARS-COV-2) (CORONAVIRUS DISEASE [COVID-19]), AMPLIFIED PROBE TECHNIQUE, MAKING USE OF HIGH THROUGHPUT TECHNOLOGIES AS DESCRIBED BY CMS-2020-01-R: HCPCS | Mod: ORL | Performed by: FAMILY MEDICINE

## 2022-10-09 RX ORDER — OXYCODONE HYDROCHLORIDE 10 MG/1
10 TABLET ORAL EVERY 4 HOURS PRN
Qty: 18 TABLET | Refills: 0 | Status: SHIPPED | OUTPATIENT
Start: 2022-10-09 | End: 2022-10-13

## 2022-10-10 ENCOUNTER — ANTICOAGULATION THERAPY VISIT (OUTPATIENT)
Dept: ANTICOAGULATION | Facility: CLINIC | Age: 72
End: 2022-10-10

## 2022-10-10 VITALS
BODY MASS INDEX: 21.33 KG/M2 | DIASTOLIC BLOOD PRESSURE: 49 MMHG | SYSTOLIC BLOOD PRESSURE: 112 MMHG | WEIGHT: 149 LBS | HEIGHT: 70 IN | HEART RATE: 76 BPM | RESPIRATION RATE: 18 BRPM | OXYGEN SATURATION: 98 % | TEMPERATURE: 97.5 F

## 2022-10-10 DIAGNOSIS — I48.0 PAROXYSMAL ATRIAL FIBRILLATION (H): Primary | ICD-10-CM

## 2022-10-10 DIAGNOSIS — Z95.4 S/P MITRAL VALVE REPLACEMENT WITH METALLIC VALVE: ICD-10-CM

## 2022-10-10 LAB
ANION GAP SERPL CALCULATED.3IONS-SCNC: 14 MMOL/L (ref 7–15)
BUN SERPL-MCNC: 83.1 MG/DL (ref 8–23)
CALCIUM SERPL-MCNC: 9.2 MG/DL (ref 8.8–10.2)
CHLORIDE SERPL-SCNC: 101 MMOL/L (ref 98–107)
CREAT SERPL-MCNC: 2.14 MG/DL (ref 0.67–1.17)
DEPRECATED HCO3 PLAS-SCNC: 24 MMOL/L (ref 22–29)
GFR SERPL CREATININE-BSD FRML MDRD: 32 ML/MIN/1.73M2
GLUCOSE SERPL-MCNC: 107 MG/DL (ref 70–99)
INR PPP: 1.86 (ref 0.85–1.15)
POTASSIUM SERPL-SCNC: 3.6 MMOL/L (ref 3.4–5.3)
SARS-COV-2 RNA RESP QL NAA+PROBE: POSITIVE
SODIUM SERPL-SCNC: 139 MMOL/L (ref 136–145)

## 2022-10-10 PROCEDURE — 36415 COLL VENOUS BLD VENIPUNCTURE: CPT | Mod: ORL | Performed by: FAMILY MEDICINE

## 2022-10-10 PROCEDURE — 85610 PROTHROMBIN TIME: CPT | Mod: ORL | Performed by: FAMILY MEDICINE

## 2022-10-10 PROCEDURE — P9603 ONE-WAY ALLOW PRORATED MILES: HCPCS | Mod: ORL | Performed by: FAMILY MEDICINE

## 2022-10-10 PROCEDURE — 80048 BASIC METABOLIC PNL TOTAL CA: CPT | Mod: ORL | Performed by: FAMILY MEDICINE

## 2022-10-10 NOTE — PROGRESS NOTES
[facility] GERIATRIC SERVICES    Facility:  [facility] ([facility]) [[id number]]  Code Status: FULL CODE      CHIEF COMPLAINT/REASON FOR VISIT:  Chief Complaint   Patient presents with     RECHECK       HISTORY:      HPI: Bandar is a 72 year old male who has chronic kidney disease stage IV and is currently here at the TCU undergoing physical and occupational therapy secondary to hospitalization for Citrobacter and Pseudomonas in his wound.  Wound does not look infected since has been here and staff is been doing a diligent job.    Creatinine has been improving at this time and likely baseline for him.  He is likely going to have dialysis at some point in the future.    Patient also has COVID-19 infection but according to staff he is stayed relatively asymptomatic.    Patient feels well at this time no shortness of breath chest pain tightness cough loss of taste or smell or any signs and symptoms of COVID at this time.  He claims he feels just fine at this time.    He has no other concerns today.        Past Medical History:   Diagnosis Date     Abnormal liver function test      Atrial fibrillation (H)     s/p Maze procedure     Atrial fibrillation (H)     post naun     Atrial fibrillation with RVR (H) 8/29/2015    S/p MAZE Jul 2015     Bacterial endocarditis      CHF (congestive heart failure) (H)      Chronic combined systolic and diastolic heart failure (H)      CKD (chronic kidney disease)      Dyslipidemia      H/O mitral valve replacement with mechanical valve      Hyperlipidemia      Hyperlipidemia      Idiopathic chronic gout without tophus, unspecified site 4/15/2021     Mitral valve prolapse      Near syncope      Pericardial effusion without cardiac tamponade 8/29/2015     S/P mitral valve replacement with metallic valve 03/16/2017     Status post Maze operation for atrial fibrillation 8/29/2015             Family History   Problem Relation Age of Onset     Heart Failure Mother      Atrial fibrillation Mother       Emphysema Father      Heart Failure Father      Kidney failure Sister         S/P Renal transplant     Alcoholism Brother      No Known Problems Brother      No Known Problems Son      Social History     Socioeconomic History     Marital status: Single   Tobacco Use     Smoking status: Never     Smokeless tobacco: Never   Substance and Sexual Activity     Alcohol use: Yes     Alcohol/week: 2.0 standard drinks     Drug use: No     Sexual activity: Not Currently   Social History Narrative    Has a steady girlfriend and multiple friends .  His son is in [place] but can be present if warned.  Home 1 1/2 stories.    3/2017         REVIEW OF SYSTEM: Patient denies any pain fevers chills nausea vomit diarrhea change in vision hearing taste or smell weakness one-sided the chest pain shortness of breath.  Denies any current shortness stool polyphasia polydipsia polyuria depression or anxiety and the main review of systems is negative.      PHYSICAL EXAM: Patient is alert pleasant does not appear to be in acute distress head is normocephalic and atraumatic oromucosa was clear.  Mechanical click noted on heart sounds they are irregular.  Lungs were clear to auscultation without crackles rales or wheeze extremity showed 2+ pitting edema is wrapped at this time and wounds will be done this afternoon with nursing staff.    Neurologic Hernandez is baseline affect is pleasant.        LABS: On 10/10/2022 sodium is 139, potassium 3.6, chloride 101, CO2 is 24, anion gap was 14, BUN was 83, creatinine 2.14 which is improved from 2.88, GFR was 32 improved from 22.    INR was 1.86 and he has been managed by the Coumadin clinic.    Vital; blood pressure 105/50    Pulse 50    Temperature is 97.9    Respirations 18    O2 sats 95%.    Weight is stayed stable 148 up to 149 and yesterday he was 151.2.      ASSESSMENT:   Encounter Diagnoses   Name Primary?     Infection due to 2019 novel coronavirus Yes     Paroxysmal atrial fibrillation (H)       Chronic kidney disease, stage IV (severe) (H)      Chronic HFrEF (heart failure with reduced ejection fraction) (H)      Lymphedema      Ulcer of right lower extremity with muscle involvement without evidence of necrosis (H)         PLAN: Coumadin clinic will manage his INR at this time.    He is asymptomatic would not put him on any medication for COVID at this time and will continue to monitor his symptoms on a daily basis.  He is already on Coumadin at this time for anticoagulation and he is not symptomatic or hypoxic so no other medications will be given for his COVID at this time.    Will continue to monitor above medical problems and no other changes to care plan at this time.        Electronically signed by: RADHAMES CRONIN DO

## 2022-10-10 NOTE — PROGRESS NOTES
ANTICOAGULATION MANAGEMENT     Bandar Wells 72 year old male is on warfarin with subtherapeutic INR result. (Goal INR 2.5-3.5)    Recent labs: (last 7 days)     10/10/22  0512   INR 1.86*       ASSESSMENT       Source(s): Chart Review and Home Care/Facility Nurse       Warfarin doses taken: Warfarin taken as instructed    Diet: No new diet changes identified    New illness, injury, or hospitalization: Yes: COVID positive    Medication/supplement changes: Paxlovid to start today for  5 day course (dates: 10/10-10/15) may decrease inr    Signs or symptoms of bleeding or clotting: No    Previous INR: Therapeutic last visit; previously outside of goal range    Additional findings: will route to rounding provider ? about low inr and needing lovenox       PLAN     Recommended plan for temporary change(s) affecting INR     Dosing Instructions: booster dose then Increase your warfarin dose (15.8% change) with next INR in 3 days       Summary  As of 10/10/2022    Full warfarin instructions:  10/10: 6 mg; Otherwise 3 mg every Fri; 6 mg every Thu; 4.5 mg all other days   Next INR check:  10/13/2022             Telephone call with [facility] nurse who verbalizes understanding and agrees to plan    Orders given to  Homecare nurse/facility to recheck    Education provided: Goal range and significance of current result, Importance of therapeutic range, Importance of following up at instructed interval and Contact [phone]  with any changes, questions or concerns.     Plan made with [facility] Pharmacist Batool Narnajo, RN  Anticoagulation Clinic  10/10/2022    _______________________________________________________________________     Anticoagulation Episode Summary     Current INR goal:  2.5-3.5   TTR:  37.0 % (1 y)   Target end date:  Indefinite   Send INR reminders to:  ILAN PATEL    Indications    Paroxysmal atrial fibrillation (H) [I48.0]  S/P mitral valve replacement with metallic valve [Z95.4]            Comments:  Aviva- wants a call every time         Anticoagulation Care Providers     Provider Role Specialty Phone number    Jin Hicks MD Referring Internal Medicine [phone]

## 2022-10-11 ENCOUNTER — TRANSITIONAL CARE UNIT VISIT (OUTPATIENT)
Dept: GERIATRICS | Facility: CLINIC | Age: 72
End: 2022-10-11
Payer: COMMERCIAL

## 2022-10-11 DIAGNOSIS — N18.4 CHRONIC KIDNEY DISEASE, STAGE IV (SEVERE) (H): ICD-10-CM

## 2022-10-11 DIAGNOSIS — I50.22 CHRONIC HFREF (HEART FAILURE WITH REDUCED EJECTION FRACTION) (H): ICD-10-CM

## 2022-10-11 DIAGNOSIS — U07.1 INFECTION DUE TO 2019 NOVEL CORONAVIRUS: Primary | ICD-10-CM

## 2022-10-11 DIAGNOSIS — I89.0 LYMPHEDEMA: ICD-10-CM

## 2022-10-11 DIAGNOSIS — L97.915 ULCER OF RIGHT LOWER EXTREMITY WITH MUSCLE INVOLVEMENT WITHOUT EVIDENCE OF NECROSIS (H): ICD-10-CM

## 2022-10-11 DIAGNOSIS — I48.0 PAROXYSMAL ATRIAL FIBRILLATION (H): ICD-10-CM

## 2022-10-11 PROCEDURE — 99309 SBSQ NF CARE MODERATE MDM 30: CPT | Mod: CS | Performed by: FAMILY MEDICINE

## 2022-10-11 NOTE — LETTER
10/10/2022        RE: Bandar Wells  [address] [place] [address]        [facility] GERIATRIC SERVICES    Facility:  [facility] ([facility]) [[id number]]  Code Status: FULL CODE      CHIEF COMPLAINT/REASON FOR VISIT:  Chief Complaint   Patient presents with     RECHECK       HISTORY:      HPI: Bandar is a 72 year old male who has chronic kidney disease stage IV and is currently here at the TCU undergoing physical and occupational therapy secondary to hospitalization for Citrobacter and Pseudomonas in his wound.  Wound does not look infected since has been here and staff is been doing a diligent job.    Creatinine has been improving at this time and likely baseline for him.  He is likely going to have dialysis at some point in the future.    Patient also has COVID-19 infection but according to staff he is stayed relatively asymptomatic.    Patient feels well at this time no shortness of breath chest pain tightness cough loss of taste or smell or any signs and symptoms of COVID at this time.  He claims he feels just fine at this time.    He has no other concerns today.        Past Medical History:   Diagnosis Date     Abnormal liver function test      Atrial fibrillation (H)     s/p Maze procedure     Atrial fibrillation (H)     post naun     Atrial fibrillation with RVR (H) 8/29/2015    S/p MAZE Jul 2015     Bacterial endocarditis      CHF (congestive heart failure) (H)      Chronic combined systolic and diastolic heart failure (H)      CKD (chronic kidney disease)      Dyslipidemia      H/O mitral valve replacement with mechanical valve      Hyperlipidemia      Hyperlipidemia      Idiopathic chronic gout without tophus, unspecified site 4/15/2021     Mitral valve prolapse      Near syncope      Pericardial effusion without cardiac tamponade 8/29/2015     S/P mitral valve replacement with metallic valve 03/16/2017     Status post Maze operation for atrial fibrillation 8/29/2015             Family History    Problem Relation Age of Onset     Heart Failure Mother      Atrial fibrillation Mother      Emphysema Father      Heart Failure Father      Kidney failure Sister         S/P Renal transplant     Alcoholism Brother      No Known Problems Brother      No Known Problems Son      Social History     Socioeconomic History     Marital status: Single   Tobacco Use     Smoking status: Never     Smokeless tobacco: Never   Substance and Sexual Activity     Alcohol use: Yes     Alcohol/week: 2.0 standard drinks     Drug use: No     Sexual activity: Not Currently   Social History Narrative    Has a steady girlfriend and multiple friends .  His son is in [place] but can be present if warned.  Home 1 1/2 stories.    3/2017         REVIEW OF SYSTEM: Patient denies any pain fevers chills nausea vomit diarrhea change in vision hearing taste or smell weakness one-sided the chest pain shortness of breath.  Denies any current shortness stool polyphasia polydipsia polyuria depression or anxiety and the main review of systems is negative.      PHYSICAL EXAM: Patient is alert pleasant does not appear to be in acute distress head is normocephalic and atraumatic oromucosa was clear.  Mechanical click noted on heart sounds they are irregular.  Lungs were clear to auscultation without crackles rales or wheeze extremity showed 2+ pitting edema is wrapped at this time and wounds will be done this afternoon with nursing staff.    Neurologic Hernandez is baseline affect is pleasant.        LABS: On 10/10/2022 sodium is 139, potassium 3.6, chloride 101, CO2 is 24, anion gap was 14, BUN was 83, creatinine 2.14 which is improved from 2.88, GFR was 32 improved from 22.    INR was 1.86 and he has been managed by the Coumadin clinic.    Vital; blood pressure 105/50    Pulse 50    Temperature is 97.9    Respirations 18    O2 sats 95%.    Weight is stayed stable 148 up to 149 and yesterday he was 151.2.      ASSESSMENT:   Encounter Diagnoses   Name Primary?      Infection due to 2019 novel coronavirus Yes     Paroxysmal atrial fibrillation (H)      Chronic kidney disease, stage IV (severe) (H)      Chronic HFrEF (heart failure with reduced ejection fraction) (H)      Lymphedema      Ulcer of right lower extremity with muscle involvement without evidence of necrosis (H)         PLAN: Coumadin clinic will manage his INR at this time.    He is asymptomatic would not put him on any medication for COVID at this time and will continue to monitor his symptoms on a daily basis.  He is already on Coumadin at this time for anticoagulation and he is not symptomatic or hypoxic so no other medications will be given for his COVID at this time.    Will continue to monitor above medical problems and no other changes to care plan at this time.        Electronically signed by: RADHAMES CRONIN DO          Sincerely,        RADHAMES CRONIN DO

## 2022-10-12 ENCOUNTER — LAB REQUISITION (OUTPATIENT)
Dept: LAB | Facility: CLINIC | Age: 72
End: 2022-10-12
Payer: COMMERCIAL

## 2022-10-12 DIAGNOSIS — I48.91 UNSPECIFIED ATRIAL FIBRILLATION (H): ICD-10-CM

## 2022-10-12 DIAGNOSIS — R52 PAIN: ICD-10-CM

## 2022-10-12 DIAGNOSIS — N18.4 CHRONIC KIDNEY DISEASE, STAGE 4 (SEVERE) (H): ICD-10-CM

## 2022-10-12 DIAGNOSIS — D64.9 ANEMIA, UNSPECIFIED: ICD-10-CM

## 2022-10-13 ENCOUNTER — TRANSITIONAL CARE UNIT VISIT (OUTPATIENT)
Dept: GERIATRICS | Facility: CLINIC | Age: 72
End: 2022-10-13
Payer: COMMERCIAL

## 2022-10-13 ENCOUNTER — TELEPHONE (OUTPATIENT)
Dept: GERIATRICS | Facility: CLINIC | Age: 72
End: 2022-10-13

## 2022-10-13 ENCOUNTER — ANTICOAGULATION THERAPY VISIT (OUTPATIENT)
Dept: ANTICOAGULATION | Facility: CLINIC | Age: 72
End: 2022-10-13

## 2022-10-13 ENCOUNTER — LAB REQUISITION (OUTPATIENT)
Dept: LAB | Facility: CLINIC | Age: 72
End: 2022-10-13
Payer: COMMERCIAL

## 2022-10-13 VITALS
SYSTOLIC BLOOD PRESSURE: 120 MMHG | BODY MASS INDEX: 21.76 KG/M2 | OXYGEN SATURATION: 96 % | WEIGHT: 152 LBS | HEIGHT: 70 IN | RESPIRATION RATE: 18 BRPM | DIASTOLIC BLOOD PRESSURE: 49 MMHG | HEART RATE: 67 BPM | TEMPERATURE: 97.3 F

## 2022-10-13 DIAGNOSIS — N18.4 CHRONIC KIDNEY DISEASE, STAGE IV (SEVERE) (H): ICD-10-CM

## 2022-10-13 DIAGNOSIS — D63.1 ANEMIA IN CHRONIC KIDNEY DISEASE (CODE): ICD-10-CM

## 2022-10-13 DIAGNOSIS — I48.0 PAROXYSMAL ATRIAL FIBRILLATION (H): ICD-10-CM

## 2022-10-13 DIAGNOSIS — I48.0 PAROXYSMAL ATRIAL FIBRILLATION (H): Primary | ICD-10-CM

## 2022-10-13 DIAGNOSIS — I89.0 LYMPHEDEMA: ICD-10-CM

## 2022-10-13 DIAGNOSIS — D64.9 ANEMIA, UNSPECIFIED TYPE: Primary | ICD-10-CM

## 2022-10-13 DIAGNOSIS — I50.22 CHRONIC HFREF (HEART FAILURE WITH REDUCED EJECTION FRACTION) (H): ICD-10-CM

## 2022-10-13 DIAGNOSIS — U07.1 INFECTION DUE TO 2019 NOVEL CORONAVIRUS: ICD-10-CM

## 2022-10-13 DIAGNOSIS — Z95.4 S/P MITRAL VALVE REPLACEMENT WITH METALLIC VALVE: ICD-10-CM

## 2022-10-13 LAB
ALBUMIN SERPL BCG-MCNC: 3.4 G/DL (ref 3.5–5.2)
ANION GAP SERPL CALCULATED.3IONS-SCNC: 12 MMOL/L (ref 7–15)
BUN SERPL-MCNC: 85.7 MG/DL (ref 8–23)
CALCIUM SERPL-MCNC: 8.9 MG/DL (ref 8.8–10.2)
CHLORIDE SERPL-SCNC: 101 MMOL/L (ref 98–107)
CREAT SERPL-MCNC: 2.31 MG/DL (ref 0.67–1.17)
DEPRECATED HCO3 PLAS-SCNC: 24 MMOL/L (ref 22–29)
ERYTHROCYTE [DISTWIDTH] IN BLOOD BY AUTOMATED COUNT: 18.4 % (ref 10–15)
GFR SERPL CREATININE-BSD FRML MDRD: 29 ML/MIN/1.73M2
GLUCOSE SERPL-MCNC: 82 MG/DL (ref 70–99)
HCT VFR BLD AUTO: 21.3 % (ref 40–53)
HGB BLD-MCNC: 6.6 G/DL (ref 13.3–17.7)
HGB BLD-MCNC: 7.4 G/DL (ref 13.3–17.7)
HOLD SPECIMEN: NORMAL
INR PPP: 2.24 (ref 0.85–1.15)
MAGNESIUM SERPL-MCNC: 2.2 MG/DL (ref 1.7–2.3)
MCH RBC QN AUTO: 29.6 PG (ref 26.5–33)
MCHC RBC AUTO-ENTMCNC: 31 G/DL (ref 31.5–36.5)
MCV RBC AUTO: 96 FL (ref 78–100)
PHOSPHATE SERPL-MCNC: 4.1 MG/DL (ref 2.5–4.5)
PLATELET # BLD AUTO: 89 10E3/UL (ref 150–450)
POTASSIUM SERPL-SCNC: 4.3 MMOL/L (ref 3.4–5.3)
RBC # BLD AUTO: 2.23 10E6/UL (ref 4.4–5.9)
SODIUM SERPL-SCNC: 137 MMOL/L (ref 136–145)
WBC # BLD AUTO: 5.6 10E3/UL (ref 4–11)

## 2022-10-13 PROCEDURE — P9603 ONE-WAY ALLOW PRORATED MILES: HCPCS | Mod: ORL | Performed by: FAMILY MEDICINE

## 2022-10-13 PROCEDURE — 83735 ASSAY OF MAGNESIUM: CPT | Mod: ORL | Performed by: FAMILY MEDICINE

## 2022-10-13 PROCEDURE — P9604 ONE-WAY ALLOW PRORATED TRIP: HCPCS | Mod: ORL | Performed by: FAMILY MEDICINE

## 2022-10-13 PROCEDURE — 99309 SBSQ NF CARE MODERATE MDM 30: CPT | Mod: CS | Performed by: FAMILY MEDICINE

## 2022-10-13 PROCEDURE — 80069 RENAL FUNCTION PANEL: CPT | Mod: ORL | Performed by: FAMILY MEDICINE

## 2022-10-13 PROCEDURE — 85610 PROTHROMBIN TIME: CPT | Mod: ORL | Performed by: FAMILY MEDICINE

## 2022-10-13 PROCEDURE — 85018 HEMOGLOBIN: CPT | Mod: ORL | Performed by: FAMILY MEDICINE

## 2022-10-13 PROCEDURE — 36415 COLL VENOUS BLD VENIPUNCTURE: CPT | Mod: ORL | Performed by: FAMILY MEDICINE

## 2022-10-13 PROCEDURE — 85027 COMPLETE CBC AUTOMATED: CPT | Mod: ORL | Performed by: FAMILY MEDICINE

## 2022-10-13 RX ORDER — OXYCODONE HYDROCHLORIDE 10 MG/1
TABLET ORAL
Qty: 90 TABLET | Refills: 0 | Status: SHIPPED | OUTPATIENT
Start: 2022-10-13 | End: 2022-11-10

## 2022-10-13 NOTE — TELEPHONE ENCOUNTER
[facility] Lab Note     Provider: Jabier Lopes DO  Facility: [facility] Facility Type:  TCU    Allergies   Allergen Reactions     Allopurinol Rash     Clindamycin Rash       Labs Reviewed by provider: Renal profile, Heme 2, Mg     Verbal Order/Direction given by Provider: No new orders.      Provider giving Order:  Jabier Lopes DO    Verbal Order given to: Cathie Smith RN

## 2022-10-13 NOTE — PROGRESS NOTES
ANTICOAGULATION MANAGEMENT     Bandar Wells 72 year old male is on warfarin with subtherapeutic INR result. (Goal INR 2.5-3.5)    Recent labs: (last 7 days)     10/13/22  0539   INR 2.24*       ASSESSMENT       Source(s): Chart Review and Home Care/Facility Nurse       Warfarin doses taken: Warfarin taken as instructed    Diet: No new diet changes identified    New illness, injury, or hospitalization: yes, COVID positive    Medication/supplement changes: Paxlovid for COVID was finished on 10/12/22    Signs or symptoms of bleeding or clotting: No    Previous INR: Subtherapeutic    Additional findings: None       PLAN     Recommended plan for temporary change(s) affecting INR     Dosing Instructions: Increase your warfarin dose (4.5% change) with next INR in 4 days       Summary  As of 10/13/2022    Full warfarin instructions:  6 mg every Sun, Thu; 4.5 mg all other days   Next INR check:  10/17/2022                 Telephone call with [facility] facility nurse who verbalizes understanding and agrees to plan    Orders given to  Homecare nurse/facility to recheck    Education provided: Please call back if any changes to your diet, medications or how you've been taking warfarin    Plan made with [facility] Pharmacist Batool Stinson RN  Anticoagulation Clinic  10/13/2022    _______________________________________________________________________     Anticoagulation Episode Summary     Current INR goal:  2.5-3.5   TTR:  37.0 % (1 y)   Target end date:  Indefinite   Send INR reminders to:  ANTICOAG HOME MONITORING    Indications    Paroxysmal atrial fibrillation (H) [I48.0]  S/P mitral valve replacement with metallic valve [Z95.4]           Comments:  Aviva- wants a call every time         Anticoagulation Care Providers     Provider Role Specialty Phone number    Jin Hicks MD Referring Internal Medicine [phone]

## 2022-10-13 NOTE — LETTER
10/13/2022        RE: Bandar Wells  [address] [place] [address]        M [facility] GERIATRIC SERVICES    Facility:  [facility] ([facility]) [[id number]]  Code Status: FULL CODE      CHIEF COMPLAINT/REASON FOR VISIT:  Chief Complaint   Patient presents with     RECHECK       HISTORY:      HPI: Bandar is a 72 year old male [facility] therapy and med monitoring for multiple medical problems.  He was recently hospitalized for Citrobacter Pseudomonas in his wound and he was on antibiotics.  His wound currently looks okay at this time and we are doing a diligent job taking care of it.  Hemoglobin came back very low today which is concerning 6.6 but he is hemodynamically stable and does not appear to be anemic.  Creatinine has been improving and likely baseline for him at this time.  He does have COVID infection but is remained totally asymptomatic.    He did see nephrology this week and they did order some labs hemoglobin came back at 6.6 but we can recheck that to test.  We also can set him up for a blood transfusion tomorrow outpatient and avoid sending him to the hospital.    He otherwise has no new concerns today.    Past Medical History:   Diagnosis Date     Abnormal liver function test      Atrial fibrillation (H)     s/p Maze procedure     Atrial fibrillation (H)     post naun     Atrial fibrillation with RVR (H) 8/29/2015    S/p MAZE Jul 2015     Bacterial endocarditis      CHF (congestive heart failure) (H)      Chronic combined systolic and diastolic heart failure (H)      CKD (chronic kidney disease)      Dyslipidemia      H/O mitral valve replacement with mechanical valve      Hyperlipidemia      Hyperlipidemia      Idiopathic chronic gout without tophus, unspecified site 4/15/2021     Mitral valve prolapse      Near syncope      Pericardial effusion without cardiac tamponade 8/29/2015     S/P mitral valve replacement with metallic  valve 03/16/2017     Status post Maze operation for atrial fibrillation 8/29/2015             Family History   Problem Relation Age of Onset     Heart Failure Mother      Atrial fibrillation Mother      Emphysema Father      Heart Failure Father      Kidney failure Sister         S/P Renal transplant     Alcoholism Brother      No Known Problems Brother      No Known Problems Son      Social History     Socioeconomic History     Marital status: Single   Tobacco Use     Smoking status: Never     Smokeless tobacco: Never   Substance and Sexual Activity     Alcohol use: Yes     Alcohol/week: 2.0 standard drinks     Drug use: No     Sexual activity: Not Currently   Social History Narrative    Has a steady girlfriend and multiple friends .  His son is in [place] but can be present if warned.  Home 1 1/2 stories.    3/2017         REVIEW OF SYSTEM: Patient claims his pain is well managed with medications denies any fevers chills nausea vomit diarrhea change in vision hearing taste or smell weakness one-sided chest pain shortness of breath.  The remainder the review of systems is negative.      PHYSICAL EXAM: Patient is alert pleasant does not appear to be in acute distress head is normocephalic and atraumatic sclera conjunctive is clear abdomen soft nontender.  Heart sounds were irregularly irregular lungs were clear to auscultation.  Extremities showed 1+ pitting edema bilateral.  The wraps are off the wound does not look infected but multiple wounds of the appear to be clean.        LABS: Today's labs are as follows White count was 5.6, hemoglobin was 6.6, platelets were 89,000.  Sodium was 137, BUN was 85, creatinine was 2.31.  In comparison creatinine is 2.871-week ago.    Vital; blood pressure 120/49    Pulse 67    Temperature is 97.3    Respirations 18    O2 sats 96%.      ASSESSMENT:   Encounter Diagnoses   Name Primary?     Anemia, unspecified type Yes     Chronic HFrEF (heart failure with reduced ejection  fraction) (H)      Paroxysmal atrial fibrillation (H)      Chronic kidney disease, stage IV (severe) (H)      Lymphedema      Infection due to 2019 novel coronavirus         PLAN: Plan at this time we will check hemoglobin stat now but still low we will arrange for blood transfusion tomorrow.  No signs of any active bleeding at this time and I do not have a comparison for his hemoglobin at this time.    We will type cross and transfuse 1 unit of packed red blood cells for tomorrow.    Continue wound care as ordered and continue isolation patient is asymptomatic for COVID.  His isolation will be over soon.    I will continue to monitor above medical problems and no other changes to care plan at this time.        Electronically signed by: RADHAMES CRONIN DO          Sincerely,        RADHAMES CRONIN DO

## 2022-10-13 NOTE — PROGRESS NOTES
[facility] GERIATRIC SERVICES    Facility:  [facility] ([facility]) [[id number]]  Code Status: FULL CODE      CHIEF COMPLAINT/REASON FOR VISIT:  Chief Complaint   Patient presents with     RECHECK       HISTORY:      HPI: Bandar is a 72 year old male [facility] and med monitoring for multiple medical problems.  He was recently hospitalized for Citrobacter Pseudomonas in his wound and he was on antibiotics.  His wound currently looks okay at this time and we are doing a diligent job taking care of it.  Hemoglobin came back very low today which is concerning 6.6 but he is hemodynamically stable and does not appear to be anemic.  Creatinine has been improving and likely baseline for him at this time.  He does have COVID infection but is remained totally asymptomatic.    He did see nephrology this week and they did order some labs hemoglobin came back at 6.6 but we can recheck that to test.  We also can set him up for a blood transfusion tomorrow outpatient and avoid sending him to the hospital.    He otherwise has no new concerns today.    Past Medical History:   Diagnosis Date     Abnormal liver function test      Atrial fibrillation (H)     s/p Maze procedure     Atrial fibrillation (H)     post naun     Atrial fibrillation with RVR (H) 8/29/2015    S/p MAZE Jul 2015     Bacterial endocarditis      CHF (congestive heart failure) (H)      Chronic combined systolic and diastolic heart failure (H)      CKD (chronic kidney disease)      Dyslipidemia      H/O mitral valve replacement with mechanical valve      Hyperlipidemia      Hyperlipidemia      Idiopathic chronic gout without tophus, unspecified site 4/15/2021     Mitral valve prolapse      Near syncope      Pericardial effusion without cardiac tamponade 8/29/2015     S/P mitral valve replacement with metallic valve 03/16/2017     Status post Maze operation for atrial fibrillation 8/29/2015              Family History   Problem Relation Age of Onset     Heart Failure Mother      Atrial fibrillation Mother      Emphysema Father      Heart Failure Father      Kidney failure Sister         S/P Renal transplant     Alcoholism Brother      No Known Problems Brother      No Known Problems Son      Social History     Socioeconomic History     Marital status: Single   Tobacco Use     Smoking status: Never     Smokeless tobacco: Never   Substance and Sexual Activity     Alcohol use: Yes     Alcohol/week: 2.0 standard drinks     Drug use: No     Sexual activity: Not Currently   Social History Narrative    Has a steady girlfriend and multiple friends .  His son is in [place] but can be present if warned.  Home 1 1/2 stories.    3/2017         REVIEW OF SYSTEM: Patient claims his pain is well managed with medications denies any fevers chills nausea vomit diarrhea change in vision hearing taste or smell weakness one-sided chest pain shortness of breath.  The remainder the review of systems is negative.      PHYSICAL EXAM: Patient is alert pleasant does not appear to be in acute distress head is normocephalic and atraumatic sclera conjunctive is clear abdomen soft nontender.  Heart sounds were irregularly irregular lungs were clear to auscultation.  Extremities showed 1+ pitting edema bilateral.  The wraps are off the wound does not look infected but multiple wounds of the appear to be clean.        LABS: Today's labs are as follows White count was 5.6, hemoglobin was 6.6, platelets were 89,000.  Sodium was 137, BUN was 85, creatinine was 2.31.  In comparison creatinine is 2.871-week ago.    Vital; blood pressure 120/49    Pulse 67    Temperature is 97.3    Respirations 18    O2 sats 96%.      ASSESSMENT:   Encounter Diagnoses   Name Primary?     Anemia, unspecified type Yes     Chronic HFrEF (heart failure with reduced ejection fraction) (H)      Paroxysmal atrial fibrillation (H)      Chronic kidney disease, stage IV  (severe) (H)      Lymphedema      Infection due to 2019 novel coronavirus         PLAN: Plan at this time we will check hemoglobin stat now but still low we will arrange for blood transfusion tomorrow.  No signs of any active bleeding at this time and I do not have a comparison for his hemoglobin at this time.    We will type cross and transfuse 1 unit of packed red blood cells for tomorrow.    Continue wound care as ordered and continue isolation patient is asymptomatic for COVID.  His isolation will be over soon.    I will continue to monitor above medical problems and no other changes to care plan at this time.        Electronically signed by: RADHAMES CRONIN DO

## 2022-10-16 ENCOUNTER — LAB REQUISITION (OUTPATIENT)
Dept: LAB | Facility: CLINIC | Age: 72
End: 2022-10-16
Payer: COMMERCIAL

## 2022-10-16 DIAGNOSIS — Z79.01 LONG TERM (CURRENT) USE OF ANTICOAGULANTS: ICD-10-CM

## 2022-10-17 ENCOUNTER — ANTICOAGULATION THERAPY VISIT (OUTPATIENT)
Dept: ANTICOAGULATION | Facility: CLINIC | Age: 72
End: 2022-10-17

## 2022-10-17 DIAGNOSIS — I48.0 PAROXYSMAL ATRIAL FIBRILLATION (H): Primary | ICD-10-CM

## 2022-10-17 DIAGNOSIS — Z95.4 S/P MITRAL VALVE REPLACEMENT WITH METALLIC VALVE: ICD-10-CM

## 2022-10-17 LAB — INR PPP: 2.05 (ref 0.85–1.15)

## 2022-10-17 PROCEDURE — 85610 PROTHROMBIN TIME: CPT | Mod: ORL | Performed by: FAMILY MEDICINE

## 2022-10-17 PROCEDURE — P9603 ONE-WAY ALLOW PRORATED MILES: HCPCS | Mod: ORL | Performed by: FAMILY MEDICINE

## 2022-10-17 PROCEDURE — 36415 COLL VENOUS BLD VENIPUNCTURE: CPT | Mod: ORL | Performed by: FAMILY MEDICINE

## 2022-10-17 NOTE — PROGRESS NOTES
[facility] GERIATRIC SERVICES    Facility:  [facility] ([facility]) [[id number]]  Code Status: FULL CODE      CHIEF COMPLAINT/REASON FOR VISIT:  Chief Complaint   Patient presents with     RECHECK       HISTORY:      HPI: Bandar is a 72 year old male who resides here at the [facility] TCU undergoing physical occupational therapy on antibiotics for his Citrobacter and Pseudomonas in his wound.  Wounds have looks good not infected and we have been following her closely here in the TCU doing effective wound care.  He also has COVID but he is stayed asymptomatic at this time and has had no issues.  He is followed by nephrology outpatient and no new changes today.    Patient has no new concerns today.  He denies any other issues at this time and says he is ready to go home.  Discussed that with wound nurse today we discussed the wound is getting better at this time we both agree to that.    Past Medical History:   Diagnosis Date     Abnormal liver function test      Atrial fibrillation (H)     s/p Maze procedure     Atrial fibrillation (H)     post naun     Atrial fibrillation with RVR (H) 8/29/2015    S/p MAZE Jul 2015     Bacterial endocarditis      CHF (congestive heart failure) (H)      Chronic combined systolic and diastolic heart failure (H)      CKD (chronic kidney disease)      Dyslipidemia      H/O mitral valve replacement with mechanical valve      Hyperlipidemia      Hyperlipidemia      Idiopathic chronic gout without tophus, unspecified site 4/15/2021     Mitral valve prolapse      Near syncope      Pericardial effusion without cardiac tamponade 8/29/2015     S/P mitral valve replacement with metallic valve 03/16/2017     Status post Maze operation for atrial fibrillation 8/29/2015             Family History   Problem Relation Age of Onset     Heart Failure Mother      Atrial fibrillation Mother      Emphysema Father      Heart Failure Father      Kidney failure Sister         S/P Renal transplant      Alcoholism Brother      No Known Problems Brother      No Known Problems Son      Social History     Socioeconomic History     Marital status: Single   Tobacco Use     Smoking status: Never     Smokeless tobacco: Never   Substance and Sexual Activity     Alcohol use: Yes     Alcohol/week: 2.0 standard drinks     Drug use: No     Sexual activity: Not Currently   Social History Narrative    Has a steady girlfriend and multiple friends .  His son is in [place] but can be present if warned.  Home 1 1/2 stories.    3/2017         REVIEW OF SYSTEM: Patient denies any pain fevers chills nausea vomit diarrhea change in vision hearing taste smell weakness one-sided chest pain shortness of breath.  Denies any function of stool polyphasia polydipsia polyuria depression or anxiety and the main review of systems is negative.      PHYSICAL EXAM: Patient is alert pleasant does not appear to be in acute distress head is normocephalic and atraumatic sclera conjunctive is clear mucosas moist nasal discharge.  Heart sounds are irregularly irregular with adequate rate control.  Wound is improving.  No signs of infection.        LABS: Reviewed.    Vital; blood pressure 103/46    Pulse 68    Temperature is 97.3    Respiration 17    O2 sats 97%.      ASSESSMENT:   Encounter Diagnoses   Name Primary?     Chronic HFrEF (heart failure with reduced ejection fraction) (H) Yes     Lymphedema      Paroxysmal atrial fibrillation (H)      Infection due to 2019 novel coronavirus      Chronic kidney disease, stage IV (severe) (H)      Anemia, unspecified type      Ulcer of right lower extremity with muscle involvement without evidence of necrosis (H)         PLAN: Plan to 17 continue to monitor above medical problems and no other changes to care plan at this time.  Care plan was reviewed and is appropriate.  Patient is likely to discharge in 2 days and he will go home with current meds and services.  He will need to follow-up with his primary care  doctor in 1 week.    No other changes to care plan at this time.        Electronically signed by: RADHAMES CRONIN DO

## 2022-10-17 NOTE — PROGRESS NOTES
ANTICOAGULATION MANAGEMENT     Bandar Wells 72 year old male is on warfarin with subtherapeutic INR result. (Goal INR 2.5-3.5)    Recent labs: (last 7 days)     10/17/22  0806   INR 2.05*       ASSESSMENT       Source(s): Chart Review and Home Care/Facility Nurse       Warfarin doses taken: Warfarin taken as instructed    Diet: No new diet changes identified    New illness, injury, or hospitalization: No    Medication/supplement changes: None noted    Signs or symptoms of bleeding or clotting: No    Previous INR: Subtherapeutic    Additional findings: None       PLAN     Recommended plan for no diet, medication or health factor changes affecting INR     Dosing Instructions: booster dose then Increase your warfarin dose (13% change) with next INR in 3 days       Summary  As of 10/17/2022    Full warfarin instructions:  10/17: 9 mg; Otherwise 4.5 mg every Tue, Fri; 6 mg all other days   Next INR check:  10/20/2022             Telephone call with [facility] nurse who agrees to plan and repeated back plan correctly  Faxed dosing and follow up instructions to [facility]    Orders given to  Homecare nurse/facility to recheck    Education provided: Goal range and significance of current result, Importance of therapeutic range, Importance of following up at instructed interval and Importance of taking warfarin as instructed    Plan made with [facility] Pharmacist Batool Steiner, RN  Anticoagulation Clinic  10/17/2022    _______________________________________________________________________     Anticoagulation Episode Summary     Current INR goal:  2.5-3.5   TTR:  37.0 % (1 y)   Target end date:  Indefinite   Send INR reminders to:  ANTICOAG HOME MONITORING    Indications    Paroxysmal atrial fibrillation (H) [I48.0]  S/P mitral valve replacement with metallic valve [Z95.4]           Comments:  Anas- wants a call every time         Anticoagulation Care Providers     Provider  Role Specialty Phone number    Jin Hicks MD Referring Internal Medicine [phone]

## 2022-10-18 ENCOUNTER — TRANSITIONAL CARE UNIT VISIT (OUTPATIENT)
Dept: GERIATRICS | Facility: CLINIC | Age: 72
End: 2022-10-18
Payer: COMMERCIAL

## 2022-10-18 VITALS
OXYGEN SATURATION: 97 % | HEIGHT: 70 IN | DIASTOLIC BLOOD PRESSURE: 46 MMHG | HEART RATE: 68 BPM | WEIGHT: 161 LBS | TEMPERATURE: 97.3 F | RESPIRATION RATE: 17 BRPM | BODY MASS INDEX: 23.05 KG/M2 | SYSTOLIC BLOOD PRESSURE: 103 MMHG

## 2022-10-18 DIAGNOSIS — U07.1 INFECTION DUE TO 2019 NOVEL CORONAVIRUS: ICD-10-CM

## 2022-10-18 DIAGNOSIS — D64.9 ANEMIA, UNSPECIFIED TYPE: ICD-10-CM

## 2022-10-18 DIAGNOSIS — I50.22 CHRONIC HFREF (HEART FAILURE WITH REDUCED EJECTION FRACTION) (H): Primary | ICD-10-CM

## 2022-10-18 DIAGNOSIS — L97.915 ULCER OF RIGHT LOWER EXTREMITY WITH MUSCLE INVOLVEMENT WITHOUT EVIDENCE OF NECROSIS (H): ICD-10-CM

## 2022-10-18 DIAGNOSIS — I48.0 PAROXYSMAL ATRIAL FIBRILLATION (H): ICD-10-CM

## 2022-10-18 DIAGNOSIS — I89.0 LYMPHEDEMA: ICD-10-CM

## 2022-10-18 DIAGNOSIS — N18.4 CHRONIC KIDNEY DISEASE, STAGE IV (SEVERE) (H): ICD-10-CM

## 2022-10-18 PROCEDURE — 99309 SBSQ NF CARE MODERATE MDM 30: CPT | Mod: CS | Performed by: FAMILY MEDICINE

## 2022-10-18 NOTE — LETTER
10/17/2022        RE: Bandar Wells  [address] [place] [address]        M [facility] GERIATRIC SERVICES    Facility:  [facility] ([facility]) [[id number]]  Code Status: FULL CODE      CHIEF COMPLAINT/REASON FOR VISIT:  Chief Complaint   Patient presents with     RECHECK       HISTORY:      HPI: Bandar is a 72 year old male [facility] on antibiotics for his Citrobacter and Pseudomonas in his wound.  Wounds have looks good not infected and we have been following her closely here in the TCU doing effective wound care.  He also has COVID but he is stayed asymptomatic at this time and has had no issues.  He is followed by nephrology outpatient and no new changes today.    Patient has no new concerns today.  He denies any other issues at this time and says he is ready to go home.  Discussed that with wound nurse today we discussed the wound is getting better at this time we both agree to that.    Past Medical History:   Diagnosis Date     Abnormal liver function test      Atrial fibrillation (H)     s/p Maze procedure     Atrial fibrillation (H)     post naun     Atrial fibrillation with RVR (H) 8/29/2015    S/p MAZE Jul 2015     Bacterial endocarditis      CHF (congestive heart failure) (H)      Chronic combined systolic and diastolic heart failure (H)      CKD (chronic kidney disease)      Dyslipidemia      H/O mitral valve replacement with mechanical valve      Hyperlipidemia      Hyperlipidemia      Idiopathic chronic gout without tophus, unspecified site 4/15/2021     Mitral valve prolapse      Near syncope      Pericardial effusion without cardiac tamponade 8/29/2015     S/P mitral valve replacement with metallic valve 03/16/2017     Status post Maze operation for atrial fibrillation 8/29/2015             Family History   Problem Relation Age of Onset     Heart Failure Mother      Atrial fibrillation Mother      Emphysema Father       Heart Failure Father      Kidney failure Sister         S/P Renal transplant     Alcoholism Brother      No Known Problems Brother      No Known Problems Son      Social History     Socioeconomic History     Marital status: Single   Tobacco Use     Smoking status: Never     Smokeless tobacco: Never   Substance and Sexual Activity     Alcohol use: Yes     Alcohol/week: 2.0 standard drinks     Drug use: No     Sexual activity: Not Currently   Social History Narrative    Has a steady girlfriend and multiple friends .  His son is in [place] but can be present if warned.  Home 1 1/2 stories.    3/2017         REVIEW OF SYSTEM: Patient denies any pain fevers chills nausea vomit diarrhea change in vision hearing taste smell weakness one-sided chest pain shortness of breath.  Denies any function of stool polyphasia polydipsia polyuria depression or anxiety and the main review of systems is negative.      PHYSICAL EXAM: Patient is alert pleasant does not appear to be in acute distress head is normocephalic and atraumatic sclera conjunctive is clear mucosas moist nasal discharge.  Heart sounds are irregularly irregular with adequate rate control.  Wound is improving.  No signs of infection.        LABS: Reviewed.    Vital; blood pressure 103/46    Pulse 68    Temperature is 97.3    Respiration 17    O2 sats 97%.      ASSESSMENT:   Encounter Diagnoses   Name Primary?     Chronic HFrEF (heart failure with reduced ejection fraction) (H) Yes     Lymphedema      Paroxysmal atrial fibrillation (H)      Infection due to 2019 novel coronavirus      Chronic kidney disease, stage IV (severe) (H)      Anemia, unspecified type      Ulcer of right lower extremity with muscle involvement without evidence of necrosis (H)         PLAN: Plan to 17 continue to monitor above medical problems and no other changes to care plan at this time.  Care plan was reviewed and is appropriate.  Patient is likely to discharge in 2 days and he will go home  with current meds and services.  He will need to follow-up with his primary care doctor in 1 week.    No other changes to care plan at this time.        Electronically signed by: RADHAMES CRONIN DO          Sincerely,        RADHAMES CRONIN DO

## 2022-10-19 ENCOUNTER — LAB REQUISITION (OUTPATIENT)
Dept: LAB | Facility: CLINIC | Age: 72
End: 2022-10-19
Payer: COMMERCIAL

## 2022-10-19 DIAGNOSIS — Z51.81 ENCOUNTER FOR THERAPEUTIC DRUG LEVEL MONITORING: ICD-10-CM

## 2022-10-20 ENCOUNTER — ANTICOAGULATION THERAPY VISIT (OUTPATIENT)
Dept: ANTICOAGULATION | Facility: CLINIC | Age: 72
End: 2022-10-20

## 2022-10-20 DIAGNOSIS — I48.0 PAROXYSMAL ATRIAL FIBRILLATION (H): Primary | ICD-10-CM

## 2022-10-20 DIAGNOSIS — Z95.4 S/P MITRAL VALVE REPLACEMENT WITH METALLIC VALVE: ICD-10-CM

## 2022-10-20 LAB — INR PPP: 2.25 (ref 0.85–1.15)

## 2022-10-20 PROCEDURE — 36415 COLL VENOUS BLD VENIPUNCTURE: CPT | Mod: ORL | Performed by: FAMILY MEDICINE

## 2022-10-20 PROCEDURE — 85610 PROTHROMBIN TIME: CPT | Mod: ORL | Performed by: FAMILY MEDICINE

## 2022-10-20 PROCEDURE — P9604 ONE-WAY ALLOW PRORATED TRIP: HCPCS | Mod: ORL | Performed by: FAMILY MEDICINE

## 2022-10-20 NOTE — PROGRESS NOTES
ANTICOAGULATION MANAGEMENT     Bandar Wells 72 year old male is on warfarin with subtherapeutic INR result. (Goal INR 2.5-3.5)    Recent labs: (last 7 days)     10/20/22  0620   INR 2.25*       ASSESSMENT       Source(s): Chart Review    Previous INR was Subtherapeutic    Medication, diet, health changes since last INR chart reviewed; none identified      Discharged from TCU today.     PLAN     Unable to reach Bandar today.    Left message to continue current dose of warfarin 6 mg tonight. Request call back for assessment.    Follow up required to confirm warfarin dose taken and assess for changes    Mary Stinson, RN  Anticoagulation Clinic  10/20/2022

## 2022-10-21 ENCOUNTER — TELEPHONE (OUTPATIENT)
Dept: INTERNAL MEDICINE | Facility: CLINIC | Age: 72
End: 2022-10-21

## 2022-10-21 ENCOUNTER — MEDICAL CORRESPONDENCE (OUTPATIENT)
Dept: HEALTH INFORMATION MANAGEMENT | Facility: CLINIC | Age: 72
End: 2022-10-21

## 2022-10-21 NOTE — PROGRESS NOTES
ANTICOAGULATION MANAGEMENT     Bandar Wells 72 year old male is on warfarin with subtherapeutic INR result. (Goal INR 2.5-3.5)    Recent labs: (last 7 days)     10/20/22  0620   INR 2.25*       ASSESSMENT       Source(s): Chart Review    Previous INR was Subtherapeutic    Medication, diet, health changes since last INR chart reviewed; none identified Discharged from TCU on 10/20/22           PLAN     Recommended plan for no diet, medication or health factor changes affecting INR     Dosing Instructions: Increase your warfarin dose (3.8% change) with next INR in 5 days       Summary  As of 10/20/2022    Full warfarin instructions:  4.5 mg every Tue; 6 mg all other days; Starting 10/20/2022   Next INR check:  10/25/2022             Detailed voice message left for Bandar with dosing instructions and follow up date. [employer] sent as well.    Patient to recheck with home meter    Education provided:     Please call back if any changes to your diet, medications or how you've been taking warfarin    Plan made per [facility] anticoagulation protocol    Mary Stinson RN  Anticoagulation Clinic  10/21/2022    _______________________________________________________________________     Anticoagulation Episode Summary     Current INR goal:  2.5-3.5   TTR:  37.3 % (1 y)   Target end date:  Indefinite   Send INR reminders to:  [facility]    Indications    Paroxysmal atrial fibrillation (H) [I48.0]  S/P mitral valve replacement with metallic valve [Z95.4]           Comments:  Aviva- wants a call every time         Anticoagulation Care Providers     Provider Role Specialty Phone number    Jin Hicks MD Referring Internal Medicine [phone]

## 2022-10-21 NOTE — TELEPHONE ENCOUNTER
The Home Care/Assisted Living/Nursing Facility is calling regarding an established patient.  Has the patient seen Home Care in the past or is currently residing in Assisted Living or Nursing Facility? Yes.     Rik calling from [facility] requesting the following orders that are within the Home Care, Assisted Living or Nursing Home Eval and Treatment standing order and can be signed as standing order signature required by RN.    Preferred Call Back Number: [phone]    Home Care Visits Continuation and PT/OT/Speech Therapy   Home health-skilled nursing every other day for woudn care and      PT  And OT for eval and treat.    Any additional Orders:  Are there any orders requested, not stated above, that are outside of the standing order and must be routed to a licensed practitioner for approval?    No    Writer has verified Requestor will send fax to have orders signed.

## 2022-10-21 NOTE — TELEPHONE ENCOUNTER
General Call    Contacts       Type Contact Phone/Fax    10/21/2022 02:09 PM CDT Phone (Incoming) Rik (Home Care) [phone]     [facility]        Reason for Call:  Time calling for verbal orders    What are your questions or concerns:    Home health-skilled nursing every other day for woudn care and     PT  And OT for eval and treat.    He would like to have pt seen on weekend so please respond ASAP.      Ok for detailed msg.

## 2022-10-25 ENCOUNTER — ANTICOAGULATION THERAPY VISIT (OUTPATIENT)
Dept: ANTICOAGULATION | Facility: CLINIC | Age: 72
End: 2022-10-25

## 2022-10-25 ENCOUNTER — OFFICE VISIT (OUTPATIENT)
Dept: INTERNAL MEDICINE | Facility: CLINIC | Age: 72
End: 2022-10-25
Payer: COMMERCIAL

## 2022-10-25 VITALS
SYSTOLIC BLOOD PRESSURE: 104 MMHG | WEIGHT: 151.8 LBS | DIASTOLIC BLOOD PRESSURE: 42 MMHG | HEIGHT: 67 IN | OXYGEN SATURATION: 96 % | BODY MASS INDEX: 23.83 KG/M2 | HEART RATE: 65 BPM

## 2022-10-25 DIAGNOSIS — N18.4 ANEMIA OF CHRONIC RENAL FAILURE, STAGE 4 (SEVERE) (H): ICD-10-CM

## 2022-10-25 DIAGNOSIS — I48.0 PAROXYSMAL ATRIAL FIBRILLATION (H): Primary | ICD-10-CM

## 2022-10-25 DIAGNOSIS — T50.905A DRESS SYNDROME: ICD-10-CM

## 2022-10-25 DIAGNOSIS — I48.0 PAROXYSMAL ATRIAL FIBRILLATION (H): ICD-10-CM

## 2022-10-25 DIAGNOSIS — D89.2 PARAPROTEINEMIA: ICD-10-CM

## 2022-10-25 DIAGNOSIS — I38 PROSTHETIC VALVE ENDOCARDITIS, SEQUELA: ICD-10-CM

## 2022-10-25 DIAGNOSIS — Z51.81 ENCOUNTER FOR THERAPEUTIC DRUG LEVEL MONITORING: ICD-10-CM

## 2022-10-25 DIAGNOSIS — D72.12 DRESS SYNDROME: ICD-10-CM

## 2022-10-25 DIAGNOSIS — Z95.4 S/P MITRAL VALVE REPLACEMENT WITH METALLIC VALVE: ICD-10-CM

## 2022-10-25 DIAGNOSIS — I25.10 CORONARY ARTERY DISEASE INVOLVING NATIVE CORONARY ARTERY OF NATIVE HEART WITHOUT ANGINA PECTORIS: ICD-10-CM

## 2022-10-25 DIAGNOSIS — Z00.00 ANNUAL PHYSICAL EXAM: Primary | ICD-10-CM

## 2022-10-25 DIAGNOSIS — E78.5 DYSLIPIDEMIA: ICD-10-CM

## 2022-10-25 DIAGNOSIS — M1A.00X0 IDIOPATHIC CHRONIC GOUT WITHOUT TOPHUS, UNSPECIFIED SITE: ICD-10-CM

## 2022-10-25 DIAGNOSIS — G24.8 FOCAL DYSTONIA: ICD-10-CM

## 2022-10-25 DIAGNOSIS — T82.6XXS PROSTHETIC VALVE ENDOCARDITIS, SEQUELA: ICD-10-CM

## 2022-10-25 DIAGNOSIS — I50.22 CHRONIC SYSTOLIC HEART FAILURE (H): ICD-10-CM

## 2022-10-25 DIAGNOSIS — I89.0 LYMPHEDEMA: ICD-10-CM

## 2022-10-25 DIAGNOSIS — F11.90 CHRONIC, CONTINUOUS USE OF OPIOIDS: ICD-10-CM

## 2022-10-25 DIAGNOSIS — I87.2 VENOUS STASIS DERMATITIS OF BOTH LOWER EXTREMITIES: ICD-10-CM

## 2022-10-25 DIAGNOSIS — Z79.891 LONG TERM (CURRENT) USE OF OPIATE ANALGESIC: ICD-10-CM

## 2022-10-25 DIAGNOSIS — I87.2 VENOUS STASIS ULCER OF RIGHT ANKLE LIMITED TO BREAKDOWN OF SKIN WITHOUT VARICOSE VEINS (H): ICD-10-CM

## 2022-10-25 DIAGNOSIS — D63.1 ANEMIA OF CHRONIC RENAL FAILURE, STAGE 4 (SEVERE) (H): ICD-10-CM

## 2022-10-25 DIAGNOSIS — Z79.899 ENCOUNTER FOR LONG-TERM (CURRENT) USE OF MEDICATIONS: ICD-10-CM

## 2022-10-25 DIAGNOSIS — L97.311 VENOUS STASIS ULCER OF RIGHT ANKLE LIMITED TO BREAKDOWN OF SKIN WITHOUT VARICOSE VEINS (H): ICD-10-CM

## 2022-10-25 DIAGNOSIS — Z86.79 HISTORY OF BACTERIAL ENDOCARDITIS: ICD-10-CM

## 2022-10-25 DIAGNOSIS — N18.4 CHRONIC KIDNEY DISEASE, STAGE 4 (SEVERE) (H): ICD-10-CM

## 2022-10-25 DIAGNOSIS — M40.03 POSTURAL KYPHOSIS OF CERVICOTHORACIC REGION: ICD-10-CM

## 2022-10-25 PROBLEM — R80.9 PROTEINURIA: Status: RESOLVED | Noted: 2020-08-18 | Resolved: 2022-10-25

## 2022-10-25 PROBLEM — I42.9 CARDIOMYOPATHY (H): Status: RESOLVED | Noted: 2019-06-16 | Resolved: 2022-10-25

## 2022-10-25 LAB
CREAT UR-MCNC: 37 MG/DL
INR BLD: 1.4 (ref 0.9–1.1)

## 2022-10-25 PROCEDURE — 99214 OFFICE O/P EST MOD 30 MIN: CPT | Mod: 25 | Performed by: INTERNAL MEDICINE

## 2022-10-25 PROCEDURE — 36416 COLLJ CAPILLARY BLOOD SPEC: CPT | Performed by: INTERNAL MEDICINE

## 2022-10-25 PROCEDURE — 85610 PROTHROMBIN TIME: CPT | Performed by: INTERNAL MEDICINE

## 2022-10-25 PROCEDURE — G0008 ADMIN INFLUENZA VIRUS VAC: HCPCS | Performed by: INTERNAL MEDICINE

## 2022-10-25 PROCEDURE — G0439 PPPS, SUBSEQ VISIT: HCPCS | Performed by: INTERNAL MEDICINE

## 2022-10-25 PROCEDURE — 80307 DRUG TEST PRSMV CHEM ANLYZR: CPT | Performed by: INTERNAL MEDICINE

## 2022-10-25 PROCEDURE — 90662 IIV NO PRSV INCREASED AG IM: CPT | Performed by: INTERNAL MEDICINE

## 2022-10-25 RX ORDER — WARFARIN SODIUM 3 MG/1
TABLET ORAL
Qty: 70 TABLET | Refills: 0 | Status: SHIPPED | OUTPATIENT
Start: 2022-10-25 | End: 2023-02-13

## 2022-10-25 RX ORDER — TORSEMIDE 100 MG/1
100 TABLET ORAL 2 TIMES DAILY
COMMUNITY
Start: 2022-10-18 | End: 2024-01-01

## 2022-10-25 RX ORDER — TRIAMCINOLONE ACETONIDE 1 MG/G
OINTMENT TOPICAL 2 TIMES DAILY
Qty: 80 G | Refills: 1 | Status: SHIPPED | OUTPATIENT
Start: 2022-10-25 | End: 2024-01-01

## 2022-10-25 RX ORDER — ENOXAPARIN SODIUM 100 MG/ML
1 INJECTION SUBCUTANEOUS EVERY 24 HOURS
Qty: 6.4 ML | Refills: 1 | Status: SHIPPED | OUTPATIENT
Start: 2022-10-25 | End: 2023-02-13

## 2022-10-25 ASSESSMENT — PATIENT HEALTH QUESTIONNAIRE - PHQ9
SUM OF ALL RESPONSES TO PHQ QUESTIONS 1-9: 5
SUM OF ALL RESPONSES TO PHQ QUESTIONS 1-9: 5
10. IF YOU CHECKED OFF ANY PROBLEMS, HOW DIFFICULT HAVE THESE PROBLEMS MADE IT FOR YOU TO DO YOUR WORK, TAKE CARE OF THINGS AT HOME, OR GET ALONG WITH OTHER PEOPLE: NOT DIFFICULT AT ALL

## 2022-10-25 NOTE — PROGRESS NOTES
ANTICOAGULATION MANAGEMENT       Bandar Wells 72 year old male is on warfarin with subtherapeutic INR result. (Goal INR 2.5-3.5)    Recent labs: (last 7 days)     10/25/22  1017   INR 1.4*       ASSESSMENT       Source(s): Chart Review and Patient/Caregiver Call       Warfarin doses taken: Missed dose(s) may be affecting INR- pt reports that after he discharged from the TCU he wasn't given any warfarin and hasn't went home since because they are doing construction on his bathroom. No warfarin since 10/20/22.    Diet: No new diet changes identified    New illness, injury, or hospitalization: Yes: Discharged from TCU on 10/20/22.     Medication/supplement changes: None noted    Signs or symptoms of bleeding or clotting: No    Previous INR: Subtherapeutic    Additional findings: Bridging with Enoxaparin until INR >= 2.5     Consulted with [facility], booster dose and maintenance increase, pt advised to begin bridging with Lovenox. New script for Lovenox and Warfarin sent to pt preferred pharmacy.     Pt reports that he does not want to bridge but advised on recommendations by [facility] and the risks associated with not bridging and subtherapeutic INR       PLAN     Recommended plan for temporary change(s) affecting INR     Dosing Instructions: booster dose then Increase your warfarin dose (3.7% change) with next INR in 3 days       Summary  As of 10/25/2022    Full warfarin instructions:  10/25: 12 mg; Otherwise 6 mg every day; Starting 10/25/2022   Next INR check:  10/28/2022             Telephone call with Bandar pt is agreeable with warfarin dosing but refusing to bridge with Lovenox despite education.     Patient to recheck with home meter    Education provided:     Taking warfarin: purpose of warfarin and how it works and Importance of taking warfarin as instructed    Goal range and lab monitoring: goal range and significance of current result and Importance of therapeutic range    Symptom monitoring: monitoring for  clotting signs and symptoms, monitoring for stroke signs and symptoms and when to seek medical attention/emergency care    Lovenox/Heparin education provided: role of enoxaparin/heparin in bridge therapy, prescribed dose and frequency and monitoring for signs and symptoms of clotting     Plan made with [facility] Pharmacist Batool Davis RN  Anticoagulation Clinic  10/25/2022    _______________________________________________________________________     Anticoagulation Episode Summary     Current INR goal:  2.5-3.5   TTR:  37.1 % (1 y)   Target end date:  Indefinite   Send INR reminders to:  [facility]    Indications    Paroxysmal atrial fibrillation (H) [I48.0]  S/P mitral valve replacement with metallic valve [Z95.4]           Comments:  Aviva- wants a call every time         Anticoagulation Care Providers     Provider Role Specialty Phone number    Jin Hicks MD Referring Internal Medicine [phone]

## 2022-10-25 NOTE — PROGRESS NOTES
SUBJECTIVE:   Bandar is a 72 year old who presents for Preventive Visit.  This 72-year-old man was admitted and treated at [facility] for sepsis related to wound infection.  He grew out many resistant antibiotics and was ultimately treated with linezolid and Cipro.  He is doing better after prolonged stay at [facility] where he had asymptomatic COVID infection.  He now is staying at a hotel because his home is undergoing remodeling.  He has wound care and Occupational Therapy doing lymphedema and wound treatment for him.  Generally feeling stable.    Patient has been advised of split billing requirements and indicates understanding: Yes  Are you in the first 12 months of your Medicare coverage?  No    History of Present Illness       Reason for visit:  Infection  Symptom onset:  More than a month  Symptom intensity:  Moderate  Symptom progression:  Improving  Had these symptoms before:  Yes  Has tried/received treatment for these symptoms:  Yes  Previous treatment was successful:  Yes    He eats 2-3 servings of fruits and vegetables daily.He consumes 6 sweetened beverage(s) daily.He exercises with enough effort to increase his heart rate 10 to 19 minutes per day.  He exercises with enough effort to increase his heart rate 7 days per week.   He is taking medications regularly.    Today's PHQ-9         PHQ-9 Total Score: 5    PHQ-9 Q9 Thoughts of better off dead/self-harm past 2 weeks :   Not at all    How difficult have these problems made it for you to do your work, take care of things at home, or get along with other people: Not difficult at all    Do you feel safe in your environment? Yes    Have you ever done Advance Care Planning? (For example, a Health Directive, POLST, or a discussion with a medical provider or your loved ones about your wishes): No, advance care planning information given to patient to review.  Patient plans to discuss their wishes with loved ones or provider.         Fall risk  Fallen 2 or more  times in the past year?: Yes  Any fall with injury in the past year?: Yes    Cognitive Screening   1) Repeat 3 items (Leader, Season, Table)    2) Clock draw: NORMAL  3) 3 item recall: Recalls 3 objects  Results: 3 items recalled: COGNITIVE IMPAIRMENT LESS LIKELY    Mini-CogTM Copyright S Sean. Licensed by the author for use in [facility]; reprinted with permission (roseline@[facility]). All rights reserved.        Reviewed and updated as needed this visit by clinical staff   Tobacco  Allergies  Meds              Reviewed and updated as needed this visit by Provider                 Social History     Tobacco Use     Smoking status: Never     Smokeless tobacco: Never   Substance Use Topics     Alcohol use: Yes     Alcohol/week: 2.0 standard drinks       No flowsheet data found.      Current providers sharing in care for this patient include:   Patient Care Team:  Jin Hicks MD as PCP - General (Internal Medicine)  José Miguel Solorio MD as MD (Hematology & Oncology)  Ronn Ordonez MD as Assigned Infectious Disease Provider  Jin Hicks MD as Assigned PCP  Bertha Cervantes MD as Assigned Neuroscience Provider    The following health maintenance items are reviewed in Epic and correct as of today:  Health Maintenance   Topic Date Due     MEDICARE ANNUAL WELLNESS VISIT  01/17/2021     COVID-19 Vaccine (5 - Booster for Moderna series) 06/06/2022     LIPID  07/15/2022     DTAP/TDAP/TD IMMUNIZATION (2 - Td or Tdap) 09/07/2022     ALT  10/18/2022     URINE DRUG SCREEN  10/18/2022     BMP  01/13/2023     ANNUAL REVIEW OF HM ORDERS  04/11/2023     HEMOGLOBIN  04/13/2023     PHQ-9  04/25/2023     CBC  10/13/2023     FALL RISK ASSESSMENT  10/25/2023     HF ACTION PLAN  10/18/2024     ADVANCE CARE PLANNING  01/17/2025     COLORECTAL CANCER SCREENING  10/02/2029     PARATHYROID  Completed     PHOSPHORUS  Completed     TSH W/FREE T4 REFLEX  Completed     HEPATITIS C SCREENING  Completed     INFLUENZA  "VACCINE  Completed     Pneumococcal Vaccine: 65+ Years  Completed     URINALYSIS  Completed     ALK PHOS  Completed     ZOSTER IMMUNIZATION  Completed     AORTIC ANEURYSM SCREENING (SYSTEM ASSIGNED)  Completed     IPV IMMUNIZATION  Aged Out     MENINGITIS IMMUNIZATION  Aged Out     MICROALBUMIN  Discontinued     HEPATITIS B IMMUNIZATION  Discontinued       Review of Systems  Constitutional, HEENT, cardiovascular, pulmonary, GI, , musculoskeletal, neuro, skin, endocrine and psych systems are negative, except as otherwise noted.    OBJECTIVE:   /42 (BP Location: Left arm, Patient Position: Sitting, Cuff Size: Adult Small)   Pulse 65   Ht 1.705 m (5' 7.13\")   Wt 68.9 kg (151 lb 12.8 oz)   SpO2 96%   BMI 23.69 kg/m   Estimated body mass index is 23.69 kg/m  as calculated from the following:    Height as of this encounter: 1.705 m (5' 7.13\").    Weight as of this encounter: 68.9 kg (151 lb 12.8 oz).  Physical Exam  EYES: Eyelids, conjunctiva, and sclera were normal. Pupils were normal. Cornea, iris, and lens were normal bilaterally.  HEAD, EARS, NOSE, MOUTH, AND THROAT: Head and face were normal. Hearing was normal to voice and the ears were normal to external exam.   NECK: Neck appearance was normal. There were no neck masses and the thyroid was not enlarged.  RESPIRATORY: Breathing pattern was normal and the chest moved symmetrically.  Percussion/auscultatory percussion was normal.  Lung sounds were normal and there were no abnormal sounds.  CARDIOVASCULAR: Heart rate and rhythm were normal.  S1 and S2 were normal and there were no extra sounds or murmurs. Peripheral pulses in arms and legs were normal.  Jugular venous pressure was normal.  Edema of the right lower extremity more so than the left  GASTROINTESTINAL: The abdomen was normal in contour.  Bowel sounds were present.  Percussion detected no organ enlargement or tenderness.  Palpation detected no tenderness, mass, or enlarged organs. "   MUSCULOSKELETAL: Significant kyphosis with contractures of his flexor tendons of his neck  LYMPHATIC: There were no enlarged nodes.  SKIN/HAIR/NAILS: His legs are wrapped  NEUROLOGIC: The patient was alert and oriented to person, place, time, and circumstance. Speech was normal. Cranial nerves were normal. Motor strength was normal for age. The patient was normally coordinated.  PSYCHIATRIC:  Mood and affect were normal and the patient had normal recent and remote memory. The patient's judgment and insight were normal.        ASSESSMENT / PLAN:   1. Annual physical exam  This is a 72-year-old man with issues as discussed below    2. Chronic kidney disease, stage 4 (severe) (H)  Follows with nephrology and will be having labs drawn in the next week    3. Anemia of chronic renal failure, stage 4 (severe) (H)  As above    4. Chronic systolic heart failure (H)  Stable    5. Coronary artery disease involving native coronary artery of native heart without angina pectoris  Continue secondary prevention, not on a statin due to muscular scattered effects    6. Encounter for therapeutic drug level monitoring    7. Encounter for long-term (current) use of medications    8. Paroxysmal atrial fibrillation (H)  - INR point of care    10. Postural kyphosis of cervicothoracic region  11. Focal dystonia / anterior cervical muscles - Neuro - [place], PT   He gets Botox injections through neurology    12. Dyslipidemia  On a statin as above.    9. S/P mitral valve replacement with metallic valve  13. History of bacterial endocarditis  14. Prosthetic valve endocarditis, sequela  Continue prophylactic penicillin also on warfarin    15. Idiopathic chronic gout without tophus, unspecified site  Continue with current medication    16. Lymphedema  Continue with occupational therapy lymphedema treatment    17. Paraproteinemia  Follows with oncology    18. DRESS syndrome  Improved    19. Chronic, continuous use of opioids  - [id number] - Urine  "Drug Confirmation Panel (Comprehensive); Future  - [id number] - Urine Drug Confirmation Panel (Comprehensive)    20. Long term (current) use of opiate analgesic  - [id number] - Urine Drug Confirmation Panel (Comprehensive); Future  - [id number] - Urine Drug Confirmation Panel (Comprehensive)    21. Venous stasis dermatitis of both lower extremities  - triamcinolone (KENALOG) 0.1 % external ointment; Apply topically 2 times daily  Dispense: 80 g; Refill: 1    22. Venous stasis ulcer of right ankle limited to breakdown of skin without varicose veins (H)  As above    COUNSELING:    Estimated body mass index is 23.69 kg/m  as calculated from the following:    Height as of this encounter: 1.705 m (5' 7.13\").    Weight as of this encounter: 68.9 kg (151 lb 12.8 oz).    He reports that he has never smoked. He has never used smokeless tobacco.      Appropriate preventive services were discussed with this patient, including applicable screening as appropriate for cardiovascular disease, diabetes, osteopenia/osteoporosis, and glaucoma.  As appropriate for age/gender, discussed screening for colorectal cancer, prostate cancer, breast cancer, and cervical cancer. Checklist reviewing preventive services available has been given to the patient.    Reviewed patients plan of care and provided an AVS. The Basic Care Plan (routine screening as documented in Health Maintenance) for Bandar meets the Care Plan requirement. This Care Plan has been established and reviewed with the Patient.    Counseling Resources:  ATP IV Guidelines  Pooled Cohorts Equation Calculator  Breast Cancer Risk Calculator  Breast Cancer: Medication to Reduce Risk  FRAX Risk Assessment  ICSI Preventive Guidelines  Dietary Guidelines for Americans, 2010  USDA's MyPlate  ASA Prophylaxis  Lung CA Screening    Jin Hicks MD  [facility]    Identified Health Risks:    "

## 2022-10-26 ENCOUNTER — TELEPHONE (OUTPATIENT)
Dept: INTERNAL MEDICINE | Facility: CLINIC | Age: 72
End: 2022-10-26

## 2022-10-26 DIAGNOSIS — I48.0 PAROXYSMAL ATRIAL FIBRILLATION (H): Primary | ICD-10-CM

## 2022-10-26 DIAGNOSIS — Z95.4 S/P MITRAL VALVE REPLACEMENT WITH METALLIC VALVE: ICD-10-CM

## 2022-10-26 NOTE — TELEPHONE ENCOUNTER
Beata ([facility] ) calling today with provider MARK for Dr Hicks.  The message is as follows:    I've been following Bandar while he was in TCU.  I have made 2 attempts post discharge to connect with him.  I have not been able to reach him.  I will make one more attempt before closing this case.  If Dr Hicks believes patient could benefit from this service he should encourage patient to engage.    Any questions, Beata can be reached at [phone].

## 2022-10-27 ENCOUNTER — TELEPHONE (OUTPATIENT)
Dept: INTERNAL MEDICINE | Facility: CLINIC | Age: 72
End: 2022-10-27

## 2022-10-27 NOTE — TELEPHONE ENCOUNTER
Patient states he has questions on his dosage for coumadin and when to do the next INR.  Please call.    Thank you.

## 2022-10-27 NOTE — TELEPHONE ENCOUNTER
Returned call to patient.  Patient states he has been taking the Lovenox injections and is wondering when he should check his INR again. Patient also inquired about the dose of Lovenox he has been taking stating he's been taking 80 mg.      Reviewed Lovenox dosage and patient should be taking 70 mg not 80 mg.  Reviewed with patient how to waste 0.1 mL.  Patient will take next Lovenox injection tonight and check his INR in the morning and report as usual.    SUE ConnollyN, RN  Anticoagulation Clinic

## 2022-10-28 ENCOUNTER — TELEPHONE (OUTPATIENT)
Dept: INTERNAL MEDICINE | Facility: CLINIC | Age: 72
End: 2022-10-28

## 2022-10-28 DIAGNOSIS — Z95.4 S/P MITRAL VALVE REPLACEMENT WITH METALLIC VALVE: ICD-10-CM

## 2022-10-28 DIAGNOSIS — I89.0 LYMPHEDEMA: Primary | ICD-10-CM

## 2022-10-28 DIAGNOSIS — I48.0 PAROXYSMAL ATRIAL FIBRILLATION (H): Primary | ICD-10-CM

## 2022-10-28 LAB
GABAPENTIN UR QL CFM: PRESENT
INR (EXTERNAL): 1.2 (ref 2.5–3.5)
OXYCODONE UR CFM-MCNC: 2220 NG/ML
OXYCODONE/CREAT UR: 6000 NG/MG {CREAT}

## 2022-10-28 NOTE — TELEPHONE ENCOUNTER
ANTICOAGULATION MANAGEMENT     Bandar Wells 72 year old male is on warfarin with subtherapeutic INR result. (Goal INR )    Recent labs: (last 7 days)     10/28/22  1721   INR 1.2*       ASSESSMENT       Source(s): Chart Review and Patient/Caregiver Call       Warfarin doses taken: Missed dose(s) may be affecting INR; patient was not aware he was supposed to take warfarin at the same time as lovenox. Patient was not taking warfarin--education provided with rationale    Diet: No new diet changes identified    New illness, injury, or hospitalization: Yes: recently d/c'd from TCU    Medication/supplement changes: pt continues to take lovenox    Signs or symptoms of bleeding or clotting: No    Previous INR: Subtherapeutic    Additional findings: None       PLAN     Recommended plan for temporary change(s) affecting INR     Dosing Instructions: Booster doses then continue your current warfarin dose with next INR in 3 days       Summary  As of 10/28/2022    Full warfarin instructions:  10/28: 12 mg; 10/29: 9 mg; Otherwise 6 mg every day; Starting 10/28/2022   Next INR check:  10/31/2022             Telephone call with Bandar who verbalizes understanding and agrees to plan and who agrees to plan and repeated back plan correctly    Patient to recheck with home meter    Education provided:     Taking warfarin: purpose of warfarin and how it works and take warfarin at same time each day; preferably in the evening    Lovenox/Heparin education provided: role of enoxaparin/heparin in bridge therapy     Plan made with [facility] Pharmacist Janel German, RN  Anticoagulation Clinic  10/28/2022    _______________________________________________________________________     Anticoagulation Episode Summary     Current INR goal:  2.5-3.5   TTR:  37.2 % (1 y)   Target end date:  Indefinite   Send INR reminders to:  [facility]    Indications    Paroxysmal atrial fibrillation (H) [I48.0]  S/P mitral valve  replacement with metallic valve [Z95.4]           Comments:  Aviva- wants a call every time         Anticoagulation Care Providers     Provider Role Specialty Phone number    Jin Hicks MD Referring Internal Medicine [phone]

## 2022-10-28 NOTE — TELEPHONE ENCOUNTER
Reason for Call:  Other call back    Detailed comments: Patient called 5:08pm, needs call back tonight. Also left VC at [phone]      Phone Number Patient can be reached at: Cell number on file:    Telephone Information:   Mobile [phone]       Best Time: ASAP    Can we leave a detailed message on this number? YES    Call taken on 10/28/2022 at 5:08 PM by Laura Kanavel

## 2022-10-28 NOTE — TELEPHONE ENCOUNTER
Order/Referral Request    Who is requesting: [facility]      Orders being requested:   Fitting for velco compression wraps - bilateral lower extremity    Needs to be faxed to [facility]  Fax:  [phone]    Reason service is needed/diagnosis: n/a    When are orders needed by: n/a    Has this been discussed with Provider: No    Does patient have a preference on a Group/Provider/Facility? n/a    Does patient have an appointment scheduled?: No    Where to send orders: N/A    Could we send this information to you in [employer] or would you prefer to receive a phone call?:   No preference   Okay to leave a detailed message?: Yes at Other phone number:  [phone]

## 2022-10-31 ENCOUNTER — ANTICOAGULATION THERAPY VISIT (OUTPATIENT)
Dept: ANTICOAGULATION | Facility: CLINIC | Age: 72
End: 2022-10-31

## 2022-10-31 DIAGNOSIS — I48.0 PAROXYSMAL ATRIAL FIBRILLATION (H): Primary | ICD-10-CM

## 2022-10-31 DIAGNOSIS — Z95.4 S/P MITRAL VALVE REPLACEMENT WITH METALLIC VALVE: ICD-10-CM

## 2022-10-31 LAB — INR HOME MONITORING: 2.1 (ref 2.5–3.5)

## 2022-10-31 NOTE — PROGRESS NOTES
ANTICOAGULATION MANAGEMENT     Bandar Wells 72 year old male is on warfarin with subtherapeutic INR result. (Goal INR 2.5-3.5)    Recent labs: (last 7 days)     10/31/22  0000   INR 2.1*       ASSESSMENT       Source(s): Chart Review and Patient/Caregiver Call       Warfarin doses taken: Warfarin taken as instructed    Diet: No new diet changes identified    New illness, injury, or hospitalization: No    Medication/supplement changes: None noted    Signs or symptoms of bleeding or clotting: No    Previous INR: Subtherapeutic    Additional findings: Bridging with Enoxaparin until INR >= 2.5     Per [facility], if patient's INR is moving toward goal on Wednesday ok to stop lovenox       PLAN     Recommended plan for temporary change(s) affecting INR     Dosing Instructions: Continue your current warfarin dose Continue bridging with Enoxaparin with next INR in 2 days       Summary  As of 10/31/2022    Full warfarin instructions:  6 mg every day; Starting 10/31/2022   Next INR check:  11/2/2022             Telephone call with Bandar who verbalizes understanding and agrees to plan    Patient to recheck with home meter    Education provided:     Taking warfarin: Importance of taking warfarin as instructed    Plan made with [facility] Pharmacist Batool Steiner, RN  Anticoagulation Clinic  10/31/2022    _______________________________________________________________________     Anticoagulation Episode Summary     Current INR goal:  2.5-3.5   TTR:  37.2 % (1 y)   Target end date:  Indefinite   Send INR reminders to:  [facility]    Indications    Paroxysmal atrial fibrillation (H) [I48.0]  S/P mitral valve replacement with metallic valve [Z95.4]           Comments:  Aviva- wants a call every time         Anticoagulation Care Providers     Provider Role Specialty Phone number    Jin Hicks MD Referring Internal Medicine [phone]

## 2022-11-01 NOTE — TELEPHONE ENCOUNTER
Order naun'd up for velcro compression stockings  Please route back to pool so we can fax orders once signed

## 2022-11-02 ENCOUNTER — ANTICOAGULATION THERAPY VISIT (OUTPATIENT)
Dept: ANTICOAGULATION | Facility: CLINIC | Age: 72
End: 2022-11-02

## 2022-11-02 DIAGNOSIS — Z95.4 S/P MITRAL VALVE REPLACEMENT WITH METALLIC VALVE: ICD-10-CM

## 2022-11-02 DIAGNOSIS — I48.0 PAROXYSMAL ATRIAL FIBRILLATION (H): Primary | ICD-10-CM

## 2022-11-02 LAB — INR HOME MONITORING: 2.4 (ref 2.5–3.5)

## 2022-11-02 NOTE — PROGRESS NOTES
ANTICOAGULATION MANAGEMENT     Bandar Wells 72 year old male is on warfarin with subtherapeutic INR result. (Goal INR 2.5-3.5)    Recent labs: (last 7 days)     11/02/22  0000   INR 2.4*       ASSESSMENT       Source(s): Chart Review    Previous INR was Subtherapeutic    Medication, diet, health changes since last INR; Per last Anticoag note: 10:31:Per [facility], if patient's INR is moving toward goal on Wednesday ok to stop lovenox. Patient advised to stop Lovenox in VM today           PLAN     Recommended plan for no diet, medication or health factor changes affecting INR     Dosing Instructions: Continue your current warfarin dose with next INR in 5 days       Summary  As of 11/2/2022    Full warfarin instructions:  6 mg every day; Starting 11/2/2022   Next INR check:  11/7/2022             Detailed voice message left for Bandar with dosing instructions and follow up date.     Patient to recheck with home meter    Education provided:     Please call back if any changes to your diet, medications or how you've been taking warfarin    Plan made per [facility] anticoagulation protocol    Janel Jara, RN  Anticoagulation Clinic  11/2/2022    _______________________________________________________________________     Anticoagulation Episode Summary     Current INR goal:  2.5-3.5   TTR:  37.2 % (1 y)   Target end date:  Indefinite   Send INR reminders to:  [facility]    Indications    Paroxysmal atrial fibrillation (H) [I48.0]  S/P mitral valve replacement with metallic valve [Z95.4]           Comments:  Anas- wants a call every time         Anticoagulation Care Providers     Provider Role Specialty Phone number    Jin Hicks MD Referring Internal Medicine [phone]

## 2022-11-03 ENCOUNTER — TELEPHONE (OUTPATIENT)
Dept: INTERNAL MEDICINE | Facility: CLINIC | Age: 72
End: 2022-11-03

## 2022-11-03 NOTE — PROGRESS NOTES
ANTICOAGULATION MANAGEMENT     Bandar Wells 72 year old male is on warfarin with subtherapeutic INR result. (Goal INR 2.5-3.5)    Recent labs: (last 7 days)     11/02/22  0000   INR 2.4*       ASSESSMENT       Source(s): Chart Review and Patient/Caregiver Call       Warfarin doses taken: Missed dose(s) may be affecting INR    Diet: No new diet changes identified    New illness, injury, or hospitalization: No    Medication/supplement changes: Lovenox discontinued    Signs or symptoms of bleeding or clotting: No    Previous INR: Subtherapeutic    Additional findings: INR almost back in range and rising as expected--Per formerly [facility] can stop lovenox if INR rising and almost back in range        PLAN     Recommended plan for temporary change(s) affecting INR     Dosing Instructions: Continue your current warfarin dose with next INR in 5 days       Summary  As of 11/2/2022    Full warfarin instructions:  6 mg every day; Starting 11/2/2022   Next INR check:  11/7/2022             Telephone call with Bandar who verbalizes understanding and agrees to plan and who agrees to plan and repeated back plan correctly    Patient to recheck with home meter    Education provided:     Please call back if any changes to your diet, medications or how you've been taking warfarin    Plan made per [facility] anticoagulation protocol    Janel Jara, RN  Anticoagulation Clinic  11/3/2022    _______________________________________________________________________     Anticoagulation Episode Summary     Current INR goal:  2.5-3.5   TTR:  37.3 % (1 y)   Target end date:  Indefinite   Send INR reminders to:  [facility]    Indications    Paroxysmal atrial fibrillation (H) [I48.0]  S/P mitral valve replacement with metallic valve [Z95.4]           Comments:  Jeni harts a call every time         Anticoagulation Care Providers     Provider Role Specialty Phone number    Jin Hicks MD Referring Internal Medicine [phone]

## 2022-11-03 NOTE — TELEPHONE ENCOUNTER
Reason for Call:  Other call back    Detailed comments: pt would like call back from inr nurse to discuss recent results. Please call pt back to discuss.    Phone Number Patient can be reached at: Cell number on file:    Telephone Information:   Mobile [phone]       Best Time: na    Can we leave a detailed message on this number? YES    Call taken on 11/3/2022 at 9:17 AM by Kate Abebe

## 2022-11-07 ENCOUNTER — ANTICOAGULATION THERAPY VISIT (OUTPATIENT)
Dept: ANTICOAGULATION | Facility: CLINIC | Age: 72
End: 2022-11-07

## 2022-11-07 DIAGNOSIS — I48.0 PAROXYSMAL ATRIAL FIBRILLATION (H): Primary | ICD-10-CM

## 2022-11-07 DIAGNOSIS — Z95.4 S/P MITRAL VALVE REPLACEMENT WITH METALLIC VALVE: ICD-10-CM

## 2022-11-07 LAB — INR HOME MONITORING: 3.9 (ref 2.5–3.5)

## 2022-11-07 NOTE — PROGRESS NOTES
ANTICOAGULATION MANAGEMENT     Bandar Wells 72 year old male is on warfarin with supratherapeutic INR result. (Goal INR 2.5-3.5)    Recent labs: (last 7 days)     11/07/22  0000   INR 3.9*       ASSESSMENT       Source(s): Chart Review and Patient/Caregiver Call       Warfarin doses taken: Warfarin taken as instructed. Pt already took today's dose.    Diet: No new diet changes identified    New illness, injury, or hospitalization: No    Medication/supplement changes: None noted    Signs or symptoms of bleeding or clotting: No    Previous INR: Subtherapeutic    Additional findings: rapid rise in INR since 11/02       PLAN     Recommended plan for no diet, medication or health factor changes affecting INR     Dosing Instructions: decrease your warfarin dose (7% change) with next INR in 4 days       Summary  As of 11/7/2022    Full warfarin instructions:  3 mg every Tue; 6 mg all other days; Starting 11/7/2022   Next INR check:  11/11/2022             Telephone call with Bandar who verbalizes understanding and agrees to plan    Patient to recheck with home meter    Education provided:     Goal range and lab monitoring: goal range and significance of current result    Plan made per [facility] anticoagulation protocol    Driss Gonzalez RN  Anticoagulation Clinic  11/7/2022    _______________________________________________________________________     Anticoagulation Episode Summary     Current INR goal:  2.5-3.5   TTR:  37.9 % (1 y)   Target end date:  Indefinite   Send INR reminders to:  [facility] MONITOR[facility]    Indications    Paroxysmal atrial fibrillation (H) [I48.0]  S/P mitral valve replacement with metallic valve [Z95.4]           Comments:  Anas- wants a call every time         Anticoagulation Care Providers     Provider Role Specialty Phone number    Jin Hicks MD Referring Internal Medicine [phone]

## 2022-11-10 DIAGNOSIS — R52 PAIN: ICD-10-CM

## 2022-11-10 RX ORDER — OXYCODONE HYDROCHLORIDE 10 MG/1
5 TABLET ORAL EVERY 4 HOURS PRN
Qty: 90 TABLET | Refills: 0 | Status: SHIPPED | OUTPATIENT
Start: 2022-11-10 | End: 2022-11-30

## 2022-11-11 ENCOUNTER — ANTICOAGULATION THERAPY VISIT (OUTPATIENT)
Dept: ANTICOAGULATION | Facility: CLINIC | Age: 72
End: 2022-11-11

## 2022-11-11 ENCOUNTER — TELEPHONE (OUTPATIENT)
Dept: INTERNAL MEDICINE | Facility: CLINIC | Age: 72
End: 2022-11-11

## 2022-11-11 DIAGNOSIS — I48.0 PAROXYSMAL ATRIAL FIBRILLATION (H): Primary | ICD-10-CM

## 2022-11-11 DIAGNOSIS — Z95.4 S/P MITRAL VALVE REPLACEMENT WITH METALLIC VALVE: ICD-10-CM

## 2022-11-11 LAB — INR HOME MONITORING: 2.8 (ref 2.5–3.5)

## 2022-11-11 NOTE — TELEPHONE ENCOUNTER
ANTICOAGULATION CLINIC REFERRAL RENEWAL REQUEST       An annual renewal order is required for all patients referred to [facility].?  Please review and sign the pended referral order for Bandar Wells.       ANTICOAGULATION SUMMARY      Warfarin indication(s)   Paroxysmal atrial fibrillation (H) [I48.0]  S/P mitral valve replacement with metallic valve [Z95.4]            Current goal range   INR: 2.5-3.5     Goal appropriate for indication? Goal INR 2.5-3.5 standard for indication(s) above     Time in Therapeutic Range (TTR)  (Goal > 60%) 38.6%       Office visit with referring provider's group within last year yes on 10/25/22       Julieta Muñoz RN  [facility]

## 2022-11-11 NOTE — PROGRESS NOTES
ANTICOAGULATION MANAGEMENT     Bandar Wells 72 year old male is on warfarin with therapeutic INR result. (Goal INR 2.5-3.5)    Recent labs: (last 7 days)     11/11/22  0000   INR 2.8       ASSESSMENT       Source(s): Chart Review and Patient/Caregiver Call       Warfarin doses taken: Warfarin taken as instructed    Diet: No new diet changes identified    New illness, injury, or hospitalization: No    Medication/supplement changes: None noted    Signs or symptoms of bleeding or clotting: No    Previous INR: Supratherapeutic    Additional [facility]: [facility]       PLAN       Dosing Instructions: Continue your current warfarin dose with next INR in 5 days       Summary  As of 11/11/2022    Full warfarin instructions:  3 mg every Tue; 6 mg all other days; Starting 11/11/2022   Next INR check:  11/16/2022             Telephone call with Bandar who agrees to plan and repeated back plan correctly    Patient to recheck with home meter    Education provided:     Contact [phone]  with any changes, questions or concerns.     Plan made with [facility] Pharmacist Batool Muñoz RN  Anticoagulation Clinic  11/11/2022    _______________________________________________________________________     Anticoagulation Episode Summary     Current INR goal:  2.5-3.5   TTR:  38.6 % (1 y)   Target end date:  Indefinite   Send INR reminders to:  [facility]    Indications    Paroxysmal atrial fibrillation (H) [I48.0]  S/P mitral valve replacement with metallic valve [Z95.4]           Comments:  Aviva- wants a call every time         Anticoagulation Care Providers     Provider Role Specialty Phone number    Jin Hicks MD Referring Internal Medicine [phone]

## 2022-11-15 ENCOUNTER — TELEPHONE (OUTPATIENT)
Dept: INTERNAL MEDICINE | Facility: CLINIC | Age: 72
End: 2022-11-15

## 2022-11-15 ENCOUNTER — OFFICE VISIT (OUTPATIENT)
Dept: PHYSICAL MEDICINE AND REHAB | Facility: CLINIC | Age: 72
End: 2022-11-15
Payer: COMMERCIAL

## 2022-11-15 VITALS — HEART RATE: 64 BPM | SYSTOLIC BLOOD PRESSURE: 113 MMHG | DIASTOLIC BLOOD PRESSURE: 55 MMHG

## 2022-11-15 DIAGNOSIS — G24.3 CERVICAL DYSTONIA: Primary | ICD-10-CM

## 2022-11-15 PROCEDURE — 99215 OFFICE O/P EST HI 40 MIN: CPT | Performed by: PHYSICAL MEDICINE & REHABILITATION

## 2022-11-15 NOTE — TELEPHONE ENCOUNTER
Order/Referral Request    Who is requesting:     Orders being requested: PT orders---Does not want his last visit, he would like to discontinue PT---Bandar is doing well with his PT    Reason service is needed/diagnosis: Venous insufficiency    When are orders needed by: ASAP    Has this been discussed with Provider: unknown    Does patient have a preference on a Group/Provider/Facility? Home Health---RN visits continue but PT is not needed or wanted    Does patient have an appointment scheduled?: No    Where to send orders: verbal orders    Could we send this information to you in [employer] or would you prefer to receive a phone call?:   Patient would prefer a phone call   Okay to leave a detailed message?: Yes at Other phone number: Anuradha TALLEY  [phone]

## 2022-11-15 NOTE — PROGRESS NOTES
[facility]    PM&R CLINIC NOTE  BOTULINUM TOXIN PROCEDURE      HPI  Chief Complaint   Patient presents with     Procedure     Injection     Bandar Wells is a 72 year old male with a history of involuntary neck muscle spasms resulting in abnormal posturing of his head and neck who presents to clinic for botulinum toxin injections for management of cervical dystonia (anterocollis primarily).     SINCE LAST VISIT  Bandar Wells was last seen here in clinic on 7/7/2022, at which time he received 100 units of Botox, which was a decrease from previous dose of 125 units out of concern that he may have developed some weakness with prior series of injections.     Patient denies new medical diagnoses, illnesses, hospitalizations, emergency room visits, and injuries since the previous injection with botulinum neurotoxin.      RESPONSE TO PREVIOUS TREATMENT    Side effects: No problems reported    Pain Improvement: Yes, his pain has been relatively well-controlled, however he is not sure whether the Botox is helping or if the pain has just improved on it's own. He continues to experience some pain throughout the day, but this is intermittent and he cannot determine what his triggers are.     Dystonia Improvement: Unsure.  Percent Improvement: Difficult to quantify dystonia improvement, as his head is quite difficult to straight upward. This makes walking and ADLs quite difficult.     PHYSICAL EXAM  VS: /55 (BP Location: Right arm, Patient Position: Sitting, Cuff Size: Adult Regular)   Pulse 64    GEN: Pleasant and cooperative, in no acute distress  HEENT: No facial asymmetry. Well-healing abrasions over top of scalp.   HEAD, NECK AND TRUNK PATTERN:   Head & Neck Flexion:  Present   Head turned to the right side with chin deviated towards the left side. Significant hypertonicity in bilateral upper trapezius (L>R), point tenderness at levator insertion bilaterally.    ALLERGIES  Allergies    Allergen Reactions     Allopurinol Rash     Clindamycin Rash       CURRENT MEDICATIONS    Current Outpatient Medications:      acetaminophen (TYLENOL) 500 MG tablet, Take 1,000 mg by mouth 3 times daily, Disp: , Rfl:      Cholecalciferol (VITAMIN D3) 25 MCG (1000 UT) CAPS, Take 1,000 Units by mouth daily, Disp: , Rfl:      febuxostat (ULORIC) 40 MG TABS tablet, Take 1 tablet (40 mg) by mouth daily, Disp: , Rfl:      folic acid (FOLVITE) 1 MG tablet, Take 1 mg by mouth daily, Disp: , Rfl:      gabapentin (NEURONTIN) 300 MG capsule, TAKE 2 CAPSULES (600 MG) BY MOUTH AT BEDTIME, Disp: 180 capsule, Rfl: 3     lactobacillus rhamnosus (GG) (CULTURELL) capsule, Take 1 capsule by mouth 2 times daily, Disp: , Rfl:      oxyCODONE IR (ROXICODONE) 10 MG tablet, Take 0.5 tablets (5 mg) by mouth every 4 hours as needed for severe pain, Disp: 90 tablet, Rfl: 0     penicillin V (VEETID) 500 MG tablet, Take 1 tablet (500 mg) by mouth daily for 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, Disp: 90 tablet, Rfl: 2     polyethylene glycol (MIRALAX) 17 g packet, Take 17 g by mouth daily as needed for constipation, Disp: 30 packet, Rfl: 0     potassium chloride (KLOR-CON) 20 MEQ packet, Take 40 mEq by mouth 2 times daily, Disp: , Rfl:      sennosides (SENOKOT) 8.6 MG tablet, Take 1-4 tablets by mouth 2 times daily, Disp: , Rfl:      spironolactone (ALDACTONE) 25 MG tablet, Take 12.5 mg by mouth daily, Disp: , Rfl:      torsemide (DEMADEX) 100 MG tablet, Take 100 mg by mouth 2 times daily, Disp: , Rfl:      triamcinolone (KENALOG) 0.1 % external ointment, Apply topically 2 times daily, Disp: 80 g, Rfl: 1     vitamin B complex with vitamin C (STRESS TAB) tablet, Take 1 tablet by mouth daily, Disp: , Rfl:      warfarin ANTICOAGULANT (COUMADIN) 3 MG tablet, Take 6 mg daily or as directed by [facility] clinic, Disp: 190 tablet, Rfl: 1     warfarin ANTICOAGULANT (COUMADIN) 3 MG tablet, Take by mouth 12mg (4 tablets) of warfarin on 10/25/22 then 6mg (2 tablets) daily  or as directed by the Anticoagulation Clinic, Disp: 70 tablet, Rfl: 0     enoxaparin ANTICOAGULANT (LOVENOX) 80 MG/0.8ML syringe, Inject 0.7 mLs (70 mg) Subcutaneous every 24 hours (Patient not taking: Reported on 11/15/2022), Disp: 6.4 mL, Rfl: 1    Current Facility-Administered Medications:      botulinum toxin type A (BOTOX) 100 units injection 300 Units, 300 Units, Intramuscular, Q90 Days, Bertha Cervantes MD, 100 Units at 07/07/22 1128     botulinum toxin type A (BOTOX) 100 units injection 300 Units, 300 Units, Intramuscular, Q90 Days, Bertha Cervantes MD, 125 Units at 04/14/22 1139       ASSESSMENT AND PLAN   Bandar Wells is a 72 year old male with a history of involuntary neck muscle spasms resulting in abnormal posturing of his head and neck who presents to clinic for botulinum toxin injections for management of cervical dystonia (anterocollis primarily).     Mr. Wells ultimately opted not to do Botox injections today, as it is unclear whether or not it has been helpful for his dystonia - if it is helping, the effects are very subtle, and perhaps too subtle to justify continuing, as he continues to experience significant anterocollis and intermittent neck pain. Instead, we did discuss having him continue to monitor his dystonia symptoms over the next 4-5 weeks at which time we will check back in and see how he is doing without Botox (he has done 4 rounds of Botox to date).     Also recommended a trial of a neck brace with chin support to be used intermittently throughout the day. He may find a brace online, or may require a custom brace through orthotics.     Lety Bucio, PT will call Mr. Wells in 4-5 weeks to check in with him to see how he is doing, at which time he will let us know if he would like to be rescheduled for Botox. We also discussed possibility of starting medical cannabis, which he is an excellent candidate for. He will let Lety Bucio know if he is interested in trying  cannabis at her check-in.       Bertha Cervantes MD     I spent a total of 40 minutes face-to-face and managing the care of Bandar Wells. Over 50% of my time was spent counseling the patient and coordinating care. Please see note for details.

## 2022-11-15 NOTE — LETTER
11/15/2022         RE: Bandar Wells  [address]  [place] [address]        Dear Colleague,    Thank you for referring your patient, Bandar Wells, to the [facility]. Please see a copy of my visit note below.        [facility]    PM&R CLINIC NOTE  BOTULINUM TOXIN PROCEDURE      HPI  Chief Complaint   Patient presents with     Procedure     Injection     Bandar Wells is a 72 year old male with a history of involuntary neck muscle spasms resulting in abnormal posturing of his head and neck who presents to clinic for botulinum toxin injections for management of cervical dystonia (anterocollis primarily).     SINCE LAST VISIT  Bandar Wells was last seen here in clinic on 7/7/2022, at which time he received 100 units of Botox, which was a decrease from previous dose of 125 units out of concern that he may have developed some weakness with prior series of injections.     Patient denies new medical diagnoses, illnesses, hospitalizations, emergency room visits, and injuries since the previous injection with botulinum neurotoxin.      RESPONSE TO PREVIOUS TREATMENT    Side effects: No problems reported    Pain Improvement: Yes, his pain has been relatively well-controlled, however he is not sure whether the Botox is helping or if the pain has just improved on it's own. He continues to experience some pain throughout the day, but this is intermittent and he cannot determine what his triggers are.     Dystonia Improvement: Unsure.  Percent Improvement: Difficult to quantify dystonia improvement, as his head is quite difficult to straight upward. This makes walking and ADLs quite difficult.     PHYSICAL EXAM  VS: /55 (BP Location: Right arm, Patient Position: Sitting, Cuff Size: Adult Regular)   Pulse 64    GEN: Pleasant and cooperative, in no acute distress  HEENT: No facial asymmetry. Well-healing abrasions over top of scalp.   HEAD, NECK AND TRUNK  PATTERN:   Head & Neck Flexion:  Present   Head turned to the right side with chin deviated towards the left side. Significant hypertonicity in bilateral upper trapezius (L>R), point tenderness at levator insertion bilaterally.    ALLERGIES  Allergies   Allergen Reactions     Allopurinol Rash     Clindamycin Rash       CURRENT MEDICATIONS    Current Outpatient Medications:      acetaminophen (TYLENOL) 500 MG tablet, Take 1,000 mg by mouth 3 times daily, Disp: , Rfl:      Cholecalciferol (VITAMIN D3) 25 MCG (1000 UT) CAPS, Take 1,000 Units by mouth daily, Disp: , Rfl:      febuxostat (ULORIC) 40 MG TABS tablet, Take 1 tablet (40 mg) by mouth daily, Disp: , Rfl:      folic acid (FOLVITE) 1 MG tablet, Take 1 mg by mouth daily, Disp: , Rfl:      gabapentin (NEURONTIN) 300 MG capsule, TAKE 2 CAPSULES (600 MG) BY MOUTH AT BEDTIME, Disp: 180 capsule, Rfl: 3     lactobacillus rhamnosus (GG) (CULTURELL) capsule, Take 1 capsule by mouth 2 times daily, Disp: , Rfl:      oxyCODONE IR (ROXICODONE) 10 MG tablet, Take 0.5 tablets (5 mg) by mouth every 4 hours as needed for severe pain, Disp: 90 tablet, Rfl: 0     penicillin V (VEETID) 500 MG tablet, Take 1 tablet (500 mg) by mouth daily for 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, Disp: 90 tablet, Rfl: 2     polyethylene glycol (MIRALAX) 17 g packet, Take 17 g by mouth daily as needed for constipation, Disp: 30 packet, Rfl: 0     potassium chloride (KLOR-CON) 20 MEQ packet, Take 40 mEq by mouth 2 times daily, Disp: , Rfl:      sennosides (SENOKOT) 8.6 MG tablet, Take 1-4 tablets by mouth 2 times daily, Disp: , Rfl:      spironolactone (ALDACTONE) 25 MG tablet, Take 12.5 mg by mouth daily, Disp: , Rfl:      torsemide (DEMADEX) 100 MG tablet, Take 100 mg by mouth 2 times daily, Disp: , Rfl:      triamcinolone (KENALOG) 0.1 % external ointment, Apply topically 2 times daily, Disp: 80 g, Rfl: 1     vitamin B complex with vitamin C (STRESS TAB) tablet, Take 1 tablet by mouth daily, Disp: , Rfl:       warfarin ANTICOAGULANT (COUMADIN) 3 MG tablet, Take 6 mg daily or as directed by [facility] clinic, Disp: 190 tablet, Rfl: 1     warfarin ANTICOAGULANT (COUMADIN) 3 MG tablet, Take by mouth 12mg (4 tablets) of warfarin on 10/25/22 then 6mg (2 tablets) daily or as directed by the Anticoagulation Clinic, Disp: 70 tablet, Rfl: 0     enoxaparin ANTICOAGULANT (LOVENOX) 80 MG/0.8ML syringe, Inject 0.7 mLs (70 mg) Subcutaneous every 24 hours (Patient not taking: Reported on 11/15/2022), Disp: 6.4 mL, Rfl: 1    Current Facility-Administered Medications:      botulinum toxin type A (BOTOX) 100 units injection 300 Units, 300 Units, Intramuscular, Q90 Days, Bertha Cervantes MD, 100 Units at 07/07/22 1128     botulinum toxin type A (BOTOX) 100 units injection 300 Units, 300 Units, Intramuscular, Q90 Days, Bertha Cervantes MD, 125 Units at 04/14/22 1139       ASSESSMENT AND PLAN   Bandar Wells is a 72 year old male with a history of involuntary neck muscle spasms resulting in abnormal posturing of his head and neck who presents to clinic for botulinum toxin injections for management of cervical dystonia (anterocollis primarily).     Mr. Wells ultimately opted not to do Botox injections today, as it is unclear whether or not it has been helpful for his dystonia - if it is helping, the effects are very subtle, and perhaps too subtle to justify continuing, as he continues to experience significant anterocollis and intermittent neck pain. Instead, we did discuss having him continue to monitor his dystonia symptoms over the next 4-5 weeks at which time we will check back in and see how he is doing without Botox (he has done 4 rounds of Botox to date).     Also recommended a trial of a neck brace with chin support to be used intermittently throughout the day. He may find a brace online, or may require a custom brace through orthotics.     Lety Bucio PT will call Mr. Wells in 4-5 weeks to check in with him to see how he is  doing, at which time he will let us know if he would like to be rescheduled for Botox. We also discussed possibility of starting medical cannabis, which he is an excellent candidate for. He will let Lety Bucio know if he is interested in trying cannabis at her check-in.       Bertha Cervantes MD     I spent a total of 40 minutes face-to-face and managing the care of Bandar Wells. Over 50% of my time was spent counseling the patient and coordinating care. Please see note for details.         Again, thank you for allowing me to participate in the care of your patient.        Sincerely,        Bertha Cervantes MD

## 2022-11-16 NOTE — TELEPHONE ENCOUNTER
The Home Care/Assisted Living/Nursing Facility is calling regarding an established patient.  Has the patient seen Home Care in the past or is currently residing in Assisted Living or Nursing Facility? Yes.     Anuradha calling from [facility] requesting the following orders that are within the Home Care, Assisted Living or Nursing Home Eval and Treatment standing order and can be signed as standing order signature required by RN.    Preferred Call Back Number: [phone]    Home Care Visits Continuation and PT/OT/Speech Therapy    Any additional Orders:  Are there any orders requested, not stated above, that are outside of the standing order and must be routed to a licensed practitioner for approval?    No    Writer has verified [employer] will send fax to have orders signed.  Left detailed message on confidential voicemail

## 2022-11-17 ENCOUNTER — ANTICOAGULATION THERAPY VISIT (OUTPATIENT)
Dept: ANTICOAGULATION | Facility: CLINIC | Age: 72
End: 2022-11-17

## 2022-11-17 DIAGNOSIS — I48.0 PAROXYSMAL ATRIAL FIBRILLATION (H): Primary | ICD-10-CM

## 2022-11-17 DIAGNOSIS — Z95.4 S/P MITRAL VALVE REPLACEMENT WITH METALLIC VALVE: ICD-10-CM

## 2022-11-17 LAB — INR HOME MONITORING: 4.9 (ref 2.5–3.5)

## 2022-11-17 NOTE — PROGRESS NOTES
ANTICOAGULATION MANAGEMENT     Bandar Wells 72 year old male is on warfarin with supratherapeutic INR result. (Goal INR 2.5-3.5)    Recent labs: (last 7 days)     11/17/22  0000   INR 4.9*       ASSESSMENT       Source(s): Chart Review and Patient/Caregiver Call       Warfarin doses taken: Bandar states it is possible he took an extra tablet earlier this week but he's really not sure    Diet: Decreased greens/vitamin K in diet; plans to resume previous intake    New illness, injury, or hospitalization: Yes: increased swelling and fluid retention in his legs, the wound nurse told him he needs to be elevating his legs more    Medication/supplement changes: None noted    Signs or symptoms of bleeding or clotting: No    Previous INR: Therapeutic last visit; previously outside of goal range    Additional findings: None       PLAN     Recommended plan for temporary change(s) affecting INR     Dosing Instructions: Warfarin was already taken today. Hold dose then continue your current warfarin dose with next INR in 3-4 days       Summary  As of 11/17/2022    Full warfarin instructions:  11/18: Hold; Otherwise 3 mg every Tue; 6 mg all other days; Starting 11/17/2022   Next INR check:  11/23/2022             Telephone call with Bandar who verbalizes understanding and agrees to plan    Patient to recheck with home meter    Education provided:     Goal range and lab monitoring: goal range and significance of current result    Dietary considerations: importance of consistent vitamin K intake    Symptom monitoring: monitoring for bleeding signs and symptoms    Contact [phone]  with any changes, questions or concerns.     Plan made per [facility] anticoagulation protocol    Christel La RN  Anticoagulation Clinic  11/17/2022    _______________________________________________________________________     Anticoagulation Episode Summary     Current INR goal:  2.5-3.5   TTR:  39.1 % (1 y)   Target end date:  Indefinite   Send INR  reminders to:  [facility]    Indications    Paroxysmal atrial fibrillation (H) [I48.0]  S/P mitral valve replacement with metallic valve [Z95.4]           Comments:  [facility]- wants a call every time         Anticoagulation Care Providers     Provider Role Specialty Phone number    Jin Hicks MD Referring Internal Medicine [phone]

## 2022-11-18 NOTE — PATIENT INSTRUCTIONS
I entered a referral to Dr. Cervantes at the [facility]. They will call you within the next several days to schedule an appointment.  If you have not heard from them within several days, please call [phone] to schedule a consultation.  
Render Note In Bullet Format When Appropriate: No
Show Aperture Variable?: Yes
Consent: The patient's consent was obtained including but not limited to risks of crusting, scabbing, blistering, scarring, darker or lighter pigmentary change, recurrence, incomplete removal and infection.
Post-Care Instructions: I reviewed with the patient in detail post-care instructions. Patient is to wear sunprotection, and avoid picking at any of the treated lesions. Pt may apply Vaseline to crusted or scabbing areas.
Duration Of Freeze Thaw-Cycle (Seconds): 0
Detail Level: Detailed

## 2022-11-21 ENCOUNTER — OFFICE VISIT (OUTPATIENT)
Dept: INTERNAL MEDICINE | Facility: CLINIC | Age: 72
End: 2022-11-21
Payer: COMMERCIAL

## 2022-11-21 ENCOUNTER — MEDICAL CORRESPONDENCE (OUTPATIENT)
Dept: HEALTH INFORMATION MANAGEMENT | Facility: CLINIC | Age: 72
End: 2022-11-21

## 2022-11-21 VITALS
DIASTOLIC BLOOD PRESSURE: 48 MMHG | BODY MASS INDEX: 25.25 KG/M2 | RESPIRATION RATE: 14 BRPM | TEMPERATURE: 97.7 F | SYSTOLIC BLOOD PRESSURE: 98 MMHG | WEIGHT: 160.9 LBS | HEIGHT: 67 IN | OXYGEN SATURATION: 100 % | HEART RATE: 70 BPM

## 2022-11-21 DIAGNOSIS — D63.1 ANEMIA DUE TO STAGE 4 CHRONIC KIDNEY DISEASE (H): ICD-10-CM

## 2022-11-21 DIAGNOSIS — I87.2 VENOUS STASIS ULCER OF RIGHT ANKLE LIMITED TO BREAKDOWN OF SKIN WITHOUT VARICOSE VEINS (H): ICD-10-CM

## 2022-11-21 DIAGNOSIS — M1A.00X0 IDIOPATHIC CHRONIC GOUT WITHOUT TOPHUS, UNSPECIFIED SITE: ICD-10-CM

## 2022-11-21 DIAGNOSIS — I25.10 CORONARY ARTERY DISEASE INVOLVING NATIVE CORONARY ARTERY OF NATIVE HEART WITHOUT ANGINA PECTORIS: ICD-10-CM

## 2022-11-21 DIAGNOSIS — I87.2 VENOUS STASIS DERMATITIS OF BOTH LOWER EXTREMITIES: ICD-10-CM

## 2022-11-21 DIAGNOSIS — D50.9 IRON DEFICIENCY ANEMIA, UNSPECIFIED IRON DEFICIENCY ANEMIA TYPE: ICD-10-CM

## 2022-11-21 DIAGNOSIS — N18.4 ANEMIA DUE TO STAGE 4 CHRONIC KIDNEY DISEASE (H): ICD-10-CM

## 2022-11-21 DIAGNOSIS — L97.311 VENOUS STASIS ULCER OF RIGHT ANKLE LIMITED TO BREAKDOWN OF SKIN WITHOUT VARICOSE VEINS (H): ICD-10-CM

## 2022-11-21 DIAGNOSIS — Z23 HIGH PRIORITY FOR 2019-NCOV VACCINE: Primary | ICD-10-CM

## 2022-11-21 DIAGNOSIS — G24.8 FOCAL DYSTONIA: ICD-10-CM

## 2022-11-21 PROBLEM — E79.0 HYPERURICEMIA: Status: ACTIVE | Noted: 2021-02-02

## 2022-11-21 PROBLEM — D72.10: Status: ACTIVE | Noted: 2020-07-17

## 2022-11-21 PROBLEM — S81.809A OPEN WOUND OF LOWER LEG: Status: RESOLVED | Noted: 2021-12-13 | Resolved: 2022-11-21

## 2022-11-21 PROBLEM — L97.909 ULCER OF LOWER EXTREMITY (H): Status: RESOLVED | Noted: 2020-09-16 | Resolved: 2022-11-21

## 2022-11-21 PROBLEM — N18.9 CHRONIC KIDNEY DISEASE, UNSPECIFIED: Status: RESOLVED | Noted: 2020-08-18 | Resolved: 2022-11-21

## 2022-11-21 PROBLEM — L98.9 SKIN LESIONS: Status: RESOLVED | Noted: 2020-09-15 | Resolved: 2022-11-21

## 2022-11-21 PROBLEM — I50.41 ACUTE COMBINED SYSTOLIC AND DIASTOLIC CONGESTIVE HEART FAILURE (H): Status: ACTIVE | Noted: 2020-09-15

## 2022-11-21 PROBLEM — D64.9 ANEMIA: Status: ACTIVE | Noted: 2020-11-20

## 2022-11-21 PROBLEM — S81.809A OPEN WOUND OF LOWER LEG: Status: ACTIVE | Noted: 2021-12-13

## 2022-11-21 PROBLEM — N52.9 ERECTILE DYSFUNCTION: Status: RESOLVED | Noted: 2017-01-26 | Resolved: 2022-11-21

## 2022-11-21 PROBLEM — L98.9 SKIN LESIONS: Status: ACTIVE | Noted: 2020-09-15

## 2022-11-21 PROBLEM — E87.6 HYPOKALEMIA: Status: RESOLVED | Noted: 2022-09-24 | Resolved: 2022-11-21

## 2022-11-21 PROBLEM — N18.30 STAGE 3 CHRONIC KIDNEY DISEASE (H): Status: RESOLVED | Noted: 2019-06-16 | Resolved: 2022-11-21

## 2022-11-21 PROBLEM — M54.2 NECK PAIN: Status: ACTIVE | Noted: 2022-02-07

## 2022-11-21 PROBLEM — G72.9 MYOPATHY: Status: RESOLVED | Noted: 2021-09-02 | Resolved: 2022-11-21

## 2022-11-21 PROBLEM — N18.30 STAGE 3 CHRONIC KIDNEY DISEASE (H): Status: ACTIVE | Noted: 2019-06-16

## 2022-11-21 PROBLEM — I50.9 CHF (CONGESTIVE HEART FAILURE) (H): Status: ACTIVE | Noted: 2020-08-18

## 2022-11-21 PROBLEM — I50.42 CHRONIC COMBINED SYSTOLIC AND DIASTOLIC HEART FAILURE (H): Status: ACTIVE | Noted: 2017-10-18

## 2022-11-21 PROBLEM — I50.9 CHF (CONGESTIVE HEART FAILURE) (H): Status: RESOLVED | Noted: 2020-08-18 | Resolved: 2022-11-21

## 2022-11-21 PROBLEM — N18.9 CHRONIC KIDNEY DISEASE, UNSPECIFIED: Status: ACTIVE | Noted: 2020-08-18

## 2022-11-21 PROBLEM — L97.909 ULCER OF LOWER EXTREMITY (H): Status: ACTIVE | Noted: 2020-09-16

## 2022-11-21 PROBLEM — T50.905A: Status: ACTIVE | Noted: 2020-07-17

## 2022-11-21 PROBLEM — G72.9 MYOPATHY: Status: ACTIVE | Noted: 2021-09-02

## 2022-11-21 PROBLEM — I50.41 ACUTE COMBINED SYSTOLIC AND DIASTOLIC CONGESTIVE HEART FAILURE (H): Status: RESOLVED | Noted: 2020-09-15 | Resolved: 2022-11-21

## 2022-11-21 PROBLEM — E79.0 HYPERURICEMIA: Status: RESOLVED | Noted: 2021-02-02 | Resolved: 2022-11-21

## 2022-11-21 PROBLEM — D72.10: Status: RESOLVED | Noted: 2020-07-17 | Resolved: 2022-11-21

## 2022-11-21 PROBLEM — E87.6 HYPOKALEMIA: Status: ACTIVE | Noted: 2022-09-24

## 2022-11-21 PROBLEM — T50.905A: Status: RESOLVED | Noted: 2020-07-17 | Resolved: 2022-11-21

## 2022-11-21 PROBLEM — I38 PROSTHETIC VALVE ENDOCARDITIS (H): Status: RESOLVED | Noted: 2020-10-27 | Resolved: 2022-11-21

## 2022-11-21 PROBLEM — T82.6XXA PROSTHETIC VALVE ENDOCARDITIS (H): Status: RESOLVED | Noted: 2020-10-27 | Resolved: 2022-11-21

## 2022-11-21 PROCEDURE — 91313 COVID-19,PF,MODERNA BIVALENT: CPT | Performed by: INTERNAL MEDICINE

## 2022-11-21 PROCEDURE — 99214 OFFICE O/P EST MOD 30 MIN: CPT | Performed by: INTERNAL MEDICINE

## 2022-11-21 PROCEDURE — 0134A COVID-19,PF,MODERNA BIVALENT: CPT | Performed by: INTERNAL MEDICINE

## 2022-11-21 ASSESSMENT — PAIN SCALES - GENERAL: PAINLEVEL: MODERATE PAIN (4)

## 2022-11-21 NOTE — PROGRESS NOTES
Office Visit - Follow Up   Bandar Wells   72 year old male    Date of Visit: 11/21/2022    Chief Complaint   Patient presents with     Recheck Medication     Medication Follow-up     Referral     For Physical therapy and pain care     Imm/Inj     COVID-19 VACCINE        Assessment and Plan   1. High priority for 2019-nCoV vaccine  - COVID-19,PF,MODERNA BIVALENT 18+Yrs    2. Venous stasis ulcer of right ankle limited to breakdown of skin without varicose veins (H)  This is a patient who is homebound due to debility from his lower extremity edema and ulcerations.  He will benefit from ongoing home care including nursing care and physical therapy and he is seen face-to-face today  - Pain Management  Referral; Future  - Home Care Referral    3. Venous stasis dermatitis of both lower extremities  - Home Care Referral    4. Focal dystonia / anterior cervical muscles - Neuro [facility], PT   - Pain Management  Referral; Future  - Home Care Referral    5. Idiopathic chronic gout without tophus, unspecified site  This is a patient who is on chronic opioid pain medication due to chronic pain from focal dystonia and neck contractures, chronic gout with chronic kidney disease and persistently elevated uric acid level, and venous stasis ulcers.  He is interested in decreasing opioid pain medication but finds a medication to be beneficial whereas other interventions have not been beneficial.  We will have him meet with the pain clinic.  He is interested in potentially pursuing medical marijuana through the state run dispensary's.  I discussed with him that I do not certify patients for this program.  He is open to other pain interventions as well  - Pain Management  Referral; Future    6. Coronary artery disease involving native coronary artery of native heart without angina pectoris  Continue secondary prevention    7. Anemia due to stage 4 chronic kidney disease (H)  8. Iron deficiency anemia,  "unspecified iron deficiency anemia type  Seems Epogen and will also receive IV iron    Return in about 3 months (around 2/21/2023) for Follow up.     History of Present Illness   This 72 year old comes in for follow-up.  He continues to be homebound due to his chronic wounds.  He is receiving home health care and wound care.  He has wanting to do some physical therapy and is hopeful this could be done at home.  He continues to have neck pain and contractures.  Recently saw nephrology and received Epogen injection and will be getting IV iron.  Is wondering about long-term use of opioid pain medication       Physical Exam   General Appearance:   No acute distress    BP 98/48 (BP Location: Left arm, Patient Position: Sitting, Cuff Size: Adult Large)   Pulse 70   Temp 97.7  F (36.5  C)   Resp 14   Ht 1.702 m (5' 7\")   Wt 73 kg (160 lb 14.4 oz)   SpO2 100%   BMI 25.20 kg/m           Additional Information   Current Outpatient Medications   Medication Sig Dispense Refill     acetaminophen (TYLENOL) 500 MG tablet Take 1,000 mg by mouth 3 times daily       Cholecalciferol (VITAMIN D3) 25 MCG (1000 UT) CAPS Take 1,000 Units by mouth daily       febuxostat (ULORIC) 40 MG TABS tablet Take 1 tablet (40 mg) by mouth daily       folic acid (FOLVITE) 1 MG tablet Take 1 mg by mouth daily       gabapentin (NEURONTIN) 300 MG capsule TAKE 2 CAPSULES (600 MG) BY MOUTH AT BEDTIME 180 capsule 3     lactobacillus rhamnosus (GG) (CULTURELL) capsule Take 1 capsule by mouth 2 times daily       oxyCODONE IR (ROXICODONE) 10 MG tablet Take 0.5 tablets (5 mg) by mouth every 4 hours as needed for severe pain 90 tablet 0     penicillin V (VEETID) 500 MG tablet Take 1 tablet (500 mg) by mouth daily for 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 90 tablet 2     polyethylene glycol (MIRALAX) 17 g packet Take 17 g by mouth daily as needed for constipation 30 packet 0     potassium chloride (KLOR-CON) 20 MEQ packet Take 40 mEq by mouth 2 times daily       " sennosides (SENOKOT) 8.6 MG tablet Take 1-4 tablets by mouth 2 times daily       spironolactone (ALDACTONE) 25 MG tablet Take 12.5 mg by mouth daily       torsemide (DEMADEX) 100 MG tablet Take 100 mg by mouth 2 times daily       vitamin B complex with vitamin C (STRESS TAB) tablet Take 1 tablet by mouth daily       warfarin ANTICOAGULANT (COUMADIN) 3 MG tablet Take 6 mg daily or as directed by [facility] 190 tablet 1     warfarin ANTICOAGULANT (COUMADIN) 3 MG tablet Take by mouth 12mg (4 tablets) of warfarin on 10/25/22 then 6mg (2 tablets) daily or as directed by the Anticoagulation Clinic 70 tablet 0     enoxaparin ANTICOAGULANT (LOVENOX) 80 MG/0.8ML syringe Inject 0.7 mLs (70 mg) Subcutaneous every 24 hours (Patient not taking: Reported on 11/15/2022) 6.4 mL 1     triamcinolone (KENALOG) 0.1 % external ointment Apply topically 2 times daily (Patient not taking: Reported on 11/21/2022) 80 g 1     Allergies   Allergen Reactions     Allopurinol Rash     Clindamycin Rash       Time:      Jin Hicks MD    Answers for HPI/ROS submitted by the patient on 11/21/2022  What is the reason for your visit today? : medications  How many servings of fruits and vegetables do you eat daily?: 2-3  On average, how many sweetened beverages do you drink each day (Examples: soda, juice, sweet tea, etc.  Do NOT count diet or artificially sweetened beverages)?: 2  How many minutes a day do you exercise enough to make your heart beat faster?: 9 or less  How many days a week do you exercise enough to make your heart beat faster?: 3 or less  How many days per week do you miss taking your medication?: 0

## 2022-11-23 ENCOUNTER — ANTICOAGULATION THERAPY VISIT (OUTPATIENT)
Dept: ANTICOAGULATION | Facility: CLINIC | Age: 72
End: 2022-11-23

## 2022-11-23 DIAGNOSIS — Z95.4 S/P MITRAL VALVE REPLACEMENT WITH METALLIC VALVE: ICD-10-CM

## 2022-11-23 DIAGNOSIS — I48.0 PAROXYSMAL ATRIAL FIBRILLATION (H): Primary | ICD-10-CM

## 2022-11-23 LAB — INR HOME MONITORING: 3.8 (ref 2.5–3.5)

## 2022-11-23 NOTE — PROGRESS NOTES
ANTICOAGULATION MANAGEMENT     Bandar Wells 72 year old male is on warfarin with supratherapeutic INR result. (Goal INR 2.5-3.5)    Recent labs: (last 7 days)     11/23/22  0000   INR 3.8*       ASSESSMENT       Source(s): Chart Review and Patient/Caregiver Call       Warfarin doses taken: Warfarin taken as instructed    Diet: No new diet changes identified    New illness, injury, or hospitalization: No    Medication/supplement changes: None noted    Signs or symptoms of bleeding or clotting: No    Previous INR: Supratherapeutic    Additional findings: wound care on lower leg; clear fluid drainage. Has home care/wound care. will be doing US to see circulation in leg.       PLAN     Recommended plan for temporary change(s) affecting INR     Dosing Instructions: partial hold then continue your current warfarin dose with next INR in 6 days       Summary  As of 11/23/2022    Full warfarin instructions:  11/23: 3 mg; Otherwise 3 mg every Tue; 6 mg all other days; Starting 11/23/2022   Next INR check:  11/29/2022             Telephone call with Bandar who agrees to plan and repeated back plan correctly    Patient to recheck with home meter    Education provided:     Please call back if any changes to your diet, medications or how you've been taking warfarin    Plan made per [facility] anticoagulation protocol    Yarely Powell RN  Anticoagulation Clinic  11/23/2022    _______________________________________________________________________     Anticoagulation Episode Summary     Current INR goal:  2.5-3.5   TTR:  37.7 % (1 y)   Target end date:  Indefinite   Send INR reminders to:  [facility]    Indications    Paroxysmal atrial fibrillation (H) [I48.0]  S/P mitral valve replacement with metallic valve [Z95.4]           Comments:  [facility]- wants a call every time         Anticoagulation Care Providers     Provider Role Specialty Phone number    Jin Hicks MD Referring Internal Medicine [phone]

## 2022-11-29 DIAGNOSIS — R52 PAIN: ICD-10-CM

## 2022-11-29 NOTE — TELEPHONE ENCOUNTER
Pt is calling in and has an injury-ulcers on right left and he went through his pain meds faster this month.    He is not due until 12/09-he is wondering if he can get stronger dosage maybe or sooner script-ok.      He doesn't want to go through withdrawal.    Please call pt and let him know what you decide.    meds were already pended.

## 2022-11-30 ENCOUNTER — ANTICOAGULATION THERAPY VISIT (OUTPATIENT)
Dept: ANTICOAGULATION | Facility: CLINIC | Age: 72
End: 2022-11-30

## 2022-11-30 DIAGNOSIS — I48.0 PAROXYSMAL ATRIAL FIBRILLATION (H): Primary | ICD-10-CM

## 2022-11-30 DIAGNOSIS — Z95.4 S/P MITRAL VALVE REPLACEMENT WITH METALLIC VALVE: ICD-10-CM

## 2022-11-30 LAB — INR HOME MONITORING: 3.7 (ref 2.5–3.5)

## 2022-11-30 RX ORDER — OXYCODONE HYDROCHLORIDE 10 MG/1
TABLET ORAL
Qty: 90 TABLET | Refills: 0 | Status: SHIPPED | OUTPATIENT
Start: 2022-11-30 | End: 2023-01-05

## 2022-11-30 NOTE — PROGRESS NOTES
ANTICOAGULATION MANAGEMENT     Bandar Wells 72 year old male is on warfarin with supratherapeutic INR result. (Goal INR 2.5-3.5)    Recent labs: (last 7 days)     11/30/22  0000   INR 3.7*       ASSESSMENT       Source(s): Chart Review and Patient/Caregiver Call       Warfarin doses taken: Warfarin taken as instructed    Diet: No new diet changes identified    New illness, injury, or hospitalization: No    Medication/supplement changes: None noted    Signs or symptoms of bleeding or clotting: No    Previous INR: Supratherapeutic    Additional findings: Lower leg wound. US 12/14       PLAN     Recommended plan for no diet, medication or health factor changes affecting INR     Dosing Instructions: decrease your warfarin dose (8% change) with next INR in 1 week       Summary  As of 11/30/2022    Full warfarin instructions:  3 mg every Tue, Fri; 6 mg all other days; Starting 11/30/2022   Next INR check:  12/7/2022             Telephone call with Bandar who agrees to plan and repeated back plan correctly    Patient to recheck with home meter    Education provided:     Please call back if any changes to your diet, medications or how you've been taking warfarin    Plan made per [facility] anticoagulation protocol    Yarely Powell, RN  Anticoagulation Clinic  11/30/2022    _______________________________________________________________________     Anticoagulation Episode Summary     Current INR goal:  2.5-3.5   TTR:  37.7 % (1 y)   Target end date:  Indefinite   Send INR reminders to:  [facility]    Indications    Paroxysmal atrial fibrillation (H) [I48.0]  S/P mitral valve replacement with metallic valve [Z95.4]           Comments:  Anas- wants a call every time         Anticoagulation Care Providers     Provider Role Specialty Phone number    Jin Hicks MD Referring Internal Medicine [phone]

## 2022-12-07 ENCOUNTER — ANTICOAGULATION THERAPY VISIT (OUTPATIENT)
Dept: ANTICOAGULATION | Facility: CLINIC | Age: 72
End: 2022-12-07

## 2022-12-07 DIAGNOSIS — I48.0 PAROXYSMAL ATRIAL FIBRILLATION (H): Primary | ICD-10-CM

## 2022-12-07 DIAGNOSIS — Z95.4 S/P MITRAL VALVE REPLACEMENT WITH METALLIC VALVE: ICD-10-CM

## 2022-12-07 LAB — INR HOME MONITORING: 2.5 (ref 2.5–3.5)

## 2022-12-07 NOTE — PROGRESS NOTES
ANTICOAGULATION MANAGEMENT     Bandar Wells 72 year old male is on warfarin with therapeutic INR result. (Goal INR 2.5-3.5)    Recent labs: (last 7 days)     12/07/22  0000   INR 2.5       ASSESSMENT       Source(s): Chart Review and Patient/Caregiver Call       Warfarin doses taken: Warfarin taken as instructed    Diet: No new diet changes identified    New illness, injury, or hospitalization: R leg wound still painful. Patient feels it is about the same or slightly improved.    Medication/supplement changes: None noted    Signs or symptoms of bleeding or clotting: No    Previous INR: Supratherapeutic    Additional findings: None       PLAN     Recommended plan for no diet, medication or health factor changes affecting INR     Dosing Instructions: Continue your current warfarin dose with next INR in 6 days       Summary  As of 12/7/2022    Full warfarin instructions:  3 mg every Tue, Fri; 6 mg all other days; Starting 12/7/2022   Next INR check:  12/13/2022             Telephone call with Bandar who verbalizes understanding and agrees to plan    Patient to recheck with home meter    Education provided:     Goal range and lab monitoring: goal range and significance of current result    Plan made per [facility] anticoagulation protocol    Driss Gonzalez RN  Anticoagulation Clinic  12/7/2022    _______________________________________________________________________     Anticoagulation Episode Summary     Current INR goal:  2.5-3.5   TTR:  39.3 % (1 y)   Target end date:  Indefinite   Send INR reminders to:  [facility]    Indications    Paroxysmal atrial fibrillation (H) [I48.0]  S/P mitral valve replacement with metallic valve [Z95.4]           Comments:  Anas- wants a call every time         Anticoagulation Care Providers     Provider Role Specialty Phone number    Jin Hicks MD Referring Internal Medicine [phone]

## 2022-12-13 ENCOUNTER — ANTICOAGULATION THERAPY VISIT (OUTPATIENT)
Dept: ANTICOAGULATION | Facility: CLINIC | Age: 72
End: 2022-12-13

## 2022-12-13 DIAGNOSIS — I48.0 PAROXYSMAL ATRIAL FIBRILLATION (H): Primary | ICD-10-CM

## 2022-12-13 DIAGNOSIS — Z95.4 S/P MITRAL VALVE REPLACEMENT WITH METALLIC VALVE: ICD-10-CM

## 2022-12-13 LAB — INR HOME MONITORING: 4.3 (ref 2.5–3.5)

## 2022-12-13 NOTE — PROGRESS NOTES
ANTICOAGULATION MANAGEMENT     Bandar Wells 72 year old male is on warfarin with supratherapeutic INR result. (Goal INR 2.5-3.5)    Recent labs: (last 7 days)     12/13/22  0000   INR 4.3*       ASSESSMENT       Source(s): Chart Review and Patient/Caregiver Call       Warfarin doses taken: Less warfarin taken than planned which may be affecting INR. Patient reports he only took 3 mg of warfarin on 12/12/22.    Diet: No new diet changes identified    New illness, injury, or hospitalization: Yes: continued pain in right lower leg wound    Medication/supplement changes: None noted    Signs or symptoms of bleeding or clotting: No    Previous INR: Therapeutic last visit; previously outside of goal range    Additional findings: None       PLAN     Recommended plan for no diet, medication or health factor changes affecting INR     Dosing Instructions: partial hold then decrease your warfarin dose (8.3% change) with next INR in 6 days. Patient declined partial hold today since he only took 3 mg of warfarin yesterday.       Summary  As of 12/13/2022    Full warfarin instructions:  3 mg every Mon, Wed, Fri; 6 mg all other days; Starting 12/13/2022   Next INR check:  12/19/2022             Telephone call with Bandar who verbalizes understanding and agrees to plan    Patient to recheck with home meter    Education provided:     Please call back if any changes to your diet, medications or how you've been taking warfarin    Symptom monitoring: monitoring for bleeding signs and symptoms and monitoring for clotting signs and symptoms    Plan made per [facility] anticoagulation protocol    Galilea Jarrett RN  Anticoagulation Clinic  12/13/2022    _______________________________________________________________________     Anticoagulation Episode Summary     Current INR goal:  2.5-3.5   TTR:  40.1 % (1 y)   Target end date:  Indefinite   Send INR reminders to:  [facility]    Indications    Paroxysmal atrial fibrillation (H)  [I48.0]  S/P mitral valve replacement with metallic valve [Z95.4]           Comments:  [facility]- wants a call every time         Anticoagulation Care Providers     Provider Role Specialty Phone number    Jin Hicks MD Referring Internal Medicine [phone]

## 2022-12-13 NOTE — PROGRESS NOTES
Instructed the patient to call the clinic if they have not received their INR results within 24 hours.    2021/yes

## 2022-12-20 ENCOUNTER — CARE COORDINATION (OUTPATIENT)
Dept: PHYSICAL MEDICINE AND REHAB | Facility: CLINIC | Age: 72
End: 2022-12-20

## 2022-12-20 ENCOUNTER — ANTICOAGULATION THERAPY VISIT (OUTPATIENT)
Dept: ANTICOAGULATION | Facility: CLINIC | Age: 72
End: 2022-12-20

## 2022-12-20 DIAGNOSIS — Z95.4 S/P MITRAL VALVE REPLACEMENT WITH METALLIC VALVE: ICD-10-CM

## 2022-12-20 DIAGNOSIS — I48.0 PAROXYSMAL ATRIAL FIBRILLATION (H): Primary | ICD-10-CM

## 2022-12-20 LAB — INR HOME MONITORING: 5.4 (ref 2.5–3.5)

## 2022-12-20 NOTE — PROGRESS NOTES
ANTICOAGULATION MANAGEMENT     Bandar Wells 72 year old male is on warfarin with supratherapeutic INR result. (Goal INR 2.5-3.5)    Recent labs: (last 7 days)     12/20/22  0000   INR 5.4*       ASSESSMENT       Source(s): Chart Review and Patient/Caregiver Call       Warfarin doses taken: Warfarin taken as instructed    Diet: No new diet changes identified    New illness, injury, or hospitalization: No    Medication/supplement changes: None noted    Signs or symptoms of bleeding or clotting: No    Previous INR: Supratherapeutic    Additional findings: Lower leg wound and pain       PLAN     Recommended plan for no diet, medication or health factor changes affecting INR     Dosing Instructions: hold dose then decrease your warfarin dose (9% change) with next INR in 3 days       Summary  As of 12/20/2022    Full warfarin instructions:  12/20: Hold; Otherwise 6 mg every Sun, Tue, Thu; 3 mg all other days; Starting 12/20/2022   Next INR check:  12/23/2022             Telephone call with Bandar who verbalizes understanding and agrees to plan    Patient to recheck with home meter    Education provided:     Please call back if any changes to your diet, medications or how you've been taking warfarin    Plan made per [facility] anticoagulation protocol    Yarely Powell, RN  Anticoagulation Clinic  12/20/2022    _______________________________________________________________________     Anticoagulation Episode Summary     Current INR goal:  2.5-3.5   TTR:  38.4 % (1 y)   Target end date:  Indefinite   Send INR reminders to:  [facility]    Indications    Paroxysmal atrial fibrillation (H) [I48.0]  S/P mitral valve replacement with metallic valve [Z95.4]           Comments:  [facility]- wants a call every time         Anticoagulation Care Providers     Provider Role Specialty Phone number    Jin Hicks MD Referring Internal Medicine [phone]

## 2022-12-21 DIAGNOSIS — Z53.9 DIAGNOSIS NOT YET DEFINED: Primary | ICD-10-CM

## 2022-12-23 ENCOUNTER — ANTICOAGULATION THERAPY VISIT (OUTPATIENT)
Dept: ANTICOAGULATION | Facility: CLINIC | Age: 72
End: 2022-12-23

## 2022-12-23 DIAGNOSIS — Z95.4 S/P MITRAL VALVE REPLACEMENT WITH METALLIC VALVE: ICD-10-CM

## 2022-12-23 DIAGNOSIS — I48.0 PAROXYSMAL ATRIAL FIBRILLATION (H): Primary | ICD-10-CM

## 2022-12-23 LAB — INR HOME MONITORING: 2.6 (ref 2.5–3.5)

## 2022-12-23 NOTE — PROGRESS NOTES
ANTICOAGULATION MANAGEMENT     Bandar Wells 72 year old male is on warfarin with therapeutic INR result. (Goal INR 2.5-3.5)    Recent labs: (last 7 days)     12/23/22  0000   INR 2.6       ASSESSMENT       Source(s): Chart Review and Patient/Caregiver Call       Warfarin doses taken: Pt only took 3mg yesterday instead of 6mg.    Diet: Decreased appetite of late.     New illness, injury, or hospitalization: Leg wound is continuing to give him pain.  Takes 1-2 doses of Tylenol per day for this.    Medication/supplement changes: see above    Signs or symptoms of bleeding or clotting: No    Previous INR: Supratherapeutic    Additional findings: None       PLAN     Recommended plan for temporary change(s) affecting INR     Dosing Instructions: Continue your current warfarin dose with next INR in 5 days       Summary  As of 12/23/2022    Full warfarin instructions:  6 mg every Sun, Tue, Fri; 3 mg all other days   Next INR check:  12/28/2022             Telephone call with Bandar who verbalizes understanding and agrees to plan    Patient to recheck with home meter    Education provided:     Goal range and lab monitoring: goal range and significance of current result    Plan made per [facility] anticoagulation protocol    Driss Gonzalez RN  Anticoagulation Clinic  12/23/2022    _______________________________________________________________________     Anticoagulation Episode Summary     Current INR goal:  2.5-3.5   TTR:  38.8 % (1 y)   Target end date:  Indefinite   Send INR reminders to:  [facility]    Indications    Paroxysmal atrial fibrillation (H) [I48.0]  S/P mitral valve replacement with metallic valve [Z95.4]           Comments:  Anas- wants a call every time         Anticoagulation Care Providers     Provider Role Specialty Phone number    Jin Hicks MD Referring Internal Medicine [phone]

## 2022-12-29 ENCOUNTER — ANTICOAGULATION THERAPY VISIT (OUTPATIENT)
Dept: ANTICOAGULATION | Facility: CLINIC | Age: 72
End: 2022-12-29

## 2022-12-29 DIAGNOSIS — I48.0 PAROXYSMAL ATRIAL FIBRILLATION (H): Primary | ICD-10-CM

## 2022-12-29 DIAGNOSIS — Z95.4 S/P MITRAL VALVE REPLACEMENT WITH METALLIC VALVE: ICD-10-CM

## 2022-12-29 LAB — INR HOME MONITORING: 3.4 (ref 2.5–3.5)

## 2022-12-29 NOTE — PROGRESS NOTES
ANTICOAGULATION MANAGEMENT     Bandar Wells 72 year old male is on warfarin with therapeutic INR result. (Goal INR 2.5-3.5)    Recent labs: (last 7 days)     12/29/22  0000   INR 3.4       ASSESSMENT       Source(s): Chart Review and Patient/Caregiver Call       Warfarin doses taken: Warfarin taken as instructed    Diet: No new diet changes identified    New illness, injury, or hospitalization: No    Medication/supplement changes: None noted    Signs or symptoms of bleeding or clotting: No    Previous INR: Therapeutic last visit; previously outside of goal range    Additional findings: None       PLAN     Recommended plan for no diet, medication or health factor changes affecting INR     Dosing Instructions: Continue your current warfarin dose with next INR in 1 week       Summary  As of 12/29/2022    Full warfarin instructions:  6 mg every Sun, Tue, Fri; 3 mg all other days   Next INR check:  1/5/2023             Telephone call with Bandar who verbalizes understanding and agrees to plan and who agrees to plan and repeated back plan correctly    Patient to recheck with home meter    Education provided:     Please call back if any changes to your diet, medications or how you've been taking warfarin    Plan made per [facility] anticoagulation protocol    Janel Jara, RN  Anticoagulation Clinic  12/29/2022    _______________________________________________________________________     Anticoagulation Episode Summary     Current INR goal:  2.5-3.5   TTR:  40.4 % (1 y)   Target end date:  Indefinite   Send INR reminders to:  [facility]    Indications    Paroxysmal atrial fibrillation (H) [I48.0]  S/P mitral valve replacement with metallic valve [Z95.4]           Comments:  Anas- wants a call every time         Anticoagulation Care Providers     Provider Role Specialty Phone number    Jin Hicks MD Referring Internal Medicine [phone]

## 2023-01-01 ENCOUNTER — TELEPHONE (OUTPATIENT)
Dept: INTERNAL MEDICINE | Facility: CLINIC | Age: 73
End: 2023-01-01
Payer: COMMERCIAL

## 2023-01-01 ENCOUNTER — ANTICOAGULATION THERAPY VISIT (OUTPATIENT)
Dept: ANTICOAGULATION | Facility: CLINIC | Age: 73
End: 2023-01-01
Payer: COMMERCIAL

## 2023-01-01 ENCOUNTER — VIRTUAL VISIT (OUTPATIENT)
Dept: PSYCHOLOGY | Facility: CLINIC | Age: 73
End: 2023-01-01
Payer: COMMERCIAL

## 2023-01-01 ENCOUNTER — MEDICAL CORRESPONDENCE (OUTPATIENT)
Dept: HEALTH INFORMATION MANAGEMENT | Facility: CLINIC | Age: 73
End: 2023-01-01

## 2023-01-01 ENCOUNTER — VIRTUAL VISIT (OUTPATIENT)
Dept: INTERNAL MEDICINE | Facility: CLINIC | Age: 73
End: 2023-01-01
Payer: COMMERCIAL

## 2023-01-01 ENCOUNTER — TELEPHONE (OUTPATIENT)
Dept: INTERNAL MEDICINE | Facility: CLINIC | Age: 73
End: 2023-01-01

## 2023-01-01 ENCOUNTER — TELEPHONE (OUTPATIENT)
Dept: ANTICOAGULATION | Facility: CLINIC | Age: 73
End: 2023-01-01

## 2023-01-01 ENCOUNTER — TELEPHONE (OUTPATIENT)
Dept: BEHAVIORAL HEALTH | Facility: CLINIC | Age: 73
End: 2023-01-01

## 2023-01-01 ENCOUNTER — TELEPHONE (OUTPATIENT)
Dept: INFECTIOUS DISEASES | Facility: CLINIC | Age: 73
End: 2023-01-01

## 2023-01-01 ENCOUNTER — VIRTUAL VISIT (OUTPATIENT)
Dept: PSYCHOLOGY | Facility: CLINIC | Age: 73
End: 2023-01-01

## 2023-01-01 ENCOUNTER — OFFICE VISIT (OUTPATIENT)
Dept: INFECTIOUS DISEASES | Facility: CLINIC | Age: 73
End: 2023-01-01
Payer: COMMERCIAL

## 2023-01-01 ENCOUNTER — PATIENT OUTREACH (OUTPATIENT)
Dept: NURSING | Facility: CLINIC | Age: 73
End: 2023-01-01
Payer: COMMERCIAL

## 2023-01-01 ENCOUNTER — PATIENT OUTREACH (OUTPATIENT)
Dept: CARE COORDINATION | Facility: CLINIC | Age: 73
End: 2023-01-01
Payer: COMMERCIAL

## 2023-01-01 ENCOUNTER — DOCUMENTATION ONLY (OUTPATIENT)
Dept: ANTICOAGULATION | Facility: CLINIC | Age: 73
End: 2023-01-01
Payer: COMMERCIAL

## 2023-01-01 ENCOUNTER — ANTICOAGULATION THERAPY VISIT (OUTPATIENT)
Dept: ANTICOAGULATION | Facility: CLINIC | Age: 73
End: 2023-01-01

## 2023-01-01 ENCOUNTER — LAB (OUTPATIENT)
Dept: LAB | Facility: CLINIC | Age: 73
End: 2023-01-01
Payer: COMMERCIAL

## 2023-01-01 VITALS
SYSTOLIC BLOOD PRESSURE: 134 MMHG | HEART RATE: 78 BPM | OXYGEN SATURATION: 95 % | BODY MASS INDEX: 23.88 KG/M2 | WEIGHT: 152.5 LBS | DIASTOLIC BLOOD PRESSURE: 60 MMHG

## 2023-01-01 DIAGNOSIS — F43.23 ADJUSTMENT DISORDER WITH MIXED ANXIETY AND DEPRESSED MOOD: Primary | ICD-10-CM

## 2023-01-01 DIAGNOSIS — Z13.9 ENCOUNTER FOR SCREENING INVOLVING SOCIAL DETERMINANTS OF HEALTH (SDOH): Primary | ICD-10-CM

## 2023-01-01 DIAGNOSIS — F11.90 CHRONIC, CONTINUOUS USE OF OPIOIDS: ICD-10-CM

## 2023-01-01 DIAGNOSIS — L03.119 CELLULITIS AND ABSCESS OF LEG: ICD-10-CM

## 2023-01-01 DIAGNOSIS — F33.1 MODERATE EPISODE OF RECURRENT MAJOR DEPRESSIVE DISORDER (H): ICD-10-CM

## 2023-01-01 DIAGNOSIS — M1A.00X0 IDIOPATHIC CHRONIC GOUT WITHOUT TOPHUS, UNSPECIFIED SITE: ICD-10-CM

## 2023-01-01 DIAGNOSIS — I48.0 PAROXYSMAL ATRIAL FIBRILLATION (H): Primary | ICD-10-CM

## 2023-01-01 DIAGNOSIS — I34.1 MITRAL VALVE PROLAPSE: Primary | ICD-10-CM

## 2023-01-01 DIAGNOSIS — Z95.4 S/P MITRAL VALVE REPLACEMENT WITH METALLIC VALVE: ICD-10-CM

## 2023-01-01 DIAGNOSIS — I50.42 CHRONIC COMBINED SYSTOLIC AND DIASTOLIC HEART FAILURE (H): ICD-10-CM

## 2023-01-01 DIAGNOSIS — G89.4 CHRONIC PAIN SYNDROME: ICD-10-CM

## 2023-01-01 DIAGNOSIS — R19.7 DIARRHEA, UNSPECIFIED TYPE: ICD-10-CM

## 2023-01-01 DIAGNOSIS — N18.4 CKD (CHRONIC KIDNEY DISEASE) STAGE 4, GFR 15-29 ML/MIN (H): ICD-10-CM

## 2023-01-01 DIAGNOSIS — R19.7 DIARRHEA, UNSPECIFIED TYPE: Primary | ICD-10-CM

## 2023-01-01 DIAGNOSIS — L02.419 CELLULITIS AND ABSCESS OF LEG: ICD-10-CM

## 2023-01-01 DIAGNOSIS — D63.1 ANEMIA DUE TO STAGE 4 CHRONIC KIDNEY DISEASE (H): Primary | ICD-10-CM

## 2023-01-01 DIAGNOSIS — D50.9 IRON DEFICIENCY ANEMIA, UNSPECIFIED IRON DEFICIENCY ANEMIA TYPE: ICD-10-CM

## 2023-01-01 DIAGNOSIS — I48.0 PAROXYSMAL ATRIAL FIBRILLATION (H): ICD-10-CM

## 2023-01-01 DIAGNOSIS — Z86.79 HX OF BACTERIAL ENDOCARDITIS: ICD-10-CM

## 2023-01-01 DIAGNOSIS — Z53.9 DIAGNOSIS NOT YET DEFINED: Primary | ICD-10-CM

## 2023-01-01 DIAGNOSIS — R63.0 LACK OF APPETITE: ICD-10-CM

## 2023-01-01 DIAGNOSIS — N18.4 ANEMIA DUE TO STAGE 4 CHRONIC KIDNEY DISEASE (H): Primary | ICD-10-CM

## 2023-01-01 DIAGNOSIS — G47.01 INSOMNIA DUE TO MEDICAL CONDITION: ICD-10-CM

## 2023-01-01 LAB
ALBUMIN SERPL BCG-MCNC: 4.5 G/DL (ref 3.5–5.2)
ALP SERPL-CCNC: 112 U/L (ref 40–129)
ALT SERPL W P-5'-P-CCNC: 22 U/L (ref 0–70)
ANION GAP SERPL CALCULATED.3IONS-SCNC: 16 MMOL/L (ref 7–15)
AST SERPL W P-5'-P-CCNC: 48 U/L (ref 0–45)
BILIRUB SERPL-MCNC: 0.4 MG/DL
BUN SERPL-MCNC: 43 MG/DL (ref 8–23)
CALCIUM SERPL-MCNC: 9.9 MG/DL (ref 8.8–10.2)
CHLORIDE SERPL-SCNC: 99 MMOL/L (ref 98–107)
CREAT SERPL-MCNC: 2.06 MG/DL (ref 0.67–1.17)
DEPRECATED HCO3 PLAS-SCNC: 23 MMOL/L (ref 22–29)
EGFRCR SERPLBLD CKD-EPI 2021: 33 ML/MIN/1.73M2
ERYTHROCYTE [DISTWIDTH] IN BLOOD BY AUTOMATED COUNT: 16.8 % (ref 10–15)
GLUCOSE SERPL-MCNC: 118 MG/DL (ref 70–99)
HCT VFR BLD AUTO: 31.3 % (ref 40–53)
HGB BLD-MCNC: 9.7 G/DL (ref 13.3–17.7)
INR HOME MONITORING: 2.2 (ref 2.5–3.5)
INR HOME MONITORING: 2.4 (ref 2.5–3.5)
INR HOME MONITORING: 2.6 (ref 2.5–3.5)
INR HOME MONITORING: 2.7 (ref 2.5–3.5)
INR HOME MONITORING: 2.7 (ref 2.5–3.5)
INR HOME MONITORING: 2.8 (ref 2.5–3.5)
INR HOME MONITORING: 2.8 (ref 2.5–3.5)
INR HOME MONITORING: 3 (ref 2.5–3.5)
INR HOME MONITORING: 3.2 (ref 2.5–3.5)
INR HOME MONITORING: 3.9 (ref 2.5–3.5)
INR HOME MONITORING: 4 (ref 2.5–3.5)
INR HOME MONITORING: 4 (ref 2.5–3.5)
INR HOME MONITORING: 4.1 (ref 2.5–3.5)
INR HOME MONITORING: 4.2 (ref 2.5–3.5)
INR HOME MONITORING: 4.8 (ref 2.5–3.5)
INR HOME MONITORING: 5.2 (ref 2.5–3.5)
INR HOME MONITORING: 5.3 (ref 2.5–3.5)
MCH RBC QN AUTO: 31.8 PG (ref 26.5–33)
MCHC RBC AUTO-ENTMCNC: 31 G/DL (ref 31.5–36.5)
MCV RBC AUTO: 103 FL (ref 78–100)
PLATELET # BLD AUTO: 235 10E3/UL (ref 150–450)
POTASSIUM SERPL-SCNC: 4.5 MMOL/L (ref 3.4–5.3)
PROT SERPL-MCNC: 8.6 G/DL (ref 6.4–8.3)
RBC # BLD AUTO: 3.05 10E6/UL (ref 4.4–5.9)
SODIUM SERPL-SCNC: 138 MMOL/L (ref 135–145)
WBC # BLD AUTO: 5.6 10E3/UL (ref 4–11)

## 2023-01-01 PROCEDURE — 99214 OFFICE O/P EST MOD 30 MIN: CPT | Performed by: INTERNAL MEDICINE

## 2023-01-01 PROCEDURE — G0179 MD RECERTIFICATION HHA PT: HCPCS | Performed by: INTERNAL MEDICINE

## 2023-01-01 PROCEDURE — 80053 COMPREHEN METABOLIC PANEL: CPT

## 2023-01-01 PROCEDURE — 90834 PSYTX W PT 45 MINUTES: CPT | Mod: VID

## 2023-01-01 PROCEDURE — 90832 PSYTX W PT 30 MINUTES: CPT | Mod: TEL

## 2023-01-01 PROCEDURE — 36415 COLL VENOUS BLD VENIPUNCTURE: CPT

## 2023-01-01 PROCEDURE — 99214 OFFICE O/P EST MOD 30 MIN: CPT | Mod: VID | Performed by: INTERNAL MEDICINE

## 2023-01-01 PROCEDURE — 85027 COMPLETE CBC AUTOMATED: CPT

## 2023-01-01 PROCEDURE — 90791 PSYCH DIAGNOSTIC EVALUATION: CPT | Mod: VID

## 2023-01-01 PROCEDURE — 99214 OFFICE O/P EST MOD 30 MIN: CPT | Mod: 93 | Performed by: INTERNAL MEDICINE

## 2023-01-01 RX ORDER — OXYCODONE HYDROCHLORIDE 10 MG/1
10 TABLET ORAL EVERY 4 HOURS PRN
Qty: 120 TABLET | Refills: 0 | OUTPATIENT
Start: 2023-01-01

## 2023-01-01 RX ORDER — OXYCODONE HYDROCHLORIDE 10 MG/1
10 TABLET ORAL EVERY 6 HOURS PRN
Qty: 120 TABLET | Refills: 0 | Status: SHIPPED | OUTPATIENT
Start: 2023-01-01 | End: 2023-01-01

## 2023-01-01 RX ORDER — OXYCODONE HYDROCHLORIDE 10 MG/1
TABLET ORAL
Qty: 120 TABLET | Refills: 0 | Status: SHIPPED | OUTPATIENT
Start: 2023-01-01 | End: 2023-01-01

## 2023-01-01 RX ORDER — OXYCODONE HYDROCHLORIDE 10 MG/1
10 TABLET ORAL EVERY 4 HOURS PRN
Qty: 8 TABLET | Refills: 0 | Status: SHIPPED | OUTPATIENT
Start: 2023-01-01 | End: 2023-01-01

## 2023-01-01 RX ORDER — OXYCODONE HYDROCHLORIDE 10 MG/1
10 TABLET ORAL EVERY 6 HOURS PRN
Qty: 120 TABLET | Refills: 0 | Status: SHIPPED | OUTPATIENT
Start: 2023-01-01 | End: 2024-01-01

## 2023-01-01 RX ORDER — ALBUTEROL SULFATE 0.83 MG/ML
2.5 SOLUTION RESPIRATORY (INHALATION)
Status: CANCELLED | OUTPATIENT
Start: 2023-01-01

## 2023-01-01 RX ORDER — MEPERIDINE HYDROCHLORIDE 25 MG/ML
25 INJECTION INTRAMUSCULAR; INTRAVENOUS; SUBCUTANEOUS EVERY 30 MIN PRN
Status: CANCELLED | OUTPATIENT
Start: 2023-01-01

## 2023-01-01 RX ORDER — DIPHENHYDRAMINE HYDROCHLORIDE 50 MG/ML
50 INJECTION INTRAMUSCULAR; INTRAVENOUS
Status: CANCELLED
Start: 2023-01-01

## 2023-01-01 RX ORDER — OXYCODONE HYDROCHLORIDE 10 MG/1
10 TABLET ORAL EVERY 4 HOURS PRN
Qty: 120 TABLET | Refills: 0 | Status: CANCELLED | OUTPATIENT
Start: 2023-01-01

## 2023-01-01 RX ORDER — EPINEPHRINE 1 MG/ML
0.3 INJECTION, SOLUTION, CONCENTRATE INTRAVENOUS EVERY 5 MIN PRN
Status: CANCELLED | OUTPATIENT
Start: 2023-01-01

## 2023-01-01 RX ORDER — WARFARIN SODIUM 3 MG/1
TABLET ORAL
Qty: 190 TABLET | Refills: 1 | Status: SHIPPED | OUTPATIENT
Start: 2023-01-01 | End: 2024-01-01

## 2023-01-01 RX ORDER — CIPROFLOXACIN 250 MG/1
250 TABLET, FILM COATED ORAL 2 TIMES DAILY
Qty: 14 TABLET | Refills: 0 | Status: CANCELLED | OUTPATIENT
Start: 2023-01-01

## 2023-01-01 RX ORDER — OXYCODONE HYDROCHLORIDE 10 MG/1
10 TABLET ORAL EVERY 4 HOURS PRN
Qty: 120 TABLET | Refills: 0 | Status: SHIPPED | OUTPATIENT
Start: 2023-01-01 | End: 2023-01-01

## 2023-01-01 RX ORDER — ALBUTEROL SULFATE 90 UG/1
1-2 AEROSOL, METERED RESPIRATORY (INHALATION)
Status: CANCELLED
Start: 2023-01-01

## 2023-01-01 RX ORDER — HEPARIN SODIUM (PORCINE) LOCK FLUSH IV SOLN 100 UNIT/ML 100 UNIT/ML
5 SOLUTION INTRAVENOUS
Status: CANCELLED | OUTPATIENT
Start: 2023-01-01

## 2023-01-01 RX ORDER — HEPARIN SODIUM,PORCINE 10 UNIT/ML
5-20 VIAL (ML) INTRAVENOUS DAILY PRN
Status: CANCELLED | OUTPATIENT
Start: 2023-01-01

## 2023-01-01 RX ORDER — PENICILLIN V POTASSIUM 500 MG/1
500 TABLET, FILM COATED ORAL DAILY
Qty: 90 TABLET | Refills: 1 | Status: SHIPPED | OUTPATIENT
Start: 2023-01-01 | End: 2024-01-01

## 2023-01-01 RX ORDER — SULFAMETHOXAZOLE/TRIMETHOPRIM 800-160 MG
0.5 TABLET ORAL 2 TIMES DAILY
Qty: 7 TABLET | Refills: 0 | Status: CANCELLED | OUTPATIENT
Start: 2023-01-01

## 2023-01-01 ASSESSMENT — PATIENT HEALTH QUESTIONNAIRE - PHQ9
SUM OF ALL RESPONSES TO PHQ QUESTIONS 1-9: 2
SUM OF ALL RESPONSES TO PHQ QUESTIONS 1-9: 8
SUM OF ALL RESPONSES TO PHQ QUESTIONS 1-9: 7
10. IF YOU CHECKED OFF ANY PROBLEMS, HOW DIFFICULT HAVE THESE PROBLEMS MADE IT FOR YOU TO DO YOUR WORK, TAKE CARE OF THINGS AT HOME, OR GET ALONG WITH OTHER PEOPLE: NOT DIFFICULT AT ALL
10. IF YOU CHECKED OFF ANY PROBLEMS, HOW DIFFICULT HAVE THESE PROBLEMS MADE IT FOR YOU TO DO YOUR WORK, TAKE CARE OF THINGS AT HOME, OR GET ALONG WITH OTHER PEOPLE: SOMEWHAT DIFFICULT
SUM OF ALL RESPONSES TO PHQ QUESTIONS 1-9: 9
SUM OF ALL RESPONSES TO PHQ QUESTIONS 1-9: 8
SUM OF ALL RESPONSES TO PHQ QUESTIONS 1-9: 7
5. POOR APPETITE OR OVEREATING: SEVERAL DAYS
10. IF YOU CHECKED OFF ANY PROBLEMS, HOW DIFFICULT HAVE THESE PROBLEMS MADE IT FOR YOU TO DO YOUR WORK, TAKE CARE OF THINGS AT HOME, OR GET ALONG WITH OTHER PEOPLE: SOMEWHAT DIFFICULT
SUM OF ALL RESPONSES TO PHQ QUESTIONS 1-9: 2
10. IF YOU CHECKED OFF ANY PROBLEMS, HOW DIFFICULT HAVE THESE PROBLEMS MADE IT FOR YOU TO DO YOUR WORK, TAKE CARE OF THINGS AT HOME, OR GET ALONG WITH OTHER PEOPLE: NOT DIFFICULT AT ALL
SUM OF ALL RESPONSES TO PHQ QUESTIONS 1-9: 9

## 2023-01-01 ASSESSMENT — COLUMBIA-SUICIDE SEVERITY RATING SCALE - C-SSRS
2. HAVE YOU ACTUALLY HAD ANY THOUGHTS OF KILLING YOURSELF?: NO
1. IN THE PAST MONTH, HAVE YOU WISHED YOU WERE DEAD OR WISHED YOU COULD GO TO SLEEP AND NOT WAKE UP?: YES
6. HAVE YOU EVER DONE ANYTHING, STARTED TO DO ANYTHING, OR PREPARED TO DO ANYTHING TO END YOUR LIFE?: NO
ATTEMPT LIFETIME: NO
REASONS FOR IDEATION LIFETIME: COMPLETELY TO END OR STOP THE PAIN (YOU COULDN'T GO ON LIVING WITH THE PAIN OR HOW YOU WERE FEELING)
1. HAVE YOU WISHED YOU WERE DEAD OR WISHED YOU COULD GO TO SLEEP AND NOT WAKE UP?: YES
REASONS FOR IDEATION PAST MONTH: COMPLETELY TO END OR STOP THE PAIN (YOU COULDN'T GO ON LIVING WITH THE PAIN OR HOW YOU WERE FEELING)
TOTAL  NUMBER OF ABORTED OR SELF INTERRUPTED ATTEMPTS LIFETIME: NO
TOTAL  NUMBER OF INTERRUPTED ATTEMPTS LIFETIME: NO

## 2023-01-01 ASSESSMENT — ANXIETY QUESTIONNAIRES
GAD7 TOTAL SCORE: 8
2. NOT BEING ABLE TO STOP OR CONTROL WORRYING: MORE THAN HALF THE DAYS
5. BEING SO RESTLESS THAT IT IS HARD TO SIT STILL: NOT AT ALL
7. FEELING AFRAID AS IF SOMETHING AWFUL MIGHT HAPPEN: NOT AT ALL
6. BECOMING EASILY ANNOYED OR IRRITABLE: SEVERAL DAYS
3. WORRYING TOO MUCH ABOUT DIFFERENT THINGS: SEVERAL DAYS
1. FEELING NERVOUS, ANXIOUS, OR ON EDGE: NEARLY EVERY DAY
GAD7 TOTAL SCORE: 8
IF YOU CHECKED OFF ANY PROBLEMS ON THIS QUESTIONNAIRE, HOW DIFFICULT HAVE THESE PROBLEMS MADE IT FOR YOU TO DO YOUR WORK, TAKE CARE OF THINGS AT HOME, OR GET ALONG WITH OTHER PEOPLE: SOMEWHAT DIFFICULT

## 2023-01-04 DIAGNOSIS — R52 PAIN: ICD-10-CM

## 2023-01-05 ENCOUNTER — ANTICOAGULATION THERAPY VISIT (OUTPATIENT)
Dept: ANTICOAGULATION | Facility: CLINIC | Age: 73
End: 2023-01-05

## 2023-01-05 DIAGNOSIS — I48.0 PAROXYSMAL ATRIAL FIBRILLATION (H): Primary | ICD-10-CM

## 2023-01-05 DIAGNOSIS — Z95.4 S/P MITRAL VALVE REPLACEMENT WITH METALLIC VALVE: ICD-10-CM

## 2023-01-05 LAB — INR HOME MONITORING: 4.1 (ref 2.5–3.5)

## 2023-01-05 RX ORDER — OXYCODONE HYDROCHLORIDE 10 MG/1
TABLET ORAL
Qty: 90 TABLET | Refills: 0 | Status: SHIPPED | OUTPATIENT
Start: 2023-01-05 | End: 2023-02-06

## 2023-01-05 NOTE — PROGRESS NOTES
ANTICOAGULATION MANAGEMENT     Bnadar Wells 72 year old male is on warfarin with supratherapeutic INR result. (Goal INR 2.5-3.5)    Recent labs: (last 7 days)     01/05/23  0000   INR 4.1*       ASSESSMENT       Source(s): Chart Review and Patient/Caregiver Call       Warfarin doses taken: Warfarin taken as instructed    Diet: No new diet changes identified, reports appetitie is improving    New illness, injury, or hospitalization: Yes: Patient has a leg wound that continues to cause him pain    Medication/supplement changes: Tylenol continues for leg pain (1-2 tablets per day)    Signs or symptoms of bleeding or clotting: Yes: reports bloody nose last week, he was able to get it to stop    Previous INR: Therapeutic last visit; previously outside of goal range    Additional findings: None       PLAN     Recommended plan for temporary change(s) and ongoing change(s) affecting INR     Dosing Instructions: decrease your warfarin dose (10% change) with next INR in 1 week       Summary  As of 1/5/2023    Full warfarin instructions:  6 mg every Tue, Fri; 3 mg all other days   Next INR check:  1/12/2023             Telephone call with Bandar who verbalizes understanding and agrees to plan    Patient to recheck with home meter    Education provided:     Please call back if any changes to your diet, medications or how you've been taking warfarin    Plan made per [facility] anticoagulation protocol    Mary Stinson RN  Anticoagulation Clinic  1/5/2023    _______________________________________________________________________     Anticoagulation Episode Summary     Current INR goal:  2.5-3.5   TTR:  40.6 % (1 y)   Target end date:  Indefinite   Send INR reminders to:  [facility]    Indications    Paroxysmal atrial fibrillation (H) [I48.0]  S/P mitral valve replacement with metallic valve [Z95.4]           Comments:  Anas- wants a call every time         Anticoagulation Care Providers     Provider Role Specialty Phone  number    Jin Hicks MD [facility] Internal Medicine [phone]

## 2023-01-13 ENCOUNTER — ANTICOAGULATION THERAPY VISIT (OUTPATIENT)
Dept: ANTICOAGULATION | Facility: CLINIC | Age: 73
End: 2023-01-13

## 2023-01-13 DIAGNOSIS — I48.0 PAROXYSMAL ATRIAL FIBRILLATION (H): Primary | ICD-10-CM

## 2023-01-13 DIAGNOSIS — Z95.4 S/P MITRAL VALVE REPLACEMENT WITH METALLIC VALVE: ICD-10-CM

## 2023-01-13 LAB — INR HOME MONITORING: 3.4 (ref 2.5–3.5)

## 2023-01-13 NOTE — PROGRESS NOTES
ANTICOAGULATION MANAGEMENT     Bandar Wells 72 year old male is on warfarin with therapeutic INR result. (Goal INR 2.5-3.5)    Recent labs: (last 7 days)     01/13/23  0000   INR 3.4       ASSESSMENT       Source(s): Chart Review    Previous INR was Supratherapeutic    Medication, diet, health changes since last INR chart reviewed; none identified    I left a detailed voicemail with the orders reflected in flowsheet. I have also requested a call back if there have been any missed doses, concerns, illness, fever, or if there have been any changes in medications, activity level, or diet         PLAN     Recommended plan for no diet, medication or health factor changes affecting INR     Dosing Instructions: Continue your current warfarin dose with next INR in 1 week       Summary  As of 1/13/2023    Full warfarin instructions:  6 mg every Tue, Fri; 3 mg all other days   Next INR check:  1/20/2023             Detailed voice message left for Bandar with dosing instructions and follow up date.     Patient to recheck with home meter    Education provided:     Please call back if any changes to your diet, medications or how you've been taking warfarin    Plan made per [facility] anticoagulation protocol    Jerrica Varma, RN  Anticoagulation Clinic  1/13/2023    _______________________________________________________________________     Anticoagulation Episode Summary     Current INR goal:  2.5-3.5   TTR:  38.9 % (1 y)   Target end date:  Indefinite   Send INR reminders to:  [facility]    Indications    Paroxysmal atrial fibrillation (H) [I48.0]  S/P mitral valve replacement with metallic valve [Z95.4]           Comments:  Anas- wants a call every time         Anticoagulation Care Providers     Provider Role Specialty Phone number    Jin Hicks MD Referring Internal Medicine [phone]

## 2023-01-19 ENCOUNTER — ANTICOAGULATION THERAPY VISIT (OUTPATIENT)
Dept: ANTICOAGULATION | Facility: CLINIC | Age: 73
End: 2023-01-19
Payer: COMMERCIAL

## 2023-01-19 DIAGNOSIS — Z95.4 S/P MITRAL VALVE REPLACEMENT WITH METALLIC VALVE: ICD-10-CM

## 2023-01-19 DIAGNOSIS — I48.0 PAROXYSMAL ATRIAL FIBRILLATION (H): Primary | ICD-10-CM

## 2023-01-19 LAB — INR HOME MONITORING: 2.1 (ref 2.5–3.5)

## 2023-01-19 NOTE — PROGRESS NOTES
ANTICOAGULATION MANAGEMENT     Bandar Wells 72 year old male is on warfarin with subtherapeutic INR result. (Goal INR 2.5-3.5)    Recent labs: (last 7 days)     01/19/23  0000   INR 2.1*       ASSESSMENT       Source(s): Chart Review and Patient/Caregiver Call       Warfarin doses taken: Warfarin taken as instructed    Diet: No new diet changes identified    New illness, injury, or hospitalization: No    Medication/supplement changes: None noted    Signs or symptoms of bleeding or clotting: No    Previous INR: Therapeutic last visit; previously outside of goal range    Additional findings: None       PLAN     Recommended plan for no diet, medication or health factor changes affecting INR     Dosing Instructions: booster dose then continue your current warfarin dose with next INR in 1 week       Summary  As of 1/19/2023    Full warfarin instructions:  1/19: 6 mg; Otherwise 6 mg every Tue, Fri; 3 mg all other days   Next INR check:  1/26/2023             Telephone call with Bandar who verbalizes understanding and agrees to plan    Patient to recheck with home meter    Education provided:     Contact [phone]  with any changes, questions or concerns.     Plan made per [facility] anticoagulation protocol    Shmuel Jeffers RN  Anticoagulation Clinic  1/19/2023    _______________________________________________________________________     Anticoagulation Episode Summary     Current INR goal:  2.5-3.5   TTR:  38.7 % (1 y)   Target end date:  Indefinite   Send INR reminders to:  [facility]    Indications    Paroxysmal atrial fibrillation (H) [I48.0]  S/P mitral valve replacement with metallic valve [Z95.4]           Comments:  Acelis- wants a call every time         Anticoagulation Care Providers     Provider Role Specialty Phone number    Jin Hicks MD Referring Internal Medicine [phone]

## 2023-01-26 ENCOUNTER — ANTICOAGULATION THERAPY VISIT (OUTPATIENT)
Dept: ANTICOAGULATION | Facility: CLINIC | Age: 73
End: 2023-01-26
Payer: COMMERCIAL

## 2023-01-26 DIAGNOSIS — Z95.4 S/P MITRAL VALVE REPLACEMENT WITH METALLIC VALVE: ICD-10-CM

## 2023-01-26 DIAGNOSIS — I48.0 PAROXYSMAL ATRIAL FIBRILLATION (H): Primary | ICD-10-CM

## 2023-01-26 LAB — INR HOME MONITORING: 2.9 (ref 2.5–3.5)

## 2023-01-26 NOTE — PROGRESS NOTES
ANTICOAGULATION MANAGEMENT     Bandar Wells 72 year old male is on warfarin with therapeutic INR result. (Goal INR 2.5-3.5)    Recent labs: (last 7 days)     01/26/23  0000   INR 2.9       ASSESSMENT       Source(s): Chart Review and Patient/Caregiver Call       Warfarin doses taken: Warfarin taken as instructed    Diet: No new diet changes identified    New illness, injury, or hospitalization: Venous stasis ulcer of right leg-office visit 1/25-slow healing    Medication/supplement changes: None noted    Signs or symptoms of bleeding or clotting: No    Previous INR: Subtherapeutic    Additional findings: None       PLAN     Recommended plan for no diet, medication or health factor changes affecting INR     Dosing Instructions: Continue your current warfarin dose with next INR in 2 weeks       Summary  As of 1/26/2023    Full warfarin instructions:  6 mg every Tue, Fri; 3 mg all other days   Next INR check:  2/9/2023             Telephone call with Bandar who verbalizes understanding and agrees to plan and who agrees to plan and repeated back plan correctly    Patient to recheck with home meter    Education provided:     Please call back if any changes to your diet, medications or how you've been taking warfarin    Plan made per [facility] anticoagulation protocol    Janel Jara, RN  Anticoagulation Clinic  1/26/2023    _______________________________________________________________________     Anticoagulation Episode Summary     Current INR goal:  2.5-3.5   TTR:  39.7 % (1 y)   Target end date:  Indefinite   Send INR reminders to:  [facility]    Indications    Paroxysmal atrial fibrillation (H) [I48.0]  S/P mitral valve replacement with metallic valve [Z95.4]           Comments:  Anas- wants a call every time         Anticoagulation Care Providers     Provider Role Specialty Phone number    Jin Hicks MD Referring Internal Medicine [phone]

## 2023-02-02 ASSESSMENT — ANXIETY QUESTIONNAIRES
1. FEELING NERVOUS, ANXIOUS, OR ON EDGE: MORE THAN HALF THE DAYS
2. NOT BEING ABLE TO STOP OR CONTROL WORRYING: MORE THAN HALF THE DAYS
GAD7 TOTAL SCORE: 10
4. TROUBLE RELAXING: MORE THAN HALF THE DAYS
GAD7 TOTAL SCORE: 10
IF YOU CHECKED OFF ANY PROBLEMS ON THIS QUESTIONNAIRE, HOW DIFFICULT HAVE THESE PROBLEMS MADE IT FOR YOU TO DO YOUR WORK, TAKE CARE OF THINGS AT HOME, OR GET ALONG WITH OTHER PEOPLE: NOT DIFFICULT AT ALL
8. IF YOU CHECKED OFF ANY PROBLEMS, HOW DIFFICULT HAVE THESE MADE IT FOR YOU TO DO YOUR WORK, TAKE CARE OF THINGS AT HOME, OR GET ALONG WITH OTHER PEOPLE?: NOT DIFFICULT AT ALL
3. WORRYING TOO MUCH ABOUT DIFFERENT THINGS: MORE THAN HALF THE DAYS
7. FEELING AFRAID AS IF SOMETHING AWFUL MIGHT HAPPEN: SEVERAL DAYS
5. BEING SO RESTLESS THAT IT IS HARD TO SIT STILL: NOT AT ALL
6. BECOMING EASILY ANNOYED OR IRRITABLE: SEVERAL DAYS
7. FEELING AFRAID AS IF SOMETHING AWFUL MIGHT HAPPEN: SEVERAL DAYS

## 2023-02-02 ASSESSMENT — PAIN SCALES - PAIN ENJOYMENT GENERAL ACTIVITY SCALE (PEG)
AVG_PAIN_PASTWEEK: 5
INTERFERED_GENERAL_ACTIVITY: 3
INTERFERED_GENERAL_ACTIVITY: 3
INTERFERED_ENJOYMENT_LIFE: 3
PEG_TOTALSCORE: 3.67
INTERFERED_ENJOYMENT_LIFE: 3
AVG_PAIN_PASTWEEK: 5
PEG_TOTALSCORE: 3.67

## 2023-02-03 ENCOUNTER — OFFICE VISIT (OUTPATIENT)
Dept: PALLIATIVE MEDICINE | Facility: OTHER | Age: 73
End: 2023-02-03
Attending: INTERNAL MEDICINE
Payer: COMMERCIAL

## 2023-02-03 VITALS
OXYGEN SATURATION: 94 % | SYSTOLIC BLOOD PRESSURE: 144 MMHG | HEART RATE: 75 BPM | WEIGHT: 161 LBS | BODY MASS INDEX: 25.22 KG/M2 | DIASTOLIC BLOOD PRESSURE: 67 MMHG

## 2023-02-03 DIAGNOSIS — G24.8 FOCAL DYSTONIA: ICD-10-CM

## 2023-02-03 DIAGNOSIS — I87.2 VENOUS STASIS ULCER OF RIGHT ANKLE LIMITED TO BREAKDOWN OF SKIN WITHOUT VARICOSE VEINS (H): ICD-10-CM

## 2023-02-03 DIAGNOSIS — M1A.00X0 IDIOPATHIC CHRONIC GOUT WITHOUT TOPHUS, UNSPECIFIED SITE: ICD-10-CM

## 2023-02-03 DIAGNOSIS — L97.311 VENOUS STASIS ULCER OF RIGHT ANKLE LIMITED TO BREAKDOWN OF SKIN WITHOUT VARICOSE VEINS (H): ICD-10-CM

## 2023-02-03 PROCEDURE — 99204 OFFICE O/P NEW MOD 45 MIN: CPT | Performed by: ANESTHESIOLOGY

## 2023-02-03 PROCEDURE — G0463 HOSPITAL OUTPT CLINIC VISIT: HCPCS | Performed by: ANESTHESIOLOGY

## 2023-02-03 RX ORDER — OXCARBAZEPINE 150 MG/1
75 TABLET, FILM COATED ORAL 2 TIMES DAILY
Qty: 60 TABLET | Refills: 3 | Status: SHIPPED | OUTPATIENT
Start: 2023-02-03 | End: 2023-02-13

## 2023-02-03 ASSESSMENT — PAIN SCALES - GENERAL: PAINLEVEL: MODERATE PAIN (4)

## 2023-02-03 NOTE — PATIENT INSTRUCTIONS
PLAN:    Will start the medication Trileptal (oxcarbazepine) for your right lower leg pain.  Dr. Mcwilliams to discuss dosing with your nephrologist.    You will be enrolled in the [place] medical cannabis program.  They will contact you for the process to go to the dispensary's and discussed different products including topical creams.    Discussed the use of a grounding mat and resources provided.  Discussed you will need to monitor your INR as may increase blood flow.    Continue oxycodone through Dr. Lowe, taper as able.    Discussed you may taper the gabapentin as unclear how helpful has been and may be affecting memory.    Contact Dr. Mcwilliams if questions, follow-up in the clinic in 4 to 5 weeks.

## 2023-02-03 NOTE — PROGRESS NOTES
Patient presents to the clinic today for a follow up with LALO SHANKAR MD regarding Pain Management.      PEG Score 2/3/2023   PEG Total Score 4.67       UDS 10.25.2022 PCP    Medications-  Oxycodone ir 02.03.2023 @9am  QUESTIONS:    Iris MURRAY [facility] Visit Facilitator   DISPLAY PLAN FREE TEXT

## 2023-02-03 NOTE — PROGRESS NOTES
[facility] - Office Visit    ASSESSMENT & PLAN     Bandar was seen today for pain.    Diagnoses and all orders for this visit:    Focal dystonia / anterior cervical muscles - Neuro - Ceballos, PT   -     Pain Management  Referral    Venous stasis ulcer of right ankle limited to breakdown of skin without varicose veins (H)  -     Pain Management  Referral  -     OXcarbazepine (TRILEPTAL) 150 MG tablet; Take 0.5 tablets (75 mg) by mouth 2 times daily For one week, if still pain and tolerating 3 times a day one week, call Dr. Shankar    Idiopathic chronic gout without tophus, unspecified site  -     Pain Management  Referral        Patient Instructions   PLAN:    Will start the medication Trileptal (oxcarbazepine) for your right lower leg pain.  Dr. Shankar to discuss dosing with your nephrologist.    You will be enrolled in the [place] medical cannabis program.  They will contact you for the process to go to the dispensary's and discussed different products including topical creams.    Discussed the use of a grounding mat and resources provided.  Discussed you will need to monitor your INR as may increase blood flow.    Continue oxycodone through Dr. Lowe, taper as able.    Discussed you may taper the gabapentin as unclear how helpful has been and may be affecting memory.    Contact Dr. Shankar if questions, follow-up in the clinic in 4 to 5 weeks.        -----  LALO SHANKAR MD  [facility]       SUBJECTIVE      Bandar Wells is a 72 year old year old male who presents to clinic today for the following:     Referred from Dr. Hicks, who is 11/21/2022 note indicates a venous stasis ulcer of the right ankle home physical therapy, history of anterior cervical muscle focal dystonia, and chronic gout without tophus interested in decreasing his opioids and consideration of medical cannabis.    72-year-old white male living alone in [place].  Accompanied by  "his friend Milly.  He does have a son who lives in [place].  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    He reviews he has had pain for a year in his right lower leg.  Milly reminds him is been going on for 4 years.  The reviewed pain started with the left arm pain \"out of nowhere\" 3 to 4 years ago.  He was evaluated by [facility] have a variety of studies, injections which were not helpful.  He then went down to the [facility].  He was found to have focal dystonia followed by neurology having Botox injections and physical therapy.  Last was 2 months ago.  Reports there is improvement in the pain in his neck and shoulders.    Milly indicates on trip to [place] in 2019 he began to complain about pain in his right arm, the evaluations went to the [facility].  There was some concern some point about cancer, had a biopsy and soon developed a rash all over the body.  Followed by the [facility].  There is a 2/7/2022 note at their pain clinic describing dress syndrome, torticollis, having trigger point injections.  Also treated with allopurinol for gout    He developed pain in his right lower leg starting with a blister that got worse.  He has been followed through the vascular surgery clinic and wound clinic.  He was hospitalized in September when it became significantly worse.  He continues with wraps.  He is told it is improving.  Is taking gabapentin which she has been using for several years 300 mg 2 tablets at night.  Unclear how helpful.  May have some memory issues.  Does not correlate the lower extremity swelling with the gabapentin, no left leg symptoms.  Did have ultrasounds bilaterally.    He has been using oxycodone 10 mg taking 0-1 a day initially started for his arm pain where it was significantly helpful, now 1-3 times a day since his hospitalization in September.  Milly wondered whether his pain complaints are worse on the oxycodone, he does not think so.  No side effects.    Main concern " is in the right lower leg where he can have sudden bouts of pain, indeed grimaces and jumps several times through the session.  Describes that can be more disruptive at night.    Has not tried duloxetine, Trileptal or other neuropathic agents.    Was also told he had peripheral neuropathy to the [facility] several years ago, had evaluation no cause found.    He has tried some CBD products, they help him sleep better.  He may be interested in medical cannabis.    He has stage III kidney disease, does not take nonsteroidals.    He describes he is able to get up and down stairs, but was not driving since his September hospitalization.  Used to be much more active with outdoor sports.    Sleep is better than it was a month ago attributes to the CBD.  His appetite is good weight is stable.  Energy is low.  Notes his mood has been low since the September hospitalization frustrated with the pain.  Did not have previous history of depression or anxiety.    May use alcohol having 1 or 2 drinks a night.  Denies its ever been a problem or issue.  Does not use cigarettes.    Reviewing the record a vitamin D level was normal.  Uric acid has been elevated through the years.      Current Outpatient Medications:      acetaminophen (TYLENOL) 500 MG tablet, Take 1,000 mg by mouth 3 times daily, Disp: , Rfl:      Cholecalciferol (VITAMIN D3) 25 MCG (1000 UT) CAPS, Take 1,000 Units by mouth daily, Disp: , Rfl:      enoxaparin ANTICOAGULANT (LOVENOX) 80 MG/0.8ML syringe, Inject 0.7 mLs (70 mg) Subcutaneous every 24 hours, Disp: 6.4 mL, Rfl: 1     febuxostat (ULORIC) 40 MG TABS tablet, Take 1 tablet (40 mg) by mouth daily, Disp: , Rfl:      folic acid (FOLVITE) 1 MG tablet, Take 1 mg by mouth daily, Disp: , Rfl:      gabapentin (NEURONTIN) 300 MG capsule, TAKE 2 CAPSULES (600 MG) BY MOUTH AT BEDTIME, Disp: 180 capsule, Rfl: 3     lactobacillus rhamnosus (GG) (CULTURELL) capsule, Take 1 capsule by mouth 2 times daily, Disp: , Rfl:       OXcarbazepine (TRILEPTAL) 150 MG tablet, Take 0.5 tablets (75 mg) by mouth 2 times daily For one week, if still pain and tolerating 3 times a day one week, call Dr. Mcwilliams, Disp: 60 tablet, Rfl: 3     oxyCODONE IR (ROXICODONE) 10 MG tablet, TAKE 1/2 TABLET (5 MG) BY MOUTH EVERY 4 HOURS AS NEEDED FOR SEVERE PAIN, Disp: 90 tablet, Rfl: 0     penicillin V (VEETID) 500 MG tablet, Take 1 tablet (500 mg) by mouth daily for 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, Disp: 90 tablet, Rfl: 2     polyethylene glycol (MIRALAX) 17 g packet, Take 17 g by mouth daily as needed for constipation, Disp: 30 packet, Rfl: 0     potassium chloride (KLOR-CON) 20 MEQ packet, Take 40 mEq by mouth 2 times daily, Disp: , Rfl:      sennosides (SENOKOT) 8.6 MG tablet, Take 1-4 tablets by mouth 2 times daily, Disp: , Rfl:      spironolactone (ALDACTONE) 25 MG tablet, Take 12.5 mg by mouth daily, Disp: , Rfl:      torsemide (DEMADEX) 100 MG tablet, Take 100 mg by mouth 2 times daily, Disp: , Rfl:      triamcinolone (KENALOG) 0.1 % external ointment, Apply topically 2 times daily, Disp: 80 g, Rfl: 1     vitamin B complex with vitamin C (STRESS TAB) tablet, Take 1 tablet by mouth daily, Disp: , Rfl:      warfarin ANTICOAGULANT (COUMADIN) 3 MG tablet, Take 6 mg daily or as directed by [facility] clinic, Disp: 190 tablet, Rfl: 1     warfarin ANTICOAGULANT (COUMADIN) 3 MG tablet, Take by mouth 12mg (4 tablets) of warfarin on 10/25/22 then 6mg (2 tablets) daily or as directed by the Anticoagulation Clinic, Disp: 70 tablet, Rfl: 0    Current Facility-Administered Medications:      botulinum toxin type A (BOTOX) 100 units injection 300 Units, 300 Units, Intramuscular, Q90 Days, Bertha Cervantes MD, 100 Units at 07/07/22 1128     botulinum toxin type A (BOTOX) 100 units injection 300 Units, 300 Units, Intramuscular, Q90 Days, Bertha Cervantes MD, 125 Units at 04/14/22 1139  .aal    Review of Systems   General, psych, musculoskeletal, bowels and bladder  otherwise normal other than above.    Substance abuse history: Bandar  reports that he has never smoked. He has never used smokeless tobacco. He reports current alcohol use of about 2.0 standard drinks per week. He reports that he does not use drugs.    Past Medical History: Bandar  has a past medical history of Abnormal liver function test, Atrial fibrillation (H), Atrial fibrillation (H), Atrial fibrillation with RVR (H) (8/29/2015), Bacterial endocarditis, CHF (congestive heart failure) (H), Chronic combined systolic and diastolic heart failure (H), CKD (chronic kidney disease), Dyslipidemia, H/O mitral valve replacement with mechanical valve, Hyperlipidemia, Hyperlipidemia, Idiopathic chronic gout without tophus, unspecified site (4/15/2021), Mitral valve prolapse, Near syncope, Pericardial effusion without cardiac tamponade (8/29/2015), S/P mitral valve replacement with metallic valve (03/16/2017), and Status post Maze operation for atrial fibrillation (8/29/2015).    Past Surgical history: Bandar  has a past surgical history that includes Mechanical mitral valve; Chg X-Ray Pelvis 3+ Vw (N/A, 7/1/2015); Creation pericardial window (N/A, 8/31/2015); Picc (9/3/2015); Cardiac surgery; Picc (2/14/2017); Replace valve mitral (N/A, 3/16/2017); Replace valve mitral (2015); Nicholas-Maze Microwave Ablation; Eye surgery; and hernia repair (Right).     Developmental history: Father worked for the [employer], patient moved a lot.  Completed high school in [place], college in [place].  Hobbies included things outdoors including sailing, golfing, skiing, surfing.  Not involved in [employer].  Denies issues of abuse or trauma        OBJECTIVE   BP (!) 144/67   Pulse 75   Wt 73 kg (161 lb)   SpO2 94%   BMI 25.22 kg/m         Physical Exam  General: Alert, clear sensorium.  Neck is indeed flexed to the right.  Patient and Patnode much improved since having the Botox injections.    Has dressings are wrapping over the right calf.  He does  open some of the dressing to show it is quite erythematous, notes the main wound has more gauze tightened lower.  Increased muscle tone in the trapezius and paraspinal, nontender.  Cardiovascular: Normal rate  Lungs: Pulmonary effort is normal, speaking in full sentences    Skin: Warm and dry. No concerning rashes or lesions.  Neurologic: No focal deficit, alert and oriented x3  Psychiatric: Affect is constricted    Assessment: Overlapping pain conditions, reported starting with left arm pain several years ago, evaluation with orthopedics, etiology unclear though he notes completely disappeared when starting oxycodone.    Has had focal dystonia, having Botox injection follow through the [facility] with some improvement.    Developed a right lower extremity ulcer with venous stasis, recent hospitalization, continues with the wound clinic.    Elevated uric acid.    The pain sometimes presently the right or extremity appear more sporadic and lancinating.  Reviewed different neuropathic agents particularly sodium channel blocking agent such as Trileptal may be more helpful.    Reviewed the role of medical cannabis preparations as he has been trying CBD with some interest.  He would like to minimize his other medications including oxycodone.  Reviewed different products including topical creams.    I did contact his nephrologist recommending a 50% decrease the starting dose of oxcarbazepine going slowly.    You will be enrolled in [place] medical cannabis program discussed its use.    You may taper the gabapentin as it is unclear how helpful may have memory issues.    Reviewed other neuropathic agents may be considered such as duloxetine in the future.    Discussed the role of a grounding mat and wound healing.  We will have to monitor his INR.  Total time more than 58 minutes  Answers for HPI/ROS submitted by the patient on 2/2/2023  TOD 7 TOTAL SCORE: 10

## 2023-02-06 ENCOUNTER — NURSE TRIAGE (OUTPATIENT)
Dept: NURSING | Facility: CLINIC | Age: 73
End: 2023-02-06
Payer: COMMERCIAL

## 2023-02-06 DIAGNOSIS — R52 PAIN: ICD-10-CM

## 2023-02-06 RX ORDER — OXYCODONE HYDROCHLORIDE 10 MG/1
TABLET ORAL
Qty: 90 TABLET | Refills: 0 | Status: SHIPPED | OUTPATIENT
Start: 2023-02-06 | End: 2023-03-03

## 2023-02-06 NOTE — TELEPHONE ENCOUNTER
Stephanie RN with [facility] is calling and states that she is going to see patient two times a week for 2 weeks for wound care and then she will re certify patient.   Stephanie is sending this as an FYI.      Reason for Disposition    Information only question and nurse able to answer    Additional Information    Negative: Nursing judgment    Negative: Nursing judgment    Negative: Nursing judgment    Negative: Nursing judgment    Protocols used: INFORMATION ONLY CALL - NO TRIAGE-A-[place]

## 2023-02-08 DIAGNOSIS — E61.1 IRON DEFICIENCY: ICD-10-CM

## 2023-02-08 DIAGNOSIS — R80.9 PROTEINURIA, UNSPECIFIED TYPE: ICD-10-CM

## 2023-02-08 DIAGNOSIS — E87.70 HYPERVOLEMIA, UNSPECIFIED HYPERVOLEMIA TYPE: ICD-10-CM

## 2023-02-08 DIAGNOSIS — N18.4 CKD (CHRONIC KIDNEY DISEASE) STAGE 4, GFR 15-29 ML/MIN (H): Primary | ICD-10-CM

## 2023-02-08 DIAGNOSIS — D64.9 ANEMIA, UNSPECIFIED TYPE: ICD-10-CM

## 2023-02-09 ENCOUNTER — TELEPHONE (OUTPATIENT)
Dept: INTERNAL MEDICINE | Facility: CLINIC | Age: 73
End: 2023-02-09
Payer: COMMERCIAL

## 2023-02-09 NOTE — TELEPHONE ENCOUNTER
February 9, 2023    [facility] Forms: [facility] order number:  [id number] were received via fax for [facility] Providers: Dr. Hicks to sign.  Patient label was attached to paperwork and placed in provider's inbox to be signed.    Corin Samayoa

## 2023-02-10 ENCOUNTER — DOCUMENTATION ONLY (OUTPATIENT)
Dept: ANTICOAGULATION | Facility: CLINIC | Age: 73
End: 2023-02-10
Payer: COMMERCIAL

## 2023-02-10 NOTE — PROGRESS NOTES
ANTICOAGULATION     Bandar Wells is overdue for INR check.      Spoke with Bandar instructed to test INR with home meter and call results to home monitoring company as soon as possible.    Mary Stinson RN

## 2023-02-13 ENCOUNTER — ANTICOAGULATION THERAPY VISIT (OUTPATIENT)
Dept: ANTICOAGULATION | Facility: CLINIC | Age: 73
End: 2023-02-13

## 2023-02-13 ENCOUNTER — OFFICE VISIT (OUTPATIENT)
Dept: INTERNAL MEDICINE | Facility: CLINIC | Age: 73
End: 2023-02-13
Payer: COMMERCIAL

## 2023-02-13 VITALS
SYSTOLIC BLOOD PRESSURE: 110 MMHG | DIASTOLIC BLOOD PRESSURE: 58 MMHG | TEMPERATURE: 98 F | HEART RATE: 62 BPM | OXYGEN SATURATION: 95 % | HEIGHT: 67 IN | BODY MASS INDEX: 25.27 KG/M2 | WEIGHT: 161 LBS

## 2023-02-13 DIAGNOSIS — I48.0 PAROXYSMAL ATRIAL FIBRILLATION (H): Primary | ICD-10-CM

## 2023-02-13 DIAGNOSIS — N18.4 CKD (CHRONIC KIDNEY DISEASE) STAGE 4, GFR 15-29 ML/MIN (H): ICD-10-CM

## 2023-02-13 DIAGNOSIS — I25.10 CORONARY ARTERY DISEASE INVOLVING NATIVE CORONARY ARTERY OF NATIVE HEART WITHOUT ANGINA PECTORIS: ICD-10-CM

## 2023-02-13 DIAGNOSIS — Z95.4 S/P MITRAL VALVE REPLACEMENT WITH METALLIC VALVE: ICD-10-CM

## 2023-02-13 DIAGNOSIS — N18.4 ANEMIA DUE TO STAGE 4 CHRONIC KIDNEY DISEASE (H): ICD-10-CM

## 2023-02-13 DIAGNOSIS — G62.9 POLYNEUROPATHY: Primary | ICD-10-CM

## 2023-02-13 DIAGNOSIS — D64.9 ANEMIA, UNSPECIFIED TYPE: ICD-10-CM

## 2023-02-13 DIAGNOSIS — I48.0 PAROXYSMAL ATRIAL FIBRILLATION (H): ICD-10-CM

## 2023-02-13 DIAGNOSIS — I87.2 VENOUS STASIS ULCER OF RIGHT ANKLE LIMITED TO BREAKDOWN OF SKIN WITHOUT VARICOSE VEINS (H): ICD-10-CM

## 2023-02-13 DIAGNOSIS — R80.9 PROTEINURIA, UNSPECIFIED TYPE: ICD-10-CM

## 2023-02-13 DIAGNOSIS — D63.1 ANEMIA DUE TO STAGE 4 CHRONIC KIDNEY DISEASE (H): ICD-10-CM

## 2023-02-13 DIAGNOSIS — E61.1 IRON DEFICIENCY: ICD-10-CM

## 2023-02-13 DIAGNOSIS — L97.311 VENOUS STASIS ULCER OF RIGHT ANKLE LIMITED TO BREAKDOWN OF SKIN WITHOUT VARICOSE VEINS (H): ICD-10-CM

## 2023-02-13 DIAGNOSIS — E78.5 DYSLIPIDEMIA: ICD-10-CM

## 2023-02-13 LAB
ALBUMIN SERPL BCG-MCNC: 4.5 G/DL (ref 3.5–5.2)
ANION GAP SERPL CALCULATED.3IONS-SCNC: 18 MMOL/L (ref 7–15)
BUN SERPL-MCNC: 64.6 MG/DL (ref 8–23)
CALCIUM SERPL-MCNC: 10.5 MG/DL (ref 8.8–10.2)
CHLORIDE SERPL-SCNC: 99 MMOL/L (ref 98–107)
CHOLEST SERPL-MCNC: 215 MG/DL
CREAT SERPL-MCNC: 2.28 MG/DL (ref 0.67–1.17)
CYSTATIN C (ROCHE): 3.4 MG/L (ref 0.6–1)
DEPRECATED HCO3 PLAS-SCNC: 21 MMOL/L (ref 22–29)
ERYTHROCYTE [DISTWIDTH] IN BLOOD BY AUTOMATED COUNT: 16.2 % (ref 10–15)
FERRITIN SERPL-MCNC: 839 NG/ML (ref 31–409)
GFR SERPL CREATININE-BSD FRML MDRD: 14 ML/MIN/1.73M2
GFR SERPL CREATININE-BSD FRML MDRD: 29 ML/MIN/1.73M2
GLUCOSE SERPL-MCNC: 96 MG/DL (ref 70–99)
HCT VFR BLD AUTO: 33.5 % (ref 40–53)
HDLC SERPL-MCNC: 37 MG/DL
HGB BLD-MCNC: 10.6 G/DL (ref 13.3–17.7)
INR BLD: 2 (ref 0.9–1.1)
IRON BINDING CAPACITY (ROCHE): 253 UG/DL (ref 240–430)
IRON SATN MFR SERPL: 30 % (ref 15–46)
IRON SERPL-MCNC: 75 UG/DL (ref 61–157)
LDLC SERPL CALC-MCNC: 144 MG/DL
MAGNESIUM SERPL-MCNC: 2.8 MG/DL (ref 1.7–2.3)
MCH RBC QN AUTO: 31.1 PG (ref 26.5–33)
MCHC RBC AUTO-ENTMCNC: 31.6 G/DL (ref 31.5–36.5)
MCV RBC AUTO: 98 FL (ref 78–100)
NONHDLC SERPL-MCNC: 178 MG/DL
PHOSPHATE SERPL-MCNC: 3.7 MG/DL (ref 2.5–4.5)
PLATELET # BLD AUTO: 190 10E3/UL (ref 150–450)
POTASSIUM SERPL-SCNC: 3.9 MMOL/L (ref 3.4–5.3)
RBC # BLD AUTO: 3.41 10E6/UL (ref 4.4–5.9)
SODIUM SERPL-SCNC: 138 MMOL/L (ref 136–145)
TRIGL SERPL-MCNC: 168 MG/DL
TSH SERPL DL<=0.005 MIU/L-ACNC: 3.08 UIU/ML (ref 0.3–4.2)
URATE SERPL-MCNC: 9 MG/DL (ref 3.4–7)
WBC # BLD AUTO: 7.2 10E3/UL (ref 4–11)

## 2023-02-13 PROCEDURE — 83516 IMMUNOASSAY NONANTIBODY: CPT | Mod: 90 | Performed by: INTERNAL MEDICINE

## 2023-02-13 PROCEDURE — 36416 COLLJ CAPILLARY BLOOD SPEC: CPT | Performed by: INTERNAL MEDICINE

## 2023-02-13 PROCEDURE — 84443 ASSAY THYROID STIM HORMONE: CPT | Performed by: INTERNAL MEDICINE

## 2023-02-13 PROCEDURE — 83735 ASSAY OF MAGNESIUM: CPT | Performed by: INTERNAL MEDICINE

## 2023-02-13 PROCEDURE — 84425 ASSAY OF VITAMIN B-1: CPT | Mod: 90 | Performed by: INTERNAL MEDICINE

## 2023-02-13 PROCEDURE — 36415 COLL VENOUS BLD VENIPUNCTURE: CPT | Performed by: INTERNAL MEDICINE

## 2023-02-13 PROCEDURE — 84207 ASSAY OF VITAMIN B-6: CPT | Mod: 90 | Performed by: INTERNAL MEDICINE

## 2023-02-13 PROCEDURE — 80069 RENAL FUNCTION PANEL: CPT | Performed by: INTERNAL MEDICINE

## 2023-02-13 PROCEDURE — 80061 LIPID PANEL: CPT | Performed by: INTERNAL MEDICINE

## 2023-02-13 PROCEDURE — 85610 PROTHROMBIN TIME: CPT | Performed by: INTERNAL MEDICINE

## 2023-02-13 PROCEDURE — 83540 ASSAY OF IRON: CPT | Performed by: INTERNAL MEDICINE

## 2023-02-13 PROCEDURE — 99000 SPECIMEN HANDLING OFFICE-LAB: CPT | Performed by: INTERNAL MEDICINE

## 2023-02-13 PROCEDURE — 82728 ASSAY OF FERRITIN: CPT | Performed by: INTERNAL MEDICINE

## 2023-02-13 PROCEDURE — 99214 OFFICE O/P EST MOD 30 MIN: CPT | Performed by: INTERNAL MEDICINE

## 2023-02-13 PROCEDURE — 84550 ASSAY OF BLOOD/URIC ACID: CPT | Performed by: INTERNAL MEDICINE

## 2023-02-13 PROCEDURE — 82610 CYSTATIN C: CPT | Performed by: INTERNAL MEDICINE

## 2023-02-13 PROCEDURE — 85027 COMPLETE CBC AUTOMATED: CPT | Performed by: INTERNAL MEDICINE

## 2023-02-13 PROCEDURE — 83550 IRON BINDING TEST: CPT | Performed by: INTERNAL MEDICINE

## 2023-02-13 RX ORDER — ROPINIROLE 0.5 MG/1
0.5 TABLET, FILM COATED ORAL 3 TIMES DAILY
COMMUNITY
Start: 2023-02-08 | End: 2023-03-21

## 2023-02-13 RX ORDER — OXCARBAZEPINE 150 MG/1
75 TABLET, FILM COATED ORAL 2 TIMES DAILY
Qty: 60 TABLET | Refills: 3
Start: 2023-02-13 | End: 2023-03-09

## 2023-02-13 RX ORDER — TACROLIMUS 1 MG/G
OINTMENT TOPICAL
COMMUNITY
Start: 2023-02-02 | End: 2024-01-01

## 2023-02-13 NOTE — PROGRESS NOTES
Office Visit - Follow Up   Bandar Wells   72 year old male    Date of Visit: 2/13/2023    Chief Complaint   Patient presents with     Knee Pain     Bilateral knee pains - pt reports new medication ropinirole      Follow Up     Pt reports he recently seen Michael Thorne - had Bx (positive for skin cancer)         Assessment and Plan   1. Polyneuropathy  His symptoms are most consistent with a sensory neuropathy.  He has had some extensive laboratory evaluation in the past including normal B12, SPEP and UPEP which are fairly unremarkable, normal thyroid.  He does have a couple of alcoholic drinks at night and I discussed with him that this could be contributing to his neuropathy.  We will check labs as below and obtain an EMG.  We might need to investigate any issues with his lumbar spine.  - Vitamin B6; Future  - Vitamin B1 whole blood; Future  - EMG; Future  - GM1 antibody panel; Future  - Vitamin B6  - Vitamin B1 whole blood  - GM1 antibody panel    2. Coronary artery disease involving native coronary artery of native heart without angina pectoris  - Lipid panel reflex to direct LDL Non-fasting; Future  - Lipid panel reflex to direct LDL Non-fasting    3. CKD (chronic kidney disease) stage 4, GFR 15-29 ml/min (H)  - Renal panel (Alb, BUN, Ca, Cl, CO2, Creat, Gluc, Phos, K, Na)  - Magnesium  - CBC with platelets  - Iron and iron binding capacity  - Ferritin  - Uric acid  - Cystatin C with GFR    4. Proteinuria, unspecified type  - Renal panel (Alb, BUN, Ca, Cl, CO2, Creat, Gluc, Phos, K, Na)  - Magnesium  - CBC with platelets  - Iron and iron binding capacity  - Ferritin  - Uric acid  - Cystatin C with GFR    5. Anemia due to stage 4 chronic kidney disease (H)    6. Dyslipidemia  - TSH WITH FREE T4 REFLEX; Future  - TSH WITH FREE T4 REFLEX    7. Venous stasis ulcer of right ankle limited to breakdown of skin without varicose veins (H)  - OXcarbazepine (TRILEPTAL) 150 MG tablet; Take 0.5 tablets (75 mg) by mouth 2  "times daily For one week, if still pain and tolerating 3 times a day one week, call Dr. Mcwilliams  Dispense: 60 tablet; Refill: 3    8. Paroxysmal atrial fibrillation (H)  - INR point of care    9. S/P mitral valve replacement with metallic valve  - INR point of care    10. Iron deficiency  - Renal panel (Alb, BUN, Ca, Cl, CO2, Creat, Gluc, Phos, K, Na)  - Magnesium  - CBC with platelets  - Iron and iron binding capacity  - Ferritin  - Uric acid  - Cystatin C with GFR    11. Anemia, unspecified type  - Renal panel (Alb, BUN, Ca, Cl, CO2, Creat, Gluc, Phos, K, Na)  - Magnesium  - CBC with platelets  - Iron and iron binding capacity  - Ferritin  - Uric acid  - Cystatin C with GFR      Return in about 4 weeks (around 3/13/2023) for Follow up.     History of Present Illness   This 72 year old man comes in for evaluation of pain in both legs.  This occurs mostly at night when he is lying.  It can be a burning tingling type pain.  His second side of the bed and after about 15 minutes overweight.  Walking helps.  Recently stopped gabapentin.  Recently started Trileptal under the guidance of Dr. Javier Mcwilliams in the pain clinic.  Recently started Requip under the guidance of his nephrologist.  He is not sure if either of these medications was been helpful.  He is planning to use medical cannabis under the guidance of Dr. Javier Mcwilliams but has not started this yet.  He also has oxycodone that he uses for chronic pain.       Physical Exam   General Appearance:   No acute distress    /58 (BP Location: Left arm, Patient Position: Sitting, Cuff Size: Adult Regular)   Pulse 62   Temp 98  F (36.7  C)   Ht 1.702 m (5' 7\")   Wt 73 kg (161 lb)   SpO2 95%   BMI 25.22 kg/m           Additional Information   Current Outpatient Medications   Medication Sig Dispense Refill     acetaminophen (TYLENOL) 500 MG tablet Take 1,000 mg by mouth 3 times daily       Cholecalciferol (VITAMIN D3) 25 MCG (1000 UT) CAPS Take 1,000 Units by " mouth daily       febuxostat (ULORIC) 40 MG TABS tablet Take 1 tablet (40 mg) by mouth daily       folic acid (FOLVITE) 1 MG tablet Take 1 mg by mouth daily       lactobacillus rhamnosus (GG) (CULTURELL) capsule Take 1 capsule by mouth 2 times daily       OXcarbazepine (TRILEPTAL) 150 MG tablet Take 0.5 tablets (75 mg) by mouth 2 times daily For one week, if still pain and tolerating 3 times a day one week, call Dr. Mcwilliams 60 tablet 3     oxyCODONE IR (ROXICODONE) 10 MG tablet TAKE 1/2 TABLET (5 MG) BY MOUTH EVERY 4 HOURS AS NEEDED FOR SEVERE PAIN 90 tablet 0     penicillin V (VEETID) 500 MG tablet Take 1 tablet (500 mg) by mouth daily for 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 90 tablet 2     polyethylene glycol (MIRALAX) 17 g packet Take 17 g by mouth daily as needed for constipation 30 packet 0     potassium chloride (KLOR-CON) 20 MEQ packet Take 40 mEq by mouth 2 times daily       rOPINIRole (REQUIP) 0.5 MG tablet Take 0.5 mg by mouth 3 times daily       sennosides (SENOKOT) 8.6 MG tablet Take 1-4 tablets by mouth 2 times daily       tacrolimus (PROTOPIC) 0.1 % external ointment APPLY THIN LAYER TO SCALP ONCE DAILY       torsemide (DEMADEX) 100 MG tablet Take 100 mg by mouth 2 times daily       triamcinolone (KENALOG) 0.1 % external ointment Apply topically 2 times daily 80 g 1     vitamin B complex with vitamin C (STRESS TAB) tablet Take 1 tablet by mouth daily       warfarin ANTICOAGULANT (COUMADIN) 3 MG tablet Take 6 mg daily or as directed by [facility] clinic 190 tablet 1     Allergies   Allergen Reactions     Allopurinol Rash     Clindamycin Rash       Time:      Jin Hicks MD    Answers for HPI/ROS submitted by the patient on 2/13/2023  What is the reason for your visit today? : Follow up  How many servings of fruits and vegetables do you eat daily?: 0-1  On average, how many sweetened beverages do you drink each day (Examples: soda, juice, sweet tea, etc.  Do NOT count diet or artificially sweetened beverages)?:  0  How many minutes a day do you exercise enough to make your heart beat faster?: 9 or less  How many days a week do you exercise enough to make your heart beat faster?: 3 or less  How many days per week do you miss taking your medication?: 0

## 2023-02-13 NOTE — PROGRESS NOTES
ANTICOAGULATION MANAGEMENT     Bandar Wells 72 year old male is on warfarin with subtherapeutic INR result. (Goal INR 2.5-3.5)    Recent labs: (last 7 days)     02/13/23  1443   INR 2.0*       ASSESSMENT       Source(s): Chart Review and Patient/Caregiver Call       Warfarin doses taken: Warfarin taken as instructed    Diet: No new diet changes identified    New illness, injury, or hospitalization: No    Medication/supplement changes: None noted    Signs or symptoms of bleeding or clotting: No    Previous INR: Subtherapeutic    Additional findings: None       PLAN     Recommended plan for no diet, medication or health factor changes affecting INR     Dosing Instructions: booster dose then Increase your warfarin dose (11.1% change) with next INR in 1 week       Summary  As of 2/13/2023    Full warfarin instructions:  2/13: 6 mg; Otherwise 6 mg every Tue, Thu, Sat; 3 mg all other days   Next INR check:  2/20/2023             Telephone call with Bandar who verbalizes understanding and agrees to plan    Patient to recheck with home meter    Education provided:     Contact [phone] with any changes, questions or concerns.     Plan made per [facility] anticoagulation protocol    Shmuel Jeffers RN  Anticoagulation Clinic  2/13/2023    _______________________________________________________________________     Anticoagulation Episode Summary     Current INR goal:  2.5-3.5   TTR:  38.6 % (1 y)   Target end date:  Indefinite   Send INR reminders to:  [facility]    Indications    Paroxysmal atrial fibrillation (H) [I48.0]  S/P mitral valve replacement with metallic valve [Z95.4]           Comments:  Anas- wants a call every time         Anticoagulation Care Providers     Provider Role Specialty Phone number    Jin Hciks MD Referring Internal Medicine [phone]           Admission Reconciliation is Completed  Discharge Reconciliation is Not Complete Admission Reconciliation is Completed  Discharge Reconciliation is Completed

## 2023-02-14 NOTE — TELEPHONE ENCOUNTER
February 14, 2023    [facility] Forms: [facility] received from outbox of [facility] Providers: Dr. Hicks.  Paperwork has been reviewed and is complete.  Per initial initial request, this was sent via fax to [phone].     Hannah Bennett

## 2023-02-15 LAB
GM1 GANGL IGG SER IA-ACNC: 28 IV
GM1 GANGL IGM SER IA-ACNC: 29 IV
PYRIDOXAL PHOS SERPL-SCNC: 247.1 NMOL/L

## 2023-02-17 LAB — VIT B1 PYROPHOSHATE BLD-SCNC: 293 NMOL/L

## 2023-02-18 ENCOUNTER — MEDICAL CORRESPONDENCE (OUTPATIENT)
Dept: HEALTH INFORMATION MANAGEMENT | Facility: CLINIC | Age: 73
End: 2023-02-18

## 2023-02-23 ENCOUNTER — DOCUMENTATION ONLY (OUTPATIENT)
Dept: ANTICOAGULATION | Facility: CLINIC | Age: 73
End: 2023-02-23
Payer: COMMERCIAL

## 2023-02-23 ENCOUNTER — ANTICOAGULATION THERAPY VISIT (OUTPATIENT)
Dept: ANTICOAGULATION | Facility: CLINIC | Age: 73
End: 2023-02-23
Payer: COMMERCIAL

## 2023-02-23 DIAGNOSIS — I48.0 PAROXYSMAL ATRIAL FIBRILLATION (H): Primary | ICD-10-CM

## 2023-02-23 DIAGNOSIS — Z95.4 S/P MITRAL VALVE REPLACEMENT WITH METALLIC VALVE: ICD-10-CM

## 2023-02-23 LAB — INR HOME MONITORING: 2.5 (ref 2.5–3.5)

## 2023-02-23 NOTE — PROGRESS NOTES
ANTICOAGULATION MANAGEMENT     Bandar Wells 72 year old male is on warfarin with therapeutic INR result. (Goal INR 2.5-3.5)    Recent labs: (last 7 days)     02/23/23  0000   INR 2.5       ASSESSMENT       Source(s): Chart Review and Patient/Caregiver Call       Warfarin doses taken: Warfarin taken as instructed    Diet: No new diet changes identified    New illness, injury, or hospitalization: No    Medication/supplement changes: None noted    Signs or symptoms of bleeding or clotting: No    Previous INR: Subtherapeutic    Additional findings: None         PLAN     Recommended plan for no diet, medication or health factor changes affecting INR     Dosing Instructions: Continue your current warfarin dose with next INR in 2 weeks       Summary  As of 2/23/2023    Full warfarin instructions:  6 mg every Tue, Thu, Sat; 3 mg all other days   Next INR check:  3/9/2023             Telephone call with Bandar who verbalizes understanding and agrees to plan    Patient to recheck with home meter    Education provided:     Please call back if any changes to your diet, medications or how you've been taking warfarin    Plan made per [facility] anticoagulation protocol    Galilea Jarrett RN  Anticoagulation Clinic  2/23/2023    _______________________________________________________________________     Anticoagulation Episode Summary     Current INR goal:  2.5-3.5   TTR:  36.2 % (1 y)   Target end date:  Indefinite   Send INR reminders to:  [facility]    Indications    Paroxysmal atrial fibrillation (H) [I48.0]  S/P mitral valve replacement with metallic valve [Z95.4]           Comments:  Aviva- wants a call every time         Anticoagulation Care Providers     Provider Role Specialty Phone number    Jin Hicks MD Referring Internal Medicine [phone]

## 2023-02-28 ENCOUNTER — TELEPHONE (OUTPATIENT)
Dept: INTERNAL MEDICINE | Facility: CLINIC | Age: 73
End: 2023-02-28
Payer: COMMERCIAL

## 2023-02-28 NOTE — TELEPHONE ENCOUNTER
February 28, 2023    [facility] Forms: [facility] care orders were received via fax for [facility] Providers: Dr. Hicks to sign.  Patient label was attached to paperwork and placed in provider's inbox to be signed.    Hannah Bennett

## 2023-03-01 NOTE — TELEPHONE ENCOUNTER
March 1, 2023    [facility] Forms: [facility] forms received from outbox of [facility] Providers: Dr. Parra.  Paperwork has been reviewed and is complete.  Per initial initial request, this was sent via fax to [phone].     Germain Hernandez III

## 2023-03-03 DIAGNOSIS — R52 PAIN: ICD-10-CM

## 2023-03-03 RX ORDER — OXYCODONE HYDROCHLORIDE 10 MG/1
5 TABLET ORAL EVERY 4 HOURS PRN
Qty: 90 TABLET | Refills: 0 | Status: SHIPPED | OUTPATIENT
Start: 2023-03-03 | End: 2023-03-31

## 2023-03-03 NOTE — TELEPHONE ENCOUNTER
Patient's pharmacy calling to clarify questions about the prescription sent in for oxyCODONE IR (ROXICODONE) 10 MG tablet.    Pharmacy trying to clarify if this is for chronic pain. Chart review indicates he has been getting this prescription for several months. Verbal clarification given.    Pharmacy also states the patient is requesting his refill 5 days early and they need approval from Dr Hicks to do so. States they asked why he is filling this early and he told them he is needing it more often.

## 2023-03-06 NOTE — TELEPHONE ENCOUNTER
Spoke with patient's pharmacy and patient picked up prescription today, no early refill authorization was needed.

## 2023-03-09 ENCOUNTER — OFFICE VISIT (OUTPATIENT)
Dept: PALLIATIVE MEDICINE | Facility: OTHER | Age: 73
End: 2023-03-09
Payer: COMMERCIAL

## 2023-03-09 VITALS — OXYGEN SATURATION: 94 % | HEART RATE: 82 BPM | SYSTOLIC BLOOD PRESSURE: 141 MMHG | DIASTOLIC BLOOD PRESSURE: 65 MMHG

## 2023-03-09 DIAGNOSIS — G24.8 FOCAL DYSTONIA: Primary | ICD-10-CM

## 2023-03-09 PROCEDURE — 99213 OFFICE O/P EST LOW 20 MIN: CPT | Performed by: ANESTHESIOLOGY

## 2023-03-09 PROCEDURE — G0463 HOSPITAL OUTPT CLINIC VISIT: HCPCS

## 2023-03-09 PROCEDURE — G0463 HOSPITAL OUTPT CLINIC VISIT: HCPCS | Performed by: ANESTHESIOLOGY

## 2023-03-09 NOTE — PROGRESS NOTES
[facility] - Office Visit    ASSESSMENT & PLAN     There are no diagnoses linked to this encounter.    Patient Instructions     [facility] Number:  [phone]    Call with any questions about your care and for scheduling assistance.     Calls are returned Monday through Friday between 8 AM and 4:30 PM. We usually get back to you within 2 business days depending on the issue/request.    If we are prescribing your medications:    For opioid medication refills, call the clinic or send a [employer]Vax message 7 days in advance.  Please include:    Name of requested medication    Name of the pharmacy.    For non-opioid medications, call your pharmacy directly to request a refill. Please allow 3-4 days to be processed.     Per [place] State Law:    All controlled substance prescriptions must be filled within 30 days of being written.      For those controlled substances allowing refills, pickup must occur within 30 days of last fill.      We believe regular attendance is key to your success in our program!      Any time you are unable to keep your appointment we ask that you call us at least 24 hours in advance to cancel.This will allow us to offer the appointment time to another patient.     Multiple missed appointments may lead to dismissal from the clinic.     PLAN:    You will continue with the medical cannabis program.    You may obtain the grounding mat to see if helps with sleep, pain, wound healing.    You may call if you would like to try different medicines for nerve pain.    You may try the medicine fromyour neurologist about restless legs.    Follow-up with Dr. Shankar in the office in 3 months        -----  LALO SHANKAR MD  [facility]       SUBJECTIVE      Bandar Wells is a 72 year old year old male who presents to clinic today for the following:     Follow-up for history of stasis ulcer on his ankle, chronic gout, cervical  dystonia.    He is seen with his wife.    He describes having a GI bug for 2 weeks, recovering his, still decreased appetite.    Since last seen he is working on getting involved with the medical cannabis program, just needs to fill out some forms.    He has started the oxcarbazepine at the same time as something for restless legs.  Developed a rash after about a week.  He stopped them both and the rash resolved.  He is unsure if it helped with his pain.    Does note in general there seem to be less pain in his leg as the wound appears improving slowly.  With more pain there is more restless leg syndrome.  Sleep is a bit better.    He did look into the grounding mat, did not yet obtain.  Reviewed some literature regarding helping with wound pain.    He did discontinue the gabapentin, noted no change in his pain and no changes in his memory.    He does continue with the oxycodone 10 mg tablets 1/2 tablet at a time usually 2 tablets total a day sometimes 3.  Continues look forward to simplify his medication decreasing the oxycodone.    His wife noted he was complaining of depression last time.  Reports his mood is a bit better.    He is starting a new radiation course for skin cancer noted on his scalp.      Current Outpatient Medications:      acetaminophen (TYLENOL) 500 MG tablet, Take 1,000 mg by mouth 3 times daily, Disp: , Rfl:      Cholecalciferol (VITAMIN D3) 25 MCG (1000 UT) CAPS, Take 1,000 Units by mouth daily, Disp: , Rfl:      febuxostat (ULORIC) 40 MG TABS tablet, Take 1 tablet (40 mg) by mouth daily, Disp: , Rfl:      folic acid (FOLVITE) 1 MG tablet, Take 1 mg by mouth daily, Disp: , Rfl:      lactobacillus rhamnosus (GG) (CULTURELL) capsule, Take 1 capsule by mouth 2 times daily, Disp: , Rfl:      oxyCODONE IR (ROXICODONE) 10 MG tablet, Take 0.5 tablets (5 mg) by mouth every 4 hours as needed for severe pain (7-10), Disp: 90 tablet, Rfl: 0     penicillin V (VEETID) 500 MG tablet, Take 1 tablet (500 mg)  by mouth daily for 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, Disp: 90 tablet, Rfl: 2     polyethylene glycol (MIRALAX) 17 g packet, Take 17 g by mouth daily as needed for constipation, Disp: 30 packet, Rfl: 0     potassium chloride (KLOR-CON) 20 MEQ packet, Take 40 mEq by mouth 2 times daily, Disp: , Rfl:      rOPINIRole (REQUIP) 0.5 MG tablet, Take 0.5 mg by mouth 3 times daily, Disp: , Rfl:      sennosides (SENOKOT) 8.6 MG tablet, Take 1-4 tablets by mouth 2 times daily, Disp: , Rfl:      tacrolimus (PROTOPIC) 0.1 % external ointment, APPLY THIN LAYER TO SCALP ONCE DAILY, Disp: , Rfl:      torsemide (DEMADEX) 100 MG tablet, Take 100 mg by mouth 2 times daily, Disp: , Rfl:      triamcinolone (KENALOG) 0.1 % external ointment, Apply topically 2 times daily, Disp: 80 g, Rfl: 1     vitamin B complex with vitamin C (STRESS TAB) tablet, Take 1 tablet by mouth daily, Disp: , Rfl:      warfarin ANTICOAGULANT (COUMADIN) 3 MG tablet, Take 6 mg daily or as directed by [facility] clinic, Disp: 190 tablet, Rfl: 1    Current Facility-Administered Medications:      botulinum toxin type A (BOTOX) 100 units injection 300 Units, 300 Units, Intramuscular, Q90 Days, Standal, Bertha Guerrero MD, 125 Units at 04/14/22 1139      Review of Systems   General, psych, musculoskeletal, bowels and bladder otherwise normal other than above.          OBJECTIVE   BP (!) 141/65 (BP Location: Right arm)   Pulse 82   SpO2 94%         Physical Exam  General: Alert, clear sensorium.  No respiratory distress, no pain behavior  Cardiovascular: Normal rate  Lungs: Pulmonary effort is normal, speaking in full sentences  MSK:   Skin: . No concerning rashes or lesions.  Neurologic: Head is flexed forward to the right related to cervical dystonia.  Psychiatric: Affect somewhat constricted, indeed brighter than last      Assessment overlapping pain syndromes with right ulcer wound healing, diffuse gout, cervical dystonia.    He noted a rash after trial oxcarbazepine.  Discussed  we could use other neuropathic agents but he would like to hold it simplify his medication regimen.    He is continue to work on the medical cannabis program.    May look into the Irr thing matte to help with pain, inflammation, sleep.    Refuses attention to continue to taper the oxycodone as able.    Total time more than 20 minutes

## 2023-03-09 NOTE — PATIENT INSTRUCTIONS
[facility] Number:  [phone]  Call with any questions about your care and for scheduling assistance.   Calls are returned Monday through Friday between 8 AM and 4:30 PM. We usually get back to you within 2 business days depending on the issue/request.    If we are prescribing your medications:  For opioid medication refills, call the clinic or send a [employer] message 7 days in advance.  Please include:  Name of requested medication  Name of the pharmacy.  For non-opioid medications, call your pharmacy directly to request a refill. Please allow 3-4 days to be processed.   Per [place] State Law:  All controlled substance prescriptions must be filled within 30 days of being written.    For those controlled substances allowing refills, pickup must occur within 30 days of last fill.      We believe regular attendance is key to your success in our program!    Any time you are unable to keep your appointment we ask that you call us at least 24 hours in advance to cancel.This will allow us to offer the appointment time to another patient.   Multiple missed appointments may lead to dismissal from the clinic.     PLAN:    You will continue with the medical cannabis program.    You may obtain the grounding mat to see if helps with sleep, pain, wound healing.    You may call if you would like to try different medicines for nerve pain.    You may try the medicine fromyour neurologist about restless legs.    Follow-up with Dr. Mcwilliams in the office in 3 months

## 2023-03-09 NOTE — NURSING NOTE
"  PEG Score 2/3/2023 3/9/2023   PEG Total Score 4.67 5.67     Patient notes that he has stopped oxcarbazapine due to rash that started around that time.  Patient has also stopped \"another medication\" to see if this was related(prescribed by an outside provider)    Patient has been approved for medical cannabis and is working on this.    Patient has recently been diagnosed with  skin cancer, he will have radiation treatment starting soon(light radiation).    "

## 2023-03-16 ENCOUNTER — DOCUMENTATION ONLY (OUTPATIENT)
Dept: ANTICOAGULATION | Facility: CLINIC | Age: 73
End: 2023-03-16
Payer: COMMERCIAL

## 2023-03-16 NOTE — PROGRESS NOTES
ANTICOAGULATION     Bandar Wells is overdue for INR check.      Spoke with Bandar instructed to test INR with home meter and call results to home monitoring company as soon as possible.     Patient reports he just received his supplies in the mail today and will test today.    Mary Stinson RN

## 2023-03-17 ENCOUNTER — ANTICOAGULATION THERAPY VISIT (OUTPATIENT)
Dept: ANTICOAGULATION | Facility: CLINIC | Age: 73
End: 2023-03-17
Payer: COMMERCIAL

## 2023-03-17 DIAGNOSIS — I48.0 PAROXYSMAL ATRIAL FIBRILLATION (H): Primary | ICD-10-CM

## 2023-03-17 DIAGNOSIS — Z95.4 S/P MITRAL VALVE REPLACEMENT WITH METALLIC VALVE: ICD-10-CM

## 2023-03-17 LAB — INR HOME MONITORING: 1.9 (ref 2.5–3.5)

## 2023-03-17 NOTE — PROGRESS NOTES
ANTICOAGULATION MANAGEMENT     Bandar Wells 72 year old male is on warfarin with subtherapeutic INR result. (Goal INR 2.5-3.5)    Recent labs: (last 7 days)     03/17/23  0000   INR 1.9*       ASSESSMENT       Source(s): Chart Review and Patient/Caregiver Call       Warfarin doses taken: Warfarin taken as instructed - denies missed doses    Diet: poor appetite may be affecting diet and INR    New illness, injury, or hospitalization: Yes: recently diagnosed with skin cancer - starting radiation on Monday.    Medication/supplement changes: None noted    Signs or symptoms of bleeding or clotting: No    Previous INR: Therapeutic last visit; previously outside of goal range    Additional findings: None         PLAN     Recommended plan for ongoing change(s) affecting INR     Dosing Instructions: booster dose then Increase your warfarin dose (10% change) with next INR in 1 week       Summary  As of 3/17/2023    Full warfarin instructions:  3/17: 6 mg; Otherwise 3 mg every Mon, Wed, Fri; 6 mg all other days   Next INR check:  3/24/2023             Telephone call with Bandar who agrees to plan and repeated back plan correctly    Patient to recheck with home meter    Education provided:     Goal range and lab monitoring: goal range and significance of current result, Importance of therapeutic range and Importance of following up at instructed interval    Plan made per [facility] anticoagulation protocol    Anuradha Zheng RN  Anticoagulation Clinic  3/17/2023    _______________________________________________________________________     Anticoagulation Episode Summary     Current INR goal:  2.5-3.5   TTR:  32.8 % (1 y)   Target end date:  Indefinite   Send INR reminders to:  [facility]    Indications    Paroxysmal atrial fibrillation (H) [I48.0]  S/P mitral valve replacement with metallic valve [Z95.4]           Comments:  [facility]- wants a call every time         Anticoagulation Care Providers     Provider Role Specialty  Phone number    Jin Hicks MD Referring Internal Medicine [phone]

## 2023-03-20 ENCOUNTER — TELEPHONE (OUTPATIENT)
Dept: INTERNAL MEDICINE | Facility: CLINIC | Age: 73
End: 2023-03-20
Payer: COMMERCIAL

## 2023-03-20 DIAGNOSIS — Z53.9 DIAGNOSIS NOT YET DEFINED: Primary | ICD-10-CM

## 2023-03-20 PROCEDURE — G0180 MD CERTIFICATION HHA PATIENT: HCPCS | Performed by: INTERNAL MEDICINE

## 2023-03-20 NOTE — TELEPHONE ENCOUNTER
March 20, 2023    [facility] orders was received via fax for Dr. Hicks to sign.  Patient label was attached to paperwork and placed in provider's inbox to be signed.    Germain Hernandez III

## 2023-03-21 ENCOUNTER — OFFICE VISIT (OUTPATIENT)
Dept: INTERNAL MEDICINE | Facility: CLINIC | Age: 73
End: 2023-03-21
Payer: COMMERCIAL

## 2023-03-21 VITALS
RESPIRATION RATE: 18 BRPM | OXYGEN SATURATION: 99 % | HEIGHT: 67 IN | TEMPERATURE: 97.9 F | HEART RATE: 61 BPM | DIASTOLIC BLOOD PRESSURE: 50 MMHG | WEIGHT: 148.8 LBS | SYSTOLIC BLOOD PRESSURE: 98 MMHG | BODY MASS INDEX: 23.35 KG/M2

## 2023-03-21 DIAGNOSIS — L97.311 VENOUS STASIS ULCER OF RIGHT ANKLE LIMITED TO BREAKDOWN OF SKIN WITHOUT VARICOSE VEINS (H): ICD-10-CM

## 2023-03-21 DIAGNOSIS — F33.1 MODERATE EPISODE OF RECURRENT MAJOR DEPRESSIVE DISORDER (H): Primary | ICD-10-CM

## 2023-03-21 DIAGNOSIS — D63.1 ANEMIA DUE TO STAGE 4 CHRONIC KIDNEY DISEASE (H): ICD-10-CM

## 2023-03-21 DIAGNOSIS — G47.01 INSOMNIA DUE TO MEDICAL CONDITION: ICD-10-CM

## 2023-03-21 DIAGNOSIS — I87.2 VENOUS STASIS ULCER OF RIGHT ANKLE LIMITED TO BREAKDOWN OF SKIN WITHOUT VARICOSE VEINS (H): ICD-10-CM

## 2023-03-21 DIAGNOSIS — D63.1 ANEMIA OF CHRONIC RENAL FAILURE, STAGE 4 (SEVERE) (H): ICD-10-CM

## 2023-03-21 DIAGNOSIS — G62.9 POLYNEUROPATHY: ICD-10-CM

## 2023-03-21 DIAGNOSIS — N18.4 ANEMIA OF CHRONIC RENAL FAILURE, STAGE 4 (SEVERE) (H): ICD-10-CM

## 2023-03-21 DIAGNOSIS — N18.4 ANEMIA DUE TO STAGE 4 CHRONIC KIDNEY DISEASE (H): ICD-10-CM

## 2023-03-21 PROCEDURE — 99214 OFFICE O/P EST MOD 30 MIN: CPT | Performed by: INTERNAL MEDICINE

## 2023-03-21 RX ORDER — MIRTAZAPINE 15 MG/1
15 TABLET, FILM COATED ORAL AT BEDTIME
Qty: 90 TABLET | Refills: 4 | Status: SHIPPED | OUTPATIENT
Start: 2023-03-21 | End: 2024-01-01

## 2023-03-21 NOTE — PROGRESS NOTES
"  Office Visit - Follow Up   Bandar Wells   72 year old male    Date of Visit: 3/21/2023    Chief Complaint   Patient presents with     Recheck Medication     RECHECK        Assessment and Plan   1. Moderate episode of recurrent major depressive disorder (H)  We will start with 1/2 tablet and if tolerating increase to a full tablet.  Follow-up 1 month  - mirtazapine (REMERON) 15 MG tablet; Take 1 tablet (15 mg) by mouth At Bedtime  Dispense: 90 tablet; Refill: 4    2. Insomnia due to medical condition  - mirtazapine (REMERON) 15 MG tablet; Take 1 tablet (15 mg) by mouth At Bedtime  Dispense: 90 tablet; Refill: 4    3. Anemia due to stage 4 chronic kidney disease (H)  Stable    4. Anemia of chronic renal failure, stage 4 (severe) (H)  Stable    5. Venous stasis ulcer of right ankle limited to breakdown of skin without varicose veins (H)  Continue with home wound care    6. Polyneuropathy  He has an upcoming EMG    Return in about 4 weeks (around 4/18/2023) for Follow up.     History of Present Illness   This 72 year old comes in for follow-up.  Overall he is doing fairly well.  Since I last saw him he had some issues with lack of appetite and a skin rash and some diarrheal illness thought to be related to some medication started in the pain clinic.  He stopped these and now is feeling better.  His mood has been a little bit down and he has been sleeping poorly.  Appetite only improved today.  Labs reviewed and fairly stable following with nephrology.  He has not yet had his EMG       Physical Exam   General Appearance:   No acute distress    BP 98/50   Pulse 61   Temp 97.9  F (36.6  C) (Tympanic)   Resp 18   Ht 1.702 m (5' 7\")   Wt 67.5 kg (148 lb 12.8 oz)   SpO2 99%   BMI 23.31 kg/m           Additional Information   Current Outpatient Medications   Medication Sig Dispense Refill     acetaminophen (TYLENOL) 500 MG tablet Take 1,000 mg by mouth 3 times daily       Cholecalciferol (VITAMIN D3) 25 MCG (1000 UT) " CAPS Take 1,000 Units by mouth daily       febuxostat (ULORIC) 40 MG TABS tablet Take 1 tablet (40 mg) by mouth daily       folic acid (FOLVITE) 1 MG tablet Take 1 mg by mouth daily       lactobacillus rhamnosus (GG) (CULTURELL) capsule Take 1 capsule by mouth 2 times daily       mirtazapine (REMERON) 15 MG tablet Take 1 tablet (15 mg) by mouth At Bedtime 90 tablet 4     oxyCODONE IR (ROXICODONE) 10 MG tablet Take 0.5 tablets (5 mg) by mouth every 4 hours as needed for severe pain (7-10) 90 tablet 0     penicillin V (VEETID) 500 MG tablet Take 1 tablet (500 mg) by mouth daily for 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 90 tablet 2     polyethylene glycol (MIRALAX) 17 g packet Take 17 g by mouth daily as needed for constipation 30 packet 0     potassium chloride (KLOR-CON) 20 MEQ packet Take 40 mEq by mouth 2 times daily       sennosides (SENOKOT) 8.6 MG tablet Take 1-4 tablets by mouth 2 times daily       tacrolimus (PROTOPIC) 0.1 % external ointment APPLY THIN LAYER TO SCALP ONCE DAILY       torsemide (DEMADEX) 100 MG tablet Take 100 mg by mouth 2 times daily       triamcinolone (KENALOG) 0.1 % external ointment Apply topically 2 times daily 80 g 1     vitamin B complex with vitamin C (STRESS TAB) tablet Take 1 tablet by mouth daily       warfarin ANTICOAGULANT (COUMADIN) 3 MG tablet Take 6 mg daily or as directed by [facility] clinic 190 tablet 1     Allergies   Allergen Reactions     Allopurinol Rash     Clindamycin Rash       Time:      Jin Hicks MD    Answers for HPI/ROS submitted by the patient on 3/21/2023  What is the reason for your visit today? : follow up on numerous issues  How many servings of fruits and vegetables do you eat daily?: 0-1  On average, how many sweetened beverages do you drink each day (Examples: soda, juice, sweet tea, etc.  Do NOT count diet or artificially sweetened beverages)?: 2  How many minutes a day do you exercise enough to make your heart beat faster?: 9 or less  How many days a week do you  exercise enough to make your heart beat faster?: 3 or less  How many days per week do you miss taking your medication?: 0

## 2023-03-21 NOTE — TELEPHONE ENCOUNTER
March 21, 2023    Home health orders was picked up from outbox of Dr. Hicks.  Paperwork has been reviewed and is complete.  Per initial initial request, this was sent via fax to [phone] and HIM.     Germain Hernandez III

## 2023-03-27 ENCOUNTER — ANTICOAGULATION THERAPY VISIT (OUTPATIENT)
Dept: ANTICOAGULATION | Facility: CLINIC | Age: 73
End: 2023-03-27
Payer: COMMERCIAL

## 2023-03-27 DIAGNOSIS — Z95.4 S/P MITRAL VALVE REPLACEMENT WITH METALLIC VALVE: ICD-10-CM

## 2023-03-27 DIAGNOSIS — I48.0 PAROXYSMAL ATRIAL FIBRILLATION (H): Primary | ICD-10-CM

## 2023-03-27 LAB — INR HOME MONITORING: 1.8 (ref 2.5–3.5)

## 2023-03-27 RX ORDER — ENOXAPARIN SODIUM 100 MG/ML
70 INJECTION SUBCUTANEOUS EVERY 24 HOURS
Qty: 4 ML | Refills: 1 | Status: SHIPPED | OUTPATIENT
Start: 2023-03-27 | End: 2023-06-01
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
## 2023-03-27 NOTE — PROGRESS NOTES
Calc CrCl 27ml/min, advise start Lovenox 70mg Q24H for sustained low INR.    Batool Godwin, PharmD BCACP  Anticoagulation Clinical Pharmacist

## 2023-03-31 ENCOUNTER — ANTICOAGULATION THERAPY VISIT (OUTPATIENT)
Dept: ANTICOAGULATION | Facility: CLINIC | Age: 73
End: 2023-03-31
Payer: COMMERCIAL

## 2023-03-31 DIAGNOSIS — Z95.4 S/P MITRAL VALVE REPLACEMENT WITH METALLIC VALVE: ICD-10-CM

## 2023-03-31 DIAGNOSIS — R52 PAIN: ICD-10-CM

## 2023-03-31 DIAGNOSIS — I48.0 PAROXYSMAL ATRIAL FIBRILLATION (H): Primary | ICD-10-CM

## 2023-03-31 LAB — INR HOME MONITORING: 1.5 (ref 2.5–3.5)

## 2023-03-31 RX ORDER — OXYCODONE HYDROCHLORIDE 10 MG/1
5 TABLET ORAL EVERY 4 HOURS PRN
Qty: 90 TABLET | Refills: 0 | Status: SHIPPED | OUTPATIENT
Start: 2023-03-31 | End: 2023-05-05

## 2023-03-31 NOTE — PROGRESS NOTES
ANTICOAGULATION MANAGEMENT     Bandar Wells 72 year old male is on warfarin with subtherapeutic INR result. (Goal INR 2.5-3.5)    Recent labs: (last 7 days)     03/31/23  0000   INR 1.5*       ASSESSMENT       Source(s): Chart Review and Patient/Caregiver Call       Warfarin doses taken: Warfarin taken as instructed    Diet: No new diet changes identified    New illness, injury, or hospitalization: patient's wound is being cultured    Medication/supplement changes: None noted    Signs or symptoms of bleeding or clotting: No    Previous INR: Subtherapeutic    Additional findings: Bridging with Enoxaparin until INR >= 2.5         PLAN     Recommended plan for no diet, medication or health factor changes affecting INR     Dosing Instructions: booster dose then Increase your warfarin dose (15.4% change) Continue bridging with Enoxaparin with next INR in 3 days       Summary  As of 3/31/2023    Full warfarin instructions:  3/31: 12 mg; Otherwise 7.5 mg every Mon, Fri; 6 mg all other days   Next INR check:  4/3/2023             Telephone call with Bandar who agrees to plan and repeated back plan correctly  Sent [email or web address] message with dosing and follow up instructions    Patient to recheck with home meter    Education provided:     Please call back if any changes to your diet, medications or how you've been taking warfarin    Plan made with [facility] Pharmacist Batool Steiner, RN  Anticoagulation Clinic  3/31/2023    _______________________________________________________________________     Anticoagulation Episode Summary     Current INR goal:  2.5-3.5   TTR:  30.6 % (1 y)   Target end date:  Indefinite   Send INR reminders to:  [facility] MONIT[facility]    Indications    Paroxysmal atrial fibrillation (H) [I48.0]  S/P mitral valve replacement with metallic valve [Z95.4]           Comments:  [employer]- wants a call every time. Needs to Bridge if below 2.0         Anticoagulation Care Providers     Provider Role  Specialty Phone number    Jin Hicks MD Referring Internal Medicine [phone]

## 2023-03-31 NOTE — PROGRESS NOTES
Chart reviewed with [facility] RN at time of encounter.    Advise 15% dose increase today if NO missed doses reported, and boost to 12mg today, plus continue bridge with INR recheck Monday.      Batool Connelly, PharmD BCACP  Anticoagulation Clinical Pharmacist

## 2023-04-03 ENCOUNTER — ANTICOAGULATION THERAPY VISIT (OUTPATIENT)
Dept: ANTICOAGULATION | Facility: CLINIC | Age: 73
End: 2023-04-03
Payer: COMMERCIAL

## 2023-04-03 DIAGNOSIS — I48.0 PAROXYSMAL ATRIAL FIBRILLATION (H): Primary | ICD-10-CM

## 2023-04-03 DIAGNOSIS — Z95.4 S/P MITRAL VALVE REPLACEMENT WITH METALLIC VALVE: ICD-10-CM

## 2023-04-03 LAB — INR HOME MONITORING: 1.9 (ref 2.5–3.5)

## 2023-04-03 NOTE — PROGRESS NOTES
ANTICOAGULATION MANAGEMENT     Bandar Wells 72 year old male is on warfarin with subtherapeutic INR result. (Goal INR 2.5-3.5)    Recent labs: (last 7 days)     04/03/23  0000   INR 1.9*       ASSESSMENT       Source(s): Chart Review and Patient/Caregiver Call       Warfarin doses taken: Warfarin taken as instructed    Diet: No new diet changes identified    New illness, injury, or hospitalization:  Wound infection    Medication/supplement changes: 4/3/23 Levaquin x 7 Days    Signs or symptoms of bleeding or clotting: No    Previous INR: Subtherapeutic    Additional findings: Patient took last dose of enoxaparin last night, prefers to not start if at all possible         PLAN     Recommended plan for temporary change(s) affecting INR     Dosing Instructions: 4/3/23: 9 mgs, then continue current maintenance dose with next INR  in 3 days    Summary  As of 4/3/2023    Full warfarin instructions:  4/3: 9 mg; Otherwise 7.5 mg every Mon, Fri; 6 mg all other days   Next INR check:  4/6/2023             Telephone call with Bandar who agrees to plan and repeated back plan correctly    Patient to recheck with home meter    Education provided:     Please call back if any changes to your diet, medications or how you've been taking warfarin    Goal range and lab monitoring: goal range and significance of current result and Importance of therapeutic range    Contact [phone]  with any changes, questions or concerns.     Plan made with [facility] Pharmacist Batool Interiano, CLINT  Anticoagulation Clinic  4/3/2023    _______________________________________________________________________     Anticoagulation Episode Summary     Current INR goal:  2.5-3.5   TTR:  29.8 % (1 y)   Target end date:  Indefinite   Send INR reminders to:  ANT[facility]AG HOME MONITOR[facility]    Indications    Paroxysmal atrial fibrillation (H) [I48.0]  S/P mitral valve replacement with metallic valve [Z95.4]           Comments:  [employer]- wants a call every  time. Needs to Bridge if below 2.0         Anticoagulation Care Providers     Provider Role Specialty Phone number    Jin Hicks MD Referring Internal Medicine [phone]

## 2023-04-06 ENCOUNTER — ANTICOAGULATION THERAPY VISIT (OUTPATIENT)
Dept: ANTICOAGULATION | Facility: CLINIC | Age: 73
End: 2023-04-06
Payer: COMMERCIAL

## 2023-04-06 DIAGNOSIS — I48.0 PAROXYSMAL ATRIAL FIBRILLATION (H): Primary | ICD-10-CM

## 2023-04-06 DIAGNOSIS — Z95.4 S/P MITRAL VALVE REPLACEMENT WITH METALLIC VALVE: ICD-10-CM

## 2023-04-06 LAB — INR HOME MONITORING: 2.2 (ref 2.5–3.5)

## 2023-04-06 NOTE — PROGRESS NOTES
ANTICOAGULATION MANAGEMENT     Bandar Wells 72 year old male is on warfarin with subtherapeutic INR result. (Goal INR 2.5-3.5)    Recent labs: (last 7 days)     04/06/23  0000   INR 2.2*       ASSESSMENT       Source(s): Chart Review and Patient/Caregiver Call       Warfarin doses taken: Booster dose(s) recently taken which may be affecting INR    Diet: No new diet changes identified    New illness, injury, or hospitalization: No    Medication/supplement changes: levaquin started on 4-3 which may be increasing INR today    Signs or symptoms of bleeding or clotting: No    Previous INR: Subtherapeutic    Additional findings: None         PLAN     Recommended plan for temporary change(s) affecting INR     Dosing Instructions: Increase your warfarin dose (3.3% change) with next INR in 4 days       Summary  As of 4/6/2023    Full warfarin instructions:  7.5 mg every Mon, Thu, Sat; 6 mg all other days   Next INR check:  4/10/2023             Telephone call with Bandar who verbalizes understanding and agrees to plan    Patient to recheck with home meter    Education provided:     Please call back if any changes to your diet, medications or how you've been taking warfarin    Contact [phone]  with any changes, questions or concerns.     Plan made with [facility] Pharmacist Batool Montes, RN  Anticoagulation Clinic  4/6/2023    _______________________________________________________________________     Anticoagulation Episode Summary     Current INR goal:  2.5-3.5   TTR:  28.9 % (1 y)   Target end date:  Indefinite   Send INR reminders to:  [facility]IT[facility]    Indications    Paroxysmal atrial fibrillation (H) [I48.0]  S/P mitral valve replacement with metallic valve [Z95.4]           Comments:  [employer]- wants a call every time. Needs to Bridge if below 2.0         Anticoagulation Care Providers     Provider Role Specialty Phone number    Jin Hicks MD Referring Internal Medicine [phone]

## 2023-04-10 ENCOUNTER — ANTICOAGULATION THERAPY VISIT (OUTPATIENT)
Dept: ANTICOAGULATION | Facility: CLINIC | Age: 73
End: 2023-04-10
Payer: COMMERCIAL

## 2023-04-10 ENCOUNTER — TELEPHONE (OUTPATIENT)
Dept: INTERNAL MEDICINE | Facility: CLINIC | Age: 73
End: 2023-04-10
Payer: COMMERCIAL

## 2023-04-10 LAB — INR HOME MONITORING: 2.5 (ref 2.5–3.5)

## 2023-04-10 NOTE — PROGRESS NOTES
ANTICOAGULATION MANAGEMENT     Bandar Wells 72 year old male is on warfarin with therapeutic INR result. (Goal INR 2.5-3.5)    Recent labs: (last 7 days)     04/10/23  0000   INR 2.5       ASSESSMENT       Source(s): Chart Review and Patient/Caregiver Call       Warfarin doses taken: Warfarin taken as instructed    Diet: No new diet changes identified    New illness, injury, or hospitalization: No    Medication/supplement changes: None noted, completed Levaquin.    Signs or symptoms of bleeding or clotting: No    Previous INR: Subtherapeutic    Additional findings: Patient prefers to extend testing out to 2 weeks if he is therapeutic next INR.         PLAN     Recommended plan for no diet, medication or health factor changes affecting INR     Dosing Instructions: Continue your current warfarin dose with next INR in 1 week       Summary  As of 4/10/2023    Full warfarin instructions:  7.5 mg every Mon, Thu, Sat; 6 mg all other days   Next INR check:  4/17/2023             Telephone call with Bandar who agrees to plan and repeated back plan correctly    Patient to recheck with home meter    Education provided:     Please call back if any changes to your diet, medications or how you've been taking warfarin    Goal range and lab monitoring: goal range and significance of current result and Importance of therapeutic range    Plan made per [facility] anticoagulation protocol    Yareli Interiano RN  Anticoagulation Clinic  4/10/2023    _______________________________________________________________________     Anticoagulation Episode Summary     Current INR goal:  2.5-3.5   TTR:  28.0 % (1 y)   Target end date:  Indefinite   Send INR reminders to:  [facility]AG [facility]ITOR[facility]    Indications    Paroxysmal atrial fibrillation (H) [I48.0]  S/P mitral valve replacement with metallic valve [Z95.4]           Comments:  [employer]- wants a call every time. Needs to Bridge if below 2.0         Anticoagulation Care Providers     Provider Role  Specialty Phone number    Jin Hicks MD Referring Internal Medicine [phone]

## 2023-04-10 NOTE — TELEPHONE ENCOUNTER
April 10, 2023    [facility] orders was received via fax for Dr. Hicks to sign.  Patient label was attached to paperwork and placed in provider's inbox to be signed.    Germain Hernandez III

## 2023-04-11 NOTE — TELEPHONE ENCOUNTER
April 11, 2023    [facility] orders was picked up from outbox of Dr. Hicks.  Paperwork has been reviewed and is complete.  Per initial initial request, this was sent via fax to [phone].     Germain Hernandez III

## 2023-04-17 ENCOUNTER — ANTICOAGULATION THERAPY VISIT (OUTPATIENT)
Dept: ANTICOAGULATION | Facility: CLINIC | Age: 73
End: 2023-04-17
Payer: COMMERCIAL

## 2023-04-17 LAB — INR HOME MONITORING: 3.3 (ref 2.5–3.5)

## 2023-04-17 NOTE — PROGRESS NOTES
ANTICOAGULATION MANAGEMENT     Bandar Wells 72 year old male is on warfarin with therapeutic INR result. (Goal INR 2.5-3.5)    Recent labs: (last 7 days)     04/17/23  0000   INR 3.3       ASSESSMENT       Source(s): Chart Review and Patient/Caregiver Call       Warfarin doses taken: Warfarin taken as instructed    Diet: No new diet changes identified    Medication/supplement changes: None noted    New illness, injury, or hospitalization: Yes: leg wound healing-antibiotic stopped last week    Signs or symptoms of bleeding or clotting: No    Previous INR: Therapeutic last visit; previously outside of goal range    Additional findings: None         PLAN       Dosing Instructions: Continue your current warfarin dose with next INR in 2 weeks       Summary  As of 4/17/2023    Full warfarin instructions:  7.5 mg every Mon, Thu, Sat; 6 mg all other days   Next INR check:  5/1/2023             Telephone call with Bandar who verbalizes understanding and agrees to plan    Patient to recheck with home meter    Education provided:     Contact [phone]  with any changes, questions or concerns.     Plan made per [facility] anticoagulation protocol    Julieta Muñoz RN  Anticoagulation Clinic  4/17/2023    _______________________________________________________________________     Anticoagulation Episode Summary     Current INR goal:  2.5-3.5   TTR:  28.4 % (1 y)   Target end date:  Indefinite   Send INR reminders to:  [facility]    Indications    Paroxysmal atrial fibrillation (H) [I48.0]  S/P mitral valve replacement with metallic valve [Z95.4]           Comments:  [employer]- wants a call every time. Needs to Bridge if below 2.0         Anticoagulation Care Providers     Provider Role Specialty Phone number    Jin Hicks MD Referring Internal Medicine [phone]

## 2023-04-18 ENCOUNTER — TELEPHONE (OUTPATIENT)
Dept: INFECTIOUS DISEASES | Facility: CLINIC | Age: 73
End: 2023-04-18
Payer: COMMERCIAL

## 2023-04-18 DIAGNOSIS — Z86.79 HX OF BACTERIAL ENDOCARDITIS: ICD-10-CM

## 2023-04-18 NOTE — TELEPHONE ENCOUNTER
Refill request received from [facility] for Penicillin  mg tablet     Last OV: 06/23/2022  Last lab: No labs   Future appt:  No Future Appt     Rx sent for review.

## 2023-04-18 NOTE — TELEPHONE ENCOUNTER
Pt needs a follow up in June for continued refills. Please schedule and then I can process a refill.

## 2023-04-19 ENCOUNTER — MEDICAL CORRESPONDENCE (OUTPATIENT)
Dept: HEALTH INFORMATION MANAGEMENT | Facility: CLINIC | Age: 73
End: 2023-04-19

## 2023-04-19 RX ORDER — PENICILLIN V POTASSIUM 500 MG/1
500 TABLET, FILM COATED ORAL DAILY
Qty: 90 TABLET | Refills: 0 | Status: SHIPPED | OUTPATIENT
Start: 2023-04-19 | End: 2023-01-01

## 2023-04-20 ENCOUNTER — TELEPHONE (OUTPATIENT)
Dept: INTERNAL MEDICINE | Facility: CLINIC | Age: 73
End: 2023-04-20
Payer: COMMERCIAL

## 2023-04-20 NOTE — TELEPHONE ENCOUNTER
Order/Referral Request    Who is requesting: Stephanie Del Rosario    Orders being requested: Verbal Orders:  Re certifying for home care, for wound care    2 times per week for 5 weeks     Reason service is needed/diagnosis: n/a    When are orders needed by: ASAP    Has this been discussed with Provider: Yes    Does patient have a preference on a Group/Provider/Facility? n/a    Does patient have an appointment scheduled?: No    Where to send orders: Call Stephanie at [phone]     would you prefer to receive a phone call?:  Stephanie with [facility] would prefer a phone call     Okay to leave a detailed message?: Yes at Other phone number:  [phone]

## 2023-04-21 DIAGNOSIS — G62.9 POLYNEUROPATHY: Primary | ICD-10-CM

## 2023-04-21 NOTE — TELEPHONE ENCOUNTER
The Home Care/Assisted Living/Nursing Facility is calling regarding an established patient.  Has the patient seen Home Care in the past or is currently residing in Assisted Living or Nursing Facility? No.     Stephanie calling from [facility] requesting the following orders that are NOT within the Home Care, Assisted Living or Nursing Home Eval and Treatment standing order and must be ordered by a Licensed Practitioner.    Preferred Call Back Number: [phone]    Re certifying for home care, for wound care 2 times per week for 5 weeks     Routing to Licensed Practitioner (Provider) to review request and provide approval or recommendation.    Dr. Hicks provided approval for verbal orders.     Left message on specified voicemail with verbal orders. Provided clinic phone number for order follow-up / requested that the orders be faxed to clinic for provider signature.

## 2023-04-22 NOTE — TELEPHONE ENCOUNTER
"Routing refill request to provider for review/approval because:  Drug not on the [facility] refill protocol   Medication is reported/historical    Last Written Prescription Date:  ?  Last Fill Quantity: ?,  # refills: ?   Last office visit provider:  3/21/23     Requested Prescriptions   Pending Prescriptions Disp Refills     B Complex CAPS [Pharmacy Med Name: VITAMIN B COMPLEX CAPSULE Capsule] 100 capsule 0     Sig: TAKE ONE CAPSULE BY MOUTH ONCE DAILY       There is no refill protocol information for this order        folic acid (FOLVITE) 1 MG tablet [Pharmacy Med Name: FOLIC ACID 1 MG TABS** 1 Tablet] 30 tablet 0     Sig: TAKE ONE TABLET BY MOUTH ONCE DAILY.       Vitamin Supplements (Adult) Protocol Passed - 4/21/2023  4:25 PM        Passed - High dose Vitamin D not ordered        Passed - Recent (12 mo) or future (30 days) visit within the authorizing provider's specialty     Patient has had an office visit with the authorizing provider or a provider within the authorizing providers department within the previous 12 mos or has a future within next 30 days. See \"Patient Info\" tab in inbasket, or \"Choose Columns\" in Meds & Orders section of the refill encounter.              Passed - Medication is active on med list             Fifi Mccarthy 04/22/23 4:05 PM  "

## 2023-04-24 ENCOUNTER — OFFICE VISIT (OUTPATIENT)
Dept: INTERNAL MEDICINE | Facility: CLINIC | Age: 73
End: 2023-04-24
Payer: COMMERCIAL

## 2023-04-24 VITALS
BODY MASS INDEX: 24.64 KG/M2 | OXYGEN SATURATION: 97 % | RESPIRATION RATE: 16 BRPM | HEART RATE: 76 BPM | DIASTOLIC BLOOD PRESSURE: 46 MMHG | SYSTOLIC BLOOD PRESSURE: 123 MMHG | WEIGHT: 157 LBS | HEIGHT: 67 IN | TEMPERATURE: 97.3 F

## 2023-04-24 DIAGNOSIS — N18.4 CKD (CHRONIC KIDNEY DISEASE) STAGE 4, GFR 15-29 ML/MIN (H): Primary | ICD-10-CM

## 2023-04-24 DIAGNOSIS — F33.1 MODERATE EPISODE OF RECURRENT MAJOR DEPRESSIVE DISORDER (H): ICD-10-CM

## 2023-04-24 DIAGNOSIS — L97.311 VENOUS STASIS ULCER OF RIGHT ANKLE LIMITED TO BREAKDOWN OF SKIN WITHOUT VARICOSE VEINS (H): ICD-10-CM

## 2023-04-24 DIAGNOSIS — M1A.00X0 IDIOPATHIC CHRONIC GOUT WITHOUT TOPHUS, UNSPECIFIED SITE: ICD-10-CM

## 2023-04-24 DIAGNOSIS — I50.42 CHRONIC COMBINED SYSTOLIC AND DIASTOLIC HEART FAILURE (H): ICD-10-CM

## 2023-04-24 DIAGNOSIS — I87.2 VENOUS STASIS ULCER OF RIGHT ANKLE LIMITED TO BREAKDOWN OF SKIN WITHOUT VARICOSE VEINS (H): ICD-10-CM

## 2023-04-24 PROBLEM — M54.2 NECK PAIN: Status: RESOLVED | Noted: 2022-02-07 | Resolved: 2023-04-24

## 2023-04-24 PROBLEM — D63.1 ANEMIA OF CHRONIC RENAL FAILURE, STAGE 4 (SEVERE) (H): Status: RESOLVED | Noted: 2020-08-18 | Resolved: 2023-04-24

## 2023-04-24 PROCEDURE — 99214 OFFICE O/P EST MOD 30 MIN: CPT | Performed by: INTERNAL MEDICINE

## 2023-04-24 RX ORDER — VITAMIN B COMPLEX
CAPSULE ORAL
Qty: 100 CAPSULE | Refills: 0 | Status: SHIPPED | OUTPATIENT
Start: 2023-04-24

## 2023-04-24 RX ORDER — FOLIC ACID 1 MG/1
TABLET ORAL
Qty: 30 TABLET | Refills: 0 | Status: SHIPPED | OUTPATIENT
Start: 2023-04-24 | End: 2023-05-25

## 2023-04-24 ASSESSMENT — PATIENT HEALTH QUESTIONNAIRE - PHQ9
SUM OF ALL RESPONSES TO PHQ QUESTIONS 1-9: 1
SUM OF ALL RESPONSES TO PHQ QUESTIONS 1-9: 1
10. IF YOU CHECKED OFF ANY PROBLEMS, HOW DIFFICULT HAVE THESE PROBLEMS MADE IT FOR YOU TO DO YOUR WORK, TAKE CARE OF THINGS AT HOME, OR GET ALONG WITH OTHER PEOPLE: NOT DIFFICULT AT ALL

## 2023-04-24 ASSESSMENT — PAIN SCALES - GENERAL: PAINLEVEL: MODERATE PAIN (4)

## 2023-04-24 NOTE — Clinical Note
Hi Dr. Nguyen,  I'm not sure how often you check message in  [place], but how would you feel about starting Bandar on losartan 25mg daily?  I could certainly follow BP and labs here.  Epifanio Hicks

## 2023-04-24 NOTE — PROGRESS NOTES
"  Office Visit - Follow Up   Bandar Wells   72 year old male    Date of Visit: 4/24/2023    Chief Complaint   Patient presents with     RECHECK        Assessment and Plan   1. CKD (chronic kidney disease) stage 4, GFR 15-29 ml/min (H)  I have messaged his nephrologist about consideration of adding losartan for renal protection and uricosuric effect.    2. Chronic combined systolic and diastolic heart failure (H)  Appears euvolemic continue same    3. Venous stasis ulcer of right ankle limited to breakdown of skin without varicose veins (H)  Continue with home wound care and wound clinic cares    4. Idiopathic chronic gout without tophus, unspecified site  Continue with Uloric, losartan as above consideration    5. Moderate episode of recurrent major depressive disorder (H)  Improved, continue mirtazapine    Return in about 4 weeks (around 5/22/2023) for Follow up.     History of Present Illness   This 72 year old man comes in for follow-up.  Overall his mood, sleep and appetite have improved with the addition of mirtazapine.  Recently met with his nephrologist and getting some education on dialysis.  We did review his medications and nephrology notes, not currently on losartan-ARB or ACE inhibitor.  Continues with home wound care and wound clinic for the right lower extremity wounds.       Physical Exam   General Appearance:   No acute distress    /46 (BP Location: Left arm, Patient Position: Sitting, Cuff Size: Adult Small)   Pulse 76   Temp 97.3  F (36.3  C) (Tympanic)   Resp 16   Ht 1.702 m (5' 7\")   Wt 71.2 kg (157 lb)   SpO2 97%   BMI 24.59 kg/m           Additional Information   Current Outpatient Medications   Medication Sig Dispense Refill     acetaminophen (TYLENOL) 500 MG tablet Take 1,000 mg by mouth 3 times daily       Cholecalciferol (VITAMIN D3) 25 MCG (1000 UT) CAPS Take 1,000 Units by mouth daily       enoxaparin ANTICOAGULANT (LOVENOX) 80 MG/0.8ML syringe Inject 0.7 mLs (70 mg) " Subcutaneous every 24 hours 4 mL 1     febuxostat (ULORIC) 40 MG TABS tablet Take 1 tablet (40 mg) by mouth daily       folic acid (FOLVITE) 1 MG tablet Take 1 mg by mouth daily       lactobacillus rhamnosus (GG) (CULTURELL) capsule Take 1 capsule by mouth 2 times daily       mirtazapine (REMERON) 15 MG tablet Take 1 tablet (15 mg) by mouth At Bedtime 90 tablet 4     oxyCODONE IR (ROXICODONE) 10 MG tablet Take 0.5 tablets (5 mg) by mouth every 4 hours as needed for severe pain (7-10) 90 tablet 0     penicillin V (VEETID) 500 MG tablet Take 1 tablet (500 mg) by mouth daily Hx endocarditis 90 tablet 0     polyethylene glycol (MIRALAX) 17 g packet Take 17 g by mouth daily as needed for constipation 30 packet 0     potassium chloride (KLOR-CON) 20 MEQ packet Take 40 mEq by mouth 2 times daily       sennosides (SENOKOT) 8.6 MG tablet Take 1-4 tablets by mouth 2 times daily       tacrolimus (PROTOPIC) 0.1 % external ointment APPLY THIN LAYER TO SCALP ONCE DAILY       torsemide (DEMADEX) 100 MG tablet Take 100 mg by mouth 2 times daily       triamcinolone (KENALOG) 0.1 % external ointment Apply topically 2 times daily 80 g 1     vitamin B complex with vitamin C (STRESS TAB) tablet Take 1 tablet by mouth daily       warfarin ANTICOAGULANT (COUMADIN) 3 MG tablet Take 6 mg daily or as directed by [facility] 190 tablet 1     Allergies   Allergen Reactions     Allopurinol Rash     Clindamycin Rash       Time:      Jin Hicks MD  Answers for HPI/ROS submitted by the patient on 4/24/2023  If you checked off any problems, how difficult have these problems made it for you to do your work, take care of things at home, or get along with other people?: Not difficult at all  PHQ9 TOTAL SCORE: 1  What is the reason for your visit today? : Antidepressant talk  How many servings of fruits and vegetables do you eat daily?: 2-3  On average, how many sweetened beverages do you drink each day (Examples: soda, juice, sweet tea, etc.  Do  NOT count diet or artificially sweetened beverages)?: 2  How many minutes a day do you exercise enough to make your heart beat faster?: 9 or less  How many days a week do you exercise enough to make your heart beat faster?: 3 or less  How many days per week do you miss taking your medication?: 0

## 2023-04-28 ENCOUNTER — OFFICE VISIT (OUTPATIENT)
Dept: NEUROLOGY | Facility: CLINIC | Age: 73
End: 2023-04-28
Attending: INTERNAL MEDICINE
Payer: COMMERCIAL

## 2023-04-28 DIAGNOSIS — G62.9 POLYNEUROPATHY: ICD-10-CM

## 2023-04-28 PROCEDURE — 95886 MUSC TEST DONE W/N TEST COMP: CPT | Mod: LT | Performed by: PSYCHIATRY & NEUROLOGY

## 2023-04-28 PROCEDURE — 95908 NRV CNDJ TST 3-4 STUDIES: CPT | Performed by: PSYCHIATRY & NEUROLOGY

## 2023-04-28 NOTE — LETTER
4/28/2023         RE: Bandar Wells  [address] [place] [address]        Dear Colleague,    Thank you for referring your patient, Bandar Wells, to the [facility] [place]. Please see a copy of my visit note below.    See procedure note for EMG report      Again, thank you for allowing me to participate in the care of your patient.        Sincerely,        Andrea Baltazar MD

## 2023-04-28 NOTE — PROCEDURES
ELECTROMYOGRAPHY (EMG) REPORT       [facility] NEUROLOGY [place] [address] [place] [address]  Tel: [phone]  Fax: [phone]  [email or web address]     Bandar Wells,  1950, MRN [id number]  PCP: Jin Hicks  Date: 2023     Principal Diagnosis: Polyneuropathy     Height: 5 feet 9 inch  Reason for referral: Evaluate left lower. c/o swelling, tingling in both legs/feet for a year. Currently in radiation for Skin Cancer. Redness, edema in left leg. Has a wound on the right leg & pitting edema.       Motor NCS      Nerve / Sites Lat Amp Dist Raúl    ms mV cm m/s   L Peroneal - EDB      Ankle 3.91 1.6 8       Fib head 11.93 1.1 31 39      Pop fossa 14.95 1.1 11 36   L Tibial - AH      Ankle 4.17 3.7 8       Pop fossa 14.64 4.0 38 36       F  Wave      Nerve Fmin    ms   L Tibial - AH 60.00       Sensory NCS      Nerve / Sites Onset Lat Pk Lat Amp.2-3 Dist Raúl    ms ms  V cm m/s   L Sural - Ankle (Calf)      Calf NR NR NR 14 NR   L Superficial peroneal - Ankle      Lat leg NR NR NR 12 NR       EMG Summary Table     Spontaneous MUAP Rcmt Note   Muscle Fib PSW Fasc IA # Amp Dur PPP Rate Type   L. Gluteus medius None None None N N N N N N N   L. Gluteus lexis None None None N N N N N N N   L. Upper paraspinal None None None Incr N N N N N N   L. Middle paraspinal None None None Incr N N N N N N   L. Lower paraspinal None None None N N N N N N N   L. Adductor emanuel None None None N N N N N N N   L. Quadriceps None None None N N N N N N N        Summary: Nerve conduction and EMG study of left lower extremity shows:  1. Normal left peroneal, tibial distal motor latencies but low amplitude and slow conduction velocities  2. Delayed left tibial F latency  3. Absent left sural and superficial peroneal SNAP  4. Needle exam showed changes as above    Impression:   This is a abnormal nerve conduction and EMG study of left lower extremity that suggests length dependent, mixed  sensorimotor predominantly axonal polyneuropathy.      Andrea Baltazar MD  [facility]  (Formerly, [facility]      This note was dictated using voice recognition software.  Any grammatical or context distortions are unintentional and inherent to the software.

## 2023-05-01 ENCOUNTER — TELEPHONE (OUTPATIENT)
Dept: INTERNAL MEDICINE | Facility: CLINIC | Age: 73
End: 2023-05-01
Payer: COMMERCIAL

## 2023-05-01 DIAGNOSIS — Z53.9 DIAGNOSIS NOT YET DEFINED: Primary | ICD-10-CM

## 2023-05-01 PROCEDURE — G0179 MD RECERTIFICATION HHA PT: HCPCS | Performed by: INTERNAL MEDICINE

## 2023-05-01 NOTE — TELEPHONE ENCOUNTER
May 1, 2023    Home health orders was received via fax for Dr. Hicks to sign.  Patient label was attached to paperwork and placed in provider's inbox to be signed.    Corin Samayoa

## 2023-05-02 ENCOUNTER — ANTICOAGULATION THERAPY VISIT (OUTPATIENT)
Dept: ANTICOAGULATION | Facility: CLINIC | Age: 73
End: 2023-05-02

## 2023-05-02 ENCOUNTER — TELEPHONE (OUTPATIENT)
Dept: ANTICOAGULATION | Facility: CLINIC | Age: 73
End: 2023-05-02

## 2023-05-02 DIAGNOSIS — Z95.4 S/P MITRAL VALVE REPLACEMENT WITH METALLIC VALVE: ICD-10-CM

## 2023-05-02 DIAGNOSIS — I48.0 PAROXYSMAL ATRIAL FIBRILLATION (H): Primary | ICD-10-CM

## 2023-05-02 LAB — INR HOME MONITORING: 2.8 (ref 2.5–3.5)

## 2023-05-02 NOTE — TELEPHONE ENCOUNTER
May 2, 2023    Home health orders was picked up from outbox of Dr. Hicks.  Paperwork has been reviewed and is complete.  Per initial initial request, this was sent via fax to [phone].     Corin Samayoa

## 2023-05-02 NOTE — TELEPHONE ENCOUNTER
ANTICOAGULATION     Bandar Wells is overdue for INR check.      Spoke with Bandar instructed to test INR with home meter and call results to home monitoring company as soon as possible.    Brenda Steiner RN

## 2023-05-02 NOTE — PROGRESS NOTES
ANTICOAGULATION MANAGEMENT     Bandar Wells 72 year old male is on warfarin with therapeutic INR result. (Goal INR 2.5-3.5)    Recent labs: (last 7 days)     05/02/23  0000   INR 2.8       ASSESSMENT       Source(s): Chart Review and Patient/Caregiver Call       Warfarin doses taken: Warfarin taken as instructed    Diet: No new diet changes identified    Medication/supplement changes: None noted    New illness, injury, or hospitalization: No    Signs or symptoms of bleeding or clotting: No    Previous result: Therapeutic last 2(+) visits    Additional findings: None         PLAN     Recommended plan for no diet, medication or health factor changes affecting INR     Dosing Instructions: Continue your current warfarin dose with next INR in 2 weeks       Summary  As of 5/2/2023    Full warfarin instructions:  7.5 mg every Mon, Thu, Sat; 6 mg all other days   Next INR check:  5/16/2023             Telephone call with Bandar who verbalizes understanding and agrees to plan    Patient to recheck with home meter    Education provided:     Contact [phone]  with any changes, questions or concerns.     Plan made per [facility] anticoagulation protocol    Galilea Hernandez RN  Anticoagulation Clinic  5/2/2023    _______________________________________________________________________     Anticoagulation Episode Summary     Current INR goal:  2.5-3.5   TTR:  32.5 % (1 y)   Target end date:  Indefinite   Send INR reminders to:  ANTICOAG HOME MONITORING    Indications    Paroxysmal atrial fibrillation (H) [I48.0]  S/P mitral valve replacement with metallic valve [Z95.4]           Comments:  [employer]- wants a call every time. Needs to Bridge if below 2.0         Anticoagulation Care Providers     Provider Role Specialty Phone number    Jin Hicks MD Referring Internal Medicine [phone]

## 2023-05-04 DIAGNOSIS — R52 PAIN: ICD-10-CM

## 2023-05-05 ENCOUNTER — TELEPHONE (OUTPATIENT)
Dept: INTERNAL MEDICINE | Facility: CLINIC | Age: 73
End: 2023-05-05
Payer: COMMERCIAL

## 2023-05-05 RX ORDER — OXYCODONE HYDROCHLORIDE 10 MG/1
TABLET ORAL
Qty: 90 TABLET | Refills: 0 | Status: SHIPPED | OUTPATIENT
Start: 2023-05-05 | End: 2023-06-01

## 2023-05-05 NOTE — TELEPHONE ENCOUNTER
May 5, 2023    Home health orders was received via fax for Dr. Hicks to sign.  Patient label was attached to paperwork and placed in provider's inbox to be signed.    Corin Samayoa

## 2023-05-08 NOTE — TELEPHONE ENCOUNTER
May 8, 2023    Home health orders was picked up from outbox of Dr. Hicks.  Paperwork has been reviewed and is complete.  Per initial initial request, this was sent via fax to [phone].     Germain Hernandez III

## 2023-05-12 ENCOUNTER — TELEPHONE (OUTPATIENT)
Dept: INTERNAL MEDICINE | Facility: CLINIC | Age: 73
End: 2023-05-12
Payer: COMMERCIAL

## 2023-05-12 ENCOUNTER — TELEPHONE (OUTPATIENT)
Dept: ANTICOAGULATION | Facility: CLINIC | Age: 73
End: 2023-05-12
Payer: COMMERCIAL

## 2023-05-12 DIAGNOSIS — L02.419 CELLULITIS AND ABSCESS OF LEG: ICD-10-CM

## 2023-05-12 DIAGNOSIS — Z95.4 S/P MITRAL VALVE REPLACEMENT WITH METALLIC VALVE: ICD-10-CM

## 2023-05-12 DIAGNOSIS — L03.119 CELLULITIS AND ABSCESS OF LEG: ICD-10-CM

## 2023-05-12 DIAGNOSIS — I48.0 PAROXYSMAL ATRIAL FIBRILLATION (H): Primary | ICD-10-CM

## 2023-05-12 RX ORDER — CIPROFLOXACIN 250 MG/1
250 TABLET, FILM COATED ORAL 2 TIMES DAILY
Qty: 14 TABLET | Refills: 0 | Status: SHIPPED | OUTPATIENT
Start: 2023-05-12 | End: 2023-06-01

## 2023-05-12 RX ORDER — SULFAMETHOXAZOLE/TRIMETHOPRIM 800-160 MG
0.5 TABLET ORAL 2 TIMES DAILY
Qty: 7 TABLET | Refills: 0 | Status: SHIPPED | OUTPATIENT
Start: 2023-05-12 | End: 2023-06-01

## 2023-05-12 NOTE — TELEPHONE ENCOUNTER
ANTICOAGULATION  MANAGEMENT     Interacting Medication Review    Interacting medication(s): Ciprofloxacin (Cipro) and Sulfamethoxazole-Trimethoprim (Bactrim) with warfarin.    Duration: Cipro and Bactrim x 7 days (5/12/23 to 5/18/23)    Indication: leg wound infection     New medication?: Yes, interaction may increase INR and risk of bleeding. With Sulfamethoxazole-Trimethoprim, [facility] protocol Appendix G recommends empiric reduction of warfarin dose in therapeutic/supratherapeutic patients.        PLAN     Temporarily adjust warfarin dose during interaction (13% change) with next INR in 4 days        Summary  As of 5/12/2023    Full warfarin instructions:  5/13: 6 mg; Otherwise 4.5 mg every Mon; 6 mg all other days   Next INR check:  5/16/2023             Telephone call with [facility] care nurse who instructed [facility] to call Bandar with dosing instructions. Spoke to Bandar, education provided. Dosing instructions provided as above and he will check INR on 5/16/23 as previously planned.     Daily doses modified on anticoagulation calendar for interaction <= 7 days    Plan made with [facility] Pharmacist Batool La RN  Anticoagulation Clinic

## 2023-05-12 NOTE — TELEPHONE ENCOUNTER
Spoke with Stephanie from [facility] to gather more information. Stephanie states she was seeing the patient today for cares and wound dressings and the patient's right leg wound is getting bad. States there is a bad odor when she unwrapped it, the wound looks like it is full of slough and greenish. States about 2.5-3 inches below the wound is dark pink. Stephanie is hoping the patient can get some antibiotics rather than come in for a visit. States he was seen a few weeks ago by Dr Hicks and is hoping that he can avoid another visit.   Rhofade Counseling: Rhofade is a topical medication which can decrease superficial blood flow where applied. Side effects are uncommon and include stinging, redness and allergic reactions.

## 2023-05-12 NOTE — TELEPHONE ENCOUNTER
I sent in renally dosed Bactrim and Cipro to his pharmacy.  He needs to have his INR monitored more closely while on these antibiotics and I really think he needs to be seen by me or the wound clinic for additional evaluation.

## 2023-05-12 NOTE — TELEPHONE ENCOUNTER
Spoke with Stephanie from [facility] and relayed information below from Dr Hicks. Stephanie verbalized understanding and states the patient does have an appointment next week with the wound clinic. States she will also call the patient and have him  the antibiotics. States his son is staying with him so he can help monitor the patient.

## 2023-05-12 NOTE — TELEPHONE ENCOUNTER
Order/Referral Request    Who is requesting: [facility]    Orders being requested:   Pt has a wound infection - Right lower extremity    Needing a antibiotic    Pharm:  [facility]    Please advise    Reason service is needed/diagnosis: n/a    When are orders needed by: n/a    Has this been discussed with Provider: No    Does patient have a preference on a Group/Provider/Facility? n/a    Does patient have an appointment scheduled?: No    Where to send orders: N/A    Could we send this information to you in [employer] or would you prefer to receive a phone call?:   No preference   Okay to leave a detailed message?: Yes at Other phone number:  [phone]*

## 2023-05-16 ENCOUNTER — ANTICOAGULATION THERAPY VISIT (OUTPATIENT)
Dept: ANTICOAGULATION | Facility: CLINIC | Age: 73
End: 2023-05-16
Payer: COMMERCIAL

## 2023-05-16 DIAGNOSIS — Z95.4 S/P MITRAL VALVE REPLACEMENT WITH METALLIC VALVE: ICD-10-CM

## 2023-05-16 DIAGNOSIS — I48.0 PAROXYSMAL ATRIAL FIBRILLATION (H): Primary | ICD-10-CM

## 2023-05-16 LAB — INR HOME MONITORING: 3.8 (ref 2.5–3.5)

## 2023-05-16 NOTE — PROGRESS NOTES
ANTICOAGULATION MANAGEMENT     Bandar Wells 72 year old male is on warfarin with supratherapeutic INR result. (Goal INR 2.5-3.5)    Recent labs: (last 7 days)     05/16/23  0000   INR 3.8*       ASSESSMENT       Source(s): Chart Review and Patient/Caregiver Call       Warfarin doses taken: Warfarin taken as instructed    Diet: No new diet changes identified    Medication/supplement changes: Continues to be on Bactrim and cipro for a few more days    New illness, injury, or hospitalization: No    Signs or symptoms of bleeding or clotting: No    Previous result: Therapeutic last 2(+) visits    Additional findings: None         PLAN     Recommended plan for temporary change(s) affecting INR     Dosing Instructions: 4.5 mg Tues/Wed and 7.5 mg Thursday with next INR in 3 days       Summary  As of 5/16/2023    Full warfarin instructions:  5/16: 4.5 mg; 5/17: 4.5 mg; Otherwise 7.5 mg every Mon, Thu, Sat; 6 mg all other days   Next INR check:  5/19/2023             Telephone call with Bandar who verbalizes understanding and agrees to plan    Patient to recheck with home meter    Education provided:     Interaction IS anticipated between warfarin and cipro and Bactrim    Plan made with [facility] Pharmacist Batool Steiner, RN  Anticoagulation Clinic  5/16/2023    _______________________________________________________________________     Anticoagulation Episode Summary     Current INR goal:  2.5-3.5   TTR:  35.1 % (1 y)   Target end date:  Indefinite   Send INR reminders to:  ANTICOAG HOME MONITORING    Indications    Paroxysmal atrial fibrillation (H) [I48.0]  S/P mitral valve replacement with metallic valve [Z95.4]           Comments:  [employer]- wants a call every time. Needs to Bridge if below 2.0         Anticoagulation Care Providers     Provider Role Specialty Phone number    Jin Hicks MD Referring Internal Medicine [phone]

## 2023-05-19 ENCOUNTER — ANTICOAGULATION THERAPY VISIT (OUTPATIENT)
Dept: ANTICOAGULATION | Facility: CLINIC | Age: 73
End: 2023-05-19
Payer: COMMERCIAL

## 2023-05-19 LAB — INR HOME MONITORING: 3.2 (ref 2.5–3.5)

## 2023-05-19 NOTE — PROGRESS NOTES
ANTICOAGULATION MANAGEMENT     Bandar Wells 72 year old male is on warfarin with therapeutic INR result. (Goal INR 2.5-3.5)    Recent labs: (last 7 days)     05/19/23  0000   INR 3.2       ASSESSMENT       Source(s): Chart Review and Patient/Caregiver Call       Warfarin doses taken: Warfarin taken as instructed    Diet: No new diet changes identified    Medication/supplement changes: Bactrim finished    New illness, injury, or hospitalization: Yes: bilateral leg edema    Signs or symptoms of bleeding or clotting: No    Previous result: Supratherapeutic    Additional findings: Pt reports that home care is coming on Monday 5/22/23 and can check an INR again then. Pt states that he just used his last strip for the meter. Pt ordered more but they may not arrive or 1-2 weeks.         PLAN       Dosing Instructions: Continue your current warfarin dose with next INR in 3 days       Summary  As of 5/19/2023    Full warfarin instructions:  7.5 mg every Mon, Thu, Sat; 6 mg all other days   Next INR check:  5/22/2023             Telephone call with Bandar who verbalizes understanding and agrees to plan    Patient to recheck with home meter    Education provided:     Please call back if any changes to your diet, medications or how you've been taking warfarin    Contact [phone]  with any changes, questions or concerns.     Plan made per [facility] anticoagulation protocol    Julieta Muñoz RN  Anticoagulation Clinic  5/19/2023    _______________________________________________________________________     Anticoagulation Episode Summary     Current INR goal:  2.5-3.5   TTR:  35.5 % (1 y)   Target end date:  Indefinite   Send INR reminders to:  ANTICOAG HOME MONITORING    Indications    Paroxysmal atrial fibrillation (H) [I48.0]  S/P mitral valve replacement with metallic valve [Z95.4]           Comments:  [employer]- wants a call every time. Needs to Bridge if below 2.0         Anticoagulation Care Providers     Provider Role  Specialty Phone number    Jin Hicks MD Referring Internal Medicine [phone]

## 2023-05-22 ENCOUNTER — ANTICOAGULATION THERAPY VISIT (OUTPATIENT)
Dept: ANTICOAGULATION | Facility: CLINIC | Age: 73
End: 2023-05-22
Payer: COMMERCIAL

## 2023-05-22 LAB — INR HOME MONITORING: 2.7 (ref 2.5–3.5)

## 2023-05-22 NOTE — PROGRESS NOTES
ANTICOAGULATION MANAGEMENT     Bandar Wells 72 year old male is on warfarin with therapeutic INR result. (Goal INR 2.5-3.5)    Recent labs: (last 7 days)     05/22/23  0000   INR 2.7       ASSESSMENT       Source(s): Chart Review and Patient/Caregiver Call       Warfarin doses taken: Warfarin taken as instructed    Diet: No new diet changes identified    Medication/supplement changes: None noted    New illness, injury, or hospitalization: No, chronic leg wounds healing slowly    Signs or symptoms of bleeding or clotting: Yes: has epistaxis which stops quickly 2-3 times a week when he clears nose..    Previous result: Therapeutic last visit; previously outside of goal range    Additional findings: None         PLAN     Recommended plan for ongoing change(s) affecting INR     Dosing Instructions: Continue your current warfarin dose with next INR in 2 weeks       Summary  As of 5/22/2023    Full warfarin instructions:  7.5 mg every Mon, Thu, Sat; 6 mg all other days   Next INR check:  6/5/2023             Telephone call with Bandar who agrees to plan and repeated back plan correctly    Patient to recheck with home meter    Education provided:     Please call back if any changes to your diet, medications or how you've been taking warfarin    Goal range and lab monitoring: goal range and significance of current result    Contact [phone]  with any changes, questions or concerns.     Plan made per [facility] anticoagulation protocol    Yareli Interiano RN  Anticoagulation Clinic  5/22/2023    _______________________________________________________________________     Anticoagulation Episode Summary     Current INR goal:  2.5-3.5   TTR:  36.3 % (1 y)   Target end date:  Indefinite   Send INR reminders to:  ANTICOAG HOME MONITORING    Indications    Paroxysmal atrial fibrillation (H) [I48.0]  S/P mitral valve replacement with metallic valve [Z95.4]           Comments:  [employer]- wants a call every time. Needs to Bridge if below  2.0         Anticoagulation Care Providers     Provider Role Specialty Phone number    Jin Hicks MD Referring Internal Medicine [phone]

## 2023-05-24 DIAGNOSIS — G62.9 POLYNEUROPATHY: ICD-10-CM

## 2023-05-25 RX ORDER — FOLIC ACID 1 MG/1
TABLET ORAL
Qty: 30 TABLET | Refills: 0 | Status: SHIPPED | OUTPATIENT
Start: 2023-05-25

## 2023-05-30 ENCOUNTER — TELEPHONE (OUTPATIENT)
Dept: INTERNAL MEDICINE | Facility: CLINIC | Age: 73
End: 2023-05-30
Payer: COMMERCIAL

## 2023-05-30 NOTE — TELEPHONE ENCOUNTER
May 30, 2023    Home health orders was received via fax for Dr. Hicks to sign.  Patient label was attached to paperwork and placed in provider's inbox to be signed.    Jackelyn Zabala

## 2023-05-31 NOTE — TELEPHONE ENCOUNTER
May 31, 2023    Home health orders was picked up from outbox of Dr. Hicks.  Paperwork has been reviewed and is complete.  Per initial initial request, this was sent via fax to [phone].     Pam J. Behr

## 2023-06-01 ENCOUNTER — OFFICE VISIT (OUTPATIENT)
Dept: PALLIATIVE MEDICINE | Facility: OTHER | Age: 73
End: 2023-06-01
Payer: COMMERCIAL

## 2023-06-01 VITALS
DIASTOLIC BLOOD PRESSURE: 56 MMHG | BODY MASS INDEX: 23.96 KG/M2 | OXYGEN SATURATION: 95 % | HEART RATE: 69 BPM | SYSTOLIC BLOOD PRESSURE: 115 MMHG | WEIGHT: 153 LBS

## 2023-06-01 DIAGNOSIS — M1A.00X0 IDIOPATHIC CHRONIC GOUT WITHOUT TOPHUS, UNSPECIFIED SITE: Primary | ICD-10-CM

## 2023-06-01 DIAGNOSIS — R52 PAIN: ICD-10-CM

## 2023-06-01 PROCEDURE — G0463 HOSPITAL OUTPT CLINIC VISIT: HCPCS

## 2023-06-01 PROCEDURE — 99213 OFFICE O/P EST LOW 20 MIN: CPT | Performed by: ANESTHESIOLOGY

## 2023-06-01 RX ORDER — OXYCODONE HYDROCHLORIDE 10 MG/1
TABLET ORAL
Qty: 90 TABLET | Refills: 0 | Status: SHIPPED | OUTPATIENT
Start: 2023-06-01 | End: 2023-07-05

## 2023-06-01 ASSESSMENT — PAIN SCALES - GENERAL: PAINLEVEL: MILD PAIN (2)

## 2023-06-01 NOTE — PROGRESS NOTES
Patient brought in Oxcarbazepine to be destroyed. I counted 42.5 pills and destroyed the medication per instructions on the [employer] drug deactivation system and disposed of the bag in the garbage in the room.

## 2023-06-01 NOTE — PROGRESS NOTES
[facility] - Office Visit    ASSESSMENT & PLAN     There are no diagnoses linked to this encounter.    Patient Instructions     [facility] Number:  [phone]    Call with any questions about your care and for scheduling assistance.     Calls are returned Monday through Friday between 8 AM and 4:30 PM. We usually get back to you within 2 business days depending on the issue/request.    If we are prescribing your medications:    For opioid medication refills, call the clinic or send a DeskActive message 7 days in advance.  Please include:    Name of requested medication    Name of the pharmacy.    For non-opioid medications, call your pharmacy directly to request a refill. Please allow 3-4 days to be processed.     Per [place] State Law:    All controlled substance prescriptions must be filled within 30 days of being written.      For those controlled substances allowing refills, pickup must occur within 30 days of last fill.      We believe regular attendance is key to your success in our program!      Any time you are unable to keep your appointment we ask that you call us at least 24 hours in advance to cancel.This will allow us to offer the appointment time to another patient.     Multiple missed appointments may lead to dismissal from the clinic.     PLAN:    Continue with the mirtazapine for primary care doctor.    Continue with the wound clinic.    Discussed the use of the grounding mat.    Continue with medical cannabis preparations.    Follow-up with Dr. Shankar in 9 months for monitoring from medical cannabis            -----  LALO SHANKAR MD  [facility]       SUBJECTIVE      Bandar Wells is a 72 year old year old male who presents to clinic today for the following:     Follow-up for gout, ulcer on ankle, cervical dystonia.    Reviews he is doing better.  He is in less pain.  Treatment is a part to sleeping better and  appetite better with mirtazapine.    The wound seem to be healing better.  He is consistently going to the wound clinic.  He has been able to decrease his oxycodone to 10 mg a day.    He has been going to the medical cannabis using some Gummies with CBD equal to THC may be more helpful for sleep and pain.    He did obtain the grounding mat, unclear if there is a correlation with the wound healing.    Feels in general acceptable level for where the pain is presently.      Current Outpatient Medications:      acetaminophen (TYLENOL) 500 MG tablet, Take 1,000 mg by mouth 3 times daily, Disp: , Rfl:      B Complex CAPS, TAKE ONE CAPSULE BY MOUTH ONCE DAILY, Disp: 100 capsule, Rfl: 0     Cholecalciferol (VITAMIN D3) 25 MCG (1000 UT) CAPS, Take 1,000 Units by mouth daily, Disp: , Rfl:      febuxostat (ULORIC) 40 MG TABS tablet, Take 1 tablet (40 mg) by mouth daily, Disp: , Rfl:      folic acid (FOLVITE) 1 MG tablet, TAKE ONE TABLET BY MOUTH ONCE DAILY., Disp: 30 tablet, Rfl: 0     lactobacillus rhamnosus (GG) (CULTURELL) capsule, Take 1 capsule by mouth 2 times daily, Disp: , Rfl:      mirtazapine (REMERON) 15 MG tablet, Take 1 tablet (15 mg) by mouth At Bedtime, Disp: 90 tablet, Rfl: 4     penicillin V (VEETID) 500 MG tablet, Take 1 tablet (500 mg) by mouth daily Hx endocarditis, Disp: 90 tablet, Rfl: 0     polyethylene glycol (MIRALAX) 17 g packet, Take 17 g by mouth daily as needed for constipation, Disp: 30 packet, Rfl: 0     potassium chloride (KLOR-CON) 20 MEQ packet, Take 40 mEq by mouth 2 times daily, Disp: , Rfl:      sennosides (SENOKOT) 8.6 MG tablet, Take 1-4 tablets by mouth 2 times daily, Disp: , Rfl:      tacrolimus (PROTOPIC) 0.1 % external ointment, APPLY THIN LAYER TO SCALP ONCE DAILY, Disp: , Rfl:      torsemide (DEMADEX) 100 MG tablet, Take 100 mg by mouth 2 times daily, Disp: , Rfl:      triamcinolone (KENALOG) 0.1 % external ointment, Apply topically 2 times daily, Disp: 80 g, Rfl: 1     vitamin B  complex with vitamin C (STRESS TAB) tablet, Take 1 tablet by mouth daily, Disp: , Rfl:      warfarin ANTICOAGULANT (COUMADIN) 3 MG tablet, Take 6 mg daily or as directed by [facility] clinic, Disp: 190 tablet, Rfl: 1     oxyCODONE IR (ROXICODONE) 10 MG tablet, TAKE A HALF TABLET (5 MG) BY MOUTH EVERY 4 HOURS AS NEEDED FOR SEVERE PAIN (7-10), Disp: 90 tablet, Rfl: 0      Review of Systems   General, psych, musculoskeletal, bowels and bladder otherwise normal other than above.          OBJECTIVE   /56   Pulse 69   Wt 69.4 kg (153 lb)   SpO2 95%   BMI 23.96 kg/m        Physical Exam  General: Alert, clear sensorium.  Initially supporting his head with his left arm no respiratory distress.  Cardiovascular: Normal rate  Lungs: Pulmonary effort is normal, speaking in full sentences  MSK:   Skin:  No concerning rashes or lesions.  Neurologic: No focal deficit, alert and oriented x3  Psychiatric: Affect constricted,    Assessment: History of pain related delayed wound healing, gallop.  Reports improving with present regimen.    We discussed at this point I am just monitoring the medical cannabis we will see him back for monitoring of the moment as needed.    Total time 16      Total time spent: minutes

## 2023-06-01 NOTE — PATIENT INSTRUCTIONS
[facility] Number:  [phone]  Call with any questions about your care and for scheduling assistance.   Calls are returned Monday through Friday between 8 AM and 4:30 PM. We usually get back to you within 2 business days depending on the issue/request.    If we are prescribing your medications:  For opioid medication refills, call the clinic or send a [employer] message 7 days in advance.  Please include:  Name of requested medication  Name of the pharmacy.  For non-opioid medications, call your pharmacy directly to request a refill. Please allow 3-4 days to be processed.   Per [place] State Law:  All controlled substance prescriptions must be filled within 30 days of being written.    For those controlled substances allowing refills, pickup must occur within 30 days of last fill.      We believe regular attendance is key to your success in our program!    Any time you are unable to keep your appointment we ask that you call us at least 24 hours in advance to cancel.This will allow us to offer the appointment time to another patient.   Multiple missed appointments may lead to dismissal from the clinic.     PLAN:    Continue with the mirtazapine for primary care doctor.    Continue with the wound clinic.    Discussed the use of the grounding mat.    Continue with medical cannabis preparations.    Follow-up with Dr. Mcwilliams in 9 months for monitoring from medical cannabis

## 2023-06-01 NOTE — PROGRESS NOTES
Patient presents to the clinic today for a follow up with LALO SHANKAR MD regarding Pain Management.          2/3/2023    10:56 AM 3/9/2023     9:11 AM 6/1/2023    10:52 AM   PEG Score   PEG Total Score 4.67 5.67 4       UDS/CSA- 10.25.2022    Medications- Oxy ir 10mg 05.31.2023 evening    QUESTIONS:    Iris MURRAY [facility] Visit Facilitator

## 2023-06-05 ENCOUNTER — ANTICOAGULATION THERAPY VISIT (OUTPATIENT)
Dept: ANTICOAGULATION | Facility: CLINIC | Age: 73
End: 2023-06-05
Payer: COMMERCIAL

## 2023-06-05 DIAGNOSIS — I48.0 PAROXYSMAL ATRIAL FIBRILLATION (H): Primary | ICD-10-CM

## 2023-06-05 DIAGNOSIS — Z95.4 S/P MITRAL VALVE REPLACEMENT WITH METALLIC VALVE: ICD-10-CM

## 2023-06-05 LAB — INR HOME MONITORING: 2 (ref 2.5–3.5)

## 2023-06-05 NOTE — PROGRESS NOTES
ANTICOAGULATION MANAGEMENT     Bandar Wells 72 year old male is on warfarin with subtherapeutic INR result. (Goal INR 2.5-3.5)    Recent labs: (last 7 days)     06/05/23  0000   INR 2.0*       ASSESSMENT       Source(s): Chart Review and Patient/Caregiver Call       Warfarin doses taken: Missed dose(s) may be affecting INR    Diet: No new diet changes identified    Medication/supplement changes: None noted    New illness, injury, or hospitalization: No    Signs or symptoms of bleeding or clotting: No    Previous result: Therapeutic last 2(+) visits    Additional findings: None         PLAN     Recommended plan for temporary change(s) affecting INR     Dosing Instructions: booster dose then continue your current warfarin dose with next INR in 1 week    Summary  As of 6/5/2023    Full warfarin instructions:  6/5: 12 mg; Otherwise 7.5 mg every Mon, Thu, Sat; 6 mg all other days   Next INR check:  6/12/2023             Telephone call with Bandar who verbalizes understanding and agrees to plan    Patient to recheck with home meter    Education provided:     Please call back if any changes to your diet, medications or how you've been taking warfarin    Plan made per [facility] anticoagulation protocol    Mary Stinson, RN  Anticoagulation Clinic  6/5/2023    _______________________________________________________________________     Anticoagulation Episode Summary     Current INR goal:  2.5-3.5   TTR:  36.5 % (1 y)   Target end date:  Indefinite   Send INR reminders to:  [facility]IT[facility]    Indications    Paroxysmal atrial fibrillation (H) [I48.0]  S/P mitral valve replacement with metallic valve [Z95.4]           Comments:  [facility]- wants a call every time. Needs to Bridge if below 2.0         Anticoagulation Care Providers     Provider Role Specialty Phone number    Jin Hicks MD Referring Internal Medicine [phone]

## 2023-06-06 ENCOUNTER — TRANSFERRED RECORDS (OUTPATIENT)
Dept: HEALTH INFORMATION MANAGEMENT | Facility: CLINIC | Age: 73
End: 2023-06-06
Payer: COMMERCIAL

## 2023-06-06 LAB
CREATININE (EXTERNAL): 2.37 MG/DL (ref 0.7–1.28)
GFR ESTIMATED (EXTERNAL): 28 ML/MIN/1.73M2
GLUCOSE (EXTERNAL): 84 MG/DL (ref 65–99)
POTASSIUM (EXTERNAL): 3.9 MMOL/L (ref 3.5–5.3)

## 2023-06-13 ENCOUNTER — ANTICOAGULATION THERAPY VISIT (OUTPATIENT)
Dept: ANTICOAGULATION | Facility: CLINIC | Age: 73
End: 2023-06-13
Payer: COMMERCIAL

## 2023-06-13 DIAGNOSIS — I48.0 PAROXYSMAL ATRIAL FIBRILLATION (H): Primary | ICD-10-CM

## 2023-06-13 DIAGNOSIS — Z95.4 S/P MITRAL VALVE REPLACEMENT WITH METALLIC VALVE: ICD-10-CM

## 2023-06-13 LAB — INR HOME MONITORING: 2.9 (ref 2.5–3.5)

## 2023-06-13 NOTE — PROGRESS NOTES
ANTICOAGULATION MANAGEMENT     Bandar Wells 72 year old male is on warfarin with therapeutic INR result. (Goal INR 2.5-3.5)    Recent labs: (last 7 days)     06/13/23  0000   INR 2.9       ASSESSMENT       Source(s): Chart Review and Patient/Caregiver Call       Warfarin doses taken: Warfarin taken as instructed    Diet: No new diet changes identified    Medication/supplement changes: None noted    New illness, injury, or hospitalization: No    Signs or symptoms of bleeding or clotting: No    Previous result: Subtherapeutic    Additional findings: None         PLAN     Recommended plan for no diet, medication or health factor changes affecting INR     Dosing Instructions: Continue your current warfarin dose with next INR in 2 weeks       Summary  As of 6/13/2023    Full warfarin instructions:  7.5 mg every Mon, Thu, Sat; 6 mg all other days   Next INR check:  6/27/2023             Telephone call with Bandar who verbalizes understanding and agrees to plan    Patient to recheck with home meter    Education provided:     None required    Plan made per [facility] anticoagulation protocol    Jackelyn Ayala RN  Anticoagulation Clinic  6/13/2023    _______________________________________________________________________     Anticoagulation Episode Summary     Current INR goal:  2.5-3.5   TTR:  37.6 % (1 y)   Target end date:  Indefinite   Send INR reminders to:  [facility]    Indications    Paroxysmal atrial fibrillation (H) [I48.0]  S/P mitral valve replacement with metallic valve [Z95.4]           Comments:  [facility]- wants a call every time. Needs to Bridge if below 2.0         Anticoagulation Care Providers     Provider Role Specialty Phone number    Jin Hicks MD Referring Internal Medicine [phone]

## 2023-06-18 ENCOUNTER — MEDICAL CORRESPONDENCE (OUTPATIENT)
Dept: HEALTH INFORMATION MANAGEMENT | Facility: CLINIC | Age: 73
End: 2023-06-18

## 2023-06-23 NOTE — TELEPHONE ENCOUNTER
ANTICOAGULATION  MANAGEMENT    Assessment     Today's INR result of 2.50 is Therapeutic (goal INR of 2.5-3.5)        Warfarin taken as previously instructed    No new diet changes affecting INR    No interaction expected between Lasic / HF Clinic and warfarin    Continues to tolerate warfarin with no reported s/s of bleeding or thromboembolism     Previous INR was Supratherapeutic at 4.40 on 9/11/19.    Scheduled for Colonoscopy on 10/2/19  (will need to hold warfarin / bridge).    Plan:      regarding INR result and instructed:     Warfarin Dosing Instructions:   (evenings.  Has 3mg tabs)   - Continue current warfarin dose 3 mg daily on Sun,Tues, Fri; and 6 mg daily rest of week.    Instructed patient to follow up no later than:  2 wks.   - checks INR's every 2 wks with home monitor.    Education provided: importance of consistent vitamin K intake, target INR goal and significance of current INR result, importance of notifying clinic for changes in medications and importance of notifying clinic of upcoming surgeries and procedures 2 weeks in advance    Bandar verbalizes understanding and agrees to warfarin dosing plan.    Instructed to call the [facility] for any changes, questions or concerns. (#[phone])   ?   Meg Jacinto RN    Subjective/Objective:      Bandar Wells, a 69 y.o. male is on warfarin.     Bandar reports:     Home warfarin dose: verbally confirmed home dose with Bandar and updated on anticoagulation calendar     Missed doses: No     Medication changes:  Yes: on 9/24/19 per cardiologist / HF Clinic @ [facility], increase Lasix to 40mg two times a day for 3 days, then go back down to 40mg daily.       S/S of bleeding or thromboembolism:  No     New Injury or illness:  No     Changes in diet or alcohol consumption:  No    Upcoming surgery, procedure or cardioversion:  Yes: Scheduled for Colonoscopy on 10/2/19  (will need to hold warfarin / bridge).        Anticoagulation Episode Summary     Current INR  goal:   2.5-3.5   TTR:   33.9 %   Next INR check:   10/10/2019   INR from last check:   2.50 (9/26/2019)   Weekly max warfarin dose:      Target end date:   Indefinite   INR check location:      Preferred lab:      Send INR reminders to:   [facility]    Indications    Paroxysmal atrial fibrillation (H) [I48.0]  S/P MVR (mitral valve replacement) [Z95.2]  S/P mitral valve replacement with metallic valve [Z95.4]           Comments:   INR TARGET GOAL 2.5 - 3.5         Anticoagulation Care Providers     Provider Role Specialty Phone number    Jin Bhat MD Referring Internal Medicine [phone]         Anesthesia Volume In Cc: 3

## 2023-06-29 ENCOUNTER — TELEPHONE (OUTPATIENT)
Dept: INTERNAL MEDICINE | Facility: CLINIC | Age: 73
End: 2023-06-29

## 2023-06-29 NOTE — TELEPHONE ENCOUNTER
June 29, 2023    Home health orders was received via fax for Dr. Hicks to sign.  Patient label was attached to paperwork and placed in provider's inbox to be signed.    Corin Samayoa

## 2023-06-30 DIAGNOSIS — Z53.9 DIAGNOSIS NOT YET DEFINED: Primary | ICD-10-CM

## 2023-06-30 PROCEDURE — 99207 PR MD RECERTIFICATION HHA PT: CPT | Performed by: INTERNAL MEDICINE

## 2023-06-30 NOTE — TELEPHONE ENCOUNTER
June 30, 2023    Home health orders was picked up from outbox of Dr. Hicks.  Paperwork has been reviewed and is complete.  Per initial initial request, this was sent via fax to [phone].     Corin Samayoa

## 2023-06-30 NOTE — TELEPHONE ENCOUNTER
June 30, 2023    Home health orders was picked up from outbox of Dr. Hicks.  Paperwork has been reviewed and is complete.  Per initial initial request, this was sent via fax to [phone].     Corin Samayoa

## 2023-07-03 DIAGNOSIS — R52 PAIN: ICD-10-CM

## 2023-07-05 ENCOUNTER — TELEPHONE (OUTPATIENT)
Dept: INTERNAL MEDICINE | Facility: CLINIC | Age: 73
End: 2023-07-05
Payer: COMMERCIAL

## 2023-07-05 RX ORDER — OXYCODONE HYDROCHLORIDE 10 MG/1
TABLET ORAL
Qty: 90 TABLET | Refills: 0 | Status: SHIPPED | OUTPATIENT
Start: 2023-07-05 | End: 2023-07-26

## 2023-07-05 NOTE — TELEPHONE ENCOUNTER
Pharmacy is only able to dispense 21 days only per [place] law since it needs to state if this is for acute or chronic. A new script can be written and send back to pharmacy.

## 2023-07-05 NOTE — TELEPHONE ENCOUNTER
July 5, 2023    Home health orders was received via fax for Dr. Hicks to sign.  Patient label was attached to paperwork and placed in provider's inbox to be signed.    Corin Samayoa

## 2023-07-07 ENCOUNTER — ANTICOAGULATION THERAPY VISIT (OUTPATIENT)
Dept: ANTICOAGULATION | Facility: CLINIC | Age: 73
End: 2023-07-07
Payer: COMMERCIAL

## 2023-07-07 DIAGNOSIS — Z95.4 S/P MITRAL VALVE REPLACEMENT WITH METALLIC VALVE: ICD-10-CM

## 2023-07-07 DIAGNOSIS — I48.0 PAROXYSMAL ATRIAL FIBRILLATION (H): Primary | ICD-10-CM

## 2023-07-07 LAB — INR HOME MONITORING: 2.7 (ref 2.5–3.5)

## 2023-07-07 NOTE — PROGRESS NOTES
ANTICOAGULATION MANAGEMENT     Banadr Wells 72 year old male is on warfarin with therapeutic INR result. (Goal INR 2.5-3.5)    Recent labs: (last 7 days)     07/07/23  0000   INR 2.7       ASSESSMENT       Source(s): Chart Review and Patient/Caregiver Call       Warfarin doses taken: Warfarin taken as instructed    Diet: No new diet changes identified    Medication/supplement changes: None noted    New illness, injury, or hospitalization: No    Signs or symptoms of bleeding or clotting: No    Previous result: Subtherapeutic    Additional findings: None         PLAN     Recommended plan for no diet, medication or health factor changes affecting INR     Dosing Instructions: Continue your current warfarin dose with next INR in 2 weeks       Summary  As of 7/7/2023    Full warfarin instructions:  7.5 mg every Mon, Thu, Sat; 6 mg all other days   Next INR check:  7/21/2023             Telephone call with Bandar who verbalizes understanding and agrees to plan    Patient to recheck with home meter    Education provided:     Please call back if any changes to your diet, medications or how you've been taking warfarin    Plan made per [facility] anticoagulation protocol    Mary Stinson RN  Anticoagulation Clinic  7/7/2023    _______________________________________________________________________     Anticoagulation Episode Summary     Current INR goal:  2.5-3.5   TTR:  38.5 % (1 y)   Target end date:  Indefinite   Send INR reminders to:  [facility]    Indications    Paroxysmal atrial fibrillation (H) [I48.0]  S/P mitral valve replacement with metallic valve [Z95.4]           Comments:  [facility]- wants a call every time. Needs to Bridge if below 2.0         Anticoagulation Care Providers     Provider Role Specialty Phone number    Jin Hicks MD Referring Internal Medicine [phone]

## 2023-07-07 NOTE — TELEPHONE ENCOUNTER
July 7, 2023    Home health orders was picked up from outbox of Dr. Hicks.  Paperwork has been reviewed and is complete.  Per initial initial request, this was sent via fax to [phone].     Corin Samayoa

## 2023-07-24 ENCOUNTER — TELEPHONE (OUTPATIENT)
Dept: INTERNAL MEDICINE | Facility: CLINIC | Age: 73
End: 2023-07-24
Payer: COMMERCIAL

## 2023-07-24 NOTE — TELEPHONE ENCOUNTER
Attempted to call pt, no answer (1/3). Left message requesting pt to call back clinic.     Please request patient provide a further details regarding nature of this discussion, this will need to be reviewed by Dr. Hicks as there are no sooner virtual visit openings this week.

## 2023-07-24 NOTE — TELEPHONE ENCOUNTER
Patient calling back wanting to talk about his head pain and right leg wound pain. Patient stated he had radiation treatment for cancer and now he is having recurring headaches.

## 2023-07-24 NOTE — TELEPHONE ENCOUNTER
General Call      Reason for Call:  Requesting call back from provider     What are your questions or concerns:  Patient want to see if provider can call and talk to him regarding some health issues he is concern about. I scheduled patient for this Friday at noon for a telephone call but he still want to see if provider can call him sooner.       Could we send this information to you in [employer] or would you prefer to receive a phone call?:   Patient would prefer a phone call   Okay to leave a detailed message?: Yes at Cell number on file:    Telephone Information:   Mobile [phone]

## 2023-07-25 ENCOUNTER — ANTICOAGULATION THERAPY VISIT (OUTPATIENT)
Dept: ANTICOAGULATION | Facility: CLINIC | Age: 73
End: 2023-07-25
Payer: COMMERCIAL

## 2023-07-25 DIAGNOSIS — I48.0 PAROXYSMAL ATRIAL FIBRILLATION (H): Primary | ICD-10-CM

## 2023-07-25 DIAGNOSIS — Z95.4 S/P MITRAL VALVE REPLACEMENT WITH METALLIC VALVE: ICD-10-CM

## 2023-07-25 LAB — INR HOME MONITORING: 4.2 (ref 2.5–3.5)

## 2023-07-25 NOTE — PROGRESS NOTES
ANTICOAGULATION MANAGEMENT     Bandar Wells 72 year old male is on warfarin with supratherapeutic INR result. (Goal INR 2.5-3.5)    Recent labs: (last 7 days)     07/25/23  0000   INR 4.2*       ASSESSMENT     Source(s): Chart Review and Patient/Caregiver Call     Warfarin doses taken: Warfarin taken as instructed  Diet: No new diet changes identified  Medication/supplement changes: None noted  New illness, injury, or hospitalization: Yes: reported that he has been feeling fatigued in the last couple of days  Signs or symptoms of bleeding or clotting: No  Previous result: Therapeutic last 2(+) visits  Additional findings: patient reported that he already took his warfarin dose today,       PLAN     Recommended plan for temporary change(s) affecting INR     Dosing Instructions: partial hold then continue your current warfarin dose with next INR in 1-2 weeks       Summary  As of 7/25/2023      Full warfarin instructions:  7/26: 3 mg; Otherwise 7.5 mg every Mon, Thu, Sat; 6 mg all other days   Next INR check:  8/8/2023               Telephone call with Bandar who verbalizes understanding and agrees to plan    Patient to recheck with home meter    Education provided:   Goal range and lab monitoring: goal range and significance of current result and Importance of following up at instructed interval    Plan made per [facility] anticoagulation protocol    Galilea Hernandez RN  Anticoagulation Clinic  7/25/2023    _______________________________________________________________________     Anticoagulation Episode Summary       Current INR goal:  2.5-3.5   TTR:  39.7 % (1 y)   Target end date:  Indefinite   Send INR reminders to:  [facility]    Indications    Paroxysmal atrial fibrillation (H) [I48.0]  S/P mitral valve replacement with metallic valve [Z95.4]             Comments:  [facility]- wants a call every time. Needs to Bridge if below 2.0             Anticoagulation Care Providers       Provider Role Specialty Phone  number    Jin Hicks MD [facility] Internal Medicine [phone]

## 2023-07-26 ENCOUNTER — VIRTUAL VISIT (OUTPATIENT)
Dept: INTERNAL MEDICINE | Facility: CLINIC | Age: 73
End: 2023-07-26
Payer: COMMERCIAL

## 2023-07-26 DIAGNOSIS — G89.4 CHRONIC PAIN SYNDROME: ICD-10-CM

## 2023-07-26 DIAGNOSIS — I87.2 VENOUS STASIS ULCER OF RIGHT ANKLE LIMITED TO BREAKDOWN OF SKIN WITHOUT VARICOSE VEINS (H): ICD-10-CM

## 2023-07-26 DIAGNOSIS — F11.90 CHRONIC, CONTINUOUS USE OF OPIOIDS: ICD-10-CM

## 2023-07-26 DIAGNOSIS — C44.42 SQUAMOUS CELL CARCINOMA, SCALP/NECK: ICD-10-CM

## 2023-07-26 DIAGNOSIS — F11.90 CHRONIC, CONTINUOUS USE OF OPIOIDS: Primary | ICD-10-CM

## 2023-07-26 DIAGNOSIS — L97.311 VENOUS STASIS ULCER OF RIGHT ANKLE LIMITED TO BREAKDOWN OF SKIN WITHOUT VARICOSE VEINS (H): ICD-10-CM

## 2023-07-26 DIAGNOSIS — G89.4 CHRONIC PAIN SYNDROME: Primary | ICD-10-CM

## 2023-07-26 PROCEDURE — 99213 OFFICE O/P EST LOW 20 MIN: CPT | Mod: 93 | Performed by: INTERNAL MEDICINE

## 2023-07-26 RX ORDER — LEVOFLOXACIN 250 MG/1
TABLET, FILM COATED ORAL
COMMUNITY
Start: 2023-04-03 | End: 2024-01-01

## 2023-07-26 RX ORDER — OXYCODONE HYDROCHLORIDE 10 MG/1
10 TABLET ORAL EVERY 4 HOURS PRN
Qty: 120 TABLET | Refills: 0 | Status: SHIPPED | OUTPATIENT
Start: 2023-07-26 | End: 2023-07-27

## 2023-07-26 NOTE — PROGRESS NOTES
Bandar is a 72 year old who is being evaluated via a billable telephone visit.      What phone number would you like to be contacted at? [phone]  How would you like to obtain your AVS? [place]    Distant Location (provider location):  On-site    1. Chronic pain syndrome  Patient with chronic pain with limited options for pain control.  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  Will increase oxycodone to 4 tablets/day.  I would like him to revisit with the pain clinic and wonder if there may be additional options available to him.  I wonder about long-acting opioid such as Suboxone.  - Pain Management  Referral; Future    2. Chronic, continuous use of opioids  - Pain Management  Referral; Future    3. Squamous cell carcinoma, scalp/neck  Follows with Dr. Tavarez    4. Venous stasis ulcer of right ankle limited to breakdown of skin without varicose veins (H)  Following in the wound clinic and has a nurse coming to his home multiple times per week.  I did speak with her on the phone today.    Subjective   Bandar is a 72 year old, presenting for the following health issues:  Follow Up (Would like to update on dermatology  - pt reports having to go through radiation. Would like to discuss wound on leg. Home healthcare nurse on way to redress wound. Pt express that he he is feeling very fatigued and in pain. Reports pain is on right leg and skull. )      7/26/2023     6:50 AM   Additional Questions   Roomed by Анна      History of Present Illness       Reason for visit:  Would like to discuss over all health updates and pain.    He eats 2-3 servings of fruits and vegetables daily.He consumes 2 sweetened beverage(s) daily.He exercises with enough effort to increase his heart rate 9 or less minutes per day.  He exercises with enough effort to increase his heart rate 3 or less days per week.   He is taking medications regularly.           Review of Systems          Objective    Vitals - Patient Reported  Pain Score: Extreme Pain (8) (pt reports pain is on his skull and leg (right).)  Pain Loc:  (leg and skull)        Physical Exam                Phone call duration: 21 minutes

## 2023-07-26 NOTE — TELEPHONE ENCOUNTER
General Call      Reason for Call:  Home care calling regarding Rx for Oxycodone  Written for every 4 hours   Rx should indicate every 4 hours -- pharmacy took it as 1 tab every 4 hours equals 6 tabs per day    Need to send new Rx to pharmacy stating 4 tabs per day    Please advise      What are your questions or concerns:  n/a    Date of last appointment with provider: n/a    Could we send this information to you in [employer] or would you prefer to receive a phone call?:   No preference   Okay to leave a detailed message?: Yes at Other phone number:  [phone]

## 2023-07-27 ENCOUNTER — TELEPHONE (OUTPATIENT)
Dept: INTERNAL MEDICINE | Facility: CLINIC | Age: 73
End: 2023-07-27

## 2023-07-27 RX ORDER — OXYCODONE HYDROCHLORIDE 10 MG/1
10 TABLET ORAL EVERY 4 HOURS PRN
Qty: 120 TABLET | Refills: 0 | Status: SHIPPED | OUTPATIENT
Start: 2023-07-27 | End: 2023-07-27

## 2023-07-27 RX ORDER — OXYCODONE HYDROCHLORIDE 10 MG/1
10 TABLET ORAL EVERY 4 HOURS PRN
Qty: 8 TABLET | Refills: 0 | Status: SHIPPED | OUTPATIENT
Start: 2023-07-27 | End: 2023-01-01

## 2023-07-27 NOTE — TELEPHONE ENCOUNTER
Prior Authorization Request    Prior Authorization for the medication oxyCODONE IR (ROXICODONE) 10 MG tablet         Requesting Provider: Jin Hicks   Pharmacy name and location: [facility] [address]         Pt name: Bandar YUDITH Priscilla        Pt : 1950        Pt MRN: [id number]        Last Office Visit: 2023           Insurance: Payor: [employer] / Plan: [employer] / Product Type: HMO /         Insurance ID Number: [unfilled]        Prior Auth Contact Phone number:     BIN#:   KUNAL#:         CMM KEY:  [id number]    Informed patient that prior authorizations can take up to 10 business days  for response NA

## 2023-07-27 NOTE — TELEPHONE ENCOUNTER
Spoke with Stephanie, [facility] nurse.     She notified me that the prescription re-signed by Dr. Hicks today is for 6 tablets a day.       Disp Refills Start End DEAN    oxyCODONE IR (ROXICODONE) 10 MG tablet 120 tablet 0 7/27/2023  No   Sig - Route: Take 1 tablet (10 mg) by mouth every 4 hours as needed for severe pain - Oral     Per Dr. Hicks's visit note, pt is to have 4 tablets per day. Prescription needs to be resent as pharmacy will not dispense any of the medication.     I informed Stephanie that Dr. Hicks is out of the clinic until tomorrow. Stephanie is requesting if another provider can send a small refill of his oxycodone medication to his pharmacy (4 tablets for today and 4 tablets for tomorrow, 8 tablets total). Stephanie reports that patient is a cancer patient and has pain that is inhibiting him from resting. Today is day 2 of patient being out of his pain medication.       Spoke with pharmacy:     There needs to be an indication of what the max daily dosage is. Prescription clarification needed if provider is intending to dispense at 10 mg tablet every 4 hrs,  up to 4 tablets daily.     The prescription needs to indicate that there is a max of four 10 mg tablets daily.

## 2023-07-27 NOTE — TELEPHONE ENCOUNTER
Stephanie from [facility] is calling back checking on status on this. Want to see if provider can resend this in today saying 4 tabs per day . Patient is in pain.

## 2023-07-27 NOTE — TELEPHONE ENCOUNTER
Spoke with pharmacy: one time prescription from Dr. Zarco will be able to be filled this evening.     Spoke with Stephanie and updated her on the one time prescription.

## 2023-07-27 NOTE — TELEPHONE ENCOUNTER
Pharmacy called and the script is written for 6 tablets per day and a prior auth orization was started.    Just to be aware that the insurance will pay for 5 per day.

## 2023-07-27 NOTE — TELEPHONE ENCOUNTER
Note from 7/27 visit with Dr. Hicks:       1. Chronic pain syndrome  Patient with chronic pain with limited options for pain control.  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  Will increase oxycodone to 4 tablets/day.  I would like him to revisit with the pain clinic and wonder if there may be additional options available to him.  I wonder about long-acting opioid such as Suboxone.  - Pain Management  Referral; Future

## 2023-07-28 RX ORDER — OXYCODONE HYDROCHLORIDE 10 MG/1
10 TABLET ORAL EVERY 4 HOURS PRN
Qty: 120 TABLET | Refills: 0 | Status: SHIPPED | OUTPATIENT
Start: 2023-07-28 | End: 2023-01-01

## 2023-07-31 NOTE — TELEPHONE ENCOUNTER
Central Prior Authorization Team  Phone: [phone]    PA Initiation    Medication: OXYCODONE HCL 10 MG PO TABS  Insurance Company: [employer] - Phone [phone] Fax [phone]  Pharmacy Filling the Rx: [facility] - [place], [place] - [address]  Filling Pharmacy Phone: [phone]  Filling Pharmacy Fax:    Start Date: 7/31/2023

## 2023-08-01 NOTE — TELEPHONE ENCOUNTER
Prior Authorization Approval    Medication: OXYCODONE HCL 10 MG PO TABS  Authorization Effective Date: 6/5/2023  Authorization Expiration Date: 7/31/2024  Approved Dose/Quantity:   Reference #:     Insurance Company: [employer] - Phone [phone] Fax [phone]  Expected CoPay:       CoPay Card Available:      Financial Assistance Needed:   Which Pharmacy is filling the prescription: [facility] - [place], [place] - [facility] [address]  Pharmacy Notified: Yes- pharmacy has already received a paid claim.  Patient Notified:

## 2023-08-14 NOTE — PROGRESS NOTES
ANTICOAGULATION MANAGEMENT     Bandar Wells 72 year old male is on warfarin with therapeutic INR result. (Goal INR 2.5-3.5)    Recent labs: (last 7 days)     08/12/23  0000   INR 3.0       ASSESSMENT     Source(s): Chart Review and Patient/Caregiver Call     Warfarin doses taken:  Pt thinks he may have missed a dose of his warfarin a week ago on Sunday but unsure.   Diet: No new diet changes identified  Medication/supplement changes: None noted  New illness, injury, or hospitalization: No  Signs or symptoms of bleeding or clotting: No  Previous result: Supratherapeutic  Additional findings: None       PLAN     Recommended plan for temporary change(s) affecting INR     Dosing Instructions: Continue your current warfarin dose with next INR in 2 weeks       Summary  As of 8/14/2023      Full warfarin instructions:  7.5 mg every Mon, Thu, Sat; 6 mg all other days   Next INR check:  8/25/2023               Telephone call with Bandar who verbalizes understanding and agrees to plan    Patient to recheck with home meter    Education provided:   Goal range and lab monitoring: goal range and significance of current result and Importance of therapeutic range    Plan made per [facility] anticoagulation protocol    Hai Davis RN  Anticoagulation Clinic  8/14/2023    _______________________________________________________________________     Anticoagulation Episode Summary       Current INR goal:  2.5-3.5   TTR:  39.9 % (1 y)   Target end date:  Indefinite   Send INR reminders to:  [facility]    Indications    Paroxysmal atrial fibrillation (H) [I48.0]  S/P mitral valve replacement with metallic valve [Z95.4]             Comments:  [employer]- wants a call every time. Needs to Bridge if below 2.0             Anticoagulation Care Providers       Provider Role Specialty Phone number    Jin Hicks MD Referring Internal Medicine [phone]

## 2023-08-23 NOTE — CONFIDENTIAL NOTE
reviewed.  Last filled July 28 for 120 tablets    8 tablets filled July 27    90 tablets filled July 5, which lasted apparently only 23 days    Prior to that, 90 tablets June 5, May 5, April 4 etc.    Reviewed last office visit with primary physician Dr. Hicks.  At that time increased from 3 tablets daily to 4 tablets daily, and filled on July 28    Prescription should last until August 27.  Refill refused today for that reason

## 2023-08-25 NOTE — TELEPHONE ENCOUNTER
General Call      Reason for Call:  home care Nurse Stephanie calling regardig patient medications    What are your questions or concerns:  n/a    Date of last appointment with provider: n/a    Could we send this information to you in [facility] or would you prefer to receive a phone call?:   No preference   Okay to leave a detailed message?: Yes at Other phone number:  [phone]

## 2023-08-25 NOTE — TELEPHONE ENCOUNTER
Patient is going to be out of medication on Sunday.  Home care nurse is requesting short Rx for 8 pills of oxycodone to get patient through the weekend until Dr. Hicks returns from vacation.  Patient is going through radiation for skin cancer.    Home care nurse would like a phone call if medication is filled so she can relay information to patient.     8/23/23 Rx for 120 tablets denied:    Denton Parra MD         8/23/23  6:04 PM  Unsigned Note   reviewed.  Last filled July 28 for 120 tablets     8 tablets filled July 27     90 tablets filled July 5, which lasted apparently only 23 days     Prior to that, 90 tablets June 5, May 5, April 4 etc.     Reviewed last office visit with primary physician Dr. Hicks.  At that time increased from 3 tablets daily to 4 tablets daily, and filled on July 28     Prescription should last until August 27.

## 2023-08-25 NOTE — TELEPHONE ENCOUNTER
Please do remind nurse that dr Parra is correct , he should have 8 tablets left and she needs to confirm with patient if he is taking them more than prescribed or why those 8 are not accounted for . Will give a one time exception to cover this weekend Will defer to PCP to decide further plan

## 2023-08-25 NOTE — TELEPHONE ENCOUNTER
Discussed with home care nurse medication and dosing. Stephanie advised that patient should have an additional 8 tablets available and this medication should last until 08/27/23.    Stephanie states she is not sure what happened, but patient will be out of medication on 08/26 and the patients pharmacy is closed on Sunday so they are requesting a refill so that the medication can be delivered on Saturday.     Attempted to speak with patient regarding medication and to inform them a refill was sent in. Patient unable to hear writer at time of call and will return call when available.

## 2023-08-25 NOTE — TELEPHONE ENCOUNTER
August 25, 2023    Home health orders was received via fax for Dr. Hicks to sign.  Patient label was attached to paperwork and placed in provider's inbox to be signed.    Jackelyn Zabala

## 2023-08-25 NOTE — TELEPHONE ENCOUNTER
Patient returned call and reports having 3 pills of Oxycodone left.     Reports that sometimes overnight pain worsens in legs due to wounds or from radiation and will take a pill overnight to reduce pain.    Patient advised that if pain is not managed with a maximum of 4 tablets a day to let PCP know so additional recommendations can be made.    Patient reports that 4 tablets maximum a day of Oxycodone is sufficient and will let clinic know if additional recommendation is needed.     No further questions at time of call.

## 2023-08-28 NOTE — TELEPHONE ENCOUNTER
I agree that the patient should be seen by his wound specialist.    I also agree that the 8 tablet prescription of oxycodone should be canceled

## 2023-08-28 NOTE — TELEPHONE ENCOUNTER
General Call    Contacts         Type Contact Phone/Fax    08/28/2023 09:51 AM CDT Phone (Incoming) Stephanie [phone]     [facility]          Reason for Call:  Infection    What are your questions or concerns:  Stephanie is calling to inform Dr. Hicks that patient seems to be developing an infection in their wound.  No odors, yellow/green oozing out    Date of last appointment with provider: n/a    prefer to receive a phone call?:   Patient would prefer a phone call     Okay to leave a detailed message?: Yes at Other phone number:  [phone]

## 2023-08-28 NOTE — TELEPHONE ENCOUNTER
Information regarding wound report:     Wound has been present, Stephanie has been treating Bandar for 1 year for this wound, pt does see wound care clinic.     Pt restarted skin cancer radiation last Thursday. Stephanie reports that patient has had considerable stress for the past three months. Stephanie thinks the wound is starting to becoming infected. Drainage is changing to yellowish/green in a small area of the wound, there is no smell at this point.     Stephanie plans to assist patient with making a wound clinic appointment within the next two weeks, pt sees wound clinic every 6 weeks.    Stephanie reports that patient was on bactrim and keflex in May 2023:     From 5/12/23 telephone encounter:     Jin Hicks MD   to [facility]    5/12/23 12:41 PM  Note  I sent in renally dosed Bactrim and Cipro to his pharmacy.  He needs to have his INR monitored more closely while on these antibiotics and I really think he needs to be seen by me or the wound clinic for additional evaluation.          Stephanie requests consideration for antibiotics for patient and is following up regarding patient's oxycodone prescription:     Stephanie reports that patient is due for oxycodone 10 mg tablet refill. Pt was provided an 8 tablet refill on 8/25/23 to last him until Dr. Hicks could return to clinic on 8/28/23. Today, 8/28/23 pharmacy sent request for 8 tablets to be filled however patient needs to have the full 120 tablet filled.     Upon further review of patient medication history, oxycodone 10 mg tablet was filled on 8/28/23:       Disp Refills Start End DEAN    oxyCODONE IR (ROXICODONE) 10 MG tablet 120 tablet 0 8/28/2023  No   Sig - Route: Take 1 tablet (10 mg) by mouth every 4 hours as needed for severe pain. Maximum of 4 tablets daily - Oral     Called patient pharmacy, [facility]:     Pharmacy received the 8 tablet prescription, the 120 tablet prescription was received today and they are processing the 120 tablet  prescription for a refill.

## 2023-08-28 NOTE — TELEPHONE ENCOUNTER
Spoke with Stephanie from [facility] and shared with her the recommendation from Dr. Hicks to follow up with wound care clinic. Stephanie stated understanding. I let Stephanie know that oxycodone prescription for QTY: 120 was sent to pharmacy this morning and let her know that pharmacy was preparing this prescription for the patient.     Called pharmacy to cancel the following prescription:     Disp Refills Start End DEAN    oxyCODONE IR (ROXICODONE) 10 MG tablet (Discontinued) 8 tablet 0 8/28/2023 8/28/2023 No   Sig - Route: Take 1 tablet (10 mg) by mouth every 4 hours as needed for severe pain Maximum of 4 tablets daily - Oral

## 2023-08-30 NOTE — TELEPHONE ENCOUNTER
August 30, 2023    Home health orders was picked up from outbox of Dr. Hicks.  Paperwork has been reviewed and is complete.  Per initial initial request, this was sent via fax to [phone] and HIM.     Jackelyn Zabala

## 2023-08-30 NOTE — PROGRESS NOTES
ANTICOAGULATION MANAGEMENT     Bandar Wells 73 year old male is on warfarin with therapeutic INR result. (Goal INR 2.5-3.5)    Recent labs: (last 7 days)     08/29/23  0000   INR 3.2       ASSESSMENT     Source(s): Chart Review and Patient/Caregiver Call     Warfarin doses taken: Warfarin taken as instructed  Diet: No new diet changes identified  Medication/supplement changes: None noted  New illness, injury, or hospitalization: No  Signs or symptoms of bleeding or clotting: No  Previous result: Therapeutic last visit; previously outside of goal range  Additional findings: None       PLAN     Recommended plan for no diet, medication or health factor changes affecting INR     Dosing Instructions: Continue your current warfarin dose with next INR in 2 weeks       Summary  As of 8/30/2023      Full warfarin instructions:  7.5 mg every Mon, Thu, Sat; 6 mg all other days   Next INR check:  9/12/2023               Telephone call with Bandar who verbalizes understanding and agrees to plan    Patient to recheck with home meter    Education provided:   Please call back if any changes to your diet, medications or how you've been taking warfarin    Plan made per [facility] anticoagulation protocol    Mary Stinson RN  Anticoagulation Clinic  8/30/2023    _______________________________________________________________________     Anticoagulation Episode Summary       Current INR goal:  2.5-3.5   TTR:  41.2 % (1 y)   Target end date:  Indefinite   Send INR reminders to:  [facility]    Indications    Paroxysmal atrial fibrillation (H) [I48.0]  S/P mitral valve replacement with metallic valve [Z95.4]             Comments:  [employer]- wants a call every time. Needs to Bridge if below 2.0             Anticoagulation Care Providers       Provider Role Specialty Phone number    Jin Hicks MD Referring Internal Medicine [phone]

## 2023-09-06 NOTE — PROGRESS NOTES
ANTICOAGULATION MANAGEMENT     Bandar Wells 73 year old male is on warfarin with supratherapeutic INR result. (Goal INR 2.5-3.5)    Recent labs: (last 7 days)     09/06/23  0000   INR 5.3*       ASSESSMENT     Source(s): Chart Review, Patient/Caregiver Call, and Home Care/Facility Nurse     Warfarin doses taken: Warfarin taken as instructed  Diet: No new diet changes identified  Medication/supplement changes: None noted  New illness, injury, or hospitalization: Yes: bleeding noted on  right lower leg wound  - more pain and swelling - going to the wound clinic this afternoon.  Signs or symptoms of bleeding or clotting: Yes: nose bleeding noted on Monday and again last night   Previous result: Therapeutic last 2(+) visits  Additional findings:  patient looks very stressed and  frail as reported by home care nurse Stephanie.  [facility] will check INR on Friday   Will notify referring MD of critical INR today.       PLAN     Recommended plan for temporary change(s) affecting INR     Dosing Instructions:  Hold dose today, partial dose tomorrow  then continue your current warfarin dose with next INR in 2 days       Summary  As of 9/6/2023      Full warfarin instructions:  9/6: Hold; 9/7: 4.5 mg; Otherwise 7.5 mg every Mon, Thu, Sat; 6 mg all other days   Next INR check:  9/8/2023               Telephone call with Stephanie home care nurse who verbalizes understanding and agrees to plan and who agrees to plan and repeated back plan correctly    Orders given to  Homecare nurse/facility to recheck    Education provided:   Symptom monitoring: monitoring for bleeding signs and symptoms  Contact  with any changes, questions or concerns.     Plan made per [facility] anticoagulation protocol    Galilea Hernandez RN  Anticoagulation Clinic  9/6/2023    _______________________________________________________________________     Anticoagulation Episode Summary       Current INR goal:  2.5-3.5   TTR:  41.4 % (1 y)   Target end  date:  Indefinite   Send INR reminders to:  [facility]    Indications    Paroxysmal atrial fibrillation (H) [I48.0]  S/P mitral valve replacement with metallic valve [Z95.4]             Comments:  [employer]- wants a call every time. Needs to Bridge if below 2.0             Anticoagulation Care Providers       Provider Role Specialty Phone number    Jin Hicks MD Referring Internal Medicine [phone]

## 2023-09-06 NOTE — TELEPHONE ENCOUNTER
Patient Returning Call    Reason for call:  INR    Information relayed to patient:  CALL BACK    Patient has additional questions:  Yes    What are your questions/concerns:  PLEASE CALL PT, INR WAS 5.3    Who does the patient want to speak with:  INR NURSE    Is an  needed?:  No      Could we send this information to you in [facility] or would you prefer to receive a phone call?:   No preference   Okay to leave a detailed message?: Yes at Home number on file [phone] ([place])

## 2023-09-06 NOTE — TELEPHONE ENCOUNTER
Please see 9/6/2023 Anticoagulation Therapy encounter for further information    Galilea Hernandez RN [facility] Anticoagulation Clinic

## 2023-09-08 NOTE — PROGRESS NOTES
ANTICOAGULATION MANAGEMENT     Bandar Wells 73 year old male is on warfarin with therapeutic INR result. (Goal INR 2.5-3.5)    Recent labs: (last 7 days)     09/08/23  0000   INR 2.8       ASSESSMENT     Source(s): Chart Review and Patient/Caregiver Call     Warfarin doses taken: Warfarin taken as instructed  Diet: No new diet changes identified  Medication/supplement changes: None noted  New illness, injury, or hospitalization: No,but patient is having radiation three times a week  Signs or symptoms of bleeding or clotting: No  Previous result: Supratherapeutic  Additional findings: None       PLAN     Recommended plan for no diet, medication or health factor changes affecting INR     Dosing Instructions: Continue your current warfarin dose with next INR in 5 days       Summary  As of 9/8/2023      Full warfarin instructions:  7.5 mg every Mon, Thu, Sat; 6 mg all other days   Next INR check:  9/13/2023               Telephone call with Bandar who verbalizes understanding and agrees to plan    Patient to recheck with home meter    Education provided:   Please call back if any changes to your diet, medications or how you've been taking warfarin    Plan made per [facility] anticoagulation protocol    Brenda Steiner, RN  Anticoagulation Clinic  9/8/2023    _______________________________________________________________________     Anticoagulation Episode Summary       Current INR goal:  2.5-3.5   TTR:  41.6 % (1 y)   Target end date:  Indefinite   Send INR reminders to:  [facility]    Indications    Paroxysmal atrial fibrillation (H) [I48.0]  S/P mitral valve replacement with metallic valve [Z95.4]             Comments:  [employer]- wants a call every time. Needs to Bridge if below 2.0             Anticoagulation Care Providers       Provider Role Specialty Phone number    Jin Hicks MD Referring Internal Medicine [phone]

## 2023-09-13 NOTE — PROGRESS NOTES
ANTICOAGULATION MANAGEMENT     Bandar Wells 73 year old male is on warfarin with therapeutic INR result. (Goal INR 2.5-3.5)    Recent labs: (last 7 days)     09/13/23  0000   INR 2.7       ASSESSMENT     Source(s): Chart Review  Previous INR was Therapeutic last visit; previously outside of goal range  Medication, diet, health changes since last INR chart reviewed; none identified    MyChart sent      PLAN     Recommended plan for no diet, medication or health factor changes affecting INR     Dosing Instructions: Continue your current warfarin dose with next INR in 1 week       Summary  As of 9/13/2023      Full warfarin instructions:  7.5 mg every Mon, Thu, Sat; 6 mg all other days   Next INR check:  9/20/2023               Sent [facility] message with dosing and follow up instructions    Patient to recheck with home meter    Education provided:   Please call back if any changes to your diet, medications or how you've been taking warfarin    Plan made per [facility] anticoagulation protocol      Jerrica Varma RN  Anticoagulation Clinic  9/13/2023    _______________________________________________________________________     Anticoagulation Episode Summary       Current INR goal:  2.5-3.5   TTR:  42.4 % (1 y)   Target end date:  Indefinite   Send INR reminders to:  [facility]    Indications    Paroxysmal atrial fibrillation (H) [I48.0]  S/P mitral valve replacement with metallic valve [Z95.4]             Comments:  [employer]- wants a call every time. Needs to Bridge if below 2.0             Anticoagulation Care Providers       Provider Role Specialty Phone number    Jin Hicks MD Referring Internal Medicine [phone]

## 2023-09-13 NOTE — PROGRESS NOTES
ANTICOAGULATION MANAGEMENT     Bandar Wells 73 year old male is on warfarin with therapeutic INR result. (Goal INR 2.5-3.5)    Recent labs: (last 7 days)     09/13/23  0000   INR 2.7       ASSESSMENT     Source(s): Chart Review  Previous INR was Therapeutic last visit; previously outside of goal range  Medication, diet, health changes since last INR chart reviewed; none identified    Attempted to contact nurse, Stephanie [phone], no answer. VM left to call [facility] back. Will try again later.      PLAN     Unable to reach Stephanie today.    VM left to call [facility] back.     Follow up required to confirm warfarin dose taken and assess for changes    Jerrica Varma, RN  Anticoagulation Clinic  9/13/2023

## 2023-09-19 NOTE — TELEPHONE ENCOUNTER
"Attempted to receive transfer from  staff for message from home care staff. Transfer unsuccessful and contact information for homecare was unable to be received prior to transfer, will attempt previous home care contact for patient.      Stephanie [phone] (information found in 8/28/23 encounter)     Stephanie has been Bandar's nurse for over a year. The last month Stephanie can see that patient is not sleeping and patient is starting to decline mentally. Last two visits, pt has been crying. Pt has reported to Stephanie that he has not been sleeping.    Pt wants to be go to a mental health provider who specializes in working with individuals wh are elderly.     Stephanie reports that regarding suicide \"none of that.\"     Stephanie thinks that patient may need to start an antidepressant now due to wait for mental health services.     [facility] does not have mental health resources for patient.     Stephanie said that last time pt had visit with Dr. Hicks, they did a three way call and this worked well for addressing patient needs. Stephanie is open to making herself available for this so Bandar can get the help that he needs.   Stephanie will be at patient's home Thursday morning as scheduled but Stephanie is willing to go back to patient's home today if Dr. Hicks is available for a telephone visit. Pt does have radiation treatment tomorrow.       " Patient was scheduled a follow up appointment on 8/16/17.

## 2023-09-19 NOTE — TELEPHONE ENCOUNTER
Spoke with patient and scheduled him to see Dr. Hicks via video visit tomorrow after his cancer appointment which ends at 1200. Pt stated that he would like Stephanie to be there for the visit if she is able, he was okay with me calling Stephanie to alert her to the visit.     Pt was also provided with the national crisis line phone number [phone] and encouraged to call back if he has further concerns.    Stephanie was called and updated on virtual visit tomorrow with Dr. Hicks.

## 2023-09-20 NOTE — PROGRESS NOTES
Bandar is a 73 year old who is being evaluated via a billable video visit.      How would you like to obtain your AVS? MyChart  If the video visit is dropped, the invitation should be resent by: Text to cell phone: [phone]  Will anyone else be joining your video visit? No          1. Encounter for screening involving social determinants of health (SDoH)  2. Chronic pain syndrome  3. CKD (chronic kidney disease) stage 4, GFR 15-29 ml/min (H)  4. Chronic combined systolic and diastolic heart failure (H)  5. Idiopathic chronic gout without tophus, unspecified site  6. Moderate episode of recurrent major depressive disorder (H)7. Insomnia due to medical condition    Patient has been struggling with the complexity and fatigue and mental health strain of dealing with his chronic medical issues.  He is losing some motivation to make appointments and follow-up and becoming tired.  This is caused him to not sleep well at night.  Over the past few days is gotten into a better spot but thinks he would benefit from meeting with a therapist.  He does have some loneliness living alone.  I have placed a referral and we will also have our /care coordination program reach out to him regarding other community resources or non-[place] mental health resources.  Additionally, I have encouraged him to resume taking mirtazapine which she had stopped  - Adult Mental Health  Referral; Future  - Primary Care - Care Coordination Referral; Future      Subjective   Bandar is a 73 year old, presenting for the following health issues:  Recheck Medication and Referral (Psychological assessment )      9/20/2023    12:29 PM   Additional Questions   Roomed by john tobias       History of Present Illness       Reason for visit:  Referral    He eats 2-3 servings of fruits and vegetables daily.He consumes 3 sweetened beverage(s) daily.He exercises with enough effort to increase his heart rate 9 or less minutes per day.  He exercises  with enough effort to increase his heart rate 3 or less days per week.   He is taking medications regularly.               Review of Systems         Objective    Vitals - Patient Reported  Pain Score: Moderate Pain (4)        Physical Exam                   Video-Visit Details    Type of service:  Video Visit   riginating Location (pt. Location): Home    Distant Location (provider location):  On-site  Platform used for Video Visit: Other: FaceTime

## 2023-09-20 NOTE — COMMUNITY RESOURCES LIST (ENGLISH)
09/20/2023   [facility]  N/A  For questions about this resource list or additional care needs, please contact your primary care clinic or care manager.  Phone: [phone]   Email: N/A   Address: [address] [place] [address]   Hours: N/A        Hotlines and Helplines       Hotline - Housing crisis  1  [facility] Distance: 4.91 miles      Phone/Virtual   [address] [place] [address]  Language: English  Hours: Mon - Sun Open 24 Hours   Phone: (930) 312-7590 Email: communications@[facility].org Website: https://[facility].org/oursaviourshousing/     2  [facility] Distance: 6.38 miles      Phone/Virtual   [address] [place] [address]  Language: English  Hours: Mon - Sun Open 24 Hours   Phone: (248) 540-8296 Email: info@[facility].org Website: http://www.[employer].org          Housing       Coordinated Entry access point  3  [facility] - Coordinated Access to Housing and Shelter (CAHS) - Coordinated Access Distance: 1.62 miles      In-Person, Phone/Virtual   [address] [place] [address]  Language: English  Hours: Mon - Fri 8:00 AM - 4:30 PM  Fees: Free   Phone: (993) 143-7425 Website: https://www.[facility]./residents/assistance-support/assistance/housing-services-support     4  [facility] Distance: 3.43 miles      In-Person, Phone/Virtual   [address] [place] [address]  Language: English, Venezuelan  Hours: Mon - Fri 8:30 AM - 4:30 PM  Fees: Free   Phone: (513) 945-5466 Email: info@[facility].org Website: https://www.[facility].org/locations/[facility]-clinic/     Drop-in center or day shelter  5  [facility] [place] [facility] [address] [facility] [place] [facility] Distance: 3.37 miles      In-Person   [address] [place] [address]  Language: English  Hours: Mon - Sun 9:00 AM - 5:30 PM  Fees: Free, Self Pay    Phone: (864) 321-6731 Email: info@[employer] Website: https://www.[facility].org/locations/[facility]/     6  [facility] Distance: 4.93 miles      In-Person   [address] [place] [address]  Language: English  Hours: Mon - Fri 9:00 AM - 4:00 PM  Fees: Free   Phone: (580) 230-6023 Email: balta@[employer] Website: http://[employer]     Housing search assistance  7  [facility] [place] Distance: 1.72 miles      Phone/Virtual   [address] [facility] [place] [address] [place] [address]  Language: English, Hmong, Jhoana, Irish, Scottish  Hours: Mon - Tue 8:00 AM - 4:30 PM , Wed 8:00 AM - 6:00 PM , Thu - Fri 8:00 AM - 4:30 PM  Fees: Free   Phone: (791) 810-4289 Email: [facility]. Website: https://www.[facility]/health-medical/clinics-services/mental-health/adult-mental-health     8  [facility] - Online Housing Search Assistance Distance: 1.9 miles      Phone/Virtual   [address] [place] [address]  Language: English  Hours: Mon - Sun Open 24 Hours  Fees: Free   Phone: (157) 263-1093 Email: juliocesar@[employer] Website: https://[employer].org/     Shelter for families  9  [facility] Distance: 17.67 miles      In-Person   [address] [place] [address]  Language: English  Hours: Mon - Fri 3:00 PM - 9:00 AM , Sat - Sun Open 24 Hours  Fees: Free   Phone: (464) 700-4924 Ext.1 Website: https://www.[facility].org/2020/07/03/emergency-family-shelter/     Shelter for individuals  10  [facility] [place] [facility] [address] [facility] [place] [facility] Distance: 3.37 miles      In-Person   [address] [place] [address]  Language: English  Hours: Mon - Sun 5:00 PM - 10:00 AM  Fees: Free, Self Pay   Phone: (877) 760-4251 Email: info@[employer] Website:  https://www.[facility].org/locations/[facility]/     11  [facility] Distance: 4.91 miles      In-Person   [address] [place] [address]  Language: English  Hours: Mon - Sun Open 24 Hours  Fees: Free   Phone: (716) 610-8974 Email: communications@[facility].org Website: https://[facility].org/oursaviourshousing/          Important Numbers & Websites       Emergency Services   [address]  [place] [address]  Poison Control   (832) 163-4840  Suicide Prevention Lifeline   (424) 269-9934 (TALK)  Child Abuse Hotline   (646) 503-6027 (4-A-Child)  Sexual Assault Hotline   (148) 493-6098 (HOPE)  National Runaway Safeline   (201) 930-1104 (RUNAWAY)  All-Options Talkline   (925) 318-2533  Substance Abuse Referral   (506) 356-6887 (HELP)

## 2023-09-21 NOTE — PROGRESS NOTES
Clinic Care Coordination Contact  Community Health Worker Initial Outreach    CHW Initial Information Gathering:  Referral Source: PCP  Preferred Urgent Care: [facility] [place], [phone]  Current living arrangement:: I live in a private home  Type of residence:: Private home - stairs  Informal Support system:: Children, Family, Friends  No PCP office visit in Past Year: No  Transportation means:: Regular car, Friend, Family  CHW Additional Questions  If ED/Hospital discharge, follow-up appointment scheduled as recommended?: N/A  Medication changes made following ED/Hospital discharge?: N/A  MyChart active?: Yes  Patient sent Social Determinants of Health questionnaire?: Yes    Patient accepts CC: Yes. Patient scheduled for assessment with CC SW on 9/22/2023 at 1:00 pm. Patient noted desire to discuss recent CC referral placed by PCP.     Order Questions    Question Answer   Reason for Referral: Complex Medical Concerns/Education    Mental Wellness (Health) (Mental Illness/Chemical Dependency)   Complex Medical Concerns: Chronic Diagnosis - Use Comments   Mental Wellness: Resources of Behavioral Health Services   Clinical Staff have discussed the Care Coordination Referral with the patient and/or caregiver: Yes     Nerissa SADLER  Community Health Worker  [facility] Care Coordination  [email or web address]  [email or web address]  Office: [phone]

## 2023-09-22 NOTE — PROGRESS NOTES
Contact   Chart Review     Situation: Patient chart reviewed by .    Background: Referral for care coordination to discuss mental health supports.     Assessment: Helped patient schedule therapy appt on 9-29 within [facility].  He wanted a referral to transition clinic as well, which was completed.  He was expecting someone at his home and wants to call if he needs further support.      Plan/Recommendations: Sending letter. Patient to call if needs arise.     Kiera Natarajan,   [facility]  [phone]

## 2023-09-22 NOTE — TELEPHONE ENCOUNTER
ANTICOAGULATION     Bandar Wells is overdue for INR check.     Spoke with Bandar who would prefer to test in two weeks from his most recent INR which was 09/13/2023. This seems appropriate given last two INRs were in range, however upon further chart review pt has an INR of 5.3 earlier this month so a weekly test is warranted.    [employer] sent with recommendation.    Driss Gonzalez RN

## 2023-09-22 NOTE — TELEPHONE ENCOUNTER
First attempt to reach patient regarding Transition Clinic Referral.  Spoke to patient regarding Transition Clinic referral.   Scheduled patient for Transition Clinic services on 9/25/2023. Intake forms sent to patient via [employer] Tracker form completed.     Eliz Bocanegra  Transition Clinic Coordinator  09/22/23 1:57 PM        ----- Message from BEBO Price sent at 9/22/2023  1:13 PM CDT -----  Contact: [phone]  Transition Clinic Referral   [place]         Please Check Type of Referral Requested:       __x__THERAPY: The Transition clinic is able to schedule patients without current medical insurance; these patient will be referred to our Social Work Care Coordinator for Medical Insurance              Assistance. We are open for referral for psychotherapy. Patient is referred from:  Christopher ALVES care coordination      ____MEDICATION:  Referrals for Medication are ONLY accepted from the following areas (select):                                     Suboxone and Opioid Management Referrals are automatically denied. [facility] cannot see patient without active medical insurance.      Next level of psychiatry care must be within 12 months.  TC Psychiatry cannot accept patient with next level of care scheduled with PCP.  The transition clinic cannot follow patients who are on a restricted recipient program.    GUARDIAN: If your patient is not their own Guardian, please provide the following:    Guardian Name:  Guardian Contact Information (Phone & Email) :  Guardian Address:     FOSTER CARE PROVIDER: If your patient lives at a Licensed Foster Care, please provide the following:    Foster Provider:  Foster Provider Contact Information (Phone & Email):  Foster Provider Address:       Referring Provider Contact Name: dimas avendano; Phone Number: [phone]    Reason for Transition Clinic Referral: depression    Next Level of Care Patient Will Be Transitioned To:  counseling  Provider(s)working on where.   Location   Date/Time     What Would Be Helpful from the Transition Clinic:      Needs: NO    Does Patient Have Access to Technology: yes, can do phone or virtual,     Patient E-mail Address: [email or web address]    Current Patient Phone Number: [phone];     Clinician Gender Preference (if applicable): NO    Patient location preference: Virtual    BEBO Price

## 2023-09-22 NOTE — LETTER
[facility] CARE COORDINATION  [facility]  September 22, 2023    Bandar Wells  [address]  [place] [address]      Dear Bandar,        I am a  clinic care coordinator who works with Jin Hicks MD with the [facility]. I wanted to thank you for spending the time to talk with me.  Below is a description of clinic care coordination and how I can further assist you.       The clinic care coordination team is made up of a registered nurse, , financial resource worker and community health worker who understand the health care system. The goal of clinic care coordination is to help you manage your health and improve access to the health care system. Our team works alongside your provider to assist you in determining your health and social needs. We can help you obtain health care and community resources, providing you with necessary information and education. We can work with you through any barriers and develop a care plan that helps coordinate and strengthen the communication between you and your care team.  Our services are voluntary and are offered without charge to you personally.    Please feel free to contact me with any questions or concerns regarding care coordination and what we can offer.      We are focused on providing you with the highest-quality healthcare experience possible.    Sincerely,     Kiera Natarajan,   [facility]  [phone]

## 2023-09-22 NOTE — Clinical Note
Surprisingly, [facility] was able to get him a therapy appt in one week!  Also sent referral to transition clinic to reach out earlier.  Thanks, Kiera

## 2023-09-26 NOTE — PROGRESS NOTES
ANTICOAGULATION MANAGEMENT     Bandar Wells 73 year old male is on warfarin with supratherapeutic INR result. (Goal INR 2.5-3.5)    Recent labs: (last 7 days)     09/25/23  0000   INR 4.0*       ASSESSMENT     Source(s): Chart Review and Patient/Caregiver Call     Warfarin doses taken: Warfarin taken as instructed - reported that he took his scheduled dose of 7.5 mg yesterday.  Diet: No new diet changes identified  Medication/supplement changes: None noted  New illness, injury, or hospitalization: Yes: felt lousy over the weekend felt better   Signs or symptoms of bleeding or clotting: Yes: bleeding from wound on leg and head ( where the radiation area) per patient a nurse came and dressed the wound today  Previous result: Therapeutic last 2(+) visits  Additional findings:  Per Stephanie patient does own inr check and manages own meds- call patient directly to discuss inr.  Per patient he had 5 more radiation therapy left.       PLAN     Recommended plan for temporary change(s) affecting INR     Dosing Instructions: hold dose then continue your current warfarin dose with next INR in 1 week       Summary  As of 9/26/2023      Full warfarin instructions:  9/26: Hold; Otherwise 7.5 mg every Mon, Thu, Sat; 6 mg all other days   Next INR check:  10/2/2023               Telephone call with Bandar who verbalizes understanding and agrees to plan and who agrees to plan and repeated back plan correctly    Patient to recheck with home meter    Education provided:   Symptom monitoring: monitoring for bleeding signs and symptoms  Contact [phone]  with any changes, questions or concerns.     Plan made per [facility] anticoagulation protocol    Galilea Hernandez RN  Anticoagulation Clinic  9/26/2023    _______________________________________________________________________     Anticoagulation Episode Summary       Current INR goal:  2.5-3.5   TTR:  43.9 % (1 y)   Target end date:  Indefinite   Send INR reminders to:  [facility]     Indications    Paroxysmal atrial fibrillation (H) [I48.0]  S/P mitral valve replacement with metallic valve [Z95.4]             Comments:  [facility]- wants a call every time. Needs to Bridge if below 2.0             Anticoagulation Care Providers       Provider Role Specialty Phone number    Jin Hicks MD Referring Internal Medicine [phone]

## 2023-09-26 NOTE — TELEPHONE ENCOUNTER
Spoke with Stephanie and gave her the okay for verbal orders from Dr. Hicks. Stephanie will reach back out to clinic if there are further concerns.

## 2023-09-26 NOTE — PROGRESS NOTES
ANTICOAGULATION MANAGEMENT     Bandar Wells 73 year old male is on warfarin with supratherapeutic INR result. (Goal INR 2.5-3.5)    Recent labs: (last 7 days)     09/25/23  0000   INR 4.0*       ASSESSMENT     Source(s): Chart Review     Warfarin doses taken: Reviewed in chart    Previous result: Therapeutic last 2(+) visits  Additional findings:  Per Stephanie  patient is very sick lately and is getting daily radiation she also stated that patient does his own meds and INR checks and calls should go directly to him       PLAN     Unable to reach Bandar today.    Left message to hold warfarin tonight if dose was not held yesterday Request call back for assessment.    Follow up required to confirm warfarin dose taken and assess for changes    Galilea Hernandez RN  Anticoagulation Clinic  9/26/2023

## 2023-09-26 NOTE — TELEPHONE ENCOUNTER
Home Care is calling regarding an established patient with [facility].       Requesting orders from: Jin Hicks  Provider is following patient: Yes  Is this a 60-day recertification request?  No    Orders Requested    Skilled Nursing  Request for initial certification (first set of orders)   Frequency:  Add visit on Tuesday and Thursday x1 week to equal a total of 5 visits this week for wound care on top of head.     Will apply wound  and opti foam adhesive to wound on top of head.       Information was gathered and will be sent to provider for review.  RN will contact Home Care with information after provider review.    Laura Trent, CLINT Curiel reports that patient hit the top of his head Monday night. States swelling is approximately marble in size and abrasion is 2.5 inches long and quarter inch wide. No active bleeding and Veena denies s/s of infection. Denies loss of consciousness.     Veena states she does not feel patient needs an appointment for this and would like orders to perform wound care for patient. Veena states she is already doing wound care for other wound the patient has. States that she believes radiation has weakened the skin and the warfarin caused the bleeding and swelling. Reports that she does not feel he need stitches or to be seen unless area is not healing.      Veena reports that she sees patient 3 days a week already and would like to add additional visits this week for wound care to the top of patients head.

## 2023-10-03 NOTE — TELEPHONE ENCOUNTER
October 3, 2023    Home health orders was received via fax for Dr. Hicks to sign.  Patient label was attached to paperwork and placed in provider's inbox to be signed.    Jackelyn Zabala

## 2023-10-04 NOTE — PROGRESS NOTES
ANTICOAGULATION MANAGEMENT     Bandar Wells 73 year old male is on warfarin with supratherapeutic INR result. (Goal INR 2.5-3.5)    Recent labs: (last 7 days)     10/03/23  0000   INR 4.8*       ASSESSMENT     Source(s): Chart Review and Patient/Caregiver Call     Warfarin doses taken: Warfarin taken as instructed  Diet: No new diet changes identified  Medication/supplement changes: None noted  New illness, injury, or hospitalization: No  Signs or symptoms of bleeding or clotting: Yes: wound is bleeding and head radiation spot broke open last night  Previous result: Supratherapeutic  Additional findings: None       PLAN     Recommended plan for no diet, medication or health factor changes affecting INR     Dosing Instructions: hold dose then decrease your warfarin dose (9.7% change) with next INR in 5 days       Summary  As of 10/4/2023      Full warfarin instructions:  10/4: 3 mg; Otherwise 6 mg every day   Next INR check:  10/9/2023               Telephone call with Bandar who agrees to plan and repeated back plan correctly    Patient to recheck with home meter    Education provided:   None required    Plan made per [facility] anticoagulation protocol    Jackelyn Ayala, RN  Anticoagulation Clinic  10/4/2023    _______________________________________________________________________     Anticoagulation Episode Summary       Current INR goal:  2.5-3.5   TTR:  43.4 % (1 y)   Target end date:  Indefinite   Send INR reminders to:  [facility]    Indications    Paroxysmal atrial fibrillation (H) [I48.0]  S/P mitral valve replacement with metallic valve [Z95.4]             Comments:  [facility]- wants a call every time. Needs to Bridge if below 2.0             Anticoagulation Care Providers       Provider Role Specialty Phone number    Jin Hicks MD Referring Internal Medicine [phone]

## 2023-10-06 NOTE — PROGRESS NOTES
ANTICOAGULATION CLINIC REFERRAL RENEWAL REQUEST       An annual renewal order is required for all patients referred to [facility].?  Please review and sign the pended referral order for Bandar Wells.       ANTICOAGULATION SUMMARY      Warfarin indication(s)   Atrial Fibrillation and Mechanical MVR    Mechanical heart valve present?  mitral valve replacement with metallic valve        Current goal range   INR: 2.5-3.5     Goal appropriate for indication? Goal INR 2.5-3.5 standard for indication(s) above     Time in Therapeutic Range (TTR)  (Goal > 60%) 43.4%       Office visit with referring provider's group within last year yes on 07/20/2023       Jackelyn Ayala, RN  [facility]

## 2023-10-06 NOTE — PROGRESS NOTES
"          [facility]      PATIENT'S NAME: Bandar Wells  PREFERRED NAME: Bandar  PRONOUNS: He/him  MRN: [id number]  : 1950  ADDRESS: [address] [place] [address]  [facility]  [id number]  DATE OF SERVICE: 10/06/23  START TIME: 8:04am  END TIME: 8:43am  PREFERRED PHONE: [phone]  May we leave a program related message: Yes  SERVICE MODALITY:  Video Visit:      Provider verified identity through the following two step process.  Patient provided:  Patient , Patient address, and Patient is known previously to provider    Telemedicine Visit: The patient's condition can be safely assessed and treated via synchronous audio and visual telemedicine encounter.      Reason for Telemedicine Visit: Patient convenience (e.g. access to timely appointments / distance to available provider)    Originating Site (Patient Location): Patient's home    Distant Site (Provider Location): Provider Remote Setting- Home Office    Consent:  The patient/guardian has verbally consented to: the potential risks and benefits of telemedicine (video visit) versus in person care; bill my insurance or make self-payment for services provided; and responsibility for payment of non-covered services.     Patient would like the video invitation sent by:  My Chart    Mode of Communication:  Video Conference via Amwell    Distant Location (Provider):  Off-site    As the provider I attest to compliance with applicable laws and regulations related to telemedicine.    UNIVERSAL ADULT Mental Health DIAGNOSTIC ASSESSMENT    Identifying Information:  Patient is a 73 year old,      individual.  Patient was referred for an assessment by   self.  Patient attended the session alone.    Chief Complaint:   The reason for seeking services at this time is: \"I'm asking for help because I need emotional support and direction\".  \"Consultation and guidance about how to get out of this mess I'm in\".  \"Hopelessness, anger, " "circumstantial\".  Significant health difficulties, feeling overwhelmed.   The problem(s) began a couple years ago.    30 years ago: individual therapy for relationship difficulties    Patient has attempted to resolve these concerns in the past through going to the doctor to address physical health problems .    Social/Family History:  Patient reported they grew up in other  Family numerous [facility] across the [place].  They were raised by biological parents  .  Parents were always together.  Patient reported having 2 siblings.  Patient reported that their childhood was \"fairly happy\".  Patient described their current relationships with family of origin as \"don't have much of a relaitonship with my brother and sister, others are \".     The patient describes their cultural background as  and Rastafarian, not practicing.  Patient reported they were raised Rastafarian.  Cultural influences and impact on patient's life structure, values, norms, and healthcare: none.  Contextual influences on patient's health include: Contextual Factors: Individual Factors   and Health- Seeking Factors   .    These factors will be addressed in the Preliminary Treatment plan. Patient identified their preferred language to be English. Patient reported they does not need the assistance of an  or other support involved in therapy.     Patient reported had no significant delays in developmental tasks.   Patient's highest education level was college, BA, some work towards Masters.  Patient identified the following learning problems: none reported.  Modifications will not be used to assist communication in therapy. Patient reports they are  able to understand written materials.    Patient reported the following relationship history: .    in  for 7 years, .  Patient reported marriage was pretty good for a while, and then it \"fell apart\".  Patient reported it was a difficult divorce.  Patient's current " "relationship status is in a relationship beginning in around 2000.  Patient is in a relationship for over 20 years.  Patient reported it was \"steady\" \"fairly good\".  Patient stated they have no plans.  Patient identified their sexual orientation as heterosexual.  Patient reported having 1child(jorge). Patient identified Pat (significant other), my son, and my good friends.  as part of their support system.  Patient identified the quality of these relationships as \"good\"      Patient's current living/housing situation involves his own home.  Significant other has dog that they \"share\".  The immediate members of family and household include himself  and they report that housing is stable.    Patient is currently retired.  Patient reports their finances are obtained through pension and medicare. Patient does not identify finances as a current stressor.      Patient reported that they had been involved with the legal system.  Patient was cited for driving without a license as a teen.    Patient does not report being under probation/ parole/ jurisdiction. They are not under any current court jurisdiction. .    Patient's Strengths and Limitations:  Patient identified the following strengths or resources that will help them succeed in treatment: commitment to health and well being, carlos / spirituality, friends / good social support, and motivation. Things that may interfere with the patient's success in treatment include: lack of family support, physical health concerns, and patient living alone .     Assessments:  The following assessments were completed by patient for this visit:  Elliott Suicide Severity Rating Scale (Lifetime/Recent)      9/29/2023    12:00 PM   Elliott Suicide Severity Rating (Lifetime/Recent)   Q1 Wish to be Dead (Lifetime) Y   Wish to be Dead Description (Lifetime) Patient reported \"daily\" thoughts of wishing he were dead/thinking he would be better off dead.  Patient denied anythoughts about " "actually killing himself.  Patient stated these thoughts began \"a couple years ago\"   1. Wish to be Dead (Past 1 Month) Y   Q2 Non-Specific Active Suicidal Thoughts (Lifetime) N   Most Severe Ideation Rating (Lifetime) 1   Most Severe Ideation Rating (Past 1 Month) 1   Frequency (Lifetime) 4   Frequency (Past 1 Month) 4   Duration (Lifetime) 1   Duration (Past 1 Month) 1   Controllability (Lifetime) 1   Controllability (Past 1 Month) 1   Deterrents (Lifetime) 1   Deterrents (Past 1 Month) 1   Reasons for Ideation (Lifetime) 5   Reasons for Ideation (Past 1 Month) 5   Actual Attempt (Lifetime) N   Has subject engaged in non-suicidal self-injurious behavior? (Lifetime) N   Interrupted Attempts (Lifetime) N   Aborted or Self-Interrupted Attempt (Lifetime) N   Preparatory Acts or Behavior (Lifetime) N   Calculated C-SSRS Risk Score (Lifetime/Recent) Low Risk         2/2/2023     6:00 PM 10/6/2023     8:30 AM   TOD-7 SCORE   Total Score 10 (moderate anxiety)    Total Score 10 8         9/25/2023     8:29 AM 9/29/2023    11:50 AM 10/6/2023     7:49 AM   PHQ   PHQ-9 Total Score 14 8 9   Q9: Thoughts of better off dead/self-harm past 2 weeks Several days Several days Not at all   F/U: Thoughts of suicide or self-harm Yes Yes    F/U: Self harm-plan No No    F/U: Self-harm action No No    F/U: Safety concerns No No          9/29/2023    12:57 PM   CAGE-AID Total Score   Total Score 2       CAGE-AID score  > 1 is a positive screen, suggesting further discussion is needed to determine if evaluation for alcohol or substance abuse is appropriate.  A score > 2 is considered clinically significant, suggesting further evaluation of alcohol or substance-related problems is indicated.    PROMIS-10 Scores        10/6/2023     7:50 AM   PROMIS-10 Total Score w/o Sub Scores   PROMIS TOTAL - SUBSCORES 18         Personal and Family Medical History:  Patient   report a family history of mental health concerns.  Patient reports family " "history includes Alcoholism in his brother; Atrial fibrillation in his mother; Emphysema in his father; Heart Failure in his father and mother; Kidney failure in his sister; No Known Problems in his brother and son..     Patient does report Mental Health Diagnosis and/or Treatment.  Patient Patient reported the following previous diagnoses which include(s): Major Depressive Disorder diagnosed 6 months ago.  Patient reported symptoms began when medical concerns started.  Patient has not received mental health services in the past:  Psychiatric Hospitalizations: none. Patient denies a history of civil commitment.  Currently, patient    receiving other mental health services.  These include psychiatry with [facility].  Next appointment: Patient is unsure.   and none.       Patient has not had a physical exam to rule out medical causes for current symptoms.  Date of last physical exam was greater than a year ago and client was encouraged to schedule an exam with PCP.  Patient says he \"speaks to him often\".  The patient has a [place] Primary Care Provider, who is named Jin Hicks.  Patient reports the following current medical concerns: radiation for skin cancer in his head, major medical problem with right leg (veinous insufficiency), caused a wound in leg that needs to be cared for regularly, Stage 4 Epilepsy issue. .  Patient reports pain concerns including head, leg.  Patient does not want help addressing pain concerns..   There are not significant appetite / nutritional concerns / weight changes.   Patient does not report a history of head injury / trauma / cognitive impairment.      PSYCHIATRIC MEDICATION  Mirtazapine (Remeron) 15mg    Medication Adherence:  Patient reports  .  taking prescribed medications as prescribed.    Patient Allergies:    Allergies   Allergen Reactions    Allopurinol Rash    Clindamycin Rash       Medical History:    Past Medical History:   Diagnosis Date    Abnormal liver " function test     Atrial fibrillation (H)     s/p Maze procedure    Atrial fibrillation (H)     post naun    Atrial fibrillation with RVR (H) 8/29/2015    S/p MAZE Jul 2015    Bacterial endocarditis     CHF (congestive heart failure) (H)     Chronic combined systolic and diastolic heart failure (H)     CKD (chronic kidney disease)     Dyslipidemia     H/O mitral valve replacement with mechanical valve     Hyperlipidemia     Hyperlipidemia     Idiopathic chronic gout without tophus, unspecified site 4/15/2021    Mitral valve prolapse     Near syncope     Pericardial effusion without cardiac tamponade 8/29/2015    S/P mitral valve replacement with metallic valve 03/16/2017    Status post Maze operation for atrial fibrillation 8/29/2015         Current Mental Status Exam:   Appearance:  Appropriate    Eye Contact:  Fair   Psychomotor:  Restless       Gait / station:  N/A Video Visit  Attitude / Demeanor: Cooperative  Guarded   Speech      Rate / Production: Normal/ Responsive Mumbled      Volume:  Soft  volume      Language:  intact  Mood:   Anxious  Depressed  Grieving  Affect:   Blunted    Thought Content: Clear  Rumination   Thought Process: Coherent       Associations: No loosening of associations  Insight:   Fair   Judgment:  Intact   Orientation:  All  Attention/concentration: Good    Substance Use:  Patient did not report a family history of substance use concerns; see medical history section for details.  Patient has not received chemical dependency treatment in the past.  Patient has not ever been to detox.      Patient is not currently receiving any chemical dependency treatment. Patient reported the following problems as a result of their substance use:   None identified at this time .    Patient  reports drinking 1-2 Rum and Cokes daily (which include a total of 1-4 shots of Rum), margaritas not highest conern at all.   Patient denies using tobacco.   Patient reports using cannabis daily for sleep (patient  "recently began using 1-2 gummies)  Patient reports drinking 1 cup of coffee daily.   Patient reports using/abusing the following substance(s). Patient reported no other substance use.     Substance Use: No symptoms    Based on the positive CAGE score and clinical interview there   are indications of possible drug or alcohol abuse.  Provider recommended that patient decrease alcohol intake.  Provider will continue to assess .Patient reported that his alcohol use was no the \"highest concern at all\" but was willing to consider cutting down on daily alcohol use.     Significant Losses / Trauma / Abuse / Neglect Issues:   Patient did not serve in the .  There are indications or report of significant loss, trauma, abuse or neglect issues related to: are indications or report of significant loss, trauma, abuse or neglect issues related to, changes in child custody rights  , death of family member or friend that was impactful, major medical problems  , divorce / relational changes  , emotional abuse by client  , and neglect by client   .  Concerns for possible neglect are not present. Provider will continue to assess.    Safety Assessment:   Patient denies current homicidal ideation and behaviors.  Patient denies current self-injurious ideation and behaviors.    Patient denied risk behaviors associated with substance use.  Patient denies any high risk behaviors associated with mental health symptoms.  Patient reports the following current concerns for their personal safety:  Patient reported concerns around being a vulnerable adult and losing his ability to live alone due to recent significant health concerns .      Patient reports there are no firearms in the house.       There are no firearms in the home..      History of Safety Concerns:  Patient denied a history of homicidal ideation.     Patient denied a history of personal safety concerns.    Patient denied a history of assaultive behaviors.    Patient denied " a history of sexual assault behaviors.     Patient reported a history of unsafe sex practices  reported a history of placing themselves in unsafe environment(s) associated with substance use.  Patient reported a history of high risk sexual behaviors  reported a history of injuries or accidents resulting from impulsivity associated with mental health symptoms.  Patient reports the following protective factors:      Risk Plan:  See Recommendations for Safety and Risk Management Plan    Review of Symptoms per patient report:   Depression: Change in sleep, Lack of interest, Excessive or inappropriate guilt, Change in energy level, Difficulties concentrating, Change in appetite, Feelings of hopelessness, Low self-worth, Ruminations, Irritability, Feeling sad, down, or depressed, and Withdrawn  Vicky:  No Symptoms  Psychosis: No Symptoms  Anxiety: Excessive worry, Nervousness, Sleep disturbance, Ruminations, Poor concentration, and Irritability  Panic:  No symptoms  Post Traumatic Stress Disorder:  No Symptoms   Eating Disorder: No Symptoms  ADD / ADHD:  No symptoms  Conduct Disorder: No symptoms  Autism Spectrum Disorder: No symptoms  Obsessive Compulsive Disorder: No Symptoms    Patient reports the following compulsive behaviors and treatment history:  none. .      Diagnostic Criteria:   Adjustment Disorder  A. The development of emotional or behavioral symptoms in response to an identifiable stressor(s) occurring within 3 months of the onset of the stressor(s)  B. These symptoms or behaviors are clinically significant, as evidenced by one or both of the following:  C. The stress-related disturbance does not meet criteria for another disorder & is not not an exacerbation of another mental disorder  D. The symptoms do not represent normal bereavement  E. Once the stressor or its consequences have terminated, the symptoms do not persist for more than an additional 6 months       * Adjustment Disorder with Mixed Anxiety  and Depressed Mood: The predominant manifestation is a combination of depression and anxiety    Functional Status:  Patient reports the following functional impairments:  health maintenance, home life with himself (lives alone), management of the household and or completion of tasks, relationship(s), self-care, and social interactions.     Nonprogrammatic care:  Patient is requesting basic services to address current mental health concerns.    Clinical Summary:  1. Reason for assessment: Patient is experiencing significant depressive and anxious sxs resulting from significant medical difficulties that are impacting patient's quality of life.  2. Psychosocial, Cultural and Contextual Factors: Lives alone, significant medical difficulties, distant hx of high risk behaviors/substance-use behaviors, significant substance use (alcohol), family and friends as strong protective factors   .  3. Principal DSM5 Diagnoses  (Sustained by DSM5 Criteria Listed Above):   Adjustment Disorders  309.28 (F43.23) With mixed anxiety and depressed mood. R/O Major Depressive Disorder, R/O Generalized Anxiety Disorder  4. Other Diagnoses that is relevant to services:   N/A at this time. Provider will continue to assess.   5. Provisional Diagnosis:  N/A at this time. Provider will continue to assess.   6. Prognosis: Expect Improvement and Relieve Acute Symptoms.  7. Likely consequences of symptoms if not treated: Patient will continue to experience clinically significant anxious and depressive sxs that will be impactful to patient's ability to function and quality of life.  8. Client strengths include:  goal-focused, has a previous history of therapy, insightful, motivated, support of family, friends and providers, and work history .     Recommendations:     1. Plan for Safety and Risk Management:   Safety and Risk: A safety and risk management plan has been developed including: Patient consented to co-developed safety plan on 9/29/23.   Safety and risk management plan was reviewed.   Patient agreed to use safety plan should any safety concerns arise.  A copy was made available to the patient..          Report to child / adult protection services was NA.     2. Patient's identified  no cultural/ethnic/Scientologist/spiritual influences that they would like to specifically incorporate into therapy at this time.  Provider will continue to assess .     3. Initial Treatment will focus on:    Depressed Mood - motivation for change  Anxiety - develop and utilize coping strategies to manage anxious sxs  Alcohol / Substance Use - continued assessment and psychoeducation .     4. Resources/Service Plan:    services are not indicated.   Modifications to assist communication are not indicated.   Additional disability accommodations are not indicated.      5. Collaboration:   Collaboration / coordination of treatment will be initiated with the following  support professionals: N/A at this time. Provider will continue to assess.      6.  Referrals:   The following referral(s) will be initiated: N/A at this time. Provider will continue to assess.      A Release of Information has been obtained for the following: N/A at this time. Provider will continue to assess.      Clinical Substantiation/medical necessity for the above recommendations:  N/A at this time. Provider will continue to assess. .    7. CLEO:    CLEO:  Discussed the general effects of drugs and alcohol on health and well-being. Recommendations:  Provider again recommended patient consider cutting back on daily alcohol usage.  Provider will continue to assess.      8. Records:   These were reviewed at time of assessment.   Information in this assessment was obtained from the medical record and  provided by patient who is a fair historian.    Patient will have open access to their mental health medical record.    9.   Interactive Complexity: No    10. Safety Plan:  When the patient identifies the  following:  Modifiable risk factors and strong protective factors      The following is recommended:   Complete/Review/Update Safety Plan    Provider Name/ Credentials:  Jerrica Cleaning [facility]  October 6, 2023    Service Performed and Documented by VALDEZ ROSARIO reviewed and clinical supervision by BERNY Menchaca [facility] 10/9/2023      Safety Plan:  Adult Short Safety Plan:   Name: Bandar Wells  YOB: 1950  Date: September 29, 2023   My primary care provider: Jin Hicks     My Triggers:  Medical Health      Additional People, Places, and Things that I can access for support: Family and friends                GREEN    Good Control  1. I feel good  2. No suicidal thoughts   3. Can work, sleep and play      Action Steps  1. Self-care: balanced meals, exercising, sleep practices, etc.  2. Take your medications as prescribed.  3. Continue meetings with therapist and prescriber.  4.  Do the healthy things that I enjoy.  5. Reach out and spend time with family and friends           YELLOW  Getting Worse  I have ANY of these:  1. I do not feel good  2. Difficulty Concentrating  3. Sleep is changing  4. Increase/Change in my thoughts to hurt self and/or others, but I can still manage and not act on it.   5. Not taking care of self.             Action Steps (in addition to the above):  1. Inform your therapist and psychiatric prescriber/PCP.  2. Keep taking your medications as prescribed.    3. Turn to people you can ask for help.  4. Use internal coping strategies -see below.  5. Create safe environment: notify friends/family of increase in symptoms             RED  Get Help  If I have ANY of these:  1. Current and uncontrollable thoughts and/or behaviors to hurt self and/or others.      Actions to manage my safety  1. Contact your emergency person   2. Call or Text 365  3. Call my crisis team- [facility] [phone] [facility]  3. Or Call [phone] or go to the emergency room right  away          [End for Brief Safety Plan]     Safety Concerns  How To Identify Situations That Make Your Mental Health Worse:  Triggers are things that make your mental health worse.  Look at the list below to help you find your triggers and what you can do about them.     1. Identify Early Warning Signs:    Sometimes symptoms return, even when people do their best to stay well. Symptoms can develop over a short period of time with little or no warning, but most of the time they emerge gradually over several weeks.  Early warning signs are changes that people experience when a relapse is starting. Some early warning signs are common and others are not as common.   Common Early Warning Signs:    Feeling depressed or low     2. Identify action steps to take when warning signs are noticed:    Taking Action- It is important to take action if you are experiencing early warning signs of a relapse.  The faster you act, the more likely it is that you can avoid a full relapse.  It is helpful to identify several specific ways to cope with symptoms.      The following is my list of symptoms and coping strategies that I can use when they are present:    Symptom Coping Strategies   Anxiety -Talk with someone in your support system and let him or her know how you are feeling.  -Use relaxation techniques such as deep breathing or imagery.  -Use positive affirmations to counteract negative self-talk such as  I am learning to let go of worry.    Depression - Schedule your day; include activities you have to do and activities you enjoy doing.  - Get some exercise - walk, run, bike, or swim.  - Give yourself credit for even the smallest things you get done.   Sleep Difficulties   - Go to sleep at the same time every day.  - Do something relaxing before bed, such as drinking herbal tea or listening to music.  - Avoid having discussions about upsetting topics before going to bed.   Delusions   - Distract yourself from the disturbing  thought by doing something that requires your attention such as a puzzle.  - Check out your beliefs by talking to someone you trust.    Hallucinations   - Use headphones to listen to music.  - Tell voices to  stop  or say to yourself,  I am safe.   - Ignore the hallucinations as much as possible; focus on other things.   Concentration Difficulties - Minimize distractions so there is only one thing for you to focus on at a time.    - Ask the person you are having a conversation with to slow down or repeat things you are unsure of.      Provider Name/ Credentials:  Jerrica Cleaning [facility]  October 6, 2023       Answers submitted by the patient for this visit:  Patient Health Questionnaire (Submitted on 10/6/2023)  If you checked off any problems, how difficult have these problems made it for you to do your work, take care of things at home, or get along with other people?: Somewhat difficult  PHQ9 TOTAL SCORE: 9

## 2023-10-09 NOTE — TELEPHONE ENCOUNTER
October 9, 2023    Home health orders was picked up from outbox of Dr. Hicks.  Paperwork has been reviewed and is complete.  Per initial initial request, this was sent via fax to [phone].     Jackelyn Zabala

## 2023-10-10 NOTE — PROGRESS NOTES
ANTICOAGULATION MANAGEMENT     Bandar Wells 73 year old male is on warfarin with subtherapeutic INR result. (Goal INR 2.5-3.5)    Recent labs: (last 7 days)     10/10/23  0000   INR 2.2*       ASSESSMENT     Source(s): Chart Review and Patient/Caregiver Call     Warfarin doses taken: More warfarin taken than planned which may be affecting INR per patient he was taking warfarin 2tablets daily  Diet: Protein supplement/shake started 10/8 and drinking 1 can every other day which maybe affecting INR  Medication/supplement changes: None noted  New illness, injury, or hospitalization: No  Signs or symptoms of bleeding or clotting: No  Previous result: Supratherapeutic  Additional findings: None       PLAN     Recommended plan for temporary change(s) and ongoing change(s) affecting INR     Dosing Instructions: continue  your warfarin dose (10.7% change) with next INR in 1 week       Summary  As of 10/10/2023      Full warfarin instructions:  7.5 mg every Tue, Thu, Sat; 6 mg all other days   Next INR check:  10/17/2023               Telephone call with Bandar who verbalizes understanding and agrees to plan  Sent [facility] message with dosing and follow up instructions    Patient to recheck with home meter    Education provided:   Contact [phone]  with any changes, questions or concerns.     Plan made with [facility] Pharmacist Batool Hernandez RN  Anticoagulation Clinic  10/10/2023    _______________________________________________________________________     Anticoagulation Episode Summary       Current INR goal:  2.5-3.5   TTR:  44.1 % (1 y)   Target end date:  Indefinite   Send INR reminders to:  ANTICOAG HOME MONITOR[facility]    Indications    Paroxysmal atrial fibrillation (H) [I48.0]  S/P mitral valve replacement with metallic valve [Z95.4]             Comments:  [facility]- wants a call every time. Needs to Bridge if below 2.0             Anticoagulation Care Providers       Provider Role Specialty Phone number     Jin Hicks MD [facility] Internal Medicine [phone]

## 2023-10-11 NOTE — TELEPHONE ENCOUNTER
October 11, 2023    Home health orders was received via fax for Dr. Hicks to sign.  Patient label was attached to paperwork and placed in provider's inbox to be signed.    Jackelyn Zabala

## 2023-10-13 NOTE — TELEPHONE ENCOUNTER
October 13, 2023    Home health orders was picked up from outbox of Dr. Hicks.  Paperwork has been reviewed and is complete.  Per initial initial request, this was sent via fax to [phone].     Jackelyn Zabala

## 2023-10-17 NOTE — PROGRESS NOTES
ANTICOAGULATION MANAGEMENT     Bandar Wells 73 year old male is on warfarin with subtherapeutic INR result. (Goal INR 2.5-3.5)    Recent labs: (last 7 days)     10/17/23  0000   INR 2.2*       ASSESSMENT     Source(s): Chart Review and Patient/Caregiver Call     Warfarin doses taken: Warfarin taken differently, but did not change total weekly dose  Diet:  Continues to drink  protein supplements every other day maybe affecting INR today  Medication/supplement changes: None noted  New illness, injury, or hospitalization: No  Signs or symptoms of bleeding or clotting: No  Previous result: Subtherapeutic  Additional findings: None       PLAN     Recommended plan for ongoing change(s) affecting INR     Dosing Instructions: Increase your warfarin dose (10% change) with next INR in 1 week       Summary  As of 10/17/2023      Full warfarin instructions:  9 mg every Tue, Thu, Sat; 6 mg all other days   Next INR check:  10/24/2023               Telephone call with Bandar who verbalizes understanding and agrees to plan and who agrees to plan and repeated back plan correctly    Patient to recheck with home meter    Education provided:   Contact [phone]  with any changes, questions or concerns.     Plan made per [facility] anticoagulation protocol    Galilea Hernandez RN  Anticoagulation Clinic  10/17/2023    _______________________________________________________________________     Anticoagulation Episode Summary       Current INR goal:  2.5-3.5   TTR:  44.1% (1 y)   Target end date:  Indefinite   Send INR reminders to:  ANTICO HOME MONITOR[facility]    Indications    Paroxysmal atrial fibrillation (H) [I48.0]  S/P mitral valve replacement with metallic valve [Z95.4]             Comments:  [facility]- wants a call every time. Needs to Bridge if below 2.0             Anticoagulation Care Providers       Provider Role Specialty Phone number    Jin Hicks MD Referring Internal Medicine [phone]

## 2023-10-17 NOTE — PROGRESS NOTES
Spoke with Veena with [facility] and she confirmed that INR result and dosingis now directly discussed with patient.

## 2023-10-18 NOTE — PROGRESS NOTES
[facility]                                     Progress Note    Patient Name: Bandar Wells  Date: 10/18/23         Service Type: Individual      Session Start Time: 9:04am  Session End Time: 9:55am     Session Length: 38-52min    Session #: 3    Attendees: Client attended alone    Service Modality:  Video Visit:      Provider verified identity through the following two step process.  Patient provided:  Patient , Patient address, and Patient is known previously to provider    Telemedicine Visit: The patient's condition can be safely assessed and treated via synchronous audio and visual telemedicine encounter.      Reason for Telemedicine Visit: Patient convenience (e.g. access to timely appointments / distance to available provider)    Originating Site (Patient Location): Patient's home    Distant Site (Provider Location): Provider Remote Setting- Home Office    Consent:  The patient/guardian has verbally consented to: the potential risks and benefits of telemedicine (video visit) versus in person care; bill my insurance or make self-payment for services provided; and responsibility for payment of non-covered services.     Patient would like the video invitation sent by:  My Chart    Mode of Communication:  Video Conference via [employer]    Distant Location (Provider):  Off-site    As the provider I attest to compliance with applicable laws and regulations related to telemedicine.    DATA  Interactive Complexity: No  Crisis: No        Progress Since Last Session (Related to Symptoms / Goals / Homework):   Symptoms: Improving anxious and depressive sxs (minimally) per patient report    Homework: Completed in session      Episode of Care Goals: Satisfactory progress - PREPARATION (Decided to change - considering how); Intervened by negotiating a change plan and determining options / strategies for behavior change, identifying triggers, exploring social supports, and working towards setting a date  to begin behavior change     Current / Ongoing Stressors and Concerns:   Deteriorating physical health, adjusting, decreased sense of meaning and purpose, increased isolation, estrangement from family of origin, fear of dying, difficult experiences related to career, pain     Treatment Objective(s) Addressed in This Session:   Patient will use at least 3 coping skills for anxiety management in the next 4 weeks  Increase interest, engagement, and pleasure in doing things  Decrease frequency and intensity of feeling down, depressed, hopeless  Improve quantity and quality of night time sleep / decrease daytime naps  Feel less tired and more energy during the day   Improve diet, appetite, mindful eating, and / or meal planning  Identify negative self-talk and behaviors: challenge core beliefs, myths, and actions  Improve concentration, focus, and mindfulness in daily activities   Decrease thoughts that you'd be better off dead or of suicide / self-harm.  Patient will participate in   activities to improve mood  increase understanding of steps in the grief process  identify two areas of life that you would like to have improved functioning.      Intervention:   Motivational Interviewing    MI Intervention: Expressed Empathy/Understanding and Supported Autonomy, Collaboration, Evocation     Change Talk Expressed by the Patient: Desire to change Ability to change Reasons to change Need to change    Provider Response to Change Talk: E - Evoked more info from patient about behavior change, A - Affirmed patient's thoughts, decisions, or attempts at behavior change, R - Reflected patient's change talk, and S - Summarized patient's change talk statements    Provider held space as patient processed experiences and stressors using reflective listening, validation and normalization of patient's emotional response.  Provider worked to develop greater insight.    Provider held space as patient processed difficulties with medical  issues, grief around the loss of his physical health and career, and the difficulty in adjusting to his current physical capabilities.  Provider worked to develop greater insight and validated patient.    Solution-Focused: Provider utilized solution-focused strategies to identify a step patient was agreeable to taking to work towards goals.  Patient was agreeable to scheduling an appointment for physical rehab and continuing to engage with friends in family in his favorite pasttimes as he is able.      Provider engaged patient in treatment planning.  Provider worked to develop greater insight and discussed the direction of treatment.    Provider worked to build rapport and strengthen the therapeutic alliance.      DBT: Provider taught the DBT skill around radical acceptance.     Assessments completed prior to visit:  The following assessments were completed by patient for this visit:  PHQ9:       4/11/2022    10:45 AM 10/25/2022     9:10 AM 4/24/2023     6:55 AM 4/24/2023     6:59 AM 9/25/2023     8:29 AM 9/29/2023    11:50 AM 10/6/2023     7:49 AM   PHQ-9 SCORE   PHQ-9 Total Score MyChart  5 (Mild depression) 1 (Minimal depression)  14 (Moderate depression) 8 (Mild depression) 9 (Mild depression)   PHQ-9 Total Score 5 5 1 2 14 8 9     GAD7:       2/2/2023     6:00 PM 10/6/2023     8:30 AM   TOD-7 SCORE   Total Score 10 (moderate anxiety)    Total Score 10 8     PROMIS 10-Global Health (only subscores and total score):       10/6/2023     7:50 AM   PROMIS-10 Scores Only   Global Mental Health Score 7   Global Physical Health Score 11   PROMIS TOTAL - SUBSCORES 18         ASSESSMENT: Current Emotional / Mental Status (status of significant symptoms):   Risk status (Self / Other harm or suicidal ideation)   Patient denies current fears or concerns for personal safety.   Patient denies current or recent suicidal ideation or behaviors.   Patient denies current or recent homicidal ideation or behaviors.   Patient denies  current or recent self injurious behavior or ideation.   Patient denies other safety concerns.   Patient reports there has been no change in risk factors since their last session.     Patient reports there has been no change in protective factors since their last session.     A safety and risk management plan has been developed including: Patient consented to co-developed safety plan on 9/29/23.  Safety and risk management plan was reviewed.   Patient agreed to use safety plan should any safety concerns arise.  A copy was made available to the patient.     Appearance:   Appropriate    Eye Contact:   Fair    Psychomotor Behavior: Normal    Attitude:   Cooperative    Orientation:   All   Speech    Rate / Production: Normal/ Responsive Emotional Talkative    Volume:  Soft    Mood:    Anxious  Depressed  Sad  Grieving   Affect:    Subdued  Tearful Worrisome    Thought Content:  Clear  Rumination    Thought Form:  Coherent  Goal Directed    Insight:    Fair      Medication Review:   No changes to current psychiatric medication(s)     Medication Compliance:   Yes     Changes in Health Issues:   Yes: Pain, Associated Psychological Distress  Patient is struggling with several medical concerns.  See chart.  Patient completed radiation last Friday.  Patient was recently hospitalized due to excessively bleeding.  Patient will follow-up on Friday.       Chemical Use Review:   Substance Use: Chemical use reviewed, no active concerns identified      Tobacco Use: No current tobacco use.      Diagnosis:  1. Adjustment disorder with mixed anxiety and depressed mood        Collateral Reports Completed:   Not Applicable    PLAN: (Patient Tasks / Therapist Tasks / Other)  Patient will schedule physical rehab and work to identify realistic ways he can engage in activities he used to enjoy.         JAY Lawrence 10/18/23      Service Performed and Documented by JAY-   Note reviewed and clinical supervision by BERNY Menchaca  LICSW 10/25/2023                                                   ______________________________________________________________________    Individual Treatment Plan    Patient's Name: Bandar Wells  YOB: 1950    Date of Creation: 10/18/23  Date Treatment Plan Last Reviewed/Revised: 10/18/23    DSM5 Diagnoses: Adjustment Disorders  309.28 (F43.23) With mixed anxiety and depressed mood  Psychosocial / Contextual Factors: Lives alone, significant medical difficulties, distant hx of high risk behaviors/substance-use behaviors, significant substance use (alcohol), family and friends as strong protective factors   PROMIS (reviewed every 90 days):     Referral / Collaboration:  Referral to another professional/service is not indicated at this time..    Anticipated number of session for this episode of care: 6-9 sessions  Anticipation frequency of session:  Every 3 weeks  Anticipated Duration of each session: 38-52 minutes  Treatment plan will be reviewed in 90 days or when goals have been changed.       MeasurableTreatment Goal(s) related to diagnosis / functional impairment(s)  Goal 1: Patient will work to develop and utilize coping strategies to manage anxious and depressive sxs within 90 days.  Patient will continue to decrease/cease suicidal ideation     I will know I've met my goal when PROVIDER WILL GET QUOTES.      Objective #A (Patient Action)    Patient will use at least 3 coping skills for anxiety management in the next 4 weeks  Increase interest, engagement, and pleasure in doing things  Decrease frequency and intensity of feeling down, depressed, hopeless  Improve quantity and quality of night time sleep / decrease daytime naps  Feel less tired and more energy during the day   Improve diet, appetite, mindful eating, and / or meal planning  Identify negative self-talk and behaviors: challenge core beliefs, myths, and actions  Improve concentration, focus, and mindfulness in daily activities    Decrease thoughts that you'd be better off dead or of suicide / self-harm.  Status: New - Date: 10/18/23      Intervention(s)  Therapist will teach emotional recognition/identification. Emotional regulation, coping strategies, and psychoeducation. .    Objective #B  Patient will participate in   activities to improve mood  increase understanding of steps in the grief process  identify two areas of life that you would like to have improved functioning.   Status: New - Date: 10/18/23      Intervention(s)  Therapist will assign homework around utilizing coping strategies  teach emotional recognition/identification. Emotional regulation, coping strategies and psychoeducation .        Patient has reviewed and agreed to the above plan.      Jerrica Cleaning [facility]  October 18, 2023   Service Performed and Documented by LONG-   Tx plan reviewed and clinical supervision by BERNY Menchaca [facility] 10/25/2023    Safety Plan:  Adult Short Safety Plan:   Name: Bandar Wells  YOB: 1950  Date: September 29, 2023   My primary care provider: Jin Hicks       My Triggers:  Medical Health        Additional People, Places, and Things that I can access for support: Family and Friends            What is important to me and makes life worth living: Family and friends .            GREEN     Good Control  1. I feel good  2. No suicidal thoughts   3. Can work, sleep and play        Action Steps  1. Self-care: balanced meals, exercising, sleep practices, etc.  2. Take your medications as prescribed.  3. Continue meetings with therapist and prescriber.  4.  Do the healthy things that I enjoy.  5. Reach out and spend time with family and friends             YELLOW  Getting Worse  I have ANY of these:  1. I do not feel good  2. Difficulty Concentrating  3. Sleep is changing  4. Increase/Change in my thoughts to hurt self and/or others, but I can still manage and not act on it.   5. Not taking care of self.             Action  Steps (in addition to the above):  1. Inform your therapist and psychiatric prescriber/PCP.  2. Keep taking your medications as prescribed.    3. Turn to people you can ask for help.  4. Use internal coping strategies -see below.  5. Create safe environment: notify friends/family of increase in symptoms                RED  Get Help  If I have ANY of these:  1. Current and uncontrollable thoughts and/or behaviors to hurt self and/or others.        Actions to manage my safety  1. Contact your emergency person   2. Call or Text 128  3. Call my crisis team- [facility] [phone] [facility] Crisis [facility]  3. Or Call 911 or go to the emergency room right away            [End for Brief Safety Plan]      Safety Concerns  How To Identify Situations That Make Your Mental Health Worse:  Triggers are things that make your mental health worse.  Look at the list below to help you find your triggers and what you can do about them.      1. Identify Early Warning Signs:     Sometimes symptoms return, even when people do their best to stay well. Symptoms can develop over a short period of time with little or no warning, but most of the time they emerge gradually over several weeks.  Early warning signs are changes that people experience when a relapse is starting. Some early warning signs are common and others are not as common.   Common Early Warning Signs:    Feeling depressed or low               2. Identify action steps to take when warning signs are noticed:     Taking Action- It is important to take action if you are experiencing early warning signs of a relapse.  The faster you act, the more likely it is that you can avoid a full relapse.  It is helpful to identify several specific ways to cope with symptoms.       The following is my list of symptoms and coping strategies that I can use when they are present:     Symptom Coping Strategies   Anxiety -Talk with someone in your support system and let him or her  know how you are feeling.  -Use relaxation techniques such as deep breathing or imagery.  -Use positive affirmations to counteract negative self-talk such as  I am learning to let go of worry.    Depression - Schedule your day; include activities you have to do and activities you enjoy doing.  - Get some exercise - walk, run, bike, or swim.  - Give yourself credit for even the smallest things you get done.   Sleep Difficulties    - Go to sleep at the same time every day.  - Do something relaxing before bed, such as drinking herbal tea or listening to music.  - Avoid having discussions about upsetting topics before going to bed.   Delusions    - Distract yourself from the disturbing thought by doing something that requires your attention such as a puzzle.  - Check out your beliefs by talking to someone you trust.    Hallucinations    - Use headphones to listen to music.  - Tell voices to  stop  or say to yourself,  I am safe.   - Ignore the hallucinations as much as possible; focus on other things.   Concentration Difficulties - Minimize distractions so there is only one thing for you to focus on at a time.    - Ask the person you are having a conversation with to slow down or repeat things you are unsure of.

## 2023-10-24 NOTE — PROGRESS NOTES
ANTICOAGULATION MANAGEMENT     Bandar Wells 73 year old male is on warfarin with subtherapeutic INR result. (Goal INR 2.5-3.5)    Recent labs: (last 7 days)     10/24/23  0000   INR 2.2*       ASSESSMENT     Source(s): Chart Review and Patient/Caregiver Call     Warfarin doses taken: Less warfarin taken than planned which may be affecting INR  Diet: No new diet changes identified  Medication/supplement changes: None noted  New illness, injury, or hospitalization: No  Signs or symptoms of bleeding or clotting: No  Previous result: Subtherapeutic  Additional findings:  Patient was traveling last week and may have taken less warfarin than planned.  Thinks he took only 2 tabs on Tuesday.  Will leave MD the same as patient's dosing was increased last week.         PLAN     Recommended plan for temporary change(s) affecting INR     Dosing Instructions: Continue your current warfarin dose with next INR in 1 week       Summary  As of 10/24/2023      Full warfarin instructions:  9 mg every Tue, Thu, Sat; 6 mg all other days   Next INR check:  10/31/2023               Telephone call with Bandar who agrees to plan and repeated back plan correctly    Patient to recheck with home meter    Education provided:   Please call back if any changes to your diet, medications or how you've been taking warfarin    Plan made per [facility] anticoagulation protocol    Yarely Powell RN  Anticoagulation Clinic  10/24/2023    _______________________________________________________________________     Anticoagulation Episode Summary       Current INR goal:  2.5-3.5   TTR:  44.1% (1 y)   Target end date:  Indefinite   Send INR reminders to:  ANTICOAG HOME MONITORING    Indications    Paroxysmal atrial fibrillation (H) [I48.0]  S/P mitral valve replacement with metallic valve [Z95.4]             Comments:  [facility]- wants a call every time. Needs to Bridge if below 2.0             Anticoagulation Care Providers       Provider Role Specialty Phone number     Jin Hicks MD [facility] Internal Medicine [phone]

## 2023-10-31 NOTE — PROGRESS NOTES
[facility]                                     Progress Note    Patient Name: Bandar Wells  Date: 10/31/23         Service Type: Individual      Session Start Time: 9:01am  Session End Time: 9:51am     Session Length: 38-52min    Session #: 4    Attendees: Client attended alone    Service Modality:  Video Visit:      Provider verified identity through the following two step process.  Patient provided:  Patient , Patient address, and Patient is known previously to provider    Telemedicine Visit: The patient's condition can be safely assessed and treated via synchronous audio and visual telemedicine encounter.      Reason for Telemedicine Visit: Patient convenience (e.g. access to timely appointments / distance to available provider)    Originating Site (Patient Location): Patient's home    Distant Site (Provider Location): Provider Remote Setting- Home Office    Consent:  The patient/guardian has verbally consented to: the potential risks and benefits of telemedicine (video visit) versus in person care; bill my insurance or make self-payment for services provided; and responsibility for payment of non-covered services.     Patient would like the video invitation sent by:  My Chart    Mode of Communication:  Video Conference via Amwell    Distant Location (Provider):  Off-site    As the provider I attest to compliance with applicable laws and regulations related to telemedicine.    DATA  Interactive Complexity: No  Crisis: No        Progress Since Last Session (Related to Symptoms / Goals / Homework):   Symptoms: Improving anxious and depressive sxs (minimally) per patient report    Homework: Completed in session      Episode of Care Goals: Satisfactory progress - PREPARATION (Decided to change - considering how); Intervened by negotiating a change plan and determining options / strategies for behavior change, identifying triggers, exploring social supports, and working towards setting a date  to begin behavior change     Current / Ongoing Stressors and Concerns:   Deteriorating physical health, adjusting, decreased sense of meaning and purpose, increased isolation, estrangement from family of origin, fear of dying, difficult experiences related to career, pain     Treatment Objective(s) Addressed in This Session:   Patient will use at least 3 coping skills for anxiety management in the next 4 weeks  Increase interest, engagement, and pleasure in doing things  Decrease frequency and intensity of feeling down, depressed, hopeless  Improve quantity and quality of night time sleep / decrease daytime naps  Feel less tired and more energy during the day   Improve diet, appetite, mindful eating, and / or meal planning  Identify negative self-talk and behaviors: challenge core beliefs, myths, and actions  Improve concentration, focus, and mindfulness in daily activities   Decrease thoughts that you'd be better off dead or of suicide / self-harm.  Patient will participate in   activities to improve mood  increase understanding of steps in the grief process  identify two areas of life that you would like to have improved functioning.      Intervention:   Motivational Interviewing    MI Intervention: Expressed Empathy/Understanding and Supported Autonomy, Collaboration, Evocation     Change Talk Expressed by the Patient: Desire to change Ability to change Reasons to change Need to change    Provider Response to Change Talk: E - Evoked more info from patient about behavior change, A - Affirmed patient's thoughts, decisions, or attempts at behavior change, R - Reflected patient's change talk, and S - Summarized patient's change talk statements    Provider held space as patient processed experiences and stressors using reflective listening, validation and normalization of patient's emotional response.  Provider worked to develop greater insight.    Provider held space as patient processed ongoing difficulties with  health and new diagnosis.  Provider worked to develop greater insight and validated patient.      CBT: Provider utilized CBT strategies to challenge and reframe patient's identified cognitive distortions. Provider worked to develop greater insight.    ACT: Provider continued to  patient on practicing acceptance and identifying steps he can take to move forward.      Solution-Focused: Provider utilized solution-focused strategies to identify a step patient was agreeable to taking to work towards goals.  Patient was agreeable to setting attainable goals related to physical activity (treadmill, physical rehab) and identifying more activities he can do independently or with others at home.  Patient was agreeable to reflecting on this.      Provider continued to work to build rapport and strengthen the therapeutic alliance.     Assessments completed prior to visit:  The following assessments were completed by patient for this visit:  PHQ9:       4/11/2022    10:45 AM 10/25/2022     9:10 AM 4/24/2023     6:55 AM 4/24/2023     6:59 AM 9/25/2023     8:29 AM 9/29/2023    11:50 AM 10/6/2023     7:49 AM   PHQ-9 SCORE   PHQ-9 Total Score MyChart  5 (Mild depression) 1 (Minimal depression)  14 (Moderate depression) 8 (Mild depression) 9 (Mild depression)   PHQ-9 Total Score 5 5 1 2 14 8 9     GAD7:       2/2/2023     6:00 PM 10/6/2023     8:30 AM   TOD-7 SCORE   Total Score 10 (moderate anxiety)    Total Score 10 8     PROMIS 10-Global Health (only subscores and total score):       10/6/2023     7:50 AM   PROMIS-10 Scores Only   Global Mental Health Score 7   Global Physical Health Score 11   PROMIS TOTAL - SUBSCORES 18         ASSESSMENT: Current Emotional / Mental Status (status of significant symptoms):   Risk status (Self / Other harm or suicidal ideation)   Patient denies current fears or concerns for personal safety.   Patient reports the following current or recent suicidal ideation or behaviors: Patient reported one  "instance of experiencing thoughts of not wanting to be here when he received the news about a medical diagnosis.  Patient reported this thought as \"rare\" and denied thoughts of killing himself, method plan or intent.  Patient enumerated multiple protective factors.    Patient denies current or recent homicidal ideation or behaviors.   Patient denies current or recent self injurious behavior or ideation.   Patient denies other safety concerns.   Patient reports there has been no change in risk factors since their last session.     Patient reports there has been no change in protective factors since their last session.     A safety and risk management plan has been developed including: Patient consented to co-developed safety plan on 9/29/23.  Safety and risk management plan was reviewed.   Patient agreed to use safety plan should any safety concerns arise.  A copy was made available to the patient.     Appearance:   Appropriate    Eye Contact:   Fair    Psychomotor Behavior: Normal    Attitude:   Cooperative    Orientation:   All   Speech    Rate / Production: Normal/ Responsive Emotional Talkative    Volume:  Soft    Mood:    Anxious  Depressed  Sad  Grieving   Affect:    Subdued  Tearful Worrisome    Thought Content:  Clear  Rumination    Thought Form:  Coherent  Goal Directed    Insight:    Fair      Medication Review:   No changes to current psychiatric medication(s)     Medication Compliance:   Yes     Changes in Health Issues:   Yes: Pain, Associated Psychological Distress  Patient is struggling with several medical concerns.  See chart.  Patient has completed radiation treatment.  Patient is in stage 4 kidney failure and will continue to follow-up with providers.       Chemical Use Review:   Substance Use: Chemical use reviewed, no active concerns identified      Tobacco Use: No current tobacco use.      Diagnosis:  1. Adjustment disorder with mixed anxiety and depressed mood      Collateral Reports " Completed:   Not Applicable    PLAN: (Patient Tasks / Therapist Tasks / Other)  Patient will schedule physical rehab and work to identify realistic ways he can engage in activities he enjoys and set goals around physical fitness in regards to rehab and using his treadmill.     Provider and patient will discuss difficulties with sleep.  Provider will continue to assess.      JAY Lawrence 10/31/23  Service Performed and Documented by [facility]-   Note reviewed and clinical supervision by BERNY Menchaca [facility] 11/2/2023      ______________________________________________________________________    Individual Treatment Plan    Patient's Name: Bandar Wells  YOB: 1950    Date of Creation: 10/18/23  Date Treatment Plan Last Reviewed/Revised: 10/18/23    DSM5 Diagnoses: Adjustment Disorders  309.28 (F43.23) With mixed anxiety and depressed mood  Psychosocial / Contextual Factors: Lives alone, significant medical difficulties, distant hx of high risk behaviors/substance-use behaviors, significant substance use (alcohol), family and friends as strong protective factors   PROMIS (reviewed every 90 days):     Referral / Collaboration:  Referral to another professional/service is not indicated at this time..    Anticipated number of session for this episode of care: 6-9 sessions  Anticipation frequency of session:  Every 3 weeks  Anticipated Duration of each session: 38-52 minutes  Treatment plan will be reviewed in 90 days or when goals have been changed.       MeasurableTreatment Goal(s) related to diagnosis / functional impairment(s)  Goal 1: Patient will work to develop and utilize coping strategies to manage anxious and depressive sxs within 90 days.  Patient will continue to decrease/cease suicidal ideation     I will know I've met my goal when PROVIDER WILL GET QUOTES.      Objective #A (Patient Action)    Patient will use at least 3 coping skills for anxiety management in the next 4 weeks  Increase  interest, engagement, and pleasure in doing things  Decrease frequency and intensity of feeling down, depressed, hopeless  Improve quantity and quality of night time sleep / decrease daytime naps  Feel less tired and more energy during the day   Improve diet, appetite, mindful eating, and / or meal planning  Identify negative self-talk and behaviors: challenge core beliefs, myths, and actions  Improve concentration, focus, and mindfulness in daily activities   Decrease thoughts that you'd be better off dead or of suicide / self-harm.  Status: New - Date: 10/18/23      Intervention(s)  Therapist will teach emotional recognition/identification. Emotional regulation, coping strategies, and psychoeducation. .    Objective #B  Patient will participate in   activities to improve mood  increase understanding of steps in the grief process  identify two areas of life that you would like to have improved functioning.   Status: New - Date: 10/18/23      Intervention(s)  Therapist will assign homework around utilizing coping strategies  teach emotional recognition/identification. Emotional regulation, coping strategies and psychoeducation .        Patient has reviewed and agreed to the above plan.      Jerrica Cleaning LONG  October 18, 2023   Service Performed and Documented by VALDEZ Moya plan reviewed and clinical supervision by BERNY Menchaca [facility] 10/25/2023    Safety Plan:  Adult Short Safety Plan:   Name: Bandar Wells  YOB: 1950  Date: September 29, 2023   My primary care provider: Jin Hicks       My Triggers:  Medical Health        Additional People, Places, and Things that I can access for support: Family and Friends            What is important to me and makes life worth living: Family and friends .            GREEN     Good Control  1. I feel good  2. No suicidal thoughts   3. Can work, sleep and play        Action Steps  1. Self-care: balanced meals, exercising, sleep practices, etc.  2. Take  your medications as prescribed.  3. Continue meetings with therapist and prescriber.  4.  Do the healthy things that I enjoy.  5. Reach out and spend time with family and friends             YELLOW  Getting Worse  I have ANY of these:  1. I do not feel good  2. Difficulty Concentrating  3. Sleep is changing  4. Increase/Change in my thoughts to hurt self and/or others, but I can still manage and not act on it.   5. Not taking care of self.             Action Steps (in addition to the above):  1. Inform your therapist and psychiatric prescriber/PCP.  2. Keep taking your medications as prescribed.    3. Turn to people you can ask for help.  4. Use internal coping strategies -see below.  5. Create safe environment: notify friends/family of increase in symptoms                RED  Get Help  If I have ANY of these:  1. Current and uncontrollable thoughts and/or behaviors to hurt self and/or others.        Actions to manage my safety  1. Contact your emergency person   2. Call or Text 425  3. Call my crisis team- [facility] [phone] [facility]  3. Or Call 911 or go to the emergency room right away            [End for Brief Safety Plan]      Safety Concerns  How To Identify Situations That Make Your Mental Health Worse:  Triggers are things that make your mental health worse.  Look at the list below to help you find your triggers and what you can do about them.      1. Identify Early Warning Signs:     Sometimes symptoms return, even when people do their best to stay well. Symptoms can develop over a short period of time with little or no warning, but most of the time they emerge gradually over several weeks.  Early warning signs are changes that people experience when a relapse is starting. Some early warning signs are common and others are not as common.   Common Early Warning Signs:    Feeling depressed or low               2. Identify action steps to take when warning signs are  noticed:     Taking Action- It is important to take action if you are experiencing early warning signs of a relapse.  The faster you act, the more likely it is that you can avoid a full relapse.  It is helpful to identify several specific ways to cope with symptoms.       The following is my list of symptoms and coping strategies that I can use when they are present:     Symptom Coping Strategies   Anxiety -Talk with someone in your support system and let him or her know how you are feeling.  -Use relaxation techniques such as deep breathing or imagery.  -Use positive affirmations to counteract negative self-talk such as  I am learning to let go of worry.    Depression - Schedule your day; include activities you have to do and activities you enjoy doing.  - Get some exercise - walk, run, bike, or swim.  - Give yourself credit for even the smallest things you get done.   Sleep Difficulties    - Go to sleep at the same time every day.  - Do something relaxing before bed, such as drinking herbal tea or listening to music.  - Avoid having discussions about upsetting topics before going to bed.   Delusions    - Distract yourself from the disturbing thought by doing something that requires your attention such as a puzzle.  - Check out your beliefs by talking to someone you trust.    Hallucinations    - Use headphones to listen to music.  - Tell voices to  stop  or say to yourself,  I am safe.   - Ignore the hallucinations as much as possible; focus on other things.   Concentration Difficulties - Minimize distractions so there is only one thing for you to focus on at a time.    - Ask the person you are having a conversation with to slow down or repeat things you are unsure of.

## 2023-11-01 NOTE — PROGRESS NOTES
ANTICOAGULATION MANAGEMENT     Bandar Wells 73 year old male is on warfarin with supratherapeutic INR result. (Goal INR 2.5-3.5)    Recent labs: (last 7 days)     10/31/23  0000   INR 4.0*       ASSESSMENT     Source(s): Chart Review and Patient/Caregiver Call     Warfarin doses taken: Warfarin taken as instructed  Diet: No new diet changes identified  Medication/supplement changes: None noted  New illness, injury, or hospitalization: No  Signs or symptoms of bleeding or clotting: No  Previous result: Subtherapeutic  Additional findings: None and May have eaten a little different over the weekend as he received some bad news.        PLAN     Recommended plan for no diet, medication or health factor changes affecting INR     Dosing Instructions: decrease your warfarin dose (6% change) with next INR in 1 week       Summary  As of 11/1/2023      Full warfarin instructions:  9 mg every Tue, Sat; 6 mg all other days   Next INR check:  11/7/2023               Telephone call with Bandar who agrees to plan and repeated back plan correctly    Patient to recheck with home meter    Education provided:   Please call back if any changes to your diet, medications or how you've been taking warfarin    Plan made per [facility] anticoagulation protocol    Yarely Powell, RN  Anticoagulation Clinic  11/1/2023    _______________________________________________________________________     Anticoagulation Episode Summary       Current INR goal:  2.5-3.5   TTR:  45.2% (1 y)   Target end date:  Indefinite   Send INR reminders to:  [facility]    Indications    Paroxysmal atrial fibrillation (H) [I48.0]  S/P mitral valve replacement with metallic valve [Z95.4]             Comments:  [facility]- wants a call every time. Needs to Bridge if below 2.0             Anticoagulation Care Providers       Provider Role Specialty Phone number    Jin Hicks MD Referring Internal Medicine [phone]

## 2023-11-03 NOTE — TELEPHONE ENCOUNTER
November 3, 2023    Home health orders was received via fax for Dr. Hicks to sign.  Patient label was attached to paperwork and placed in provider's inbox to be signed.    Jackelyn Zabala

## 2023-11-07 NOTE — TELEPHONE ENCOUNTER
November 7, 2023    Home health orders was picked up from outbox of Dr. Hicks.  Paperwork has been reviewed and is complete.  Per initial initial request, this was sent via fax to [phone].     Corin Samayoa

## 2023-11-07 NOTE — PROGRESS NOTES
ANTICOAGULATION MANAGEMENT     Bandar Wells 73 year old male is on warfarin with supratherapeutic INR result. (Goal INR 2.5-3.5)    Recent labs: (last 7 days)     11/07/23  0000   INR 5.2*       ASSESSMENT     Source(s): Chart Review and Patient/Caregiver Call     Warfarin doses taken: Warfarin taken as instructed  Diet: Decreased greens/vitamin K in diet; plans to resume previous intake. Has been sick and has not ate much over the last 5 days.  Medication/supplement changes: None noted, Tylenol  New illness, injury, or hospitalization: Yes: 5 days of diarrhea, 1 or 2 times per day.  Signs or symptoms of bleeding or clotting: bloody nose a couple of days ago.  Previous result: Supratherapeutic  Additional findings: None       PLAN     Recommended plan for temporary change(s) affecting INR     Dosing Instructions: hold dose then continue your current warfarin dose with next INR in 2 days       Summary  As of 11/7/2023      Full warfarin instructions:  11/7: Hold; Otherwise 9 mg every Tue, Sat; 6 mg all other days   Next INR check:  11/9/2023               Telephone call with Bandar who verbalizes understanding and agrees to plan    Patient to recheck with home meter    Education provided:   Contact PCP about diarrhea for 5 days.    Plan made per [facility] anticoagulation protocol    Jackelyn Ayala RN  Anticoagulation Clinic  11/7/2023    _______________________________________________________________________     Anticoagulation Episode Summary       Current INR goal:  2.5-3.5   TTR:  44.2% (1 y)   Target end date:  Indefinite   Send INR reminders to:  [facility]    Indications    Paroxysmal atrial fibrillation (H) [I48.0]  S/P mitral valve replacement with metallic valve [Z95.4]             Comments:  [facility]- wants a call every time. Needs to Bridge if below 2.0             Anticoagulation Care Providers       Provider Role Specialty Phone number    Jin Hicks MD Referring Internal Medicine  [phone]

## 2023-11-09 NOTE — Clinical Note
I saw the patient telephonically today and I would like him to come in for lab work.  I am not sure if he needs an appointment but I said we just reach out to him and let him know/coordinate a time for him to come in today for labs.  Thanks for your help!

## 2023-11-09 NOTE — PROGRESS NOTES
ANTICOAGULATION MANAGEMENT     Bandar Wells 73 year old male is on warfarin with supratherapeutic INR result. (Goal INR 2.5-3.5)    Recent labs: (last 7 days)     11/09/23  0000   INR 4.2*       ASSESSMENT     Source(s): Chart Review and Patient/Caregiver Call     Warfarin doses taken: Warfarin taken as instructed  Diet:  not eating much, is having ensure and quan bars  Medication/supplement changes: None noted  New illness, injury, or hospitalization: Yes: continued diarrhea but had appointment today.  Signs or symptoms of bleeding or clotting: Yes: nose bleed today  Previous result: Supratherapeutic  Additional findings: None       PLAN     Recommended plan for temporary change(s) affecting INR     Dosing Instructions: partial hold on Saturday  continue your current warfarin dose with next INR in 4 days       Summary  As of 11/9/2023      Full warfarin instructions:  11/11: 6 mg; Otherwise 9 mg every Tue, Sat; 6 mg all other days   Next INR check:  11/13/2023               Telephone call with Bandar who agrees to plan and repeated back plan correctly    Patient to recheck with home meter    Education provided:   None required    Plan made with [facility] Pharmacist Batool Ayala RN  Anticoagulation Clinic  11/9/2023    _______________________________________________________________________     Anticoagulation Episode Summary       Current INR goal:  2.5-3.5   TTR:  44.1% (1 y)   Target end date:  Indefinite   Send INR reminders to:  [facility]    Indications    Paroxysmal atrial fibrillation (H) [I48.0]  S/P mitral valve replacement with metallic valve [Z95.4]             Comments:  [facility]- wants a call every time. Needs to Bridge if below 2.0             Anticoagulation Care Providers       Provider Role Specialty Phone number    Jin Hicks MD Referring Internal Medicine [phone]

## 2023-11-09 NOTE — PROGRESS NOTES
Bandar is a 73 year old who is being evaluated via a billable telephone visit.      What phone number would you like to be contacted at? [phone]   How would you like to obtain your AVS? Nick    Distant Location (provider location):  On-site    1. Diarrhea, unspecified type  Patient with a few days of watery stools, weakness, lack of appetite.  No abdominal pain or fever.  INR has been elevated likely due to poor intake and has been holding warfarin and following with the anticoagulation clinic.  I like to check labs as below and make sure he has not gone into renal failure.  Check for any infection including C. difficile given his frequent use of antibiotics.  - Comprehensive metabolic panel; Future  - CBC with platelets; Future  - C. difficile Toxin B PCR with reflex to C. difficile Antigen and Toxins A/B EIA; Future  - Enteric Bacteria and Virus Panel by BECKY Stool; Future    2. Lack of appetite  - Comprehensive metabolic panel; Future  - CBC with platelets; Future    3. CKD (chronic kidney disease) stage 4, GFR 15-29 ml/min (H)  - Comprehensive metabolic panel; Future  - CBC with platelets; Future      Subjective   Bandar is a 73 year old, presenting for the following health issues:  Chief complaint weakness      11/9/2023     9:34 AM   Additional Questions   Roomed by ADOLPH Burton       History of Present Illness       Reason for visit:  Stomach flu or something and no appetite  Symptom onset:  1-3 days ago  Symptoms include:  Loose stools and no appetite  Symptom intensity:  Moderate  Symptom progression:  Improving  Had these symptoms before:  No  What makes it worse:  Not sure  What makes it better:  Not sure    He eats 0-1 servings of fruits and vegetables daily.He consumes 0 sweetened beverage(s) daily.He exercises with enough effort to increase his heart rate 9 or less minutes per day.  He exercises with enough effort to increase his heart rate 3 or less days per week.   He is taking medications  regularly.         Review of Systems         Objective           Vitals:  No vitals were obtained today due to virtual visit.    Physical Exam             Phone call duration: 21 minutes

## 2023-11-13 NOTE — PROGRESS NOTES
ANTICOAGULATION MANAGEMENT     Bandar Wells 73 year old male is on warfarin with therapeutic INR result. (Goal INR 2.5-3.5)    Recent labs: (last 7 days)     11/13/23  0000   INR 2.8       ASSESSMENT     Source(s): Chart Review and Patient/Caregiver Call     Warfarin doses taken: Warfarin taken as instructed  Diet: No new diet changes identified  Medication/supplement changes: None noted  New illness, injury, or hospitalization: No  Signs or symptoms of bleeding or clotting: No  Previous result: Supratherapeutic  Additional findings:  Venous stasis ulcerations of the RLE with CVI. Saw [facility] wound care team.  Debridement and wound cleansing 11/10/23. Wound is healing.       PLAN     Recommended plan for ongoing change(s) affecting INR     Dosing Instructions: decrease your warfarin dose (6.2% change) with next INR in 4 days   (MD temp change last week, will do perm MD Change)    Summary  As of 11/13/2023      Full warfarin instructions:  9 mg every Sat; 6 mg all other days   Next INR check:  11/17/2023               Telephone call with Bandar who agrees to plan and repeated back plan correctly    Patient to recheck with home meter    Education provided:   Please call back if any changes to your diet, medications or how you've been taking warfarin    Plan made per [facility] anticoagulation protocol    Yarely Powell, RN  Anticoagulation Clinic  11/13/2023    _______________________________________________________________________     Anticoagulation Episode Summary       Current INR goal:  2.5-3.5   TTR:  43.5% (1 y)   Target end date:  Indefinite   Send INR reminders to:  [facility]    Indications    Paroxysmal atrial fibrillation (H) [I48.0]  S/P mitral valve replacement with metallic valve [Z95.4]             Comments:  [facility]- wants a call every time. Needs to Bridge if below 2.0             Anticoagulation Care Providers       Provider Role Specialty Phone number    Jin Hicks MD Referring  Internal Medicine [phone]

## 2023-11-17 NOTE — TELEPHONE ENCOUNTER
Reason for Call:  Other call back and returning call    Detailed comments: Patient returning call to INR nurse, unable to reach. Will wait for call back.    Phone Number Patient can be reached at: Home number on file [phone] (home)    Best Time: Anytime    Can we leave a detailed message on this number? YES    Call taken on 11/17/2023 at 2:32 PM by Vashti Kent

## 2023-11-17 NOTE — PROGRESS NOTES
"ANTICOAGULATION MANAGEMENT     Bandar Wells 73 year old male is on warfarin with therapeutic INR result. (Goal INR 2.5-3.5)    Recent labs: (last 7 days)     11/17/23  0000   INR 2.6  2.4*       ASSESSMENT     Source(s): Chart Review and Patient/Caregiver Call     Warfarin doses taken: Warfarin taken as instructed  Diet: No new diet changes identified  Medication/supplement changes: None noted  New illness, injury, or hospitalization: No  Signs or symptoms of bleeding or clotting: Yes: Nose bleed on Saturday 11/11/23  Previous result: Therapeutic last visit; previously outside of goal range  Additional findings: Patient reports he clicked the wrong number and reported 2.4 incorrectly.  Patient reports no changes, both times writer called to discuss patient was anxious, \"Im receiving another phone call\" and ended call twice. Advised patient that a mychart would be sent with dosing as no changes reported. Patient verbalized understanding.       PLAN     Recommended plan for temporary change(s) affecting INR     Dosing Instructions: Continue your current warfarin dose with next INR in 1 week       Summary  As of 11/17/2023      Full warfarin instructions:  9 mg every Sat; 6 mg all other days   Next INR check:  11/24/2023               Telephone call with Bandar who verbalizes understanding and agrees to plan    Patient to recheck with home meter    Education provided:   Please call back if any changes to your diet, medications or how you've been taking warfarin    Plan made per [facility]agulation protocol    Mary Stinson RN  Anticoagulation Clinic  11/17/2023    _______________________________________________________________________     Anticoagulation Episode Summary       Current INR goal:  2.5-3.5   TTR:  44.3% (1 y)   Target end date:  Indefinite   Send INR reminders to:  [facility]    Indications    Paroxysmal atrial fibrillation (H) [I48.0]  S/P mitral valve replacement with metallic valve " [Z95.4]             Comments:  [facility]- wants a call every time. Needs to Bridge if below 2.0             Anticoagulation Care Providers       Provider Role Specialty Phone number    Jin Hicks MD Referring Internal Medicine [phone]

## 2023-11-21 NOTE — PROGRESS NOTES
ANTICOAGULATION MANAGEMENT     Bandar Wells 73 year old male is on warfarin with therapeutic INR result. (Goal INR 2.5-3.5)    Recent labs: (last 7 days)     11/21/23  0000   INR 2.7       ASSESSMENT     Source(s): Chart Review and Patient/Caregiver Call     Warfarin doses taken: Warfarin taken as instructed  Diet: No new diet changes identified  Medication/supplement changes: None noted  New illness, injury, or hospitalization: No  Signs or symptoms of bleeding or clotting: No  Previous result: Therapeutic last 2(+) visits  Additional findings: None       PLAN     Recommended plan for no diet, medication or health factor changes affecting INR     Dosing Instructions: Continue your current warfarin dose with next INR in 2 weeks       Summary  As of 11/21/2023      Full warfarin instructions:  9 mg every Sat; 6 mg all other days   Next INR check:  12/5/2023               Telephone call with Bandar who agrees to plan and repeated back plan correctly    Patient to recheck with home meter    Education provided:   Please call back if any changes to your diet, medications or how you've been taking warfarin    Plan made per [facility] anticoagulation protocol    Yarely Powell RN  Anticoagulation Clinic  11/21/2023    _______________________________________________________________________     Anticoagulation Episode Summary       Current INR goal:  2.5-3.5   TTR:  45.1% (1 y)   Target end date:  Indefinite   Send INR reminders to:  [facility]    Indications    Paroxysmal atrial fibrillation (H) [I48.0]  S/P mitral valve replacement with metallic valve [Z95.4]             Comments:  [facility]- wants a call every time. Needs to Bridge if below 2.0             Anticoagulation Care Providers       Provider Role Specialty Phone number    Jin Hicks MD Referring Internal Medicine [phone]

## 2023-11-27 NOTE — PROGRESS NOTES
[facility]                                     Progress Note    Patient Name: Bandar Wells  Date: 23         Service Type: Individual      Session Start Time: 9:00am  Session End Time: 9:43am     Session Length: 38-52min    Session #: 5    Attendees: Client attended alone    Service Modality:  Video Visit:      Provider verified identity through the following two step process.  Patient provided:  Patient , Patient address, and Patient is known previously to provider    Telemedicine Visit: The patient's condition can be safely assessed and treated via synchronous audio and visual telemedicine encounter.      Reason for Telemedicine Visit: Patient convenience (e.g. access to timely appointments / distance to available provider)    Originating Site (Patient Location): Patient's home    Distant Site (Provider Location): Provider Remote Setting- Home Office    Consent:  The patient/guardian has verbally consented to: the potential risks and benefits of telemedicine (video visit) versus in person care; bill my insurance or make self-payment for services provided; and responsibility for payment of non-covered services.     Patient would like the video invitation sent by:  My Chart    Mode of Communication:  Video Conference via Amwell    Distant Location (Provider):  Off-site    As the provider I attest to compliance with applicable laws and regulations related to telemedicine.    DATA  Interactive Complexity: No  Crisis: No        Progress Since Last Session (Related to Symptoms / Goals / Homework):   Symptoms: Worsening depressive sxs per PHQ-9, improving anxious and depressive sxs per patient report     Homework: Completed in session      Episode of Care Goals: Satisfactory progress - PREPARATION (Decided to change - considering how); Intervened by negotiating a change plan and determining options / strategies for behavior change, identifying triggers, exploring social supports, and  working towards setting a date to begin behavior change     Current / Ongoing Stressors and Concerns:   Deteriorating physical health, adjusting, decreased sense of meaning and purpose, increased isolation, estrangement from family of origin, fear of dying, difficult experiences related to career, pain     Treatment Objective(s) Addressed in This Session:   Patient will use at least 3 coping skills for anxiety management in the next 4 weeks  Increase interest, engagement, and pleasure in doing things  Decrease frequency and intensity of feeling down, depressed, hopeless  Improve quantity and quality of night time sleep / decrease daytime naps  Feel less tired and more energy during the day   Improve diet, appetite, mindful eating, and / or meal planning  Identify negative self-talk and behaviors: challenge core beliefs, myths, and actions  Improve concentration, focus, and mindfulness in daily activities   Decrease thoughts that you'd be better off dead or of suicide / self-harm.  Patient will participate in   activities to improve mood  increase understanding of steps in the grief process  identify two areas of life that you would like to have improved functioning.      Intervention:   Motivational Interviewing    MI Intervention: Expressed Empathy/Understanding and Supported Autonomy, Collaboration, Evocation     Change Talk Expressed by the Patient: Desire to change Ability to change Reasons to change Need to change    Provider Response to Change Talk: E - Evoked more info from patient about behavior change, A - Affirmed patient's thoughts, decisions, or attempts at behavior change, R - Reflected patient's change talk, and S - Summarized patient's change talk statements    Provider held space as patient processed experiences and stressors using reflective listening, validation and normalization of patient's emotional response.  Provider worked to develop greater insight around patient's use of boundaries and  coping.  Provider validated patient.     Provider and patient discussed ongoing difficulties with billing.  Provider normalized patient's emotional response and encouraged patient to follow-up with the billing department.     Provider held space as patient processed ongoing difficulties with health and new diagnosis.  Provider worked to develop greater insight and validated patient's use of his support system and reframing to navigate difficulties.      ACT: Provider continued to  patient on practicing acceptance and identifying steps he can take to move forward.      Provider and patient reviewed goals that had been previously discussed in session.  Patient was agreeable to continue to work towards attainable goals he had set (treadmill, physical rehab) and identify more activities he can do independently or with others at home.     Provider continued to work to build rapport and strengthen the therapeutic alliance.     Assessments completed prior to visit:  The following assessments were completed by patient for this visit:  PHQ9:       4/24/2023     6:55 AM 4/24/2023     6:59 AM 9/25/2023     8:29 AM 9/29/2023    11:50 AM 10/6/2023     7:49 AM 11/9/2023     9:31 AM 11/27/2023     8:44 AM   PHQ-9 SCORE   PHQ-9 Total Score MyChart 1 (Minimal depression)  14 (Moderate depression) 8 (Mild depression) 9 (Mild depression) 2 (Minimal depression) 7 (Mild depression)   PHQ-9 Total Score 1 2 14 8 9 2 7     GAD7:       2/2/2023     6:00 PM 10/6/2023     8:30 AM   TOD-7 SCORE   Total Score 10 (moderate anxiety)    Total Score 10 8     PROMIS 10-Global Health (only subscores and total score):       10/6/2023     7:50 AM   PROMIS-10 Scores Only   Global Mental Health Score 7   Global Physical Health Score 11   PROMIS TOTAL - SUBSCORES 18         ASSESSMENT: Current Emotional / Mental Status (status of significant symptoms):   Risk status (Self / Other harm or suicidal ideation)   Patient denies current fears or concerns  for personal safety.   Patient denies current or recent suicidal ideation or behaviors.    Patient denies current or recent homicidal ideation or behaviors.   Patient denies current or recent self injurious behavior or ideation.   Patient denies other safety concerns.   Patient reports there has been no change in risk factors since their last session.     Patient reports there has been no change in protective factors since their last session.     A safety and risk management plan has been developed including: Patient consented to co-developed safety plan on 9/29/23.  Safety and risk management plan was reviewed.   Patient agreed to use safety plan should any safety concerns arise.  A copy was made available to the patient.     Appearance:   Appropriate    Eye Contact:   Fair    Psychomotor Behavior: Normal    Attitude:   Cooperative    Orientation:   All   Speech    Rate / Production: Normal/ Responsive Talkative    Volume:  Soft    Mood:    Anxious  Depressed  Sad  Grieving   Affect:    Subdued  Worrisome    Thought Content:  Clear  Rumination    Thought Form:  Coherent  Goal Directed    Insight:    Fair      Medication Review:   No changes to current psychiatric medication(s)     Medication Compliance:   Yes     Changes in Health Issues:   Yes: Pain, Associated Psychological Distress  Patient is struggling with several medical concerns.  See chart.  Patient has completed radiation treatment.  Patient is close to stage 4 kidney failure and will continue to follow-up with providers.       Chemical Use Review:   Substance Use: Chemical use reviewed, no active concerns identified      Tobacco Use: No current tobacco use.      Diagnosis:  1. Adjustment disorder with mixed anxiety and depressed mood        Collateral Reports Completed:   Not Applicable    PLAN: (Patient Tasks / Therapist Tasks / Other)  Patient will schedule physical rehab and work to identify realistic ways he can engage in activities he enjoys and set  goals around physical fitness in regards to rehab and using his treadmill.     Provider and patient will discuss difficulties with sleep.  Provider will continue to assess.      JAY Lawrence 11/27/23  Service Performed and Documented by [facility]-   Note reviewed and clinical supervision by BERNY Menchaca [facility] 11/28/2023      ______________________________________________________________________    Individual Treatment Plan    Patient's Name: Bandar Wells  YOB: 1950    Date of Creation: 10/18/23  Date Treatment Plan Last Reviewed/Revised: 10/18/23    DSM5 Diagnoses: Adjustment Disorders  309.28 (F43.23) With mixed anxiety and depressed mood  Psychosocial / Contextual Factors: Lives alone, significant medical difficulties, distant hx of high risk behaviors/substance-use behaviors, significant substance use (alcohol), family and friends as strong protective factors   PROMIS (reviewed every 90 days):     Referral / Collaboration:  Referral to another professional/service is not indicated at this time..    Anticipated number of session for this episode of care: 6-9 sessions  Anticipation frequency of session:  Every 3 weeks  Anticipated Duration of each session: 38-52 minutes  Treatment plan will be reviewed in 90 days or when goals have been changed.       MeasurableTreatment Goal(s) related to diagnosis / functional impairment(s)  Goal 1: Patient will work to develop and utilize coping strategies to manage anxious and depressive sxs within 90 days.  Patient will continue to decrease/cease suicidal ideation     I will know I've met my goal when PROVIDER WILL GET QUOTES.      Objective #A (Patient Action)    Patient will use at least 3 coping skills for anxiety management in the next 4 weeks  Increase interest, engagement, and pleasure in doing things  Decrease frequency and intensity of feeling down, depressed, hopeless  Improve quantity and quality of night time sleep / decrease daytime  naps  Feel less tired and more energy during the day   Improve diet, appetite, mindful eating, and / or meal planning  Identify negative self-talk and behaviors: challenge core beliefs, myths, and actions  Improve concentration, focus, and mindfulness in daily activities   Decrease thoughts that you'd be better off dead or of suicide / self-harm.  Status: New - Date: 10/18/23      Intervention(s)  Therapist will teach emotional recognition/identification. Emotional regulation, coping strategies, and psychoeducation. .    Objective #B  Patient will participate in   activities to improve mood  increase understanding of steps in the grief process  identify two areas of life that you would like to have improved functioning.   Status: New - Date: 10/18/23      Intervention(s)  Therapist will assign homework around utilizing coping strategies  teach emotional recognition/identification. Emotional regulation, coping strategies and psychoeducation .        Patient has reviewed and agreed to the above plan.      Jerrica Cleaning LONG  October 18, 2023   Service Performed and Documented by [facility]-   Tx plan reviewed and clinical supervision by BERNY Menchaca [facility] 10/25/2023    Safety Plan:  Adult Short Safety Plan:   Name: Bandar Wells  YOB: 1950  Date: September 29, 2023   My primary care provider: Jin Hicks       My Triggers:  Medical Health        Additional People, Places, and Things that I can access for support: Family and Friends            What is important to me and makes life worth living: Family and friends .            GREEN     Good Control  1. I feel good  2. No suicidal thoughts   3. Can work, sleep and play        Action Steps  1. Self-care: balanced meals, exercising, sleep practices, etc.  2. Take your medications as prescribed.  3. Continue meetings with therapist and prescriber.  4.  Do the healthy things that I enjoy.  5. Reach out and spend time with family and friends              YELLOW  Getting Worse  I have ANY of these:  1. I do not feel good  2. Difficulty Concentrating  3. Sleep is changing  4. Increase/Change in my thoughts to hurt self and/or others, but I can still manage and not act on it.   5. Not taking care of self.             Action Steps (in addition to the above):  1. Inform your therapist and psychiatric prescriber/PCP.  2. Keep taking your medications as prescribed.    3. Turn to people you can ask for help.  4. Use internal coping strategies -see below.  5. Create safe environment: notify friends/family of increase in symptoms                RED  Get Help  If I have ANY of these:  1. Current and uncontrollable thoughts and/or behaviors to hurt self and/or others.        Actions to manage my safety  1. Contact your emergency person   2. Call or Text [phone]  3. Call my crisis team- [facility] [phone] [facility] Crisis [facility]  3. Or Call 911 or go to the emergency room right away            [End for Brief Safety Plan]      Safety Concerns  How To Identify Situations That Make Your Mental Health Worse:  Triggers are things that make your mental health worse.  Look at the list below to help you find your triggers and what you can do about them.      1. Identify Early Warning Signs:     Sometimes symptoms return, even when people do their best to stay well. Symptoms can develop over a short period of time with little or no warning, but most of the time they emerge gradually over several weeks.  Early warning signs are changes that people experience when a relapse is starting. Some early warning signs are common and others are not as common.   Common Early Warning Signs:    Feeling depressed or low               2. Identify action steps to take when warning signs are noticed:     Taking Action- It is important to take action if you are experiencing early warning signs of a relapse.  The faster you act, the more likely it is that you can avoid a full relapse.  It  is helpful to identify several specific ways to cope with symptoms.       The following is my list of symptoms and coping strategies that I can use when they are present:     Symptom Coping Strategies   Anxiety -Talk with someone in your support system and let him or her know how you are feeling.  -Use relaxation techniques such as deep breathing or imagery.  -Use positive affirmations to counteract negative self-talk such as  I am learning to let go of worry.    Depression - Schedule your day; include activities you have to do and activities you enjoy doing.  - Get some exercise - walk, run, bike, or swim.  - Give yourself credit for even the smallest things you get done.   Sleep Difficulties    - Go to sleep at the same time every day.  - Do something relaxing before bed, such as drinking herbal tea or listening to music.  - Avoid having discussions about upsetting topics before going to bed.   Delusions    - Distract yourself from the disturbing thought by doing something that requires your attention such as a puzzle.  - Check out your beliefs by talking to someone you trust.    Hallucinations    - Use headphones to listen to music.  - Tell voices to  stop  or say to yourself,  I am safe.   - Ignore the hallucinations as much as possible; focus on other things.   Concentration Difficulties - Minimize distractions so there is only one thing for you to focus on at a time.    - Ask the person you are having a conversation with to slow down or repeat things you are unsure of.          Answers submitted by the patient for this visit:  Patient Health Questionnaire (Submitted on 11/27/2023)  If you checked off any problems, how difficult have these problems made it for you to do your work, take care of things at home, or get along with other people?: Not difficult at all  PHQ9 TOTAL SCORE: 7

## 2023-11-28 NOTE — TELEPHONE ENCOUNTER
November 28, 2023    Home health orders was received via fax for Dr. Hicks to sign.  Patient label was attached to paperwork and placed in provider's inbox to be signed.    Corin Samayoa

## 2023-12-15 NOTE — PROGRESS NOTES
ANTICOAGULATION MANAGEMENT     Bandar Wells 73 year old male is on warfarin with supratherapeutic INR result. (Goal INR 2.5-3.5)    Recent labs: (last 7 days)     12/15/23  0000   INR 4.1*       ASSESSMENT     Source(s): Chart Review and Patient/Caregiver Call     Warfarin doses taken: Warfarin taken as instructed  Diet:  Decreased appetite started a week ago may be affecting diet and INR appetite getting better now.  Medication/supplement changes: None noted  New illness, injury, or hospitalization: Yes: feeling cruddy and some diarrhea but feeling better now  Signs or symptoms of bleeding or clotting: Had nose bleed last episode was about 3 days ago- reported it was light and stated that he knows what to do for worsening of symptom.  Previous result: Therapeutic last 2(+) visits  Additional findings: None       PLAN     Recommended plan for temporary change(s) affecting INR     Dosing Instructions: partial hold then continue your current warfarin dose with next INR in 1 week       Summary  As of 12/15/2023      Full warfarin instructions:  12/15: 3 mg; Otherwise 9 mg every Sat; 6 mg all other days   Next INR check:  12/22/2023               Telephone call with Bandar who verbalizes understanding and agrees to plan    Patient to recheck with home meter    Education provided:   Symptom monitoring: monitoring for bleeding signs and symptoms and when to seek medical attention/emergency care  Contact [phone]  with any changes, questions or concerns.     Plan made per [facility] anticoagulation protocol    Galilea Hernandez RN  Anticoagulation Clinic  12/15/2023    _______________________________________________________________________     Anticoagulation Episode Summary       Current INR goal:  2.5-3.5   TTR:  46.3% (1 y)   Target end date:  Indefinite   Send INR reminders to:  [facility]    Indications    Paroxysmal atrial fibrillation (H) [I48.0]  S/P mitral valve replacement with metallic valve [Z95.4]              Comments:  [facility]- wants a call every time. Needs to Bridge if below 2.0             Anticoagulation Care Providers       Provider Role Specialty Phone number    Jin Hicks MD Referring Internal Medicine [phone]

## 2023-12-22 NOTE — PROGRESS NOTES
ANTICOAGULATION MANAGEMENT     Bandar Wells 73 year old male is on warfarin with supratherapeutic INR result. (Goal INR 2.5-3.5)    Recent labs: (last 7 days)     12/22/23  0000   INR 3.9*       ASSESSMENT     Source(s): Chart Review and Patient/Caregiver Call     Warfarin doses taken: Warfarin taken as instructed  Diet: No new diet changes identified, appetite returning, continues with same amount of green veggies per his usual  Medication/supplement changes: None noted  New illness, injury, or hospitalization: No, diarrhea from last week has resolved  Signs or symptoms of bleeding or clotting: No, no more nosebleeds noted since last week  Previous result: Supratherapeutic  Additional findings: None       PLAN     Recommended plan for ongoing supratherapeutic INR and no diet, medication or health factor changes affecting INR     Dosing Instructions: partial hold then decrease your warfarin dose (6.7% change) with next INR in 1-2 weeks       Summary  As of 12/22/2023      Full warfarin instructions:  12/22: 3 mg; Otherwise 6 mg every day   Next INR check:  1/3/2024               Telephone call with Bandar who agrees to plan and repeated back plan correctly    Patient to recheck with home meter    Education provided:   Please call back if any changes to your diet, medications or how you've been taking warfarin  Dietary considerations: importance of consistent vitamin K intake  Symptom monitoring: when to seek medical attention/emergency care    Plan made per [facility] anticoagulation protocol    Fauzia Donohue RN  Anticoagulation Clinic  12/22/2023    _______________________________________________________________________     Anticoagulation Episode Summary       Current INR goal:  2.5-3.5   TTR:  46.3% (1 y)   Target end date:  Indefinite   Send INR reminders to:  [facility]    Indications    Paroxysmal atrial fibrillation (H) [I48.0]  S/P mitral valve replacement with metallic valve [Z95.4]              Comments:  [facility]- wants a call every time. Needs to Bridge if below 2.0             Anticoagulation Care Providers       Provider Role Specialty Phone number    Jin Hicks MD Referring Internal Medicine [phone]

## 2024-01-01 ENCOUNTER — MEDICAL CORRESPONDENCE (OUTPATIENT)
Dept: HEALTH INFORMATION MANAGEMENT | Facility: CLINIC | Age: 74
End: 2024-01-01
Payer: COMMERCIAL

## 2024-01-01 ENCOUNTER — ANTICOAGULATION THERAPY VISIT (OUTPATIENT)
Dept: ANTICOAGULATION | Facility: CLINIC | Age: 74
End: 2024-01-01

## 2024-01-01 ENCOUNTER — ANTICOAGULATION THERAPY VISIT (OUTPATIENT)
Dept: ANTICOAGULATION | Facility: CLINIC | Age: 74
End: 2024-01-01
Payer: COMMERCIAL

## 2024-01-01 ENCOUNTER — MEDICAL CORRESPONDENCE (OUTPATIENT)
Dept: HEALTH INFORMATION MANAGEMENT | Facility: CLINIC | Age: 74
End: 2024-01-01

## 2024-01-01 ENCOUNTER — MYC MEDICAL ADVICE (OUTPATIENT)
Dept: INTERNAL MEDICINE | Facility: CLINIC | Age: 74
End: 2024-01-01
Payer: COMMERCIAL

## 2024-01-01 ENCOUNTER — TRANSFERRED RECORDS (OUTPATIENT)
Dept: HEALTH INFORMATION MANAGEMENT | Facility: CLINIC | Age: 74
End: 2024-01-01
Payer: COMMERCIAL

## 2024-01-01 ENCOUNTER — TELEPHONE (OUTPATIENT)
Dept: INTERNAL MEDICINE | Facility: CLINIC | Age: 74
End: 2024-01-01
Payer: COMMERCIAL

## 2024-01-01 ENCOUNTER — OFFICE VISIT (OUTPATIENT)
Dept: INTERNAL MEDICINE | Facility: CLINIC | Age: 74
End: 2024-01-01
Payer: COMMERCIAL

## 2024-01-01 ENCOUNTER — ANCILLARY PROCEDURE (OUTPATIENT)
Dept: GENERAL RADIOLOGY | Facility: CLINIC | Age: 74
End: 2024-01-01
Attending: INTERNAL MEDICINE
Payer: COMMERCIAL

## 2024-01-01 ENCOUNTER — NURSE TRIAGE (OUTPATIENT)
Dept: NURSING | Facility: CLINIC | Age: 74
End: 2024-01-01
Payer: COMMERCIAL

## 2024-01-01 ENCOUNTER — HEALTH MAINTENANCE LETTER (OUTPATIENT)
Age: 74
End: 2024-01-01

## 2024-01-01 ENCOUNTER — TELEPHONE (OUTPATIENT)
Dept: INTERNAL MEDICINE | Facility: CLINIC | Age: 74
End: 2024-01-01

## 2024-01-01 ENCOUNTER — OFFICE VISIT (OUTPATIENT)
Dept: PALLIATIVE MEDICINE | Facility: OTHER | Age: 74
End: 2024-01-01
Attending: INTERNAL MEDICINE
Payer: COMMERCIAL

## 2024-01-01 ENCOUNTER — OFFICE VISIT (OUTPATIENT)
Dept: INFECTIOUS DISEASES | Facility: CLINIC | Age: 74
End: 2024-01-01
Payer: COMMERCIAL

## 2024-01-01 ENCOUNTER — LAB REQUISITION (OUTPATIENT)
Dept: LAB | Facility: CLINIC | Age: 74
End: 2024-01-01
Payer: COMMERCIAL

## 2024-01-01 ENCOUNTER — MYC MEDICAL ADVICE (OUTPATIENT)
Dept: INTERNAL MEDICINE | Facility: CLINIC | Age: 74
End: 2024-01-01

## 2024-01-01 ENCOUNTER — MYC MEDICAL ADVICE (OUTPATIENT)
Dept: ANTICOAGULATION | Facility: CLINIC | Age: 74
End: 2024-01-01

## 2024-01-01 ENCOUNTER — TELEPHONE (OUTPATIENT)
Dept: ANTICOAGULATION | Facility: CLINIC | Age: 74
End: 2024-01-01
Payer: COMMERCIAL

## 2024-01-01 ENCOUNTER — MYC MEDICAL ADVICE (OUTPATIENT)
Dept: ANTICOAGULATION | Facility: CLINIC | Age: 74
End: 2024-01-01
Payer: COMMERCIAL

## 2024-01-01 VITALS
RESPIRATION RATE: 23 BRPM | WEIGHT: 164 LBS | BODY MASS INDEX: 25.74 KG/M2 | TEMPERATURE: 97.2 F | HEART RATE: 82 BPM | OXYGEN SATURATION: 96 % | HEIGHT: 67 IN | SYSTOLIC BLOOD PRESSURE: 97 MMHG | DIASTOLIC BLOOD PRESSURE: 54 MMHG

## 2024-01-01 VITALS
DIASTOLIC BLOOD PRESSURE: 67 MMHG | WEIGHT: 159 LBS | SYSTOLIC BLOOD PRESSURE: 143 MMHG | BODY MASS INDEX: 24.9 KG/M2 | OXYGEN SATURATION: 97 % | HEART RATE: 79 BPM

## 2024-01-01 VITALS
HEIGHT: 67 IN | TEMPERATURE: 97.8 F | OXYGEN SATURATION: 97 % | RESPIRATION RATE: 19 BRPM | WEIGHT: 152 LBS | BODY MASS INDEX: 23.86 KG/M2 | HEART RATE: 86 BPM

## 2024-01-01 VITALS
RESPIRATION RATE: 20 BRPM | HEART RATE: 86 BPM | SYSTOLIC BLOOD PRESSURE: 97 MMHG | DIASTOLIC BLOOD PRESSURE: 54 MMHG | OXYGEN SATURATION: 96 % | HEIGHT: 67 IN | TEMPERATURE: 97.7 F | BODY MASS INDEX: 26.09 KG/M2 | WEIGHT: 166.2 LBS

## 2024-01-01 VITALS
BODY MASS INDEX: 24.9 KG/M2 | HEART RATE: 73 BPM | SYSTOLIC BLOOD PRESSURE: 125 MMHG | OXYGEN SATURATION: 99 % | DIASTOLIC BLOOD PRESSURE: 58 MMHG | WEIGHT: 159 LBS

## 2024-01-01 VITALS
TEMPERATURE: 98.3 F | BODY MASS INDEX: 24.8 KG/M2 | HEART RATE: 78 BPM | HEIGHT: 67 IN | SYSTOLIC BLOOD PRESSURE: 120 MMHG | OXYGEN SATURATION: 98 % | DIASTOLIC BLOOD PRESSURE: 60 MMHG | RESPIRATION RATE: 22 BRPM | WEIGHT: 158 LBS

## 2024-01-01 VITALS
RESPIRATION RATE: 12 BRPM | BODY MASS INDEX: 23.86 KG/M2 | SYSTOLIC BLOOD PRESSURE: 122 MMHG | OXYGEN SATURATION: 95 % | HEART RATE: 77 BPM | DIASTOLIC BLOOD PRESSURE: 62 MMHG | TEMPERATURE: 98.3 F | WEIGHT: 152 LBS | HEIGHT: 67 IN

## 2024-01-01 VITALS
SYSTOLIC BLOOD PRESSURE: 149 MMHG | OXYGEN SATURATION: 96 % | DIASTOLIC BLOOD PRESSURE: 61 MMHG | HEART RATE: 76 BPM | BODY MASS INDEX: 24.9 KG/M2 | WEIGHT: 159 LBS

## 2024-01-01 DIAGNOSIS — Z95.4 S/P MITRAL VALVE REPLACEMENT WITH METALLIC VALVE: ICD-10-CM

## 2024-01-01 DIAGNOSIS — F11.90 CHRONIC, CONTINUOUS USE OF OPIOIDS: ICD-10-CM

## 2024-01-01 DIAGNOSIS — F11.90 OPIOID USE, UNSPECIFIED, UNCOMPLICATED: ICD-10-CM

## 2024-01-01 DIAGNOSIS — G89.4 CHRONIC PAIN SYNDROME: ICD-10-CM

## 2024-01-01 DIAGNOSIS — I48.0 PAROXYSMAL ATRIAL FIBRILLATION (H): Primary | ICD-10-CM

## 2024-01-01 DIAGNOSIS — Z85.828 H/O MALIGNANT NEOPLASM OF SKIN: ICD-10-CM

## 2024-01-01 DIAGNOSIS — T50.905A DRESS SYNDROME: ICD-10-CM

## 2024-01-01 DIAGNOSIS — M1A.00X0 IDIOPATHIC CHRONIC GOUT WITHOUT TOPHUS, UNSPECIFIED SITE: ICD-10-CM

## 2024-01-01 DIAGNOSIS — I87.2 VENOUS STASIS ULCER OF RIGHT ANKLE LIMITED TO BREAKDOWN OF SKIN WITHOUT VARICOSE VEINS (H): ICD-10-CM

## 2024-01-01 DIAGNOSIS — M25.512 ACUTE PAIN OF LEFT SHOULDER: ICD-10-CM

## 2024-01-01 DIAGNOSIS — I50.42 CHRONIC COMBINED SYSTOLIC AND DIASTOLIC HEART FAILURE (H): Primary | ICD-10-CM

## 2024-01-01 DIAGNOSIS — I89.0 LYMPHEDEMA: ICD-10-CM

## 2024-01-01 DIAGNOSIS — I10 ESSENTIAL (PRIMARY) HYPERTENSION: ICD-10-CM

## 2024-01-01 DIAGNOSIS — N18.4 ANEMIA DUE TO STAGE 4 CHRONIC KIDNEY DISEASE (H): ICD-10-CM

## 2024-01-01 DIAGNOSIS — T14.8XXA OPEN WOUND: Primary | ICD-10-CM

## 2024-01-01 DIAGNOSIS — I83.029 VENOUS ULCERS OF BOTH LOWER EXTREMITIES (H): ICD-10-CM

## 2024-01-01 DIAGNOSIS — L97.919 VENOUS ULCERS OF BOTH LOWER EXTREMITIES (H): ICD-10-CM

## 2024-01-01 DIAGNOSIS — F11.90 CHRONIC, CONTINUOUS USE OF OPIOIDS: Primary | ICD-10-CM

## 2024-01-01 DIAGNOSIS — F33.1 MODERATE EPISODE OF RECURRENT MAJOR DEPRESSIVE DISORDER (H): ICD-10-CM

## 2024-01-01 DIAGNOSIS — I48.0 PAROXYSMAL ATRIAL FIBRILLATION (H): ICD-10-CM

## 2024-01-01 DIAGNOSIS — L97.311 VENOUS STASIS ULCER OF RIGHT ANKLE LIMITED TO BREAKDOWN OF SKIN WITHOUT VARICOSE VEINS (H): Primary | ICD-10-CM

## 2024-01-01 DIAGNOSIS — E03.9 HYPOTHYROIDISM, UNSPECIFIED TYPE: ICD-10-CM

## 2024-01-01 DIAGNOSIS — L97.311 VENOUS STASIS ULCER OF RIGHT ANKLE LIMITED TO BREAKDOWN OF SKIN WITHOUT VARICOSE VEINS (H): ICD-10-CM

## 2024-01-01 DIAGNOSIS — I50.33 ACUTE ON CHRONIC HEART FAILURE WITH PRESERVED EJECTION FRACTION (H): ICD-10-CM

## 2024-01-01 DIAGNOSIS — N18.4 CKD (CHRONIC KIDNEY DISEASE) STAGE 4, GFR 15-29 ML/MIN (H): ICD-10-CM

## 2024-01-01 DIAGNOSIS — Z53.9 DIAGNOSIS NOT YET DEFINED: Primary | ICD-10-CM

## 2024-01-01 DIAGNOSIS — N18.4 CKD (CHRONIC KIDNEY DISEASE) STAGE 4, GFR 15-29 ML/MIN (H): Primary | ICD-10-CM

## 2024-01-01 DIAGNOSIS — M40.03 POSTURAL KYPHOSIS OF CERVICOTHORACIC REGION: ICD-10-CM

## 2024-01-01 DIAGNOSIS — D89.2 PARAPROTEINEMIA: ICD-10-CM

## 2024-01-01 DIAGNOSIS — I50.42 CHRONIC COMBINED SYSTOLIC AND DIASTOLIC HEART FAILURE (H): ICD-10-CM

## 2024-01-01 DIAGNOSIS — Z86.79 HISTORY OF BACTERIAL ENDOCARDITIS: ICD-10-CM

## 2024-01-01 DIAGNOSIS — N18.4 CHRONIC KIDNEY DISEASE, STAGE 4 (SEVERE) (H): ICD-10-CM

## 2024-01-01 DIAGNOSIS — D72.12 DRESS SYNDROME: ICD-10-CM

## 2024-01-01 DIAGNOSIS — N17.1 ACUTE KIDNEY FAILURE WITH ACUTE CORTICAL NECROSIS (H): ICD-10-CM

## 2024-01-01 DIAGNOSIS — I34.1 MITRAL VALVE PROLAPSE: ICD-10-CM

## 2024-01-01 DIAGNOSIS — M25.511 ACUTE PAIN OF RIGHT SHOULDER: ICD-10-CM

## 2024-01-01 DIAGNOSIS — I44.39 HIGH-GRADE ATRIOVENTRICULAR BLOCK: ICD-10-CM

## 2024-01-01 DIAGNOSIS — R21 RASH: ICD-10-CM

## 2024-01-01 DIAGNOSIS — I48.91 UNSPECIFIED ATRIAL FIBRILLATION (H): ICD-10-CM
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
## 2024-01-01 DIAGNOSIS — L97.929 VENOUS ULCERS OF BOTH LOWER EXTREMITIES (H): ICD-10-CM

## 2024-01-01 DIAGNOSIS — I87.2 VENOUS STASIS DERMATITIS OF BOTH LOWER EXTREMITIES: ICD-10-CM
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
## 2024-01-01 DIAGNOSIS — I34.1 MITRAL VALVE PROLAPSE: Primary | ICD-10-CM

## 2024-01-01 DIAGNOSIS — D63.1 ANEMIA DUE TO STAGE 4 CHRONIC KIDNEY DISEASE (H): ICD-10-CM

## 2024-01-01 DIAGNOSIS — I25.10 CORONARY ARTERY DISEASE INVOLVING NATIVE CORONARY ARTERY OF NATIVE HEART WITHOUT ANGINA PECTORIS: ICD-10-CM

## 2024-01-01 DIAGNOSIS — D64.9 ANEMIA, UNSPECIFIED: ICD-10-CM

## 2024-01-01 DIAGNOSIS — R53.81 DEBILITY: ICD-10-CM

## 2024-01-01 DIAGNOSIS — G24.8 FOCAL DYSTONIA: ICD-10-CM

## 2024-01-01 DIAGNOSIS — D50.9 IRON DEFICIENCY ANEMIA, UNSPECIFIED IRON DEFICIENCY ANEMIA TYPE: ICD-10-CM

## 2024-01-01 DIAGNOSIS — G89.29 CHRONIC INTRACTABLE PAIN: ICD-10-CM

## 2024-01-01 DIAGNOSIS — E78.5 DYSLIPIDEMIA: ICD-10-CM

## 2024-01-01 DIAGNOSIS — I83.019 VENOUS ULCERS OF BOTH LOWER EXTREMITIES (H): ICD-10-CM

## 2024-01-01 DIAGNOSIS — I87.2 VENOUS STASIS ULCER OF RIGHT ANKLE LIMITED TO BREAKDOWN OF SKIN WITHOUT VARICOSE VEINS (H): Primary | ICD-10-CM

## 2024-01-01 DIAGNOSIS — Z86.79 HX OF BACTERIAL ENDOCARDITIS: ICD-10-CM

## 2024-01-01 DIAGNOSIS — Z51.81 ENCOUNTER FOR THERAPEUTIC DRUG LEVEL MONITORING: ICD-10-CM

## 2024-01-01 DIAGNOSIS — M25.519 SHOULDER PAIN: ICD-10-CM

## 2024-01-01 DIAGNOSIS — Z85.828 HISTORY OF SKIN CANCER: ICD-10-CM

## 2024-01-01 DIAGNOSIS — E55.9 VITAMIN D DEFICIENCY, UNSPECIFIED: ICD-10-CM

## 2024-01-01 DIAGNOSIS — G47.01 INSOMNIA DUE TO MEDICAL CONDITION: ICD-10-CM

## 2024-01-01 DIAGNOSIS — R73.9 HYPERGLYCEMIA: ICD-10-CM

## 2024-01-01 DIAGNOSIS — R65.21 SEPSIS WITH ACUTE RENAL FAILURE AND SEPTIC SHOCK, DUE TO UNSPECIFIED ORGANISM, UNSPECIFIED ACUTE RENAL FAILURE TYPE (H): Primary | ICD-10-CM

## 2024-01-01 DIAGNOSIS — N17.9 SEPSIS WITH ACUTE RENAL FAILURE AND SEPTIC SHOCK, DUE TO UNSPECIFIED ORGANISM, UNSPECIFIED ACUTE RENAL FAILURE TYPE (H): Primary | ICD-10-CM

## 2024-01-01 DIAGNOSIS — A41.9 SEPSIS WITH ACUTE RENAL FAILURE AND SEPTIC SHOCK, DUE TO UNSPECIFIED ORGANISM, UNSPECIFIED ACUTE RENAL FAILURE TYPE (H): Primary | ICD-10-CM

## 2024-01-01 DIAGNOSIS — Z00.00 ANNUAL PHYSICAL EXAM: Primary | ICD-10-CM

## 2024-01-01 LAB
ALT SERPL-CCNC: 18 U/L (ref 9–46)
ANION GAP SERPL CALCULATED.3IONS-SCNC: 14 MMOL/L (ref 7–15)
ANION GAP SERPL CALCULATED.3IONS-SCNC: 15 MMOL/L (ref 7–15)
ANION GAP SERPL CALCULATED.3IONS-SCNC: 17 MMOL/L (ref 7–15)
AST SERPL-CCNC: 40 U/L (ref 10–35)
BUN SERPL-MCNC: 72.8 MG/DL (ref 8–23)
BUN SERPL-MCNC: 78.3 MG/DL (ref 8–23)
BUN SERPL-MCNC: 89.6 MG/DL (ref 8–23)
CALCIUM SERPL-MCNC: 10.7 MG/DL (ref 8.8–10.2)
CALCIUM SERPL-MCNC: 10.8 MG/DL (ref 8.8–10.2)
CALCIUM SERPL-MCNC: 9 MG/DL (ref 8.8–10.2)
CHLORIDE SERPL-SCNC: 101 MMOL/L (ref 98–107)
CHLORIDE SERPL-SCNC: 104 MMOL/L (ref 98–107)
CHLORIDE SERPL-SCNC: 106 MMOL/L (ref 98–107)
CHOLEST SERPL-MCNC: 165 MG/DL
CREAT SERPL-MCNC: 2.77 MG/DL (ref 0.67–1.17)
CREAT SERPL-MCNC: 3.09 MG/DL (ref 0.67–1.17)
CREAT SERPL-MCNC: 3.34 MG/DL (ref 0.67–1.17)
CREAT UR-MCNC: 59 MG/DL
CREATININE (EXTERNAL): 2.74 MG/DL (ref 0.7–1.28)
DEPRECATED HCO3 PLAS-SCNC: 19 MMOL/L (ref 22–29)
DEPRECATED HCO3 PLAS-SCNC: 22 MMOL/L (ref 22–29)
DEPRECATED HCO3 PLAS-SCNC: 22 MMOL/L (ref 22–29)
EGFRCR SERPLBLD CKD-EPI 2021: 18 ML/MIN/1.73M2
EGFRCR SERPLBLD CKD-EPI 2021: 20 ML/MIN/1.73M2
EGFRCR SERPLBLD CKD-EPI 2021: 23 ML/MIN/1.73M2
ERYTHROCYTE [DISTWIDTH] IN BLOOD BY AUTOMATED COUNT: 17 % (ref 10–15)
ERYTHROCYTE [DISTWIDTH] IN BLOOD BY AUTOMATED COUNT: 18.1 % (ref 10–15)
ERYTHROCYTE [DISTWIDTH] IN BLOOD BY AUTOMATED COUNT: 18.8 % (ref 10–15)
FASTING STATUS PATIENT QL REPORTED: YES
GFR ESTIMATED (EXTERNAL): 13 ML/MIN/1.73M2
GLUCOSE (EXTERNAL): 94 MG/DL (ref 65–99)
GLUCOSE SERPL-MCNC: 100 MG/DL (ref 70–99)
GLUCOSE SERPL-MCNC: 106 MG/DL (ref 70–99)
GLUCOSE SERPL-MCNC: 110 MG/DL (ref 70–99)
HBA1C MFR BLD: 5.2 % (ref 0–5.6)
HCT VFR BLD AUTO: 23.5 % (ref 40–53)
HCT VFR BLD AUTO: 26.6 % (ref 40–53)
HCT VFR BLD AUTO: 28.8 % (ref 40–53)
HDLC SERPL-MCNC: 38 MG/DL
HGB BLD-MCNC: 7.4 G/DL (ref 13.3–17.7)
HGB BLD-MCNC: 8.3 G/DL (ref 13.3–17.7)
HGB BLD-MCNC: 8.8 G/DL (ref 13.3–17.7)
INR BLD: 7.1 (ref 0.9–1.1)
INR HOME MONITORING: 1.7 (ref 2.5–3.5)
INR HOME MONITORING: 1.9 (ref 2.5–3.5)
INR HOME MONITORING: 1.9 (ref 2.5–3.5)
INR HOME MONITORING: 2.1 (ref 2.5–3.5)
INR HOME MONITORING: 2.1 (ref 2.5–3.5)
INR HOME MONITORING: 2.3 (ref 2.5–3.5)
INR HOME MONITORING: 2.4 (ref 2.5–3.5)
INR HOME MONITORING: 2.4 (ref 2.5–3.5)
INR HOME MONITORING: 2.5 (ref 2.5–3.5)
INR HOME MONITORING: 2.5 (ref 2.5–3.5)
INR HOME MONITORING: 2.8 (ref 2.5–3.5)
INR HOME MONITORING: 2.9 (ref 2.5–3.5)
INR HOME MONITORING: 3.3 (ref 2.5–3.5)
INR HOME MONITORING: 3.6 (ref 2.5–3.5)
INR HOME MONITORING: 3.7 (ref 2.5–3.5)
INR HOME MONITORING: 3.7 (ref 2.5–3.5)
INR HOME MONITORING: 3.8 (ref 2.5–3.5)
INR HOME MONITORING: 3.8 (ref 2.5–3.5)
INR HOME MONITORING: 3.9 (ref 2.5–3.5)
INR HOME MONITORING: 4.3 (ref 2.5–3.5)
INR HOME MONITORING: 4.5 (ref 2.5–3.5)
INR HOME MONITORING: 4.9 (ref 2.5–3.5)
INR HOME MONITORING: 7.5 (ref 2.5–3.5)
INR PPP: 2.67 (ref 0.85–1.15)
INR PPP: 2.96 (ref 0.85–1.15)
LDLC SERPL CALC-MCNC: 106 MG/DL
MCH RBC QN AUTO: 30.1 PG (ref 26.5–33)
MCH RBC QN AUTO: 30.7 PG (ref 26.5–33)
MCH RBC QN AUTO: 30.9 PG (ref 26.5–33)
MCHC RBC AUTO-ENTMCNC: 30.6 G/DL (ref 31.5–36.5)
MCHC RBC AUTO-ENTMCNC: 31.2 G/DL (ref 31.5–36.5)
MCHC RBC AUTO-ENTMCNC: 31.5 G/DL (ref 31.5–36.5)
MCV RBC AUTO: 98 FL (ref 78–100)
MCV RBC AUTO: 99 FL (ref 78–100)
MCV RBC AUTO: 99 FL (ref 78–100)
NONHDLC SERPL-MCNC: 127 MG/DL
OXYCODONE UR CFM-MCNC: 757 NG/ML
OXYCODONE/CREAT UR: 1283 NG/MG {CREAT}
PLATELET # BLD AUTO: 196 10E3/UL (ref 150–450)
PLATELET # BLD AUTO: 203 10E3/UL (ref 150–450)
PLATELET # BLD AUTO: 205 10E3/UL (ref 150–450)
POTASSIUM (EXTERNAL): 4.2 MMOL/L (ref 3.5–5.3)
POTASSIUM SERPL-SCNC: 3.8 MMOL/L (ref 3.4–5.3)
POTASSIUM SERPL-SCNC: 3.9 MMOL/L (ref 3.4–5.3)
POTASSIUM SERPL-SCNC: 4.1 MMOL/L (ref 3.4–5.3)
RBC # BLD AUTO: 2.41 10E6/UL (ref 4.4–5.9)
RBC # BLD AUTO: 2.69 10E6/UL (ref 4.4–5.9)
RBC # BLD AUTO: 2.92 10E6/UL (ref 4.4–5.9)
SODIUM SERPL-SCNC: 137 MMOL/L (ref 135–145)
SODIUM SERPL-SCNC: 140 MMOL/L (ref 135–145)
SODIUM SERPL-SCNC: 143 MMOL/L (ref 135–145)
T4 FREE SERPL-MCNC: 0.96 NG/DL (ref 0.9–1.7)
TRIGL SERPL-MCNC: 106 MG/DL
TSH SERPL DL<=0.005 MIU/L-ACNC: 15.2 UIU/ML (ref 0.3–4.2)
TSH SERPL DL<=0.005 MIU/L-ACNC: 8.17 UIU/ML (ref 0.3–4.2)
URATE SERPL-MCNC: 7.3 MG/DL (ref 3.4–7)
VIT D+METAB SERPL-MCNC: 49 NG/ML (ref 20–50)
WBC # BLD AUTO: 4.1 10E3/UL (ref 4–11)
WBC # BLD AUTO: 5 10E3/UL (ref 4–11)
WBC # BLD AUTO: 5.3 10E3/UL (ref 4–11)

## 2024-01-01 PROCEDURE — 83036 HEMOGLOBIN GLYCOSYLATED A1C: CPT | Performed by: INTERNAL MEDICINE

## 2024-01-01 PROCEDURE — G0463 HOSPITAL OUTPT CLINIC VISIT: HCPCS | Performed by: NURSE PRACTITIONER

## 2024-01-01 PROCEDURE — G0179 MD RECERTIFICATION HHA PT: HCPCS | Performed by: INTERNAL MEDICINE

## 2024-01-01 PROCEDURE — 73030 X-RAY EXAM OF SHOULDER: CPT | Mod: TC | Performed by: RADIOLOGY

## 2024-01-01 PROCEDURE — G2211 COMPLEX E/M VISIT ADD ON: HCPCS | Performed by: INTERNAL MEDICINE

## 2024-01-01 PROCEDURE — G0439 PPPS, SUBSEQ VISIT: HCPCS | Performed by: INTERNAL MEDICINE

## 2024-01-01 PROCEDURE — 99213 OFFICE O/P EST LOW 20 MIN: CPT | Performed by: INTERNAL MEDICINE

## 2024-01-01 PROCEDURE — 99214 OFFICE O/P EST MOD 30 MIN: CPT | Performed by: INTERNAL MEDICINE

## 2024-01-01 PROCEDURE — 84439 ASSAY OF FREE THYROXINE: CPT | Performed by: FAMILY MEDICINE

## 2024-01-01 PROCEDURE — P9604 ONE-WAY ALLOW PRORATED TRIP: HCPCS | Performed by: NURSE PRACTITIONER

## 2024-01-01 PROCEDURE — 90662 IIV NO PRSV INCREASED AG IM: CPT | Performed by: INTERNAL MEDICINE

## 2024-01-01 PROCEDURE — 85027 COMPLETE CBC AUTOMATED: CPT | Performed by: INTERNAL MEDICINE

## 2024-01-01 PROCEDURE — 80061 LIPID PANEL: CPT | Performed by: INTERNAL MEDICINE

## 2024-01-01 PROCEDURE — 80048 BASIC METABOLIC PNL TOTAL CA: CPT | Performed by: INTERNAL MEDICINE

## 2024-01-01 PROCEDURE — 99417 PROLNG OP E/M EACH 15 MIN: CPT | Performed by: NURSE PRACTITIONER

## 2024-01-01 PROCEDURE — 36415 COLL VENOUS BLD VENIPUNCTURE: CPT | Performed by: INTERNAL MEDICINE

## 2024-01-01 PROCEDURE — 82306 VITAMIN D 25 HYDROXY: CPT | Performed by: FAMILY MEDICINE

## 2024-01-01 PROCEDURE — 91320 SARSCV2 VAC 30MCG TRS-SUC IM: CPT | Performed by: INTERNAL MEDICINE

## 2024-01-01 PROCEDURE — 99205 OFFICE O/P NEW HI 60 MIN: CPT | Performed by: NURSE PRACTITIONER

## 2024-01-01 PROCEDURE — 85610 PROTHROMBIN TIME: CPT | Performed by: FAMILY MEDICINE

## 2024-01-01 PROCEDURE — 99214 OFFICE O/P EST MOD 30 MIN: CPT | Mod: 25 | Performed by: INTERNAL MEDICINE

## 2024-01-01 PROCEDURE — G0180 MD CERTIFICATION HHA PATIENT: HCPCS | Performed by: INTERNAL MEDICINE

## 2024-01-01 PROCEDURE — 99214 OFFICE O/P EST MOD 30 MIN: CPT | Performed by: ANESTHESIOLOGY

## 2024-01-01 PROCEDURE — 84443 ASSAY THYROID STIM HORMONE: CPT | Performed by: FAMILY MEDICINE

## 2024-01-01 PROCEDURE — 84443 ASSAY THYROID STIM HORMONE: CPT | Performed by: INTERNAL MEDICINE

## 2024-01-01 PROCEDURE — 85610 PROTHROMBIN TIME: CPT | Performed by: INTERNAL MEDICINE

## 2024-01-01 PROCEDURE — 36415 COLL VENOUS BLD VENIPUNCTURE: CPT | Performed by: FAMILY MEDICINE

## 2024-01-01 PROCEDURE — 90480 ADMN SARSCOV2 VAC 1/ONLY CMP: CPT | Performed by: INTERNAL MEDICINE

## 2024-01-01 PROCEDURE — 85610 PROTHROMBIN TIME: CPT | Performed by: NURSE PRACTITIONER

## 2024-01-01 PROCEDURE — P9604 ONE-WAY ALLOW PRORATED TRIP: HCPCS | Performed by: FAMILY MEDICINE

## 2024-01-01 PROCEDURE — 80048 BASIC METABOLIC PNL TOTAL CA: CPT | Performed by: NURSE PRACTITIONER

## 2024-01-01 PROCEDURE — G0008 ADMIN INFLUENZA VIRUS VAC: HCPCS | Performed by: INTERNAL MEDICINE

## 2024-01-01 PROCEDURE — 85027 COMPLETE CBC AUTOMATED: CPT | Performed by: NURSE PRACTITIONER

## 2024-01-01 PROCEDURE — G0463 HOSPITAL OUTPT CLINIC VISIT: HCPCS | Performed by: ANESTHESIOLOGY

## 2024-01-01 PROCEDURE — 85027 COMPLETE CBC AUTOMATED: CPT | Performed by: FAMILY MEDICINE

## 2024-01-01 PROCEDURE — 36416 COLLJ CAPILLARY BLOOD SPEC: CPT | Performed by: INTERNAL MEDICINE

## 2024-01-01 PROCEDURE — G0481 DRUG TEST DEF 8-14 CLASSES: HCPCS | Performed by: INTERNAL MEDICINE

## 2024-01-01 PROCEDURE — 36415 COLL VENOUS BLD VENIPUNCTURE: CPT | Performed by: NURSE PRACTITIONER

## 2024-01-01 PROCEDURE — 82565 ASSAY OF CREATININE: CPT | Performed by: FAMILY MEDICINE

## 2024-01-01 PROCEDURE — 84550 ASSAY OF BLOOD/URIC ACID: CPT | Performed by: INTERNAL MEDICINE

## 2024-01-01 RX ORDER — OXYCODONE HYDROCHLORIDE 10 MG/1
10 TABLET ORAL EVERY 6 HOURS PRN
Qty: 120 TABLET | Refills: 0 | Status: SHIPPED | OUTPATIENT
Start: 2024-01-01 | End: 2024-01-01

## 2024-01-01 RX ORDER — OXYCODONE HYDROCHLORIDE 10 MG/1
10 TABLET ORAL EVERY 6 HOURS PRN
Qty: 52 TABLET | Refills: 0 | Status: SHIPPED | OUTPATIENT
Start: 2024-01-01 | End: 2024-01-01

## 2024-01-01 RX ORDER — LEVOTHYROXINE SODIUM 25 UG/1
TABLET ORAL
Qty: 90 TABLET | Refills: 0 | Status: SHIPPED | OUTPATIENT
Start: 2024-01-01 | End: 2024-08-19

## 2024-01-01 RX ORDER — OXYCODONE HYDROCHLORIDE 10 MG/1
10 TABLET ORAL EVERY 6 HOURS PRN
Qty: 24 TABLET | Refills: 0 | Status: SHIPPED | OUTPATIENT
Start: 2024-01-01 | End: 2024-01-01

## 2024-01-01 RX ORDER — OXYCODONE HYDROCHLORIDE 10 MG/1
10 TABLET ORAL 4 TIMES DAILY
COMMUNITY
Start: 2024-01-01 | End: 2024-01-01

## 2024-01-01 RX ORDER — TORSEMIDE 20 MG/1
60 TABLET ORAL 2 TIMES DAILY
COMMUNITY
Start: 2024-01-01

## 2024-01-01 RX ORDER — MIDODRINE HYDROCHLORIDE 2.5 MG/1
2.5 TABLET ORAL 3 TIMES DAILY PRN
COMMUNITY
Start: 2024-01-01

## 2024-01-01 RX ORDER — LANOLIN ALCOHOL/MO/W.PET/CERES
3 CREAM (GRAM) TOPICAL
COMMUNITY
Start: 2024-01-01

## 2024-01-01 RX ORDER — METHYLENE BLUE
POWDER (GRAM) MISCELLANEOUS
Qty: 75 G | Refills: 1 | Status: SHIPPED | OUTPATIENT
Start: 2024-01-01

## 2024-01-01 RX ORDER — OXYCODONE HYDROCHLORIDE 10 MG/1
10 TABLET ORAL EVERY 4 HOURS PRN
Qty: 150 TABLET | Refills: 0 | Status: SHIPPED | OUTPATIENT
Start: 2024-01-01

## 2024-01-01 RX ORDER — FEBUXOSTAT 40 MG/1
TABLET, FILM COATED ORAL
Qty: 30 TABLET | Refills: 11 | Status: SHIPPED | OUTPATIENT
Start: 2024-01-01 | End: 2024-01-01

## 2024-01-01 RX ORDER — ASCORBIC ACID 500 MG
500 TABLET ORAL DAILY
COMMUNITY
Start: 2024-01-01

## 2024-01-01 RX ORDER — MULTIVITAMIN
1 TABLET ORAL DAILY
COMMUNITY
Start: 2024-01-01

## 2024-01-01 RX ORDER — FEBUXOSTAT 40 MG/1
TABLET, FILM COATED ORAL
Qty: 90 TABLET | Refills: 4 | Status: SHIPPED | OUTPATIENT
Start: 2024-01-01

## 2024-01-01 RX ORDER — PENTOXIFYLLINE 400 MG/1
400 TABLET, EXTENDED RELEASE ORAL DAILY
COMMUNITY
Start: 2024-01-01

## 2024-01-01 RX ORDER — WARFARIN SODIUM 3 MG/1
TABLET ORAL
Qty: 190 TABLET | Refills: 1 | Status: SHIPPED | OUTPATIENT
Start: 2024-01-01

## 2024-01-01 RX ORDER — PREGABALIN 25 MG/1
CAPSULE ORAL
Qty: 45 CAPSULE | Refills: 0 | Status: SHIPPED | OUTPATIENT
Start: 2024-01-01 | End: 2024-01-01

## 2024-01-01 RX ORDER — ACETAMINOPHEN AND CODEINE PHOSPHATE 300; 30 MG/1; MG/1
1 TABLET ORAL EVERY 6 HOURS PRN
COMMUNITY
End: 2024-01-01

## 2024-01-01 RX ORDER — METOPROLOL SUCCINATE 25 MG/1
25 TABLET, EXTENDED RELEASE ORAL DAILY
COMMUNITY
Start: 2024-01-01 | End: 2024-01-01

## 2024-01-01 RX ORDER — OXYCODONE HYDROCHLORIDE 10 MG/1
10 TABLET ORAL EVERY 6 HOURS PRN
Qty: 36 TABLET | Refills: 0 | Status: SHIPPED | OUTPATIENT
Start: 2024-01-01 | End: 2024-01-01

## 2024-01-01 RX ORDER — L. ACIDOPHILUS/L.BULGARICUS 1MM CELL
1 TABLET ORAL DAILY
COMMUNITY
Start: 2024-01-01

## 2024-01-01 RX ORDER — MIRTAZAPINE 15 MG/1
15 TABLET, FILM COATED ORAL AT BEDTIME
Qty: 90 TABLET | Refills: 4 | Status: SHIPPED | OUTPATIENT
Start: 2024-01-01

## 2024-01-01 RX ORDER — PENICILLIN V POTASSIUM 500 MG/1
500 TABLET, FILM COATED ORAL DAILY
Qty: 90 TABLET | Refills: 1 | Status: SHIPPED | OUTPATIENT
Start: 2024-01-01

## 2024-01-01 RX ORDER — HYDROCODONE BITARTRATE AND ACETAMINOPHEN 10; 325 MG/1; MG/1
1 TABLET ORAL EVERY 6 HOURS PRN
Qty: 56 TABLET | Refills: 0 | Status: SHIPPED | OUTPATIENT
Start: 2024-01-01 | End: 2024-01-01

## 2024-01-01 RX ORDER — TRIAMCINOLONE ACETONIDE 1 MG/G
OINTMENT TOPICAL 2 TIMES DAILY
Qty: 80 G | Refills: 1 | Status: SHIPPED | OUTPATIENT
Start: 2024-01-01

## 2024-01-01 RX ORDER — FERROUS SULFATE 325(65) MG
325 TABLET ORAL
COMMUNITY
Start: 2024-01-01

## 2024-01-01 RX ORDER — OXYCODONE HYDROCHLORIDE 10 MG/1
10 TABLET ORAL 4 TIMES DAILY
Qty: 120 TABLET | Refills: 0 | Status: SHIPPED | OUTPATIENT
Start: 2024-01-01 | End: 2024-01-01

## 2024-01-01 RX ORDER — OXYCODONE HYDROCHLORIDE 10 MG/1
10 TABLET ORAL EVERY 6 HOURS PRN
Qty: 16 TABLET | Refills: 0 | Status: SHIPPED | OUTPATIENT
Start: 2024-01-01 | End: 2024-01-01

## 2024-01-01 RX ORDER — OXYCODONE HYDROCHLORIDE 10 MG/1
10 TABLET ORAL EVERY 4 HOURS PRN
Qty: 24 TABLET | Refills: 0 | Status: SHIPPED | OUTPATIENT
Start: 2024-01-01 | End: 2024-01-01

## 2024-01-01 RX ORDER — RESPIRATORY SYNCYTIAL VIRUS VACCINE 120MCG/0.5
0.5 KIT INTRAMUSCULAR ONCE
Qty: 1 EACH | Refills: 0 | Status: CANCELLED | OUTPATIENT
Start: 2024-01-01 | End: 2024-01-01

## 2024-01-01 SDOH — HEALTH STABILITY: PHYSICAL HEALTH: ON AVERAGE, HOW MANY DAYS PER WEEK DO YOU ENGAGE IN MODERATE TO STRENUOUS EXERCISE (LIKE A BRISK WALK)?: 3 DAYS

## 2024-01-01 SDOH — HEALTH STABILITY: PHYSICAL HEALTH: ON AVERAGE, HOW MANY MINUTES DO YOU ENGAGE IN EXERCISE AT THIS LEVEL?: 20 MIN

## 2024-01-01 ASSESSMENT — PAIN SCALES - GENERAL
PAINLEVEL: MODERATE PAIN (4)
PAINLEVEL: MODERATE PAIN (5)
PAINLEVEL: MODERATE PAIN (4)
PAINLEVEL: MODERATE PAIN (5)

## 2024-01-01 ASSESSMENT — PAIN SCALES - PAIN ENJOYMENT GENERAL ACTIVITY SCALE (PEG)
PEG_TOTALSCORE: 6
AVG_PAIN_PASTWEEK: 6
INTERFERED_ENJOYMENT_LIFE: 6
INTERFERED_GENERAL_ACTIVITY: 6

## 2024-01-01 ASSESSMENT — PATIENT HEALTH QUESTIONNAIRE - PHQ9
SUM OF ALL RESPONSES TO PHQ QUESTIONS 1-9: 3
SUM OF ALL RESPONSES TO PHQ QUESTIONS 1-9: 3
10. IF YOU CHECKED OFF ANY PROBLEMS, HOW DIFFICULT HAVE THESE PROBLEMS MADE IT FOR YOU TO DO YOUR WORK, TAKE CARE OF THINGS AT HOME, OR GET ALONG WITH OTHER PEOPLE: NOT DIFFICULT AT ALL

## 2024-01-01 ASSESSMENT — SOCIAL DETERMINANTS OF HEALTH (SDOH): HOW OFTEN DO YOU GET TOGETHER WITH FRIENDS OR RELATIVES?: THREE TIMES A WEEK

## 2024-01-04 NOTE — PROGRESS NOTES
ANTICOAGULATION MANAGEMENT     Bandar Wells 73 year old male is on warfarin with subtherapeutic INR result. (Goal INR 2.5-3.5)    Recent labs: (last 7 days)     01/04/24  0000   INR 2.3*       ASSESSMENT     Source(s): Chart Review and Patient/Caregiver Call     Warfarin doses taken: More warfarin taken than planned which may be affecting INR. He did not reduce maintenance as instructed with last INR.   Diet: Increased greens/vitamin K in diet; ongoing change and appetite is improved overall from a few weeks back as he is feeling much better  Medication/supplement changes: None noted  New illness, injury, or hospitalization: No  Signs or symptoms of bleeding or clotting: No  Previous result: Supratherapeutic  Additional findings: None       PLAN     Recommended plan for ongoing change(s) affecting INR     Dosing Instructions: Increase your warfarin dose (6% change) with next INR in 1-2 weeks       Summary  As of 1/4/2024      Full warfarin instructions:  9 mg every Wed, Sat; 6 mg all other days   Next INR check:  1/18/2024               Telephone call with Banadr who verbalizes understanding and agrees to plan    Patient to recheck with home meter    Education provided:   Goal range and lab monitoring: goal range and significance of current result  Contact [phone]  with any changes, questions or concerns.     Plan made per [facility] anticoagulation protocol    Christel La RN  Anticoagulation Clinic  1/4/2024    _______________________________________________________________________     Anticoagulation Episode Summary       Current INR goal:  2.5-3.5   TTR:  46.4% (1 y)   Target end date:  Indefinite   Send INR reminders to:  [facility]    Indications    Paroxysmal atrial fibrillation (H) [I48.0]  S/P mitral valve replacement with metallic valve [Z95.4]             Comments:  [facility]- wants a call every time. Needs to Bridge if below 2.0             Anticoagulation Care Providers       Provider Role  Specialty Phone number    Jin Hicks MD Referring Internal Medicine [phone]

## 2024-01-09 PROBLEM — F11.90 CHRONIC, CONTINUOUS USE OF OPIOIDS: Status: ACTIVE | Noted: 2024-01-01

## 2024-01-09 NOTE — TELEPHONE ENCOUNTER
January 9, 2024    Home health orders was picked up from outbox of Dr. Soto, covering for Dr. Hicks.  Paperwork has been reviewed and is complete.  Per initial initial request, this was sent via fax to [phone] and [facility].     Jackelyn Zabala

## 2024-01-18 NOTE — PROGRESS NOTES
ANTICOAGULATION MANAGEMENT     Bandar Wells 73 year old male is on warfarin with therapeutic INR result. (Goal INR 2.5-3.5)    Recent labs: (last 7 days)     01/18/24  0000   INR 3.3       ASSESSMENT     Source(s): Chart Review and Patient/Caregiver Call     Warfarin doses taken: Warfarin taken as instructed  Diet: No new diet changes identified  Medication/supplement changes: None noted  New illness, injury, or hospitalization: No  Signs or symptoms of bleeding or clotting: No  Previous result: Subtherapeutic  Additional findings:  Maintenance dose change last INR. May need to reduce MD at next INR       PLAN     Recommended plan for no diet, medication or health factor changes affecting INR     Dosing Instructions: Continue your current warfarin dose with next INR in 2 weeks       Summary  As of 1/18/2024      Full warfarin instructions:  9 mg every Wed, Sat; 6 mg all other days   Next INR check:  2/1/2024               Telephone call with Bandar who agrees to plan and repeated back plan correctly    Patient to recheck with home meter    Education provided:   Please call back if any changes to your diet, medications or how you've been taking warfarin    Plan made per [facility] anticoagulation protocol    Yarely Powell, RN  Anticoagulation Clinic  1/18/2024    _______________________________________________________________________     Anticoagulation Episode Summary       Current INR goal:  2.5-3.5   TTR:  48.0% (1 y)   Target end date:  Indefinite   Send INR reminders to:  [facility]    Indications    Paroxysmal atrial fibrillation (H) [I48.0]  S/P mitral valve replacement with metallic valve [Z95.4]             Comments:  [employer]- wants a call every time. Needs to Bridge if below 2.0             Anticoagulation Care Providers       Provider Role Specialty Phone number    Jin Hicks MD Referring Internal Medicine [phone]

## 2024-02-01 NOTE — TELEPHONE ENCOUNTER
Home Care is calling regarding an established patient with [facility].        2/1/2024     3:29 PM   Home Care Information   Current following provider [facility]   Home Care agency [facility]     Requesting orders from: Jin Hicks  Provider is following patient: Yes  Is this a 60-day recertification request?  No    Orders Requested    Skilled Nursing  Request for continuation of care with no increase or decrease in frequency  Frequency:  3x/wk for 2 wks        Verbal orders given.  Home Care will send orders for provider to sign.    Frandy Mitchell RN

## 2024-02-01 NOTE — TELEPHONE ENCOUNTER
Home Health Care    Reason for call:  Verbal Orders    Orders are needed for this patient.  Skilled Nursing: 3x a week for 2 weeks     Pt Provider: Kurt    Phone Number Homecare Nurse can be reached at: [phone]       Could we send this information to you in [facility] or would you prefer to receive a phone call?:   Patient would prefer a phone call   Okay to leave a detailed message?: Yes at Other phone number:  [phone]

## 2024-02-02 NOTE — PROGRESS NOTES
ANTICOAGULATION MANAGEMENT     Bandar Wells 73 year old male is on warfarin with supratherapeutic INR result. (Goal INR 2.5-3.5)    Recent labs: (last 7 days)     02/02/24  0000   INR 3.9*       ASSESSMENT     Source(s): Chart Review and Patient/Caregiver Call     Warfarin doses taken: Warfarin taken as instructed  Diet: Decreased greens/vitamin K in diet; plans to resume previous intake  Medication/supplement changes: None noted  New illness, injury, or hospitalization: No  Signs or symptoms of bleeding or clotting: Yes: bloody nose - able to get it to stop quickly   Previous result: Therapeutic last visit; previously outside of goal range  Additional findings: None       PLAN     Recommended plan for temporary change(s) affecting INR     Dosing Instructions: partial hold then continue your current warfarin dose with next INR in 1-2 weeks       Summary  As of 2/2/2024      Full warfarin instructions:  2/2: 3 mg; Otherwise 9 mg every Wed, Sat; 6 mg all other days   Next INR check:  2/9/2024               Telephone call with Bandar who verbalizes understanding and agrees to plan    Patient to recheck with home meter    Education provided:   Please call back if any changes to your diet, medications or how you've been taking warfarin  Goal range and lab monitoring: goal range and significance of current result, Importance of therapeutic range, and Importance of following up at instructed interval  Symptom monitoring: monitoring for bleeding signs and symptoms and when to seek medical attention/emergency care    Plan made per [facility] anticoagulation protocol    Jerrica Varma, RN  Anticoagulation Clinic  2/2/2024    _______________________________________________________________________     Anticoagulation Episode Summary       Current INR goal:  2.5-3.5   TTR:  46.5% (1 y)   Target end date:  Indefinite   Send INR reminders to:  [facility]    Indications    Paroxysmal atrial fibrillation (H) [I48.0]  S/P  mitral valve replacement with metallic valve [Z95.4]             Comments:  [employer]- wants a call every time. Needs to Bridge if below 2.0             Anticoagulation Care Providers       Provider Role Specialty Phone number    Jin Hicks MD Referring Internal Medicine [phone]

## 2024-02-02 NOTE — TELEPHONE ENCOUNTER
February 2, 2024    Home health orders was received via fax for Dr. Hicks to sign.  Patient label was attached to paperwork and placed in provider's inbox to be signed.    Corin Samayoa

## 2024-02-06 NOTE — TELEPHONE ENCOUNTER
February 6, 2024    Home health orders was picked up from outbox of Dr. Hicks.  Paperwork has been reviewed and is complete.  Per initial initial request, this was sent via fax to [phone].     Corin Samayoa

## 2024-02-14 NOTE — TELEPHONE ENCOUNTER
February 14, 2024    Home health orders was received via fax for Dr. Hicks to sign.  Patient label was attached to paperwork and placed in provider's inbox to be signed.    Corin Samayoa

## 2024-02-19 NOTE — PROGRESS NOTES
ANTICOAGULATION MANAGEMENT     Bandar Wells 73 year old male is on warfarin with supratherapeutic INR result. (Goal INR 2.5-3.5)    Recent labs: (last 7 days)     02/16/24  0000   INR 4.5*       ASSESSMENT     Source(s): Chart Review and Patient/Caregiver Call     Warfarin doses taken: Warfarin taken as instructed  Diet: No new diet changes identified  Medication/supplement changes: Pt reports that he was recently on an antibiotic for a wound on his leg but that was completed.  New illness, injury, or hospitalization: Yes: Pt visiting the wound care clinic for a wound on his leg. Pt reports that the provider is considering a skin graft.  Signs or symptoms of bleeding or clotting: No  Previous result: Supratherapeutic  Additional findings: Pt tested on Friday 2/16/24 after hours. The result was not addressed until today 2/19/24.       PLAN     Recommended plan for ongoing change(s) affecting INR     Dosing Instructions: decrease your warfarin dose (6.2% change) with next INR in 1 week       Summary  As of 2/19/2024      Full warfarin instructions:  9 mg every Sat; 6 mg all other days   Next INR check:  2/26/2024               Telephone call with Bandar who verbalizes understanding and agrees to plan    Patient to recheck with home meter    Education provided:   Please call back if any changes to your diet, medications or how you've been taking warfarin  Contact [phone]  with any changes, questions or concerns.     Plan made per [facility] anticoagulation protocol    Julieta Muñoz RN  Anticoagulation Clinic  2/19/2024    _______________________________________________________________________     Anticoagulation Episode Summary       Current INR goal:  2.5-3.5   TTR:  46.5% (1 y)   Target end date:  Indefinite   Send INR reminders to:  [facility]    Indications    Paroxysmal atrial fibrillation (H) [I48.0]  S/P mitral valve replacement with metallic valve [Z95.4]             Comments:  [employer]- wants a call  every time. Needs to Bridge if below 2.0             Anticoagulation Care Providers       Provider Role Specialty Phone number    Jin Hicks MD Referring Internal Medicine [phone]

## 2024-02-20 NOTE — PROGRESS NOTES
Preventive Care Visit  [facility]  Jin Hicks MD, Internal Medicine  Feb 20, 2024    1. Annual physical exam  This is a 73-year-old man with issues as discussed below    2. Hyperglycemia  - Hemoglobin A1c; Future  - Hemoglobin A1c    3. Mitral valve prolapse  4. S/P mitral valve replacement with metallic valve  5. History of bacterial endocarditis  Continue warfarin, follow-up with cardiology, penicillin for prevention of recurrence of bacterial endocarditis, follows annually with Dr. Ronn Ordonez    6. Chronic combined systolic and diastolic heart failure (H)  Stable in regards to home nursing care, this is a patient who is homebound due to underlying debility from his chronic medical problems as outlined in this note.  He benefits from home care due to his chronic wounds and chronic disease management  - Home Care Referral    7. Coronary artery disease involving native coronary artery of native heart without angina pectoris  Continue secondary prevention not currently on statin due to complications  - Lipid panel reflex to direct LDL Non-fasting; Future  - Lipid panel reflex to direct LDL Non-fasting    8. Dyslipidemia    9. Paroxysmal atrial fibrillation (H)  Continue with current strategy including warfarin for stroke prevention    10. Venous stasis ulcer of right ankle limited to breakdown of skin without varicose veins (H)  He is having skin grafts at the wound clinic, significant pain for which she is on oxycodone and we have a controlled substance agreement  - Home Care Referral    11. CKD (chronic kidney disease) stage 4, GFR 15-29 ml/min (H)  Follows with nephrology recent labs are available for my review but reports her creatinine around 2.2 and hemoglobin of 8.1 for which she received dose of Epogen and will be getting iron infusion    12. Anemia due to stage 4 chronic kidney disease (H)    13. Idiopathic chronic gout without tophus, unspecified site  Continue Uloric  - Uric  acid; Future  - Uric acid    14. Paraproteinemia  Has been stable per nephrology    15. Postural kyphosis of cervicothoracic region  Continues with the neurology/physical medicine rehab specialist    16. Focal dystonia / anterior cervical muscles - Neuro - Ceballos, PT   - Home Care Referral    17. Moderate episode of recurrent major depressive disorder (H)  Stable continue same    18. Lymphedema  Continue current management    19. DRESS syndrome  This seems to be resolved sees dermatology  - Home Care Referral    20. Chronic, continuous use of opioids  - [id number] - Urine Drug Confirmation Panel (Comprehensive); Future  - [id number] - Urine Drug Confirmation Panel (Comprehensive)    21. Encounter for therapeutic drug level monitoring  - [id number] - Urine Drug Confirmation Panel (Comprehensive); Future  - [id number] - Urine Drug Confirmation Panel (Comprehensive)    22. Debility  - Home Care Referral      Erick Portillo is a 73 year old, presenting for the following:  Wellness Visit (Fasting; flu and Covid booster /) and Orders (Needs documentation for home care services that patient is homebound per medicare  )        2/20/2024     8:57 AM   Additional Questions   Roomed by ADOLPH Burton   Accompanied by Alone         Health Care Directive  Patient does not have a Health Care Directive or Living Will: Patient states has Advance Directive and will bring in a copy to clinic.    HPI  73-year-old man with issues as discussed above        2/15/2024   General Health   How would you rate your overall physical health? (!) FAIR   Feel stress (tense, anxious, or unable to sleep) Rather much   (!) STRESS CONCERN      2/15/2024   Nutrition   Diet: Regular (no restrictions)         2/15/2024   Exercise   Days per week of moderate/strenous exercise 3 days   Average minutes spent exercising at this level 20 min         2/15/2024   Social Factors   Frequency of gathering with friends or relatives Three times a week   Worry food won't last  until get money to buy more No   Food not last or not have enough money for food? No   Do you have housing?  Yes   Are you worried about losing your housing? No   Lack of transportation? No   Unable to get utilities (heat,electricity)? No         2/15/2024   Fall Risk   Fallen 2 or more times in the past year? No   Trouble with walking or balance? No          2/15/2024   Activities of Daily Living- Home Safety   Needs help with the following daily activites None of the above   Safety concerns in the home None of the above         2/15/2024   Dental   Dentist two times every year? Yes         2/15/2024   Hearing Screening   Hearing concerns? None of the above         2/15/2024   Driving Risk Screening   Patient/family members have concerns about driving (!) YES          2/15/2024   General Alertness/Fatigue Screening   Have you been more tired than usual lately? No         2/15/2024   Urinary Incontinence Screening   Bothered by leaking urine in past 6 months No         2/15/2024   TB Screening   Were you born outside of [place]?  No       Today's PHQ-9 Score:       2/20/2024     8:55 AM   PHQ-9 SCORE   PHQ-9 Total Score MyChart 3 (Minimal depression)   PHQ-9 Total Score 3         2/15/2024   Substance Use   Alcohol more than 3/day or more than 7/wk No   Do you have a current opioid prescription? (!) YES   How severe/bad is pain from 1 to 10? 5/10   Do you use any other substances recreationally? No         Social History     Tobacco Use    Smoking status: Never    Smokeless tobacco: Never   Vaping Use    Vaping Use: Never used   Substance Use Topics    Alcohol use: Yes     Alcohol/week: 2.0 standard drinks of alcohol    Drug use: No           2/15/2024   AAA Screening   Family history of Abdominal Aortic Aneurysm (AAA)? Unsure   The 10-year ASCVD risk score (Edmundo VU, et al., 2019) is: 25.8%    Values used to calculate the score:      Age: 73 years      Sex: Male      Is Non- : No      Diabetic:  No      Tobacco smoker: No      Systolic Blood Pressure: 122 mmHg      Is BP treated: No      HDL Cholesterol: 37 mg/dL      Total Cholesterol: 215 mg/dL          Reviewed and updated as needed this visit by Provider                    Current providers sharing in care for this patient include:  Patient Care Team:  Jin Hicks MD as PCP - General (Internal Medicine)  José Miguel Solorio MD as MD (Hematology & Oncology)  Jin Hicks MD as Assigned PCP  Jin Hicks MD as Assigned Pain Medication Provider  Andrea Baltazar MD as Assigned Neuroscience Provider    The following health maintenance items are reviewed in Epic and correct as of today:  Health Maintenance   Topic Date Due    RSV VACCINE (Pregnancy & 60+) (1 - 1-dose 60+ series) Never done    CONTROLLED SUBSTANCE AGREEMENT FOR CHRONIC PAIN MANAGEMENT  01/19/2022    DTAP/TDAP/TD IMMUNIZATION (4 - Td or Tdap) 09/07/2022    INFLUENZA VACCINE (1) 09/01/2023    COVID-19 Vaccine (6 - 2023-24 season) 09/01/2023    MEDICARE ANNUAL WELLNESS VISIT  10/25/2023    URINE DRUG SCREEN  10/25/2023    BMP  02/09/2024    LIPID  02/13/2024    HEMOGLOBIN  05/09/2024    ANNUAL REVIEW OF HM ORDERS  07/26/2024    TOD ASSESSMENT  10/06/2024    HF ACTION PLAN  10/18/2024    ALT  11/09/2024    CBC  11/09/2024    FALL RISK ASSESSMENT  02/20/2025    PHQ-9  02/20/2025    GLUCOSE  11/09/2026    ADVANCE CARE PLANNING  10/25/2027    COLORECTAL CANCER SCREENING  10/02/2029    PARATHYROID  Completed    PHOSPHORUS  Completed    TSH W/FREE T4 REFLEX  Completed    HEPATITIS C SCREENING  Completed    Pneumococcal Vaccine: 65+ Years  Completed    URINALYSIS  Completed    ALK PHOS  Completed    ZOSTER IMMUNIZATION  Completed    DEPRESSION ACTION PLAN  Addressed    IPV IMMUNIZATION  Aged Out    HPV IMMUNIZATION  Aged Out    MENINGITIS IMMUNIZATION  Aged Out    RSV MONOCLONAL ANTIBODY  Aged Out    MICROALBUMIN  Discontinued         Review of Systems  Constitutional, HEENT, cardiovascular,  "pulmonary, gi and gu systems are negative, except as otherwise noted.     Objective    Exam  /62 (BP Location: Left arm, Patient Position: Sitting, Cuff Size: Adult Small)   Pulse 77   Temp 98.3  F (36.8  C) (Tympanic)   Resp 12   Ht 1.702 m (5' 7\")   Wt 68.9 kg (152 lb)   SpO2 95%   BMI 23.81 kg/m     Estimated body mass index is 23.81 kg/m  as calculated from the following:    Height as of this encounter: 1.702 m (5' 7\").    Weight as of this encounter: 68.9 kg (152 lb).    Physical Exam  EYES: Eyelids, conjunctiva, and sclera were normal. Pupils were normal. Cornea, iris, and lens were normal bilaterally.  HEAD, EARS, NOSE, MOUTH, AND THROAT: Head and face were normal. Hearing was normal to voice and the ears were normal to external exam. Nose appearance was normal and there was no discharge.   NECK: Kyphosis  RESPIRATORY: Breathing pattern was normal and the chest moved symmetrically.  Percussion/auscultatory percussion was normal.  Lung sounds were normal and there were no abnormal sounds.  CARDIOVASCULAR: Heart rate and rhythm were normal.  Legs were wrapped  GASTROINTESTINAL: The abdomen was normal in contour.  MUSCULOSKELETAL: Significant kyphosis  SKIN/HAIR/NAILS: Right leg is wrapped  NEUROLOGIC: The patient was alert and oriented to person, place, time, and circumstance. Speech was normal. Cranial nerves were normal.  PSYCHIATRIC:  Mood and affect were normal and the patient had normal recent and remote memory. The patient's judgment and insight were normal.        2/20/2024   Mini Cog   Clock Draw Score 2 Normal   3 Item Recall 3 objects recalled   Mini Cog Total Score 5            Signed Electronically by: Jin Hicks MD    "

## 2024-02-20 NOTE — LETTER

## 2024-02-21 NOTE — TELEPHONE ENCOUNTER
February 21, 2024    Home health orders was picked up from outbox of Dr. Hicks.  Paperwork has been reviewed and is complete.  Per initial initial request, this was sent via fax to [phone].     Corin Samayoa

## 2024-02-27 NOTE — PROGRESS NOTES
ANTICOAGULATION MANAGEMENT     Bandar Wells 73 year old male is on warfarin with supratherapeutic INR result. (Goal INR 2.5-3.5)    Recent labs: (last 7 days)     02/27/24  0000   INR 3.7*       ASSESSMENT     Source(s): Chart Review and Patient/Caregiver Call     Warfarin doses taken: Warfarin taken as instructed  Diet: No new diet changes identified  Medication/supplement changes: None noted  New illness, injury, or hospitalization: No  Signs or symptoms of bleeding or clotting: No  Previous result: Supratherapeutic  Additional findings: None       PLAN     Recommended plan for no diet, medication or health factor changes affecting INR     Dosing Instructions: decrease your warfarin dose (6.7% change) with next INR in 1 week       Summary  As of 2/27/2024      Full warfarin instructions:  6 mg every day   Next INR check:  3/5/2024               Telephone call with Bandar who verbalizes understanding and agrees to plan and who agrees to plan and repeated back plan correctly    Patient to recheck with home meter    Education provided:   Contact [phone]  with any changes, questions or concerns.     Plan made per [facility] anticoagulation protocol    Galilea Hernandez RN  Anticoagulation Clinic  2/27/2024    _______________________________________________________________________     Anticoagulation Episode Summary       Current INR goal:  2.5-3.5   TTR:  46.1% (1 y)   Target end date:  Indefinite   Send INR reminders to:  [facility]    Indications    Paroxysmal atrial fibrillation (H) [I48.0]  S/P mitral valve replacement with metallic valve [Z95.4]             Comments:  [employer]- wants a call every time. Needs to Bridge if below 2.0             Anticoagulation Care Providers       Provider Role Specialty Phone number    Jin Hicks MD Referring Internal Medicine [phone]

## 2024-02-29 NOTE — TELEPHONE ENCOUNTER
February 29, 2024    Home health orders was received via fax for Dr. Hicks to sign.  Patient label was attached to paperwork and placed in provider's inbox to be signed.    Corin Samayoa

## 2024-03-01 NOTE — TELEPHONE ENCOUNTER
March 1, 2024    Home health orders was picked up from outbox of Dr. Hicks.  Paperwork has been reviewed and is complete.  Per initial initial request, this was sent via fax to [phone].     Corin Samayoa

## 2024-03-01 NOTE — TELEPHONE ENCOUNTER
March 1, 2024    Home health orders was picked up from outbox of Dr. Hicks.  Paperwork has been reviewed and is complete.  Per initial initial request, this was sent via fax to [phone].     Corin Samayoa

## 2024-03-05 NOTE — PROGRESS NOTES
ANTICOAGULATION MANAGEMENT     Bandar Wells 73 year old male is on warfarin with subtherapeutic INR result. (Goal INR 2.5-3.5)    Recent labs: (last 7 days)     03/05/24  0000   INR 2.4*       ASSESSMENT     Source(s): Chart Review and Patient/Caregiver Call     Warfarin doses taken: Warfarin taken as instructed  Diet: No new diet changes identified  Medication/supplement changes: None noted  New illness, injury, or hospitalization: No  Signs or symptoms of bleeding or clotting: No  Previous result: Supratherapeutic  Additional findings: None       PLAN     Recommended plan for no diet, medication or health factor changes affecting INR     Dosing Instructions: Increase your warfarin dose (7% change) with next INR in 1 week       Summary  As of 3/5/2024      Full warfarin instructions:  9 mg every Tue; 6 mg all other days   Next INR check:  3/12/2024               Telephone call with Bandar who verbalizes understanding and agrees to plan and who agrees to plan and repeated back plan correctly    Patient to recheck with home meter    Education provided:   Contact [phone]  with any changes, questions or concerns.     Plan made per [facility] anticoagulation protocol    Galilea Hernandez RN  Anticoagulation Clinic  3/5/2024    _______________________________________________________________________     Anticoagulation Episode Summary       Current INR goal:  2.5-3.5   TTR:  47.6% (1 y)   Target end date:  Indefinite   Send INR reminders to:  [facility]    Indications    Paroxysmal atrial fibrillation (H) [I48.0]  S/P mitral valve replacement with metallic valve [Z95.4]             Comments:  [employer]- wants a call every time. Needs to Bridge if below 2.0             Anticoagulation Care Providers       Provider Role Specialty Phone number    Jin Hicks MD Referring Internal Medicine [phone]

## 2024-03-05 NOTE — TELEPHONE ENCOUNTER
March 5, 2024    Home health orders was picked up from outbox of Dr. Hicks.  Paperwork has been reviewed and is complete.  Per initial initial request, this was sent via fax to [phone].     Corin Samayoa

## 2024-03-11 NOTE — TELEPHONE ENCOUNTER
Home Health Care    Reason for call:  Verbal Orders    Orders are needed for this patient.  Skilled Nursing: 3x a week for 8 weeks     Pt Provider: Kurt    Phone Number Homecare Nurse can be reached at: [phone]       Okay to leave a detailed message?: Yes at Other phone number:  [phone]

## 2024-03-11 NOTE — TELEPHONE ENCOUNTER
Home Care is calling regarding an established patient with [facility].        2/1/2024     3:29 PM   Home Care Information   Current following provider [facility]   Home Care agency [facility]     Requesting orders from: Jin Hicks  Provider is following patient: Yes  Is this a 60-day recertification request?  No    Orders Requested    Skilled Nursing  Request for continuation of care with no increase or decrease in frequency  Frequency:  3x/wk for 8 wks       Verbal orders given.  Home Care will send orders for provider to sign.        Laura Trent, RN

## 2024-03-11 NOTE — TELEPHONE ENCOUNTER
March 11, 2024    Home health orders was received via fax for Dr. Hicks to sign.  Patient label was attached to paperwork and placed in provider's inbox to be signed.    Corin Samayoa

## 2024-03-12 NOTE — PROGRESS NOTES
"ANTICOAGULATION MANAGEMENT     Bandar Wells 73 year old male is on warfarin with therapeutic INR result. (Goal INR 2.5-3.5)    Recent labs: (last 7 days)     03/12/24  0000   INR 2.5       ASSESSMENT     Source(s): Chart Review and Patient/Caregiver Call     Warfarin doses taken: Warfarin taken as instructed, \"may have missed a dose, but really not sure\"  Diet: No new diet changes identified  Medication/supplement changes:  3/6 [facility], no interaction expected  New illness, injury, or hospitalization: No, continued management of Venous stasis ulcer right foot  Signs or symptoms of bleeding or clotting:  Previous result: Subtherapeutic  Additional findings: 7% warfarin increase previous result       PLAN     Recommended plan for no diet, medication or health factor changes affecting INR     Dosing Instructions: Continue your current warfarin dose with next INR in 1 week       Summary  As of 3/12/2024      Full warfarin instructions:  9 mg every Tue; 6 mg all other days   Next INR check:  3/19/2024               Telephone call with Bandar who verbalizes understanding and agrees to plan    Patient to recheck with home meter    Education provided:   Please call back if any changes to your diet, medications or how you've been taking warfarin  Goal range and lab monitoring: goal range and significance of current result    Plan made per [facility] anticoagulation protocol    Yareli Interiano RN  Anticoagulation Clinic  3/12/2024    _______________________________________________________________________     Anticoagulation Episode Summary       Current INR goal:  2.5-3.5   TTR:  47.6% (1 y)   Target end date:  Indefinite   Send INR reminders to:  [facility]    Indications    Paroxysmal atrial fibrillation (H) [I48.0]  S/P mitral valve replacement with metallic valve [Z95.4]             Comments:  [employer]- wants a call every time. Needs to Bridge if below 2.0             Anticoagulation Care Providers       " Provider Role Specialty Phone number    Jin Hicks MD Referring Internal Medicine [phone]

## 2024-03-18 NOTE — TELEPHONE ENCOUNTER
March 18, 2024    Home health orders was received via fax for Dr. Hicks to sign.  Patient label was attached to paperwork and placed in provider's inbox to be signed.    Corin Samayoa

## 2024-03-19 NOTE — PROGRESS NOTES
Estimated Creatinine Clearance: 30.2 mL/min (A) (based on SCr of 2.06 mg/dL (H)).    Most recent weight 68.9kg as of February, BMI <40kg/m2    Lovenox dose would be 1mg/kg Q24H = Lovenox 70mg Q24H    Advise boost today to 15mg and recheck INR 03/20, if still <2.0 start Lovenox tomorrow, also advise a maintenance dose increase based on further discussion and possible ongoing factors    Chest 2012 Oral anticoagulation management guides suggests against routinely bridging for patients with stable therapeutic INRs presenting with a single subtherapeutic INR value (Grade 2C).CHEST 2012; 141(2)(Suppl):[id number]    Batool Connelly, PharmD BCACP  Anticoagulation Clinical Pharmacist

## 2024-03-19 NOTE — TELEPHONE ENCOUNTER
March 19, 2024    Home health orders was picked up from outbox of Dr. Hicks.  Paperwork has been reviewed and is complete.  Per initial initial request, this was sent via fax to [phone].     Corin Samayoa

## 2024-03-19 NOTE — PROGRESS NOTES
ANTICOAGULATION MANAGEMENT     Bandar Wells 73 year old male is on warfarin with subtherapeutic INR result. (Goal INR 2.5-3.5)    Recent labs: (last 7 days)     03/19/24  0000   INR 1.9*       ASSESSMENT     Source(s): Chart Review and Patient/Caregiver Call     Warfarin doses taken: Missed dose(s) may be affecting INR - unknown day  Diet: thinks he may have not had any greens this week   Medication/supplement changes: None noted  New illness, injury, or hospitalization: No  Signs or symptoms of bleeding or clotting: No  Previous result: Therapeutic last visit; previously outside of goal range  Additional findings:  will start bridge tomorrow (3/20) if INR is still below 2.0 on recheck after larger booster dose tonight        PLAN     Recommended plan for temporary change(s) affecting INR     Dosing Instructions:Booster dose tonight, then Increase your warfarin dose (13.3% change) with next INR in 1 day       Summary  As of 3/19/2024      Full warfarin instructions:  3/19: 15 mg; Otherwise 9 mg every Sun, Tue, Fri; 6 mg all other days   Next INR check:  3/20/2024               Telephone call with Bandar who verbalizes understanding and agrees to plan and who agrees to plan and repeated back plan correctly    Patient to recheck with home meter    Education provided:   Lovenox/Heparin education provided: role of enoxaparin/heparin in bridge therapy   Contact [phone]  with any changes, questions or concerns.     Plan made with [facility] Pharmacist Batool Grover, CLINT  Anticoagulation Clinic  3/19/2024    _______________________________________________________________________     Anticoagulation Episode Summary       Current INR goal:  2.5-3.5   TTR:  47.6% (1 y)   Target end date:  Indefinite   Send INR reminders to:  [facility]    Indications    Paroxysmal atrial fibrillation (H) [I48.0]  S/P mitral valve replacement with metallic valve [Z95.4]             Comments:  [employer]- wants a call  every time. Needs to Bridge if below 2.0             Anticoagulation Care Providers       Provider Role Specialty Phone number    Jin Hicks MD Referring Internal Medicine [phone]

## 2024-03-20 NOTE — PROGRESS NOTES
ANTICOAGULATION MANAGEMENT     Bandar Wells 73 year old male is on warfarin with subtherapeutic INR result. (Goal INR 2.5-3.5)    Recent labs: (last 7 days)     03/20/24  0000   INR 2.1*       ASSESSMENT     Source(s): Chart Review and Patient/Caregiver Call     Warfarin doses taken: Booster dose(s) recently taken which may be affecting INR + MD increased by 13.3% yesterday  Diet: No new diet changes identified  Medication/supplement changes: None noted  New illness, injury, or hospitalization: No  Signs or symptoms of bleeding or clotting: No  Previous result: Subtherapeutic  Additional findings: Since INR >2.0 - no bridging required today       PLAN     Recommended plan for temporary change(s) affecting INR     Dosing Instructions: Continue your current warfarin dose with next INR in 6 days       Summary  As of 3/20/2024      Full warfarin instructions:  9 mg every Sun, Tue, Fri; 6 mg all other days   Next INR check:  3/26/2024               Telephone call with Bandar who verbalizes understanding and agrees to plan and who agrees to plan and repeated back plan correctly    Lab visit scheduled    Education provided:   Please call back if any changes to your diet, medications or how you've been taking warfarin  Symptom monitoring: monitoring for bleeding signs and symptoms and monitoring for clotting signs and symptoms    Plan made with [facility] Pharmacist Batool Mendiola RN  Anticoagulation Clinic  3/20/2024    _______________________________________________________________________     Anticoagulation Episode Summary       Current INR goal:  2.5-3.5   TTR:  47.6% (1 y)   Target end date:  Indefinite   Send INR reminders to:  [facility]    Indications    Paroxysmal atrial fibrillation (H) [I48.0]  S/P mitral valve replacement with metallic valve [Z95.4]             Comments:  [employer]- wants a call every time. Needs to Bridge if below 2.0             Anticoagulation Care Providers        Provider Role Specialty Phone number    Jin Hicks MD Referring Internal Medicine [phone]

## 2024-03-22 NOTE — TELEPHONE ENCOUNTER
March 22, 2024    Home health orders was picked up from outbox of Dr. Hicks.  Paperwork has been reviewed and is complete.  Per initial initial request, this was sent via fax to [phone].     Corin Samayoa

## 2024-03-22 NOTE — TELEPHONE ENCOUNTER
Spoke with Kaycee who stated this is an FYI update.     Just informing us due to change in schedule. Patient typically seen Monday, Wednesday, Friday.     Not rescheduled. No action needed per home care.

## 2024-03-22 NOTE — TELEPHONE ENCOUNTER
Home Health Care    Reason for call:  update:  Bandar had a missed nursing visit on Wednesday, 3/20 due to an appt with the wound clinic.      Pt Provider: Dr Hicks    Phone Number Homecare Nurse can be reached at: [phone]-no call back needed

## 2024-03-26 NOTE — PROGRESS NOTES
ANTICOAGULATION MANAGEMENT     Bandar Wells 73 year old male is on warfarin with supratherapeutic INR result. (Goal INR 2.5-3.5)    Recent labs: (last 7 days)     03/26/24  0000   INR 3.8*       ASSESSMENT     Source(s): Chart Review and Patient/Caregiver Call     Warfarin doses taken: Warfarin taken as instructed  Diet: No new diet changes identified  Medication/supplement changes: None noted  New illness, injury, or hospitalization: No  Signs or symptoms of bleeding or clotting: No  Previous result: Subtherapeutic  Additional findings: None       PLAN     Recommended plan for no diet, medication or health factor changes affecting INR     Dosing Instructions: decrease your warfarin dose (5.9% change) with next INR in 1 week       Summary  As of 3/26/2024      Full warfarin instructions:  9 mg every Sun, Thu; 6 mg all other days   Next INR check:  4/2/2024               Telephone call with Bandar who agrees to plan and repeated back plan correctly    Patient to recheck with home meter    Education provided:   Please call back if any changes to your diet, medications or how you've been taking warfarin  Goal range and lab monitoring: goal range and significance of current result    Plan made per [facility] anticoagulation protocol    Yareli Interiano RN  Anticoagulation Clinic  3/26/2024    _______________________________________________________________________     Anticoagulation Episode Summary       Current INR goal:  2.5-3.5   TTR:  48.6% (1 y)   Target end date:  Indefinite   Send INR reminders to:  [facility]    Indications    Paroxysmal atrial fibrillation (H) [I48.0]  S/P mitral valve replacement with metallic valve [Z95.4]             Comments:  [employer]- wants a call every time. Needs to Bridge if below 2.0             Anticoagulation Care Providers       Provider Role Specialty Phone number    Jin Hicks MD Referring Internal Medicine [phone]

## 2024-03-26 NOTE — TELEPHONE ENCOUNTER
Patient Returning Call    Reason for call:  returning call from INR nurse    Information relayed to patient:  nurse will call back    Patient has additional questions:  No      Could we send this information to you in [facility] or would you prefer to receive a phone call?:   Patient would prefer a phone call   Okay to leave a detailed message?: No at Cell number on file:    Telephone Information:   Mobile [phone]

## 2024-04-02 NOTE — PROGRESS NOTES
ANTICOAGULATION MANAGEMENT     Bandar Wells 73 year old male is on warfarin with supratherapeutic INR result. (Goal INR 2.5-3.5)    Recent labs: (last 7 days)     04/02/24  0000   INR 4.3*       ASSESSMENT     Source(s): Chart Review and Patient/Caregiver Call     Warfarin doses taken: Warfarin taken as instructed  Diet: No new diet changes identified  Medication/supplement changes:  ciprofloxacin 7 day course (dates: 3/29-4/4) which may be increasing INR today  New illness, injury, or hospitalization: No, had a cancerous spot removed from ear last week  Signs or symptoms of bleeding or clotting: No  Previous result: Supratherapeutic  Additional findings: None       PLAN     Recommended plan for temporary change(s) affecting INR     Dosing Instructions: hold dose then continue your current warfarin dose with next INR in 1 week       Summary  As of 4/2/2024      Full warfarin instructions:  4/2: Hold; Otherwise 9 mg every Sun, Thu; 6 mg all other days   Next INR check:  4/9/2024               Telephone call with Bandar who verbalizes understanding and agrees to plan  Sent DXY message with dosing and follow up instructions    Patient to recheck with home meter    Education provided:   Interaction IS anticipated between warfarin and ciprofloxacin   Symptom monitoring: monitoring for bleeding signs and symptoms, when to seek medical attention/emergency care, and if you hit your head or have a bad fall seek emergency care    Plan made per [facility] anticoagulation protocol    Brenda Steiner, RN  Anticoagulation Clinic  4/2/2024    _______________________________________________________________________     Anticoagulation Episode Summary       Current INR goal:  2.5-3.5   TTR:  48.6% (1 y)   Target end date:  Indefinite   Send INR reminders to:  [facility]    Indications    Paroxysmal atrial fibrillation (H) [I48.0]  S/P mitral valve replacement with metallic valve [Z95.4]             Comments:  [employer]-  wants a call every time. Needs to Bridge if below 2.0             Anticoagulation Care Providers       Provider Role Specialty Phone number    Jin Hicks MD Referring Internal Medicine [phone]

## 2024-04-03 NOTE — TELEPHONE ENCOUNTER
April 3, 2024    Home health orders was received via fax for Dr. Hicks to sign.  Patient label was attached to paperwork and placed in provider's inbox to be signed.    Corin Samayoa

## 2024-04-04 NOTE — TELEPHONE ENCOUNTER
Home Health Care    Reason for call:  verbal orders    Orders are needed for this patient.  Skilled Nursing: discontinue visit for 4/3 as pt went to wound care    Pt Provider: Dr Hicks    Phone Number Homecare Nurse can be reached at: [phone]-ok for detailed message

## 2024-04-05 NOTE — TELEPHONE ENCOUNTER
April 5, 2024    Home health orders was picked up from outbox of Dr. Hicks.  Paperwork has been reviewed and is complete.  Per initial initial request, this was sent via fax to [phone].     Edna Rodriguez

## 2024-04-05 NOTE — TELEPHONE ENCOUNTER
Triage Call:     S-(situation): Pt calling with [facility] nurse to report that he was prescribed Tylenol with Codeine by his dermatologist and they are not sure how much Acetaminophen is in it because the bottle is not labeled. They are also calling to discuss patient's Acetaminophen use this week.     B-(background): Pt had some skin removed from his left ear by his dermatologist on 4/4/2024 and was prescribed Tylenol with Codeine for pain management    A-(assessment): Pt states that this week he has been taking 4000-5000mg of tylenol a day. [facility] nurse is calling to get advisement. Pt's order for Tylenol is as follows:       Pt was advised that he is taking more than his PCP has prescribed for his daily Acetaminophen amounts.     He was also educated that the Tylenol with Codeine also has Acetaminophen in it. Patient and nurse were advised to contact dermatologist to find out dosage of Tylenol since the bottle is not labeled with it.     Today patient has taken: two tablets 1000mg of tylenol and one tablet Tylenol with codeine, so he is likely within the recommended Acetaminophen range. Pain left ear where they removed cancerous skin: 3-5/10    Pt denies any adverse sx related to taking 4000mg to 5000mg of Acetaminophen a day for the last week. He was still advised to contact the poison center per protocol.     R-(recommendations): Pt was advised to contact poison center today for further advisement from them per the protocol.  He was also advised to contact the clinic with further questions about sx or medication questions.     Consent: Pt gave consent to speak with CLINT Pritchett with [facility] about his health care today.     Reason for Disposition   MORE THAN A DOUBLE DOSE of a prescription or over-the-counter (OTC) drug    Additional Information   Negative: Intentional drug overdose and suicidal thoughts or ideas   Negative: DOUBLE DOSE (an extra dose or lesser amount) of  over-the-counter (OTC) drug and any symptoms (e.g., dizziness, nausea, pain, sleepiness)   Negative: DOUBLE DOSE (an extra dose or lesser amount) of prescription drug and any symptoms (e.g., dizziness, nausea, pain, sleepiness)    Protocols used: Medication Question Call-A-[place]  Yolanda Mike RN  [facility] Nurse Advisor 3:34 PM 4/5/2024

## 2024-04-05 NOTE — TELEPHONE ENCOUNTER
Home Care is calling regarding an established patient with [facility].        2/1/2024     3:29 PM   Home Care Information   Current following provider [facility]   [place] [facility]     Requesting orders from: Jin Hicks  Provider is following patient: Yes  Is this a 60-day recertification request?  No    Orders Requested    Skilled Nursing  discontinue visit for 4/3 as pt went to wound care       Verbal orders given.  Home Care will send orders for provider to sign.  Information was gathered and will be sent to provider for review.  RN will contact Home Care with information after provider review.  See below for provider approval.    Frandy Mitchell RN

## 2024-04-10 NOTE — PROGRESS NOTES
ANTICOAGULATION MANAGEMENT     Bandar Wells 73 year old male is on warfarin with supratherapeutic INR result. 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  Patient stated that he was getting error 8 and 4 and he called [employer] support and issues were resolved.       PLAN     Recommended plan for temporary change(s) and ongoing change(s) affecting INR     Dosing Instructions: decrease your warfarin dose (6% change) with next INR in 1 week       Summary  As of 4/10/2024      Full warfarin instructions:  9 mg every Sun; 6 mg all other days   Next INR check:  4/17/2024               Telephone call with Bandar who verbalizes understanding and agrees to plan and who agrees to plan and repeated back plan correctly    Patient to recheck with home meter    Education provided:   Contact [phone]  with any changes, questions or concerns.     Plan made per [facility] anticoagulation protocol    Galilea Hernandez RN  Anticoagulation Clinic  4/10/2024    _______________________________________________________________________     Anticoagulation Episode Summary       Current INR goal:  2.5-3.5   TTR:  48.3% (1 y)   Target end date:  Indefinite   Send INR reminders to:  [facility]    Indications    Paroxysmal atrial fibrillation (H) [I48.0]  S/P mitral valve replacement with metallic valve  [Z95.4]             Comments:  [employer]- wants a call every time. Needs to Bridge if below 2.0             Anticoagulation Care Providers       Provider Role Specialty Phone number    Jin Hicks MD Referring Internal Medicine [phone]

## 2024-04-10 NOTE — TELEPHONE ENCOUNTER
April 10, 2024    Home health orders was received via fax for Dr. Hicks to sign.  Patient label was attached to paperwork and placed in provider's inbox to be signed.    Corin Samayoa

## 2024-04-12 NOTE — TELEPHONE ENCOUNTER
April 12, 2024    Home health orders was picked up from outbox of Dr. Hicks.  Paperwork has been reviewed and is complete.  Per initial initial request, this was sent via fax to [phone].     Edna Rodriguez

## 2024-04-15 NOTE — PROGRESS NOTES
ANTICOAGULATION MANAGEMENT     Bandar Wells 73 year old male is on warfarin with supratherapeutic INR result. (Goal INR 2.5-3.5)    Recent labs: (last 7 days)     04/15/24  0000   INR 4.9*       ASSESSMENT     Source(s): Chart Review and Patient/Caregiver Call     Warfarin doses taken: Warfarin taken as instructed  Diet: No new diet changes identified  Medication/supplement changes: None noted  New illness, injury, or hospitalization: No  Signs or symptoms of bleeding or clotting: Yes: patient reports he had some bleeding this morning at the site of where his skin cancer was removed on his ear. Patient states he is at the dermatology office now waiting to see the provider to have a follow up. Patient will bring up these concerns to the provider today, and continue to monitor symptoms. Patient will notify [facility] is bleeding reoccurs.   Previous result: Supratherapeutic  Additional findings: None       PLAN     Recommended plan for no diet, medication or health factor changes affecting INR     Dosing Instructions: hold dose then decrease your warfarin dose (13% change) with next INR in 3 days       Summary  As of 4/15/2024      Full warfarin instructions:  4/15: Hold; Otherwise 3 mg every Tue; 6 mg all other days   Next INR check:  4/18/2024               Telephone call with Bandar who agrees to plan and repeated back plan correctly    Patient to recheck with home meter    Education provided:   Please call back if any changes to your diet, medications or how you've been taking warfarin  Symptom monitoring: monitoring for bleeding signs and symptoms, monitoring for clotting signs and symptoms, and monitoring for stroke signs and symptoms  Contact [phone]  with any changes, questions or concerns.     Plan made per [facility] anticoagulation protocol    Galilea Jarrett RN  Anticoagulation Clinic  4/15/2024    _______________________________________________________________________     Anticoagulation Episode Summary       Current  INR goal:  2.5-3.5   TTR:  46.9% (1 y)   Target end date:  Indefinite   Send INR reminders to:  [facility]    Indications    Paroxysmal atrial fibrillation (H) [I48.0]  S/P mitral valve replacement with metallic valve [Z95.4]             Comments:  [employer]- wants a call every time. Needs to Bridge if below 2.0             Anticoagulation Care Providers       Provider Role Specialty Phone number    Jin Hicks MD Referring Internal Medicine [phone]

## 2024-04-18 NOTE — PROGRESS NOTES
ANTICOAGULATION MANAGEMENT     Bandar Wells 73 year old male is on warfarin with subtherapeutic INR result. (Goal INR 2.5-3.5)    Recent labs: (last 7 days)     04/18/24  0000   INR 2.1*       ASSESSMENT     Source(s): Chart Review and Patient/Caregiver Call     Warfarin doses taken: Warfarin taken as instructed  Diet: Protein supplement/shake started which maybe affecting INR  - Ensure plus for the last 2 days and plan to continue at least until next week.   Medication/supplement changes:  Acetaminophen with codeine started on 4/17/24 as needed for pain subsequent INRs may be increased. Closer INR monitoring recommended.  New illness, injury, or hospitalization: No  Signs or symptoms of bleeding or clotting: No -seen dermatology yesterday, bleeding from site where skin cancer removed on ear has stopped.  Taking Acetaminophen with codeine prn for pain. Follow up again on Monday  Previous result: Supratherapeutic  Additional findings: None       PLAN     Recommended plan for temporary change(s) affecting INR     Dosing Instructions: booster dose then Increase your warfarin dose (7.7% change) as patient plan to continue with ensure plus until next week, with next INR in 5 days       Summary  As of 4/18/2024      Full warfarin instructions:  4/18: 9 mg; Otherwise 6 mg every day   Next INR check:  4/23/2024               Telephone call with Bandar who verbalizes understanding and agrees to plan    Patient to recheck with home meter    Education provided:   Please call back if any changes to your diet, medications or how you've been taking warfarin  Interaction IS anticipated between warfarin and acetaminophen with codeine     Plan made per [facility] anticoagulation protocol    Cecile Huggins RN  Anticoagulation Clinic  4/18/2024    _______________________________________________________________________     Anticoagulation Episode Summary       Current INR goal:  2.5-3.5   TTR:  46.4% (1 y)   Target end date:  Indefinite   Send INR  reminders to:  [facility] HOME MONITORING    Indications    Paroxysmal atrial fibrillation (H) [I48.0]  S/P mitral valve replacement with metallic valve [Z95.4]             Comments:  [facility]- wants a call every time. Needs to Bridge if below 2.0             Anticoagulation Care Providers       Provider Role Specialty Phone number    Jin Hicks MD Referring Internal Medicine [phone]

## 2024-04-23 NOTE — PROGRESS NOTES
ANTICOAGULATION MANAGEMENT     Bandar Wells 73 year old male is on warfarin with therapeutic INR result. (Goal INR 2.5-3.5)    Recent labs: (last 7 days)     04/23/24  0000   INR 2.5       ASSESSMENT     Source(s): Chart Review and Patient/Caregiver Call     Warfarin doses taken: Warfarin taken as instructed  Diet: No new diet changes identified - continues ensure daily - explains no changes from last week   Medication/supplement changes: None noted  New illness, injury, or hospitalization: No  Signs or symptoms of bleeding or clotting: No  Previous result: Subtherapeutic  Additional findings: None       PLAN     Recommended plan for no diet, medication or health factor changes affecting INR     Dosing Instructions: Continue your current warfarin dose with next INR in 1 week       Summary  As of 4/23/2024      Full warfarin instructions:  6 mg every day   Next INR check:  4/30/2024               Telephone call with Bandar who verbalizes understanding and agrees to plan and who agrees to plan and repeated back plan correctly    Patient to recheck with home meter    Education provided:   Contact [phone]  with any changes, questions or concerns.     Plan made per [facility] anticoagulation protocol    Germaine Grover RN  Anticoagulation Clinic  4/23/2024    _______________________________________________________________________     Anticoagulation Episode Summary       Current INR goal:  2.5-3.5   TTR:  45.0% (1 y)   Target end date:  Indefinite   Send INR reminders to:  [facility] HOME MONITORING    Indications    Paroxysmal atrial fibrillation (H) [I48.0]  S/P mitral valve replacement with metallic valve [Z95.4]             Comments:  [facility]- wants a call every time. Needs to Bridge if below 2.0             Anticoagulation Care Providers       Provider Role Specialty Phone number    Jin Hicks MD Referring Internal Medicine [phone]

## 2024-04-24 NOTE — TELEPHONE ENCOUNTER
Call placed to [facility] . Left detailed message on secured line with orders as noted below. Requested call back with further questions or concerns.    Orders approved:   Discontinue skilled nursing visits for the next 2 Wednesdays  Do to going to wound clinic

## 2024-04-24 NOTE — PROGRESS NOTES
Office Visit - Follow Up   Bandar Wells   73 year old male    Date of Visit: 4/24/2024    Chief Complaint   Patient presents with    Follow Up     Left ear surgery (this was done with [facility])     Medication Refill        Assessment and Plan   1. Chronic, continuous use of opioids  He is low on opioid pain medication, some concern about his previous home care nurse with whom he no longer has any contact.  The patient himself is very diligent about monitoring his medication.  I will give him a one-time refill today with his recent surgery and pain associated with this.  I wonder if he might do better with a longer acting opioid like methadone or Suboxone and therefore I am going to have him revisit with the pain clinic.  - oxyCODONE IR (ROXICODONE) 10 MG tablet; Take 1 tablet (10 mg) by mouth every 6 hours as needed for severe pain (May take up to 4 tablets/day)  Dispense: 120 tablet; Refill: 0  - oxyCODONE (ROXICODONE) 10 MG tablet; Take 1 tablet (10 mg) by mouth every 6 hours as needed for pain  Dispense: 52 tablet; Refill: 0  - Pain Management  Referral; Future    2. Chronic pain syndrome  - oxyCODONE IR (ROXICODONE) 10 MG tablet; Take 1 tablet (10 mg) by mouth every 6 hours as needed for severe pain (May take up to 4 tablets/day)  Dispense: 120 tablet; Refill: 0  - oxyCODONE (ROXICODONE) 10 MG tablet; Take 1 tablet (10 mg) by mouth every 6 hours as needed for pain  Dispense: 52 tablet; Refill: 0  - Pain Management  Referral; Future    3. CKD (chronic kidney disease) stage 4, GFR 15-29 ml/min (H)  Stable follows with nephrology    4. Venous stasis ulcer of right ankle limited to breakdown of skin without varicose veins (H)  Continue home wound care, now has a wound home care nurse    5. Postural kyphosis of cervicothoracic region  Continue with neurology, getting occasional injections, significant debility from this    6. History of skin cancer  Poor healing resection area of his  "left ear causing significant difficulty sleeping and pain    Return in about 4 weeks (around 5/22/2024) for Follow up.     History of Present Illness   This 73 year old man comes in for follow-up.  Overall he has been struggling a bit.  Recently had skin cancer surgery on his left ear and has had difficulty with closure and bleeding.  This makes it difficult to sleep with his contractures of the back.  Continues with home wound care.  He now has a new wound care nurse.  He is low on opioid pain medication.       Physical Exam   General Appearance:   No acute distress    Pulse 86   Temp 97.8  F (36.6  C) (Tympanic)   Resp 19   Ht 1.702 m (5' 7\")   Wt 68.9 kg (152 lb)   SpO2 97%   BMI 23.81 kg/m      He has a bandage on his left ear     Additional Information   Current Outpatient Medications   Medication Sig Dispense Refill    acetaminophen (TYLENOL) 500 MG tablet Take 1,000 mg by mouth 3 times daily      acetaminophen-codeine (TYLENOL #3) 300-30 MG per tablet Take 1 tablet by mouth every 6 hours as needed for severe pain      B Complex CAPS TAKE ONE CAPSULE BY MOUTH ONCE DAILY 100 capsule 0    Cholecalciferol (VITAMIN D3) 25 MCG (1000 UT) CAPS Take 1,000 Units by mouth daily      febuxostat (ULORIC) 40 MG TABS tablet **THANK YOU** 90 tablet 4    folic acid (FOLVITE) 1 MG tablet TAKE ONE TABLET BY MOUTH ONCE DAILY. 30 tablet 0    mirtazapine (REMERON) 15 MG tablet Take 1 tablet (15 mg) by mouth at bedtime 90 tablet 4    oxyCODONE (ROXICODONE) 10 MG tablet Take 1 tablet (10 mg) by mouth every 6 hours as needed for pain 52 tablet 0    [START ON 5/9/2024] oxyCODONE IR (ROXICODONE) 10 MG tablet Take 1 tablet (10 mg) by mouth every 6 hours as needed for severe pain (May take up to 4 tablets/day) 120 tablet 0    penicillin V (VEETID) 500 MG tablet Take 1 tablet (500 mg) by mouth daily Hx endocarditis 90 tablet 1    polyethylene glycol (MIRALAX) 17 g packet Take 17 g by mouth daily as needed for constipation 30 packet " 0    potassium chloride (KLOR-CON) 20 MEQ packet Take 40 mEq by mouth 2 times daily      sennosides (SENOKOT) 8.6 MG tablet Take 1-4 tablets by mouth 2 times daily      torsemide (DEMADEX) 100 MG tablet Take 100 mg by mouth 2 times daily      triamcinolone (KENALOG) 0.1 % external ointment Apply topically 2 times daily 80 g 1    vitamin B complex with vitamin C (STRESS TAB) tablet Take 1 tablet by mouth daily      warfarin ANTICOAGULANT (COUMADIN) 3 MG tablet TAKE TWO TABLETS (6 MG) BY MOUTH DAILY OR AS DIRECTED BY [facility]. 190 tablet 1       Time:     The longitudinal plan of care for the diagnosis(es)/condition(s) as documented were addressed during this visit. Due to the added complexity in care, I will continue to support Bandar in the subsequent management and with ongoing continuity of care.     Jin Hicks MD  Answers submitted by the patient for this visit:  General Questionnaire (Submitted on 4/24/2024)  Chief Complaint: Chronic problems general questions HPI Form  What is the reason for your visit today? : Follwup to recent ear surgery and medication management

## 2024-04-24 NOTE — TELEPHONE ENCOUNTER
Order/Referral Request    Who is requesting: anastasia    Orders being requested:   Discontinue skilled nursing visits for the next 2 Wednesdays  Do to going to wound clinic    Reason service is needed/diagnosis: n/a    When are orders needed by: n/a    Has this been discussed with Provider: No    Does patient have a preference on a Group/Provider/Facility? N/a    Does patient have an appointment scheduled?: No    Where to send orders: N/A    Could we send this information to you in [employer] or would you prefer to receive a phone call?:   No preference   Okay to leave a detailed message?: Yes at Other phone number:  [phone]

## 2024-04-30 NOTE — LETTER

## 2024-04-30 NOTE — PROGRESS NOTES
Patient presents to the clinic today for a visit with LALO SHANKAR MD regarding Pain Management.          3/9/2023     9:11 AM 6/1/2023    10:52 AM 4/30/2024     8:58 AM   PEG Score   PEG Total Score 5.67 4 6.67       UDS/CSA- na    Medications- Oxycodone (pcp) last taken @7am    Notes worse at night    Iris Salmon  [facility] Clinical Assistant

## 2024-04-30 NOTE — PROGRESS NOTES
"                          [facility] Follow-up    Date of visit: 4/30/2024    Chief complaint:   Chief Complaint   Patient presents with    Pain     Patient last seen 6/1/2023 for medical cannabis enrollment followed for ulcer on his ankle, cervical dystonia.,   Interval history:    Reviewing the records seen on 4/24, Dr. Lowe, patient followed for left ear surgery through dermatology, noting he was on opioid management, concerned problem with his home care nurse and opioids.  Question whether would do better on long-acting opioid so return to the pain clinic, was using oxycodone 10 mg up to 4 times a day.    On interview seen with his wife.  Reviews \"not so well\".  Has had increased physical problems which in part affect his medical problems.    He continues with ulcers on his leg, and has had grafts, follow-up through the [facility] wound clinic.  Will be having weekly visits seems to be improving.    Given that he has home health care nurses seen him 3 times a week the regulations have made him be homebound over the last year and a half he is struggling.    Also had problems with skin cancer on his scalp, had radiation therapy 3 times a way to seem to help.  However then had a biopsy and ear found to have cancer Mohs surgery 2 months ago.  Has continued now with frequent visits to the plastic surgeon, having hematomas, bleeding.  Did follow-up yesterday making \"tremendous progress so does not need to have such a bulky bandage.    He becomes tearful as he reviews interaction on the opioids.    He did not continue the medical cannabis as he did not feel it was particular helpful wants to keep things simple.  He has been using the oxycodone 10 mg, had been prescribed to take for a day, had needed to take up to 5 a day at times with the pain.  It would help him sleep.  Notes the 10 mg dose only lasted around 2 hours.    There was concern that a nurse was stealing his medication.  She had " confided that she herself was on oxycodone.  He began to have doses missing.  She is no longer employed.  They have not yet informed the agency of their concerns.  I reviewed how important it is to inform that and as the nurse herself may have an addiction which is dangerous and must be addressed for her safety, and she may be compromising the health and safety of others she works with.  Wife indicates she can make the call.    Recalls taking Tylenol 3 which did not help.  He has not tried hydrocodone or morphine.    He did have a urine drug test in February through primary care provider.        Medications:  Current Outpatient Medications   Medication Sig Dispense Refill    acetaminophen (TYLENOL) 500 MG tablet Take 1,000 mg by mouth 3 times daily      acetaminophen-codeine (TYLENOL #3) 300-30 MG per tablet Take 1 tablet by mouth every 6 hours as needed for severe pain      B Complex CAPS TAKE ONE CAPSULE BY MOUTH ONCE DAILY 100 capsule 0    Cholecalciferol (VITAMIN D3) 25 MCG (1000 UT) CAPS Take 1,000 Units by mouth daily      febuxostat (ULORIC) 40 MG TABS tablet **THANK YOU** 90 tablet 4    folic acid (FOLVITE) 1 MG tablet TAKE ONE TABLET BY MOUTH ONCE DAILY. 30 tablet 0    HYDROcodone-acetaminophen (NORCO)  MG per tablet Take 1 tablet by mouth every 6 hours as needed for severe pain 56 tablet 0    Methylene Blue POWD 5 mg capsule, one daily one week, one morning and noon one week, two morning and one noon, 75 75 g 1    mirtazapine (REMERON) 15 MG tablet Take 1 tablet (15 mg) by mouth at bedtime 90 tablet 4    penicillin V (VEETID) 500 MG tablet Take 1 tablet (500 mg) by mouth daily Hx endocarditis 90 tablet 1    polyethylene glycol (MIRALAX) 17 g packet Take 17 g by mouth daily as needed for constipation 30 packet 0    potassium chloride (KLOR-CON) 20 MEQ packet Take 40 mEq by mouth 2 times daily      sennosides (SENOKOT) 8.6 MG tablet Take 1-4 tablets by mouth 2 times daily      torsemide (DEMADEX) 100  MG tablet Take 100 mg by mouth 2 times daily      triamcinolone (KENALOG) 0.1 % external ointment Apply topically 2 times daily 80 g 1    vitamin B complex with vitamin C (STRESS TAB) tablet Take 1 tablet by mouth daily      warfarin ANTICOAGULANT (COUMADIN) 3 MG tablet TAKE TWO TABLETS (6 MG) BY MOUTH DAILY OR AS DIRECTED BY [facility]. 190 tablet 1           Physical Exam:  Blood pressure (!) 149/61, pulse 76, weight 72.1 kg (159 lb), SpO2 96%.    Alert, clear sensorium.  No respiratory distress.    Large bandages on his left ear.    Continues with the torticollis of his neck.    Has a brace on his right foot.            Assessment:   Overlapping pain syndromes with his left ear recent surgeries, continues with wounds on his right lower leg.    We reviewed he appears to have developed tolerance with the oxycodone at 10 mg dose lasting just 2 hours.    Discussed the plan for the short-term to rotate to a different class of opioid, hoping there will be healing and will decrease the need.  Will be given hydrocodone 10 mg every 6 hours as needed.  He will call with an update.      Total time 32 minutes minutes spent on the date of encounter doing chart review, history, and exam documentation and further activities as noted above.     Javier Mcwilliams MD  [facility] Pain

## 2024-04-30 NOTE — PROGRESS NOTES
ANTICOAGULATION MANAGEMENT     Bandar Wells 73 year old male is on warfarin with supratherapeutic INR result. (Goal INR 2.5-3.5)    Recent labs: (last 7 days)     04/30/24  0000   INR 3.9*       ASSESSMENT     Source(s): Chart Review and Patient/Caregiver Call     Warfarin doses taken:  Bandar thinks it is possible he took a double dose one day last week. Not 100% certain but it is possible and could explain the increase in INR this week  Diet: No new diet changes identified  Medication/supplement changes: None noted  New illness, injury, or hospitalization: No  Signs or symptoms of bleeding or clotting: No  Previous result: Therapeutic last visit; previously outside of goal range  Additional findings: None       PLAN     Recommended plan for possible temporary change(s) affecting INR     Dosing Instructions: partial hold then continue your current warfarin dose with next INR in 1 week       Summary  As of 4/30/2024      Full warfarin instructions:  4/30: 3 mg; Otherwise 6 mg every day   Next INR check:  5/7/2024               Telephone call with Bandar who verbalizes understanding and agrees to plan    Patient to recheck with home meter    Education provided:   Contact [phone]  with any changes, questions or concerns.     Plan made per [facility] anticoagulation protocol    Christel La RN  Anticoagulation Clinic  4/30/2024    _______________________________________________________________________     Anticoagulation Episode Summary       Current INR goal:  2.5-3.5   TTR:  44.5% (1 y)   Target end date:  Indefinite   Send INR reminders to:  [facility] HOME MONITORING    Indications    Paroxysmal atrial fibrillation (H) [I48.0]  S/P mitral valve replacement with metallic valve [Z95.4]             Comments:  [employer]- wants a call every time. Needs to Bridge if below 2.0             Anticoagulation Care Providers       Provider Role Specialty Phone number    Jin Hicks MD Referring Internal Medicine [phone]

## 2024-04-30 NOTE — TELEPHONE ENCOUNTER
April 30, 2024    Home health orders was received via fax for Dr. Hicks.  Patient label was attached to paperwork and placed in provider's inbox to be signed.    Corin Samayoa

## 2024-04-30 NOTE — PATIENT INSTRUCTIONS
PLAN:    Stop oxycodone and changed to hydrocodone 10 mg 4 times a day as needed.    Begin methylene blue to help with wound healing, from [facility] pharmacy, 5 mg capsule 1 daily for 1 week, 1 morning and noon for 1 week, 2 morning and 1 at noon.    Follow-up with Arline Anderson NP in 3 weeks and with Dr. Oj gamble return visit in 8 weeks 6

## 2024-05-01 NOTE — TELEPHONE ENCOUNTER
May 1, 2024    Home health orders was picked up from outbox of Dr. Hicks.  sent via fax to [phone]    Corin Samayoa

## 2024-05-02 NOTE — PROGRESS NOTES
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  Recent basal of the L ear, bandaged.  Teeth remain ground down, saw dentist yesterday no real movement there about full mouth extraction.  Here with long term female partner again.  He cries pretty easily, and cried when I mentioned how jennifer he was to get Epifanio Hicks MD to take on his primary care.  He maybe isn't quite as feeble as when I last saw him.  The sternal wound is healed and clean.  The valve is crisp.            Current antibiotics and duration plan:    Reviewed PAST MEDICAL HISTORY,  family and social history      Examination:  Alert, awake  Vitals tabulated above, reviewed  Neck supple  Sclera OK  CARDIOVASCULAR regular rate and rhythm, no mumur  Lungs CLEAR TO AUSCULTATION   Abdomen soft, NT/ND, absent HEPATOSPLENOMEGALY  Skin normal  Joints normal  Neurologic exam non focal  Wound:  N/A    Lab Data/New Imaging/Medication levels/Microbiologic data:  Reviewed labs and path    IMPRESSION:  SBE on lifetime suppression helen given the state of his dental repair  He didn't use much coffee and no tobacco so not totally sure how teeth went south so badly  Basal L ear  Hard time healing wounds quickly    PLAN:  Same care plan  I see again 6-12 months  Call if needed.  He mentioned being on a rinse and either abx, or probiotic for his mouth he will call my staff to report what this stuff actually is.  Might be oral silvadene or biosimilar.    Thank you for allowing me to continue care of this patient.    Sincerely:      BISHNU Ordonez MD  [facility]  [facility] [phone]

## 2024-05-06 NOTE — TELEPHONE ENCOUNTER
Home Health Care    Reason for call:  verbal orders    Orders are needed for this patient.  Skilled Nursin time a week for 1 week, 2 times for 4 weeks, 3 PRN for wound care.    Pt Provider: Dr Hicks    Phone Number Homecare Nurse can be reached at: [phone]- ok for detailed message

## 2024-05-06 NOTE — TELEPHONE ENCOUNTER
Home Care is calling regarding an established patient with [facility].        2024     3:29 PM   Home Care Information   Current following provider [facility]   Home Care agency [facility]     Requesting orders from: Jin Hicks  Provider is following patient: Yes  Is this a 60-day recertification request?  No    Orders Requested    Skilled Nursing  Request for continuation of care with decrease in frequency   Goals have been met/progressing.  Frequency:  Skilled Nursin time a week for 1 week, 2 times for 4 weeks, 3 PRN for wound care        Verbal orders given.  Home Care will send orders for provider to sign.    Peg Hudson RN

## 2024-05-06 NOTE — TELEPHONE ENCOUNTER
May 6, 2024    Home health orders was received via fax for Dr. Hicks.  Patient label was attached to paperwork and placed in provider's inbox to be signed.    Corin Samayoa

## 2024-05-07 NOTE — PROGRESS NOTES
ANTICOAGULATION MANAGEMENT     Bandar Wells 73 year old male is on warfarin with subtherapeutic INR result. (Goal INR 2.5-3.5)    Recent labs: (last 7 days)     05/07/24  0000   INR 2.4*       ASSESSMENT          PLAN     Unable to reach Bandar today.    Left message to continue current dose of warfarin 6 mg tonight. Request call back for assessment.    Follow up required to confirm warfarin dose taken and assess for changes    Galilea Jarrett RN  Anticoagulation Clinic  5/7/2024

## 2024-05-07 NOTE — TELEPHONE ENCOUNTER
May 7, 2024    Home health orders was picked up from outbox of Dr. Hicks.  sent via fax to [phone]    Pam J. Behr

## 2024-05-08 NOTE — PROGRESS NOTES
ANTICOAGULATION MANAGEMENT     Bandar Wells 73 year old male is on warfarin with subtherapeutic INR result. (Goal INR 2.5-3.5)    Recent labs: (last 7 days)     05/07/24  0000   INR 2.4*       ASSESSMENT     Source(s): Chart Review and Patient/Caregiver Call     Warfarin doses taken: Warfarin taken as instructed  Diet: No new diet changes identified  Medication/supplement changes: None noted  New illness, injury, or hospitalization: No  Signs or symptoms of bleeding or clotting: No  Previous result: Supratherapeutic  Additional findings: None       PLAN     Recommended plan for temporary change(s) affecting INR     Dosing Instructions: Continue your current warfarin dose with next INR in 1 week       Summary  As of 5/7/2024      Full warfarin instructions:  6 mg every day   Next INR check:  5/14/2024               Telephone call with Bandar who verbalizes understanding and agrees to plan  Sent [employer] message with dosing and follow up instructions    Patient to recheck with home meter    Education provided:   Please call back if any changes to your diet, medications or how you've been taking warfarin    Plan made per [facility] anticoagulation protocol    Brenda Steiner, RN  Anticoagulation Clinic  5/8/2024    _______________________________________________________________________     Anticoagulation Episode Summary       Current INR goal:  2.5-3.5   TTR:  43.7% (1 y)   Target end date:  Indefinite   Send INR reminders to:  [facility]IT[facility]    Indications    Paroxysmal atrial fibrillation (H) [I48.0]  S/P mitral valve replacement with metallic valve [Z95.4]             Comments:  [facility]- wants a call every time. Needs to Bridge if below 2.0             Anticoagulation Care Providers       Provider Role Specialty Phone number    Jin Hicks MD Referring Internal Medicine [phone]

## 2024-05-09 NOTE — TELEPHONE ENCOUNTER
FYI - Status Update    Who is Calling: patient    Update: Inr results and needs warfarin dosage.  Last night patient had a severe nose bleed.    Does caller want a call/response back: Yes     Could we send this information to you in [email or web address] or would you prefer to receive a phone call?:   Patient would prefer a phone call   Okay to leave a detailed message?: Yes at Cell number on file:    Telephone Information:   Mobile [phone]

## 2024-05-09 NOTE — TELEPHONE ENCOUNTER
"Spoke with patient who report that he had a bad nose bleed \"it was bad in my book\" that was on and off for a couple hours before completely stopped.  Patient report that it came on out of no where.  No problem today.  Denies dizziness, lightheadedness.  Patient wondering if he should lower his warfarin dosing at this time.     INR   Date Value Ref Range Status   07/23/2020 3.37 (H) 0.86 - 1.14 Final     INR HOME MONITORING   Date Value Ref Range Status   05/07/2024 2.4 (L) 2.500 - 3.500 Final      Given INR was 2.4 on 5/7/24, slightly sub therapeutic 2 days ago for goal range of 2.5 -3.5.  Advised to keep on same dose of warfarin given 1 episode of nose bleed.  Patient advised to monitor and to call office triage if happens again.  Patient is running low on test strip and is reluctant to recheck sooner.  Will continue same dose and monitor for nose bleed problem.  Patient verbalizes understanding and agrees with plan.     Cecile Huggins RN  [facility]tico[facility].      "

## 2024-05-10 NOTE — TELEPHONE ENCOUNTER
May 10, 2024    Home health orders was received via fax for Dr. Hicks.  Patient label was attached to paperwork and placed in provider's inbox to be signed.    Corin Samayoa

## 2024-05-13 NOTE — PATIENT INSTRUCTIONS
Start Pregabalin 25mg 1 tab at bedtime for 5 days, then 2 tabs at bedtime for 5 days then 3 tabs at bedtime. Discussed with Yoshi Haskins, PharmD max dose with his kidney function is 150mg BID.    Discussed case with Dr. Mcwilliams and he agreed that it is ok for the patient to take Pentoxifylline and start methylene blue because they have 2 different mechanisms of action.  Pentoxifylline helps reduce platelet aggregation/clotting and Methylene Blue will help with endothelial repair.      Norco 10/325mg - 04/30/2024 last filled per  - he has 5 tabs left - will discontinue this medication as patient feels oxycodone 10mg QID is more effective in reducing his pain.  Methylene Blue - Begin methylene blue to help with wound healing, from [facility] pharmacy, 5 mg capsule 1 daily for 1 week, 1 morning and noon for 1 week, 2 morning and 1 at noon  Pentoxifylline 400mg TID per wound clinic  Mirtazapine 15mg  Acetaminophen 500mg 1-2 tabsTID PRN  Stool Softeners PRN  Coumadin 3mg    Interventions: none      Follow up:   2 wks in clinic in [place] with me.        STIVEN Daley, RN, CNP, FNP  [facility]          [facility]    Clinic Number:  [phone]  Call with any questions about your care and for scheduling assistance.   Calls are returned Monday through Friday between 8 AM and 4:30 PM. We usually get back to you within 2 business days depending on the issue/request.    If we are prescribing your medications:  For opioid medication refills, call the clinic or send a Fund[employer] message 7 days in advance.  Please include:  Name of requested medication  Name of the pharmacy.  For non-opioid medications, call your pharmacy directly to request a refill. Please allow 3-4 days to be processed.   Per [place] State Law:  All controlled substance prescriptions must be filled within 30 days of being written.    For those controlled  substances allowing refills, pickup must occur within 30 days of last fill.      We believe regular attendance is key to your success in our program!    Any time you are unable to keep your appointment we ask that you call us at least 24 hours in advance to cancel.This will allow us to offer the appointment time to another patient.   Multiple missed appointments may lead to dismissal from the clinic.

## 2024-05-13 NOTE — PROGRESS NOTES
Patient presents to the clinic today for a visit with STIVEN Couch CNP regarding Pain Management.          6/1/2023    10:52 AM 4/30/2024     8:58 AM 5/13/2024     1:00 PM   PEG Score   PEG Total Score 4 6.67 6.33       UDS/CSA- 02.20.2024 (susan)    Medications- Norco last taken today @930am    Notes Did not start the methylene blue    Erinn at [facility] wound clinic [place] [facility] told him to take Pentoxifylline instead of the Methylene Blue    Iris Salmon  [facility] Clinical Assistant

## 2024-05-13 NOTE — TELEPHONE ENCOUNTER
May 13, 2024    Home health orders was received via fax for Dr. Hicks.  Patient label was attached to paperwork and placed in provider's inbox to be signed.    Corin Samayoa

## 2024-05-13 NOTE — PROGRESS NOTES
[facility] Follow-up      Mr. Wells is a 74 yo male with a history of R) ankle venous stasis ulcer, kyphosis of cervicothoracic spine, h/o skin cancer L) ear, chronic intractable pain, anterior cervical dystonia, idiopathic chronic gout without tophus who was initially evaluated by Dr. Javier Mcwilliams on 02/03/2024.  He also has a history of step endocarditis x 2 on chronic PenVK, chronic anticoagulation due to a-fib, chronic continuous use of opioid, CKD stage 4, CHF,     Updates since last appointment on 04/30/2024 with Dr. Mcwilliams.  He has a home RN come to his house twice a week for wound care and he goes to the wound clinic once a week.  He is having problems staying asleep.  He was on gabapentin 1.5 years ago and he took himself off the medication.  He denies any negative side effects from gabapentin.  He denies any benefit from the gabapentin.  He has not tried pregabalin.    He thinks oxycodone 10mg QID is more effective then hydrocodone/acet 10/325mg QID.  He did develop a rash on L) arm and leg in the last 2 weeks.  He is trying to schedule an appt with Dermatology to have this evaluated.  His last dose of hydrocodone/acet 10/325 was this am.  He brought his medications with him and he has 5 tabs left in the bottle.      The wound clinic started him on Pentoxifyline 400mg TID and was told to not start the Methylene Blue.      He reports left ear pain has resolved and healed.  He does have some lesions on his scalp that are being treated by Dermatology.    Home health nurse was stealing his medication.  She had confided that she herself was on oxycodone.  He began to have doses missing. The RN is no longer employed.    Has had focal dystonia, having Botox injection follow through the [facility] with some improvement.       Interventions/Injections:   none      Progress Notes Reviewed:  05/08/2024 Sangeetha Head NP, Wound Clinic - R) leg  05/01/2024  [facility] Dermatology - L) ear basal cell skin cancer - Tylenol #3 prescribed  04/30/2024 Dr. Javier Mcwilliams   [facility]/2024 [facility]  04/24/2024 Dr. Jin Hicks Internal Medicine  02/03/024 Dr. Javier Mcwilliams - initial consult      Any hospitalizations/ER/UC visits since last appointment: no  Any falls/accidents since last appointment: no      Primary Pain :  He continues with ulcers on his leg, and has had grafts, follow-up through the [facility] wound clinic  L) ear and scalp treatments d/t cancer through dermatology  Pain interferes with function and ADL's.    Characteristics:  No changes in pain characteristics since last appointment.      What makes the pain better: pain medication, elevating his legs  What makes the pain worse:  prolong walking, standing  05/13/2024 current pain on 0/10 VAS:  6   Worst pain:  8     Best pain: 3      Current Pain Related Medications:  Any medications changes since last appointment: in bold    Pentoxifylline 400mg TID - per wound clinic  Acetaminophen 500mg  Norco 10/325mg - 04/30/2024 last filled per Dr. Mcwilliams  Methylene Blue - Begin methylene blue to help with wound healing, from [facility], 5 mg capsule 1 daily for 1 week, 1 morning and noon for 1 week, 2 morning and 1 at noon   Mirtazapine 15mg  Stool Softeners PRN  Coumadin 3mg    NOTE:  stopped medical cannabis, Tylenol 3 which did not help.    He has not tried hydrocodone or morphine.      Therapies discuss on initial consult:   Physical therapy, Pain Psychology, TENs unit, Grounding Mat, Frequency Specific Micro Current, Anti-inflammatory Lifestyle    Social:  He is  and lives in [place].  He is independent in ADL's.      Employment:  He is retired.      Exercise:  No regular exercise.      Hobbies:  Not much due to pain in L) leg from ulcers.      Mental Health:    Stable at this time.  Denies issues of abuse or trauma.        Date of visit: 4/30/2024  Assessment/Plan:    Overlapping pain syndromes with his left ear recent surgeries, continues with wounds on his right lower leg.    We reviewed he appears to have developed tolerance with the oxycodone at 10 mg dose lasting just 2 hours.    Discussed the plan for the short-term to rotate to a different class of opioid, hoping there will be healing and will decrease the need.  Will be given hydrocodone 10 mg every 6 hours as needed.  He will call with an update.    Past Medical History:  Medical history reviewed.  Past Medical History:   Diagnosis Date    Abnormal liver function test     Atrial fibrillation with RVR (H) 08/29/2015    S/p MAZE Jul 2015    Bacterial endocarditis     Chronic combined systolic and diastolic heart failure (H)     Chronic, continuous use of opioids 01/09/2024    CKD (chronic kidney disease)     H/O mitral valve replacement with mechanical valve     Hyperlipidemia     Idiopathic chronic gout without tophus, unspecified site 04/15/2021    Mitral valve prolapse     Near syncope     Pericardial effusion without cardiac tamponade 08/29/2015    S/P mitral valve replacement with metallic valve 03/16/2017    Status post Maze operation for atrial fibrillation 08/29/2015      Patient Active Problem List   Diagnosis    DRESS syndrome    Paroxysmal atrial fibrillation (H)    S/P mitral valve replacement with metallic valve    Focal dystonia / anterior cervical muscles - Neuro - Morgan, PT     Coronary artery disease involving native coronary artery of native heart without angina pectoris    Dyslipidemia    History of bacterial endocarditis    Idiopathic chronic gout without tophus, unspecified site    Lymphedema    Mitral valve prolapse    Paraproteinemia    Postural kyphosis of cervicothoracic region    Anemia due to stage 4 chronic kidney disease (H)    Chronic combined systolic and diastolic heart failure (H)    OLIVA (iron deficiency anemia)    CKD (chronic kidney disease) stage 4, GFR 15-29 ml/min (H)    Venous stasis  ulcer of right ankle limited to breakdown of skin without varicose veins (H)    Moderate episode of recurrent major depressive disorder (H)    Chronic, continuous use of opioids         Past Surgical History:  Pertinent surgical history reviewed.  Past Surgical History:   Procedure Laterality Date    CARDIAC SURGERY      CHG X-RAY PELVIS 3+ VW N/A 7/1/2015    Procedure: MITRAL VALVE REPLACEMENT, MAZE PROCEDURE, CARDIOLOGY TRANSESOPHAGEAL ECHOCARDIOGRAM;  Surgeon: Rm Munoz MD;  Location: [facility] OR;  Service:     LANG-MAZE MICROWAVE ABLATION      CREATION PERICARDIAL WINDOW N/A 8/31/2015    Procedure: RIGHT PERICARDIAL WINDOW, TALC PLEURODESIS,VIDEO ASSISTED THOROSCOPY,DRAINAGE OF BILATERAL PLEURAL EFFUSIONS;  Surgeon: Rm Munoz MD;  Location: [facility] OR;  Service:     EYE SURGERY      Retial hole treatment    HERNIA REPAIR Right     inguinal    Mechanical mitral valve      [facility]  9/3/2015         [facility]  2/14/2017         REPLACE VALVE MITRAL N/A 3/16/2017    Procedure: REDO STERNOTOMY, REDO MITRAL VALVE REPLACEMENT, PLACEMENT TEMPORARY VENTRICULAR PACING WIRES, ANESTHESIA TRANSESOPHAGEAL ECHOCARDIOGRAM;  Surgeon: Raphael Rosenberg MD;  Location: [facility] OR;  Service:     REPLACE VALVE MITRAL  2015    Bioprosthetic          Medications: Pertinent medications reviewed.  Current Outpatient Medications   Medication Sig Dispense Refill    acetaminophen (TYLENOL) 500 MG tablet Take 1,000 mg by mouth 3 times daily      acetaminophen-codeine (TYLENOL #3) 300-30 MG per tablet Take 1 tablet by mouth every 6 hours as needed for severe pain      B Complex CAPS TAKE ONE CAPSULE BY MOUTH ONCE DAILY 100 capsule 0    Cholecalciferol (VITAMIN D3) 25 MCG (1000 UT) CAPS Take 1,000 Units by mouth daily      febuxostat (ULORIC) 40 MG TABS tablet **THANK YOU** 90 tablet 4    folic acid (FOLVITE) 1 MG tablet TAKE ONE TABLET BY MOUTH ONCE DAILY. 30 tablet 0     HYDROcodone-acetaminophen (NORCO)  MG per tablet Take 1 tablet by mouth every 6 hours as needed for severe pain 56 tablet 0    Methylene Blue POWD 5 mg capsule, one daily one week, one morning and noon one week, two morning and one noon, 75 75 g 1    mirtazapine (REMERON) 15 MG tablet Take 1 tablet (15 mg) by mouth at bedtime 90 tablet 4    penicillin V (VEETID) 500 MG tablet Take 1 tablet (500 mg) by mouth daily Hx endocarditis 90 tablet 1    polyethylene glycol (MIRALAX) 17 g packet Take 17 g by mouth daily as needed for constipation 30 packet 0    potassium chloride (KLOR-CON) 20 MEQ packet Take 40 mEq by mouth 2 times daily      sennosides (SENOKOT) 8.6 MG tablet Take 1-4 tablets by mouth 2 times daily      torsemide (DEMADEX) 100 MG tablet Take 100 mg by mouth 2 times daily      triamcinolone (KENALOG) 0.1 % external ointment Apply topically 2 times daily 80 g 1    vitamin B complex with vitamin C (STRESS TAB) tablet Take 1 tablet by mouth daily      warfarin ANTICOAGULANT (COUMADIN) 3 MG tablet TAKE TWO TABLETS (6 MG) BY MOUTH DAILY OR AS DIRECTED BY [facility]. 190 tablet 1       [place] Prescription Monitoring Program reviewed 5/13/2024.  No concern for abuse or misuse of controlled medications based on this report.  04/30/2024 Hydrocodone-acet 10/325mg 56 tabs for 14 days  04/25/2024 Oxycodone 10mg 52 tabs for 13 days  04/23/2024 Tylenol #3 12 tabs for 2 days  04/17/2024 Tylenol #3 12 tabs for 1 days  04/10/2024 Oxycodone 10mg 120 tabs for 30 days  04/05/2024 Oxycodone 10mg 24 tabs for 6 days      Allergies: Pertinent allergies reviewed.     Allergies   Allergen Reactions    Allopurinol Rash    Clindamycin Rash       Family History:   family history includes Alcoholism in his brother; Atrial fibrillation in his mother; Emphysema in his father; Heart Failure in his father and mother; Kidney failure in his sister; No Known Problems in his brother and son.    Social History:   He  reports that he has never  smoked. He has never used smokeless tobacco. He reports current alcohol use of about 2.0 standard drinks of alcohol per week. He reports that he does not use drugs.  Social History     Social History Narrative    Has a steady girlfriend and multiple friends .  His son is in [place] but can be present if warned.  Home 1 1/2 stories.    3/2017         Physical Exam:  /58   Pulse 73   Wt 72.1 kg (159 lb)   SpO2 99%   BMI 24.90 kg/m        Constitutional: He is oriented to person, place, and time.  He appears well-developed and well-nourished. He is not in acute distress.   HENT:     Head: Normocephalic and atraumatic.     Eyes: Pupils are equal, round, and reactive to light. EOM are normal. No scleral icterus.   Pulmonary/Chest:  NWOB. No respiratory distress.   Neurological: He is alert and oriented to person, place, and time. Coordination grossly normal.   Skin: Skin is warm and dry. He is not diaphoretic.   Psychiatric: He has a normal mood and affect. His behavior is normal. Judgment and thought content normal.  Patient answers questions appropriately.  MSK: Gait is shuffling.  L) leg edematous without dressings.        DIRE Score for ongoing opioid management is calculated as follows:    Diagnosis = 3    Intractability = 3    Risk: Psych = 3  Chem Hlth = 3  Reliability = 3  Social = 3    Efficacy = 3    Total DIRE Score = 21 (14 or higher predicts good candidate for ongoing opioid management; 13 or lower predicts poor candidate for opioid management)         Imaging: none      EMG:  na      Diagnosis:  (I87.2,  L97.311) Venous stasis ulcer of right ankle limited to breakdown of skin without varicose veins (H)  (primary encounter diagnosis)  Comment:   Plan: pregabalin (LYRICA) 25 MG capsule, oxyCODONE IR        (ROXICODONE) 10 MG tablet            (Z85.828) H/O malignant neoplasm of skin  Comment:   Plan:     (F11.90) Chronic, continuous use of opioids  Comment:   Plan: naloxone (NARCAN) 4 MG/0.1ML nasal  spray            (G89.29) Chronic intractable pain  Comment:   Plan: naloxone (NARCAN) 4 MG/0.1ML nasal spray                Plan on 05/13/2024 :  A multimodal plan was developed today to treat your pain.  Multimodal analgesia is a strategy that reduces reliance on opioids through the use of non-opioid analgesics and therapies that have different mechanisms of action.    Opioid agreement was signed today.      Diagnostics: none        Medications:  Start Pregabalin 25mg 1 tab at bedtime for 5 days, then 2 tabs at bedtime for 5 days then 3 tabs at bedtime. Discussed with Yoshi Haskins, PharmD max dose with his kidney function is 150mg BID.    Discussed case with Dr. Mcwilliams and he agreed that it is ok for the patient to take Pentoxifylline and start methylene blue because they have 2 different mechanisms of action.  Pentoxifylline helps reduce platelet aggregation/clotting and Methylene Blue will help with endothelial repair.      Norco 10/325mg - 04/30/2024 last filled per  - he has 5 tabs left - will discontinue this medication as patient feels oxycodone 10mg QID is more effective in reducing his pain.  Methylene Blue - Begin methylene blue to help with wound healing, from [facility] pharmacy, 5 mg capsule 1 daily for 1 week, 1 morning and noon for 1 week, 2 morning and 1 at noon  Pentoxifylline 400mg TID per wound clinic  Mirtazapine 15mg  Acetaminophen 500mg 1-2 tabsTID PRN  Stool Softeners PRN  Coumadin 3mg    Interventions: none      Follow up:   2 wks in clinic in [place] with me.        STIVEN Daley, RN, CNP, FNP  [facility]        BILLING TIME DOCUMENTATION:   The total TIME spent on this patient on the date of the encounter/appointment was 90 minutes.

## 2024-05-14 NOTE — TELEPHONE ENCOUNTER
May 14, 2024    Home health orders was picked up from outbox of Dr. Hicks and sent via fax to [phone].    Corin Samayoa

## 2024-05-14 NOTE — TELEPHONE ENCOUNTER
May 14, 2024    Home health orders was picked up from outbox of Dr. Hicks and sent via fax to [phone].    Corin Samayoa     indoor environmental allergies/reactions to animals/reactions to medicines/outdoor environmental allergies

## 2024-05-14 NOTE — PROGRESS NOTES
ANTICOAGULATION MANAGEMENT     Bandar Wells 73 year old male is on warfarin with subtherapeutic INR result. (Goal INR 2.5-3.5)    Recent labs: (last 7 days)     05/14/24  0000   INR 1.7*       ASSESSMENT     Source(s): Chart Review and Patient/Caregiver Call     Warfarin doses taken: Warfarin taken as instructed  Diet: Protein supplement/shake started which maybe affecting INR, one ensure daily for the last 2 weeks.  Medication/supplement changes:  pregabalin 25 mg daily for 5 days then increase to 2 at sleep. Oxycodne given 5/13. Has not received medications yet.  New illness, injury, or hospitalization: No  Signs or symptoms of bleeding or clotting: Yes: 2 bloody noses in the last week. Was able to get them stopped.  Previous result: Subtherapeutic  Additional findings: None       PLAN     Recommended plan for ongoing change(s) affecting INR     Dosing Instructions: booster dose then Increase your warfarin dose (14.3% change) with next INR in 3 days       Summary  As of 5/14/2024      Full warfarin instructions:  5/14: 12 mg; Otherwise 6 mg every Mon, Wed, Fri; 7.5 mg all other days   Next INR check:  5/17/2024               Telephone call with Bandar who agrees to plan and repeated back plan correctly    Patient to recheck with home meter    Education provided:   Dietary considerations: importance of consistent vitamin K intake    Plan made with [facility] Pharmacist Batool Ayala, RN  Anticoagulation Clinic  5/14/2024    _______________________________________________________________________     Anticoagulation Episode Summary       Current INR goal:  2.5-3.5   TTR:  42.8% (1 y)   Target end date:  Indefinite   Send INR reminders to:  [facility]    Indications    Paroxysmal atrial fibrillation (H) [I48.0]  S/P mitral valve replacement with metallic valve [Z95.4]             Comments:  [facility]- wants a call every time. Needs to Bridge if below 2.0             Anticoagulation Care  Providers       Provider Role Specialty Phone number    Jin Hicks MD Referring Internal Medicine [phone]

## 2024-05-17 NOTE — PROGRESS NOTES
ANTICOAGULATION MANAGEMENT     Bandar Wells 73 year old male is on warfarin with subtherapeutic INR result. (Goal INR 2.5-3.5)    Recent labs: (last 7 days)     05/17/24  0000   INR 1.9*       ASSESSMENT     Source(s): Chart Review and Patient/Caregiver Call     Warfarin doses taken: Warfarin taken as instructed  Diet: Protein supplement/shake having only one per day which maybe affecting INR  Medication/supplement changes:  Lyrica stopped on 5/17/2024 No interaction anticipated-patient advised to stop taking this by pain clinic due to patient's balance issues  New illness, injury, or hospitalization: No  Signs or symptoms of bleeding or clotting: No  Previous result: Subtherapeutic  Additional findings: None       PLAN     Recommended plan for no diet, medication or health factor changes affecting INR     Dosing Instructions: booster dose then Increase your warfarin dose (9.4% change) with next INR in 3 days       Summary  As of 5/17/2024      Full warfarin instructions:  5/17: 15 mg; Otherwise 7.5 mg every day   Next INR check:  5/20/2024               Telephone call with Bandar and wife in the background who verbalizes understanding and agrees to plan    Check at provider office visit    Education provided:   Symptom monitoring: monitoring for stroke signs and symptoms and when to seek medical attention/emergency care    Plan made with [facility] Pharmacist Batool Steiner, RN  Anticoagulation Clinic  5/17/2024    _______________________________________________________________________     Anticoagulation Episode Summary       Current INR goal:  2.5-3.5   TTR:  42.7% (1 y)   Target end date:  Indefinite   Send INR reminders to:  [facility]    Indications    Paroxysmal atrial fibrillation (H) [I48.0]  S/P mitral valve replacement with metallic valve [Z95.4]             Comments:  [facility]- wants a call every time. Needs to Bridge if below 2.0             Anticoagulation Care Providers        Provider Role Specialty Phone number    Jin Hicks MD Referring Internal Medicine [phone]

## 2024-05-17 NOTE — TELEPHONE ENCOUNTER
Home Health Care    Reason for call:  pt had fall on 5/14/24.  He had no shoes on and fall.  He is having increased pain and has been taking oxycodone,  His son was present for the fall and did help him get off the floor    Orders are needed for this patient.      Pt Provider: Dr Hicks    Phone Number Homecare Nurse can be reached at: on  if needing any orders

## 2024-05-20 NOTE — TELEPHONE ENCOUNTER
MD please review referral request and review pended order.    Once order is signed please route back to support pool to fax order

## 2024-05-20 NOTE — PROGRESS NOTES
Office Visit - Follow Up   Bandar Wells   73 year old male    Date of Visit: 5/20/2024    Chief Complaint   Patient presents with    Heart Failure     3 months follow up; needs INR test today     Follow Up     Medication review/discuss        Assessment and Plan   1. CKD (chronic kidney disease) stage 4, GFR 15-29 ml/min (H)  2. Chronic combined systolic and diastolic heart failure (H)  Will following up with nephrology.  Discussed Jardiance or Farxiga and he will discuss this with his nephrologist.    3. Chronic, continuous use of opioids  He is working with the pain clinic.  I think he might possibly be an excellent candidate for a long-acting opioid such as Suboxone.    4. Venous stasis ulcer of right ankle limited to breakdown of skin without varicose veins (H)  Continue with home wound care as well as care and then wound clinic.    5. Acute pain of right shoulder  He is going to get some physical therapy scheduled through his home care  - XR Shoulder Right G/E 3 Views; Future    6. Acute pain of left shoulder  - XR Shoulder Left G/E 3 Views; Future    7. Fall, initial encounter  Balance better now off Lyrica  - XR Shoulder Right G/E 3 Views; Future  - XR Shoulder Left G/E 3 Views; Future    8. Focal dystonia / anterior cervical muscles - Neuro - Macy, PT     9. History of bacterial endocarditis  Just saw infectious diseases on prophylactic antibiotics    10. Paroxysmal atrial fibrillation (H)  Continue current strategy  - INR point of care    11. S/P mitral valve replacement with metallic valve  - INR point of care      Return in about 4 weeks (around 6/17/2024) for Follow up.     History of Present Illness   This 73 year old comes in for follow-up.  Overall things are stable.  He did have a mechanical fall.  He has had some bilateral shoulder pain since then.  He would like to do some home PT and already has home health care nurse.  Now has bilateral venous ulcers.  Works with the wound clinic.  He has met  "with the pain clinic regarding long-acting opioids.  They did try to switch his opioid pain medication but it was not as effective.  We have also prescribed some topical medication.  They have tried Lyrica but this caused some encephalopathy and poor balance.       Physical Exam   General Appearance:   No acute distress    /60 (BP Location: Left arm, Patient Position: Sitting, Cuff Size: Adult Regular)   Pulse 78   Temp 98.3  F (36.8  C) (Tympanic)   Resp 22   Ht 1.702 m (5' 7\")   Wt 71.7 kg (158 lb)   SpO2 98%   BMI 24.75 kg/m      His ear is healed nicely.  He has chronic contracture of his neck muscles.  Heart rate controlled.  Lungs clear to auscultation bilaterally.  His legs are wrapped.     Additional Information   Current Outpatient Medications   Medication Sig Dispense Refill    acetaminophen (TYLENOL) 500 MG tablet Take 1,000 mg by mouth 3 times daily      acetaminophen-codeine (TYLENOL #3) 300-30 MG per tablet Take 1 tablet by mouth every 6 hours as needed for severe pain      B Complex CAPS TAKE ONE CAPSULE BY MOUTH ONCE DAILY 100 capsule 0    Cholecalciferol (VITAMIN D3) 25 MCG (1000 UT) CAPS Take 1,000 Units by mouth daily      febuxostat (ULORIC) 40 MG TABS tablet **THANK YOU** 90 tablet 4    ferrous sulfate (FEROSUL) 325 (65 Fe) MG tablet Take 325 mg by mouth daily (with breakfast)      folic acid (FOLVITE) 1 MG tablet TAKE ONE TABLET BY MOUTH ONCE DAILY. 30 tablet 0    Methylene Blue POWD 5 mg capsule, one daily one week, one morning and noon one week, two morning and one noon, 75 75 g 1    mirtazapine (REMERON) 15 MG tablet Take 1 tablet (15 mg) by mouth at bedtime 90 tablet 4    naloxone (NARCAN) 4 MG/0.1ML nasal spray Spray 1 spray (4 mg) into one nostril alternating nostrils as needed for opioid reversal every 2-3 minutes until assistance arrives 0.2 mL 1    NONFORMULARY Take 2 tablets by mouth daily Dental Supplement      NONFORMULARY Take 1 capsule by mouth daily Methylene Blue 5 " mg      oxyCODONE IR (ROXICODONE) 10 MG tablet Take 1 tablet (10 mg) by mouth 4 times daily for 30 days 120 tablet 0    penicillin V (VEETID) 500 MG tablet Take 1 tablet (500 mg) by mouth daily Hx endocarditis 90 tablet 1    polyethylene glycol (MIRALAX) 17 g packet Take 17 g by mouth daily as needed for constipation 30 packet 0    potassium chloride (KLOR-CON) 20 MEQ packet Take 40 mEq by mouth 2 times daily      sennosides (SENOKOT) 8.6 MG tablet Take 1-4 tablets by mouth 2 times daily      torsemide (DEMADEX) 100 MG tablet Take 100 mg by mouth 2 times daily      triamcinolone (KENALOG) 0.1 % external ointment Apply topically 2 times daily 80 g 1    vitamin B complex with vitamin C (STRESS TAB) tablet Take 1 tablet by mouth daily      warfarin ANTICOAGULANT (COUMADIN) 3 MG tablet TAKE TWO TABLETS (6 MG) BY MOUTH DAILY OR AS DIRECTED BY [facility]IC. 190 tablet 1       Time:     The longitudinal plan of care for the diagnosis(es)/condition(s) as documented were addressed during this visit. Due to the added complexity in care, I will continue to support Bandar in the subsequent management and with ongoing continuity of care.     Jin Hicks MD  Answers submitted by the patient for this visit:  General Questionnaire (Submitted on 5/20/2024)  Chief Complaint: Chronic problems general questions HPI Form  What is the reason for your visit today? : follow up  On average, how many sweetened beverages do you drink each day (Examples: soda, juice, sweet tea, etc.  Do NOT count diet or artificially sweetened beverages)?: 2  How many minutes a day do you exercise enough to make your heart beat faster?: 9 or less  How many days a week do you exercise enough to make your heart beat faster?: 3 or less  How many days per week do you miss taking your medication?: 0

## 2024-05-20 NOTE — PROGRESS NOTES
ANTICOAGULATION MANAGEMENT     Bandar Wells 73 year old male is on warfarin with supratherapeutic INR result. (Goal INR 2.5-3.5)    Recent labs: (last 7 days)     05/20/24  0900   INR 7.1*       ASSESSMENT     Source(s): Chart Review and Home Care/Facility Nurse     Warfarin doses taken: Warfarin taken as instructed  Diet: No new diet changes identified  Medication/supplement changes: Tylenol 1000 mg once every 8 hours   New illness, injury, or hospitalization: Yes: patient had a fall on 5/17/24 onto his back hitting both shoulders. Patient denies hitting his head. Patient reports pain in both shoulders still. Patient saw primary care provider today regarding these concerns. Xrays were negative for fractures or dislocation.  Signs or symptoms of bleeding or clotting: No  Previous result: Subtherapeutic  Additional findings: Concerns for rapid rise since 5/17/24       PLAN     Recommended plan for temporary change(s) affecting INR     Dosing Instructions: hold dose with next INR in 1 days       Summary  As of 5/20/2024      Full warfarin instructions:  5/20: Hold; Otherwise 7.5 mg every day   Next INR check:  5/21/2024               Telephone call with Bandar who verbalizes understanding and agrees to plan    Patient to recheck with home meter    Education provided:   Please call back if any changes to your diet, medications or how you've been taking warfarin  Symptom monitoring: monitoring for bleeding signs and symptoms, monitoring for clotting signs and symptoms, monitoring for stroke signs and symptoms, when to seek medical attention/emergency care, and if you hit your head or have a bad fall seek emergency care  Contact [phone]  with any changes, questions or concerns.     Plan made with [facility] Pharmacist Batool Jarrett, CLINT  Anticoagulation Clinic  5/20/2024    _______________________________________________________________________     Anticoagulation Episode Summary       Current INR goal:   2.5-3.5   TTR:  42.0% (1 y)   Target end date:  Indefinite   Send INR reminders to:  [facility]IT[facility]    Indications    Paroxysmal atrial fibrillation (H) [I48.0]  S/P mitral valve replacement with metallic valve [Z95.4]             Comments:  [facility]- wants a call every time. Needs to Bridge if below 2.0             Anticoagulation Care Providers       Provider Role Specialty Phone number    Jin Hicks MD Referring Internal Medicine [phone]

## 2024-05-21 NOTE — TELEPHONE ENCOUNTER
May 21, 2024    Home health orders was received via fax for Dr. Hicks.  Patient label was attached to paperwork and placed in provider's inbox to be signed.    Corin Samayoa

## 2024-05-23 NOTE — TELEPHONE ENCOUNTER
May 23, 2024    Home health orders was picked up from outbox of Dr. Hicks and sent via fax to [phone].    Edna Rodriguez

## 2024-06-05 NOTE — TELEPHONE ENCOUNTER
ANTICOAGULATION  MANAGEMENT: Discharge Review    Bandar Wells chart reviewed for anticoagulation continuity of care    Hospital Admission on 5/21 for Septic Shock.    Discharge disposition: TCU    Results:    Recent labs: (last 7 days)     06/05/24  0550   INR 2.96*     Anticoagulation inpatient management:     anticoagulation calendar updated with inpatient dosing  DOSE AND MONITORING HISTORY  Date INR Warfarin  Dose Vitamin K   5/21 7.7 hold 10 mg + Kcentra   5/22 1.6 hold     5/23 1.4 hold     5/24 1.7 hold     5/25 -- hold     5/26 2 hold     5/27 2 2.5 mg     5/28 1.8 5 mg     5/29 1.8 5 mg     5/30 1.8 7.5 mg                     Anticoagulation discharge instructions:     Warfarin dosing:  patient discharged to TCU, [facility] was managing daily   Bridging: bridging with heparin   INR goal change: No      Medication changes affecting anticoagulation: No    Additional factors affecting anticoagulation: No     PLAN     No adjustment to anticoagulation plan needed    [facility] will resume monitoring upon discharge from TCU    Anticoagulation Calendar updated  [facility] and patient will be managed by Emely until discharge    Brenda Steiner RN

## 2024-06-12 NOTE — TELEPHONE ENCOUNTER
Home Health Care    Reason for call:  verbal orders    Orders are needed for this patient.  Skilled Nursing: resume care today 6/12/24- 2 times a week for 4 weeks and then 1 time a week for 1 week with 3 PRN for dressing removal or saturation.    Pt Provider: Dr Hicks    Phone Number Homecare Nurse can be reached at: [phone]    Okay to leave a detailed message?: Yes

## 2024-06-13 NOTE — TELEPHONE ENCOUNTER
Iggy called back to check on status as they are trying to see patient tomorrow.  Gonzálezse call asap

## 2024-06-13 NOTE — TELEPHONE ENCOUNTER
"Grazyna Mccoy 75 y.o. female     Chief Complaint:  Chief Complaint   Patient presents with    6 month follow up       History of Present Illness:  Pt is new to provider, but established in practice and presents for 6 month f/u chronic conditions and multiple complaints.     HTN  Today 164/70 initially, rechecked 154/78   Asymptomatic   Not checking BP at home recently   Home readings in the past 130/60s, but hasn't checked in long time reports white coat     Cough   Since December   Worse at night   PND   "Recurrent bronchitis"  & "Chronic sinusisitis"   Left ear intermittent - itching     Suspects UTI  bilateral low back pain   Better with silting  Worse with movement    Few months   Thinks kidney infection   Having chills - about 1 month       Exam:  Review of Systems   Constitutional: Positive for chills. Negative for activity change, appetite change, diaphoresis, fever and unexpected weight change.   HENT: Positive for congestion, ear pain (ithcing (left) ) and postnasal drip. Negative for rhinorrhea, sinus pain, sore throat, trouble swallowing and voice change.    Eyes: Negative for discharge and visual disturbance.   Respiratory: Positive for cough. Negative for chest tightness, shortness of breath, wheezing and stridor.    Cardiovascular: Negative for chest pain, palpitations and leg swelling.   Gastrointestinal: Negative for abdominal distention, abdominal pain, diarrhea, nausea and vomiting.   Genitourinary: Positive for flank pain. Negative for difficulty urinating, dysuria, frequency, hematuria and pelvic pain.   Musculoskeletal: Positive for back pain. Negative for gait problem.   Skin: Negative for wound.   Neurological: Negative for dizziness, speech difficulty and headaches.   Psychiatric/Behavioral: Negative for confusion.     Physical Exam   Constitutional: She is oriented to person, place, and time. She appears well-developed and well-nourished. She is cooperative.  Non-toxic appearance. She does " Verbal orders given.    not have a sickly appearance. She does not appear ill. No distress.   HENT:   Head: Normocephalic and atraumatic.   Right Ear: Tympanic membrane, external ear and ear canal normal.   Left Ear: Tympanic membrane, external ear and ear canal normal.   Nose: Nose normal.   Mouth/Throat: Uvula is midline and mucous membranes are normal. Posterior oropharyngeal erythema present.   L ear canal dry, flaking  PND   Eyes: Pupils are equal, round, and reactive to light. Conjunctivae and EOM are normal. Right eye exhibits no discharge. Left eye exhibits no discharge. No scleral icterus.   Neck: Normal range of motion. No tracheal deviation present.   Cardiovascular: Normal rate and regular rhythm.   Pulmonary/Chest: Effort normal and breath sounds normal. No stridor. No respiratory distress. She has no wheezes. She has no rales. She exhibits no tenderness.   Abdominal: Soft. Bowel sounds are normal. She exhibits no distension and no mass. There is no tenderness. There is no rigidity, no rebound, no guarding and no CVA tenderness.   Musculoskeletal: Normal range of motion. She exhibits no edema.   Neurological: She is alert and oriented to person, place, and time.   Skin: Skin is warm, dry and intact. No rash noted. She is not diaphoretic. No erythema. No pallor.   Psychiatric: Her behavior is normal. Judgment and thought content normal. Her mood appears anxious. Her speech is tangential. Cognition and memory are normal.   Nursing note and vitals reviewed.      Most Recent Laboratory Results Reviewed ({Yes)  Lab Results   Component Value Date    WBC 7.69 08/19/2019    HGB 12.5 08/19/2019    HCT 37.9 08/19/2019     08/19/2019    CHOL 152 08/19/2019    TRIG 95 08/19/2019    HDL 51 08/19/2019    ALT 13 08/19/2019    AST 19 08/19/2019     (L) 08/19/2019    K 4.1 08/19/2019     08/19/2019    CREATININE 0.8 08/19/2019    BUN 16 08/19/2019    CO2 26 08/19/2019    TSH 1.938 08/19/2019    GLUF 136 (H) 10/13/2004     HGBA1C 7.5 (H) 08/19/2019       Assessment     ICD-10-CM ICD-9-CM   1. Encounter for routine adult health examination with abnormal findings Z00.01 V70.0   2. Hypertension associated with diabetes E11.59 250.80    I10 401.9   3. Diabetes mellitus type 2, uncontrolled, with complications E11.8 250.92    E11.65    4. Vitamin D deficiency E55.9 268.9   5. Anxiety F41.9 300.00   6. Depression, unspecified depression type F32.9 311   7. Chronic cough R05 786.2   8. Bronchiectasis without complication J47.9 494.0   9. Meniere's disease in remission, right H81.01 386.04   10. Acute bilateral low back pain without sciatica M54.5 724.2     338.19   11. Eczema of left external ear H60.542 380.22   12. Gastroesophageal reflux disease, esophagitis presence not specified K21.9 530.81        Plan   Encounter for routine adult health examination with abnormal findings    Hypertension associated with diabetes  mildly elevated today   Check BP daily and keep a log   Bring log and BP cuff to next apt     Diabetes mellitus type 2, uncontrolled, with complications  -     MICROALBUMIN / CREATININE RATIO URINE  - previously diet controlled   - most recent a1c 7.5 6 months ago   - a1c to be drawn next week     HLD    controlled, continue current meds - zetia   Lipids to be redrawn next week     Vitamin D deficiency  -WNL 6 mo. Ago, repeat labs next week   - continue supplementation 2000 units daily     Anxiety   followed by psych    controlled, continue current meds     Depression, unspecified depression type   controlled, continue current meds     Chronic cough  Will treat for GERD, but also likely due to the below     Bronchiectasis without complication  Followed by pulm     Meniere's disease in remission, right   controlled, continue current meds     Acute bilateral low back pain without sciatica  -     Urinalysis - WNL   -     Urinalysis Microscopic    Eczema of left external ear  -     fluocinonide (LIDEX) 0.05 % external solution;  Apply topically 2 (two) times daily.  Dispense: 20 mL; Refill: 0    Gastroesophageal reflux disease, esophagitis presence not specified  -     famotidine (PEPCID) 20 MG tablet; Take 1 tablet (20 mg total) by mouth 2 (two) times daily.  Dispense: 60 tablet; Refill: 11    Follow up in about 2 weeks (around 3/3/2020), or if symptoms worsen or fail to improve, for BP recheck.  Future Appointments     Date Provider Specialty Appt Notes    2/26/2020  Lab labs    3/3/2020 Ayah Palomino, CÉSAR Internal Medicine 2 week BP follow up    7/24/2020 MARCELO Marx, OD Ophthalmology 1yr/RTC    8/18/2020 Shobha Swift MD Internal Medicine annual

## 2024-06-14 NOTE — TELEPHONE ENCOUNTER
Home Health Care    Reason for call:  Verbal Orders    Orders are needed for this patient.  PT: 1 time a week for 1 week, then 1 time a month for 1 month    Pt Provider: Dr Hicks    Phone Number Homecare Nurse can be reached at: [phone], Vj, secure voicemail

## 2024-06-17 NOTE — PROGRESS NOTES
ANTICOAGULATION MANAGEMENT     Bandar Wells 73 year old male is on warfarin with therapeutic INR result. (Goal INR 2.5-3.5)    Recent labs: (last 7 days)     06/17/24  0000   INR 2.8       ASSESSMENT     Source(s): Chart Review and Patient/Caregiver Call     Warfarin doses taken:  Per patient was given  7.5 mg tablets and was taking 1 tablet daily and has 3 tablets and has 3 mg tablets supply on hand - shared clinical decision to use up the 7.5 mg tablets then start using the warfarin 3 mg tablets for his dosing moving forward.  Diet: No new diet changes identified  Medication/supplement changes: None noted  New illness, injury, or hospitalization: Yes: 5/21 admitted for Septic Shock - discharged to TCU on 6/5 was managed by Emely while in TCU  Signs or symptoms of bleeding or clotting: No  Previous result: Therapeutic last 2(+) visits  Additional findings:  Patient was just discharged from TCU to home last Tuesday       PLAN     Recommended plan for no diet, medication or health factor changes affecting INR     Dosing Instructions: Continue your current warfarin dose with next INR in 1 week       Summary  As of 6/17/2024      Full warfarin instructions:  7.5 mg every day   Next INR check:  6/24/2024               Telephone call with Bandar who verbalizes understanding and agrees to plan    Patient to recheck with home meter    Education provided:   Contact [phone]  with any changes, questions or concerns.     Plan made per [facility] anticoagulation protocol    Galilea Hernandez RN  Anticoagulation Clinic  6/17/2024    _______________________________________________________________________     Anticoagulation Episode Summary       Current INR goal:  2.5-3.5   TTR:  42.1% (1 y)   Target end date:  Indefinite   Send INR reminders to:  [facility]    Indications    Paroxysmal atrial fibrillation (H) [I48.0]  S/P mitral valve replacement with metallic valve [Z95.4]             Comments:  Anas- wants a call  every time. Needs to Bridge if below 2.0             Anticoagulation Care Providers       Provider Role Specialty Phone number    Jin Hicks MD Referring Internal Medicine [phone]

## 2024-06-18 NOTE — TELEPHONE ENCOUNTER
Home Care is calling regarding an established patient with [facility].        2/1/2024     3:29 PM   Home Care Information   Current following provider uth   Home Care agency [facility]     Requesting orders from: Jin Hicks  Provider is following patient: Yes  Is this a 60-day recertification request?  No    Orders Requested    Physical Therapy  Request for initial certification (first set of orders)   Frequency: 1x/wk for 1 wks  then 1x/month for 1 month      Information was gathered and will be sent to provider for review.  RN will contact Home Care with information after provider review.

## 2024-06-18 NOTE — TELEPHONE ENCOUNTER
Called Vj and relayed verbal ok.    Vj said there is a change in plan for patient's care. The priority is skilled nursing right now. They will resume PT 7/8/24. He needs a verbal ok for delay in care until 7/8/24.           Home Care is calling regarding an established patient with [facility].        2/1/2024     3:29 PM   Home Care Information   Current following provider uth   [facility] agency [facility]     Requesting orders from: Jin Hicks  Provider is following patient: Yes  Is this a 60-day recertification request?  No    Orders Requested    Physical Therapy  Request for delay in care, service is not able to be provided within same scheduled day.   Delay in care until 7/8/24 because the priority is skilled nursing right now.      Information was gathered and will be sent to provider for review.  RN will contact Home Care with information after provider review.

## 2024-06-19 PROBLEM — L97.929 VENOUS ULCERS OF BOTH LOWER EXTREMITIES (H): Status: ACTIVE | Noted: 2024-01-01

## 2024-06-19 PROBLEM — I50.33 ACUTE ON CHRONIC HEART FAILURE WITH PRESERVED EJECTION FRACTION (H): Status: ACTIVE | Noted: 2024-01-01

## 2024-06-19 PROBLEM — I83.029 VENOUS ULCERS OF BOTH LOWER EXTREMITIES (H): Status: ACTIVE | Noted: 2024-01-01

## 2024-06-19 PROBLEM — L97.919 VENOUS ULCERS OF BOTH LOWER EXTREMITIES (H): Status: ACTIVE | Noted: 2024-01-01

## 2024-06-19 PROBLEM — I83.019 VENOUS ULCERS OF BOTH LOWER EXTREMITIES (H): Status: ACTIVE | Noted: 2024-01-01

## 2024-06-19 NOTE — PROGRESS NOTES
Office Visit - Follow Up   Bandar Wells   73 year old male    Date of Visit: 6/19/2024    Chief Complaint   Patient presents with    Hospital F/U     Ed/TCU follow up Afib and sepsis/ 1 month follow up-5/21-6/3 [facility] for fall/septic shock. 6/3-6/11 TCU [facility].  He has an itchy rash on arms and back.  Possible medication reaction. Needs lab per [facility] request. Medication changes need to be verified for Pat/partner. He is very fatigued.        Assessment and Plan   1. Sepsis with acute renal failure and septic shock, due to unspecified organism, unspecified acute renal failure type (H)  Presumed secondary to bacterial entry from his venous ulcers.  He is going to wound care today.  He is now off of antibiotics and back on penicillin.  Notes reviewed.    2. Venous ulcers of both lower extremities (H)  Continue with wound care and wound management per the wound clinic    3. CKD (chronic kidney disease) stage 4, GFR 15-29 ml/min (H)  Creatinine was over 3, recheck now, follow-up with nephrology  - BASIC METABOLIC PANEL; Future  - BASIC METABOLIC PANEL    4. Anemia due to stage 4 chronic kidney disease (H)  Hemoglobin seems of stabilized around 8-9, likely will need some EPO from the nephrology clinic  - CBC with platelets; Future  - CBC with platelets    5. Acute on chronic heart failure with preserved ejection fraction (H)  Volume status seems okay, continue same for now, holding beta-blocker with new drug rash    6. Paroxysmal atrial fibrillation (H)  Continue current strategy, hold metoprolol, continue warfarin  - TSH; Future  - TSH    7. High-grade atrioventricular block  Now has a leadless pacemaker    8. Rash  Etiology uncertain, continue with topical steroid cream, hold metoprolol follow-up in 2 weeks    9. Chronic, continuous use of opioids  Continues on oxycodone 10 mg 4 times a day    10. Iron deficiency anemia, unspecified iron deficiency anemia type  He has received iron infusion, may need  "EPO    Return in about 2 weeks (around 7/3/2024) for Follow up.     History of Present Illness   This 73 year old man was admitted to [facility] with sepsis and septic shock on 5/21/2023.  He was treated with broad-spectrum antibiotics.  He required ICU support.  He developed a high-grade heart block and had a leadless pacemaker placed.  He had some atrial fibrillation.  He received 1 unit of packed red blood cells per profound anemia of 6.6.  His creatinine increased.  He is now back at home after a short skilled nursing facility stay and he has a rash.  He is going to wound care, presumed site of infection, chronic venous stasis wounds.       Physical Exam   General Appearance:   No acute distress    BP 97/54 (BP Location: Left arm, Patient Position: Sitting, Cuff Size: Adult Regular)   Pulse 82   Temp 97.2  F (36.2  C) (Tympanic)   Resp 23   Ht 1.702 m (5' 7\")   Wt 74.4 kg (164 lb)   SpO2 96%   BMI 25.69 kg/m      Heart rate controlled rhythm regular lungs clear to auscultation bilaterally, legs are wrapped and he will be going to visit with the wound clinic later this afternoon so he prefers to not have these unwrapped now.     Additional Information   Current Outpatient Medications   Medication Sig Dispense Refill    acetaminophen (TYLENOL) 500 MG tablet Take 1,000 mg by mouth 3 times daily      B Complex CAPS TAKE ONE CAPSULE BY MOUTH ONCE DAILY 100 capsule 0    Cholecalciferol (VITAMIN D3) 25 MCG (1000 UT) CAPS Take 1,000 Units by mouth daily      febuxostat (ULORIC) 40 MG TABS tablet **THANK YOU** 90 tablet 4    ferrous sulfate (FEROSUL) 325 (65 Fe) MG tablet Take 325 mg by mouth daily (with breakfast)      folic acid (FOLVITE) 1 MG tablet TAKE ONE TABLET BY MOUTH ONCE DAILY. 30 tablet 0    Lactobacillus Probiotic TABS Take 1 capsule by mouth daily      melatonin 3 MG tablet Take 1 tablet (3 mg) by mouth nightly as needed for sleep      metoprolol succinate ER (TOPROL XL) 25 MG 24 hr tablet Take " 1 tablet (25 mg) by mouth daily      midodrine (PROAMATINE) 2.5 MG tablet Take 1 tablet (2.5 mg) by mouth 3 times daily as needed (if SBP <120)      mirtazapine (REMERON) 15 MG tablet Take 1 tablet (15 mg) by mouth at bedtime 90 tablet 4    multivitamin (ONE-DAILY) tablet Take 1 tablet by mouth daily      NONFORMULARY Take 2 tablets by mouth daily Dental Supplement      NONFORMULARY Take 1 capsule by mouth daily Methylene Blue 5 mg      oxyCODONE IR (ROXICODONE) 10 MG tablet Take 1 tablet (10 mg) by mouth 4 times daily      penicillin V (VEETID) 500 MG tablet Take 1 tablet (500 mg) by mouth daily Hx endocarditis 90 tablet 1    pentoxifylline ER (TRENTAL) 400 MG CR tablet Take 1 tablet (400 mg) by mouth daily      polyethylene glycol (MIRALAX) 17 g packet Take 17 g by mouth daily as needed for constipation 30 packet 0    sennosides (SENOKOT) 8.6 MG tablet Take 1-4 tablets by mouth 2 times daily      torsemide (DEMADEX) 20 MG tablet Take 3 tablets (60 mg) by mouth 2 times daily      triamcinolone (KENALOG) 0.1 % external ointment Apply topically 2 times daily 80 g 1    vitamin C (ASCORBIC ACID) 500 MG tablet Take 1 tablet (500 mg) by mouth daily      warfarin ANTICOAGULANT (COUMADIN) 3 MG tablet TAKE TWO TABLETS (6 MG) BY MOUTH DAILY OR AS DIRECTED BY [facility] CLINIC. 190 tablet 1    Methylene Blue POWD 5 mg capsule, one daily one week, one morning and noon one week, two morning and one noon, 75 (Patient not taking: Reported on 6/19/2024) 75 g 1    naloxone (NARCAN) 4 MG/0.1ML nasal spray Spray 1 spray (4 mg) into one nostril alternating nostrils as needed for opioid reversal every 2-3 minutes until assistance arrives (Patient not taking: Reported on 6/19/2024) 0.2 mL 1       Time:     The longitudinal plan of care for the diagnosis(es)/condition(s) as documented were addressed during this visit. Due to the added complexity in care, I will continue to support Bandar in the subsequent management and with ongoing continuity of  care.     Jin Hicks MD

## 2024-06-20 NOTE — TELEPHONE ENCOUNTER
June 17, 2024    Home health orders was received via fax for Dr. Hicks.  Patient label was attached to paperwork and placed in provider's inbox to be signed.    Corin Samayoa    
June 20, 2024    Home health orders was picked up from outbox of Dr. Hicks and sent via fax to [phone].    Edna Rodriguez  
yes

## 2024-06-24 NOTE — PROGRESS NOTES
ANTICOAGULATION MANAGEMENT     Bandar Wells 73 year old male is on warfarin with supratherapeutic INR result. (Goal INR 2.5-3.5)    Recent labs: (last 7 days)     06/24/24  0000   INR 7.5*       ASSESSMENT     Source(s): Chart Review and Patient/Caregiver Call     Warfarin doses taken: Warfarin taken as instructed  Diet: Decreased greens/vitamin K in diet; plans to resume previous intake  Medication/supplement changes:  stopped metoprolol 6/19  Taking benedryl and topical steroids  New illness, injury, or hospitalization: Yes: Pt reports a severe rash that started after taking metoprolol- very painful. Seemed a little better today finally.   Signs or symptoms of bleeding or clotting: No  Previous result: Therapeutic last 2(+) visits  Additional findings: None       PLAN     Recommended plan for temporary change(s) affecting INR     Dosing Instructions: Already took dose today, hold 2 doses then continue your current warfarin dose with next INR in 3 days- wait to take dose on Thursday until after RN calls with instructions    Summary  As of 6/24/2024      Full warfarin instructions:  6/25: Hold; 6/26: Hold; Otherwise 7.5 mg every day   Next INR check:  6/27/2024               Telephone call with Bandar who verbalizes understanding and agrees to plan and who agrees to plan and repeated back plan correctly    Patient to recheck with home meter    Education provided:   Symptom monitoring: monitoring for bleeding signs and symptoms, monitoring for stroke signs and symptoms, when to seek medical attention/emergency care, and if you hit your head or have a bad fall seek emergency care    Plan made with [facility] Pharmacist Batolo Acevedo, RN  Anticoagulation Clinic  6/24/2024    _______________________________________________________________________     Anticoagulation Episode Summary       Current INR goal:  2.5-3.5   TTR:  40.5% (1 y)   Target end date:  Indefinite   Send INR reminders to:  ANTICOAG HOME  MONITORING    Indications    Paroxysmal atrial fibrillation (H) [I48.0]  S/P mitral valve replacement with metallic valve [Z95.4]             Comments:  [facility]- wants a call every time. Needs to Bridge if below 2.0             Anticoagulation Care Providers       Provider Role Specialty Phone number    Jin Hicks MD Referring Internal Medicine [phone]

## 2024-06-24 NOTE — TELEPHONE ENCOUNTER
June 24, 2024    Home health orders was received via fax for Dr. Hicks.  Patient label was attached to paperwork and placed in provider's inbox to be signed.    Corin Samayoa

## 2024-06-26 NOTE — TELEPHONE ENCOUNTER
June 26, 2024    Home health orders was picked up from outbox of Dr. Hicks and sent via fax to [phone].    Corin Samayoa

## 2024-06-27 NOTE — PROGRESS NOTES
ANTICOAGULATION MANAGEMENT     Bandar eWlls 73 year old male is on warfarin with supratherapeutic INR result. (Goal INR 2.5-3.5)    Recent labs: (last 7 days)     06/27/24  0000   INR 3.9*       ASSESSMENT     Source(s): Chart Review and Patient/Caregiver Call     Warfarin doses taken: Held for supra INR x 2 days  recently which may be affecting INR  Diet: No new diet changes identified  Medication/supplement changes: None noted  New illness, injury, or hospitalization: No  Signs or symptoms of bleeding or clotting: No  Previous result: Supratherapeutic  Additional findings:  patient confirms rash is 90% gone and he has resumed a more normal diet as of yesterday       PLAN     Recommended plan for temporary change(s) affecting INR     Dosing Instructions: Continue your current warfarin dose with next INR in 6 days       Summary  As of 6/27/2024      Full warfarin instructions:  7.5 mg every day   Next INR check:  7/3/2024               Telephone call with Bandar who verbalizes understanding and agrees to plan    Patient to recheck with home meter    Education provided: Please call back if any changes to your diet, medications or how you've been taking warfarin  Symptom monitoring: monitoring for bleeding signs and symptoms and when to seek medical attention/emergency care  Contact [phone] with any changes, questions or concerns.     Plan made with [facility] Pharmacist Shilpi Montes, RN  Anticoagulation Clinic  6/27/2024    _______________________________________________________________________     Anticoagulation Episode Summary       Current INR goal:  2.5-3.5   TTR:  39.6% (1 y)   Target end date:  Indefinite   Send INR reminders to:  ANTICOAG HOME MONITORING    Indications    Paroxysmal atrial fibrillation (H) [I48.0]  S/P mitral valve replacement with metallic valve [Z95.4]             Comments:  [employer]- wants a call every time. Needs to Bridge if below 2.0             Anticoagulation Care  Providers       Provider Role Specialty Phone number    Jin Hicks MD Referring Internal Medicine [phone]

## 2024-07-01 NOTE — TELEPHONE ENCOUNTER
July 1, 2024  Home health orders was received via fax for Dr. Hicks.  Patient label was attached to paperwork and placed in provider's inbox to be signed.    Corin Samayoa

## 2024-07-03 PROBLEM — I50.33 ACUTE ON CHRONIC HEART FAILURE WITH PRESERVED EJECTION FRACTION (H): Status: RESOLVED | Noted: 2024-01-01 | Resolved: 2024-01-01

## 2024-07-03 PROBLEM — E03.9 HYPOTHYROIDISM, UNSPECIFIED TYPE: Status: ACTIVE | Noted: 2024-01-01

## 2024-07-03 NOTE — PROGRESS NOTES
ANTICOAGULATION MANAGEMENT     Bandar Wells 73 year old male is on warfarin with supratherapeutic INR result. (Goal INR 2.5-3.5)    Recent labs: (last 7 days)     07/03/24  0000   INR 3.6*       ASSESSMENT     Source(s): Chart Review and Patient/Caregiver Call     Warfarin doses taken: Warfarin taken as instructed  Diet:  inconsistent green intake may be affecting diet and INR  Medication/supplement changes:  started levothyroxine on 6/20/24 which can elevate INR  New illness, injury, or hospitalization: Yes: Hx: pt was hospitalized in May for septic shock. Pt was in a TCU until 6/5/24. Pt had a rash which could have caused inflammation and elevated INR.  Signs or symptoms of bleeding or clotting: No  Previous result: Supratherapeutic  Additional findings: Pt already took his warfarin today. Recommend MD adjustment       PLAN     Recommended plan for ongoing change(s) affecting INR     Dosing Instructions: partial hold then decrease your warfarin dose (2.9% change) with next INR in 1 week       Summary  As of 7/3/2024      Full warfarin instructions:  7/4: 3 mg; Otherwise 6 mg every Sun; 7.5 mg all other days   Next INR check:  7/10/2024               Telephone call with Bandar who verbalizes understanding and agrees to plan  Sent [employer] message with dosing and follow up instructions    Patient to recheck with home meter    Education provided: Contact [phone] with any changes, questions or concerns.     Plan made per [facility] anticoagulation protocol    Julieta Muñoz RN  Anticoagulation Clinic  7/3/2024    _______________________________________________________________________     Anticoagulation Episode Summary       Current INR goal:  2.5-3.5   TTR:  38.0% (1 y)   Target end date:  Indefinite   Send INR reminders to:  ANTICOAG HOME MONITORING    Indications    Paroxysmal atrial fibrillation (H) [I48.0]  S/P mitral valve replacement with metallic valve [Z95.4]             Comments:  [facility]- wants a call every  time. Needs to Bridge if below 2.0             Anticoagulation Care Providers       Provider Role Specialty Phone number    Jin Hicks MD Referring Internal Medicine [phone]

## 2024-07-03 NOTE — PROGRESS NOTES
"  Office Visit - Follow Up   Bandar Wells   73 year old male    Date of Visit: 7/3/2024    Chief Complaint   Patient presents with    Follow Up    Recheck Medication     Pt is here to follow up from hospital admission of blood infection.        Assessment and Plan   1. Chronic combined systolic and diastolic heart failure (H)  Continue with current medications.  He is not taking metoprolol because of the presumed drug rash.    2. CKD (chronic kidney disease) stage 4, GFR 15-29 ml/min (H)  Stable follow-up with nephrology    3. Anemia due to stage 4 chronic kidney disease (H)  Stable per nephrology    4. Paroxysmal atrial fibrillation (H)  Now has a pacemaker and is following up with Dr. Mi    5. Venous ulcers of both lower extremities (H)  Continue with current wound care    6. Hypothyroidism, unspecified type  Continue with levothyroxine increased to 50 mcg after 1 month and come and see me in 6 weeks    Return in about 6 weeks (around 8/14/2024) for Follow up.     History of Present Illness   This 73 year old man comes in for follow-up.  Overall he is doing much better.  He has started thyroid medication.  We reviewed his labs and he has follow-up with nephrology upcoming.  He continues with the wound clinic.       Physical Exam   General Appearance:   No acute distress    BP 97/54 (BP Location: Left arm, Patient Position: Sitting, Cuff Size: Adult Regular)   Pulse 86   Temp 97.7  F (36.5  C) (Tympanic)   Resp 20   Ht 1.702 m (5' 7\")   Wt 75.4 kg (166 lb 3.2 oz)   SpO2 96%   BMI 26.03 kg/m           Additional Information   Current Outpatient Medications   Medication Sig Dispense Refill    acetaminophen (TYLENOL) 500 MG tablet Take 1,000 mg by mouth 3 times daily      B Complex CAPS TAKE ONE CAPSULE BY MOUTH ONCE DAILY 100 capsule 0    Cholecalciferol (VITAMIN D3) 25 MCG (1000 UT) CAPS Take 1,000 Units by mouth daily      febuxostat (ULORIC) 40 MG TABS tablet **THANK YOU** 90 tablet 4    ferrous " sulfate (FEROSUL) 325 (65 Fe) MG tablet Take 325 mg by mouth daily (with breakfast)      folic acid (FOLVITE) 1 MG tablet TAKE ONE TABLET BY MOUTH ONCE DAILY. 30 tablet 0    Lactobacillus Probiotic TABS Take 1 capsule by mouth daily      levothyroxine (SYNTHROID/LEVOTHROID) 25 MCG tablet Take 1 tablet (25 mcg) by mouth daily for 30 days, THEN 2 tablets (50 mcg) daily for 30 days. 90 tablet 0    melatonin 3 MG tablet Take 1 tablet (3 mg) by mouth nightly as needed for sleep      midodrine (PROAMATINE) 2.5 MG tablet Take 1 tablet (2.5 mg) by mouth 3 times daily as needed (if SBP <120)      mirtazapine (REMERON) 15 MG tablet Take 1 tablet (15 mg) by mouth at bedtime 90 tablet 4    multivitamin (ONE-DAILY) tablet Take 1 tablet by mouth daily      naloxone (NARCAN) 4 MG/0.1ML nasal spray Spray 1 spray (4 mg) into one nostril alternating nostrils as needed for opioid reversal every 2-3 minutes until assistance arrives 0.2 mL 1    NONFORMULARY Take 2 tablets by mouth daily Dental Supplement      NONFORMULARY Take 1 capsule by mouth daily Methylene Blue 5 mg      oxyCODONE IR (ROXICODONE) 10 MG tablet Take 1 tablet (10 mg) by mouth every 6 hours as needed for severe pain May take up to 4 tablets/day 120 tablet 0    penicillin V (VEETID) 500 MG tablet Take 1 tablet (500 mg) by mouth daily Hx endocarditis 90 tablet 1    pentoxifylline ER (TRENTAL) 400 MG CR tablet Take 1 tablet (400 mg) by mouth daily      polyethylene glycol (MIRALAX) 17 g packet Take 17 g by mouth daily as needed for constipation 30 packet 0    sennosides (SENOKOT) 8.6 MG tablet Take 1-4 tablets by mouth 2 times daily      torsemide (DEMADEX) 20 MG tablet Take 3 tablets (60 mg) by mouth 2 times daily      triamcinolone (KENALOG) 0.1 % external ointment Apply topically 2 times daily 80 g 1    vitamin C (ASCORBIC ACID) 500 MG tablet Take 1 tablet (500 mg) by mouth daily      warfarin ANTICOAGULANT (COUMADIN) 3 MG tablet TAKE TWO TABLETS (6 MG) BY MOUTH DAILY  OR AS DIRECTED BY [facility] CLINIC. 190 tablet 1    Methylene Blue POWD 5 mg capsule, one daily one week, one morning and noon one week, two morning and one noon, 75 (Patient not taking: Reported on 6/19/2024) 75 g 1       Time:     The longitudinal plan of care for the diagnosis(es)/condition(s) as documented were addressed during this visit. Due to the added complexity in care, I will continue to support Bandar in the subsequent management and with ongoing continuity of care.     Jin Hicks MD  Answers submitted by the patient for this visit:  General Questionnaire (Submitted on 7/3/2024)  Chief Complaint: Chronic problems general questions HPI Form  What is the reason for your visit today? : follow up  How many servings of fruits and vegetables do you eat daily?: 0-1  On average, how many sweetened beverages do you drink each day (Examples: soda, juice, sweet tea, etc.  Do NOT count diet or artificially sweetened beverages)?: 1  How many minutes a day do you exercise enough to make your heart beat faster?: 9 or less  How many days a week do you exercise enough to make your heart beat faster?: 3 or less  How many days per week do you miss taking your medication?: 0

## 2024-07-03 NOTE — TELEPHONE ENCOUNTER
July 3, 2024    Home health orders was picked up from outbox of Dr. Hicks and sent via fax to [phone].    Corin Samayoa

## 2024-07-03 NOTE — TELEPHONE ENCOUNTER
July 3, 2024    Home health orders was picked up from outbox of Dr. Hicks and sent via fax to [phone].    Corin Samayoa

## 2024-07-08 NOTE — TELEPHONE ENCOUNTER
Home Health Care    Reason for call:  Verbal Orders    Orders are needed for this patient.  OT: evaluation week of 7/8  - RN is hoping to see patient on 7/9    Pt Provider: Dr Hicks    Phone Number Homecare Nurse can be reached at: [phone], Saniya, secure voicemail

## 2024-07-08 NOTE — TELEPHONE ENCOUNTER
July 8, 2024    Home health orders was received via fax for Dr. Hicks.  Patient label was attached to paperwork and placed in provider's inbox to be signed.    Corin Samayoa

## 2024-07-09 NOTE — TELEPHONE ENCOUNTER
Reason for call:  heart rate is 106-112, /58, feeling fatigued today.    No light headedness or dizzyness    Pt Provider: Dr Hicks    Phone Number Homecare Nurse can be reached at: [phone]-ok for detailed message

## 2024-07-09 NOTE — TELEPHONE ENCOUNTER
See PCP's response on 7/9/24 to request for home care orders on 7/9/24.    Writer [facility] and left a detailed message, relaying the above PCP's response to home care orders.    If Galilea calls back, please relay to her the above PCP's message.    Please route to the RN queue for any questions or concerns.    Ирина Sandoval RN, BSN  [facility]

## 2024-07-09 NOTE — TELEPHONE ENCOUNTER
Home Health Care    Reason for call:  Verbal Orders     Orders are needed for this patient.  Skilled Nursinx week for 1 week, 2x a week for 5 weeks, 3x a week for 2 weeks, 3 PRN visits for wound care.    PT - eval and treat week of      Pt Provider: Kurt     Phone Number Homecare Nurse can be reached at: [phone]      Okay to leave a detailed message?: Yes at Other phone number:  [phone]

## 2024-07-09 NOTE — TELEPHONE ENCOUNTER
OT calling to report heart rate above parameter of 100 bpm.  Saniya states patient's heart rate elevated most of visit (45 minutes), 106-112, /58.  Patient was slightly fatigues but otherwise asymptomatic.

## 2024-07-09 NOTE — TELEPHONE ENCOUNTER
Home Care is calling regarding an established patient with [facility].        2/1/2024     3:29 PM   Home Care Information   Current following provider [facility]   Home Care agency [facility]     Requesting orders from: Jin Hicks  Provider is following patient: Yes  Is this a 60-day recertification request?  No    Orders Requested    Skilled Nursing  Request for initial certification (first set of orders)   Frequency:  1x/wk for 1 wks  then 2x/wk for 5 wks  Then 3x a week for 2 wks, and 3 prn visits for wound care.    Physical Therapy  Request for initial evaluation and treatment (one time)       Verbal orders given for PT eval and treat.  Information was gathered for SN visit set.  Provider review needed.  RN will contact Home Care with information after provider review.  Confirmed ok to leave a detailed message with call back.    Galilea Haq RN

## 2024-07-09 NOTE — TELEPHONE ENCOUNTER
.  Home Care is calling regarding an established patient with  [facility].        2/1/2024     3:29 PM   Home Care Information   Current following provider [facility]   [facility]     Requesting orders from: Jin Hicsk  Provider is following patient: Yes  Is this a 60-day recertification request?  No    Orders Requested    Occupational Therapy  Request for initial evaluation and treatment (one time)     Occupational Therapy  Request for initial evaluation and treatment (one time)     Verbal orders given.  Home Care will send orders for provider to sign.  Confirmed ok to leave a detailed message with call back.  Contact information confirmed and updated as needed.    Nicole Calvert RN

## 2024-07-10 NOTE — TELEPHONE ENCOUNTER
July 10, 2024    Home health orders was picked up from outbox of Dr. Hicks and sent via fax to [phone].    Corin Samayoa

## 2024-07-10 NOTE — PROGRESS NOTES
ANTICOAGULATION MANAGEMENT     Bandar Wells 73 year old male is on warfarin with therapeutic INR result. (Goal INR 2.5-3.5)    Recent labs: (last 7 days)     07/10/24  0000   INR 2.9       ASSESSMENT     Source(s): Chart Review and Patient/Caregiver Call     Warfarin doses taken: Warfarin taken as instructed  Diet: No new diet changes identified  Medication/supplement changes: None noted  New illness, injury, or hospitalization: No  Signs or symptoms of bleeding or clotting: No  Previous result: Supratherapeutic  Additional findings: None       PLAN     Recommended plan for no diet, medication or health factor changes affecting INR     Dosing Instructions: Continue your current warfarin dose with next INR in 1 week       Summary  As of 7/10/2024      Full warfarin instructions:  6 mg every Sun; 7.5 mg all other days   Next INR check:  7/17/2024               Telephone call with Bandar who verbalizes understanding and agrees to plan    Patient to recheck with home meter    Education provided: Contact [phone] with any changes, questions or concerns.     Plan made per [facility] anticoagulation protocol    Joyce CARIAS RN  Anticoagulation Clinic  7/10/2024    _______________________________________________________________________     Anticoagulation Episode Summary       Current INR goal:  2.5-3.5   TTR:  37.7% (1 y)   Target end date:  Indefinite   Send INR reminders to:  [facility]    Indications    Paroxysmal atrial fibrillation (H) [I48.0]  S/P mitral valve replacement with metallic valve [Z95.4]             Comments:  [facility]- wants a call every time. Needs to Bridge if below 2.0             Anticoagulation Care Providers       Provider Role Specialty Phone number    Jin Hicks MD Referring Internal Medicine [phone]

## 2024-07-12 NOTE — TELEPHONE ENCOUNTER
July 12, 2024    Home health orders was picked up from outbox of Dr. Hicks and sent via fax to [phone].    Edna Rodriguez

## 2024-07-12 NOTE — TELEPHONE ENCOUNTER
July 12, 2024    Home health orders was received via fax for Dr. Hicks.  Patient label was attached to paperwork and placed in provider's inbox to be signed.    Pam J. Behr

## 2024-07-15 NOTE — TELEPHONE ENCOUNTER
July 15, 2024    Home health orders was received via fax for Dr. Hicks.  Patient label was attached to paperwork and placed in provider's inbox to be signed.    Corin Samayoa

## 2024-07-17 NOTE — TELEPHONE ENCOUNTER
July 17, 2024    Home health orders was picked up from outbox of Dr. Hicks and sent via fax to [phone].    Corin Samayoa

## 2024-07-17 NOTE — PROGRESS NOTES
"ANTICOAGULATION MANAGEMENT     Bandar Wells 73 year old male is on warfarin with supratherapeutic INR result. (Goal INR 2.5-3.5)    Recent labs: (last 7 days)     07/17/24  0000   INR 3.8*       ASSESSMENT     Source(s): Chart Review and Patient/Caregiver Call     Warfarin doses taken: Warfarin taken as instructed  Diet: No new diet changes identified  Medication/supplement changes: None noted  New illness, injury, or hospitalization: Yes: diarrhea yesterday and seem to get better. \"It must be something I ate\"  Signs or symptoms of bleeding or clotting: No  Previous result: Therapeutic last visit; previously outside of goal range  Additional findings: None       PLAN     Recommended plan for temporary change(s) affecting INR     Dosing Instructions: partial hold then continue your current warfarin dose with next INR in 1 week       Summary  As of 7/17/2024      Full warfarin instructions:  7/17: 4.5 mg; Otherwise 6 mg every Sun; 7.5 mg all other days   Next INR check:  7/24/2024               Telephone call with Bandar who verbalizes understanding and agrees to plan    Patient to recheck with home meter    Education provided: Please call back if any changes to your diet, medications or how you've been taking warfarin    Plan made per [facility] anticoagulation protocol    Cecile Huggins RN  Anticoagulation Clinic  7/17/2024    _______________________________________________________________________     Anticoagulation Episode Summary       Current INR goal:  2.5-3.5   TTR:  37.5% (1 y)   Target end date:  Indefinite   Send INR reminders to:  [facility]    Indications    Paroxysmal atrial fibrillation (H) [I48.0]  S/P mitral valve replacement with metallic valve [Z95.4]             Comments:  [facility]- wants a call every time. Needs to Bridge if below 2.0             Anticoagulation Care Providers       Provider Role Specialty Phone number    Jin Hicks MD Referring Internal Medicine [phone]            "

## 2024-07-22 NOTE — TELEPHONE ENCOUNTER
July 22, 2024    Home health orders was received via fax for Dr. Hicks.  Patient label was attached to paperwork and placed in provider's inbox to be signed.    Edna Rodriguez

## 2024-07-24 NOTE — PROGRESS NOTES
ANTICOAGULATION MANAGEMENT     Bandar Wells 73 year old male is on warfarin with supratherapeutic INR result. (Goal INR 2.5-3.5)    Recent labs: (last 7 days)     07/24/24  0000   INR 3.7*       ASSESSMENT     Source(s): Chart Review and Home Care/Facility Nurse     Warfarin doses taken: Warfarin taken as instructed  Diet: No new diet changes identified  Medication/supplement changes:  Tylenol as needed use which may be increasing INR today  oxycodone as needed use No interaction anticipated  New illness, injury, or hospitalization: Yes: biopsy on scalp done last 7/18 -still having pain rated it about 6-7 /10  Signs or symptoms of bleeding or clotting: Yes: bleeding on scalp after biopsy was done last Thursday - no recurrence at this time.  Previous result: Supratherapeutic  Additional findings:  Stated that he was at the wound clinic today and they checked his scalp and no signs of infection  noted.       PLAN     Recommended plan for temporary change(s) affecting INR     Dosing Instructions: partial hold then continue your current warfarin dose with next INR in 5 -7 days       Summary  As of 7/24/2024      Full warfarin instructions:  7/24: 3 mg; Otherwise 6 mg every Sun; 7.5 mg all other days   Next INR check:  8/7/2024               Telephone call with Bandar who verbalizes understanding and agrees to plan and who agrees to plan and repeated back plan correctly    Patient to recheck with home meter    Education provided: Symptom monitoring: monitoring for bleeding signs and symptoms  Contact [phone] with any changes, questions or concerns.   Impact of pain on INR  Interaction between tylenol and warfarin    Plan made per [facility] anticoagulation protocol    Galilea Hernandez, RN  Anticoagulation Clinic  7/24/2024    _______________________________________________________________________     Anticoagulation Episode Summary       Current INR goal:  2.5-3.5   TTR:  37.5% (1 y)   Target end date:  Indefinite   Send INR  reminders to:  [facility]    Indications    Paroxysmal atrial fibrillation (H) [I48.0]  S/P mitral valve replacement with metallic valve [Z95.4]             Comments:  [facility]- wants a call every time. Needs to Bridge if below 2.0             Anticoagulation Care Providers       Provider Role Specialty Phone number    Jin Hicks MD Referring Internal Medicine [phone]

## 2024-07-24 NOTE — TELEPHONE ENCOUNTER
July 24, 2024    Home health orders was picked up from outbox of Dr. Hicks and sent via fax to [phone].    Corin Samayoa

## 2024-07-24 NOTE — TELEPHONE ENCOUNTER
July 24, 2024    Home health orders was picked up from outbox of Dr. Hicks and sent via fax to [phone].    Corin Samayoa

## 2024-07-30 ENCOUNTER — TELEPHONE (OUTPATIENT)
Dept: INTERNAL MEDICINE | Facility: CLINIC | Age: 74
End: 2024-07-30
Payer: COMMERCIAL

## 2024-07-30 NOTE — TELEPHONE ENCOUNTER
July 30, 2024    Home health orders was received via fax for Dr. Hicks.  Patient label was attached to paperwork and placed in the inbox for  Dr. Parra (covering provider) to review and sign.    Corin Samayoa

## 2024-07-31 ENCOUNTER — TELEPHONE (OUTPATIENT)
Dept: INTERNAL MEDICINE | Facility: CLINIC | Age: 74
End: 2024-07-31
Payer: COMMERCIAL

## 2024-07-31 NOTE — TELEPHONE ENCOUNTER
July 31, 2024    Home health orders was picked up from outbox of Dr. Parra and sent via fax to [phone].    Corin Samayoa

## 2024-09-09 DIAGNOSIS — I48.0 PAROXYSMAL ATRIAL FIBRILLATION (H): ICD-10-CM

## 2024-09-09 DIAGNOSIS — Z95.4 S/P MITRAL VALVE REPLACEMENT WITH METALLIC VALVE: ICD-10-CM

## 2024-09-09 RX ORDER — WARFARIN SODIUM 3 MG/1
TABLET ORAL
Qty: 190 TABLET | Refills: 1 | OUTPATIENT
Start: 2024-09-09

## 2024-10-25 NOTE — PROGRESS NOTES
ANTICOAGULATION     Bandar Wells is overdue for INR check.       My chart message sent    Brenda Steiner RN  
Opt out

## 2024-11-07 ASSESSMENT — PATIENT HEALTH QUESTIONNAIRE - PHQ9: SUM OF ALL RESPONSES TO PHQ QUESTIONS 1-9: 1

## 2025-05-09 NOTE — PROGRESS NOTES
CLINIC FOLLOW UP NOTE:    Original Reason for consultation, site patient seen:    Interval history:  Yearly follow up.  Still in his own home, but globally much weaker and panchito than in past.  I have not seen in awhile.  Much of the lower dentition is blackened/rotted.  No real other ID issues remains on PCN for life.         Current antibiotics and duration plan:    Reviewed PAST MEDICAL HISTORY,  family and social history      Examination:  Alert, awake  Less physically fit, unstable on ambulation  Very poor dentition  Vitals tabulated above, reviewed  Neck supple  Sclera OK  CARDIOVASCULAR regular rate and rhythm, no mumur  Lungs CLEAR TO AUSCULTATION   Abdomen soft, NT/ND, absent HEPATOSPLENOMEGALY  Skin normal  Joints normal  Neurologic exam non focal  Wound:  N/A    Lab Data/New Imaging/Medication levels/Microbiologic data:  Creat is 2.38 in June 23    IMPRESSION:  CHF  Weakness and failure to thrive,ongoing  Peripheral edema BLE  SBE (times 2) on lifetime PCN--MVR  CRI seeing DR. Florencia Nguyen  Appreciate all the coord care Dr. Hicks gives Bandar    PLAN:  No additional recs, reviewed lab work.   Teeth were in issue in my June 2022 note    Thank you for allowing me to continue care of this patient.    Sincerely:      BISHNU Ordonez MD  [facility]  [facility] [phone]   [Time Spent: ___ minutes] : I have spent [unfilled] minutes of time on the encounter which excludes teaching and separately reported services.